# Patient Record
Sex: MALE | Race: WHITE | URBAN - METROPOLITAN AREA
[De-identification: names, ages, dates, MRNs, and addresses within clinical notes are randomized per-mention and may not be internally consistent; named-entity substitution may affect disease eponyms.]

---

## 2017-03-15 ENCOUNTER — INPATIENT (INPATIENT)
Facility: HOSPITAL | Age: 52
LOS: 1 days | Discharge: HOME | End: 2017-03-17
Attending: INTERNAL MEDICINE | Admitting: INTERNAL MEDICINE

## 2017-05-08 ENCOUNTER — EMERGENCY (EMERGENCY)
Facility: HOSPITAL | Age: 52
LOS: 0 days | Discharge: HOME | End: 2017-05-08

## 2017-05-19 ENCOUNTER — APPOINTMENT (OUTPATIENT)
Dept: PULMONOLOGY | Facility: CLINIC | Age: 52
End: 2017-05-19

## 2017-06-28 DIAGNOSIS — M53.3 SACROCOCCYGEAL DISORDERS, NOT ELSEWHERE CLASSIFIED: ICD-10-CM
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
## 2017-06-28 DIAGNOSIS — M54.5 LOW BACK PAIN: ICD-10-CM
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
## 2017-06-28 DIAGNOSIS — E66.9 OBESITY, UNSPECIFIED: ICD-10-CM
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
## 2017-06-28 DIAGNOSIS — I10 ESSENTIAL (PRIMARY) HYPERTENSION: ICD-10-CM

## 2017-06-28 DIAGNOSIS — Z79.51 LONG TERM (CURRENT) USE OF INHALED STEROIDS: ICD-10-CM

## 2017-06-28 DIAGNOSIS — R51 HEADACHE: ICD-10-CM

## 2017-06-28 DIAGNOSIS — E78.00 PURE HYPERCHOLESTEROLEMIA, UNSPECIFIED: ICD-10-CM

## 2017-06-28 DIAGNOSIS — J44.9 CHRONIC OBSTRUCTIVE PULMONARY DISEASE, UNSPECIFIED: ICD-10-CM

## 2017-06-28 DIAGNOSIS — K21.9 GASTRO-ESOPHAGEAL REFLUX DISEASE WITHOUT ESOPHAGITIS: ICD-10-CM

## 2017-08-14 ENCOUNTER — EMERGENCY (EMERGENCY)
Facility: HOSPITAL | Age: 52
LOS: 0 days | Discharge: HOME | End: 2017-08-15

## 2017-08-14 DIAGNOSIS — I10 ESSENTIAL (PRIMARY) HYPERTENSION: ICD-10-CM

## 2017-08-14 DIAGNOSIS — K21.9 GASTRO-ESOPHAGEAL REFLUX DISEASE WITHOUT ESOPHAGITIS: ICD-10-CM

## 2017-08-14 DIAGNOSIS — Z79.82 LONG TERM (CURRENT) USE OF ASPIRIN: ICD-10-CM

## 2017-08-14 DIAGNOSIS — G47.33 OBSTRUCTIVE SLEEP APNEA (ADULT) (PEDIATRIC): ICD-10-CM

## 2017-08-14 DIAGNOSIS — Z90.49 ACQUIRED ABSENCE OF OTHER SPECIFIED PARTS OF DIGESTIVE TRACT: ICD-10-CM

## 2017-08-14 DIAGNOSIS — J44.1 CHRONIC OBSTRUCTIVE PULMONARY DISEASE WITH (ACUTE) EXACERBATION: ICD-10-CM

## 2017-08-14 DIAGNOSIS — R06.02 SHORTNESS OF BREATH: ICD-10-CM

## 2017-08-14 DIAGNOSIS — S76.219A STRAIN OF ADDUCTOR MUSCLE, FASCIA AND TENDON OF UNSPECIFIED THIGH, INITIAL ENCOUNTER: ICD-10-CM

## 2017-08-14 DIAGNOSIS — M17.10 UNILATERAL PRIMARY OSTEOARTHRITIS, UNSPECIFIED KNEE: ICD-10-CM

## 2017-08-14 DIAGNOSIS — Z79.899 OTHER LONG TERM (CURRENT) DRUG THERAPY: ICD-10-CM

## 2017-08-14 DIAGNOSIS — R07.9 CHEST PAIN, UNSPECIFIED: ICD-10-CM

## 2017-08-14 DIAGNOSIS — E66.9 OBESITY, UNSPECIFIED: ICD-10-CM

## 2017-08-14 DIAGNOSIS — E78.5 HYPERLIPIDEMIA, UNSPECIFIED: ICD-10-CM

## 2017-08-14 DIAGNOSIS — Z98.890 OTHER SPECIFIED POSTPROCEDURAL STATES: ICD-10-CM

## 2017-08-14 DIAGNOSIS — E78.00 PURE HYPERCHOLESTEROLEMIA, UNSPECIFIED: ICD-10-CM

## 2017-08-14 DIAGNOSIS — J44.9 CHRONIC OBSTRUCTIVE PULMONARY DISEASE, UNSPECIFIED: ICD-10-CM

## 2017-08-14 DIAGNOSIS — R10.9 UNSPECIFIED ABDOMINAL PAIN: ICD-10-CM

## 2017-08-14 DIAGNOSIS — F17.200 NICOTINE DEPENDENCE, UNSPECIFIED, UNCOMPLICATED: ICD-10-CM

## 2017-10-11 ENCOUNTER — EMERGENCY (EMERGENCY)
Facility: HOSPITAL | Age: 52
LOS: 0 days | Discharge: HOME | End: 2017-10-11
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
## 2017-10-11 DIAGNOSIS — J44.9 CHRONIC OBSTRUCTIVE PULMONARY DISEASE, UNSPECIFIED: ICD-10-CM

## 2017-10-11 DIAGNOSIS — I10 ESSENTIAL (PRIMARY) HYPERTENSION: ICD-10-CM

## 2017-10-11 DIAGNOSIS — S93.504A UNSPECIFIED SPRAIN OF RIGHT LESSER TOE(S), INITIAL ENCOUNTER: ICD-10-CM

## 2017-10-11 DIAGNOSIS — E66.9 OBESITY, UNSPECIFIED: ICD-10-CM

## 2017-10-11 DIAGNOSIS — Z90.49 ACQUIRED ABSENCE OF OTHER SPECIFIED PARTS OF DIGESTIVE TRACT: ICD-10-CM

## 2017-10-11 DIAGNOSIS — R10.9 UNSPECIFIED ABDOMINAL PAIN: ICD-10-CM
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
## 2017-10-11 DIAGNOSIS — E78.00 PURE HYPERCHOLESTEROLEMIA, UNSPECIFIED: ICD-10-CM

## 2017-10-11 DIAGNOSIS — S76.219A STRAIN OF ADDUCTOR MUSCLE, FASCIA AND TENDON OF UNSPECIFIED THIGH, INITIAL ENCOUNTER: ICD-10-CM

## 2017-10-11 DIAGNOSIS — S90.121A CONTUSION OF RIGHT LESSER TOE(S) WITHOUT DAMAGE TO NAIL, INITIAL ENCOUNTER: ICD-10-CM

## 2017-10-11 DIAGNOSIS — R07.9 CHEST PAIN, UNSPECIFIED: ICD-10-CM

## 2017-10-11 DIAGNOSIS — Z79.82 LONG TERM (CURRENT) USE OF ASPIRIN: ICD-10-CM
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
## 2017-10-11 DIAGNOSIS — G47.33 OBSTRUCTIVE SLEEP APNEA (ADULT) (PEDIATRIC): ICD-10-CM

## 2017-10-11 DIAGNOSIS — J44.1 CHRONIC OBSTRUCTIVE PULMONARY DISEASE WITH (ACUTE) EXACERBATION: ICD-10-CM

## 2017-10-11 DIAGNOSIS — S99.921A UNSPECIFIED INJURY OF RIGHT FOOT, INITIAL ENCOUNTER: ICD-10-CM

## 2017-10-11 DIAGNOSIS — M17.10 UNILATERAL PRIMARY OSTEOARTHRITIS, UNSPECIFIED KNEE: ICD-10-CM

## 2017-10-11 DIAGNOSIS — K21.9 GASTRO-ESOPHAGEAL REFLUX DISEASE WITHOUT ESOPHAGITIS: ICD-10-CM

## 2017-10-11 DIAGNOSIS — Z98.890 OTHER SPECIFIED POSTPROCEDURAL STATES: ICD-10-CM

## 2017-12-05 DIAGNOSIS — Z96.651 PRESENCE OF RIGHT ARTIFICIAL KNEE JOINT: ICD-10-CM

## 2017-12-05 DIAGNOSIS — M17.12 UNILATERAL PRIMARY OSTEOARTHRITIS, LEFT KNEE: ICD-10-CM

## 2017-12-05 DIAGNOSIS — K21.9 GASTRO-ESOPHAGEAL REFLUX DISEASE WITHOUT ESOPHAGITIS: ICD-10-CM

## 2017-12-05 DIAGNOSIS — F11.20 OPIOID DEPENDENCE, UNCOMPLICATED: ICD-10-CM

## 2017-12-05 DIAGNOSIS — R07.9 CHEST PAIN, UNSPECIFIED: ICD-10-CM

## 2017-12-05 DIAGNOSIS — E66.8 OTHER OBESITY: ICD-10-CM

## 2017-12-05 DIAGNOSIS — G89.29 OTHER CHRONIC PAIN: ICD-10-CM

## 2017-12-05 DIAGNOSIS — J44.1 CHRONIC OBSTRUCTIVE PULMONARY DISEASE WITH (ACUTE) EXACERBATION: ICD-10-CM

## 2017-12-05 DIAGNOSIS — F17.210 NICOTINE DEPENDENCE, CIGARETTES, UNCOMPLICATED: ICD-10-CM

## 2017-12-05 DIAGNOSIS — I10 ESSENTIAL (PRIMARY) HYPERTENSION: ICD-10-CM

## 2017-12-05 DIAGNOSIS — G47.33 OBSTRUCTIVE SLEEP APNEA (ADULT) (PEDIATRIC): ICD-10-CM

## 2017-12-05 DIAGNOSIS — E78.5 HYPERLIPIDEMIA, UNSPECIFIED: ICD-10-CM

## 2017-12-05 DIAGNOSIS — I25.10 ATHEROSCLEROTIC HEART DISEASE OF NATIVE CORONARY ARTERY WITHOUT ANGINA PECTORIS: ICD-10-CM

## 2017-12-05 DIAGNOSIS — M16.0 BILATERAL PRIMARY OSTEOARTHRITIS OF HIP: ICD-10-CM

## 2017-12-05 DIAGNOSIS — K29.70 GASTRITIS, UNSPECIFIED, WITHOUT BLEEDING: ICD-10-CM

## 2017-12-23 ENCOUNTER — EMERGENCY (EMERGENCY)
Facility: HOSPITAL | Age: 52
LOS: 0 days | Discharge: HOME | End: 2017-12-24

## 2017-12-23 DIAGNOSIS — Z79.82 LONG TERM (CURRENT) USE OF ASPIRIN: ICD-10-CM

## 2017-12-23 DIAGNOSIS — Z79.3 LONG TERM (CURRENT) USE OF HORMONAL CONTRACEPTIVES: ICD-10-CM

## 2017-12-23 DIAGNOSIS — E66.9 OBESITY, UNSPECIFIED: ICD-10-CM

## 2017-12-23 DIAGNOSIS — I10 ESSENTIAL (PRIMARY) HYPERTENSION: ICD-10-CM

## 2017-12-23 DIAGNOSIS — J43.9 EMPHYSEMA, UNSPECIFIED: ICD-10-CM

## 2017-12-23 DIAGNOSIS — E78.00 PURE HYPERCHOLESTEROLEMIA, UNSPECIFIED: ICD-10-CM

## 2017-12-23 DIAGNOSIS — J44.1 CHRONIC OBSTRUCTIVE PULMONARY DISEASE WITH (ACUTE) EXACERBATION: ICD-10-CM

## 2017-12-23 DIAGNOSIS — Z79.891 LONG TERM (CURRENT) USE OF OPIATE ANALGESIC: ICD-10-CM

## 2017-12-23 DIAGNOSIS — R10.9 UNSPECIFIED ABDOMINAL PAIN: ICD-10-CM

## 2017-12-23 DIAGNOSIS — Z90.49 ACQUIRED ABSENCE OF OTHER SPECIFIED PARTS OF DIGESTIVE TRACT: ICD-10-CM

## 2017-12-23 DIAGNOSIS — G47.33 OBSTRUCTIVE SLEEP APNEA (ADULT) (PEDIATRIC): ICD-10-CM

## 2017-12-23 DIAGNOSIS — S76.219A STRAIN OF ADDUCTOR MUSCLE, FASCIA AND TENDON OF UNSPECIFIED THIGH, INITIAL ENCOUNTER: ICD-10-CM

## 2017-12-23 DIAGNOSIS — Z90.89 ACQUIRED ABSENCE OF OTHER ORGANS: ICD-10-CM

## 2017-12-23 DIAGNOSIS — M17.10 UNILATERAL PRIMARY OSTEOARTHRITIS, UNSPECIFIED KNEE: ICD-10-CM

## 2017-12-23 DIAGNOSIS — R07.9 CHEST PAIN, UNSPECIFIED: ICD-10-CM

## 2017-12-23 DIAGNOSIS — Z79.51 LONG TERM (CURRENT) USE OF INHALED STEROIDS: ICD-10-CM

## 2017-12-23 DIAGNOSIS — J44.9 CHRONIC OBSTRUCTIVE PULMONARY DISEASE, UNSPECIFIED: ICD-10-CM

## 2017-12-23 DIAGNOSIS — R07.89 OTHER CHEST PAIN: ICD-10-CM

## 2017-12-23 DIAGNOSIS — Z98.890 OTHER SPECIFIED POSTPROCEDURAL STATES: ICD-10-CM

## 2017-12-27 ENCOUNTER — INPATIENT (INPATIENT)
Facility: HOSPITAL | Age: 52
LOS: 0 days | Discharge: HOME | End: 2017-12-28
Attending: EMERGENCY MEDICINE

## 2017-12-27 DIAGNOSIS — I10 ESSENTIAL (PRIMARY) HYPERTENSION: ICD-10-CM

## 2017-12-27 DIAGNOSIS — M17.10 UNILATERAL PRIMARY OSTEOARTHRITIS, UNSPECIFIED KNEE: ICD-10-CM

## 2017-12-27 DIAGNOSIS — R07.9 CHEST PAIN, UNSPECIFIED: ICD-10-CM

## 2017-12-27 DIAGNOSIS — G47.33 OBSTRUCTIVE SLEEP APNEA (ADULT) (PEDIATRIC): ICD-10-CM

## 2017-12-27 DIAGNOSIS — E66.9 OBESITY, UNSPECIFIED: ICD-10-CM

## 2017-12-27 DIAGNOSIS — R10.9 UNSPECIFIED ABDOMINAL PAIN: ICD-10-CM

## 2017-12-27 DIAGNOSIS — J44.1 CHRONIC OBSTRUCTIVE PULMONARY DISEASE WITH (ACUTE) EXACERBATION: ICD-10-CM

## 2017-12-27 DIAGNOSIS — S76.219A STRAIN OF ADDUCTOR MUSCLE, FASCIA AND TENDON OF UNSPECIFIED THIGH, INITIAL ENCOUNTER: ICD-10-CM

## 2017-12-27 DIAGNOSIS — E78.00 PURE HYPERCHOLESTEROLEMIA, UNSPECIFIED: ICD-10-CM

## 2017-12-27 DIAGNOSIS — J44.9 CHRONIC OBSTRUCTIVE PULMONARY DISEASE, UNSPECIFIED: ICD-10-CM

## 2018-01-02 DIAGNOSIS — R05 COUGH: ICD-10-CM

## 2018-01-02 DIAGNOSIS — J44.9 CHRONIC OBSTRUCTIVE PULMONARY DISEASE, UNSPECIFIED: ICD-10-CM

## 2018-01-02 DIAGNOSIS — R07.89 OTHER CHEST PAIN: ICD-10-CM

## 2018-01-02 DIAGNOSIS — F17.210 NICOTINE DEPENDENCE, CIGARETTES, UNCOMPLICATED: ICD-10-CM

## 2018-01-02 DIAGNOSIS — E78.5 HYPERLIPIDEMIA, UNSPECIFIED: ICD-10-CM

## 2018-01-02 DIAGNOSIS — I25.10 ATHEROSCLEROTIC HEART DISEASE OF NATIVE CORONARY ARTERY WITHOUT ANGINA PECTORIS: ICD-10-CM

## 2018-01-02 DIAGNOSIS — K21.9 GASTRO-ESOPHAGEAL REFLUX DISEASE WITHOUT ESOPHAGITIS: ICD-10-CM

## 2018-01-05 ENCOUNTER — EMERGENCY (EMERGENCY)
Facility: HOSPITAL | Age: 53
LOS: 0 days | Discharge: HOME | End: 2018-01-05

## 2018-01-05 DIAGNOSIS — R11.2 NAUSEA WITH VOMITING, UNSPECIFIED: ICD-10-CM

## 2018-01-05 DIAGNOSIS — R10.31 RIGHT LOWER QUADRANT PAIN: ICD-10-CM

## 2018-01-05 DIAGNOSIS — K21.9 GASTRO-ESOPHAGEAL REFLUX DISEASE WITHOUT ESOPHAGITIS: ICD-10-CM

## 2018-01-05 DIAGNOSIS — R07.9 CHEST PAIN, UNSPECIFIED: ICD-10-CM

## 2018-01-05 DIAGNOSIS — I10 ESSENTIAL (PRIMARY) HYPERTENSION: ICD-10-CM

## 2018-01-05 DIAGNOSIS — R19.7 DIARRHEA, UNSPECIFIED: ICD-10-CM

## 2018-01-05 DIAGNOSIS — Z79.82 LONG TERM (CURRENT) USE OF ASPIRIN: ICD-10-CM

## 2018-01-05 DIAGNOSIS — J44.9 CHRONIC OBSTRUCTIVE PULMONARY DISEASE, UNSPECIFIED: ICD-10-CM

## 2018-01-05 DIAGNOSIS — M17.10 UNILATERAL PRIMARY OSTEOARTHRITIS, UNSPECIFIED KNEE: ICD-10-CM

## 2018-01-05 DIAGNOSIS — E78.00 PURE HYPERCHOLESTEROLEMIA, UNSPECIFIED: ICD-10-CM

## 2018-01-05 DIAGNOSIS — Z79.51 LONG TERM (CURRENT) USE OF INHALED STEROIDS: ICD-10-CM

## 2018-01-05 DIAGNOSIS — R10.32 LEFT LOWER QUADRANT PAIN: ICD-10-CM

## 2018-01-05 DIAGNOSIS — G89.29 OTHER CHRONIC PAIN: ICD-10-CM

## 2018-01-05 DIAGNOSIS — E66.9 OBESITY, UNSPECIFIED: ICD-10-CM

## 2018-01-05 DIAGNOSIS — J44.1 CHRONIC OBSTRUCTIVE PULMONARY DISEASE WITH (ACUTE) EXACERBATION: ICD-10-CM

## 2018-01-05 DIAGNOSIS — Z90.49 ACQUIRED ABSENCE OF OTHER SPECIFIED PARTS OF DIGESTIVE TRACT: ICD-10-CM

## 2018-01-05 DIAGNOSIS — G47.33 OBSTRUCTIVE SLEEP APNEA (ADULT) (PEDIATRIC): ICD-10-CM

## 2018-01-05 DIAGNOSIS — R10.9 UNSPECIFIED ABDOMINAL PAIN: ICD-10-CM

## 2018-01-05 DIAGNOSIS — Z79.891 LONG TERM (CURRENT) USE OF OPIATE ANALGESIC: ICD-10-CM

## 2018-01-05 DIAGNOSIS — S76.219A STRAIN OF ADDUCTOR MUSCLE, FASCIA AND TENDON OF UNSPECIFIED THIGH, INITIAL ENCOUNTER: ICD-10-CM

## 2018-04-06 ENCOUNTER — EMERGENCY (EMERGENCY)
Facility: HOSPITAL | Age: 53
LOS: 0 days | Discharge: HOME | End: 2018-04-07
Attending: EMERGENCY MEDICINE

## 2018-04-06 VITALS
TEMPERATURE: 97 F | SYSTOLIC BLOOD PRESSURE: 126 MMHG | RESPIRATION RATE: 18 BRPM | DIASTOLIC BLOOD PRESSURE: 89 MMHG | OXYGEN SATURATION: 97 % | HEART RATE: 98 BPM

## 2018-04-06 DIAGNOSIS — R06.02 SHORTNESS OF BREATH: ICD-10-CM

## 2018-04-06 DIAGNOSIS — R20.0 ANESTHESIA OF SKIN: ICD-10-CM

## 2018-04-06 DIAGNOSIS — R07.9 CHEST PAIN, UNSPECIFIED: ICD-10-CM

## 2018-04-06 DIAGNOSIS — R07.89 OTHER CHEST PAIN: ICD-10-CM

## 2018-04-06 DIAGNOSIS — E78.00 PURE HYPERCHOLESTEROLEMIA, UNSPECIFIED: ICD-10-CM

## 2018-04-06 DIAGNOSIS — F17.200 NICOTINE DEPENDENCE, UNSPECIFIED, UNCOMPLICATED: ICD-10-CM

## 2018-04-06 DIAGNOSIS — R61 GENERALIZED HYPERHIDROSIS: ICD-10-CM

## 2018-04-06 DIAGNOSIS — I10 ESSENTIAL (PRIMARY) HYPERTENSION: ICD-10-CM

## 2018-04-06 LAB
BASE EXCESS BLDV CALC-SCNC: 5.5 MMOL/L — HIGH (ref -2–2)
BASOPHILS # BLD AUTO: 0.08 K/UL — SIGNIFICANT CHANGE UP (ref 0–0.2)
BASOPHILS NFR BLD AUTO: 0.8 % — SIGNIFICANT CHANGE UP (ref 0–1)
CA-I SERPL-SCNC: 1.2 MMOL/L — SIGNIFICANT CHANGE UP (ref 1.12–1.3)
EOSINOPHIL # BLD AUTO: 0.36 K/UL — SIGNIFICANT CHANGE UP (ref 0–0.7)
EOSINOPHIL NFR BLD AUTO: 3.6 % — SIGNIFICANT CHANGE UP (ref 0–8)
GAS PNL BLDV: 137 MMOL/L — SIGNIFICANT CHANGE UP (ref 136–145)
GAS PNL BLDV: SIGNIFICANT CHANGE UP
GAS PNL BLDV: SIGNIFICANT CHANGE UP
HCO3 BLDV-SCNC: 34 MMOL/L — HIGH (ref 22–29)
HCT VFR BLD CALC: 42.5 % — SIGNIFICANT CHANGE UP (ref 42–52)
HCT VFR BLDA CALC: 45.5 % — HIGH (ref 34–44)
HGB BLD CALC-MCNC: 14.9 G/DL — SIGNIFICANT CHANGE UP (ref 14–18)
HGB BLD-MCNC: 14.3 G/DL — SIGNIFICANT CHANGE UP (ref 14–18)
IMM GRANULOCYTES NFR BLD AUTO: 0.3 % — SIGNIFICANT CHANGE UP (ref 0.1–0.3)
LACTATE BLDV-MCNC: 1.2 MMOL/L — SIGNIFICANT CHANGE UP (ref 0.5–1.6)
LYMPHOCYTES # BLD AUTO: 2.27 K/UL — SIGNIFICANT CHANGE UP (ref 1.2–3.4)
LYMPHOCYTES # BLD AUTO: 22.5 % — SIGNIFICANT CHANGE UP (ref 20.5–51.1)
MCHC RBC-ENTMCNC: 28.9 PG — SIGNIFICANT CHANGE UP (ref 27–31)
MCHC RBC-ENTMCNC: 33.6 G/DL — SIGNIFICANT CHANGE UP (ref 32–37)
MCV RBC AUTO: 86 FL — SIGNIFICANT CHANGE UP (ref 80–94)
MONOCYTES # BLD AUTO: 0.85 K/UL — HIGH (ref 0.1–0.6)
MONOCYTES NFR BLD AUTO: 8.4 % — SIGNIFICANT CHANGE UP (ref 1.7–9.3)
NEUTROPHILS # BLD AUTO: 6.51 K/UL — HIGH (ref 1.4–6.5)
NEUTROPHILS NFR BLD AUTO: 64.4 % — SIGNIFICANT CHANGE UP (ref 42.2–75.2)
NRBC # BLD: 0 /100 WBCS — SIGNIFICANT CHANGE UP (ref 0–0)
PCO2 BLDV: 64 MMHG — HIGH (ref 41–51)
PH BLDV: 7.33 — SIGNIFICANT CHANGE UP (ref 7.26–7.43)
PLATELET # BLD AUTO: 246 K/UL — SIGNIFICANT CHANGE UP (ref 130–400)
PO2 BLDV: 20 MMHG — SIGNIFICANT CHANGE UP (ref 20–40)
POTASSIUM BLDV-SCNC: 3.8 MMOL/L — SIGNIFICANT CHANGE UP (ref 3.3–5.6)
RBC # BLD: 4.94 M/UL — SIGNIFICANT CHANGE UP (ref 4.7–6.1)
RBC # FLD: 15 % — HIGH (ref 11.5–14.5)
SAO2 % BLDV: 34 % — SIGNIFICANT CHANGE UP
WBC # BLD: 10.1 K/UL — SIGNIFICANT CHANGE UP (ref 4.8–10.8)
WBC # FLD AUTO: 10.1 K/UL — SIGNIFICANT CHANGE UP (ref 4.8–10.8)

## 2018-04-06 RX ORDER — IPRATROPIUM/ALBUTEROL SULFATE 18-103MCG
3 AEROSOL WITH ADAPTER (GRAM) INHALATION
Qty: 0 | Refills: 0 | Status: DISCONTINUED | OUTPATIENT
Start: 2018-04-06 | End: 2018-04-07

## 2018-04-06 RX ORDER — ASPIRIN/CALCIUM CARB/MAGNESIUM 324 MG
325 TABLET ORAL ONCE
Qty: 0 | Refills: 0 | Status: COMPLETED | OUTPATIENT
Start: 2018-04-06 | End: 2018-04-06

## 2018-04-06 NOTE — ED PROVIDER NOTE - OBJECTIVE STATEMENT
52 y m pmh copd htn pw cp. 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 L sided chest pain with radiation to L arm. Worsened with exertion, alleviated with rest. A couple minutes pta, noticed cp and L arm numbness/tingling. Associated with SOB and diaphoresis. Denies fever, chills, n/v, abdominal pain, leg swelling, leg erythema.

## 2018-04-06 NOTE — ED PROVIDER NOTE - PHYSICAL EXAMINATION
CONSTITUTIONAL: Well-developed; well-nourished; in no acute distress.   SKIN: warm, dry  HEAD: Normocephalic; atraumatic.  EYES: PERRL, EOMI, normal sclera and conjunctiva   ENT: No nasal discharge; airway clear.  NECK: Supple; non tender.  CARD: S1, S2 normal; no murmurs, gallops, or rubs. Regular rate and rhythm.   RESP: Bilateral wheezing. No accessory muscle use, nasal flaring.   ABD: soft ntnd  EXT: Normal ROM.  No clubbing, cyanosis or edema. No posterior calf ttp.   LYMPH: No acute cervical adenopathy.  NEURO: Alert, oriented, grossly unremarkable. No cranial nerve deficits, able to ambulate well, moving all extremities well.   PSYCH: Cooperative, appropriate.

## 2018-04-06 NOTE — ED PROVIDER NOTE - ATTENDING CONTRIBUTION TO CARE
pt co cp rad to left arm after lifting 80 pound brick. pain sharp. not rad thru to back. n and sweats. pt had same presentation last year, had neg CT dissction study and unremarkable ccta. pt in nad, heent nml, neck sup, ctab, rrr, ab soft ,nt, nd. left trapezius spasm and tender. non focal. will get ekg, labs, cxr, reassess.

## 2018-04-06 NOTE — ED PROVIDER NOTE - NS ED ROS FT
Eyes:  No visual changes, eye pain or discharge.  ENMT:  No hearing changes, pain, discharge or infections. No neck pain or stiffness.  Cardiac:  CP, sob. No edema. +chest pain with exertion.  Respiratory:  SOB. No cough or respiratory distress.  GI:  No nausea, vomiting, diarrhea or abdominal pain.  MS:  No myalgia, muscle weakness, joint pain or back pain.  Neuro:  No headache or weakness.  No LOC.  Skin:  No skin rash.

## 2018-04-06 NOTE — ED ADULT NURSE NOTE - OBJECTIVE STATEMENT
Patient c/o crushing chest pain for the past 7 hours, numbness to left jaw and left arm. States SOB with exertion.

## 2018-04-07 VITALS
SYSTOLIC BLOOD PRESSURE: 135 MMHG | HEART RATE: 89 BPM | RESPIRATION RATE: 20 BRPM | OXYGEN SATURATION: 94 % | DIASTOLIC BLOOD PRESSURE: 76 MMHG

## 2018-04-07 LAB
ALBUMIN SERPL ELPH-MCNC: 4 G/DL — SIGNIFICANT CHANGE UP (ref 3.5–5.2)
ALP SERPL-CCNC: 125 U/L — HIGH (ref 30–115)
ALT FLD-CCNC: 24 U/L — SIGNIFICANT CHANGE UP (ref 0–41)
ANION GAP SERPL CALC-SCNC: 14 MMOL/L — SIGNIFICANT CHANGE UP (ref 7–14)
APTT BLD: 29.3 SEC — SIGNIFICANT CHANGE UP (ref 27–39.2)
AST SERPL-CCNC: 22 U/L — SIGNIFICANT CHANGE UP (ref 0–41)
BILIRUB SERPL-MCNC: 0.4 MG/DL — SIGNIFICANT CHANGE UP (ref 0.2–1.2)
BUN SERPL-MCNC: 12 MG/DL — SIGNIFICANT CHANGE UP (ref 10–20)
CALCIUM SERPL-MCNC: 9.2 MG/DL — SIGNIFICANT CHANGE UP (ref 8.5–10.1)
CHLORIDE SERPL-SCNC: 97 MMOL/L — LOW (ref 98–110)
CK MB CFR SERPL CALC: 3.5 NG/ML — SIGNIFICANT CHANGE UP (ref 0.6–6.3)
CK SERPL-CCNC: 159 U/L — SIGNIFICANT CHANGE UP (ref 0–225)
CO2 SERPL-SCNC: 27 MMOL/L — SIGNIFICANT CHANGE UP (ref 17–32)
CREAT SERPL-MCNC: 0.7 MG/DL — SIGNIFICANT CHANGE UP (ref 0.7–1.5)
GLUCOSE SERPL-MCNC: 113 MG/DL — HIGH (ref 70–99)
INR BLD: 1.14 RATIO — SIGNIFICANT CHANGE UP (ref 0.65–1.3)
NT-PROBNP SERPL-SCNC: 27 PG/ML — SIGNIFICANT CHANGE UP (ref 0–300)
POTASSIUM SERPL-MCNC: 5.1 MMOL/L — HIGH (ref 3.5–5)
POTASSIUM SERPL-SCNC: 5.1 MMOL/L — HIGH (ref 3.5–5)
PROT SERPL-MCNC: 6.6 G/DL — SIGNIFICANT CHANGE UP (ref 6–8)
PROTHROM AB SERPL-ACNC: 12.3 SEC — SIGNIFICANT CHANGE UP (ref 9.95–12.87)
SODIUM SERPL-SCNC: 138 MMOL/L — SIGNIFICANT CHANGE UP (ref 135–146)
TROPONIN T SERPL-MCNC: <0.01 NG/ML — SIGNIFICANT CHANGE UP
TROPONIN T SERPL-MCNC: <0.01 NG/ML — SIGNIFICANT CHANGE UP

## 2018-04-07 RX ORDER — ONDANSETRON 8 MG/1
4 TABLET, FILM COATED ORAL ONCE
Qty: 0 | Refills: 0 | Status: COMPLETED | OUTPATIENT
Start: 2018-04-07 | End: 2018-04-07

## 2018-04-07 RX ORDER — ONDANSETRON 8 MG/1
8 TABLET, FILM COATED ORAL ONCE
Qty: 0 | Refills: 0 | Status: COMPLETED | OUTPATIENT
Start: 2018-04-07 | End: 2018-04-07

## 2018-04-07 RX ORDER — KETOROLAC TROMETHAMINE 30 MG/ML
15 SYRINGE (ML) INJECTION ONCE
Qty: 0 | Refills: 0 | Status: DISCONTINUED | OUTPATIENT
Start: 2018-04-07 | End: 2018-04-07

## 2018-04-07 RX ORDER — ACETAMINOPHEN 500 MG
650 TABLET ORAL ONCE
Qty: 0 | Refills: 0 | Status: COMPLETED | OUTPATIENT
Start: 2018-04-07 | End: 2018-04-07

## 2018-04-07 RX ADMIN — Medication 3 MILLILITER(S): at 00:00

## 2018-04-07 RX ADMIN — Medication 325 MILLIGRAM(S): at 00:00

## 2018-04-07 RX ADMIN — Medication 15 MILLIGRAM(S): at 01:58

## 2018-04-07 RX ADMIN — Medication 3 MILLILITER(S): at 01:16

## 2018-04-07 RX ADMIN — Medication 15 MILLIGRAM(S): at 01:15

## 2018-04-07 RX ADMIN — ONDANSETRON 8 MILLIGRAM(S): 8 TABLET, FILM COATED ORAL at 01:16

## 2018-04-07 RX ADMIN — Medication 650 MILLIGRAM(S): at 02:58

## 2018-04-15 ENCOUNTER — EMERGENCY (EMERGENCY)
Facility: HOSPITAL | Age: 53
LOS: 0 days | Discharge: HOME | End: 2018-04-15

## 2018-04-15 VITALS
DIASTOLIC BLOOD PRESSURE: 73 MMHG | OXYGEN SATURATION: 98 % | RESPIRATION RATE: 18 BRPM | TEMPERATURE: 98 F | SYSTOLIC BLOOD PRESSURE: 130 MMHG | HEART RATE: 87 BPM
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
## 2018-04-15 DIAGNOSIS — M25.512 PAIN IN LEFT SHOULDER: ICD-10-CM

## 2018-04-15 DIAGNOSIS — I10 ESSENTIAL (PRIMARY) HYPERTENSION: ICD-10-CM
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
## 2018-04-15 DIAGNOSIS — J44.9 CHRONIC OBSTRUCTIVE PULMONARY DISEASE, UNSPECIFIED: ICD-10-CM

## 2018-04-15 NOTE — ED PROVIDER NOTE - PHYSICAL EXAMINATION
VITAL SIGNS: I have reviewed nursing notes and confirm.  CONSTITUTIONAL: Well-developed; well-nourished; in no acute distress.   SKIN:  skin exam is warm and dry, no acute rash.    HEAD: Normocephalic; atraumatic.  EYES: conjunctiva and sclera clear.  ENT: No nasal discharge; airway clear.  EXT: mild area of swelling to medial left bicep, TTP in that region, pt has no gross deformities indicative of biceps tendon rupture, pt able to flex/extend at elbow, limited in LUE due to pain, radial pulses present, sensation intact, Normal ROM.  No clubbing, cyanosis   NEURO: Alert, oriented, grossly unremarkable

## 2018-04-15 NOTE — ED PROVIDER NOTE - PROGRESS NOTE DETAILS
pt has possible soft tissue injury to left arm, pt informed will benefit from MRI as outpatient, will give pt ortho f/u

## 2018-04-15 NOTE — ED PROVIDER NOTE - OBJECTIVE STATEMENT
Pt is a 51y/o male with a pmhx of COPD, HTN presents today c/o left shoulder pain x 2 weeks after his dog jumped into his arm, hyperextending it at shoulder. Pt sts pain was slightly worse today which is why pt came to ED. Pt denies weakness, numbness, paresthesias, diffuse edema.

## 2018-04-15 NOTE — ED PROVIDER NOTE - NS ED ROS FT
MS:  + shoulder pain, No myalgia, muscle weakness, back pain.  Neuro:  No headache or weakness.  No LOC.  Skin:  No skin rash.   Endocrine: No history of thyroid disease or diabetes.  Except as documented in the HPI,  all other systems are negative.

## 2018-04-15 NOTE — ED ADULT NURSE NOTE - OBJECTIVE STATEMENT
left shoulder pain x 2 weeks after his dog jumped into his arm, hyperextending it at should, denies any numbness/tingling

## 2018-04-20 ENCOUNTER — EMERGENCY (EMERGENCY)
Facility: HOSPITAL | Age: 53
LOS: 0 days | Discharge: HOME | End: 2018-04-20
Attending: EMERGENCY MEDICINE | Admitting: EMERGENCY MEDICINE

## 2018-04-20 VITALS
OXYGEN SATURATION: 96 % | WEIGHT: 315 LBS | HEART RATE: 89 BPM | TEMPERATURE: 98 F | RESPIRATION RATE: 20 BRPM | DIASTOLIC BLOOD PRESSURE: 76 MMHG | SYSTOLIC BLOOD PRESSURE: 135 MMHG

## 2018-04-20 DIAGNOSIS — E78.00 PURE HYPERCHOLESTEROLEMIA, UNSPECIFIED: ICD-10-CM

## 2018-04-20 DIAGNOSIS — I10 ESSENTIAL (PRIMARY) HYPERTENSION: ICD-10-CM

## 2018-04-20 DIAGNOSIS — K21.9 GASTRO-ESOPHAGEAL REFLUX DISEASE WITHOUT ESOPHAGITIS: ICD-10-CM

## 2018-04-20 DIAGNOSIS — Z87.891 PERSONAL HISTORY OF NICOTINE DEPENDENCE: ICD-10-CM

## 2018-04-20 DIAGNOSIS — J44.9 CHRONIC OBSTRUCTIVE PULMONARY DISEASE, UNSPECIFIED: ICD-10-CM

## 2018-04-20 DIAGNOSIS — R11.2 NAUSEA WITH VOMITING, UNSPECIFIED: ICD-10-CM

## 2018-04-20 DIAGNOSIS — F03.90 UNSPECIFIED DEMENTIA WITHOUT BEHAVIORAL DISTURBANCE: ICD-10-CM

## 2018-04-20 DIAGNOSIS — R10.84 GENERALIZED ABDOMINAL PAIN: ICD-10-CM

## 2018-04-20 DIAGNOSIS — Z90.49 ACQUIRED ABSENCE OF OTHER SPECIFIED PARTS OF DIGESTIVE TRACT: ICD-10-CM

## 2018-04-20 DIAGNOSIS — R10.9 UNSPECIFIED ABDOMINAL PAIN: ICD-10-CM

## 2018-04-20 LAB
ALBUMIN SERPL ELPH-MCNC: 4.1 G/DL — SIGNIFICANT CHANGE UP (ref 3.5–5.2)
ALP SERPL-CCNC: 113 U/L — SIGNIFICANT CHANGE UP (ref 30–115)
ALT FLD-CCNC: 18 U/L — SIGNIFICANT CHANGE UP (ref 0–41)
ANION GAP SERPL CALC-SCNC: 16 MMOL/L — HIGH (ref 7–14)
AST SERPL-CCNC: 46 U/L — HIGH (ref 0–41)
BASOPHILS # BLD AUTO: 0.08 K/UL — SIGNIFICANT CHANGE UP (ref 0–0.2)
BASOPHILS NFR BLD AUTO: 0.6 % — SIGNIFICANT CHANGE UP (ref 0–1)
BILIRUB DIRECT SERPL-MCNC: <0.2 MG/DL — SIGNIFICANT CHANGE UP (ref 0–0.2)
BILIRUB INDIRECT FLD-MCNC: >0.4 MG/DL — SIGNIFICANT CHANGE UP (ref 0.2–1.2)
BILIRUB SERPL-MCNC: 0.6 MG/DL — SIGNIFICANT CHANGE UP (ref 0.2–1.2)
BUN SERPL-MCNC: 8 MG/DL — LOW (ref 10–20)
CALCIUM SERPL-MCNC: 9.4 MG/DL — SIGNIFICANT CHANGE UP (ref 8.5–10.1)
CHLORIDE SERPL-SCNC: 96 MMOL/L — LOW (ref 98–110)
CK SERPL-CCNC: 199 U/L — SIGNIFICANT CHANGE UP (ref 0–225)
CO2 SERPL-SCNC: 25 MMOL/L — SIGNIFICANT CHANGE UP (ref 17–32)
CREAT SERPL-MCNC: 0.8 MG/DL — SIGNIFICANT CHANGE UP (ref 0.7–1.5)
EOSINOPHIL # BLD AUTO: 0.07 K/UL — SIGNIFICANT CHANGE UP (ref 0–0.7)
EOSINOPHIL NFR BLD AUTO: 0.5 % — SIGNIFICANT CHANGE UP (ref 0–8)
GLUCOSE SERPL-MCNC: 123 MG/DL — HIGH (ref 70–99)
HCT VFR BLD CALC: 46.5 % — SIGNIFICANT CHANGE UP (ref 42–52)
HGB BLD-MCNC: 16 G/DL — SIGNIFICANT CHANGE UP (ref 14–18)
IMM GRANULOCYTES NFR BLD AUTO: 0.3 % — SIGNIFICANT CHANGE UP (ref 0.1–0.3)
LIDOCAIN IGE QN: 23 U/L — SIGNIFICANT CHANGE UP (ref 7–60)
LYMPHOCYTES # BLD AUTO: 13.8 % — LOW (ref 20.5–51.1)
LYMPHOCYTES # BLD AUTO: 2.01 K/UL — SIGNIFICANT CHANGE UP (ref 1.2–3.4)
MCHC RBC-ENTMCNC: 28.9 PG — SIGNIFICANT CHANGE UP (ref 27–31)
MCHC RBC-ENTMCNC: 34.4 G/DL — SIGNIFICANT CHANGE UP (ref 32–37)
MCV RBC AUTO: 84.1 FL — SIGNIFICANT CHANGE UP (ref 80–94)
MONOCYTES # BLD AUTO: 0.76 K/UL — HIGH (ref 0.1–0.6)
MONOCYTES NFR BLD AUTO: 5.2 % — SIGNIFICANT CHANGE UP (ref 1.7–9.3)
NEUTROPHILS # BLD AUTO: 11.57 K/UL — HIGH (ref 1.4–6.5)
NEUTROPHILS NFR BLD AUTO: 79.6 % — HIGH (ref 42.2–75.2)
NRBC # BLD: 0 /100 WBCS — SIGNIFICANT CHANGE UP (ref 0–0)
PLATELET # BLD AUTO: 328 K/UL — SIGNIFICANT CHANGE UP (ref 130–400)
POTASSIUM SERPL-MCNC: 5.6 MMOL/L — HIGH (ref 3.5–5)
POTASSIUM SERPL-SCNC: 5.6 MMOL/L — HIGH (ref 3.5–5)
PROT SERPL-MCNC: 8.2 G/DL — HIGH (ref 6–8)
RBC # BLD: 5.53 M/UL — SIGNIFICANT CHANGE UP (ref 4.7–6.1)
RBC # FLD: 14.7 % — HIGH (ref 11.5–14.5)
SODIUM SERPL-SCNC: 137 MMOL/L — SIGNIFICANT CHANGE UP (ref 135–146)
TROPONIN T SERPL-MCNC: <0.01 NG/ML — SIGNIFICANT CHANGE UP
WBC # BLD: 14.54 K/UL — HIGH (ref 4.8–10.8)
WBC # FLD AUTO: 14.54 K/UL — HIGH (ref 4.8–10.8)

## 2018-04-20 RX ORDER — MORPHINE SULFATE 50 MG/1
6 CAPSULE, EXTENDED RELEASE ORAL ONCE
Qty: 0 | Refills: 0 | Status: DISCONTINUED | OUTPATIENT
Start: 2018-04-20 | End: 2018-04-20

## 2018-04-20 RX ORDER — SODIUM CHLORIDE 9 MG/ML
3 INJECTION INTRAMUSCULAR; INTRAVENOUS; SUBCUTANEOUS ONCE
Qty: 0 | Refills: 0 | Status: COMPLETED | OUTPATIENT
Start: 2018-04-20 | End: 2018-04-20

## 2018-04-20 RX ORDER — ONDANSETRON 8 MG/1
4 TABLET, FILM COATED ORAL ONCE
Qty: 0 | Refills: 0 | Status: COMPLETED | OUTPATIENT
Start: 2018-04-20 | End: 2018-04-20

## 2018-04-20 RX ORDER — SODIUM CHLORIDE 9 MG/ML
1000 INJECTION INTRAMUSCULAR; INTRAVENOUS; SUBCUTANEOUS ONCE
Qty: 0 | Refills: 0 | Status: COMPLETED | OUTPATIENT
Start: 2018-04-20 | End: 2018-04-20

## 2018-04-20 RX ORDER — MORPHINE SULFATE 50 MG/1
4 CAPSULE, EXTENDED RELEASE ORAL ONCE
Qty: 0 | Refills: 0 | Status: DISCONTINUED | OUTPATIENT
Start: 2018-04-20 | End: 2018-04-20

## 2018-04-20 RX ORDER — IPRATROPIUM/ALBUTEROL SULFATE 18-103MCG
3 AEROSOL WITH ADAPTER (GRAM) INHALATION ONCE
Qty: 0 | Refills: 0 | Status: COMPLETED | OUTPATIENT
Start: 2018-04-20 | End: 2018-04-20

## 2018-04-20 RX ORDER — ASPIRIN/CALCIUM CARB/MAGNESIUM 324 MG
325 TABLET ORAL ONCE
Qty: 0 | Refills: 0 | Status: COMPLETED | OUTPATIENT
Start: 2018-04-20 | End: 2018-04-20

## 2018-04-20 RX ORDER — OXYCODONE AND ACETAMINOPHEN 5; 325 MG/1; MG/1
1 TABLET ORAL ONCE
Qty: 0 | Refills: 0 | Status: DISCONTINUED | OUTPATIENT
Start: 2018-04-20 | End: 2018-04-20

## 2018-04-20 RX ADMIN — Medication 325 MILLIGRAM(S): at 20:38

## 2018-04-20 RX ADMIN — MORPHINE SULFATE 6 MILLIGRAM(S): 50 CAPSULE, EXTENDED RELEASE ORAL at 21:39

## 2018-04-20 RX ADMIN — Medication 3 MILLILITER(S): at 22:00

## 2018-04-20 RX ADMIN — MORPHINE SULFATE 6 MILLIGRAM(S): 50 CAPSULE, EXTENDED RELEASE ORAL at 22:00

## 2018-04-20 RX ADMIN — OXYCODONE AND ACETAMINOPHEN 1 TABLET(S): 5; 325 TABLET ORAL at 23:54

## 2018-04-20 RX ADMIN — ONDANSETRON 4 MILLIGRAM(S): 8 TABLET, FILM COATED ORAL at 21:37

## 2018-04-20 RX ADMIN — MORPHINE SULFATE 6 MILLIGRAM(S): 50 CAPSULE, EXTENDED RELEASE ORAL at 22:55

## 2018-04-20 RX ADMIN — MORPHINE SULFATE 6 MILLIGRAM(S): 50 CAPSULE, EXTENDED RELEASE ORAL at 21:45

## 2018-04-20 NOTE — ED PROVIDER NOTE - OBJECTIVE STATEMENT
Pt is a 53y/o male with a pmhx of COPD, HTN, Chronic pain on opioids presents today c/o n/v with abd/chest discomfort that started today. Pt denies fever, chills, SOB.

## 2018-04-20 NOTE — ED ADULT NURSE NOTE - PMH
COPD (chronic obstructive pulmonary disease)    GERD (gastroesophageal reflux disease)    High cholesterol    HTN (hypertension)

## 2018-04-20 NOTE — ED ADULT NURSE REASSESSMENT NOTE - NS ED NURSE REASSESS COMMENT FT1
Pt constantly shoving fingers down throat to induce vomiting. Pt asking for pain meds. Pt resting comfortably, no distress noted.

## 2018-04-20 NOTE — ED PROVIDER NOTE - NS ED ROS FT
ENMT:  No hearing changes, pain, discharge or infections. No neck pain or stiffness.  Cardiac:  No chest pain, SOB or edema. No chest pain with exertion.  Respiratory:  No cough or respiratory distress. No hemoptysis. No history of asthma or RAD.  GI:  + nausea, + vomiting, No diarrhea  MS:  No myalgia, muscle weakness, joint pain or back pain.  Neuro:  No headache or weakness.  No LOC.  Skin:  No skin rash.   Endocrine: No history of thyroid disease or diabetes.  Except as documented in the HPI,  all other systems are negative.

## 2018-04-20 NOTE — ED PROVIDER NOTE - ATTENDING CONTRIBUTION TO CARE
53 y/o M here with generalized AP that started today and constant tight CP that feels like his COPD that started around 11am today. No cough or fever.  Pt states he had diarrhea all last week and today around 5/6pm started vomiting. Pt states that he has been taking his pain meds today.  Pt with chronic pain that is treated with morphine, oxycodone, and hydromorphone.  His most recent prescriptions as per [facility] are 30 tabs of morphine ER 100mg filled on 4/5/18, 180tabs of oxycodone 30mg filled on 4/6/18 and 90 tabs of hydromorphone filled on 4/16/18.  Pt also has been noted by both the nursing staff and the [facility] to be sticking his fingers in his throat to gag himself in order to vomit.  Pt was seen here by me in Dec for the same sxs, although stated at that time that he could not keep down his pain meds.  EXAM: well appearing. NAD. s1s2, reg. b/l rhonchi, no wheezing or resp distress. abd soft, nd, nt. no calf swelling or ttp.  P: labs, ivf, zofran, ekg, cxr, nebs.

## 2018-04-20 NOTE — ED ADULT NURSE NOTE - OBJECTIVE STATEMENT
PT presents with one day of n/v/d, stating he has chest pain. Pt shoving fingers down throat, stating "it makes it come out" and "it helps me feel better." Pt on chronic pain meds

## 2018-04-20 NOTE — ED ADULT NURSE NOTE - CHIEF COMPLAINT QUOTE
"My belly pains started this morning at 8, I've been vomiting and having diarrhea. At noontime, I started having chest pains."

## 2018-04-20 NOTE — ED PROVIDER NOTE - PROGRESS NOTE DETAILS
pt has been sticking fingers down throat constantly forcing himself mto vomit, constantly asking for morphine, 1st dose infiltrated, ordered another 6 mg morphine 2nd line blew, morphine ordered 6mg IM PT STILL STICKING FINGERS DOWN THROAT FORCING HIM TO VOMIT pt given results

## 2018-04-20 NOTE — ED ADULT NURSE REASSESSMENT NOTE - NS ED NURSE REASSESS COMMENT FT1
Pt shoving fingers down throat. Pt states "it makes it come out."    Pt is a difficult stick, three attempts made. PA made andie

## 2018-04-20 NOTE — ED PROVIDER NOTE - PHYSICAL EXAMINATION
VITAL SIGNS: I have reviewed nursing notes and confirm.  CONSTITUTIONAL: inducing vomiting by sticking fingers down throat   SKIN: skin exam warm, dry   HEAD: Normocephalic; atraumatic.  EYES:  conjunctiva and sclera clear.  ENT: No nasal discharge; airway clear.  CARD: S1, S2 normal; no murmurs, gallops, or rubs. Regular rate and rhythm.   RESP: No wheezes, rales or rhonchi.  ABD: Normal bowel sounds; soft; non-distended; non-tender  EXT: Normal ROM.  No clubbing, cyanosis or edema.   NEURO: Alert, oriented, grossly unremarkable

## 2018-04-21 DIAGNOSIS — Z98.890 OTHER SPECIFIED POSTPROCEDURAL STATES: Chronic | ICD-10-CM

## 2018-04-22 ENCOUNTER — EMERGENCY (EMERGENCY)
Facility: HOSPITAL | Age: 53
LOS: 0 days | Discharge: HOME | End: 2018-04-22
Attending: EMERGENCY MEDICINE | Admitting: EMERGENCY MEDICINE

## 2018-04-22 VITALS
RESPIRATION RATE: 18 BRPM | HEART RATE: 75 BPM | TEMPERATURE: 97 F | DIASTOLIC BLOOD PRESSURE: 78 MMHG | OXYGEN SATURATION: 99 % | SYSTOLIC BLOOD PRESSURE: 144 MMHG

## 2018-04-22 VITALS — TEMPERATURE: 98 F | HEART RATE: 89 BPM

## 2018-04-22 DIAGNOSIS — R10.9 UNSPECIFIED ABDOMINAL PAIN: ICD-10-CM

## 2018-04-22 DIAGNOSIS — Z79.82 LONG TERM (CURRENT) USE OF ASPIRIN: ICD-10-CM

## 2018-04-22 DIAGNOSIS — R10.84 GENERALIZED ABDOMINAL PAIN: ICD-10-CM

## 2018-04-22 DIAGNOSIS — K21.9 GASTRO-ESOPHAGEAL REFLUX DISEASE WITHOUT ESOPHAGITIS: ICD-10-CM

## 2018-04-22 DIAGNOSIS — J44.9 CHRONIC OBSTRUCTIVE PULMONARY DISEASE, UNSPECIFIED: ICD-10-CM

## 2018-04-22 DIAGNOSIS — F17.200 NICOTINE DEPENDENCE, UNSPECIFIED, UNCOMPLICATED: ICD-10-CM

## 2018-04-22 DIAGNOSIS — Z90.49 ACQUIRED ABSENCE OF OTHER SPECIFIED PARTS OF DIGESTIVE TRACT: ICD-10-CM

## 2018-04-22 DIAGNOSIS — Z98.890 OTHER SPECIFIED POSTPROCEDURAL STATES: Chronic | ICD-10-CM

## 2018-04-22 DIAGNOSIS — Z79.899 OTHER LONG TERM (CURRENT) DRUG THERAPY: ICD-10-CM

## 2018-04-22 DIAGNOSIS — I10 ESSENTIAL (PRIMARY) HYPERTENSION: ICD-10-CM

## 2018-04-22 DIAGNOSIS — Z72.89 OTHER PROBLEMS RELATED TO LIFESTYLE: ICD-10-CM

## 2018-04-22 DIAGNOSIS — E78.00 PURE HYPERCHOLESTEROLEMIA, UNSPECIFIED: ICD-10-CM

## 2018-04-22 RX ORDER — ONDANSETRON 8 MG/1
4 TABLET, FILM COATED ORAL ONCE
Qty: 0 | Refills: 0 | Status: COMPLETED | OUTPATIENT
Start: 2018-04-22 | End: 2018-04-22

## 2018-04-22 RX ADMIN — ONDANSETRON 4 MILLIGRAM(S): 8 TABLET, FILM COATED ORAL at 07:50

## 2018-04-22 RX ADMIN — Medication 20 MILLIGRAM(S): at 07:50

## 2018-04-22 NOTE — ED PROVIDER NOTE - OBJECTIVE STATEMENT
pt co diffsue ab pain. chronic, going on for years. here freq for same. has had several neg CT scans. he is requesting IV dilaudid by name. no fevers or chills. no n, v, d.

## 2018-06-09 ENCOUNTER — EMERGENCY (EMERGENCY)
Facility: HOSPITAL | Age: 53
LOS: 0 days | Discharge: HOME | End: 2018-06-09
Attending: EMERGENCY MEDICINE | Admitting: EMERGENCY MEDICINE

## 2018-06-09 VITALS
HEART RATE: 85 BPM | DIASTOLIC BLOOD PRESSURE: 89 MMHG | RESPIRATION RATE: 16 BRPM | SYSTOLIC BLOOD PRESSURE: 156 MMHG | OXYGEN SATURATION: 97 % | TEMPERATURE: 99 F

## 2018-06-09 DIAGNOSIS — J44.9 CHRONIC OBSTRUCTIVE PULMONARY DISEASE, UNSPECIFIED: ICD-10-CM

## 2018-06-09 DIAGNOSIS — L03.114 CELLULITIS OF LEFT UPPER LIMB: ICD-10-CM

## 2018-06-09 DIAGNOSIS — Z79.82 LONG TERM (CURRENT) USE OF ASPIRIN: ICD-10-CM

## 2018-06-09 DIAGNOSIS — Z90.49 ACQUIRED ABSENCE OF OTHER SPECIFIED PARTS OF DIGESTIVE TRACT: ICD-10-CM

## 2018-06-09 DIAGNOSIS — M79.603 PAIN IN ARM, UNSPECIFIED: ICD-10-CM

## 2018-06-09 DIAGNOSIS — E78.00 PURE HYPERCHOLESTEROLEMIA, UNSPECIFIED: ICD-10-CM

## 2018-06-09 DIAGNOSIS — Z98.890 OTHER SPECIFIED POSTPROCEDURAL STATES: Chronic | ICD-10-CM

## 2018-06-09 DIAGNOSIS — K21.9 GASTRO-ESOPHAGEAL REFLUX DISEASE WITHOUT ESOPHAGITIS: ICD-10-CM

## 2018-06-09 DIAGNOSIS — Z79.2 LONG TERM (CURRENT) USE OF ANTIBIOTICS: ICD-10-CM

## 2018-06-09 DIAGNOSIS — Z79.899 OTHER LONG TERM (CURRENT) DRUG THERAPY: ICD-10-CM

## 2018-06-09 DIAGNOSIS — I10 ESSENTIAL (PRIMARY) HYPERTENSION: ICD-10-CM

## 2018-06-09 RX ORDER — CEPHALEXIN 500 MG
1 CAPSULE ORAL
Qty: 40 | Refills: 0 | OUTPATIENT
Start: 2018-06-09 | End: 2018-06-18

## 2018-06-09 NOTE — ED ADULT NURSE NOTE - OBJECTIVE STATEMENT
pt presents to the ED with c/o left upper arm swelling and redness for 1 month that is worsening the past 2 days. pt denies any fevers/chills, cp/sob, n/v/d.

## 2018-06-09 NOTE — ED PROVIDER NOTE - PHYSICAL EXAMINATION
Well appearing NAD non toxic. NCAT EOMI conjunctiva nml. No nasal discharge. MMM. Neck supple, non tender, full ROM. RRR no MRG +S1S2. CTA b/l. Abd s NT ND +BS. Ext WWP x4, moving all extremities, no edema. 2+ equal pulses throughout. LUE distal upper arm with some edema/swelling, soft, mild overlying erythema, tender, no fluctuance/induration

## 2018-06-09 NOTE — ED PROVIDER NOTE - OBJECTIVE STATEMENT
53yo M hx HTN DL COPD otherwise healthy pw LUE pain/swelling x1mo intermittently but for last 2d getting progressively worse- no f/c/n/v/d, chest pain, shortness of breath, numbness/weakness, trauma, open wound, drainage, dysuria/hematuria, back pain

## 2018-06-09 NOTE — ED PROVIDER NOTE - PROGRESS NOTE DETAILS
bedside sono showing cobblestoning w no fluid collection- will dc on PO abx, return precautions given

## 2018-06-09 NOTE — ED PROVIDER NOTE - ATTENDING CONTRIBUTION TO CARE
52M pmh copd, htn, hl, p/w redness, pain, swelling to L upper inner arm. no fever, chills. no trauma. no numbness, weakness, tingling. symptoms red/warm x today. pain and swelling x 1 mo. no cp, sob. no abd pain, nvdc. on exam, AFVSS, well maria luz nad, ncat, eomi, perrla, mmm, lctab, rrr nl s1s2 no mrg, abd soft ntnd, aaox3, no focal deficits, no le edema or calf ttp, morbid obesity. L  upper arm inner aspect mild erythema 5x3 cm w mild induration, mild edema, no fluctuance, no crepitus, no discharge or ulcer, from, 5/5 motor, silt, a/p; Will treat cellultiis w keflex, f/u pmd 1-2 days wound check or ED, bedside sono no fluid collection, strict return precautions provided .NV intact.

## 2018-07-03 ENCOUNTER — EMERGENCY (EMERGENCY)
Facility: HOSPITAL | Age: 53
LOS: 0 days | Discharge: LEFT BEFORE DISCHARGE | End: 2018-07-03
Attending: EMERGENCY MEDICINE | Admitting: EMERGENCY MEDICINE

## 2018-07-03 VITALS
SYSTOLIC BLOOD PRESSURE: 138 MMHG | TEMPERATURE: 97 F | OXYGEN SATURATION: 98 % | RESPIRATION RATE: 20 BRPM | HEART RATE: 76 BPM | DIASTOLIC BLOOD PRESSURE: 82 MMHG

## 2018-07-03 DIAGNOSIS — F17.210 NICOTINE DEPENDENCE, CIGARETTES, UNCOMPLICATED: ICD-10-CM

## 2018-07-03 DIAGNOSIS — I10 ESSENTIAL (PRIMARY) HYPERTENSION: ICD-10-CM

## 2018-07-03 DIAGNOSIS — K21.9 GASTRO-ESOPHAGEAL REFLUX DISEASE WITHOUT ESOPHAGITIS: ICD-10-CM

## 2018-07-03 DIAGNOSIS — Z96.651 PRESENCE OF RIGHT ARTIFICIAL KNEE JOINT: Chronic | ICD-10-CM

## 2018-07-03 DIAGNOSIS — Z90.49 ACQUIRED ABSENCE OF OTHER SPECIFIED PARTS OF DIGESTIVE TRACT: ICD-10-CM

## 2018-07-03 DIAGNOSIS — Z90.89 ACQUIRED ABSENCE OF OTHER ORGANS: ICD-10-CM

## 2018-07-03 DIAGNOSIS — Z79.899 OTHER LONG TERM (CURRENT) DRUG THERAPY: ICD-10-CM

## 2018-07-03 DIAGNOSIS — R42 DIZZINESS AND GIDDINESS: ICD-10-CM

## 2018-07-03 DIAGNOSIS — Z79.82 LONG TERM (CURRENT) USE OF ASPIRIN: ICD-10-CM

## 2018-07-03 DIAGNOSIS — J44.9 CHRONIC OBSTRUCTIVE PULMONARY DISEASE, UNSPECIFIED: ICD-10-CM

## 2018-07-03 DIAGNOSIS — R07.9 CHEST PAIN, UNSPECIFIED: ICD-10-CM

## 2018-07-03 DIAGNOSIS — Z98.890 OTHER SPECIFIED POSTPROCEDURAL STATES: Chronic | ICD-10-CM

## 2018-07-03 DIAGNOSIS — E78.00 PURE HYPERCHOLESTEROLEMIA, UNSPECIFIED: ICD-10-CM

## 2018-07-03 DIAGNOSIS — R11.0 NAUSEA: ICD-10-CM

## 2018-07-03 DIAGNOSIS — R07.89 OTHER CHEST PAIN: ICD-10-CM

## 2018-07-03 LAB
ALBUMIN SERPL ELPH-MCNC: 3.7 G/DL — SIGNIFICANT CHANGE UP (ref 3.5–5.2)
ALP SERPL-CCNC: 104 U/L — SIGNIFICANT CHANGE UP (ref 30–115)
ALT FLD-CCNC: 16 U/L — SIGNIFICANT CHANGE UP (ref 0–41)
ANION GAP SERPL CALC-SCNC: 14 MMOL/L — SIGNIFICANT CHANGE UP (ref 7–14)
AST SERPL-CCNC: 19 U/L — SIGNIFICANT CHANGE UP (ref 0–41)
BILIRUB SERPL-MCNC: 0.6 MG/DL — SIGNIFICANT CHANGE UP (ref 0.2–1.2)
BUN SERPL-MCNC: 9 MG/DL — LOW (ref 10–20)
CALCIUM SERPL-MCNC: 9 MG/DL — SIGNIFICANT CHANGE UP (ref 8.5–10.1)
CHLORIDE SERPL-SCNC: 101 MMOL/L — SIGNIFICANT CHANGE UP (ref 98–110)
CK MB CFR SERPL CALC: 2.2 NG/ML — SIGNIFICANT CHANGE UP (ref 0.6–6.3)
CK SERPL-CCNC: 122 U/L — SIGNIFICANT CHANGE UP (ref 0–225)
CO2 SERPL-SCNC: 23 MMOL/L — SIGNIFICANT CHANGE UP (ref 17–32)
CREAT SERPL-MCNC: 0.8 MG/DL — SIGNIFICANT CHANGE UP (ref 0.7–1.5)
GLUCOSE SERPL-MCNC: 101 MG/DL — HIGH (ref 70–99)
HCT VFR BLD CALC: 43.6 % — SIGNIFICANT CHANGE UP (ref 42–52)
HGB BLD-MCNC: 15 G/DL — SIGNIFICANT CHANGE UP (ref 14–18)
MCHC RBC-ENTMCNC: 28.8 PG — SIGNIFICANT CHANGE UP (ref 27–31)
MCHC RBC-ENTMCNC: 34.4 G/DL — SIGNIFICANT CHANGE UP (ref 32–37)
MCV RBC AUTO: 83.8 FL — SIGNIFICANT CHANGE UP (ref 80–94)
NRBC # BLD: 0 /100 WBCS — SIGNIFICANT CHANGE UP (ref 0–0)
PLATELET # BLD AUTO: 226 K/UL — SIGNIFICANT CHANGE UP (ref 130–400)
POTASSIUM SERPL-MCNC: 4.4 MMOL/L — SIGNIFICANT CHANGE UP (ref 3.5–5)
POTASSIUM SERPL-SCNC: 4.4 MMOL/L — SIGNIFICANT CHANGE UP (ref 3.5–5)
PROT SERPL-MCNC: 6.4 G/DL — SIGNIFICANT CHANGE UP (ref 6–8)
RBC # BLD: 5.2 M/UL — SIGNIFICANT CHANGE UP (ref 4.7–6.1)
RBC # FLD: 14.2 % — SIGNIFICANT CHANGE UP (ref 11.5–14.5)
SODIUM SERPL-SCNC: 138 MMOL/L — SIGNIFICANT CHANGE UP (ref 135–146)
TROPONIN T SERPL-MCNC: <0.01 NG/ML — SIGNIFICANT CHANGE UP
WBC # BLD: 10.48 K/UL — SIGNIFICANT CHANGE UP (ref 4.8–10.8)
WBC # FLD AUTO: 10.48 K/UL — SIGNIFICANT CHANGE UP (ref 4.8–10.8)

## 2018-07-03 RX ORDER — ONDANSETRON 8 MG/1
4 TABLET, FILM COATED ORAL ONCE
Qty: 0 | Refills: 0 | Status: COMPLETED | OUTPATIENT
Start: 2018-07-03 | End: 2018-07-03

## 2018-07-03 RX ORDER — ASPIRIN/CALCIUM CARB/MAGNESIUM 324 MG
325 TABLET ORAL DAILY
Qty: 0 | Refills: 0 | Status: DISCONTINUED | OUTPATIENT
Start: 2018-07-03 | End: 2018-07-03

## 2018-07-03 RX ADMIN — ONDANSETRON 4 MILLIGRAM(S): 8 TABLET, FILM COATED ORAL at 06:40

## 2018-07-03 RX ADMIN — Medication 325 MILLIGRAM(S): at 06:15

## 2018-07-03 NOTE — ED PROVIDER NOTE - MEDICAL DECISION MAKING DETAILS
chest pain, No  obstructive  coronary  disease on CCTA in 2015, component of drug seeking based on previous charts and his requests for narcotics here. will proceed with acs work up and check cardiac

## 2018-07-03 NOTE — ED PROVIDER NOTE - CARE PLAN
Assessment and plan of treatment:	chest pain, No  obstructive  coronary  disease on CCTA in 2015, component of drug seeking based on previous charts and his requests for narcotics here. will proceed with acs work up and check cardiac enzymes, ekg, cxr.

## 2018-07-03 NOTE — ED PROVIDER NOTE - PLAN OF CARE
chest pain, No  obstructive  coronary  disease on CCTA in 2015, component of drug seeking based on previous charts and his requests for narcotics here. will proceed with acs work up and check cardiac enzymes, ekg, cxr.

## 2018-07-03 NOTE — ED PROVIDER NOTE - OBJECTIVE STATEMENT
51 yo with high cholesterol, asthma, smoker, cardiologist is dr. kathleen. he presents with left sided chest pain and nausea/lightheadedness after walking to [facility] today. called ems. he requested that he receive narcotics for chest pain and zofran. I then stated that he will not recevie any narctocis for chest pain and that we can vgive nitro but he refused. he then stated he needs narcotics for his left arm as  he felt symptoms in left hand, he initially described as numbness then when I told him that if it is numbness he should not be feelign pain he proceeded to descirbe symptomsat as tingling that was uncomfrotable.

## 2018-07-03 NOTE — ED ADULT NURSE NOTE - CHPI ED SYMPTOMS NEG
no dizziness/no fever/no back pain/no shortness of breath/no nausea/no vomiting/no syncope/no chest pain

## 2018-07-03 NOTE — ED ADULT NURSE NOTE - PSH
History of appendectomy    History of cholecystectomy History of appendectomy    History of cholecystectomy    History of knee replacement procedure of right knee

## 2018-07-03 NOTE — ED PROVIDER NOTE - PROGRESS NOTE DETAILS
CCTA in 1/2015 showed IMPRESSION:       Mild  narrowing  in  the  first  obtuse  marginal  branch  from  noncalcified  plaque.  No  obstructive  coronary  disease. patient requesting narcotics still, I discussed that narctocis are not indicated at this time and if he wants his home narcotics he can wait until labs are completed and if work up negative can go home to take meds. He states because he does not want to wait to get his pain medication he does not want to stay and asked to have iv removed, after IV was removed he did not stay for AMA paperwork but I did discuss that we will be unable to evaluate for ACS/chest pain/ or ischemia without get proper blood work and he risks the possibility of NSTEMI or unstable angina. he understands this but still left.

## 2018-07-15 ENCOUNTER — EMERGENCY (EMERGENCY)
Facility: HOSPITAL | Age: 53
LOS: 0 days | Discharge: HOME | End: 2018-07-15
Attending: EMERGENCY MEDICINE | Admitting: EMERGENCY MEDICINE

## 2018-07-15 VITALS
DIASTOLIC BLOOD PRESSURE: 86 MMHG | SYSTOLIC BLOOD PRESSURE: 148 MMHG | HEART RATE: 81 BPM | RESPIRATION RATE: 18 BRPM | OXYGEN SATURATION: 96 % | TEMPERATURE: 99 F

## 2018-07-15 VITALS
HEART RATE: 74 BPM | RESPIRATION RATE: 18 BRPM | TEMPERATURE: 99 F | DIASTOLIC BLOOD PRESSURE: 78 MMHG | SYSTOLIC BLOOD PRESSURE: 131 MMHG | OXYGEN SATURATION: 98 %

## 2018-07-15 DIAGNOSIS — T18.9XXA FOREIGN BODY OF ALIMENTARY TRACT, PART UNSPECIFIED, INITIAL ENCOUNTER: ICD-10-CM

## 2018-07-15 DIAGNOSIS — R53.83 OTHER FATIGUE: ICD-10-CM
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
## 2018-07-15 DIAGNOSIS — I10 ESSENTIAL (PRIMARY) HYPERTENSION: ICD-10-CM

## 2018-07-15 DIAGNOSIS — Z79.2 LONG TERM (CURRENT) USE OF ANTIBIOTICS: ICD-10-CM

## 2018-07-15 DIAGNOSIS — F17.200 NICOTINE DEPENDENCE, UNSPECIFIED, UNCOMPLICATED: ICD-10-CM

## 2018-07-15 DIAGNOSIS — Z79.82 LONG TERM (CURRENT) USE OF ASPIRIN: ICD-10-CM

## 2018-07-15 DIAGNOSIS — E78.00 PURE HYPERCHOLESTEROLEMIA, UNSPECIFIED: ICD-10-CM

## 2018-07-15 DIAGNOSIS — K21.9 GASTRO-ESOPHAGEAL REFLUX DISEASE WITHOUT ESOPHAGITIS: ICD-10-CM
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
## 2018-07-15 DIAGNOSIS — Z98.890 OTHER SPECIFIED POSTPROCEDURAL STATES: Chronic | ICD-10-CM

## 2018-07-15 DIAGNOSIS — Z96.651 PRESENCE OF RIGHT ARTIFICIAL KNEE JOINT: Chronic | ICD-10-CM

## 2018-07-15 DIAGNOSIS — J44.9 CHRONIC OBSTRUCTIVE PULMONARY DISEASE, UNSPECIFIED: ICD-10-CM
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
## 2018-07-15 DIAGNOSIS — Z79.899 OTHER LONG TERM (CURRENT) DRUG THERAPY: ICD-10-CM

## 2018-07-15 NOTE — ED ADULT NURSE NOTE - OBJECTIVE STATEMENT
Pt denies any pain or complaints at this time. Pt states EMS was called by bystander because patient was sleeping in car, pt denies any drug use, a&ox4, speaking coherently,steady gait

## 2018-07-15 NOTE — ED ADULT TRIAGE NOTE - CHIEF COMPLAINT QUOTE
poss heroin abuse, pt found sleeping in his car. poss heroin abuse, pt found sleeping in his car as per ems.

## 2018-07-15 NOTE — ED PROVIDER NOTE - OBJECTIVE STATEMENT
52y m hx HTN, HLD, COPD, drug seeking behavior presents for sleeping in his car. 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 Apparently, passerby saw individual sleeping in car and called EMS because believed that he was passed out from drug use. Patient adamantly denies any recent drug use. Has no current c/o whatsoever. Is feeling healthy and well and eager to go home. No HA, dizziness, syncope, CP, SOB, abdominal pain, n/v/d, fever, chills. + smoker.

## 2018-07-15 NOTE — ED PROVIDER NOTE - PHYSICAL EXAMINATION
VITAL SIGNS: I have reviewed nursing notes and confirm.  CONSTITUTIONAL: Well-developed; well-nourished; in no acute distress. ambulating in ED, well appearing   SKIN: Skin exam is warm and dry  HEAD: Normocephalic; atraumatic.  EYES: EOM intact; conjunctiva and sclera clear. PERRLA, normal sized pupils   ENT: No nasal discharge; airway clear.   NECK: Supple; non tender.  CARD: S1, S2 normal; Regular rate and rhythm.  RESP: No wheezes, rales or rhonchi.  ABD: Normal bowel sounds; soft; non-distended; non-tender  EXT: Normal ROM. No LE edema.  NEURO: Alert, oriented. Grossly unremarkable. No focal deficits.  PSYCH: Cooperative, appropriate.

## 2018-07-15 NOTE — ED PROVIDER NOTE - MEDICAL DECISION MAKING DETAILS
patient feels completely well, denies any sx, will follow up with Dr. Haque. Strict return precautions given.

## 2018-07-25 ENCOUNTER — EMERGENCY (EMERGENCY)
Facility: HOSPITAL | Age: 53
LOS: 0 days | Discharge: HOME | End: 2018-07-25
Attending: EMERGENCY MEDICINE | Admitting: EMERGENCY MEDICINE
Payer: MEDICAID

## 2018-07-25 VITALS
OXYGEN SATURATION: 96 % | HEART RATE: 95 BPM | TEMPERATURE: 99 F | RESPIRATION RATE: 18 BRPM | SYSTOLIC BLOOD PRESSURE: 127 MMHG | DIASTOLIC BLOOD PRESSURE: 83 MMHG

## 2018-07-25 DIAGNOSIS — M79.89 OTHER SPECIFIED SOFT TISSUE DISORDERS: ICD-10-CM

## 2018-07-25 DIAGNOSIS — Z79.899 OTHER LONG TERM (CURRENT) DRUG THERAPY: ICD-10-CM

## 2018-07-25 DIAGNOSIS — Z79.82 LONG TERM (CURRENT) USE OF ASPIRIN: ICD-10-CM

## 2018-07-25 DIAGNOSIS — J44.9 CHRONIC OBSTRUCTIVE PULMONARY DISEASE, UNSPECIFIED: ICD-10-CM

## 2018-07-25 DIAGNOSIS — E78.5 HYPERLIPIDEMIA, UNSPECIFIED: ICD-10-CM

## 2018-07-25 DIAGNOSIS — Z98.890 OTHER SPECIFIED POSTPROCEDURAL STATES: Chronic | ICD-10-CM

## 2018-07-25 DIAGNOSIS — I10 ESSENTIAL (PRIMARY) HYPERTENSION: ICD-10-CM

## 2018-07-25 DIAGNOSIS — Z79.83 LONG TERM (CURRENT) USE OF BISPHOSPHONATES: ICD-10-CM

## 2018-07-25 DIAGNOSIS — M79.602 PAIN IN LEFT ARM: ICD-10-CM

## 2018-07-25 DIAGNOSIS — Z96.651 PRESENCE OF RIGHT ARTIFICIAL KNEE JOINT: Chronic | ICD-10-CM

## 2018-07-25 DIAGNOSIS — Z79.01 LONG TERM (CURRENT) USE OF ANTICOAGULANTS: ICD-10-CM

## 2018-07-25 DIAGNOSIS — F17.200 NICOTINE DEPENDENCE, UNSPECIFIED, UNCOMPLICATED: ICD-10-CM

## 2018-07-25 DIAGNOSIS — Z79.2 LONG TERM (CURRENT) USE OF ANTIBIOTICS: ICD-10-CM

## 2018-07-25 PROBLEM — K21.9 GASTRO-ESOPHAGEAL REFLUX DISEASE WITHOUT ESOPHAGITIS: Chronic | Status: ACTIVE | Noted: 2018-04-21

## 2018-07-25 PROBLEM — E78.00 PURE HYPERCHOLESTEROLEMIA, UNSPECIFIED: Chronic | Status: ACTIVE | Noted: 2018-04-06

## 2018-07-25 LAB
ANION GAP SERPL CALC-SCNC: 12 MMOL/L — SIGNIFICANT CHANGE UP (ref 7–14)
BUN SERPL-MCNC: 8 MG/DL — LOW (ref 10–20)
CALCIUM SERPL-MCNC: 9.3 MG/DL — SIGNIFICANT CHANGE UP (ref 8.5–10.1)
CHLORIDE SERPL-SCNC: 101 MMOL/L — SIGNIFICANT CHANGE UP (ref 98–110)
CO2 SERPL-SCNC: 26 MMOL/L — SIGNIFICANT CHANGE UP (ref 17–32)
CREAT SERPL-MCNC: 0.7 MG/DL — SIGNIFICANT CHANGE UP (ref 0.7–1.5)
GLUCOSE SERPL-MCNC: 92 MG/DL — SIGNIFICANT CHANGE UP (ref 70–99)
HCT VFR BLD CALC: 44.1 % — SIGNIFICANT CHANGE UP (ref 42–52)
HGB BLD-MCNC: 14.9 G/DL — SIGNIFICANT CHANGE UP (ref 14–18)
MCHC RBC-ENTMCNC: 28.5 PG — SIGNIFICANT CHANGE UP (ref 27–31)
MCHC RBC-ENTMCNC: 33.8 G/DL — SIGNIFICANT CHANGE UP (ref 32–37)
MCV RBC AUTO: 84.5 FL — SIGNIFICANT CHANGE UP (ref 80–94)
NRBC # BLD: 0 /100 WBCS — SIGNIFICANT CHANGE UP (ref 0–0)
PLATELET # BLD AUTO: 244 K/UL — SIGNIFICANT CHANGE UP (ref 130–400)
POTASSIUM SERPL-MCNC: 6.1 MMOL/L — CRITICAL HIGH (ref 3.5–5)
POTASSIUM SERPL-SCNC: 6.1 MMOL/L — CRITICAL HIGH (ref 3.5–5)
RBC # BLD: 5.22 M/UL — SIGNIFICANT CHANGE UP (ref 4.7–6.1)
RBC # FLD: 14.1 % — SIGNIFICANT CHANGE UP (ref 11.5–14.5)
SODIUM SERPL-SCNC: 139 MMOL/L — SIGNIFICANT CHANGE UP (ref 135–146)
WBC # BLD: 9.58 K/UL — SIGNIFICANT CHANGE UP (ref 4.8–10.8)
WBC # FLD AUTO: 9.58 K/UL — SIGNIFICANT CHANGE UP (ref 4.8–10.8)

## 2018-07-25 PROCEDURE — 93971 EXTREMITY STUDY: CPT | Mod: 26

## 2018-07-25 RX ORDER — OXYCODONE AND ACETAMINOPHEN 5; 325 MG/1; MG/1
1 TABLET ORAL ONCE
Qty: 0 | Refills: 0 | Status: DISCONTINUED | OUTPATIENT
Start: 2018-07-25 | End: 2018-07-25

## 2018-07-25 RX ADMIN — OXYCODONE AND ACETAMINOPHEN 1 TABLET(S): 5; 325 TABLET ORAL at 18:47

## 2018-07-25 NOTE — ED PROVIDER NOTE - PHYSICAL EXAMINATION
VITAL SIGNS: I have reviewed nursing notes and confirm.  CONSTITUTIONAL: Well-developed; well-nourished; in no acute distress.   SKIN: swelling to left brachial region,   HEAD: Normocephalic; atraumatic.  EYES: conjunctiva and sclera clear.  ENT: No nasal discharge; airway clear.  CARD: S1, S2 normal; no murmurs, gallops, or rubs. Regular rate and rhythm.   RESP: No wheezes, rales or rhonchi.  EXT: mild edema to left brachial region, radial pulses present, sensation intact Normal ROM.  No clubbing, cyanosis  NEURO: Alert, oriented, grossly unremarkable

## 2018-07-25 NOTE — ED PROVIDER NOTE - OBJECTIVE STATEMENT
Pt is a 51y/o male with a pmhx of COPD, HTN, HLD presents today c/o left arm swelling x approx 3 months. Pt st she was recently started on keflex 2 weeks ago for possible cellulitis but has not noticed a change. Pt sts swelling hasn't changed in months. Pt denies CP, SOB, fever, chills, n/v/d, abd pain.

## 2018-07-25 NOTE — ED PROVIDER NOTE - MEDICAL DECISION MAKING DETAILS
51 y/o male with c/o swelling to L upper arm x > 2 months.  no change with keflex.  no trauma to area.  no paresthesias.  2+ radial pulse on pe.  prelim UE duplex - no DVT, no abscess.  pt to f/u with ortho for further eval.  pt told to return to ER for any new/concerning symptoms.

## 2018-07-25 NOTE — ED PROVIDER NOTE - ATTENDING CONTRIBUTION TO CARE
53 y/o male with h/o htn, hld, copd in ER with c/o swelling to L upper arm x > ~ 2months.  Denies any trauma to area.  Pt was seen in ER on 6/9/2018 for same complaint (present x 1 month at that point), noted to have mild overlying erythema, given course of kelfex which he completed last month.  Pt states swelling has not improved since then.  + painful with movements.  no redness.  no fever.  no paresthesias.  no cp/sob.  no abd pain.  no ha/dizziness/loc.  pe - nad, nc/at, eomi, perrl, op - clear, cta b/l, no w/r/r, rrr, abd- soft, nt/nd, nabs, from x 4, LUE - + swelling to medial distal upper arm, no erythema/warmth, no fluctuance, 2+ radial pulse.  -to check LUE duplex.

## 2018-07-25 NOTE — ED PROVIDER NOTE - NS ED ROS FT
Cardiac:  No chest pain, SOB or edema.  Respiratory:  No cough or respiratory distress. No hemoptysis. No history of asthma or RAD.  GI:  No nausea, vomiting, diarrhea or abdominal pain.  MS:  + arm pain, No myalgia, muscle weakness, joint pain or back pain.  Neuro:  No headache or weakness.  No LOC.  Skin:  No skin rash.   Except as documented in the HPI,  all other systems are negative.

## 2018-09-08 ENCOUNTER — EMERGENCY (EMERGENCY)
Facility: HOSPITAL | Age: 53
LOS: 0 days | Discharge: LEFT BEFORE DISCHARGE | End: 2018-09-08
Attending: EMERGENCY MEDICINE | Admitting: EMERGENCY MEDICINE

## 2018-09-08 VITALS
HEART RATE: 89 BPM | WEIGHT: 315 LBS | OXYGEN SATURATION: 100 % | SYSTOLIC BLOOD PRESSURE: 128 MMHG | TEMPERATURE: 98 F | DIASTOLIC BLOOD PRESSURE: 95 MMHG | HEIGHT: 70 IN | RESPIRATION RATE: 20 BRPM

## 2018-09-08 DIAGNOSIS — Z98.890 OTHER SPECIFIED POSTPROCEDURAL STATES: Chronic | ICD-10-CM

## 2018-09-08 DIAGNOSIS — Z96.651 PRESENCE OF RIGHT ARTIFICIAL KNEE JOINT: ICD-10-CM

## 2018-09-08 DIAGNOSIS — Z79.82 LONG TERM (CURRENT) USE OF ASPIRIN: ICD-10-CM

## 2018-09-08 DIAGNOSIS — R07.9 CHEST PAIN, UNSPECIFIED: ICD-10-CM

## 2018-09-08 DIAGNOSIS — R06.00 DYSPNEA, UNSPECIFIED: ICD-10-CM

## 2018-09-08 DIAGNOSIS — F17.210 NICOTINE DEPENDENCE, CIGARETTES, UNCOMPLICATED: ICD-10-CM

## 2018-09-08 DIAGNOSIS — J44.9 CHRONIC OBSTRUCTIVE PULMONARY DISEASE, UNSPECIFIED: ICD-10-CM

## 2018-09-08 DIAGNOSIS — Z90.49 ACQUIRED ABSENCE OF OTHER SPECIFIED PARTS OF DIGESTIVE TRACT: ICD-10-CM

## 2018-09-08 DIAGNOSIS — R07.89 OTHER CHEST PAIN: ICD-10-CM

## 2018-09-08 DIAGNOSIS — Z96.651 PRESENCE OF RIGHT ARTIFICIAL KNEE JOINT: Chronic | ICD-10-CM

## 2018-09-08 DIAGNOSIS — K21.9 GASTRO-ESOPHAGEAL REFLUX DISEASE WITHOUT ESOPHAGITIS: ICD-10-CM

## 2018-09-08 DIAGNOSIS — Z79.2 LONG TERM (CURRENT) USE OF ANTIBIOTICS: ICD-10-CM

## 2018-09-08 DIAGNOSIS — R10.31 RIGHT LOWER QUADRANT PAIN: ICD-10-CM

## 2018-09-08 DIAGNOSIS — R10.32 LEFT LOWER QUADRANT PAIN: ICD-10-CM

## 2018-09-08 DIAGNOSIS — I10 ESSENTIAL (PRIMARY) HYPERTENSION: ICD-10-CM

## 2018-09-08 LAB
ALBUMIN SERPL ELPH-MCNC: 3.7 G/DL — SIGNIFICANT CHANGE UP (ref 3.5–5.2)
ALP SERPL-CCNC: 121 U/L — HIGH (ref 30–115)
ALT FLD-CCNC: 21 U/L — SIGNIFICANT CHANGE UP (ref 0–41)
ANION GAP SERPL CALC-SCNC: 12 MMOL/L — SIGNIFICANT CHANGE UP (ref 7–14)
APTT BLD: 30.3 SEC — SIGNIFICANT CHANGE UP (ref 27–39.2)
AST SERPL-CCNC: 24 U/L — SIGNIFICANT CHANGE UP (ref 0–41)
BASOPHILS # BLD AUTO: 0.05 K/UL — SIGNIFICANT CHANGE UP (ref 0–0.2)
BASOPHILS NFR BLD AUTO: 0.5 % — SIGNIFICANT CHANGE UP (ref 0–1)
BILIRUB SERPL-MCNC: 0.5 MG/DL — SIGNIFICANT CHANGE UP (ref 0.2–1.2)
BUN SERPL-MCNC: 10 MG/DL — SIGNIFICANT CHANGE UP (ref 10–20)
CALCIUM SERPL-MCNC: 9.1 MG/DL — SIGNIFICANT CHANGE UP (ref 8.5–10.1)
CHLORIDE SERPL-SCNC: 99 MMOL/L — SIGNIFICANT CHANGE UP (ref 98–110)
CK SERPL-CCNC: 115 U/L — SIGNIFICANT CHANGE UP (ref 0–225)
CO2 SERPL-SCNC: 26 MMOL/L — SIGNIFICANT CHANGE UP (ref 17–32)
CREAT SERPL-MCNC: 0.8 MG/DL — SIGNIFICANT CHANGE UP (ref 0.7–1.5)
EOSINOPHIL # BLD AUTO: 0.08 K/UL — SIGNIFICANT CHANGE UP (ref 0–0.7)
EOSINOPHIL NFR BLD AUTO: 0.8 % — SIGNIFICANT CHANGE UP (ref 0–8)
GLUCOSE SERPL-MCNC: 121 MG/DL — HIGH (ref 70–99)
HCT VFR BLD CALC: 44.2 % — SIGNIFICANT CHANGE UP (ref 42–52)
HGB BLD-MCNC: 15.3 G/DL — SIGNIFICANT CHANGE UP (ref 14–18)
IMM GRANULOCYTES NFR BLD AUTO: 0.2 % — SIGNIFICANT CHANGE UP (ref 0.1–0.3)
INR BLD: 1.23 RATIO — SIGNIFICANT CHANGE UP (ref 0.65–1.3)
LACTATE SERPL-SCNC: 1 MMOL/L — SIGNIFICANT CHANGE UP (ref 0.5–2.2)
LIDOCAIN IGE QN: 14 U/L — SIGNIFICANT CHANGE UP (ref 7–60)
LYMPHOCYTES # BLD AUTO: 1.54 K/UL — SIGNIFICANT CHANGE UP (ref 1.2–3.4)
LYMPHOCYTES # BLD AUTO: 15.7 % — LOW (ref 20.5–51.1)
MCHC RBC-ENTMCNC: 29.1 PG — SIGNIFICANT CHANGE UP (ref 27–31)
MCHC RBC-ENTMCNC: 34.6 G/DL — SIGNIFICANT CHANGE UP (ref 32–37)
MCV RBC AUTO: 84.2 FL — SIGNIFICANT CHANGE UP (ref 80–94)
MONOCYTES # BLD AUTO: 0.5 K/UL — SIGNIFICANT CHANGE UP (ref 0.1–0.6)
MONOCYTES NFR BLD AUTO: 5.1 % — SIGNIFICANT CHANGE UP (ref 1.7–9.3)
NEUTROPHILS # BLD AUTO: 7.65 K/UL — HIGH (ref 1.4–6.5)
NEUTROPHILS NFR BLD AUTO: 77.7 % — HIGH (ref 42.2–75.2)
NRBC # BLD: 0 /100 WBCS — SIGNIFICANT CHANGE UP (ref 0–0)
PLATELET # BLD AUTO: 244 K/UL — SIGNIFICANT CHANGE UP (ref 130–400)
POTASSIUM SERPL-MCNC: 4.8 MMOL/L — SIGNIFICANT CHANGE UP (ref 3.5–5)
POTASSIUM SERPL-SCNC: 4.8 MMOL/L — SIGNIFICANT CHANGE UP (ref 3.5–5)
PROT SERPL-MCNC: 6.7 G/DL — SIGNIFICANT CHANGE UP (ref 6–8)
PROTHROM AB SERPL-ACNC: 13.3 SEC — HIGH (ref 9.95–12.87)
RBC # BLD: 5.25 M/UL — SIGNIFICANT CHANGE UP (ref 4.7–6.1)
RBC # FLD: 14.2 % — SIGNIFICANT CHANGE UP (ref 11.5–14.5)
SODIUM SERPL-SCNC: 137 MMOL/L — SIGNIFICANT CHANGE UP (ref 135–146)
TROPONIN T SERPL-MCNC: <0.01 NG/ML — SIGNIFICANT CHANGE UP
WBC # BLD: 9.84 K/UL — SIGNIFICANT CHANGE UP (ref 4.8–10.8)
WBC # FLD AUTO: 9.84 K/UL — SIGNIFICANT CHANGE UP (ref 4.8–10.8)

## 2018-09-08 RX ORDER — MORPHINE SULFATE 50 MG/1
4 CAPSULE, EXTENDED RELEASE ORAL ONCE
Qty: 0 | Refills: 0 | Status: DISCONTINUED | OUTPATIENT
Start: 2018-09-08 | End: 2018-09-08

## 2018-09-08 RX ORDER — FAMOTIDINE 10 MG/ML
20 INJECTION INTRAVENOUS ONCE
Qty: 0 | Refills: 0 | Status: COMPLETED | OUTPATIENT
Start: 2018-09-08 | End: 2018-09-08

## 2018-09-08 RX ORDER — ONDANSETRON 8 MG/1
4 TABLET, FILM COATED ORAL ONCE
Qty: 0 | Refills: 0 | Status: COMPLETED | OUTPATIENT
Start: 2018-09-08 | End: 2018-09-08

## 2018-09-08 RX ADMIN — ONDANSETRON 104 MILLIGRAM(S): 8 TABLET, FILM COATED ORAL at 05:52

## 2018-09-08 RX ADMIN — MORPHINE SULFATE 4 MILLIGRAM(S): 50 CAPSULE, EXTENDED RELEASE ORAL at 06:22

## 2018-09-08 RX ADMIN — FAMOTIDINE 100 MILLIGRAM(S): 10 INJECTION INTRAVENOUS at 05:51

## 2018-09-08 NOTE — ED ADULT NURSE REASSESSMENT NOTE - NS ED NURSE REASSESS COMMENT FT1
Pt was given Morphine and was asked to remain on his stretcher for at least 20 minutes.  Pt got up and ambulated outside to smoke a cigarette after 10 min.  When pt returned to ED, he informed this RN that "it still hurts a lot".  Pt was asked to return to his stretcher.
Pt refused ct  md aware pt to be d/c calvin

## 2018-09-08 NOTE — ED ADULT NURSE NOTE - PSH
History of appendectomy    History of cholecystectomy    History of knee replacement procedure of right knee

## 2018-09-08 NOTE — ED PROVIDER NOTE - OBJECTIVE STATEMENT
51 y/o male with PMH of HTN, HLD, CAD, smoker who presents to ED for chest pain. Pt states he was walking tot he store to buy cigarettes this AM when he had onset of pain in the center of his chest with diaphoresis, nausea, 2 episodes of NBNB vomiting and SOB. Patient states he has a history fo colitis and on arrival to ED had onset of pain in the b/l lower abdomen. No diarrhea. No history of abdominal surgery.

## 2018-09-08 NOTE — ED PROVIDER NOTE - PHYSICAL EXAMINATION
CONSTITUTIONAL: Well-developed; well-nourished; in no acute distress.   SKIN: warm, dry  HEAD: Normocephalic; atraumatic.  EYES: no conjunctival injection. PERRL.   ENT: No nasal discharge; airway clear.  NECK: Supple; non tender.  CARD: S1, S2 normal; no murmurs, gallops, or rubs. Regular rate and rhythm.   RESP: No wheezes, rales or rhonchi.  ABD: soft protuberant, ttp in the b/l lower abdomen.   EXT: Normal ROM.  No clubbing, cyanosis or edema.   LYMPH: No acute cervical adenopathy.  NEURO: Alert, oriented, grossly unremarkable  PSYCH: Cooperative, appropriate.

## 2018-09-08 NOTE — ED PROVIDER NOTE - NS ED ROS FT
Constitutional: See HPI.  Eyes: No visual changes, eye pain or discharge.  ENMT: No hearing changes, pain, discharge or infections. No neck pain or stiffness.  Cardiac: as per hpi  Respiratory: No cough or respiratory distress.   GI: No nausea, vomiting, diarrhea or abdominal pain.  : as per hpi  MS: No myalgia, muscle weakness, joint pain or back pain.  Neuro: No headache or weakness. No LOC.  Skin: No skin rash.

## 2018-09-08 NOTE — ED PROVIDER NOTE - MEDICAL DECISION MAKING DETAILS
52m w chest pain and dyspnea concerning for cardiac etiology also reports abdominal pain starting while on way to ED. After receiving IV analgesia patient got up and walked outside to smoke. When patient advised to return for CT, further testing, and admission for cardiac eval patient refusing all further interventions and requesting to leave AMA. Patient is awake/alert/interactive with normal mental status and normal neurologic function. Patient reports no SI/HI and demonstrates normal thought processes w no evidence of intoxication, delirium, delusions, or hallucinations. Patient requesting to leave against medical advice at this time. Advised patient of potential risks of leaving AMA which include potential death or disability. Attempted to convince patient to stay and continue work up/treatment and patient refused. Patient has capacity to make medical decisions at this time and will be signed out AMA. Pt advised regarding symptomatic/supportive care, importance of PMD/Cardiology/GI f/u, and symptoms to prompt ED return.

## 2018-09-08 NOTE — ED PROVIDER NOTE - ATTENDING CONTRIBUTION TO CARE
52m w a hx of HTN, HLD, COPD, & CAD w nonstented lesions previously seen on coronary CT (followed by Dr Montelongo). Pt presents reporting multiple complaints. Pt reports that he went out to buy cigarettes just prior to arrival and while walking developed chest pain and dyspnea. Then while on way to ER for eval of chest pain, developed lower abdominal pain similar to prior colitis. No recent travel/hosp/immobilization.    Review of Systems  Constitutional:  No fever or chills.   Eyes:  Negative.   ENMT:  No nasal congestion, discharge, or throat pain.   Cardiac:  No palpitations, syncope, or edema.  Respiratory:  No dyspnea, wheezing, or cough. No hemoptysis.  GI:  No vomiting, diarrhea, melena, or hematochezia.  :  No dysuria or hematuria.  Musculoskeletal:  No joint swelling, joint pain, or back pain.  Skin:  No skin rash, jaundice, or lesions.  Neuro:  No headache, loss of sensation, or focal weakness.  No change in mental status.    Physical Exam  General: Awake, alert, mild discomfort, WDWN, non-toxic appearing, NCAT  Eyes: PERRL, EOMI, no icterus, lids and conjunctivae are normal  ENT: External inspection normal, pink/moist membranes, pharynx normal  CV: S1S2, regular rate and rhythm, no murmur/gallops/rubs, no JVD, 2+ pulses b/l, no edema/cords/homans, warm/well-perfused  Respiratory: Normal respiratory rate/effort, no respiratory distress, normal voice, speaking full sentences, lungs clear to auscultation b/l, no wheezing/rales/rhonchi, no retractions, no stridor  Abdomen: Soft abdomen, lower abd tender, no distended/guarding/rebound, no CVA tender  Musculoskeletal: FROM all 4 extremities, N/V intact  Neck: FROM neck, supple, no meningismus, trachea midline, no JVD  Integumentary: Color normal for race, warm and dry, no rash  Neuro: Oriented x3, CN 2-12 grossly intact, normal motor, normal sensory  Psych: Oriented x3, mood normal, affect normal     52m w chest pain and dyspnea concerning for cardiac etiology also reports abdominal pain starting while on way to ED. nontoxic appearing, n/v intact. --CBC, CMP, lipase, CXR, EKG, enzymes, CT abd. --IV fluids, Analgesia/antiemetics as needed, admit for cardiac eval.

## 2018-09-08 NOTE — ED ADULT NURSE NOTE - NSIMPLEMENTINTERV_GEN_ALL_ED
Implemented All Universal Safety Interventions:  [place] to call system. Call bell, personal items and telephone within reach. Instruct patient to call for assistance. Room bathroom lighting operational. Non-slip footwear when patient is off stretcher. Physically safe environment: no spills, clutter or unnecessary equipment. Stretcher in lowest position, wheels locked, appropriate side rails in place.

## 2018-10-07 ENCOUNTER — EMERGENCY (EMERGENCY)
Facility: HOSPITAL | Age: 53
LOS: 0 days | Discharge: LEFT BEFORE DISCHARGE | End: 2018-10-07
Admitting: EMERGENCY MEDICINE

## 2018-10-07 DIAGNOSIS — R11.10 VOMITING, UNSPECIFIED: ICD-10-CM

## 2018-10-07 DIAGNOSIS — I10 ESSENTIAL (PRIMARY) HYPERTENSION: ICD-10-CM

## 2018-10-07 DIAGNOSIS — R50.9 FEVER, UNSPECIFIED: ICD-10-CM

## 2018-10-07 DIAGNOSIS — Z98.890 OTHER SPECIFIED POSTPROCEDURAL STATES: Chronic | ICD-10-CM

## 2018-10-07 DIAGNOSIS — Z96.651 PRESENCE OF RIGHT ARTIFICIAL KNEE JOINT: Chronic | ICD-10-CM

## 2018-10-07 DIAGNOSIS — R10.9 UNSPECIFIED ABDOMINAL PAIN: ICD-10-CM

## 2018-10-14 ENCOUNTER — INPATIENT (INPATIENT)
Facility: HOSPITAL | Age: 53
LOS: 7 days | Discharge: HOME | End: 2018-10-22
Attending: HOSPITALIST | Admitting: HOSPITALIST

## 2018-10-14 VITALS
RESPIRATION RATE: 20 BRPM | TEMPERATURE: 98 F | SYSTOLIC BLOOD PRESSURE: 129 MMHG | DIASTOLIC BLOOD PRESSURE: 86 MMHG | OXYGEN SATURATION: 98 % | HEART RATE: 89 BPM

## 2018-10-14 DIAGNOSIS — Z96.651 PRESENCE OF RIGHT ARTIFICIAL KNEE JOINT: Chronic | ICD-10-CM

## 2018-10-14 DIAGNOSIS — Z98.890 OTHER SPECIFIED POSTPROCEDURAL STATES: Chronic | ICD-10-CM

## 2018-10-14 LAB
ALBUMIN SERPL ELPH-MCNC: 4 G/DL — SIGNIFICANT CHANGE UP (ref 3.5–5.2)
ALP SERPL-CCNC: 144 U/L — HIGH (ref 30–115)
ALT FLD-CCNC: 28 U/L — SIGNIFICANT CHANGE UP (ref 0–41)
ANION GAP SERPL CALC-SCNC: 12 MMOL/L — SIGNIFICANT CHANGE UP (ref 7–14)
APTT BLD: 33.7 SEC — SIGNIFICANT CHANGE UP (ref 27–39.2)
AST SERPL-CCNC: 18 U/L — SIGNIFICANT CHANGE UP (ref 0–41)
BASE EXCESS BLDV CALC-SCNC: 2.3 MMOL/L — HIGH (ref -2–2)
BASOPHILS # BLD AUTO: 0.03 K/UL — SIGNIFICANT CHANGE UP (ref 0–0.2)
BASOPHILS NFR BLD AUTO: 0.3 % — SIGNIFICANT CHANGE UP (ref 0–1)
BILIRUB SERPL-MCNC: 0.5 MG/DL — SIGNIFICANT CHANGE UP (ref 0.2–1.2)
BUN SERPL-MCNC: 9 MG/DL — LOW (ref 10–20)
CA-I SERPL-SCNC: 1.28 MMOL/L — SIGNIFICANT CHANGE UP (ref 1.12–1.3)
CALCIUM SERPL-MCNC: 9.1 MG/DL — SIGNIFICANT CHANGE UP (ref 8.5–10.1)
CHLORIDE SERPL-SCNC: 99 MMOL/L — SIGNIFICANT CHANGE UP (ref 98–110)
CO2 SERPL-SCNC: 28 MMOL/L — SIGNIFICANT CHANGE UP (ref 17–32)
CREAT SERPL-MCNC: 0.8 MG/DL — SIGNIFICANT CHANGE UP (ref 0.7–1.5)
EOSINOPHIL # BLD AUTO: 0.27 K/UL — SIGNIFICANT CHANGE UP (ref 0–0.7)
EOSINOPHIL NFR BLD AUTO: 2.5 % — SIGNIFICANT CHANGE UP (ref 0–8)
FLU A RESULT: NEGATIVE — SIGNIFICANT CHANGE UP
FLU A RESULT: NEGATIVE — SIGNIFICANT CHANGE UP
FLUAV AG NPH QL: NEGATIVE — SIGNIFICANT CHANGE UP
FLUBV AG NPH QL: NEGATIVE — SIGNIFICANT CHANGE UP
GAS PNL BLDV: 141 MMOL/L — SIGNIFICANT CHANGE UP (ref 136–145)
GAS PNL BLDV: SIGNIFICANT CHANGE UP
GLUCOSE SERPL-MCNC: 104 MG/DL — HIGH (ref 70–99)
HCO3 BLDV-SCNC: 30 MMOL/L — HIGH (ref 22–29)
HCT VFR BLD CALC: 43.8 % — SIGNIFICANT CHANGE UP (ref 42–52)
HCT VFR BLDA CALC: 47.3 % — HIGH (ref 34–44)
HGB BLD CALC-MCNC: 15.4 G/DL — SIGNIFICANT CHANGE UP (ref 14–18)
HGB BLD-MCNC: 14.8 G/DL — SIGNIFICANT CHANGE UP (ref 14–18)
IMM GRANULOCYTES NFR BLD AUTO: 0.2 % — SIGNIFICANT CHANGE UP (ref 0.1–0.3)
INR BLD: 1.11 RATIO — SIGNIFICANT CHANGE UP (ref 0.65–1.3)
LACTATE BLDV-MCNC: SIGNIFICANT CHANGE UP MMOL/L (ref 0.5–1.6)
LYMPHOCYTES # BLD AUTO: 19.5 % — LOW (ref 20.5–51.1)
LYMPHOCYTES # BLD AUTO: 2.06 K/UL — SIGNIFICANT CHANGE UP (ref 1.2–3.4)
MCHC RBC-ENTMCNC: 28.7 PG — SIGNIFICANT CHANGE UP (ref 27–31)
MCHC RBC-ENTMCNC: 33.8 G/DL — SIGNIFICANT CHANGE UP (ref 32–37)
MCV RBC AUTO: 85 FL — SIGNIFICANT CHANGE UP (ref 80–94)
MONOCYTES # BLD AUTO: 0.51 K/UL — SIGNIFICANT CHANGE UP (ref 0.1–0.6)
MONOCYTES NFR BLD AUTO: 4.8 % — SIGNIFICANT CHANGE UP (ref 1.7–9.3)
NEUTROPHILS # BLD AUTO: 7.7 K/UL — HIGH (ref 1.4–6.5)
NEUTROPHILS NFR BLD AUTO: 72.7 % — SIGNIFICANT CHANGE UP (ref 42.2–75.2)
NRBC # BLD: 0 /100 WBCS — SIGNIFICANT CHANGE UP (ref 0–0)
NT-PROBNP SERPL-SCNC: 69 PG/ML — SIGNIFICANT CHANGE UP (ref 0–300)
PCO2 BLDV: 57 MMHG — HIGH (ref 41–51)
PH BLDV: 7.33 — SIGNIFICANT CHANGE UP (ref 7.26–7.43)
PLATELET # BLD AUTO: 249 K/UL — SIGNIFICANT CHANGE UP (ref 130–400)
PO2 BLDV: 29 MMHG — SIGNIFICANT CHANGE UP (ref 20–40)
POTASSIUM BLDV-SCNC: SIGNIFICANT CHANGE UP MMOL/L (ref 3.3–5.6)
POTASSIUM SERPL-MCNC: 4.2 MMOL/L — SIGNIFICANT CHANGE UP (ref 3.5–5)
POTASSIUM SERPL-SCNC: 4.2 MMOL/L — SIGNIFICANT CHANGE UP (ref 3.5–5)
PROT SERPL-MCNC: 6.7 G/DL — SIGNIFICANT CHANGE UP (ref 6–8)
PROTHROM AB SERPL-ACNC: 12.8 SEC — SIGNIFICANT CHANGE UP (ref 9.95–12.87)
RBC # BLD: 5.15 M/UL — SIGNIFICANT CHANGE UP (ref 4.7–6.1)
RBC # FLD: 14.6 % — HIGH (ref 11.5–14.5)
RSV RESULT: NEGATIVE — SIGNIFICANT CHANGE UP
RSV RNA RESP QL NAA+PROBE: NEGATIVE — SIGNIFICANT CHANGE UP
SAO2 % BLDV: 50 % — SIGNIFICANT CHANGE UP
SODIUM SERPL-SCNC: 139 MMOL/L — SIGNIFICANT CHANGE UP (ref 135–146)
TROPONIN T SERPL-MCNC: <0.01 NG/ML — SIGNIFICANT CHANGE UP
WBC # BLD: 10.59 K/UL — SIGNIFICANT CHANGE UP (ref 4.8–10.8)
WBC # FLD AUTO: 10.59 K/UL — SIGNIFICANT CHANGE UP (ref 4.8–10.8)

## 2018-10-14 RX ORDER — OXYCODONE HYDROCHLORIDE 5 MG/1
30 TABLET ORAL EVERY 4 HOURS
Qty: 0 | Refills: 0 | Status: DISCONTINUED | OUTPATIENT
Start: 2018-10-14 | End: 2018-10-21

## 2018-10-14 RX ORDER — ACETAMINOPHEN 500 MG
650 TABLET ORAL EVERY 6 HOURS
Qty: 0 | Refills: 0 | Status: DISCONTINUED | OUTPATIENT
Start: 2018-10-14 | End: 2018-10-22

## 2018-10-14 RX ORDER — LISINOPRIL 2.5 MG/1
0 TABLET ORAL
Qty: 0 | Refills: 0 | COMMUNITY

## 2018-10-14 RX ORDER — ZOLPIDEM TARTRATE 10 MG/1
5 TABLET ORAL ONCE
Qty: 0 | Refills: 0 | Status: DISCONTINUED | OUTPATIENT
Start: 2018-10-14 | End: 2018-10-14

## 2018-10-14 RX ORDER — FENOFIBRATE,MICRONIZED 130 MG
145 CAPSULE ORAL DAILY
Qty: 0 | Refills: 0 | Status: DISCONTINUED | OUTPATIENT
Start: 2018-10-14 | End: 2018-10-22

## 2018-10-14 RX ORDER — OXYCODONE HYDROCHLORIDE 5 MG/1
10 TABLET ORAL EVERY 6 HOURS
Qty: 0 | Refills: 0 | Status: DISCONTINUED | OUTPATIENT
Start: 2018-10-14 | End: 2018-10-14

## 2018-10-14 RX ORDER — AZITHROMYCIN 500 MG/1
500 TABLET, FILM COATED ORAL ONCE
Qty: 0 | Refills: 0 | Status: COMPLETED | OUTPATIENT
Start: 2018-10-14 | End: 2018-10-14

## 2018-10-14 RX ORDER — FENOFIBRATE,MICRONIZED 130 MG
0 CAPSULE ORAL
Qty: 0 | Refills: 0 | COMMUNITY

## 2018-10-14 RX ORDER — LISINOPRIL 2.5 MG/1
5 TABLET ORAL DAILY
Qty: 0 | Refills: 0 | Status: DISCONTINUED | OUTPATIENT
Start: 2018-10-14 | End: 2018-10-22

## 2018-10-14 RX ORDER — MORPHINE SULFATE 50 MG/1
100 CAPSULE, EXTENDED RELEASE ORAL
Qty: 0 | Refills: 0 | Status: DISCONTINUED | OUTPATIENT
Start: 2018-10-14 | End: 2018-10-21

## 2018-10-14 RX ORDER — FUROSEMIDE 40 MG
0 TABLET ORAL
Qty: 0 | Refills: 0 | COMMUNITY

## 2018-10-14 RX ORDER — ESOMEPRAZOLE MAGNESIUM 40 MG/1
0 CAPSULE, DELAYED RELEASE ORAL
Qty: 0 | Refills: 0 | COMMUNITY

## 2018-10-14 RX ORDER — ENOXAPARIN SODIUM 100 MG/ML
40 INJECTION SUBCUTANEOUS DAILY
Qty: 0 | Refills: 0 | Status: DISCONTINUED | OUTPATIENT
Start: 2018-10-14 | End: 2018-10-15

## 2018-10-14 RX ORDER — SIMVASTATIN 20 MG/1
40 TABLET, FILM COATED ORAL AT BEDTIME
Qty: 0 | Refills: 0 | Status: DISCONTINUED | OUTPATIENT
Start: 2018-10-14 | End: 2018-10-22

## 2018-10-14 RX ORDER — FUROSEMIDE 40 MG
40 TABLET ORAL DAILY
Qty: 0 | Refills: 0 | Status: DISCONTINUED | OUTPATIENT
Start: 2018-10-14 | End: 2018-10-22

## 2018-10-14 RX ORDER — MORPHINE SULFATE 50 MG/1
100 CAPSULE, EXTENDED RELEASE ORAL
Qty: 0 | Refills: 0 | Status: DISCONTINUED | OUTPATIENT
Start: 2018-10-14 | End: 2018-10-14

## 2018-10-14 RX ORDER — ASPIRIN/CALCIUM CARB/MAGNESIUM 324 MG
81 TABLET ORAL DAILY
Qty: 0 | Refills: 0 | Status: DISCONTINUED | OUTPATIENT
Start: 2018-10-14 | End: 2018-10-22

## 2018-10-14 RX ORDER — PANTOPRAZOLE SODIUM 20 MG/1
40 TABLET, DELAYED RELEASE ORAL
Qty: 0 | Refills: 0 | Status: DISCONTINUED | OUTPATIENT
Start: 2018-10-14 | End: 2018-10-22

## 2018-10-14 RX ORDER — AZITHROMYCIN 500 MG/1
500 TABLET, FILM COATED ORAL EVERY 24 HOURS
Qty: 0 | Refills: 0 | Status: DISCONTINUED | OUTPATIENT
Start: 2018-10-14 | End: 2018-10-18

## 2018-10-14 RX ORDER — IPRATROPIUM/ALBUTEROL SULFATE 18-103MCG
3 AEROSOL WITH ADAPTER (GRAM) INHALATION
Qty: 0 | Refills: 0 | Status: COMPLETED | OUTPATIENT
Start: 2018-10-14 | End: 2018-10-14

## 2018-10-14 RX ORDER — IPRATROPIUM/ALBUTEROL SULFATE 18-103MCG
3 AEROSOL WITH ADAPTER (GRAM) INHALATION EVERY 4 HOURS
Qty: 0 | Refills: 0 | Status: DISCONTINUED | OUTPATIENT
Start: 2018-10-14 | End: 2018-10-22

## 2018-10-14 RX ADMIN — MORPHINE SULFATE 100 MILLIGRAM(S): 50 CAPSULE, EXTENDED RELEASE ORAL at 20:30

## 2018-10-14 RX ADMIN — MORPHINE SULFATE 100 MILLIGRAM(S): 50 CAPSULE, EXTENDED RELEASE ORAL at 21:00

## 2018-10-14 RX ADMIN — Medication 60 MILLIGRAM(S): at 13:13

## 2018-10-14 RX ADMIN — SIMVASTATIN 40 MILLIGRAM(S): 20 TABLET, FILM COATED ORAL at 22:26

## 2018-10-14 RX ADMIN — Medication 3 MILLILITER(S): at 13:07

## 2018-10-14 RX ADMIN — Medication 3 MILLILITER(S): at 13:11

## 2018-10-14 RX ADMIN — AZITHROMYCIN 255 MILLIGRAM(S): 500 TABLET, FILM COATED ORAL at 16:03

## 2018-10-14 RX ADMIN — Medication 3 MILLILITER(S): at 13:45

## 2018-10-14 RX ADMIN — ZOLPIDEM TARTRATE 5 MILLIGRAM(S): 10 TABLET ORAL at 23:36

## 2018-10-14 RX ADMIN — Medication 80 MILLIGRAM(S): at 22:26

## 2018-10-14 NOTE — H&P ADULT - PMH
COPD (chronic obstructive pulmonary disease)    GERD (gastroesophageal reflux disease)    High cholesterol    HTN (hypertension)    Obstructive sleep apnea COPD (chronic obstructive pulmonary disease)    GERD (gastroesophageal reflux disease)    High cholesterol    HTN (hypertension)    Morbid obesity    Obstructive sleep apnea

## 2018-10-14 NOTE — H&P ADULT - HISTORY OF PRESENT ILLNESS
52 year old male with a past medical history of Chronic Obstructive Pulmonary Disease, Obstructive Sleep Apnea, Gastroesophageal Reflux Disease, Hyperlipidemia, Hypertension presenting with shortness of breath. 52 year old male with a past medical history of Chronic Obstructive Pulmonary Disease, Obstructive Sleep Apnea, Morbid Obesity, Gastroesophageal Reflux Disease, Hyperlipidemia, Hypertension presenting with shortness of breath for the past week. Patient reports that his shortness of breath has gotten worse to the extent where it's on exertion and his lips and fingers turned blue. Reports chills that started Friday night, diarrhea of around 2-4 since Friday and nasal congestion that started yesterday.  Patient reports that he is expectorating greenish like phlegm as well as wheezing.  Denies fevers, palpitations, chest pain, lower extremity swelling/tenderness/warmth.  Denies rhinorrhea, sinus tenderness. Reports contact with his wife who had a viral illness.  No recent travels.  Patient also reports left knee pain for which is scheduled for a total knee replacement during the end of October 2018.    ED: Azithromycin 500mg, Prednisone 60mg, Albuterol/Ipratropium x 1 52 year old male with a past medical history of Chronic Obstructive Pulmonary Disease, Obstructive Sleep Apnea, Morbid Obesity, Gastroesophageal Reflux Disease, Hyperlipidemia, Hypertension presenting with shortness of breath for the past week. Patient reports that his shortness of breath has gotten worse to the extent where it's on exertion and his lips and fingers turned blue. Not relieved with use of his inhalers and nebulizers. Reports chills that started Friday night, diarrhea of around 2-4 since Friday and nasal congestion that started yesterday.  Patient reports that he is expectorating greenish like phlegm as well as wheezing.  Denies fevers, palpitations, chest pain, lower extremity swelling/tenderness/warmth.  Denies rhinorrhea, sinus tenderness. Reports contact with his wife who had a viral illness.  No recent travels.  Patient also reports left knee pain for which is scheduled for a total knee replacement during the end of October 2018.    ED: Azithromycin 500mg, Prednisone 60mg, Albuterol/Ipratropium x 1

## 2018-10-14 NOTE — ED PROVIDER NOTE - PHYSICAL EXAMINATION
VITAL SIGNS: AFebrile, vital signs stable  CONSTITUTIONAL: Well-developed; well-nourished  SKIN: Skin exam is warm and dry, no acute rash.  HEAD: Normocephalic; atraumatic.  EYES: Pupils equal round reactive to light, Extraocular movements intact; conjunctiva and sclera clear.  ENT: No nasal discharge; airway clear. Moist mucus membranes.  NECK: Supple; non tender. No rigidity  CARD: Regular rate and rhythm. Normal S1, S2; no murmurs, gallops, or rubs.  RESP: diffuse wheezing and rhonchi bilaterally, mild tachypnea and inc wob, speaking in short sentences. mild resp distress.  ABD: Abdomen soft; non-tender; non-distended;  no hepatosplenomegaly. No costovertebral angle tenderness.   EXT: Normal ROM. No clubbing, cyanosis or edema. No calf tenderness to palpation.  NEURO: Alert and oriented x 3. No focal deficits.  PSYCH: Cooperative, appropriate.

## 2018-10-14 NOTE — H&P ADULT - ATTENDING COMMENTS
Patient is seen and examined independently at bedside. I agree with the resident's note, history and plan except as below.    PHYSICAL EXAM:  CONSTITUTIONAL: NAD, well-groomed, well-developed, obese.  HEAD:  Atraumatic, Normocephalic.  EYES: EOMI, PERRLA, conjunctiva and sclera clear.  ENMT: Moist mucous membranes, No lesions.  NERVOUS SYSTEM:  Alert & Oriented X3, Good concentration; No limb weakness or numbness.  RESPIRATORY: Diffuse wheezes and rhonchi bilaterally  CARDIOVASCULAR: Regular rate and rhythm; No murmurs, rubs, or gallops.  GASTROINTESTINAL: Soft, Nontender, Nondistended; Bowel sounds present.  MUSCULOSKELETAL: No joint swelling or tenderness.   EXTREMITIES: No clubbing, cyanosis, or edema.  LYMPH: No lymphadenopathy noted.  SKIN: No rashes or lesions.    # Acute COPD exacerbation  - noted flu and RVP panel negative, isolation discontinued  - start on solumedrol 80 mg IV q8h  - c/w duoneb q6h and prn  - c/w IV antibiotics azithromycin 500 mg q24h    # Watery diarrhea likely viral gastroenteritis  - monitor for bowel movements, symptom management for now  - no recent antibiotics use    # REBECCA  - on BIPAP at bedtime, on 21/18 setting, verified with pulmonology note from [facility]      # Chronic Pain, pt is on pain regimen for past 17 years chronically  - istop checked  - on very high pain regimen from home including 4 medications, morphine  mg po QID, oxycodone 30 mg po 6 times per day, methadone 10 mg po BID, dilaudid 4 mg po TID  - pt states that he will be okay with regimen of home morphine  mg po QID and oxycodone 30 mg po 6 times per day for now  - hold bowel regimen as pt is having diarrhea    # Hypertension  - c/w lisinopril    # Hyperlipidemia  - c/w fenofibrate and zocor    # GERD  - c/w PPI    # Left knee pain  - follow up outpatient for scheduled total left knee replacement    # DVT prophylaxis  - will increase Lovenox subcut to 40 mg BID based on pt's weight and decreased ambulation status due to knee pain and respiratory distress    All labs, radiologic studies, vitals, orders and medications list reviewed. Patient is seen and examined independently at bedside. I agree with the resident's note, history and plan except as below.    PHYSICAL EXAM:  CONSTITUTIONAL: NAD, well-groomed, well-developed, obese.  HEAD:  Atraumatic, Normocephalic.  EYES: EOMI, PERRLA, conjunctiva and sclera clear.  ENMT: Moist mucous membranes, No lesions.  NERVOUS SYSTEM:  Alert & Oriented X3, Good concentration; No limb weakness or numbness.  RESPIRATORY: Diffuse wheezes and rhonchi bilaterally  CARDIOVASCULAR: Regular rate and rhythm; No murmurs, rubs, or gallops.  GASTROINTESTINAL: Soft, Nontender, Nondistended; Bowel sounds present.  MUSCULOSKELETAL: No joint swelling or tenderness.   EXTREMITIES: No clubbing, cyanosis, or edema.  LYMPH: No lymphadenopathy noted.  SKIN: No rashes or lesions.    # Acute COPD exacerbation  - noted flu and RVP panel negative, isolation discontinued  - start on solumedrol 80 mg IV q8h  - c/w duoneb q6h and prn  - c/w IV antibiotics azithromycin 500 mg q24h    # Watery diarrhea likely viral gastroenteritis vs. opioid withdrawals  - monitor for bowel movements, symptom management for now  - no recent antibiotics use    # REBECCA  - on BIPAP at bedtime, on 21/18 setting, verified with pulmonology note from [facility]    # Chronic Pain, pt is on pain regimen for past 17 years chronically  - istop checked  - on very high pain regimen from home including 4 medications, morphine  mg po QID, oxycodone 30 mg po 6 times per day, methadone 10 mg po BID, dilaudid 4 mg po TID  - pt states that he will be okay with regimen of home morphine  mg po QID and oxycodone 30 mg po 6 times per day for now  - hold bowel regimen as pt is having diarrhea    # Hypertension  - c/w lisinopril and lasix    # Hyperlipidemia  - c/w fenofibrate and zocor    # GERD and GI prophylaxis  - c/w PPI    # Left knee pain  - follow up outpatient for scheduled total left knee replacement    # DVT prophylaxis  - will increase Lovenox subcut to 40 mg BID based on pt's weight and decreased ambulation status due to knee pain and respiratory distress    All labs, radiologic studies, vitals, orders and medications list reviewed.

## 2018-10-14 NOTE — ED PROVIDER NOTE - NS ED ROS FT
Review of Systems:  	•	CONSTITUTIONAL - no fever, no diaphoresis, no weight change  	•	SKIN - no rash  	•	HEMATOLOGIC - no bleeding, no bruising  	•	EYES - no eye pain, no blurred vision  	•	ENT - no change in hearing, no pain  	•	RESPIRATORY - + shortness of breath, + cough  	•	CARDIAC - +chest pain, no palpitations  	•	GI - no abd pain, no nausea, no vomiting, no diarrhea, no constipation, no bleeding  	•	 - no dysuria, no hematuria, no flank pain, no urinary retention  	•	MUSCULOSKELETAL - no joint paint, no swelling, no redness  	•	NEUROLOGIC - no weakness, no headache, no paresthesias, no dizziness  All other systems negative, unless specified in HPI

## 2018-10-14 NOTE — ED ADULT NURSE NOTE - OBJECTIVE STATEMENT
pt presents to the ED with c/o sob and cough that worsened past 6 hours. pt denies cp/fevers/chills, n/v/d.

## 2018-10-14 NOTE — H&P ADULT - ASSESSMENT
52 year old male with a past medical history of Chronic Obstructive Pulmonary Disease, Obstructive Sleep Apnea, Morbid Obesity, Gastroesophageal Reflux Disease, Hyperlipidemia, Hypertension presenting with shortness of breath for the past week.    Chronic Obstructive Pulmonary Disease exacerbation  - RVP panel. Flu panel.  - Prednisone 60mg, taper  - Resume Azithromycin 500mg for 4 more days  - Albuterol/Ipratropium q4h prn  - Patient not on LABA/ICS at home. Follow up with Dr Bassett regarding patient's management.    Hypertension; Lisinopril    Hyperlipidemia; Fenofibrate    Bilateral lower extremity ankle swelling; patient takes Lasix twice a week although it was prescribed daily.    Gastroesophageal Reflux Disease; Esomeprazole to Pantoprazole    Obstructive Sleep Apnea; stable    Left knee pain; Tylenol. Has scheduled total knee replacement.    DVT ppx; Lovenox  Ambulate 52 year old male with a past medical history of Chronic Obstructive Pulmonary Disease, Obstructive Sleep Apnea, Morbid Obesity, Gastroesophageal Reflux Disease, Hyperlipidemia, Hypertension presenting with shortness of breath for the past week.    Chronic Obstructive Pulmonary Disease exacerbation  - RVP panel. Flu panel. Droplet & contact precautions.  - Prednisone 60mg, taper  - Resume Azithromycin 500mg for 4 more days  - Albuterol/Ipratropium q4h prn  - Patient not on LABA/ICS at home. Follow up with Dr Bassett regarding patient's management.    Hypertension; Lisinopril    Hyperlipidemia; Fenofibrate    Bilateral lower extremity ankle swelling; patient takes Lasix twice a week although it was prescribed daily.    Gastroesophageal Reflux Disease; Esomeprazole to Pantoprazole    Obstructive Sleep Apnea; stable    Left knee pain; Tylenol. Has scheduled total knee replacement.    DVT ppx; Lovenox  Ambulate

## 2018-10-14 NOTE — ED PROVIDER NOTE - CARE PLAN
Principal Discharge DX:	COPD exacerbation Principal Discharge DX:	COPD exacerbation  Assessment and plan of treatment:	concern for copd exac, r/o pna, r/o acs, H&P not consistent w pe, dissection, ptx, chf, will do labs, ekg/trop, CXR, nebs, steroids, re-eval

## 2018-10-14 NOTE — H&P ADULT - NSHPLABSRESULTS_GEN_ALL_CORE
Labs                        14.8   10.59 )-----------( 249      ( 14 Oct 2018 12:32 )             43.8     10-14  139  |  99  |  9<L>  ----------------------------<  104<H>  4.2   |  28  |  0.8  Ca    9.1      14 Oct 2018 12:32  TPro  6.7  /  Alb  4.0  /  TBili  0.5  /  DBili  x   /  AST  18  /  ALT  28  /  AlkPhos  144<H>  10-14    PT/INR - ( 14 Oct 2018 12:32 )   PT: 12.80 sec;   INR: 1.11 ratio    PTT - ( 14 Oct 2018 12:32 )  PTT:33.7 sec    CARDIAC MARKERS ( 14 Oct 2018 12:32 )  x     / <0.01 ng/mL / x     / x     / x        Serum Pro-Brain Natriuretic Peptide (10.14.18 @ 12:32)    Serum Pro-Brain Natriuretic Peptide: 69 pg/mL    Blood Gas Profile - Venous (10.14.18 @ 13:00)    pH, Venous: 7.33    pCO2, Venous: 57 mmHg    pO2, Venous: 29 mmHg    HCO3, Venous: 30 mmoL/L    Base Excess, Venous: 2.3 mmoL/L    Oxygen Saturation, Venous: 50 % Labs                        14.8   10.59 )-----------( 249      ( 14 Oct 2018 12:32 )             43.8     10-14  139  |  99  |  9<L>  ----------------------------<  104<H>  4.2   |  28  |  0.8  Ca    9.1      14 Oct 2018 12:32  TPro  6.7  /  Alb  4.0  /  TBili  0.5  /  DBili  x   /  AST  18  /  ALT  28  /  AlkPhos  144<H>  10-14    PT/INR - ( 14 Oct 2018 12:32 )   PT: 12.80 sec;   INR: 1.11 ratio    PTT - ( 14 Oct 2018 12:32 )  PTT:33.7 sec    CARDIAC MARKERS ( 14 Oct 2018 12:32 )  x     / <0.01 ng/mL / x     / x     / x        Serum Pro-Brain Natriuretic Peptide (10.14.18 @ 12:32)    Serum Pro-Brain Natriuretic Peptide: 69 pg/mL    Blood Gas Profile - Venous (10.14.18 @ 13:00)    pH, Venous: 7.33    pCO2, Venous: 57 mmHg    pO2, Venous: 29 mmHg    HCO3, Venous: 30 mmoL/L    Base Excess, Venous: 2.3 mmoL/L    Oxygen Saturation, Venous: 50 %    Radiology  CXR; no acute pathology, pending official read Labs                        14.8   10.59 )-----------( 249      ( 14 Oct 2018 12:32 )             43.8     10-14  139  |  99  |  9<L>  ----------------------------<  104<H>  4.2   |  28  |  0.8  Ca    9.1      14 Oct 2018 12:32  TPro  6.7  /  Alb  4.0  /  TBili  0.5  /  DBili  x   /  AST  18  /  ALT  28  /  AlkPhos  144<H>  10-14    PT/INR - ( 14 Oct 2018 12:32 )   PT: 12.80 sec;   INR: 1.11 ratio    PTT - ( 14 Oct 2018 12:32 )  PTT:33.7 sec    CARDIAC MARKERS ( 14 Oct 2018 12:32 )  x     / <0.01 ng/mL / x     / x     / x        Serum Pro-Brain Natriuretic Peptide (10.14.18 @ 12:32)    Serum Pro-Brain Natriuretic Peptide: 69 pg/mL    Blood Gas Profile - Venous (10.14.18 @ 13:00)    pH, Venous: 7.33    pCO2, Venous: 57 mmHg    pO2, Venous: 29 mmHg    HCO3, Venous: 30 mmoL/L    Base Excess, Venous: 2.3 mmoL/L    Oxygen Saturation, Venous: 50 %    EKG NSR    Radiology  CXR; no acute pathology, pending official read

## 2018-10-14 NOTE — H&P ADULT - NSHPPHYSICALEXAM_GEN_ALL_CORE
Vital Signs Last 24 Hrs  T(C): 36.2 (14 Oct 2018 16:24), Max: 37.3 (14 Oct 2018 14:58)  T(F): 97.1 (14 Oct 2018 16:24), Max: 99.1 (14 Oct 2018 14:58)  HR: 85 (14 Oct 2018 16:24) (85 - 104)  BP: 124/68 (14 Oct 2018 16:24) (124/68 - 129/86)  BP(mean): --  RR: 18 (14 Oct 2018 16:24) (18 - 20)  SpO2: 95% (14 Oct 2018 16:24) (95% - 98%)    Constitutional:  HEENT:  Cardiac:  Lungs:  Abdomen:  Extremities:  Skin:  Psych:  Neuro: Vital Signs Last 24 Hrs  T(C): 36.2 (14 Oct 2018 16:24), Max: 37.3 (14 Oct 2018 14:58)  T(F): 97.1 (14 Oct 2018 16:24), Max: 99.1 (14 Oct 2018 14:58)  HR: 85 (14 Oct 2018 16:24) (85 - 104)  BP: 124/68 (14 Oct 2018 16:24) (124/68 - 129/86)  BP(mean): --  RR: 18 (14 Oct 2018 16:24) (18 - 20)  SpO2: 95% (14 Oct 2018 16:24) (95% - 98%)    Constitutional: NAD, morbidly obese, speaking in complete sentences, not using accessory muscles  HEENT: NCAT, EOMI, PERRLA, anicteric sclera, conjunctiva clear, no sinus tenderness, nonerythematous oropharynx, nonexudative tonsils, no uvular deviation, trachea midline, neck supple  Cardiac: S1 S2, RRR, no audible murmurs  Lungs: GBAE, diffuse mild expiratory wheezes bilaterally with bilateral rhonchi, no audible crackles  Abdomen: +BS, soft, NTND, morbid obesity  Extremities: +2 DP b/l, -ve LLE b/l, no left upper extremity erythema from prior admission  Skin: No rashes, no bruises  Psych: Normal affect  Neuro: CN grossly intact, motor 5/5, no sensory abnormalities, limping gait due to left knee pain

## 2018-10-14 NOTE — ED PROVIDER NOTE - PLAN OF CARE
concern for copd exac, r/o pna, r/o acs, H&P not consistent w pe, dissection, ptx, chf, will do labs, ekg/trop, CXR, nebs, steroids, re-eval

## 2018-10-14 NOTE — H&P ADULT - FAMILY HISTORY
No pertinent family history in first degree relatives Father  Still living? Unknown  No pertinent family history in first degree relatives, Age at diagnosis: Age Unknown     Mother  Still living? Unknown  No pertinent family history in first degree relatives, Age at diagnosis: Age Unknown

## 2018-10-14 NOTE — H&P ADULT - NSHPSOCIALHISTORY_GEN_ALL_CORE
Quit smoking 8 days ago Quit smoking 8 days ago; 70-80 pack year smoker  Occasional alcohol  No illicit drugs    Lives with wife  Active in [facility]Ls

## 2018-10-15 LAB
ALBUMIN SERPL ELPH-MCNC: 4 G/DL — SIGNIFICANT CHANGE UP (ref 3.5–5.2)
ALP SERPL-CCNC: 140 U/L — HIGH (ref 30–115)
ALT FLD-CCNC: 21 U/L — SIGNIFICANT CHANGE UP (ref 0–41)
ANION GAP SERPL CALC-SCNC: 15 MMOL/L — HIGH (ref 7–14)
AST SERPL-CCNC: 10 U/L — SIGNIFICANT CHANGE UP (ref 0–41)
BASE EXCESS BLDA CALC-SCNC: 1.8 MMOL/L — SIGNIFICANT CHANGE UP (ref -2–2)
BASOPHILS # BLD AUTO: 0.01 K/UL — SIGNIFICANT CHANGE UP (ref 0–0.2)
BASOPHILS NFR BLD AUTO: 0.1 % — SIGNIFICANT CHANGE UP (ref 0–1)
BILIRUB SERPL-MCNC: 0.4 MG/DL — SIGNIFICANT CHANGE UP (ref 0.2–1.2)
BUN SERPL-MCNC: 12 MG/DL — SIGNIFICANT CHANGE UP (ref 10–20)
CALCIUM SERPL-MCNC: 9.6 MG/DL — SIGNIFICANT CHANGE UP (ref 8.5–10.1)
CHLORIDE SERPL-SCNC: 100 MMOL/L — SIGNIFICANT CHANGE UP (ref 98–110)
CK MB CFR SERPL CALC: 2 NG/ML — SIGNIFICANT CHANGE UP (ref 0.6–6.3)
CK MB CFR SERPL CALC: 2 NG/ML — SIGNIFICANT CHANGE UP (ref 0.6–6.3)
CK SERPL-CCNC: 119 U/L — SIGNIFICANT CHANGE UP (ref 0–225)
CK SERPL-CCNC: 92 U/L — SIGNIFICANT CHANGE UP (ref 0–225)
CO2 SERPL-SCNC: 24 MMOL/L — SIGNIFICANT CHANGE UP (ref 17–32)
CREAT SERPL-MCNC: 0.6 MG/DL — LOW (ref 0.7–1.5)
EOSINOPHIL # BLD AUTO: 0 K/UL — SIGNIFICANT CHANGE UP (ref 0–0.7)
EOSINOPHIL NFR BLD AUTO: 0 % — SIGNIFICANT CHANGE UP (ref 0–8)
GLUCOSE SERPL-MCNC: 192 MG/DL — HIGH (ref 70–99)
HCO3 BLDA-SCNC: 26 MMOL/L — SIGNIFICANT CHANGE UP (ref 23–27)
HCT VFR BLD CALC: 42.1 % — SIGNIFICANT CHANGE UP (ref 42–52)
HGB BLD-MCNC: 14.2 G/DL — SIGNIFICANT CHANGE UP (ref 14–18)
IMM GRANULOCYTES NFR BLD AUTO: 0.4 % — HIGH (ref 0.1–0.3)
LYMPHOCYTES # BLD AUTO: 0.89 K/UL — LOW (ref 1.2–3.4)
LYMPHOCYTES # BLD AUTO: 7.9 % — LOW (ref 20.5–51.1)
MAGNESIUM SERPL-MCNC: 2.2 MG/DL — SIGNIFICANT CHANGE UP (ref 1.8–2.4)
MCHC RBC-ENTMCNC: 28.7 PG — SIGNIFICANT CHANGE UP (ref 27–31)
MCHC RBC-ENTMCNC: 33.7 G/DL — SIGNIFICANT CHANGE UP (ref 32–37)
MCV RBC AUTO: 85.2 FL — SIGNIFICANT CHANGE UP (ref 80–94)
MONOCYTES # BLD AUTO: 0.09 K/UL — LOW (ref 0.1–0.6)
MONOCYTES NFR BLD AUTO: 0.8 % — LOW (ref 1.7–9.3)
NEUTROPHILS # BLD AUTO: 10.17 K/UL — HIGH (ref 1.4–6.5)
NEUTROPHILS NFR BLD AUTO: 90.8 % — HIGH (ref 42.2–75.2)
NRBC # BLD: 0 /100 WBCS — SIGNIFICANT CHANGE UP (ref 0–0)
PCO2 BLDA: 40 MMHG — SIGNIFICANT CHANGE UP (ref 38–42)
PH BLDA: 7.43 — HIGH (ref 7.38–7.42)
PLATELET # BLD AUTO: 258 K/UL — SIGNIFICANT CHANGE UP (ref 130–400)
PO2 BLDA: 108 MMHG — HIGH (ref 78–95)
POTASSIUM SERPL-MCNC: 4.3 MMOL/L — SIGNIFICANT CHANGE UP (ref 3.5–5)
POTASSIUM SERPL-SCNC: 4.3 MMOL/L — SIGNIFICANT CHANGE UP (ref 3.5–5)
PROT SERPL-MCNC: 6.7 G/DL — SIGNIFICANT CHANGE UP (ref 6–8)
RAPID RVP RESULT: SIGNIFICANT CHANGE UP
RBC # BLD: 4.94 M/UL — SIGNIFICANT CHANGE UP (ref 4.7–6.1)
RBC # FLD: 14.7 % — HIGH (ref 11.5–14.5)
SAO2 % BLDA: 99 % — HIGH (ref 94–98)
SODIUM SERPL-SCNC: 139 MMOL/L — SIGNIFICANT CHANGE UP (ref 135–146)
TROPONIN T SERPL-MCNC: <0.01 NG/ML — SIGNIFICANT CHANGE UP
TROPONIN T SERPL-MCNC: <0.01 NG/ML — SIGNIFICANT CHANGE UP
WBC # BLD: 11.21 K/UL — HIGH (ref 4.8–10.8)
WBC # FLD AUTO: 11.21 K/UL — HIGH (ref 4.8–10.8)

## 2018-10-15 RX ORDER — IPRATROPIUM/ALBUTEROL SULFATE 18-103MCG
3 AEROSOL WITH ADAPTER (GRAM) INHALATION EVERY 6 HOURS
Qty: 0 | Refills: 0 | Status: DISCONTINUED | OUTPATIENT
Start: 2018-10-15 | End: 2018-10-22

## 2018-10-15 RX ORDER — ENOXAPARIN SODIUM 100 MG/ML
40 INJECTION SUBCUTANEOUS
Qty: 0 | Refills: 0 | Status: DISCONTINUED | OUTPATIENT
Start: 2018-10-15 | End: 2018-10-19

## 2018-10-15 RX ORDER — MORPHINE SULFATE 50 MG/1
100 CAPSULE, EXTENDED RELEASE ORAL
Qty: 0 | Refills: 0 | COMMUNITY

## 2018-10-15 RX ORDER — BUDESONIDE AND FORMOTEROL FUMARATE DIHYDRATE 160; 4.5 UG/1; UG/1
2 AEROSOL RESPIRATORY (INHALATION)
Qty: 0 | Refills: 0 | Status: DISCONTINUED | OUTPATIENT
Start: 2018-10-15 | End: 2018-10-22

## 2018-10-15 RX ADMIN — MORPHINE SULFATE 100 MILLIGRAM(S): 50 CAPSULE, EXTENDED RELEASE ORAL at 05:47

## 2018-10-15 RX ADMIN — PANTOPRAZOLE SODIUM 40 MILLIGRAM(S): 20 TABLET, DELAYED RELEASE ORAL at 05:46

## 2018-10-15 RX ADMIN — Medication 80 MILLIGRAM(S): at 16:28

## 2018-10-15 RX ADMIN — LISINOPRIL 5 MILLIGRAM(S): 2.5 TABLET ORAL at 05:46

## 2018-10-15 RX ADMIN — Medication 145 MILLIGRAM(S): at 12:20

## 2018-10-15 RX ADMIN — MORPHINE SULFATE 100 MILLIGRAM(S): 50 CAPSULE, EXTENDED RELEASE ORAL at 22:47

## 2018-10-15 RX ADMIN — Medication 81 MILLIGRAM(S): at 12:20

## 2018-10-15 RX ADMIN — SIMVASTATIN 40 MILLIGRAM(S): 20 TABLET, FILM COATED ORAL at 22:47

## 2018-10-15 RX ADMIN — Medication 40 MILLIGRAM(S): at 05:46

## 2018-10-15 RX ADMIN — AZITHROMYCIN 255 MILLIGRAM(S): 500 TABLET, FILM COATED ORAL at 12:20

## 2018-10-15 RX ADMIN — MORPHINE SULFATE 100 MILLIGRAM(S): 50 CAPSULE, EXTENDED RELEASE ORAL at 16:30

## 2018-10-15 RX ADMIN — Medication 80 MILLIGRAM(S): at 05:45

## 2018-10-15 RX ADMIN — OXYCODONE HYDROCHLORIDE 30 MILLIGRAM(S): 5 TABLET ORAL at 23:50

## 2018-10-15 RX ADMIN — Medication 80 MILLIGRAM(S): at 22:50

## 2018-10-15 RX ADMIN — OXYCODONE HYDROCHLORIDE 30 MILLIGRAM(S): 5 TABLET ORAL at 12:23

## 2018-10-15 RX ADMIN — MORPHINE SULFATE 100 MILLIGRAM(S): 50 CAPSULE, EXTENDED RELEASE ORAL at 12:20

## 2018-10-15 RX ADMIN — Medication 3 MILLILITER(S): at 18:05

## 2018-10-15 RX ADMIN — ENOXAPARIN SODIUM 40 MILLIGRAM(S): 100 INJECTION SUBCUTANEOUS at 05:46

## 2018-10-15 RX ADMIN — MORPHINE SULFATE 100 MILLIGRAM(S): 50 CAPSULE, EXTENDED RELEASE ORAL at 05:46

## 2018-10-15 NOTE — PROGRESS NOTE ADULT - ASSESSMENT
52 year old male with a past medical history of Chronic Obstructive Pulmonary Disease, Obstructive Sleep Apnea, Morbid Obesity, Gastroesophageal Reflux Disease, Hyperlipidemia, Hypertension presenting with shortness of breath for the past week. Patient reports that his shortness of breath has gotten worse to the extent where it's on exertion and his lips and fingers turned blue. Not relieved with use of his inhalers and nebulizers. Reports chills that started Friday night, diarrhea of around 2-4 since Friday and nasal congestion that started yesterday.  Patient reports that he is expectorating greenish like phlegm as well as wheezing.  Denies fevers, palpitations, chest pain, lower extremity swelling/tenderness/warmth.  Denies rhinorrhea, sinus tenderness. Reports contact with his wife who had a viral illness.  No recent travels.  Patient also reports left knee pain for which is scheduled for a total knee replacement during the end of October 2018.  ED: Azithromycin 500mg, Prednisone 60mg, Albuterol/Ipratropium x 1 (14 Oct 2018 16:28)    #) Chronic Obstructive Pulmonary Disease exacerbation  - RVP and flu panel- negative  - on Azithromycin 500mg (started 10/14)  - Albuterol/Ipratropium q4h prn  - Patient not on LABA/ICS at home. Follow up with Dr Bassett regarding patient's management.  -2D Echo pending  -pulm c/s pending for Dr. Bassett    #) Hypertension  -c/w Lisinopril    #) Hyperlipidemia  - c/w Fenofibrate    #) Bilateral lower extremity ankle swelling  -patient takes Lasix twice a week although it was prescribed daily.    #) GERD  -protonix    #) Obstructive Sleep Apnea  -stable    #) Left knee pain  -Tylenol PRN  -Has scheduled total knee replacement.    DVT ppx; Lovenox  Ambulate 52 year old male with a past medical history of Chronic Obstructive Pulmonary Disease, Obstructive Sleep Apnea, Morbid Obesity, Gastroesophageal Reflux Disease, Hyperlipidemia, Hypertension presenting with shortness of breath for the past week. Patient reports that his shortness of breath has gotten worse to the extent where it's on exertion and his lips and fingers turned blue. Not relieved with use of his inhalers and nebulizers. Reports chills that started Friday night, diarrhea of around 2-4 since Friday and nasal congestion that started yesterday.  Patient reports that he is expectorating greenish like phlegm as well as wheezing.  Denies fevers, palpitations, chest pain, lower extremity swelling/tenderness/warmth.  Denies rhinorrhea, sinus tenderness. Reports contact with his wife who had a viral illness.  No recent travels.  Patient also reports left knee pain for which is scheduled for a total knee replacement during the end of October 2018.  ED: Azithromycin 500mg, Prednisone 60mg, Albuterol/Ipratropium x 1 (14 Oct 2018 16:28)    #) Chronic Obstructive Pulmonary Disease exacerbation  -RVP and flu panel- negative  -on Azithromycin 500mg (started 10/14)  -Albuterol/Ipratropium q4h prn  - Patient not on LABA/ICS at home. Follow up with Dr Bassett regarding patient's management.  -2D Echo- EF-55-60%, grade 1 DD  -pulm c/s pending for Dr. Bassett    #) Hypertension  -c/w Lisinopril    #) Hyperlipidemia  - c/w Fenofibrate    #) Bilateral lower extremity ankle swelling  -patient takes Lasix twice a week although it was prescribed daily.    #) GERD  -protonix    #) Obstructive Sleep Apnea  -stable    #) Left knee pain  -Tylenol PRN  -Has scheduled total knee replacement.    DVT ppx; Lovenox  Ambulate

## 2018-10-16 RX ORDER — INFLUENZA VIRUS VACCINE 15; 15; 15; 15 UG/.5ML; UG/.5ML; UG/.5ML; UG/.5ML
0.5 SUSPENSION INTRAMUSCULAR ONCE
Qty: 0 | Refills: 0 | Status: COMPLETED | OUTPATIENT
Start: 2018-10-16 | End: 2018-10-19

## 2018-10-16 RX ORDER — ZOLPIDEM TARTRATE 10 MG/1
5 TABLET ORAL AT BEDTIME
Qty: 0 | Refills: 0 | Status: DISCONTINUED | OUTPATIENT
Start: 2018-10-16 | End: 2018-10-22

## 2018-10-16 RX ADMIN — Medication 3 MILLILITER(S): at 09:11

## 2018-10-16 RX ADMIN — Medication 80 MILLIGRAM(S): at 06:44

## 2018-10-16 RX ADMIN — Medication 145 MILLIGRAM(S): at 12:46

## 2018-10-16 RX ADMIN — AZITHROMYCIN 255 MILLIGRAM(S): 500 TABLET, FILM COATED ORAL at 12:46

## 2018-10-16 RX ADMIN — MORPHINE SULFATE 100 MILLIGRAM(S): 50 CAPSULE, EXTENDED RELEASE ORAL at 22:37

## 2018-10-16 RX ADMIN — OXYCODONE HYDROCHLORIDE 30 MILLIGRAM(S): 5 TABLET ORAL at 21:30

## 2018-10-16 RX ADMIN — LISINOPRIL 5 MILLIGRAM(S): 2.5 TABLET ORAL at 06:45

## 2018-10-16 RX ADMIN — ZOLPIDEM TARTRATE 5 MILLIGRAM(S): 10 TABLET ORAL at 23:56

## 2018-10-16 RX ADMIN — MORPHINE SULFATE 100 MILLIGRAM(S): 50 CAPSULE, EXTENDED RELEASE ORAL at 22:33

## 2018-10-16 RX ADMIN — MORPHINE SULFATE 100 MILLIGRAM(S): 50 CAPSULE, EXTENDED RELEASE ORAL at 06:44

## 2018-10-16 RX ADMIN — MORPHINE SULFATE 100 MILLIGRAM(S): 50 CAPSULE, EXTENDED RELEASE ORAL at 08:02

## 2018-10-16 RX ADMIN — OXYCODONE HYDROCHLORIDE 30 MILLIGRAM(S): 5 TABLET ORAL at 10:05

## 2018-10-16 RX ADMIN — BUDESONIDE AND FORMOTEROL FUMARATE DIHYDRATE 2 PUFF(S): 160; 4.5 AEROSOL RESPIRATORY (INHALATION) at 09:10

## 2018-10-16 RX ADMIN — OXYCODONE HYDROCHLORIDE 30 MILLIGRAM(S): 5 TABLET ORAL at 09:33

## 2018-10-16 RX ADMIN — MORPHINE SULFATE 100 MILLIGRAM(S): 50 CAPSULE, EXTENDED RELEASE ORAL at 12:46

## 2018-10-16 RX ADMIN — MORPHINE SULFATE 100 MILLIGRAM(S): 50 CAPSULE, EXTENDED RELEASE ORAL at 21:29

## 2018-10-16 RX ADMIN — MORPHINE SULFATE 100 MILLIGRAM(S): 50 CAPSULE, EXTENDED RELEASE ORAL at 17:10

## 2018-10-16 RX ADMIN — Medication 80 MILLIGRAM(S): at 13:26

## 2018-10-16 RX ADMIN — Medication 3 MILLILITER(S): at 13:27

## 2018-10-16 RX ADMIN — Medication 40 MILLIGRAM(S): at 06:45

## 2018-10-16 RX ADMIN — Medication 80 MILLIGRAM(S): at 22:33

## 2018-10-16 RX ADMIN — OXYCODONE HYDROCHLORIDE 30 MILLIGRAM(S): 5 TABLET ORAL at 20:15

## 2018-10-16 RX ADMIN — PANTOPRAZOLE SODIUM 40 MILLIGRAM(S): 20 TABLET, DELAYED RELEASE ORAL at 06:45

## 2018-10-16 RX ADMIN — ZOLPIDEM TARTRATE 5 MILLIGRAM(S): 10 TABLET ORAL at 00:39

## 2018-10-16 RX ADMIN — SIMVASTATIN 40 MILLIGRAM(S): 20 TABLET, FILM COATED ORAL at 22:33

## 2018-10-16 RX ADMIN — Medication 81 MILLIGRAM(S): at 12:46

## 2018-10-16 RX ADMIN — MORPHINE SULFATE 100 MILLIGRAM(S): 50 CAPSULE, EXTENDED RELEASE ORAL at 13:16

## 2018-10-16 NOTE — CONSULT NOTE ADULT - ATTENDING COMMENTS
copd exacerb-steroids-pulse then taper////bronchodilator rx,o2 based on sats////d/c smoking//////needs sleep study as an outpt copd exacerb-steroids-pulse then taper////bronchodilator rx,o2 based on sats////d/c smoking//////

## 2018-10-16 NOTE — PATIENT PROFILE ADULT - SURGICAL SITE INCISION
eMERGENCY dEPARTMENT eNCOUnter      CHIEF COMPLAINT    Chief Complaint   Patient presents with   • Abdominal Pain       HPI    Ami Melvin is a 18 year old female who presents complaining of burning and stabbing abdominal pain which started approximately 1 hour prior to arrival. This is localized to the epigastrium and right upper quadrant. She had some discomfort last night preceding this which resolved. She has had similar symptoms in the past and been told she has reflux. She no longer takes those medications. Onset did not occur after eating.      She reports associated nausea. She denies emesis or diarrhea. No melena or hematochezia. She denies fever or chills. She has not had any urinary changes. She does not have any back discomfort.     She is not taking any medication for pain control.    ALLERGIES    ALLERGIES:  No Known Allergies    CURRENT MEDICATIONS    No current facility-administered medications for this encounter.      Current Outpatient Prescriptions   Medication Sig Dispense Refill   • pantoprazole (PROTONIX) 40 MG tablet Take 1 tablet by mouth daily. 30 tablet 0       PAST MEDICAL HISTORY    History reviewed. No pertinent past medical history.    SURGICAL HISTORY    History reviewed. No pertinent surgical history.    SOCIAL HISTORY    Social History     Social History   • Marital status: Single     Spouse name: N/A   • Number of children: N/A   • Years of education: N/A     Social History Main Topics   • Smoking status: Never Smoker   • Smokeless tobacco: Never Used   • Alcohol use None   • Drug use: Unknown   • Sexual activity: Not Asked     Other Topics Concern   • None     Social History Narrative   • None       FAMILY HISTORY    History reviewed. No pertinent family history.    REVIEW OF SYSTEMS    A 10 point review of systems was performed and found to be negative, except for stated above in the HPI.    PHYSICAL EXAM    ED Triage Vitals [06/03/18 2119]   /84   Heart Rate 76   Resp 16    Temp 97.7 °F (36.5 °C)   SpO2 97 %       Constitutional: Obese female. No acute distress, non-toxic appearance.   Head: Normal cephalic. Atraumatic.   Eyes: Conjunctivae normal.   Oral pharynx: Moist mucus membranes.   Neck: Supple.  Respiratory:  No respiratory distress, normal breath sounds. No rales. No wheezing. No rhonchi.   Cardiovascular:  Normal rate, normal rhythm. No murmurs, gallops, or rubs.  GI:  Soft, obese. Bowel sounds present, tenderness to the epigastric region. No guarding, rebound or rigidity.   Musculoskeletal:  No edema.  Integument:  Well hydrated, no rash.   Neurologic:  Alert & oriented x 3.   Psychiatric:  Speech and behavior appropriate.     RADIOLOGY    No orders to display       LABS    Results for orders placed or performed during the hospital encounter of 06/03/18   CBC & Auto Differential   Result Value    WBC 11.2 (H)    RBC 4.45    HGB 12.1    HCT 37.6    MCV 84.5    MCH 27.2    MCHC 32.2    RDW-CV 13.3        DIFF TYPE AUTOMATED DIFFERENTIAL    Neutrophil 53    LYMPH 33    MONO 7    EOSIN 7    BASO 0    Absolute Neutrophil 5.9    Absolute Lymph 3.7    Absolute Mono 0.8    Absolute Eos 0.8 (H)    Absolute Baso 0.0   Comprehensive Metabolic Panel   Result Value    Sodium 140    Potassium 3.5    Chloride 104    Carbon Dioxide 28    Anion Gap 12    Glucose 98    BUN 19    Creatinine 0.59    GFR Estimate,  Not calculated.     Comment: GFR NOT CALCULATED FOR AGE LESS THAN 18 YEARS    GFR Estimate, Non  Not calculated.     Comment: GFR NOT CALCULATED FOR AGE LESS THAN 18 YEARS    BUN/Creatinine Ratio 32 (H)    CALCIUM 9.4    TOTAL BILIRUBIN 0.2    AST/SGOT 17    ALT/SGPT 26    ALK PHOSPHATASE 98    TOTAL PROTEIN 7.9    Albumin 3.8    GLOBULIN 4.1 (H)    A/G Ratio, Serum 0.9 (L)   Lipase Level   Result Value    Lipase 115   Urinalysis & Reflex Micro with Culture if Indicated   Result Value    COLOR YELLOW    APPEARANCE CLEAR    GLUCOSE(URINE)  NEGATIVE    BILIRUBIN NEGATIVE    KETONES TRACE (A)    SPECIFIC GRAVITY 1.025    BLOOD NEGATIVE    pH 7.0    PROTEIN(URINE) NEGATIVE    UROBILINOGEN 0.2    NITRITE NEGATIVE    LEUKOCYTE ESTERASE NEGATIVE    SPECIMEN TYPE URINE, CLEAN CATCH/MIDSTREAM   Pregnancy Test Urine   Result Value    MONOCLONAL PREGNANCY NEGATIVE         ED MEDICATIONS  ED Medication Orders     Start Ordered     Status Ordering Provider    06/03/18 2140 06/03/18 2142    PRN      Discontinued ENRICO GARICA    06/03/18 2140 06/03/18 2142    PRN      Discontinued ENRICO GARCIA        ED COURSE & MEDICAL DECISION MAKING    Pt is a 18 year old female, who presented with her parents for abdominal pain. See HPI for additional details. Patient was in no apparent distress and non-toxic in appearance. Triage vitals and nursing notes reviewed. Patient afebrile and hemodynamically stable. Exam as documented above.    Patient given a GI cocktail.   CBC and CMP were negative for gross abnormalities.   No elevation in total bili, LFT's, alk phos or lipase.   UA and urine pregnancy negative.     10:30 PM   Per nursing staff patient is feeling somewhat improved with GI cocktail and is requesting water.     Patient and father were updated on all findings and results. Suspect symptoms related to GERD vs gastritis. Will start her on Protonix. Recommend PRN Maalox for symptoms. Discharge and home care instructions were provided.     She was advised to call her PCP for follow up. Father would like to establish with a new PCP, currently with [facility], list of providers given. Worrisome signs and symptoms were discussed, that if present she should return to the emergency department. All questions were answered. They understood and were in agreement with this plan. She was discharged in stable condition.     FINAL IMPRESSION      The encounter diagnosis was Epigastric pain.      No Pcp    Schedule an appointment as soon as possible for a visit in 1 week  List of local  doctors given, call to establish care and for close follow up.         Discharge Medication List as of 6/3/2018 10:39 PM      START taking these medications    Details   pantoprazole (PROTONIX) 40 MG tablet Take 1 tablet by mouth daily.Eprescribe, Disp-30 tablet, R-0             This chart was documented by Lexx Luther acting as a scribe for Tierra Mckeon MD. 6/3/2018, 9:36 PM.     The documentation recorded by the scribe accurately and completely reflects the service(s) I personally performed and the decisions made by me.           Stephany Menard, DO  06/04/18 0204     no

## 2018-10-16 NOTE — PROGRESS NOTE ADULT - ASSESSMENT
# Acute COPD exacerbation  - noted flu and RVP panel negative,  -  on solumedrol 80 mg IV q8h-- patient feels tight even now and is on bipapa  - c/w duoneb q6h and prn  - c/w IV antibiotics azithromycin 500 mg q24h    # Watery diarrhea likely viral gastroenteritis vs. opioid withdrawals  - monitor for bowel movements, symptom management for now  - no recent antibiotics use    # REBECCA with morbid obesity  - on BIPAP at bedtime, on 21/18 setting, verified with pulmonology note from [facility]    # Chronic Pain, pt is on pain regimen for past 17 years chronically  -- on very high pain regimen from home including 4 medications, morphine  mg po QID, oxycodone 30 mg po 6 times per day, methadone 10 mg po BID, dilaudid 4 mg po TID  - pt states that he will be okay with regimen of home morphine  mg po QID and oxycodone 30 mg po 6 times per day for now  - hold bowel regimen as pt is having diarrhea    # Hypertension  - c/w lisinopril and lasix    # Hyperlipidemia  - c/w fenofibrate and zocor    # GERD and GI prophylaxis  - c/w PPI    # Left knee pain  - follow up outpatient for scheduled total left knee replacement    # DVT prophylaxis  - will increase Lovenox subcut to 40 mg BID based on pt's weight and decreased ambulation status due to knee pain and respiratory distress

## 2018-10-16 NOTE — PROGRESS NOTE ADULT - ASSESSMENT
52 year old male with a past medical history of Chronic Obstructive Pulmonary Disease, Obstructive Sleep Apnea, Morbid Obesity, Gastroesophageal Reflux Disease, Hyperlipidemia, Hypertension presenting with shortness of breath for the past week. Patient reports that his shortness of breath has gotten worse to the extent where it's on exertion and his lips and fingers turned blue. Not relieved with use of his inhalers and nebulizers. Reports chills that started Friday night, diarrhea of around 2-4 since Friday and nasal congestion that started yesterday.  Patient reports that he is expectorating greenish like phlegm as well as wheezing.  Denies fevers, palpitations, chest pain, lower extremity swelling/tenderness/warmth.  Denies rhinorrhea, sinus tenderness. Reports contact with his wife who had a viral illness.  No recent travels.  Patient also reports left knee pain for which is scheduled for a total knee replacement during the end of October 2018.  ED: Azithromycin 500mg, Prednisone 60mg, Albuterol/Ipratropium x 1     #) Chronic Obstructive Pulmonary Disease exacerbation  -RVP and flu panel- negative  -on Azithromycin 500mg (started 10/14)  -Albuterol/Ipratropium q4h prn  -Patient not on LABA/ICS at home. Follow up with Dr Bassett regarding patient's management.  -2D Echo- EF-55-60%, grade 1 DD  -pulm c/s pending for Dr. Bassett    #) Hypertension  -c/w Lisinopril    #) Hyperlipidemia  - c/w Fenofibrate    #) Bilateral lower extremity ankle swelling  -patient takes Lasix twice a week although it was prescribed daily.    #) GERD  -protonix    #) Obstructive Sleep Apnea  -stable    #) Left knee pain  -Tylenol PRN  -Has scheduled total knee replacement.    DVT ppx; Lovenox  Ambulate 52 year old male with a past medical history of Chronic Obstructive Pulmonary Disease, Obstructive Sleep Apnea, Morbid Obesity, Gastroesophageal Reflux Disease, Hyperlipidemia, Hypertension presenting with shortness of breath for the past week. Patient reports that his shortness of breath has gotten worse to the extent where it's on exertion and his lips and fingers turned blue. Not relieved with use of his inhalers and nebulizers. Reports chills that started Friday night, diarrhea of around 2-4 since Friday and nasal congestion that started yesterday.  Patient reports that he is expectorating greenish like phlegm as well as wheezing.  Denies fevers, palpitations, chest pain, lower extremity swelling/tenderness/warmth.  Denies rhinorrhea, sinus tenderness. Reports contact with his wife who had a viral illness.  No recent travels.  Patient also reports left knee pain for which is scheduled for a total knee replacement during the end of October 2018.  ED: Azithromycin 500mg, Prednisone 60mg, Albuterol/Ipratropium x 1     #) Chronic Obstructive Pulmonary Disease exacerbation  -RVP and flu panel- negative  -on Azithromycin 500mg (started 10/14)  -Albuterol/Ipratropium q4h prn  -Patient not on LABA/ICS at home.  -2D Echo- EF-55-60%, grade 1 DD  -pulm c/s pending for Dr. Bassett    #) Hypertension  -c/w Lisinopril    #) Hyperlipidemia  - c/w Fenofibrate    #) Bilateral lower extremity ankle swelling  -patient takes Lasix twice a week although it was prescribed daily.    #) GERD  -protonix    #) Obstructive Sleep Apnea  -stable    #) Left knee pain  -Tylenol PRN  -Has scheduled total knee replacement.    DVT ppx; Lovenox  Ambulate

## 2018-10-16 NOTE — CONSULT NOTE ADULT - SUBJECTIVE AND OBJECTIVE BOX
Patient is a 52y old  Male who presents with a chief complaint of shortness of breath (15 Oct 2018 11:02)      HPI:  52 year old male with a past medical history of Chronic Obstructive Pulmonary Disease, Obstructive Sleep Apnea, Morbid Obesity, Gastroesophageal Reflux Disease, Hyperlipidemia, Hypertension presenting with shortness of breath for the past week. Patient reports that his shortness of breath has gotten worse to the extent where it's on exertion and his lips and fingers turned blue. Not relieved with use of his inhalers and nebulizers. Reports chills that started Friday night, diarrhea of around 2-4 since Friday and nasal congestion that started yesterday.  Patient reports that he is expectorating greenish like phlegm as well as wheezing.  Denies fevers, palpitations, chest pain, lower extremity swelling/tenderness/warmth.  Denies rhinorrhea, sinus tenderness. Reports contact with his wife who had a viral illness.  No recent travels.  Patient also reports left knee pain for which is scheduled for a total knee replacement during the end of October 2018.    ED: Azithromycin 500mg, Prednisone 60mg, Albuterol/Ipratropium x 1 (14 Oct 2018 16:28)      PAST MEDICAL & SURGICAL HISTORY:  Morbid obesity  Obstructive sleep apnea  GERD (gastroesophageal reflux disease)  High cholesterol  HTN (hypertension)  COPD (chronic obstructive pulmonary disease)  History of knee replacement procedure of right knee  History of appendectomy  History of cholecystectomy      SOCIAL HX:   Smoking                         ETOH                            Other    FAMILY HISTORY:  No pertinent family history in first degree relatives (Father, Mother)  .  No cardiovascular or pulmonary family history     Review of System:  See HPI    Allergies    No Known Allergies    Intolerances          PHYSICAL EXAM  Vital Signs Last 24 Hrs  T(C): 36.3 (16 Oct 2018 00:18), Max: 36.3 (16 Oct 2018 00:18)  T(F): 97.4 (16 Oct 2018 00:18), Max: 97.4 (16 Oct 2018 00:18)  HR: 90 (16 Oct 2018 06:43) (74 - 96)  BP: 126/90 (16 Oct 2018 06:43) (100/62 - 138/69)  BP(mean): --  RR: 18 (16 Oct 2018 00:18) (18 - 18)  SpO2: 98% (16 Oct 2018 00:18) (98% - 98%)    General: In NAD  HEENT: GERONIMO             Lymphatic system: No cervical LN     Lungs: Cong BS  Cardiovascular: Regular  Gastrointestinal: Soft.  + BS   Musculoskeletal: No Clubbing.  Full range of motion.. Moves all extremities  Skin: Warm.  Intact  Neurological: No motor or sensory deficit       LABS:                          14.2   11.21 )-----------( 258      ( 15 Oct 2018 05:59 )             42.1                                               10-15    139  |  100  |  12  ----------------------------<  192<H>  4.3   |  24  |  0.6<L>    Ca    9.6      15 Oct 2018 05:59  Mg     2.2     10-15    TPro  6.7  /  Alb  4.0  /  TBili  0.4  /  DBili  x   /  AST  10  /  ALT  21  /  AlkPhos  140<H>  10-15      PT/INR - ( 14 Oct 2018 12:32 )   PT: 12.80 sec;   INR: 1.11 ratio         PTT - ( 14 Oct 2018 12:32 )  PTT:33.7 sec                                           CARDIAC MARKERS ( 15 Oct 2018 05:59 )  x     / <0.01 ng/mL / 92 U/L / x     / 2.0 ng/mL  CARDIAC MARKERS ( 15 Oct 2018 00:37 )  x     / <0.01 ng/mL / 119 U/L / x     / 2.0 ng/mL  CARDIAC MARKERS ( 14 Oct 2018 12:32 )  x     / <0.01 ng/mL / x     / x     / x                                                LIVER FUNCTIONS - ( 15 Oct 2018 05:59 )  Alb: 4.0 g/dL / Pro: 6.7 g/dL / ALK PHOS: 140 U/L / ALT: 21 U/L / AST: 10 U/L / GGT: x                                                                                            ABG - ( 15 Oct 2018 06:18 )  pH, Arterial: 7.43  pH, Blood: x     /  pCO2: 40    /  pO2: 108   / HCO3: 26    / Base Excess: 1.8   /  SaO2: 99                  MEDICATIONS  (STANDING):  ALBUTerol/ipratropium for Nebulization 3 milliLiter(s) Nebulizer every 6 hours  aspirin  chewable 81 milliGRAM(s) Oral daily  azithromycin  IVPB 500 milliGRAM(s) IV Intermittent every 24 hours  buDESOnide 160 MICROgram(s)/formoterol 4.5 MICROgram(s) Inhaler 2 Puff(s) Inhalation two times a day  enoxaparin Injectable 40 milliGRAM(s) SubCutaneous two times a day  fenofibrate Tablet 145 milliGRAM(s) Oral daily  furosemide    Tablet 40 milliGRAM(s) Oral daily  lisinopril 5 milliGRAM(s) Oral daily  methylPREDNISolone sodium succinate Injectable 80 milliGRAM(s) IV Push three times a day  morphine ER Tablet 100 milliGRAM(s) Oral <User Schedule>  pantoprazole    Tablet 40 milliGRAM(s) Oral before breakfast  simvastatin 40 milliGRAM(s) Oral at bedtime    MEDICATIONS  (PRN):  acetaminophen   Tablet .. 650 milliGRAM(s) Oral every 6 hours PRN Moderate Pain (4 - 6)  ALBUTerol/ipratropium for Nebulization 3 milliLiter(s) Nebulizer every 4 hours PRN Bronchospasm  oxyCODONE    IR 30 milliGRAM(s) Oral every 4 hours PRN Severe Pain (7 - 10)  zolpidem 5 milliGRAM(s) Oral at bedtime PRN Insomnia Patient is a 52y old  Male who presents with a chief complaint of shortness of breath (15 Oct 2018 11:02)      HPI:  52 year old male with a past medical history of Chronic Obstructive Pulmonary Disease, Obstructive Sleep Apnea, Morbid Obesity, Gastroesophageal Reflux Disease, Hyperlipidemia, Hypertension presenting with shortness of breath for the past week. Patient reports that his shortness of breath has gotten worse to the extent where it's on exertion and his lips and fingers turned blue. Not relieved with use of his inhalers and nebulizers. Reports chills that started Friday night, diarrhea of around 2-4 since Friday and nasal congestion that started yesterday.  Patient reports that he is expectorating greenish like phlegm as well as wheezing.  Denies fevers, palpitations, chest pain, lower extremity swelling/tenderness/warmth.  Denies rhinorrhea, sinus tenderness. Reports contact with his wife who had a viral illness.  No recent travels.  Patient also reports left knee pain for which is scheduled for a total knee replacement during the end of October 2018.    ED: Azithromycin 500mg, Prednisone 60mg, Albuterol/Ipratropium x 1 (14 Oct 2018 16:28)      PAST MEDICAL & SURGICAL HISTORY:  Morbid obesity  Obstructive sleep apnea  GERD (gastroesophageal reflux disease)  High cholesterol  HTN (hypertension)  COPD (chronic obstructive pulmonary disease)  History of knee replacement procedure of right knee  History of appendectomy  History of cholecystectomy      SOCIAL HX:   Smoking                         ETOH                            Other    FAMILY HISTORY:  No pertinent family history in first degree relatives (Father, Mother)  .  No cardiovascular or pulmonary family history     Review of System:  See HPI    Allergies    No Known Allergies    Intolerances          PHYSICAL EXAM  Vital Signs Last 24 Hrs  T(C): 36.3 (16 Oct 2018 00:18), Max: 36.3 (16 Oct 2018 00:18)  T(F): 97.4 (16 Oct 2018 00:18), Max: 97.4 (16 Oct 2018 00:18)  HR: 90 (16 Oct 2018 06:43) (74 - 96)  BP: 126/90 (16 Oct 2018 06:43) (100/62 - 138/69)  BP(mean): --  RR: 18 (16 Oct 2018 00:18) (18 - 18)  SpO2: 98% (16 Oct 2018 00:18) (98% - 98%)    General: In NAD  HEENT: GERONIMO             Lymphatic system: No cervical LN     Lungs: Cong BS  Cardiovascular: Regular  Gastrointestinal: Soft.  + BS   Musculoskeletal: No Clubbing.  Full range of motion.. Moves all extremities  Skin: Warm.  Intact  Neurological: No motor or sensory deficit       LABS:                          14.2   11.21 )-----------( 258      ( 15 Oct 2018 05:59 )             42.1                                               10-15    139  |  100  |  12  ----------------------------<  192<H>  4.3   |  24  |  0.6<L>    Ca    9.6      15 Oct 2018 05:59  Mg     2.2     10-15    TPro  6.7  /  Alb  4.0  /  TBili  0.4  /  DBili  x   /  AST  10  /  ALT  21  /  AlkPhos  140<H>  10-15      PT/INR - ( 14 Oct 2018 12:32 )   PT: 12.80 sec;   INR: 1.11 ratio         PTT - ( 14 Oct 2018 12:32 )  PTT:33.7 sec                                           CARDIAC MARKERS ( 15 Oct 2018 05:59 )  x     / <0.01 ng/mL / 92 U/L / x     / 2.0 ng/mL  CARDIAC MARKERS ( 15 Oct 2018 00:37 )  x     / <0.01 ng/mL / 119 U/L / x     / 2.0 ng/mL  CARDIAC MARKERS ( 14 Oct 2018 12:32 )  x     / <0.01 ng/mL / x     / x     / x                                                LIVER FUNCTIONS - ( 15 Oct 2018 05:59 )  Alb: 4.0 g/dL / Pro: 6.7 g/dL / ALK PHOS: 140 U/L / ALT: 21 U/L / AST: 10 U/L / GGT: x                                                                                            ABG - ( 15 Oct 2018 06:18 )  pH, Arterial: 7.43  pH, Blood: x     /  pCO2: 40    /  pO2: 108   / HCO3: 26    / Base Excess: 1.8   /  SaO2: 99            < from: Xray Chest 1 View AP/PA (10.14.18 @ 14:20) >  Findings:    Support devices: None.    Cardiac/mediastinum/hilum: Unremarkable.    Lung parenchyma/Pleura: Within normal limits.    Skeleton/soft tissues: Unremarkable.    Impression:      No radiographic evidence of acute cardiopulmonary disease.    Unchanged.    < end of copied text >    < from: CT Angio Chest PE Protocol w/ IV Cont (07.23.16 @ 01:49) >    FINDINGS:  Pulmonary arteries: Unremarkable. No pulmonary embolism.  Aorta: No acute findings. No thoracic aortic aneurysm.  Lungs: Unremarkable. No mass. No consolidation.  Pleural spaces: Unremarkable. No significant effusion. No pneumothorax.  Heart: Unremarkable. No cardiomegaly. No significant pericardial   effusion. No evidence of RV  dysfunction.  Mediastinum: There is a small hiatal hernia.  Bones: Unremarkable. No acute fracture.  Lymph nodes: Unremarkable. No enlarged lymph nodes.  Upper abdomen: The patient is status post cholecystectomy.    IMPRESSION: #1. No evidence of pulmonary embolus  #2. The CT scan of the thorax is nonacute.    < end of copied text >        MEDICATIONS  (STANDING):  ALBUTerol/ipratropium for Nebulization 3 milliLiter(s) Nebulizer every 6 hours  aspirin  chewable 81 milliGRAM(s) Oral daily  azithromycin  IVPB 500 milliGRAM(s) IV Intermittent every 24 hours  buDESOnide 160 MICROgram(s)/formoterol 4.5 MICROgram(s) Inhaler 2 Puff(s) Inhalation two times a day  enoxaparin Injectable 40 milliGRAM(s) SubCutaneous two times a day  fenofibrate Tablet 145 milliGRAM(s) Oral daily  furosemide    Tablet 40 milliGRAM(s) Oral daily  lisinopril 5 milliGRAM(s) Oral daily  methylPREDNISolone sodium succinate Injectable 80 milliGRAM(s) IV Push three times a day  morphine ER Tablet 100 milliGRAM(s) Oral <User Schedule>  pantoprazole    Tablet 40 milliGRAM(s) Oral before breakfast  simvastatin 40 milliGRAM(s) Oral at bedtime    MEDICATIONS  (PRN):  acetaminophen   Tablet .. 650 milliGRAM(s) Oral every 6 hours PRN Moderate Pain (4 - 6)  ALBUTerol/ipratropium for Nebulization 3 milliLiter(s) Nebulizer every 4 hours PRN Bronchospasm  oxyCODONE    IR 30 milliGRAM(s) Oral every 4 hours PRN Severe Pain (7 - 10)  zolpidem 5 milliGRAM(s) Oral at bedtime PRN Insomnia Patient is a 52y old  Male who presents with a chief complaint of shortness of breath (15 Oct 2018 11:02)      HPI:  52 year old male with a past medical history of Chronic Obstructive Pulmonary Disease, (70 Pack year history pt. quit 7 days ago) Obstructive Sleep Apnea on BI Pap settings 21/18, Morbid Obesity, Gastroesophageal Reflux Disease, Hyperlipidemia, Hypertension. Presented to Er with SOB after excretion and productive cough with chest pain secondary to coughing described as sharp and shooting up his chest . Patient claimed that his lips and fingerers turned blue and he has never experienced such an episode of shortness of breath, not relieved with use of his inhalers and nebulizers. Patient claimed that he had a fever on Friday however currently denies fever, chills, night sweats. Patient complains of still having chest pain and states that he still has SOB although notes  marked improvement since ER visit . He also reports contact with his wife who had a viral illness.  No recent travels.        ED: Azithromycin 500mg, Prednisone 60mg, Albuterol/Ipratropium x 1 (14 Oct 2018 16:28)      PAST MEDICAL & SURGICAL HISTORY:  Morbid obesity  Obstructive sleep apnea  GERD (gastroesophageal reflux disease)  High cholesterol  HTN (hypertension)  COPD (chronic obstructive pulmonary disease)  History of knee replacement procedure of right knee  History of appendectomy  History of cholecystectomy      SOCIAL HX:   Smoking  70 Pack Years            ETOH Occasional                    	 Other    FAMILY HISTORY:  No pertinent family history in first degree relatives (Father, Mother)  .  No cardiovascular or pulmonary family history     Review of System:  See HPI    Allergies    No Known Allergies    Intolerances          PHYSICAL EXAM  Vital Signs Last 24 Hrs   T(C): 36.3 (16 Oct 2018 00:18), Max: 36.3 (16 Oct 2018 00:18)  T(F): 97.4 (16 Oct 2018 00:18), Max: 97.4 (16 Oct 2018 00:18)  HR: 90 (16 Oct 2018 06:43) (74 - 96)  BP: 126/90 (16 Oct 2018 06:43) (100/62 - 138/69)  BP(mean): --  RR: 18 (16 Oct 2018 00:18) (18 - 18)  SpO2: 98% (16 Oct 2018 00:18) (98% - 98%), 96% at 9:45am on room air     General: In NAD Lying in bed with non-rebreather mask and receiving a nebulizer treatment   HEENT: GERONIMO  neck supple non tender, obese            Lymphatic system: No cervical LN    Lungs: Not tender to palpation, Wheezing in all lung fields.   Cardiovascular: S1 S2 RRR  Gastrointestinal: Soft.  + BS   Musculoskeletal: No Clubbing.  Full range of motion.. Moves all extremities  Skin: Warm.  Intact  Extremities ankles bilateral non pitting edema   Neurological: No motor or sensory deficit       LABS:                          14.2   11.21 )-----------( 258      ( 15 Oct 2018 05:59 )             42.1                                               10-15    139  |  100  |  12  ----------------------------<  192<H>  4.3   |  24  |  0.6<L>    Ca    9.6      15 Oct 2018 05:59  Mg     2.2     10-15    TPro  6.7  /  Alb  4.0  /  TBili  0.4  /  DBili  x   /  AST  10  /  ALT  21  /  AlkPhos  140<H>  10-15      PT/INR - ( 14 Oct 2018 12:32 )   PT: 12.80 sec;   INR: 1.11 ratio         PTT - ( 14 Oct 2018 12:32 )  PTT:33.7 sec                                           CARDIAC MARKERS ( 15 Oct 2018 05:59 )  x     / <0.01 ng/mL / 92 U/L / x     / 2.0 ng/mL  CARDIAC MARKERS ( 15 Oct 2018 00:37 )  x     / <0.01 ng/mL / 119 U/L / x     / 2.0 ng/mL  CARDIAC MARKERS ( 14 Oct 2018 12:32 )  x     / <0.01 ng/mL / x     / x     / x                                                LIVER FUNCTIONS - ( 15 Oct 2018 05:59 )  Alb: 4.0 g/dL / Pro: 6.7 g/dL / ALK PHOS: 140 U/L / ALT: 21 U/L / AST: 10 U/L / GGT: x                                                                                            ABG - ( 15 Oct 2018 06:18 )  pH, Arterial: 7.43  pH, Blood: x     /  pCO2: 40    /  pO2: 108   / HCO3: 26    / Base Excess: 1.8   /  SaO2: 99            < from: Xray Chest 1 View AP/PA (10.14.18 @ 14:20) >  Findings:    Support devices: None.    Cardiac/mediastinum/hilum: Unremarkable.    Lung parenchyma/Pleura: Within normal limits.    Skeleton/soft tissues: Unremarkable.    Impression:      No radiographic evidence of acute cardiopulmonary disease.    Unchanged.    < end of copied text >    < from: CT Angio Chest PE Protocol w/ IV Cont (07.23.16 @ 01:49) >    FINDINGS:  Pulmonary arteries: Unremarkable. No pulmonary embolism.  Aorta: No acute findings. No thoracic aortic aneurysm.  Lungs: Unremarkable. No mass. No consolidation.  Pleural spaces: Unremarkable. No significant effusion. No pneumothorax.  Heart: Unremarkable. No cardiomegaly. No significant pericardial   effusion. No evidence of RV  dysfunction.  Mediastinum: There is a small hiatal hernia.  Bones: Unremarkable. No acute fracture.  Lymph nodes: Unremarkable. No enlarged lymph nodes.  Upper abdomen: The patient is status post cholecystectomy.    IMPRESSION: #1. No evidence of pulmonary embolus  #2. The CT scan of the thorax is nonacute.    < end of copied text >        MEDICATIONS  (STANDING):  ALBUTerol/ipratropium for Nebulization 3 milliLiter(s) Nebulizer every 6 hours  aspirin  chewable 81 milliGRAM(s) Oral daily  azithromycin  IVPB 500 milliGRAM(s) IV Intermittent every 24 hours  buDESOnide 160 MICROgram(s)/formoterol 4.5 MICROgram(s) Inhaler 2 Puff(s) Inhalation two times a day  enoxaparin Injectable 40 milliGRAM(s) SubCutaneous two times a day  fenofibrate Tablet 145 milliGRAM(s) Oral daily  furosemide    Tablet 40 milliGRAM(s) Oral daily  lisinopril 5 milliGRAM(s) Oral daily  methylPREDNISolone sodium succinate Injectable 80 milliGRAM(s) IV Push three times a day  morphine ER Tablet 100 milliGRAM(s) Oral <User Schedule>  pantoprazole    Tablet 40 milliGRAM(s) Oral before breakfast  simvastatin 40 milliGRAM(s) Oral at bedtime    MEDICATIONS  (PRN):  acetaminophen   Tablet .. 650 milliGRAM(s) Oral every 6 hours PRN Moderate Pain (4 - 6)  ALBUTerol/ipratropium for Nebulization 3 milliLiter(s) Nebulizer every 4 hours PRN Bronchospasm  oxyCODONE    IR 30 milliGRAM(s) Oral every 4 hours PRN Severe Pain (7 - 10)  zolpidem 5 milliGRAM(s) Oral at bedtime PRN Insomnia

## 2018-10-17 LAB
ALBUMIN SERPL ELPH-MCNC: 3.9 G/DL — SIGNIFICANT CHANGE UP (ref 3.5–5.2)
ALP SERPL-CCNC: 112 U/L — SIGNIFICANT CHANGE UP (ref 30–115)
ALT FLD-CCNC: 24 U/L — SIGNIFICANT CHANGE UP (ref 0–41)
ANION GAP SERPL CALC-SCNC: 14 MMOL/L — SIGNIFICANT CHANGE UP (ref 7–14)
AST SERPL-CCNC: 15 U/L — SIGNIFICANT CHANGE UP (ref 0–41)
BILIRUB SERPL-MCNC: 0.4 MG/DL — SIGNIFICANT CHANGE UP (ref 0.2–1.2)
BUN SERPL-MCNC: 17 MG/DL — SIGNIFICANT CHANGE UP (ref 10–20)
CALCIUM SERPL-MCNC: 9.2 MG/DL — SIGNIFICANT CHANGE UP (ref 8.5–10.1)
CHLORIDE SERPL-SCNC: 98 MMOL/L — SIGNIFICANT CHANGE UP (ref 98–110)
CO2 SERPL-SCNC: 27 MMOL/L — SIGNIFICANT CHANGE UP (ref 17–32)
CREAT SERPL-MCNC: 0.8 MG/DL — SIGNIFICANT CHANGE UP (ref 0.7–1.5)
GLUCOSE BLDC GLUCOMTR-MCNC: 298 MG/DL — HIGH (ref 70–99)
GLUCOSE SERPL-MCNC: 163 MG/DL — HIGH (ref 70–99)
HCT VFR BLD CALC: 40.7 % — LOW (ref 42–52)
HGB BLD-MCNC: 13.6 G/DL — LOW (ref 14–18)
MAGNESIUM SERPL-MCNC: 2.1 MG/DL — SIGNIFICANT CHANGE UP (ref 1.8–2.4)
MCHC RBC-ENTMCNC: 28.8 PG — SIGNIFICANT CHANGE UP (ref 27–31)
MCHC RBC-ENTMCNC: 33.4 G/DL — SIGNIFICANT CHANGE UP (ref 32–37)
MCV RBC AUTO: 86 FL — SIGNIFICANT CHANGE UP (ref 80–94)
NRBC # BLD: 0 /100 WBCS — SIGNIFICANT CHANGE UP (ref 0–0)
PLATELET # BLD AUTO: 198 K/UL — SIGNIFICANT CHANGE UP (ref 130–400)
POTASSIUM SERPL-MCNC: 4.2 MMOL/L — SIGNIFICANT CHANGE UP (ref 3.5–5)
POTASSIUM SERPL-SCNC: 4.2 MMOL/L — SIGNIFICANT CHANGE UP (ref 3.5–5)
PROT SERPL-MCNC: 6.3 G/DL — SIGNIFICANT CHANGE UP (ref 6–8)
RBC # BLD: 4.73 M/UL — SIGNIFICANT CHANGE UP (ref 4.7–6.1)
RBC # FLD: 15.3 % — HIGH (ref 11.5–14.5)
SODIUM SERPL-SCNC: 139 MMOL/L — SIGNIFICANT CHANGE UP (ref 135–146)
WBC # BLD: 16.75 K/UL — HIGH (ref 4.8–10.8)
WBC # FLD AUTO: 16.75 K/UL — HIGH (ref 4.8–10.8)

## 2018-10-17 RX ORDER — DOCUSATE SODIUM 100 MG
100 CAPSULE ORAL DAILY
Qty: 0 | Refills: 0 | Status: DISCONTINUED | OUTPATIENT
Start: 2018-10-17 | End: 2018-10-17

## 2018-10-17 RX ORDER — NICOTINE POLACRILEX 2 MG
1 GUM BUCCAL DAILY
Qty: 0 | Refills: 0 | Status: DISCONTINUED | OUTPATIENT
Start: 2018-10-17 | End: 2018-10-22

## 2018-10-17 RX ORDER — SENNA PLUS 8.6 MG/1
2 TABLET ORAL AT BEDTIME
Qty: 0 | Refills: 0 | Status: DISCONTINUED | OUTPATIENT
Start: 2018-10-17 | End: 2018-10-17

## 2018-10-17 RX ORDER — CHLORHEXIDINE GLUCONATE 213 G/1000ML
1 SOLUTION TOPICAL
Qty: 0 | Refills: 0 | Status: DISCONTINUED | OUTPATIENT
Start: 2018-10-17 | End: 2018-10-22

## 2018-10-17 RX ADMIN — ZOLPIDEM TARTRATE 5 MILLIGRAM(S): 10 TABLET ORAL at 20:56

## 2018-10-17 RX ADMIN — OXYCODONE HYDROCHLORIDE 30 MILLIGRAM(S): 5 TABLET ORAL at 01:15

## 2018-10-17 RX ADMIN — SENNA PLUS 2 TABLET(S): 8.6 TABLET ORAL at 05:49

## 2018-10-17 RX ADMIN — MORPHINE SULFATE 100 MILLIGRAM(S): 50 CAPSULE, EXTENDED RELEASE ORAL at 21:23

## 2018-10-17 RX ADMIN — OXYCODONE HYDROCHLORIDE 30 MILLIGRAM(S): 5 TABLET ORAL at 21:27

## 2018-10-17 RX ADMIN — OXYCODONE HYDROCHLORIDE 30 MILLIGRAM(S): 5 TABLET ORAL at 10:18

## 2018-10-17 RX ADMIN — MORPHINE SULFATE 100 MILLIGRAM(S): 50 CAPSULE, EXTENDED RELEASE ORAL at 17:43

## 2018-10-17 RX ADMIN — OXYCODONE HYDROCHLORIDE 30 MILLIGRAM(S): 5 TABLET ORAL at 10:48

## 2018-10-17 RX ADMIN — OXYCODONE HYDROCHLORIDE 30 MILLIGRAM(S): 5 TABLET ORAL at 05:51

## 2018-10-17 RX ADMIN — LISINOPRIL 5 MILLIGRAM(S): 2.5 TABLET ORAL at 08:49

## 2018-10-17 RX ADMIN — Medication 3 MILLILITER(S): at 12:33

## 2018-10-17 RX ADMIN — OXYCODONE HYDROCHLORIDE 30 MILLIGRAM(S): 5 TABLET ORAL at 14:54

## 2018-10-17 RX ADMIN — AZITHROMYCIN 255 MILLIGRAM(S): 500 TABLET, FILM COATED ORAL at 11:30

## 2018-10-17 RX ADMIN — MORPHINE SULFATE 100 MILLIGRAM(S): 50 CAPSULE, EXTENDED RELEASE ORAL at 21:53

## 2018-10-17 RX ADMIN — Medication 40 MILLIGRAM(S): at 05:50

## 2018-10-17 RX ADMIN — MORPHINE SULFATE 100 MILLIGRAM(S): 50 CAPSULE, EXTENDED RELEASE ORAL at 11:27

## 2018-10-17 RX ADMIN — OXYCODONE HYDROCHLORIDE 30 MILLIGRAM(S): 5 TABLET ORAL at 20:57

## 2018-10-17 RX ADMIN — SIMVASTATIN 40 MILLIGRAM(S): 20 TABLET, FILM COATED ORAL at 20:56

## 2018-10-17 RX ADMIN — OXYCODONE HYDROCHLORIDE 30 MILLIGRAM(S): 5 TABLET ORAL at 15:30

## 2018-10-17 RX ADMIN — Medication 1 PATCH: at 12:43

## 2018-10-17 RX ADMIN — Medication 145 MILLIGRAM(S): at 11:24

## 2018-10-17 RX ADMIN — MORPHINE SULFATE 100 MILLIGRAM(S): 50 CAPSULE, EXTENDED RELEASE ORAL at 16:42

## 2018-10-17 RX ADMIN — Medication 60 MILLIGRAM(S): at 20:57

## 2018-10-17 RX ADMIN — BUDESONIDE AND FORMOTEROL FUMARATE DIHYDRATE 2 PUFF(S): 160; 4.5 AEROSOL RESPIRATORY (INHALATION) at 12:49

## 2018-10-17 RX ADMIN — MORPHINE SULFATE 100 MILLIGRAM(S): 50 CAPSULE, EXTENDED RELEASE ORAL at 05:51

## 2018-10-17 RX ADMIN — PANTOPRAZOLE SODIUM 40 MILLIGRAM(S): 20 TABLET, DELAYED RELEASE ORAL at 06:02

## 2018-10-17 RX ADMIN — Medication 3 MILLILITER(S): at 19:07

## 2018-10-17 RX ADMIN — OXYCODONE HYDROCHLORIDE 30 MILLIGRAM(S): 5 TABLET ORAL at 01:46

## 2018-10-17 RX ADMIN — Medication 80 MILLIGRAM(S): at 05:51

## 2018-10-17 RX ADMIN — Medication 1 PATCH: at 19:00

## 2018-10-17 RX ADMIN — MORPHINE SULFATE 100 MILLIGRAM(S): 50 CAPSULE, EXTENDED RELEASE ORAL at 12:51

## 2018-10-17 RX ADMIN — Medication 81 MILLIGRAM(S): at 11:24

## 2018-10-17 RX ADMIN — Medication 80 MILLIGRAM(S): at 13:12

## 2018-10-17 NOTE — PROGRESS NOTE ADULT - ASSESSMENT
52 year old male with a past medical history of Chronic Obstructive Pulmonary Disease, Obstructive Sleep Apnea, Morbid Obesity, Gastroesophageal Reflux Disease, Hyperlipidemia, Hypertension presenting with shortness of breath for the past week.    #) Chronic Obstructive Pulmonary Disease exacerbation  -RVP and flu panel- negative  -on Azithromycin 500mg q24hr (started 10/14)  -c/w duoneb q6h and prn  -solumedrol 80 mg IV q8h: Pulse then taper  -2D Echo- EF-55-60%, grade 1 DD    # Watery diarrhea likely viral gastroenteritis vs. opioid withdrawals  - monitor for bowel movements, symptom management for now  - no recent antibiotics use    # Chronic Pain, pt is on pain regimen for past 17 years chronically  - istop checked during ED stay  - on very high pain regimen from home including 4 medications, morphine  mg po QID, oxycodone 30 mg po 6 times per day, methadone 10 mg po BID, dilaudid 4 mg po TID  - pt states that he will be okay with regimen of home morphine  mg po QID and oxycodone 30 mg po 6 times per day for now  - holding bowel regimen as pt is having diarrhea      #) Hypertension  -c/w Lisinopril    #) Hyperlipidemia  - c/w Fenofibrate    # Left knee pain  - follow up outpatient for scheduled total left knee replacement      #) GERD  -c/w PPI    # REBECCA  - on BIPAP at bedtime, on 21/18 setting, verified with pulmonology note from [facility]    DVT ppx; 40 mg BID based on pt's weight and decreased ambulation status due to knee pain and respiratory distress 52 year old male with a past medical history of Chronic Obstructive Pulmonary Disease, Obstructive Sleep Apnea, Morbid Obesity, Gastroesophageal Reflux Disease, Hyperlipidemia, Hypertension presenting with shortness of breath for the past week.    #) Chronic Obstructive Pulmonary Disease exacerbation  -RVP and flu panel- negative  -on Azithromycin 500mg q24hr (started 10/14)  -c/w duoneb q6h and prn  -solumedrol 60 mg IV q8h: Pulse then taper  -2D Echo- EF-55-60%, grade 1 DD    # Watery diarrhea likely viral gastroenteritis vs. opioid withdrawals  - monitor for bowel movements, symptom management for now  - no recent antibiotics use    # Chronic Pain, pt is on pain regimen for past 17 years chronically  - istop checked during ED stay  - on very high pain regimen from home including 4 medications, morphine  mg po QID, oxycodone 30 mg po 6 times per day, methadone 10 mg po BID, dilaudid 4 mg po TID  - pt states that he will be okay with regimen of home morphine  mg po QID and oxycodone 30 mg po 6 times per day for now  - holding bowel regimen as pt is having diarrhea      #) Hypertension  -c/w Lisinopril    #) Hyperlipidemia  - c/w Fenofibrate    # Left knee pain  - follow up outpatient for scheduled total left knee replacement      #) GERD  -c/w PPI    # REBECCA  - on BIPAP at bedtime, on 21/18 setting, verified with pulmonology note from [facility]    DVT ppx; 40 mg BID based on pt's weight and decreased ambulation status due to knee pain and respiratory distress

## 2018-10-17 NOTE — PROGRESS NOTE ADULT - ASSESSMENT
52 year old male with a past medical history of Chronic Obstructive Pulmonary Disease, Obstructive Sleep Apnea, Morbid Obesity, Gastroesophageal Reflux Disease, Hyperlipidemia, Hypertension presenting with shortness of breath for the past week.    #) Chronic Obstructive Pulmonary Disease exacerbation  -RVP and flu panel- negative  -on Azithromycin 500mg q24hr (started 10/14)  -c/w duoneb q6h and prn  -solumedrol 60 mg IV q8h: Pulse then taper  -2D Echo- EF-55-60%, grade 1 DD    # Watery diarrhea likely viral gastroenteritis vs. opioid withdrawals  - monitor for bowel movements, symptom management for now  - no recent antibiotics use    # Chronic Pain, pt is on pain regimen for past 17 years chronically  - istop checked during ED stay  - on very high pain regimen from home including 4 medications, morphine  mg po QID, oxycodone 30 mg po 6 times per day, methadone 10 mg po BID, dilaudid 4 mg po TID  - pt states that he will be okay with regimen of home morphine  mg po QID and oxycodone 30 mg po 6 times per day for now  - holding bowel regimen as pt is having diarrhea      #) Hypertension  -c/w Lisinopril    #) Hyperlipidemia  - c/w Fenofibrate    # Left knee pain  - follow up outpatient for scheduled total left knee replacement      #) GERD  -c/w PPI    # REBECCA  - on BIPAP at bedtime, on 21/18 setting, verified with pulmonology note from [facility]    DVT ppx; 40 mg BID based on pt's weight and decreased ambulation status due to knee pain and respiratory distress

## 2018-10-18 LAB
ALBUMIN SERPL ELPH-MCNC: 3.7 G/DL — SIGNIFICANT CHANGE UP (ref 3.5–5.2)
ALP SERPL-CCNC: 117 U/L — HIGH (ref 30–115)
ALT FLD-CCNC: 45 U/L — HIGH (ref 0–41)
ANION GAP SERPL CALC-SCNC: 11 MMOL/L — SIGNIFICANT CHANGE UP (ref 7–14)
AST SERPL-CCNC: 26 U/L — SIGNIFICANT CHANGE UP (ref 0–41)
BILIRUB SERPL-MCNC: 0.3 MG/DL — SIGNIFICANT CHANGE UP (ref 0.2–1.2)
BUN SERPL-MCNC: 17 MG/DL — SIGNIFICANT CHANGE UP (ref 10–20)
CALCIUM SERPL-MCNC: 9.6 MG/DL — SIGNIFICANT CHANGE UP (ref 8.5–10.1)
CHLORIDE SERPL-SCNC: 98 MMOL/L — SIGNIFICANT CHANGE UP (ref 98–110)
CO2 SERPL-SCNC: 32 MMOL/L — SIGNIFICANT CHANGE UP (ref 17–32)
CREAT SERPL-MCNC: 0.7 MG/DL — SIGNIFICANT CHANGE UP (ref 0.7–1.5)
GLUCOSE BLDC GLUCOMTR-MCNC: 189 MG/DL — HIGH (ref 70–99)
GLUCOSE SERPL-MCNC: 110 MG/DL — HIGH (ref 70–99)
HCT VFR BLD CALC: 42.6 % — SIGNIFICANT CHANGE UP (ref 42–52)
HGB BLD-MCNC: 14 G/DL — SIGNIFICANT CHANGE UP (ref 14–18)
MAGNESIUM SERPL-MCNC: 2.4 MG/DL — SIGNIFICANT CHANGE UP (ref 1.8–2.4)
MCHC RBC-ENTMCNC: 28.3 PG — SIGNIFICANT CHANGE UP (ref 27–31)
MCHC RBC-ENTMCNC: 32.9 G/DL — SIGNIFICANT CHANGE UP (ref 32–37)
MCV RBC AUTO: 86.2 FL — SIGNIFICANT CHANGE UP (ref 80–94)
NRBC # BLD: 0 /100 WBCS — SIGNIFICANT CHANGE UP (ref 0–0)
PLATELET # BLD AUTO: 231 K/UL — SIGNIFICANT CHANGE UP (ref 130–400)
POTASSIUM SERPL-MCNC: 4.5 MMOL/L — SIGNIFICANT CHANGE UP (ref 3.5–5)
POTASSIUM SERPL-SCNC: 4.5 MMOL/L — SIGNIFICANT CHANGE UP (ref 3.5–5)
PROT SERPL-MCNC: 6.3 G/DL — SIGNIFICANT CHANGE UP (ref 6–8)
RBC # BLD: 4.94 M/UL — SIGNIFICANT CHANGE UP (ref 4.7–6.1)
RBC # FLD: 15.2 % — HIGH (ref 11.5–14.5)
SODIUM SERPL-SCNC: 141 MMOL/L — SIGNIFICANT CHANGE UP (ref 135–146)
WBC # BLD: 14.4 K/UL — HIGH (ref 4.8–10.8)
WBC # FLD AUTO: 14.4 K/UL — HIGH (ref 4.8–10.8)

## 2018-10-18 RX ORDER — AZITHROMYCIN 500 MG/1
500 TABLET, FILM COATED ORAL DAILY
Qty: 0 | Refills: 0 | Status: DISCONTINUED | OUTPATIENT
Start: 2018-10-18 | End: 2018-10-22

## 2018-10-18 RX ADMIN — CHLORHEXIDINE GLUCONATE 1 APPLICATION(S): 213 SOLUTION TOPICAL at 06:19

## 2018-10-18 RX ADMIN — Medication 1 PATCH: at 06:17

## 2018-10-18 RX ADMIN — Medication 3 MILLILITER(S): at 20:04

## 2018-10-18 RX ADMIN — BUDESONIDE AND FORMOTEROL FUMARATE DIHYDRATE 2 PUFF(S): 160; 4.5 AEROSOL RESPIRATORY (INHALATION) at 08:03

## 2018-10-18 RX ADMIN — OXYCODONE HYDROCHLORIDE 30 MILLIGRAM(S): 5 TABLET ORAL at 09:14

## 2018-10-18 RX ADMIN — OXYCODONE HYDROCHLORIDE 30 MILLIGRAM(S): 5 TABLET ORAL at 20:53

## 2018-10-18 RX ADMIN — MORPHINE SULFATE 100 MILLIGRAM(S): 50 CAPSULE, EXTENDED RELEASE ORAL at 23:15

## 2018-10-18 RX ADMIN — AZITHROMYCIN 500 MILLIGRAM(S): 500 TABLET, FILM COATED ORAL at 11:26

## 2018-10-18 RX ADMIN — Medication 3 MILLILITER(S): at 13:13

## 2018-10-18 RX ADMIN — Medication 40 MILLIGRAM(S): at 06:18

## 2018-10-18 RX ADMIN — MORPHINE SULFATE 100 MILLIGRAM(S): 50 CAPSULE, EXTENDED RELEASE ORAL at 07:00

## 2018-10-18 RX ADMIN — Medication 81 MILLIGRAM(S): at 11:25

## 2018-10-18 RX ADMIN — PANTOPRAZOLE SODIUM 40 MILLIGRAM(S): 20 TABLET, DELAYED RELEASE ORAL at 06:18

## 2018-10-18 RX ADMIN — OXYCODONE HYDROCHLORIDE 30 MILLIGRAM(S): 5 TABLET ORAL at 09:44

## 2018-10-18 RX ADMIN — Medication 1 PATCH: at 18:58

## 2018-10-18 RX ADMIN — MORPHINE SULFATE 100 MILLIGRAM(S): 50 CAPSULE, EXTENDED RELEASE ORAL at 11:55

## 2018-10-18 RX ADMIN — MORPHINE SULFATE 100 MILLIGRAM(S): 50 CAPSULE, EXTENDED RELEASE ORAL at 06:18

## 2018-10-18 RX ADMIN — OXYCODONE HYDROCHLORIDE 30 MILLIGRAM(S): 5 TABLET ORAL at 01:58

## 2018-10-18 RX ADMIN — OXYCODONE HYDROCHLORIDE 30 MILLIGRAM(S): 5 TABLET ORAL at 01:28

## 2018-10-18 RX ADMIN — LISINOPRIL 5 MILLIGRAM(S): 2.5 TABLET ORAL at 06:18

## 2018-10-18 RX ADMIN — OXYCODONE HYDROCHLORIDE 30 MILLIGRAM(S): 5 TABLET ORAL at 14:48

## 2018-10-18 RX ADMIN — MORPHINE SULFATE 100 MILLIGRAM(S): 50 CAPSULE, EXTENDED RELEASE ORAL at 17:00

## 2018-10-18 RX ADMIN — Medication 80 MILLIGRAM(S): at 14:17

## 2018-10-18 RX ADMIN — Medication 60 MILLIGRAM(S): at 06:18

## 2018-10-18 RX ADMIN — SIMVASTATIN 40 MILLIGRAM(S): 20 TABLET, FILM COATED ORAL at 21:14

## 2018-10-18 RX ADMIN — Medication 80 MILLIGRAM(S): at 21:13

## 2018-10-18 RX ADMIN — OXYCODONE HYDROCHLORIDE 30 MILLIGRAM(S): 5 TABLET ORAL at 20:23

## 2018-10-18 RX ADMIN — MORPHINE SULFATE 100 MILLIGRAM(S): 50 CAPSULE, EXTENDED RELEASE ORAL at 17:30

## 2018-10-18 RX ADMIN — OXYCODONE HYDROCHLORIDE 30 MILLIGRAM(S): 5 TABLET ORAL at 15:18

## 2018-10-18 RX ADMIN — Medication 1 PATCH: at 11:26

## 2018-10-18 RX ADMIN — Medication 1 PATCH: at 12:45

## 2018-10-18 RX ADMIN — MORPHINE SULFATE 100 MILLIGRAM(S): 50 CAPSULE, EXTENDED RELEASE ORAL at 11:25

## 2018-10-18 RX ADMIN — MORPHINE SULFATE 100 MILLIGRAM(S): 50 CAPSULE, EXTENDED RELEASE ORAL at 22:45

## 2018-10-18 RX ADMIN — Medication 145 MILLIGRAM(S): at 11:25

## 2018-10-18 NOTE — PROGRESS NOTE ADULT - ASSESSMENT
52 year old male with a past medical history of Chronic Obstructive Pulmonary Disease, Obstructive Sleep Apnea, Morbid Obesity, Gastroesophageal Reflux Disease, Hyperlipidemia, Hypertension presenting with shortness of breath for the past week.    #) Chronic Obstructive Pulmonary Disease exacerbation  -RVP and flu panel- negative  -on Azithromycin 500mg q24hr (started 10/14)  -c/w duoneb q6h and prn  -solumedrol 60 mg IV q8h --> Increase to solumedrol 80mg IVq8hr today  -2D Echo- EF-55-60%, grade 1 DD    # Watery diarrhea likely viral gastroenteritis vs. opioid withdrawals- resolved      # Chronic Pain, pt is on pain regimen for past 17 years chronically  - istop checked during ED stay  - on very high pain regimen from home including 4 medications, morphine  mg po QID, oxycodone 30 mg po 6 times per day, methadone 10 mg po BID, dilaudid 4 mg po TID  - pt states that he will be okay with regimen of home morphine  mg po QID and oxycodone 30 mg po 6 times per day for now  - holding bowel regimen as pt had recent diarrhea      #) Hypertension  -c/w Lisinopril    #) Hyperlipidemia  - c/w Fenofibrate    # Left knee pain  - follow up outpatient for scheduled total left knee replacement      #) GERD  -c/w PPI    # REBECCA  - on BIPAP at bedtime, on 21/18 setting, verified with pulmonology note from [facility] while in ED    DVT ppx; No need as patient is ambulatory. 52 year old male with a past medical history of Chronic Obstructive Pulmonary Disease, Obstructive Sleep Apnea, Morbid Obesity, Gastroesophageal Reflux Disease, Hyperlipidemia, Hypertension presenting with shortness of breath for the past week.    #) Chronic Obstructive Pulmonary Disease exacerbation  -RVP and flu panel- negative  -on Azithromycin 500mg q24hr (started 10/14)  -c/w duoneb q6h and prn  -solumedrol 60 mg IV q8h --> Increase to solumedrol 80mg IVq8hr today  -2D Echo- EF-55-60%, grade 1 DD    # Watery diarrhea likely viral gastroenteritis vs. opioid withdrawals- resolved    # Morbid obesity with BMI 47.     Life style modification education / counselling provided.      # Chronic Pain, pt is on pain regimen for past 17 years chronically  - istop checked during ED stay  - on very high pain regimen from home including 4 medications, morphine  mg po QID, oxycodone 30 mg po 6 times per day, methadone 10 mg po BID, dilaudid 4 mg po TID  - pt states that he will be okay with regimen of home morphine  mg po QID and oxycodone 30 mg po 6 times per day for now  - holding bowel regimen as pt had recent diarrhea      #) Hypertension  -c/w Lisinopril    #) Hyperlipidemia  - c/w Fenofibrate    # Left knee pain  - follow up outpatient for scheduled total left knee replacement      #) GERD  -c/w PPI    # REBECCA  - on BIPAP at bedtime, on 21/18 setting, verified with pulmonology note from [facility] while in ED    DVT ppx; No need as patient is ambulatory.

## 2018-10-18 NOTE — PROGRESS NOTE ADULT - ASSESSMENT
52 year old male with a past medical history of Chronic Obstructive Pulmonary Disease, Obstructive Sleep Apnea, Morbid Obesity, Gastroesophageal Reflux Disease, Hyperlipidemia, Hypertension presenting with shortness of breath for the past week.    #) Chronic Obstructive Pulmonary Disease exacerbation  -RVP and flu panel- negative  -on Azithromycin 500mg q24hr (started 10/14)  -c/w duoneb q6h and prn  -solumedrol 60 mg IV q8h --> Increase to solumedrol 80mg IVq8hr today  -2D Echo- EF-55-60%, grade 1 DD    # Watery diarrhea likely viral gastroenteritis vs. opioid withdrawals- resolved      # Chronic Pain, pt is on pain regimen for past 17 years chronically  - istop checked during ED stay  - on very high pain regimen from home including 4 medications, morphine  mg po QID, oxycodone 30 mg po 6 times per day, methadone 10 mg po BID, dilaudid 4 mg po TID  - pt states that he will be okay with regimen of home morphine  mg po QID and oxycodone 30 mg po 6 times per day for now  - holding bowel regimen as pt had recent diarrhea      #) Hypertension  -c/w Lisinopril    #) Hyperlipidemia  - c/w Fenofibrate    # Left knee pain  - follow up outpatient for scheduled total left knee replacement      #) GERD  -c/w PPI    # REBECCA  - on BIPAP at bedtime, on 21/18 setting, verified with pulmonology note from [facility] while in ED    DVT ppx; 40 mg BID based on pt's weight and decreased ambulation status due to knee pain and respiratory distress

## 2018-10-19 LAB
ALBUMIN SERPL ELPH-MCNC: 3.7 G/DL — SIGNIFICANT CHANGE UP (ref 3.5–5.2)
ALP SERPL-CCNC: 117 U/L — HIGH (ref 30–115)
ALT FLD-CCNC: 47 U/L — HIGH (ref 0–41)
ANION GAP SERPL CALC-SCNC: 15 MMOL/L — HIGH (ref 7–14)
AST SERPL-CCNC: 19 U/L — SIGNIFICANT CHANGE UP (ref 0–41)
BILIRUB SERPL-MCNC: 0.4 MG/DL — SIGNIFICANT CHANGE UP (ref 0.2–1.2)
BUN SERPL-MCNC: 18 MG/DL — SIGNIFICANT CHANGE UP (ref 10–20)
CALCIUM SERPL-MCNC: 9.3 MG/DL — SIGNIFICANT CHANGE UP (ref 8.5–10.1)
CHLORIDE SERPL-SCNC: 97 MMOL/L — LOW (ref 98–110)
CO2 SERPL-SCNC: 28 MMOL/L — SIGNIFICANT CHANGE UP (ref 17–32)
CREAT SERPL-MCNC: 0.8 MG/DL — SIGNIFICANT CHANGE UP (ref 0.7–1.5)
GLUCOSE BLDC GLUCOMTR-MCNC: 120 MG/DL — HIGH (ref 70–99)
GLUCOSE BLDC GLUCOMTR-MCNC: 160 MG/DL — HIGH (ref 70–99)
GLUCOSE BLDC GLUCOMTR-MCNC: 180 MG/DL — HIGH (ref 70–99)
GLUCOSE BLDC GLUCOMTR-MCNC: 182 MG/DL — HIGH (ref 70–99)
GLUCOSE SERPL-MCNC: 123 MG/DL — HIGH (ref 70–99)
HCT VFR BLD CALC: 41.2 % — LOW (ref 42–52)
HGB BLD-MCNC: 13.7 G/DL — LOW (ref 14–18)
MAGNESIUM SERPL-MCNC: 2.5 MG/DL — HIGH (ref 1.8–2.4)
MCHC RBC-ENTMCNC: 28.4 PG — SIGNIFICANT CHANGE UP (ref 27–31)
MCHC RBC-ENTMCNC: 33.3 G/DL — SIGNIFICANT CHANGE UP (ref 32–37)
MCV RBC AUTO: 85.5 FL — SIGNIFICANT CHANGE UP (ref 80–94)
NRBC # BLD: 0 /100 WBCS — SIGNIFICANT CHANGE UP (ref 0–0)
PLATELET # BLD AUTO: 228 K/UL — SIGNIFICANT CHANGE UP (ref 130–400)
POTASSIUM SERPL-MCNC: 4.9 MMOL/L — SIGNIFICANT CHANGE UP (ref 3.5–5)
POTASSIUM SERPL-SCNC: 4.9 MMOL/L — SIGNIFICANT CHANGE UP (ref 3.5–5)
PROT SERPL-MCNC: 6.3 G/DL — SIGNIFICANT CHANGE UP (ref 6–8)
RBC # BLD: 4.82 M/UL — SIGNIFICANT CHANGE UP (ref 4.7–6.1)
RBC # FLD: 15 % — HIGH (ref 11.5–14.5)
SODIUM SERPL-SCNC: 140 MMOL/L — SIGNIFICANT CHANGE UP (ref 135–146)
WBC # BLD: 12.89 K/UL — HIGH (ref 4.8–10.8)
WBC # FLD AUTO: 12.89 K/UL — HIGH (ref 4.8–10.8)

## 2018-10-19 RX ORDER — ZOLPIDEM TARTRATE 10 MG/1
5 TABLET ORAL ONCE
Qty: 0 | Refills: 0 | Status: DISCONTINUED | OUTPATIENT
Start: 2018-10-19 | End: 2018-10-19

## 2018-10-19 RX ADMIN — Medication 60 MILLIGRAM(S): at 17:57

## 2018-10-19 RX ADMIN — OXYCODONE HYDROCHLORIDE 30 MILLIGRAM(S): 5 TABLET ORAL at 18:25

## 2018-10-19 RX ADMIN — OXYCODONE HYDROCHLORIDE 30 MILLIGRAM(S): 5 TABLET ORAL at 12:29

## 2018-10-19 RX ADMIN — MORPHINE SULFATE 100 MILLIGRAM(S): 50 CAPSULE, EXTENDED RELEASE ORAL at 06:03

## 2018-10-19 RX ADMIN — MORPHINE SULFATE 100 MILLIGRAM(S): 50 CAPSULE, EXTENDED RELEASE ORAL at 22:06

## 2018-10-19 RX ADMIN — OXYCODONE HYDROCHLORIDE 30 MILLIGRAM(S): 5 TABLET ORAL at 12:59

## 2018-10-19 RX ADMIN — OXYCODONE HYDROCHLORIDE 30 MILLIGRAM(S): 5 TABLET ORAL at 08:43

## 2018-10-19 RX ADMIN — MORPHINE SULFATE 100 MILLIGRAM(S): 50 CAPSULE, EXTENDED RELEASE ORAL at 16:04

## 2018-10-19 RX ADMIN — OXYCODONE HYDROCHLORIDE 30 MILLIGRAM(S): 5 TABLET ORAL at 17:55

## 2018-10-19 RX ADMIN — Medication 3 MILLILITER(S): at 07:25

## 2018-10-19 RX ADMIN — Medication 80 MILLIGRAM(S): at 05:34

## 2018-10-19 RX ADMIN — MORPHINE SULFATE 100 MILLIGRAM(S): 50 CAPSULE, EXTENDED RELEASE ORAL at 22:36

## 2018-10-19 RX ADMIN — ZOLPIDEM TARTRATE 5 MILLIGRAM(S): 10 TABLET ORAL at 23:57

## 2018-10-19 RX ADMIN — Medication 40 MILLIGRAM(S): at 05:34

## 2018-10-19 RX ADMIN — MORPHINE SULFATE 100 MILLIGRAM(S): 50 CAPSULE, EXTENDED RELEASE ORAL at 05:33

## 2018-10-19 RX ADMIN — ZOLPIDEM TARTRATE 5 MILLIGRAM(S): 10 TABLET ORAL at 00:01

## 2018-10-19 RX ADMIN — OXYCODONE HYDROCHLORIDE 30 MILLIGRAM(S): 5 TABLET ORAL at 22:37

## 2018-10-19 RX ADMIN — LISINOPRIL 5 MILLIGRAM(S): 2.5 TABLET ORAL at 05:34

## 2018-10-19 RX ADMIN — Medication 1 PATCH: at 06:24

## 2018-10-19 RX ADMIN — Medication 3 MILLILITER(S): at 19:42

## 2018-10-19 RX ADMIN — SIMVASTATIN 40 MILLIGRAM(S): 20 TABLET, FILM COATED ORAL at 21:13

## 2018-10-19 RX ADMIN — MORPHINE SULFATE 100 MILLIGRAM(S): 50 CAPSULE, EXTENDED RELEASE ORAL at 10:58

## 2018-10-19 RX ADMIN — BUDESONIDE AND FORMOTEROL FUMARATE DIHYDRATE 2 PUFF(S): 160; 4.5 AEROSOL RESPIRATORY (INHALATION) at 08:05

## 2018-10-19 RX ADMIN — Medication 81 MILLIGRAM(S): at 11:54

## 2018-10-19 RX ADMIN — OXYCODONE HYDROCHLORIDE 30 MILLIGRAM(S): 5 TABLET ORAL at 08:13

## 2018-10-19 RX ADMIN — Medication 1 PATCH: at 10:10

## 2018-10-19 RX ADMIN — OXYCODONE HYDROCHLORIDE 30 MILLIGRAM(S): 5 TABLET ORAL at 03:38

## 2018-10-19 RX ADMIN — MORPHINE SULFATE 100 MILLIGRAM(S): 50 CAPSULE, EXTENDED RELEASE ORAL at 16:34

## 2018-10-19 RX ADMIN — OXYCODONE HYDROCHLORIDE 30 MILLIGRAM(S): 5 TABLET ORAL at 22:07

## 2018-10-19 RX ADMIN — Medication 145 MILLIGRAM(S): at 11:54

## 2018-10-19 RX ADMIN — OXYCODONE HYDROCHLORIDE 30 MILLIGRAM(S): 5 TABLET ORAL at 03:08

## 2018-10-19 RX ADMIN — MORPHINE SULFATE 100 MILLIGRAM(S): 50 CAPSULE, EXTENDED RELEASE ORAL at 10:28

## 2018-10-19 RX ADMIN — AZITHROMYCIN 500 MILLIGRAM(S): 500 TABLET, FILM COATED ORAL at 11:53

## 2018-10-19 RX ADMIN — INFLUENZA VIRUS VACCINE 0.5 MILLILITER(S): 15; 15; 15; 15 SUSPENSION INTRAMUSCULAR at 18:53

## 2018-10-19 RX ADMIN — PANTOPRAZOLE SODIUM 40 MILLIGRAM(S): 20 TABLET, DELAYED RELEASE ORAL at 06:25

## 2018-10-19 NOTE — PROGRESS NOTE ADULT - ASSESSMENT
52 year old male with a past medical history of Chronic Obstructive Pulmonary Disease, Obstructive Sleep Apnea, Morbid Obesity, Gastroesophageal Reflux Disease, Hyperlipidemia, Hypertension presenting with shortness of breath for the past week.    #) Chronic Obstructive Pulmonary Disease exacerbation  -RVP and flu panel- negative  -on Azithromycin 500mg q24hr (started 10/14)  -c/w duoneb q6h and prn  - solumedrol 80mg IVq8hr , would taper down.  - Counselled about smoking cessation .    # Watery diarrhea likely viral gastroenteritis vs. opioid withdrawals- resolved    # Morbid obesity with BMI 47.     Life style modification education / counselling provided.      # Chronic Pain, pt is on pain regimen for past 17 years chronically  - istop checked during ED stay  - on very high pain regimen from home including 4 medications, morphine  mg po QID, oxycodone 30 mg po 6 times per day, methadone 10 mg po BID, dilaudid 4 mg po TID  - pt states that he will be okay with regimen of home morphine  mg po QID and oxycodone 30 mg po 6 times per day for now  - holding bowel regimen as pt had recent diarrhea      #) Hypertension  -c/w Lisinopril    #) Hyperlipidemia  - c/w Fenofibrate    # Left knee pain  - follow up outpatient for scheduled total left knee replacement      #) GERD  -c/w PPI    # REBECCA  - on BIPAP at bedtime, on 21/18 setting, verified with pulmonology note from [facility] while in ED    DVT ppx; No need as patient is ambulatory.    Intend for tomorrow.

## 2018-10-19 NOTE — PROGRESS NOTE ADULT - ASSESSMENT
52 year old male with a past medical history of Chronic Obstructive Pulmonary Disease, Obstructive Sleep Apnea, Morbid Obesity, Gastroesophageal Reflux Disease, Hyperlipidemia, Hypertension presenting with shortness of breath for the past week.    #) Chronic Obstructive Pulmonary Disease exacerbation  -RVP and flu panel- negative  -on Azithromycin 500mg q24hr (started 10/14)  -c/w duoneb q6h and prn  -solumedrol 80 mg IV q8h --> tapered to 60mg q12 today. will switch to oral tomorrow.  -2D Echo- EF-55-60%, grade 1 DD      # Severe obesity  -BMI 47.4  -provided education, pt says he plans to follow his own weight reduction program      # Chronic Pain, pt is on pain regimen for past 17 years chronically  - istop checked   - on very high pain regimen from home including 4 medications, morphine  mg po QID, oxycodone 30 mg po 6 times per day, methadone 10 mg po BID, dilaudid 4 mg po TID  - pt states that he will be okay with regimen of home morphine  mg po QID and oxycodone 30 mg po 6 times per day for now  - F/U w/ own pain clinic in [place]   -holding bowel regimen as pt had recent diarrhea      #) Hypertension  -c/w Lisinopril    #) Hyperlipidemia  - c/w Fenofibrate    # Left knee pain  - follow up outpatient for scheduled total left knee replacement      #) GERD  -c/w PPI    # REBECCA  - on BIPAP at bedtime, on 21/18 setting, verified with pulmonology note from [facility] while in ED    DVT ppx; Pt refusing and ambulating 52 year old male with a past medical history of Chronic Obstructive Pulmonary Disease, Obstructive Sleep Apnea, Morbid Obesity, Gastroesophageal Reflux Disease, Hyperlipidemia, Hypertension presenting with shortness of breath for the past week.    #) Chronic Obstructive Pulmonary Disease exacerbation  -RVP and flu panel- negative  -on Azithromycin 500mg q24hr (started 10/14)  -c/w duoneb q6h and prn  -solumedrol 80 mg IV q8h --> tapered to 60mg q12 today. will switch to oral tomorrow.  -2D Echo- EF-55-60%, grade 1 DD      # Severe obesity  -BMI 47.4  -provided education, pt says he plans to follow his own weight reduction program      # Chronic Pain, pt is on pain regimen for past 17 years chronically  - istop checked   - on very high pain regimen from home including 4 medications, morphine  mg po QID, oxycodone 30 mg po 6 times per day, methadone 10 mg po BID, dilaudid 4 mg po TID  - pt states that he will be okay with regimen of home morphine  mg po QID and oxycodone 30 mg po 6 times per day for now  - F/U w/ his own pain clinic in [place]   -holding bowel regimen as pt had recent diarrhea      #) Hypertension  -c/w Lisinopril    #) Hyperlipidemia  - c/w Fenofibrate    # Left knee pain  - follow up outpatient for scheduled total left knee replacement      #) GERD  -c/w PPI    # REBECCA  - on BIPAP at bedtime, on 21/18 setting, verified with pulmonology note from [facility] while in ED    DVT ppx; Pt refusing and ambulating

## 2018-10-20 LAB
ALBUMIN SERPL ELPH-MCNC: 4 G/DL — SIGNIFICANT CHANGE UP (ref 3.5–5.2)
ALP SERPL-CCNC: 105 U/L — SIGNIFICANT CHANGE UP (ref 30–115)
ALT FLD-CCNC: 50 U/L — HIGH (ref 0–41)
ANION GAP SERPL CALC-SCNC: 14 MMOL/L — SIGNIFICANT CHANGE UP (ref 7–14)
AST SERPL-CCNC: 25 U/L — SIGNIFICANT CHANGE UP (ref 0–41)
BILIRUB SERPL-MCNC: 0.5 MG/DL — SIGNIFICANT CHANGE UP (ref 0.2–1.2)
BUN SERPL-MCNC: 20 MG/DL — SIGNIFICANT CHANGE UP (ref 10–20)
CALCIUM SERPL-MCNC: 9 MG/DL — SIGNIFICANT CHANGE UP (ref 8.5–10.1)
CHLORIDE SERPL-SCNC: 92 MMOL/L — LOW (ref 98–110)
CO2 SERPL-SCNC: 34 MMOL/L — HIGH (ref 17–32)
CREAT SERPL-MCNC: 0.8 MG/DL — SIGNIFICANT CHANGE UP (ref 0.7–1.5)
GLUCOSE BLDC GLUCOMTR-MCNC: 113 MG/DL — HIGH (ref 70–99)
GLUCOSE SERPL-MCNC: 153 MG/DL — HIGH (ref 70–99)
HCT VFR BLD CALC: 42.7 % — SIGNIFICANT CHANGE UP (ref 42–52)
HGB BLD-MCNC: 14.2 G/DL — SIGNIFICANT CHANGE UP (ref 14–18)
MAGNESIUM SERPL-MCNC: 2.7 MG/DL — HIGH (ref 1.8–2.4)
MCHC RBC-ENTMCNC: 28.5 PG — SIGNIFICANT CHANGE UP (ref 27–31)
MCHC RBC-ENTMCNC: 33.3 G/DL — SIGNIFICANT CHANGE UP (ref 32–37)
MCV RBC AUTO: 85.6 FL — SIGNIFICANT CHANGE UP (ref 80–94)
NRBC # BLD: 0 /100 WBCS — SIGNIFICANT CHANGE UP (ref 0–0)
PLATELET # BLD AUTO: 234 K/UL — SIGNIFICANT CHANGE UP (ref 130–400)
POTASSIUM SERPL-MCNC: 5 MMOL/L — SIGNIFICANT CHANGE UP (ref 3.5–5)
POTASSIUM SERPL-SCNC: 5 MMOL/L — SIGNIFICANT CHANGE UP (ref 3.5–5)
PROT SERPL-MCNC: 6.3 G/DL — SIGNIFICANT CHANGE UP (ref 6–8)
RBC # BLD: 4.99 M/UL — SIGNIFICANT CHANGE UP (ref 4.7–6.1)
RBC # FLD: 15.3 % — HIGH (ref 11.5–14.5)
SODIUM SERPL-SCNC: 140 MMOL/L — SIGNIFICANT CHANGE UP (ref 135–146)
WBC # BLD: 12.74 K/UL — HIGH (ref 4.8–10.8)
WBC # FLD AUTO: 12.74 K/UL — HIGH (ref 4.8–10.8)

## 2018-10-20 RX ADMIN — OXYCODONE HYDROCHLORIDE 30 MILLIGRAM(S): 5 TABLET ORAL at 04:43

## 2018-10-20 RX ADMIN — OXYCODONE HYDROCHLORIDE 30 MILLIGRAM(S): 5 TABLET ORAL at 21:42

## 2018-10-20 RX ADMIN — Medication 145 MILLIGRAM(S): at 11:05

## 2018-10-20 RX ADMIN — MORPHINE SULFATE 100 MILLIGRAM(S): 50 CAPSULE, EXTENDED RELEASE ORAL at 21:40

## 2018-10-20 RX ADMIN — Medication 40 MILLIGRAM(S): at 05:08

## 2018-10-20 RX ADMIN — OXYCODONE HYDROCHLORIDE 30 MILLIGRAM(S): 5 TABLET ORAL at 04:13

## 2018-10-20 RX ADMIN — ZOLPIDEM TARTRATE 5 MILLIGRAM(S): 10 TABLET ORAL at 21:41

## 2018-10-20 RX ADMIN — Medication 60 MILLIGRAM(S): at 05:08

## 2018-10-20 RX ADMIN — MORPHINE SULFATE 100 MILLIGRAM(S): 50 CAPSULE, EXTENDED RELEASE ORAL at 17:43

## 2018-10-20 RX ADMIN — AZITHROMYCIN 500 MILLIGRAM(S): 500 TABLET, FILM COATED ORAL at 11:05

## 2018-10-20 RX ADMIN — MORPHINE SULFATE 100 MILLIGRAM(S): 50 CAPSULE, EXTENDED RELEASE ORAL at 22:15

## 2018-10-20 RX ADMIN — OXYCODONE HYDROCHLORIDE 30 MILLIGRAM(S): 5 TABLET ORAL at 17:17

## 2018-10-20 RX ADMIN — BUDESONIDE AND FORMOTEROL FUMARATE DIHYDRATE 2 PUFF(S): 160; 4.5 AEROSOL RESPIRATORY (INHALATION) at 08:04

## 2018-10-20 RX ADMIN — BUDESONIDE AND FORMOTEROL FUMARATE DIHYDRATE 2 PUFF(S): 160; 4.5 AEROSOL RESPIRATORY (INHALATION) at 21:43

## 2018-10-20 RX ADMIN — PANTOPRAZOLE SODIUM 40 MILLIGRAM(S): 20 TABLET, DELAYED RELEASE ORAL at 06:07

## 2018-10-20 RX ADMIN — OXYCODONE HYDROCHLORIDE 30 MILLIGRAM(S): 5 TABLET ORAL at 12:18

## 2018-10-20 RX ADMIN — MORPHINE SULFATE 100 MILLIGRAM(S): 50 CAPSULE, EXTENDED RELEASE ORAL at 05:37

## 2018-10-20 RX ADMIN — Medication 3 MILLILITER(S): at 07:39

## 2018-10-20 RX ADMIN — OXYCODONE HYDROCHLORIDE 30 MILLIGRAM(S): 5 TABLET ORAL at 22:15

## 2018-10-20 RX ADMIN — OXYCODONE HYDROCHLORIDE 30 MILLIGRAM(S): 5 TABLET ORAL at 17:43

## 2018-10-20 RX ADMIN — MORPHINE SULFATE 100 MILLIGRAM(S): 50 CAPSULE, EXTENDED RELEASE ORAL at 05:07

## 2018-10-20 RX ADMIN — Medication 81 MILLIGRAM(S): at 11:04

## 2018-10-20 RX ADMIN — Medication 3 MILLILITER(S): at 19:31

## 2018-10-20 RX ADMIN — MORPHINE SULFATE 100 MILLIGRAM(S): 50 CAPSULE, EXTENDED RELEASE ORAL at 17:16

## 2018-10-20 RX ADMIN — OXYCODONE HYDROCHLORIDE 30 MILLIGRAM(S): 5 TABLET ORAL at 11:48

## 2018-10-20 RX ADMIN — MORPHINE SULFATE 100 MILLIGRAM(S): 50 CAPSULE, EXTENDED RELEASE ORAL at 11:04

## 2018-10-20 RX ADMIN — SIMVASTATIN 40 MILLIGRAM(S): 20 TABLET, FILM COATED ORAL at 21:43

## 2018-10-20 RX ADMIN — MORPHINE SULFATE 100 MILLIGRAM(S): 50 CAPSULE, EXTENDED RELEASE ORAL at 11:34

## 2018-10-20 RX ADMIN — LISINOPRIL 5 MILLIGRAM(S): 2.5 TABLET ORAL at 05:08

## 2018-10-20 NOTE — PROGRESS NOTE ADULT - ASSESSMENT
52 year old male with a past medical history of Chronic Obstructive Pulmonary Disease, Obstructive Sleep Apnea, Morbid Obesity, Gastroesophageal Reflux Disease, Hyperlipidemia, Hypertension presenting with shortness of breath for the past week.    #) Chronic Obstructive Pulmonary Disease exacerbation  -RVP and flu panel- negative  -on Azithromycin 500mg q24hr (started 10/14)  -c/w duoneb q6h and prn  -IV solumedrol 60mg q12hr yesterday--> Prednisone 60mg PO today  -2D Echo- EF-55-60%, grade 1 DD      # Severe obesity  -BMI 47.4  -provided education, pt says he plans to follow his own weight reduction program      # Chronic Pain, pt is on pain regimen for past 17 years chronically  - istop checked   - on very high pain regimen from home including 4 medications, morphine  mg po QID, oxycodone 30 mg po 6 times per day, methadone 10 mg po BID, dilaudid 4 mg po TID  - pt states that he will be okay with regimen of home morphine  mg po QID and oxycodone 30 mg po 6 times per day for now  - Follows up w/ his own pain clinic in [place]   -holding bowel regimen as pt had recent diarrhea      #) Hypertension  -c/w Lisinopril    #) Hyperlipidemia  - c/w Fenofibrate    # Left knee pain  - follow up outpatient for scheduled total left knee replacement      #) GERD  -c/w PPI    # REBECCA  - on BIPAP at bedtime, on 21/18 setting, verified with pulmonology note from [facility] while in ED    DVT ppx; Pt refusing and ambulating

## 2018-10-21 LAB
ALBUMIN SERPL ELPH-MCNC: 3.8 G/DL — SIGNIFICANT CHANGE UP (ref 3.5–5.2)
ALP SERPL-CCNC: 102 U/L — SIGNIFICANT CHANGE UP (ref 30–115)
ALT FLD-CCNC: 48 U/L — HIGH (ref 0–41)
ANION GAP SERPL CALC-SCNC: 12 MMOL/L — SIGNIFICANT CHANGE UP (ref 7–14)
AST SERPL-CCNC: 24 U/L — SIGNIFICANT CHANGE UP (ref 0–41)
BILIRUB SERPL-MCNC: 0.5 MG/DL — SIGNIFICANT CHANGE UP (ref 0.2–1.2)
BUN SERPL-MCNC: 22 MG/DL — HIGH (ref 10–20)
CALCIUM SERPL-MCNC: 8.6 MG/DL — SIGNIFICANT CHANGE UP (ref 8.5–10.1)
CHLORIDE SERPL-SCNC: 94 MMOL/L — LOW (ref 98–110)
CO2 SERPL-SCNC: 32 MMOL/L — SIGNIFICANT CHANGE UP (ref 17–32)
CREAT SERPL-MCNC: 0.9 MG/DL — SIGNIFICANT CHANGE UP (ref 0.7–1.5)
GLUCOSE BLDC GLUCOMTR-MCNC: 100 MG/DL — HIGH (ref 70–99)
GLUCOSE BLDC GLUCOMTR-MCNC: 81 MG/DL — SIGNIFICANT CHANGE UP (ref 70–99)
GLUCOSE SERPL-MCNC: 93 MG/DL — SIGNIFICANT CHANGE UP (ref 70–99)
HCT VFR BLD CALC: 43.7 % — SIGNIFICANT CHANGE UP (ref 42–52)
HGB BLD-MCNC: 14.5 G/DL — SIGNIFICANT CHANGE UP (ref 14–18)
MAGNESIUM SERPL-MCNC: 2.8 MG/DL — HIGH (ref 1.8–2.4)
MCHC RBC-ENTMCNC: 28.3 PG — SIGNIFICANT CHANGE UP (ref 27–31)
MCHC RBC-ENTMCNC: 33.2 G/DL — SIGNIFICANT CHANGE UP (ref 32–37)
MCV RBC AUTO: 85.4 FL — SIGNIFICANT CHANGE UP (ref 80–94)
NRBC # BLD: 0 /100 WBCS — SIGNIFICANT CHANGE UP (ref 0–0)
PLATELET # BLD AUTO: 222 K/UL — SIGNIFICANT CHANGE UP (ref 130–400)
POTASSIUM SERPL-MCNC: 4.7 MMOL/L — SIGNIFICANT CHANGE UP (ref 3.5–5)
POTASSIUM SERPL-SCNC: 4.7 MMOL/L — SIGNIFICANT CHANGE UP (ref 3.5–5)
PROT SERPL-MCNC: 6.3 G/DL — SIGNIFICANT CHANGE UP (ref 6–8)
RBC # BLD: 5.12 M/UL — SIGNIFICANT CHANGE UP (ref 4.7–6.1)
RBC # FLD: 15.4 % — HIGH (ref 11.5–14.5)
SODIUM SERPL-SCNC: 138 MMOL/L — SIGNIFICANT CHANGE UP (ref 135–146)
WBC # BLD: 12.1 K/UL — HIGH (ref 4.8–10.8)
WBC # FLD AUTO: 12.1 K/UL — HIGH (ref 4.8–10.8)

## 2018-10-21 RX ADMIN — SIMVASTATIN 40 MILLIGRAM(S): 20 TABLET, FILM COATED ORAL at 21:06

## 2018-10-21 RX ADMIN — LISINOPRIL 5 MILLIGRAM(S): 2.5 TABLET ORAL at 05:28

## 2018-10-21 RX ADMIN — Medication 145 MILLIGRAM(S): at 11:16

## 2018-10-21 RX ADMIN — MORPHINE SULFATE 100 MILLIGRAM(S): 50 CAPSULE, EXTENDED RELEASE ORAL at 22:11

## 2018-10-21 RX ADMIN — Medication 81 MILLIGRAM(S): at 11:16

## 2018-10-21 RX ADMIN — Medication 1 PATCH: at 11:17

## 2018-10-21 RX ADMIN — Medication 3 MILLILITER(S): at 07:45

## 2018-10-21 RX ADMIN — MORPHINE SULFATE 100 MILLIGRAM(S): 50 CAPSULE, EXTENDED RELEASE ORAL at 11:15

## 2018-10-21 RX ADMIN — OXYCODONE HYDROCHLORIDE 30 MILLIGRAM(S): 5 TABLET ORAL at 16:50

## 2018-10-21 RX ADMIN — MORPHINE SULFATE 100 MILLIGRAM(S): 50 CAPSULE, EXTENDED RELEASE ORAL at 17:33

## 2018-10-21 RX ADMIN — Medication 1 PATCH: at 20:00

## 2018-10-21 RX ADMIN — OXYCODONE HYDROCHLORIDE 30 MILLIGRAM(S): 5 TABLET ORAL at 21:45

## 2018-10-21 RX ADMIN — MORPHINE SULFATE 100 MILLIGRAM(S): 50 CAPSULE, EXTENDED RELEASE ORAL at 23:00

## 2018-10-21 RX ADMIN — Medication 60 MILLIGRAM(S): at 05:28

## 2018-10-21 RX ADMIN — Medication 40 MILLIGRAM(S): at 05:28

## 2018-10-21 RX ADMIN — OXYCODONE HYDROCHLORIDE 30 MILLIGRAM(S): 5 TABLET ORAL at 17:55

## 2018-10-21 RX ADMIN — ZOLPIDEM TARTRATE 5 MILLIGRAM(S): 10 TABLET ORAL at 21:21

## 2018-10-21 RX ADMIN — OXYCODONE HYDROCHLORIDE 30 MILLIGRAM(S): 5 TABLET ORAL at 14:09

## 2018-10-21 RX ADMIN — OXYCODONE HYDROCHLORIDE 30 MILLIGRAM(S): 5 TABLET ORAL at 12:47

## 2018-10-21 RX ADMIN — OXYCODONE HYDROCHLORIDE 30 MILLIGRAM(S): 5 TABLET ORAL at 09:30

## 2018-10-21 RX ADMIN — OXYCODONE HYDROCHLORIDE 30 MILLIGRAM(S): 5 TABLET ORAL at 02:57

## 2018-10-21 RX ADMIN — Medication 1 PATCH: at 19:30

## 2018-10-21 RX ADMIN — AZITHROMYCIN 500 MILLIGRAM(S): 500 TABLET, FILM COATED ORAL at 14:18

## 2018-10-21 RX ADMIN — PANTOPRAZOLE SODIUM 40 MILLIGRAM(S): 20 TABLET, DELAYED RELEASE ORAL at 05:28

## 2018-10-21 RX ADMIN — OXYCODONE HYDROCHLORIDE 30 MILLIGRAM(S): 5 TABLET ORAL at 08:35

## 2018-10-21 RX ADMIN — OXYCODONE HYDROCHLORIDE 30 MILLIGRAM(S): 5 TABLET ORAL at 21:06

## 2018-10-21 RX ADMIN — BUDESONIDE AND FORMOTEROL FUMARATE DIHYDRATE 2 PUFF(S): 160; 4.5 AEROSOL RESPIRATORY (INHALATION) at 08:32

## 2018-10-21 RX ADMIN — MORPHINE SULFATE 100 MILLIGRAM(S): 50 CAPSULE, EXTENDED RELEASE ORAL at 11:17

## 2018-10-21 RX ADMIN — MORPHINE SULFATE 100 MILLIGRAM(S): 50 CAPSULE, EXTENDED RELEASE ORAL at 05:27

## 2018-10-21 RX ADMIN — OXYCODONE HYDROCHLORIDE 30 MILLIGRAM(S): 5 TABLET ORAL at 04:00

## 2018-10-21 RX ADMIN — MORPHINE SULFATE 100 MILLIGRAM(S): 50 CAPSULE, EXTENDED RELEASE ORAL at 05:31

## 2018-10-21 RX ADMIN — Medication 3 MILLILITER(S): at 13:02

## 2018-10-21 NOTE — PROGRESS NOTE ADULT - ASSESSMENT
52 year old male with a past medical history of Chronic Obstructive Pulmonary Disease, Obstructive Sleep Apnea, Morbid Obesity, Gastroesophageal Reflux Disease, Hyperlipidemia, Hypertension presenting with shortness of breath for the past week.    #) Chronic Obstructive Pulmonary Disease exacerbation  -RVP and flu panel- negative  -on Azithromycin 500mg q24hr (started 10/14)  -c/w duoneb q6h and prn  -IV solumedrol 60mg q12hr yesterday--> Prednisone 60mg PO today  -2D Echo- EF-55-60%, grade 1 DD      # Morbid  obesity  -BMI 47.4  -provided education, pt says he plans to follow his own weight reduction program      # Chronic Pain, pt is on pain regimen for past 17 years chronically  - istop checked   - on very high pain regimen from home including 4 medications, morphine  mg po QID, oxycodone 30 mg po 6 times per day, methadone 10 mg po BID, dilaudid 4 mg po TID  - pt states that he will be okay with regimen of home morphine  mg po QID and oxycodone 30 mg po 6 times per day for now  - Follows up w/ his own pain clinic in [place]   -holding bowel regimen as pt had recent diarrhea      #) Hypertension  -c/w Lisinopril    #) Hyperlipidemia  - c/w Fenofibrate    # Left knee pain  - follow up outpatient for scheduled total left knee replacement      #) GERD  -c/w PPI    # REBECCA  - on BIPAP at bedtime, on 21/18 setting, verified with pulmonology note from [facility] while in ED    DVT ppx; Pt refusing and ambulating    D/C PLANNING.

## 2018-10-22 ENCOUNTER — TRANSCRIPTION ENCOUNTER (OUTPATIENT)
Age: 53
End: 2018-10-22

## 2018-10-22 VITALS
RESPIRATION RATE: 18 BRPM | SYSTOLIC BLOOD PRESSURE: 121 MMHG | DIASTOLIC BLOOD PRESSURE: 69 MMHG | TEMPERATURE: 97 F | HEART RATE: 103 BPM

## 2018-10-22 LAB
ALBUMIN SERPL ELPH-MCNC: 3.3 G/DL — LOW (ref 3.5–5.2)
ALP SERPL-CCNC: 106 U/L — SIGNIFICANT CHANGE UP (ref 30–115)
ALT FLD-CCNC: 56 U/L — HIGH (ref 0–41)
ANION GAP SERPL CALC-SCNC: 8 MMOL/L — SIGNIFICANT CHANGE UP (ref 7–14)
AST SERPL-CCNC: 28 U/L — SIGNIFICANT CHANGE UP (ref 0–41)
BILIRUB SERPL-MCNC: 0.4 MG/DL — SIGNIFICANT CHANGE UP (ref 0.2–1.2)
BUN SERPL-MCNC: 22 MG/DL — HIGH (ref 10–20)
CALCIUM SERPL-MCNC: 8.9 MG/DL — SIGNIFICANT CHANGE UP (ref 8.5–10.1)
CHLORIDE SERPL-SCNC: 91 MMOL/L — LOW (ref 98–110)
CO2 SERPL-SCNC: 33 MMOL/L — HIGH (ref 17–32)
CREAT SERPL-MCNC: 0.8 MG/DL — SIGNIFICANT CHANGE UP (ref 0.7–1.5)
GLUCOSE BLDC GLUCOMTR-MCNC: 152 MG/DL — HIGH (ref 70–99)
GLUCOSE BLDC GLUCOMTR-MCNC: 184 MG/DL — HIGH (ref 70–99)
GLUCOSE SERPL-MCNC: 75 MG/DL — SIGNIFICANT CHANGE UP (ref 70–99)
HCT VFR BLD CALC: 42.6 % — SIGNIFICANT CHANGE UP (ref 42–52)
HGB BLD-MCNC: 14 G/DL — SIGNIFICANT CHANGE UP (ref 14–18)
MAGNESIUM SERPL-MCNC: 2.6 MG/DL — HIGH (ref 1.8–2.4)
MCHC RBC-ENTMCNC: 28.2 PG — SIGNIFICANT CHANGE UP (ref 27–31)
MCHC RBC-ENTMCNC: 32.9 G/DL — SIGNIFICANT CHANGE UP (ref 32–37)
MCV RBC AUTO: 85.9 FL — SIGNIFICANT CHANGE UP (ref 80–94)
NRBC # BLD: 0 /100 WBCS — SIGNIFICANT CHANGE UP (ref 0–0)
PLATELET # BLD AUTO: 198 K/UL — SIGNIFICANT CHANGE UP (ref 130–400)
POTASSIUM SERPL-MCNC: 5 MMOL/L — SIGNIFICANT CHANGE UP (ref 3.5–5)
POTASSIUM SERPL-SCNC: 5 MMOL/L — SIGNIFICANT CHANGE UP (ref 3.5–5)
PROT SERPL-MCNC: 6 G/DL — SIGNIFICANT CHANGE UP (ref 6–8)
RBC # BLD: 4.96 M/UL — SIGNIFICANT CHANGE UP (ref 4.7–6.1)
RBC # FLD: 15.8 % — HIGH (ref 11.5–14.5)
SODIUM SERPL-SCNC: 132 MMOL/L — LOW (ref 135–146)
WBC # BLD: 11.66 K/UL — HIGH (ref 4.8–10.8)
WBC # FLD AUTO: 11.66 K/UL — HIGH (ref 4.8–10.8)

## 2018-10-22 RX ORDER — MORPHINE SULFATE 50 MG/1
100 CAPSULE, EXTENDED RELEASE ORAL
Qty: 0 | Refills: 0 | Status: DISCONTINUED | OUTPATIENT
Start: 2018-10-22 | End: 2018-10-22

## 2018-10-22 RX ORDER — OXYCODONE HYDROCHLORIDE 5 MG/1
30 TABLET ORAL EVERY 4 HOURS
Qty: 0 | Refills: 0 | Status: DISCONTINUED | OUTPATIENT
Start: 2018-10-22 | End: 2018-10-22

## 2018-10-22 RX ADMIN — OXYCODONE HYDROCHLORIDE 30 MILLIGRAM(S): 5 TABLET ORAL at 09:56

## 2018-10-22 RX ADMIN — OXYCODONE HYDROCHLORIDE 30 MILLIGRAM(S): 5 TABLET ORAL at 10:26

## 2018-10-22 RX ADMIN — Medication 60 MILLIGRAM(S): at 05:57

## 2018-10-22 RX ADMIN — MORPHINE SULFATE 100 MILLIGRAM(S): 50 CAPSULE, EXTENDED RELEASE ORAL at 09:36

## 2018-10-22 RX ADMIN — Medication 3 MILLILITER(S): at 13:47

## 2018-10-22 RX ADMIN — Medication 3 MILLILITER(S): at 07:44

## 2018-10-22 RX ADMIN — OXYCODONE HYDROCHLORIDE 30 MILLIGRAM(S): 5 TABLET ORAL at 14:26

## 2018-10-22 RX ADMIN — Medication 81 MILLIGRAM(S): at 12:06

## 2018-10-22 RX ADMIN — OXYCODONE HYDROCHLORIDE 30 MILLIGRAM(S): 5 TABLET ORAL at 13:56

## 2018-10-22 RX ADMIN — Medication 145 MILLIGRAM(S): at 12:06

## 2018-10-22 RX ADMIN — MORPHINE SULFATE 100 MILLIGRAM(S): 50 CAPSULE, EXTENDED RELEASE ORAL at 09:06

## 2018-10-22 RX ADMIN — LISINOPRIL 5 MILLIGRAM(S): 2.5 TABLET ORAL at 05:57

## 2018-10-22 RX ADMIN — PANTOPRAZOLE SODIUM 40 MILLIGRAM(S): 20 TABLET, DELAYED RELEASE ORAL at 05:57

## 2018-10-22 RX ADMIN — BUDESONIDE AND FORMOTEROL FUMARATE DIHYDRATE 2 PUFF(S): 160; 4.5 AEROSOL RESPIRATORY (INHALATION) at 12:07

## 2018-10-22 RX ADMIN — OXYCODONE HYDROCHLORIDE 30 MILLIGRAM(S): 5 TABLET ORAL at 05:56

## 2018-10-22 RX ADMIN — Medication 40 MILLIGRAM(S): at 05:57

## 2018-10-22 RX ADMIN — AZITHROMYCIN 500 MILLIGRAM(S): 500 TABLET, FILM COATED ORAL at 12:06

## 2018-10-22 RX ADMIN — OXYCODONE HYDROCHLORIDE 30 MILLIGRAM(S): 5 TABLET ORAL at 06:21

## 2018-10-22 RX ADMIN — OXYCODONE HYDROCHLORIDE 30 MILLIGRAM(S): 5 TABLET ORAL at 01:36

## 2018-10-22 NOTE — PROGRESS NOTE ADULT - PROVIDER SPECIALTY LIST ADULT
Hospitalist
Internal Medicine

## 2018-10-22 NOTE — DISCHARGE NOTE ADULT - PATIENT PORTAL LINK FT
You can access the [facility] Patient Portal, offered by [facility], by registering with the following website: [email or web address]

## 2018-10-22 NOTE — DISCHARGE NOTE ADULT - PROVIDER TOKENS
TOKEN:'[id number]:'[id number]',FREE:[LAST:[Zhane],FIRST:[Gabriel],PHONE:[phone]:[(   )    -],ADDRESS:[pain management]]

## 2018-10-22 NOTE — DISCHARGE NOTE ADULT - CARE PLAN
Principal Discharge DX:	COPD exacerbation  Goal:	medical management, prevent recurrent COPD exacerbation  Assessment and plan of treatment:	- came to the hospital with shortness of breath and were found to be in COPD exacerbation.  - Completed antibiotic course with azithromycin 500 mg q 24 hours for 1 week  - Given IV steroids throughout your hospital course, will be changed to PO prednisone on discharge and plan for prednisone taper  - You should follow up with your PMD and pulmonologist on discharge to optimize medical management  - Smoking cessation is highly recommended to help improve your current symptoms and prevent recurrent COPD exacerbations  Secondary Diagnosis:	Morbid obesity  Goal:	weight loss  Assessment and plan of treatment:	- Exercise and diet are critical factors for losing weight. Diet should be low fat, low salt, with many fruits and vegetables, whole grains, and lean meat (like chicken and fish)  - You should try to exercise up to 3-5 x a week, for at least 30 minutes per session Principal Discharge DX:	COPD exacerbation  Goal:	medical management, prevent recurrent COPD exacerbation  Assessment and plan of treatment:	- came to the hospital with shortness of breath and were found to be in COPD exacerbation.  - Completed antibiotic course with azithromycin 500 mg q 24 hours for 1 week  - Given IV steroids throughout your hospital course, will be changed to PO prednisone on discharge and plan for prednisone taper  - You should follow up with your PMD and pulmonologist on discharge to optimize medical management  - Smoking cessation is highly recommended to help improve your current symptoms and prevent recurrent COPD exacerbations  - You will be discharged on a prednisone taper. Take 60 mg for 2 days (3 pills), followed by 40 mg for 2 days (2 pills per day) followed by 20 mg for two days (1 pill)  Secondary Diagnosis:	Morbid obesity  Goal:	weight loss  Assessment and plan of treatment:	- Exercise and diet are critical factors for losing weight. Diet should be low fat, low salt, with many fruits and vegetables, whole grains, and lean meat (like chicken and fish)  - You should try to exercise up to 3-5 x a week, for at least 30 minutes per session

## 2018-10-22 NOTE — DISCHARGE NOTE ADULT - CARE PROVIDERS DIRECT ADDRESSES
,DirectAddress_Unknown,[email or web address],DirectAddress_Unknown

## 2018-10-22 NOTE — PROGRESS NOTE ADULT - ASSESSMENT
52 year old male with a past medical history of Chronic Obstructive Pulmonary Disease, Obstructive Sleep Apnea, Morbid Obesity, Gastroesophageal Reflux Disease, Hyperlipidemia, Hypertension presenting with shortness of breath for the past week.    #) Chronic Obstructive Pulmonary Disease exacerbation  -RVP and flu panel- negative  -on Azithromycin 500mg q24hr (started 10/14), will d/c it.  -c/w duoneb q6h and prn  -IV solumedrol 60mg q12hr yesterday--> Prednisone 60mg PO today, taper down slowly.  -2D Echo- EF-55-60%, grade 1 DD  - f/u with Pulm as outpt.      # Morbid  obesity  -BMI 47.4  -provided education, pt says he plans to follow his own weight reduction program      # Chronic Pain, pt is on pain regimen for past 17 years chronically  - istop checked   - on very high pain regimen from home including 4 medications, morphine  mg po QID, oxycodone 30 mg po 6 times per day, methadone 10 mg po BID, dilaudid 4 mg po TID  - pt states that he will be okay with regimen of home morphine  mg po QID and oxycodone 30 mg po 6 times per day for now  - Follows up w/ his own pain clinic in [place]   -holding bowel regimen as pt had recent diarrhea  -       #) Hypertension  -c/w Lisinopril    #) Hyperlipidemia  - c/w Fenofibrate    # Left knee pain  - follow up outpatient for scheduled total left knee replacement      #) GERD  -c/w PPI    # REBECCA  - on BIPAP at bedtime, on 21/18 setting, verified with pulmonology note from [facility] while in ED    DVT ppx; Pt refusing and ambulating    D/C PLANNING. 52 year old male with a past medical history of Chronic Obstructive Pulmonary Disease, Obstructive Sleep Apnea, Morbid Obesity, Gastroesophageal Reflux Disease, Hyperlipidemia, Hypertension presenting with shortness of breath for the past week.    #) Chronic Obstructive Pulmonary Disease exacerbation  -RVP and flu panel- negative  -on Azithromycin 500mg q24hr (started 10/14), will d/c it.  -c/w duoneb q6h and prn  -IV solumedrol 60mg q12hr yesterday--> Prednisone 60mg PO today, taper down slowly.  -2D Echo- EF-55-60%, grade 1 DD  - f/u with Pulm as outpt.      # Morbid  obesity  -BMI 47.4  -provided education, pt says he plans to follow his own weight reduction program      # Chronic Pain, pt is on pain regimen for past 17 years chronically  - istop checked   - on very high pain regimen from home including 4 medications, morphine  mg po QID, oxycodone 30 mg po 6 times per day, methadone 10 mg po BID, dilaudid 4 mg po TID  - pt states that he will be okay with regimen of home morphine  mg po QID and oxycodone 30 mg po 6 times per day for now  - Follows up w/ his own pain clinic in [place]   -holding bowel regimen as pt had recent diarrhea  -       #) Hypertension  -c/w Lisinopril    # Hyponatremia.     #) Hyperlipidemia  - c/w Fenofibrate    # Left knee pain  - follow up outpatient for scheduled total left knee replacement      #) GERD  -c/w PPI    # REBECCA  - on BIPAP at bedtime, on 21/18 setting, verified with pulmonology note from [facility] while in ED    DVT ppx; Pt refusing and ambulating    D/C PLANNING.

## 2018-10-22 NOTE — PROGRESS NOTE ADULT - REASON FOR ADMISSION
shortness of breath

## 2018-10-22 NOTE — DISCHARGE NOTE ADULT - ADDITIONAL INSTRUCTIONS
Please follow up with your PMD and your pain management doctor Please follow up with your Primary care doctor, pulmonologist,  and your pain management doctor

## 2018-10-22 NOTE — DISCHARGE NOTE ADULT - PLAN OF CARE
medical management, prevent recurrent COPD exacerbation - came to the hospital with shortness of breath and were found to be in COPD exacerbation.  - Completed antibiotic course with azithromycin 500 mg q 24 hours for 1 week  - Given IV steroids throughout your hospital course, will be changed to PO prednisone on discharge and plan for prednisone taper  - You should follow up with your PMD and pulmonologist on discharge to optimize medical management  - Smoking cessation is highly recommended to help improve your current symptoms and prevent recurrent COPD exacerbations weight loss - Exercise and diet are critical factors for losing weight. Diet should be low fat, low salt, with many fruits and vegetables, whole grains, and lean meat (like chicken and fish)  - You should try to exercise up to 3-5 x a week, for at least 30 minutes per session - came to the hospital with shortness of breath and were found to be in COPD exacerbation.  - Completed antibiotic course with azithromycin 500 mg q 24 hours for 1 week  - Given IV steroids throughout your hospital course, will be changed to PO prednisone on discharge and plan for prednisone taper  - You should follow up with your PMD and pulmonologist on discharge to optimize medical management  - Smoking cessation is highly recommended to help improve your current symptoms and prevent recurrent COPD exacerbations  - You will be discharged on a prednisone taper. Take 60 mg for 2 days (3 pills), followed by 40 mg for 2 days (2 pills per day) followed by 20 mg for two days (1 pill)

## 2018-10-22 NOTE — PROGRESS NOTE ADULT - SUBJECTIVE AND OBJECTIVE BOX
GENOVEVAKAYLEIGH HOWARD  52y  Male      Patient is a 52y old  Male who presents with a chief complaint of shortness of breath (17 Oct 2018 10:36)      INTERVAL HPI/OVERNIGHT EVENTS:      ******************************* REVIEW OF SYSTEMS:**********************************************    All other review of systems negative    *********************** VITALS ******************************************    T(F): 97.9 (10-17-18 @ 12:30)  HR: 85 (10-17-18 @ 12:30) (67 - 87)  BP: 120/65 (10-17-18 @ 12:30) (114/58 - 130/74)  RR: 18 (10-17-18 @ 12:30) (18 - 18)  SpO2: 97% (10-16-18 @ 21:09) (97% - 97%)    10-17-18 @ 07:01  -  10-17-18 @ 17:03  --------------------------------------------------------  IN: 0 mL / OUT: 1275 mL / NET: -1275 mL            10-17-18 @ 07:01  -  10-17-18 @ 17:03  --------------------------------------------------------  IN: 0 mL / OUT: 1275 mL / NET: -1275 mL        ******************************** PHYSICAL EXAM:**************************************************  GENERAL: NAD    PSYCH: no agitation, baseline mentation  HEENT:     NERVOUS SYSTEM:  Alert & Oriented X3, MS  5/5 B/L  UE and LE ; Sensory intact    PULMONARY: OTIS, wheezing b/l    CARDIOVASCULAR: S1S2 RRR    GI: Soft, NT, ND; BS present.    EXTREMITIES:  2+ Peripheral Pulses, No clubbing, cyanosis, or edema    LYMPH: No lymphadenopathy noted    SKIN: No rashes or lesions    ******************************************************************************************    Consultant(s) Notes Reviewed:  [x ] YES  [ ] NO    Discussed with Consultants/Other Providers [ x] YES     **************************** LABS *******************************************************                          13.6   16.75 )-----------( 198      ( 17 Oct 2018 13:13 )             40.7     10-17    139  |  98  |  17  ----------------------------<  163<H>  4.2   |  27  |  0.8    Ca    9.2      17 Oct 2018 13:13  Mg     2.1     10-17    TPro  6.3  /  Alb  3.9  /  TBili  0.4  /  DBili  x   /  AST  15  /  ALT  24  /  AlkPhos  112  10-17          Lactate Trend        CAPILLARY BLOOD GLUCOSE      POCT Blood Glucose.: 298 mg/dL (17 Oct 2018 08:11)          **************************Active Medications *******************************************  No Known Allergies      acetaminophen   Tablet .. 650 milliGRAM(s) Oral every 6 hours PRN  ALBUTerol/ipratropium for Nebulization 3 milliLiter(s) Nebulizer every 4 hours PRN  ALBUTerol/ipratropium for Nebulization 3 milliLiter(s) Nebulizer every 6 hours  aspirin  chewable 81 milliGRAM(s) Oral daily  azithromycin  IVPB 500 milliGRAM(s) IV Intermittent every 24 hours  buDESOnide 160 MICROgram(s)/formoterol 4.5 MICROgram(s) Inhaler 2 Puff(s) Inhalation two times a day  chlorhexidine 4% Liquid 1 Application(s) Topical <User Schedule>  enoxaparin Injectable 40 milliGRAM(s) SubCutaneous two times a day  fenofibrate Tablet 145 milliGRAM(s) Oral daily  furosemide    Tablet 40 milliGRAM(s) Oral daily  influenza   Vaccine 0.5 milliLiter(s) IntraMuscular once  lisinopril 5 milliGRAM(s) Oral daily  methylPREDNISolone sodium succinate Injectable 60 milliGRAM(s) IV Push every 8 hours  morphine ER Tablet 100 milliGRAM(s) Oral <User Schedule>  nicotine -  14 mG/24Hr(s) Patch 1 patch Transdermal daily  oxyCODONE    IR 30 milliGRAM(s) Oral every 4 hours PRN  pantoprazole    Tablet 40 milliGRAM(s) Oral before breakfast  simvastatin 40 milliGRAM(s) Oral at bedtime  zolpidem 5 milliGRAM(s) Oral at bedtime PRN      ***************************************************  RADIOLOGY & ADDITIONAL TESTS:    Imaging Personally Reviewed:  [ ] YES  [ ] NO    HEALTH ISSUES - PROBLEM Dx:
GENOVEVAKAYLEIGH HOWARD  52y  Male      Patient is a 52y old  Male who presents with a chief complaint of shortness of breath (18 Oct 2018 09:46)      INTERVAL HPI/OVERNIGHT EVENTS:      ******************************* REVIEW OF SYSTEMS:**********************************************      All other review of systems negative    *********************** VITALS ******************************************    T(F): 96.7 (10-18-18 @ 12:30)  HR: 91 (10-18-18 @ 12:30) (62 - 91)  BP: 117/77 (10-18-18 @ 12:30) (105/56 - 123/68)  RR: 18 (10-18-18 @ 12:30) (18 - 20)  SpO2: 97% (10-17-18 @ 21:46) (97% - 97%)    10-17-18 @ 07:01  -  10-18-18 @ 07:00  --------------------------------------------------------  IN: 0 mL / OUT: 1275 mL / NET: -1275 mL            10-17-18 @ 07:01  -  10-18-18 @ 07:00  --------------------------------------------------------  IN: 0 mL / OUT: 1275 mL / NET: -1275 mL        ******************************** PHYSICAL EXAM:**************************************************  GENERAL: NAD, OBESE    PSYCH: no agitation, baseline mentation  HEENT:     NERVOUS SYSTEM:  Alert & Oriented X3,  PULMONARY: OTIS, diffuse wheezing B/L     CARDIOVASCULAR: S1S2 RRR    GI: Soft, NT, ND; BS present.    EXTREMITIES:  2+ Peripheral Pulses, No clubbing, cyanosis, or edema    LYMPH: No lymphadenopathy noted    SKIN: No rashes or lesions    ******************************************************************************************    Consultant(s) Notes Reviewed:  [x ] YES  [ ] NO    Discussed with Consultants/Other Providers [ x] YES     **************************** LABS *******************************************************                          14.0   14.40 )-----------( 231      ( 18 Oct 2018 06:30 )             42.6     10-18    141  |  98  |  17  ----------------------------<  110<H>  4.5   |  32  |  0.7    Ca    9.6      18 Oct 2018 06:30  Mg     2.4     10-18    TPro  6.3  /  Alb  3.7  /  TBili  0.3  /  DBili  x   /  AST  26  /  ALT  45<H>  /  AlkPhos  117<H>  10-18          Lactate Trend        CAPILLARY BLOOD GLUCOSE      POCT Blood Glucose.: 298 mg/dL (17 Oct 2018 08:11)          **************************Active Medications *******************************************  No Known Allergies      acetaminophen   Tablet .. 650 milliGRAM(s) Oral every 6 hours PRN  ALBUTerol/ipratropium for Nebulization 3 milliLiter(s) Nebulizer every 4 hours PRN  ALBUTerol/ipratropium for Nebulization 3 milliLiter(s) Nebulizer every 6 hours  aspirin  chewable 81 milliGRAM(s) Oral daily  azithromycin   Tablet 500 milliGRAM(s) Oral daily  buDESOnide 160 MICROgram(s)/formoterol 4.5 MICROgram(s) Inhaler 2 Puff(s) Inhalation two times a day  chlorhexidine 4% Liquid 1 Application(s) Topical <User Schedule>  enoxaparin Injectable 40 milliGRAM(s) SubCutaneous two times a day  fenofibrate Tablet 145 milliGRAM(s) Oral daily  furosemide    Tablet 40 milliGRAM(s) Oral daily  influenza   Vaccine 0.5 milliLiter(s) IntraMuscular once  lisinopril 5 milliGRAM(s) Oral daily  methylPREDNISolone sodium succinate Injectable 80 milliGRAM(s) IV Push every 8 hours  morphine ER Tablet 100 milliGRAM(s) Oral <User Schedule>  nicotine -  14 mG/24Hr(s) Patch 1 patch Transdermal daily  oxyCODONE    IR 30 milliGRAM(s) Oral every 4 hours PRN  pantoprazole    Tablet 40 milliGRAM(s) Oral before breakfast  simvastatin 40 milliGRAM(s) Oral at bedtime  zolpidem 5 milliGRAM(s) Oral at bedtime PRN      ***************************************************  RADIOLOGY & ADDITIONAL TESTS:    Imaging Personally Reviewed:  [ ] YES  [ ] NO    HEALTH ISSUES - PROBLEM Dx:
KAYLEIGH TOMAS  52y  Male      Patient is a 52y old  Male who presents with a chief complaint of shortness of breath (19 Oct 2018 10:34)      INTERVAL HPI/OVERNIGHT EVENTS:      ******************************* REVIEW OF SYSTEMS:**********************************************    All other review of systems negative    *********************** VITALS ******************************************    T(F): 96.1 (10-19-18 @ 05:19)  HR: 72 (10-19-18 @ 05:19) (72 - 91)  BP: 100/54 (10-19-18 @ 05:19) (100/54 - 117/77)  RR: 20 (10-19-18 @ 05:19) (18 - 20)  SpO2: 97% (10-19-18 @ 09:22) (96% - 100%)            ******************************** PHYSICAL EXAM:**************************************************  GENERAL: NAD    PSYCH: no agitation, baseline mentation  HEENT:     NERVOUS SYSTEM:  Alert & Oriented X3, MS  5/5 B/L  UE and LE ; Sensory intact    PULMONARY: OTIS, decreased wheezing.    CARDIOVASCULAR: S1S2 RRR    GI: Soft, NT, ND; BS present.    EXTREMITIES:  2+ Peripheral Pulses, No clubbing, cyanosis, or edema    LYMPH: No lymphadenopathy noted    SKIN: No rashes or lesions    ******************************************************************************************    Consultant(s) Notes Reviewed:  [x ] YES  [ ] NO    Discussed with Consultants/Other Providers [ x] YES     **************************** LABS *******************************************************                          13.7   12.89 )-----------( 228      ( 19 Oct 2018 06:28 )             41.2     10-19    140  |  97<L>  |  18  ----------------------------<  123<H>  4.9   |  28  |  0.8    Ca    9.3      19 Oct 2018 06:28  Mg     2.5     10-19    TPro  6.3  /  Alb  3.7  /  TBili  0.4  /  DBili  x   /  AST  19  /  ALT  47<H>  /  AlkPhos  117<H>  10-19          Lactate Trend        CAPILLARY BLOOD GLUCOSE      POCT Blood Glucose.: 180 mg/dL (19 Oct 2018 11:48)          **************************Active Medications *******************************************  No Known Allergies      acetaminophen   Tablet .. 650 milliGRAM(s) Oral every 6 hours PRN  ALBUTerol/ipratropium for Nebulization 3 milliLiter(s) Nebulizer every 4 hours PRN  ALBUTerol/ipratropium for Nebulization 3 milliLiter(s) Nebulizer every 6 hours  aspirin  chewable 81 milliGRAM(s) Oral daily  azithromycin   Tablet 500 milliGRAM(s) Oral daily  buDESOnide 160 MICROgram(s)/formoterol 4.5 MICROgram(s) Inhaler 2 Puff(s) Inhalation two times a day  chlorhexidine 4% Liquid 1 Application(s) Topical <User Schedule>  fenofibrate Tablet 145 milliGRAM(s) Oral daily  furosemide    Tablet 40 milliGRAM(s) Oral daily  influenza   Vaccine 0.5 milliLiter(s) IntraMuscular once  lisinopril 5 milliGRAM(s) Oral daily  methylPREDNISolone sodium succinate Injectable 60 milliGRAM(s) IV Push once  morphine ER Tablet 100 milliGRAM(s) Oral <User Schedule>  nicotine -  14 mG/24Hr(s) Patch 1 patch Transdermal daily  oxyCODONE    IR 30 milliGRAM(s) Oral every 4 hours PRN  pantoprazole    Tablet 40 milliGRAM(s) Oral before breakfast  simvastatin 40 milliGRAM(s) Oral at bedtime  zolpidem 5 milliGRAM(s) Oral at bedtime PRN      ***************************************************  RADIOLOGY & ADDITIONAL TESTS:    Imaging Personally Reviewed:  [ ] YES  [ ] NO    HEALTH ISSUES - PROBLEM Dx:
KAYLEIGH TOMAS  52y  Male      Patient is a 52y old  Male who presents with a chief complaint of shortness of breath (20 Oct 2018 12:03)      INTERVAL HPI/OVERNIGHT EVENTS:      ******************************* REVIEW OF SYSTEMS:**********************************************      All other review of systems negative    *********************** VITALS ******************************************    T(F): 97 (10-21-18 @ 06:20)  HR: 80 (10-21-18 @ 06:20) (80 - 91)  BP: 115/73 (10-21-18 @ 06:20) (115/73 - 148/81)  RR: 18 (10-21-18 @ 06:20) (18 - 20)  SpO2: 93% (10-20-18 @ 21:00) (93% - 97%)            ******************************** PHYSICAL EXAM:**************************************************  GENERAL: NAD    PSYCH: no agitation, baseline mentation  HEENT:     NERVOUS SYSTEM:  Alert & Oriented X3, MS  5/5 B/L  UE and LE ; Sensory intact    PULMONARY: OTIS, still wheezing    CARDIOVASCULAR: S1S2 RRR    GI: Soft, NT, ND; BS present.    EXTREMITIES:  2+ Peripheral Pulses, No clubbing, cyanosis, or edema    LYMPH: No lymphadenopathy noted    SKIN: No rashes or lesions    ******************************************************************************************    Consultant(s) Notes Reviewed:  [x ] YES  [ ] NO    Discussed with Consultants/Other Providers [ x] YES     **************************** LABS *******************************************************                          14.5   12.10 )-----------( 222      ( 21 Oct 2018 07:45 )             43.7     10-21    138  |  94<L>  |  22<H>  ----------------------------<  93  4.7   |  32  |  0.9    Ca    8.6      21 Oct 2018 07:45  Mg     2.8     10-21    TPro  6.3  /  Alb  3.8  /  TBili  0.5  /  DBili  x   /  AST  24  /  ALT  48<H>  /  AlkPhos  102  10-21          Lactate Trend        CAPILLARY BLOOD GLUCOSE      POCT Blood Glucose.: 113 mg/dL (20 Oct 2018 13:51)          **************************Active Medications *******************************************  No Known Allergies      acetaminophen   Tablet .. 650 milliGRAM(s) Oral every 6 hours PRN  ALBUTerol/ipratropium for Nebulization 3 milliLiter(s) Nebulizer every 4 hours PRN  ALBUTerol/ipratropium for Nebulization 3 milliLiter(s) Nebulizer every 6 hours  aspirin  chewable 81 milliGRAM(s) Oral daily  azithromycin   Tablet 500 milliGRAM(s) Oral daily  buDESOnide 160 MICROgram(s)/formoterol 4.5 MICROgram(s) Inhaler 2 Puff(s) Inhalation two times a day  chlorhexidine 4% Liquid 1 Application(s) Topical <User Schedule>  fenofibrate Tablet 145 milliGRAM(s) Oral daily  furosemide    Tablet 40 milliGRAM(s) Oral daily  lisinopril 5 milliGRAM(s) Oral daily  morphine ER Tablet 100 milliGRAM(s) Oral <User Schedule>  nicotine -  14 mG/24Hr(s) Patch 1 patch Transdermal daily  oxyCODONE    IR 30 milliGRAM(s) Oral every 4 hours PRN  pantoprazole    Tablet 40 milliGRAM(s) Oral before breakfast  predniSONE   Tablet 60 milliGRAM(s) Oral daily  simvastatin 40 milliGRAM(s) Oral at bedtime  zolpidem 5 milliGRAM(s) Oral at bedtime PRN      ***************************************************  RADIOLOGY & ADDITIONAL TESTS:    Imaging Personally Reviewed:  [ ] YES  [ ] NO    HEALTH ISSUES - PROBLEM Dx:
KAYLEIGH TOMAS  52y  Male      Patient is a 52y old  Male who presents with a chief complaint of shortness of breath (22 Oct 2018 09:32)      INTERVAL HPI/OVERNIGHT EVENTS:      ******************************* REVIEW OF SYSTEMS:**********************************************      All other review of systems negative    *********************** VITALS ******************************************    T(F): 97.5 (10-22-18 @ 05:51)  HR: 87 (10-22-18 @ 05:51) (87 - 94)  BP: 105/62 (10-22-18 @ 05:51) (101/- - 137/87)  RR: 18 (10-22-18 @ 05:51) (18 - 18)  SpO2: 99% (10-21-18 @ 19:30) (99% - 99%)            ******************************** PHYSICAL EXAM:**************************************************  GENERAL: NAD    PSYCH: no agitation, baseline mentation  HEENT:     NERVOUS SYSTEM:  Alert & Oriented X3,   PULMONARY: OTIS, decreased wheezing.    CARDIOVASCULAR: S1S2 RRR    GI: Soft, NT, ND; BS present.    EXTREMITIES:  2+ Peripheral Pulses, No clubbing, cyanosis, or edema    LYMPH: No lymphadenopathy noted    SKIN: No rashes or lesions    ******************************************************************************************    Consultant(s) Notes Reviewed:  [x ] YES  [ ] NO    Discussed with Consultants/Other Providers [ x] YES     **************************** LABS *******************************************************                          14.0   11.66 )-----------( 198      ( 22 Oct 2018 06:15 )             42.6     10-22    132<L>  |  91<L>  |  22<H>  ----------------------------<  75  5.0   |  33<H>  |  0.8    Ca    8.9      22 Oct 2018 06:15  Mg     2.6     10-22    TPro  6.0  /  Alb  3.3<L>  /  TBili  0.4  /  DBili  x   /  AST  28  /  ALT  56<H>  /  AlkPhos  106  10-22          Lactate Trend        CAPILLARY BLOOD GLUCOSE      POCT Blood Glucose.: 152 mg/dL (22 Oct 2018 02:02)          **************************Active Medications *******************************************  No Known Allergies      acetaminophen   Tablet .. 650 milliGRAM(s) Oral every 6 hours PRN  ALBUTerol/ipratropium for Nebulization 3 milliLiter(s) Nebulizer every 4 hours PRN  ALBUTerol/ipratropium for Nebulization 3 milliLiter(s) Nebulizer every 6 hours  aspirin  chewable 81 milliGRAM(s) Oral daily  azithromycin   Tablet 500 milliGRAM(s) Oral daily  buDESOnide 160 MICROgram(s)/formoterol 4.5 MICROgram(s) Inhaler 2 Puff(s) Inhalation two times a day  chlorhexidine 4% Liquid 1 Application(s) Topical <User Schedule>  fenofibrate Tablet 145 milliGRAM(s) Oral daily  furosemide    Tablet 40 milliGRAM(s) Oral daily  lisinopril 5 milliGRAM(s) Oral daily  morphine ER Tablet 100 milliGRAM(s) Oral <User Schedule>  nicotine -  14 mG/24Hr(s) Patch 1 patch Transdermal daily  oxyCODONE    IR 30 milliGRAM(s) Oral every 4 hours PRN  pantoprazole    Tablet 40 milliGRAM(s) Oral before breakfast  predniSONE   Tablet 60 milliGRAM(s) Oral daily  simvastatin 40 milliGRAM(s) Oral at bedtime  zolpidem 5 milliGRAM(s) Oral at bedtime PRN      ***************************************************  RADIOLOGY & ADDITIONAL TESTS:    Imaging Personally Reviewed:  [ ] YES  [ ] NO    HEALTH ISSUES - PROBLEM Dx:
KAYLEIGH TOMAS, male, 52y ([birth year]),   [id number]  Admit Date: 10-14-18 (6d)      Chief Complaint:  Patient is a 52y old  Male who presents with a chief complaint of shortness of breath (19 Oct 2018 11:54)      Interval History:  Pt lying comfortably in bed. Reports no acute events overnight. still complaining of wheezing and SOB.    Past Medical and Surgical History:  PAST MEDICAL & SURGICAL HISTORY:  Morbid obesity  Obstructive sleep apnea  GERD (gastroesophageal reflux disease)  High cholesterol  HTN (hypertension)  COPD (chronic obstructive pulmonary disease)  History of knee replacement procedure of right knee  History of appendectomy  History of cholecystectomy      Current Medications:  MEDICATIONS  (STANDING):  ALBUTerol/ipratropium for Nebulization 3 milliLiter(s) Nebulizer every 6 hours  aspirin  chewable 81 milliGRAM(s) Oral daily  azithromycin   Tablet 500 milliGRAM(s) Oral daily  buDESOnide 160 MICROgram(s)/formoterol 4.5 MICROgram(s) Inhaler 2 Puff(s) Inhalation two times a day  chlorhexidine 4% Liquid 1 Application(s) Topical <User Schedule>  fenofibrate Tablet 145 milliGRAM(s) Oral daily  furosemide    Tablet 40 milliGRAM(s) Oral daily  lisinopril 5 milliGRAM(s) Oral daily  morphine ER Tablet 100 milliGRAM(s) Oral <User Schedule>  nicotine -  14 mG/24Hr(s) Patch 1 patch Transdermal daily  pantoprazole    Tablet 40 milliGRAM(s) Oral before breakfast  predniSONE   Tablet 60 milliGRAM(s) Oral daily  simvastatin 40 milliGRAM(s) Oral at bedtime    MEDICATIONS  (PRN):  acetaminophen   Tablet .. 650 milliGRAM(s) Oral every 6 hours PRN Moderate Pain (4 - 6)  ALBUTerol/ipratropium for Nebulization 3 milliLiter(s) Nebulizer every 4 hours PRN Bronchospasm  oxyCODONE    IR 30 milliGRAM(s) Oral every 4 hours PRN Severe Pain (7 - 10)  zolpidem 5 milliGRAM(s) Oral at bedtime PRN Insomnia        Vital Signs:  T(F): 96 (10-20-18 @ 04:30), Max: 96.7 (10-18-18 @ 12:30)  HR: 84 (10-20-18 @ 04:30) (72 - 92)  BP: 133/96 (10-20-18 @ 04:30) (100/54 - 137/82)  RR: 18 (10-20-18 @ 04:30) (18 - 20)  SpO2: 100% (10-19-18 @ 21:26) (96% - 100%)  CAPILLARY BLOOD GLUCOSE      POCT Blood Glucose.: 160 mg/dL (19 Oct 2018 21:16)  POCT Blood Glucose.: 182 mg/dL (19 Oct 2018 16:43)      Physical Exam:  General: Not in distress.   HEENT: Moist mucus membranes. PERRLA.  Cardio: Regular rate and rhythm, S1, S2, no murmur, rub, or gallop.  Pulm: diffuse b/l wheezing  Abdomen: Soft, non-tender, non-distended. Obese  Extremities: No cyanosis or edema bilaterally. No calf tenderness to palpation.  Neuro: A&O x3. No focal deficits.     Labs and Imaging:  CBC Full  -  ( 20 Oct 2018 08:03 )  WBC Count : 12.74 K/uL  Hemoglobin : 14.2 g/dL  Hematocrit : 42.7 %  Platelet Count - Automated : 234 K/uL  Mean Cell Volume : 85.6 fL  Mean Cell Hemoglobin : 28.5 pg  Mean Cell Hemoglobin Concentration : 33.3 g/dL  Auto Neutrophil # : x  Auto Lymphocyte # : x  Auto Monocyte # : x  Auto Eosinophil # : x  Auto Basophil # : x  Auto Neutrophil % : x  Auto Lymphocyte % : x  Auto Monocyte % : x  Auto Eosinophil % : x  Auto Basophil % : x    RDW: 15.3      BMP: 10-20-18 @ 08:03  140 | 92 | 20   -----------------< 153  5.0  | 34 | 0.8  eGFR(AA): 121, eGFR (non-AA): 104  Ca 9.0, Mg 2.7, P --    LFTs: 10-20-18 @ 08:03  TP  6.3  | 4.0 Alb   ---------------  TB  0.5  | --  DB   ---------------  ALT 50  | 25  AST            ^          105 ALK  LFTs: 10-19-18 @ 06:28  TP  6.3  | 3.7 Alb   ---------------  TB  0.4  | --  DB   ---------------  ALT 47  | 19  AST            ^          117 ALK      LFTs: 10-20-18 @ 08:03  Ca  9.0  | 25 AST   -----------------  TP  6.3  | 50 ALT  -----------------  Alb 4.0  | 105 ALK          ^        0.5         TB      Cardiac Enzymes:    Urinalysis:    Cultures:        Radiology:   < from: [facility] Duplex Upper Ext Vein Scan, Left (07.25.18 @ 16:18) >  Impression:    No evidence of deep or superficial venous thrombosis in the left upper   extremity.    Left upper arm fluid collection as above.    < end of copied text >
KAYLEIGH TOMAS, male, 52y ([birth year]),   [id number]  Admit Date: 10-14-18 (4d)      Chief Complaint:  Patient is a 52y old  Male who presents with a chief complaint of shortness of breath (17 Oct 2018 17:03)      Interval History:  Pt not available again in the room this morning, seen walking back during rounds. Still complains of SOB, says his symptoms are unchanged. Still wheezing on physical exam.    Past Medical and Surgical History:  PAST MEDICAL & SURGICAL HISTORY:  Morbid obesity  Obstructive sleep apnea  GERD (gastroesophageal reflux disease)  High cholesterol  HTN (hypertension)  COPD (chronic obstructive pulmonary disease)  History of knee replacement procedure of right knee  History of appendectomy  History of cholecystectomy      Current Medications:  MEDICATIONS  (STANDING):  ALBUTerol/ipratropium for Nebulization 3 milliLiter(s) Nebulizer every 6 hours  aspirin  chewable 81 milliGRAM(s) Oral daily  azithromycin   Tablet 500 milliGRAM(s) Oral daily  buDESOnide 160 MICROgram(s)/formoterol 4.5 MICROgram(s) Inhaler 2 Puff(s) Inhalation two times a day  chlorhexidine 4% Liquid 1 Application(s) Topical <User Schedule>  enoxaparin Injectable 40 milliGRAM(s) SubCutaneous two times a day  fenofibrate Tablet 145 milliGRAM(s) Oral daily  furosemide    Tablet 40 milliGRAM(s) Oral daily  influenza   Vaccine 0.5 milliLiter(s) IntraMuscular once  lisinopril 5 milliGRAM(s) Oral daily  methylPREDNISolone sodium succinate Injectable 80 milliGRAM(s) IV Push every 8 hours  morphine ER Tablet 100 milliGRAM(s) Oral <User Schedule>  nicotine -  14 mG/24Hr(s) Patch 1 patch Transdermal daily  pantoprazole    Tablet 40 milliGRAM(s) Oral before breakfast  simvastatin 40 milliGRAM(s) Oral at bedtime    MEDICATIONS  (PRN):  acetaminophen   Tablet .. 650 milliGRAM(s) Oral every 6 hours PRN Moderate Pain (4 - 6)  ALBUTerol/ipratropium for Nebulization 3 milliLiter(s) Nebulizer every 4 hours PRN Bronchospasm  oxyCODONE    IR 30 milliGRAM(s) Oral every 4 hours PRN Severe Pain (7 - 10)  zolpidem 5 milliGRAM(s) Oral at bedtime PRN Insomnia        Vital Signs:  T(F): 97.6 (10-18-18 @ 05:27), Max: 97.9 (10-17-18 @ 12:30)  HR: 62 (10-18-18 @ 05:27) (62 - 87)  BP: 105/56 (10-18-18 @ 05:27) (105/56 - 140/82)  RR: 20 (10-18-18 @ 05:27) (18 - 20)  SpO2: 97% (10-17-18 @ 21:46) (97% - 98%)  CAPILLARY BLOOD GLUCOSE          Physical Exam:  General: looks distressed, complaining of feelng SOB  HEENT: Moist mucus membranes. PERRLA.  Cardio: Regular rate and rhythm, S1, S2, no murmur, rub, or gallop.  Pulm: Diffuse wheezing b/l  Abdomen: Soft, non-tender, non-distended. Normoactive bowel sounds.  Extremities: No cyanosis or edema bilaterally. Neuro: A&O x3. No focal deficits.     Labs and Imaging:  CBC Full  -  ( 18 Oct 2018 06:30 )  WBC Count : 14.40 K/uL  Hemoglobin : 14.0 g/dL  Hematocrit : 42.6 %  Platelet Count - Automated : 231 K/uL  Mean Cell Volume : 86.2 fL  Mean Cell Hemoglobin : 28.3 pg  Mean Cell Hemoglobin Concentration : 32.9 g/dL  Auto Neutrophil # : x  Auto Lymphocyte # : x  Auto Monocyte # : x  Auto Eosinophil # : x  Auto Basophil # : x  Auto Neutrophil % : x  Auto Lymphocyte % : x  Auto Monocyte % : x  Auto Eosinophil % : x  Auto Basophil % : x    RDW: 15.2  15.3      BMP: 10-18-18 @ 06:30  141 | 98 | 17   -----------------< 110  4.5  | 32 | 0.7  eGFR(AA): 128, eGFR (non-AA): 110  Ca 9.6, Mg 2.4, P --  BMP: 10-17-18 @ 13:13  139 | 98 | 17   -----------------< 163  4.2  | 27 | 0.8  eGFR(AA): 121, eGFR (non-AA): 104  Ca 9.2, Mg 2.1, P --    LFTs: 10-18-18 @ 06:30  TP  6.3  | 3.7 Alb   ---------------  TB  0.3  | --  DB   ---------------  ALT 45  | 26  AST            ^          117 ALK  LFTs: 10-17-18 @ 13:13  TP  6.3  | 3.9 Alb   ---------------  TB  0.4  | --  DB   ---------------  ALT 24  | 15  AST            ^          112 ALK      LFTs: 10-18-18 @ 06:30  Ca  9.6  | 26 AST   -----------------  TP  6.3  | 45 ALT  -----------------  Alb 3.7  | 117 ALK          ^        0.3         TB  LFTs: 10-17-18 @ 13:13  Ca  9.2  | 15 AST   -----------------  TP  6.3  | 24 ALT  -----------------  Alb 3.9  | 112 ALK          ^        0.4         TB      Radiology:     < from: Xray Chest 1 View AP/PA (10.14.18 @ 14:20) >  Impression:      No radiographic evidence of acute cardiopulmonary disease.    Unchanged.        < end of copied text >
KAYLEIGH TOMAS, male, 52y ([birth year]),   [id number]  Admit Date: 10-14-18 (3d)      Chief Complaint:  Patient is a 52y old  Male who presents with a chief complaint of shortness of breath (16 Oct 2018 16:36)      Interval History:  Pt still complaining of SOB, Cough  and phlegm production frequently leaves the room to smoke and comes back to ask for his inhalers. Holding bowel regimen as he has been having diarrhea outside the hospital. No diarrhea as an inpt.    Past Medical and Surgical History:  PAST MEDICAL & SURGICAL HISTORY:  Morbid obesity  Obstructive sleep apnea  GERD (gastroesophageal reflux disease)  High cholesterol  HTN (hypertension)  COPD (chronic obstructive pulmonary disease)  History of knee replacement procedure of right knee  History of appendectomy  History of cholecystectomy      Current Medications:  MEDICATIONS  (STANDING):  ALBUTerol/ipratropium for Nebulization 3 milliLiter(s) Nebulizer every 6 hours  aspirin  chewable 81 milliGRAM(s) Oral daily  azithromycin  IVPB 500 milliGRAM(s) IV Intermittent every 24 hours  buDESOnide 160 MICROgram(s)/formoterol 4.5 MICROgram(s) Inhaler 2 Puff(s) Inhalation two times a day  chlorhexidine 4% Liquid 1 Application(s) Topical <User Schedule>  enoxaparin Injectable 40 milliGRAM(s) SubCutaneous two times a day  fenofibrate Tablet 145 milliGRAM(s) Oral daily  furosemide    Tablet 40 milliGRAM(s) Oral daily  influenza   Vaccine 0.5 milliLiter(s) IntraMuscular once  lisinopril 5 milliGRAM(s) Oral daily  methylPREDNISolone sodium succinate Injectable 80 milliGRAM(s) IV Push three times a day  morphine ER Tablet 100 milliGRAM(s) Oral <User Schedule>  pantoprazole    Tablet 40 milliGRAM(s) Oral before breakfast  simvastatin 40 milliGRAM(s) Oral at bedtime    MEDICATIONS  (PRN):  acetaminophen   Tablet .. 650 milliGRAM(s) Oral every 6 hours PRN Moderate Pain (4 - 6)  ALBUTerol/ipratropium for Nebulization 3 milliLiter(s) Nebulizer every 4 hours PRN Bronchospasm  oxyCODONE    IR 30 milliGRAM(s) Oral every 4 hours PRN Severe Pain (7 - 10)  zolpidem 5 milliGRAM(s) Oral at bedtime PRN Insomnia        Vital Signs:  T(F): 97.5 (10-17-18 @ 05:22), Max: 97.5 (10-17-18 @ 05:22)  HR: 67 (10-17-18 @ 05:22) (67 - 96)  BP: 114/58 (10-17-18 @ 05:22) (100/62 - 140/82)  RR: 18 (10-17-18 @ 05:22) (18 - 18)  SpO2: 97% (10-16-18 @ 21:09) (97% - 98%)  CAPILLARY BLOOD GLUCOSE      POCT Blood Glucose.: 298 mg/dL (17 Oct 2018 08:11)      Physical Exam:  General: NAD  HEENT: Moist mucus membranes. PERRLA.  Cardio: Regular rate and rhythm, S1, S2, no murmur, rub, or gallop.  Pulm: B/l expiratory wheezing  Abdomen: Soft, non-tender, non-distended. Normoactive bowel sounds.  Extremities: No cyanosis or edema bilaterally.   Neuro: A&O x3. No focal deficits.         Radiology:     < from: Xray Chest 1 View AP/PA (10.14.18 @ 14:20) >  Impression:      No radiographic evidence of acute cardiopulmonary disease.    Unchanged.    < end of copied text >    < from: [facility] Duplex Upper Ext Vein Scan, Left (07.25.18 @ 16:18) >  Impression:    No evidence of deep or superficial venous thrombosis in the left upper   extremity.    Left upper arm fluid collection as above.    < end of copied text >
SUBJECTIVE:    Patient is a 52y old Male who presents with a chief complaint of shortness of breath (14 Oct 2018 16:28)    Currently admitted to medicine with the primary diagnosis of COPD exacerbation     Today is hospital day 1d. This morning he is resting comfortably in bed and reports no new issues or overnight events.     PAST MEDICAL & SURGICAL HISTORY  Morbid obesity  Obstructive sleep apnea  GERD (gastroesophageal reflux disease)  High cholesterol  HTN (hypertension)  COPD (chronic obstructive pulmonary disease)  History of knee replacement procedure of right knee  History of appendectomy  History of cholecystectomy    SOCIAL HISTORY:  Negative for smoking/alcohol/drug use.     ALLERGIES:  No Known Allergies    MEDICATIONS:  STANDING MEDICATIONS  ALBUTerol/ipratropium for Nebulization 3 milliLiter(s) Nebulizer every 6 hours  aspirin  chewable 81 milliGRAM(s) Oral daily  azithromycin  IVPB 500 milliGRAM(s) IV Intermittent every 24 hours  buDESOnide 160 MICROgram(s)/formoterol 4.5 MICROgram(s) Inhaler 2 Puff(s) Inhalation two times a day  enoxaparin Injectable 40 milliGRAM(s) SubCutaneous two times a day  fenofibrate Tablet 145 milliGRAM(s) Oral daily  furosemide    Tablet 40 milliGRAM(s) Oral daily  lisinopril 5 milliGRAM(s) Oral daily  methylPREDNISolone sodium succinate Injectable 80 milliGRAM(s) IV Push three times a day  morphine ER Tablet 100 milliGRAM(s) Oral <User Schedule>  pantoprazole    Tablet 40 milliGRAM(s) Oral before breakfast  simvastatin 40 milliGRAM(s) Oral at bedtime    PRN MEDICATIONS  acetaminophen   Tablet .. 650 milliGRAM(s) Oral every 6 hours PRN  ALBUTerol/ipratropium for Nebulization 3 milliLiter(s) Nebulizer every 4 hours PRN  oxyCODONE    IR 30 milliGRAM(s) Oral every 4 hours PRN    VITALS:   T(F): 98.5  HR: 74  BP: 109/68  RR: 18  SpO2: 96%    LABS:                        14.2   11.21 )-----------( 258      ( 15 Oct 2018 05:59 )             42.1     10-15    139  |  100  |  12  ----------------------------<  192<H>  4.3   |  24  |  0.6<L>    Ca    9.6      15 Oct 2018 05:59  Mg     2.2     10-15    TPro  6.7  /  Alb  4.0  /  TBili  0.4  /  DBili  x   /  AST  10  /  ALT  21  /  AlkPhos  140<H>  10-15    PT/INR - ( 14 Oct 2018 12:32 )   PT: 12.80 sec;   INR: 1.11 ratio         PTT - ( 14 Oct 2018 12:32 )  PTT:33.7 sec    ABG - ( 15 Oct 2018 06:18 )  pH, Arterial: 7.43  pH, Blood: x     /  pCO2: 40    /  pO2: 108   / HCO3: 26    / Base Excess: 1.8   /  SaO2: 99                Creatine Kinase, Serum: 92 U/L (10-15-18 @ 05:59)  Troponin T, Serum: <0.01 ng/mL (10-15-18 @ 05:59)  Creatine Kinase, Serum: 119 U/L (10-15-18 @ 00:37)  Troponin T, Serum: <0.01 ng/mL (10-15-18 @ 00:37)  Troponin T, Serum: <0.01 ng/mL (10-14-18 @ 12:32)      CARDIAC MARKERS ( 15 Oct 2018 05:59 )  x     / <0.01 ng/mL / 92 U/L / x     / 2.0 ng/mL  CARDIAC MARKERS ( 15 Oct 2018 00:37 )  x     / <0.01 ng/mL / 119 U/L / x     / 2.0 ng/mL  CARDIAC MARKERS ( 14 Oct 2018 12:32 )  x     / <0.01 ng/mL / x     / x     / x          RADIOLOGY:    PHYSICAL EXAM:  GEN: No acute distress  LUNGS: Clear to auscultation bilaterally   HEART: S1/S2 present. RRR.   ABD: Soft, non-tender, non-distended. Bowel sounds present  EXT: NC/NC/NE/2+PP/CLARK  NEURO: AAOX3
SUBJECTIVE:    Patient is a 52y old Male who presents with a chief complaint of shortness of breath (16 Oct 2018 08:49)    Currently admitted to medicine with the primary diagnosis of COPD exacerbation     Today is hospital day 2d. This morning he is resting comfortably in bed and reports no new issues or overnight events.     PAST MEDICAL & SURGICAL HISTORY  Morbid obesity  Obstructive sleep apnea  GERD (gastroesophageal reflux disease)  High cholesterol  HTN (hypertension)  COPD (chronic obstructive pulmonary disease)  History of knee replacement procedure of right knee  History of appendectomy  History of cholecystectomy    SOCIAL HISTORY:  Negative for smoking/alcohol/drug use.     ALLERGIES:  No Known Allergies    MEDICATIONS:  STANDING MEDICATIONS  ALBUTerol/ipratropium for Nebulization 3 milliLiter(s) Nebulizer every 6 hours  aspirin  chewable 81 milliGRAM(s) Oral daily  azithromycin  IVPB 500 milliGRAM(s) IV Intermittent every 24 hours  buDESOnide 160 MICROgram(s)/formoterol 4.5 MICROgram(s) Inhaler 2 Puff(s) Inhalation two times a day  enoxaparin Injectable 40 milliGRAM(s) SubCutaneous two times a day  fenofibrate Tablet 145 milliGRAM(s) Oral daily  furosemide    Tablet 40 milliGRAM(s) Oral daily  lisinopril 5 milliGRAM(s) Oral daily  methylPREDNISolone sodium succinate Injectable 80 milliGRAM(s) IV Push three times a day  morphine ER Tablet 100 milliGRAM(s) Oral <User Schedule>  pantoprazole    Tablet 40 milliGRAM(s) Oral before breakfast  simvastatin 40 milliGRAM(s) Oral at bedtime    PRN MEDICATIONS  acetaminophen   Tablet .. 650 milliGRAM(s) Oral every 6 hours PRN  ALBUTerol/ipratropium for Nebulization 3 milliLiter(s) Nebulizer every 4 hours PRN  oxyCODONE    IR 30 milliGRAM(s) Oral every 4 hours PRN  zolpidem 5 milliGRAM(s) Oral at bedtime PRN    VITALS:   T(F): 97.4  HR: 90  BP: 126/90  RR: 18  SpO2: 98%    LABS:                        14.2   11.21 )-----------( 258      ( 15 Oct 2018 05:59 )             42.1     10-15    139  |  100  |  12  ----------------------------<  192<H>  4.3   |  24  |  0.6<L>    Ca    9.6      15 Oct 2018 05:59  Mg     2.2     10-15    TPro  6.7  /  Alb  4.0  /  TBili  0.4  /  DBili  x   /  AST  10  /  ALT  21  /  AlkPhos  140<H>  10-15    PT/INR - ( 14 Oct 2018 12:32 )   PT: 12.80 sec;   INR: 1.11 ratio         PTT - ( 14 Oct 2018 12:32 )  PTT:33.7 sec    ABG - ( 15 Oct 2018 06:18 )  pH, Arterial: 7.43  pH, Blood: x     /  pCO2: 40    /  pO2: 108   / HCO3: 26    / Base Excess: 1.8   /  SaO2: 99                    CARDIAC MARKERS ( 15 Oct 2018 05:59 )  x     / <0.01 ng/mL / 92 U/L / x     / 2.0 ng/mL  CARDIAC MARKERS ( 15 Oct 2018 00:37 )  x     / <0.01 ng/mL / 119 U/L / x     / 2.0 ng/mL  CARDIAC MARKERS ( 14 Oct 2018 12:32 )  x     / <0.01 ng/mL / x     / x     / x          RADIOLOGY:    PHYSICAL EXAM:  GEN: No acute distress  LUNGS: Clear to auscultation bilaterally   HEART: S1/S2 present. RRR.   ABD: Soft, non-tender, non-distended. Bowel sounds present  EXT: NC/NC/NE/2+PP/CLARK  NEURO: AAOX3
SUBJECTIVE:    Patient is a 52y old Male who presents with a chief complaint of shortness of breath (16 Oct 2018 09:29)    Currently admitted to medicine with the primary diagnosis of COPD exacerbation     Today is hospital day 2d. This morning he is resting comfortably in bed and reports no new issues or overnight events.     PAST MEDICAL & SURGICAL HISTORY  Morbid obesity  Obstructive sleep apnea  GERD (gastroesophageal reflux disease)  High cholesterol  HTN (hypertension)  COPD (chronic obstructive pulmonary disease)  History of knee replacement procedure of right knee  History of appendectomy  History of cholecystectomy    SOCIAL HISTORY:  Negative for smoking/alcohol/drug use.     ALLERGIES:  No Known Allergies    MEDICATIONS:  STANDING MEDICATIONS  ALBUTerol/ipratropium for Nebulization 3 milliLiter(s) Nebulizer every 6 hours  aspirin  chewable 81 milliGRAM(s) Oral daily  azithromycin  IVPB 500 milliGRAM(s) IV Intermittent every 24 hours  buDESOnide 160 MICROgram(s)/formoterol 4.5 MICROgram(s) Inhaler 2 Puff(s) Inhalation two times a day  enoxaparin Injectable 40 milliGRAM(s) SubCutaneous two times a day  fenofibrate Tablet 145 milliGRAM(s) Oral daily  furosemide    Tablet 40 milliGRAM(s) Oral daily  lisinopril 5 milliGRAM(s) Oral daily  methylPREDNISolone sodium succinate Injectable 80 milliGRAM(s) IV Push three times a day  morphine ER Tablet 100 milliGRAM(s) Oral <User Schedule>  pantoprazole    Tablet 40 milliGRAM(s) Oral before breakfast  simvastatin 40 milliGRAM(s) Oral at bedtime    PRN MEDICATIONS  acetaminophen   Tablet .. 650 milliGRAM(s) Oral every 6 hours PRN  ALBUTerol/ipratropium for Nebulization 3 milliLiter(s) Nebulizer every 4 hours PRN  oxyCODONE    IR 30 milliGRAM(s) Oral every 4 hours PRN  zolpidem 5 milliGRAM(s) Oral at bedtime PRN    VITALS:   T(F): 95.9  HR: 79  BP: 140/82  RR: 18  SpO2: 98%    LABS:                        14.2   11.21 )-----------( 258      ( 15 Oct 2018 05:59 )             42.1     10-15    139  |  100  |  12  ----------------------------<  192<H>  4.3   |  24  |  0.6<L>    Ca    9.6      15 Oct 2018 05:59  Mg     2.2     10-15    TPro  6.7  /  Alb  4.0  /  TBili  0.4  /  DBili  x   /  AST  10  /  ALT  21  /  AlkPhos  140<H>  10-15        ABG - ( 15 Oct 2018 06:18 )  pH, Arterial: 7.43  pH, Blood: x     /  pCO2: 40    /  pO2: 108   / HCO3: 26    / Base Excess: 1.8   /  SaO2: 99                    CARDIAC MARKERS ( 15 Oct 2018 05:59 )  x     / <0.01 ng/mL / 92 U/L / x     / 2.0 ng/mL  CARDIAC MARKERS ( 15 Oct 2018 00:37 )  x     / <0.01 ng/mL / 119 U/L / x     / 2.0 ng/mL      RADIOLOGY:    PHYSICAL EXAM:  GEN: No acute distress  LUNGS: Clear to auscultation bilaterally   HEART: S1/S2 present. RRR.   ABD/ GI: Soft, non-tender, non-distended. Bowel sounds present  EXT: swelling of lower extremity  NEURO: AAOX3
KAYLEIGH TOMAS, male, 52y ([birth year]),   MRN-[id number]  Admit Date: 10-14-18 (5d)      Chief Complaint:  Patient is a 52y old  Male who presents with a chief complaint of shortness of breath (18 Oct 2018 15:38)      Interval History:  pt standing by his bed, still regularly leaves and comes back. High dose steroids decreased today. Will switch to PO tomorrow. Anticipated for discharge tomorrow.    Past Medical and Surgical History:  PAST MEDICAL & SURGICAL HISTORY:  Morbid obesity  Obstructive sleep apnea  GERD (gastroesophageal reflux disease)  High cholesterol  HTN (hypertension)  COPD (chronic obstructive pulmonary disease)  History of knee replacement procedure of right knee  History of appendectomy  History of cholecystectomy      Current Medications:  MEDICATIONS  (STANDING):  ALBUTerol/ipratropium for Nebulization 3 milliLiter(s) Nebulizer every 6 hours  aspirin  chewable 81 milliGRAM(s) Oral daily  azithromycin   Tablet 500 milliGRAM(s) Oral daily  buDESOnide 160 MICROgram(s)/formoterol 4.5 MICROgram(s) Inhaler 2 Puff(s) Inhalation two times a day  chlorhexidine 4% Liquid 1 Application(s) Topical <User Schedule>  fenofibrate Tablet 145 milliGRAM(s) Oral daily  furosemide    Tablet 40 milliGRAM(s) Oral daily  influenza   Vaccine 0.5 milliLiter(s) IntraMuscular once  lisinopril 5 milliGRAM(s) Oral daily  methylPREDNISolone sodium succinate Injectable 60 milliGRAM(s) IV Push once  morphine ER Tablet 100 milliGRAM(s) Oral <User Schedule>  nicotine -  14 mG/24Hr(s) Patch 1 patch Transdermal daily  pantoprazole    Tablet 40 milliGRAM(s) Oral before breakfast  simvastatin 40 milliGRAM(s) Oral at bedtime    MEDICATIONS  (PRN):  acetaminophen   Tablet .. 650 milliGRAM(s) Oral every 6 hours PRN Moderate Pain (4 - 6)  ALBUTerol/ipratropium for Nebulization 3 milliLiter(s) Nebulizer every 4 hours PRN Bronchospasm  oxyCODONE    IR 30 milliGRAM(s) Oral every 4 hours PRN Severe Pain (7 - 10)  zolpidem 5 milliGRAM(s) Oral at bedtime PRN Insomnia        Vital Signs:  T(F): 96.1 (10-19-18 @ 05:19), Max: 97.9 (10-17-18 @ 12:30)  HR: 72 (10-19-18 @ 05:19) (62 - 91)  BP: 100/54 (10-19-18 @ 05:19) (100/54 - 123/68)  RR: 20 (10-19-18 @ 05:19) (18 - 20)  SpO2: 97% (10-19-18 @ 09:22) (96% - 100%)  CAPILLARY BLOOD GLUCOSE      POCT Blood Glucose.: 120 mg/dL (19 Oct 2018 08:07)  POCT Blood Glucose.: 189 mg/dL (18 Oct 2018 23:57)      Physical Exam:  General: Not in distress.   HEENT: Moist mucus membranes. PERRLA.  Cardio: Regular rate and rhythm, S1, S2, no murmur, rub, or gallop.  Pulm: b/l wheezing improved  Abdomen: Soft, non-tender, non-distended. Normoactive bowel sounds.  Extremities: No cyanosis or edema bilaterally.   Neuro: A&O x3. No focal deficits.     Labs and Imaging:  CBC Full  -  ( 19 Oct 2018 06:28 )  WBC Count : 12.89 K/uL  Hemoglobin : 13.7 g/dL  Hematocrit : 41.2 %  Platelet Count - Automated : 228 K/uL  Mean Cell Volume : 85.5 fL  Mean Cell Hemoglobin : 28.4 pg  Mean Cell Hemoglobin Concentration : 33.3 g/dL  Auto Neutrophil # : x  Auto Lymphocyte # : x  Auto Monocyte # : x  Auto Eosinophil # : x  Auto Basophil # : x  Auto Neutrophil % : x  Auto Lymphocyte % : x  Auto Monocyte % : x  Auto Eosinophil % : x  Auto Basophil % : x    RDW: 15.0      BMP: 10-19-18 @ 06:28  140 | 97 | 18   -----------------< 123  4.9  | 28 | 0.8  eGFR(AA): 121, eGFR (non-AA): 104  Ca 9.3, Mg 2.5, P --    LFTs: 10-19-18 @ 06:28  TP  6.3  | 3.7 Alb   ---------------  TB  0.4  | --  DB   ---------------  ALT 47  | 19  AST            ^          117 ALK  LFTs: 10-18-18 @ 06:30  TP  6.3  | 3.7 Alb   ---------------  TB  0.3  | --  DB   ---------------  ALT 45  | 26  AST            ^          117 ALK  LFTs: 10-17-18 @ 13:13  TP  6.3  | 3.9 Alb   ---------------  TB  0.4  | --  DB   ---------------  ALT 24  | 15  AST            ^          112 ALK      LFTs: 10-19-18 @ 06:28  Ca  9.3  | 19 AST   -----------------  TP  6.3  | 47 ALT  -----------------  Alb 3.7  | 117 ALK          ^        0.4         TB          Radiology:     < from: Xray Chest 1 View AP/PA (10.14.18 @ 14:20) >  Impression:      No radiographic evidence of acute cardiopulmonary disease.    Unchanged.    < end of copied text >

## 2018-10-22 NOTE — DISCHARGE NOTE ADULT - HOSPITAL COURSE
52 year old male with PMH of COPD, REBECCA, morbid obesity, GERD, HLD, and HTN who presented to the hospital for SOB x 1 week, he was admitted for treatment of COPD exacerbation. While in the hospital, he completed a 7 day course of azithromycin and he was started on IV steroids which were tapered down and he will be discharged on a prednisone taper. His SOB was much improved on admission. He should follow up with his PMD on discharge. OF note, smoking cessation was discussed with patient and the importance of this contributing to symptom improvement and preventing future COPD exacerbations.     The patient was given a slightly lower pain medication regimen while in the hospital, which he was okay with. Plan to resume home medications on discharge and follow up with pain management specialist for mangement.     Home meds for hypertension, HLD, and GERD were otherwise continued during hospitalization.

## 2018-10-22 NOTE — PROGRESS NOTE ADULT - ASSESSMENT
<<<RESIDENT DISCHARGE NOTE>>>     KAYLEIGH TOMAS  MRN-[id number]    VITAL SIGNS:  T(F): 97.1 (10-22-18 @ 13:19), Max: 97.5 (10-22-18 @ 05:51)  HR: 103 (10-22-18 @ 13:19)  BP: 121/69 (10-22-18 @ 13:19)  SpO2: 99% (10-21-18 @ 19:30)      PHYSICAL EXAMINATION:  GENERAL: NAD  PSYCH: no agitation, baseline mentation  NERVOUS SYSTEM:  Alert & Oriented X3,   PULMONARY: b/l rhonchi, decreased wheezing from admissin  CARDIOVASCULAR: S1S2 RRR  GI: Soft, NT, ND; BS present.  EXTREMITIES:  2+ Peripheral Pulses, No clubbing, cyanosis, or edema  LYMPH: No lymphadenopathy noted  SKIN: No rashes or lesions      TEST RESULTS:                        14.0   11.66 )-----------( 198      ( 22 Oct 2018 06:15 )             42.6       10-22    132<L>  |  91<L>  |  22<H>  ----------------------------<  75  5.0   |  33<H>  |  0.8    Ca    8.9      22 Oct 2018 06:15  Mg     2.6     10-22    TPro  6.0  /  Alb  3.3<L>  /  TBili  0.4  /  DBili  x   /  AST  28  /  ALT  56<H>  /  AlkPhos  106  10-22      FINAL DISCHARGE INTERVIEW:  Resident(s) Present: Katerina Sanchez MD, RN Present: Sukumar    DISCHARGE MEDICATION RECONCILIATION  reviewed with Attending Dr. Wallace    DISPOSITION:   [  x] Home,    [  ] Home with Visiting Nursing Services,   [    ]  SNF/ NH,    [   ] Acute Rehab (4A),   [   ] Other (Specify:_________)                   COPD Discharge Checklist (48 hours prior to DC day):     [  x] Verify appropriate discharge disposition with team (e.g. home, home with services, hospice, SNF)    [ x ] Educate patient and/or caregiver on the COPD ACTION PLAN and provide patient with the completed form.    [ x ] Review discharge medications with RN and patient/care giver    [ x ] Emphasize smoking cessation options for smokers    [  ] Switch to nebulizers if patient has difficulty using inhalers    [  ] Verify and document outpatient follow-up within 3 days with PMD or Pulmonary specialist.    [  ] Referal to outpatient pulmonary rehab within 2 weeks for eligible patients (discuss with pulmonary specialist)

## 2018-10-22 NOTE — DISCHARGE NOTE ADULT - MEDICATION SUMMARY - MEDICATIONS TO TAKE
I will START or STAY ON the medications listed below when I get home from the hospital:    predniSONE 20 mg oral tablet  -- 3 tab(s) by mouth once a day x 2 days  2 tab(s) by mouth once a day x 2 days  1 tab(s) by mouth once a day x 2 days  -- Indication: For COPD exacerbation    oxyCODONE-acetaminophen 10 mg-325 mg oral tablet  -- 1 tab(s) by mouth 2 to 3 times a day, As Needed MDD:3 tablets  -- Caution federal law prohibits the transfer of this drug to any person other  than the person for whom it was prescribed.  May cause drowsiness.  Alcohol may intensify this effect.  Use care when operating dangerous machinery.  This prescription cannot be refilled.  This product contains acetaminophen.  Do not use  with any other product containing acetaminophen to prevent possible liver damage.  Using more of this medication than prescribed may cause serious breathing problems.    -- Indication: For pain    aspirin 81 mg oral tablet, chewable  -- Indication: For hypertension    oxyCODONE 30 mg oral tablet  -- 1 tab(s) by mouth every 4 hours, As Needed  -- Indication: For pain    morphine 100 mg/12 hours oral tablet, extended release  -- 1 tab(s) by mouth 4 times a day  -- Indication: For pain    lisinopril 5 mg oral tablet  -- 1 tab(s) by mouth once a day  -- Indication: For hypertension    TriCor 134 mg oral capsule  -- 1 cap(s) by mouth once a day  -- Indication: For hyperlipidemia    Zocor 40 mg oral tablet  -- 1 tab(s) by mouth once a day (at bedtime)  -- Indication: For hyperlipidemia    Advair  mcg-21 mcg/inh inhalation aerosol  -- 2 puff(s) inhaled 2 times a day  -- Indication: For COPD exacerbation    Xopenex HFA  -- Indication: For COPD exacerbation    Spiriva 18 mcg inhalation capsule  -- 1 cap(s) inhaled once a day  -- Indication: For COPD exacerbation    Lasix 40 mg oral tablet  -- 1 tab(s) by mouth once a day  -- Indication: For hypertension    NexIUM 40 mg oral delayed release capsule  -- 1 cap(s) by mouth once a day  -- Indication: For GERD

## 2018-10-22 NOTE — DISCHARGE NOTE ADULT - CARE PROVIDER_API CALL
Edison Bowie), [address]  [address] [place] [address]  Phone: [phone]  Fax: [phone]    Blake Bassett), Critical Care Medicine; Geriatric Medicine; Internal Medicine; Pulmonary Disease  [address] [place] [address] [address]  [place] [address]  Phone: [phone]  Fax: [phone]    Gabriel Phelan  pain management  Phone: [phone]  Fax: (   )    -

## 2018-10-24 LAB — GLUCOSE BLDC GLUCOMTR-MCNC: 108 MG/DL — HIGH (ref 70–99)

## 2018-10-29 DIAGNOSIS — J44.1 CHRONIC OBSTRUCTIVE PULMONARY DISEASE WITH (ACUTE) EXACERBATION: ICD-10-CM

## 2018-10-29 DIAGNOSIS — I10 ESSENTIAL (PRIMARY) HYPERTENSION: ICD-10-CM

## 2018-10-29 DIAGNOSIS — Z79.82 LONG TERM (CURRENT) USE OF ASPIRIN: ICD-10-CM

## 2018-10-29 DIAGNOSIS — E78.5 HYPERLIPIDEMIA, UNSPECIFIED: ICD-10-CM

## 2018-10-29 DIAGNOSIS — G47.33 OBSTRUCTIVE SLEEP APNEA (ADULT) (PEDIATRIC): ICD-10-CM

## 2018-10-29 DIAGNOSIS — K21.9 GASTRO-ESOPHAGEAL REFLUX DISEASE WITHOUT ESOPHAGITIS: ICD-10-CM

## 2018-10-29 DIAGNOSIS — E66.01 MORBID (SEVERE) OBESITY DUE TO EXCESS CALORIES: ICD-10-CM

## 2018-10-29 DIAGNOSIS — M25.562 PAIN IN LEFT KNEE: ICD-10-CM

## 2018-10-29 DIAGNOSIS — G89.29 OTHER CHRONIC PAIN: ICD-10-CM

## 2018-10-29 DIAGNOSIS — E87.1 HYPO-OSMOLALITY AND HYPONATREMIA: ICD-10-CM

## 2018-10-29 DIAGNOSIS — F17.200 NICOTINE DEPENDENCE, UNSPECIFIED, UNCOMPLICATED: ICD-10-CM

## 2018-10-29 DIAGNOSIS — A08.4 VIRAL INTESTINAL INFECTION, UNSPECIFIED: ICD-10-CM

## 2018-10-29 DIAGNOSIS — Z96.651 PRESENCE OF RIGHT ARTIFICIAL KNEE JOINT: ICD-10-CM

## 2018-11-04 ENCOUNTER — EMERGENCY (EMERGENCY)
Facility: HOSPITAL | Age: 53
LOS: 0 days | Discharge: HOME | End: 2018-11-04
Attending: EMERGENCY MEDICINE | Admitting: RADIOLOGY

## 2018-11-04 VITALS
SYSTOLIC BLOOD PRESSURE: 138 MMHG | RESPIRATION RATE: 22 BRPM | OXYGEN SATURATION: 100 % | HEART RATE: 84 BPM | DIASTOLIC BLOOD PRESSURE: 109 MMHG | TEMPERATURE: 98 F

## 2018-11-04 VITALS
HEART RATE: 76 BPM | SYSTOLIC BLOOD PRESSURE: 161 MMHG | OXYGEN SATURATION: 100 % | DIASTOLIC BLOOD PRESSURE: 87 MMHG | RESPIRATION RATE: 22 BRPM

## 2018-11-04 DIAGNOSIS — F17.200 NICOTINE DEPENDENCE, UNSPECIFIED, UNCOMPLICATED: ICD-10-CM

## 2018-11-04 DIAGNOSIS — Z90.49 ACQUIRED ABSENCE OF OTHER SPECIFIED PARTS OF DIGESTIVE TRACT: ICD-10-CM

## 2018-11-04 DIAGNOSIS — Z98.890 OTHER SPECIFIED POSTPROCEDURAL STATES: Chronic | ICD-10-CM

## 2018-11-04 DIAGNOSIS — Z79.82 LONG TERM (CURRENT) USE OF ASPIRIN: ICD-10-CM

## 2018-11-04 DIAGNOSIS — Z79.899 OTHER LONG TERM (CURRENT) DRUG THERAPY: ICD-10-CM

## 2018-11-04 DIAGNOSIS — R10.9 UNSPECIFIED ABDOMINAL PAIN: ICD-10-CM

## 2018-11-04 DIAGNOSIS — R10.30 LOWER ABDOMINAL PAIN, UNSPECIFIED: ICD-10-CM

## 2018-11-04 DIAGNOSIS — Z96.651 PRESENCE OF RIGHT ARTIFICIAL KNEE JOINT: Chronic | ICD-10-CM

## 2018-11-04 DIAGNOSIS — Z79.52 LONG TERM (CURRENT) USE OF SYSTEMIC STEROIDS: ICD-10-CM

## 2018-11-04 DIAGNOSIS — J44.9 CHRONIC OBSTRUCTIVE PULMONARY DISEASE, UNSPECIFIED: ICD-10-CM

## 2018-11-04 DIAGNOSIS — R10.10 UPPER ABDOMINAL PAIN, UNSPECIFIED: ICD-10-CM

## 2018-11-04 DIAGNOSIS — Z79.891 LONG TERM (CURRENT) USE OF OPIATE ANALGESIC: ICD-10-CM

## 2018-11-04 LAB
ALBUMIN SERPL ELPH-MCNC: 3.8 G/DL — SIGNIFICANT CHANGE UP (ref 3.5–5.2)
ALP SERPL-CCNC: 108 U/L — SIGNIFICANT CHANGE UP (ref 30–115)
ALT FLD-CCNC: 17 U/L — SIGNIFICANT CHANGE UP (ref 0–41)
ANION GAP SERPL CALC-SCNC: 14 MMOL/L — SIGNIFICANT CHANGE UP (ref 7–14)
AST SERPL-CCNC: 13 U/L — SIGNIFICANT CHANGE UP (ref 0–41)
BASOPHILS # BLD AUTO: 0.04 K/UL — SIGNIFICANT CHANGE UP (ref 0–0.2)
BASOPHILS NFR BLD AUTO: 0.4 % — SIGNIFICANT CHANGE UP (ref 0–1)
BILIRUB DIRECT SERPL-MCNC: <0.2 MG/DL — SIGNIFICANT CHANGE UP (ref 0–0.2)
BILIRUB INDIRECT FLD-MCNC: >0.3 MG/DL — SIGNIFICANT CHANGE UP (ref 0.2–1.2)
BILIRUB SERPL-MCNC: 0.5 MG/DL — SIGNIFICANT CHANGE UP (ref 0.2–1.2)
BUN SERPL-MCNC: 9 MG/DL — LOW (ref 10–20)
CALCIUM SERPL-MCNC: 9 MG/DL — SIGNIFICANT CHANGE UP (ref 8.5–10.1)
CHLORIDE SERPL-SCNC: 100 MMOL/L — SIGNIFICANT CHANGE UP (ref 98–110)
CO2 SERPL-SCNC: 25 MMOL/L — SIGNIFICANT CHANGE UP (ref 17–32)
CREAT SERPL-MCNC: 0.7 MG/DL — SIGNIFICANT CHANGE UP (ref 0.7–1.5)
EOSINOPHIL # BLD AUTO: 0.02 K/UL — SIGNIFICANT CHANGE UP (ref 0–0.7)
EOSINOPHIL NFR BLD AUTO: 0.2 % — SIGNIFICANT CHANGE UP (ref 0–8)
GLUCOSE SERPL-MCNC: 131 MG/DL — HIGH (ref 70–99)
HCT VFR BLD CALC: 40.9 % — LOW (ref 42–52)
HGB BLD-MCNC: 14.2 G/DL — SIGNIFICANT CHANGE UP (ref 14–18)
IMM GRANULOCYTES NFR BLD AUTO: 0.2 % — SIGNIFICANT CHANGE UP (ref 0.1–0.3)
LACTATE SERPL-SCNC: 2 MMOL/L — SIGNIFICANT CHANGE UP (ref 0.5–2.2)
LIDOCAIN IGE QN: 25 U/L — SIGNIFICANT CHANGE UP (ref 7–60)
LYMPHOCYTES # BLD AUTO: 1.29 K/UL — SIGNIFICANT CHANGE UP (ref 1.2–3.4)
LYMPHOCYTES # BLD AUTO: 11.9 % — LOW (ref 20.5–51.1)
MAGNESIUM SERPL-MCNC: 1.9 MG/DL — SIGNIFICANT CHANGE UP (ref 1.8–2.4)
MCHC RBC-ENTMCNC: 29.2 PG — SIGNIFICANT CHANGE UP (ref 27–31)
MCHC RBC-ENTMCNC: 34.7 G/DL — SIGNIFICANT CHANGE UP (ref 32–37)
MCV RBC AUTO: 84.2 FL — SIGNIFICANT CHANGE UP (ref 80–94)
MONOCYTES # BLD AUTO: 0.43 K/UL — SIGNIFICANT CHANGE UP (ref 0.1–0.6)
MONOCYTES NFR BLD AUTO: 4 % — SIGNIFICANT CHANGE UP (ref 1.7–9.3)
NEUTROPHILS # BLD AUTO: 9.07 K/UL — HIGH (ref 1.4–6.5)
NEUTROPHILS NFR BLD AUTO: 83.3 % — HIGH (ref 42.2–75.2)
NRBC # BLD: 0 /100 WBCS — SIGNIFICANT CHANGE UP (ref 0–0)
PLATELET # BLD AUTO: 224 K/UL — SIGNIFICANT CHANGE UP (ref 130–400)
POTASSIUM SERPL-MCNC: 3.9 MMOL/L — SIGNIFICANT CHANGE UP (ref 3.5–5)
POTASSIUM SERPL-SCNC: 3.9 MMOL/L — SIGNIFICANT CHANGE UP (ref 3.5–5)
PROT SERPL-MCNC: 6.7 G/DL — SIGNIFICANT CHANGE UP (ref 6–8)
RBC # BLD: 4.86 M/UL — SIGNIFICANT CHANGE UP (ref 4.7–6.1)
RBC # FLD: 14.6 % — HIGH (ref 11.5–14.5)
SODIUM SERPL-SCNC: 139 MMOL/L — SIGNIFICANT CHANGE UP (ref 135–146)
WBC # BLD: 10.87 K/UL — HIGH (ref 4.8–10.8)
WBC # FLD AUTO: 10.87 K/UL — HIGH (ref 4.8–10.8)

## 2018-11-04 RX ORDER — MORPHINE SULFATE 50 MG/1
100 CAPSULE, EXTENDED RELEASE ORAL ONCE
Qty: 0 | Refills: 0 | Status: DISCONTINUED | OUTPATIENT
Start: 2018-11-04 | End: 2018-11-04

## 2018-11-04 RX ORDER — OXYCODONE AND ACETAMINOPHEN 5; 325 MG/1; MG/1
1 TABLET ORAL ONCE
Qty: 0 | Refills: 0 | Status: DISCONTINUED | OUTPATIENT
Start: 2018-11-04 | End: 2018-11-04

## 2018-11-04 RX ORDER — ONDANSETRON 8 MG/1
4 TABLET, FILM COATED ORAL ONCE
Qty: 0 | Refills: 0 | Status: COMPLETED | OUTPATIENT
Start: 2018-11-04 | End: 2018-11-04

## 2018-11-04 RX ORDER — ONDANSETRON 8 MG/1
1 TABLET, FILM COATED ORAL
Qty: 24 | Refills: 0 | OUTPATIENT
Start: 2018-11-04 | End: 2018-11-09

## 2018-11-04 RX ORDER — HALOPERIDOL DECANOATE 100 MG/ML
5 INJECTION INTRAMUSCULAR ONCE
Qty: 0 | Refills: 0 | Status: DISCONTINUED | OUTPATIENT
Start: 2018-11-04 | End: 2018-11-04

## 2018-11-04 RX ORDER — OXYCODONE HYDROCHLORIDE 5 MG/1
30 TABLET ORAL ONCE
Qty: 0 | Refills: 0 | Status: DISCONTINUED | OUTPATIENT
Start: 2018-11-04 | End: 2018-11-04

## 2018-11-04 RX ORDER — SODIUM CHLORIDE 9 MG/ML
1000 INJECTION, SOLUTION INTRAVENOUS ONCE
Qty: 0 | Refills: 0 | Status: COMPLETED | OUTPATIENT
Start: 2018-11-04 | End: 2018-11-04

## 2018-11-04 RX ADMIN — ONDANSETRON 4 MILLIGRAM(S): 8 TABLET, FILM COATED ORAL at 21:41

## 2018-11-04 RX ADMIN — SODIUM CHLORIDE 1000 MILLILITER(S): 9 INJECTION, SOLUTION INTRAVENOUS at 21:42

## 2018-11-04 RX ADMIN — OXYCODONE HYDROCHLORIDE 30 MILLIGRAM(S): 5 TABLET ORAL at 22:14

## 2018-11-04 NOTE — ED ADULT NURSE NOTE - CHIEF COMPLAINT QUOTE
Patient BIBA from home with complaints of lower abdominal pain and vomiting for 4 hours. Patient denies any fever or diarrhea.

## 2018-11-04 NOTE — ED PROVIDER NOTE - MEDICAL DECISION MAKING DETAILS
evaluated for abdominal pain and vomiting, his pain is chronic, he was able tot olerate po afte isis dose of zofran, then he was persistently requiesting iv narcotics, I discussed that no iv narcotics will be given , I gave him dose of his home narcotic.

## 2018-11-04 NOTE — ED PROVIDER NOTE - ATTENDING CONTRIBUTION TO CARE
hx copd, chronic abd pain on narcotics who presents with vomiting multiple epsides sicne this after noon without any preceding events with lower abd cramping, no fevers, no diarrhea and no blood in stool or vomit. He demands narcotics to cure his pain. He has soft abd but minimal if any tenderness in his abd however he is obsese. I discussed that IV narcotics will not be used to treat his pain given his prior history. I offered toradol or haldol intially to help treat his sytmpoms but he refuses it. we will obtain ct scan and labs, if vomiting is controlled will offer his home meds of pain med. hx copd, chronic abd pain on narcotics who presents with vomiting multiple epsides sicne this after noon without any preceding events with lower abd cramping, no fevers, no diarrhea and no blood in stool or vomit. He demands narcotics to cure his pain. He has soft abd but minimal if any tenderness in his abd however he is obsese. I discussed that IV narcotics will not be used to treat his pain given his prior history. I offered toradol or haldol intially to help treat his sytmpoms but he refuses it. we will obtain ct scan if labs are abn given multiple prior nml ct scans, if vomiting is controlled will offer his home meds of pain med.

## 2018-11-04 NOTE — ED PROVIDER NOTE - NS ED ROS FT
Constitutional: (-) fever  Eyes/ENT: (-) blurry vision, (-) epistaxis  Cardiovascular: (-) chest pain, (-) syncope  Respiratory: (-) cough, (-) shortness of breath  Gastrointestinal: (+) vomiting, (-) diarrhea  Musculoskeletal: (-) neck pain, (-) back pain, (-) joint pain  Integumentary: (-) rash, (-) edema  Neurological: (-) headache, (-) altered mental status  Psychiatric: (-) hallucinations  Allergic/Immunologic: (-) pruritus

## 2018-11-04 NOTE — ED PROVIDER NOTE - PROGRESS NOTE DETAILS
tolerating po, repeat abd exam is non tender, he was able to keep hi pain medication down, he is asking for morphine now, I told him he can take his medication at home.

## 2018-11-04 NOTE — ED PROVIDER NOTE - PHYSICAL EXAMINATION
VITAL SIGNS: I have reviewed nursing notes and confirm.  CONSTITUTIONAL: Well-developed; +obese. I was talking to pt in the same room as him--pt was calm; then when I went to talk to him he started complaining of pain.  SKIN: skin exam is warm and dry, no acute rash.   HEAD: Normocephalic; atraumatic.  EYES:  EOM intact; conjunctiva and sclera clear.  ENT: No nasal discharge; airway clear. moist oral mucosa;  NECK: Supple; non tender.  CARD: S1, S2 normal; no murmurs, gallops, or rubs. Regular rate and rhythm. posterior tibial and radial pulses 2+  RESP: No wheezes, rales or rhonchi. cta b/l. no use of accessory muscles. no retractions  ABD: Normal bowel sounds; soft; non-distended;; no rebound. negative psoas, rovsign's and murphys. mild tenderness with push b;/l pelvic  : no testicular swelling or tenderness.  EXT: Normal ROM. No  cyanosis or edema.  BACK: No cva tenderness  LYMPH: No acute cervical adenopathy.  NEURO: Alert, oriented, grossly unremarkable.    PSYCH: Cooperative, appropriate.

## 2018-11-04 NOTE — ED ADULT NURSE NOTE - NSIMPLEMENTINTERV_GEN_ALL_ED
Implemented All Universal Safety Interventions:  [place] to call system. Call bell, personal items and telephone within reach. Instruct patient to call for assistance. Room bathroom lighting operational. Non-slip footwear when patient is off stretcher. Physically safe environment: no spills, clutter or unnecessary equipment. Stretcher in lowest position, wheels locked, appropriate side rails in place.

## 2018-11-04 NOTE — ED PROVIDER NOTE - NSFOLLOWUPINSTRUCTIONS_ED_ALL_ED_FT
Nausea / Vomiting    Nausea is the feeling that you have to vomit. As nausea gets worse, it can lead to vomiting. Vomiting puts you at an increased risk for dehydration. Older adults and people with other diseases or a weak immune system are at higher risk for dehydration. Drink clear fluids in small but frequent amounts as tolerated. Eat bland, easy-to-digest foods in small amounts as tolerated.    SEEK IMMEDIATE MEDICAL CARE IF YOU HAVE ANY OF THE FOLLOWING SYMPTOMS: fever, inability to keep sufficient fluids down, black or bloody vomitus, black or bloody stools, lightheadedness/dizziness, chest pain, severe headache, rash, shortness of breath, cold or clammy skin, confusion, pain with urination, or any signs of dehydration.

## 2018-11-04 NOTE — ED PROVIDER NOTE - OBJECTIVE STATEMENT
Called labs.  Per TGF - LDL cholesterol acceptable at 110 - prior severe reaction to atorvastatin.  Chemistry panel is acceptable.  Walk daily as tolerated - re-build strength and fitness.  Consider Zetia for improved cholesterol control. Pt verb understanding.   53y/o M hx copd (not o2 dependant, pul- sassa), chronic abd pain with previous visits AMAing for not getting narcotics who presents with vomiting multiple epsides since this after noon without any preceding events with lower abd cramping, no fevers, no diarrhea and no blood in stool or vomit. Pt asking for pain meds.  s/p cholecystectomy and appendectomy. pt with bowel movement this morning  PCA saw pt gagging himself in order to vomit.  denies testicular swelling or pain.

## 2018-11-05 PROBLEM — E66.01 MORBID (SEVERE) OBESITY DUE TO EXCESS CALORIES: Chronic | Status: ACTIVE | Noted: 2018-10-14

## 2018-11-07 ENCOUNTER — EMERGENCY (EMERGENCY)
Facility: HOSPITAL | Age: 53
LOS: 0 days | Discharge: HOME | End: 2018-11-07
Attending: EMERGENCY MEDICINE | Admitting: EMERGENCY MEDICINE

## 2018-11-07 VITALS
TEMPERATURE: 97 F | HEART RATE: 80 BPM | RESPIRATION RATE: 20 BRPM | OXYGEN SATURATION: 97 % | DIASTOLIC BLOOD PRESSURE: 78 MMHG | SYSTOLIC BLOOD PRESSURE: 130 MMHG

## 2018-11-07 VITALS
HEIGHT: 70 IN | SYSTOLIC BLOOD PRESSURE: 133 MMHG | TEMPERATURE: 98 F | DIASTOLIC BLOOD PRESSURE: 82 MMHG | WEIGHT: 315 LBS | OXYGEN SATURATION: 96 % | RESPIRATION RATE: 18 BRPM | HEART RATE: 78 BPM

## 2018-11-07 DIAGNOSIS — F11.23 OPIOID DEPENDENCE WITH WITHDRAWAL: ICD-10-CM

## 2018-11-07 DIAGNOSIS — J44.9 CHRONIC OBSTRUCTIVE PULMONARY DISEASE, UNSPECIFIED: ICD-10-CM

## 2018-11-07 DIAGNOSIS — K21.9 GASTRO-ESOPHAGEAL REFLUX DISEASE WITHOUT ESOPHAGITIS: ICD-10-CM

## 2018-11-07 DIAGNOSIS — Z98.890 OTHER SPECIFIED POSTPROCEDURAL STATES: Chronic | ICD-10-CM

## 2018-11-07 DIAGNOSIS — Z79.82 LONG TERM (CURRENT) USE OF ASPIRIN: ICD-10-CM

## 2018-11-07 DIAGNOSIS — Z90.49 ACQUIRED ABSENCE OF OTHER SPECIFIED PARTS OF DIGESTIVE TRACT: ICD-10-CM

## 2018-11-07 DIAGNOSIS — R10.32 LEFT LOWER QUADRANT PAIN: ICD-10-CM

## 2018-11-07 DIAGNOSIS — R10.13 EPIGASTRIC PAIN: ICD-10-CM

## 2018-11-07 DIAGNOSIS — Z96.651 PRESENCE OF RIGHT ARTIFICIAL KNEE JOINT: Chronic | ICD-10-CM

## 2018-11-07 DIAGNOSIS — I10 ESSENTIAL (PRIMARY) HYPERTENSION: ICD-10-CM

## 2018-11-07 DIAGNOSIS — R11.10 VOMITING, UNSPECIFIED: ICD-10-CM

## 2018-11-07 DIAGNOSIS — R10.9 UNSPECIFIED ABDOMINAL PAIN: ICD-10-CM

## 2018-11-07 DIAGNOSIS — Z79.51 LONG TERM (CURRENT) USE OF INHALED STEROIDS: ICD-10-CM

## 2018-11-07 DIAGNOSIS — Z79.52 LONG TERM (CURRENT) USE OF SYSTEMIC STEROIDS: ICD-10-CM

## 2018-11-07 DIAGNOSIS — Z96.651 PRESENCE OF RIGHT ARTIFICIAL KNEE JOINT: ICD-10-CM

## 2018-11-07 LAB
ALBUMIN SERPL ELPH-MCNC: 4.1 G/DL — SIGNIFICANT CHANGE UP (ref 3.5–5.2)
ALLERGY+IMMUNOLOGY DIAG STUDY NOTE: SIGNIFICANT CHANGE UP
ALP SERPL-CCNC: 103 U/L — SIGNIFICANT CHANGE UP (ref 30–115)
ALT FLD-CCNC: 17 U/L — SIGNIFICANT CHANGE UP (ref 0–41)
ANION GAP SERPL CALC-SCNC: 14 MMOL/L — SIGNIFICANT CHANGE UP (ref 7–14)
APTT BLD: 27.2 SEC — SIGNIFICANT CHANGE UP (ref 27–39.2)
AST SERPL-CCNC: 14 U/L — SIGNIFICANT CHANGE UP (ref 0–41)
BASOPHILS # BLD AUTO: 0.06 K/UL — SIGNIFICANT CHANGE UP (ref 0–0.2)
BASOPHILS NFR BLD AUTO: 0.7 % — SIGNIFICANT CHANGE UP (ref 0–1)
BILIRUB DIRECT SERPL-MCNC: 0.4 MG/DL — HIGH (ref 0–0.2)
BILIRUB INDIRECT FLD-MCNC: 0.7 MG/DL — SIGNIFICANT CHANGE UP (ref 0.2–1.2)
BILIRUB SERPL-MCNC: 1.1 MG/DL — SIGNIFICANT CHANGE UP (ref 0.2–1.2)
BUN SERPL-MCNC: 10 MG/DL — SIGNIFICANT CHANGE UP (ref 10–20)
CALCIUM SERPL-MCNC: 9.1 MG/DL — SIGNIFICANT CHANGE UP (ref 8.5–10.1)
CHLORIDE SERPL-SCNC: 100 MMOL/L — SIGNIFICANT CHANGE UP (ref 98–110)
CO2 SERPL-SCNC: 27 MMOL/L — SIGNIFICANT CHANGE UP (ref 17–32)
CREAT SERPL-MCNC: 0.7 MG/DL — SIGNIFICANT CHANGE UP (ref 0.7–1.5)
EOSINOPHIL # BLD AUTO: 0.01 K/UL — SIGNIFICANT CHANGE UP (ref 0–0.7)
EOSINOPHIL NFR BLD AUTO: 0.1 % — SIGNIFICANT CHANGE UP (ref 0–8)
GLUCOSE SERPL-MCNC: 109 MG/DL — HIGH (ref 70–99)
HCT VFR BLD CALC: 43.6 % — SIGNIFICANT CHANGE UP (ref 42–52)
HGB BLD-MCNC: 14.9 G/DL — SIGNIFICANT CHANGE UP (ref 14–18)
IMM GRANULOCYTES NFR BLD AUTO: 0.2 % — SIGNIFICANT CHANGE UP (ref 0.1–0.3)
INR BLD: 1.18 RATIO — SIGNIFICANT CHANGE UP (ref 0.65–1.3)
LACTATE SERPL-SCNC: 1.4 MMOL/L — SIGNIFICANT CHANGE UP (ref 0.5–2.2)
LIDOCAIN IGE QN: 18 U/L — SIGNIFICANT CHANGE UP (ref 7–60)
LYMPHOCYTES # BLD AUTO: 1.3 K/UL — SIGNIFICANT CHANGE UP (ref 1.2–3.4)
LYMPHOCYTES # BLD AUTO: 15.2 % — LOW (ref 20.5–51.1)
MCHC RBC-ENTMCNC: 28.9 PG — SIGNIFICANT CHANGE UP (ref 27–31)
MCHC RBC-ENTMCNC: 34.2 G/DL — SIGNIFICANT CHANGE UP (ref 32–37)
MCV RBC AUTO: 84.7 FL — SIGNIFICANT CHANGE UP (ref 80–94)
MONOCYTES # BLD AUTO: 0.52 K/UL — SIGNIFICANT CHANGE UP (ref 0.1–0.6)
MONOCYTES NFR BLD AUTO: 6.1 % — SIGNIFICANT CHANGE UP (ref 1.7–9.3)
NEUTROPHILS # BLD AUTO: 6.67 K/UL — HIGH (ref 1.4–6.5)
NEUTROPHILS NFR BLD AUTO: 77.7 % — HIGH (ref 42.2–75.2)
NRBC # BLD: 0 /100 WBCS — SIGNIFICANT CHANGE UP (ref 0–0)
PLATELET # BLD AUTO: 247 K/UL — SIGNIFICANT CHANGE UP (ref 130–400)
POTASSIUM SERPL-MCNC: 3.3 MMOL/L — LOW (ref 3.5–5)
POTASSIUM SERPL-SCNC: 3.3 MMOL/L — LOW (ref 3.5–5)
PROT SERPL-MCNC: 6.5 G/DL — SIGNIFICANT CHANGE UP (ref 6–8)
PROTHROM AB SERPL-ACNC: 12.8 SEC — SIGNIFICANT CHANGE UP (ref 9.95–12.87)
RBC # BLD: 5.15 M/UL — SIGNIFICANT CHANGE UP (ref 4.7–6.1)
RBC # FLD: 14.2 % — SIGNIFICANT CHANGE UP (ref 11.5–14.5)
SODIUM SERPL-SCNC: 141 MMOL/L — SIGNIFICANT CHANGE UP (ref 135–146)
TROPONIN T SERPL-MCNC: <0.01 NG/ML — SIGNIFICANT CHANGE UP
TYPE + AB SCN PNL BLD: SIGNIFICANT CHANGE UP
WBC # BLD: 8.58 K/UL — SIGNIFICANT CHANGE UP (ref 4.8–10.8)
WBC # FLD AUTO: 8.58 K/UL — SIGNIFICANT CHANGE UP (ref 4.8–10.8)

## 2018-11-07 RX ORDER — DIPHENHYDRAMINE HCL 50 MG
50 CAPSULE ORAL ONCE
Qty: 0 | Refills: 0 | Status: COMPLETED | OUTPATIENT
Start: 2018-11-07 | End: 2018-11-07

## 2018-11-07 RX ORDER — SODIUM CHLORIDE 9 MG/ML
1000 INJECTION INTRAMUSCULAR; INTRAVENOUS; SUBCUTANEOUS
Qty: 0 | Refills: 0 | Status: DISCONTINUED | OUTPATIENT
Start: 2018-11-07 | End: 2018-11-07

## 2018-11-07 RX ORDER — SODIUM CHLORIDE 9 MG/ML
1000 INJECTION INTRAMUSCULAR; INTRAVENOUS; SUBCUTANEOUS ONCE
Qty: 0 | Refills: 0 | Status: COMPLETED | OUTPATIENT
Start: 2018-11-07 | End: 2018-11-07

## 2018-11-07 RX ORDER — PANTOPRAZOLE SODIUM 20 MG/1
40 TABLET, DELAYED RELEASE ORAL ONCE
Qty: 0 | Refills: 0 | Status: COMPLETED | OUTPATIENT
Start: 2018-11-07 | End: 2018-11-07

## 2018-11-07 RX ORDER — LIDOCAINE 4 G/100G
10 CREAM TOPICAL ONCE
Qty: 0 | Refills: 0 | Status: COMPLETED | OUTPATIENT
Start: 2018-11-07 | End: 2018-11-07

## 2018-11-07 RX ORDER — KETOROLAC TROMETHAMINE 30 MG/ML
30 SYRINGE (ML) INJECTION ONCE
Qty: 0 | Refills: 0 | Status: DISCONTINUED | OUTPATIENT
Start: 2018-11-07 | End: 2018-11-07

## 2018-11-07 RX ORDER — ONDANSETRON 8 MG/1
4 TABLET, FILM COATED ORAL ONCE
Qty: 0 | Refills: 0 | Status: COMPLETED | OUTPATIENT
Start: 2018-11-07 | End: 2018-11-07

## 2018-11-07 RX ORDER — SODIUM CHLORIDE 9 MG/ML
3 INJECTION INTRAMUSCULAR; INTRAVENOUS; SUBCUTANEOUS ONCE
Qty: 0 | Refills: 0 | Status: COMPLETED | OUTPATIENT
Start: 2018-11-07 | End: 2018-11-07

## 2018-11-07 RX ADMIN — Medication 30 MILLILITER(S): at 02:15

## 2018-11-07 RX ADMIN — Medication 0.1 MILLIGRAM(S): at 04:05

## 2018-11-07 RX ADMIN — Medication 30 MILLIGRAM(S): at 05:42

## 2018-11-07 RX ADMIN — SODIUM CHLORIDE 3 MILLILITER(S): 9 INJECTION INTRAMUSCULAR; INTRAVENOUS; SUBCUTANEOUS at 02:16

## 2018-11-07 RX ADMIN — SODIUM CHLORIDE 1000 MILLILITER(S): 9 INJECTION INTRAMUSCULAR; INTRAVENOUS; SUBCUTANEOUS at 02:17

## 2018-11-07 RX ADMIN — SODIUM CHLORIDE 125 MILLILITER(S): 9 INJECTION INTRAMUSCULAR; INTRAVENOUS; SUBCUTANEOUS at 02:17

## 2018-11-07 RX ADMIN — PANTOPRAZOLE SODIUM 40 MILLIGRAM(S): 20 TABLET, DELAYED RELEASE ORAL at 02:16

## 2018-11-07 RX ADMIN — Medication 50 MILLIGRAM(S): at 04:05

## 2018-11-07 RX ADMIN — LIDOCAINE 10 MILLILITER(S): 4 CREAM TOPICAL at 02:23

## 2018-11-07 RX ADMIN — ONDANSETRON 4 MILLIGRAM(S): 8 TABLET, FILM COATED ORAL at 02:15

## 2018-11-07 NOTE — ED ADULT NURSE NOTE - NSIMPLEMENTINTERV_GEN_ALL_ED
Implemented All Universal Safety Interventions:  [place] to call system. Call bell, personal items and telephone within reach. Instruct patient to call for assistance. Room bathroom lighting operational. Non-slip footwear when patient is off stretcher. Physically safe environment: no spills, clutter or unnecessary equipment. Stretcher in lowest position, wheels locked, appropriate side rails in place.

## 2018-11-07 NOTE — ED PROVIDER NOTE - PROGRESS NOTE DETAILS
While putting in patient IV in. Patient explains that he is going threw withdrawl because he is out of his morphine. patient states that he is taking back the vomiting blood and states that he is just going threw withdrawl. patient states if he gets IV dilaudid and morphine he will go home and not come back. Pt once again comes up to MD and PA asking for one dose of IV Dilaudid and he will leave and be out of our hair. patient is ambulating to and from bathroom. patient has not vomited once in ER and keeps sticking fingers down his throat and is being told to stop. Pt offered Clonidine to help with withdrawal. Pt laying down stretcher, still putting fingers down his throat in order to induce vomiting. Pt now requesting his home Morphine and Oxycontin stating if he gets his dose he will just leave. d/w patient all labs and ct results. Pt demanding and begging for IV Dilaudid and morphine states he will leave and never come back if we him medication. Pt states has appointment with pain management tomorrow at 4pm. d/w patient importance of not running out of his medication and going to doctor earlier. Pt offered Ketoralac and Clonadine again to help treat withdrawl symptoms. patient refusing. Will d/c home to follow up with PMD and Pain management and detox. Pt has been ambulating in and out of ER to smoke ciggerates and to and from ice machine to get water and ice and is no longer vomiting. While putting in patient IV in. Patient explains that he is going through withdrawal because he is out of his morphine. patient states that he is taking back the vomiting blood and states that he is just going through withdrawl. patient states if he gets IV dilaudid and morphine he will go home and not come back. Pt once again comes up to MD and PA asking for one dose of IV Dilaudid and he will leave and" be out of your hair." patient is ambulating to and from bathroom. patient has not vomited once in ER and keeps sticking fingers down his throat and is being told to stop. Pt offered Clonidine to help with withdrawal. d/w patient all labs and ct results. Pt demanding and begging for IV Dilaudid and morphine states he will leave and never come back if we give him medication. Pt states has appointment with pain management tomorrow at 4pm. d/w patient importance of not running out of his medication and going to doctor earlier. Pt offered Ketoralac and Clonidine again to help treat withdrawal symptoms as well as alternate medications for pain that are non narcotic . patient refusing. Will d/c home to follow up with PMD and Pain management and detox. Pt has been ambulating in and out of ER to smoke cigarettes and to and from ice machine to get water and ice and is no longer vomiting.

## 2018-11-07 NOTE — ED ADULT NURSE NOTE - PMH
COPD (chronic obstructive pulmonary disease)    GERD (gastroesophageal reflux disease)    High blood cholesterol    High cholesterol    HTN (hypertension)    Morbid obesity    Obstructive sleep apnea

## 2018-11-07 NOTE — ED PROVIDER NOTE - MEDICAL DECISION MAKING DETAILS
pt p/w  vomiting and abdominal pain -  abd  soft ttp epigatrum llq  mp rebound guarding or rigidity -   pt on oxycodone at home - says he hasn't taken in 3 days 2/2 vomiting. no fever  - no back marycruz no syncope ,  pt  self inducing  vomiting,   - will get CT  labs

## 2018-11-07 NOTE — ED PROVIDER NOTE - OBJECTIVE STATEMENT
52 year old male brought in by ambulance stating that he has been vomiting for the last few days and states its getting worse. patient seen in [facility] yesterday and had labs and told everything was fine and states he left hospital because he was unhappy with care. patient denies chest pain, shortness of breath, back pain. Pt states he needs IV dilaudid and morphine as he believes his colitis is acting up.

## 2018-11-07 NOTE — ED PROVIDER NOTE - PHYSICAL EXAMINATION
Physical Exam    Vital Signs: I have reviewed the initial vital signs.  Constitutional: well-nourished, appears stated age, no acute distress + patient sticking fingers down his throat as approaching bedside.  Eyes: Conjunctiva pink, Sclera clear, PERRLA, EOMI.  Cardiovascular: S1 and S2, regular rate, regular rhythm, well-perfused extremities, radial pulses equal and 2+  Respiratory: unlabored respiratory effort, clear to auscultation bilaterally no wheezing, rales and rhonchi  Gastrointestinal: soft, +Mild LLQ and epigastric tenderness, no pulsatile mass, normal bowl sounds  Musculoskeletal: supple neck, no lower extremity edema, no midline tenderness  Integumentary: warm, dry, no rash  Neurologic: awake, alert, cranial nerves II-XII grossly intact, extremities’ motor and sensory functions grossly intact  Psychiatric: appropriate mood, appropriate affect

## 2018-11-07 NOTE — ED PROVIDER NOTE - NSFOLLOWUPCLINICS_GEN_ALL_ED_FT
[facility]  Detox Mgmt  [address] [place] [address]  Phone: [phone]  Fax:   Follow Up Time:

## 2018-12-01 ENCOUNTER — EMERGENCY (EMERGENCY)
Facility: HOSPITAL | Age: 53
LOS: 0 days | Discharge: HOME | End: 2018-12-01
Admitting: PHYSICIAN ASSISTANT
Payer: MEDICAID

## 2018-12-01 VITALS
SYSTOLIC BLOOD PRESSURE: 159 MMHG | RESPIRATION RATE: 20 BRPM | DIASTOLIC BLOOD PRESSURE: 96 MMHG | OXYGEN SATURATION: 99 % | HEART RATE: 98 BPM | TEMPERATURE: 96 F

## 2018-12-01 DIAGNOSIS — J44.9 CHRONIC OBSTRUCTIVE PULMONARY DISEASE, UNSPECIFIED: ICD-10-CM

## 2018-12-01 DIAGNOSIS — Z79.2 LONG TERM (CURRENT) USE OF ANTIBIOTICS: ICD-10-CM

## 2018-12-01 DIAGNOSIS — F17.200 NICOTINE DEPENDENCE, UNSPECIFIED, UNCOMPLICATED: ICD-10-CM

## 2018-12-01 DIAGNOSIS — Z79.899 OTHER LONG TERM (CURRENT) DRUG THERAPY: ICD-10-CM

## 2018-12-01 DIAGNOSIS — M79.89 OTHER SPECIFIED SOFT TISSUE DISORDERS: ICD-10-CM

## 2018-12-01 DIAGNOSIS — M79.622 PAIN IN LEFT UPPER ARM: ICD-10-CM

## 2018-12-01 DIAGNOSIS — Z79.891 LONG TERM (CURRENT) USE OF OPIATE ANALGESIC: ICD-10-CM

## 2018-12-01 DIAGNOSIS — I10 ESSENTIAL (PRIMARY) HYPERTENSION: ICD-10-CM

## 2018-12-01 DIAGNOSIS — Z79.52 LONG TERM (CURRENT) USE OF SYSTEMIC STEROIDS: ICD-10-CM

## 2018-12-01 DIAGNOSIS — Z98.890 OTHER SPECIFIED POSTPROCEDURAL STATES: Chronic | ICD-10-CM

## 2018-12-01 DIAGNOSIS — Z79.82 LONG TERM (CURRENT) USE OF ASPIRIN: ICD-10-CM

## 2018-12-01 DIAGNOSIS — Z96.651 PRESENCE OF RIGHT ARTIFICIAL KNEE JOINT: Chronic | ICD-10-CM

## 2018-12-01 DIAGNOSIS — M79.603 PAIN IN ARM, UNSPECIFIED: ICD-10-CM

## 2018-12-01 DIAGNOSIS — Z79.51 LONG TERM (CURRENT) USE OF INHALED STEROIDS: ICD-10-CM

## 2018-12-01 PROCEDURE — 93971 EXTREMITY STUDY: CPT | Mod: 26

## 2018-12-01 NOTE — ED PROVIDER NOTE - OBJECTIVE STATEMENT
51 yo M with pmhx of COPD, emphysema, HTN presenting with worsening pain and swelling to left upper extremity over 1 month. Patient states he had similar problem a few months ago. Denies any fever, chills, warmth or redness. No paresthesias or weakness. No cp, sob, or neck pain. 53 yo M with pmhx of COPD, emphysema, HTN presenting with worsening pain and swelling to left upper extremity over 1 month. Patient states he had similar problem a few months ago. Denies any fever, chills, warmth or redness. No paresthesias or weakness. No cp, sob, or neck pain. No recent trauma. No heavy ligting.

## 2018-12-01 NOTE — ED PROVIDER NOTE - MEDICAL DECISION MAKING DETAILS
I have discussed the discharge plan with the patient. The patient agrees with the plan, as discussed.  The patient understands Emergency Department diagnosis is a preliminary diagnosis often based on limited information and that the patient must adhere to the follow-up plan as discussed.  The patient understands that if the symptoms worsen or if prescribed medications do not have the desired/planned effect that the patient may return to the Emergency Department at any time for further evaluation and treatment. 53 yo M with right arm pain and swelling duplex + for fluid collection negative for DVT. No erythema, warmth, fever, chills seems like a chronic issue that has been occurring which needs further workup and evaluation - rapid surgery clinic follow up made on 12/3     I have discussed the discharge plan with the patient. The patient agrees with the plan, as discussed.  The patient understands Emergency Department diagnosis is a preliminary diagnosis often based on limited information and that the patient must adhere to the follow-up plan as discussed.  The patient understands that if the symptoms worsen or if prescribed medications do not have the desired/planned effect that the patient may return to the Emergency Department at any time for further evaluation and treatment. 53 yo M with right arm pain and swelling duplex + for fluid collection negative for DVT. No trauma. XR neg. No erythema, warmth, fever, chills no concern for cellulitis/abscess likely chronic issue that has been occurring which needs further workup and evaluation - rapid surgery clinic follow up made on 12/3     I have discussed the discharge plan with the patient. The patient agrees with the plan, as discussed.  The patient understands Emergency Department diagnosis is a preliminary diagnosis often based on limited information and that the patient must adhere to the follow-up plan as discussed.  The patient understands that if the symptoms worsen or if prescribed medications do not have the desired/planned effect that the patient may return to the Emergency Department at any time for further evaluation and treatment.

## 2018-12-01 NOTE — ED PROVIDER NOTE - NS ED ROS FT
Review of Systems:  	•	CONSTITUTIONAL - no fever, no diaphoresis, no chills  	•	SKIN - no rash  	•	HEMATOLOGIC - no bleeding, no bruising  	•	EYES - no eye pain, no blurry vision  	•	ENT - no congestion  	•	RESPIRATORY - no shortness of breath, no cough  	•	CARDIAC - no chest pain, no palpitations  	•	MUSCULOSKELETAL - +arm pain, +swelling, no redness  	•	NEUROLOGIC - no weakness, no headache, no paresthesia  	All other ROS are negative except as documented in HPI.

## 2018-12-01 NOTE — ED PROVIDER NOTE - CHIEF COMPLAINT
The patient is a 52y Male complaining of arm pain/injury. The patient is a 52y Male complaining of arm pain.

## 2018-12-01 NOTE — ED PROVIDER NOTE - NSFOLLOWUPINSTRUCTIONS_ED_ALL_ED_FT
YOU HAVE AN APPOINTMENT WITH SURGERY CLINIC AS ABOVE ON DEC, 3, 2018 1PM PLEASE FOLLOW UP WITH THEM.    RETURN TO THE ED IF YOU EXPERIENCE ANY CP, SOB, FEVER, CHILLS, REDNESS, SWELLING, DRAINAGE, OR BLEEDING.

## 2018-12-01 NOTE — ED PROVIDER NOTE - NSFOLLOWUPCLINICS_GEN_ALL_ED_FT
[facility]  Surgery  [address] [place] [address]  Phone: [phone]  Fax:   Follow Up Time: 1-3 Days    A Family Medicine Doctor  Family Medicine  .  [place]   Phone:   Fax:   Follow Up Time: 1-3 Days

## 2018-12-01 NOTE — ED PROVIDER NOTE - PHYSICAL EXAMINATION
VITAL SIGNS: I have reviewed nursing notes and confirm.  CONSTITUTIONAL: Well-developed; well-nourished; in no acute distress.  SKIN: No acute rash. No erythema or warmth. No concern for cellulitis or abscess.   HEAD: Normocephalic; atraumatic.  EYES: PERRL, EOM intact; conjunctiva and sclera clear.  ENT: No nasal discharge; airway clear.   NECK: Supple; non tender.  CARD: S1, S2 normal; no murmurs, gallops, or rubs. Regular rate and rhythm.  RESP: Clear to auscultation bilaterally. No wheezes, rales or rhonchi.  ABD: Normal bowel sounds; soft; non-distended; non-tender.   EXT: +Full ROM. +Swelling to upper lateral left arm.   LYMPH: No acute cervical adenopathy.  NEURO: Alert, oriented. Grossly unremarkable. No focal deficits. Radial pulses 2+ bilaterally. Motor and sensation intact.   PSYCH: Cooperative, appropriate.

## 2018-12-03 ENCOUNTER — APPOINTMENT (OUTPATIENT)
Dept: SURGERY | Facility: CLINIC | Age: 53
End: 2018-12-03
Payer: MEDICAID

## 2018-12-03 ENCOUNTER — OUTPATIENT (OUTPATIENT)
Dept: OUTPATIENT SERVICES | Facility: HOSPITAL | Age: 53
LOS: 1 days | Discharge: HOME | End: 2018-12-03

## 2018-12-03 VITALS
SYSTOLIC BLOOD PRESSURE: 158 MMHG | DIASTOLIC BLOOD PRESSURE: 100 MMHG | BODY MASS INDEX: 45.1 KG/M2 | HEIGHT: 70 IN | WEIGHT: 315 LBS

## 2018-12-03 DIAGNOSIS — Z96.651 PRESENCE OF RIGHT ARTIFICIAL KNEE JOINT: Chronic | ICD-10-CM

## 2018-12-03 DIAGNOSIS — Z98.890 OTHER SPECIFIED POSTPROCEDURAL STATES: Chronic | ICD-10-CM

## 2018-12-03 DIAGNOSIS — M62.9 DISORDER OF MUSCLE, UNSPECIFIED: ICD-10-CM

## 2018-12-03 LAB
ANION GAP SERPL CALC-SCNC: 14 MMOL/L
BASOPHILS # BLD AUTO: 0.06 K/UL
BASOPHILS NFR BLD AUTO: 0.6 %
BUN SERPL-MCNC: <5 MG/DL
CALCIUM SERPL-MCNC: 8.7 MG/DL
CHLORIDE SERPL-SCNC: 100 MMOL/L
CO2 SERPL-SCNC: 26 MMOL/L
CREAT SERPL-MCNC: 0.6 MG/DL
EOSINOPHIL # BLD AUTO: 0.18 K/UL
EOSINOPHIL NFR BLD AUTO: 1.9 %
GLUCOSE SERPL-MCNC: 77 MG/DL
HCT VFR BLD CALC: 42.3 %
HGB BLD-MCNC: 14 G/DL
IMM GRANULOCYTES NFR BLD AUTO: 0.4 %
LYMPHOCYTES # BLD AUTO: 2.17 K/UL
LYMPHOCYTES NFR BLD AUTO: 22.6 %
MAN DIFF?: NORMAL
MCHC RBC-ENTMCNC: 29.2 PG
MCHC RBC-ENTMCNC: 33.1 G/DL
MCV RBC AUTO: 88.3 FL
MONOCYTES # BLD AUTO: 0.64 K/UL
MONOCYTES NFR BLD AUTO: 6.7 %
NEUTROPHILS # BLD AUTO: 6.51 K/UL
NEUTROPHILS NFR BLD AUTO: 67.8 %
PLATELET # BLD AUTO: 223 K/UL
POTASSIUM SERPL-SCNC: 3.5 MMOL/L
RBC # BLD: 4.79 M/UL
RBC # FLD: 15 %
SODIUM SERPL-SCNC: 140 MMOL/L
WBC # FLD AUTO: 9.6 K/UL

## 2018-12-03 PROCEDURE — 99202 OFFICE O/P NEW SF 15 MIN: CPT

## 2018-12-14 ENCOUNTER — APPOINTMENT (OUTPATIENT)
Dept: PULMONOLOGY | Facility: CLINIC | Age: 53
End: 2018-12-14

## 2019-01-23 ENCOUNTER — OUTPATIENT (OUTPATIENT)
Dept: OUTPATIENT SERVICES | Facility: HOSPITAL | Age: 54
LOS: 1 days | Discharge: HOME | End: 2019-01-23

## 2019-01-23 VITALS
HEIGHT: 70 IN | OXYGEN SATURATION: 98 % | RESPIRATION RATE: 16 BRPM | WEIGHT: 313.06 LBS | DIASTOLIC BLOOD PRESSURE: 96 MMHG | TEMPERATURE: 98 F | SYSTOLIC BLOOD PRESSURE: 158 MMHG | HEART RATE: 87 BPM

## 2019-01-23 DIAGNOSIS — M17.12 UNILATERAL PRIMARY OSTEOARTHRITIS, LEFT KNEE: ICD-10-CM

## 2019-01-23 DIAGNOSIS — E78.00 PURE HYPERCHOLESTEROLEMIA, UNSPECIFIED: ICD-10-CM

## 2019-01-23 DIAGNOSIS — Z98.890 OTHER SPECIFIED POSTPROCEDURAL STATES: Chronic | ICD-10-CM

## 2019-01-23 DIAGNOSIS — R06.02 SHORTNESS OF BREATH: ICD-10-CM

## 2019-01-23 DIAGNOSIS — F17.200 NICOTINE DEPENDENCE, UNSPECIFIED, UNCOMPLICATED: ICD-10-CM

## 2019-01-23 DIAGNOSIS — Z01.818 ENCOUNTER FOR OTHER PREPROCEDURAL EXAMINATION: ICD-10-CM

## 2019-01-23 DIAGNOSIS — J44.9 CHRONIC OBSTRUCTIVE PULMONARY DISEASE, UNSPECIFIED: ICD-10-CM

## 2019-01-23 DIAGNOSIS — Z96.651 PRESENCE OF RIGHT ARTIFICIAL KNEE JOINT: Chronic | ICD-10-CM

## 2019-01-23 DIAGNOSIS — K21.9 GASTRO-ESOPHAGEAL REFLUX DISEASE WITHOUT ESOPHAGITIS: ICD-10-CM

## 2019-01-23 DIAGNOSIS — G47.30 SLEEP APNEA, UNSPECIFIED: ICD-10-CM

## 2019-01-23 DIAGNOSIS — K40.20 BILATERAL INGUINAL HERNIA, WITHOUT OBSTRUCTION OR GANGRENE, NOT SPECIFIED AS RECURRENT: Chronic | ICD-10-CM

## 2019-01-23 LAB
ALBUMIN SERPL ELPH-MCNC: 4.1 G/DL — SIGNIFICANT CHANGE UP (ref 3.5–5.2)
ALP SERPL-CCNC: 128 U/L — HIGH (ref 30–115)
ALT FLD-CCNC: 14 U/L — SIGNIFICANT CHANGE UP (ref 0–41)
ANION GAP SERPL CALC-SCNC: 14 MMOL/L — SIGNIFICANT CHANGE UP (ref 7–14)
APPEARANCE UR: CLEAR — SIGNIFICANT CHANGE UP
APTT BLD: 37 SEC — SIGNIFICANT CHANGE UP (ref 27–39.2)
AST SERPL-CCNC: 15 U/L — SIGNIFICANT CHANGE UP (ref 0–41)
BILIRUB SERPL-MCNC: 0.5 MG/DL — SIGNIFICANT CHANGE UP (ref 0.2–1.2)
BILIRUB UR-MCNC: NEGATIVE — SIGNIFICANT CHANGE UP
BUN SERPL-MCNC: 8 MG/DL — LOW (ref 10–20)
CALCIUM SERPL-MCNC: 9.3 MG/DL — SIGNIFICANT CHANGE UP (ref 8.5–10.1)
CHLORIDE SERPL-SCNC: 99 MMOL/L — SIGNIFICANT CHANGE UP (ref 98–110)
CO2 SERPL-SCNC: 29 MMOL/L — SIGNIFICANT CHANGE UP (ref 17–32)
COLOR SPEC: YELLOW — SIGNIFICANT CHANGE UP
CREAT SERPL-MCNC: 0.7 MG/DL — SIGNIFICANT CHANGE UP (ref 0.7–1.5)
DIFF PNL FLD: NEGATIVE — SIGNIFICANT CHANGE UP
ESTIMATED AVERAGE GLUCOSE: 111 MG/DL — SIGNIFICANT CHANGE UP (ref 68–114)
GLUCOSE SERPL-MCNC: 78 MG/DL — SIGNIFICANT CHANGE UP (ref 70–99)
GLUCOSE UR QL: NEGATIVE MG/DL — SIGNIFICANT CHANGE UP
HBA1C BLD-MCNC: 5.5 % — SIGNIFICANT CHANGE UP (ref 4–5.6)
HCT VFR BLD CALC: 45.5 % — SIGNIFICANT CHANGE UP (ref 42–52)
HGB BLD-MCNC: 15 G/DL — SIGNIFICANT CHANGE UP (ref 14–18)
INR BLD: 1.03 RATIO — SIGNIFICANT CHANGE UP (ref 0.65–1.3)
KETONES UR-MCNC: NEGATIVE — SIGNIFICANT CHANGE UP
LEUKOCYTE ESTERASE UR-ACNC: NEGATIVE — SIGNIFICANT CHANGE UP
MCHC RBC-ENTMCNC: 28.4 PG — SIGNIFICANT CHANGE UP (ref 27–31)
MCHC RBC-ENTMCNC: 33 G/DL — SIGNIFICANT CHANGE UP (ref 32–37)
MCV RBC AUTO: 86 FL — SIGNIFICANT CHANGE UP (ref 80–94)
MRSA PCR RESULT.: NEGATIVE — SIGNIFICANT CHANGE UP
NITRITE UR-MCNC: NEGATIVE — SIGNIFICANT CHANGE UP
NRBC # BLD: 0 /100 WBCS — SIGNIFICANT CHANGE UP (ref 0–0)
PH UR: 6.5 — SIGNIFICANT CHANGE UP (ref 5–8)
PLATELET # BLD AUTO: 261 K/UL — SIGNIFICANT CHANGE UP (ref 130–400)
POTASSIUM SERPL-MCNC: 5.2 MMOL/L — HIGH (ref 3.5–5)
POTASSIUM SERPL-SCNC: 5.2 MMOL/L — HIGH (ref 3.5–5)
PROT SERPL-MCNC: 7 G/DL — SIGNIFICANT CHANGE UP (ref 6–8)
PROT UR-MCNC: NEGATIVE MG/DL — SIGNIFICANT CHANGE UP
PROTHROM AB SERPL-ACNC: 11.8 SEC — SIGNIFICANT CHANGE UP (ref 9.95–12.87)
RBC # BLD: 5.29 M/UL — SIGNIFICANT CHANGE UP (ref 4.7–6.1)
RBC # FLD: 14 % — SIGNIFICANT CHANGE UP (ref 11.5–14.5)
SODIUM SERPL-SCNC: 142 MMOL/L — SIGNIFICANT CHANGE UP (ref 135–146)
SP GR SPEC: 1.01 — SIGNIFICANT CHANGE UP (ref 1.01–1.03)
TYPE + AB SCN PNL BLD: SIGNIFICANT CHANGE UP
UROBILINOGEN FLD QL: 0.2 MG/DL — SIGNIFICANT CHANGE UP (ref 0.2–0.2)
WBC # BLD: 9.24 K/UL — SIGNIFICANT CHANGE UP (ref 4.8–10.8)
WBC # FLD AUTO: 9.24 K/UL — SIGNIFICANT CHANGE UP (ref 4.8–10.8)

## 2019-01-23 RX ORDER — SIMVASTATIN 20 MG/1
1 TABLET, FILM COATED ORAL
Qty: 0 | Refills: 0 | COMMUNITY

## 2019-01-23 RX ORDER — LEVALBUTEROL 1.25 MG/.5ML
0 SOLUTION, CONCENTRATE RESPIRATORY (INHALATION)
Qty: 0 | Refills: 0 | COMMUNITY

## 2019-01-23 RX ORDER — FENOFIBRATE,MICRONIZED 130 MG
1 CAPSULE ORAL
Qty: 0 | Refills: 0 | COMMUNITY

## 2019-01-23 NOTE — H&P PST ADULT - ADDITIONAL PE
dr. schultz spoke with patient at length re: surgery. regional vs general, procedure answered all patients questions.

## 2019-01-23 NOTE — H&P PST ADULT - HISTORY OF PRESENT ILLNESS
53 year old male here today for left total knee replacement  having problems for about 1 year  fos=0  denies chest pain ,palp just sob  denies recent uri or uti

## 2019-01-23 NOTE — H&P PST ADULT - PSH
H/O knee surgery  arthoscopic x4  Hernia, inguinal, bilateral  3 months old  History of appendectomy    History of cholecystectomy    History of knee replacement procedure of right knee

## 2019-01-23 NOTE — H&P PST ADULT - PMH
Colitis    COPD (chronic obstructive pulmonary disease)    GERD (gastroesophageal reflux disease)    Hiatal hernia    High blood cholesterol    High cholesterol    HTN (hypertension)    Morbid obesity    Numbness and tingling  hands  Obstructive sleep apnea    Osteoarthritis    SOB (shortness of breath)

## 2019-01-23 NOTE — H&P PST ADULT - NSANTHOSAYNRD_GEN_A_CORE
b-pap/No. REBECCA screening performed.  STOP BANG Legend: 0-2 = LOW Risk; 3-4 = INTERMEDIATE Risk; 5-8 = HIGH Risk

## 2019-01-23 NOTE — PROGRESS NOTE ADULT - SUBJECTIVE AND OBJECTIVE BOX
PAST document reviewed : This is scheduled for a Left TKR under Regional Anesthesia for 02/13/2019 . I interviewed the patient along with the interviewing / examining FNP . I informed the patient that he is scheduled for Regional/ Spinal Anesthesia , however , he expressed that he did not want this type of Anesthesia , but preferred GA ( as with his previous Right TKR  ) . This was because he had had a significant problem in the past with an attempted Epidural Anesthetic . I explained to him that this was now planned as Spinal / (+)/(-) peripheral Anesthesia , not Epidural . I also explained the definite advantages as to post op recovery and shorter hospital stay ; along with the difference of this as opposed to Epidural . I also informed him that because of his significant comorbidities , the Spinal / Peripheral Anesthesia would be more advantageous ( especially in light of his Pulmonary / REBECCA history ) . I told him that , if he is insisting on GA , he must discuss this fully pre op with his Surgeon ,  . He expressed his understanding and said he would consider the Regional Anesthesia more fully based on our discussion . In preparation for this I discussed his medications which may affect the Regional Anesthesia : he is on low dose [id number]  which is not a problem . Also , I specifically discussed use of Rx or OTC NSAID 's or ASA containing preparations ,none of which may be taken for at least >7 days pre op . Again , he expressed his understanding of these further instructions .

## 2019-01-24 LAB
CULTURE RESULTS: NO GROWTH — SIGNIFICANT CHANGE UP
SPECIMEN SOURCE: SIGNIFICANT CHANGE UP

## 2019-02-08 ENCOUNTER — EMERGENCY (EMERGENCY)
Facility: HOSPITAL | Age: 54
LOS: 0 days | Discharge: HOME | End: 2019-02-08
Attending: EMERGENCY MEDICINE | Admitting: EMERGENCY MEDICINE

## 2019-02-08 VITALS
TEMPERATURE: 99 F | OXYGEN SATURATION: 97 % | RESPIRATION RATE: 20 BRPM | SYSTOLIC BLOOD PRESSURE: 124 MMHG | HEART RATE: 124 BPM | DIASTOLIC BLOOD PRESSURE: 86 MMHG

## 2019-02-08 VITALS
RESPIRATION RATE: 18 BRPM | SYSTOLIC BLOOD PRESSURE: 120 MMHG | HEART RATE: 114 BPM | OXYGEN SATURATION: 97 % | DIASTOLIC BLOOD PRESSURE: 68 MMHG

## 2019-02-08 DIAGNOSIS — Z98.890 OTHER SPECIFIED POSTPROCEDURAL STATES: Chronic | ICD-10-CM

## 2019-02-08 DIAGNOSIS — J44.9 CHRONIC OBSTRUCTIVE PULMONARY DISEASE, UNSPECIFIED: ICD-10-CM

## 2019-02-08 DIAGNOSIS — Z96.651 PRESENCE OF RIGHT ARTIFICIAL KNEE JOINT: Chronic | ICD-10-CM

## 2019-02-08 DIAGNOSIS — Z79.899 OTHER LONG TERM (CURRENT) DRUG THERAPY: ICD-10-CM

## 2019-02-08 DIAGNOSIS — Z79.82 LONG TERM (CURRENT) USE OF ASPIRIN: ICD-10-CM

## 2019-02-08 DIAGNOSIS — I10 ESSENTIAL (PRIMARY) HYPERTENSION: ICD-10-CM

## 2019-02-08 DIAGNOSIS — K40.20 BILATERAL INGUINAL HERNIA, WITHOUT OBSTRUCTION OR GANGRENE, NOT SPECIFIED AS RECURRENT: Chronic | ICD-10-CM

## 2019-02-08 DIAGNOSIS — Z87.891 PERSONAL HISTORY OF NICOTINE DEPENDENCE: ICD-10-CM

## 2019-02-08 RX ORDER — OXYCODONE HYDROCHLORIDE 5 MG/1
15 TABLET ORAL ONCE
Qty: 0 | Refills: 0 | Status: DISCONTINUED | OUTPATIENT
Start: 2019-02-08 | End: 2019-02-08

## 2019-02-08 RX ORDER — IPRATROPIUM/ALBUTEROL SULFATE 18-103MCG
3 AEROSOL WITH ADAPTER (GRAM) INHALATION ONCE
Qty: 0 | Refills: 0 | Status: COMPLETED | OUTPATIENT
Start: 2019-02-08 | End: 2019-02-08

## 2019-02-08 RX ADMIN — Medication 3 MILLILITER(S): at 18:33

## 2019-02-08 RX ADMIN — OXYCODONE HYDROCHLORIDE 15 MILLIGRAM(S): 5 TABLET ORAL at 19:41

## 2019-02-08 NOTE — ED ADULT TRIAGE NOTE - CHIEF COMPLAINT QUOTE
Requesting xray for medical clearance for L knee replacement on Wednesday, states he missed his pulmonologist appointment today w/ Dr Bassett, was recently admitted to [facility] for "water on my lungs." states "I need the xray to make sure the water is off for my surgery on Wednesday."

## 2019-02-08 NOTE — ED PROVIDER NOTE - PHYSICAL EXAMINATION
VITAL SIGNS: noted  CONSTITUTIONAL: Well-developed; well-nourished; in no acute distress  HEAD: Normocephalic; atraumatic  EYES: conjunctiva and sclera clear  ENT: No nasal discharge; airway clear. MMM  NECK: Supple; non tender. No anterior cervical lymphadenopathy noted  CARD: Regular rhythm, tachycardic   RESP: bibasilar wheeze, no respiratory distress, referred upper airway sounds. no rales or rhonchi  ABD: Abd exam limited by body habitus, non-tender; No CVAT  EXT: Normal ROM. No calf tenderness or edema. Distal pulses intact  NEURO: Alert, oriented. Grossly unremarkable. No focal deficits  SKIN: Skin exam is warm and dry, no acute rash  MS: No midline spinal tenderness

## 2019-02-08 NOTE — ED PROVIDER NOTE - OBJECTIVE STATEMENT
53M hx of HTN, copd presenting for evaluation of "water on lungs" pt states he went to [facility] for chest pain 2 weeks ago and was told he had a copd exacerbation with water on lungs. Was given lasix. Was told by his doctor to come for xray to ed so he could obtain one prior to surgery next wendesday. Pt has baseline wheeze and dry cough which is improved since stopping smoking 12 days ago. Uses inhaler daily. Has resting HR of 110 since "as long as I can remember." Not on beta blocker.  Pt has no acute complaints.     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

## 2019-02-08 NOTE — ED PROVIDER NOTE - NSFOLLOWUPINSTRUCTIONS_ED_ALL_ED_FT
Follow up with your PMD within 48-72 hrs. Show copies of your reports given to you. Take all of your medications as previously prescribed.

## 2019-02-08 NOTE — ED ADULT NURSE NOTE - CHIEF COMPLAINT QUOTE
Requesting xray for medical clearance for L knee replacement on Wednesday, states he missed his pulmonologist appointment today w/ Dr Bassett, was recently admitted to [facility] for "water on my lungs." states "I need the xray to make sure the water is off for my surgery on Wednesday."

## 2019-02-08 NOTE — ED ADULT NURSE NOTE - CHPI ED NUR SYMPTOMS NEG
no fever/no nausea/no chills/no tingling/no weakness/no decreased eating/drinking/no pain/no vomiting

## 2019-02-08 NOTE — ED PROVIDER NOTE - MEDICAL DECISION MAKING DETAILS
ED CXR prelim: No PTX, No infiltrates, No Free air. ED CXR prelim: No PTX, No infiltrates, No Free air. EKG NSR, No STEMI, No STD. No wheeze on reassessment. Has nebs/mdi  at home. Patient was given strict return and follow up precautions. The patient has been informed of all concerning signs and symptoms to return to Emergency Department, the necessity to follow up with PMD/Clinic/follow up provided within 2-3 days was explained, and the patient reports understanding of above with capacity and insight. Feels and appears well. No complaints at present. Eager to leave. Stable for discharge.

## 2019-02-08 NOTE — ED ADULT NURSE REASSESSMENT NOTE - NS ED NURSE REASSESS COMMENT FT1
pt assessed for SOB, chest x ray obtained, Duoneb obtained as per MD order, EKG ordered for tachycardia, will continue to monitor.

## 2019-02-08 NOTE — ED ADULT NURSE NOTE - OBJECTIVE STATEMENT
Patient is a 53 year old male coming in requesting chest xray for medical clearance for L knee replacement on Wednesday, Patient states he missed his pulmonologist appointment today w/ Dr Bassett to follow up on recent fluid on lungs. As per patient he was told to come to ER for chest x ray. Patient denies any shortness of breath at this time, dyspneic on exertion. Patient denies any recent fever, chills, nausea, vomiting, diarrhea or chest pain.

## 2019-02-08 NOTE — ED PROVIDER NOTE - PROGRESS NOTE DETAILS
JAMARI: Pt aware prior ekg HR 89. Affirms he is consistently 110. EKG here is Sinus Tach to 108. No STEMI, No STD. Pt requesting 30mg oxycodone. States he takes dilaudid, oxy, and moprhine sulfate.

## 2019-02-08 NOTE — ED PROVIDER NOTE - CARE PLAN
Principal Discharge DX:	COPD (chronic obstructive pulmonary disease)  Assessment and plan of treatment:	Pt afebrile WI. Will obtain xr and revital.

## 2019-02-09 PROBLEM — M19.90 UNSPECIFIED OSTEOARTHRITIS, UNSPECIFIED SITE: Chronic | Status: ACTIVE | Noted: 2019-01-23

## 2019-02-10 ENCOUNTER — EMERGENCY (EMERGENCY)
Facility: HOSPITAL | Age: 54
LOS: 0 days | Discharge: LEFT BEFORE DISCHARGE | End: 2019-02-10
Admitting: EMERGENCY MEDICINE

## 2019-02-10 VITALS
DIASTOLIC BLOOD PRESSURE: 90 MMHG | RESPIRATION RATE: 18 BRPM | HEART RATE: 88 BPM | OXYGEN SATURATION: 98 % | TEMPERATURE: 97 F | SYSTOLIC BLOOD PRESSURE: 180 MMHG

## 2019-02-10 VITALS
TEMPERATURE: 99 F | DIASTOLIC BLOOD PRESSURE: 75 MMHG | RESPIRATION RATE: 18 BRPM | HEART RATE: 99 BPM | SYSTOLIC BLOOD PRESSURE: 136 MMHG | OXYGEN SATURATION: 99 %

## 2019-02-10 DIAGNOSIS — Z98.890 OTHER SPECIFIED POSTPROCEDURAL STATES: Chronic | ICD-10-CM

## 2019-02-10 DIAGNOSIS — K40.20 BILATERAL INGUINAL HERNIA, WITHOUT OBSTRUCTION OR GANGRENE, NOT SPECIFIED AS RECURRENT: Chronic | ICD-10-CM

## 2019-02-10 DIAGNOSIS — Z79.51 LONG TERM (CURRENT) USE OF INHALED STEROIDS: ICD-10-CM

## 2019-02-10 DIAGNOSIS — Z79.891 LONG TERM (CURRENT) USE OF OPIATE ANALGESIC: ICD-10-CM

## 2019-02-10 DIAGNOSIS — R31.9 HEMATURIA, UNSPECIFIED: ICD-10-CM

## 2019-02-10 DIAGNOSIS — Z79.82 LONG TERM (CURRENT) USE OF ASPIRIN: ICD-10-CM

## 2019-02-10 DIAGNOSIS — Z79.899 OTHER LONG TERM (CURRENT) DRUG THERAPY: ICD-10-CM

## 2019-02-10 DIAGNOSIS — Z96.651 PRESENCE OF RIGHT ARTIFICIAL KNEE JOINT: Chronic | ICD-10-CM

## 2019-02-15 ENCOUNTER — APPOINTMENT (OUTPATIENT)
Dept: PULMONOLOGY | Facility: CLINIC | Age: 54
End: 2019-02-15

## 2019-02-15 ENCOUNTER — OUTPATIENT (OUTPATIENT)
Dept: OUTPATIENT SERVICES | Facility: HOSPITAL | Age: 54
LOS: 1 days | Discharge: HOME | End: 2019-02-15

## 2019-02-15 VITALS
DIASTOLIC BLOOD PRESSURE: 88 MMHG | HEART RATE: 96 BPM | BODY MASS INDEX: 42.95 KG/M2 | OXYGEN SATURATION: 96 % | SYSTOLIC BLOOD PRESSURE: 133 MMHG | TEMPERATURE: 97.2 F | HEIGHT: 70 IN | WEIGHT: 300 LBS

## 2019-02-15 DIAGNOSIS — Z96.651 PRESENCE OF RIGHT ARTIFICIAL KNEE JOINT: Chronic | ICD-10-CM

## 2019-02-15 DIAGNOSIS — Z98.890 OTHER SPECIFIED POSTPROCEDURAL STATES: Chronic | ICD-10-CM

## 2019-02-15 DIAGNOSIS — K40.20 BILATERAL INGUINAL HERNIA, WITHOUT OBSTRUCTION OR GANGRENE, NOT SPECIFIED AS RECURRENT: Chronic | ICD-10-CM

## 2019-02-15 NOTE — PHYSICAL EXAM
[General Appearance - Well Developed] : well developed [Normal Appearance] : normal appearance [Well Groomed] : well groomed [General Appearance - Well Nourished] : well nourished [General Appearance - In No Acute Distress] : no acute distress [Heart Rate And Rhythm] : heart rate and rhythm were normal [Heart Sounds] : normal S1 and S2 [Murmurs] : no murmurs present [Edema] : no peripheral edema present [] : no respiratory distress [Respiration, Rhythm And Depth] : normal respiratory rhythm and effort [Exaggerated Use Of Accessory Muscles For Inspiration] : no accessory muscle use [Oriented To Time, Place, And Person] : oriented to person, place, and time [Impaired Insight] : insight and judgment were intact

## 2019-02-15 NOTE — END OF VISIT
[] : Resident [FreeTextEntry3] : acceptable risk for the planned ortho procedure-from a pulm perspective-needs to bring cpap to hosp [Time Spent: ___ minutes] : I have spent [unfilled] minutes of face to face time with the patient

## 2019-02-15 NOTE — HISTORY OF PRESENT ILLNESS
[Stable] : are stable [Cough] : improved coughing [Wheezing] : improved wheezing [Chest Pain Or Discomfort] : denies chest pain [FreeTextEntry1] : The patient is a 54 yo M w/ a PMH of sleep apnea on BIPAP and COPD presenting for pulmonary clearance for scheduled knee replacement next week. He states he had a CXR done last week in the ED and was requested to have it read by Dr. Bassett prior to the procedure. He states he stopped smoking 47 days ago and has noticed an improvement in his wheezing since then. All other ROS neg.

## 2019-02-15 NOTE — ASSESSMENT
[FreeTextEntry1] : The patient is a 54 yo M w/ a PMH of COPD and sleep apnea on BIPAP at home presenting for routine follow-up.\par \par # COPD\par Patient instructed to take 40 mg prednisone PO day before knee replacement, 40 mg day of the procedure.\par C/w Advair, Spiriva, Xopenex as prescribed.\par \par # Sleep apnea on BIPAP\par Patient also instructed to bring BIPAP equipment to the planned procedure.\par Patient deemed acceptable risk; cleared for scheduled knee replacement for next week.\par F/u in 6 months.

## 2019-03-01 ENCOUNTER — OUTPATIENT (OUTPATIENT)
Dept: OUTPATIENT SERVICES | Facility: HOSPITAL | Age: 54
LOS: 1 days | End: 2019-03-01
Payer: MEDICAID

## 2019-03-01 DIAGNOSIS — Z96.651 PRESENCE OF RIGHT ARTIFICIAL KNEE JOINT: Chronic | ICD-10-CM

## 2019-03-01 DIAGNOSIS — Z98.890 OTHER SPECIFIED POSTPROCEDURAL STATES: Chronic | ICD-10-CM

## 2019-03-01 DIAGNOSIS — K40.20 BILATERAL INGUINAL HERNIA, WITHOUT OBSTRUCTION OR GANGRENE, NOT SPECIFIED AS RECURRENT: Chronic | ICD-10-CM

## 2019-03-01 PROCEDURE — G9001: CPT

## 2019-03-11 ENCOUNTER — EMERGENCY (EMERGENCY)
Facility: HOSPITAL | Age: 54
LOS: 0 days | Discharge: LEFT BEFORE DISCHARGE | End: 2019-03-11
Admitting: EMERGENCY MEDICINE

## 2019-03-11 VITALS
DIASTOLIC BLOOD PRESSURE: 97 MMHG | TEMPERATURE: 98 F | HEART RATE: 87 BPM | SYSTOLIC BLOOD PRESSURE: 148 MMHG | OXYGEN SATURATION: 97 % | RESPIRATION RATE: 20 BRPM

## 2019-03-11 DIAGNOSIS — Z96.651 PRESENCE OF RIGHT ARTIFICIAL KNEE JOINT: Chronic | ICD-10-CM

## 2019-03-11 DIAGNOSIS — Z79.891 LONG TERM (CURRENT) USE OF OPIATE ANALGESIC: ICD-10-CM

## 2019-03-11 DIAGNOSIS — Z87.19 PERSONAL HISTORY OF OTHER DISEASES OF THE DIGESTIVE SYSTEM: ICD-10-CM

## 2019-03-11 DIAGNOSIS — Z79.51 LONG TERM (CURRENT) USE OF INHALED STEROIDS: ICD-10-CM

## 2019-03-11 DIAGNOSIS — Z98.890 OTHER SPECIFIED POSTPROCEDURAL STATES: Chronic | ICD-10-CM

## 2019-03-11 DIAGNOSIS — R10.9 UNSPECIFIED ABDOMINAL PAIN: ICD-10-CM

## 2019-03-11 DIAGNOSIS — Z79.899 OTHER LONG TERM (CURRENT) DRUG THERAPY: ICD-10-CM

## 2019-03-11 DIAGNOSIS — Z79.82 LONG TERM (CURRENT) USE OF ASPIRIN: ICD-10-CM

## 2019-03-11 DIAGNOSIS — K40.20 BILATERAL INGUINAL HERNIA, WITHOUT OBSTRUCTION OR GANGRENE, NOT SPECIFIED AS RECURRENT: Chronic | ICD-10-CM

## 2019-03-16 ENCOUNTER — INPATIENT (INPATIENT)
Facility: HOSPITAL | Age: 54
LOS: 2 days | Discharge: HOME | End: 2019-03-19
Attending: INTERNAL MEDICINE | Admitting: INTERNAL MEDICINE

## 2019-03-16 VITALS
HEIGHT: 68 IN | HEART RATE: 94 BPM | OXYGEN SATURATION: 98 % | WEIGHT: 300.05 LBS | SYSTOLIC BLOOD PRESSURE: 148 MMHG | TEMPERATURE: 97 F | DIASTOLIC BLOOD PRESSURE: 78 MMHG | RESPIRATION RATE: 19 BRPM

## 2019-03-16 DIAGNOSIS — Z96.651 PRESENCE OF RIGHT ARTIFICIAL KNEE JOINT: Chronic | ICD-10-CM

## 2019-03-16 DIAGNOSIS — Z98.890 OTHER SPECIFIED POSTPROCEDURAL STATES: Chronic | ICD-10-CM

## 2019-03-16 DIAGNOSIS — K40.20 BILATERAL INGUINAL HERNIA, WITHOUT OBSTRUCTION OR GANGRENE, NOT SPECIFIED AS RECURRENT: Chronic | ICD-10-CM

## 2019-03-16 LAB
ALBUMIN SERPL ELPH-MCNC: 3.8 G/DL — SIGNIFICANT CHANGE UP (ref 3.5–5.2)
ALP SERPL-CCNC: 118 U/L — HIGH (ref 30–115)
ALT FLD-CCNC: 12 U/L — SIGNIFICANT CHANGE UP (ref 0–41)
ANION GAP SERPL CALC-SCNC: 8 MMOL/L — SIGNIFICANT CHANGE UP (ref 7–14)
AST SERPL-CCNC: 16 U/L — SIGNIFICANT CHANGE UP (ref 0–41)
BILIRUB SERPL-MCNC: 0.2 MG/DL — SIGNIFICANT CHANGE UP (ref 0.2–1.2)
BUN SERPL-MCNC: 13 MG/DL — SIGNIFICANT CHANGE UP (ref 10–20)
CALCIUM SERPL-MCNC: 9.1 MG/DL — SIGNIFICANT CHANGE UP (ref 8.5–10.1)
CHLORIDE SERPL-SCNC: 100 MMOL/L — SIGNIFICANT CHANGE UP (ref 98–110)
CO2 SERPL-SCNC: 30 MMOL/L — SIGNIFICANT CHANGE UP (ref 17–32)
CREAT SERPL-MCNC: 0.7 MG/DL — SIGNIFICANT CHANGE UP (ref 0.7–1.5)
GAS PNL BLDV: SIGNIFICANT CHANGE UP
GLUCOSE SERPL-MCNC: 101 MG/DL — HIGH (ref 70–99)
NT-PROBNP SERPL-SCNC: 68 PG/ML — SIGNIFICANT CHANGE UP (ref 0–300)
POTASSIUM SERPL-MCNC: 4.7 MMOL/L — SIGNIFICANT CHANGE UP (ref 3.5–5)
POTASSIUM SERPL-SCNC: 4.7 MMOL/L — SIGNIFICANT CHANGE UP (ref 3.5–5)
PROT SERPL-MCNC: 6.4 G/DL — SIGNIFICANT CHANGE UP (ref 6–8)
SODIUM SERPL-SCNC: 138 MMOL/L — SIGNIFICANT CHANGE UP (ref 135–146)
TROPONIN T SERPL-MCNC: <0.01 NG/ML — SIGNIFICANT CHANGE UP

## 2019-03-16 RX ORDER — IPRATROPIUM/ALBUTEROL SULFATE 18-103MCG
3 AEROSOL WITH ADAPTER (GRAM) INHALATION ONCE
Qty: 0 | Refills: 0 | Status: COMPLETED | OUTPATIENT
Start: 2019-03-16 | End: 2019-03-16

## 2019-03-16 RX ORDER — DEXAMETHASONE 0.5 MG/5ML
10 ELIXIR ORAL ONCE
Qty: 0 | Refills: 0 | Status: COMPLETED | OUTPATIENT
Start: 2019-03-16 | End: 2019-03-16

## 2019-03-16 RX ORDER — MAGNESIUM SULFATE 500 MG/ML
2 VIAL (ML) INJECTION ONCE
Qty: 0 | Refills: 0 | Status: COMPLETED | OUTPATIENT
Start: 2019-03-16 | End: 2019-03-16

## 2019-03-16 RX ADMIN — Medication 10 MILLIGRAM(S): at 23:18

## 2019-03-16 RX ADMIN — Medication 3 MILLILITER(S): at 23:18

## 2019-03-16 NOTE — ED PROVIDER NOTE - AXIS
[facility] [address], [place] [address]

Test Date:    2017               Test Time:    22:26:47

Pat Name:     PARISH RICO      Department:   

Patient ID:   [id number]           Room:          

Gender:       F                        Technician:   ZOË

:          1950               Requested By: JESÚS LEIGH

Order Number: [id number]            Reading MD:     

                                 Measurements

Intervals                              Axis          

Rate:         66                       P:            43

CT:           158                      QRS:          -14

QRSD:         126                      T:            73

QT:           442                                    

QTc:          465                                    

                           Interpretive Statements

SINUS RHYTHM

LEFTWARD AXIS

NON SPECIFIC INTRAVENTRICULAR BLOCK

ABNORMAL ECG

RI6.01          Unconfirmed report

Compared to ECG 2017 18:37:15

Left-axis deviation now present
Normal

## 2019-03-16 NOTE — ED PROVIDER NOTE - PHYSICAL EXAMINATION
Physical Exam    Vital Signs: I have reviewed the initial vital signs.  Constitutional: well-nourished, appears stated age, no acute distress  Eyes: Conjunctiva pink, Sclera clear, PERRLA, EOMI.  Cardiovascular: S1 and S2, regular rate, regular rhythm, well-perfused extremities, radial pulses equal and 2+  Respiratory: + b/l wheezing noted  Gastrointestinal: soft, non-tender abdomen, no pulsatile mass, normal bowl sounds  Musculoskeletal: supple neck, no lower extremity edema, no midline tenderness  Integumentary: warm, dry, no rash  Neurologic: awake, alert, cranial nerves II-XII grossly intact, extremities’ motor and sensory functions grossly intact  Psychiatric: appropriate mood, appropriate affect

## 2019-03-16 NOTE — ED ADULT TRIAGE NOTE - CHIEF COMPLAINT QUOTE
BIBA via [employer], patient complaining of shortness of breath x 3 days, patient complaining of generalized chest pain and states, "But it's not cardiac" states pain present for a couple of hours. given 3 combivent tx by ems

## 2019-03-16 NOTE — ED ADULT NURSE NOTE - NSIMPLEMENTINTERV_GEN_ALL_ED
Implemented All Universal Safety Interventions:  [place] to call system. Call bell, personal items and telephone within reach. Instruct patient to call for assistance. Room bathroom lighting operational. Non-slip footwear when patient is off stretcher. Physically safe environment: no spills, clutter or unnecessary equipment. Stretcher in lowest position, wheels locked, appropriate side rails in place.

## 2019-03-16 NOTE — ED PROVIDER NOTE - CLINICAL SUMMARY MEDICAL DECISION MAKING FREE TEXT BOX
pt with COPD history presents with active wheezing, multiple nebs by EMS  .  Wheezing improved however still with some and SOB worse with exertion.  will admit for around the clock nebs, steroids.

## 2019-03-16 NOTE — ED PROVIDER NOTE - OBJECTIVE STATEMENT
53 year old male with multiple medical problems and COPD states that he was walking outside and started to have attack and called 911. patient given 3 nebs enroute to hospital doing better but still wheezing. patient denies chest pain, no abd pain. no nausea and vomiting.

## 2019-03-16 NOTE — ED ADULT NURSE NOTE - NS ED NOTE ABUSE RESPONSE YN
Yes Hpi Title: Evaluation of Skin Lesions How Severe Are Your Spot(S)?: mild Have Your Spot(S) Been Treated In The Past?: has not been treated Year Removed: 1900

## 2019-03-16 NOTE — ED PROVIDER NOTE - ATTENDING CONTRIBUTION TO CARE
54 yo M PMHx noted presents with c/o feeling SOB, wheezing while outdoors.  Pt received 3 nebs via EMA with some improvement, + cough, no chest pain, no fever or chills.  no n/v.  On exam pt in NAD AAO x 3, able to speak full sentences, Lungs with diffuse wheezing and prolonged exp phase, no retractions, abd osft nt nd, no edema

## 2019-03-16 NOTE — ED PROVIDER NOTE - NS ED ROS FT
Constitutional: (-) fever  Eyes/ENT: (-) blurry vision, (-) epistaxis  Cardiovascular: (-) chest pain, (-) syncope  Respiratory: (+) cough, (+) shortness of breath  Gastrointestinal: (-) vomiting, (-) diarrhea  Musculoskeletal: (-) neck pain, (-) back pain, (-) joint pain  Integumentary: (-) rash, (-) edema  Neurological: (-) headache, (-) altered mental status  Psychiatric: (-) hallucinations  Allergic/Immunologic: (-) pruritus

## 2019-03-16 NOTE — ED PROVIDER NOTE - AGGRAVATING FACTORS
Abhi Mahajan is a 54 year old male presenting with a CEE  1st Exam  NVA is down, Uses OTC, not with.     OCCUPATION:           Eye Pain: no       Double Vision: no       Trouble Reading: Yes       Seasonal Allergy/Itchy Eyes:  no       Dry Eyes:  no       Floaters:  No     PERSONAL OCULAR HX:       Glaucoma: no       Cataract: no       Injury to eye: no       Crossed or lazy eye: no       Retinal detachment: no       Eye operations /Lasik: no       Ever worn contact lenses: no       Other eye disease?     FAMILY OCULAR  HX       Glaucoma:       ARMD:    Do you have:                   Diabetes? no        Thyroid disease? Yes        High blood pressure? Yes        Heart disease?no             Other medical conditions:           walking

## 2019-03-17 LAB
HCT VFR BLD CALC: 45.9 % — SIGNIFICANT CHANGE UP (ref 42–52)
HGB BLD-MCNC: 15.1 G/DL — SIGNIFICANT CHANGE UP (ref 14–18)
MCHC RBC-ENTMCNC: 28.1 PG — SIGNIFICANT CHANGE UP (ref 27–31)
MCHC RBC-ENTMCNC: 32.9 G/DL — SIGNIFICANT CHANGE UP (ref 32–37)
MCV RBC AUTO: 85.3 FL — SIGNIFICANT CHANGE UP (ref 80–94)
NRBC # BLD: 0 /100 WBCS — SIGNIFICANT CHANGE UP (ref 0–0)
PLATELET # BLD AUTO: 264 K/UL — SIGNIFICANT CHANGE UP (ref 130–400)
RBC # BLD: 5.38 M/UL — SIGNIFICANT CHANGE UP (ref 4.7–6.1)
RBC # FLD: SIGNIFICANT CHANGE UP % (ref 11.5–14.5)
WBC # BLD: 10.05 K/UL — SIGNIFICANT CHANGE UP (ref 4.8–10.8)
WBC # FLD AUTO: 10.05 K/UL — SIGNIFICANT CHANGE UP (ref 4.8–10.8)

## 2019-03-17 RX ORDER — IPRATROPIUM/ALBUTEROL SULFATE 18-103MCG
3 AEROSOL WITH ADAPTER (GRAM) INHALATION EVERY 4 HOURS
Qty: 0 | Refills: 0 | Status: DISCONTINUED | OUTPATIENT
Start: 2019-03-17 | End: 2019-03-19

## 2019-03-17 RX ORDER — MORPHINE SULFATE 50 MG/1
8 CAPSULE, EXTENDED RELEASE ORAL EVERY 4 HOURS
Qty: 0 | Refills: 0 | Status: DISCONTINUED | OUTPATIENT
Start: 2019-03-17 | End: 2019-03-17

## 2019-03-17 RX ORDER — CHLORHEXIDINE GLUCONATE 213 G/1000ML
1 SOLUTION TOPICAL
Qty: 0 | Refills: 0 | Status: DISCONTINUED | OUTPATIENT
Start: 2019-03-17 | End: 2019-03-19

## 2019-03-17 RX ORDER — ASPIRIN/CALCIUM CARB/MAGNESIUM 324 MG
81 TABLET ORAL DAILY
Qty: 0 | Refills: 0 | Status: DISCONTINUED | OUTPATIENT
Start: 2019-03-17 | End: 2019-03-19

## 2019-03-17 RX ORDER — FENOFIBRATE,MICRONIZED 130 MG
145 CAPSULE ORAL AT BEDTIME
Qty: 0 | Refills: 0 | Status: DISCONTINUED | OUTPATIENT
Start: 2019-03-17 | End: 2019-03-19

## 2019-03-17 RX ORDER — MORPHINE SULFATE 50 MG/1
8 CAPSULE, EXTENDED RELEASE ORAL ONCE
Qty: 0 | Refills: 0 | Status: DISCONTINUED | OUTPATIENT
Start: 2019-03-17 | End: 2019-03-17

## 2019-03-17 RX ORDER — ACETAMINOPHEN 500 MG
650 TABLET ORAL EVERY 6 HOURS
Qty: 0 | Refills: 0 | Status: DISCONTINUED | OUTPATIENT
Start: 2019-03-17 | End: 2019-03-19

## 2019-03-17 RX ORDER — ZOLPIDEM TARTRATE 10 MG/1
5 TABLET ORAL AT BEDTIME
Qty: 0 | Refills: 0 | Status: DISCONTINUED | OUTPATIENT
Start: 2019-03-17 | End: 2019-03-19

## 2019-03-17 RX ORDER — FUROSEMIDE 40 MG
40 TABLET ORAL DAILY
Qty: 0 | Refills: 0 | Status: DISCONTINUED | OUTPATIENT
Start: 2019-03-17 | End: 2019-03-19

## 2019-03-17 RX ORDER — IPRATROPIUM/ALBUTEROL SULFATE 18-103MCG
3 AEROSOL WITH ADAPTER (GRAM) INHALATION ONCE
Qty: 0 | Refills: 0 | Status: COMPLETED | OUTPATIENT
Start: 2019-03-17 | End: 2019-03-17

## 2019-03-17 RX ORDER — IBUPROFEN 200 MG
600 TABLET ORAL ONCE
Qty: 0 | Refills: 0 | Status: COMPLETED | OUTPATIENT
Start: 2019-03-17 | End: 2019-03-17

## 2019-03-17 RX ORDER — SIMVASTATIN 20 MG/1
20 TABLET, FILM COATED ORAL AT BEDTIME
Qty: 0 | Refills: 0 | Status: DISCONTINUED | OUTPATIENT
Start: 2019-03-17 | End: 2019-03-19

## 2019-03-17 RX ORDER — PANTOPRAZOLE SODIUM 20 MG/1
40 TABLET, DELAYED RELEASE ORAL
Qty: 0 | Refills: 0 | Status: DISCONTINUED | OUTPATIENT
Start: 2019-03-17 | End: 2019-03-19

## 2019-03-17 RX ORDER — OXYCODONE HYDROCHLORIDE 5 MG/1
30 TABLET ORAL EVERY 4 HOURS
Qty: 0 | Refills: 0 | Status: DISCONTINUED | OUTPATIENT
Start: 2019-03-17 | End: 2019-03-19

## 2019-03-17 RX ORDER — MORPHINE SULFATE 50 MG/1
8 CAPSULE, EXTENDED RELEASE ORAL EVERY 4 HOURS
Qty: 0 | Refills: 0 | Status: DISCONTINUED | OUTPATIENT
Start: 2019-03-17 | End: 2019-03-18

## 2019-03-17 RX ORDER — MORPHINE SULFATE 50 MG/1
2 CAPSULE, EXTENDED RELEASE ORAL ONCE
Qty: 0 | Refills: 0 | Status: DISCONTINUED | OUTPATIENT
Start: 2019-03-17 | End: 2019-03-17

## 2019-03-17 RX ORDER — LISINOPRIL 2.5 MG/1
5 TABLET ORAL DAILY
Qty: 0 | Refills: 0 | Status: DISCONTINUED | OUTPATIENT
Start: 2019-03-17 | End: 2019-03-19

## 2019-03-17 RX ADMIN — MORPHINE SULFATE 8 MILLIGRAM(S): 50 CAPSULE, EXTENDED RELEASE ORAL at 18:00

## 2019-03-17 RX ADMIN — MORPHINE SULFATE 8 MILLIGRAM(S): 50 CAPSULE, EXTENDED RELEASE ORAL at 12:19

## 2019-03-17 RX ADMIN — Medication 60 MILLIGRAM(S): at 12:58

## 2019-03-17 RX ADMIN — MORPHINE SULFATE 8 MILLIGRAM(S): 50 CAPSULE, EXTENDED RELEASE ORAL at 12:57

## 2019-03-17 RX ADMIN — Medication 81 MILLIGRAM(S): at 11:33

## 2019-03-17 RX ADMIN — Medication 650 MILLIGRAM(S): at 20:30

## 2019-03-17 RX ADMIN — ZOLPIDEM TARTRATE 5 MILLIGRAM(S): 10 TABLET ORAL at 04:54

## 2019-03-17 RX ADMIN — Medication 3 MILLILITER(S): at 01:07

## 2019-03-17 RX ADMIN — Medication 3 MILLILITER(S): at 13:32

## 2019-03-17 RX ADMIN — ZOLPIDEM TARTRATE 5 MILLIGRAM(S): 10 TABLET ORAL at 22:11

## 2019-03-17 RX ADMIN — OXYCODONE HYDROCHLORIDE 30 MILLIGRAM(S): 5 TABLET ORAL at 18:40

## 2019-03-17 RX ADMIN — OXYCODONE HYDROCHLORIDE 30 MILLIGRAM(S): 5 TABLET ORAL at 12:57

## 2019-03-17 RX ADMIN — OXYCODONE HYDROCHLORIDE 30 MILLIGRAM(S): 5 TABLET ORAL at 13:30

## 2019-03-17 RX ADMIN — MORPHINE SULFATE 8 MILLIGRAM(S): 50 CAPSULE, EXTENDED RELEASE ORAL at 08:05

## 2019-03-17 RX ADMIN — MORPHINE SULFATE 2 MILLIGRAM(S): 50 CAPSULE, EXTENDED RELEASE ORAL at 16:45

## 2019-03-17 RX ADMIN — LISINOPRIL 5 MILLIGRAM(S): 2.5 TABLET ORAL at 06:35

## 2019-03-17 RX ADMIN — Medication 600 MILLIGRAM(S): at 18:00

## 2019-03-17 RX ADMIN — MORPHINE SULFATE 8 MILLIGRAM(S): 50 CAPSULE, EXTENDED RELEASE ORAL at 04:16

## 2019-03-17 RX ADMIN — MORPHINE SULFATE 2 MILLIGRAM(S): 50 CAPSULE, EXTENDED RELEASE ORAL at 17:00

## 2019-03-17 RX ADMIN — ZOLPIDEM TARTRATE 5 MILLIGRAM(S): 10 TABLET ORAL at 04:03

## 2019-03-17 RX ADMIN — SIMVASTATIN 20 MILLIGRAM(S): 20 TABLET, FILM COATED ORAL at 21:03

## 2019-03-17 RX ADMIN — MORPHINE SULFATE 8 MILLIGRAM(S): 50 CAPSULE, EXTENDED RELEASE ORAL at 22:30

## 2019-03-17 RX ADMIN — MORPHINE SULFATE 8 MILLIGRAM(S): 50 CAPSULE, EXTENDED RELEASE ORAL at 08:35

## 2019-03-17 RX ADMIN — Medication 60 MILLIGRAM(S): at 21:03

## 2019-03-17 RX ADMIN — Medication 145 MILLIGRAM(S): at 21:03

## 2019-03-17 RX ADMIN — MORPHINE SULFATE 8 MILLIGRAM(S): 50 CAPSULE, EXTENDED RELEASE ORAL at 21:32

## 2019-03-17 RX ADMIN — MORPHINE SULFATE 8 MILLIGRAM(S): 50 CAPSULE, EXTENDED RELEASE ORAL at 16:25

## 2019-03-17 RX ADMIN — Medication 3 MILLILITER(S): at 20:48

## 2019-03-17 RX ADMIN — Medication 600 MILLIGRAM(S): at 18:40

## 2019-03-17 RX ADMIN — Medication 60 MILLIGRAM(S): at 06:34

## 2019-03-17 RX ADMIN — OXYCODONE HYDROCHLORIDE 30 MILLIGRAM(S): 5 TABLET ORAL at 18:00

## 2019-03-17 RX ADMIN — CHLORHEXIDINE GLUCONATE 1 APPLICATION(S): 213 SOLUTION TOPICAL at 07:41

## 2019-03-17 RX ADMIN — MORPHINE SULFATE 8 MILLIGRAM(S): 50 CAPSULE, EXTENDED RELEASE ORAL at 00:29

## 2019-03-17 RX ADMIN — Medication 3 MILLILITER(S): at 17:29

## 2019-03-17 RX ADMIN — PANTOPRAZOLE SODIUM 40 MILLIGRAM(S): 20 TABLET, DELAYED RELEASE ORAL at 06:35

## 2019-03-17 RX ADMIN — Medication 650 MILLIGRAM(S): at 21:03

## 2019-03-17 RX ADMIN — Medication 3 MILLILITER(S): at 07:43

## 2019-03-17 RX ADMIN — MORPHINE SULFATE 8 MILLIGRAM(S): 50 CAPSULE, EXTENDED RELEASE ORAL at 04:02

## 2019-03-17 NOTE — H&P ADULT - NSHPLABSRESULTS_GEN_ALL_CORE
EKG - nsr, normal ekg                          15.1   10.05 )-----------( 264      ( 17 Mar 2019 01:00 )             45.9     03-16    138  |  100  |  13  ----------------------------<  101<H>  4.7   |  30  |  0.7    Ca    9.1      16 Mar 2019 21:55    TPro  6.4  /  Alb  3.8  /  TBili  0.2  /  DBili  x   /  AST  16  /  ALT  12  /  AlkPhos  118<H>  03-16              Lactate Trend    CARDIAC MARKERS ( 16 Mar 2019 21:55 )  x     / <0.01 ng/mL / x     / x     / x          CAPILLARY BLOOD GLUCOSE EKG - nsr, normal ekg                          15.1   10.05 )-----------( 264      ( 17 Mar 2019 01:00 )             45.9     03-16    138  |  100  |  13  ----------------------------<  101<H>  4.7   |  30  |  0.7    Ca    9.1      16 Mar 2019 21:55    TPro  6.4  /  Alb  3.8  /  TBili  0.2  /  DBili  x   /  AST  16  /  ALT  12  /  AlkPhos  118<H>  03-16          Lactate Trend    CARDIAC MARKERS ( 16 Mar 2019 21:55 )  x     / <0.01 ng/mL / x     / x     / x          CAPILLARY BLOOD GLUCOSE    CXR to me is NAPD

## 2019-03-17 NOTE — H&P ADULT - NSHPREVIEWOFSYSTEMS_GEN_ALL_CORE
In addition to HPI and PMH, patient uses a cane and wears glasses In addition to HPI and PMH, patient uses a cane and wears glasses. He lost about 150 lbs on his own, uses ambien for sleep

## 2019-03-17 NOTE — H&P ADULT - NSICDXPASTSURGICALHX_GEN_ALL_CORE_FT
PAST SURGICAL HISTORY:  H/O knee surgery arthoscopic x4    Hernia, inguinal, bilateral 3 months old    History of appendectomy     History of cholecystectomy     History of knee replacement procedure of right knee

## 2019-03-17 NOTE — H&P ADULT - NSICDXPASTMEDICALHX_GEN_ALL_CORE_FT
PAST MEDICAL HISTORY:  Colitis     COPD (chronic obstructive pulmonary disease)     GERD (gastroesophageal reflux disease)     Hiatal hernia     High blood cholesterol     High cholesterol     HTN (hypertension)     Morbid obesity     Numbness and tingling hands    Obstructive sleep apnea     Osteoarthritis     SOB (shortness of breath)

## 2019-03-17 NOTE — H&P ADULT - HISTORY OF PRESENT ILLNESS
52yo male whose PMH includes COPD and chronic orthopedic related pain analgesics include morphine 400 mg daily, oxycodone and methadone) presents to the ER with shortness of breath for 2-3 days. Associated with cough productive of "clear" phlegm but he denies fevers. 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 Associated with cough productive of "clear" phlegm but he denies fevers. 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

## 2019-03-17 NOTE — CONSULT NOTE ADULT - SUBJECTIVE AND OBJECTIVE BOX
Patient is a 53y old  Male who presents with a chief complaint of COPD (17 Mar 2019 03:59)      HPI:  54yo male whose PMH includes COPD  REBECCA on bipap and chronic orthopedic related pain analgesics include morphine 400 mg daily, oxycodone and methadone) presents to the ER with shortness of breath for 2-3 days. Associated with cough productive of "clear" phlegm but he denies fevers. 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 but he also still uses tobacco (17 Mar 2019 03:59)      PAST MEDICAL & SURGICAL HISTORY:  Numbness and tingling: hands  Colitis  Hiatal hernia  SOB (shortness of breath)  Osteoarthritis  High blood cholesterol  Morbid obesity  Obstructive sleep apnea  GERD (gastroesophageal reflux disease)  High cholesterol  HTN (hypertension)  COPD (chronic obstructive pulmonary disease)  H/O knee surgery: arthoscopic x4  Hernia, inguinal, bilateral: 3 months old  History of knee replacement procedure of right knee  History of appendectomy  History of cholecystectomy      FAMILY HISTORY:    Family history: No family cardiovascular system   Occupation:  Alochol: Denied  Smoking: postive   Drug Use: Denied  Marital Status:           Allergies    No Known Allergies    Intolerances        Home Medications:  Advair  mcg-21 mcg/inh inhalation aerosol: 2 puff(s) inhaled 2 times a day (16 Mar 2019 21:10)  aspirin 81 mg oral tablet, chewable:  (16 Mar 2019 21:10)  fenofibrate 134 mg oral capsule: 1 cap(s) orally once a day (16 Mar 2019 21:10)  Lasix 40 mg oral tablet: 1 tab(s) orally once a day, As Needed (16 Mar 2019 21:10)  lisinopril 5 mg oral tablet: 1 tab(s) orally once a day (16 Mar 2019 21:10)  morphine 100 mg/12 hours oral tablet, extended release: 1 tab(s) orally 4 times a day (16 Mar 2019 21:10)  NexIUM 40 mg oral delayed release capsule: 1 cap(s) orally once a day (16 Mar 2019 21:10)  oxyCODONE 30 mg oral tablet: 1 tab(s) orally every 4 hours, As Needed (16 Mar 2019 21:10)  Spiriva 18 mcg inhalation capsule: 1 cap(s) inhaled once a day (16 Mar 2019 21:10)  Xopenex HFA: inhaled once a day, As Needed (16 Mar 2019 21:10)  Zocor 20 mg oral tablet: 1 tab(s) orally once a day (at bedtime) (16 Mar 2019 21:10)      ROS: as in HPI; All other systems reviewed are negative        PHYSICAL EXAM:  Vital Signs Last 24 Hrs  T(C): 36.1 (17 Mar 2019 05:09), Max: 36.7 (17 Mar 2019 01:18)  T(F): 97 (17 Mar 2019 05:09), Max: 98 (17 Mar 2019 01:18)  HR: 85 (17 Mar 2019 05:09) (80 - 94)  BP: 118/71 (17 Mar 2019 05:09) (118/71 - 148/78)  BP(mean): --  RR: 18 (17 Mar 2019 05:09) (18 - 20)  SpO2: 98% (17 Mar 2019 01:18) (98% - 98%)      GENERAL: NAD, well-groomed, well-developed  HEAD:  Atraumatic, Normocephalic  EYES: EOMI, PERRLA, conjunctiva and sclera clear  ENMT: No tonsillar erythema, exudates, or enlargement; Moist mucous membranes, Good dentition, No lesions  NECK: Supple, No JVD, Normal thyroid  NERVOUS SYSTEM:  Alert & Oriented X3, Good concentration; Motor Strength 5/5 B/L upper and lower extremities; DTRs 2+ intact and symmetric  CHEST/LUNG: b/l wheeze   HEART: Regular rate and rhythm; No murmurs, rubs, or gallops  ABDOMEN: Soft, Nontender, Nondistended; Bowel sounds present  EXTREMITIES:  2+ Peripheral Pulses, No clubbing, cyanosis, or edema  LYMPH: No lymphadenopathy noted  SKIN: No rashes or lesions    HOSPITAL MEDICATIONS:  MEDICATIONS  (STANDING):  ALBUTerol/ipratropium for Nebulization 3 milliLiter(s) Nebulizer every 4 hours  aspirin  chewable 81 milliGRAM(s) Oral daily  chlorhexidine 4% Liquid 1 Application(s) Topical <User Schedule>  fenofibrate Tablet 145 milliGRAM(s) Oral at bedtime  lisinopril 5 milliGRAM(s) Oral daily  methylPREDNISolone sodium succinate Injectable 60 milliGRAM(s) IV Push every 8 hours  pantoprazole    Tablet 40 milliGRAM(s) Oral before breakfast  simvastatin 20 milliGRAM(s) Oral at bedtime    MEDICATIONS  (PRN):  furosemide    Tablet 40 milliGRAM(s) Oral daily PRN swelling  morphine  - Injectable 8 milliGRAM(s) IV Push every 4 hours PRN Severe Pain (7 - 10)  zolpidem 5 milliGRAM(s) Oral at bedtime PRN Insomnia  zolpidem 5 milliGRAM(s) Oral at bedtime PRN Insomnia      LABS:                        15.1   10.05 )-----------( 264      ( 17 Mar 2019 01:00 )             45.9     03-16    138  |  100  |  13  ----------------------------<  101<H>  4.7   |  30  |  0.7    Ca    9.1      16 Mar 2019 21:55    TPro  6.4  /  Alb  3.8  /  TBili  0.2  /  DBili  x   /  AST  16  /  ALT  12  /  AlkPhos  118<H>  03-16                  RADIOLOGY:  [ ] Reviewed and interpreted by me  no infiltrate   ECHO:    Point of Care Ultrasound Findings;    PFT:

## 2019-03-17 NOTE — CONSULT NOTE ADULT - ASSESSMENT
Impression:          Plan: Impression: Copd exacerbation         Plan:  solumderol 60 mg q 12 hrs   nebulizer q 4 hrs and prn   Symbicort Q 12 hrs   follow cx   need LAMA upon discharge   smoking cessation   bipap at night and prn   dvt prophylaxis

## 2019-03-18 DIAGNOSIS — Z71.89 OTHER SPECIFIED COUNSELING: ICD-10-CM

## 2019-03-18 RX ORDER — MORPHINE SULFATE 50 MG/1
100 CAPSULE, EXTENDED RELEASE ORAL
Qty: 0 | Refills: 0 | Status: DISCONTINUED | OUTPATIENT
Start: 2019-03-18 | End: 2019-03-18

## 2019-03-18 RX ADMIN — Medication 3 MILLILITER(S): at 13:35

## 2019-03-18 RX ADMIN — OXYCODONE HYDROCHLORIDE 30 MILLIGRAM(S): 5 TABLET ORAL at 12:22

## 2019-03-18 RX ADMIN — Medication 81 MILLIGRAM(S): at 15:22

## 2019-03-18 RX ADMIN — Medication 60 MILLIGRAM(S): at 20:55

## 2019-03-18 RX ADMIN — LISINOPRIL 5 MILLIGRAM(S): 2.5 TABLET ORAL at 05:35

## 2019-03-18 RX ADMIN — ZOLPIDEM TARTRATE 5 MILLIGRAM(S): 10 TABLET ORAL at 22:02

## 2019-03-18 RX ADMIN — OXYCODONE HYDROCHLORIDE 30 MILLIGRAM(S): 5 TABLET ORAL at 13:32

## 2019-03-18 RX ADMIN — OXYCODONE HYDROCHLORIDE 30 MILLIGRAM(S): 5 TABLET ORAL at 16:31

## 2019-03-18 RX ADMIN — Medication 145 MILLIGRAM(S): at 21:10

## 2019-03-18 RX ADMIN — MORPHINE SULFATE 100 MILLIGRAM(S): 50 CAPSULE, EXTENDED RELEASE ORAL at 18:11

## 2019-03-18 RX ADMIN — OXYCODONE HYDROCHLORIDE 30 MILLIGRAM(S): 5 TABLET ORAL at 20:55

## 2019-03-18 RX ADMIN — OXYCODONE HYDROCHLORIDE 30 MILLIGRAM(S): 5 TABLET ORAL at 08:30

## 2019-03-18 RX ADMIN — ZOLPIDEM TARTRATE 5 MILLIGRAM(S): 10 TABLET ORAL at 21:56

## 2019-03-18 RX ADMIN — PANTOPRAZOLE SODIUM 40 MILLIGRAM(S): 20 TABLET, DELAYED RELEASE ORAL at 05:35

## 2019-03-18 RX ADMIN — MORPHINE SULFATE 8 MILLIGRAM(S): 50 CAPSULE, EXTENDED RELEASE ORAL at 05:35

## 2019-03-18 RX ADMIN — MORPHINE SULFATE 100 MILLIGRAM(S): 50 CAPSULE, EXTENDED RELEASE ORAL at 12:12

## 2019-03-18 RX ADMIN — MORPHINE SULFATE 8 MILLIGRAM(S): 50 CAPSULE, EXTENDED RELEASE ORAL at 06:30

## 2019-03-18 RX ADMIN — MORPHINE SULFATE 100 MILLIGRAM(S): 50 CAPSULE, EXTENDED RELEASE ORAL at 11:00

## 2019-03-18 RX ADMIN — Medication 650 MILLIGRAM(S): at 11:12

## 2019-03-18 RX ADMIN — Medication 3 MILLILITER(S): at 15:29

## 2019-03-18 RX ADMIN — Medication 650 MILLIGRAM(S): at 10:12

## 2019-03-18 RX ADMIN — Medication 3 MILLILITER(S): at 20:12

## 2019-03-18 RX ADMIN — OXYCODONE HYDROCHLORIDE 30 MILLIGRAM(S): 5 TABLET ORAL at 07:34

## 2019-03-18 RX ADMIN — SIMVASTATIN 20 MILLIGRAM(S): 20 TABLET, FILM COATED ORAL at 21:10

## 2019-03-18 NOTE — PROGRESS NOTE ADULT - ASSESSMENT
Impression:  Acute chronic COPD with exacerbation  No Impending respiratory failure        Check O2 sat on NC, record, continue O2 as necessary to maintain sats > 90%  Continue IV solumedrol   bronchodilator treatments ATC and PRN  complete course of antibiotics  NIPPV as needed and HS  OOB to chair  GI and DVT prophylaxis  pulmonary toilet  Monitor clinically, convert to oral prednisone as clinical improvement allows  Upon D/C the patient will need ICS/LABA, PRN albuterol, finish abx, slow taper of steroids ie. start Prednisone 40 mg and taper 10 mg Q4D.

## 2019-03-18 NOTE — PROGRESS NOTE ADULT - SUBJECTIVE AND OBJECTIVE BOX
CHIEF COMPLAINT:    Patient is a 53y old  Male who presents with a chief complaint of COPD (17 Mar 2019 07:41)      INTERVAL HPI/OVERNIGHT EVENTS:    Patient seen and examined at bedside. No acute overnight events occurred.    ROS: All other systems are negative.    Vital Signs:    T(F): 97.9 (03-18-19 @ 05:54), Max: 98 (03-17-19 @ 22:31)  HR: 75 (03-18-19 @ 05:54) (75 - 107)  BP: 109/58 (03-18-19 @ 05:54) (109/58 - 136/61)  RR: 18 (03-18-19 @ 05:54) (18 - 18)  SpO2: --  I&O's Summary    Daily     Daily   CAPILLARY BLOOD GLUCOSE          PHYSICAL EXAM:  GENERAL:  NAD, morbidly obese  SKIN: No rashes or lesions  HEENT: Atraumatic. Normocephalic. Anicteric  NECK:  No JVD.   PULMONARY: Clear to ausculation bilaterally. No wheezing. No rales  CVS: Normal S1, S2. Regular rate and rhythm. No murmurs.  ABDOMEN/GI: Soft, Nontender, Nondistended; Bowel sounds are present  EXTREMITIES:  No edema B/L LE.  NEUROLOGIC:  No motor deficit.  PSYCH: Alert & oriented x 3, normal affect    Consultant(s) Notes Reviewed:  [x ] YES  [ ] NO  Care Discussed with Consultants/Other Providers [ x] YES  [ ] NO    LABS:                        15.1   10.05 )-----------( 264      ( 17 Mar 2019 01:00 )             45.9     03-16    138  |  100  |  13  ----------------------------<  101<H>  4.7   |  30  |  0.7    Ca    9.1      16 Mar 2019 21:55    TPro  6.4  /  Alb  3.8  /  TBili  0.2  /  DBili  x   /  AST  16  /  ALT  12  /  AlkPhos  118<H>  03-16      Serum Pro-Brain Natriuretic Peptide: 68 pg/mL (03-16-19 @ 21:55)    Trop <0.01, CKMB --, CK --, 03-16-19 @ 21:55        RADIOLOGY & ADDITIONAL TESTS:  No new imaging    Medications:  Standing  ALBUTerol/ipratropium for Nebulization 3 milliLiter(s) Nebulizer every 4 hours  aspirin  chewable 81 milliGRAM(s) Oral daily  chlorhexidine 4% Liquid 1 Application(s) Topical <User Schedule>  fenofibrate Tablet 145 milliGRAM(s) Oral at bedtime  lisinopril 5 milliGRAM(s) Oral daily  morphine ER Tablet 100 milliGRAM(s) Oral <User Schedule>  pantoprazole    Tablet 40 milliGRAM(s) Oral before breakfast  predniSONE   Tablet 60 milliGRAM(s) Oral daily  simvastatin 20 milliGRAM(s) Oral at bedtime    PRN Meds  acetaminophen   Tablet .. 650 milliGRAM(s) Oral every 6 hours PRN  furosemide    Tablet 40 milliGRAM(s) Oral daily PRN  oxyCODONE    IR 30 milliGRAM(s) Oral every 4 hours PRN  zolpidem 5 milliGRAM(s) Oral at bedtime PRN  zolpidem 5 milliGRAM(s) Oral at bedtime PRN

## 2019-03-18 NOTE — PROGRESS NOTE ADULT - SUBJECTIVE AND OBJECTIVE BOX
Patient is a 53y old  Male who presents with a chief complaint of COPD (18 Mar 2019 10:26)        Interval Events: No overnight events. Feeling better. Walked outside for smoke.    REVIEW OF SYSTEMS:  Constitutional: No fevers or chills. No weight loss. No fatigue or generalized malaise.  Eyes: No itching or discharge from the eyes  ENT: No ear pain. No ear discharge. No nasal congestion. No post nasal drip. No epistaxis. No throat pain. No sore throat. No difficulty swallowing.   CV: No chest pain. No palpitations. No lightheadedness or dizziness.   Resp: +dyspnea at rest. +dyspnea on exertion. No orthopnea. +wheezing. +cough. No stridor. No sputum production. No chest pain with respiration.  GI: No nausea. No vomiting. No diarrhea.  MSK: No joint pain or pain in any extremities  Integumentary: No skin lesions. No pedal edema.  Neurological: No gross motor weakness. No sensory changes.      OBJECTIVE:  ICU Vital Signs Last 24 Hrs  T(C): 36.6 (18 Mar 2019 05:54), Max: 36.7 (17 Mar 2019 22:31)  T(F): 97.9 (18 Mar 2019 05:54), Max: 98 (17 Mar 2019 22:31)  HR: 75 (18 Mar 2019 05:54) (75 - 107)  BP: 109/58 (18 Mar 2019 05:54) (109/58 - 136/61)  RR: 18 (18 Mar 2019 05:54) (18 - 18)        CAPILLARY BLOOD GLUCOSE          PHYSICAL EXAM:  General: Awake, alert, oriented X 3.   HEENT: Atraumatic, normocephalic.                 Mallampatti Grade                 No nasal congestion.                No tonsillar or pharyngeal exudates.  Lymph Nodes: No palpable lymphadenopathy neck, supraclavicular region  Neck: No JVD. No carotid bruit, no thyromegally  Respiratory: Normal chest expansion                         Normal percussion                         Normal and equal air entry                         diffuse wheeze, scattered   Cardiovascular: S1 S2 normal. No murmurs, rubs or gallops.   Abdomen: Soft, non-tender, non-distended. No organomegaly.  Extremities: Warm to touch. Peripheral pulse palpable. No pedal edema.   Skin: No rashes or skin lesions, warm dry  Neurological: Motor and sensory examination equal and normal in all four extremities.  Psychiatry: Appropriate mood and affect.    HOSPITAL MEDICATIONS:  MEDICATIONS  (STANDING):  ALBUTerol/ipratropium for Nebulization 3 milliLiter(s) Nebulizer every 4 hours  aspirin  chewable 81 milliGRAM(s) Oral daily  chlorhexidine 4% Liquid 1 Application(s) Topical <User Schedule>  fenofibrate Tablet 145 milliGRAM(s) Oral at bedtime  lisinopril 5 milliGRAM(s) Oral daily  morphine ER Tablet 100 milliGRAM(s) Oral <User Schedule>  pantoprazole    Tablet 40 milliGRAM(s) Oral before breakfast  predniSONE   Tablet 60 milliGRAM(s) Oral daily  simvastatin 20 milliGRAM(s) Oral at bedtime    MEDICATIONS  (PRN):  acetaminophen   Tablet .. 650 milliGRAM(s) Oral every 6 hours PRN Temp greater or equal to 38C (100.4F), Moderate Pain (4 - 6)  furosemide    Tablet 40 milliGRAM(s) Oral daily PRN swelling  oxyCODONE    IR 30 milliGRAM(s) Oral every 4 hours PRN Severe Pain (7 - 10)  zolpidem 5 milliGRAM(s) Oral at bedtime PRN Insomnia  zolpidem 5 milliGRAM(s) Oral at bedtime PRN Insomnia      LABS:                        15.1   10.05 )-----------( 264      ( 17 Mar 2019 01:00 )             45.9     03-16    138  |  100  |  13  ----------------------------<  101<H>  4.7   |  30  |  0.7    Ca    9.1      16 Mar 2019 21:55    TPro  6.4  /  Alb  3.8  /  TBili  0.2  /  DBili  x   /  AST  16  /  ALT  12  /  AlkPhos  118<H>  03-16                      RADIOLOGY: Radiology personally reviewed.

## 2019-03-18 NOTE — PROGRESS NOTE ADULT - ASSESSMENT
52 yo M with COPD presents for evaluation of SOB    Dypnea  -2/2 COPD exacebation  -lungs CTA today  -d/c solumedrol, start prednisone  -c/w inhalors    REBECCA  -c/w BiPAP    SMoking cessation  -pt goes outside to smoke    Chronic pain  -doses checked with ISTOP  -c/w opoids    Morbid obesity  -BMI 41  -diet and lifestyle modifications    DVT Px  Full Code  From home

## 2019-03-19 ENCOUNTER — TRANSCRIPTION ENCOUNTER (OUTPATIENT)
Age: 54
End: 2019-03-19

## 2019-03-19 VITALS — OXYGEN SATURATION: 94 % | RESPIRATION RATE: 25 BRPM

## 2019-03-19 RX ORDER — FUROSEMIDE 40 MG
1 TABLET ORAL
Qty: 0 | Refills: 0 | DISCHARGE
Start: 2019-03-19

## 2019-03-19 RX ORDER — MORPHINE SULFATE 50 MG/1
8 CAPSULE, EXTENDED RELEASE ORAL ONCE
Qty: 0 | Refills: 0 | Status: DISCONTINUED | OUTPATIENT
Start: 2019-03-19 | End: 2019-03-19

## 2019-03-19 RX ORDER — ZOLPIDEM TARTRATE 10 MG/1
1 TABLET ORAL
Qty: 5 | Refills: 0
Start: 2019-03-19

## 2019-03-19 RX ORDER — IPRATROPIUM/ALBUTEROL SULFATE 18-103MCG
3 AEROSOL WITH ADAPTER (GRAM) INHALATION
Qty: 360 | Refills: 0
Start: 2019-03-19 | End: 2019-04-17

## 2019-03-19 RX ORDER — MORPHINE SULFATE 50 MG/1
100 CAPSULE, EXTENDED RELEASE ORAL ONCE
Qty: 0 | Refills: 0 | Status: DISCONTINUED | OUTPATIENT
Start: 2019-03-19 | End: 2019-03-19

## 2019-03-19 RX ORDER — FUROSEMIDE 40 MG
1 TABLET ORAL
Qty: 0 | Refills: 0 | COMMUNITY

## 2019-03-19 RX ADMIN — MORPHINE SULFATE 8 MILLIGRAM(S): 50 CAPSULE, EXTENDED RELEASE ORAL at 01:08

## 2019-03-19 RX ADMIN — MORPHINE SULFATE 8 MILLIGRAM(S): 50 CAPSULE, EXTENDED RELEASE ORAL at 01:06

## 2019-03-19 RX ADMIN — Medication 650 MILLIGRAM(S): at 14:23

## 2019-03-19 RX ADMIN — OXYCODONE HYDROCHLORIDE 30 MILLIGRAM(S): 5 TABLET ORAL at 05:12

## 2019-03-19 RX ADMIN — PANTOPRAZOLE SODIUM 40 MILLIGRAM(S): 20 TABLET, DELAYED RELEASE ORAL at 08:52

## 2019-03-19 RX ADMIN — OXYCODONE HYDROCHLORIDE 30 MILLIGRAM(S): 5 TABLET ORAL at 01:05

## 2019-03-19 RX ADMIN — Medication 81 MILLIGRAM(S): at 11:15

## 2019-03-19 RX ADMIN — MORPHINE SULFATE 100 MILLIGRAM(S): 50 CAPSULE, EXTENDED RELEASE ORAL at 13:24

## 2019-03-19 RX ADMIN — Medication 3 MILLILITER(S): at 01:40

## 2019-03-19 RX ADMIN — Medication 3 MILLILITER(S): at 08:21

## 2019-03-19 RX ADMIN — MORPHINE SULFATE 100 MILLIGRAM(S): 50 CAPSULE, EXTENDED RELEASE ORAL at 12:50

## 2019-03-19 RX ADMIN — OXYCODONE HYDROCHLORIDE 30 MILLIGRAM(S): 5 TABLET ORAL at 01:08

## 2019-03-19 RX ADMIN — OXYCODONE HYDROCHLORIDE 30 MILLIGRAM(S): 5 TABLET ORAL at 05:15

## 2019-03-19 RX ADMIN — LISINOPRIL 5 MILLIGRAM(S): 2.5 TABLET ORAL at 05:14

## 2019-03-19 RX ADMIN — OXYCODONE HYDROCHLORIDE 30 MILLIGRAM(S): 5 TABLET ORAL at 11:16

## 2019-03-19 RX ADMIN — Medication 650 MILLIGRAM(S): at 15:00

## 2019-03-19 RX ADMIN — Medication 60 MILLIGRAM(S): at 05:14

## 2019-03-19 NOTE — PROGRESS NOTE ADULT - ASSESSMENT
Impression:  Acute chronic COPD with exacerbation  No Impending respiratory failure      Plan:  Check O2 sat on NC, record, continue O2 as necessary to maintain sats > 90%  OK to D/C home  convert to oral prednisone   slow taper of steroids ie. start Prednisone 40 mg and taper 10 mg Q5D.  The patient will need ICS/LABA, LAMA PRN albuterol, finish abx,

## 2019-03-19 NOTE — DISCHARGE NOTE PROVIDER - NSDCCPCAREPLAN_GEN_ALL_CORE_FT
PRINCIPAL DISCHARGE DIAGNOSIS  Diagnosis: COPD exacerbation  Assessment and Plan of Treatment: Follow up with Pulmonary. continue prednisone and inhalers. If you feel that you have an exacerbation coming on please take steroids and doxycycline.

## 2019-03-19 NOTE — DISCHARGE NOTE PROVIDER - CARE PROVIDER_API CALL
Albino Morley (DO)  Critical Care Medicine; Pulmonary Disease; Sleep Medicine  [address] [place] [address], [place] [address]  Phone: [phone]  Fax: [phone]  Follow Up Time:

## 2019-03-19 NOTE — DISCHARGE NOTE NURSING/CASE MANAGEMENT/SOCIAL WORK - NSDCDPATPORTLINK_GEN_ALL_CORE
You can access the [facility] Patient Portal, offered by [facility], by registering with the following website: [email or web address]

## 2019-03-19 NOTE — DISCHARGE NOTE PROVIDER - HOSPITAL COURSE
54 yo M with COPD presents for evaluation of SOB. Found to have COPD exacerbation. Pt transitioned to prednisone from solumedrol yesterday. Pulse ox stable on RA and ambulation. Smoking cessation done but pt continues to smoke while in hospital. Pt is to c/w BiPAP. COPD action plan reviewed with pt. Pt to c/w prednisone taper and use inhalers.

## 2019-03-19 NOTE — CHART NOTE - NSCHARTNOTEFT_GEN_A_CORE
Pt seen and examined at bedside. No acute events occurred. Lungs have mild rhonchi b/l, pt witness by self and other house staff walking freely with out dyspnea to go outside to smoke. Discussed COPD action plan at length with pt, discussed smoking cessation. Declined nictoine replacement or other cessation therapy. Wants to quit by himself. Prednisone for current treatment sent to pharmacy. Prednisone and doxycycline as part of COPD action plan sent to pharmacy as well. Pt to follow up with PMD.    PHYSICAL EXAM:  GENERAL: NAD, speaks in full sentences, no signs of respiratory distress  HEAD:  Atraumatic, Normocephalic  EYES: Anicteric  NECK: Supple, No JVD  CHEST/LUNG: Coarse rhonchi  HEART: Regular rate and rhythm; No murmurs;   ABDOMEN: Soft, Nontender, Nondistended; Bowel sounds present; No guarding  EXTREMITIES:  2+ Peripheral Pulses, No cyanosis or edema  PSYCH: AAOx3  NEUROLOGY: non-focal  SKIN: No rashes or lesions    Time spent=37 minutes

## 2019-03-22 DIAGNOSIS — K21.9 GASTRO-ESOPHAGEAL REFLUX DISEASE WITHOUT ESOPHAGITIS: ICD-10-CM

## 2019-03-22 DIAGNOSIS — M19.90 UNSPECIFIED OSTEOARTHRITIS, UNSPECIFIED SITE: ICD-10-CM

## 2019-03-22 DIAGNOSIS — E66.01 MORBID (SEVERE) OBESITY DUE TO EXCESS CALORIES: ICD-10-CM

## 2019-03-22 DIAGNOSIS — E78.5 HYPERLIPIDEMIA, UNSPECIFIED: ICD-10-CM

## 2019-03-22 DIAGNOSIS — I10 ESSENTIAL (PRIMARY) HYPERTENSION: ICD-10-CM

## 2019-03-22 DIAGNOSIS — J44.1 CHRONIC OBSTRUCTIVE PULMONARY DISEASE WITH (ACUTE) EXACERBATION: ICD-10-CM

## 2019-03-22 DIAGNOSIS — F17.210 NICOTINE DEPENDENCE, CIGARETTES, UNCOMPLICATED: ICD-10-CM

## 2019-03-22 DIAGNOSIS — G47.33 OBSTRUCTIVE SLEEP APNEA (ADULT) (PEDIATRIC): ICD-10-CM

## 2019-03-22 DIAGNOSIS — G89.29 OTHER CHRONIC PAIN: ICD-10-CM

## 2019-03-22 DIAGNOSIS — K44.9 DIAPHRAGMATIC HERNIA WITHOUT OBSTRUCTION OR GANGRENE: ICD-10-CM

## 2019-03-25 ENCOUNTER — EMERGENCY (EMERGENCY)
Facility: HOSPITAL | Age: 54
LOS: 0 days | Discharge: HOME | End: 2019-03-26
Attending: EMERGENCY MEDICINE | Admitting: EMERGENCY MEDICINE

## 2019-03-25 ENCOUNTER — EMERGENCY (EMERGENCY)
Facility: HOSPITAL | Age: 54
LOS: 0 days | Discharge: HOME | End: 2019-03-25
Attending: EMERGENCY MEDICINE | Admitting: EMERGENCY MEDICINE

## 2019-03-25 VITALS
HEIGHT: 71 IN | HEART RATE: 85 BPM | OXYGEN SATURATION: 97 % | RESPIRATION RATE: 18 BRPM | SYSTOLIC BLOOD PRESSURE: 133 MMHG | DIASTOLIC BLOOD PRESSURE: 92 MMHG | TEMPERATURE: 96 F | WEIGHT: 300.05 LBS

## 2019-03-25 VITALS
OXYGEN SATURATION: 100 % | TEMPERATURE: 98 F | DIASTOLIC BLOOD PRESSURE: 87 MMHG | HEIGHT: 70 IN | SYSTOLIC BLOOD PRESSURE: 137 MMHG | WEIGHT: 300.05 LBS | RESPIRATION RATE: 18 BRPM | HEART RATE: 72 BPM

## 2019-03-25 DIAGNOSIS — Z79.2 LONG TERM (CURRENT) USE OF ANTIBIOTICS: ICD-10-CM

## 2019-03-25 DIAGNOSIS — Z96.651 PRESENCE OF RIGHT ARTIFICIAL KNEE JOINT: Chronic | ICD-10-CM

## 2019-03-25 DIAGNOSIS — Z79.51 LONG TERM (CURRENT) USE OF INHALED STEROIDS: ICD-10-CM

## 2019-03-25 DIAGNOSIS — E78.00 PURE HYPERCHOLESTEROLEMIA, UNSPECIFIED: ICD-10-CM

## 2019-03-25 DIAGNOSIS — J44.1 CHRONIC OBSTRUCTIVE PULMONARY DISEASE WITH (ACUTE) EXACERBATION: ICD-10-CM

## 2019-03-25 DIAGNOSIS — R10.31 RIGHT LOWER QUADRANT PAIN: ICD-10-CM

## 2019-03-25 DIAGNOSIS — Z98.890 OTHER SPECIFIED POSTPROCEDURAL STATES: Chronic | ICD-10-CM

## 2019-03-25 DIAGNOSIS — R06.2 WHEEZING: ICD-10-CM

## 2019-03-25 DIAGNOSIS — K21.9 GASTRO-ESOPHAGEAL REFLUX DISEASE WITHOUT ESOPHAGITIS: ICD-10-CM

## 2019-03-25 DIAGNOSIS — R55 SYNCOPE AND COLLAPSE: ICD-10-CM

## 2019-03-25 DIAGNOSIS — F17.200 NICOTINE DEPENDENCE, UNSPECIFIED, UNCOMPLICATED: ICD-10-CM

## 2019-03-25 DIAGNOSIS — R19.7 DIARRHEA, UNSPECIFIED: ICD-10-CM

## 2019-03-25 DIAGNOSIS — Z96.651 PRESENCE OF RIGHT ARTIFICIAL KNEE JOINT: ICD-10-CM

## 2019-03-25 DIAGNOSIS — K40.20 BILATERAL INGUINAL HERNIA, WITHOUT OBSTRUCTION OR GANGRENE, NOT SPECIFIED AS RECURRENT: Chronic | ICD-10-CM

## 2019-03-25 DIAGNOSIS — E78.5 HYPERLIPIDEMIA, UNSPECIFIED: ICD-10-CM

## 2019-03-25 DIAGNOSIS — Z87.891 PERSONAL HISTORY OF NICOTINE DEPENDENCE: ICD-10-CM

## 2019-03-25 DIAGNOSIS — Z79.82 LONG TERM (CURRENT) USE OF ASPIRIN: ICD-10-CM

## 2019-03-25 DIAGNOSIS — R50.9 FEVER, UNSPECIFIED: ICD-10-CM

## 2019-03-25 DIAGNOSIS — I10 ESSENTIAL (PRIMARY) HYPERTENSION: ICD-10-CM

## 2019-03-25 DIAGNOSIS — R10.32 LEFT LOWER QUADRANT PAIN: ICD-10-CM

## 2019-03-25 DIAGNOSIS — Z87.19 PERSONAL HISTORY OF OTHER DISEASES OF THE DIGESTIVE SYSTEM: ICD-10-CM

## 2019-03-25 DIAGNOSIS — R11.2 NAUSEA WITH VOMITING, UNSPECIFIED: ICD-10-CM

## 2019-03-25 DIAGNOSIS — Z79.891 LONG TERM (CURRENT) USE OF OPIATE ANALGESIC: ICD-10-CM

## 2019-03-25 DIAGNOSIS — Z79.52 LONG TERM (CURRENT) USE OF SYSTEMIC STEROIDS: ICD-10-CM

## 2019-03-25 DIAGNOSIS — J44.9 CHRONIC OBSTRUCTIVE PULMONARY DISEASE, UNSPECIFIED: ICD-10-CM

## 2019-03-25 LAB
ALBUMIN SERPL ELPH-MCNC: 4 G/DL — SIGNIFICANT CHANGE UP (ref 3.5–5.2)
ALBUMIN SERPL ELPH-MCNC: 4.2 G/DL — SIGNIFICANT CHANGE UP (ref 3.5–5.2)
ALP SERPL-CCNC: 133 U/L — HIGH (ref 30–115)
ALP SERPL-CCNC: 144 U/L — HIGH (ref 30–115)
ALT FLD-CCNC: 43 U/L — HIGH (ref 0–41)
ALT FLD-CCNC: 47 U/L — HIGH (ref 0–41)
ANION GAP SERPL CALC-SCNC: 10 MMOL/L — SIGNIFICANT CHANGE UP (ref 7–14)
ANION GAP SERPL CALC-SCNC: 12 MMOL/L — SIGNIFICANT CHANGE UP (ref 7–14)
APPEARANCE UR: CLEAR — SIGNIFICANT CHANGE UP
AST SERPL-CCNC: 15 U/L — SIGNIFICANT CHANGE UP (ref 0–41)
AST SERPL-CCNC: 21 U/L — SIGNIFICANT CHANGE UP (ref 0–41)
BACTERIA # UR AUTO: ABNORMAL
BASE EXCESS BLDV CALC-SCNC: 2.2 MMOL/L — HIGH (ref -2–2)
BASOPHILS # BLD AUTO: 0.03 K/UL — SIGNIFICANT CHANGE UP (ref 0–0.2)
BASOPHILS NFR BLD AUTO: 0.3 % — SIGNIFICANT CHANGE UP (ref 0–1)
BILIRUB SERPL-MCNC: 0.4 MG/DL — SIGNIFICANT CHANGE UP (ref 0.2–1.2)
BILIRUB SERPL-MCNC: 0.5 MG/DL — SIGNIFICANT CHANGE UP (ref 0.2–1.2)
BILIRUB UR-MCNC: ABNORMAL
BUN SERPL-MCNC: 14 MG/DL — SIGNIFICANT CHANGE UP (ref 10–20)
BUN SERPL-MCNC: 9 MG/DL — LOW (ref 10–20)
CA-I SERPL-SCNC: 1.19 MMOL/L — SIGNIFICANT CHANGE UP (ref 1.12–1.3)
CALCIUM SERPL-MCNC: 9.2 MG/DL — SIGNIFICANT CHANGE UP (ref 8.5–10.1)
CALCIUM SERPL-MCNC: 9.3 MG/DL — SIGNIFICANT CHANGE UP (ref 8.5–10.1)
CHLORIDE SERPL-SCNC: 103 MMOL/L — SIGNIFICANT CHANGE UP (ref 98–110)
CHLORIDE SERPL-SCNC: 103 MMOL/L — SIGNIFICANT CHANGE UP (ref 98–110)
CK SERPL-CCNC: 73 U/L — SIGNIFICANT CHANGE UP (ref 0–225)
CO2 SERPL-SCNC: 26 MMOL/L — SIGNIFICANT CHANGE UP (ref 17–32)
CO2 SERPL-SCNC: 28 MMOL/L — SIGNIFICANT CHANGE UP (ref 17–32)
COLOR SPEC: YELLOW — SIGNIFICANT CHANGE UP
CREAT SERPL-MCNC: 0.6 MG/DL — LOW (ref 0.7–1.5)
CREAT SERPL-MCNC: 0.6 MG/DL — LOW (ref 0.7–1.5)
DIFF PNL FLD: NEGATIVE — SIGNIFICANT CHANGE UP
EOSINOPHIL # BLD AUTO: 0.03 K/UL — SIGNIFICANT CHANGE UP (ref 0–0.7)
EOSINOPHIL NFR BLD AUTO: 0.3 % — SIGNIFICANT CHANGE UP (ref 0–8)
EPI CELLS # UR: ABNORMAL /HPF
GAS PNL BLDV: 138 MMOL/L — SIGNIFICANT CHANGE UP (ref 136–145)
GAS PNL BLDV: SIGNIFICANT CHANGE UP
GLUCOSE SERPL-MCNC: 134 MG/DL — HIGH (ref 70–99)
GLUCOSE SERPL-MCNC: 98 MG/DL — SIGNIFICANT CHANGE UP (ref 70–99)
GLUCOSE UR QL: NEGATIVE MG/DL — SIGNIFICANT CHANGE UP
HCO3 BLDV-SCNC: 26 MMOL/L — SIGNIFICANT CHANGE UP (ref 22–29)
HCT VFR BLD CALC: 47.6 % — SIGNIFICANT CHANGE UP (ref 42–52)
HCT VFR BLDA CALC: 47.7 % — HIGH (ref 34–44)
HGB BLD CALC-MCNC: 15.6 G/DL — SIGNIFICANT CHANGE UP (ref 14–18)
HGB BLD-MCNC: 15.8 G/DL — SIGNIFICANT CHANGE UP (ref 14–18)
HOROWITZ INDEX BLDV+IHG-RTO: 21 — SIGNIFICANT CHANGE UP
IMM GRANULOCYTES NFR BLD AUTO: 0.4 % — HIGH (ref 0.1–0.3)
INR BLD: 1.13 RATIO — SIGNIFICANT CHANGE UP (ref 0.65–1.3)
KETONES UR-MCNC: NEGATIVE — SIGNIFICANT CHANGE UP
LACTATE BLDV-MCNC: 0.9 MMOL/L — SIGNIFICANT CHANGE UP (ref 0.5–1.6)
LACTATE SERPL-SCNC: 1.1 MMOL/L — SIGNIFICANT CHANGE UP (ref 0.5–2.2)
LEUKOCYTE ESTERASE UR-ACNC: NEGATIVE — SIGNIFICANT CHANGE UP
LIDOCAIN IGE QN: 14 U/L — SIGNIFICANT CHANGE UP (ref 7–60)
LIDOCAIN IGE QN: 27 U/L — SIGNIFICANT CHANGE UP (ref 7–60)
LYMPHOCYTES # BLD AUTO: 1.63 K/UL — SIGNIFICANT CHANGE UP (ref 1.2–3.4)
LYMPHOCYTES # BLD AUTO: 16.2 % — LOW (ref 20.5–51.1)
MCHC RBC-ENTMCNC: 28.2 PG — SIGNIFICANT CHANGE UP (ref 27–31)
MCHC RBC-ENTMCNC: 33.2 G/DL — SIGNIFICANT CHANGE UP (ref 32–37)
MCV RBC AUTO: 84.8 FL — SIGNIFICANT CHANGE UP (ref 80–94)
MONOCYTES # BLD AUTO: 0.55 K/UL — SIGNIFICANT CHANGE UP (ref 0.1–0.6)
MONOCYTES NFR BLD AUTO: 5.5 % — SIGNIFICANT CHANGE UP (ref 1.7–9.3)
NEUTROPHILS # BLD AUTO: 7.76 K/UL — HIGH (ref 1.4–6.5)
NEUTROPHILS NFR BLD AUTO: 77.3 % — HIGH (ref 42.2–75.2)
NITRITE UR-MCNC: NEGATIVE — SIGNIFICANT CHANGE UP
NRBC # BLD: 0 /100 WBCS — SIGNIFICANT CHANGE UP (ref 0–0)
PCO2 BLDV: 39 MMHG — LOW (ref 41–51)
PH BLDV: 7.44 — HIGH (ref 7.26–7.43)
PH UR: 8.5 — SIGNIFICANT CHANGE UP (ref 5–8)
PLATELET # BLD AUTO: 255 K/UL — SIGNIFICANT CHANGE UP (ref 130–400)
PO2 BLDV: 47 MMHG — HIGH (ref 20–40)
POTASSIUM BLDV-SCNC: 4.4 MMOL/L — SIGNIFICANT CHANGE UP (ref 3.3–5.6)
POTASSIUM SERPL-MCNC: 4.2 MMOL/L — SIGNIFICANT CHANGE UP (ref 3.5–5)
POTASSIUM SERPL-MCNC: 4.7 MMOL/L — SIGNIFICANT CHANGE UP (ref 3.5–5)
POTASSIUM SERPL-MCNC: 4.8 MMOL/L — SIGNIFICANT CHANGE UP (ref 3.5–5)
POTASSIUM SERPL-SCNC: 4.2 MMOL/L — SIGNIFICANT CHANGE UP (ref 3.5–5)
POTASSIUM SERPL-SCNC: 4.7 MMOL/L — SIGNIFICANT CHANGE UP (ref 3.5–5)
POTASSIUM SERPL-SCNC: 4.8 MMOL/L — SIGNIFICANT CHANGE UP (ref 3.5–5)
PROT SERPL-MCNC: 6.9 G/DL — SIGNIFICANT CHANGE UP (ref 6–8)
PROT SERPL-MCNC: 6.9 G/DL — SIGNIFICANT CHANGE UP (ref 6–8)
PROT UR-MCNC: 30 MG/DL
PROTHROM AB SERPL-ACNC: 13 SEC — HIGH (ref 9.95–12.87)
RBC # BLD: 5.61 M/UL — SIGNIFICANT CHANGE UP (ref 4.7–6.1)
RBC # FLD: SIGNIFICANT CHANGE UP % (ref 11.5–14.5)
RBC CASTS # UR COMP ASSIST: SIGNIFICANT CHANGE UP /HPF
SAO2 % BLDV: 84 % — SIGNIFICANT CHANGE UP
SODIUM SERPL-SCNC: 141 MMOL/L — SIGNIFICANT CHANGE UP (ref 135–146)
SODIUM SERPL-SCNC: 141 MMOL/L — SIGNIFICANT CHANGE UP (ref 135–146)
SP GR SPEC: 1.02 — SIGNIFICANT CHANGE UP (ref 1.01–1.03)
UROBILINOGEN FLD QL: 1 MG/DL (ref 0.2–0.2)
WBC # BLD: 10.04 K/UL — SIGNIFICANT CHANGE UP (ref 4.8–10.8)
WBC # FLD AUTO: 10.04 K/UL — SIGNIFICANT CHANGE UP (ref 4.8–10.8)
WBC UR QL: SIGNIFICANT CHANGE UP /HPF

## 2019-03-25 RX ORDER — ASPIRIN/CALCIUM CARB/MAGNESIUM 324 MG
325 TABLET ORAL ONCE
Qty: 0 | Refills: 0 | Status: COMPLETED | OUTPATIENT
Start: 2019-03-25 | End: 2019-03-25

## 2019-03-25 RX ORDER — SODIUM CHLORIDE 9 MG/ML
1000 INJECTION INTRAMUSCULAR; INTRAVENOUS; SUBCUTANEOUS ONCE
Qty: 0 | Refills: 0 | Status: COMPLETED | OUTPATIENT
Start: 2019-03-25 | End: 2019-03-25

## 2019-03-25 RX ORDER — SODIUM CHLORIDE 9 MG/ML
1000 INJECTION INTRAMUSCULAR; INTRAVENOUS; SUBCUTANEOUS
Qty: 0 | Refills: 0 | Status: DISCONTINUED | OUTPATIENT
Start: 2019-03-25 | End: 2019-03-25

## 2019-03-25 RX ORDER — MORPHINE SULFATE 50 MG/1
4 CAPSULE, EXTENDED RELEASE ORAL ONCE
Qty: 0 | Refills: 0 | Status: DISCONTINUED | OUTPATIENT
Start: 2019-03-25 | End: 2019-03-25

## 2019-03-25 RX ORDER — SODIUM CHLORIDE 9 MG/ML
2000 INJECTION INTRAMUSCULAR; INTRAVENOUS; SUBCUTANEOUS ONCE
Qty: 0 | Refills: 0 | Status: COMPLETED | OUTPATIENT
Start: 2019-03-25 | End: 2019-03-25

## 2019-03-25 RX ORDER — ONDANSETRON 8 MG/1
4 TABLET, FILM COATED ORAL ONCE
Qty: 0 | Refills: 0 | Status: COMPLETED | OUTPATIENT
Start: 2019-03-25 | End: 2019-03-25

## 2019-03-25 RX ORDER — IPRATROPIUM/ALBUTEROL SULFATE 18-103MCG
3 AEROSOL WITH ADAPTER (GRAM) INHALATION ONCE
Qty: 0 | Refills: 0 | Status: COMPLETED | OUTPATIENT
Start: 2019-03-25 | End: 2019-03-25

## 2019-03-25 RX ORDER — MORPHINE SULFATE 50 MG/1
8 CAPSULE, EXTENDED RELEASE ORAL ONCE
Qty: 0 | Refills: 0 | Status: DISCONTINUED | OUTPATIENT
Start: 2019-03-25 | End: 2019-03-25

## 2019-03-25 RX ADMIN — MORPHINE SULFATE 8 MILLIGRAM(S): 50 CAPSULE, EXTENDED RELEASE ORAL at 16:47

## 2019-03-25 RX ADMIN — SODIUM CHLORIDE 125 MILLILITER(S): 9 INJECTION INTRAMUSCULAR; INTRAVENOUS; SUBCUTANEOUS at 14:56

## 2019-03-25 RX ADMIN — MORPHINE SULFATE 4 MILLIGRAM(S): 50 CAPSULE, EXTENDED RELEASE ORAL at 16:47

## 2019-03-25 RX ADMIN — SODIUM CHLORIDE 2000 MILLILITER(S): 9 INJECTION INTRAMUSCULAR; INTRAVENOUS; SUBCUTANEOUS at 14:55

## 2019-03-25 RX ADMIN — Medication 3 MILLILITER(S): at 16:12

## 2019-03-25 RX ADMIN — ONDANSETRON 4 MILLIGRAM(S): 8 TABLET, FILM COATED ORAL at 14:56

## 2019-03-25 RX ADMIN — MORPHINE SULFATE 8 MILLIGRAM(S): 50 CAPSULE, EXTENDED RELEASE ORAL at 14:56

## 2019-03-25 RX ADMIN — Medication 60 MILLIGRAM(S): at 16:12

## 2019-03-25 NOTE — ED ADULT TRIAGE NOTE - CHIEF COMPLAINT QUOTE
pt c/o diarrhea and lower abd pain, constant pain, c/o weakness, fever over the weekend and chills, c/o n/v.

## 2019-03-25 NOTE — ED PROVIDER NOTE - NS ED ROS FT
Review of Systems    Constitutional: (+) fever, (-) chills  Eyes/ENT: (-) blurry vision, (-) epistaxis, (-) sore throat  Cardiovascular: (-) chest pain, (-) syncope  Respiratory: (-) cough, (-) shortness of breath  Gastrointestinal: (+) pain, (+) nausea, (+) vomiting, (+) diarrhea  Musculoskeletal: (-) neck pain, (-) back pain, (-) joint pain  Integumentary: (-) rash, (-) edema  Neurological: (-) headache, (-) altered mental status  Psychiatric: (-) hallucinations  Allergic/Immunologic: (-) pruritus

## 2019-03-25 NOTE — ED PROVIDER NOTE - ATTENDING CONTRIBUTION TO CARE
52 yo m with pmh of copd, on pain meds for pain, recently admitted for respiratory issues presents with 5 days of worsening abd pain, nausea, vomiting and diarrhea.  pt with multiple episodes of diarrhea, loose, watery.  pt with occasional blood streaks.  + lower abd pain.  no cp, no sob.  no fevers, no chills.  no urinary complaints.  awake, alert.  abd soft with mild diffuse lower abd tenderness, no rebound, no guarding.  mmm.  lungs with mild expiratory wheezing.    p:  ct, labs, cxr, supportive care, ekg, reassess.

## 2019-03-25 NOTE — ED PROVIDER NOTE - CARE PROVIDER_API CALL
your PMD 1 day,   Phone: (   )    -  Fax: (   )    -  Follow Up Time:     Mane Guzman)  Gastroenterology  [address] [place] [address]  Phone: [phone]  Fax: [phone]  Follow Up Time: 1-3 Days

## 2019-03-25 NOTE — ED PROVIDER NOTE - CLINICAL SUMMARY MEDICAL DECISION MAKING FREE TEXT BOX
Pt here with abd pain, na usea, vomiting, diarrhea since being recently discharged from the hospital last week after being admitted for COPD.  D/C note from previous admission reviewed.  no chest pain, no sob.  pt with chornic copd.  pt had mild wheezing.  pt given nebs.  pt had been noncompliant with steroids as outpatient.  pt given steroids here.  Pt given multiple rounds of pain meds.  pt continued to walk around the ED asking for more pain medications However, pt was resting comfortably in the ED.  no chest pain.  CT negative for anything acute.  pt given rx for steroids.  pt dc with outpatient GI follow up.  pt was given strict return precautions by me multiple times.  pt dc.  Pt also had refused rectal exam to DOV Peter.

## 2019-03-25 NOTE — ED PROVIDER NOTE - PROVIDER TOKENS
FREE:[LAST:[your PMD 1 day],PHONE:[(   )    -],FAX:[(   )    -]],PROVIDER:[TOKEN:[[id number]],FOLLOWUP:[1-3 Days]]

## 2019-03-25 NOTE — ED ADULT TRIAGE NOTE - CHIEF COMPLAINT QUOTE
pt seen in ED earlier today, collapsed at bus stop, pt reports loc,  witnessed by bystanders and brought back, c/o nausea and abd pain.

## 2019-03-25 NOTE — ED PROVIDER NOTE - PROGRESS NOTE DETAILS
Patient given copies of all results and advised f/u with PMD and GI. Patient states he will see Dr. Guzman pt walking around the ED without difficulty.  no sob in the ED with ambulating.  pt was askin gthe nurse multiple times and walking to front of ED continuing to ask for pain meds for diarrhea.   ct negative for anything acute.  pt also asked the nurse to "push the morphine fast" through the IV.  work up negative for anyhting acute.  no need for admission at this time.  pt was noncompliant with outpatient steroids for copd.  will give rx for steroids.  pt given strict return precautions.  pt nontoxic, well appearing.  DOV Peter spoke to radiology about CXR finding on aorta and findings are the same as prior ct and other imaging studies. dilation of ascending aorta noted on xray report, discussed with Dr. Iverson, same as previous images

## 2019-03-25 NOTE — ED PROVIDER NOTE - NSFOLLOWUPINSTRUCTIONS_ED_ALL_ED_FT
Abdominal Pain    Many things can cause abdominal pain. Many times, abdominal pain is not caused by a disease and will improve without treatment. Your health care provider will do a physical exam to determine if there is a dangerous cause of your pain; blood tests and imaging may help determine the cause of your pain. However, in many cases, no cause may be found and you may need further testing as an outpatient. Monitor your abdominal pain for any changes.     SEEK IMMEDIATE MEDICAL CARE IF YOU HAVE ANY OF THE FOLLOWING SYMPTOMS: worsening abdominal pain, uncontrollable vomiting, profuse diarrhea, inability to have bowel movements or pass gas, black or bloody stools, fever accompanying chest pain or back pain, or fainting. These symptoms may represent a serious problem that is an emergency. Do not wait to see if the symptoms will go away. Get medical help right away. Call 911 and do not drive yourself to the hospital.    Patient to be discharged from ED. Any available test results were discussed with patient and/or family. Verbal instructions given, including instructions to return to ED immediately for any new, worsening, or concerning symptoms. Patient endorsed understanding. Written discharge instructions additionally given, including follow-up plan.

## 2019-03-25 NOTE — ED PROVIDER NOTE - PHYSICAL EXAMINATION
Gen: Alert, NAD, well appearing  Head: NC, AT, PERRL, EOMI, normal lids/conjunctiva  ENT: normal hearing, patent oropharynx  Neck: +supple, no tenderness/meningismus,  Pulm: Bilateral BS, normal resp effort, no wheeze/stridor/retractions  CV: RRR, no murmer  Abd: soft, BS+. +diffuse discomfort to abdomen on palpation. No guarding or rebound. Refused rectal exam  Mskel: no edema/erythema/cyanosis  Skin: no rash, warm/dry  Neuro: AAOx3, no sensory/motor deficits Gen: Alert, NAD, well appearing  Head: NC, AT, PERRL, EOMI, normal lids/conjunctiva  ENT: normal hearing, patent oropharynx  Neck: +supple, no tenderness/meningismus,  Pulm: Bilateral BS, +wheeze  CV: RRR, no murmer  Abd: soft, BS+. +diffuse discomfort to abdomen on palpation. No guarding or rebound. Refused rectal exam  Mskel: no edema/erythema/cyanosis  Skin: no rash, warm/dry  Neuro: AAOx3, no sensory/motor deficits

## 2019-03-25 NOTE — ED ADULT NURSE REASSESSMENT NOTE - NS ED NURSE REASSESS COMMENT FT1
Patient asking for pain medications and states "push medications fast with a flush I am used to it I get it on the street". MD aware pain seeking behavior noted.

## 2019-03-25 NOTE — ED PROVIDER NOTE - OBJECTIVE STATEMENT
54 yo M hx of COPD, chronic pain c/o abdominal pains. Patient with lower abdominal pains and n/v/d x 5 days. Pain is constant, moderate in severity, with no modifying factors. + fever. + blood in stool. No hematuria or dysuria. Patient was released last week from hospital for COPD.

## 2019-03-26 VITALS
RESPIRATION RATE: 18 BRPM | SYSTOLIC BLOOD PRESSURE: 121 MMHG | OXYGEN SATURATION: 97 % | DIASTOLIC BLOOD PRESSURE: 73 MMHG | HEART RATE: 66 BPM

## 2019-03-26 LAB
APTT BLD: 31 SEC — SIGNIFICANT CHANGE UP (ref 27–39.2)
BASOPHILS # BLD AUTO: 0.01 K/UL — SIGNIFICANT CHANGE UP (ref 0–0.2)
BASOPHILS NFR BLD AUTO: 0.1 % — SIGNIFICANT CHANGE UP (ref 0–1)
CK MB CFR SERPL CALC: 2.4 NG/ML — SIGNIFICANT CHANGE UP (ref 0.6–6.3)
EOSINOPHIL # BLD AUTO: 0 K/UL — SIGNIFICANT CHANGE UP (ref 0–0.7)
EOSINOPHIL NFR BLD AUTO: 0 % — SIGNIFICANT CHANGE UP (ref 0–8)
HCT VFR BLD CALC: 43.3 % — SIGNIFICANT CHANGE UP (ref 42–52)
HGB BLD-MCNC: 14.4 G/DL — SIGNIFICANT CHANGE UP (ref 14–18)
IMM GRANULOCYTES NFR BLD AUTO: 0.5 % — HIGH (ref 0.1–0.3)
LYMPHOCYTES # BLD AUTO: 1.16 K/UL — LOW (ref 1.2–3.4)
LYMPHOCYTES # BLD AUTO: 11.5 % — LOW (ref 20.5–51.1)
MCHC RBC-ENTMCNC: 28.5 PG — SIGNIFICANT CHANGE UP (ref 27–31)
MCHC RBC-ENTMCNC: 33.3 G/DL — SIGNIFICANT CHANGE UP (ref 32–37)
MCV RBC AUTO: 85.6 FL — SIGNIFICANT CHANGE UP (ref 80–94)
MONOCYTES # BLD AUTO: 0.21 K/UL — SIGNIFICANT CHANGE UP (ref 0.1–0.6)
MONOCYTES NFR BLD AUTO: 2.1 % — SIGNIFICANT CHANGE UP (ref 1.7–9.3)
NEUTROPHILS # BLD AUTO: 8.63 K/UL — HIGH (ref 1.4–6.5)
NEUTROPHILS NFR BLD AUTO: 85.8 % — HIGH (ref 42.2–75.2)
NRBC # BLD: 0 /100 WBCS — SIGNIFICANT CHANGE UP (ref 0–0)
NT-PROBNP SERPL-SCNC: 25 PG/ML — SIGNIFICANT CHANGE UP (ref 0–300)
PLATELET # BLD AUTO: 267 K/UL — SIGNIFICANT CHANGE UP (ref 130–400)
RBC # BLD: 5.06 M/UL — SIGNIFICANT CHANGE UP (ref 4.7–6.1)
RBC # FLD: 15.9 % — HIGH (ref 11.5–14.5)
TROPONIN T SERPL-MCNC: <0.01 NG/ML — SIGNIFICANT CHANGE UP
WBC # BLD: 10.06 K/UL — SIGNIFICANT CHANGE UP (ref 4.8–10.8)
WBC # FLD AUTO: 10.06 K/UL — SIGNIFICANT CHANGE UP (ref 4.8–10.8)

## 2019-03-26 RX ORDER — AZITHROMYCIN 500 MG/1
1 TABLET, FILM COATED ORAL
Qty: 7 | Refills: 0 | OUTPATIENT
Start: 2019-03-26 | End: 2019-04-01

## 2019-03-26 RX ORDER — IPRATROPIUM/ALBUTEROL SULFATE 18-103MCG
3 AEROSOL WITH ADAPTER (GRAM) INHALATION ONCE
Qty: 0 | Refills: 0 | Status: COMPLETED | OUTPATIENT
Start: 2019-03-26 | End: 2019-03-26

## 2019-03-26 RX ORDER — MORPHINE SULFATE 50 MG/1
8 CAPSULE, EXTENDED RELEASE ORAL ONCE
Qty: 0 | Refills: 0 | Status: DISCONTINUED | OUTPATIENT
Start: 2019-03-26 | End: 2019-03-26

## 2019-03-26 RX ORDER — DIPHENHYDRAMINE HYDROCHLORIDE AND LIDOCAINE HYDROCHLORIDE AND ALUMINUM HYDROXIDE AND MAGNESIUM HYDRO
30 KIT ONCE
Qty: 0 | Refills: 0 | Status: COMPLETED | OUTPATIENT
Start: 2019-03-26 | End: 2019-03-26

## 2019-03-26 RX ORDER — AZITHROMYCIN 500 MG/1
500 TABLET, FILM COATED ORAL ONCE
Qty: 0 | Refills: 0 | Status: COMPLETED | OUTPATIENT
Start: 2019-03-26 | End: 2019-03-26

## 2019-03-26 RX ORDER — ONDANSETRON 8 MG/1
8 TABLET, FILM COATED ORAL ONCE
Qty: 0 | Refills: 0 | Status: COMPLETED | OUTPATIENT
Start: 2019-03-26 | End: 2019-03-26

## 2019-03-26 RX ORDER — FAMOTIDINE 10 MG/ML
20 INJECTION INTRAVENOUS ONCE
Qty: 0 | Refills: 0 | Status: COMPLETED | OUTPATIENT
Start: 2019-03-26 | End: 2019-03-26

## 2019-03-26 RX ADMIN — ONDANSETRON 4 MILLIGRAM(S): 8 TABLET, FILM COATED ORAL at 00:02

## 2019-03-26 RX ADMIN — AZITHROMYCIN 500 MILLIGRAM(S): 500 TABLET, FILM COATED ORAL at 03:01

## 2019-03-26 RX ADMIN — AZITHROMYCIN 255 MILLIGRAM(S): 500 TABLET, FILM COATED ORAL at 02:01

## 2019-03-26 RX ADMIN — ONDANSETRON 8 MILLIGRAM(S): 8 TABLET, FILM COATED ORAL at 03:56

## 2019-03-26 RX ADMIN — Medication 3 MILLILITER(S): at 02:00

## 2019-03-26 RX ADMIN — Medication 20 MILLIGRAM(S): at 00:34

## 2019-03-26 RX ADMIN — MORPHINE SULFATE 8 MILLIGRAM(S): 50 CAPSULE, EXTENDED RELEASE ORAL at 01:51

## 2019-03-26 RX ADMIN — FAMOTIDINE 20 MILLIGRAM(S): 10 INJECTION INTRAVENOUS at 03:57

## 2019-03-26 RX ADMIN — MORPHINE SULFATE 8 MILLIGRAM(S): 50 CAPSULE, EXTENDED RELEASE ORAL at 02:06

## 2019-03-26 RX ADMIN — Medication 3 MILLILITER(S): at 02:01

## 2019-03-26 RX ADMIN — SODIUM CHLORIDE 2000 MILLILITER(S): 9 INJECTION INTRAMUSCULAR; INTRAVENOUS; SUBCUTANEOUS at 01:02

## 2019-03-26 RX ADMIN — SODIUM CHLORIDE 2000 MILLILITER(S): 9 INJECTION INTRAMUSCULAR; INTRAVENOUS; SUBCUTANEOUS at 00:02

## 2019-03-26 RX ADMIN — Medication 125 MILLIGRAM(S): at 01:50

## 2019-03-26 RX ADMIN — Medication 325 MILLIGRAM(S): at 00:34

## 2019-03-26 RX ADMIN — DIPHENHYDRAMINE HYDROCHLORIDE AND LIDOCAINE HYDROCHLORIDE AND ALUMINUM HYDROXIDE AND MAGNESIUM HYDRO 30 MILLILITER(S): KIT at 03:57

## 2019-03-26 NOTE — ED PROVIDER NOTE - ATTENDING CONTRIBUTION TO CARE
56 y M PMH COPD, HTN, HLD, Obesity, REBECCA, chronic pain on multiple chronic opioid pain medications outpatient, pw coughing fit which caused him to have to lower himself to the floor to rest. No LOC as suggested in documented CC of syncope. Patient has not experienced syncope, fall, or other LOC associated with today's complaint. Was seen earlier in the ED with abd pain, nausea and vomiting, with fevers in prior days, and workup at that time was negative, pt felt improved and was discharged.   Exam: NAD, NCAT, HEENT: mmm, EOMI, PERRLA, Neck: supple, nontender, nl ROM, Heart: RRR, no murmur, Lungs: B/l wheezing throughout, greatest in JARRELL field anteriorly, moderate to good air movement b/l, Abd: NTND, no guarding or rebound, no hernia palpated, no CVAT. MSK: chest, back, and ext nontender, nl rom, no deformity. Neuro: A&Ox3, normal strength, nl sensation throughout, normal speech.   A/P: Eval COPD vs PNA given history of fevers, vs less likely cardiac etiology. Labs, imaging, nebs, steroids, chronic pain symptom control, reassess.

## 2019-03-26 NOTE — ED PROVIDER NOTE - NSFOLLOWUPINSTRUCTIONS_ED_ALL_ED_FT
Chronic Obstructive Pulmonary Disease    Chronic obstructive pulmonary disease (COPD) is a lung condition in which airflow from the lungs is limited. Causes include smoking, secondhand smoke exposure, genetics, or recurrent infections. Take all medicines (inhaled or pills) as directed by your health care provider. Avoid exposure to irritants such as smoke, chemicals, and fumes that aggravate your breathing.    If you are a smoker, the most important thing that you can do is stop smoking. Continuing to smoke will cause further lung damage and breathing trouble. Ask your health care provider for help with quitting smoking.    SEEK IMMEDIATE MEDICAL CARE IF YOU HAVE ANY OF THE FOLLOWING SYMPTOMS: shortness of breath at rest or when talking, bluish discoloration of lips, skin, fever, worsening cough, unexplained chest pain, or lightheadedness/dizziness.

## 2019-03-26 NOTE — ED ADULT NURSE NOTE - NSIMPLEMENTINTERV_GEN_ALL_ED
Implemented All Fall Risk Interventions:  [place] to call system. Call bell, personal items and telephone within reach. Instruct patient to call for assistance. Room bathroom lighting operational. Non-slip footwear when patient is off stretcher. Physically safe environment: no spills, clutter or unnecessary equipment. Stretcher in lowest position, wheels locked, appropriate side rails in place. Provide visual cue, wrist band, yellow gown, etc. Monitor gait and stability. Monitor for mental status changes and reorient to person, place, and time. Review medications for side effects contributing to fall risk. Reinforce activity limits and safety measures with patient and family.

## 2019-03-26 NOTE — ED PROVIDER NOTE - CARE PROVIDER_API CALL
Blake Bassett)  Critical Care Medicine; Geriatric Medicine; Internal Medicine; Pulmonary Disease  [address] [place] [address]  Phone: [phone]  Fax: [phone]  Follow Up Time: 1-3 Days

## 2019-03-26 NOTE — ED PROVIDER NOTE - PHYSICAL EXAMINATION
PHYSICAL EXAM:    GENERAL: Alert, appears stated age, well appearing, non-toxic  SKIN: Warm, pink and dry. MMM.   EYE: Normal lids/conjunctiva  ENT: Normal hearing, patent oropharynx without erythema or exudate  NECK: +supple. No meningismus  Pulm: Bilateral BS, normal resp effort, no stridor, or retractions. +diffuse wheezing.   CV: RRR, no M/R/G, 2+and = radial pulses  Abd: soft, non-tender, non-distended  Mskel: no erythema, cyanosis, edema. no calf tenderness  Neuro: AAOx3. 5/5 strength throughout. normal gait.

## 2019-03-26 NOTE — ED ADULT NURSE NOTE - OBJECTIVE STATEMENT
Patient presents today with complaints of collapse at bus stop, pt reports loc,   c/o nausea and abd pain. He is alert and oriented x 4, S1, s2 regular, normal sinus rhythm on cardiac monitor, respirations are even and unlabored, abdomen obese and nontender, 20 gauge saline lock placed on left metacarpal , blood drawn and sent. Cardiac monitor placed on patient. Safety measures maintained. Will follow up.

## 2019-03-26 NOTE — ED PROVIDER NOTE - OBJECTIVE STATEMENT
54 y/o M with PMH chronic pain on opiates, HTN, HLD, tobacco use (with recent "increased" use), COPD not on home O2, here earlier today for ab pain and n/v/d with workup and improvement in symptoms, presents for "coughing fit" Followed by shortness of breath and subsequently sitting on the ground one hour PTA. Despite triage note patient denies LOC. +wheezing. No head injury; no blood thinner use; no fall/trauma. Denies CP, palpitations, back pain, fevers, sweats, chills, HA, leg pain/swelling, urinary symptoms, rash.  Denies hemoptysis, recent surgery/immobilization, hx cancers, hx PE/DVT, hormone use.

## 2019-03-26 NOTE — ED PROVIDER NOTE - NS ED ROS FT
Review of Systems    Constitutional: (-) fever  Eyes/ENT: (-) blurry vision  Cardiovascular: (-) chest pain, (-) syncope  Respiratory: (+) cough, (+) shortness of breath  Gastrointestinal: (+) vomiting, (-) diarrhea  Genitourinary:  (-) dysuria   Musculoskeletal: (-) neck pain, (-) back pain  Integumentary: (-) rash, (-) edema  Neurological: (-) headache  Hematologic: (-) easy bruising

## 2019-03-26 NOTE — ED PROVIDER NOTE - CLINICAL SUMMARY MEDICAL DECISION MAKING FREE TEXT BOX
pt pw coughing,  no syncope as reportedin CC<  labs wnl, including  trop wbc, bnp - cxr wnl -  pt dcd  in stable condtion with print out of labs and outpt follow u md   Patient to be discharged from ED. Any available test results were discussed with patient and/or family. Verbal instructions given, including instructions to return to ED immediately for any new, worsening, or concerning symptoms. Patient endorsed understanding. Written discharge instructions additionally given, including follow-up plan.

## 2019-03-26 NOTE — ED ADULT NURSE NOTE - ED STAT RN HANDOFF DETAILS
Patient is discharge I stable condition, taxi service provided. Patient advised to  medications at pharmacy.

## 2019-03-27 ENCOUNTER — OUTPATIENT (OUTPATIENT)
Dept: OUTPATIENT SERVICES | Facility: HOSPITAL | Age: 54
LOS: 1 days | Discharge: HOME | End: 2019-03-27

## 2019-03-27 VITALS
SYSTOLIC BLOOD PRESSURE: 136 MMHG | OXYGEN SATURATION: 98 % | TEMPERATURE: 98 F | HEIGHT: 70 IN | DIASTOLIC BLOOD PRESSURE: 90 MMHG | RESPIRATION RATE: 18 BRPM | HEART RATE: 85 BPM

## 2019-03-27 DIAGNOSIS — Z45.9 ENCOUNTER FOR ADJUSTMENT AND MANAGEMENT OF UNSPECIFIED IMPLANTED DEVICE: ICD-10-CM

## 2019-03-27 DIAGNOSIS — Z98.890 OTHER SPECIFIED POSTPROCEDURAL STATES: Chronic | ICD-10-CM

## 2019-03-27 DIAGNOSIS — K40.20 BILATERAL INGUINAL HERNIA, WITHOUT OBSTRUCTION OR GANGRENE, NOT SPECIFIED AS RECURRENT: Chronic | ICD-10-CM

## 2019-03-27 DIAGNOSIS — N40.0 BENIGN PROSTATIC HYPERPLASIA WITHOUT LOWER URINARY TRACT SYMPTOMS: ICD-10-CM

## 2019-03-27 DIAGNOSIS — Z01.818 ENCOUNTER FOR OTHER PREPROCEDURAL EXAMINATION: ICD-10-CM

## 2019-03-27 DIAGNOSIS — G47.30 SLEEP APNEA, UNSPECIFIED: ICD-10-CM

## 2019-03-27 DIAGNOSIS — K21.9 GASTRO-ESOPHAGEAL REFLUX DISEASE WITHOUT ESOPHAGITIS: ICD-10-CM

## 2019-03-27 DIAGNOSIS — E66.9 OBESITY, UNSPECIFIED: ICD-10-CM

## 2019-03-27 DIAGNOSIS — E78.00 PURE HYPERCHOLESTEROLEMIA, UNSPECIFIED: ICD-10-CM

## 2019-03-27 DIAGNOSIS — Z96.651 PRESENCE OF RIGHT ARTIFICIAL KNEE JOINT: Chronic | ICD-10-CM

## 2019-03-27 DIAGNOSIS — M17.12 UNILATERAL PRIMARY OSTEOARTHRITIS, LEFT KNEE: ICD-10-CM

## 2019-03-27 DIAGNOSIS — I10 ESSENTIAL (PRIMARY) HYPERTENSION: ICD-10-CM

## 2019-03-27 LAB
BLD GP AB SCN SERPL QL: SIGNIFICANT CHANGE UP
ESTIMATED AVERAGE GLUCOSE: 126 MG/DL — HIGH (ref 68–114)
HBA1C BLD-MCNC: 6 % — HIGH (ref 4–5.6)
MRSA PCR RESULT.: NEGATIVE — SIGNIFICANT CHANGE UP
TYPE + AB SCN PNL BLD: SIGNIFICANT CHANGE UP

## 2019-03-27 NOTE — H&P PST ADULT - HISTORY OF PRESENT ILLNESS
"left knee"  PT CURRENTLY DENIES PALPITATIONS, DYSURIA, RECENT ILLNESS  HAD "CHEST PAIN" SEVERAL DAYS AGO-WENT TO ED    WAS DISCHARGED 3/26  YESTERDAY   W/U NEGATIVE PER PATIENT  EXERCISE TOLERANCE 1 BLOCKS THEN PAIN AND SOB  PT DENIES ANY RASHES, ABRASION, OR OPEN WOUNDS OR LACERATIONS    AS PER THE PT, THIS IS A COMPLETE MEDICAL AND SURGICAL HX, INCLUDING MEDICATIONS PRESCRIBED AND OVER THE COUNTER

## 2019-03-27 NOTE — H&P PST ADULT - CONSTITUTIONAL DETAILS
talkative  unfocused appears aggravated with questioning at times/well-groomed/well-developed/well-nourished/obese

## 2019-03-27 NOTE — H&P PST ADULT - NSICDXPASTMEDICALHX_GEN_ALL_CORE_FT
PAST MEDICAL HISTORY:  Colitis LARGE INTESTINE    COPD (chronic obstructive pulmonary disease)     GERD (gastroesophageal reflux disease)     Hiatal hernia     High blood cholesterol     High cholesterol     HTN (hypertension)     Morbid obesity     Numbness and tingling hands    Obstructive sleep apnea     Osteoarthritis     SOB (shortness of breath)

## 2019-03-28 LAB
CULTURE RESULTS: SIGNIFICANT CHANGE UP
SPECIMEN SOURCE: SIGNIFICANT CHANGE UP

## 2019-04-08 ENCOUNTER — EMERGENCY (EMERGENCY)
Facility: HOSPITAL | Age: 54
LOS: 0 days | Discharge: HOME | End: 2019-04-08
Attending: EMERGENCY MEDICINE | Admitting: EMERGENCY MEDICINE
Payer: MEDICAID

## 2019-04-08 VITALS
SYSTOLIC BLOOD PRESSURE: 139 MMHG | TEMPERATURE: 98 F | RESPIRATION RATE: 18 BRPM | WEIGHT: 279.99 LBS | DIASTOLIC BLOOD PRESSURE: 82 MMHG | HEART RATE: 88 BPM | OXYGEN SATURATION: 98 %

## 2019-04-08 VITALS
DIASTOLIC BLOOD PRESSURE: 95 MMHG | HEART RATE: 83 BPM | SYSTOLIC BLOOD PRESSURE: 173 MMHG | RESPIRATION RATE: 18 BRPM | OXYGEN SATURATION: 98 % | TEMPERATURE: 97 F

## 2019-04-08 DIAGNOSIS — Z98.890 OTHER SPECIFIED POSTPROCEDURAL STATES: Chronic | ICD-10-CM

## 2019-04-08 DIAGNOSIS — R10.30 LOWER ABDOMINAL PAIN, UNSPECIFIED: ICD-10-CM

## 2019-04-08 DIAGNOSIS — R11.2 NAUSEA WITH VOMITING, UNSPECIFIED: ICD-10-CM

## 2019-04-08 DIAGNOSIS — Z79.899 OTHER LONG TERM (CURRENT) DRUG THERAPY: ICD-10-CM

## 2019-04-08 DIAGNOSIS — Z79.2 LONG TERM (CURRENT) USE OF ANTIBIOTICS: ICD-10-CM

## 2019-04-08 DIAGNOSIS — Z96.651 PRESENCE OF RIGHT ARTIFICIAL KNEE JOINT: Chronic | ICD-10-CM

## 2019-04-08 DIAGNOSIS — F17.200 NICOTINE DEPENDENCE, UNSPECIFIED, UNCOMPLICATED: ICD-10-CM

## 2019-04-08 DIAGNOSIS — Z79.52 LONG TERM (CURRENT) USE OF SYSTEMIC STEROIDS: ICD-10-CM

## 2019-04-08 DIAGNOSIS — R19.7 DIARRHEA, UNSPECIFIED: ICD-10-CM

## 2019-04-08 DIAGNOSIS — G89.29 OTHER CHRONIC PAIN: ICD-10-CM

## 2019-04-08 DIAGNOSIS — J44.9 CHRONIC OBSTRUCTIVE PULMONARY DISEASE, UNSPECIFIED: ICD-10-CM

## 2019-04-08 DIAGNOSIS — K40.20 BILATERAL INGUINAL HERNIA, WITHOUT OBSTRUCTION OR GANGRENE, NOT SPECIFIED AS RECURRENT: Chronic | ICD-10-CM

## 2019-04-08 DIAGNOSIS — Z79.891 LONG TERM (CURRENT) USE OF OPIATE ANALGESIC: ICD-10-CM

## 2019-04-08 DIAGNOSIS — M25.569 PAIN IN UNSPECIFIED KNEE: ICD-10-CM

## 2019-04-08 DIAGNOSIS — I10 ESSENTIAL (PRIMARY) HYPERTENSION: ICD-10-CM

## 2019-04-08 DIAGNOSIS — R11.10 VOMITING, UNSPECIFIED: ICD-10-CM

## 2019-04-08 LAB
ALBUMIN SERPL ELPH-MCNC: 4.5 G/DL — SIGNIFICANT CHANGE UP (ref 3.5–5.2)
ALP SERPL-CCNC: 121 U/L — HIGH (ref 30–115)
ALT FLD-CCNC: 15 U/L — SIGNIFICANT CHANGE UP (ref 0–41)
ANION GAP SERPL CALC-SCNC: 16 MMOL/L — HIGH (ref 7–14)
APPEARANCE UR: CLEAR — SIGNIFICANT CHANGE UP
AST SERPL-CCNC: 38 U/L — SIGNIFICANT CHANGE UP (ref 0–41)
BACTERIA # UR AUTO: ABNORMAL /HPF
BASE EXCESS BLDV CALC-SCNC: 5.5 MMOL/L — HIGH (ref -2–2)
BASOPHILS # BLD AUTO: 0.03 K/UL — SIGNIFICANT CHANGE UP (ref 0–0.2)
BASOPHILS NFR BLD AUTO: 0.2 % — SIGNIFICANT CHANGE UP (ref 0–1)
BILIRUB SERPL-MCNC: 0.6 MG/DL — SIGNIFICANT CHANGE UP (ref 0.2–1.2)
BILIRUB UR-MCNC: NEGATIVE — SIGNIFICANT CHANGE UP
BUN SERPL-MCNC: 12 MG/DL — SIGNIFICANT CHANGE UP (ref 10–20)
CA-I SERPL-SCNC: 1.19 MMOL/L — SIGNIFICANT CHANGE UP (ref 1.12–1.3)
CALCIUM SERPL-MCNC: 9.8 MG/DL — SIGNIFICANT CHANGE UP (ref 8.5–10.1)
CHLORIDE SERPL-SCNC: 99 MMOL/L — SIGNIFICANT CHANGE UP (ref 98–110)
CO2 SERPL-SCNC: 24 MMOL/L — SIGNIFICANT CHANGE UP (ref 17–32)
COLOR SPEC: YELLOW — SIGNIFICANT CHANGE UP
CREAT SERPL-MCNC: 0.8 MG/DL — SIGNIFICANT CHANGE UP (ref 0.7–1.5)
DIFF PNL FLD: NEGATIVE — SIGNIFICANT CHANGE UP
EOSINOPHIL # BLD AUTO: 0 K/UL — SIGNIFICANT CHANGE UP (ref 0–0.7)
EOSINOPHIL NFR BLD AUTO: 0 % — SIGNIFICANT CHANGE UP (ref 0–8)
EPI CELLS # UR: ABNORMAL /HPF
GAS PNL BLDV: 140 MMOL/L — SIGNIFICANT CHANGE UP (ref 136–145)
GAS PNL BLDV: SIGNIFICANT CHANGE UP
GLUCOSE SERPL-MCNC: 136 MG/DL — HIGH (ref 70–99)
GLUCOSE UR QL: NEGATIVE — SIGNIFICANT CHANGE UP
HCO3 BLDV-SCNC: 32 MMOL/L — HIGH (ref 22–29)
HCT VFR BLD CALC: 47 % — SIGNIFICANT CHANGE UP (ref 42–52)
HCT VFR BLDA CALC: 48.9 % — HIGH (ref 34–44)
HGB BLD CALC-MCNC: 15.9 G/DL — SIGNIFICANT CHANGE UP (ref 14–18)
HGB BLD-MCNC: 16.2 G/DL — SIGNIFICANT CHANGE UP (ref 14–18)
IMM GRANULOCYTES NFR BLD AUTO: 0.3 % — SIGNIFICANT CHANGE UP (ref 0.1–0.3)
KETONES UR-MCNC: NEGATIVE — SIGNIFICANT CHANGE UP
LACTATE BLDV-MCNC: 1.3 MMOL/L — SIGNIFICANT CHANGE UP (ref 0.5–1.6)
LACTATE SERPL-SCNC: 2 MMOL/L — SIGNIFICANT CHANGE UP (ref 0.5–2.2)
LEUKOCYTE ESTERASE UR-ACNC: NEGATIVE — SIGNIFICANT CHANGE UP
LIDOCAIN IGE QN: 22 U/L — SIGNIFICANT CHANGE UP (ref 7–60)
LYMPHOCYTES # BLD AUTO: 1.35 K/UL — SIGNIFICANT CHANGE UP (ref 1.2–3.4)
LYMPHOCYTES # BLD AUTO: 8.3 % — LOW (ref 20.5–51.1)
MCHC RBC-ENTMCNC: 28.6 PG — SIGNIFICANT CHANGE UP (ref 27–31)
MCHC RBC-ENTMCNC: 34.5 G/DL — SIGNIFICANT CHANGE UP (ref 32–37)
MCV RBC AUTO: 82.9 FL — SIGNIFICANT CHANGE UP (ref 80–94)
MONOCYTES # BLD AUTO: 0.78 K/UL — HIGH (ref 0.1–0.6)
MONOCYTES NFR BLD AUTO: 4.8 % — SIGNIFICANT CHANGE UP (ref 1.7–9.3)
NEUTROPHILS # BLD AUTO: 13.97 K/UL — HIGH (ref 1.4–6.5)
NEUTROPHILS NFR BLD AUTO: 86.4 % — HIGH (ref 42.2–75.2)
NITRITE UR-MCNC: NEGATIVE — SIGNIFICANT CHANGE UP
NRBC # BLD: 0 /100 WBCS — SIGNIFICANT CHANGE UP (ref 0–0)
NT-PROBNP SERPL-SCNC: 134 PG/ML — SIGNIFICANT CHANGE UP (ref 0–300)
PCO2 BLDV: 53 MMHG — HIGH (ref 41–51)
PH BLDV: 7.39 — SIGNIFICANT CHANGE UP (ref 7.26–7.43)
PH UR: 6.5 — SIGNIFICANT CHANGE UP (ref 5–8)
PLATELET # BLD AUTO: 340 K/UL — SIGNIFICANT CHANGE UP (ref 130–400)
PO2 BLDV: 20 MMHG — SIGNIFICANT CHANGE UP (ref 20–40)
POTASSIUM BLDV-SCNC: 3.5 MMOL/L — SIGNIFICANT CHANGE UP (ref 3.3–5.6)
POTASSIUM SERPL-MCNC: 6.5 MMOL/L — CRITICAL HIGH (ref 3.5–5)
POTASSIUM SERPL-SCNC: 6.5 MMOL/L — CRITICAL HIGH (ref 3.5–5)
PROT SERPL-MCNC: 8 G/DL — SIGNIFICANT CHANGE UP (ref 6–8)
PROT UR-MCNC: 30
RBC # BLD: 5.67 M/UL — SIGNIFICANT CHANGE UP (ref 4.7–6.1)
RBC # FLD: 16.6 % — HIGH (ref 11.5–14.5)
SAO2 % BLDV: 29 % — SIGNIFICANT CHANGE UP
SODIUM SERPL-SCNC: 139 MMOL/L — SIGNIFICANT CHANGE UP (ref 135–146)
SP GR SPEC: >=1.03 — SIGNIFICANT CHANGE UP (ref 1.01–1.03)
UROBILINOGEN FLD QL: 0.2 — SIGNIFICANT CHANGE UP (ref 0.2–0.2)
WBC # BLD: 16.18 K/UL — HIGH (ref 4.8–10.8)
WBC # FLD AUTO: 16.18 K/UL — HIGH (ref 4.8–10.8)

## 2019-04-08 PROCEDURE — 74177 CT ABD & PELVIS W/CONTRAST: CPT | Mod: 26

## 2019-04-08 PROCEDURE — 99285 EMERGENCY DEPT VISIT HI MDM: CPT | Mod: 25

## 2019-04-08 PROCEDURE — 93010 ELECTROCARDIOGRAM REPORT: CPT

## 2019-04-08 PROCEDURE — 71046 X-RAY EXAM CHEST 2 VIEWS: CPT | Mod: 26

## 2019-04-08 RX ORDER — ONDANSETRON 8 MG/1
4 TABLET, FILM COATED ORAL ONCE
Qty: 0 | Refills: 0 | Status: DISCONTINUED | OUTPATIENT
Start: 2019-04-08 | End: 2019-04-08

## 2019-04-08 RX ORDER — IOHEXOL 300 MG/ML
30 INJECTION, SOLUTION INTRAVENOUS ONCE
Qty: 0 | Refills: 0 | Status: COMPLETED | OUTPATIENT
Start: 2019-04-08 | End: 2019-04-08

## 2019-04-08 RX ORDER — MORPHINE SULFATE 50 MG/1
8 CAPSULE, EXTENDED RELEASE ORAL ONCE
Qty: 0 | Refills: 0 | Status: DISCONTINUED | OUTPATIENT
Start: 2019-04-08 | End: 2019-04-08

## 2019-04-08 RX ORDER — MORPHINE SULFATE 50 MG/1
4 CAPSULE, EXTENDED RELEASE ORAL ONCE
Qty: 0 | Refills: 0 | Status: COMPLETED | OUTPATIENT
Start: 2019-04-08 | End: 2019-04-08

## 2019-04-08 RX ORDER — METOCLOPRAMIDE HCL 10 MG
10 TABLET ORAL ONCE
Qty: 0 | Refills: 0 | Status: COMPLETED | OUTPATIENT
Start: 2019-04-08 | End: 2019-04-08

## 2019-04-08 RX ORDER — SODIUM CHLORIDE 9 MG/ML
1000 INJECTION, SOLUTION INTRAVENOUS ONCE
Qty: 0 | Refills: 0 | Status: DISCONTINUED | OUTPATIENT
Start: 2019-04-08 | End: 2019-04-08

## 2019-04-08 RX ORDER — KETOROLAC TROMETHAMINE 30 MG/ML
15 SYRINGE (ML) INJECTION ONCE
Qty: 0 | Refills: 0 | Status: DISCONTINUED | OUTPATIENT
Start: 2019-04-08 | End: 2019-04-08

## 2019-04-08 RX ORDER — SODIUM CHLORIDE 9 MG/ML
1000 INJECTION INTRAMUSCULAR; INTRAVENOUS; SUBCUTANEOUS ONCE
Qty: 0 | Refills: 0 | Status: COMPLETED | OUTPATIENT
Start: 2019-04-08 | End: 2019-04-08

## 2019-04-08 RX ORDER — ONDANSETRON 8 MG/1
4 TABLET, FILM COATED ORAL ONCE
Qty: 0 | Refills: 0 | Status: COMPLETED | OUTPATIENT
Start: 2019-04-08 | End: 2019-04-08

## 2019-04-08 RX ADMIN — MORPHINE SULFATE 8 MILLIGRAM(S): 50 CAPSULE, EXTENDED RELEASE ORAL at 02:36

## 2019-04-08 RX ADMIN — ONDANSETRON 4 MILLIGRAM(S): 8 TABLET, FILM COATED ORAL at 02:19

## 2019-04-08 RX ADMIN — Medication 15 MILLIGRAM(S): at 02:19

## 2019-04-08 RX ADMIN — SODIUM CHLORIDE 1000 MILLILITER(S): 9 INJECTION INTRAMUSCULAR; INTRAVENOUS; SUBCUTANEOUS at 02:19

## 2019-04-08 RX ADMIN — IOHEXOL 30 MILLILITER(S): 300 INJECTION, SOLUTION INTRAVENOUS at 02:20

## 2019-04-08 RX ADMIN — Medication 10 MILLIGRAM(S): at 02:36

## 2019-04-08 NOTE — ED PROVIDER NOTE - OBJECTIVE STATEMENT
Patient is a 54 yo M w/ hx of ulcerative colitis seeing Dr. Gerber, COPD not on home O2, HTN, chronic knee pain on oxycodone and morphine at home p/w lower abdominal pain and vomiting x 1 day. Patient states he has severe, sharp lower abdominal pain, nonradiating, no exacerbating or relieving factors associated with 15 episodes of NBNB vomiting and watery diarrhea. Denies fevers, denies chest pain, SOB. No recent travel.

## 2019-04-08 NOTE — ED ADULT NURSE REASSESSMENT NOTE - NS ED NURSE REASSESS COMMENT FT1
Patient requesting RN "push the morphine fast, I'll be okay". Patient also observed sticking his fingers down his throat, inducing vomiting prior to morphine and Zofran administration. MD aware.

## 2019-04-08 NOTE — ED PROVIDER NOTE - PROGRESS NOTE DETAILS
lactate 1.3, k on vbg 3.5. Patient self inducing vomiting; seen doing it multiple times.   When asked about it, states he wants to "get it all out". pt tolerating po in ed, asking for pain medicatins because he id due tof rhi medicaiton he would take at home, pt abd re exam softntnd no t/g, not vomiting at this time, aware of labs and imaging, understands follow up with gi as outpt as well as pmd, pending urine, will conintue to monitor and reassess.

## 2019-04-08 NOTE — ED PROVIDER NOTE - NS ED ROS FT
Constitutional: No fevers.   Eyes:  No visual changes, eye pain or discharge.  ENMT:  No sore throat or runny nose.  Cardiac:  No chest pain, SOB or edema.  Respiratory:  No cough or respiratory distress. hx of COPD.   GI: +abdominal pain. +vomiting. +diarrhea.   :  No dysuria, frequency or burning.  MS:  knee pain.   Neuro:  No headache or weakness.  No LOC.  Skin:  No skin rash.

## 2019-04-08 NOTE — ED PROVIDER NOTE - ATTENDING CONTRIBUTION TO CARE
I personally evaluated the patient. I reviewed the Resident’s or Physician Assistant’s note (as assigned above), and agree with the findings and plan except as documented in my note.  A 54 y/o m w/ pmhx of copd not on home o2, ulcerative colitis based on ct (has not had colonoscopy yet- follows with gi Dr. Gerber), htn, hld, knee and shoulder pain on morphine 100mg 4 x a day presents with lower abd pain associated with nausea, and vomiting ~ 16 episodes since 6:00 p.m. watery diarrhea, non-bloody. pt reports fever of 100.9 on Friday ~ 3 days ago, no fever now, and abd pain started yesterday. when he vomited today came to ed. No current fever, chills, cp,  pleuritic cp, sob, palpitations, diaphoresis, cough, ha/lh/dizziness, numbness/tingling, neck pain/ stiffness, constipation, melena/brbpr, urinary symptoms, trauma, testicular pain/swelling/erythema, weakness, edema, calf pain/swelling/erythema, sick contacts, recent travel or rash.  Vital Signs: I have reviewed the initial vital signs. Constitutional: Male pt sitting on stretcher speaking full sentences. Yellow vomit in bin next to him. Integumentary: No rash. ENT: Dry MM NECK: Supple, non-tender, no meningeal signs. Cardiovascular: RRR, radial pulses 2/4 b/l. No JVD. Respiratory: BS present b/l, no wheezing or crackles, good resp effort and excursion, poorair exchange,  no accessory muscle use, no stridor. Gastrointestinal: BS present throughout all 4 quadrants, soft, nd, generalzied tenderness to palpation worse to lower mid abd, no rebound tenderness or guarding, no cvat. (-) Christian (-) Roving (-) Obturator (-) Psoas Musculoskeletal: FROM, no edema, no calf pain/swelling/erythema. Neurologic: AAOx3, motor 5/5 and sensation intact throughout upper and lowe ext, CN II-XII intact, No facial droop or slurring of speech. No focal deficits.

## 2019-04-08 NOTE — ED PROVIDER NOTE - CARE PLAN
Assessment and plan of treatment:	Plan: EKG, CXR, labs, ivf, anti-emetic, pain medication, urine, imaging, reassess. Principal Discharge DX:	Abdominal pain  Assessment and plan of treatment:	Plan: EKG, CXR, labs, ivf, anti-emetic, pain medication, urine, imaging, reassess.  Secondary Diagnosis:	Nausea and vomiting

## 2019-04-08 NOTE — ED PROVIDER NOTE - PHYSICAL EXAMINATION
CONSTITUTIONAL: Well-developed; well-nourished; in no acute distress.   SKIN: warm, dry.  HEAD: Normocephalic; atraumatic.  EYES: EOMI, no conjunctival erythema  ENT: No nasal discharge; airway clear.  NECK: Supple; non tender.  CARD: S1, S2 normal; no murmurs, gallops, or rubs. Regular rate and rhythm.   RESP: No wheezes, rales or rhonchi.  ABD: soft, tender to palpation diffusely; when distracted, abdomen is nontender. No rebound. No CVA tenderness bilaterally.   EXT: Normal ROM.    NEURO: Alert, oriented, grossly unremarkable  PSYCH: Cooperative, appropriate.

## 2019-04-08 NOTE — ED PROVIDER NOTE - CLINICAL SUMMARY MEDICAL DECISION MAKING FREE TEXT BOX
pt aware of all labs and imaging, tolerated po, abd re exam with no pain, pt understand sproper hydration, had pain mediacation at home, signs and symptoms to return for, aware of cxr, and ct, copy of results given, reports will follow up with pmd and gi discussed.

## 2019-04-08 NOTE — ED ADULT NURSE NOTE - NSIMPLEMENTINTERV_GEN_ALL_ED
Implemented All Universal Safety Interventions:  [place] to call system. Call bell, personal items and telephone within reach. Instruct patient to call for assistance. Room bathroom lighting operational. Non-slip footwear when patient is off stretcher. Physically safe environment: no spills, clutter or unnecessary equipment. Stretcher in lowest position, wheels locked, appropriate side rails in place.

## 2019-04-09 LAB
CULTURE RESULTS: SIGNIFICANT CHANGE UP
SPECIMEN SOURCE: SIGNIFICANT CHANGE UP

## 2019-04-15 ENCOUNTER — INPATIENT (INPATIENT)
Facility: HOSPITAL | Age: 54
LOS: 2 days | Discharge: HOME HEALTH | End: 2019-04-18
Attending: ORTHOPAEDIC SURGERY | Admitting: ORTHOPAEDIC SURGERY
Payer: MEDICAID

## 2019-04-15 ENCOUNTER — RESULT REVIEW (OUTPATIENT)
Age: 54
End: 2019-04-15

## 2019-04-15 VITALS
SYSTOLIC BLOOD PRESSURE: 146 MMHG | WEIGHT: 294.98 LBS | RESPIRATION RATE: 18 BRPM | HEART RATE: 82 BPM | DIASTOLIC BLOOD PRESSURE: 102 MMHG | HEIGHT: 70.5 IN | TEMPERATURE: 99 F

## 2019-04-15 DIAGNOSIS — Z96.651 PRESENCE OF RIGHT ARTIFICIAL KNEE JOINT: Chronic | ICD-10-CM

## 2019-04-15 DIAGNOSIS — Z98.890 OTHER SPECIFIED POSTPROCEDURAL STATES: Chronic | ICD-10-CM

## 2019-04-15 DIAGNOSIS — K40.20 BILATERAL INGUINAL HERNIA, WITHOUT OBSTRUCTION OR GANGRENE, NOT SPECIFIED AS RECURRENT: Chronic | ICD-10-CM

## 2019-04-15 LAB
ANION GAP SERPL CALC-SCNC: 11 MMOL/L — SIGNIFICANT CHANGE UP (ref 7–14)
BUN SERPL-MCNC: 10 MG/DL — SIGNIFICANT CHANGE UP (ref 10–20)
CALCIUM SERPL-MCNC: 9.1 MG/DL — SIGNIFICANT CHANGE UP (ref 8.5–10.1)
CHLORIDE SERPL-SCNC: 102 MMOL/L — SIGNIFICANT CHANGE UP (ref 98–110)
CO2 SERPL-SCNC: 26 MMOL/L — SIGNIFICANT CHANGE UP (ref 17–32)
CREAT SERPL-MCNC: 0.8 MG/DL — SIGNIFICANT CHANGE UP (ref 0.7–1.5)
GLUCOSE SERPL-MCNC: 128 MG/DL — HIGH (ref 70–99)
HCT VFR BLD CALC: 43.3 % — SIGNIFICANT CHANGE UP (ref 42–52)
HGB BLD-MCNC: 14.2 G/DL — SIGNIFICANT CHANGE UP (ref 14–18)
MCHC RBC-ENTMCNC: 28.2 PG — SIGNIFICANT CHANGE UP (ref 27–31)
MCHC RBC-ENTMCNC: 32.8 G/DL — SIGNIFICANT CHANGE UP (ref 32–37)
MCV RBC AUTO: 86.1 FL — SIGNIFICANT CHANGE UP (ref 80–94)
NRBC # BLD: 0 /100 WBCS — SIGNIFICANT CHANGE UP (ref 0–0)
PLATELET # BLD AUTO: 249 K/UL — SIGNIFICANT CHANGE UP (ref 130–400)
POTASSIUM SERPL-MCNC: 4.4 MMOL/L — SIGNIFICANT CHANGE UP (ref 3.5–5)
POTASSIUM SERPL-SCNC: 4.4 MMOL/L — SIGNIFICANT CHANGE UP (ref 3.5–5)
RBC # BLD: 5.03 M/UL — SIGNIFICANT CHANGE UP (ref 4.7–6.1)
RBC # FLD: 16.1 % — HIGH (ref 11.5–14.5)
SODIUM SERPL-SCNC: 139 MMOL/L — SIGNIFICANT CHANGE UP (ref 135–146)
WBC # BLD: 12.19 K/UL — HIGH (ref 4.8–10.8)
WBC # FLD AUTO: 12.19 K/UL — HIGH (ref 4.8–10.8)

## 2019-04-15 PROCEDURE — 73560 X-RAY EXAM OF KNEE 1 OR 2: CPT | Mod: 26,LT

## 2019-04-15 PROCEDURE — 88304 TISSUE EXAM BY PATHOLOGIST: CPT | Mod: 26

## 2019-04-15 PROCEDURE — 88311 DECALCIFY TISSUE: CPT | Mod: 26

## 2019-04-15 RX ORDER — MORPHINE SULFATE 50 MG/1
100 CAPSULE, EXTENDED RELEASE ORAL
Qty: 0 | Refills: 0 | Status: DISCONTINUED | OUTPATIENT
Start: 2019-04-15 | End: 2019-04-18

## 2019-04-15 RX ORDER — ALBUTEROL 90 UG/1
2 AEROSOL, METERED ORAL EVERY 6 HOURS
Qty: 0 | Refills: 0 | Status: DISCONTINUED | OUTPATIENT
Start: 2019-04-15 | End: 2019-04-18

## 2019-04-15 RX ORDER — SENNA PLUS 8.6 MG/1
2 TABLET ORAL AT BEDTIME
Qty: 0 | Refills: 0 | Status: DISCONTINUED | OUTPATIENT
Start: 2019-04-15 | End: 2019-04-18

## 2019-04-15 RX ORDER — HYDROMORPHONE HYDROCHLORIDE 2 MG/ML
0.5 INJECTION INTRAMUSCULAR; INTRAVENOUS; SUBCUTANEOUS
Qty: 0 | Refills: 0 | Status: DISCONTINUED | OUTPATIENT
Start: 2019-04-15 | End: 2019-04-15

## 2019-04-15 RX ORDER — SIMVASTATIN 20 MG/1
20 TABLET, FILM COATED ORAL AT BEDTIME
Qty: 0 | Refills: 0 | Status: DISCONTINUED | OUTPATIENT
Start: 2019-04-15 | End: 2019-04-18

## 2019-04-15 RX ORDER — ACETAMINOPHEN 500 MG
650 TABLET ORAL EVERY 6 HOURS
Qty: 0 | Refills: 0 | Status: DISCONTINUED | OUTPATIENT
Start: 2019-04-15 | End: 2019-04-18

## 2019-04-15 RX ORDER — TIOTROPIUM BROMIDE 18 UG/1
1 CAPSULE ORAL; RESPIRATORY (INHALATION) DAILY
Qty: 0 | Refills: 0 | Status: DISCONTINUED | OUTPATIENT
Start: 2019-04-15 | End: 2019-04-15

## 2019-04-15 RX ORDER — GABAPENTIN 400 MG/1
300 CAPSULE ORAL ONCE
Qty: 0 | Refills: 0 | Status: COMPLETED | OUTPATIENT
Start: 2019-04-15 | End: 2019-04-15

## 2019-04-15 RX ORDER — FUROSEMIDE 40 MG
40 TABLET ORAL DAILY
Qty: 0 | Refills: 0 | Status: DISCONTINUED | OUTPATIENT
Start: 2019-04-15 | End: 2019-04-18

## 2019-04-15 RX ORDER — CHLORHEXIDINE GLUCONATE 213 G/1000ML
1 SOLUTION TOPICAL
Qty: 0 | Refills: 0 | Status: DISCONTINUED | OUTPATIENT
Start: 2019-04-15 | End: 2019-04-18

## 2019-04-15 RX ORDER — SENNA PLUS 8.6 MG/1
2 TABLET ORAL AT BEDTIME
Qty: 0 | Refills: 0 | Status: DISCONTINUED | OUTPATIENT
Start: 2019-04-15 | End: 2019-04-15

## 2019-04-15 RX ORDER — MAGNESIUM HYDROXIDE 400 MG/1
30 TABLET, CHEWABLE ORAL DAILY
Qty: 0 | Refills: 0 | Status: DISCONTINUED | OUTPATIENT
Start: 2019-04-15 | End: 2019-04-18

## 2019-04-15 RX ORDER — SODIUM CHLORIDE 9 MG/ML
1000 INJECTION INTRAMUSCULAR; INTRAVENOUS; SUBCUTANEOUS
Qty: 0 | Refills: 0 | Status: DISCONTINUED | OUTPATIENT
Start: 2019-04-15 | End: 2019-04-15

## 2019-04-15 RX ORDER — DOCUSATE SODIUM 100 MG
100 CAPSULE ORAL THREE TIMES A DAY
Qty: 0 | Refills: 0 | Status: DISCONTINUED | OUTPATIENT
Start: 2019-04-15 | End: 2019-04-18

## 2019-04-15 RX ORDER — LABETALOL HCL 100 MG
10 TABLET ORAL
Qty: 0 | Refills: 0 | Status: DISCONTINUED | OUTPATIENT
Start: 2019-04-15 | End: 2019-04-18

## 2019-04-15 RX ORDER — HYDROMORPHONE HYDROCHLORIDE 2 MG/ML
1 INJECTION INTRAMUSCULAR; INTRAVENOUS; SUBCUTANEOUS
Qty: 0 | Refills: 0 | Status: DISCONTINUED | OUTPATIENT
Start: 2019-04-15 | End: 2019-04-15

## 2019-04-15 RX ORDER — LISINOPRIL 2.5 MG/1
5 TABLET ORAL DAILY
Qty: 0 | Refills: 0 | Status: DISCONTINUED | OUTPATIENT
Start: 2019-04-15 | End: 2019-04-18

## 2019-04-15 RX ORDER — DOCUSATE SODIUM 100 MG
100 CAPSULE ORAL THREE TIMES A DAY
Qty: 0 | Refills: 0 | Status: DISCONTINUED | OUTPATIENT
Start: 2019-04-15 | End: 2019-04-15

## 2019-04-15 RX ORDER — CELECOXIB 200 MG/1
200 CAPSULE ORAL
Qty: 0 | Refills: 0 | Status: DISCONTINUED | OUTPATIENT
Start: 2019-04-16 | End: 2019-04-18

## 2019-04-15 RX ORDER — ZOLPIDEM TARTRATE 10 MG/1
5 TABLET ORAL AT BEDTIME
Qty: 0 | Refills: 0 | Status: DISCONTINUED | OUTPATIENT
Start: 2019-04-15 | End: 2019-04-18

## 2019-04-15 RX ORDER — FENOFIBRATE,MICRONIZED 130 MG
145 CAPSULE ORAL DAILY
Qty: 0 | Refills: 0 | Status: DISCONTINUED | OUTPATIENT
Start: 2019-04-15 | End: 2019-04-18

## 2019-04-15 RX ORDER — ACETAMINOPHEN 500 MG
1000 TABLET ORAL ONCE
Qty: 0 | Refills: 0 | Status: COMPLETED | OUTPATIENT
Start: 2019-04-15 | End: 2019-04-15

## 2019-04-15 RX ORDER — OXYCODONE HYDROCHLORIDE 5 MG/1
30 TABLET ORAL EVERY 4 HOURS
Qty: 0 | Refills: 0 | Status: DISCONTINUED | OUTPATIENT
Start: 2019-04-15 | End: 2019-04-18

## 2019-04-15 RX ORDER — ACETAMINOPHEN 500 MG
650 TABLET ORAL EVERY 6 HOURS
Qty: 0 | Refills: 0 | Status: DISCONTINUED | OUTPATIENT
Start: 2019-04-15 | End: 2019-04-15

## 2019-04-15 RX ORDER — OXYCODONE HYDROCHLORIDE 5 MG/1
10 TABLET ORAL EVERY 4 HOURS
Qty: 0 | Refills: 0 | Status: DISCONTINUED | OUTPATIENT
Start: 2019-04-15 | End: 2019-04-15

## 2019-04-15 RX ORDER — CELECOXIB 200 MG/1
400 CAPSULE ORAL ONCE
Qty: 0 | Refills: 0 | Status: COMPLETED | OUTPATIENT
Start: 2019-04-15 | End: 2019-04-15

## 2019-04-15 RX ORDER — ENOXAPARIN SODIUM 100 MG/ML
40 INJECTION SUBCUTANEOUS ONCE
Qty: 0 | Refills: 0 | Status: COMPLETED | OUTPATIENT
Start: 2019-04-16 | End: 2019-04-16

## 2019-04-15 RX ORDER — MORPHINE SULFATE 50 MG/1
2 CAPSULE, EXTENDED RELEASE ORAL EVERY 4 HOURS
Qty: 0 | Refills: 0 | Status: DISCONTINUED | OUTPATIENT
Start: 2019-04-15 | End: 2019-04-16

## 2019-04-15 RX ORDER — CEFAZOLIN SODIUM 1 G
2000 VIAL (EA) INJECTION EVERY 8 HOURS
Qty: 0 | Refills: 0 | Status: COMPLETED | OUTPATIENT
Start: 2019-04-15 | End: 2019-04-16

## 2019-04-15 RX ORDER — TIOTROPIUM BROMIDE 18 UG/1
1 CAPSULE ORAL; RESPIRATORY (INHALATION) DAILY
Qty: 0 | Refills: 0 | Status: DISCONTINUED | OUTPATIENT
Start: 2019-04-15 | End: 2019-04-18

## 2019-04-15 RX ORDER — ASPIRIN/CALCIUM CARB/MAGNESIUM 324 MG
81 TABLET ORAL
Qty: 0 | Refills: 0 | Status: DISCONTINUED | OUTPATIENT
Start: 2019-04-15 | End: 2019-04-18

## 2019-04-15 RX ORDER — ONDANSETRON 8 MG/1
4 TABLET, FILM COATED ORAL EVERY 6 HOURS
Qty: 0 | Refills: 0 | Status: DISCONTINUED | OUTPATIENT
Start: 2019-04-15 | End: 2019-04-18

## 2019-04-15 RX ORDER — POLYETHYLENE GLYCOL 3350 17 G/17G
17 POWDER, FOR SOLUTION ORAL DAILY
Qty: 0 | Refills: 0 | Status: DISCONTINUED | OUTPATIENT
Start: 2019-04-15 | End: 2019-04-18

## 2019-04-15 RX ORDER — SODIUM CHLORIDE 9 MG/ML
1000 INJECTION, SOLUTION INTRAVENOUS
Qty: 0 | Refills: 0 | Status: DISCONTINUED | OUTPATIENT
Start: 2019-04-15 | End: 2019-04-15

## 2019-04-15 RX ORDER — OXYCODONE HYDROCHLORIDE 5 MG/1
5 TABLET ORAL EVERY 4 HOURS
Qty: 0 | Refills: 0 | Status: DISCONTINUED | OUTPATIENT
Start: 2019-04-15 | End: 2019-04-15

## 2019-04-15 RX ORDER — GABAPENTIN 400 MG/1
100 CAPSULE ORAL EVERY 8 HOURS
Qty: 0 | Refills: 0 | Status: DISCONTINUED | OUTPATIENT
Start: 2019-04-15 | End: 2019-04-18

## 2019-04-15 RX ORDER — ONDANSETRON 8 MG/1
4 TABLET, FILM COATED ORAL EVERY 6 HOURS
Qty: 0 | Refills: 0 | Status: DISCONTINUED | OUTPATIENT
Start: 2019-04-15 | End: 2019-04-15

## 2019-04-15 RX ORDER — POLYETHYLENE GLYCOL 3350 17 G/17G
17 POWDER, FOR SOLUTION ORAL DAILY
Qty: 0 | Refills: 0 | Status: DISCONTINUED | OUTPATIENT
Start: 2019-04-15 | End: 2019-04-15

## 2019-04-15 RX ADMIN — SIMVASTATIN 20 MILLIGRAM(S): 20 TABLET, FILM COATED ORAL at 21:25

## 2019-04-15 RX ADMIN — MORPHINE SULFATE 100 MILLIGRAM(S): 50 CAPSULE, EXTENDED RELEASE ORAL at 16:20

## 2019-04-15 RX ADMIN — Medication 81 MILLIGRAM(S): at 18:16

## 2019-04-15 RX ADMIN — MORPHINE SULFATE 2 MILLIGRAM(S): 50 CAPSULE, EXTENDED RELEASE ORAL at 23:00

## 2019-04-15 RX ADMIN — HYDROMORPHONE HYDROCHLORIDE 0.5 MILLIGRAM(S): 2 INJECTION INTRAMUSCULAR; INTRAVENOUS; SUBCUTANEOUS at 14:40

## 2019-04-15 RX ADMIN — HYDROMORPHONE HYDROCHLORIDE 0.5 MILLIGRAM(S): 2 INJECTION INTRAMUSCULAR; INTRAVENOUS; SUBCUTANEOUS at 14:30

## 2019-04-15 RX ADMIN — HYDROMORPHONE HYDROCHLORIDE 1 MILLIGRAM(S): 2 INJECTION INTRAMUSCULAR; INTRAVENOUS; SUBCUTANEOUS at 15:00

## 2019-04-15 RX ADMIN — CELECOXIB 400 MILLIGRAM(S): 200 CAPSULE ORAL at 10:23

## 2019-04-15 RX ADMIN — HYDROMORPHONE HYDROCHLORIDE 0.5 MILLIGRAM(S): 2 INJECTION INTRAMUSCULAR; INTRAVENOUS; SUBCUTANEOUS at 15:50

## 2019-04-15 RX ADMIN — Medication 100 MILLIGRAM(S): at 21:24

## 2019-04-15 RX ADMIN — Medication 650 MILLIGRAM(S): at 18:56

## 2019-04-15 RX ADMIN — GABAPENTIN 100 MILLIGRAM(S): 400 CAPSULE ORAL at 21:24

## 2019-04-15 RX ADMIN — Medication 650 MILLIGRAM(S): at 23:01

## 2019-04-15 RX ADMIN — Medication 650 MILLIGRAM(S): at 18:15

## 2019-04-15 RX ADMIN — HYDROMORPHONE HYDROCHLORIDE 0.5 MILLIGRAM(S): 2 INJECTION INTRAMUSCULAR; INTRAVENOUS; SUBCUTANEOUS at 15:35

## 2019-04-15 RX ADMIN — ALBUTEROL 2 PUFF(S): 90 AEROSOL, METERED ORAL at 20:26

## 2019-04-15 RX ADMIN — HYDROMORPHONE HYDROCHLORIDE 1 MILLIGRAM(S): 2 INJECTION INTRAMUSCULAR; INTRAVENOUS; SUBCUTANEOUS at 14:50

## 2019-04-15 RX ADMIN — HYDROMORPHONE HYDROCHLORIDE 1 MILLIGRAM(S): 2 INJECTION INTRAMUSCULAR; INTRAVENOUS; SUBCUTANEOUS at 15:25

## 2019-04-15 RX ADMIN — SODIUM CHLORIDE 75 MILLILITER(S): 9 INJECTION INTRAMUSCULAR; INTRAVENOUS; SUBCUTANEOUS at 19:37

## 2019-04-15 RX ADMIN — Medication 1000 MILLIGRAM(S): at 10:23

## 2019-04-15 RX ADMIN — ZOLPIDEM TARTRATE 5 MILLIGRAM(S): 10 TABLET ORAL at 23:18

## 2019-04-15 RX ADMIN — OXYCODONE HYDROCHLORIDE 30 MILLIGRAM(S): 5 TABLET ORAL at 21:23

## 2019-04-15 RX ADMIN — OXYCODONE HYDROCHLORIDE 30 MILLIGRAM(S): 5 TABLET ORAL at 16:18

## 2019-04-15 RX ADMIN — GABAPENTIN 300 MILLIGRAM(S): 400 CAPSULE ORAL at 10:23

## 2019-04-15 RX ADMIN — Medication 100 MILLIGRAM(S): at 18:15

## 2019-04-15 RX ADMIN — MORPHINE SULFATE 2 MILLIGRAM(S): 50 CAPSULE, EXTENDED RELEASE ORAL at 22:23

## 2019-04-15 RX ADMIN — Medication 100 MILLIGRAM(S): at 18:16

## 2019-04-15 RX ADMIN — OXYCODONE HYDROCHLORIDE 30 MILLIGRAM(S): 5 TABLET ORAL at 23:01

## 2019-04-15 NOTE — CHART NOTE - NSCHARTNOTEFT_GEN_A_CORE
PACU ANESTHESIA ADMISSION NOTE      Procedure: Total left knee replacement    Post op diagnosis:  Osteoarthritis of left knee      ____  Intubated  TV:______       Rate: ______      FiO2: ______    __x__  Patent Airway    __x__  Full return of protective reflexes    __x__  Full recovery from anesthesia / back to baseline status    Vitals:  T(C): 37.2 (04-15-19 @ 11:05), Max: 37.2 (04-15-19 @ 10:18)  HR: 82 (04-15-19 @ 11:05) (82 - 82)  BP: 146/102 (04-15-19 @ 11:05) (146/102 - 146/102)  RR: 18 (04-15-19 @ 11:05) (18 - 18)  SpO2: --    Mental Status:  __x__ Awake   ___x__ Alert   _____ Drowsy   _____ Sedated    Nausea/Vomiting:  __x__ NO  ______Yes,   See Post - Op Orders          Pain Scale (0-10):  _____    Treatment: ____ None    __x__ See Post - Op/PCA Orders    Post - Operative Fluids:   ____ Oral   __x__ See Post - Op Orders    Plan: Discharge:   ____Home       ___X__Floor     _____Critical Care    _____  Other:_________________    Comments: Patient had smooth intraoperative event under GA with left ACB, no anesthesia complication.  PACU Vital signs: HR:     88       BP:    169/98       RR:             O2 Sat:   100    %     Temp:97.8

## 2019-04-15 NOTE — PHYSICAL THERAPY INITIAL EVALUATION ADULT - GENERAL OBSERVATIONS, REHAB EVAL
17:00-17:30. chart reviewed. Pt received sitting at EOB, alert, oriented, able to follow multi-step instructions and agreeable to PT evaluation. + IV, + L knee ace wrapping, + B/L NAKIA stocking, slight L knee edema, c/o of L knee pain 9/10, /92

## 2019-04-15 NOTE — PROGRESS NOTE ADULT - SUBJECTIVE AND OBJECTIVE BOX
[facility] ORTHOPEDIC POST OP CHECK    T(C): 36.6 (04-15-19 @ 17:14), Max: 37.2 (04-15-19 @ 10:18)  HR: 90 (04-15-19 @ 17:14) (68 - 90)  BP: 132/86 (04-15-19 @ 17:14) (132/86 - 181/105)  RR: 18 (04-15-19 @ 17:14) (14 - 20)  SpO2: 96% (04-15-19 @ 16:30) (96% - 100%)                        14.2   12.19 )-----------( 249      ( 15 Apr 2019 15:45 )             43.3     04-15    139  |  102  |  10  ----------------------------<  128<H>  4.4   |  26  |  0.8    Ca    9.1      15 Apr 2019 15:45            S/P TKA ARTHROPLASTY  PAIN CONTROLLED  VSS   A&O X3  DRESSING C/D/I  NVI  ANTIBIOTICS INFUSED  DVT PROPHYLAXIS ORDERED  ENCOURAGE INCENTIVE SPIROMETRY  AM LABS  REHAB TO BEGIN IN THE AM  D/C PLANNING

## 2019-04-15 NOTE — ASU PATIENT PROFILE, ADULT - PMH
Colitis  LARGE INTESTINE  COPD (chronic obstructive pulmonary disease)    GERD (gastroesophageal reflux disease)    Hiatal hernia    High blood cholesterol    High cholesterol    HTN (hypertension)    Morbid obesity    Numbness and tingling  hands  Obstructive sleep apnea    Osteoarthritis    SOB (shortness of breath)

## 2019-04-15 NOTE — PATIENT PROFILE ADULT - FALL HARM RISK
coagulation(Bleeding disorder R/T clinical cond/anti-coags)/surgery/bones(Osteoporosis,prev fx,steroid use,metastatic bone ca)

## 2019-04-15 NOTE — PRE-ANESTHESIA EVALUATION ADULT - NSANTHADDINFOFT_GEN_ALL_CORE
R/B/A of neuroaxial anesthesia and regional nerve block detailed explained to the patient. Patient understood and agree to proceed.   Discussed with patient and surgeon regarding patient's poor respiratory status  Although no current cough, fever, but wheezing is present with sputum production, the risk of perioperative hypoxia, pneumonia, laryngospasm, bronchospasm is still much higher than general population. We recommend to delay elective surgery for few weeks.   However, patient state this is best condition he has been absolutely can't take another day off, would like to have surgery done today and willing to take the risk.

## 2019-04-15 NOTE — PROGRESS NOTE ADULT - SUBJECTIVE AND OBJECTIVE BOX
pain medication regimen confirmed in istop:    morphine er 100mg twice daily  oxycodone ir 30mg q4h prn   hydromorphone 4mg tabs q8h   methadone 10mg q12h    morphine er and oxycodone ir ordered , pain mgmt consult ordered. will follow

## 2019-04-16 DIAGNOSIS — M25.562 PAIN IN LEFT KNEE: ICD-10-CM

## 2019-04-16 LAB
ANION GAP SERPL CALC-SCNC: 13 MMOL/L — SIGNIFICANT CHANGE UP (ref 7–14)
BUN SERPL-MCNC: 12 MG/DL — SIGNIFICANT CHANGE UP (ref 10–20)
CALCIUM SERPL-MCNC: 9.1 MG/DL — SIGNIFICANT CHANGE UP (ref 8.5–10.1)
CHLORIDE SERPL-SCNC: 101 MMOL/L — SIGNIFICANT CHANGE UP (ref 98–110)
CO2 SERPL-SCNC: 26 MMOL/L — SIGNIFICANT CHANGE UP (ref 17–32)
CREAT SERPL-MCNC: 0.9 MG/DL — SIGNIFICANT CHANGE UP (ref 0.7–1.5)
GLUCOSE SERPL-MCNC: 111 MG/DL — HIGH (ref 70–99)
HCT VFR BLD CALC: 40.5 % — LOW (ref 42–52)
HGB BLD-MCNC: 13.3 G/DL — LOW (ref 14–18)
MCHC RBC-ENTMCNC: 28.4 PG — SIGNIFICANT CHANGE UP (ref 27–31)
MCHC RBC-ENTMCNC: 32.8 G/DL — SIGNIFICANT CHANGE UP (ref 32–37)
MCV RBC AUTO: 86.5 FL — SIGNIFICANT CHANGE UP (ref 80–94)
NRBC # BLD: 0 /100 WBCS — SIGNIFICANT CHANGE UP (ref 0–0)
PLATELET # BLD AUTO: 253 K/UL — SIGNIFICANT CHANGE UP (ref 130–400)
POTASSIUM SERPL-MCNC: 4.5 MMOL/L — SIGNIFICANT CHANGE UP (ref 3.5–5)
POTASSIUM SERPL-SCNC: 4.5 MMOL/L — SIGNIFICANT CHANGE UP (ref 3.5–5)
RBC # BLD: 4.68 M/UL — LOW (ref 4.7–6.1)
RBC # FLD: 16.5 % — HIGH (ref 11.5–14.5)
SODIUM SERPL-SCNC: 140 MMOL/L — SIGNIFICANT CHANGE UP (ref 135–146)
WBC # BLD: 14.76 K/UL — HIGH (ref 4.8–10.8)
WBC # FLD AUTO: 14.76 K/UL — HIGH (ref 4.8–10.8)

## 2019-04-16 RX ORDER — PANTOPRAZOLE SODIUM 20 MG/1
40 TABLET, DELAYED RELEASE ORAL
Qty: 0 | Refills: 0 | Status: DISCONTINUED | OUTPATIENT
Start: 2019-04-16 | End: 2019-04-18

## 2019-04-16 RX ORDER — HYDROMORPHONE HYDROCHLORIDE 2 MG/ML
1 INJECTION INTRAMUSCULAR; INTRAVENOUS; SUBCUTANEOUS ONCE
Qty: 0 | Refills: 0 | Status: DISCONTINUED | OUTPATIENT
Start: 2019-04-16 | End: 2019-04-16

## 2019-04-16 RX ORDER — METHADONE HYDROCHLORIDE 40 MG/1
10 TABLET ORAL EVERY 12 HOURS
Qty: 0 | Refills: 0 | Status: DISCONTINUED | OUTPATIENT
Start: 2019-04-16 | End: 2019-04-18

## 2019-04-16 RX ADMIN — Medication 100 MILLIGRAM(S): at 05:46

## 2019-04-16 RX ADMIN — HYDROMORPHONE HYDROCHLORIDE 1 MILLIGRAM(S): 2 INJECTION INTRAMUSCULAR; INTRAVENOUS; SUBCUTANEOUS at 21:56

## 2019-04-16 RX ADMIN — Medication 650 MILLIGRAM(S): at 17:24

## 2019-04-16 RX ADMIN — LISINOPRIL 5 MILLIGRAM(S): 2.5 TABLET ORAL at 05:46

## 2019-04-16 RX ADMIN — CELECOXIB 200 MILLIGRAM(S): 200 CAPSULE ORAL at 17:24

## 2019-04-16 RX ADMIN — ALBUTEROL 2 PUFF(S): 90 AEROSOL, METERED ORAL at 19:24

## 2019-04-16 RX ADMIN — GABAPENTIN 100 MILLIGRAM(S): 400 CAPSULE ORAL at 20:46

## 2019-04-16 RX ADMIN — Medication 100 MILLIGRAM(S): at 02:20

## 2019-04-16 RX ADMIN — POLYETHYLENE GLYCOL 3350 17 GRAM(S): 17 POWDER, FOR SOLUTION ORAL at 11:19

## 2019-04-16 RX ADMIN — OXYCODONE HYDROCHLORIDE 30 MILLIGRAM(S): 5 TABLET ORAL at 06:22

## 2019-04-16 RX ADMIN — MORPHINE SULFATE 100 MILLIGRAM(S): 50 CAPSULE, EXTENDED RELEASE ORAL at 06:05

## 2019-04-16 RX ADMIN — CELECOXIB 200 MILLIGRAM(S): 200 CAPSULE ORAL at 05:48

## 2019-04-16 RX ADMIN — HYDROMORPHONE HYDROCHLORIDE 1 MILLIGRAM(S): 2 INJECTION INTRAMUSCULAR; INTRAVENOUS; SUBCUTANEOUS at 22:13

## 2019-04-16 RX ADMIN — Medication 650 MILLIGRAM(S): at 11:18

## 2019-04-16 RX ADMIN — ZOLPIDEM TARTRATE 5 MILLIGRAM(S): 10 TABLET ORAL at 21:57

## 2019-04-16 RX ADMIN — GABAPENTIN 100 MILLIGRAM(S): 400 CAPSULE ORAL at 13:11

## 2019-04-16 RX ADMIN — OXYCODONE HYDROCHLORIDE 30 MILLIGRAM(S): 5 TABLET ORAL at 13:06

## 2019-04-16 RX ADMIN — ALBUTEROL 2 PUFF(S): 90 AEROSOL, METERED ORAL at 14:11

## 2019-04-16 RX ADMIN — Medication 100 MILLIGRAM(S): at 13:11

## 2019-04-16 RX ADMIN — MORPHINE SULFATE 100 MILLIGRAM(S): 50 CAPSULE, EXTENDED RELEASE ORAL at 06:41

## 2019-04-16 RX ADMIN — CELECOXIB 200 MILLIGRAM(S): 200 CAPSULE ORAL at 05:46

## 2019-04-16 RX ADMIN — METHADONE HYDROCHLORIDE 10 MILLIGRAM(S): 40 TABLET ORAL at 17:56

## 2019-04-16 RX ADMIN — OXYCODONE HYDROCHLORIDE 30 MILLIGRAM(S): 5 TABLET ORAL at 06:20

## 2019-04-16 RX ADMIN — OXYCODONE HYDROCHLORIDE 30 MILLIGRAM(S): 5 TABLET ORAL at 18:28

## 2019-04-16 RX ADMIN — Medication 100 MILLIGRAM(S): at 20:45

## 2019-04-16 RX ADMIN — OXYCODONE HYDROCHLORIDE 30 MILLIGRAM(S): 5 TABLET ORAL at 17:56

## 2019-04-16 RX ADMIN — Medication 145 MILLIGRAM(S): at 11:19

## 2019-04-16 RX ADMIN — Medication 81 MILLIGRAM(S): at 17:24

## 2019-04-16 RX ADMIN — PANTOPRAZOLE SODIUM 40 MILLIGRAM(S): 20 TABLET, DELAYED RELEASE ORAL at 14:36

## 2019-04-16 RX ADMIN — Medication 81 MILLIGRAM(S): at 05:46

## 2019-04-16 RX ADMIN — ENOXAPARIN SODIUM 40 MILLIGRAM(S): 100 INJECTION SUBCUTANEOUS at 10:07

## 2019-04-16 RX ADMIN — GABAPENTIN 100 MILLIGRAM(S): 400 CAPSULE ORAL at 05:46

## 2019-04-16 RX ADMIN — Medication 650 MILLIGRAM(S): at 17:58

## 2019-04-16 RX ADMIN — Medication 650 MILLIGRAM(S): at 23:19

## 2019-04-16 RX ADMIN — MORPHINE SULFATE 100 MILLIGRAM(S): 50 CAPSULE, EXTENDED RELEASE ORAL at 17:58

## 2019-04-16 RX ADMIN — OXYCODONE HYDROCHLORIDE 30 MILLIGRAM(S): 5 TABLET ORAL at 11:36

## 2019-04-16 RX ADMIN — MORPHINE SULFATE 100 MILLIGRAM(S): 50 CAPSULE, EXTENDED RELEASE ORAL at 17:24

## 2019-04-16 RX ADMIN — SIMVASTATIN 20 MILLIGRAM(S): 20 TABLET, FILM COATED ORAL at 20:46

## 2019-04-16 RX ADMIN — Medication 650 MILLIGRAM(S): at 13:06

## 2019-04-16 RX ADMIN — Medication 100 MILLIGRAM(S): at 17:26

## 2019-04-16 RX ADMIN — Medication 650 MILLIGRAM(S): at 05:48

## 2019-04-16 RX ADMIN — Medication 650 MILLIGRAM(S): at 05:46

## 2019-04-16 RX ADMIN — Medication 650 MILLIGRAM(S): at 23:13

## 2019-04-16 RX ADMIN — CELECOXIB 200 MILLIGRAM(S): 200 CAPSULE ORAL at 17:29

## 2019-04-16 NOTE — CONSULT NOTE ADULT - ASSESSMENT
REVIEW OF SYSTEMS    General:	NAD   Skin/Breast:	Neg  Ophthalmologic:	Neg  ENMT: Neg	  Respiratory and Thorax: Neg SOB	  Cardiovascular:	Neg  Gastrointestinal:	Neg  Genitourinary:	Neg  Musculoskeletal:	movement and sensation present  Neurological:	Neg deficits  Psychiatric:	Neg signs of depression   Hematology/Lymphatics:	Neg  Endocrine:	Neg        PHYSICAL EXAM:    GENERAL: NAD, well-groomed, well-developed  HEAD:  Atraumatic, Normocephalic  EYES: EOMI, PERRLA, conjunctiva and sclera clear  ENMT: No tonsillar erythema, exudates, or enlargement; Moist mucous membranes, Good dentition, No lesions  NECK: Supple, No JVD, Normal thyroid  NERVOUS SYSTEM:  Alert & Oriented X3, Good concentration; Motor Strength 5/5 B/L upper and lower extremities  CHEST/LUNG: Clear to percussion bilaterally; No rales, rhonchi, wheezing, or rubs  HEART: Regular rate and rhythm; No murmurs, rubs, or gallops  ABDOMEN: Soft, Nontender, Nondistended; Bowel sounds present  EXTREMITIES: No clubbing, cyanosis, or edema  LYMPH: No lymphadenopathy noted  SKIN: No rashes or lesions      Patient lying in bed supine. left leg with ace bandage which is dry and intact. patient states he is having some mild withdrawal symptoms. Chronic pain to the right foot big toe, bilat shoulders and left knee. Meds are verified via I-Stop. Patient states pain is 10/10 before pain meds and  10/10 after pain meds. Pain is  10/10 now. Pain is described as sharp, constant, non-radiating, better with meds and lying still and worse when he is moving. Current pain meds are not relieving his pain.

## 2019-04-16 NOTE — CONSULT NOTE ADULT - ATTENDING COMMENTS
Patient seen and examined    Agree with above.    Discharge on home medications only and to pain provider

## 2019-04-16 NOTE — OCCUPATIONAL THERAPY INITIAL EVALUATION ADULT - GENERAL OBSERVATIONS, REHAB EVAL
pt received semi martins in bed in NAD, agreeable to OT evaluation, +L knee ace wrap, left seated in w/c with RN Kasey and RN Manager Bibiana aware pt wanting to go outside to smoke cigarette. pt cautioned not to stand without assist and aware staff does not recommend pt to leave unit alone, pt aware and with good understanding of all education

## 2019-04-16 NOTE — PROGRESS NOTE ADULT - SUBJECTIVE AND OBJECTIVE BOX
ORTHO PROGRESS NOTE       53yMale POD #  1    S/P left Total Knee Arthoplasty     Patient seen and examined at bedside . The patient is awake and alert in NAD. No complaints of chest pain, SOB, N/V.     Vital Signs Last 24 Hrs  T(C): 36.9 (16 Apr 2019 07:30), Max: 37.2 (15 Apr 2019 10:18)  T(F): 98.5 (16 Apr 2019 07:30), Max: 98.9 (15 Apr 2019 10:18)  HR: 81 (16 Apr 2019 07:30) (68 - 106)  BP: 113/55 (16 Apr 2019 07:30) (113/55 - 181/105)  BP(mean): --  RR: 18 (16 Apr 2019 07:30) (14 - 20)  SpO2: 96% (15 Apr 2019 16:30) (96% - 100%)                          13.3   14.76 )-----------( 253      ( 16 Apr 2019 06:11 )             40.5     04-16    140  |  101  |  12  ----------------------------<  111<H>  4.5   |  26  |  0.9    Ca    9.1      16 Apr 2019 06:11            DVT ppx : aspirin     MEDICATIONS  (STANDING):  acetaminophen   Tablet .. 650 milliGRAM(s) Oral every 6 hours  ALBUTerol    90 MICROgram(s) HFA Inhaler 2 Puff(s) Inhalation every 6 hours  aspirin enteric coated 81 milliGRAM(s) Oral two times a day  celecoxib 200 milliGRAM(s) Oral two times a day  chlorhexidine 4% Liquid 1 Application(s) Topical <User Schedule>  docusate sodium 100 milliGRAM(s) Oral three times a day  doxycycline hyclate Capsule 100 milliGRAM(s) Oral every 12 hours  enoxaparin Injectable 40 milliGRAM(s) SubCutaneous once  fenofibrate Tablet 145 milliGRAM(s) Oral daily  gabapentin 100 milliGRAM(s) Oral every 8 hours  lisinopril 5 milliGRAM(s) Oral daily  morphine ER Tablet 100 milliGRAM(s) Oral two times a day  polyethylene glycol 3350 17 Gram(s) Oral daily  simvastatin 20 milliGRAM(s) Oral at bedtime  tiotropium 18 MICROgram(s) Capsule 1 Capsule(s) Inhalation daily    MEDICATIONS  (PRN):  aluminum hydroxide/magnesium hydroxide/simethicone Suspension 30 milliLiter(s) Oral four times a day PRN Indigestion  furosemide    Tablet 40 milliGRAM(s) Oral daily PRN swelling  labetalol Injectable 10 milliGRAM(s) IV Push every 20 minutes PRN Systolic blood pressure >160  magnesium hydroxide Suspension 30 milliLiter(s) Oral daily PRN Constipation  morphine  - Injectable 2 milliGRAM(s) IV Push every 4 hours PRN Severe Pain (7 - 10)  ondansetron Injectable 4 milliGRAM(s) IV Push every 6 hours PRN Nausea and/or Vomiting  oxyCODONE    IR 30 milliGRAM(s) Oral every 4 hours PRN BREAKTHROUGH PAIN  senna 2 Tablet(s) Oral at bedtime PRN Constipation  zolpidem 5 milliGRAM(s) Oral at bedtime PRN Insomnia      PE:   left knee dressing C/D/I          Compartments soft         NVI, SILT           A/P: 53yMale POD #1    S/P   left Total Knee Arthoplasty             OOB to Chair            Physical Therapy           Pain control            Incentive Spirometry            DVT Prophylaxis            Discharge planning for home

## 2019-04-16 NOTE — CONSULT NOTE ADULT - SUBJECTIVE AND OBJECTIVE BOX
Chief Complaint:     HPI:  This is a 53y Male admitted for elective left TKA.       Allergies    No Known Allergies    Intolerances        PAST MEDICAL & SURGICAL HISTORY:  Numbness and tingling: hands  Colitis: LARGE INTESTINE  Hiatal hernia  SOB (shortness of breath)  Osteoarthritis  High blood cholesterol  Morbid obesity  Obstructive sleep apnea  GERD (gastroesophageal reflux disease)  High cholesterol  HTN (hypertension)  COPD (chronic obstructive pulmonary disease)  H/O knee surgery: arthoscopic x4  Hernia, inguinal, bilateral: 3 months old  History of knee replacement procedure of right knee  History of appendectomy  History of cholecystectomy        SOCIAL HISTORY:  Denies Smoking, Alcohol, or Drug Use    No Known Allergies      PAIN MEDICATIONS:  acetaminophen   Tablet .. 650 milliGRAM(s) Oral every 6 hours  celecoxib 200 milliGRAM(s) Oral two times a day  gabapentin 100 milliGRAM(s) Oral every 8 hours  morphine ER Tablet 100 milliGRAM(s) Oral two times a day  ondansetron Injectable 4 milliGRAM(s) IV Push every 6 hours PRN  oxyCODONE    IR 30 milliGRAM(s) Oral every 4 hours PRN  zolpidem 5 milliGRAM(s) Oral at bedtime PRN    Heme:  aspirin enteric coated 81 milliGRAM(s) Oral two times a day    Antibiotics:  doxycycline hyclate Capsule 100 milliGRAM(s) Oral every 12 hours    Cardiovascular:  furosemide    Tablet 40 milliGRAM(s) Oral daily PRN  labetalol Injectable 10 milliGRAM(s) IV Push every 20 minutes PRN  lisinopril 5 milliGRAM(s) Oral daily    GI:  aluminum hydroxide/magnesium hydroxide/simethicone Suspension 30 milliLiter(s) Oral four times a day PRN  docusate sodium 100 milliGRAM(s) Oral three times a day  magnesium hydroxide Suspension 30 milliLiter(s) Oral daily PRN  polyethylene glycol 3350 17 Gram(s) Oral daily  senna 2 Tablet(s) Oral at bedtime PRN    Endocrine:  fenofibrate Tablet 145 milliGRAM(s) Oral daily  simvastatin 20 milliGRAM(s) Oral at bedtime    All Other Medications:  chlorhexidine 4% Liquid 1 Application(s) Topical <User Schedule>      Vital Signs Last 24 Hrs  T(C): 36.9 (16 Apr 2019 07:30), Max: 36.9 (16 Apr 2019 07:30)  T(F): 98.5 (16 Apr 2019 07:30), Max: 98.5 (16 Apr 2019 07:30)  HR: 81 (16 Apr 2019 07:30) (68 - 106)  BP: 113/55 (16 Apr 2019 07:30) (113/55 - 181/105)  BP(mean): --  RR: 18 (16 Apr 2019 07:30) (14 - 20)  SpO2: 96% (15 Apr 2019 16:30) (96% - 100%)      04-15-19 @ 07:01  -  04-16-19 @ 07:00  --------------------------------------------------------  IN: 1020 mL / OUT: 1400 mL / NET: -380 mL        LABS:                          13.3   14.76 )-----------( 253      ( 16 Apr 2019 06:11 )             40.5     04-16    140  |  101  |  12  ----------------------------<  111<H>  4.5   |  26  |  0.9    Ca    9.1      16 Apr 2019 06:11            RADIOLOGY:    Drug Screen:        [ ]  [place]  Reviewed on 4/16/2019, 1:27P  Patient Name:	Kamari Hogan	YOB: 1965  Address:	[address] [place] [address]	Sex:	Male  Rx Written	Rx Dispensed	Drug	Quantity	Days Supply	Prescriber Name  04/10/2019	04/11/2019	hydromorphone 4 mg tablet	90	30	Erick Womack MD  04/10/2019	04/11/2019	methadone hcl 10 mg tablet	60	30	GrErick madrigal MD  03/21/2019	03/28/2019	oxycodone hcl 30 mg tablet	180	30	Erick Womack MD  03/21/2019	03/28/2019	morphine sulf er 100 mg tablet	120	30	GribErick ólpez MD  03/11/2019	03/11/2019	hydromorphone 4 mg tablet	90	30	Erick Womack MD  03/11/2019	03/11/2019	methadone hcl 10 mg tablet	60	30	GrErick madrigal MD  02/26/2019	02/27/2019	morphine sulf er 100 mg tablet	120	30	Erick Womack MD  02/26/2019	02/27/2019	oxycodone hcl 30 mg tablet	180	30	Erick Womack MD  02/07/2019	02/11/2019	hydromorphone 4 mg tablet	90	30	GrErick madrigal MD  02/07/2019	02/11/2019	methadone hcl 10 mg tablet	60	30	GrErick madrigal MD  01/28/2019	01/28/2019	morphine sulf er 100 mg tablet	120	30	GrErick madrigal MD  01/28/2019	01/28/2019	oxycodone hcl 30 mg tablet	180	30	Erick Womack MD  01/10/2019	01/11/2019	hydromorphone 4 mg tablet	90	30	GrErick madrigal MD  01/10/2019	01/11/2019	methadone hcl 10 mg tablet	60	30	Erick Womack MD  12/27/2018	12/29/2018	morphine sulf er 100 mg tablet	120	30	GrErick madrigal MD  12/27/2018	12/29/2018	oxycodone hcl 30 mg tablet	180	30	Erick Womack MD  12/10/2018	12/12/2018	hydromorphone 4 mg tablet	90	30	GrErick madrigal MD  12/10/2018	12/12/2018	methadone hcl 10 mg tablet	60	30	Erick Womack MD  11/29/2018	12/03/2018	morphine sulf er 100 mg tablet	120	30	Erick Womack MD  11/29/2018	11/30/2018	oxycodone hcl 30 mg tablet	180	30	Erick Womack MD  11/08/2018	11/12/2018	hydromorphone 4 mg tablet	90	30	Erick Womack MD  11/08/2018	11/12/2018	methadone hcl 10 mg tablet	60	30	Erick Womack MD  11/01/2018	11/01/2018	oxycodone hcl 30 mg tablet	160	30	Erick Womack MD  10/23/2018	10/26/2018	morphine sulf er 100 mg tablet	140	30	GrErick madrigal MD  10/22/2018	10/22/2018	oxycodone-acetaminophen  mg tab	15	5	Shanna Bowie MD  10/08/2018	10/12/2018	hydromorphone 4 mg tablet	90	30	GrErick madrigal MD  10/08/2018	10/12/2018	methadone hcl 10 mg tablet	60	30	Erick Womack MD  09/24/2018	10/01/2018	morphine sulf er 100 mg tablet	120	30	GrErick madrigal MD  09/24/2018	10/01/2018	oxycodone hcl 30 mg tablet	180	30	GrErick madrigal MD  09/10/2018	09/14/2018	methadone hcl 10 mg tablet	60	30	GrEirck madrigal MD  09/10/2018	09/12/2018	hydromorphone 4 mg tablet	90	30	GrErick madrigal MD  08/23/2018	08/31/2018	morphine sulf er 100 mg tablet	120	30	GrErick madrigal MD  08/23/2018	08/31/2018	oxycodone hcl 30 mg tablet	180	30	Erick Womack MD  08/13/2018	08/14/2018	methadone hcl 10 mg tablet	60	30	GrErick madrigal MD  08/13/2018	08/13/2018	hydromorphone 4 mg tablet	90	30	GrErick madrigal MD  07/26/2018	08/02/2018	morphine sulf er 100 mg tablet	120	30	Erick Womack MD  07/26/2018	08/02/2018	oxycodone hcl 30 mg tablet	180	30	Erick Womack MD  07/12/2018	07/14/2018	hydromorphone 4 mg tablet	90	30	GrErick madrigal MD  07/12/2018	07/14/2018	methadone hcl 10 mg tablet	60	30	GrErick madrigal MD  06/28/2018	07/03/2018	oxycodone hcl 30 mg tablet	180	30	GrErick madrigal MD  06/28/2018	07/03/2018	morphine sulf er 100 mg tablet	120	30	GrErick madrigal MD  06/14/2018	06/15/2018	hydromorphone 4 mg tablet	90	30	Gribrene, Erick MD  06/14/2018	06/14/2018	methadone hcl 10 mg tablet	60	30	Erick Womack MD  05/31/2018	06/04/2018	morphine sulf er 100 mg tablet	120	30	Erick Womack MD  05/31/2018	06/04/2018	oxycodone hcl 30 mg tablet	180	30	Erick Womack MD  05/03/2018	05/05/2018	morphine sulf er 100 mg tablet	120	30	Erick Womack MD  05/03/2018	05/05/2018	oxycodone hcl 30 mg tablet	180	30	Erick Womack MD

## 2019-04-16 NOTE — OCCUPATIONAL THERAPY INITIAL EVALUATION ADULT - PLANNED THERAPY INTERVENTIONS, OT EVAL
parent/caregiver training.../strengthening/stretching/transfer training/ROM/bed mobility training/ADL retraining/balance training

## 2019-04-16 NOTE — CONSULT NOTE ADULT - PROBLEM SELECTOR RECOMMENDATION 9
acetaminophen   Tablet .. 650 milliGRAM(s) Oral every 6 hours  celecoxib 200 milliGRAM(s) Oral two times a day  gabapentin 100 milliGRAM(s) Oral every 8 hours  morphine ER Tablet 100 milliGRAM(s) Oral Q12hrs  ondansetron Injectable 4 milliGRAM(s) IV Push every 6 hours PRN  oxyCODONE    IR 30 milliGRAM(s) Oral every 4 hours PRN  zolpidem 5 milliGRAM(s) Oral at bedtime PRN  Methadone 10mg PO Q12hrs Royer  Patient to f/u with PMD for any refills and tappering of pain meds.

## 2019-04-16 NOTE — OCCUPATIONAL THERAPY INITIAL EVALUATION ADULT - LIVES WITH, PROFILE
private home, 1 [address], pt states he will stay on first floor upon d/c home with +bathtub, +high level toilet with grab bars/spouse

## 2019-04-17 ENCOUNTER — TRANSCRIPTION ENCOUNTER (OUTPATIENT)
Age: 54
End: 2019-04-17

## 2019-04-17 LAB
HCT VFR BLD CALC: 35.1 % — LOW (ref 42–52)
HGB BLD-MCNC: 11.4 G/DL — LOW (ref 14–18)
MCHC RBC-ENTMCNC: 28.4 PG — SIGNIFICANT CHANGE UP (ref 27–31)
MCHC RBC-ENTMCNC: 32.5 G/DL — SIGNIFICANT CHANGE UP (ref 32–37)
MCV RBC AUTO: 87.5 FL — SIGNIFICANT CHANGE UP (ref 80–94)
NRBC # BLD: 0 /100 WBCS — SIGNIFICANT CHANGE UP (ref 0–0)
PLATELET # BLD AUTO: 186 K/UL — SIGNIFICANT CHANGE UP (ref 130–400)
RBC # BLD: 4.01 M/UL — LOW (ref 4.7–6.1)
RBC # FLD: 16.9 % — HIGH (ref 11.5–14.5)
WBC # BLD: 10.45 K/UL — SIGNIFICANT CHANGE UP (ref 4.8–10.8)
WBC # FLD AUTO: 10.45 K/UL — SIGNIFICANT CHANGE UP (ref 4.8–10.8)

## 2019-04-17 RX ORDER — CEPHALEXIN 500 MG
500 CAPSULE ORAL
Qty: 0 | Refills: 0 | Status: DISCONTINUED | OUTPATIENT
Start: 2019-04-17 | End: 2019-04-18

## 2019-04-17 RX ORDER — CEFAZOLIN SODIUM 1 G
1000 VIAL (EA) INJECTION EVERY 8 HOURS
Qty: 0 | Refills: 0 | Status: DISCONTINUED | OUTPATIENT
Start: 2019-04-17 | End: 2019-04-17

## 2019-04-17 RX ADMIN — ZOLPIDEM TARTRATE 5 MILLIGRAM(S): 10 TABLET ORAL at 21:19

## 2019-04-17 RX ADMIN — Medication 650 MILLIGRAM(S): at 05:23

## 2019-04-17 RX ADMIN — Medication 100 MILLIGRAM(S): at 11:50

## 2019-04-17 RX ADMIN — Medication 650 MILLIGRAM(S): at 18:53

## 2019-04-17 RX ADMIN — MORPHINE SULFATE 100 MILLIGRAM(S): 50 CAPSULE, EXTENDED RELEASE ORAL at 05:22

## 2019-04-17 RX ADMIN — OXYCODONE HYDROCHLORIDE 30 MILLIGRAM(S): 5 TABLET ORAL at 07:28

## 2019-04-17 RX ADMIN — CELECOXIB 200 MILLIGRAM(S): 200 CAPSULE ORAL at 21:58

## 2019-04-17 RX ADMIN — MORPHINE SULFATE 100 MILLIGRAM(S): 50 CAPSULE, EXTENDED RELEASE ORAL at 18:48

## 2019-04-17 RX ADMIN — ALBUTEROL 2 PUFF(S): 90 AEROSOL, METERED ORAL at 07:27

## 2019-04-17 RX ADMIN — Medication 100 MILLIGRAM(S): at 05:24

## 2019-04-17 RX ADMIN — OXYCODONE HYDROCHLORIDE 30 MILLIGRAM(S): 5 TABLET ORAL at 22:55

## 2019-04-17 RX ADMIN — MORPHINE SULFATE 100 MILLIGRAM(S): 50 CAPSULE, EXTENDED RELEASE ORAL at 05:29

## 2019-04-17 RX ADMIN — Medication 81 MILLIGRAM(S): at 05:23

## 2019-04-17 RX ADMIN — Medication 650 MILLIGRAM(S): at 23:26

## 2019-04-17 RX ADMIN — Medication 100 MILLIGRAM(S): at 18:49

## 2019-04-17 RX ADMIN — GABAPENTIN 100 MILLIGRAM(S): 400 CAPSULE ORAL at 11:50

## 2019-04-17 RX ADMIN — METHADONE HYDROCHLORIDE 10 MILLIGRAM(S): 40 TABLET ORAL at 05:27

## 2019-04-17 RX ADMIN — Medication 650 MILLIGRAM(S): at 13:33

## 2019-04-17 RX ADMIN — OXYCODONE HYDROCHLORIDE 30 MILLIGRAM(S): 5 TABLET ORAL at 11:54

## 2019-04-17 RX ADMIN — Medication 650 MILLIGRAM(S): at 05:29

## 2019-04-17 RX ADMIN — PANTOPRAZOLE SODIUM 40 MILLIGRAM(S): 20 TABLET, DELAYED RELEASE ORAL at 07:27

## 2019-04-17 RX ADMIN — CELECOXIB 200 MILLIGRAM(S): 200 CAPSULE ORAL at 18:51

## 2019-04-17 RX ADMIN — Medication 145 MILLIGRAM(S): at 11:50

## 2019-04-17 RX ADMIN — Medication 81 MILLIGRAM(S): at 18:49

## 2019-04-17 RX ADMIN — LISINOPRIL 5 MILLIGRAM(S): 2.5 TABLET ORAL at 05:24

## 2019-04-17 RX ADMIN — Medication 650 MILLIGRAM(S): at 11:51

## 2019-04-17 RX ADMIN — OXYCODONE HYDROCHLORIDE 30 MILLIGRAM(S): 5 TABLET ORAL at 21:19

## 2019-04-17 RX ADMIN — CELECOXIB 200 MILLIGRAM(S): 200 CAPSULE ORAL at 05:29

## 2019-04-17 RX ADMIN — SIMVASTATIN 20 MILLIGRAM(S): 20 TABLET, FILM COATED ORAL at 21:18

## 2019-04-17 RX ADMIN — Medication 100 MILLIGRAM(S): at 21:18

## 2019-04-17 RX ADMIN — METHADONE HYDROCHLORIDE 10 MILLIGRAM(S): 40 TABLET ORAL at 18:53

## 2019-04-17 RX ADMIN — OXYCODONE HYDROCHLORIDE 30 MILLIGRAM(S): 5 TABLET ORAL at 21:58

## 2019-04-17 RX ADMIN — MORPHINE SULFATE 100 MILLIGRAM(S): 50 CAPSULE, EXTENDED RELEASE ORAL at 21:58

## 2019-04-17 RX ADMIN — GABAPENTIN 100 MILLIGRAM(S): 400 CAPSULE ORAL at 05:24

## 2019-04-17 RX ADMIN — Medication 100 MILLIGRAM(S): at 05:23

## 2019-04-17 RX ADMIN — OXYCODONE HYDROCHLORIDE 30 MILLIGRAM(S): 5 TABLET ORAL at 14:32

## 2019-04-17 RX ADMIN — Medication 650 MILLIGRAM(S): at 21:57

## 2019-04-17 RX ADMIN — CELECOXIB 200 MILLIGRAM(S): 200 CAPSULE ORAL at 05:23

## 2019-04-17 RX ADMIN — Medication 500 MILLIGRAM(S): at 11:50

## 2019-04-17 RX ADMIN — OXYCODONE HYDROCHLORIDE 30 MILLIGRAM(S): 5 TABLET ORAL at 16:47

## 2019-04-17 RX ADMIN — Medication 500 MILLIGRAM(S): at 23:25

## 2019-04-17 RX ADMIN — POLYETHYLENE GLYCOL 3350 17 GRAM(S): 17 POWDER, FOR SOLUTION ORAL at 11:51

## 2019-04-17 RX ADMIN — Medication 500 MILLIGRAM(S): at 18:49

## 2019-04-17 RX ADMIN — OXYCODONE HYDROCHLORIDE 30 MILLIGRAM(S): 5 TABLET ORAL at 08:16

## 2019-04-17 RX ADMIN — GABAPENTIN 100 MILLIGRAM(S): 400 CAPSULE ORAL at 21:18

## 2019-04-17 NOTE — PROGRESS NOTE ADULT - SUBJECTIVE AND OBJECTIVE BOX
Patient states his left knee pain is controlled with current regimen. The pain is throbbing in sensation. Noted staples to the left knee. site is dry and intact. movement and sensation present. Regimen discussed with patient.

## 2019-04-17 NOTE — PROGRESS NOTE ADULT - SUBJECTIVE AND OBJECTIVE BOX
pt s&e  doing well after knee replacement.  has not been compliant with smoking cessation, was in the parking lot of the hospital smoking this morning.  otherwise doing well however  knee incision c/d  leg soft  nvid    plan:  jean carlos CANDELARIO tomorrow  keflex 500 q6 b/c higher risk of infection from smoking  f/u as OP

## 2019-04-17 NOTE — DISCHARGE NOTE NURSING/CASE MANAGEMENT/SOCIAL WORK - NSDCDPATPORTLINK_GEN_ALL_CORE
You can access the [facility] Portal, offered by [facility], by registering with the following website: [email or web address]

## 2019-04-17 NOTE — PROGRESS NOTE ADULT - SUBJECTIVE AND OBJECTIVE BOX
ORTHO PROGRESS NOTE       53yMale POD # 2      S/P left Total Knee Arthoplasty     Patient seen and examined at bedside . The patient is awake and alert in NAD. No complaints of chest pain, SOB, N/V.    Vital Signs Last 24 Hrs  T(C): 35.9 (17 Apr 2019 00:00), Max: 36.8 (16 Apr 2019 16:01)  T(F): 96.7 (17 Apr 2019 00:00), Max: 98.2 (16 Apr 2019 16:01)  HR: 103 (17 Apr 2019 00:00) (103 - 121)  BP: 110/68 (17 Apr 2019 00:00) (110/68 - 133/69)  BP(mean): --  RR: 18 (17 Apr 2019 00:00) (18 - 18)  SpO2: --                          11.4   10.45 )-----------( 186      ( 17 Apr 2019 05:31 )             35.1     04-16    140  |  101  |  12  ----------------------------<  111<H>  4.5   |  26  |  0.9    Ca    9.1      16 Apr 2019 06:11            DVT ppx : aspirin     MEDICATIONS  (STANDING):  acetaminophen   Tablet .. 650 milliGRAM(s) Oral every 6 hours  ALBUTerol    90 MICROgram(s) HFA Inhaler 2 Puff(s) Inhalation every 6 hours  aspirin enteric coated 81 milliGRAM(s) Oral two times a day  celecoxib 200 milliGRAM(s) Oral two times a day  cephalexin 500 milliGRAM(s) Oral four times a day  chlorhexidine 4% Liquid 1 Application(s) Topical <User Schedule>  docusate sodium 100 milliGRAM(s) Oral three times a day  doxycycline hyclate Capsule 100 milliGRAM(s) Oral every 12 hours  fenofibrate Tablet 145 milliGRAM(s) Oral daily  gabapentin 100 milliGRAM(s) Oral every 8 hours  lisinopril 5 milliGRAM(s) Oral daily  methadone    Tablet 10 milliGRAM(s) Oral every 12 hours  morphine ER Tablet 100 milliGRAM(s) Oral two times a day  pantoprazole    Tablet 40 milliGRAM(s) Oral before breakfast  polyethylene glycol 3350 17 Gram(s) Oral daily  simvastatin 20 milliGRAM(s) Oral at bedtime  tiotropium 18 MICROgram(s) Capsule 1 Capsule(s) Inhalation daily    MEDICATIONS  (PRN):  aluminum hydroxide/magnesium hydroxide/simethicone Suspension 30 milliLiter(s) Oral four times a day PRN Indigestion  bisacodyl Suppository 10 milliGRAM(s) Rectal daily PRN If no bowel movement by postoperative day #2  bisacodyl Suppository 10 milliGRAM(s) Rectal daily PRN If no bowel movement by postoperative day #2  furosemide    Tablet 40 milliGRAM(s) Oral daily PRN swelling  labetalol Injectable 10 milliGRAM(s) IV Push every 20 minutes PRN Systolic blood pressure >160  magnesium hydroxide Suspension 30 milliLiter(s) Oral daily PRN Constipation  ondansetron Injectable 4 milliGRAM(s) IV Push every 6 hours PRN Nausea and/or Vomiting  oxyCODONE    IR 30 milliGRAM(s) Oral every 4 hours PRN BREAKTHROUGH PAIN  senna 2 Tablet(s) Oral at bedtime PRN Constipation  zolpidem 5 milliGRAM(s) Oral at bedtime PRN Insomnia      PE:   left knee surgical site  C/D          Compartments soft         NVI, SILT           A/P: 53yMale POD # 2    S/P left Total Knee Arthoplasty             OOB to Chair            Physical Therapy           Pain control            Incentive Spirometry            DVT Prophylaxis            Discharge planning

## 2019-04-17 NOTE — PROGRESS NOTE ADULT - PROBLEM SELECTOR PLAN 1
acetaminophen   Tablet .. 650 milliGRAM(s) Oral every 6 hours  celecoxib 200 milliGRAM(s) Oral two times a day  gabapentin 100 milliGRAM(s) Oral every 8 hours  methadone    Tablet 10 milliGRAM(s) Oral every 12 hours  morphine ER Tablet 100 milliGRAM(s) Oral two times a day  oxyCODONE    IR 30 milliGRAM(s) Oral every 4 hours PRN

## 2019-04-18 ENCOUNTER — TRANSCRIPTION ENCOUNTER (OUTPATIENT)
Age: 54
End: 2019-04-18

## 2019-04-18 VITALS
DIASTOLIC BLOOD PRESSURE: 60 MMHG | RESPIRATION RATE: 18 BRPM | SYSTOLIC BLOOD PRESSURE: 119 MMHG | TEMPERATURE: 98 F | HEART RATE: 113 BPM

## 2019-04-18 PROCEDURE — 93010 ELECTROCARDIOGRAM REPORT: CPT

## 2019-04-18 RX ORDER — DOCUSATE SODIUM 100 MG
1 CAPSULE ORAL
Qty: 0 | Refills: 0 | DISCHARGE
Start: 2019-04-18 | End: 2019-05-01

## 2019-04-18 RX ORDER — ASPIRIN/CALCIUM CARB/MAGNESIUM 324 MG
1 TABLET ORAL
Qty: 56 | Refills: 0
Start: 2019-04-18 | End: 2019-05-15

## 2019-04-18 RX ORDER — CELECOXIB 200 MG/1
1 CAPSULE ORAL
Qty: 14 | Refills: 0
Start: 2019-04-18 | End: 2019-04-24

## 2019-04-18 RX ORDER — ACETAMINOPHEN 500 MG
2 TABLET ORAL
Qty: 0 | Refills: 0 | DISCHARGE
Start: 2019-04-18

## 2019-04-18 RX ORDER — DOCUSATE SODIUM 100 MG
1 CAPSULE ORAL
Qty: 42 | Refills: 0
Start: 2019-04-18 | End: 2019-05-01

## 2019-04-18 RX ORDER — CEPHALEXIN 500 MG
1 CAPSULE ORAL
Qty: 28 | Refills: 0
Start: 2019-04-18 | End: 2019-04-24

## 2019-04-18 RX ORDER — GABAPENTIN 400 MG/1
1 CAPSULE ORAL
Qty: 21 | Refills: 0
Start: 2019-04-18 | End: 2019-04-24

## 2019-04-18 RX ADMIN — MORPHINE SULFATE 100 MILLIGRAM(S): 50 CAPSULE, EXTENDED RELEASE ORAL at 05:20

## 2019-04-18 RX ADMIN — Medication 81 MILLIGRAM(S): at 17:27

## 2019-04-18 RX ADMIN — METHADONE HYDROCHLORIDE 10 MILLIGRAM(S): 40 TABLET ORAL at 05:20

## 2019-04-18 RX ADMIN — PANTOPRAZOLE SODIUM 40 MILLIGRAM(S): 20 TABLET, DELAYED RELEASE ORAL at 13:33

## 2019-04-18 RX ADMIN — LISINOPRIL 5 MILLIGRAM(S): 2.5 TABLET ORAL at 05:21

## 2019-04-18 RX ADMIN — Medication 650 MILLIGRAM(S): at 13:34

## 2019-04-18 RX ADMIN — Medication 650 MILLIGRAM(S): at 05:20

## 2019-04-18 RX ADMIN — CELECOXIB 200 MILLIGRAM(S): 200 CAPSULE ORAL at 05:21

## 2019-04-18 RX ADMIN — Medication 650 MILLIGRAM(S): at 13:36

## 2019-04-18 RX ADMIN — CELECOXIB 200 MILLIGRAM(S): 200 CAPSULE ORAL at 17:27

## 2019-04-18 RX ADMIN — Medication 650 MILLIGRAM(S): at 17:27

## 2019-04-18 RX ADMIN — Medication 81 MILLIGRAM(S): at 05:21

## 2019-04-18 RX ADMIN — Medication 145 MILLIGRAM(S): at 13:34

## 2019-04-18 RX ADMIN — Medication 500 MILLIGRAM(S): at 05:20

## 2019-04-18 RX ADMIN — OXYCODONE HYDROCHLORIDE 30 MILLIGRAM(S): 5 TABLET ORAL at 18:44

## 2019-04-18 RX ADMIN — Medication 650 MILLIGRAM(S): at 05:24

## 2019-04-18 RX ADMIN — Medication 500 MILLIGRAM(S): at 13:34

## 2019-04-18 RX ADMIN — Medication 100 MILLIGRAM(S): at 17:27

## 2019-04-18 RX ADMIN — GABAPENTIN 100 MILLIGRAM(S): 400 CAPSULE ORAL at 05:20

## 2019-04-18 RX ADMIN — OXYCODONE HYDROCHLORIDE 30 MILLIGRAM(S): 5 TABLET ORAL at 09:17

## 2019-04-18 RX ADMIN — MORPHINE SULFATE 100 MILLIGRAM(S): 50 CAPSULE, EXTENDED RELEASE ORAL at 05:25

## 2019-04-18 RX ADMIN — OXYCODONE HYDROCHLORIDE 30 MILLIGRAM(S): 5 TABLET ORAL at 08:22

## 2019-04-18 RX ADMIN — METHADONE HYDROCHLORIDE 10 MILLIGRAM(S): 40 TABLET ORAL at 17:27

## 2019-04-18 RX ADMIN — Medication 100 MILLIGRAM(S): at 05:20

## 2019-04-18 RX ADMIN — CELECOXIB 200 MILLIGRAM(S): 200 CAPSULE ORAL at 05:24

## 2019-04-18 RX ADMIN — Medication 500 MILLIGRAM(S): at 17:27

## 2019-04-18 RX ADMIN — OXYCODONE HYDROCHLORIDE 30 MILLIGRAM(S): 5 TABLET ORAL at 14:28

## 2019-04-18 RX ADMIN — GABAPENTIN 100 MILLIGRAM(S): 400 CAPSULE ORAL at 13:34

## 2019-04-18 RX ADMIN — Medication 100 MILLIGRAM(S): at 13:33

## 2019-04-18 NOTE — DISCHARGE NOTE PROVIDER - NSDCFUADDINST_GEN_ALL_CORE_FT
Take temperature Twice a day for 1 week, call office for temp greater than 101  Continue to do range of motion exercises left knee  Ambulate with walker -  weight bearing as tolerated left leg  Call office for signs of infection: such as increasing swelling, pain, redness or     drainage.  If additional pain medication please contact your pain specialist or primary care provider  Call office if left leg starts to swell Take temperature Twice a day for 1 week, call office for temp greater than 101  Continue to do range of motion exercises left knee  Ambulate with walker -  weight bearing as tolerated left leg  Call office for signs of infection: such as increasing swelling, pain, redness or     drainage.  Come to ER for HR > 120 or prolonged palatations  If additional pain medication please contact your pain specialist or primary care provider  Call office if left leg starts to swell

## 2019-04-18 NOTE — DISCHARGE NOTE PROVIDER - HOSPITAL COURSE
54 yo male electively admitted for Total LKA. Patient with extensive medical hx consisting of chronic pain (has pain specialist) Colitis LARGE INTESTINE,    COPD (chronic obstructive pulmonary disease), GERD (gastroesophageal reflux disease), Hiatal hernia, High blood cholesterol, High cholesterol     HTN (hypertension), Morbid obesity, Numbness and tingling hands, Obstructive sleep apnea, Osteoarthritis, PAST SURGICAL HISTORY:    H/O knee surgery arthoscopic x4, Hernia, inguinal, bilateral 3 months old, History of appendectomy, History of cholecystectomy, and replacement of    right knee. His post-op course was essentially uneventful except for tachycardia most likely secondary to surgical pain, EKG unremarkable. He was    discharge home in stable condition on POD #3. 54 yo male electively admitted for Total LKA. Patient with extensive medical hx consisting of chronic pain (has pain specialist) Colitis LARGE INTESTINE,    COPD (chronic obstructive pulmonary disease), GERD (gastroesophageal reflux disease), Hiatal hernia, High blood cholesterol, High cholesterol     HTN (hypertension), Morbid obesity, Numbness and tingling hands, Obstructive sleep apnea, Osteoarthritis, PAST SURGICAL HISTORY:    H/O knee surgery arthoscopic x4, Hernia, inguinal, bilateral 3 months old, History of appendectomy, History of cholecystectomy, and replacement of    right knee. His post-op course was essentially uneventful except for tachycardia most likely secondary to surgical pain, EKG unremarkable, sinus tach 110. He was    discharge home in stable condition on POD #3 with  instructed to f/u with primary MD for heart rate.

## 2019-04-18 NOTE — PROGRESS NOTE ADULT - SUBJECTIVE AND OBJECTIVE BOX
53 M s/p left tka pod 3  pt s&e  no CP, SOB  has not been compliant with smoking cessation    Vital Signs Last 24 Hrs  T(C): 35.9 (18 Apr 2019 07:48), Max: 36.6 (17 Apr 2019 15:40)  T(F): 96.7 (18 Apr 2019 07:48), Max: 97.9 (17 Apr 2019 15:40)  HR: 104 (18 Apr 2019 07:48) (104 - 113)  BP: 116/72 (18 Apr 2019 07:48) (116/63 - 119/71)  BP(mean): --  RR: 18 (18 Apr 2019 07:48) (16 - 18)  SpO2: --    knee incision c/d  leg soft  nvid                          11.4   10.45 )-----------( 186      ( 17 Apr 2019 05:31 )             35.1       plan:  jean carlos CANDELARIO today  keflex 500 q6 b/c higher risk of infection from smoking  dvt ppx  PT WBAT  f/u as OP

## 2019-04-18 NOTE — DISCHARGE NOTE PROVIDER - PROVIDER TOKENS
PROVIDER:[TOKEN:[[id number]]] PROVIDER:[TOKEN:[[id number]]],PROVIDER:[TOKEN:[[id number]]]

## 2019-04-18 NOTE — DISCHARGE NOTE PROVIDER - NSDCACTIVITY_GEN_ALL_CORE
No heavy lifting/straining/Do not make important decisions/Walking - Indoors allowed/Showering allowed/Do not drive or operate machinery/Stairs allowed/Walking - Outdoors allowed

## 2019-04-18 NOTE — DISCHARGE NOTE PROVIDER - NSDCCPCAREPLAN_GEN_ALL_CORE_FT
PRINCIPAL DISCHARGE DIAGNOSIS  Diagnosis: Left knee pain  Assessment and Plan of Treatment: Left knee pain

## 2019-04-18 NOTE — DISCHARGE NOTE PROVIDER - CARE PROVIDER_API CALL
[place])  Orthopaedic Surgery  [address] [place] [address]  Phone: [phone]  Fax: [phone]  Follow Up Time: [place])  Orthopaedic Surgery  [address] [place] [address]  Phone: [phone]  Fax: [phone]  Follow Up Time:     Blake Bassett)  Critical Care Medicine; Geriatric Medicine; Internal Medicine; Pulmonary Disease  [address] [place] [address] [address]  [place] [address]  Phone: [phone]  Fax: [phone]  Follow Up Time:

## 2019-04-18 NOTE — DISCHARGE NOTE PROVIDER - CARE PROVIDERS DIRECT ADDRESSES
[email or web address]

## 2019-04-23 DIAGNOSIS — Z90.49 ACQUIRED ABSENCE OF OTHER SPECIFIED PARTS OF DIGESTIVE TRACT: ICD-10-CM

## 2019-04-23 DIAGNOSIS — M17.12 UNILATERAL PRIMARY OSTEOARTHRITIS, LEFT KNEE: ICD-10-CM

## 2019-04-23 DIAGNOSIS — J44.9 CHRONIC OBSTRUCTIVE PULMONARY DISEASE, UNSPECIFIED: ICD-10-CM

## 2019-04-23 DIAGNOSIS — E78.5 HYPERLIPIDEMIA, UNSPECIFIED: ICD-10-CM

## 2019-04-23 DIAGNOSIS — I10 ESSENTIAL (PRIMARY) HYPERTENSION: ICD-10-CM

## 2019-04-23 DIAGNOSIS — Z96.651 PRESENCE OF RIGHT ARTIFICIAL KNEE JOINT: ICD-10-CM

## 2019-04-23 DIAGNOSIS — E66.01 MORBID (SEVERE) OBESITY DUE TO EXCESS CALORIES: ICD-10-CM

## 2019-05-01 ENCOUNTER — OUTPATIENT (OUTPATIENT)
Dept: OUTPATIENT SERVICES | Facility: HOSPITAL | Age: 54
LOS: 1 days | End: 2019-05-01

## 2019-05-01 DIAGNOSIS — Z98.890 OTHER SPECIFIED POSTPROCEDURAL STATES: Chronic | ICD-10-CM

## 2019-05-01 DIAGNOSIS — Z96.651 PRESENCE OF RIGHT ARTIFICIAL KNEE JOINT: Chronic | ICD-10-CM

## 2019-05-01 DIAGNOSIS — K40.20 BILATERAL INGUINAL HERNIA, WITHOUT OBSTRUCTION OR GANGRENE, NOT SPECIFIED AS RECURRENT: Chronic | ICD-10-CM

## 2019-05-15 ENCOUNTER — EMERGENCY (EMERGENCY)
Facility: HOSPITAL | Age: 54
LOS: 0 days | Discharge: HOME | End: 2019-05-15
Attending: EMERGENCY MEDICINE | Admitting: EMERGENCY MEDICINE
Payer: MEDICAID

## 2019-05-15 VITALS
HEIGHT: 70 IN | OXYGEN SATURATION: 96 % | TEMPERATURE: 98 F | RESPIRATION RATE: 20 BRPM | DIASTOLIC BLOOD PRESSURE: 85 MMHG | HEART RATE: 83 BPM | SYSTOLIC BLOOD PRESSURE: 129 MMHG | WEIGHT: 300.05 LBS

## 2019-05-15 VITALS
SYSTOLIC BLOOD PRESSURE: 144 MMHG | DIASTOLIC BLOOD PRESSURE: 80 MMHG | HEART RATE: 80 BPM | RESPIRATION RATE: 20 BRPM | OXYGEN SATURATION: 96 %

## 2019-05-15 DIAGNOSIS — Z98.890 OTHER SPECIFIED POSTPROCEDURAL STATES: Chronic | ICD-10-CM

## 2019-05-15 DIAGNOSIS — I10 ESSENTIAL (PRIMARY) HYPERTENSION: ICD-10-CM

## 2019-05-15 DIAGNOSIS — Z79.891 LONG TERM (CURRENT) USE OF OPIATE ANALGESIC: ICD-10-CM

## 2019-05-15 DIAGNOSIS — R07.9 CHEST PAIN, UNSPECIFIED: ICD-10-CM

## 2019-05-15 DIAGNOSIS — R06.02 SHORTNESS OF BREATH: ICD-10-CM

## 2019-05-15 DIAGNOSIS — Z79.1 LONG TERM (CURRENT) USE OF NON-STEROIDAL ANTI-INFLAMMATORIES (NSAID): ICD-10-CM

## 2019-05-15 DIAGNOSIS — R07.89 OTHER CHEST PAIN: ICD-10-CM

## 2019-05-15 DIAGNOSIS — Z71.89 OTHER SPECIFIED COUNSELING: ICD-10-CM

## 2019-05-15 DIAGNOSIS — K40.20 BILATERAL INGUINAL HERNIA, WITHOUT OBSTRUCTION OR GANGRENE, NOT SPECIFIED AS RECURRENT: Chronic | ICD-10-CM

## 2019-05-15 DIAGNOSIS — Z96.651 PRESENCE OF RIGHT ARTIFICIAL KNEE JOINT: Chronic | ICD-10-CM

## 2019-05-15 DIAGNOSIS — Z79.01 LONG TERM (CURRENT) USE OF ANTICOAGULANTS: ICD-10-CM

## 2019-05-15 DIAGNOSIS — F17.210 NICOTINE DEPENDENCE, CIGARETTES, UNCOMPLICATED: ICD-10-CM

## 2019-05-15 DIAGNOSIS — E78.5 HYPERLIPIDEMIA, UNSPECIFIED: ICD-10-CM

## 2019-05-15 LAB
ALBUMIN SERPL ELPH-MCNC: 4.1 G/DL — SIGNIFICANT CHANGE UP (ref 3.5–5.2)
ALP SERPL-CCNC: 138 U/L — HIGH (ref 30–115)
ALT FLD-CCNC: 11 U/L — SIGNIFICANT CHANGE UP (ref 0–41)
ANION GAP SERPL CALC-SCNC: 10 MMOL/L — SIGNIFICANT CHANGE UP (ref 7–14)
AST SERPL-CCNC: 13 U/L — SIGNIFICANT CHANGE UP (ref 0–41)
BILIRUB SERPL-MCNC: 0.5 MG/DL — SIGNIFICANT CHANGE UP (ref 0.2–1.2)
BUN SERPL-MCNC: 7 MG/DL — LOW (ref 10–20)
CALCIUM SERPL-MCNC: 9.7 MG/DL — SIGNIFICANT CHANGE UP (ref 8.5–10.1)
CHLORIDE SERPL-SCNC: 103 MMOL/L — SIGNIFICANT CHANGE UP (ref 98–110)
CO2 SERPL-SCNC: 29 MMOL/L — SIGNIFICANT CHANGE UP (ref 17–32)
CREAT SERPL-MCNC: 0.8 MG/DL — SIGNIFICANT CHANGE UP (ref 0.7–1.5)
GAS PNL BLDV: SIGNIFICANT CHANGE UP
GLUCOSE SERPL-MCNC: 110 MG/DL — HIGH (ref 70–99)
HCT VFR BLD CALC: 42.2 % — SIGNIFICANT CHANGE UP (ref 42–52)
HGB BLD-MCNC: 14.1 G/DL — SIGNIFICANT CHANGE UP (ref 14–18)
INR BLD: 1.14 RATIO — SIGNIFICANT CHANGE UP (ref 0.65–1.3)
MCHC RBC-ENTMCNC: 29.3 PG — SIGNIFICANT CHANGE UP (ref 27–31)
MCHC RBC-ENTMCNC: 33.4 G/DL — SIGNIFICANT CHANGE UP (ref 32–37)
MCV RBC AUTO: 87.7 FL — SIGNIFICANT CHANGE UP (ref 80–94)
NRBC # BLD: 0 /100 WBCS — SIGNIFICANT CHANGE UP (ref 0–0)
NT-PROBNP SERPL-SCNC: 305 PG/ML — HIGH (ref 0–300)
PLATELET # BLD AUTO: 289 K/UL — SIGNIFICANT CHANGE UP (ref 130–400)
POTASSIUM SERPL-MCNC: 4 MMOL/L — SIGNIFICANT CHANGE UP (ref 3.5–5)
POTASSIUM SERPL-SCNC: 4 MMOL/L — SIGNIFICANT CHANGE UP (ref 3.5–5)
PROT SERPL-MCNC: 7 G/DL — SIGNIFICANT CHANGE UP (ref 6–8)
PROTHROM AB SERPL-ACNC: 13.1 SEC — HIGH (ref 9.95–12.87)
RBC # BLD: 4.81 M/UL — SIGNIFICANT CHANGE UP (ref 4.7–6.1)
RBC # FLD: 14.7 % — HIGH (ref 11.5–14.5)
SODIUM SERPL-SCNC: 142 MMOL/L — SIGNIFICANT CHANGE UP (ref 135–146)
TROPONIN T SERPL-MCNC: <0.01 NG/ML — SIGNIFICANT CHANGE UP
TROPONIN T SERPL-MCNC: <0.01 NG/ML — SIGNIFICANT CHANGE UP
WBC # BLD: 9.77 K/UL — SIGNIFICANT CHANGE UP (ref 4.8–10.8)
WBC # FLD AUTO: 9.77 K/UL — SIGNIFICANT CHANGE UP (ref 4.8–10.8)

## 2019-05-15 PROCEDURE — 93970 EXTREMITY STUDY: CPT | Mod: 26

## 2019-05-15 PROCEDURE — 71046 X-RAY EXAM CHEST 2 VIEWS: CPT | Mod: 26

## 2019-05-15 PROCEDURE — 99284 EMERGENCY DEPT VISIT MOD MDM: CPT | Mod: 25

## 2019-05-15 RX ORDER — IPRATROPIUM/ALBUTEROL SULFATE 18-103MCG
3 AEROSOL WITH ADAPTER (GRAM) INHALATION ONCE
Refills: 0 | Status: COMPLETED | OUTPATIENT
Start: 2019-05-15 | End: 2019-05-15

## 2019-05-15 RX ORDER — ONDANSETRON 8 MG/1
4 TABLET, FILM COATED ORAL ONCE
Refills: 0 | Status: COMPLETED | OUTPATIENT
Start: 2019-05-15 | End: 2019-05-15

## 2019-05-15 RX ORDER — MORPHINE SULFATE 50 MG/1
4 CAPSULE, EXTENDED RELEASE ORAL
Refills: 0 | Status: DISCONTINUED | OUTPATIENT
Start: 2019-05-15 | End: 2019-05-15

## 2019-05-15 RX ORDER — SODIUM CHLORIDE 9 MG/ML
1000 INJECTION, SOLUTION INTRAVENOUS ONCE
Refills: 0 | Status: COMPLETED | OUTPATIENT
Start: 2019-05-15 | End: 2019-05-15

## 2019-05-15 RX ORDER — MORPHINE SULFATE 50 MG/1
4 CAPSULE, EXTENDED RELEASE ORAL ONCE
Refills: 0 | Status: DISCONTINUED | OUTPATIENT
Start: 2019-05-15 | End: 2019-05-15

## 2019-05-15 RX ORDER — HYDROMORPHONE HYDROCHLORIDE 2 MG/ML
1 INJECTION INTRAMUSCULAR; INTRAVENOUS; SUBCUTANEOUS ONCE
Refills: 0 | Status: DISCONTINUED | OUTPATIENT
Start: 2019-05-15 | End: 2019-05-15

## 2019-05-15 RX ORDER — MORPHINE SULFATE 50 MG/1
2 CAPSULE, EXTENDED RELEASE ORAL ONCE
Refills: 0 | Status: DISCONTINUED | OUTPATIENT
Start: 2019-05-15 | End: 2019-05-15

## 2019-05-15 RX ADMIN — ONDANSETRON 4 MILLIGRAM(S): 8 TABLET, FILM COATED ORAL at 10:47

## 2019-05-15 RX ADMIN — Medication 125 MILLIGRAM(S): at 09:45

## 2019-05-15 RX ADMIN — Medication 3 MILLILITER(S): at 09:49

## 2019-05-15 RX ADMIN — HYDROMORPHONE HYDROCHLORIDE 1 MILLIGRAM(S): 2 INJECTION INTRAMUSCULAR; INTRAVENOUS; SUBCUTANEOUS at 10:47

## 2019-05-15 RX ADMIN — MORPHINE SULFATE 2 MILLIGRAM(S): 50 CAPSULE, EXTENDED RELEASE ORAL at 09:48

## 2019-05-15 RX ADMIN — Medication 3 MILLILITER(S): at 08:35

## 2019-05-15 RX ADMIN — MORPHINE SULFATE 4 MILLIGRAM(S): 50 CAPSULE, EXTENDED RELEASE ORAL at 08:53

## 2019-05-15 RX ADMIN — SODIUM CHLORIDE 1000 MILLILITER(S): 9 INJECTION, SOLUTION INTRAVENOUS at 10:23

## 2019-05-15 NOTE — ED PROVIDER NOTE - ATTENDING CONTRIBUTION TO CARE
52yo M with CP and SOB x 3 days. Pt states that the SOB has been increasing over the last few days. Pt is not on home O2. Pt is a smoker and admits to chills. Pt denies any HA, neck pain, back pain, fever, nausea, vomiting. On exam: NCAT. PERRLA, EOMI. OP clear. Lungs (+) b/l diffuse wheezing, RRR, S1S2 noted. Radial pulses 2+ and equal, pedal pulses 2+ and equal. Abdomen soft, NT/ND, no rebound or guarding. FROM x4 extremities. No focal neuro deficits. Plan: DVT US, labs, EKG, nebs and reassess

## 2019-05-15 NOTE — ED PROVIDER NOTE - OBJECTIVE STATEMENT
52 yo male with a pmh of COD, HTN, HLD, and s/p R TKA 1 month prior presents with SOB and chest pain that has been worsening over the past two days. pt states to have sternal chest pain that radiates to the L and is described as pressure in nature. the SOB is worsened with laying flat and he has not found relief. he denies any other symptoms including recent illness/travel, hemoptysis, n/v/d, or urinary/bowel changes.

## 2019-05-15 NOTE — ED PROVIDER NOTE - PHYSICAL EXAMINATION
Gen: NAD, AOx3  Head: NCAT  HEENT: PERRL, oral mucosa moist, normal conjunctiva, oropharynx clear without exudate or erythema  Lung: diffuse rales with wheeze  CV: normal s1/s2, rrr, Normal perfusion, pulses 2+ throughout  Abd: soft, NTND, no CVA tenderness  Genitourinary: no pelvic tenderness  MSK: No edema, no visible deformities, full range of motion in all 4 extremities, gayathri + homans sign  Neuro: CN II-XII grossly intact, No focal neurologic deficits  Skin: No rash   Psych: normal affect

## 2019-05-15 NOTE — ED ADULT NURSE REASSESSMENT NOTE - NS ED NURSE REASSESS COMMENT FT1
Pt refusing to remain in bed after receiving Morphine.  Pt found sitting outside and escorted back to bed.  Pt given respiratory tx and did not use it.  Pt continues to remove cardiac a monitor.  Pt has been repeatedly told he must remain in ED and his bed due receiving pain meds.

## 2019-05-15 NOTE — ED PROVIDER NOTE - PROGRESS NOTE DETAILS
54yo M with CP and SOB x 3 days. Pt states that the SOB has been increasing over the last few days. Pt is not on home O2. Pt is a smoker and admits to chills. Pt denies any HA, neck pain, back pain, fever, nausea, vomiting. On exam: NCAT. PERRLA, EOMI. OP clear. Lungs (+) b/l diffuse wheezing, RRR, S1S2 noted. Radial pulses 2+ and equal, pedal pulses 2+ and equal. Abdomen soft, NT/ND, no rebound or guarding. FROM x4 extremities. No focal neuro deficits. Plan: DVT US, labs, EKG, nebs and reassess Patient to be discharged from ED. Any available test results were discussed with patient and/or family. Verbal instructions given, including instructions to return to ED immediately for any new, worsening, or concerning symptoms. Patient endorsed understanding. Written discharge instructions additionally given, including follow-up plan.

## 2019-05-15 NOTE — ED PROVIDER NOTE - NSFOLLOWUPINSTRUCTIONS_ED_ALL_ED_FT
Shortness of Breath    WHAT YOU NEED TO KNOW:    Shortness of breath is a feeling that you cannot get enough air when you breathe in. You may have this feeling only during activity, or all the time. Your symptoms can range from mild to severe. Shortness of breath may be a sign of a serious health condition that needs immediate care.    DISCHARGE INSTRUCTIONS:    Return to the emergency department if:     Your signs and symptoms are the same or worse within 24 hours of treatment.       The skin over your ribs or on your neck sinks in when you breathe.       You feel confused or dizzy.    Contact your healthcare provider if:     You have new or worsening symptoms.      You have questions or concerns about your condition or care.    Medicines:     Medicines may be used to treat the cause of your symptoms. You may need medicine to treat a bacterial infection or reduce anxiety. Other medicines may be used to open your airway, reduce swelling, or remove extra fluid. If you have a heart condition, you may need medicine to help your heart beat more strongly or regularly.      Take your medicine as directed. Contact your healthcare provider if you think your medicine is not helping or if you have side effects. Tell him or her if you are allergic to any medicine. Keep a list of the medicines, vitamins, and herbs you take. Include the amounts, and when and why you take them. Bring the list or the pill bottles to follow-up visits. Carry your medicine list with you in case of an emergency.    Manage shortness of breath:     Create an action plan. You and your healthcare provider can work together to create a plan for how to handle shortness of breath. The plan can include daily activities, treatment changes, and what to do if you have severe breathing problems.      Lean forward on your elbows when you sit. This helps your lungs expand and may make it easier to breathe.      Use pursed-lip breathing any time you feel short of breath. Breathe in through your nose and then slowly breathe out through your mouth with your lips slightly puckered. It should take you twice as long to breathe out as it did to breathe in.Breathe in Breathe out           Do not smoke. Nicotine and other chemicals in cigarettes and cigars can cause lung damage and make shortness of breath worse. Ask your healthcare provider for information if you currently smoke and need help to quit. E-cigarettes or smokeless tobacco still contain nicotine. Talk to your healthcare provider before you use these products.      Reach or maintain a healthy weight. Your healthcare provider can help you create a safe weight loss plan if you are overweight.      Exercise as directed. Exercise can help your lungs work more easily. Exercise can also help you lose weight if needed. Try to get at least 30 minutes of exercise most days of the week.    Follow up with your healthcare provider or specialist as directed: Write down your questions so you remember to ask them during your visits.       © Copyright [employer] 2019 All illustrations and images included in CareNotes are the copyrighted property of [employer] or [employer].

## 2019-05-27 ENCOUNTER — EMERGENCY (EMERGENCY)
Facility: HOSPITAL | Age: 54
LOS: 0 days | Discharge: HOME | End: 2019-05-27
Admitting: EMERGENCY MEDICINE
Payer: MEDICAID

## 2019-05-27 VITALS
OXYGEN SATURATION: 98 % | TEMPERATURE: 99 F | DIASTOLIC BLOOD PRESSURE: 80 MMHG | HEART RATE: 88 BPM | SYSTOLIC BLOOD PRESSURE: 129 MMHG | RESPIRATION RATE: 20 BRPM

## 2019-05-27 VITALS — WEIGHT: 279.99 LBS

## 2019-05-27 DIAGNOSIS — I10 ESSENTIAL (PRIMARY) HYPERTENSION: ICD-10-CM

## 2019-05-27 DIAGNOSIS — E78.5 HYPERLIPIDEMIA, UNSPECIFIED: ICD-10-CM

## 2019-05-27 DIAGNOSIS — Z98.890 OTHER SPECIFIED POSTPROCEDURAL STATES: Chronic | ICD-10-CM

## 2019-05-27 DIAGNOSIS — Z96.651 PRESENCE OF RIGHT ARTIFICIAL KNEE JOINT: Chronic | ICD-10-CM

## 2019-05-27 DIAGNOSIS — S60.221A CONTUSION OF RIGHT HAND, INITIAL ENCOUNTER: ICD-10-CM

## 2019-05-27 DIAGNOSIS — Z79.891 LONG TERM (CURRENT) USE OF OPIATE ANALGESIC: ICD-10-CM

## 2019-05-27 DIAGNOSIS — Z79.82 LONG TERM (CURRENT) USE OF ASPIRIN: ICD-10-CM

## 2019-05-27 DIAGNOSIS — J44.9 CHRONIC OBSTRUCTIVE PULMONARY DISEASE, UNSPECIFIED: ICD-10-CM
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
## 2019-05-27 DIAGNOSIS — M79.646 PAIN IN UNSPECIFIED FINGER(S): ICD-10-CM
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
## 2019-05-27 DIAGNOSIS — K40.20 BILATERAL INGUINAL HERNIA, WITHOUT OBSTRUCTION OR GANGRENE, NOT SPECIFIED AS RECURRENT: Chronic | ICD-10-CM

## 2019-05-27 DIAGNOSIS — F17.200 NICOTINE DEPENDENCE, UNSPECIFIED, UNCOMPLICATED: ICD-10-CM

## 2019-05-27 DIAGNOSIS — Z87.19 PERSONAL HISTORY OF OTHER DISEASES OF THE DIGESTIVE SYSTEM: ICD-10-CM
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
## 2019-05-27 PROCEDURE — 99283 EMERGENCY DEPT VISIT LOW MDM: CPT

## 2019-05-27 PROCEDURE — 73130 X-RAY EXAM OF HAND: CPT | Mod: 26,RT

## 2019-05-27 NOTE — ED PROVIDER NOTE - OBJECTIVE STATEMENT
52 yo M with pmhx of HTN, HLD, emphysema, COPD presenting with right hand pain after a 100lb  fell onto it. Reports pain to 2nd digit of right hand radiating to knuckle. Symptoms are moderate. Worse wit palpation. No alleviating factors. No numbness, tingling, or weakness. Reports swelling to finger.

## 2019-05-27 NOTE — ED PROVIDER NOTE - NS ED ROS FT
Review of Systems:  	•	CONSTITUTIONAL - no fever, no diaphoresis, no chills  	•	SKIN - no rash  	•	HEMATOLOGIC - no bleeding, no bruising  	•	MUSCULOSKELETAL - +finger pain, +swelling, no redness  	•	NEUROLOGIC - no weakness, no headache, no paresthesias  	All other ROS are negative except as documented in HPI.

## 2019-05-27 NOTE — ED PROVIDER NOTE - NSFOLLOWUPINSTRUCTIONS_ED_ALL_ED_FT
Hand Contusion  A hand contusion is a deep bruise to the hand. Contusions are the result of a blunt injury to tissues and muscle fibers under the skin. The injury causes bleeding under the skin. The skin overlying the contusion may turn blue, purple, or yellow. Minor injuries will give you a painless contusion, but more severe contusions may stay painful and swollen for a few weeks.    What are the causes?  A contusion is usually caused by a hard hit, trauma, or direct force to your hand, such as having a heavy object fall on your hand.    What are the signs or symptoms?  Symptoms of this condition include:  Swelling of the hand.  Pain and tenderness of the hand.  Discoloration of the hand. The area may have redness and then turn blue, purple, or yellow.  How is this diagnosed?  This condition is diagnosed from a physical exam and your medical history. An X-ray may be needed to see if there are any other injuries, such as broken bones (fractures). Sometimes, a CT scan or MRI may be needed if your health care provider is concerned that you may have torn or injured ligaments.    How is this treated?  An elastic wrap may be recommended to support your hand. In general, the best treatment for a hand contusion is rest, ice, pressure (compression), and elevation of the injured area. This is often called RICE therapy. Over-the-counter medicines may also be recommended for pain control.    Follow these instructions at home:  RICE Therapy     Rest the injured area.  If directed, apply ice to the injured area:  Put ice in a plastic bag.  Place a towel between your skin and the bag.  Leave the ice on for 20 minutes, 2–3 times a day.  If directed, apply light compression to the injured area using an elastic wrap. Make sure the wrap is not too tight. Remove and reapply the wrap as told by your health care provider. If your fingers become numb, cold, or blue, take the wrap off and reapply it more loosely.  Raise (elevate) the injured area above the level of your heart while you are sitting or lying down.  General instructions     Image   Take over-the-counter and prescription medicines only as told by your health care provider.  Protect your hand from getting injured further.  Keep all follow-up visits as told by your health care provider. This is important.  Contact a health care provider if:  Your symptoms do not improve after several days of treatment.  You have increased redness, swelling, or pain in your hand or fingers.  You have difficulty moving the injured area.  Your swelling or pain is not relieved with medicines.  Get help right away if:  You have severe pain.  Your hand or fingers become numb.  Your hand or fingers turn pale, blue, or cold.  You cannot move your hand or wrist.  Your hand is warm to the touch.  This information is not intended to replace advice given to you by your health care provider. Make sure you discuss any questions you have with your health care provider.

## 2019-05-27 NOTE — ED PROVIDER NOTE - PHYSICAL EXAMINATION
VITAL SIGNS: I have reviewed nursing notes and confirm.  CONSTITUTIONAL: Well-developed; well-nourished; in no acute distress.  SKIN: Skin exam is warm and dry, no acute rash.  HEAD: Normocephalic; atraumatic.  EYES: PERRL, EOM intact; conjunctiva and sclera clear.  EXT: +Tenderness and swelling to right 2nd digit of right hand. Normal ROM. No edema.  LYMPH: No acute cervical adenopathy.  NEURO: Alert, oriented. Grossly unremarkable. No focal deficits.  PSYCH: Cooperative, appropriate.

## 2019-05-27 NOTE — ED ADULT NURSE NOTE - OBJECTIVE STATEMENT
pt presents with right index finger pain and swelling started this morning s/p dropping 2x4 pavers on his right hand from 2 feet high shelf. limited ROM to right index finger. no loss of sensation.

## 2019-05-27 NOTE — ED PROVIDER NOTE - CLINICAL SUMMARY MEDICAL DECISION MAKING FREE TEXT BOX
54 yo M with pmhx of HTN, HLD, emphysema, COPD presenting with right hand pain after a 100lb  fell onto it. Reports pain to 2nd digit of right hand radiating to knuckle. Xray neg. Aluminum finger splint applied with hand fu. I have discussed the discharge plan with the patient. The patient agrees with the plan, as discussed.  The patient understands Emergency Department diagnosis is a preliminary diagnosis often based on limited information and that the patient must adhere to the follow-up plan as discussed.  The patient understands that if the symptoms worsen or if prescribed medications do not have the desired/planned effect that the patient may return to the Emergency Department at any time for further evaluation and treatment.

## 2019-05-27 NOTE — ED PROVIDER NOTE - CARE PROVIDER_API CALL
Johnny Roy)  Orthopaedic Surgery  [address] [place] [address]  Phone: [phone]  Fax: [phone]  Follow Up Time: 1-3 Days

## 2019-05-27 NOTE — ED ADULT TRIAGE NOTE - CHIEF COMPLAINT QUOTE
pt stated "I think I broke my finger". pt injured his R index finger. pt neurovascular status intact. pt ROM limited in R index finger.

## 2019-06-03 NOTE — DISCHARGE NOTE ADULT - LAUNCH MEDICATION RECONCILIATION
History     Chief Complaint:  Abdominal Pain    HPI   Maria Del Rosario Robertson is a 38 year old female with a history of hypertension who presents with for evaluation of abdominal pain. Throughout the past day, she reports nausea, vomiting, diarrhea, fevers. and chills. She was managing these symptoms at home until around 1800 when she was struck with sudden onset of upper mid-abdominal pain. When this pain persisted, she decided to present to the ED. Here, she reports neither her stool nor the emesis have been bloody; she states the emesis was more green in color. She has not been able to tolerate fluids and only ate a small lunch today. She denies any urinary symptoms or recent travel. She does note her mother, who is currently staying with her, was ill with similar symptoms 2 days ago, but have since resolved.     Allergies:  NKDA    Medications:    Wellbutrin  HTN Medication    Past Medical History:    HTN    Past Surgical History:    The patient does not have any pertinent past surgical history.    Family History:    No past pertinent family history.    Social History:  Marital Status:   [2]   at bedside.  Negative for tobacco use.  Negative for alcohol use.    Review of Systems   Constitutional: Negative for fever.   Gastrointestinal: Positive for abdominal pain, diarrhea, nausea and vomiting. Negative for blood in stool.   Genitourinary: Negative for dysuria and hematuria.   All other systems reviewed and are negative.      Physical Exam     Patient Vitals for the past 24 hrs:   BP Temp Temp src Pulse Heart Rate Resp SpO2   06/03/19 0459 -- -- -- -- -- 14 --   06/03/19 0400 (!) 139/91 -- -- 78 -- -- 97 %   06/03/19 0345 (!) 139/91 -- -- 79 -- -- 97 %   06/03/19 0300 147/90 -- -- 79 -- -- 97 %   06/03/19 0050 (!) 165/108 98.2  F (36.8  C) Oral -- 76 18 100 %      Physical Exam   Constitutional: She appears well-developed and well-nourished.   HENT:   Right Ear: External ear normal.   Left Ear:  External ear normal.   Mouth/Throat: Oropharynx is clear and moist. No oropharyngeal exudate.   TM's clear bilaterally   Eyes: Pupils are equal, round, and reactive to light. Conjunctivae are normal. No scleral icterus.   Cardiovascular: Normal rate, regular rhythm, normal heart sounds and intact distal pulses. Exam reveals no gallop and no friction rub.   No murmur heard.  Pulmonary/Chest: Effort normal and breath sounds normal. No respiratory distress. She has no wheezes. She has no rales.   Abdominal: Soft. Bowel sounds are normal. She exhibits no distension and no mass. There is tenderness.   Epigastric tenderness. No CVAT   Musculoskeletal: She exhibits no edema.   Neurological: She is alert. No cranial nerve deficit.   Skin: Skin is warm and dry. No rash noted.   Psychiatric: She has a normal mood and affect.         Emergency Department Course   ECG:  Indication: Epigastric Pain  Time: 0220  Vent. Rate 76 bpm. HI interval 172. QRS duration 88. QT/QTc 4002/452. P-R-T axis 32 24 32.    Normal Sinus Rhythm  Normal ECG. Read time: 0220.    Imaging:  Radiographic findings were communicated with the patient who voiced understanding of the findings.  US Abdomen, Complete:  No acute sonographic findings, as per radiology.     Laboratory:  CBC: WBC: 7.9, HGB: 12.3, PLT: 281  CMP: Glucose 119 (H), o/w WNL (Creatinine: 0.84)    Lipase: 62 (L)  HCG: Negative    UA with Microscopic: Leukocyte esterase: Small (A), Bacteria: Few (A), Squamous epithelial: 5 (H), Mucous: Present (A), o/w WNL     Interventions:  0102 Zofran, 4 mg, IV injection  0215 NS 1L IV  0243 Pepcid, 20 mg, IV injection  0245 Reglan, 10 mg, IV injection  0247 Morphine, 4 mg, IV injection     Emergency Department Course:  Nursing notes and vitals reviewed. (0200) I performed an exam of the patient as documented above.      EKG obtained in the ED, see results above.     IV inserted. Medicine administered as documented above. Blood drawn. This was sent to  the lab for further testing, results above.     The patient was sent for an abdominal ultrasound while in the emergency department, findings above.      The patient provided a urine sample here in the emergency department. This was sent for laboratory testing, findings above.     (6657) I rechecked the patient and discussed the results of her workup thus far. She reports improvement.      Findings and plan explained to the Patient. Patient discharged home with instructions regarding supportive care, medications, and reasons to return. The importance of close follow-up was reviewed. The patient was prescribed Zofran and oxycodone.      I personally reviewed the laboratory and imaging results with the Patient and answered all related questions prior to discharge.       Impression & Plan      Medical Decision Making:  Maria Del Rosario Robertson is a 38 year old female who presents mid-abdominal pain, nausea, and frequent diarrhea. On my evaluation, patient did have pretty significant epigastric tenderness, but vitals were stable. She did note that her mother had been sick with similar symptoms before hers started. Vitals on reevaluation were within a normal range. Labs are also unremarkable. No evidence of bloody diarrhea on the history. Ultrasound was negative. After doses of morphine and fluids, her symptoms were much better. Likely this could be a viral gastroenteritis.  is a physician as well and will be able to care for her. They are comfortable with following up with her regular doctor as needed. They are sent home with Zofran and oxycodone for nausea and pain as needed, and she was placed on a clear liquid diet.     Diagnosis:    ICD-10-CM    1. Gastroenteritis K52.9        Disposition:  discharged to home    Discharge Medications:     Medication List      Started    ondansetron 4 MG ODT tab  Commonly known as:  ZOFRAN ODT  4 mg, Oral, EVERY 6 HOURS PRN     oxyCODONE 5 MG tablet  Commonly known as:  ROXICODONE  5  mg, Oral, EVERY 6 HOURS PRN          Scribe Disclosure:  I, Sade Valdes, am serving as a scribe on 6/3/2019 at 2:00 AM to personally document services performed by Rolo Lora MD based on my observations and the provider's statements to me.      Sade Valdes  6/3/2019   [facility] EMERGENCY DEPARTMENT       Rolo Lora MD  06/03/19 0610     <<-----Click here for Discharge Medication Review

## 2019-06-06 NOTE — ED PROVIDER NOTE - MEDICAL DECISION MAKING DETAILS
Pt tolerated PO.  Has been seen continuing to gag himself with his fingers, but also still asking for pain meds.  Pt to f/u with PCP and continue home meds as needed. 14

## 2019-07-22 ENCOUNTER — OUTPATIENT (OUTPATIENT)
Dept: OUTPATIENT SERVICES | Facility: HOSPITAL | Age: 54
LOS: 1 days | Discharge: HOME | End: 2019-07-22
Payer: MEDICAID

## 2019-07-22 VITALS — WEIGHT: 291.01 LBS | HEIGHT: 70 IN

## 2019-07-22 DIAGNOSIS — Z01.818 ENCOUNTER FOR OTHER PREPROCEDURAL EXAMINATION: ICD-10-CM

## 2019-07-22 DIAGNOSIS — M20.21 HALLUX RIGIDUS, RIGHT FOOT: ICD-10-CM

## 2019-07-22 DIAGNOSIS — Z98.890 OTHER SPECIFIED POSTPROCEDURAL STATES: Chronic | ICD-10-CM

## 2019-07-22 DIAGNOSIS — K40.20 BILATERAL INGUINAL HERNIA, WITHOUT OBSTRUCTION OR GANGRENE, NOT SPECIFIED AS RECURRENT: Chronic | ICD-10-CM

## 2019-07-22 DIAGNOSIS — Z96.651 PRESENCE OF RIGHT ARTIFICIAL KNEE JOINT: Chronic | ICD-10-CM

## 2019-07-22 LAB
ALBUMIN SERPL ELPH-MCNC: 3.8 G/DL — SIGNIFICANT CHANGE UP (ref 3.5–5.2)
ALP SERPL-CCNC: 135 U/L — HIGH (ref 30–115)
ALT FLD-CCNC: 10 U/L — SIGNIFICANT CHANGE UP (ref 0–41)
ANION GAP SERPL CALC-SCNC: 13 MMOL/L — SIGNIFICANT CHANGE UP (ref 7–14)
APPEARANCE UR: CLEAR — SIGNIFICANT CHANGE UP
APTT BLD: 38.1 SEC — SIGNIFICANT CHANGE UP (ref 27–39.2)
AST SERPL-CCNC: 13 U/L — SIGNIFICANT CHANGE UP (ref 0–41)
BASOPHILS # BLD AUTO: 0.04 K/UL — SIGNIFICANT CHANGE UP (ref 0–0.2)
BASOPHILS NFR BLD AUTO: 0.6 % — SIGNIFICANT CHANGE UP (ref 0–1)
BILIRUB SERPL-MCNC: 0.4 MG/DL — SIGNIFICANT CHANGE UP (ref 0.2–1.2)
BILIRUB UR-MCNC: NEGATIVE — SIGNIFICANT CHANGE UP
BUN SERPL-MCNC: 7 MG/DL — LOW (ref 10–20)
CALCIUM SERPL-MCNC: 9.5 MG/DL — SIGNIFICANT CHANGE UP (ref 8.5–10.1)
CHLORIDE SERPL-SCNC: 100 MMOL/L — SIGNIFICANT CHANGE UP (ref 98–110)
CO2 SERPL-SCNC: 27 MMOL/L — SIGNIFICANT CHANGE UP (ref 17–32)
COLOR SPEC: SIGNIFICANT CHANGE UP
CREAT SERPL-MCNC: 0.8 MG/DL — SIGNIFICANT CHANGE UP (ref 0.7–1.5)
DIFF PNL FLD: NEGATIVE — SIGNIFICANT CHANGE UP
EOSINOPHIL # BLD AUTO: 0.3 K/UL — SIGNIFICANT CHANGE UP (ref 0–0.7)
EOSINOPHIL NFR BLD AUTO: 4.8 % — SIGNIFICANT CHANGE UP (ref 0–8)
GLUCOSE SERPL-MCNC: 134 MG/DL — HIGH (ref 70–99)
GLUCOSE UR QL: NEGATIVE — SIGNIFICANT CHANGE UP
HCT VFR BLD CALC: 47 % — SIGNIFICANT CHANGE UP (ref 42–52)
HGB BLD-MCNC: 15.1 G/DL — SIGNIFICANT CHANGE UP (ref 14–18)
IMM GRANULOCYTES NFR BLD AUTO: 0.2 % — SIGNIFICANT CHANGE UP (ref 0.1–0.3)
INR BLD: 0.98 RATIO — SIGNIFICANT CHANGE UP (ref 0.65–1.3)
KETONES UR-MCNC: NEGATIVE — SIGNIFICANT CHANGE UP
LEUKOCYTE ESTERASE UR-ACNC: NEGATIVE — SIGNIFICANT CHANGE UP
LYMPHOCYTES # BLD AUTO: 1.71 K/UL — SIGNIFICANT CHANGE UP (ref 1.2–3.4)
LYMPHOCYTES # BLD AUTO: 27.5 % — SIGNIFICANT CHANGE UP (ref 20.5–51.1)
MCHC RBC-ENTMCNC: 27.8 PG — SIGNIFICANT CHANGE UP (ref 27–31)
MCHC RBC-ENTMCNC: 32.1 G/DL — SIGNIFICANT CHANGE UP (ref 32–37)
MCV RBC AUTO: 86.4 FL — SIGNIFICANT CHANGE UP (ref 80–94)
MONOCYTES # BLD AUTO: 0.35 K/UL — SIGNIFICANT CHANGE UP (ref 0.1–0.6)
MONOCYTES NFR BLD AUTO: 5.6 % — SIGNIFICANT CHANGE UP (ref 1.7–9.3)
NEUTROPHILS # BLD AUTO: 3.8 K/UL — SIGNIFICANT CHANGE UP (ref 1.4–6.5)
NEUTROPHILS NFR BLD AUTO: 61.3 % — SIGNIFICANT CHANGE UP (ref 42.2–75.2)
NITRITE UR-MCNC: NEGATIVE — SIGNIFICANT CHANGE UP
NRBC # BLD: 0 /100 WBCS — SIGNIFICANT CHANGE UP (ref 0–0)
PH UR: 7 — SIGNIFICANT CHANGE UP (ref 5–8)
PLATELET # BLD AUTO: 222 K/UL — SIGNIFICANT CHANGE UP (ref 130–400)
POTASSIUM SERPL-MCNC: 4.6 MMOL/L — SIGNIFICANT CHANGE UP (ref 3.5–5)
POTASSIUM SERPL-SCNC: 4.6 MMOL/L — SIGNIFICANT CHANGE UP (ref 3.5–5)
PROT SERPL-MCNC: 6.9 G/DL — SIGNIFICANT CHANGE UP (ref 6–8)
PROT UR-MCNC: NEGATIVE — SIGNIFICANT CHANGE UP
PROTHROM AB SERPL-ACNC: 11.3 SEC — SIGNIFICANT CHANGE UP (ref 9.95–12.87)
RBC # BLD: 5.44 M/UL — SIGNIFICANT CHANGE UP (ref 4.7–6.1)
RBC # FLD: 14.1 % — SIGNIFICANT CHANGE UP (ref 11.5–14.5)
SODIUM SERPL-SCNC: 140 MMOL/L — SIGNIFICANT CHANGE UP (ref 135–146)
SP GR SPEC: 1.01 — SIGNIFICANT CHANGE UP (ref 1.01–1.02)
UROBILINOGEN FLD QL: SIGNIFICANT CHANGE UP
WBC # BLD: 6.21 K/UL — SIGNIFICANT CHANGE UP (ref 4.8–10.8)
WBC # FLD AUTO: 6.21 K/UL — SIGNIFICANT CHANGE UP (ref 4.8–10.8)

## 2019-07-22 PROCEDURE — 93010 ELECTROCARDIOGRAM REPORT: CPT

## 2019-07-22 NOTE — H&P PST ADULT - NSICDXPASTSURGICALHX_GEN_ALL_CORE_FT
PAST SURGICAL HISTORY:  H/O knee surgery arthoscopic x4    Hernia, inguinal, bilateral 3 months old    History of appendectomy     History of cholecystectomy     History of knee replacement procedure of right knee BILATERAL

## 2019-07-22 NOTE — H&P PST ADULT - NSICDXPASTMEDICALHX_GEN_ALL_CORE_FT
PAST MEDICAL HISTORY:  Colitis LARGE INTESTINE   NO RECENT FLARES    COPD (chronic obstructive pulmonary disease)     COPD (chronic obstructive pulmonary disease)     GERD (gastroesophageal reflux disease)     Hiatal hernia GERD    High blood cholesterol     High cholesterol     HTN (hypertension)     Morbid obesity     Numbness and tingling hands    Obstructive sleep apnea     REBECCA treated with BiPAP     Osteoarthritis     SOB (shortness of breath)

## 2019-07-22 NOTE — H&P PST ADULT - HISTORY OF PRESENT ILLNESS
'RIGHT BIG TOE-SHE'S GUTTING IT OUT"  PT CURRENTLY DENIES CHEST PAIN, PALPITATIONS, DYSURIA, RECENT ILLNESS  EXERCISE TOLERANCE ONE MILE  PT DENIES ANY RASHES, ABRASION, OR OPEN WOUNDS OR LACERATIONS

## 2019-07-31 NOTE — ASU PATIENT PROFILE, ADULT - PMH
Colitis  LARGE INTESTINE   NO RECENT FLARES  COPD (chronic obstructive pulmonary disease)    COPD (chronic obstructive pulmonary disease)    GERD (gastroesophageal reflux disease)    Hiatal hernia  GERD  High blood cholesterol    High cholesterol    HTN (hypertension)    Morbid obesity    Numbness and tingling  hands  Obstructive sleep apnea    REBECCA treated with BiPAP    Osteoarthritis    SOB (shortness of breath)

## 2019-07-31 NOTE — ASU PATIENT PROFILE, ADULT - PSH
H/O knee surgery  arthoscopic x4  Hernia, inguinal, bilateral  3 months old  History of appendectomy    History of cholecystectomy    History of knee replacement procedure of right knee  BILATERAL

## 2019-08-01 ENCOUNTER — OUTPATIENT (OUTPATIENT)
Dept: OUTPATIENT SERVICES | Facility: HOSPITAL | Age: 54
LOS: 1 days | Discharge: HOME | End: 2019-08-01

## 2019-08-01 VITALS
RESPIRATION RATE: 17 BRPM | TEMPERATURE: 99 F | DIASTOLIC BLOOD PRESSURE: 88 MMHG | HEART RATE: 80 BPM | SYSTOLIC BLOOD PRESSURE: 140 MMHG | OXYGEN SATURATION: 100 %

## 2019-08-01 VITALS
WEIGHT: 291.01 LBS | HEART RATE: 79 BPM | SYSTOLIC BLOOD PRESSURE: 138 MMHG | DIASTOLIC BLOOD PRESSURE: 79 MMHG | RESPIRATION RATE: 18 BRPM | HEIGHT: 70 IN | OXYGEN SATURATION: 96 % | TEMPERATURE: 99 F

## 2019-08-01 DIAGNOSIS — Z98.890 OTHER SPECIFIED POSTPROCEDURAL STATES: Chronic | ICD-10-CM

## 2019-08-01 DIAGNOSIS — K40.20 BILATERAL INGUINAL HERNIA, WITHOUT OBSTRUCTION OR GANGRENE, NOT SPECIFIED AS RECURRENT: Chronic | ICD-10-CM

## 2019-08-01 DIAGNOSIS — Z96.651 PRESENCE OF RIGHT ARTIFICIAL KNEE JOINT: Chronic | ICD-10-CM

## 2019-08-01 RX ORDER — IBUPROFEN 200 MG
1 TABLET ORAL
Qty: 90 | Refills: 0
Start: 2019-08-01 | End: 2019-08-30

## 2019-08-01 RX ORDER — HYDROMORPHONE HYDROCHLORIDE 2 MG/ML
0.5 INJECTION INTRAMUSCULAR; INTRAVENOUS; SUBCUTANEOUS
Refills: 0 | Status: DISCONTINUED | OUTPATIENT
Start: 2019-08-01 | End: 2019-08-01

## 2019-08-01 RX ORDER — ONDANSETRON 8 MG/1
4 TABLET, FILM COATED ORAL ONCE
Refills: 0 | Status: DISCONTINUED | OUTPATIENT
Start: 2019-08-01 | End: 2019-08-16

## 2019-08-01 RX ORDER — SODIUM CHLORIDE 9 MG/ML
1000 INJECTION, SOLUTION INTRAVENOUS
Refills: 0 | Status: DISCONTINUED | OUTPATIENT
Start: 2019-08-01 | End: 2019-08-16

## 2019-08-01 RX ORDER — OXYCODONE HYDROCHLORIDE 5 MG/1
5 TABLET ORAL ONCE
Refills: 0 | Status: DISCONTINUED | OUTPATIENT
Start: 2019-08-01 | End: 2019-08-01

## 2019-08-01 NOTE — CHART NOTE - NSCHARTNOTEFT_GEN_A_CORE
PACU ANESTHESIA ADMISSION NOTE      Procedure:   Post op diagnosis:      ____  Intubated  TV:______       Rate: ______      FiO2: ______    __x__  Patent Airway    __x__  Full return of protective reflexes    __x__  Full recovery from anesthesia / back to baseline status      Vitals:   BP  140/102          HR   78        RR   12          O2 sat 98          Temp 98      Mental Status:  __x__ Awake   _____ Alert   _____ Drowsy   _____ Sedated    Nausea/Vomiting:  __x__ No    ____ Yes, See Post - Op Orders        Pain Scale (0-10):  __0___    Treatment: ____ None    ____ See Post - Op/PCA Orders    Post - Operative Fluids:   __x__ Oral   ____ See Post - Op Orders    Plan: Discharge:   _x___Home       _____Floor     _____Critical Care    _____  Other:_________________    Comments: No anesthesia complications noted. Procedure was cancelled due to no IV access. Patient is in pacu.   Discharge once criteria met.

## 2019-08-01 NOTE — ASU DISCHARGE PLAN (ADULT/PEDIATRIC) - PROVIDER TOKENS
FREE:[LAST:[Rebecca],FIRST:[Zoey],PHONE:[phone]],FAX:[phone]   [phone]    -],ADDRESS:[[address] [place] [address]],FOLLOWUP:[2 weeks]]

## 2019-08-01 NOTE — ASU DISCHARGE PLAN (ADULT/PEDIATRIC) - ASU DC SPECIAL INSTRUCTIONSFT
weightbearing as tolerated, elevate, ice, pain control.  Follow up in 2 weeks for suture removal and xrays

## 2019-08-01 NOTE — ASU DISCHARGE PLAN (ADULT/PEDIATRIC) - CALL YOUR DOCTOR IF YOU HAVE ANY OF THE FOLLOWING:
Fever greater than (need to indicate Fahrenheit or Celsius)/Numbness, tingling, color or temperature change to extremity/Pain not relieved by Medications/Wound/Surgical Site with redness, or foul smelling discharge or pus/Bleeding that does not stop/Swelling that gets worse

## 2019-08-10 DIAGNOSIS — M20.21 HALLUX RIGIDUS, RIGHT FOOT: ICD-10-CM

## 2019-08-10 DIAGNOSIS — Z53.09 PROCEDURE AND TREATMENT NOT CARRIED OUT BECAUSE OF OTHER CONTRAINDICATION: ICD-10-CM

## 2019-09-16 PROBLEM — K44.9 DIAPHRAGMATIC HERNIA WITHOUT OBSTRUCTION OR GANGRENE: Chronic | Status: ACTIVE | Noted: 2019-01-23

## 2019-09-16 PROBLEM — J44.9 CHRONIC OBSTRUCTIVE PULMONARY DISEASE, UNSPECIFIED: Chronic | Status: ACTIVE | Noted: 2019-07-22

## 2019-09-16 PROBLEM — G47.33 OBSTRUCTIVE SLEEP APNEA (ADULT) (PEDIATRIC): Chronic | Status: ACTIVE | Noted: 2019-07-22

## 2019-09-16 PROBLEM — K52.9 NONINFECTIVE GASTROENTERITIS AND COLITIS, UNSPECIFIED: Chronic | Status: ACTIVE | Noted: 2019-01-23

## 2019-09-19 ENCOUNTER — OUTPATIENT (OUTPATIENT)
Dept: OUTPATIENT SERVICES | Facility: HOSPITAL | Age: 54
LOS: 1 days | Discharge: HOME | End: 2019-09-19

## 2019-09-19 VITALS
HEIGHT: 70 IN | HEART RATE: 78 BPM | TEMPERATURE: 97 F | WEIGHT: 299.83 LBS | OXYGEN SATURATION: 98 % | DIASTOLIC BLOOD PRESSURE: 88 MMHG | RESPIRATION RATE: 18 BRPM | SYSTOLIC BLOOD PRESSURE: 144 MMHG

## 2019-09-19 DIAGNOSIS — M20.21 HALLUX RIGIDUS, RIGHT FOOT: ICD-10-CM

## 2019-09-19 DIAGNOSIS — Z01.818 ENCOUNTER FOR OTHER PREPROCEDURAL EXAMINATION: ICD-10-CM

## 2019-09-19 DIAGNOSIS — Z98.890 OTHER SPECIFIED POSTPROCEDURAL STATES: Chronic | ICD-10-CM

## 2019-09-19 DIAGNOSIS — Z96.651 PRESENCE OF RIGHT ARTIFICIAL KNEE JOINT: Chronic | ICD-10-CM

## 2019-09-19 DIAGNOSIS — K40.20 BILATERAL INGUINAL HERNIA, WITHOUT OBSTRUCTION OR GANGRENE, NOT SPECIFIED AS RECURRENT: Chronic | ICD-10-CM

## 2019-09-19 LAB
APPEARANCE UR: CLEAR — SIGNIFICANT CHANGE UP
BILIRUB UR-MCNC: NEGATIVE — SIGNIFICANT CHANGE UP
COLOR SPEC: SIGNIFICANT CHANGE UP
DIFF PNL FLD: NEGATIVE — SIGNIFICANT CHANGE UP
GLUCOSE UR QL: NEGATIVE — SIGNIFICANT CHANGE UP
KETONES UR-MCNC: NEGATIVE — SIGNIFICANT CHANGE UP
LEUKOCYTE ESTERASE UR-ACNC: NEGATIVE — SIGNIFICANT CHANGE UP
NITRITE UR-MCNC: NEGATIVE — SIGNIFICANT CHANGE UP
PH UR: 7 — SIGNIFICANT CHANGE UP (ref 5–8)
PROT UR-MCNC: NEGATIVE — SIGNIFICANT CHANGE UP
SP GR SPEC: 1.01 — SIGNIFICANT CHANGE UP (ref 1.01–1.02)
UROBILINOGEN FLD QL: SIGNIFICANT CHANGE UP

## 2019-09-19 RX ORDER — FENOFIBRATE,MICRONIZED 130 MG
1 CAPSULE ORAL
Qty: 0 | Refills: 0 | DISCHARGE

## 2019-09-19 NOTE — H&P PST ADULT - NSICDXPASTSURGICALHX_GEN_ALL_CORE_FT
PAST SURGICAL HISTORY:  H/O knee surgery arthroscopic x4    Hernia, inguinal, bilateral 3 months old    History of appendectomy     History of cholecystectomy     History of knee replacement procedure of right knee BILATERAL

## 2019-09-19 NOTE — H&P PST ADULT - REASON FOR ADMISSION
52 yo male presents to PAST for right first toe cheilectomy under GA by Dr. Fernandez at [facility] OR on 01/3/2019.

## 2019-09-19 NOTE — H&P PST ADULT - HISTORY OF PRESENT ILLNESS
Patient c/o progressively worsening right foot pain x 10 years. Patient was a heavy smoker ( 3 pks/Day) and quit smoking on 08/17/2019. Patient was scheduled the above surgery on Aug 1 2019 and cancelled due to unable to Obtain an IV access at the [facility] OR. Patient denies any c/o cp, sob, palpitations, fever, cough , dysuria. Ex tolerance of 2 fos walk with out SOB. Positive for REBECCA and uses BIPAP at night. Patient will bring Bipap on the DOS.  Patient non compliance with medication.

## 2019-09-20 ENCOUNTER — APPOINTMENT (OUTPATIENT)
Dept: PULMONOLOGY | Facility: CLINIC | Age: 54
End: 2019-09-20

## 2019-09-27 ENCOUNTER — APPOINTMENT (OUTPATIENT)
Dept: PULMONOLOGY | Facility: CLINIC | Age: 54
End: 2019-09-27
Payer: COMMERCIAL

## 2019-09-27 ENCOUNTER — OUTPATIENT (OUTPATIENT)
Dept: OUTPATIENT SERVICES | Facility: HOSPITAL | Age: 54
LOS: 1 days | Discharge: HOME | End: 2019-09-27

## 2019-09-27 VITALS
OXYGEN SATURATION: 96 % | HEIGHT: 70 IN | DIASTOLIC BLOOD PRESSURE: 96 MMHG | SYSTOLIC BLOOD PRESSURE: 144 MMHG | BODY MASS INDEX: 42.95 KG/M2 | HEART RATE: 79 BPM | WEIGHT: 300 LBS

## 2019-09-27 DIAGNOSIS — Z96.651 PRESENCE OF RIGHT ARTIFICIAL KNEE JOINT: Chronic | ICD-10-CM

## 2019-09-27 DIAGNOSIS — Z98.890 OTHER SPECIFIED POSTPROCEDURAL STATES: Chronic | ICD-10-CM

## 2019-09-27 DIAGNOSIS — K40.20 BILATERAL INGUINAL HERNIA, WITHOUT OBSTRUCTION OR GANGRENE, NOT SPECIFIED AS RECURRENT: Chronic | ICD-10-CM

## 2019-09-27 PROCEDURE — 99213 OFFICE O/P EST LOW 20 MIN: CPT | Mod: GC

## 2019-09-27 RX ORDER — PREDNISONE 20 MG/1
20 TABLET ORAL ONCE
Qty: 4 | Refills: 0 | Status: DISCONTINUED | COMMUNITY
Start: 2019-02-15 | End: 2019-09-27

## 2019-09-27 RX ORDER — PREDNISONE 20 MG/1
20 TABLET ORAL
Qty: 4 | Refills: 0 | Status: DISCONTINUED | COMMUNITY
Start: 2019-02-15 | End: 2019-09-27

## 2019-09-27 NOTE — PHYSICAL EXAM
[General Appearance - Well Developed] : well developed [Normal Appearance] : normal appearance [Neck Appearance] : the appearance of the neck was normal [Jugular Venous Distention Increased] : there was no jugular-venous distention [Heart Rate And Rhythm] : heart rate and rhythm were normal [Murmurs] : no murmurs present [Heart Sounds] : normal S1 and S2 [] : no respiratory distress [Exaggerated Use Of Accessory Muscles For Inspiration] : no accessory muscle use [Abdomen Soft] : soft [Abdomen Tenderness] : non-tender [Bowel Sounds] : normal bowel sounds [FreeTextEntry1] : Mild expiratory wheezing

## 2019-09-27 NOTE — HISTORY OF PRESENT ILLNESS
[None] : ~He/She~ has no significant interval events [Former] : is a former smoker [Oxygen] : the patient uses no supplemental oxygen [FreeTextEntry1] : Here for right big toe joint replacement pre-op clearance. \par \par No ED visits\par No hospitalizations \par Stopped smoking 7-8 months ago \par SOB almost resolved, decrease in wheezing since last visit\par BIPAP only at night, sleeping well on BIPAP\par No SOB at night. \par Exercise tolerance getting better from better knees and improvement of breathing. Can walk 30 mins without stopping. \par Not using his inhalers anymore as he feels like he does not need them

## 2019-09-27 NOTE — END OF VISIT
[FreeTextEntry3] : acceptable risk for the planned foot procedure-needs to bring sleep equipment ---------from a pulm perspective [] : Resident [Time Spent: ___ minutes] : I have spent [unfilled] minutes of face to face time with the patient

## 2019-09-27 NOTE — ASSESSMENT
[FreeTextEntry1] : Mr Hogan is a 53 year old male with COPD and REBECCA (on BIPAP at night) presenting for pre-op clearance for right first toe replacement. Op will be under general anesthesia. \par Assessed to have an acceptable risk for surgery. \par \par PLAN\par - Acceptable risk for surgery from a pulmonary perspective\par -  Patient instructed to bring his BIPAP to the hospital to use in the recovery room\par - Continue current inhalers (Advair Diskus, Spiriva and Xopenex)\par - given prednisone 40mg for 5 days to be taken 5 days before surgery if needed. \par -Influenza vaccine given \par - f/u in 6 months

## 2019-10-03 RX ORDER — FLUTICASONE PROPIONATE AND SALMETEROL 50; 250 UG/1; UG/1
2 POWDER ORAL; RESPIRATORY (INHALATION)
Qty: 0 | Refills: 0 | DISCHARGE

## 2019-10-03 RX ORDER — ASPIRIN/CALCIUM CARB/MAGNESIUM 324 MG
0 TABLET ORAL
Qty: 0 | Refills: 0 | DISCHARGE

## 2019-10-03 NOTE — PROGRESS NOTE ADULT - SUBJECTIVE AND OBJECTIVE BOX
Patient is a 53y old  Male who presents with a chief complaint of COPD (19 Mar 2019 11:06)        Interval Events: No overnight events. Feeling better in general. The Patient was seen walking outside to smoke.    REVIEW OF SYSTEMS:  Constitutional: No fevers or chills. No weight loss. No fatigue or generalized malaise.  Eyes: No itching or discharge from the eyes  ENT: No ear pain. No ear discharge. No nasal congestion. No post nasal drip. No epistaxis. No throat pain. No sore throat. No difficulty swallowing.   CV: No chest pain. No palpitations. No lightheadedness or dizziness.   Resp: No dyspnea at rest. No dyspnea on exertion. No orthopnea. No wheezing. No cough. No stridor. No sputum production. No chest pain with respiration.  GI: No nausea. No vomiting. No diarrhea.  MSK: No joint pain or pain in any extremities  Integumentary: No skin lesions. No pedal edema.  Neurological: No gross motor weakness. No sensory changes.      OBJECTIVE:  ICU Vital Signs Last 24 Hrs  T(C): 35.8 (19 Mar 2019 05:35), Max: 36.1 (18 Mar 2019 14:00)  T(F): 96.5 (19 Mar 2019 05:35), Max: 97 (18 Mar 2019 14:00)  HR: 91 (19 Mar 2019 05:35) (87 - 97)  BP: 132/64 (19 Mar 2019 05:35) (118/55 - 132/64)    RR: 19 (19 Mar 2019 05:35) (18 - 19)    PHYSICAL EXAM:  General: Awake, alert, oriented X 3.   HEENT: Atraumatic, normocephalic.                 Mallampatti Grade                 No nasal congestion.                No tonsillar or pharyngeal exudates.  Lymph Nodes: No palpable lymphadenopathy neck, supraclavicular region  Neck: No JVD. No carotid bruit, no thyromegally  Respiratory: Normal chest expansion                         Normal percussion                         Normal and equal air entry                         + improved wheeze, better air entry  Cardiovascular: S1 S2 normal. No murmurs, rubs or gallops.   Abdomen: Soft, non-tender, non-distended. No organomegaly.  Extremities: Warm to touch. Peripheral pulse palpable. No pedal edema.   Skin: No rashes or skin lesions, warm dry  Neurological: Motor and sensory examination equal and normal in all four extremities.  Psychiatry: Appropriate mood and affect.    HOSPITAL MEDICATIONS:  MEDICATIONS  (STANDING):  ALBUTerol/ipratropium for Nebulization 3 milliLiter(s) Nebulizer every 4 hours  aspirin  chewable 81 milliGRAM(s) Oral daily  chlorhexidine 4% Liquid 1 Application(s) Topical <User Schedule>  fenofibrate Tablet 145 milliGRAM(s) Oral at bedtime  lisinopril 5 milliGRAM(s) Oral daily  pantoprazole    Tablet 40 milliGRAM(s) Oral before breakfast  predniSONE   Tablet 60 milliGRAM(s) Oral daily  simvastatin 20 milliGRAM(s) Oral at bedtime    MEDICATIONS  (PRN):  acetaminophen   Tablet .. 650 milliGRAM(s) Oral every 6 hours PRN Temp greater or equal to 38C (100.4F), Moderate Pain (4 - 6)  furosemide    Tablet 40 milliGRAM(s) Oral daily PRN swelling  oxyCODONE    IR 30 milliGRAM(s) Oral every 4 hours PRN Severe Pain (7 - 10)  zolpidem 5 milliGRAM(s) Oral at bedtime PRN Insomnia  zolpidem 5 milliGRAM(s) Oral at bedtime PRN Insomnia              RADIOLOGY: Radiology personally reviewed. [Alert] : alert [No Acute Distress] : no acute distress [Well Nourished] : well nourished [Well Developed] : well developed [Healthy Appearance] : healthy appearance [Normal Voice/Communication] : normal voice communication [Normal Sclera/Conjunctiva] : normal sclera/conjunctiva [Normal Oropharynx] : the oropharynx was normal [No Proptosis] : no proptosis [No Neck Mass] : no neck mass was observed [Supple] : the neck was supple [No LAD] : no lymphadenopathy [Thyroid Not Enlarged] : the thyroid was not enlarged [No Respiratory Distress] : no respiratory distress [No Accessory Muscle Use] : no accessory muscle use [Normal Rate and Effort] : normal respiratory rhythm and effort [Clear to Auscultation] : lungs were clear to auscultation bilaterally [Normal Rate] : heart rate was normal  [Normal S1, S2] : normal S1 and S2 [Regular Rhythm] : with a regular rhythm [No Edema] : there was no peripheral edema [Not Distended] : not distended [No Stigmata of Cushings Syndrome] : no stigmata of cushings syndrome [Spine Straight] : spine straight [Normal Gait] : normal gait [No Clubbing, Cyanosis] : no clubbing  or cyanosis of the fingernails [No Involuntary Movements] : no involuntary movements were seen [Hirsutism] : hirsutism [Acne] : acne [Acanthosis Nigricans] : no acanthosis nigricans [Normal Insight/Judgement] : insight and judgment were intact [Normal Affect] : the affect was normal [Normal Mood] : the mood was normal

## 2019-10-10 ENCOUNTER — OUTPATIENT (OUTPATIENT)
Dept: OUTPATIENT SERVICES | Facility: HOSPITAL | Age: 54
LOS: 1 days | Discharge: HOME | End: 2019-10-10

## 2019-10-10 VITALS
HEIGHT: 70 IN | HEART RATE: 95 BPM | SYSTOLIC BLOOD PRESSURE: 168 MMHG | TEMPERATURE: 98 F | RESPIRATION RATE: 18 BRPM | DIASTOLIC BLOOD PRESSURE: 70 MMHG | WEIGHT: 299.83 LBS | OXYGEN SATURATION: 95 %

## 2019-10-10 VITALS
DIASTOLIC BLOOD PRESSURE: 87 MMHG | OXYGEN SATURATION: 96 % | SYSTOLIC BLOOD PRESSURE: 154 MMHG | RESPIRATION RATE: 20 BRPM | HEART RATE: 92 BPM

## 2019-10-10 DIAGNOSIS — Z98.890 OTHER SPECIFIED POSTPROCEDURAL STATES: Chronic | ICD-10-CM

## 2019-10-10 DIAGNOSIS — Z96.651 PRESENCE OF RIGHT ARTIFICIAL KNEE JOINT: Chronic | ICD-10-CM

## 2019-10-10 DIAGNOSIS — K40.20 BILATERAL INGUINAL HERNIA, WITHOUT OBSTRUCTION OR GANGRENE, NOT SPECIFIED AS RECURRENT: Chronic | ICD-10-CM

## 2019-10-10 RX ORDER — IBUPROFEN 200 MG
1 TABLET ORAL
Qty: 90 | Refills: 0
Start: 2019-10-10 | End: 2019-11-08

## 2019-10-10 RX ORDER — HYDROMORPHONE HYDROCHLORIDE 2 MG/ML
0.5 INJECTION INTRAMUSCULAR; INTRAVENOUS; SUBCUTANEOUS
Refills: 0 | Status: DISCONTINUED | OUTPATIENT
Start: 2019-10-10 | End: 2019-10-10

## 2019-10-10 RX ORDER — OXYCODONE AND ACETAMINOPHEN 5; 325 MG/1; MG/1
2 TABLET ORAL ONCE
Refills: 0 | Status: DISCONTINUED | OUTPATIENT
Start: 2019-10-10 | End: 2019-10-10

## 2019-10-10 RX ORDER — MEPERIDINE HYDROCHLORIDE 50 MG/ML
12.5 INJECTION INTRAMUSCULAR; INTRAVENOUS; SUBCUTANEOUS ONCE
Refills: 0 | Status: DISCONTINUED | OUTPATIENT
Start: 2019-10-10 | End: 2019-10-10

## 2019-10-10 RX ORDER — HYDROMORPHONE HYDROCHLORIDE 2 MG/ML
1 INJECTION INTRAMUSCULAR; INTRAVENOUS; SUBCUTANEOUS
Refills: 0 | Status: DISCONTINUED | OUTPATIENT
Start: 2019-10-10 | End: 2019-10-10

## 2019-10-10 RX ORDER — SODIUM CHLORIDE 9 MG/ML
1000 INJECTION, SOLUTION INTRAVENOUS
Refills: 0 | Status: DISCONTINUED | OUTPATIENT
Start: 2019-10-10 | End: 2019-10-25

## 2019-10-10 RX ORDER — ONDANSETRON 8 MG/1
4 TABLET, FILM COATED ORAL ONCE
Refills: 0 | Status: DISCONTINUED | OUTPATIENT
Start: 2019-10-10 | End: 2019-10-25

## 2019-10-10 RX ADMIN — OXYCODONE AND ACETAMINOPHEN 2 TABLET(S): 5; 325 TABLET ORAL at 18:19

## 2019-10-10 RX ADMIN — HYDROMORPHONE HYDROCHLORIDE 1 MILLIGRAM(S): 2 INJECTION INTRAMUSCULAR; INTRAVENOUS; SUBCUTANEOUS at 17:40

## 2019-10-10 RX ADMIN — HYDROMORPHONE HYDROCHLORIDE 1 MILLIGRAM(S): 2 INJECTION INTRAMUSCULAR; INTRAVENOUS; SUBCUTANEOUS at 18:01

## 2019-10-10 RX ADMIN — HYDROMORPHONE HYDROCHLORIDE 1 MILLIGRAM(S): 2 INJECTION INTRAMUSCULAR; INTRAVENOUS; SUBCUTANEOUS at 17:50

## 2019-10-10 RX ADMIN — SODIUM CHLORIDE 100 MILLILITER(S): 9 INJECTION, SOLUTION INTRAVENOUS at 17:14

## 2019-10-10 RX ADMIN — OXYCODONE AND ACETAMINOPHEN 2 TABLET(S): 5; 325 TABLET ORAL at 18:48

## 2019-10-10 RX ADMIN — HYDROMORPHONE HYDROCHLORIDE 1 MILLIGRAM(S): 2 INJECTION INTRAMUSCULAR; INTRAVENOUS; SUBCUTANEOUS at 17:51

## 2019-10-10 NOTE — PROCEDURE NOTE - NSPROCDETAILS_GEN_ALL_CORE
sterile technique, catheter placed/location identified, draped/prepped, sterile technique used/sterile dressing applied/supine position/ultrasound guidance

## 2019-10-10 NOTE — ASU DISCHARGE PLAN (ADULT/PEDIATRIC) - ASU DC SPECIAL INSTRUCTIONSFT
weight bearing as tolerated.  elevate, ice, pain control.  follow up in 2 weeks for suture removal and xrays.

## 2019-10-10 NOTE — ASU DISCHARGE PLAN (ADULT/PEDIATRIC) - CALL YOUR DOCTOR IF YOU HAVE ANY OF THE FOLLOWING:
Numbness, tingling, color or temperature change to extremity/Wound/Surgical Site with redness, or foul smelling discharge or pus/Bleeding that does not stop/Pain not relieved by Medications/Swelling that gets worse/Fever greater than (need to indicate Fahrenheit or Celsius)

## 2019-10-10 NOTE — ASU DISCHARGE PLAN (ADULT/PEDIATRIC) - CARE PROVIDER_API CALL
Zoey Fernandez  [address]  Phone: [phone]  Fax: (   )    -  Follow Up Time: 2 weeks

## 2019-10-10 NOTE — ASU DISCHARGE PLAN (ADULT/PEDIATRIC) - PROVIDER TOKENS
FREE:[LAST:[Rebecca],FIRST:[Zoey],PHONE:[phone]],FAX:[(   [phone]    -],ADDRESS:[[address] [place] [address]],FOLLOWUP:[2 weeks]]

## 2019-10-10 NOTE — PROCEDURE NOTE - NSINFORMCONSENT_GEN_A_CORE
Benefits, risks, and possible complications of procedure explained to patient/caregiver who verbalized understanding and gave verbal consent./patient

## 2019-10-14 DIAGNOSIS — M20.21 HALLUX RIGIDUS, RIGHT FOOT: ICD-10-CM

## 2019-10-24 ENCOUNTER — INPATIENT (INPATIENT)
Facility: HOSPITAL | Age: 54
LOS: 0 days | Discharge: HOME | End: 2019-10-25
Attending: INTERNAL MEDICINE | Admitting: INTERNAL MEDICINE
Payer: MEDICAID

## 2019-10-24 VITALS
DIASTOLIC BLOOD PRESSURE: 80 MMHG | RESPIRATION RATE: 20 BRPM | TEMPERATURE: 97 F | HEIGHT: 70 IN | WEIGHT: 300.05 LBS | OXYGEN SATURATION: 100 % | HEART RATE: 74 BPM | SYSTOLIC BLOOD PRESSURE: 164 MMHG

## 2019-10-24 DIAGNOSIS — Z98.890 OTHER SPECIFIED POSTPROCEDURAL STATES: Chronic | ICD-10-CM

## 2019-10-24 DIAGNOSIS — Z96.651 PRESENCE OF RIGHT ARTIFICIAL KNEE JOINT: Chronic | ICD-10-CM

## 2019-10-24 DIAGNOSIS — K40.20 BILATERAL INGUINAL HERNIA, WITHOUT OBSTRUCTION OR GANGRENE, NOT SPECIFIED AS RECURRENT: Chronic | ICD-10-CM

## 2019-10-24 LAB
ALBUMIN SERPL ELPH-MCNC: 4.3 G/DL — SIGNIFICANT CHANGE UP (ref 3.5–5.2)
ALP SERPL-CCNC: 124 U/L — HIGH (ref 30–115)
ALT FLD-CCNC: 12 U/L — SIGNIFICANT CHANGE UP (ref 0–41)
ANION GAP SERPL CALC-SCNC: 16 MMOL/L — HIGH (ref 7–14)
AST SERPL-CCNC: 18 U/L — SIGNIFICANT CHANGE UP (ref 0–41)
BASOPHILS # BLD AUTO: 0.08 K/UL — SIGNIFICANT CHANGE UP (ref 0–0.2)
BASOPHILS NFR BLD AUTO: 0.6 % — SIGNIFICANT CHANGE UP (ref 0–1)
BILIRUB SERPL-MCNC: 0.6 MG/DL — SIGNIFICANT CHANGE UP (ref 0.2–1.2)
BUN SERPL-MCNC: 10 MG/DL — SIGNIFICANT CHANGE UP (ref 10–20)
CALCIUM SERPL-MCNC: 9.8 MG/DL — SIGNIFICANT CHANGE UP (ref 8.5–10.1)
CHLORIDE SERPL-SCNC: 100 MMOL/L — SIGNIFICANT CHANGE UP (ref 98–110)
CK MB CFR SERPL CALC: 3.1 NG/ML — SIGNIFICANT CHANGE UP (ref 0.6–6.3)
CK SERPL-CCNC: 199 U/L — SIGNIFICANT CHANGE UP (ref 0–225)
CO2 SERPL-SCNC: 23 MMOL/L — SIGNIFICANT CHANGE UP (ref 17–32)
CREAT SERPL-MCNC: 0.8 MG/DL — SIGNIFICANT CHANGE UP (ref 0.7–1.5)
D DIMER BLD IA.RAPID-MCNC: 621 NG/ML DDU — HIGH (ref 0–230)
EOSINOPHIL # BLD AUTO: 0.15 K/UL — SIGNIFICANT CHANGE UP (ref 0–0.7)
EOSINOPHIL NFR BLD AUTO: 1.1 % — SIGNIFICANT CHANGE UP (ref 0–8)
ETHANOL SERPL-MCNC: <10 MG/DL — SIGNIFICANT CHANGE UP
GLUCOSE SERPL-MCNC: 133 MG/DL — HIGH (ref 70–99)
HCT VFR BLD CALC: 46.5 % — SIGNIFICANT CHANGE UP (ref 42–52)
HGB BLD-MCNC: 15.9 G/DL — SIGNIFICANT CHANGE UP (ref 14–18)
IMM GRANULOCYTES NFR BLD AUTO: 0.3 % — SIGNIFICANT CHANGE UP (ref 0.1–0.3)
LIDOCAIN IGE QN: 14 U/L — SIGNIFICANT CHANGE UP (ref 7–60)
LYMPHOCYTES # BLD AUTO: 1.69 K/UL — SIGNIFICANT CHANGE UP (ref 1.2–3.4)
LYMPHOCYTES # BLD AUTO: 12.7 % — LOW (ref 20.5–51.1)
MAGNESIUM SERPL-MCNC: 1.9 MG/DL — SIGNIFICANT CHANGE UP (ref 1.8–2.4)
MCHC RBC-ENTMCNC: 28.6 PG — SIGNIFICANT CHANGE UP (ref 27–31)
MCHC RBC-ENTMCNC: 34.2 G/DL — SIGNIFICANT CHANGE UP (ref 32–37)
MCV RBC AUTO: 83.8 FL — SIGNIFICANT CHANGE UP (ref 80–94)
MONOCYTES # BLD AUTO: 0.63 K/UL — HIGH (ref 0.1–0.6)
MONOCYTES NFR BLD AUTO: 4.7 % — SIGNIFICANT CHANGE UP (ref 1.7–9.3)
NEUTROPHILS # BLD AUTO: 10.7 K/UL — HIGH (ref 1.4–6.5)
NEUTROPHILS NFR BLD AUTO: 80.6 % — HIGH (ref 42.2–75.2)
NRBC # BLD: 0 /100 WBCS — SIGNIFICANT CHANGE UP (ref 0–0)
PLATELET # BLD AUTO: 267 K/UL — SIGNIFICANT CHANGE UP (ref 130–400)
POTASSIUM SERPL-MCNC: 4.2 MMOL/L — SIGNIFICANT CHANGE UP (ref 3.5–5)
POTASSIUM SERPL-SCNC: 4.2 MMOL/L — SIGNIFICANT CHANGE UP (ref 3.5–5)
PROT SERPL-MCNC: 7.2 G/DL — SIGNIFICANT CHANGE UP (ref 6–8)
RBC # BLD: 5.55 M/UL — SIGNIFICANT CHANGE UP (ref 4.7–6.1)
RBC # FLD: 16.4 % — HIGH (ref 11.5–14.5)
SODIUM SERPL-SCNC: 139 MMOL/L — SIGNIFICANT CHANGE UP (ref 135–146)
TROPONIN T SERPL-MCNC: <0.01 NG/ML — SIGNIFICANT CHANGE UP
TROPONIN T SERPL-MCNC: <0.01 NG/ML — SIGNIFICANT CHANGE UP
WBC # BLD: 13.29 K/UL — HIGH (ref 4.8–10.8)
WBC # FLD AUTO: 13.29 K/UL — HIGH (ref 4.8–10.8)

## 2019-10-24 PROCEDURE — 71275 CT ANGIOGRAPHY CHEST: CPT | Mod: 26

## 2019-10-24 PROCEDURE — 74177 CT ABD & PELVIS W/CONTRAST: CPT | Mod: 26

## 2019-10-24 PROCEDURE — 99285 EMERGENCY DEPT VISIT HI MDM: CPT

## 2019-10-24 PROCEDURE — 99223 1ST HOSP IP/OBS HIGH 75: CPT | Mod: AI

## 2019-10-24 PROCEDURE — 71045 X-RAY EXAM CHEST 1 VIEW: CPT | Mod: 26

## 2019-10-24 RX ORDER — PANTOPRAZOLE SODIUM 20 MG/1
40 TABLET, DELAYED RELEASE ORAL
Refills: 0 | Status: DISCONTINUED | OUTPATIENT
Start: 2019-10-24 | End: 2019-10-25

## 2019-10-24 RX ORDER — HYDROMORPHONE HYDROCHLORIDE 2 MG/ML
1 INJECTION INTRAMUSCULAR; INTRAVENOUS; SUBCUTANEOUS ONCE
Refills: 0 | Status: DISCONTINUED | OUTPATIENT
Start: 2019-10-24 | End: 2019-10-24

## 2019-10-24 RX ORDER — CHLORHEXIDINE GLUCONATE 213 G/1000ML
1 SOLUTION TOPICAL
Refills: 0 | Status: DISCONTINUED | OUTPATIENT
Start: 2019-10-24 | End: 2019-10-25

## 2019-10-24 RX ORDER — FENOFIBRATE,MICRONIZED 130 MG
145 CAPSULE ORAL DAILY
Refills: 0 | Status: DISCONTINUED | OUTPATIENT
Start: 2019-10-24 | End: 2019-10-25

## 2019-10-24 RX ORDER — LISINOPRIL 2.5 MG/1
5 TABLET ORAL DAILY
Refills: 0 | Status: DISCONTINUED | OUTPATIENT
Start: 2019-10-24 | End: 2019-10-25

## 2019-10-24 RX ORDER — ASPIRIN/CALCIUM CARB/MAGNESIUM 324 MG
81 TABLET ORAL DAILY
Refills: 0 | Status: DISCONTINUED | OUTPATIENT
Start: 2019-10-24 | End: 2019-10-25

## 2019-10-24 RX ORDER — ALBUTEROL 90 UG/1
2 AEROSOL, METERED ORAL EVERY 6 HOURS
Refills: 0 | Status: DISCONTINUED | OUTPATIENT
Start: 2019-10-24 | End: 2019-10-25

## 2019-10-24 RX ORDER — BUDESONIDE AND FORMOTEROL FUMARATE DIHYDRATE 160; 4.5 UG/1; UG/1
2 AEROSOL RESPIRATORY (INHALATION)
Refills: 0 | Status: DISCONTINUED | OUTPATIENT
Start: 2019-10-24 | End: 2019-10-25

## 2019-10-24 RX ORDER — HYDROMORPHONE HYDROCHLORIDE 2 MG/ML
2 INJECTION INTRAMUSCULAR; INTRAVENOUS; SUBCUTANEOUS EVERY 6 HOURS
Refills: 0 | Status: DISCONTINUED | OUTPATIENT
Start: 2019-10-24 | End: 2019-10-24

## 2019-10-24 RX ORDER — TIOTROPIUM BROMIDE 18 UG/1
1 CAPSULE ORAL; RESPIRATORY (INHALATION) DAILY
Refills: 0 | Status: DISCONTINUED | OUTPATIENT
Start: 2019-10-24 | End: 2019-10-25

## 2019-10-24 RX ORDER — ASPIRIN/CALCIUM CARB/MAGNESIUM 324 MG
325 TABLET ORAL ONCE
Refills: 0 | Status: COMPLETED | OUTPATIENT
Start: 2019-10-24 | End: 2019-10-24

## 2019-10-24 RX ORDER — OXYCODONE HYDROCHLORIDE 5 MG/1
30 TABLET ORAL EVERY 6 HOURS
Refills: 0 | Status: DISCONTINUED | OUTPATIENT
Start: 2019-10-24 | End: 2019-10-25

## 2019-10-24 RX ORDER — FAMOTIDINE 10 MG/ML
20 INJECTION INTRAVENOUS ONCE
Refills: 0 | Status: COMPLETED | OUTPATIENT
Start: 2019-10-24 | End: 2019-10-24

## 2019-10-24 RX ORDER — MORPHINE SULFATE 50 MG/1
8 CAPSULE, EXTENDED RELEASE ORAL ONCE
Refills: 0 | Status: DISCONTINUED | OUTPATIENT
Start: 2019-10-24 | End: 2019-10-24

## 2019-10-24 RX ORDER — SIMVASTATIN 20 MG/1
20 TABLET, FILM COATED ORAL AT BEDTIME
Refills: 0 | Status: DISCONTINUED | OUTPATIENT
Start: 2019-10-24 | End: 2019-10-25

## 2019-10-24 RX ORDER — HEPARIN SODIUM 5000 [USP'U]/ML
5000 INJECTION INTRAVENOUS; SUBCUTANEOUS EVERY 8 HOURS
Refills: 0 | Status: DISCONTINUED | OUTPATIENT
Start: 2019-10-24 | End: 2019-10-25

## 2019-10-24 RX ORDER — MORPHINE SULFATE 50 MG/1
2 CAPSULE, EXTENDED RELEASE ORAL ONCE
Refills: 0 | Status: DISCONTINUED | OUTPATIENT
Start: 2019-10-24 | End: 2019-10-24

## 2019-10-24 RX ORDER — SODIUM CHLORIDE 9 MG/ML
3 INJECTION INTRAMUSCULAR; INTRAVENOUS; SUBCUTANEOUS EVERY 8 HOURS
Refills: 0 | Status: DISCONTINUED | OUTPATIENT
Start: 2019-10-24 | End: 2019-10-25

## 2019-10-24 RX ORDER — ACETAMINOPHEN 500 MG
650 TABLET ORAL EVERY 6 HOURS
Refills: 0 | Status: DISCONTINUED | OUTPATIENT
Start: 2019-10-24 | End: 2019-10-25

## 2019-10-24 RX ORDER — ONDANSETRON 8 MG/1
8 TABLET, FILM COATED ORAL ONCE
Refills: 0 | Status: COMPLETED | OUTPATIENT
Start: 2019-10-24 | End: 2019-10-24

## 2019-10-24 RX ORDER — FUROSEMIDE 40 MG
40 TABLET ORAL DAILY
Refills: 0 | Status: DISCONTINUED | OUTPATIENT
Start: 2019-10-24 | End: 2019-10-25

## 2019-10-24 RX ORDER — MORPHINE SULFATE 50 MG/1
6 CAPSULE, EXTENDED RELEASE ORAL ONCE
Refills: 0 | Status: DISCONTINUED | OUTPATIENT
Start: 2019-10-24 | End: 2019-10-24

## 2019-10-24 RX ORDER — FAMOTIDINE 10 MG/ML
20 INJECTION INTRAVENOUS
Refills: 0 | Status: DISCONTINUED | OUTPATIENT
Start: 2019-10-24 | End: 2019-10-25

## 2019-10-24 RX ORDER — MORPHINE SULFATE 50 MG/1
100 CAPSULE, EXTENDED RELEASE ORAL
Refills: 0 | Status: DISCONTINUED | OUTPATIENT
Start: 2019-10-24 | End: 2019-10-25

## 2019-10-24 RX ORDER — OXYCODONE AND ACETAMINOPHEN 5; 325 MG/1; MG/1
2 TABLET ORAL EVERY 6 HOURS
Refills: 0 | Status: DISCONTINUED | OUTPATIENT
Start: 2019-10-24 | End: 2019-10-25

## 2019-10-24 RX ADMIN — MORPHINE SULFATE 2 MILLIGRAM(S): 50 CAPSULE, EXTENDED RELEASE ORAL at 13:38

## 2019-10-24 RX ADMIN — TIOTROPIUM BROMIDE 1 CAPSULE(S): 18 CAPSULE ORAL; RESPIRATORY (INHALATION) at 17:49

## 2019-10-24 RX ADMIN — Medication 145 MILLIGRAM(S): at 17:50

## 2019-10-24 RX ADMIN — OXYCODONE AND ACETAMINOPHEN 2 TABLET(S): 5; 325 TABLET ORAL at 14:13

## 2019-10-24 RX ADMIN — HYDROMORPHONE HYDROCHLORIDE 2 MILLIGRAM(S): 2 INJECTION INTRAMUSCULAR; INTRAVENOUS; SUBCUTANEOUS at 16:02

## 2019-10-24 RX ADMIN — Medication 325 MILLIGRAM(S): at 10:51

## 2019-10-24 RX ADMIN — MORPHINE SULFATE 2 MILLIGRAM(S): 50 CAPSULE, EXTENDED RELEASE ORAL at 13:25

## 2019-10-24 RX ADMIN — FAMOTIDINE 100 MILLIGRAM(S): 10 INJECTION INTRAVENOUS at 06:00

## 2019-10-24 RX ADMIN — SODIUM CHLORIDE 3 MILLILITER(S): 9 INJECTION INTRAMUSCULAR; INTRAVENOUS; SUBCUTANEOUS at 13:39

## 2019-10-24 RX ADMIN — HYDROMORPHONE HYDROCHLORIDE 2 MILLIGRAM(S): 2 INJECTION INTRAMUSCULAR; INTRAVENOUS; SUBCUTANEOUS at 16:42

## 2019-10-24 RX ADMIN — MORPHINE SULFATE 100 MILLIGRAM(S): 50 CAPSULE, EXTENDED RELEASE ORAL at 19:49

## 2019-10-24 RX ADMIN — LISINOPRIL 5 MILLIGRAM(S): 2.5 TABLET ORAL at 17:50

## 2019-10-24 RX ADMIN — SIMVASTATIN 20 MILLIGRAM(S): 20 TABLET, FILM COATED ORAL at 23:51

## 2019-10-24 RX ADMIN — OXYCODONE HYDROCHLORIDE 30 MILLIGRAM(S): 5 TABLET ORAL at 19:07

## 2019-10-24 RX ADMIN — BUDESONIDE AND FORMOTEROL FUMARATE DIHYDRATE 2 PUFF(S): 160; 4.5 AEROSOL RESPIRATORY (INHALATION) at 19:50

## 2019-10-24 RX ADMIN — PANTOPRAZOLE SODIUM 40 MILLIGRAM(S): 20 TABLET, DELAYED RELEASE ORAL at 17:50

## 2019-10-24 RX ADMIN — HYDROMORPHONE HYDROCHLORIDE 1 MILLIGRAM(S): 2 INJECTION INTRAMUSCULAR; INTRAVENOUS; SUBCUTANEOUS at 11:52

## 2019-10-24 RX ADMIN — ONDANSETRON 8 MILLIGRAM(S): 8 TABLET, FILM COATED ORAL at 06:01

## 2019-10-24 RX ADMIN — HYDROMORPHONE HYDROCHLORIDE 1 MILLIGRAM(S): 2 INJECTION INTRAMUSCULAR; INTRAVENOUS; SUBCUTANEOUS at 12:57

## 2019-10-24 RX ADMIN — OXYCODONE HYDROCHLORIDE 30 MILLIGRAM(S): 5 TABLET ORAL at 18:50

## 2019-10-24 RX ADMIN — FAMOTIDINE 20 MILLIGRAM(S): 10 INJECTION INTRAVENOUS at 17:48

## 2019-10-24 RX ADMIN — OXYCODONE HYDROCHLORIDE 30 MILLIGRAM(S): 5 TABLET ORAL at 23:52

## 2019-10-24 RX ADMIN — MORPHINE SULFATE 6 MILLIGRAM(S): 50 CAPSULE, EXTENDED RELEASE ORAL at 08:02

## 2019-10-24 RX ADMIN — SODIUM CHLORIDE 3 MILLILITER(S): 9 INJECTION INTRAMUSCULAR; INTRAVENOUS; SUBCUTANEOUS at 21:26

## 2019-10-24 RX ADMIN — MORPHINE SULFATE 100 MILLIGRAM(S): 50 CAPSULE, EXTENDED RELEASE ORAL at 20:19

## 2019-10-24 RX ADMIN — OXYCODONE AND ACETAMINOPHEN 2 TABLET(S): 5; 325 TABLET ORAL at 14:56

## 2019-10-24 RX ADMIN — MORPHINE SULFATE 8 MILLIGRAM(S): 50 CAPSULE, EXTENDED RELEASE ORAL at 06:01

## 2019-10-24 NOTE — H&P ADULT - HISTORY OF PRESENT ILLNESS
53 y.o M w/ PMHx HTN, HLD, COPD p/w chest pressure that began yesterday morning while gardening a/w tingling of left arm with vomiting. Pt has not had such an extreme pain like this before.   + smoker  + recent surgery,       no recent travel, no Hx of clots. 53 y.o M w/ PMHx HTN, HLD, COPD p/w chest pressure that began yesterday morning while gardening a/w tingling of left arm with vomiting. Pt has not had such an extreme pain like this before.   + smoker 2-3 pack per day x 35 years   + recent surgery, right foot surgery    Pain in Chest / left shoulder and jaw started yesterday morning and thought it would go away , pain got worse and then SOB started today , then decided to come to the ER this am.  Pain is describe as 7/10 sharp.  Pt on a lot of pain meds including morphine/ dilaudid/ methadone/ oxy    denies oxygen at home/ diarrhea/ fever    no recent travel, no Hx of clots.

## 2019-10-24 NOTE — ED ADULT NURSE NOTE - NS ED NOTE ABUSE SUSPICION NEGLECT YN
Met with patient and Dr Fanny Rivera  regarding recommendation for;      __x___ RIGHT ______LEFT      __x___Ultrasound guided  ______Stereotactic  Breast biopsy  __x___Verbalized understanding        Blood thinners:  __x___yes _____no    Date stopped: ____n/a_______    Biopsy teaching sheet given:  ____x___yes ______no No

## 2019-10-24 NOTE — ED PROVIDER NOTE - OBJECTIVE STATEMENT
53 y.o M w/ PMHx HTN, HLD, COPD p/w chest pressure that began yesterday morning while gardening a/w tingling of left arm with vomiting. Pt has not had such an extreme pain like this before. + smoker, no family history of heart disease, + recent surgery, no recent travel, no Hx of clots.

## 2019-10-24 NOTE — ED PROVIDER NOTE - CARE PLAN
Principal Discharge DX:	Chest pain  Secondary Diagnosis:	Nausea & vomiting  Secondary Diagnosis:	Abdominal pain

## 2019-10-24 NOTE — H&P ADULT - ATTENDING COMMENTS
Patient seen and examined independently. Agree with  PA note.  Patient has been insisting on getting morphine and oxycodone. he c/o shortness of breath. He takes  a lot of medications for knee pain and shoulder surgery. c/o pain on taking deep breath. will trend troponins and observe on telemetry.   will get v/q scan. patient has been walking around and is not tachycardic-- CT chest was suboptimal study. check echocardiogram.

## 2019-10-24 NOTE — ED ADULT NURSE NOTE - NSIMPLEMENTINTERV_GEN_ALL_ED
Implemented All Fall with Harm Risk Interventions:  [place] to call system. Call bell, personal items and telephone within reach. Instruct patient to call for assistance. Room bathroom lighting operational. Non-slip footwear when patient is off stretcher. Physically safe environment: no spills, clutter or unnecessary equipment. Stretcher in lowest position, wheels locked, appropriate side rails in place. Provide visual cue, wrist band, yellow gown, etc. Monitor gait and stability. Monitor for mental status changes and reorient to person, place, and time. Review medications for side effects contributing to fall risk. Reinforce activity limits and safety measures with patient and family. Provide visual clues: red socks.

## 2019-10-24 NOTE — ED ADULT NURSE REASSESSMENT NOTE - NS ED NURSE REASSESS COMMENT FT1
Patient asked if he can walk to the bathroom after receiving IV medications. Patient was advised not to walk to the bathroom and was offered a urinal, voiced understanding but refused to stay in bed.  Patient took himself off the cardiac monitor and walked himself to the bathroom. MD aware. Patient did not suffer any fall or injury. Patient back at bedside and on cardiac monitoring. Patient advised to not get out of bed. Will continue to monitor.

## 2019-10-24 NOTE — ED PROVIDER NOTE - CLINICAL SUMMARY MEDICAL DECISION MAKING FREE TEXT BOX
Patient presents for evaluation of chest pain and vomiting, he denies any fevers or chills, we obtained labs including troponin which is negative I will admit for further management at this time  I will admit for further workup

## 2019-10-24 NOTE — ED PROVIDER NOTE - NS ED ROS FT
Constitutional:  (-) fever, (-) chills, (-) lethargy  Eyes:  (-) eye pain (-) visual changes  ENMT: (-) nasal discharge, (-) sore throat. (-) neck pain or stiffness  Cardiac: (+) chest pain (-) palpitations  Respiratory:  (-) cough (-) respiratory distress.   GI:  (+) nausea (+) vomiting (-) diarrhea (-) abdominal pain.  :  (-) dysuria (-) frequency (-) burning.  MS:  (-) back pain (-) joint pain.  Neuro:  (-) headache (-) numbness (-) tingling (-) focal weakness  Skin:  (-) rash  Except as documented in the HPI,  all other systems are negative

## 2019-10-24 NOTE — ED PROVIDER NOTE - PROGRESS NOTE DETAILS
Jolly: patient signed out to me, seen and evaluated, pending CTA chest and a/p. Patient stable at this time. Jolly: patient's scans negative, patient states he is currently still having the pain and asking for pain medication. Agrees for admission for further evaluation of chest pain.

## 2019-10-24 NOTE — H&P ADULT - NSICDXPASTMEDICALHX_GEN_ALL_CORE_FT
PAST MEDICAL HISTORY:  Colitis LARGE INTESTINE   NO RECENT FLARES    COPD (chronic obstructive pulmonary disease)     GERD (gastroesophageal reflux disease)     Hiatal hernia GERD    High blood cholesterol     High cholesterol     HTN (hypertension)     Morbid obesity     Obstructive sleep apnea     REBECCA treated with BiPAP     Osteoarthritis     SOB (shortness of breath)

## 2019-10-24 NOTE — ED ADULT TRIAGE NOTE - AS HEIGHT TYPE
colitis 09/20/2017    COPD, severe ([address] 10/24/2017    Depression     Fibromyalgia     Herniated cervical disc 5-1998    Herpes zoster ophthalmicus 3/2012    Hyperlipidemia     Hypertension     L3 vertebral fracture ([address] 2010    vertebroplasty    Lumbar spinal stenosis 2015    moderately severe by MRI    Migraine     Osteoporosis 2010    Fragility Fx L3    Rheumatoid arthritis(714.0) 1976    Dr Ji Jacobson S/P cardiac catheterization 05/2017    patent coronaries; Takotsubo; najeeb Ahmed    Smoker     Takotsubo syndrome 05/2017    TMJ dysfunction     Vitamin B12 deficiency 12/2014    B12 level 199        Social History   Substance Use Topics    Smoking status: Former Smoker     Packs/day: 0.75     Years: 55.00     Types: Cigarettes     Start date: 5/26/1962     Quit date: 9/11/2017    Smokeless tobacco: Never Used      Comment: Stopped smoking for approx 20 years, restarted back up for a year    Alcohol use No        ROS: The patient has had no headache, sore throat, fever or chills, cough, dyspnea, chest pain, nausea, vomiting or diarrhea, or edema. Objective:      /76   Pulse 75   Resp 16   Wt 150 lb (68 kg)   SpO2 95%   BMI 24.96 kg/m²    General: in no apparent distress   The patient's neck is free of nodes. Lungs are clear. Heart is normal in rate and regular in rhythm. Legs are free of edema. No rash or erythema. Mammograms and lab from sept on chart and rev'd       Assessment / Plan:      1. COPD, severe ([address]    2. History of iron deficiency anemia    3. Age-related osteoporosis without current pathological fracture    4. Smoker    5. Rheumatoid arthritis involving multiple joints (HCC)    6.  Essential hypertension            Plan   Restart adamaris / Saintclair Gilles if OK with Rheum   See Mady for endoscopy for recent anemia and positive hemoccult  Stop iron  Stop cigs  Same meds  RTC 3 mo  [facility] mari met with her    Flu shot fracisco
stated

## 2019-10-24 NOTE — H&P ADULT - ASSESSMENT
52 yo male with PAST MEDICAL & SURGICAL HISTORY:  REBECCA treated with BiPAP  COPD (chronic obstructive pulmonary disease)  Osteoarthritis  High blood cholesterol  Morbid obesity  Obstructive sleep apnea  GERD (gastroesophageal reflux disease)  HTN (hypertension)  H/O knee surgery: arthoscopic x4  History of knee replacement BILATERAL  History of appendectomy  History of cholecystectomy    reports CP/ SOB / VOMITING   I-STOP to verify all narcotics  r/o ACS    - c/w asa/ statin  - heparin 5000units q8  - will hold all PO morphine/ oxycod/ dilaudid/ methadone and give DILAUDID 2mg ivP q 6 prn and PERCOCET 10mg q 4 prn   - cardio cx  - trop x 2 more  - c/w all home meds for HTN/ COPD  - O2 via nc 2L    D/W DR. GILL

## 2019-10-24 NOTE — ED PROVIDER NOTE - PHYSICAL EXAMINATION
CONSTITUTIONAL: well-appearing, in NAD  SKIN: Warm dry, normal skin turgor  HEAD: NCAT  EYES: EOMI, PERRLA, no scleral icterus, conjunctiva pink  ENT: normal pharynx with no erythema or exudates  NECK: Supple; non tender. Full ROM.  CARD: RRR, no murmurs.  RESP: clear to ausculation b/l. No crackles or wheezing.  ABD: soft, non-tender, non-distended, no rebound or guarding.  EXT: Full ROM, no bony tenderness, no pedal edema, no calf tenderness, well healing incision of R big toe.   NEURO: normal motor. normal sensory. Normal gait.  PSYCH: Cooperative, appropriate.

## 2019-10-24 NOTE — ED ADULT NURSE REASSESSMENT NOTE - NS ED NURSE REASSESS COMMENT FT1
Patient is noncompliant with cardiac monitor. Patient explained many times to keep the cardiac monitor on. Patient refusing to keep cardiac monitor on. MD Macias aware. Will continue to monitor.

## 2019-10-24 NOTE — H&P ADULT - NSHPPHYSICALEXAM_GEN_ALL_CORE
Vital Signs Last 24 Hrs  T(C): 36.1 (24 Oct 2019 11:54), Max: 36.1 (24 Oct 2019 05:28)  T(F): 97 (24 Oct 2019 11:54), Max: 97 (24 Oct 2019 05:28)  HR: 71 (24 Oct 2019 11:54) (71 - 75)  BP: 166/84 (24 Oct 2019 11:54) (164/80 - 168/80)  RR: 20 (24 Oct 2019 08:58) (20 - 20)  SpO2: 99% (24 Oct 2019 08:58) (99% - 100%)    CONSTITUTIONAL: well-appearing, in NAD  SKIN: Warm dry, normal skin turgor  HEAD: NCAT  EYES: EOMI, PERRLA, no scleral icterus, conjunctiva pink  ENT: normal pharynx with no erythema or exudates  NECK: Supple; non tender. Full ROM.  CARD: RRR, no murmurs.  RESP: clear to ausculation b/l. No crackles or wheezing.  ABD: soft, non-tender, non-distended, no rebound or guarding.  EXT: Full ROM, no bony tenderness, no pedal edema, no calf tenderness, well healing incision of R big toe.   NEURO: normal motor. normal sensory. Normal gait.  PSYCH: Cooperative, appropriate. Vital Signs Last 24 Hrs  T(C): 36.1 (24 Oct 2019 11:54), Max: 36.1 (24 Oct 2019 05:28)  T(F): 97 (24 Oct 2019 11:54), Max: 97 (24 Oct 2019 05:28)  HR: 71 (24 Oct 2019 11:54) (71 - 75)  BP: 166/84 (24 Oct 2019 11:54) (164/80 - 168/80)  RR: 20 (24 Oct 2019 08:58) (20 - 20)  SpO2: 99% (24 Oct 2019 08:58) (99% - 100%)    CONSTITUTIONAL: well-appearing, in NAD  SKIN: Warm dry, normal skin turgor  HEAD: NCAT  EYES: EOMI, PERRLA, no scleral icterus, conjunctiva pink  ENT: normal pharynx with no erythema or exudates  NECK: Supple; non tender. Full ROM.  CARD: RRR, no murmurs.  RESP: clear to ausculation b/l. No crackles or wheezing.  ABD: soft, non-tender, non-distended, no rebound or guarding.  EXT: Full ROM, no bony tenderness, no pedal edema, no calf tenderness, well healing incision of R big toe. in open boot  NEURO: normal motor. normal sensory. Normal gait.  PSYCH: Cooperative, appropriate.

## 2019-10-24 NOTE — H&P ADULT - NSHPLABSRESULTS_GEN_ALL_CORE
10-24    139  |  100  |  10  ----------------------------<  133<H>  4.2   |  23  |  0.8    Ca    9.8      24 Oct 2019 05:50  Mg     1.9     10-24    TPro  7.2  /  Alb  4.3  /  TBili  0.6  /  DBili  x   /  AST  18  /  ALT  12  /  AlkPhos  124<H>  10-24                            15.9   13.29 )-----------( 267      ( 24 Oct 2019 05:50 )             46.5     CAPILLARY BLOOD GLUCOSE        CARDIAC MARKERS ( 24 Oct 2019 05:50 )  x     / <0.01 ng/mL / x     / x     / x        < from: CT Abdomen and Pelvis w/ IV Cont (10.24.19 @ 08:58) >    ABDOMINOPELVIC NODES: No lymphadenopathy.    PELVIC ORGANS: Unremarkable.    PERITONEUM/MESENTERY/BOWEL: No pneumoperitoneum. No abdominopelvic   ascites. No evidence of bowel obstruction. The appendix is not definitely   seen. However, there is no evidence of pericecal inflammation.    BONES/SOFT TISSUES: Degenerative changes of the spine      IMPRESSION:    1.  Suboptimal PE study. No central pulmonary embolism.  2.  Ectatic ascending thoracic aorta.  3.  No acute findings in the abdomen or pelvis.    < end of copied text >

## 2019-10-25 ENCOUNTER — TRANSCRIPTION ENCOUNTER (OUTPATIENT)
Age: 54
End: 2019-10-25

## 2019-10-25 VITALS
RESPIRATION RATE: 16 BRPM | SYSTOLIC BLOOD PRESSURE: 127 MMHG | DIASTOLIC BLOOD PRESSURE: 73 MMHG | HEART RATE: 80 BPM | TEMPERATURE: 98 F

## 2019-10-25 LAB
ALBUMIN SERPL ELPH-MCNC: 3.8 G/DL — SIGNIFICANT CHANGE UP (ref 3.5–5.2)
ALP SERPL-CCNC: 97 U/L — SIGNIFICANT CHANGE UP (ref 30–115)
ALT FLD-CCNC: 12 U/L — SIGNIFICANT CHANGE UP (ref 0–41)
ANION GAP SERPL CALC-SCNC: 12 MMOL/L — SIGNIFICANT CHANGE UP (ref 7–14)
AST SERPL-CCNC: 15 U/L — SIGNIFICANT CHANGE UP (ref 0–41)
BILIRUB SERPL-MCNC: 0.8 MG/DL — SIGNIFICANT CHANGE UP (ref 0.2–1.2)
BUN SERPL-MCNC: 13 MG/DL — SIGNIFICANT CHANGE UP (ref 10–20)
CALCIUM SERPL-MCNC: 9 MG/DL — SIGNIFICANT CHANGE UP (ref 8.5–10.1)
CHLORIDE SERPL-SCNC: 99 MMOL/L — SIGNIFICANT CHANGE UP (ref 98–110)
CK MB CFR SERPL CALC: 2.3 NG/ML — SIGNIFICANT CHANGE UP (ref 0.6–6.3)
CK SERPL-CCNC: 149 U/L — SIGNIFICANT CHANGE UP (ref 0–225)
CO2 SERPL-SCNC: 26 MMOL/L — SIGNIFICANT CHANGE UP (ref 17–32)
CREAT SERPL-MCNC: 0.8 MG/DL — SIGNIFICANT CHANGE UP (ref 0.7–1.5)
GLUCOSE SERPL-MCNC: 102 MG/DL — HIGH (ref 70–99)
HCT VFR BLD CALC: 43.5 % — SIGNIFICANT CHANGE UP (ref 42–52)
HCT VFR BLD CALC: 44.4 % — SIGNIFICANT CHANGE UP (ref 42–52)
HGB BLD-MCNC: 14.6 G/DL — SIGNIFICANT CHANGE UP (ref 14–18)
HGB BLD-MCNC: 14.8 G/DL — SIGNIFICANT CHANGE UP (ref 14–18)
MCHC RBC-ENTMCNC: 28.5 PG — SIGNIFICANT CHANGE UP (ref 27–31)
MCHC RBC-ENTMCNC: 29 PG — SIGNIFICANT CHANGE UP (ref 27–31)
MCHC RBC-ENTMCNC: 33.3 G/DL — SIGNIFICANT CHANGE UP (ref 32–37)
MCHC RBC-ENTMCNC: 33.6 G/DL — SIGNIFICANT CHANGE UP (ref 32–37)
MCV RBC AUTO: 85 FL — SIGNIFICANT CHANGE UP (ref 80–94)
MCV RBC AUTO: 87.1 FL — SIGNIFICANT CHANGE UP (ref 80–94)
NRBC # BLD: 0 /100 WBCS — SIGNIFICANT CHANGE UP (ref 0–0)
NRBC # BLD: 0 /100 WBCS — SIGNIFICANT CHANGE UP (ref 0–0)
PLATELET # BLD AUTO: 121 K/UL — LOW (ref 130–400)
PLATELET # BLD AUTO: 168 K/UL — SIGNIFICANT CHANGE UP (ref 130–400)
POTASSIUM SERPL-MCNC: 3.8 MMOL/L — SIGNIFICANT CHANGE UP (ref 3.5–5)
POTASSIUM SERPL-SCNC: 3.8 MMOL/L — SIGNIFICANT CHANGE UP (ref 3.5–5)
PROT SERPL-MCNC: 6.6 G/DL — SIGNIFICANT CHANGE UP (ref 6–8)
RBC # BLD: 5.1 M/UL — SIGNIFICANT CHANGE UP (ref 4.7–6.1)
RBC # BLD: 5.12 M/UL — SIGNIFICANT CHANGE UP (ref 4.7–6.1)
RBC # FLD: 16.8 % — HIGH (ref 11.5–14.5)
RBC # FLD: 16.8 % — HIGH (ref 11.5–14.5)
SODIUM SERPL-SCNC: 137 MMOL/L — SIGNIFICANT CHANGE UP (ref 135–146)
TROPONIN T SERPL-MCNC: <0.01 NG/ML — SIGNIFICANT CHANGE UP
WBC # BLD: 12.8 K/UL — HIGH (ref 4.8–10.8)
WBC # BLD: 13.19 K/UL — HIGH (ref 4.8–10.8)
WBC # FLD AUTO: 12.8 K/UL — HIGH (ref 4.8–10.8)
WBC # FLD AUTO: 13.19 K/UL — HIGH (ref 4.8–10.8)

## 2019-10-25 PROCEDURE — 99232 SBSQ HOSP IP/OBS MODERATE 35: CPT

## 2019-10-25 PROCEDURE — 78582 LUNG VENTILAT&PERFUS IMAGING: CPT | Mod: 26

## 2019-10-25 RX ADMIN — MORPHINE SULFATE 100 MILLIGRAM(S): 50 CAPSULE, EXTENDED RELEASE ORAL at 06:59

## 2019-10-25 RX ADMIN — Medication 145 MILLIGRAM(S): at 11:00

## 2019-10-25 RX ADMIN — PANTOPRAZOLE SODIUM 40 MILLIGRAM(S): 20 TABLET, DELAYED RELEASE ORAL at 05:24

## 2019-10-25 RX ADMIN — LISINOPRIL 5 MILLIGRAM(S): 2.5 TABLET ORAL at 05:23

## 2019-10-25 RX ADMIN — MORPHINE SULFATE 100 MILLIGRAM(S): 50 CAPSULE, EXTENDED RELEASE ORAL at 12:33

## 2019-10-25 RX ADMIN — OXYCODONE HYDROCHLORIDE 30 MILLIGRAM(S): 5 TABLET ORAL at 17:05

## 2019-10-25 RX ADMIN — Medication 81 MILLIGRAM(S): at 11:00

## 2019-10-25 RX ADMIN — MORPHINE SULFATE 100 MILLIGRAM(S): 50 CAPSULE, EXTENDED RELEASE ORAL at 18:30

## 2019-10-25 RX ADMIN — MORPHINE SULFATE 100 MILLIGRAM(S): 50 CAPSULE, EXTENDED RELEASE ORAL at 18:19

## 2019-10-25 RX ADMIN — OXYCODONE HYDROCHLORIDE 30 MILLIGRAM(S): 5 TABLET ORAL at 11:55

## 2019-10-25 RX ADMIN — MORPHINE SULFATE 100 MILLIGRAM(S): 50 CAPSULE, EXTENDED RELEASE ORAL at 07:40

## 2019-10-25 RX ADMIN — OXYCODONE HYDROCHLORIDE 30 MILLIGRAM(S): 5 TABLET ORAL at 06:40

## 2019-10-25 RX ADMIN — OXYCODONE HYDROCHLORIDE 30 MILLIGRAM(S): 5 TABLET ORAL at 17:40

## 2019-10-25 RX ADMIN — FAMOTIDINE 20 MILLIGRAM(S): 10 INJECTION INTRAVENOUS at 17:05

## 2019-10-25 RX ADMIN — MORPHINE SULFATE 100 MILLIGRAM(S): 50 CAPSULE, EXTENDED RELEASE ORAL at 01:02

## 2019-10-25 RX ADMIN — BUDESONIDE AND FORMOTEROL FUMARATE DIHYDRATE 2 PUFF(S): 160; 4.5 AEROSOL RESPIRATORY (INHALATION) at 07:52

## 2019-10-25 RX ADMIN — MORPHINE SULFATE 100 MILLIGRAM(S): 50 CAPSULE, EXTENDED RELEASE ORAL at 14:08

## 2019-10-25 RX ADMIN — TIOTROPIUM BROMIDE 1 CAPSULE(S): 18 CAPSULE ORAL; RESPIRATORY (INHALATION) at 07:52

## 2019-10-25 RX ADMIN — SODIUM CHLORIDE 3 MILLILITER(S): 9 INJECTION INTRAMUSCULAR; INTRAVENOUS; SUBCUTANEOUS at 05:24

## 2019-10-25 RX ADMIN — OXYCODONE HYDROCHLORIDE 30 MILLIGRAM(S): 5 TABLET ORAL at 11:01

## 2019-10-25 RX ADMIN — OXYCODONE HYDROCHLORIDE 30 MILLIGRAM(S): 5 TABLET ORAL at 00:15

## 2019-10-25 RX ADMIN — FAMOTIDINE 20 MILLIGRAM(S): 10 INJECTION INTRAVENOUS at 05:23

## 2019-10-25 RX ADMIN — OXYCODONE HYDROCHLORIDE 30 MILLIGRAM(S): 5 TABLET ORAL at 05:22

## 2019-10-25 NOTE — DISCHARGE NOTE NURSING/CASE MANAGEMENT/SOCIAL WORK - NSDCPEWEB_GEN_ALL_CORE
[facility] for Tobacco Control website --- [email or web address] --- [email or web address]

## 2019-10-25 NOTE — CHART NOTE - NSCHARTNOTEFT_GEN_A_CORE
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 HE will take "a couple oxycodone and morphine per day plus heroine" and will go into withdrawals if he does not take opioids. The pt went to [employer] school and "knows what to say" to get admitted and get pain meds. She begged me profusely to not give him any pain meds.     Given that the pt is on such high doses of opioids and his risk for withdrawal, will spread out his pain med dosing. Sign out to AM team for rehab consult.
Patient seen and examined throughout the course of the day.    Results of Labs/Imaging discussed as well as patient's plan.        -platelet normal  -VQ scan -normal no PE  -plan to discharge today   All patient's/family questions answered.  Patient and family encouraged to contact PA with any further issues.
Patient seen and examined throughout the course of the day.    Results of Labs/Imaging discussed as well as patient's plan.      -trend cardiac enzymes  -f/u cardiology consult  All patient's/family questions answered.  Patient and family encouraged to contact PA with any further issues.

## 2019-10-25 NOTE — DISCHARGE NOTE PROVIDER - NSDCCPCAREPLAN_GEN_ALL_CORE_FT
PRINCIPAL DISCHARGE DIAGNOSIS  Diagnosis: Chest pain  Assessment and Plan of Treatment:       SECONDARY DISCHARGE DIAGNOSES  Diagnosis: Abdominal pain  Assessment and Plan of Treatment:     Diagnosis: Nausea & vomiting  Assessment and Plan of Treatment:

## 2019-10-25 NOTE — PROVIDER CONTACT NOTE (OTHER) - SITUATION
Patient noted exiting building to smoke despite smoking cessation and teaching the importance of consistent telemetry monitoring on unit. No chest pain or cardiac events noted overnight.

## 2019-10-25 NOTE — DISCHARGE NOTE NURSING/CASE MANAGEMENT/SOCIAL WORK - NSDCPEEMAIL_GEN_ALL_CORE
[facility] for Tobacco Control email [email or web address]

## 2019-10-25 NOTE — DISCHARGE NOTE PROVIDER - HOSPITAL COURSE
53 y.o M w/ PMHx HTN, HLD, COPD p/w chest pressure that began yesterday morning while gardening a/w tingling of left arm with vomiting. Pt has not had such an extreme pain like this before.     + smoker 2-3 pack per day x 35 years     + recent surgery, right foot surgery        Pain in Chest / left shoulder and jaw started yesterday morning and thought it would go away , pain got worse and then SOB started today , then decided to come to the ER this am.    Pain is describe as 7/10 sharp.    Pt on a lot of pain meds including morphine/  oxycodone.  he had normal c/q scan and was also seen by cardiology-- cardiac enz were negative and no events were noted on telemetry.    He was advised to see DR Reid as outpatient for further cardiac w/u.

## 2019-10-25 NOTE — PROGRESS NOTE ADULT - ASSESSMENT
52 yo male w presented with SOB:  - # SOB-- sec to smoking, echo showed normal LV function.  He went to get V/q scan--< from: [facility] Pulmonary Ventilation/Perfusion Scan (10.25.19 @ 14:13) >  NORMAL VENTILATION AND PERFUSION LUNG IMAGES.      # Hx of recent foot surgery-- can ambulate.    # Opioid dependence-- taking high doses of pain meds -- already has prescriptions filled for this month.    #Copd --currently not wheezing on bronchodilators.    Dc home today--spent more than 30mins

## 2019-10-25 NOTE — PROGRESS NOTE ADULT - SUBJECTIVE AND OBJECTIVE BOX
SUBJECTIVE:    Patient is a 53y old Male who presents with a chief complaint of CP (25 Oct 2019 09:16)    Currently admitted to medicine with the primary diagnosis of Chest pain     Today is hospital day 1d.     PAST MEDICAL & SURGICAL HISTORY  REBECCA treated with BiPAP  COPD (chronic obstructive pulmonary disease)  Colitis: LARGE INTESTINE   NO RECENT FLARES  Hiatal hernia: GERD  SOB (shortness of breath)  Osteoarthritis  High blood cholesterol  Morbid obesity  Obstructive sleep apnea  GERD (gastroesophageal reflux disease)  High cholesterol  HTN (hypertension)  H/O knee surgery: arthoscopic x4  Hernia, inguinal, bilateral: 3 months old  History of knee replacement procedure of right knee: BILATERAL  History of appendectomy  History of cholecystectomy    ALLERGIES:  No Known Allergies    MEDICATIONS:  STANDING MEDICATIONS  aspirin  chewable 81 milliGRAM(s) Oral daily  budesonide 160 MICROgram(s)/formoterol 4.5 MICROgram(s) Inhaler 2 Puff(s) Inhalation two times a day  chlorhexidine 4% Liquid 1 Application(s) Topical <User Schedule>  famotidine    Tablet 20 milliGRAM(s) Oral two times a day  fenofibrate Tablet 145 milliGRAM(s) Oral daily  heparin  Injectable 5000 Unit(s) SubCutaneous every 8 hours  lisinopril 5 milliGRAM(s) Oral daily  morphine ER Tablet 100 milliGRAM(s) Oral four times a day  oxyCODONE    IR 30 milliGRAM(s) Oral every 6 hours  pantoprazole    Tablet 40 milliGRAM(s) Oral before breakfast  simvastatin 20 milliGRAM(s) Oral at bedtime  sodium chloride 0.9% lock flush 3 milliLiter(s) IV Push every 8 hours  tiotropium 18 MICROgram(s) Capsule 1 Capsule(s) Inhalation daily    PRN MEDICATIONS  acetaminophen   Tablet .. 650 milliGRAM(s) Oral every 6 hours PRN  ALBUTerol    90 MICROgram(s) HFA Inhaler 2 Puff(s) Inhalation every 6 hours PRN  aluminum hydroxide/magnesium hydroxide/simethicone Suspension 30 milliLiter(s) Oral every 6 hours PRN  furosemide    Tablet 40 milliGRAM(s) Oral daily PRN  oxycodone    5 mG/acetaminophen 325 mG 2 Tablet(s) Oral every 6 hours PRN    VITALS:   T(F): 97.6  HR: 80  BP: 127/73  RR: 16  SpO2: --    LABS:                        14.8   12.80 )-----------( 168      ( 25 Oct 2019 11:40 )             44.4     10-25    137  |  99  |  13  ----------------------------<  102<H>  3.8   |  26  |  0.8    Ca    9.0      25 Oct 2019 06:26  Mg     1.9     10-24    TPro  6.6  /  Alb  3.8  /  TBili  0.8  /  DBili  x   /  AST  15  /  ALT  12  /  AlkPhos  97  10-25          Creatine Kinase, Serum: 149 U/L (10-25-19 @ 06:26)  Troponin T, Serum: <0.01 ng/mL (10-25-19 @ 06:26)      CARDIAC MARKERS ( 25 Oct 2019 06:26 )  x     / <0.01 ng/mL / 149 U/L / x     / 2.3 ng/mL  CARDIAC MARKERS ( 24 Oct 2019 15:39 )  x     / <0.01 ng/mL / 199 U/L / x     / 3.1 ng/mL  CARDIAC MARKERS ( 24 Oct 2019 05:50 )  x     / <0.01 ng/mL / x     / x     / x          RADIOLOGY:    PHYSICAL EXAM:  GEN: No acute distress  LUNGS: Clear to auscultation bilaterally   HEART: S1/S2 present. RRR.   ABD/ GI: Soft, non-tender, non-distended. Bowel sounds present  EXT: NC/NC/NE/2+PP/CLARK  NEURO: AAOX3

## 2019-10-25 NOTE — CONSULT NOTE ADULT - SUBJECTIVE AND OBJECTIVE BOX
CARDIOLOGY CONSULT NOTE     CHIEF COMPLAINT/REASON FOR CONSULT:    HPI:  53 y.o M w/ PMHx HTN, HLD, COPD p/w chest pressure that began yesterday morning while gardening a/w tingling of left arm with vomiting. Pt has not had such an extreme pain like this before.   + smoker 2-3 pack per day x 35 years   + recent surgery, right foot surgery    Pain in Chest / left shoulder and jaw started yesterday morning and thought it would go away , pain got worse and then SOB started today , then decided to come to the ER this am.  Pain is describe as 7/10 sharp.  Pt on a lot of pain meds including morphine/ dilaudid/ methadone/ oxy    denies oxygen at home/ diarrhea/ fever    no recent travel, no Hx of clots. (24 Oct 2019 11:58)      PAST MEDICAL & SURGICAL HISTORY:  REBECCA treated with BiPAP  COPD (chronic obstructive pulmonary disease)  Colitis: LARGE INTESTINE   NO RECENT FLARES  Hiatal hernia: GERD  SOB (shortness of breath)  Osteoarthritis  High blood cholesterol  Morbid obesity  Obstructive sleep apnea  GERD (gastroesophageal reflux disease)  High cholesterol  HTN (hypertension)  H/O knee surgery: arthoscopic x4  Hernia, inguinal, bilateral: 3 months old  History of knee replacement procedure of right knee: BILATERAL  History of appendectomy  History of cholecystectomy      Cardiac Risks:   [x ]HTN, [ ] DM, [x ] Smoking, [ ] FH,  [x ] Lipids        MEDICATIONS:  MEDICATIONS  (STANDING):  aspirin  chewable 81 milliGRAM(s) Oral daily  budesonide 160 MICROgram(s)/formoterol 4.5 MICROgram(s) Inhaler 2 Puff(s) Inhalation two times a day  chlorhexidine 4% Liquid 1 Application(s) Topical <User Schedule>  famotidine    Tablet 20 milliGRAM(s) Oral two times a day  fenofibrate Tablet 145 milliGRAM(s) Oral daily  heparin  Injectable 5000 Unit(s) SubCutaneous every 8 hours  lisinopril 5 milliGRAM(s) Oral daily  morphine ER Tablet 100 milliGRAM(s) Oral four times a day  oxyCODONE    IR 30 milliGRAM(s) Oral every 6 hours  pantoprazole    Tablet 40 milliGRAM(s) Oral before breakfast  simvastatin 20 milliGRAM(s) Oral at bedtime  sodium chloride 0.9% lock flush 3 milliLiter(s) IV Push every 8 hours  tiotropium 18 MICROgram(s) Capsule 1 Capsule(s) Inhalation daily      FAMILY HISTORY:  No pertinent family history in first degree relatives      SOCIAL HISTORY:      [ ] Marital status       Allergies    No Known Allergies        	    REVIEW OF SYSTEMS:  CONSTITUTIONAL: No fever, weight loss, or fatigue  EYES: No eye pain, visual disturbances, or discharge  ENMT:  No difficulty hearing, tinnitus, vertigo; No sinus or throat pain  NECK: No pain or stiffness  RESPIRATORY: No cough, wheezing, chills or hemoptysis; No Shortness of Breath  CARDIOVASCULAR: See above  GASTROINTESTINAL: No abdominal or epigastric pain. No nausea, vomiting, or hematemesis; No diarrhea or constipation. No melena or hematochezia.  GENITOURINARY: No dysuria, frequency, hematuria, or incontinence  NEUROLOGICAL: No headaches, memory loss, loss of strength, numbness, or tremors  SKIN: No itching, burning, rashes, or lesions   	      PHYSICAL EXAM:  T(C): 36.1 (10-25-19 @ 05:30), Max: 37.1 (10-24-19 @ 13:25)  HR: 70 (10-25-19 @ 05:30) (16 - 78)  BP: 132/70 (10-25-19 @ 05:30) (132/70 - 166/84)  RR: 16 (10-25-19 @ 05:30) (16 - 16)  SpO2: --  Wt(kg): --  I&O's Summary      Appearance: Normal	  Psychiatry: A & O x 3, Mood & affect appropriate  HEENT:   Normal oral mucosa, PERRL, EOMI	  Lymphatic: No lymphadenopathy  Cardiovascular: Normal S1 S2,RRR, No JVD, No murmurs  Respiratory: decreased bs rhonchi  Gastrointestinal:  Soft, Non-tender, + BS	  Skin: No rashes, No ecchymoses, No cyanosis	  Neurologic: Non-focal  Extremities: Normal range of motion, No clubbing, cyanosis or edema  Vascular: Peripheral pulses palpable 2+ bilaterally      ECG:  	< from: 12 Lead ECG (10.24.19 @ 05:41) >  Diagnosis Line Normal sinus rhythm  Normal ECG    Confirmed by RYANNE SHAIKH MD ([id number]) on 10/24/2019 11:32:03 AM    < end of copied text >      	  LABS:	 	    CARDIAC MARKERS:                                    14.6   13.19 )-----------( 121      ( 25 Oct 2019 06:26 )             43.5     10-25    137  |  99  |  13  ----------------------------<  102<H>  3.8   |  26  |  0.8    Ca    9.0      25 Oct 2019 06:26  Mg     1.9     10-24    TPro  6.6  /  Alb  3.8  /  TBili  0.8  /  DBili  x   /  AST  15  /  ALT  12  /  AlkPhos  97  10-25

## 2019-10-25 NOTE — CONSULT NOTE ADULT - ASSESSMENT
Patient followed by Behzad. Reportedly no significant CAD by CTA and cardiac cath. Atypical chest pain. Appears exacerbation COPD. Need r/o mi. Stop smoking . F/u Dr Wen. Prognosis guarded

## 2019-10-25 NOTE — DISCHARGE NOTE NURSING/CASE MANAGEMENT/SOCIAL WORK - PATIENT PORTAL LINK FT
You can access the FollowMyHealth Patient Portal offered by [facility] by registering at the following website: [email or web address]. By joining [facility]’s FollowMyHealth portal, you will also be able to view your health information using other applications (apps) compatible with our system.

## 2019-10-25 NOTE — DISCHARGE NOTE PROVIDER - CARE PROVIDER_API CALL
Oliver Crook (MD)  Medicine  [address] [place] [address]  Phone: [phone]  Fax: [phone]  Follow Up Time:

## 2019-10-30 DIAGNOSIS — F17.200 NICOTINE DEPENDENCE, UNSPECIFIED, UNCOMPLICATED: ICD-10-CM

## 2019-10-30 DIAGNOSIS — G47.33 OBSTRUCTIVE SLEEP APNEA (ADULT) (PEDIATRIC): ICD-10-CM

## 2019-10-30 DIAGNOSIS — R07.9 CHEST PAIN, UNSPECIFIED: ICD-10-CM

## 2019-10-30 DIAGNOSIS — J44.9 CHRONIC OBSTRUCTIVE PULMONARY DISEASE, UNSPECIFIED: ICD-10-CM

## 2019-10-30 DIAGNOSIS — R10.9 UNSPECIFIED ABDOMINAL PAIN: ICD-10-CM

## 2019-10-30 DIAGNOSIS — R11.2 NAUSEA WITH VOMITING, UNSPECIFIED: ICD-10-CM

## 2019-10-30 DIAGNOSIS — M19.90 UNSPECIFIED OSTEOARTHRITIS, UNSPECIFIED SITE: ICD-10-CM

## 2019-10-30 DIAGNOSIS — F11.29 OPIOID DEPENDENCE WITH UNSPECIFIED OPIOID-INDUCED DISORDER: ICD-10-CM

## 2019-10-30 DIAGNOSIS — I10 ESSENTIAL (PRIMARY) HYPERTENSION: ICD-10-CM

## 2019-10-30 DIAGNOSIS — K21.9 GASTRO-ESOPHAGEAL REFLUX DISEASE WITHOUT ESOPHAGITIS: ICD-10-CM

## 2019-10-30 DIAGNOSIS — R06.02 SHORTNESS OF BREATH: ICD-10-CM

## 2020-01-17 ENCOUNTER — EMERGENCY (EMERGENCY)
Facility: HOSPITAL | Age: 55
LOS: 0 days | Discharge: HOME | End: 2020-01-17
Attending: EMERGENCY MEDICINE | Admitting: EMERGENCY MEDICINE
Payer: MEDICAID

## 2020-01-17 VITALS
HEART RATE: 85 BPM | OXYGEN SATURATION: 95 % | TEMPERATURE: 97 F | RESPIRATION RATE: 18 BRPM | DIASTOLIC BLOOD PRESSURE: 81 MMHG | SYSTOLIC BLOOD PRESSURE: 146 MMHG

## 2020-01-17 DIAGNOSIS — K59.00 CONSTIPATION, UNSPECIFIED: ICD-10-CM

## 2020-01-17 DIAGNOSIS — Z98.890 OTHER SPECIFIED POSTPROCEDURAL STATES: Chronic | ICD-10-CM

## 2020-01-17 DIAGNOSIS — J44.9 CHRONIC OBSTRUCTIVE PULMONARY DISEASE, UNSPECIFIED: ICD-10-CM

## 2020-01-17 DIAGNOSIS — K40.20 BILATERAL INGUINAL HERNIA, WITHOUT OBSTRUCTION OR GANGRENE, NOT SPECIFIED AS RECURRENT: Chronic | ICD-10-CM

## 2020-01-17 DIAGNOSIS — Z96.651 PRESENCE OF RIGHT ARTIFICIAL KNEE JOINT: Chronic | ICD-10-CM

## 2020-01-17 DIAGNOSIS — R10.9 UNSPECIFIED ABDOMINAL PAIN: ICD-10-CM

## 2020-01-17 PROCEDURE — 99284 EMERGENCY DEPT VISIT MOD MDM: CPT

## 2020-01-17 RX ORDER — IPRATROPIUM/ALBUTEROL SULFATE 18-103MCG
3 AEROSOL WITH ADAPTER (GRAM) INHALATION
Refills: 0 | Status: COMPLETED | OUTPATIENT
Start: 2020-01-17 | End: 2020-01-17

## 2020-01-17 RX ADMIN — Medication 1 ENEMA: at 21:03

## 2020-01-17 RX ADMIN — Medication 3 MILLILITER(S): at 21:00

## 2020-01-17 RX ADMIN — Medication 3 MILLILITER(S): at 21:25

## 2020-01-17 RX ADMIN — Medication 60 MILLIGRAM(S): at 21:03

## 2020-01-17 NOTE — ED PROVIDER NOTE - PHYSICAL EXAMINATION
Physical Exam    Vital Signs: I have reviewed the initial vital signs.  Constitutional: well-nourished, appears stated age, no acute distress, obese   Eyes: Conjunctiva pink, Sclera clear  Cardiovascular: S1 and S2, regular rate, regular rhythm, well-perfused extremities, radial pulses equal and 2+  Respiratory: unlabored respiratory effort, diffuse end expiratory wheezing, speaking in full sentences.   Gastrointestinal: soft, non-tender abdomen, no pulsatile mass, normal bowl sounds  Musculoskeletal: supple neck, no lower extremity edema, no midline tenderness  Integumentary: warm, dry, no rash  Neurologic: awake, alert, nvi

## 2020-01-17 NOTE — ED PROVIDER NOTE - OBJECTIVE STATEMENT
53 yo male, pmh of chronic pain on chronic opiates, copd, htn, hld, presents to ed for constipation. Pt states has not had bm in one week, ran out of colace at home, has happened many times in past. Denies fever, chills, cp, sob, cough, le swelling, back pain, neck pain, nv, dysuria. pt admits to passing gas and eating.

## 2020-01-17 NOTE — ED PROVIDER NOTE - PROGRESS NOTE DETAILS
pt had large bm in ed s/p enema, reports feeling better. will finish nebs for pt. pt no longer in ed, eloped without telling any providers.

## 2020-01-17 NOTE — ED PROVIDER NOTE - NS ED ROS FT
Constitutional: (-) fever, (-) chills  Eyes: (-) visual changes  ENT: (-) nasal congestions  Cardiovascular: (-) chest pain, (-) syncope  Respiratory: (-) cough, (-) shortness of breath, (-) dyspnea,   Gastrointestinal: (-) vomiting, (-) diarrhea, (-)nausea, (+) constipation   Musculoskeletal: (-) neck pain, (-) back pain, (-) joint pain,  Integumentary: (-) rash, (-) edema, (-) bruises  Neurological: (-) headache, (-) loc, (-) dizziness, (-) tingling, (-)numbness,  Peripheral Vascular: (-) leg swelling  :  (-)dysuria,  (-) hematuria  Allergic/Immunologic: (-) pruritus

## 2020-01-17 NOTE — ED ADULT TRIAGE NOTE - CHIEF COMPLAINT QUOTE
"I take narcotic pain relievers (Morphine & Oxycodone) with Colace. I ran out of Colace 2 weeks ago and I've been constipated and having abdominal pain since."

## 2020-01-17 NOTE — ED PROVIDER NOTE - PMH
Colitis  LARGE INTESTINE   NO RECENT FLARES  COPD (chronic obstructive pulmonary disease)    GERD (gastroesophageal reflux disease)    Hiatal hernia  GERD  High blood cholesterol    High cholesterol    HTN (hypertension)    Morbid obesity    Obstructive sleep apnea    REBECCA treated with BiPAP    Osteoarthritis    SOB (shortness of breath)

## 2020-01-17 NOTE — ED PROVIDER NOTE - ATTENDING CONTRIBUTION TO CARE
Patient is c/o constipation x 2 wks, states feel has to move bowels, but only small amount of the stool comes out, denies rectal pain, denies abdominal pain, denies n/v/f/c/cp/sob. Denies change in appetite, has been eating normally. Denies  symptoms.   vitals noted  lungs: B/L scattered wheezing, patient with h/o COPD  abd: +BS, NT, ND, soft  CNS: awake, alert, o x 3, speaking in full sentences, appears comfortable  A/P: Constipation  COPD exacerbation  nebs, fleet enema  reevaluation after BM.

## 2020-01-17 NOTE — ED ADULT NURSE NOTE - OBJECTIVE STATEMENT
PT c/o constipation [id number]. PT states he takes opiates at home for his recent B/L knee replacements and takes colace at home for the side effects.

## 2020-01-29 NOTE — ED PROVIDER NOTE - PROGRESS NOTE DETAILS
Patient returned call. She said yes to both A & B. Please e scribe prescription to [address] [place] [address] in [facility]. Pt  re-eval Lungs improved. Unable to obtain IV access - patient refusing further attempts at this point for Lab redraws after multiple attempts made by nursing, PA and MD. Will give additional treatment and PO Decadron and re-eval

## 2020-02-01 ENCOUNTER — OUTPATIENT (OUTPATIENT)
Dept: OUTPATIENT SERVICES | Facility: HOSPITAL | Age: 55
LOS: 1 days | End: 2020-02-01
Payer: MEDICAID

## 2020-02-01 DIAGNOSIS — K40.20 BILATERAL INGUINAL HERNIA, WITHOUT OBSTRUCTION OR GANGRENE, NOT SPECIFIED AS RECURRENT: Chronic | ICD-10-CM

## 2020-02-01 DIAGNOSIS — Z98.890 OTHER SPECIFIED POSTPROCEDURAL STATES: Chronic | ICD-10-CM

## 2020-02-01 DIAGNOSIS — Z96.651 PRESENCE OF RIGHT ARTIFICIAL KNEE JOINT: Chronic | ICD-10-CM

## 2020-02-18 NOTE — ED PROVIDER NOTE - NS ED MD EM SELECTION
"  Physical Therapy Daily Treatment Note      Name: Octavia Arrington  Clinic Number: [id number]     Therapy Diagnosis:    Decreased ROM of neck      Decreased strength      Vestibulopathy, unspecified laterality           Physician: Gerard Cavazos III, MD     Visit Date: 12/30/2019      Physician Orders: PT Eval and Treat, therapeutic exercise passive, active resistive, cervical traction  Medical Diagnosis from Referral:   S06.0X0D (ICD-10-CM) - Concussion without loss of consciousness, subsequent encounter   S13.4XXA (ICD-10-CM) - Whiplash, initial encounter   H81.90 (ICD-10-CM) - Vestibulopathy, unspecified laterality   F07.81 (ICD-10-CM) - Post-concussion syndrome   R51 (ICD-10-CM) - Cervicogenic headache   M54.81 (ICD-10-CM) - Bilateral occipital neuralgia      Authorization Period Expiration: 10/10/2020  Plan of Care Expiration: 3/6/2020  Visit # / Visits authorized: 12/ 12     Time In: 1549  Time Out: 1635  Total Billable Time: 46 minutes     Precautions: Standard        Subjective      Pt reports: headaches haven't been too bad. Dizziness has been pretty good lately. Lost prism glasses this weekend   HEP: pt report good compliance with HEP  Tolerance to last PT session: no adverse effects reported  Functional change: improved convergence point, decreased headache occurrence     Pain: denies headache upon arrival but reports "weird sensation" at apex of head during session (towards end of session)- pt denied this as being painful  Location:  n/a  Objective      Octavia participated in neuromuscular re-education activities to improve: Balance, Coordination, Kinesthetic, Sense, Proprioception, Posture and oculomotor performance for 17 minutes. The following activities were included:     Convergence point= 19 cm    Functional Gait Assessment:   1. Gait on level surface =  2, 5.9 sec   (3) Normal: less than 5.5 sec, no A.D., no imbalance, normal gait pattern, deviates< 6in   (2) Mild impairment: 7-5.6 sec, uses A.D., " mild gait deviations, or deviates 6-10 in   (1) Moderate impairment: > 7 sec, slow speed, imbalance, deviates 10-15 in.   (0) Severe impairment: needs assist, deviates >15 in, reach/touch wall  2. Change in Gait Speed = 3   (3) Normal: smooth change w/o loss of balance or gait deviation, deviates < 6 in, significant difference between speeds   (2) Mild impairment: changes speed, but demonstrates mild gait deviations, deviates 6-10 in, OR no deviations but unable to significantly speed, OR uses A.D.   (1) Moderate impairment: minor changes to speed, OR changes speed w/ significant deviations, deviates 10-15 in, OR  Changes speed , but loses balance & recovers   (0) Severe impairment: cannot change speed, deviates >15 in, or loses balance & needs assist  3. Gait with horizontal head turns  = 3 (woozy)   (3) Normal: no change in gait, deviates <6 in   (2) Mild impairment: slight change in speed, deviates 6-10 in, OR uses A.D.   (1) Moderate impairment: moderate change in speed, deviates 10-15 in   (0) Severe impairment: severe disruption of gait, deviates >15in  4. Gait with vertical head turns = 3 (woozy)   (3) Normal: no change in gait, deviates <6 in   (2) Mild impairment: slight change in speed, deviates 6-10 in OR uses A.D.   (1) Moderate impairment: moderate change in speed, deviates 10-15 in   (0) Severe impairment: severe disruption of gait, deviates >15 in  5. Gait with pivot turns = 2   (3) Normal: performs safely in 3 sec, no LOB   (2) Mild impairment: performs in >3 sec & no LOB, OR turns safely & requires several steps to regain LOB   (1) Moderate impairment: turns slow, OR requires several small steps for balance following turn & stop   (0) Severe impairment: cannot turn safely, needs assist  6. Step over obstacle = 2   (3) Normal: steps over 2 stacked boxes w/o change in speed or LOB   (2) Mild impairment: able to step over 1 box w/o change in speed or LOB   (1) Moderate impairment: steps over 1 box but  must slow down, may require VC   (0) Severe impairment: cannot perform w/o assist  7. Gait with Narrow MARIELA = 3   (3) Normal: 10 steps no staggering   (2) Mild impairment: 7-9 steps   (1) Moderate impairment: 4-7 steps   (0) Severe impairment: < 4 steps or cannot perform w/o assist  8. Gait with eyes closed = 0   (3) Normal: < 7 sec, no A.D., no LOB, normal gait pattern, deviates <6 in   (2) Mild impairment: 7.1-9 sec, mild gait deviations, deviates 6-10 in   (1) Moderate impairment: > 9 sec, abnormal pattern, LOB, deviates 10-15 in   (0) Severe impairment: cannot perform w/o assist, LOB, deviates >15in  9. Ambulating Backwards = 2   (3) Normal: no A.D., no LOB, normal gait pattern, deviates <6in   (2) Mild impairment: uses A.D., slower speed, mild gait deviations, deviates 6-10 in   (1) Moderate impairment: slow speed, abnormal gait pattern, LOB, deviates 10-15 in   (0) Severe impairment: severe gait deviations or LOB, deviates >15in  10. Steps = 2   (3) Normal: alternating feet, no rail   (2) Mild Impairment: alternating feet, uses rail   (1) Moderate impairment: step-to, uses rail   (0) Severe impairment: cannot perform safely  Score 22/30     Score:   <22/30 fall risk   <20/30 fall risk in older adults   <18/30 fall risk in Parkinsons     [facility] stepping test: 0 deg rotation, no hypofunction noted CLEMENT     // bars:   Static standing on Bosu (flat side up) 2 x 30 sec= fair to fair+ balance  Static standing on Bosu (flat side up) horizontal head turns x 30 sec= fair- balance  Static standing on Bosu (flat side up) vertical head turns 2 x 30 sec= fair- balance      Octavia received therapeutic exercises to develop strength, endurance, flexibility and posture for 29 minutes including:     upper extremity ergometer, hills interval training L4 x 6 min (3 front/ 3 back)     standing:               Lower trap strength via wall slide with lift off 2 x 10   no moneys Green thera band 2 x 15    Side lying: open [employer]  "10x    * pt mentioned L "heel pain that is worst when getting up in the morning with some pain into the Achilles tendon". This pain causes her to lift L foot as soon as she attempts to rise. Pt was instructed in plantar fascia massage with tennis ball and provided with foam arch supports to address.            Home Exercises Provided and Patient Education Provided      Education provided:   - continue with HEP, added open books  - continue with convergence exercise, it has improved since last reassessment.       Written Home Exercises Provided: continue with previous HEP, added open books  Exercises were reviewed and Octavia was able to demonstrate them prior to the end of the session.  Octavia demonstrated good  understanding of the education provided.      See EMR under Patient Instructions for exercises provided 6/28/2019. And 12/30/2019     Assessment   Octavia tolerated today's PT session well. Pt reports she still has headaches and "dizziness" but this continues to improve. Pt is unable to indicate when headaches seem to occur. Pt reports improved compliance with HEP for postural range of Motion and strengthening and pencil push ups. Pt demonstrates improved (but still abnormal) convergence point.  Functional Gait Assessment was performed with pt reporting "wooziness" during gait with head turns, but no significant deviation from straight path. Pt was not able to walk with eyes closed, veering to left of center each attempt. Pt had consistent loss of balance with pivot turns; this could be influced by history of TKAs. Overall, pt is not at risk for falls per this assessment. Pt was advanced to performing head turns while balancing on Bosu ball with fair balance noted. PT recommend that pt continue with PT for ~4 more sessions (at 1x/week to advance HEP as needed) to allow her to become as close to prior level of function as possible prior to d/c to ensure resolution of headaches and dizziness. Pt also indicated L " plantar foot pain, see objective info for description and intervention provided.       Octavia is progressing well towards her goals.   Pt prognosis is Good.      Pt will continue to benefit from skilled outpatient physical therapy to address the deficits listed in the problem list box on initial evaluation, provide pt/family education and to maximize pt's level of independence in the home and community environment.      Pt's spiritual, cultural and educational needs considered and pt agreeable to plan of care and goals.     Anticipated barriers to physical therapy: attendance to treatment, compliance with HEP        Goals:  Formal status as of 1/28/2020  Short Term Goals: 6 weeks   1. Pt will report compliance with HEP to improve carry over. Ongoing- good compliance reported  2. Pt will be able to perform saccades at 75 bpm without excessive blinking at blank background x 10. Met 12/2/2019- 85 bpm  3. Pt will be able to balance with narrow base of support and eyes closed for 30 seconds without LOB. Met 12/2/19  4. Pt will improve convergence to </= 17 cm to improve oculomotor control. Improved- (2/18/20- 19 cm)     Long Term Goals: 12 weeks   1. Pt will report ability to read for 10 minutes without vision changes. Met- 1/28/2020  2. Pt will perform 10 reps of smooth tracking without nystagmus at end range horizontal motion. improved- minute nystagmus in L eye at endrange  3. Pt will be able to perform saccades at 90 bpm in dynamic background without excessive blinking or visual strain. Met 12/30/19  4. Pt will improve convergence to </= 10 cm to improve oculomotor control. Improved compared to last assessment- 23 cm  5. Pt will improve cervical SB bilaterally by 10 degrees without pain to improve AROM and return to functional activities without increase in symptoms. Ongoing-  R= 20 deg, L= 25 deg      Plan      Continue with balance on variable surfaces with decreased visual support. Perform cardiovascular  activities (preferably UE) to decrease headaches     Merari Dougherty,PT, DPT              [id number] [facility]

## 2020-02-20 ENCOUNTER — INPATIENT (INPATIENT)
Facility: HOSPITAL | Age: 55
LOS: 6 days | Discharge: HOME | End: 2020-02-27
Attending: HOSPITALIST | Admitting: HOSPITALIST
Payer: MEDICAID

## 2020-02-20 VITALS
TEMPERATURE: 98 F | SYSTOLIC BLOOD PRESSURE: 149 MMHG | HEART RATE: 91 BPM | HEIGHT: 70 IN | OXYGEN SATURATION: 97 % | DIASTOLIC BLOOD PRESSURE: 83 MMHG | WEIGHT: 266.98 LBS | RESPIRATION RATE: 17 BRPM

## 2020-02-20 DIAGNOSIS — Z98.890 OTHER SPECIFIED POSTPROCEDURAL STATES: Chronic | ICD-10-CM

## 2020-02-20 DIAGNOSIS — K40.20 BILATERAL INGUINAL HERNIA, WITHOUT OBSTRUCTION OR GANGRENE, NOT SPECIFIED AS RECURRENT: Chronic | ICD-10-CM

## 2020-02-20 DIAGNOSIS — Z96.651 PRESENCE OF RIGHT ARTIFICIAL KNEE JOINT: Chronic | ICD-10-CM

## 2020-02-20 LAB
ALBUMIN SERPL ELPH-MCNC: 4.3 G/DL — SIGNIFICANT CHANGE UP (ref 3.5–5.2)
ALP SERPL-CCNC: 124 U/L — HIGH (ref 30–115)
ALT FLD-CCNC: 16 U/L — SIGNIFICANT CHANGE UP (ref 0–41)
ANION GAP SERPL CALC-SCNC: 15 MMOL/L — HIGH (ref 7–14)
AST SERPL-CCNC: 27 U/L — SIGNIFICANT CHANGE UP (ref 0–41)
BASOPHILS # BLD AUTO: 0.08 K/UL — SIGNIFICANT CHANGE UP (ref 0–0.2)
BASOPHILS NFR BLD AUTO: 0.8 % — SIGNIFICANT CHANGE UP (ref 0–1)
BILIRUB SERPL-MCNC: 0.4 MG/DL — SIGNIFICANT CHANGE UP (ref 0.2–1.2)
BUN SERPL-MCNC: 8 MG/DL — LOW (ref 10–20)
CALCIUM SERPL-MCNC: 9.7 MG/DL — SIGNIFICANT CHANGE UP (ref 8.5–10.1)
CHLORIDE SERPL-SCNC: 96 MMOL/L — LOW (ref 98–110)
CO2 SERPL-SCNC: 27 MMOL/L — SIGNIFICANT CHANGE UP (ref 17–32)
CREAT SERPL-MCNC: 0.8 MG/DL — SIGNIFICANT CHANGE UP (ref 0.7–1.5)
EOSINOPHIL # BLD AUTO: 0.29 K/UL — SIGNIFICANT CHANGE UP (ref 0–0.7)
EOSINOPHIL NFR BLD AUTO: 2.9 % — SIGNIFICANT CHANGE UP (ref 0–8)
GLUCOSE SERPL-MCNC: 95 MG/DL — SIGNIFICANT CHANGE UP (ref 70–99)
HCT VFR BLD CALC: 45.5 % — SIGNIFICANT CHANGE UP (ref 42–52)
HGB BLD-MCNC: 15.4 G/DL — SIGNIFICANT CHANGE UP (ref 14–18)
IMM GRANULOCYTES NFR BLD AUTO: 0.3 % — SIGNIFICANT CHANGE UP (ref 0.1–0.3)
LYMPHOCYTES # BLD AUTO: 2.47 K/UL — SIGNIFICANT CHANGE UP (ref 1.2–3.4)
LYMPHOCYTES # BLD AUTO: 24.4 % — SIGNIFICANT CHANGE UP (ref 20.5–51.1)
MCHC RBC-ENTMCNC: 30.4 PG — SIGNIFICANT CHANGE UP (ref 27–31)
MCHC RBC-ENTMCNC: 33.8 G/DL — SIGNIFICANT CHANGE UP (ref 32–37)
MCV RBC AUTO: 89.9 FL — SIGNIFICANT CHANGE UP (ref 80–94)
MONOCYTES # BLD AUTO: 0.71 K/UL — HIGH (ref 0.1–0.6)
MONOCYTES NFR BLD AUTO: 7 % — SIGNIFICANT CHANGE UP (ref 1.7–9.3)
NEUTROPHILS # BLD AUTO: 6.53 K/UL — HIGH (ref 1.4–6.5)
NEUTROPHILS NFR BLD AUTO: 64.6 % — SIGNIFICANT CHANGE UP (ref 42.2–75.2)
NRBC # BLD: 0 /100 WBCS — SIGNIFICANT CHANGE UP (ref 0–0)
PLATELET # BLD AUTO: 231 K/UL — SIGNIFICANT CHANGE UP (ref 130–400)
POTASSIUM SERPL-MCNC: 4.7 MMOL/L — SIGNIFICANT CHANGE UP (ref 3.5–5)
POTASSIUM SERPL-SCNC: 4.7 MMOL/L — SIGNIFICANT CHANGE UP (ref 3.5–5)
PROT SERPL-MCNC: 7.1 G/DL — SIGNIFICANT CHANGE UP (ref 6–8)
RBC # BLD: 5.06 M/UL — SIGNIFICANT CHANGE UP (ref 4.7–6.1)
RBC # FLD: 14.9 % — HIGH (ref 11.5–14.5)
SODIUM SERPL-SCNC: 138 MMOL/L — SIGNIFICANT CHANGE UP (ref 135–146)
TROPONIN T SERPL-MCNC: <0.01 NG/ML — SIGNIFICANT CHANGE UP
WBC # BLD: 10.11 K/UL — SIGNIFICANT CHANGE UP (ref 4.8–10.8)
WBC # FLD AUTO: 10.11 K/UL — SIGNIFICANT CHANGE UP (ref 4.8–10.8)

## 2020-02-20 PROCEDURE — 99223 1ST HOSP IP/OBS HIGH 75: CPT | Mod: AI

## 2020-02-20 PROCEDURE — 71046 X-RAY EXAM CHEST 2 VIEWS: CPT | Mod: 26

## 2020-02-20 PROCEDURE — 93010 ELECTROCARDIOGRAM REPORT: CPT

## 2020-02-20 PROCEDURE — 99285 EMERGENCY DEPT VISIT HI MDM: CPT

## 2020-02-20 RX ORDER — OXYMETAZOLINE HYDROCHLORIDE 0.5 MG/ML
1 SPRAY NASAL ONCE
Refills: 0 | Status: COMPLETED | OUTPATIENT
Start: 2020-02-20 | End: 2020-02-20

## 2020-02-20 RX ORDER — CHLORHEXIDINE GLUCONATE 213 G/1000ML
1 SOLUTION TOPICAL
Refills: 0 | Status: DISCONTINUED | OUTPATIENT
Start: 2020-02-20 | End: 2020-02-27

## 2020-02-20 RX ORDER — HYDROMORPHONE HYDROCHLORIDE 2 MG/ML
4 INJECTION INTRAMUSCULAR; INTRAVENOUS; SUBCUTANEOUS ONCE
Refills: 0 | Status: DISCONTINUED | OUTPATIENT
Start: 2020-02-20 | End: 2020-02-20

## 2020-02-20 RX ORDER — ALBUTEROL 90 UG/1
2 AEROSOL, METERED ORAL EVERY 6 HOURS
Refills: 0 | Status: DISCONTINUED | OUTPATIENT
Start: 2020-02-20 | End: 2020-02-27

## 2020-02-20 RX ORDER — AZITHROMYCIN 500 MG/1
250 TABLET, FILM COATED ORAL DAILY
Refills: 0 | Status: DISCONTINUED | OUTPATIENT
Start: 2020-02-20 | End: 2020-02-21

## 2020-02-20 RX ORDER — OXYCODONE HYDROCHLORIDE 5 MG/1
30 TABLET ORAL EVERY 4 HOURS
Refills: 0 | Status: DISCONTINUED | OUTPATIENT
Start: 2020-02-20 | End: 2020-02-27

## 2020-02-20 RX ORDER — FAMOTIDINE 10 MG/ML
20 INJECTION INTRAVENOUS ONCE
Refills: 0 | Status: COMPLETED | OUTPATIENT
Start: 2020-02-20 | End: 2020-02-20

## 2020-02-20 RX ORDER — HYDROMORPHONE HYDROCHLORIDE 2 MG/ML
1 INJECTION INTRAMUSCULAR; INTRAVENOUS; SUBCUTANEOUS
Qty: 0 | Refills: 0 | DISCHARGE

## 2020-02-20 RX ORDER — METHADONE HYDROCHLORIDE 40 MG/1
10 TABLET ORAL EVERY 12 HOURS
Refills: 0 | Status: DISCONTINUED | OUTPATIENT
Start: 2020-02-20 | End: 2020-02-25

## 2020-02-20 RX ORDER — ENOXAPARIN SODIUM 100 MG/ML
40 INJECTION SUBCUTANEOUS AT BEDTIME
Refills: 0 | Status: DISCONTINUED | OUTPATIENT
Start: 2020-02-20 | End: 2020-02-27

## 2020-02-20 RX ORDER — METHADONE HYDROCHLORIDE 40 MG/1
1 TABLET ORAL
Qty: 0 | Refills: 0 | DISCHARGE

## 2020-02-20 RX ORDER — ASPIRIN/CALCIUM CARB/MAGNESIUM 324 MG
81 TABLET ORAL DAILY
Refills: 0 | Status: DISCONTINUED | OUTPATIENT
Start: 2020-02-20 | End: 2020-02-27

## 2020-02-20 RX ORDER — FENOFIBRATE,MICRONIZED 130 MG
145 CAPSULE ORAL AT BEDTIME
Refills: 0 | Status: DISCONTINUED | OUTPATIENT
Start: 2020-02-20 | End: 2020-02-27

## 2020-02-20 RX ORDER — GUAIFENESIN/DEXTROMETHORPHAN 600MG-30MG
5 TABLET, EXTENDED RELEASE 12 HR ORAL EVERY 6 HOURS
Refills: 0 | Status: DISCONTINUED | OUTPATIENT
Start: 2020-02-20 | End: 2020-02-27

## 2020-02-20 RX ORDER — SIMVASTATIN 20 MG/1
20 TABLET, FILM COATED ORAL AT BEDTIME
Refills: 0 | Status: DISCONTINUED | OUTPATIENT
Start: 2020-02-20 | End: 2020-02-27

## 2020-02-20 RX ORDER — LISINOPRIL 2.5 MG/1
5 TABLET ORAL DAILY
Refills: 0 | Status: DISCONTINUED | OUTPATIENT
Start: 2020-02-20 | End: 2020-02-27

## 2020-02-20 RX ORDER — FUROSEMIDE 40 MG
40 TABLET ORAL AT BEDTIME
Refills: 0 | Status: DISCONTINUED | OUTPATIENT
Start: 2020-02-20 | End: 2020-02-27

## 2020-02-20 RX ORDER — PANTOPRAZOLE SODIUM 20 MG/1
40 TABLET, DELAYED RELEASE ORAL
Refills: 0 | Status: DISCONTINUED | OUTPATIENT
Start: 2020-02-20 | End: 2020-02-27

## 2020-02-20 RX ORDER — IPRATROPIUM/ALBUTEROL SULFATE 18-103MCG
3 AEROSOL WITH ADAPTER (GRAM) INHALATION ONCE
Refills: 0 | Status: COMPLETED | OUTPATIENT
Start: 2020-02-20 | End: 2020-02-20

## 2020-02-20 RX ORDER — SENNA PLUS 8.6 MG/1
2 TABLET ORAL AT BEDTIME
Refills: 0 | Status: DISCONTINUED | OUTPATIENT
Start: 2020-02-20 | End: 2020-02-27

## 2020-02-20 RX ORDER — MORPHINE SULFATE 50 MG/1
100 CAPSULE, EXTENDED RELEASE ORAL
Refills: 0 | Status: COMPLETED | OUTPATIENT
Start: 2020-02-20 | End: 2020-02-27

## 2020-02-20 RX ORDER — TIOTROPIUM BROMIDE 18 UG/1
1 CAPSULE ORAL; RESPIRATORY (INHALATION) DAILY
Refills: 0 | Status: DISCONTINUED | OUTPATIENT
Start: 2020-02-20 | End: 2020-02-27

## 2020-02-20 RX ORDER — GUAIFENESIN/DEXTROMETHORPHAN 600MG-30MG
5 TABLET, EXTENDED RELEASE 12 HR ORAL EVERY 6 HOURS
Refills: 0 | Status: DISCONTINUED | OUTPATIENT
Start: 2020-02-20 | End: 2020-02-20

## 2020-02-20 RX ADMIN — Medication 110 MILLIGRAM(S): at 22:46

## 2020-02-20 RX ADMIN — Medication 3 MILLILITER(S): at 21:25

## 2020-02-20 RX ADMIN — FAMOTIDINE 20 MILLIGRAM(S): 10 INJECTION INTRAVENOUS at 18:45

## 2020-02-20 RX ADMIN — Medication 60 MILLIGRAM(S): at 18:04

## 2020-02-20 RX ADMIN — Medication 3 MILLILITER(S): at 18:04

## 2020-02-20 RX ADMIN — OXYMETAZOLINE HYDROCHLORIDE 1 SPRAY(S): 0.5 SPRAY NASAL at 23:08

## 2020-02-20 RX ADMIN — HYDROMORPHONE HYDROCHLORIDE 4 MILLIGRAM(S): 2 INJECTION INTRAMUSCULAR; INTRAVENOUS; SUBCUTANEOUS at 18:45

## 2020-02-20 RX ADMIN — HYDROMORPHONE HYDROCHLORIDE 4 MILLIGRAM(S): 2 INJECTION INTRAMUSCULAR; INTRAVENOUS; SUBCUTANEOUS at 22:18

## 2020-02-20 NOTE — ED PROVIDER NOTE - NS ED ROS FT
Constitutional: (+) subjective fever  Eyes/ENT: (-) blurry vision, (-) epistaxis  Cardiovascular: (+) chest pain, (-) syncope  Respiratory: (+) cough, (+) shortness of breath  Gastrointestinal: (-) vomiting, (-) diarrhea  Musculoskeletal: (-) neck pain, (-) back pain, (-) joint pain  Integumentary: (-) rash, (-) edema  Neurological: (-) headache, (-) altered mental status  Psychiatric: (-) hallucinations  Allergic/Immunologic: (-) pruritus

## 2020-02-20 NOTE — H&P ADULT - NSHPSOCIALHISTORY_GEN_ALL_CORE
The patient reports he has smoked 2-3 packs of cigarettes daily for 35 years; denies alcohol or illicit drug use including cocaine, marijuana, amphetamines; reports methadone which he is prescribed.

## 2020-02-20 NOTE — H&P ADULT - ATTENDING COMMENTS
55 YO M with a PMH of COPD, REBECCA (on BiPAP), chronic pain (on methadone), HLD, HTN, and GERD who presents with a c/o of SOB for the past x 1 week. Associated with productive cough (yellow) and increase in sputum/volume production. Denies any fevers/chills, CP, palpitations, N/V/D, ABD pain, or LE swelling. In the ED, given Duonebs, IV steroids, and antitussives. Physical exam shows pt in NAD. VSS, afebrile. A&Ox3. Non-focal neuro exam. Muscle strength/sensation intact. Scattered expiratory wheezes with rhonchi in the upper airways. RRR, no M/G/R. ABD is obese, soft and non-tender, normoactive BSs. LEs without swelling. No rashes. Labs and radiology as above. Dyspnea due to COPD Exacerbation due to viral URI. Send Flu/RSV and RVP. Duonebs PRN and standing. LABA/ICS inhaler. IV steroids and transition to PO. Singulair. Claritin. H2B. Anti-tussives PRN. Supplemental O2 PRN. FU official Chest X-Ray results. Chronic pain. Start pt on his home regimen after verification of doses. Tobacco abuse. Extensive counseling on benefits of quitting. Hx of REBECCA, HLD, HTN, and GERD. Restart home meds. GI and DVT PPX. Rest as per above note.

## 2020-02-20 NOTE — ED ADULT NURSE NOTE - NS ED NOTE ABUSE RESPONSE YN
reviewed with Dr Hardin   echo reviewed   start xarelto today dose given   prescription sent to pharmacy      sucralfate 1 Gram(s) Oral every 6 hours  pantoprazole    Tablet 40   propafenone  po   rivaroxaban 20      pt to follow up with Dr Hardin 2 weeks
s/p afib ablation 9/6 now in SR   post procedure instructions discussed with patient  c/w propafenone  Xarelto restarted as there is no pericardial effusion  c/w carafate and protonix x 1 month   mech soft diet x 1 month   patient is clear for discharge to follow up with [facility] Hardin in 2 weeks   discussed with Dr Hardin   [id number]
Yes

## 2020-02-20 NOTE — ED PROVIDER NOTE - PROGRESS NOTE DETAILS
continue with SOB/wheezing and rhonchi despite multiple nebs and steroid started at home. will admit for further management

## 2020-02-20 NOTE — H&P ADULT - NSHPLABSRESULTS_GEN_ALL_CORE
15.4   10.11 )-----------( 231      ( 20 Feb 2020 19:00 )             45.5     02-20    138  |  96<L>  |  8<L>  ----------------------------<  95  4.7   |  27  |  0.8    Ca    9.7      20 Feb 2020 19:00    TPro  7.1  /  Alb  4.3  /  TBili  0.4  /  DBili  x   /  AST  27  /  ALT  16  /  AlkPhos  124<H>  02-20    Troponin T, Serum (02.20.20 @ 19:00)    Troponin T, Serum: <0.01: Hemolyzed. Interpret with caution ng/mL    < from: 12 Lead ECG (02.20.20 @ 16:26) >      Ventricular Rate 93 BPM    Atrial Rate 93 BPM    P-R Interval 146 ms    QRS Duration 78 ms    Q-T Interval 354 ms    QTC Calculation(Bezet) 440 ms    P Axis 31 degrees    R Axis 66 degrees    T Axis 44 degrees    Diagnosis Line Normal sinus rhythm  Normal ECG    Confirmed by Kenan Aggarwal ([id number]) on 2/20/2020 6:23:38 PM

## 2020-02-20 NOTE — ED PROVIDER NOTE - PHYSICAL EXAMINATION
Vital Signs: I have reviewed the initial vital signs.  Constitutional: well-nourished, appears stated age, no acute distress  Cardiovascular: regular rate, regular rhythm, well-perfused extremities  Respiratory: unlabored respiratory effort, (+) BL rhonchi in all lung fields   Gastrointestinal: soft, non-tender abdomen, no pulsatile mass  Musculoskeletal: supple neck, mild lower extremity edema  Integumentary: warm, dry, no rash  Neurologic: awake, alert, cranial nerves II-XII grossly intact, extremities’ motor and sensory functions grossly intact  Psychiatric: appropriate mood, appropriate affect

## 2020-02-20 NOTE — ED PROVIDER NOTE - ATTENDING CONTRIBUTION TO CARE
54y M w/ hx of COPD, emphysema, hyperlipidemia, HTN, GERD here for eval for sob X 5 days. sx's associated with cough of white/yellow sputum. + subjective fevr,but no chest pain leg swelling. agree with above exam. in addition lung exam with wheezing and rhonchi. pt able to speak full sentences

## 2020-02-20 NOTE — ED PROVIDER NOTE - OBJECTIVE STATEMENT
The pt is a 54y M w/ hx of COPD, emphysema, hyperlipidemia, HTN, GERD presenting with worsening SOB x5 days. His usual regimen of medications has not helped. Pt also notes chest tightness, subjective fevers, and cough which is now becoming productive of white/yellow sputum. Denies headache, changes in vision, N/V, diarrhea/constipation, numbness/tingling in any extremities. Surgical hx includes BL knee replacements, appendectomy, cholecystectomy. No allergies. Current smoker.

## 2020-02-20 NOTE — ED ADULT NURSE NOTE - NSIMPLEMENTINTERV_GEN_ALL_ED
Implemented All Universal Safety Interventions:  Strong to call system. Call bell, personal items and telephone within reach. Instruct patient to call for assistance. Room bathroom lighting operational. Non-slip footwear when patient is off stretcher. Physically safe environment: no spills, clutter or unnecessary equipment. Stretcher in lowest position, wheels locked, appropriate side rails in place.

## 2020-02-20 NOTE — ED PROVIDER NOTE - CLINICAL SUMMARY MEDICAL DECISION MAKING FREE TEXT BOX
pt presented for worsening sob X 5 days. xray shows no acute infiltrate. pt treated for copd exacerbation. after nebs and steroids pt still wheezing. admitted for further eval and treatment

## 2020-02-20 NOTE — H&P ADULT - NSHPPHYSICALEXAM_GEN_ALL_CORE
Constitutional: AAOx3, obese body habitus, NAD  HEENT: atraumatic, normocephalic  Cardiovascular: NS1, S2, RRR, no murmurs, rubs, gallops  Pulmonary: b/l late expiratory wheeze w/ bilateral diffuse crackles; non-tachypneic/cyanotic; on room air  Gastrointestinal: soft, distended, nontender; + bowel sounds in all quadrants  Extremities: non-edematous, non-erythematous  Neurological: non-focal

## 2020-02-20 NOTE — ED ADULT NURSE NOTE - OBJECTIVE STATEMENT
pt presents with sob and chest pain x 5 days, hx of COPD no home o2, albuterol treatments at home with no improvement

## 2020-02-20 NOTE — H&P ADULT - ASSESSMENT
The patient is a 54 year-old male with a PMH of COPD, obstructive sleep apnea (on BiPAP), chronic knee, back pain (on methadone), HLD, HTN, GERD, b/l knee replacement, cholecystectomy and appendectomy presenting for shortness of breath, admitted for COPD exacerbation and chronic pain.      # Shortness of breath x 5 days w/ increased sputum production likely secondary to COPD exacerbation vs HFrEF exacerbation, less likely  CXR showed increased vascular congestion; pending official read; no leukocytosis; afebrile; trops neg x 1  O2 sat 96% on room air  C/w Spiriva, nebs q6h, PRN for sob/wheeze (takes Zopinex at home)  S/p prednisone 60 mg PO x 1 in ED; c/w prednisone 60 mg PO qD, taper as tolerated  Azithromycin 250 mg PO qD  Robitussin q6h, PRN  Vitals per routine  Admit to Medicine    # HTN  C/w Lasix 40 mg PO qHS, lisinopril 5 mg PO qD  Vitals per routine    # Chronic knee, back pain  C/w methadone 10 mg twice daily (patient reports he is prescribed this; does not see methadone clinic)  C/w morphine sulfate 100 mg PO q6h, oxycodone 30 mg TD  Senna qHS    # REBECCA on BiPAP  C/w BiPAP qHS  Vitals per routine    # HLD  C/w Zocor 20 mg PO qHS, fenofibrate    # GERD  Pantoprazole 40 mg PO qAM    # CHG  # DVT ppx- Lovenox 40 mg SQ qHS  # GI ppx- pantoprazole 40 mg PO qAM  # Diet- DASH  # Activity- ambulate as tolerated  # Dispo- acute, pending repeat echo, on steroid taper, +/- Pulm eval The patient is a 54 year-old male with a PMH of COPD, obstructive sleep apnea (on BiPAP), chronic knee, back pain (on methadone), HLD, HTN, GERD, b/l knee replacement, cholecystectomy and appendectomy presenting for shortness of breath, admitted for COPD exacerbation and chronic pain.      # Shortness of breath x 5 days w/ increased sputum production likely secondary to COPD exacerbation vs HFrEF exacerbation, less likely  CXR showed increased vascular congestion; pending official read; no leukocytosis; afebrile; trops neg x 1  O2 sat 96% on room air  C/w Spiriva, nebs q6h, PRN for sob/wheeze (takes Zopinex at home)  S/p prednisone 60 mg PO x 1 in ED; c/w prednisone 60 mg PO qD, taper as tolerated  AM CBC, BMP, Mg, sputum cx  Levaquin 750 mg PO once daily  Robitussin q6h, PRN  Vitals per routine  Admit to Medicine    # HTN  C/w Lasix 40 mg PO qHS, lisinopril 5 mg PO qD  Vitals per routine    # Chronic knee, back pain  C/w methadone 10 mg twice daily (patient reports he is prescribed this; does not see methadone clinic)  C/w morphine sulfate 100 mg PO q6h, oxycodone 30 mg TD  Dilaudid 4 mg PO TID  Senna qHS    # REBECCA on BiPAP  C/w BiPAP qHS  Vitals per routine    # HLD  C/w Zocor 20 mg PO qHS, fenofibrate    # GERD  Pantoprazole 40 mg PO qAM    # CHG  # DVT ppx- Lovenox 40 mg SQ qHS  # GI ppx- pantoprazole 40 mg PO qAM  # Diet- DASH  # Activity- ambulate as tolerated  # Dispo- acute, pending repeat echo, on steroid taper, +/- Pulm eval

## 2020-02-20 NOTE — H&P ADULT - HISTORY OF PRESENT ILLNESS
The patient is a 54 year-old male with a PMH of COPD, obstructive sleep apnea (on BiPAP), chronic knee, back pain (on methadone), HLD, HTN, GERD, b/l knee replacement, cholecystectomy and appendectomy presenting for shortness of breath. The patient reports that he has had shortness of breath worse compared to his baseline which started 5 days ago and gradually progressed since that time. He also endorses increased sputum production which began about a week ago, clear in color, and which progressed to a whitish/yellowish color over the subsequent days. The patient endorses subjective fever alternating with chills every ~1 hour; denies associated nausea, vomiting, diarrhea, burning on urination, headache. He reports chest tightness that is worse when coughing. In the ED a chest xray was done; pending read; trops neg x 1.    Vital Signs Last 24 Hrs  T(C): 36.9 (20 Feb 2020 16:21), Max: 36.9 (20 Feb 2020 16:21)  T(F): 98.4 (20 Feb 2020 16:21), Max: 98.4 (20 Feb 2020 16:21)  HR: 84 (20 Feb 2020 23:16) (84 - 91)  BP: 138/86 (20 Feb 2020 23:16) (138/86 - 149/83)  BP(mean): --  RR: 18 (20 Feb 2020 23:16) (17 - 18)  SpO2: 96% (20 Feb 2020 23:16) (96% - 97%)

## 2020-02-21 LAB
ANION GAP SERPL CALC-SCNC: 12 MMOL/L — SIGNIFICANT CHANGE UP (ref 7–14)
BASE EXCESS BLDA CALC-SCNC: 5.3 MMOL/L — HIGH (ref -2–2)
BASOPHILS # BLD AUTO: 0.03 K/UL — SIGNIFICANT CHANGE UP (ref 0–0.2)
BASOPHILS NFR BLD AUTO: 0.3 % — SIGNIFICANT CHANGE UP (ref 0–1)
BUN SERPL-MCNC: 14 MG/DL — SIGNIFICANT CHANGE UP (ref 10–20)
CALCIUM SERPL-MCNC: 9.3 MG/DL — SIGNIFICANT CHANGE UP (ref 8.5–10.1)
CHLORIDE SERPL-SCNC: 97 MMOL/L — LOW (ref 98–110)
CO2 SERPL-SCNC: 26 MMOL/L — SIGNIFICANT CHANGE UP (ref 17–32)
CREAT SERPL-MCNC: 0.8 MG/DL — SIGNIFICANT CHANGE UP (ref 0.7–1.5)
EOSINOPHIL # BLD AUTO: 0.02 K/UL — SIGNIFICANT CHANGE UP (ref 0–0.7)
EOSINOPHIL NFR BLD AUTO: 0.2 % — SIGNIFICANT CHANGE UP (ref 0–8)
GAS PNL BLDA: SIGNIFICANT CHANGE UP
GLUCOSE SERPL-MCNC: 133 MG/DL — HIGH (ref 70–99)
HCO3 BLDA-SCNC: 30 MMOL/L — HIGH (ref 23–27)
HCT VFR BLD CALC: 42.4 % — SIGNIFICANT CHANGE UP (ref 42–52)
HGB BLD-MCNC: 14.3 G/DL — SIGNIFICANT CHANGE UP (ref 14–18)
HOROWITZ INDEX BLDA+IHG-RTO: 21 — SIGNIFICANT CHANGE UP
IMM GRANULOCYTES NFR BLD AUTO: 0.5 % — HIGH (ref 0.1–0.3)
LYMPHOCYTES # BLD AUTO: 1.33 K/UL — SIGNIFICANT CHANGE UP (ref 1.2–3.4)
LYMPHOCYTES # BLD AUTO: 15 % — LOW (ref 20.5–51.1)
MAGNESIUM SERPL-MCNC: 2 MG/DL — SIGNIFICANT CHANGE UP (ref 1.8–2.4)
MCHC RBC-ENTMCNC: 29.6 PG — SIGNIFICANT CHANGE UP (ref 27–31)
MCHC RBC-ENTMCNC: 33.7 G/DL — SIGNIFICANT CHANGE UP (ref 32–37)
MCV RBC AUTO: 87.8 FL — SIGNIFICANT CHANGE UP (ref 80–94)
MONOCYTES # BLD AUTO: 0.52 K/UL — SIGNIFICANT CHANGE UP (ref 0.1–0.6)
MONOCYTES NFR BLD AUTO: 5.9 % — SIGNIFICANT CHANGE UP (ref 1.7–9.3)
NEUTROPHILS # BLD AUTO: 6.9 K/UL — HIGH (ref 1.4–6.5)
NEUTROPHILS NFR BLD AUTO: 78.1 % — HIGH (ref 42.2–75.2)
NRBC # BLD: 0 /100 WBCS — SIGNIFICANT CHANGE UP (ref 0–0)
PCO2 BLDA: 44 MMHG — HIGH (ref 38–42)
PH BLDA: 7.44 — HIGH (ref 7.38–7.42)
PLATELET # BLD AUTO: 241 K/UL — SIGNIFICANT CHANGE UP (ref 130–400)
PO2 BLDA: 80 MMHG — SIGNIFICANT CHANGE UP (ref 78–95)
POTASSIUM SERPL-MCNC: 4.7 MMOL/L — SIGNIFICANT CHANGE UP (ref 3.5–5)
POTASSIUM SERPL-SCNC: 4.7 MMOL/L — SIGNIFICANT CHANGE UP (ref 3.5–5)
RBC # BLD: 4.83 M/UL — SIGNIFICANT CHANGE UP (ref 4.7–6.1)
RBC # FLD: 14.7 % — HIGH (ref 11.5–14.5)
SAO2 % BLDA: 97 % — SIGNIFICANT CHANGE UP (ref 94–98)
SODIUM SERPL-SCNC: 135 MMOL/L — SIGNIFICANT CHANGE UP (ref 135–146)
WBC # BLD: 8.84 K/UL — SIGNIFICANT CHANGE UP (ref 4.8–10.8)
WBC # FLD AUTO: 8.84 K/UL — SIGNIFICANT CHANGE UP (ref 4.8–10.8)

## 2020-02-21 PROCEDURE — 93306 TTE W/DOPPLER COMPLETE: CPT | Mod: 26

## 2020-02-21 PROCEDURE — 99233 SBSQ HOSP IP/OBS HIGH 50: CPT

## 2020-02-21 RX ORDER — IPRATROPIUM/ALBUTEROL SULFATE 18-103MCG
3 AEROSOL WITH ADAPTER (GRAM) INHALATION EVERY 6 HOURS
Refills: 0 | Status: DISCONTINUED | OUTPATIENT
Start: 2020-02-21 | End: 2020-02-27

## 2020-02-21 RX ORDER — ZOLPIDEM TARTRATE 10 MG/1
5 TABLET ORAL AT BEDTIME
Refills: 0 | Status: DISCONTINUED | OUTPATIENT
Start: 2020-02-21 | End: 2020-02-27

## 2020-02-21 RX ORDER — HYDROMORPHONE HYDROCHLORIDE 2 MG/ML
4 INJECTION INTRAMUSCULAR; INTRAVENOUS; SUBCUTANEOUS EVERY 8 HOURS
Refills: 0 | Status: DISCONTINUED | OUTPATIENT
Start: 2020-02-21 | End: 2020-02-27

## 2020-02-21 RX ADMIN — ZOLPIDEM TARTRATE 5 MILLIGRAM(S): 10 TABLET ORAL at 21:56

## 2020-02-21 RX ADMIN — OXYCODONE HYDROCHLORIDE 30 MILLIGRAM(S): 5 TABLET ORAL at 18:14

## 2020-02-21 RX ADMIN — SENNA PLUS 2 TABLET(S): 8.6 TABLET ORAL at 21:49

## 2020-02-21 RX ADMIN — Medication 145 MILLIGRAM(S): at 21:49

## 2020-02-21 RX ADMIN — OXYCODONE HYDROCHLORIDE 30 MILLIGRAM(S): 5 TABLET ORAL at 22:06

## 2020-02-21 RX ADMIN — Medication 81 MILLIGRAM(S): at 11:28

## 2020-02-21 RX ADMIN — Medication 3 MILLILITER(S): at 08:30

## 2020-02-21 RX ADMIN — MORPHINE SULFATE 100 MILLIGRAM(S): 50 CAPSULE, EXTENDED RELEASE ORAL at 11:27

## 2020-02-21 RX ADMIN — PANTOPRAZOLE SODIUM 40 MILLIGRAM(S): 20 TABLET, DELAYED RELEASE ORAL at 07:04

## 2020-02-21 RX ADMIN — HYDROMORPHONE HYDROCHLORIDE 4 MILLIGRAM(S): 2 INJECTION INTRAMUSCULAR; INTRAVENOUS; SUBCUTANEOUS at 21:50

## 2020-02-21 RX ADMIN — MORPHINE SULFATE 100 MILLIGRAM(S): 50 CAPSULE, EXTENDED RELEASE ORAL at 17:59

## 2020-02-21 RX ADMIN — HYDROMORPHONE HYDROCHLORIDE 4 MILLIGRAM(S): 2 INJECTION INTRAMUSCULAR; INTRAVENOUS; SUBCUTANEOUS at 22:21

## 2020-02-21 RX ADMIN — MORPHINE SULFATE 100 MILLIGRAM(S): 50 CAPSULE, EXTENDED RELEASE ORAL at 01:45

## 2020-02-21 RX ADMIN — Medication 60 MILLIGRAM(S): at 11:28

## 2020-02-21 RX ADMIN — TIOTROPIUM BROMIDE 1 CAPSULE(S): 18 CAPSULE ORAL; RESPIRATORY (INHALATION) at 08:30

## 2020-02-21 RX ADMIN — OXYCODONE HYDROCHLORIDE 30 MILLIGRAM(S): 5 TABLET ORAL at 22:21

## 2020-02-21 RX ADMIN — OXYCODONE HYDROCHLORIDE 30 MILLIGRAM(S): 5 TABLET ORAL at 01:47

## 2020-02-21 RX ADMIN — HYDROMORPHONE HYDROCHLORIDE 4 MILLIGRAM(S): 2 INJECTION INTRAMUSCULAR; INTRAVENOUS; SUBCUTANEOUS at 14:19

## 2020-02-21 RX ADMIN — ZOLPIDEM TARTRATE 5 MILLIGRAM(S): 10 TABLET ORAL at 03:43

## 2020-02-21 RX ADMIN — MORPHINE SULFATE 100 MILLIGRAM(S): 50 CAPSULE, EXTENDED RELEASE ORAL at 05:49

## 2020-02-21 RX ADMIN — HYDROMORPHONE HYDROCHLORIDE 4 MILLIGRAM(S): 2 INJECTION INTRAMUSCULAR; INTRAVENOUS; SUBCUTANEOUS at 05:49

## 2020-02-21 RX ADMIN — OXYCODONE HYDROCHLORIDE 30 MILLIGRAM(S): 5 TABLET ORAL at 05:51

## 2020-02-21 RX ADMIN — OXYCODONE HYDROCHLORIDE 30 MILLIGRAM(S): 5 TABLET ORAL at 11:29

## 2020-02-21 RX ADMIN — SIMVASTATIN 20 MILLIGRAM(S): 20 TABLET, FILM COATED ORAL at 21:49

## 2020-02-21 RX ADMIN — Medication 40 MILLIGRAM(S): at 21:50

## 2020-02-21 RX ADMIN — LISINOPRIL 5 MILLIGRAM(S): 2.5 TABLET ORAL at 05:48

## 2020-02-21 RX ADMIN — METHADONE HYDROCHLORIDE 10 MILLIGRAM(S): 40 TABLET ORAL at 18:01

## 2020-02-21 RX ADMIN — ALBUTEROL 2 PUFF(S): 90 AEROSOL, METERED ORAL at 01:48

## 2020-02-21 RX ADMIN — METHADONE HYDROCHLORIDE 10 MILLIGRAM(S): 40 TABLET ORAL at 05:48

## 2020-02-21 RX ADMIN — Medication 3 MILLILITER(S): at 19:05

## 2020-02-21 RX ADMIN — Medication 3 MILLILITER(S): at 13:32

## 2020-02-21 NOTE — PROGRESS NOTE ADULT - SUBJECTIVE AND OBJECTIVE BOX
KAYLEIGH TOMAS 54y Male  MRN#: [id number]     SUBJECTIVE  Patient is a 54y old Male who presents with a chief complaint of COPD exacerbation (20 Feb 2020 23:22)  Currently admitted to medicine with the primary diagnosis of COPD exacerbation    OBJECTIVE  PAST MEDICAL & SURGICAL HISTORY  REBECCA treated with BiPAP  COPD (chronic obstructive pulmonary disease)  Colitis: LARGE INTESTINE   NO RECENT FLARES  Hiatal hernia: GERD  SOB (shortness of breath)  Osteoarthritis  High blood cholesterol  Morbid obesity  Obstructive sleep apnea  GERD (gastroesophageal reflux disease)  High cholesterol  HTN (hypertension)  H/O knee surgery: arthoscopic x4  Hernia, inguinal, bilateral: 3 months old  History of knee replacement procedure of right knee: BILATERAL  History of appendectomy  History of cholecystectomy    ALLERGIES:  No Known Allergies    MEDICATIONS:  STANDING MEDICATIONS  albuterol/ipratropium for Nebulization 3 milliLiter(s) Nebulizer every 6 hours  aspirin enteric coated 81 milliGRAM(s) Oral daily  chlorhexidine 4% Liquid 1 Application(s) Topical <User Schedule>  enoxaparin Injectable 40 milliGRAM(s) SubCutaneous at bedtime  fenofibrate Tablet 145 milliGRAM(s) Oral at bedtime  furosemide    Tablet 40 milliGRAM(s) Oral at bedtime  HYDROmorphone   Tablet 4 milliGRAM(s) Oral every 8 hours  levoFLOXacin  Tablet 750 milliGRAM(s) Oral every 24 hours  lisinopril 5 milliGRAM(s) Oral daily  methadone    Tablet 10 milliGRAM(s) Oral every 12 hours  methylPREDNISolone sodium succinate Injectable 60 milliGRAM(s) IV Push daily  morphine ER Tablet 100 milliGRAM(s) Oral four times a day  pantoprazole    Tablet 40 milliGRAM(s) Oral before breakfast  senna 2 Tablet(s) Oral at bedtime  simvastatin 20 milliGRAM(s) Oral at bedtime  tiotropium 18 MICROgram(s) Capsule 1 Capsule(s) Inhalation daily    PRN MEDICATIONS  ALBUTerol    90 MICROgram(s) HFA Inhaler 2 Puff(s) Inhalation every 6 hours PRN  guaifenesin/dextromethorphan  Syrup 5 milliLiter(s) Oral every 6 hours PRN  oxyCODONE   IR Oral Tab/Cap - Peds 30 milliGRAM(s) Oral every 4 hours PRN  zolpidem 5 milliGRAM(s) Oral at bedtime PRN      VITAL SIGNS: Last 24 Hours  T(C): 35.8 (21 Feb 2020 10:45), Max: 36.9 (20 Feb 2020 16:21)  T(F): 96.5 (21 Feb 2020 10:45), Max: 98.4 (20 Feb 2020 16:21)  HR: 72 (21 Feb 2020 10:45) (72 - 91)  BP: 132/80 (21 Feb 2020 10:45) (131/76 - 149/83)  BP(mean): --  RR: 18 (21 Feb 2020 10:45) (17 - 18)  SpO2: 94% (21 Feb 2020 10:45) (94% - 100%)    LABS:                        14.3   8.84  )-----------( 241      ( 21 Feb 2020 07:05 )             42.4     02-21    135  |  97<L>  |  14  ----------------------------<  133<H>  4.7   |  26  |  0.8    Ca    9.3      21 Feb 2020 07:05  Mg     2.0     02-21    TPro  7.1  /  Alb  4.3  /  TBili  0.4  /  DBili  x   /  AST  27  /  ALT  16  /  AlkPhos  124<H>  02-20  Troponin T, Serum: <0.01 ng/mL (02-20-20 @ 19:00)      CARDIAC MARKERS ( 20 Feb 2020 19:00 )  x     / <0.01 ng/mL / x     / x     / x        PHYSICAL EXAM:  Constitutional: AAOx3, obese body habitus, NAD  	HEENT: atraumatic, normocephalic  	Cardiovascular: NS1, S2, RRR, no murmurs, rubs, gallops  	Pulmonary: b/l late expiratory wheeze w/ bilateral diffuse crackles; non-tachypneic/cyanotic; on room air  	Gastrointestinal: soft, distended, nontender; + bowel sounds in all quadrants  	Extremities: non-edematous, non-erythematous  Neurological: non-focal

## 2020-02-21 NOTE — PROGRESS NOTE ADULT - ATTENDING COMMENTS
54 year-old male with a PMH of COPD, obstructive sleep apnea (on BiPAP), chronic knee, back pain (on methadone), HLD, HTN, GERD, b/l knee replacement, cholecystectomy and appendectomy presenting for shortness of breath, admitted for COPD exacerbation and chronic pain.    pt seen and examined at bedside. states that he has not used any of his inhalers prior to about 2-3wks ago despite having had them for "years" pt also endorses being an active smoker but states this has "scared" him and he is planning to quit smoking.     #acute on chronic exacerbation of COPD 2/2 medication non compliance vs. PNA  clinically unchanged from yesterday   -cont with spiriva, nebs q6h standing  -change PO steroids to intravenous   -O2 NC to maintain sats >90%  -empirically treat with levofloxacin   -if no symptomatic relief in 24hrs will consider inpt pulm however, pt will need to establish care with pulmonary upon discharge     #HTN  stable  -cont with present home medications  #chronic pain  -verify and resume home pain medications  #REBECCA  -bipap at night  #HLD  -cont with home medications  #GERD  -cont ppit    remainder to plan as noted above    Progress Note Handoff  Pending:  transition from iv to po steroids, symptomatic improvement   Patient/Family discussion: spoke with pt at length regarding smoking cessation and medication compliance he states he understands and is eager to improve his current medical state. no questions from pt at this time  Disposition: from home likely home in 48hrs

## 2020-02-21 NOTE — PROGRESS NOTE ADULT - ASSESSMENT
The patient is a 54 year-old male with a PMH of COPD, obstructive sleep apnea (on BiPAP), chronic knee, back pain (on methadone), HLD, HTN, GERD, b/l knee replacement, cholecystectomy and appendectomy presenting for shortness of breath, admitted for COPD exacerbation and chronic pain.      # Shortness of breath x 5 days w/ increased sputum production likely secondary to COPD exacerbation vs HFrEF exacerbation, less likely  CXR showed increased vascular congestion; pending official read; no leukocytosis; afebrile; trops neg x 1  O2 sat 96% on room air  C/w Spiriva, nebs q6h, PRN for sob/wheeze (takes Zopinex at home)  S/p prednisone 60 mg PO x 1 in ED; c/w prednisone 60 mg PO qD, taper as tolerated  AM CBC, BMP, Mg, sputum cx  Levaquin 750 mg PO once daily  Robitussin q6h, PRN  Vitals per routine  Admit to Medicine    # HTN  C/w Lasix 40 mg PO qHS, lisinopril 5 mg PO qD  Vitals per routine    # Chronic knee, back pain  C/w methadone 10 mg twice daily (patient reports he is prescribed this; does not see methadone clinic)  C/w morphine sulfate 100 mg PO q6h, oxycodone 30 mg TD  Dilaudid 4 mg PO TID  Senna qHS    # REBECCA on BiPAP  C/w BiPAP qHS  Vitals per routine    # HLD  C/w Zocor 20 mg PO qHS, fenofibrate    # GERD  Pantoprazole 40 mg PO qAM    # CHG  # DVT ppx- Lovenox 40 mg SQ qHS  # GI ppx- pantoprazole 40 mg PO qAM  # Diet- DASH  # Activity- ambulate as tolerated  # Dispo- acute, pending repeat echo, on steroid taper, +/- Pulm eval The patient is a 54 year-old male with a PMH of COPD, obstructive sleep apnea (on BiPAP), chronic knee, back pain (on methadone), HLD, HTN, GERD, b/l knee replacement, cholecystectomy and appendectomy presenting for shortness of breath, admitted for COPD exacerbation and chronic pain.    COPD exacerbation  -Shortness of breath x 5 days w/ increased sputum production   -Will get ABG STAT - follow up  - no leukocytosis; afebrile; trops neg x 1, no chest pain  -O2 sat 96% on room air  -C/w Spiriva, nebs q6h, PRN for sob/wheeze (takes Zopinex at home)  -Will give IV steroids as patient has significant B/L wheeze and is uncomfortable with   breathing  -Levaquin 750 mg PO once daily  -Robitussin q6h, PRN  -Consider Pulm if does not improve by tomorrow    # HTN  C/w Lasix 40 mg PO qHS, lisinopril 5 mg PO qD    # Chronic knee, back pain H/O B/L Knee replacement and chronic Shoulder Pain  C/w methadone 10 mg twice daily (patient reports he is prescribed this; does not see methadone clinic)  C/w morphine sulfate 100 mg PO q6h, oxycodone 30 mg TD  Dilaudid 4 mg PO TID  Senna qHS    # REBECCA on BiPAP  C/w BiPAP qHS    # HLD  C/w Zocor 20 mg PO qHS, fenofibrate    # GERD  Pantoprazole 40 mg PO qAM    # DVT ppx- Lovenox 40 mg SQ  # GI ppx- pantoprazole 40 mg PO qAM  # Diet- DASH  # Activity- ambulate as tolerated

## 2020-02-22 LAB
ALBUMIN SERPL ELPH-MCNC: 4.4 G/DL — SIGNIFICANT CHANGE UP (ref 3.5–5.2)
ALP SERPL-CCNC: 135 U/L — HIGH (ref 30–115)
ALT FLD-CCNC: 15 U/L — SIGNIFICANT CHANGE UP (ref 0–41)
ANION GAP SERPL CALC-SCNC: 15 MMOL/L — HIGH (ref 7–14)
AST SERPL-CCNC: 15 U/L — SIGNIFICANT CHANGE UP (ref 0–41)
BILIRUB SERPL-MCNC: 0.4 MG/DL — SIGNIFICANT CHANGE UP (ref 0.2–1.2)
BUN SERPL-MCNC: 21 MG/DL — HIGH (ref 10–20)
CALCIUM SERPL-MCNC: 9.8 MG/DL — SIGNIFICANT CHANGE UP (ref 8.5–10.1)
CHLORIDE SERPL-SCNC: 95 MMOL/L — LOW (ref 98–110)
CO2 SERPL-SCNC: 26 MMOL/L — SIGNIFICANT CHANGE UP (ref 17–32)
CREAT SERPL-MCNC: 1 MG/DL — SIGNIFICANT CHANGE UP (ref 0.7–1.5)
GLUCOSE SERPL-MCNC: 115 MG/DL — HIGH (ref 70–99)
HCT VFR BLD CALC: 46.2 % — SIGNIFICANT CHANGE UP (ref 42–52)
HGB BLD-MCNC: 15.6 G/DL — SIGNIFICANT CHANGE UP (ref 14–18)
MCHC RBC-ENTMCNC: 30.1 PG — SIGNIFICANT CHANGE UP (ref 27–31)
MCHC RBC-ENTMCNC: 33.8 G/DL — SIGNIFICANT CHANGE UP (ref 32–37)
MCV RBC AUTO: 89 FL — SIGNIFICANT CHANGE UP (ref 80–94)
NRBC # BLD: 0 /100 WBCS — SIGNIFICANT CHANGE UP (ref 0–0)
PLATELET # BLD AUTO: 260 K/UL — SIGNIFICANT CHANGE UP (ref 130–400)
POTASSIUM SERPL-MCNC: 4.6 MMOL/L — SIGNIFICANT CHANGE UP (ref 3.5–5)
POTASSIUM SERPL-SCNC: 4.6 MMOL/L — SIGNIFICANT CHANGE UP (ref 3.5–5)
PROCALCITONIN SERPL-MCNC: 0.03 NG/ML — SIGNIFICANT CHANGE UP (ref 0.02–0.1)
PROT SERPL-MCNC: 7.5 G/DL — SIGNIFICANT CHANGE UP (ref 6–8)
RBC # BLD: 5.19 M/UL — SIGNIFICANT CHANGE UP (ref 4.7–6.1)
RBC # FLD: 14.8 % — HIGH (ref 11.5–14.5)
SODIUM SERPL-SCNC: 136 MMOL/L — SIGNIFICANT CHANGE UP (ref 135–146)
WBC # BLD: 10.88 K/UL — HIGH (ref 4.8–10.8)
WBC # FLD AUTO: 10.88 K/UL — HIGH (ref 4.8–10.8)

## 2020-02-22 PROCEDURE — 99233 SBSQ HOSP IP/OBS HIGH 50: CPT

## 2020-02-22 RX ORDER — BUDESONIDE AND FORMOTEROL FUMARATE DIHYDRATE 160; 4.5 UG/1; UG/1
2 AEROSOL RESPIRATORY (INHALATION)
Refills: 0 | Status: DISCONTINUED | OUTPATIENT
Start: 2020-02-22 | End: 2020-02-27

## 2020-02-22 RX ADMIN — ZOLPIDEM TARTRATE 5 MILLIGRAM(S): 10 TABLET ORAL at 21:00

## 2020-02-22 RX ADMIN — HYDROMORPHONE HYDROCHLORIDE 4 MILLIGRAM(S): 2 INJECTION INTRAMUSCULAR; INTRAVENOUS; SUBCUTANEOUS at 05:50

## 2020-02-22 RX ADMIN — OXYCODONE HYDROCHLORIDE 30 MILLIGRAM(S): 5 TABLET ORAL at 18:40

## 2020-02-22 RX ADMIN — HYDROMORPHONE HYDROCHLORIDE 4 MILLIGRAM(S): 2 INJECTION INTRAMUSCULAR; INTRAVENOUS; SUBCUTANEOUS at 21:00

## 2020-02-22 RX ADMIN — OXYCODONE HYDROCHLORIDE 30 MILLIGRAM(S): 5 TABLET ORAL at 23:32

## 2020-02-22 RX ADMIN — MORPHINE SULFATE 100 MILLIGRAM(S): 50 CAPSULE, EXTENDED RELEASE ORAL at 23:32

## 2020-02-22 RX ADMIN — TIOTROPIUM BROMIDE 1 CAPSULE(S): 18 CAPSULE ORAL; RESPIRATORY (INHALATION) at 08:14

## 2020-02-22 RX ADMIN — Medication 60 MILLIGRAM(S): at 05:16

## 2020-02-22 RX ADMIN — MORPHINE SULFATE 100 MILLIGRAM(S): 50 CAPSULE, EXTENDED RELEASE ORAL at 13:18

## 2020-02-22 RX ADMIN — METHADONE HYDROCHLORIDE 10 MILLIGRAM(S): 40 TABLET ORAL at 18:40

## 2020-02-22 RX ADMIN — MORPHINE SULFATE 100 MILLIGRAM(S): 50 CAPSULE, EXTENDED RELEASE ORAL at 05:21

## 2020-02-22 RX ADMIN — Medication 40 MILLIGRAM(S): at 21:00

## 2020-02-22 RX ADMIN — Medication 145 MILLIGRAM(S): at 21:00

## 2020-02-22 RX ADMIN — BUDESONIDE AND FORMOTEROL FUMARATE DIHYDRATE 2 PUFF(S): 160; 4.5 AEROSOL RESPIRATORY (INHALATION) at 20:51

## 2020-02-22 RX ADMIN — METHADONE HYDROCHLORIDE 10 MILLIGRAM(S): 40 TABLET ORAL at 05:21

## 2020-02-22 RX ADMIN — MORPHINE SULFATE 100 MILLIGRAM(S): 50 CAPSULE, EXTENDED RELEASE ORAL at 00:39

## 2020-02-22 RX ADMIN — HYDROMORPHONE HYDROCHLORIDE 4 MILLIGRAM(S): 2 INJECTION INTRAMUSCULAR; INTRAVENOUS; SUBCUTANEOUS at 05:20

## 2020-02-22 RX ADMIN — PANTOPRAZOLE SODIUM 40 MILLIGRAM(S): 20 TABLET, DELAYED RELEASE ORAL at 08:22

## 2020-02-22 RX ADMIN — Medication 3 MILLILITER(S): at 08:14

## 2020-02-22 RX ADMIN — MORPHINE SULFATE 100 MILLIGRAM(S): 50 CAPSULE, EXTENDED RELEASE ORAL at 00:09

## 2020-02-22 RX ADMIN — HYDROMORPHONE HYDROCHLORIDE 4 MILLIGRAM(S): 2 INJECTION INTRAMUSCULAR; INTRAVENOUS; SUBCUTANEOUS at 13:38

## 2020-02-22 RX ADMIN — MORPHINE SULFATE 100 MILLIGRAM(S): 50 CAPSULE, EXTENDED RELEASE ORAL at 11:22

## 2020-02-22 RX ADMIN — SIMVASTATIN 20 MILLIGRAM(S): 20 TABLET, FILM COATED ORAL at 21:00

## 2020-02-22 RX ADMIN — MORPHINE SULFATE 100 MILLIGRAM(S): 50 CAPSULE, EXTENDED RELEASE ORAL at 18:40

## 2020-02-22 RX ADMIN — CHLORHEXIDINE GLUCONATE 1 APPLICATION(S): 213 SOLUTION TOPICAL at 05:22

## 2020-02-22 RX ADMIN — MORPHINE SULFATE 100 MILLIGRAM(S): 50 CAPSULE, EXTENDED RELEASE ORAL at 05:50

## 2020-02-22 RX ADMIN — LISINOPRIL 5 MILLIGRAM(S): 2.5 TABLET ORAL at 05:16

## 2020-02-22 RX ADMIN — OXYCODONE HYDROCHLORIDE 30 MILLIGRAM(S): 5 TABLET ORAL at 10:44

## 2020-02-22 RX ADMIN — Medication 81 MILLIGRAM(S): at 11:23

## 2020-02-22 RX ADMIN — SENNA PLUS 2 TABLET(S): 8.6 TABLET ORAL at 21:00

## 2020-02-22 RX ADMIN — OXYCODONE HYDROCHLORIDE 30 MILLIGRAM(S): 5 TABLET ORAL at 08:24

## 2020-02-22 NOTE — PROGRESS NOTE ADULT - ASSESSMENT
54 year-old male with a PMH of COPD, obstructive sleep apnea (on BiPAP), chronic knee, back pain (on methadone), HLD, HTN, GERD, b/l knee replacement, cholecystectomy and appendectomy presenting for shortness of breath, admitted for COPD exacerbation and chronic pain.    pt seen and examined at bedside. states that he has not used any of his inhalers prior to about 2-3wks ago despite having had them for "years" pt also endorses being an active smoker but states this has "scared" him and he is planning to quit smoking.     #acute on chronic exacerbation of COPD 2/2 medication non compliance vs. PNA  clinically unchanged from yesterday   -cont with spiriva, nebs q6h standing  -prednisone PO with plan to taper on discharge  -start symbicort   -O2 NC to maintain sats >90%  -empirically treat with levofloxacin   - pt will need to establish care with pulmonary upon discharge     #HTN  stable  -cont with present home medications  #chronic pain  -verify and resume home pain medications  #REBECCA  -bipap at night  #HLD  -cont with home medications  #GERD  -cont ppi    Progress Note Handoff  Pending:   po steroids, symptomatic improvement   Patient/Family discussion: spoke with pt at length regarding smoking cessation and medication compliance he states he understands and is eager to improve his current medical state. no questions from pt at this time  Disposition: from home likely home in 24-48hrs 54 year-old male with a PMH of COPD, obstructive sleep apnea (on BiPAP), chronic knee, back pain (on methadone), HLD, HTN, GERD, b/l knee replacement, cholecystectomy and appendectomy presenting for shortness of breath, admitted for COPD exacerbation and chronic pain.    pt seen and examined at bedside. states that he has not used any of his inhalers prior to about 2-3wks ago despite having had them for "years" pt also endorses being an active smoker but states this has "scared" him and he is planning to quit smoking.     #acute on chronic exacerbation of COPD 2/2 medication non compliance vs. PNA  clinically unchanged from yesterday   -cont with spiriva, nebs q6h standing  -cont solumedrol 60 daily   -start symbicort   -repeat CXR with AP/lateral in AM  -O2 NC to maintain sats >90%  -empirically treat with levofloxacin   - pt will need to establish care with pulmonary upon discharge     #HTN  stable  -cont with present home medications  #chronic pain  -verify and resume home pain medications  #REBECCA  -bipap at night  #HLD  -cont with home medications  #GERD  -cont ppi    Progress Note Handoff  Pending:   transition to po steroids, symptomatic improvement   Patient/Family discussion: spoke with pt at length regarding smoking cessation and medication compliance he states he understands and is eager to improve his current medical state. no questions from pt at this time  Disposition: from home likely home in 48hrs

## 2020-02-22 NOTE — PROGRESS NOTE ADULT - SUBJECTIVE AND OBJECTIVE BOX
KAYLEIGH TOMAS  54y, Male  Allergy: No Known Allergies  Hospital Day: 2d      Patient was seen and examined at bedside. no new complaints       VITALS:  T(F): 98.2 (02-22-20 @ 14:37), Max: 98.3 (02-21-20 @ 17:00)  HR: 83 (02-22-20 @ 14:37)  BP: 123/65 (02-22-20 @ 14:37) (123/65 - 165/88)  RR: 18 (02-22-20 @ 14:37)  SpO2: --    PHYSICAL EXAM:  GENERAL: well developed well nourished in no acute distress  NECK: No evidence of swelling no palpable lymphadenopathy  CHEST/LUNG: diffuse wheezing throughout    HEART/VASC: RRR, No murmurs, rubs, or gallops appreciated, 2+ equal bilateral radial pulse  ABDOMEN: Soft, non tender non distended +bowel sounds in all quadrants, no palpable organomegaly   EXTREMITIES:  No clubbing and range of motion intact     TESTS & MEASUREMENTS:  Weight (Kg): 121 (02-21-20 @ 17:00)  BMI (kg/m2): 31.6 (02-21)    02-21-20 @ 07:01  -  02-22-20 @ 07:00  --------------------------------------------------------  IN: 0 mL / OUT: 400 mL / NET: -400 mL                            15.6   10.88 )-----------( 260      ( 22 Feb 2020 07:03 )             46.2       02-22    136  |  95<L>  |  21<H>  ----------------------------<  115<H>  4.6   |  26  |  1.0    Ca    9.8      22 Feb 2020 07:03  Mg     2.0     02-21    TPro  7.5  /  Alb  4.4  /  TBili  0.4  /  DBili  x   /  AST  15  /  ALT  15  /  AlkPhos  135<H>  02-22    LIVER FUNCTIONS - ( 22 Feb 2020 07:03 )  Alb: 4.4 g/dL / Pro: 7.5 g/dL / ALK PHOS: 135 U/L / ALT: 15 U/L / AST: 15 U/L / GGT: x           CARDIAC MARKERS ( 20 Feb 2020 19:00 )  x     / <0.01 ng/mL / x     / x     / x            MEDICATIONS:  MEDICATIONS  (STANDING):  albuterol/ipratropium for Nebulization 3 milliLiter(s) Nebulizer every 6 hours  aspirin enteric coated 81 milliGRAM(s) Oral daily  budesonide 160 MICROgram(s)/formoterol 4.5 MICROgram(s) Inhaler 2 Puff(s) Inhalation two times a day  chlorhexidine 4% Liquid 1 Application(s) Topical <User Schedule>  enoxaparin Injectable 40 milliGRAM(s) SubCutaneous at bedtime  fenofibrate Tablet 145 milliGRAM(s) Oral at bedtime  furosemide    Tablet 40 milliGRAM(s) Oral at bedtime  HYDROmorphone   Tablet 4 milliGRAM(s) Oral every 8 hours  levoFLOXacin  Tablet 750 milliGRAM(s) Oral every 24 hours  lisinopril 5 milliGRAM(s) Oral daily  methadone    Tablet 10 milliGRAM(s) Oral every 12 hours  morphine ER Tablet 100 milliGRAM(s) Oral four times a day  pantoprazole    Tablet 40 milliGRAM(s) Oral before breakfast  predniSONE   Tablet 40 milliGRAM(s) Oral daily  senna 2 Tablet(s) Oral at bedtime  simvastatin 20 milliGRAM(s) Oral at bedtime  tiotropium 18 MICROgram(s) Capsule 1 Capsule(s) Inhalation daily    MEDICATIONS  (PRN):  ALBUTerol    90 MICROgram(s) HFA Inhaler 2 Puff(s) Inhalation every 6 hours PRN Shortness of Breath and/or Wheezing  guaifenesin/dextromethorphan  Syrup 5 milliLiter(s) Oral every 6 hours PRN Cough  oxyCODONE   IR Oral Tab/Cap - Peds 30 milliGRAM(s) Oral every 4 hours PRN Severe Pain (7 - 10)  zolpidem 5 milliGRAM(s) Oral at bedtime PRN Insomnia KAYLEIGH TOMAS  54y, Male  Allergy: No Known Allergies  Hospital Day: 2d      Patient was seen and examined at bedside. no new complaints       VITALS:  T(F): 98.2 (02-22-20 @ 14:37), Max: 98.3 (02-21-20 @ 17:00)  HR: 83 (02-22-20 @ 14:37)  BP: 123/65 (02-22-20 @ 14:37) (123/65 - 165/88)  RR: 18 (02-22-20 @ 14:37)  SpO2: --    PHYSICAL EXAM:  GENERAL: well developed well nourished in no acute distress  NECK: No evidence of swelling no palpable lymphadenopathy  CHEST/LUNG: diffuse wheezing throughout  no change from yesterday  HEART/VASC: RRR, No murmurs, rubs, or gallops appreciated, 2+ equal bilateral radial pulse  ABDOMEN: Soft, non tender non distended +bowel sounds in all quadrants, no palpable organomegaly   EXTREMITIES:  No clubbing and range of motion intact     TESTS & MEASUREMENTS:  Weight (Kg): 121 (02-21-20 @ 17:00)  BMI (kg/m2): 31.6 (02-21)    02-21-20 @ 07:01  -  02-22-20 @ 07:00  --------------------------------------------------------  IN: 0 mL / OUT: 400 mL / NET: -400 mL                            15.6   10.88 )-----------( 260      ( 22 Feb 2020 07:03 )             46.2       02-22    136  |  95<L>  |  21<H>  ----------------------------<  115<H>  4.6   |  26  |  1.0    Ca    9.8      22 Feb 2020 07:03  Mg     2.0     02-21    TPro  7.5  /  Alb  4.4  /  TBili  0.4  /  DBili  x   /  AST  15  /  ALT  15  /  AlkPhos  135<H>  02-22    LIVER FUNCTIONS - ( 22 Feb 2020 07:03 )  Alb: 4.4 g/dL / Pro: 7.5 g/dL / ALK PHOS: 135 U/L / ALT: 15 U/L / AST: 15 U/L / GGT: x           CARDIAC MARKERS ( 20 Feb 2020 19:00 )  x     / <0.01 ng/mL / x     / x     / x            MEDICATIONS:  MEDICATIONS  (STANDING):  albuterol/ipratropium for Nebulization 3 milliLiter(s) Nebulizer every 6 hours  aspirin enteric coated 81 milliGRAM(s) Oral daily  budesonide 160 MICROgram(s)/formoterol 4.5 MICROgram(s) Inhaler 2 Puff(s) Inhalation two times a day  chlorhexidine 4% Liquid 1 Application(s) Topical <User Schedule>  enoxaparin Injectable 40 milliGRAM(s) SubCutaneous at bedtime  fenofibrate Tablet 145 milliGRAM(s) Oral at bedtime  furosemide    Tablet 40 milliGRAM(s) Oral at bedtime  HYDROmorphone   Tablet 4 milliGRAM(s) Oral every 8 hours  levoFLOXacin  Tablet 750 milliGRAM(s) Oral every 24 hours  lisinopril 5 milliGRAM(s) Oral daily  methadone    Tablet 10 milliGRAM(s) Oral every 12 hours  morphine ER Tablet 100 milliGRAM(s) Oral four times a day  pantoprazole    Tablet 40 milliGRAM(s) Oral before breakfast  predniSONE   Tablet 40 milliGRAM(s) Oral daily  senna 2 Tablet(s) Oral at bedtime  simvastatin 20 milliGRAM(s) Oral at bedtime  tiotropium 18 MICROgram(s) Capsule 1 Capsule(s) Inhalation daily    MEDICATIONS  (PRN):  ALBUTerol    90 MICROgram(s) HFA Inhaler 2 Puff(s) Inhalation every 6 hours PRN Shortness of Breath and/or Wheezing  guaifenesin/dextromethorphan  Syrup 5 milliLiter(s) Oral every 6 hours PRN Cough  oxyCODONE   IR Oral Tab/Cap - Peds 30 milliGRAM(s) Oral every 4 hours PRN Severe Pain (7 - 10)  zolpidem 5 milliGRAM(s) Oral at bedtime PRN Insomnia

## 2020-02-23 LAB
ALBUMIN SERPL ELPH-MCNC: 4.5 G/DL — SIGNIFICANT CHANGE UP (ref 3.5–5.2)
ALP SERPL-CCNC: 128 U/L — HIGH (ref 30–115)
ALT FLD-CCNC: 16 U/L — SIGNIFICANT CHANGE UP (ref 0–41)
ANION GAP SERPL CALC-SCNC: 15 MMOL/L — HIGH (ref 7–14)
AST SERPL-CCNC: 16 U/L — SIGNIFICANT CHANGE UP (ref 0–41)
BASOPHILS # BLD AUTO: 0.07 K/UL — SIGNIFICANT CHANGE UP (ref 0–0.2)
BASOPHILS NFR BLD AUTO: 0.4 % — SIGNIFICANT CHANGE UP (ref 0–1)
BILIRUB SERPL-MCNC: 0.4 MG/DL — SIGNIFICANT CHANGE UP (ref 0.2–1.2)
BUN SERPL-MCNC: 23 MG/DL — HIGH (ref 10–20)
CALCIUM SERPL-MCNC: 10 MG/DL — SIGNIFICANT CHANGE UP (ref 8.5–10.1)
CHLORIDE SERPL-SCNC: 96 MMOL/L — LOW (ref 98–110)
CO2 SERPL-SCNC: 28 MMOL/L — SIGNIFICANT CHANGE UP (ref 17–32)
CREAT SERPL-MCNC: 1.2 MG/DL — SIGNIFICANT CHANGE UP (ref 0.7–1.5)
EOSINOPHIL # BLD AUTO: 0.03 K/UL — SIGNIFICANT CHANGE UP (ref 0–0.7)
EOSINOPHIL NFR BLD AUTO: 0.2 % — SIGNIFICANT CHANGE UP (ref 0–8)
GLUCOSE BLDC GLUCOMTR-MCNC: 101 MG/DL — HIGH (ref 70–99)
GLUCOSE SERPL-MCNC: 25 MG/DL — CRITICAL LOW (ref 70–99)
HCT VFR BLD CALC: 48.4 % — SIGNIFICANT CHANGE UP (ref 42–52)
HGB BLD-MCNC: 16.1 G/DL — SIGNIFICANT CHANGE UP (ref 14–18)
IMM GRANULOCYTES NFR BLD AUTO: 0.6 % — HIGH (ref 0.1–0.3)
LYMPHOCYTES # BLD AUTO: 37 % — SIGNIFICANT CHANGE UP (ref 20.5–51.1)
LYMPHOCYTES # BLD AUTO: 6.75 K/UL — HIGH (ref 1.2–3.4)
MAGNESIUM SERPL-MCNC: 2.1 MG/DL — SIGNIFICANT CHANGE UP (ref 1.8–2.4)
MCHC RBC-ENTMCNC: 30.3 PG — SIGNIFICANT CHANGE UP (ref 27–31)
MCHC RBC-ENTMCNC: 33.3 G/DL — SIGNIFICANT CHANGE UP (ref 32–37)
MCV RBC AUTO: 91 FL — SIGNIFICANT CHANGE UP (ref 80–94)
MONOCYTES # BLD AUTO: 1.26 K/UL — HIGH (ref 0.1–0.6)
MONOCYTES NFR BLD AUTO: 6.9 % — SIGNIFICANT CHANGE UP (ref 1.7–9.3)
NEUTROPHILS # BLD AUTO: 10.02 K/UL — HIGH (ref 1.4–6.5)
NEUTROPHILS NFR BLD AUTO: 54.9 % — SIGNIFICANT CHANGE UP (ref 42.2–75.2)
NRBC # BLD: 0 /100 WBCS — SIGNIFICANT CHANGE UP (ref 0–0)
PLATELET # BLD AUTO: 324 K/UL — SIGNIFICANT CHANGE UP (ref 130–400)
POTASSIUM SERPL-MCNC: 4.8 MMOL/L — SIGNIFICANT CHANGE UP (ref 3.5–5)
POTASSIUM SERPL-SCNC: 4.8 MMOL/L — SIGNIFICANT CHANGE UP (ref 3.5–5)
PROT SERPL-MCNC: 7.2 G/DL — SIGNIFICANT CHANGE UP (ref 6–8)
RBC # BLD: 5.32 M/UL — SIGNIFICANT CHANGE UP (ref 4.7–6.1)
RBC # FLD: 14.9 % — HIGH (ref 11.5–14.5)
SODIUM SERPL-SCNC: 139 MMOL/L — SIGNIFICANT CHANGE UP (ref 135–146)
WBC # BLD: 18.24 K/UL — HIGH (ref 4.8–10.8)
WBC # FLD AUTO: 18.24 K/UL — HIGH (ref 4.8–10.8)

## 2020-02-23 PROCEDURE — 71045 X-RAY EXAM CHEST 1 VIEW: CPT | Mod: 26

## 2020-02-23 PROCEDURE — 99233 SBSQ HOSP IP/OBS HIGH 50: CPT

## 2020-02-23 RX ADMIN — MORPHINE SULFATE 100 MILLIGRAM(S): 50 CAPSULE, EXTENDED RELEASE ORAL at 00:00

## 2020-02-23 RX ADMIN — ZOLPIDEM TARTRATE 5 MILLIGRAM(S): 10 TABLET ORAL at 21:29

## 2020-02-23 RX ADMIN — HYDROMORPHONE HYDROCHLORIDE 4 MILLIGRAM(S): 2 INJECTION INTRAMUSCULAR; INTRAVENOUS; SUBCUTANEOUS at 05:23

## 2020-02-23 RX ADMIN — Medication 81 MILLIGRAM(S): at 13:20

## 2020-02-23 RX ADMIN — PANTOPRAZOLE SODIUM 40 MILLIGRAM(S): 20 TABLET, DELAYED RELEASE ORAL at 05:23

## 2020-02-23 RX ADMIN — METHADONE HYDROCHLORIDE 10 MILLIGRAM(S): 40 TABLET ORAL at 18:56

## 2020-02-23 RX ADMIN — HYDROMORPHONE HYDROCHLORIDE 4 MILLIGRAM(S): 2 INJECTION INTRAMUSCULAR; INTRAVENOUS; SUBCUTANEOUS at 17:27

## 2020-02-23 RX ADMIN — HYDROMORPHONE HYDROCHLORIDE 4 MILLIGRAM(S): 2 INJECTION INTRAMUSCULAR; INTRAVENOUS; SUBCUTANEOUS at 21:35

## 2020-02-23 RX ADMIN — MORPHINE SULFATE 100 MILLIGRAM(S): 50 CAPSULE, EXTENDED RELEASE ORAL at 13:50

## 2020-02-23 RX ADMIN — SENNA PLUS 2 TABLET(S): 8.6 TABLET ORAL at 21:29

## 2020-02-23 RX ADMIN — Medication 60 MILLIGRAM(S): at 05:23

## 2020-02-23 RX ADMIN — Medication 3 MILLILITER(S): at 20:23

## 2020-02-23 RX ADMIN — Medication 145 MILLIGRAM(S): at 21:28

## 2020-02-23 RX ADMIN — Medication 3 MILLILITER(S): at 13:28

## 2020-02-23 RX ADMIN — TIOTROPIUM BROMIDE 1 CAPSULE(S): 18 CAPSULE ORAL; RESPIRATORY (INHALATION) at 08:14

## 2020-02-23 RX ADMIN — HYDROMORPHONE HYDROCHLORIDE 4 MILLIGRAM(S): 2 INJECTION INTRAMUSCULAR; INTRAVENOUS; SUBCUTANEOUS at 16:26

## 2020-02-23 RX ADMIN — OXYCODONE HYDROCHLORIDE 30 MILLIGRAM(S): 5 TABLET ORAL at 13:22

## 2020-02-23 RX ADMIN — SIMVASTATIN 20 MILLIGRAM(S): 20 TABLET, FILM COATED ORAL at 21:29

## 2020-02-23 RX ADMIN — HYDROMORPHONE HYDROCHLORIDE 4 MILLIGRAM(S): 2 INJECTION INTRAMUSCULAR; INTRAVENOUS; SUBCUTANEOUS at 06:00

## 2020-02-23 RX ADMIN — Medication 3 MILLILITER(S): at 08:14

## 2020-02-23 RX ADMIN — OXYCODONE HYDROCHLORIDE 30 MILLIGRAM(S): 5 TABLET ORAL at 06:00

## 2020-02-23 RX ADMIN — MORPHINE SULFATE 100 MILLIGRAM(S): 50 CAPSULE, EXTENDED RELEASE ORAL at 06:00

## 2020-02-23 RX ADMIN — METHADONE HYDROCHLORIDE 10 MILLIGRAM(S): 40 TABLET ORAL at 05:23

## 2020-02-23 RX ADMIN — MORPHINE SULFATE 100 MILLIGRAM(S): 50 CAPSULE, EXTENDED RELEASE ORAL at 13:20

## 2020-02-23 RX ADMIN — OXYCODONE HYDROCHLORIDE 30 MILLIGRAM(S): 5 TABLET ORAL at 00:00

## 2020-02-23 RX ADMIN — Medication 40 MILLIGRAM(S): at 21:32

## 2020-02-23 RX ADMIN — MORPHINE SULFATE 100 MILLIGRAM(S): 50 CAPSULE, EXTENDED RELEASE ORAL at 19:26

## 2020-02-23 RX ADMIN — LISINOPRIL 5 MILLIGRAM(S): 2.5 TABLET ORAL at 05:23

## 2020-02-23 RX ADMIN — MORPHINE SULFATE 100 MILLIGRAM(S): 50 CAPSULE, EXTENDED RELEASE ORAL at 05:23

## 2020-02-23 RX ADMIN — MORPHINE SULFATE 100 MILLIGRAM(S): 50 CAPSULE, EXTENDED RELEASE ORAL at 23:55

## 2020-02-23 RX ADMIN — OXYCODONE HYDROCHLORIDE 30 MILLIGRAM(S): 5 TABLET ORAL at 23:55

## 2020-02-23 RX ADMIN — MORPHINE SULFATE 100 MILLIGRAM(S): 50 CAPSULE, EXTENDED RELEASE ORAL at 18:56

## 2020-02-23 RX ADMIN — BUDESONIDE AND FORMOTEROL FUMARATE DIHYDRATE 2 PUFF(S): 160; 4.5 AEROSOL RESPIRATORY (INHALATION) at 20:22

## 2020-02-23 RX ADMIN — OXYCODONE HYDROCHLORIDE 30 MILLIGRAM(S): 5 TABLET ORAL at 13:52

## 2020-02-23 RX ADMIN — OXYCODONE HYDROCHLORIDE 30 MILLIGRAM(S): 5 TABLET ORAL at 05:27

## 2020-02-23 RX ADMIN — BUDESONIDE AND FORMOTEROL FUMARATE DIHYDRATE 2 PUFF(S): 160; 4.5 AEROSOL RESPIRATORY (INHALATION) at 07:59

## 2020-02-23 RX ADMIN — HYDROMORPHONE HYDROCHLORIDE 4 MILLIGRAM(S): 2 INJECTION INTRAMUSCULAR; INTRAVENOUS; SUBCUTANEOUS at 21:32

## 2020-02-23 NOTE — PROGRESS NOTE ADULT - SUBJECTIVE AND OBJECTIVE BOX
KAYLEIGH TOMAS  54y, Male  Allergy: No Known Allergies  Hospital Day: 3d      Patient was seen and examined at bedside. denies any improvement in breathing status       VITALS:  T(F): 97 (02-23-20 @ 13:23), Max: 98.2 (02-22-20 @ 14:37)  HR: 97 (02-23-20 @ 13:23)  BP: 131/80 (02-23-20 @ 13:23) (118/60 - 131/80)  RR: 18 (02-23-20 @ 13:23)  SpO2: 95% (02-23-20 @ 09:01)    PHYSICAL EXAM:  GENERAL: obese male in no acute distress  NECK: No evidence of swelling no palpable lymphadenopathy  CHEST/LUNG: diffuse coarse breath sounds throughout   HEART/VASC: RRR, No murmurs, rubs, or gallops appreciated, 2+ equal bilateral radial pulse  ABDOMEN: Soft, non tender non distended +bowel sounds in all quadrants, no palpable organomegaly   EXTREMITIES:  No clubbing and range of motion intact     TESTS & MEASUREMENTS:  Weight (Kg): 121 (02-21-20 @ 17:00)  BMI (kg/m2): 31.6 (02-21)    02-21-20 @ 07:01  -  02-22-20 @ 07:00  --------------------------------------------------------  IN: 0 mL / OUT: 400 mL / NET: -400 mL                            16.1   18.24 )-----------( 324      ( 23 Feb 2020 06:15 )             48.4       02-23    139  |  96<L>  |  23<H>  ----------------------------<  25<LL>  4.8   |  28  |  1.2    Ca    10.0      23 Feb 2020 06:15  Mg     2.1     02-23    TPro  7.2  /  Alb  4.5  /  TBili  0.4  /  DBili  x   /  AST  16  /  ALT  16  /  AlkPhos  128<H>  02-23    LIVER FUNCTIONS - ( 23 Feb 2020 06:15 )  Alb: 4.5 g/dL / Pro: 7.2 g/dL / ALK PHOS: 128 U/L / ALT: 16 U/L / AST: 16 U/L / GGT: x           MEDICATIONS:  MEDICATIONS  (STANDING):  albuterol/ipratropium for Nebulization 3 milliLiter(s) Nebulizer every 6 hours  aspirin enteric coated 81 milliGRAM(s) Oral daily  budesonide 160 MICROgram(s)/formoterol 4.5 MICROgram(s) Inhaler 2 Puff(s) Inhalation two times a day  chlorhexidine 4% Liquid 1 Application(s) Topical <User Schedule>  enoxaparin Injectable 40 milliGRAM(s) SubCutaneous at bedtime  fenofibrate Tablet 145 milliGRAM(s) Oral at bedtime  furosemide    Tablet 40 milliGRAM(s) Oral at bedtime  HYDROmorphone   Tablet 4 milliGRAM(s) Oral every 8 hours  levoFLOXacin  Tablet 750 milliGRAM(s) Oral every 24 hours  lisinopril 5 milliGRAM(s) Oral daily  methadone    Tablet 10 milliGRAM(s) Oral every 12 hours  methylPREDNISolone sodium succinate Injectable 60 milliGRAM(s) IV Push daily  morphine ER Tablet 100 milliGRAM(s) Oral four times a day  pantoprazole    Tablet 40 milliGRAM(s) Oral before breakfast  senna 2 Tablet(s) Oral at bedtime  simvastatin 20 milliGRAM(s) Oral at bedtime  tiotropium 18 MICROgram(s) Capsule 1 Capsule(s) Inhalation daily    MEDICATIONS  (PRN):  ALBUTerol    90 MICROgram(s) HFA Inhaler 2 Puff(s) Inhalation every 6 hours PRN Shortness of Breath and/or Wheezing  guaifenesin/dextromethorphan  Syrup 5 milliLiter(s) Oral every 6 hours PRN Cough  oxyCODONE   IR Oral Tab/Cap - Peds 30 milliGRAM(s) Oral every 4 hours PRN Severe Pain (7 - 10)  zolpidem 5 milliGRAM(s) Oral at bedtime PRN Insomnia

## 2020-02-23 NOTE — PROGRESS NOTE ADULT - ASSESSMENT
54 year-old male with a PMH of COPD, obstructive sleep apnea (on BiPAP), chronic knee, back pain (on methadone), HLD, HTN, GERD, b/l knee replacement, cholecystectomy and appendectomy presenting for shortness of breath, admitted for COPD exacerbation and chronic pain.    pt seen and examined at bedside. states that he has not used any of his inhalers prior to about 2-3wks ago despite having had them for "years" pt also endorses being an active smoker but states this has "scared" him and he is planning to quit smoking.     #acute on chronic exacerbation of COPD 2/2 medication non compliance vs. PNA  - cont with Symbicort, Spiriva, and nebs q6h standing  - Solumedrol 60 daily, transition to oral   - O2 NC to maintain sats 88-92%  - Levofloxacin 750mg oral daily    - Establish regimen for pain   - will need to establish care with pulmonary upon discharge     DVT ppx: Lovenox 40mg daily  GI ppx: Protonix 40mg daily   Activty: IAT  Dispo: Home   Code: Full

## 2020-02-23 NOTE — PROGRESS NOTE ADULT - SUBJECTIVE AND OBJECTIVE BOX
KAYLEIGH TOMAS 54y Male      Patient is a 54y old  Male who presents with a chief complaint of COPD exacerbation (22 Feb 2020 15:47)        INTERVAL HPI/OVERNIGHT EVENTS: No acute events overnight. Patient was seen and evaluated at the bedside. The patient denies pain. Vitals stable. Patient denies fever/chills, chest pain, abdominal pain, headaches, nausea/vomiting, and diarrhea/constipation. +SOB      PHYSICAL EXAM:  GENERAL: NAD  HEAD:  Normocephalic  EYES:  conjunctiva and sclera clear  ENMT: Moist mucous membranes  NECK: Supple  NERVOUS SYSTEM:  Alert, awake  CHEST/LUNG: Good air exchange bilaterally, +wheeze and rhonci   HEART: Regular rate and rhythm        Vital Signs Last 24 Hrs  T(C): 35.9 (23 Feb 2020 05:00), Max: 36.8 (22 Feb 2020 14:37)  T(F): 96.7 (23 Feb 2020 05:00), Max: 98.2 (22 Feb 2020 14:37)  HR: 73 (23 Feb 2020 05:00) (72 - 83)  BP: 118/60 (23 Feb 2020 05:00) (118/60 - 129/76)  BP(mean): --  RR: 19 (23 Feb 2020 05:00) (18 - 20)  SpO2: --      Consultant(s) Notes Reviewed:  [X] YES  [ ] NO  Care Discussed with Consultants/Other Providers [X] YES  [ ] NO  Imaging Personally Reviewed:  [X] YES  [ ] NO      LABS:                        16.1   18.24 )-----------( 324      ( 23 Feb 2020 06:15 )             48.4     02-23    139  |  96<L>  |  23<H>  ----------------------------<  25<LL>  4.8   |  28  |  1.2    Ca    10.0      23 Feb 2020 06:15  Mg     2.1     02-23    TPro  7.2  /  Alb  4.5  /  TBili  0.4  /  DBili  x   /  AST  16  /  ALT  16  /  AlkPhos  128<H>  02-23        ABG - ( 21 Feb 2020 11:32 )  pH, Arterial: 7.44  pH, Blood: x     /  pCO2: 44    /  pO2: 80    / HCO3: 30    / Base Excess: 5.3   /  SaO2: 97                CAPILLARY BLOOD GLUCOSE

## 2020-02-23 NOTE — PROGRESS NOTE ADULT - ASSESSMENT
54 year-old male with a PMH of COPD, obstructive sleep apnea (on BiPAP), chronic knee, back pain (on methadone), HLD, HTN, GERD, b/l knee replacement, cholecystectomy and appendectomy presenting for shortness of breath, admitted for COPD exacerbation and chronic pain.      #acute on chronic exacerbation of COPD 2/2 medication non compliance vs. PNA  clinically unchanged from yesterday   -cont with spiriva, nebs q6h standing  -cont solumedrol 60 daily   -cont symbicort   -O2 NC to maintain sats >90%  -empirically treat with levofloxacin   -given no improvement despite treatment inpt pulm eval     #HTN  stable  -cont with present home medications  #chronic pain  -cont home pain medications  #REBECCA  -bipap at night  #HLD  -cont with home medications  #GERD  -cont ppi    Progress Note Handoff  Pending:   transition to po steroids, symptomatic improvement, pulm eval   Disposition: from home likely home in 48hrs

## 2020-02-24 DIAGNOSIS — Z71.89 OTHER SPECIFIED COUNSELING: ICD-10-CM

## 2020-02-24 LAB
ANION GAP SERPL CALC-SCNC: 13 MMOL/L — SIGNIFICANT CHANGE UP (ref 7–14)
BUN SERPL-MCNC: 21 MG/DL — HIGH (ref 10–20)
CALCIUM SERPL-MCNC: 9.4 MG/DL — SIGNIFICANT CHANGE UP (ref 8.5–10.1)
CHLORIDE SERPL-SCNC: 92 MMOL/L — LOW (ref 98–110)
CO2 SERPL-SCNC: 30 MMOL/L — SIGNIFICANT CHANGE UP (ref 17–32)
CREAT SERPL-MCNC: 0.9 MG/DL — SIGNIFICANT CHANGE UP (ref 0.7–1.5)
GAS PNL BLDA: SIGNIFICANT CHANGE UP
GLUCOSE SERPL-MCNC: 107 MG/DL — HIGH (ref 70–99)
HCT VFR BLD CALC: 43.5 % — SIGNIFICANT CHANGE UP (ref 42–52)
HGB BLD-MCNC: 14.7 G/DL — SIGNIFICANT CHANGE UP (ref 14–18)
MCHC RBC-ENTMCNC: 29.8 PG — SIGNIFICANT CHANGE UP (ref 27–31)
MCHC RBC-ENTMCNC: 33.8 G/DL — SIGNIFICANT CHANGE UP (ref 32–37)
MCV RBC AUTO: 88.1 FL — SIGNIFICANT CHANGE UP (ref 80–94)
NRBC # BLD: 0 /100 WBCS — SIGNIFICANT CHANGE UP (ref 0–0)
NT-PROBNP SERPL-SCNC: 15 PG/ML — SIGNIFICANT CHANGE UP (ref 0–300)
PLATELET # BLD AUTO: 252 K/UL — SIGNIFICANT CHANGE UP (ref 130–400)
POTASSIUM SERPL-MCNC: 4.4 MMOL/L — SIGNIFICANT CHANGE UP (ref 3.5–5)
POTASSIUM SERPL-SCNC: 4.4 MMOL/L — SIGNIFICANT CHANGE UP (ref 3.5–5)
RBC # BLD: 4.94 M/UL — SIGNIFICANT CHANGE UP (ref 4.7–6.1)
RBC # FLD: 14.8 % — HIGH (ref 11.5–14.5)
SODIUM SERPL-SCNC: 135 MMOL/L — SIGNIFICANT CHANGE UP (ref 135–146)
WBC # BLD: 13.17 K/UL — HIGH (ref 4.8–10.8)
WBC # FLD AUTO: 13.17 K/UL — HIGH (ref 4.8–10.8)

## 2020-02-24 PROCEDURE — 71250 CT THORAX DX C-: CPT | Mod: 26

## 2020-02-24 PROCEDURE — 99233 SBSQ HOSP IP/OBS HIGH 50: CPT

## 2020-02-24 RX ADMIN — SENNA PLUS 2 TABLET(S): 8.6 TABLET ORAL at 22:18

## 2020-02-24 RX ADMIN — MORPHINE SULFATE 100 MILLIGRAM(S): 50 CAPSULE, EXTENDED RELEASE ORAL at 12:21

## 2020-02-24 RX ADMIN — Medication 60 MILLIGRAM(S): at 10:27

## 2020-02-24 RX ADMIN — Medication 3 MILLILITER(S): at 08:01

## 2020-02-24 RX ADMIN — Medication 81 MILLIGRAM(S): at 12:23

## 2020-02-24 RX ADMIN — TIOTROPIUM BROMIDE 1 CAPSULE(S): 18 CAPSULE ORAL; RESPIRATORY (INHALATION) at 08:03

## 2020-02-24 RX ADMIN — HYDROMORPHONE HYDROCHLORIDE 4 MILLIGRAM(S): 2 INJECTION INTRAMUSCULAR; INTRAVENOUS; SUBCUTANEOUS at 17:38

## 2020-02-24 RX ADMIN — OXYCODONE HYDROCHLORIDE 30 MILLIGRAM(S): 5 TABLET ORAL at 10:26

## 2020-02-24 RX ADMIN — Medication 3 MILLILITER(S): at 19:25

## 2020-02-24 RX ADMIN — MORPHINE SULFATE 100 MILLIGRAM(S): 50 CAPSULE, EXTENDED RELEASE ORAL at 12:51

## 2020-02-24 RX ADMIN — HYDROMORPHONE HYDROCHLORIDE 4 MILLIGRAM(S): 2 INJECTION INTRAMUSCULAR; INTRAVENOUS; SUBCUTANEOUS at 05:02

## 2020-02-24 RX ADMIN — Medication 60 MILLIGRAM(S): at 22:19

## 2020-02-24 RX ADMIN — Medication 60 MILLIGRAM(S): at 17:11

## 2020-02-24 RX ADMIN — OXYCODONE HYDROCHLORIDE 30 MILLIGRAM(S): 5 TABLET ORAL at 00:30

## 2020-02-24 RX ADMIN — CHLORHEXIDINE GLUCONATE 1 APPLICATION(S): 213 SOLUTION TOPICAL at 05:01

## 2020-02-24 RX ADMIN — Medication 145 MILLIGRAM(S): at 22:19

## 2020-02-24 RX ADMIN — MORPHINE SULFATE 100 MILLIGRAM(S): 50 CAPSULE, EXTENDED RELEASE ORAL at 00:30

## 2020-02-24 RX ADMIN — Medication 40 MILLIGRAM(S): at 22:18

## 2020-02-24 RX ADMIN — PANTOPRAZOLE SODIUM 40 MILLIGRAM(S): 20 TABLET, DELAYED RELEASE ORAL at 05:02

## 2020-02-24 RX ADMIN — MORPHINE SULFATE 100 MILLIGRAM(S): 50 CAPSULE, EXTENDED RELEASE ORAL at 05:02

## 2020-02-24 RX ADMIN — HYDROMORPHONE HYDROCHLORIDE 4 MILLIGRAM(S): 2 INJECTION INTRAMUSCULAR; INTRAVENOUS; SUBCUTANEOUS at 17:08

## 2020-02-24 RX ADMIN — SIMVASTATIN 20 MILLIGRAM(S): 20 TABLET, FILM COATED ORAL at 22:19

## 2020-02-24 RX ADMIN — BUDESONIDE AND FORMOTEROL FUMARATE DIHYDRATE 2 PUFF(S): 160; 4.5 AEROSOL RESPIRATORY (INHALATION) at 08:15

## 2020-02-24 RX ADMIN — METHADONE HYDROCHLORIDE 10 MILLIGRAM(S): 40 TABLET ORAL at 05:02

## 2020-02-24 RX ADMIN — LISINOPRIL 5 MILLIGRAM(S): 2.5 TABLET ORAL at 05:02

## 2020-02-24 RX ADMIN — METHADONE HYDROCHLORIDE 10 MILLIGRAM(S): 40 TABLET ORAL at 17:11

## 2020-02-24 RX ADMIN — OXYCODONE HYDROCHLORIDE 30 MILLIGRAM(S): 5 TABLET ORAL at 10:56

## 2020-02-24 NOTE — CONSULT NOTE ADULT - ASSESSMENT
Impression:  COPD exacerbation    Recommendations:  C/w IV solumedrol  C/w Impression:  COPD exacerbation  Active smoker    Recommendations:  C/w IV solumedrol  Duonebs q4hrs PRN  Send BNP  Smoking cessation Impression:  COPD exacerbation  Active smoker    Recommendations:  C/w IV solumedrol  Duonebs q4hrs PRN  Send BNP  Send Flu A/B/RSV and RVP  Smoking cessation      Await attending review

## 2020-02-24 NOTE — PROCEDURE NOTE - NSPROCDETAILS_GEN_ALL_CORE
location identified, draped/prepped, sterile technique used/sterile technique, catheter placed/sterile dressing applied/ultrasound guidance/ultrasound assessment

## 2020-02-24 NOTE — CONSULT NOTE ADULT - SUBJECTIVE AND OBJECTIVE BOX
Patient is a 54y old  Male who presents with a chief complaint of COPD exacerbation (23 Feb 2020 13:48)      HPI:  The patient is a 54 year-old male with a PMH of COPD, obstructive sleep apnea (on BiPAP), chronic knee, back pain (on methadone), HLD, HTN, GERD, b/l knee replacement, cholecystectomy and appendectomy presenting for shortness of breath. The patient reports that he has had shortness of breath worse compared to his baseline which started 5 days ago and gradually progressed since that time. He also endorses increased sputum production which began about a week ago, clear in color, and which progressed to a whitish/yellowish color over the subsequent days. The patient endorses subjective fever alternating with chills every ~1 hour; denies associated nausea, vomiting, diarrhea, burning on urination, headache. He reports chest tightness that is worse when coughing. In the ED a chest xray was done; pending read; trops neg x 1.    Vital Signs Last 24 Hrs  T(C): 36.9 (20 Feb 2020 16:21), Max: 36.9 (20 Feb 2020 16:21)  T(F): 98.4 (20 Feb 2020 16:21), Max: 98.4 (20 Feb 2020 16:21)  HR: 84 (20 Feb 2020 23:16) (84 - 91)  BP: 138/86 (20 Feb 2020 23:16) (138/86 - 149/83)  BP(mean): --  RR: 18 (20 Feb 2020 23:16) (17 - 18)  SpO2: 96% (20 Feb 2020 23:16) (96% - 97%) (20 Feb 2020 23:22)      PAST MEDICAL & SURGICAL HISTORY:  REBECCA treated with BiPAP  COPD (chronic obstructive pulmonary disease)  Colitis: LARGE INTESTINE   NO RECENT FLARES  Hiatal hernia: GERD  SOB (shortness of breath)  Osteoarthritis  High blood cholesterol  Morbid obesity  Obstructive sleep apnea  GERD (gastroesophageal reflux disease)  High cholesterol  HTN (hypertension)  H/O knee surgery: arthoscopic x4  Hernia, inguinal, bilateral: 3 months old  History of knee replacement procedure of right knee: BILATERAL  History of appendectomy  History of cholecystectomy      SOCIAL HX:   Smoking                         ETOH                            Other    FAMILY HISTORY:  FH: diabetes mellitus  FH: lung cancer: mother  .  No cardiovascular or pulmonary family history     REVIEW OF SYSTEMS:    CONSTITUTIONAL:   no fever   no chills.  no weight gain   no weight loss    EYES:   no discharge,   no visual changes.    ENT:   Ears: no ear pain and no hearing problems.  Nose: no nasal congestion and no nasal drainage.  Mouth/Throat: no dysphagia,  no hoarseness and no throat pain.  Neck: no lumps, no pain, no stiffness and no swollen glands.    CARDIOVASCULAR:   no chest pain,   no swelling  no palpitaions  no syncope    RESPIRATORY:  no SOB,  no wheezing ,  no respiratory difficulty  no sputum production    GASTROINTESTINAL:   no abdominal pain,   no constipation,   no diarrhea,   no vomiting.    GENITOURINARY:  no dysuria,   no hematuria.    MUSCULOSKELETAL:   no back pain,   no musculoskeletal pain,  no weakness.    SKIN:   no jaundice,    no rashes.    NEURO:   no loss of consciousness,   no headache,   no weakness.    PSYCHIATRIC:   no known mental health issues  no anxiety  no depression    ALLERGIC/IMMUNOLOGIC:   No active allergic or immunologic issues      Allergies    No Known Allergies    Intolerances          PHYSICAL EXAM  Vital Signs Last 24 Hrs  T(C): 36.1 (24 Feb 2020 06:03), Max: 36.4 (23 Feb 2020 21:38)  T(F): 97 (24 Feb 2020 06:03), Max: 97.6 (23 Feb 2020 21:38)  HR: 71 (24 Feb 2020 06:03) (71 - 97)  BP: 129/86 (24 Feb 2020 06:03) (127/91 - 131/80)  BP(mean): --  RR: 17 (24 Feb 2020 06:03) (17 - 18)  SpO2: --    CONSTITUTIONAL:  Well nourished.  NAD    ENT:   Airway patent,   No thrush    EYES:   Clear bilaterally,   pupils equal,   round and reactive to light.    CARDIAC:   Normal rate,   regular rhythm.    no edema      CAROTID:   normal systolic impulse  no bruits    RESPIRATORY:   No wheezing  Nnormal chest expansion  Not tachypneic,  No use of accessory muscles    GASTROINTESTINAL:  Abdomen soft,   non-tender,   no guarding,   + BS    GENITOURINARY  normal genitalia for sex  no edema    MUSCULOSKELETAL:   range of motion is not limited,  no clubbing, cyanosis    NEUROLOGICAL:   Alert and oriented   no motor deficits.  pertinent DTRs normal    SKIN:   Skin normal color for race,   No evidence of rash.    PSYCHIATRIC:   normal mood and affect.   no apparent risk to self or others.    HEME LYMPH:   No cervical  lymphadenopathy.  no inguinal lymphadenopathy          LABS:                          14.7   13.17 )-----------( 252      ( 24 Feb 2020 06:11 )             43.5                                               02-24    135  |  92<L>  |  21<H>  ----------------------------<  107<H>  4.4   |  30  |  0.9    Ca    9.4      24 Feb 2020 06:11  Mg     2.1     02-23    TPro  7.2  /  Alb  4.5  /  TBili  0.4  /  DBili  x   /  AST  16  /  ALT  16  /  AlkPhos  128<H>  02-23                                                                                           LIVER FUNCTIONS - ( 23 Feb 2020 06:15 )  Alb: 4.5 g/dL / Pro: 7.2 g/dL / ALK PHOS: 128 U/L / ALT: 16 U/L / AST: 16 U/L / GGT: x                                                                                                MEDICATIONS  (STANDING):  albuterol/ipratropium for Nebulization 3 milliLiter(s) Nebulizer every 6 hours  aspirin enteric coated 81 milliGRAM(s) Oral daily  budesonide 160 MICROgram(s)/formoterol 4.5 MICROgram(s) Inhaler 2 Puff(s) Inhalation two times a day  chlorhexidine 4% Liquid 1 Application(s) Topical <User Schedule>  enoxaparin Injectable 40 milliGRAM(s) SubCutaneous at bedtime  fenofibrate Tablet 145 milliGRAM(s) Oral at bedtime  furosemide    Tablet 40 milliGRAM(s) Oral at bedtime  HYDROmorphone   Tablet 4 milliGRAM(s) Oral every 8 hours  levoFLOXacin  Tablet 750 milliGRAM(s) Oral every 24 hours  lisinopril 5 milliGRAM(s) Oral daily  methadone    Tablet 10 milliGRAM(s) Oral every 12 hours  methylPREDNISolone sodium succinate Injectable 60 milliGRAM(s) IV Push every 8 hours  morphine ER Tablet 100 milliGRAM(s) Oral four times a day  pantoprazole    Tablet 40 milliGRAM(s) Oral before breakfast  senna 2 Tablet(s) Oral at bedtime  simvastatin 20 milliGRAM(s) Oral at bedtime  tiotropium 18 MICROgram(s) Capsule 1 Capsule(s) Inhalation daily    MEDICATIONS  (PRN):  ALBUTerol    90 MICROgram(s) HFA Inhaler 2 Puff(s) Inhalation every 6 hours PRN Shortness of Breath and/or Wheezing  guaifenesin/dextromethorphan  Syrup 5 milliLiter(s) Oral every 6 hours PRN Cough  oxyCODONE   IR Oral Tab/Cap - Peds 30 milliGRAM(s) Oral every 4 hours PRN Severe Pain (7 - 10)  zolpidem 5 milliGRAM(s) Oral at bedtime PRN Insomnia      X-Rays reviewed:    CXR interpreted by me: Patient is a 54y old  Male who presents with a chief complaint of COPD exacerbation (23 Feb 2020 13:48)      HPI:  The patient is a 54 year-old male with a PMH of COPD, obstructive sleep apnea (on BiPAP), chronic knee, back pain (on methadone), HLD, HTN, GERD, b/l knee replacement, cholecystectomy and appendectomy presenting for shortness of breath. The patient reports that he has had shortness of breath worse compared to his baseline which started 5 days ago and gradually progressed since that time. He also endorses increased sputum production which began about a week ago, clear in color, and which progressed to a whitish/yellowish color over the subsequent days. The patient endorses subjective fever alternating with chills every ~1 hour; denies associated nausea, vomiting, diarrhea, burning on urination, headache. He reports chest tightness that is worse when coughing. In the ED a chest xray was done; pending read; trops neg x 1.    Vital Signs Last 24 Hrs  T(C): 36.9 (20 Feb 2020 16:21), Max: 36.9 (20 Feb 2020 16:21)  T(F): 98.4 (20 Feb 2020 16:21), Max: 98.4 (20 Feb 2020 16:21)  HR: 84 (20 Feb 2020 23:16) (84 - 91)  BP: 138/86 (20 Feb 2020 23:16) (138/86 - 149/83)  BP(mean): --  RR: 18 (20 Feb 2020 23:16) (17 - 18)  SpO2: 96% (20 Feb 2020 23:16) (96% - 97%) (20 Feb 2020 23:22)      PAST MEDICAL & SURGICAL HISTORY:  REBECCA treated with BiPAP  COPD (chronic obstructive pulmonary disease)  Colitis: LARGE INTESTINE   NO RECENT FLARES  Hiatal hernia: GERD  SOB (shortness of breath)  Osteoarthritis  High blood cholesterol  Morbid obesity  Obstructive sleep apnea  GERD (gastroesophageal reflux disease)  High cholesterol  HTN (hypertension)  H/O knee surgery: arthoscopic x4  Hernia, inguinal, bilateral: 3 months old  History of knee replacement procedure of right knee: BILATERAL  History of appendectomy  History of cholecystectomy      SOCIAL HX:   Smoking                         ETOH                            Other    FAMILY HISTORY:  FH: diabetes mellitus  FH: lung cancer: mother  .  No cardiovascular or pulmonary family history     REVIEW OF SYSTEMS:    CONSTITUTIONAL:   no fever   no chills.  no weight gain   no weight loss    EYES:   no discharge,   no visual changes.    ENT:   Ears: no ear pain and no hearing problems.  Nose: no nasal congestion and no nasal drainage.  Mouth/Throat: no dysphagia,  no hoarseness and no throat pain.  Neck: no lumps, no pain, no stiffness and no swollen glands.    CARDIOVASCULAR:   no chest pain,   no swelling  no palpitaions  no syncope    RESPIRATORY:  no SOB,  no wheezing ,  no respiratory difficulty  no sputum production    GASTROINTESTINAL:   no abdominal pain,   no constipation,   no diarrhea,   no vomiting.    GENITOURINARY:  no dysuria,   no hematuria.    MUSCULOSKELETAL:   no back pain,   no musculoskeletal pain,  no weakness.    SKIN:   no jaundice,    no rashes.    NEURO:   no loss of consciousness,   no headache,   no weakness.    PSYCHIATRIC:   no known mental health issues  no anxiety  no depression    ALLERGIC/IMMUNOLOGIC:   No active allergic or immunologic issues      Allergies    No Known Allergies    Intolerances          PHYSICAL EXAM  Vital Signs Last 24 Hrs  T(C): 36.1 (24 Feb 2020 06:03), Max: 36.4 (23 Feb 2020 21:38)  T(F): 97 (24 Feb 2020 06:03), Max: 97.6 (23 Feb 2020 21:38)  HR: 71 (24 Feb 2020 06:03) (71 - 97)  BP: 129/86 (24 Feb 2020 06:03) (127/91 - 131/80)  BP(mean): --  RR: 17 (24 Feb 2020 06:03) (17 - 18)  SpO2: --    CONSTITUTIONAL:  Well nourished.  NAD    ENT:   Airway patent,   No thrush    EYES:   Clear bilaterally,   pupils equal,   round and reactive to light.    CARDIAC:   Normal rate,   regular rhythm.    no edema      CAROTID:   normal systolic impulse  no bruits    RESPIRATORY:   No wheezing  Nnormal chest expansion  Not tachypneic,  No use of accessory muscles    GASTROINTESTINAL:  Abdomen soft,   non-tender,   no guarding,   + BS    GENITOURINARY  normal genitalia for sex  no edema    MUSCULOSKELETAL:   range of motion is not limited,  no clubbing, cyanosis    NEUROLOGICAL:   Alert and oriented   no motor deficits.  pertinent DTRs normal    SKIN:   Skin normal color for race,   No evidence of rash.    PSYCHIATRIC:   normal mood and affect.   no apparent risk to self or others.    HEME LYMPH:   No cervical  lymphadenopathy.  no inguinal lymphadenopathy          LABS:                          14.7   13.17 )-----------( 252      ( 24 Feb 2020 06:11 )             43.5                                               02-24    135  |  92<L>  |  21<H>  ----------------------------<  107<H>  4.4   |  30  |  0.9    Ca    9.4      24 Feb 2020 06:11  Mg     2.1     02-23    TPro  7.2  /  Alb  4.5  /  TBili  0.4  /  DBili  x   /  AST  16  /  ALT  16  /  AlkPhos  128<H>  02-23                                                                                           LIVER FUNCTIONS - ( 23 Feb 2020 06:15 )  Alb: 4.5 g/dL / Pro: 7.2 g/dL / ALK PHOS: 128 U/L / ALT: 16 U/L / AST: 16 U/L / GGT: x                                                                                                MEDICATIONS  (STANDING):  albuterol/ipratropium for Nebulization 3 milliLiter(s) Nebulizer every 6 hours  aspirin enteric coated 81 milliGRAM(s) Oral daily  budesonide 160 MICROgram(s)/formoterol 4.5 MICROgram(s) Inhaler 2 Puff(s) Inhalation two times a day  chlorhexidine 4% Liquid 1 Application(s) Topical <User Schedule>  enoxaparin Injectable 40 milliGRAM(s) SubCutaneous at bedtime  fenofibrate Tablet 145 milliGRAM(s) Oral at bedtime  furosemide    Tablet 40 milliGRAM(s) Oral at bedtime  HYDROmorphone   Tablet 4 milliGRAM(s) Oral every 8 hours  levoFLOXacin  Tablet 750 milliGRAM(s) Oral every 24 hours  lisinopril 5 milliGRAM(s) Oral daily  methadone    Tablet 10 milliGRAM(s) Oral every 12 hours  methylPREDNISolone sodium succinate Injectable 60 milliGRAM(s) IV Push every 8 hours  morphine ER Tablet 100 milliGRAM(s) Oral four times a day  pantoprazole    Tablet 40 milliGRAM(s) Oral before breakfast  senna 2 Tablet(s) Oral at bedtime  simvastatin 20 milliGRAM(s) Oral at bedtime  tiotropium 18 MICROgram(s) Capsule 1 Capsule(s) Inhalation daily    MEDICATIONS  (PRN):  ALBUTerol    90 MICROgram(s) HFA Inhaler 2 Puff(s) Inhalation every 6 hours PRN Shortness of Breath and/or Wheezing  guaifenesin/dextromethorphan  Syrup 5 milliLiter(s) Oral every 6 hours PRN Cough  oxyCODONE   IR Oral Tab/Cap - Peds 30 milliGRAM(s) Oral every 4 hours PRN Severe Pain (7 - 10)  zolpidem 5 milliGRAM(s) Oral at bedtime PRN Insomnia    CXR interpreted by me: within normal limits Patient is a 54y old  Male who presents with a chief complaint of COPD exacerbation (23 Feb 2020 13:48)      HPI:  54 year-old male with a PMH of COPD, obstructive sleep apnea (on BiPAP), chronic knee, back pain (on methadone), HLD, HTN, GERD, b/l knee replacement, cholecystectomy and appendectomy presenting for shortness of breath. The patient reports that he has had shortness of breath worse compared to his baseline which started 5 days ago and gradually progressed since that time. He also endorses increased sputum production which began about a week ago, clear in color, and which progressed to a whitish/yellowish color over the subsequent days. The patient endorses subjective fever alternating with chills every ~1 hour; denies associated nausea, vomiting, diarrhea, burning on urination, headache. He reports chest tightness that is worse when coughing. In the ED a chest xray was done.    Vital Signs Last 24 Hrs  T(C): 36.9 (20 Feb 2020 16:21), Max: 36.9 (20 Feb 2020 16:21)  T(F): 98.4 (20 Feb 2020 16:21), Max: 98.4 (20 Feb 2020 16:21)  HR: 84 (20 Feb 2020 23:16) (84 - 91)  BP: 138/86 (20 Feb 2020 23:16) (138/86 - 149/83)  BP(mean): --  RR: 18 (20 Feb 2020 23:16) (17 - 18)  SpO2: 96% (20 Feb 2020 23:16) (96% - 97%) (20 Feb 2020 23:22)      PAST MEDICAL & SURGICAL HISTORY:  REBECCA treated with BiPAP  COPD (chronic obstructive pulmonary disease)  Colitis: LARGE INTESTINE   NO RECENT FLARES  Hiatal hernia: GERD  SOB (shortness of breath)  Osteoarthritis  High blood cholesterol  Morbid obesity  Obstructive sleep apnea  GERD (gastroesophageal reflux disease)  High cholesterol  HTN (hypertension)  H/O knee surgery: arthoscopic x4  Hernia, inguinal, bilateral: 3 months old  History of knee replacement procedure of right knee: BILATERAL  History of appendectomy  History of cholecystectomy      SOCIAL HX:   Smoking                         ETOH                            Other    FAMILY HISTORY:  FH: diabetes mellitus  FH: lung cancer: mother  .  No cardiovascular or pulmonary family history     REVIEW OF SYSTEMS:    CONSTITUTIONAL:   no fever   no chills.  no weight gain   no weight loss    EYES:   no discharge,   no visual changes.    ENT:   Ears: no ear pain and no hearing problems.  Nose: no nasal congestion and no nasal drainage.  Mouth/Throat: no dysphagia,  no hoarseness and no throat pain.  Neck: no lumps, no pain, no stiffness and no swollen glands.    CARDIOVASCULAR:   no chest pain,   no swelling  no palpitaions  no syncope    RESPIRATORY:  no SOB,  no wheezing ,  no respiratory difficulty  no sputum production    GASTROINTESTINAL:   no abdominal pain,   no constipation,   no diarrhea,   no vomiting.    GENITOURINARY:  no dysuria,   no hematuria.    MUSCULOSKELETAL:   no back pain,   no musculoskeletal pain,  no weakness.    SKIN:   no jaundice,    no rashes.    NEURO:   no loss of consciousness,   no headache,   no weakness.    PSYCHIATRIC:   no known mental health issues  no anxiety  no depression    ALLERGIC/IMMUNOLOGIC:   No active allergic or immunologic issues      Allergies    No Known Allergies    Intolerances          PHYSICAL EXAM  Vital Signs Last 24 Hrs  T(C): 36.1 (24 Feb 2020 06:03), Max: 36.4 (23 Feb 2020 21:38)  T(F): 97 (24 Feb 2020 06:03), Max: 97.6 (23 Feb 2020 21:38)  HR: 71 (24 Feb 2020 06:03) (71 - 97)  BP: 129/86 (24 Feb 2020 06:03) (127/91 - 131/80)  BP(mean): --  RR: 17 (24 Feb 2020 06:03) (17 - 18)  SpO2: --    CONSTITUTIONAL:  Well nourished.  NAD    ENT:   Airway patent,   No thrush    EYES:   Clear bilaterally,   pupils equal,   round and reactive to light.    CARDIAC:   Normal rate,   regular rhythm.    no edema      CAROTID:   normal systolic impulse  no bruits    RESPIRATORY:   No wheezing  Nnormal chest expansion  Not tachypneic,  No use of accessory muscles    GASTROINTESTINAL:  Abdomen soft,   non-tender,   no guarding,   + BS    GENITOURINARY  normal genitalia for sex  no edema    MUSCULOSKELETAL:   range of motion is not limited,  no clubbing, cyanosis    NEUROLOGICAL:   Alert and oriented   no motor deficits.  pertinent DTRs normal    SKIN:   Skin normal color for race,   No evidence of rash.    PSYCHIATRIC:   normal mood and affect.   no apparent risk to self or others.    HEME LYMPH:   No cervical  lymphadenopathy.  no inguinal lymphadenopathy          LABS:                          14.7   13.17 )-----------( 252      ( 24 Feb 2020 06:11 )             43.5                                               02-24    135  |  92<L>  |  21<H>  ----------------------------<  107<H>  4.4   |  30  |  0.9    Ca    9.4      24 Feb 2020 06:11  Mg     2.1     02-23    TPro  7.2  /  Alb  4.5  /  TBili  0.4  /  DBili  x   /  AST  16  /  ALT  16  /  AlkPhos  128<H>  02-23                                                                                           LIVER FUNCTIONS - ( 23 Feb 2020 06:15 )  Alb: 4.5 g/dL / Pro: 7.2 g/dL / ALK PHOS: 128 U/L / ALT: 16 U/L / AST: 16 U/L / GGT: x                                              ABG: PH:7.44, CO2: 44, PO2: 80                                                   MEDICATIONS  (STANDING):  albuterol/ipratropium for Nebulization 3 milliLiter(s) Nebulizer every 6 hours  aspirin enteric coated 81 milliGRAM(s) Oral daily  budesonide 160 MICROgram(s)/formoterol 4.5 MICROgram(s) Inhaler 2 Puff(s) Inhalation two times a day  chlorhexidine 4% Liquid 1 Application(s) Topical <User Schedule>  enoxaparin Injectable 40 milliGRAM(s) SubCutaneous at bedtime  fenofibrate Tablet 145 milliGRAM(s) Oral at bedtime  furosemide    Tablet 40 milliGRAM(s) Oral at bedtime  HYDROmorphone   Tablet 4 milliGRAM(s) Oral every 8 hours  levoFLOXacin  Tablet 750 milliGRAM(s) Oral every 24 hours  lisinopril 5 milliGRAM(s) Oral daily  methadone    Tablet 10 milliGRAM(s) Oral every 12 hours  methylPREDNISolone sodium succinate Injectable 60 milliGRAM(s) IV Push every 8 hours  morphine ER Tablet 100 milliGRAM(s) Oral four times a day  pantoprazole    Tablet 40 milliGRAM(s) Oral before breakfast  senna 2 Tablet(s) Oral at bedtime  simvastatin 20 milliGRAM(s) Oral at bedtime  tiotropium 18 MICROgram(s) Capsule 1 Capsule(s) Inhalation daily    MEDICATIONS  (PRN):  ALBUTerol    90 MICROgram(s) HFA Inhaler 2 Puff(s) Inhalation every 6 hours PRN Shortness of Breath and/or Wheezing  guaifenesin/dextromethorphan  Syrup 5 milliLiter(s) Oral every 6 hours PRN Cough  oxyCODONE   IR Oral Tab/Cap - Peds 30 milliGRAM(s) Oral every 4 hours PRN Severe Pain (7 - 10)  zolpidem 5 milliGRAM(s) Oral at bedtime PRN Insomnia    CXR interpreted by me: within normal limits Patient is a 54y old  Male who presents with a chief complaint of COPD exacerbation (23 Feb 2020 13:48)      HPI:  54 year-old male with a PMH of COPD, obstructive sleep apnea (on BiPAP), chronic knee, back pain (on methadone), HLD, HTN, GERD, b/l knee replacement, cholecystectomy and appendectomy presenting for shortness of breath. The patient reports that he has had shortness of breath worse compared to his baseline which started 5 days ago and gradually progressed since that time. He also endorses increased sputum production which began about a week ago, clear in color, and which progressed to a whitish/yellowish color over the subsequent days. The patient endorses subjective fever alternating with chills every ~1 hour; denies associated nausea, vomiting, diarrhea, burning on urination, headache. He reports chest tightness that is worse when coughing. In the ED a chest xray was done.    Vital Signs Last 24 Hrs  T(C): 36.9 (20 Feb 2020 16:21), Max: 36.9 (20 Feb 2020 16:21)  T(F): 98.4 (20 Feb 2020 16:21), Max: 98.4 (20 Feb 2020 16:21)  HR: 84 (20 Feb 2020 23:16) (84 - 91)  BP: 138/86 (20 Feb 2020 23:16) (138/86 - 149/83)  BP(mean): --  RR: 18 (20 Feb 2020 23:16) (17 - 18)  SpO2: 96% (20 Feb 2020 23:16) (96% - 97%) (20 Feb 2020 23:22)      PAST MEDICAL & SURGICAL HISTORY:  REBECCA treated with BiPAP  COPD (chronic obstructive pulmonary disease)  Colitis: LARGE INTESTINE   NO RECENT FLARES  Hiatal hernia: GERD  SOB (shortness of breath)  Osteoarthritis  High blood cholesterol  Morbid obesity  Obstructive sleep apnea  GERD (gastroesophageal reflux disease)  High cholesterol  HTN (hypertension)  H/O knee surgery: arthoscopic x4  Hernia, inguinal, bilateral: 3 months old  History of knee replacement procedure of right knee: BILATERAL  History of appendectomy  History of cholecystectomy      SOCIAL HX:   2PPD for last 35 years    FAMILY HISTORY:  FH: diabetes mellitus  FH: lung cancer: mother  .  No cardiovascular or pulmonary family history     REVIEW OF SYSTEMS:  unable to obtain as patient not in room currently    Allergies    No Known Allergies    Intolerances          PHYSICAL EXAM  Vital Signs Last 24 Hrs  T(C): 36.1 (24 Feb 2020 06:03), Max: 36.4 (23 Feb 2020 21:38)  T(F): 97 (24 Feb 2020 06:03), Max: 97.6 (23 Feb 2020 21:38)  HR: 71 (24 Feb 2020 06:03) (71 - 97)  BP: 129/86 (24 Feb 2020 06:03) (127/91 - 131/80)  BP(mean): --  RR: 17 (24 Feb 2020 06:03) (17 - 18)  SpO2: --    Physical Exam:  Unable to obtain as patient not in room on multiple visits        LABS:                          14.7   13.17 )-----------( 252      ( 24 Feb 2020 06:11 )             43.5                                               02-24    135  |  92<L>  |  21<H>  ----------------------------<  107<H>  4.4   |  30  |  0.9    Ca    9.4      24 Feb 2020 06:11  Mg     2.1     02-23    TPro  7.2  /  Alb  4.5  /  TBili  0.4  /  DBili  x   /  AST  16  /  ALT  16  /  AlkPhos  128<H>  02-23                                                                                           LIVER FUNCTIONS - ( 23 Feb 2020 06:15 )  Alb: 4.5 g/dL / Pro: 7.2 g/dL / ALK PHOS: 128 U/L / ALT: 16 U/L / AST: 16 U/L / GGT: x                                              ABG: PH:7.44, CO2: 44, PO2: 80 on presentation                                                  MEDICATIONS  (STANDING):  albuterol/ipratropium for Nebulization 3 milliLiter(s) Nebulizer every 6 hours  aspirin enteric coated 81 milliGRAM(s) Oral daily  budesonide 160 MICROgram(s)/formoterol 4.5 MICROgram(s) Inhaler 2 Puff(s) Inhalation two times a day  chlorhexidine 4% Liquid 1 Application(s) Topical <User Schedule>  enoxaparin Injectable 40 milliGRAM(s) SubCutaneous at bedtime  fenofibrate Tablet 145 milliGRAM(s) Oral at bedtime  furosemide    Tablet 40 milliGRAM(s) Oral at bedtime  HYDROmorphone   Tablet 4 milliGRAM(s) Oral every 8 hours  levoFLOXacin  Tablet 750 milliGRAM(s) Oral every 24 hours  lisinopril 5 milliGRAM(s) Oral daily  methadone    Tablet 10 milliGRAM(s) Oral every 12 hours  methylPREDNISolone sodium succinate Injectable 60 milliGRAM(s) IV Push every 8 hours  morphine ER Tablet 100 milliGRAM(s) Oral four times a day  pantoprazole    Tablet 40 milliGRAM(s) Oral before breakfast  senna 2 Tablet(s) Oral at bedtime  simvastatin 20 milliGRAM(s) Oral at bedtime  tiotropium 18 MICROgram(s) Capsule 1 Capsule(s) Inhalation daily    MEDICATIONS  (PRN):  ALBUTerol    90 MICROgram(s) HFA Inhaler 2 Puff(s) Inhalation every 6 hours PRN Shortness of Breath and/or Wheezing  guaifenesin/dextromethorphan  Syrup 5 milliLiter(s) Oral every 6 hours PRN Cough  oxyCODONE   IR Oral Tab/Cap - Peds 30 milliGRAM(s) Oral every 4 hours PRN Severe Pain (7 - 10)  zolpidem 5 milliGRAM(s) Oral at bedtime PRN Insomnia    CXR interpreted by me: within normal limits    ECHO: < from: Transthoracic Echocardiogram (02.21.20 @ 07:21) >    Summary:   1. Normal global left ventricular systolic function.   2. LV Ejection Fraction by Bender's Method with a biplane EF of 61 %.   3. Moderately increased LV wall thickness.   4. Normal left ventricular internal cavity size.   5. Normal right atrial size.   6. There is no evidence of pericardial effusion.   7. Mild thickening of the anterior and posterior mitral valve leaflets.   8. No evidence of mitral valve regurgitation.   9. Dilatation of the aortic root.    < end of copied text > Patient is a 54y old  Male who presents with a chief complaint of COPD exacerbation (23 Feb 2020 13:48)      HPI:  54 year-old male with a PMH of COPD, obstructive sleep apnea (on BiPAP), chronic knee, back pain (on methadone), HLD, HTN, GERD, b/l knee replacement, cholecystectomy and appendectomy presenting for shortness of breath. The patient reports that he has had shortness of breath worse compared to his baseline which started 5 days ago and gradually progressed since that time. He also endorses increased sputum production which began about a week ago, clear in color, and which progressed to a whitish/yellowish color over the subsequent days. The patient endorses subjective fever alternating with chills every ~1 hour; denies associated nausea, vomiting, diarrhea, burning on urination, headache. He reports chest tightness that is worse when coughing.    Interval history: Despite IV steroids patient reportedly continues to report shortness of breath. As per primary team patient also having persistent bilateral ronchi. However he is ambulating without oxygen requirement. This morning 95% on room air.     Vital Signs Last 24 Hrs  T(C): 36.9 (20 Feb 2020 16:21), Max: 36.9 (20 Feb 2020 16:21)  T(F): 98.4 (20 Feb 2020 16:21), Max: 98.4 (20 Feb 2020 16:21)  HR: 84 (20 Feb 2020 23:16) (84 - 91)  BP: 138/86 (20 Feb 2020 23:16) (138/86 - 149/83)  BP(mean): --  RR: 18 (20 Feb 2020 23:16) (17 - 18)  SpO2: 96% (20 Feb 2020 23:16) (96% - 97%) (20 Feb 2020 23:22)      PAST MEDICAL & SURGICAL HISTORY:  REBECCA treated with BiPAP  COPD (chronic obstructive pulmonary disease)  Colitis: LARGE INTESTINE   NO RECENT FLARES  Hiatal hernia: GERD  SOB (shortness of breath)  Osteoarthritis  High blood cholesterol  Morbid obesity  Obstructive sleep apnea  GERD (gastroesophageal reflux disease)  High cholesterol  HTN (hypertension)  H/O knee surgery: arthoscopic x4  Hernia, inguinal, bilateral: 3 months old  History of knee replacement procedure of right knee: BILATERAL  History of appendectomy  History of cholecystectomy      SOCIAL HX:   2PPD for last 35 years    FAMILY HISTORY:  FH: diabetes mellitus  FH: lung cancer: mother  .  No cardiovascular or pulmonary family history     REVIEW OF SYSTEMS:  unable to obtain as patient not in room currently    Allergies    No Known Allergies    Intolerances          PHYSICAL EXAM  Vital Signs Last 24 Hrs  T(C): 36.1 (24 Feb 2020 06:03), Max: 36.4 (23 Feb 2020 21:38)  T(F): 97 (24 Feb 2020 06:03), Max: 97.6 (23 Feb 2020 21:38)  HR: 71 (24 Feb 2020 06:03) (71 - 97)  BP: 129/86 (24 Feb 2020 06:03) (127/91 - 131/80)  BP(mean): --  RR: 17 (24 Feb 2020 06:03) (17 - 18)  SpO2: --    Physical Exam:  Unable to obtain as patient not in room on multiple visits        LABS:                          14.7   13.17 )-----------( 252      ( 24 Feb 2020 06:11 )             43.5                                               02-24    135  |  92<L>  |  21<H>  ----------------------------<  107<H>  4.4   |  30  |  0.9    Ca    9.4      24 Feb 2020 06:11  Mg     2.1     02-23    TPro  7.2  /  Alb  4.5  /  TBili  0.4  /  DBili  x   /  AST  16  /  ALT  16  /  AlkPhos  128<H>  02-23                                                                                           LIVER FUNCTIONS - ( 23 Feb 2020 06:15 )  Alb: 4.5 g/dL / Pro: 7.2 g/dL / ALK PHOS: 128 U/L / ALT: 16 U/L / AST: 16 U/L / GGT: x                                              ABG: PH:7.44, CO2: 44, PO2: 80 on presentation                                                  MEDICATIONS  (STANDING):  albuterol/ipratropium for Nebulization 3 milliLiter(s) Nebulizer every 6 hours  aspirin enteric coated 81 milliGRAM(s) Oral daily  budesonide 160 MICROgram(s)/formoterol 4.5 MICROgram(s) Inhaler 2 Puff(s) Inhalation two times a day  chlorhexidine 4% Liquid 1 Application(s) Topical <User Schedule>  enoxaparin Injectable 40 milliGRAM(s) SubCutaneous at bedtime  fenofibrate Tablet 145 milliGRAM(s) Oral at bedtime  furosemide    Tablet 40 milliGRAM(s) Oral at bedtime  HYDROmorphone   Tablet 4 milliGRAM(s) Oral every 8 hours  levoFLOXacin  Tablet 750 milliGRAM(s) Oral every 24 hours  lisinopril 5 milliGRAM(s) Oral daily  methadone    Tablet 10 milliGRAM(s) Oral every 12 hours  methylPREDNISolone sodium succinate Injectable 60 milliGRAM(s) IV Push every 8 hours  morphine ER Tablet 100 milliGRAM(s) Oral four times a day  pantoprazole    Tablet 40 milliGRAM(s) Oral before breakfast  senna 2 Tablet(s) Oral at bedtime  simvastatin 20 milliGRAM(s) Oral at bedtime  tiotropium 18 MICROgram(s) Capsule 1 Capsule(s) Inhalation daily    MEDICATIONS  (PRN):  ALBUTerol    90 MICROgram(s) HFA Inhaler 2 Puff(s) Inhalation every 6 hours PRN Shortness of Breath and/or Wheezing  guaifenesin/dextromethorphan  Syrup 5 milliLiter(s) Oral every 6 hours PRN Cough  oxyCODONE   IR Oral Tab/Cap - Peds 30 milliGRAM(s) Oral every 4 hours PRN Severe Pain (7 - 10)  zolpidem 5 milliGRAM(s) Oral at bedtime PRN Insomnia    CXR interpreted by me: within normal limits    ECHO: < from: Transthoracic Echocardiogram (02.21.20 @ 07:21) >    Summary:   1. Normal global left ventricular systolic function.   2. LV Ejection Fraction by Bender's Method with a biplane EF of 61 %.   3. Moderately increased LV wall thickness.   4. Normal left ventricular internal cavity size.   5. Normal right atrial size.   6. There is no evidence of pericardial effusion.   7. Mild thickening of the anterior and posterior mitral valve leaflets.   8. No evidence of mitral valve regurgitation.   9. Dilatation of the aortic root.    < end of copied text >

## 2020-02-24 NOTE — CHART NOTE - NSCHARTNOTEFT_GEN_A_CORE
Pulmonary team attempted multiple times to see patient throughout the day however patient persistently out of room throughout the morning and into the afternoon. Waited outside room in the afternoon for 30 minutes however patient not present. Unable to see for now.

## 2020-02-24 NOTE — PROGRESS NOTE ADULT - ASSESSMENT
The patient is a 54 year-old male with a PMH of COPD, obstructive sleep apnea (on BiPAP), chronic knee, back pain (on methadone), HLD, HTN, GERD, b/l knee replacement, cholecystectomy and appendectomy presenting for shortness of breath, admitted for COPD exacerbation and chronic pain.      # Shortness of breath x 5 days w/ increased sputum production likely secondary to COPD exacerbation vs HFrEF exacerbation, less likely  flu/rsv negative  C/w Spiriva, symbicort   duonebs q6h  Levaquin 750 mg PO daily, today day 4  solumedrol 60 q8  pulmonary following  Robitussin q6h, PRN  will send probnp  2d echo nl EF and systolic function  if not improving may consider CT chest    # HTN  C/w Lasix 40 mg PO qHS, lisinopril 5 mg PO qD  Vitals per routine    # Chronic knee, back pain  C/w methadone 10 mg twice daily (patient reports he is prescribed this; does not see methadone clinic)  C/w morphine sulfate 100 mg PO q6h, oxycodone 30 mg TD  Dilaudid 4 mg PO TID  Senna qHS    # REBECCA on BiPAP  C/w BiPAP qHS  Vitals per routine    # HLD  C/w statin 20 mg PO qHS, fenofibrate    # GERD  Pantoprazole 40 mg PO qAM    # CHG  # DVT ppx- Lovenox 40 mg SQ   # GI ppx- pantoprazole 40 mg PO   # Diet- DASH  # Activity- ambulate as tolerated  # Dispo- acute

## 2020-02-24 NOTE — PROGRESS NOTE ADULT - ATTENDING COMMENTS
54 year-old male with a PMH of COPD, obstructive sleep apnea (on BiPAP), chronic knee, back pain (on methadone), HLD, HTN, GERD, b/l knee replacement, cholecystectomy and appendectomy presenting for shortness of breath, admitted for COPD exacerbation and chronic pain.    attempted to see and examine patient 3 times and pt not in room or on floor.     #acute on chronic exacerbation of COPD 2/2 medication non compliance vs. PNA  -cont with spiriva, nebs q6h standing  -cont solumedrol 60 daily   -cont symbicort   -O2 NC to maintain sats >90%  -empirically treat with levofloxacin   -f/u pulm  -obtain CT thorax    #HTN  stable  -cont with present home medications  #chronic pain  -cont home pain medications  #REBECCA  -bipap at night  #HLD  -cont with home medications  #GERD  -cont ppi    Progress Note Handoff  Pending:   transition to po steroids, symptomatic improvement, pulm eval   Patient/Family discussion: pt not seen during rounds. not in room despite going back 3 times over the course of the morning and afternoon   Disposition: from home likely home in 48hrs 54 year-old male with a PMH of COPD, obstructive sleep apnea (on BiPAP), chronic knee, back pain (on methadone), HLD, HTN, GERD, b/l knee replacement, cholecystectomy and appendectomy presenting for shortness of breath, admitted for COPD exacerbation and chronic pain.    pt seen and examined at bedside. breathing status mildly improved per pt. lethargic on exam however.     #acute on chronic exacerbation of COPD 2/2 medication non compliance vs. PNA  -cont with spiriva, nebs q6h standing  -cont solumedrol 60 daily   -cont symbicort   -O2 NC to maintain sats >90%  -empirically treat with levofloxacin   -f/u pulm  -obtain CT thorax  -repeat ABG    #HTN  stable  -cont with present home medications  #chronic pain  -cont home pain medications  #REBECCA  -bipap at night  #HLD  -cont with home medications  #GERD  -cont ppi    Progress Note Handoff  Pending:   transition to po steroids, symptomatic improvement, pulm eval, ct thorax, abg  Patient/Family discussion: spoke with pt at bedside no questions at this time   Disposition: from home to return when medically stable

## 2020-02-24 NOTE — PROGRESS NOTE ADULT - SUBJECTIVE AND OBJECTIVE BOX
Hospital Day:  4d    Subjective:    Patient is a 54y old  Male who presents with a chief complaint of COPD exacerbation (24 Feb 2020 10:40)    Interval: No acute events overnight. Patient breathing well on room air, however still with very loud and coarse wheezes/rhonchi.     Past Medical Hx:   REBECCA treated with BiPAP  COPD (chronic obstructive pulmonary disease)  Numbness and tingling  Colitis  Hiatal hernia  SOB (shortness of breath)  Osteoarthritis  High blood cholesterol  Morbid obesity  Obstructive sleep apnea  GERD (gastroesophageal reflux disease)  High cholesterol  HTN (hypertension)  COPD (chronic obstructive pulmonary disease)    Past Sx:  H/O knee surgery  Hernia, inguinal, bilateral  History of knee replacement procedure of right knee  History of appendectomy  History of cholecystectomy    Allergies:  No Known Allergies    Current Meds:   Standng Meds:  albuterol/ipratropium for Nebulization 3 milliLiter(s) Nebulizer every 6 hours  aspirin enteric coated 81 milliGRAM(s) Oral daily  budesonide 160 MICROgram(s)/formoterol 4.5 MICROgram(s) Inhaler 2 Puff(s) Inhalation two times a day  chlorhexidine 4% Liquid 1 Application(s) Topical <User Schedule>  enoxaparin Injectable 40 milliGRAM(s) SubCutaneous at bedtime  fenofibrate Tablet 145 milliGRAM(s) Oral at bedtime  furosemide    Tablet 40 milliGRAM(s) Oral at bedtime  HYDROmorphone   Tablet 4 milliGRAM(s) Oral every 8 hours  levoFLOXacin  Tablet 750 milliGRAM(s) Oral every 24 hours  lisinopril 5 milliGRAM(s) Oral daily  methadone    Tablet 10 milliGRAM(s) Oral every 12 hours  methylPREDNISolone sodium succinate Injectable 60 milliGRAM(s) IV Push every 8 hours  morphine ER Tablet 100 milliGRAM(s) Oral four times a day  pantoprazole    Tablet 40 milliGRAM(s) Oral before breakfast  senna 2 Tablet(s) Oral at bedtime  simvastatin 20 milliGRAM(s) Oral at bedtime  tiotropium 18 MICROgram(s) Capsule 1 Capsule(s) Inhalation daily    PRN Meds:  ALBUTerol    90 MICROgram(s) HFA Inhaler 2 Puff(s) Inhalation every 6 hours PRN Shortness of Breath and/or Wheezing  guaifenesin/dextromethorphan  Syrup 5 milliLiter(s) Oral every 6 hours PRN Cough  oxyCODONE   IR Oral Tab/Cap - Peds 30 milliGRAM(s) Oral every 4 hours PRN Severe Pain (7 - 10)  zolpidem 5 milliGRAM(s) Oral at bedtime PRN Insomnia    HOME MEDICATIONS:  Advair Diskus 100 mcg-50 mcg inhalation powder: 1 dose(s) inhaled 2 times a day  aspirin 81 mg oral tablet: 1 tab(s) orally once a day  docusate sodium 100 mg oral capsule: 1 cap(s) orally once a day (at bedtime)  fenofibrate 134 mg oral capsule: 1 cap(s) orally once a day (at bedtime)  furosemide 40 mg oral tablet: 1 tab(s) orally once a day (at bedtime)  HYDROmorphone 4 mg oral tablet: 1 tab(s) orally every 8 hours  lisinopril 5 mg oral tablet: 1 tab(s) orally once a day  methadone 10 mg oral tablet: 1 tab(s) orally every 12 hours  morphine 100 mg/12 hours oral tablet, extended release: 1 tab(s) orally 4 times a day  NexIUM 40 mg oral delayed release capsule: 1 cap(s) orally once a day  oxyCODONE 30 mg oral tablet: 1 tab(s) orally every 4 hours, As Needed  Spiriva 18 mcg inhalation capsule: 1 cap(s) inhaled once a day, As Needed  Xopenex HFA: inhaled once a day, As Needed  Zocor 20 mg oral tablet: 1 tab(s) orally once a day (at bedtime)    Vital Signs:   T(F): 97 (02-24-20 @ 06:03), Max: 97.6 (02-23-20 @ 21:38)  HR: 71 (02-24-20 @ 06:03) (71 - 79)  BP: 129/86 (02-24-20 @ 06:03) (127/91 - 129/86)  RR: 17 (02-24-20 @ 06:03) (17 - 18)  SpO2: --    Physical Exam:   GENERAL: NAD  HEENT: NCAT  CHEST/LUNG: Coarse wheezes and rhonchi  HEART: Regular rate and rhythm; s1 s2 appreciated  ABDOMEN: Soft, Nontender, Nondistended; Bowel sounds present  EXTREMITIES: No LE edema b/l  NERVOUS SYSTEM:  Alert & Oriented X3    Labs:                         14.7   13.17 )-----------( 252      ( 24 Feb 2020 06:11 )             43.5       24 Feb 2020 06:11    135    |  92     |  21     ----------------------------<  107    4.4     |  30     |  0.9      Ca    9.4        24 Feb 2020 06:11  Mg     2.1       23 Feb 2020 06:15    TPro  7.2    /  Alb  4.5    /  TBili  0.4    /  DBili  x      /  AST  16     /  ALT  16     /  AlkPhos  128    23 Feb 2020 06:15    Troponin <0.01, CKMB --, CK -- 02-20-20 @ 19:00    Radiology:     < from: Xray Chest 1 View- PORTABLE-Routine (02.23.20 @ 08:20) >  Impression:      No radiographic evidence of acute cardiopulmonary disease.    < end of copied text >

## 2020-02-25 LAB
AMPHET UR-MCNC: NEGATIVE — SIGNIFICANT CHANGE UP
ANION GAP SERPL CALC-SCNC: 12 MMOL/L — SIGNIFICANT CHANGE UP (ref 7–14)
BARBITURATES UR SCN-MCNC: NEGATIVE — SIGNIFICANT CHANGE UP
BASOPHILS # BLD AUTO: 0.01 K/UL — SIGNIFICANT CHANGE UP (ref 0–0.2)
BASOPHILS NFR BLD AUTO: 0.1 % — SIGNIFICANT CHANGE UP (ref 0–1)
BENZODIAZ UR-MCNC: NEGATIVE — SIGNIFICANT CHANGE UP
BUN SERPL-MCNC: 22 MG/DL — HIGH (ref 10–20)
CALCIUM SERPL-MCNC: 9.4 MG/DL — SIGNIFICANT CHANGE UP (ref 8.5–10.1)
CHLORIDE SERPL-SCNC: 94 MMOL/L — LOW (ref 98–110)
CO2 SERPL-SCNC: 30 MMOL/L — SIGNIFICANT CHANGE UP (ref 17–32)
COCAINE METAB.OTHER UR-MCNC: NEGATIVE — SIGNIFICANT CHANGE UP
CREAT SERPL-MCNC: 1 MG/DL — SIGNIFICANT CHANGE UP (ref 0.7–1.5)
EOSINOPHIL # BLD AUTO: 0 K/UL — SIGNIFICANT CHANGE UP (ref 0–0.7)
EOSINOPHIL NFR BLD AUTO: 0 % — SIGNIFICANT CHANGE UP (ref 0–8)
GLUCOSE SERPL-MCNC: 199 MG/DL — HIGH (ref 70–99)
HCT VFR BLD CALC: 45.1 % — SIGNIFICANT CHANGE UP (ref 42–52)
HGB BLD-MCNC: 15.1 G/DL — SIGNIFICANT CHANGE UP (ref 14–18)
IMM GRANULOCYTES NFR BLD AUTO: 1 % — HIGH (ref 0.1–0.3)
LYMPHOCYTES # BLD AUTO: 1.5 K/UL — SIGNIFICANT CHANGE UP (ref 1.2–3.4)
LYMPHOCYTES # BLD AUTO: 11.1 % — LOW (ref 20.5–51.1)
MAGNESIUM SERPL-MCNC: 2.5 MG/DL — HIGH (ref 1.8–2.4)
MCHC RBC-ENTMCNC: 30 PG — SIGNIFICANT CHANGE UP (ref 27–31)
MCHC RBC-ENTMCNC: 33.5 G/DL — SIGNIFICANT CHANGE UP (ref 32–37)
MCV RBC AUTO: 89.7 FL — SIGNIFICANT CHANGE UP (ref 80–94)
METHADONE UR-MCNC: POSITIVE
MONOCYTES # BLD AUTO: 0.51 K/UL — SIGNIFICANT CHANGE UP (ref 0.1–0.6)
MONOCYTES NFR BLD AUTO: 3.8 % — SIGNIFICANT CHANGE UP (ref 1.7–9.3)
NEUTROPHILS # BLD AUTO: 11.32 K/UL — HIGH (ref 1.4–6.5)
NEUTROPHILS NFR BLD AUTO: 84 % — HIGH (ref 42.2–75.2)
NRBC # BLD: 0 /100 WBCS — SIGNIFICANT CHANGE UP (ref 0–0)
OPIATES UR-MCNC: POSITIVE
PCP SPEC-MCNC: SIGNIFICANT CHANGE UP
PLATELET # BLD AUTO: 267 K/UL — SIGNIFICANT CHANGE UP (ref 130–400)
POTASSIUM SERPL-MCNC: 4.6 MMOL/L — SIGNIFICANT CHANGE UP (ref 3.5–5)
POTASSIUM SERPL-SCNC: 4.6 MMOL/L — SIGNIFICANT CHANGE UP (ref 3.5–5)
PROPOXYPHENE QUALITATIVE URINE RESULT: NEGATIVE — SIGNIFICANT CHANGE UP
RBC # BLD: 5.03 M/UL — SIGNIFICANT CHANGE UP (ref 4.7–6.1)
RBC # FLD: 14.7 % — HIGH (ref 11.5–14.5)
SODIUM SERPL-SCNC: 136 MMOL/L — SIGNIFICANT CHANGE UP (ref 135–146)
WBC # BLD: 13.48 K/UL — HIGH (ref 4.8–10.8)
WBC # FLD AUTO: 13.48 K/UL — HIGH (ref 4.8–10.8)

## 2020-02-25 PROCEDURE — 99406 BEHAV CHNG SMOKING 3-10 MIN: CPT

## 2020-02-25 PROCEDURE — 71046 X-RAY EXAM CHEST 2 VIEWS: CPT | Mod: 26

## 2020-02-25 PROCEDURE — 72050 X-RAY EXAM NECK SPINE 4/5VWS: CPT | Mod: 26

## 2020-02-25 PROCEDURE — 93970 EXTREMITY STUDY: CPT | Mod: 26

## 2020-02-25 PROCEDURE — 70450 CT HEAD/BRAIN W/O DYE: CPT | Mod: 26

## 2020-02-25 PROCEDURE — 99233 SBSQ HOSP IP/OBS HIGH 50: CPT

## 2020-02-25 PROCEDURE — 73562 X-RAY EXAM OF KNEE 3: CPT | Mod: 26,LT

## 2020-02-25 RX ORDER — METHADONE HYDROCHLORIDE 40 MG/1
10 TABLET ORAL EVERY 12 HOURS
Refills: 0 | Status: DISCONTINUED | OUTPATIENT
Start: 2020-02-25 | End: 2020-02-27

## 2020-02-25 RX ADMIN — OXYCODONE HYDROCHLORIDE 30 MILLIGRAM(S): 5 TABLET ORAL at 11:04

## 2020-02-25 RX ADMIN — Medication 3 MILLILITER(S): at 19:25

## 2020-02-25 RX ADMIN — MORPHINE SULFATE 100 MILLIGRAM(S): 50 CAPSULE, EXTENDED RELEASE ORAL at 19:09

## 2020-02-25 RX ADMIN — MORPHINE SULFATE 100 MILLIGRAM(S): 50 CAPSULE, EXTENDED RELEASE ORAL at 17:34

## 2020-02-25 RX ADMIN — HYDROMORPHONE HYDROCHLORIDE 4 MILLIGRAM(S): 2 INJECTION INTRAMUSCULAR; INTRAVENOUS; SUBCUTANEOUS at 21:39

## 2020-02-25 RX ADMIN — SENNA PLUS 2 TABLET(S): 8.6 TABLET ORAL at 21:40

## 2020-02-25 RX ADMIN — HYDROMORPHONE HYDROCHLORIDE 4 MILLIGRAM(S): 2 INJECTION INTRAMUSCULAR; INTRAVENOUS; SUBCUTANEOUS at 13:23

## 2020-02-25 RX ADMIN — HYDROMORPHONE HYDROCHLORIDE 4 MILLIGRAM(S): 2 INJECTION INTRAMUSCULAR; INTRAVENOUS; SUBCUTANEOUS at 22:10

## 2020-02-25 RX ADMIN — METHADONE HYDROCHLORIDE 10 MILLIGRAM(S): 40 TABLET ORAL at 09:16

## 2020-02-25 RX ADMIN — SIMVASTATIN 20 MILLIGRAM(S): 20 TABLET, FILM COATED ORAL at 21:39

## 2020-02-25 RX ADMIN — HYDROMORPHONE HYDROCHLORIDE 4 MILLIGRAM(S): 2 INJECTION INTRAMUSCULAR; INTRAVENOUS; SUBCUTANEOUS at 16:46

## 2020-02-25 RX ADMIN — OXYCODONE HYDROCHLORIDE 30 MILLIGRAM(S): 5 TABLET ORAL at 10:34

## 2020-02-25 RX ADMIN — METHADONE HYDROCHLORIDE 10 MILLIGRAM(S): 40 TABLET ORAL at 17:34

## 2020-02-25 RX ADMIN — MORPHINE SULFATE 100 MILLIGRAM(S): 50 CAPSULE, EXTENDED RELEASE ORAL at 12:07

## 2020-02-25 RX ADMIN — TIOTROPIUM BROMIDE 1 CAPSULE(S): 18 CAPSULE ORAL; RESPIRATORY (INHALATION) at 07:36

## 2020-02-25 RX ADMIN — Medication 60 MILLIGRAM(S): at 06:43

## 2020-02-25 RX ADMIN — PANTOPRAZOLE SODIUM 40 MILLIGRAM(S): 20 TABLET, DELAYED RELEASE ORAL at 06:43

## 2020-02-25 RX ADMIN — Medication 81 MILLIGRAM(S): at 12:07

## 2020-02-25 RX ADMIN — LISINOPRIL 5 MILLIGRAM(S): 2.5 TABLET ORAL at 06:43

## 2020-02-25 RX ADMIN — BUDESONIDE AND FORMOTEROL FUMARATE DIHYDRATE 2 PUFF(S): 160; 4.5 AEROSOL RESPIRATORY (INHALATION) at 08:08

## 2020-02-25 RX ADMIN — BUDESONIDE AND FORMOTEROL FUMARATE DIHYDRATE 2 PUFF(S): 160; 4.5 AEROSOL RESPIRATORY (INHALATION) at 21:40

## 2020-02-25 RX ADMIN — Medication 3 MILLILITER(S): at 07:36

## 2020-02-25 RX ADMIN — CHLORHEXIDINE GLUCONATE 1 APPLICATION(S): 213 SOLUTION TOPICAL at 06:43

## 2020-02-25 RX ADMIN — Medication 145 MILLIGRAM(S): at 21:39

## 2020-02-25 RX ADMIN — MORPHINE SULFATE 100 MILLIGRAM(S): 50 CAPSULE, EXTENDED RELEASE ORAL at 16:46

## 2020-02-25 RX ADMIN — Medication 40 MILLIGRAM(S): at 21:39

## 2020-02-25 NOTE — CHART NOTE - NSCHARTNOTEFT_GEN_A_CORE
This AM pt irritable with security present demanding that he be allowed to go down and get coffee. Fell x2 overnight but pt insists because he was on edge of bed and he was sleepy. And denies second fall, insists it was a slip.     Went down to 'get coffee' with wheelchair. However afterwards ambulated and complained that he wants to walk around.  REFUSING ALL FALL PRECUATIONS. Wants 1:1 d/c'd. After discussion agreed that pt could ambulate around unit and would reassess 1:1 ~ noon.  Pt is known to go out and smoke cigarettes downstairs. Had pills as per RN that he refused to give or show nurse. Denies this. This AM pt irritable with security present demanding that he be allowed to go down and get coffee. Fell x2 overnight but pt insists because he was on edge of bed and he was sleepy. And denies second fall, insists it was a slip.     Went down to 'get coffee' with wheelchair. However afterwards ambulated and complained that he wants to walk around.  REFUSING ALL FALL PRECUATIONS. Wants 1:1 d/c'd. After discussion agreed that pt could ambulate around unit and would reassess 1:1 ~ noon.  Pt is known to go out and smoke cigarettes downstairs. Had pills as per RN that he refused to give or show nurse. Denies this.    UPDATE:   Patient REFUSED 1:1. Walking outside 'to get air'. Asking for pain pills.   Explained repeatedly risk of fall and risk of death, but pt continues to insist he's 'fine'.

## 2020-02-25 NOTE — PROGRESS NOTE ADULT - ASSESSMENT
The patient is a 54 year-old male with a PMH of COPD, obstructive sleep apnea (on BiPAP), chronic knee, back pain (on methadone), HLD, HTN, GERD, b/l knee replacement, cholecystectomy and appendectomy presenting for shortness of breath, admitted for COPD exacerbation and chronic pain.      # Shortness of breath x 5 days w/ increased sputum production likely secondary to COPD exacerbation  flu/rsv negative  C/w Spiriva, symbicort   duonebs q6h  Levaquin 750 mg PO daily, today day 5  solumedrol 60 q8  Robitussin q6h, PRN  probnp- 15/negative  2d echo nl EF and systolic function  f/u CT chest results, pulmonary consult    # HTN  C/w Lasix 40 mg PO qHS, lisinopril 5 mg PO qD  Vitals per routine    # Chronic knee, back pain  C/w methadone 10 mg twice daily (patient reports he is prescribed this; does not see methadone clinic)  C/w morphine sulfate 100 mg PO q6h, oxycodone 30 mg TD  Dilaudid 4 mg PO TID  Senna qHS    # REBECCA on BiPAP  C/w BiPAP qHS  Vitals per routine    # HLD  C/w statin 20 mg PO qHS, fenofibrate    # GERD  Pantoprazole 40 mg PO qAM    # CHG  # DVT ppx- Lovenox 40 mg SQ   # GI ppx- pantoprazole 40 mg PO   # Diet- DASH  # Activity- ambulate as tolerated  # Dispo- acute The patient is a 54 year-old male with a PMH of COPD, obstructive sleep apnea (on BiPAP), chronic knee, back pain (on methadone), HLD, HTN, GERD, b/l knee replacement, cholecystectomy and appendectomy presenting for shortness of breath, admitted for COPD exacerbation and chronic pain.      # Shortness of breath x 5 days w/ increased sputum production likely secondary to COPD exacerbation  flu/rsv negative  C/w Spiriva, symbicort   duonebs q6h  Levaquin 750 mg PO daily, today day 5  solumedrol 60 q8--> decrease today to q12  Robitussin q6h, PRN  probnp- 15/negative  2d echo nl EF and systolic function  CT chest- unremarkable  f/u pulmonary consult/recc's    # HTN  C/w Lasix 40 mg PO qHS, lisinopril 5 mg PO qD  Vitals per routine    # Chronic knee, back pain  C/w methadone 10 mg twice daily (patient reports he is prescribed this; does not see methadone clinic)  C/w morphine sulfate 100 mg PO q6h, oxycodone 30 mg TD  Dilaudid 4 mg PO TID  Senna qHS    # REBECCA on BiPAP  C/w BiPAP qHS  Vitals per routine    # HLD  C/w statin 20 mg PO qHS, fenofibrate    # GERD  Pantoprazole 40 mg PO qAM    # CHG  # DVT ppx- Lovenox 40 mg SQ   # GI ppx- pantoprazole 40 mg PO   # Diet- DASH  # Activity- ambulate as tolerated  # Dispo- acute, possible within 24 hours depending on pulmonary recc's

## 2020-02-25 NOTE — CHART NOTE - NSCHARTNOTEFT_GEN_A_CORE
Called by RN for repeat fall at 3 am.    Initial fall was around 12 pm when patient fell from bed to his knees at the time, denied any LOC, seizure, palpitations, lightheadedness. Physical Exam revealed course wheezing and rhonchi, but no signs of trauma to head, neck, cervical, thoracic or lumbar spine. Pt is obese and denies any pain. Knees and hips non-tender will full ROM. CN II-XII intact, alert and oriented x 3, no dysmetria and full ROM of knees bilaterally. Dr. Burns was made aware, pt on ASA ( antiplatelet ) and LMWH.  X-ray of the knees bilaterally was ordered, narcotics were held. Patient refused to have family made aware.    Per patient second fall occurred during sleep. Patient has sob due to COPD and has a tough time getting comfortable at night. Denied any LOC, seizure, palpitations, lightheadedness. States he accidently fell head first onto the floor while reposition, denies any lightheadedness. Repeat Physical exam similar to prior with the exception of a hematoma and small laceration to R eyebrow. He was not oriented to time at the repeat examination but oriented to person and place. Attending made aware, pt refuses to have family informed.    #Fall- Possible concussion  - CT Head NC ordered  - Cervical X-ray  - Surgery for lac repair    At the end of the visit patient refuses to remain in bed, putting his jacket on. States " I need to go for a smoke". Basilio Dee called.

## 2020-02-25 NOTE — PROGRESS NOTE ADULT - SUBJECTIVE AND OBJECTIVE BOX
KAYLEIGH TOMAS  54y  [facility] T1-3A [id number] B      Patient is a 54y old  Male who presents with a chief complaint of COPD exacerbation (25 Feb 2020 08:14)      INTERVAL HPI/OVERNIGHT EVENTS:  episode of fall overnight   per hoursestaff noncompliant leaving floor despite maximal education , placed on 1:1 which patient refused       REVIEW OF SYSTEMS:  CONSTITUTIONAL: No fever, weight loss, or fatigue  EYES: No eye pain, visual disturbances, or discharge  ENMT:  No difficulty hearing, tinnitus, vertigo; No sinus or throat pain  NECK: No pain or stiffness  BREASTS: No pain, masses, or nipple discharge  RESPIRATORY: +cough and episodes of shortness of  breath   CARDIOVASCULAR: No chest pain, palpitations, dizziness, or leg swelling  GASTROINTESTINAL: No abdominal or epigastric pain. No nausea, vomiting, or hematemesis; No diarrhea or constipation. No melena or hematochezia.  GENITOURINARY: No dysuria, frequency, hematuria, or incontinence  NEUROLOGICAL: No headaches, memory loss, loss of strength, numbness, or tremors  SKIN: No itching, burning, rashes, or lesions   LYMPH NODES: No enlarged glands  ENDOCRINE: No heat or cold intolerance; No hair loss  MUSCULOSKELETAL: No joint pain or swelling; No muscle, back, or extremity pain  PSYCHIATRIC: No depression, anxiety, mood swings, or difficulty sleeping  HEME/LYMPH: No easy bruising, or bleeding gums  ALLERY AND IMMUNOLOGIC: No hives or eczema  FAMILY HISTORY:  FH: diabetes mellitus  FH: lung cancer: mother    T(C): 35.8 (02-25-20 @ 05:10), Max: 36.8 (02-24-20 @ 21:00)  HR: 84 (02-25-20 @ 05:10) (82 - 87)  BP: 121/78 (02-25-20 @ 05:10) (116/57 - 133/88)  RR: 18 (02-25-20 @ 05:10) (18 - 19)  SpO2: 100% (02-25-20 @ 09:17) (93% - 100%)  Wt(kg): --Vital Signs Last 24 Hrs  T(C): 35.8 (25 Feb 2020 05:10), Max: 36.8 (24 Feb 2020 21:00)  T(F): 96.4 (25 Feb 2020 05:10), Max: 98.2 (24 Feb 2020 21:00)  HR: 84 (25 Feb 2020 05:10) (82 - 87)  BP: 121/78 (25 Feb 2020 05:10) (116/57 - 133/88)  BP(mean): --  RR: 18 (25 Feb 2020 05:10) (18 - 19)  SpO2: 100% (25 Feb 2020 09:17) (93% - 100%)    PHYSICAL EXAM:  GENERAL: NAD, well-groomed, well-developed  HEAD:  Atraumatic, Normocephalic  EYES: EOMI, PERRLA, conjunctiva and sclera clear  ENMT: No tonsillar erythema, exudates, or enlargement; Moist mucous membranes, Good dentition, No lesions  NECK: Supple, No JVD, Normal thyroid  NERVOUS SYSTEM:  Alert & Oriented X3, Good concentration; Motor Strength 5/5 B/L upper and lower extremities; DTRs 2+ intact and symmetric  PULM: diffuse rhonchi  CARDIAC:S1s2  GI: Soft, Nontender, Nondistended; Bowel sounds present obese   EXTREMITIES:  2+ Peripheral Pulses, No clubbing, cyanosis, or edema  LYMPH: No lymphadenopathy noted  SKIN: right laceration around level of right eyebrow ~3cm in length      LABS:                            15.1   13.48 )-----------( 267      ( 25 Feb 2020 07:44 )             45.1   02-25    136  |  94<L>  |  22<H>  ----------------------------<  199<H>  4.6   |  30  |  1.0    Ca    9.4      25 Feb 2020 07:44  Mg     2.5     02-25              ALBUTerol    90 MICROgram(s) HFA Inhaler 2 Puff(s) Inhalation every 6 hours PRN  albuterol/ipratropium for Nebulization 3 milliLiter(s) Nebulizer every 6 hours  aspirin enteric coated 81 milliGRAM(s) Oral daily  budesonide 160 MICROgram(s)/formoterol 4.5 MICROgram(s) Inhaler 2 Puff(s) Inhalation two times a day  chlorhexidine 4% Liquid 1 Application(s) Topical <User Schedule>  enoxaparin Injectable 40 milliGRAM(s) SubCutaneous at bedtime  fenofibrate Tablet 145 milliGRAM(s) Oral at bedtime  furosemide    Tablet 40 milliGRAM(s) Oral at bedtime  guaifenesin/dextromethorphan  Syrup 5 milliLiter(s) Oral every 6 hours PRN  HYDROmorphone   Tablet 4 milliGRAM(s) Oral every 8 hours  levoFLOXacin  Tablet 750 milliGRAM(s) Oral every 24 hours  lisinopril 5 milliGRAM(s) Oral daily  methadone    Tablet 10 milliGRAM(s) Oral every 12 hours  methylPREDNISolone sodium succinate Injectable 60 milliGRAM(s) IV Push every 12 hours  morphine ER Tablet 100 milliGRAM(s) Oral four times a day  oxyCODONE   IR Oral Tab/Cap - Peds 30 milliGRAM(s) Oral every 4 hours PRN  pantoprazole    Tablet 40 milliGRAM(s) Oral before breakfast  senna 2 Tablet(s) Oral at bedtime  simvastatin 20 milliGRAM(s) Oral at bedtime  tiotropium 18 MICROgram(s) Capsule 1 Capsule(s) Inhalation daily  zolpidem 5 milliGRAM(s) Oral at bedtime PRN      HEALTH ISSUES - PROBLEM Dx:          Case Discussed with House Staff     Spectra [phone]

## 2020-02-25 NOTE — PROGRESS NOTE ADULT - SUBJECTIVE AND OBJECTIVE BOX
Hospital Day:  5d    Subjective:    Patient is a 54y old  Male who presents with a chief complaint of COPD exacerbation (24 Feb 2020 13:24)    Interval: Patient fell twice overnight including once on his face sustaining laceration to his brow. Denies taking any extra medication, however per nurse, he threw out some pills and didn't let anyone see them. CT head was negative.     Past Medical Hx:   REBECCA treated with BiPAP  COPD (chronic obstructive pulmonary disease)  Numbness and tingling  Colitis  Hiatal hernia  SOB (shortness of breath)  Osteoarthritis  High blood cholesterol  Morbid obesity  Obstructive sleep apnea  GERD (gastroesophageal reflux disease)  High cholesterol  HTN (hypertension)  COPD (chronic obstructive pulmonary disease)    Past Sx:  H/O knee surgery  Hernia, inguinal, bilateral  History of knee replacement procedure of right knee  History of appendectomy  History of cholecystectomy    Allergies:  No Known Allergies    Current Meds:   Standng Meds:  albuterol/ipratropium for Nebulization 3 milliLiter(s) Nebulizer every 6 hours  aspirin enteric coated 81 milliGRAM(s) Oral daily  budesonide 160 MICROgram(s)/formoterol 4.5 MICROgram(s) Inhaler 2 Puff(s) Inhalation two times a day  chlorhexidine 4% Liquid 1 Application(s) Topical <User Schedule>  enoxaparin Injectable 40 milliGRAM(s) SubCutaneous at bedtime  fenofibrate Tablet 145 milliGRAM(s) Oral at bedtime  furosemide    Tablet 40 milliGRAM(s) Oral at bedtime  HYDROmorphone   Tablet 4 milliGRAM(s) Oral every 8 hours  levoFLOXacin  Tablet 750 milliGRAM(s) Oral every 24 hours  lisinopril 5 milliGRAM(s) Oral daily  methadone    Tablet 10 milliGRAM(s) Oral every 12 hours  methylPREDNISolone sodium succinate Injectable 60 milliGRAM(s) IV Push every 8 hours  morphine ER Tablet 100 milliGRAM(s) Oral four times a day  pantoprazole    Tablet 40 milliGRAM(s) Oral before breakfast  senna 2 Tablet(s) Oral at bedtime  simvastatin 20 milliGRAM(s) Oral at bedtime  tiotropium 18 MICROgram(s) Capsule 1 Capsule(s) Inhalation daily    PRN Meds:  ALBUTerol    90 MICROgram(s) HFA Inhaler 2 Puff(s) Inhalation every 6 hours PRN Shortness of Breath and/or Wheezing  guaifenesin/dextromethorphan  Syrup 5 milliLiter(s) Oral every 6 hours PRN Cough  oxyCODONE   IR Oral Tab/Cap - Peds 30 milliGRAM(s) Oral every 4 hours PRN Severe Pain (7 - 10)  zolpidem 5 milliGRAM(s) Oral at bedtime PRN Insomnia    HOME MEDICATIONS:  Advair Diskus 100 mcg-50 mcg inhalation powder: 1 dose(s) inhaled 2 times a day  aspirin 81 mg oral tablet: 1 tab(s) orally once a day  docusate sodium 100 mg oral capsule: 1 cap(s) orally once a day (at bedtime)  fenofibrate 134 mg oral capsule: 1 cap(s) orally once a day (at bedtime)  furosemide 40 mg oral tablet: 1 tab(s) orally once a day (at bedtime)  HYDROmorphone 4 mg oral tablet: 1 tab(s) orally every 8 hours  lisinopril 5 mg oral tablet: 1 tab(s) orally once a day  methadone 10 mg oral tablet: 1 tab(s) orally every 12 hours  morphine 100 mg/12 hours oral tablet, extended release: 1 tab(s) orally 4 times a day  NexIUM 40 mg oral delayed release capsule: 1 cap(s) orally once a day  oxyCODONE 30 mg oral tablet: 1 tab(s) orally every 4 hours, As Needed  Spiriva 18 mcg inhalation capsule: 1 cap(s) inhaled once a day, As Needed  Xopenex HFA: inhaled once a day, As Needed  Zocor 20 mg oral tablet: 1 tab(s) orally once a day (at bedtime)      Vital Signs:   T(F): 96.4 (02-25-20 @ 05:10), Max: 98.2 (02-24-20 @ 21:00)  HR: 84 (02-25-20 @ 05:10) (82 - 87)  BP: 121/78 (02-25-20 @ 05:10) (116/57 - 133/88)  RR: 18 (02-25-20 @ 05:10) (18 - 19)  SpO2: 93% (02-25-20 @ 02:50) (93% - 94%)      Physical Exam:   GENERAL: NAD, obese  HEENT: Laceration to eyebrow  CHEST/LUNG: With loud and coarse wheezes and rhonchi  HEART: Regular rate and rhythm; s1 s2 appreciated  ABDOMEN: Soft, Nontender, Nondistended; Bowel sounds present  EXTREMITIES: No LE edema b/l  NERVOUS SYSTEM:  Alert & Oriented X3        Labs:                         15.1   13.48 )-----------( 267      ( 25 Feb 2020 07:44 )             45.1     Neutophil% 84.0, Lymphocyte% 11.1, Monocyte% 3.8, Bands% 1.0 02-25-20 @ 07:44    24 Feb 2020 06:11    135    |  92     |  21     ----------------------------<  107    4.4     |  30     |  0.9      Ca    9.4        24 Feb 2020 06:11    Serum Pro-Brain Natriuretic Peptide: 15 pg/mL (02-24-20 @ 22:43)    Radiology:     < from: CT Head No Cont (02.25.20 @ 03:41) >  IMPRESSION:     No acute intra-cranial hemorrhage.    < end of copied text > Hospital Day:  5d    Subjective:    Patient is a 54y old  Male who presents with a chief complaint of COPD exacerbation (24 Feb 2020 13:24)    Interval: Patient fell twice overnight including once on his face sustaining laceration to his brow. Denies taking any extra medication, however per nurse and admitted by patient, he threw out some pills and didn't let anyone see them. He complains they were just ibuprofen. 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     Past Medical Hx:   REBECCA treated with BiPAP  COPD (chronic obstructive pulmonary disease)  Numbness and tingling  Colitis  Hiatal hernia  SOB (shortness of breath)  Osteoarthritis  High blood cholesterol  Morbid obesity  Obstructive sleep apnea  GERD (gastroesophageal reflux disease)  High cholesterol  HTN (hypertension)  COPD (chronic obstructive pulmonary disease)    Past Sx:  H/O knee surgery  Hernia, inguinal, bilateral  History of knee replacement procedure of right knee  History of appendectomy  History of cholecystectomy    Allergies:  No Known Allergies    Current Meds:   Standng Meds:  albuterol/ipratropium for Nebulization 3 milliLiter(s) Nebulizer every 6 hours  aspirin enteric coated 81 milliGRAM(s) Oral daily  budesonide 160 MICROgram(s)/formoterol 4.5 MICROgram(s) Inhaler 2 Puff(s) Inhalation two times a day  chlorhexidine 4% Liquid 1 Application(s) Topical <User Schedule>  enoxaparin Injectable 40 milliGRAM(s) SubCutaneous at bedtime  fenofibrate Tablet 145 milliGRAM(s) Oral at bedtime  furosemide    Tablet 40 milliGRAM(s) Oral at bedtime  HYDROmorphone   Tablet 4 milliGRAM(s) Oral every 8 hours  levoFLOXacin  Tablet 750 milliGRAM(s) Oral every 24 hours  lisinopril 5 milliGRAM(s) Oral daily  methadone    Tablet 10 milliGRAM(s) Oral every 12 hours  methylPREDNISolone sodium succinate Injectable 60 milliGRAM(s) IV Push every 8 hours  morphine ER Tablet 100 milliGRAM(s) Oral four times a day  pantoprazole    Tablet 40 milliGRAM(s) Oral before breakfast  senna 2 Tablet(s) Oral at bedtime  simvastatin 20 milliGRAM(s) Oral at bedtime  tiotropium 18 MICROgram(s) Capsule 1 Capsule(s) Inhalation daily    PRN Meds:  ALBUTerol    90 MICROgram(s) HFA Inhaler 2 Puff(s) Inhalation every 6 hours PRN Shortness of Breath and/or Wheezing  guaifenesin/dextromethorphan  Syrup 5 milliLiter(s) Oral every 6 hours PRN Cough  oxyCODONE   IR Oral Tab/Cap - Peds 30 milliGRAM(s) Oral every 4 hours PRN Severe Pain (7 - 10)  zolpidem 5 milliGRAM(s) Oral at bedtime PRN Insomnia    HOME MEDICATIONS:  Advair Diskus 100 mcg-50 mcg inhalation powder: 1 dose(s) inhaled 2 times a day  aspirin 81 mg oral tablet: 1 tab(s) orally once a day  docusate sodium 100 mg oral capsule: 1 cap(s) orally once a day (at bedtime)  fenofibrate 134 mg oral capsule: 1 cap(s) orally once a day (at bedtime)  furosemide 40 mg oral tablet: 1 tab(s) orally once a day (at bedtime)  HYDROmorphone 4 mg oral tablet: 1 tab(s) orally every 8 hours  lisinopril 5 mg oral tablet: 1 tab(s) orally once a day  methadone 10 mg oral tablet: 1 tab(s) orally every 12 hours  morphine 100 mg/12 hours oral tablet, extended release: 1 tab(s) orally 4 times a day  NexIUM 40 mg oral delayed release capsule: 1 cap(s) orally once a day  oxyCODONE 30 mg oral tablet: 1 tab(s) orally every 4 hours, As Needed  Spiriva 18 mcg inhalation capsule: 1 cap(s) inhaled once a day, As Needed  Xopenex HFA: inhaled once a day, As Needed  Zocor 20 mg oral tablet: 1 tab(s) orally once a day (at bedtime)      Vital Signs:   T(F): 96.4 (02-25-20 @ 05:10), Max: 98.2 (02-24-20 @ 21:00)  HR: 84 (02-25-20 @ 05:10) (82 - 87)  BP: 121/78 (02-25-20 @ 05:10) (116/57 - 133/88)  RR: 18 (02-25-20 @ 05:10) (18 - 19)  SpO2: 93% (02-25-20 @ 02:50) (93% - 94%)      Physical Exam:   GENERAL: NAD, obese  HEENT: Laceration to eyebrow  CHEST/LUNG: With loud and coarse wheezes and rhonchi  HEART: Regular rate and rhythm; s1 s2 appreciated  ABDOMEN: Soft, Nontender, Nondistended; Bowel sounds present  EXTREMITIES: No LE edema b/l  NERVOUS SYSTEM:  Alert & Oriented X3    Labs:                         15.1   13.48 )-----------( 267      ( 25 Feb 2020 07:44 )             45.1     Neutophil% 84.0, Lymphocyte% 11.1, Monocyte% 3.8, Bands% 1.0 02-25-20 @ 07:44    24 Feb 2020 06:11    135    |  92     |  21     ----------------------------<  107    4.4     |  30     |  0.9      Ca    9.4        24 Feb 2020 06:11    Serum Pro-Brain Natriuretic Peptide: 15 pg/mL (02-24-20 @ 22:43)    Radiology:     < from: CT Head No Cont (02.25.20 @ 03:41) >  IMPRESSION:     No acute intra-cranial hemorrhage.    < end of copied text > Hospital Day:  5d    Subjective:    Patient is a 54y old  Male who presents with a chief complaint of COPD exacerbation (24 Feb 2020 13:24)    Interval: Patient fell twice overnight including once on his face sustaining laceration to his brow. Denies taking any extra medication, however per nurse and admitted by patient, he threw out some pills and didn't let anyone see them. He complains they were just ibuprofen. 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 Ct chest also unremarkable. Pending pulm follow up, will decrease solumedrol to q12.    Past Medical Hx:   REBECCA treated with BiPAP  COPD (chronic obstructive pulmonary disease)  Numbness and tingling  Colitis  Hiatal hernia  SOB (shortness of breath)  Osteoarthritis  High blood cholesterol  Morbid obesity  Obstructive sleep apnea  GERD (gastroesophageal reflux disease)  High cholesterol  HTN (hypertension)  COPD (chronic obstructive pulmonary disease)    Past Sx:  H/O knee surgery  Hernia, inguinal, bilateral  History of knee replacement procedure of right knee  History of appendectomy  History of cholecystectomy    Allergies:  No Known Allergies    Current Meds:   Standng Meds:  albuterol/ipratropium for Nebulization 3 milliLiter(s) Nebulizer every 6 hours  aspirin enteric coated 81 milliGRAM(s) Oral daily  budesonide 160 MICROgram(s)/formoterol 4.5 MICROgram(s) Inhaler 2 Puff(s) Inhalation two times a day  chlorhexidine 4% Liquid 1 Application(s) Topical <User Schedule>  enoxaparin Injectable 40 milliGRAM(s) SubCutaneous at bedtime  fenofibrate Tablet 145 milliGRAM(s) Oral at bedtime  furosemide    Tablet 40 milliGRAM(s) Oral at bedtime  HYDROmorphone   Tablet 4 milliGRAM(s) Oral every 8 hours  levoFLOXacin  Tablet 750 milliGRAM(s) Oral every 24 hours  lisinopril 5 milliGRAM(s) Oral daily  methadone    Tablet 10 milliGRAM(s) Oral every 12 hours  methylPREDNISolone sodium succinate Injectable 60 milliGRAM(s) IV Push every 8 hours  morphine ER Tablet 100 milliGRAM(s) Oral four times a day  pantoprazole    Tablet 40 milliGRAM(s) Oral before breakfast  senna 2 Tablet(s) Oral at bedtime  simvastatin 20 milliGRAM(s) Oral at bedtime  tiotropium 18 MICROgram(s) Capsule 1 Capsule(s) Inhalation daily    PRN Meds:  ALBUTerol    90 MICROgram(s) HFA Inhaler 2 Puff(s) Inhalation every 6 hours PRN Shortness of Breath and/or Wheezing  guaifenesin/dextromethorphan  Syrup 5 milliLiter(s) Oral every 6 hours PRN Cough  oxyCODONE   IR Oral Tab/Cap - Peds 30 milliGRAM(s) Oral every 4 hours PRN Severe Pain (7 - 10)  zolpidem 5 milliGRAM(s) Oral at bedtime PRN Insomnia    HOME MEDICATIONS:  Advair Diskus 100 mcg-50 mcg inhalation powder: 1 dose(s) inhaled 2 times a day  aspirin 81 mg oral tablet: 1 tab(s) orally once a day  docusate sodium 100 mg oral capsule: 1 cap(s) orally once a day (at bedtime)  fenofibrate 134 mg oral capsule: 1 cap(s) orally once a day (at bedtime)  furosemide 40 mg oral tablet: 1 tab(s) orally once a day (at bedtime)  HYDROmorphone 4 mg oral tablet: 1 tab(s) orally every 8 hours  lisinopril 5 mg oral tablet: 1 tab(s) orally once a day  methadone 10 mg oral tablet: 1 tab(s) orally every 12 hours  morphine 100 mg/12 hours oral tablet, extended release: 1 tab(s) orally 4 times a day  NexIUM 40 mg oral delayed release capsule: 1 cap(s) orally once a day  oxyCODONE 30 mg oral tablet: 1 tab(s) orally every 4 hours, As Needed  Spiriva 18 mcg inhalation capsule: 1 cap(s) inhaled once a day, As Needed  Xopenex HFA: inhaled once a day, As Needed  Zocor 20 mg oral tablet: 1 tab(s) orally once a day (at bedtime)      Vital Signs:   T(F): 96.4 (02-25-20 @ 05:10), Max: 98.2 (02-24-20 @ 21:00)  HR: 84 (02-25-20 @ 05:10) (82 - 87)  BP: 121/78 (02-25-20 @ 05:10) (116/57 - 133/88)  RR: 18 (02-25-20 @ 05:10) (18 - 19)  SpO2: 93% (02-25-20 @ 02:50) (93% - 94%)      Physical Exam:   GENERAL: NAD, obese  HEENT: Laceration to eyebrow  CHEST/LUNG: With loud and coarse wheezes and rhonchi  HEART: Regular rate and rhythm; s1 s2 appreciated  ABDOMEN: Soft, Nontender, Nondistended; Bowel sounds present  EXTREMITIES: No LE edema b/l  NERVOUS SYSTEM:  Alert & Oriented X3    Labs:                         15.1   13.48 )-----------( 267      ( 25 Feb 2020 07:44 )             45.1     Neutophil% 84.0, Lymphocyte% 11.1, Monocyte% 3.8, Bands% 1.0 02-25-20 @ 07:44    24 Feb 2020 06:11    135    |  92     |  21     ----------------------------<  107    4.4     |  30     |  0.9      Ca    9.4        24 Feb 2020 06:11    Serum Pro-Brain Natriuretic Peptide: 15 pg/mL (02-24-20 @ 22:43)    Radiology:     < from: CT Head No Cont (02.25.20 @ 03:41) >  IMPRESSION:     No acute intra-cranial hemorrhage.    < end of copied text >

## 2020-02-25 NOTE — PROGRESS NOTE ADULT - ASSESSMENT
Patient is a 54y old  Male who presents with a chief complaint of COPD exacerbation (25 Feb 2020 08:14)    # Acute dyspnea secondary to acute bronchitis secondary COPD exacerbation secondary to tobacco abuse   tobaco abuse counseling spent 10 additional minutes counseling patient   switch to prednisone 60 mg q24h in AM  levaquin 750 mg q24h x 7 days  duplex to rule out vte   ct non contrast shows no consolidation     #atelectasis deep breathing     #obstructive sleep apnea (on BiPAP), with suspicion for Obesity hypoventilation syndrome     #Obesity BMI 31 patient needs to see dieitian outpatient for further evaluation     #chronic knee, back pain (on methadone)    #HLD    #HTN Vital Signs Last 24 Hrs  BP: 121/78 (25 Feb 2020 05:10) (116/57 - 133/88)  controlled    #GERD, on protonix     #h/ocholecystectomy and appendectomy     #insomnia on zolpidem     Progress Note Handoff    Pending: ambulation around unit , change to prednisone in am , duplex   Family discussion: patient verbalized understanding and agreeable to plan of care     Disposition: Home___

## 2020-02-26 ENCOUNTER — TRANSCRIPTION ENCOUNTER (OUTPATIENT)
Age: 55
End: 2020-02-26

## 2020-02-26 LAB
ANION GAP SERPL CALC-SCNC: 15 MMOL/L — HIGH (ref 7–14)
BASOPHILS # BLD AUTO: 0.03 K/UL — SIGNIFICANT CHANGE UP (ref 0–0.2)
BASOPHILS NFR BLD AUTO: 0.2 % — SIGNIFICANT CHANGE UP (ref 0–1)
BUN SERPL-MCNC: 26 MG/DL — HIGH (ref 10–20)
CALCIUM SERPL-MCNC: 9 MG/DL — SIGNIFICANT CHANGE UP (ref 8.5–10.1)
CHLORIDE SERPL-SCNC: 97 MMOL/L — LOW (ref 98–110)
CO2 SERPL-SCNC: 27 MMOL/L — SIGNIFICANT CHANGE UP (ref 17–32)
CREAT SERPL-MCNC: 1 MG/DL — SIGNIFICANT CHANGE UP (ref 0.7–1.5)
EDDP UR QL CFM: 425 NG/ML — SIGNIFICANT CHANGE UP
EDDP, UR RESULT: 425 NG/ML — SIGNIFICANT CHANGE UP
EOSINOPHIL # BLD AUTO: 0.01 K/UL — SIGNIFICANT CHANGE UP (ref 0–0.7)
EOSINOPHIL NFR BLD AUTO: 0.1 % — SIGNIFICANT CHANGE UP (ref 0–8)
GLUCOSE SERPL-MCNC: 127 MG/DL — HIGH (ref 70–99)
HCT VFR BLD CALC: 42.3 % — SIGNIFICANT CHANGE UP (ref 42–52)
HGB BLD-MCNC: 14.2 G/DL — SIGNIFICANT CHANGE UP (ref 14–18)
IMM GRANULOCYTES NFR BLD AUTO: 2.2 % — HIGH (ref 0.1–0.3)
LYMPHOCYTES # BLD AUTO: 15.4 % — LOW (ref 20.5–51.1)
LYMPHOCYTES # BLD AUTO: 2.09 K/UL — SIGNIFICANT CHANGE UP (ref 1.2–3.4)
MAGNESIUM SERPL-MCNC: 2.3 MG/DL — SIGNIFICANT CHANGE UP (ref 1.8–2.4)
MCHC RBC-ENTMCNC: 30.3 PG — SIGNIFICANT CHANGE UP (ref 27–31)
MCHC RBC-ENTMCNC: 33.6 G/DL — SIGNIFICANT CHANGE UP (ref 32–37)
MCV RBC AUTO: 90.4 FL — SIGNIFICANT CHANGE UP (ref 80–94)
METHADONE IN-HOUSE INTERPRETATION: POSITIVE
METHADONE UR CFM-MCNC: POSITIVE
MONOCYTES # BLD AUTO: 0.87 K/UL — HIGH (ref 0.1–0.6)
MONOCYTES NFR BLD AUTO: 6.4 % — SIGNIFICANT CHANGE UP (ref 1.7–9.3)
NEUTROPHILS # BLD AUTO: 10.27 K/UL — HIGH (ref 1.4–6.5)
NEUTROPHILS NFR BLD AUTO: 75.7 % — HIGH (ref 42.2–75.2)
NRBC # BLD: 0 /100 WBCS — SIGNIFICANT CHANGE UP (ref 0–0)
PLATELET # BLD AUTO: 218 K/UL — SIGNIFICANT CHANGE UP (ref 130–400)
POTASSIUM SERPL-MCNC: 4.4 MMOL/L — SIGNIFICANT CHANGE UP (ref 3.5–5)
POTASSIUM SERPL-SCNC: 4.4 MMOL/L — SIGNIFICANT CHANGE UP (ref 3.5–5)
RBC # BLD: 4.68 M/UL — LOW (ref 4.7–6.1)
RBC # FLD: 15.3 % — HIGH (ref 11.5–14.5)
SODIUM SERPL-SCNC: 139 MMOL/L — SIGNIFICANT CHANGE UP (ref 135–146)
WBC # BLD: 13.57 K/UL — HIGH (ref 4.8–10.8)
WBC # FLD AUTO: 13.57 K/UL — HIGH (ref 4.8–10.8)

## 2020-02-26 PROCEDURE — 99233 SBSQ HOSP IP/OBS HIGH 50: CPT

## 2020-02-26 RX ORDER — CIPROFLOXACIN LACTATE 400MG/40ML
1 VIAL (ML) INTRAVENOUS
Qty: 3 | Refills: 0
Start: 2020-02-26 | End: 2020-02-28

## 2020-02-26 RX ADMIN — MORPHINE SULFATE 100 MILLIGRAM(S): 50 CAPSULE, EXTENDED RELEASE ORAL at 12:07

## 2020-02-26 RX ADMIN — SIMVASTATIN 20 MILLIGRAM(S): 20 TABLET, FILM COATED ORAL at 21:26

## 2020-02-26 RX ADMIN — HYDROMORPHONE HYDROCHLORIDE 4 MILLIGRAM(S): 2 INJECTION INTRAMUSCULAR; INTRAVENOUS; SUBCUTANEOUS at 16:09

## 2020-02-26 RX ADMIN — CHLORHEXIDINE GLUCONATE 1 APPLICATION(S): 213 SOLUTION TOPICAL at 06:01

## 2020-02-26 RX ADMIN — BUDESONIDE AND FORMOTEROL FUMARATE DIHYDRATE 2 PUFF(S): 160; 4.5 AEROSOL RESPIRATORY (INHALATION) at 09:23

## 2020-02-26 RX ADMIN — Medication 40 MILLIGRAM(S): at 21:27

## 2020-02-26 RX ADMIN — OXYCODONE HYDROCHLORIDE 30 MILLIGRAM(S): 5 TABLET ORAL at 22:30

## 2020-02-26 RX ADMIN — METHADONE HYDROCHLORIDE 10 MILLIGRAM(S): 40 TABLET ORAL at 17:18

## 2020-02-26 RX ADMIN — HYDROMORPHONE HYDROCHLORIDE 4 MILLIGRAM(S): 2 INJECTION INTRAMUSCULAR; INTRAVENOUS; SUBCUTANEOUS at 17:16

## 2020-02-26 RX ADMIN — OXYCODONE HYDROCHLORIDE 30 MILLIGRAM(S): 5 TABLET ORAL at 22:01

## 2020-02-26 RX ADMIN — MORPHINE SULFATE 100 MILLIGRAM(S): 50 CAPSULE, EXTENDED RELEASE ORAL at 19:17

## 2020-02-26 RX ADMIN — Medication 81 MILLIGRAM(S): at 12:07

## 2020-02-26 RX ADMIN — MORPHINE SULFATE 100 MILLIGRAM(S): 50 CAPSULE, EXTENDED RELEASE ORAL at 13:12

## 2020-02-26 RX ADMIN — METHADONE HYDROCHLORIDE 10 MILLIGRAM(S): 40 TABLET ORAL at 06:01

## 2020-02-26 RX ADMIN — MORPHINE SULFATE 100 MILLIGRAM(S): 50 CAPSULE, EXTENDED RELEASE ORAL at 17:18

## 2020-02-26 RX ADMIN — Medication 3 MILLILITER(S): at 08:25

## 2020-02-26 RX ADMIN — Medication 60 MILLIGRAM(S): at 06:02

## 2020-02-26 RX ADMIN — Medication 145 MILLIGRAM(S): at 21:26

## 2020-02-26 RX ADMIN — HYDROMORPHONE HYDROCHLORIDE 4 MILLIGRAM(S): 2 INJECTION INTRAMUSCULAR; INTRAVENOUS; SUBCUTANEOUS at 21:29

## 2020-02-26 RX ADMIN — Medication 3 MILLILITER(S): at 13:07

## 2020-02-26 RX ADMIN — MORPHINE SULFATE 100 MILLIGRAM(S): 50 CAPSULE, EXTENDED RELEASE ORAL at 06:30

## 2020-02-26 RX ADMIN — MORPHINE SULFATE 100 MILLIGRAM(S): 50 CAPSULE, EXTENDED RELEASE ORAL at 06:01

## 2020-02-26 RX ADMIN — Medication 3 MILLILITER(S): at 01:27

## 2020-02-26 RX ADMIN — MORPHINE SULFATE 100 MILLIGRAM(S): 50 CAPSULE, EXTENDED RELEASE ORAL at 23:20

## 2020-02-26 RX ADMIN — SENNA PLUS 2 TABLET(S): 8.6 TABLET ORAL at 21:26

## 2020-02-26 RX ADMIN — PANTOPRAZOLE SODIUM 40 MILLIGRAM(S): 20 TABLET, DELAYED RELEASE ORAL at 06:02

## 2020-02-26 RX ADMIN — TIOTROPIUM BROMIDE 1 CAPSULE(S): 18 CAPSULE ORAL; RESPIRATORY (INHALATION) at 08:25

## 2020-02-26 RX ADMIN — LISINOPRIL 5 MILLIGRAM(S): 2.5 TABLET ORAL at 06:02

## 2020-02-26 RX ADMIN — Medication 3 MILLILITER(S): at 19:28

## 2020-02-26 NOTE — DISCHARGE NOTE PROVIDER - HOSPITAL COURSE
54 year-old male with a PMH of COPD, obstructive sleep apnea (on BiPAP), chronic knee, back pain (on methadone), HLD, HTN, GERD, b/l knee replacement, cholecystectomy and appendectomy presenting for shortness of breath. The patient reports that he has had shortness of breath worse compared to his baseline which started 5 days ago and gradually progressed since that time.         Patient had negative CXR but found to have rhonchi. Patient SMOKING while in hospital, walking off unit. Had x2 falls and refused intervention despite risk. Confirmed taking multiple pain medications with pharmacy for chronic pain. Pt had CT chest that was negative. Otherwise walking around unit without oxygen and without shortness of breath. 54 year-old male with a PMH of COPD, obstructive sleep apnea (on BiPAP), chronic knee, back pain (on methadone), HLD, HTN, GERD, b/l knee replacement, cholecystectomy and appendectomy presenting for shortness of breath. The patient reports that he has had shortness of breath worse compared to his baseline which started 5 days ago and gradually progressed since that time.         Patient had negative CXR but found to have rhonchi. Patient SMOKING while in hospital, walking off unit. Had x2 falls and refused intervention despite risk. Confirmed taking multiple pain medications with pharmacy for chronic pain. Pt had CT chest that was negative. Otherwise walking around unit without oxygen and without shortness of breath. He will 54 year-old male with a PMH of COPD, obstructive sleep apnea (on BiPAP), chronic knee, back pain (on methadone), HLD, HTN, GERD, b/l knee replacement, cholecystectomy and appendectomy presenting for shortness of breath. The patient reports that he has had shortness of breath worse compared to his baseline which started 5 days ago and gradually progressed since that time.         Patient had negative CXR but found to have rhonchi. Patient SMOKING while in hospital, walking off unit. Had x2 falls and refused intervention despite risk. Confirmed taking multiple pain medications with pharmacy for chronic pain. Pt had CT chest that was negative. Otherwise walking around unit without oxygen and without shortness of breath. He will leave with prednisone taper. 54 year-old male with a PMH of COPD, obstructive sleep apnea (on BiPAP), chronic knee, back pain (on methadone), HLD, HTN, GERD, b/l knee replacement, cholecystectomy and appendectomy presenting for shortness of breath. The patient reports that he has had shortness of breath worse compared to his baseline which started 5 days ago and gradually progressed since that time. Admitted to hospital for a COPD exacerbation. Patient had negative CXR but found to have rhonchi. Patient SMOKING while in hospital, walking off unit. Had x2 falls and refused intervention despite risk. Av CT chest was also performed that was negative. Otherwise walking around unit without oxygen and without shortness of breath. Improved with nebulizer treatments, steroids, and antibiotics. He will leave with short course of levaquin and a prednisone taper.

## 2020-02-26 NOTE — DISCHARGE NOTE PROVIDER - NSDCFUADDAPPT_GEN_ALL_CORE_FT
Please make an appointment with Dr. Bassett for your lung disease - continue all inhalers.   Tell them you were in the hospital recently when you make your appointment.

## 2020-02-26 NOTE — PROGRESS NOTE ADULT - SUBJECTIVE AND OBJECTIVE BOX
KAYLEIGH TOMAS  54y  Male      Patient is a 54y old  Male who presents with a chief complaint of COPD exacerbation (26 Feb 2020 15:06)      INTERVAL HPI/OVERNIGHT EVENTS: sob slowly improving, not quite at baseline      REVIEW OF SYSTEMS:  as above  All other review of systems negative    T(C): 35.4 (02-26-20 @ 14:26), Max: 37.4 (02-25-20 @ 21:31)  HR: 104 (02-26-20 @ 14:26) (99 - 104)  BP: 125/59 (02-26-20 @ 14:26) (102/57 - 125/59)  RR: 18 (02-26-20 @ 14:26) (18 - 18)  SpO2: 100% (02-26-20 @ 08:12) (100% - 100%)  Wt(kg): --Vital Signs Last 24 Hrs  T(C): 35.4 (26 Feb 2020 14:26), Max: 37.4 (25 Feb 2020 21:31)  T(F): 95.7 (26 Feb 2020 14:26), Max: 99.4 (25 Feb 2020 21:31)  HR: 104 (26 Feb 2020 14:26) (99 - 104)  BP: 125/59 (26 Feb 2020 14:26) (102/57 - 125/59)  BP(mean): --  RR: 18 (26 Feb 2020 14:26) (18 - 18)  SpO2: 100% (26 Feb 2020 08:12) (100% - 100%)        PHYSICAL EXAM:  GENERAL: NAD  HEENT thick neck, forced upper expiratory wheezing, no pharyngeal erythema or swelling noted  PSYCH: no agitation, baseline mentation  NERVOUS SYSTEM:  Alert & Oriented X3, no new focal deficits  PULMONARY: coarse/ noisy in light of forced upper neck expiratory wheezing, comfortable appearing, no signs of co2 narcosis  CARDIOVASCULAR: Regular rate and rhythm; No murmurs, rubs, or gallops  GI: Soft, Nontender, Nondistended; Bowel sounds present obese  EXTREMITIES:  2+ Peripheral Pulses, No clubbing, cyanosis, or edema    Consultant(s) Notes Reviewed:  [x ] YES  [ ] NO    Discussed with Consultants/Other Providers [ x] YES     LABS                          14.2   13.57 )-----------( 218      ( 26 Feb 2020 07:07 )             42.3     02-26    139  |  97<L>  |  26<H>  ----------------------------<  127<H>  4.4   |  27  |  1.0    Ca    9.0      26 Feb 2020 07:07  Mg     2.3     02-26            Lactate Trend        CAPILLARY BLOOD GLUCOSE            RADIOLOGY & ADDITIONAL TESTS:    Imaging Personally Reviewed:  [ ] YES  [ ] NO    HEALTH ISSUES - PROBLEM Dx:

## 2020-02-26 NOTE — CHART NOTE - NSCHARTNOTEFT_GEN_A_CORE
Confirmed with [facility] ([phone]) patient is prescribed:    Xanax 2 mg, four times a day  Oxycodone 30 mg every hours  Morphine Sulfate (Ms Contin) 100 mg every 6 hours  Methadone 10 mg 1 tab every 12 hours  Dilaudid 4 mg every 8 hours    They are refilled every month.

## 2020-02-26 NOTE — PROGRESS NOTE ADULT - ASSESSMENT
The patient is a 54 year-old male with a PMH of COPD, obstructive sleep apnea (on BiPAP), chronic knee, back pain (on methadone), HLD, HTN, GERD, b/l knee replacement, cholecystectomy and appendectomy presenting for shortness of breath, admitted for COPD exacerbation and chronic pain.      # Shortness of breath x 5 days w/ increased sputum production likely secondary to COPD exacerbation, improving  flu/rsv negative  C/w Spiriva, symbicort   duonebs q6h  Levaquin 750 mg PO daily, today day 6  prednisone 60 PO, will taper  Robitussin q6h, PRN  probnp- 15/negative  2d echo nl EF and systolic function  CT chest- unremarkable    # HTN  C/w Lasix 40 mg PO qHS, lisinopril 5 mg PO qD  Vitals per routine    # Chronic knee, back pain  C/w methadone 10 mg twice daily (patient reports he is prescribed this; does not see methadone clinic)  C/w morphine sulfate 100 mg PO q6h, oxycodone 30 mg TD  Dilaudid 4 mg PO TID  Confirmed with pharmacy  Senna qHS    # REBECCA on BiPAP  C/w BiPAP qHS  Vitals per routine    # HLD  C/w statin 20 mg PO qHS, fenofibrate    # GERD  Pantoprazole 40 mg PO qAM    # CHG  # DVT ppx- Lovenox 40 mg SQ   # GI ppx- pantoprazole 40 mg PO   # Diet- DASH  # Activity- ambulate as tolerated  # Dispo- d/c tomorrow

## 2020-02-26 NOTE — PROGRESS NOTE ADULT - SUBJECTIVE AND OBJECTIVE BOX
Hospital Day:  6d    Subjective:    Patient is a 54y old  Male who presents with a chief complaint of COPD exacerbation (26 Feb 2020 10:24)    Interval: No acute events overnight. Patient still endorses shortness of breath, however reports improvement. Still with     Past Medical Hx:   REBECCA treated with BiPAP  COPD (chronic obstructive pulmonary disease)  Numbness and tingling  Colitis  Hiatal hernia  SOB (shortness of breath)  Osteoarthritis  High blood cholesterol  Morbid obesity  Obstructive sleep apnea  GERD (gastroesophageal reflux disease)  High cholesterol  HTN (hypertension)  COPD (chronic obstructive pulmonary disease)    Past Sx:  H/O knee surgery  Hernia, inguinal, bilateral  History of knee replacement procedure of right knee  History of appendectomy  History of cholecystectomy    Allergies:  No Known Allergies    Current Meds:   Standng Meds:  albuterol/ipratropium for Nebulization 3 milliLiter(s) Nebulizer every 6 hours  aspirin enteric coated 81 milliGRAM(s) Oral daily  budesonide 160 MICROgram(s)/formoterol 4.5 MICROgram(s) Inhaler 2 Puff(s) Inhalation two times a day  chlorhexidine 4% Liquid 1 Application(s) Topical <User Schedule>  enoxaparin Injectable 40 milliGRAM(s) SubCutaneous at bedtime  fenofibrate Tablet 145 milliGRAM(s) Oral at bedtime  furosemide    Tablet 40 milliGRAM(s) Oral at bedtime  HYDROmorphone   Tablet 4 milliGRAM(s) Oral every 8 hours  levoFLOXacin  Tablet 750 milliGRAM(s) Oral every 24 hours  lisinopril 5 milliGRAM(s) Oral daily  methadone    Tablet 10 milliGRAM(s) Oral every 12 hours  morphine ER Tablet 100 milliGRAM(s) Oral four times a day  pantoprazole    Tablet 40 milliGRAM(s) Oral before breakfast  predniSONE   Tablet 60 milliGRAM(s) Oral daily  senna 2 Tablet(s) Oral at bedtime  simvastatin 20 milliGRAM(s) Oral at bedtime  tiotropium 18 MICROgram(s) Capsule 1 Capsule(s) Inhalation daily    PRN Meds:  ALBUTerol    90 MICROgram(s) HFA Inhaler 2 Puff(s) Inhalation every 6 hours PRN Shortness of Breath and/or Wheezing  guaifenesin/dextromethorphan  Syrup 5 milliLiter(s) Oral every 6 hours PRN Cough  oxyCODONE   IR Oral Tab/Cap - Peds 30 milliGRAM(s) Oral every 4 hours PRN Severe Pain (7 - 10)  zolpidem 5 milliGRAM(s) Oral at bedtime PRN Insomnia    HOME MEDICATIONS:  Advair Diskus 100 mcg-50 mcg inhalation powder: 1 dose(s) inhaled 2 times a day  aspirin 81 mg oral tablet: 1 tab(s) orally once a day  docusate sodium 100 mg oral capsule: 1 cap(s) orally once a day (at bedtime)  fenofibrate 134 mg oral capsule: 1 cap(s) orally once a day (at bedtime)  furosemide 40 mg oral tablet: 1 tab(s) orally once a day (at bedtime)  HYDROmorphone 4 mg oral tablet: 1 tab(s) orally every 8 hours  lisinopril 5 mg oral tablet: 1 tab(s) orally once a day  methadone 10 mg oral tablet: 1 tab(s) orally every 12 hours  morphine 100 mg/12 hours oral tablet, extended release: 1 tab(s) orally 4 times a day  NexIUM 40 mg oral delayed release capsule: 1 cap(s) orally once a day  oxyCODONE 30 mg oral tablet: 1 tab(s) orally every 4 hours, As Needed  Spiriva 18 mcg inhalation capsule: 1 cap(s) inhaled once a day, As Needed  Xopenex HFA: inhaled once a day, As Needed  Zocor 20 mg oral tablet: 1 tab(s) orally once a day (at bedtime)      Vital Signs:   T(F): 95.7 (02-26-20 @ 14:26), Max: 99.4 (02-25-20 @ 21:31)  HR: 104 (02-26-20 @ 14:26) (99 - 104)  BP: 125/59 (02-26-20 @ 14:26) (102/57 - 125/59)  RR: 18 (02-26-20 @ 14:26) (18 - 18)  SpO2: 100% (02-26-20 @ 08:12) (100% - 100%)        Physical Exam:   GENERAL: NAD  HEENT: NCAT  CHEST/LUNG: CTAB  HEART: Regular rate and rhythm; s1 s2 appreciated, No murmurs, rubs, or gallops  ABDOMEN: Soft, Nontender, Nondistended; Bowel sounds present  EXTREMITIES: No LE edema b/l  NERVOUS SYSTEM:  Alert & Oriented X3    Labs:                         14.2   13.57 )-----------( 218      ( 26 Feb 2020 07:07 )             42.3     Neutophil% 75.7, Lymphocyte% 15.4, Monocyte% 6.4, Bands% 2.2 02-26-20 @ 07:07    26 Feb 2020 07:07    139    |  97     |  26     ----------------------------<  127    4.4     |  27     |  1.0      Ca    9.0        26 Feb 2020 07:07  Mg     2.3       26 Feb 2020 07:07    Serum Pro-Brain Natriuretic Peptide: 15 pg/mL (02-24-20 @ 22:43)    Radiology:     < from: [facility] Dup[facility] Lower Ext Vein ScanGiovani (02.25.20 @ 16:51) >    Impression:    No evidence of deep venous thrombosis or superficial thrombophlebitis in the bilateral lower extremities.    < end of copied text > Hospital Day:  6d    Subjective:    Patient is a 54y old  Male who presents with a chief complaint of COPD exacerbation (26 Feb 2020 10:24)    Interval: No acute events overnight. Patient still endorses shortness of breath, however reports improvement. Still with coarse breath sounds.    Past Medical Hx:   REBECCA treated with BiPAP  COPD (chronic obstructive pulmonary disease)  Numbness and tingling  Colitis  Hiatal hernia  SOB (shortness of breath)  Osteoarthritis  High blood cholesterol  Morbid obesity  Obstructive sleep apnea  GERD (gastroesophageal reflux disease)  High cholesterol  HTN (hypertension)  COPD (chronic obstructive pulmonary disease)    Past Sx:  H/O knee surgery  Hernia, inguinal, bilateral  History of knee replacement procedure of right knee  History of appendectomy  History of cholecystectomy    Allergies:  No Known Allergies    Current Meds:   Standng Meds:  albuterol/ipratropium for Nebulization 3 milliLiter(s) Nebulizer every 6 hours  aspirin enteric coated 81 milliGRAM(s) Oral daily  budesonide 160 MICROgram(s)/formoterol 4.5 MICROgram(s) Inhaler 2 Puff(s) Inhalation two times a day  chlorhexidine 4% Liquid 1 Application(s) Topical <User Schedule>  enoxaparin Injectable 40 milliGRAM(s) SubCutaneous at bedtime  fenofibrate Tablet 145 milliGRAM(s) Oral at bedtime  furosemide    Tablet 40 milliGRAM(s) Oral at bedtime  HYDROmorphone   Tablet 4 milliGRAM(s) Oral every 8 hours  levoFLOXacin  Tablet 750 milliGRAM(s) Oral every 24 hours  lisinopril 5 milliGRAM(s) Oral daily  methadone    Tablet 10 milliGRAM(s) Oral every 12 hours  morphine ER Tablet 100 milliGRAM(s) Oral four times a day  pantoprazole    Tablet 40 milliGRAM(s) Oral before breakfast  predniSONE   Tablet 60 milliGRAM(s) Oral daily  senna 2 Tablet(s) Oral at bedtime  simvastatin 20 milliGRAM(s) Oral at bedtime  tiotropium 18 MICROgram(s) Capsule 1 Capsule(s) Inhalation daily    PRN Meds:  ALBUTerol    90 MICROgram(s) HFA Inhaler 2 Puff(s) Inhalation every 6 hours PRN Shortness of Breath and/or Wheezing  guaifenesin/dextromethorphan  Syrup 5 milliLiter(s) Oral every 6 hours PRN Cough  oxyCODONE   IR Oral Tab/Cap - Peds 30 milliGRAM(s) Oral every 4 hours PRN Severe Pain (7 - 10)  zolpidem 5 milliGRAM(s) Oral at bedtime PRN Insomnia    HOME MEDICATIONS:  Advair Diskus 100 mcg-50 mcg inhalation powder: 1 dose(s) inhaled 2 times a day  aspirin 81 mg oral tablet: 1 tab(s) orally once a day  docusate sodium 100 mg oral capsule: 1 cap(s) orally once a day (at bedtime)  fenofibrate 134 mg oral capsule: 1 cap(s) orally once a day (at bedtime)  furosemide 40 mg oral tablet: 1 tab(s) orally once a day (at bedtime)  HYDROmorphone 4 mg oral tablet: 1 tab(s) orally every 8 hours  lisinopril 5 mg oral tablet: 1 tab(s) orally once a day  methadone 10 mg oral tablet: 1 tab(s) orally every 12 hours  morphine 100 mg/12 hours oral tablet, extended release: 1 tab(s) orally 4 times a day  NexIUM 40 mg oral delayed release capsule: 1 cap(s) orally once a day  oxyCODONE 30 mg oral tablet: 1 tab(s) orally every 4 hours, As Needed  Spiriva 18 mcg inhalation capsule: 1 cap(s) inhaled once a day, As Needed  Xopenex HFA: inhaled once a day, As Needed  Zocor 20 mg oral tablet: 1 tab(s) orally once a day (at bedtime)      Vital Signs:   T(F): 95.7 (02-26-20 @ 14:26), Max: 99.4 (02-25-20 @ 21:31)  HR: 104 (02-26-20 @ 14:26) (99 - 104)  BP: 125/59 (02-26-20 @ 14:26) (102/57 - 125/59)  RR: 18 (02-26-20 @ 14:26) (18 - 18)  SpO2: 100% (02-26-20 @ 08:12) (100% - 100%)    Physical Exam:     GENERAL: NAD, obese  HEENT: Laceration to eyebrow  CHEST/LUNG: With loud and coarse wheezes and rhonchi  HEART: Regular rate and rhythm; s1 s2 appreciated  ABDOMEN: Soft, Nontender, Nondistended; Bowel sounds present  EXTREMITIES: No LE edema b/l  NERVOUS SYSTEM:  Alert & Oriented X3    Labs:                         14.2   13.57 )-----------( 218      ( 26 Feb 2020 07:07 )             42.3     Neutophil% 75.7, Lymphocyte% 15.4, Monocyte% 6.4, Bands% 2.2 02-26-20 @ 07:07    26 Feb 2020 07:07    139    |  97     |  26     ----------------------------<  127    4.4     |  27     |  1.0      Ca    9.0        26 Feb 2020 07:07  Mg     2.3       26 Feb 2020 07:07    Serum Pro-Brain Natriuretic Peptide: 15 pg/mL (02-24-20 @ 22:43)    Radiology:     < from: [facility] Duplex Lower Ext Vein Scan, Giovani (02.25.20 @ 16:51) >    Impression:    No evidence of deep venous thrombosis or superficial thrombophlebitis in the bilateral lower extremities.    < end of copied text >

## 2020-02-26 NOTE — DISCHARGE NOTE PROVIDER - NSDCCPCAREPLAN_GEN_ALL_CORE_FT
PRINCIPAL DISCHARGE DIAGNOSIS  Diagnosis: COPD exacerbation  Assessment and Plan of Treatment: You presented with shortness of breath likely due to COPD. CT scan of chest was negative for acute pathology. You were treated with nebulizers and steroids while in-patient.   - Please it is CRITICAL that you STOP SMOKING CIGARETTES as this not only worsens your lung function but will put you into exacerbation.   - Continue all inhalers as prescribed  - Continue prednisone taper 60mg for 2 more days, then 40mg for 3 days, then 20mg for 3 days, then stop.  - Continue levofloxacin for 3 more days.   - Please follow up with Dr. Bassett within 1 week of discharge.      SECONDARY DISCHARGE DIAGNOSES  Diagnosis: Heavy smoker  Assessment and Plan of Treatment: Please stop smoking. Consider nicotine replacement therapies.  - Please see your primary and discuss strategies to help you achieve success in stopping smoking.   - This is the single best thing you can do for your health of both your heart and lungs as smoking leads to many chronic issues including cardiac disease, cancer, and other seriouds complications    Diagnosis: REBECCA treated with BiPAP  Assessment and Plan of Treatment: Continue using BIPAP nightly which you have been doing.   - Please follow up with your pulmonary doctor, Dr. Bassett on discharge.    Diagnosis: Chronic pain  Assessment and Plan of Treatment: You are taking 4 high dose pain medications. these medications have many side effects and are high risk for respiratory depression especially when taken in conjunction.   - Please strongly consider STOPPING or REDUCING these medications. Discuss with your orthopedic and pain management providers about these risks.  - Do not take these meds together, space them out as much as possible to give yourself better pain control and protect your lungs. PRINCIPAL DISCHARGE DIAGNOSIS  Diagnosis: COPD exacerbation  Assessment and Plan of Treatment: You presented with shortness of breath likely due to COPD. CT scan of chest was negative for acute pathology. You were treated with nebulizers and steroids while in-patient.   - Please it is CRITICAL that you STOP SMOKING CIGARETTES as this not only worsens your lung function but will put you into exacerbation.   - Continue all inhalers as prescribed  - Continue prednisone taper 60mg for 1 more days, then 40mg for 3 days, then 20mg for 3 days, then stop.  - Continue levofloxacin for 2 more days.   - Please follow up with Dr. Bassett within 1 week of discharge.      SECONDARY DISCHARGE DIAGNOSES  Diagnosis: Heavy smoker  Assessment and Plan of Treatment: Please stop smoking. Consider nicotine replacement therapies.  - Please see your primary and discuss strategies to help you achieve success in stopping smoking.   - This is the single best thing you can do for your health of both your heart and lungs as smoking leads to many chronic issues including cardiac disease, cancer, and other seriouds complications    Diagnosis: REBECCA treated with BiPAP  Assessment and Plan of Treatment: Continue using BIPAP nightly which you have been doing.   - Please follow up with your pulmonary doctor, Dr. Bassett on discharge.    Diagnosis: Chronic pain  Assessment and Plan of Treatment: You are taking 4 high dose pain medications. these medications have many side effects and are high risk for respiratory depression especially when taken in conjunction.   - Please strongly consider STOPPING or REDUCING these medications. Discuss with your orthopedic and pain management providers about these risks.  - Do not take these meds together, space them out as much as possible to give yourself better pain control and protect your lungs.

## 2020-02-26 NOTE — DISCHARGE NOTE PROVIDER - CARE PROVIDERS DIRECT ADDRESSES
[email or web address]

## 2020-02-26 NOTE — DISCHARGE NOTE PROVIDER - NSFOLLOWUPCLINICS_GEN_ALL_ED_FT
[facility]  Medicine  [address], [place]   Phone: [phone]  Fax:   Follow Up Time:

## 2020-02-26 NOTE — PROGRESS NOTE ADULT - ASSESSMENT
Patient is a 54y old  Male who presents with a chief complaint of COPD exacerbation (25 Feb 2020 08:14)    # Acute dyspnea secondary to acute bronchitis secondary COPD exacerbation secondary to tobacco abuse   smoking cessation counselling reinforced  oral steroid taper  empiric oral antibiotic  duplex has ruled out vte   ct non contrast shows no consolidation     #atelectasis deep breathing / pulmonary toilet/ incentive spirometry encouraged    #obstructive sleep apnea (on BiPAP), with suspicion for Obesity hypoventilation syndrome. follows with Dr. lopez pulmonologist as an outpatient     #Obesity BMI 31 patient needs to see dieitian outpatient for further evaluation / weight loss counselling provided    #chronic knee, back pain- on extensive outpatient analgesia regimen    #HLD    #HTN controlled    #GERD, on protonix     #h/ocholecystectomy and appendectomy     #insomnia on zolpidem     high risk of readmission    Progress Note Handoff    Pending: anticipate for discharge tomorrow, add to complex care list, copd action plan upon discharge

## 2020-02-26 NOTE — DISCHARGE NOTE PROVIDER - CARE PROVIDER_API CALL
Blake Bassett)  Critical Care Medicine; Geriatric Medicine; Internal Medicine; Pulmonary Disease  [address] [place] [address], [place] [address]  Phone: [phone]  Fax: [phone]  Follow Up Time: 1-3 days    Albino Leung)  Medicine  Physicians  [address] [place] [address]  Phone: [phone]  Fax: [phone]  Follow Up Time:

## 2020-02-26 NOTE — DISCHARGE NOTE PROVIDER - NSDCMRMEDTOKEN_GEN_ALL_CORE_FT
Advair Diskus 100 mcg-50 mcg inhalation powder: 1 dose(s) inhaled 2 times a day  aspirin 81 mg oral tablet: 1 tab(s) orally once a day  docusate sodium 100 mg oral capsule: 1 cap(s) orally once a day (at bedtime)  fenofibrate 134 mg oral capsule: 1 cap(s) orally once a day (at bedtime)  furosemide 40 mg oral tablet: 1 tab(s) orally once a day (at bedtime)  HYDROmorphone 4 mg oral tablet: 1 tab(s) orally every 8 hours  levoFLOXacin 750 mg oral tablet: 1 tab(s) orally every 24 hours  lisinopril 5 mg oral tablet: 1 tab(s) orally once a day  methadone 10 mg oral tablet: 1 tab(s) orally every 12 hours  morphine 100 mg/12 hours oral tablet, extended release: 1 tab(s) orally 4 times a day  NexIUM 40 mg oral delayed release capsule: 1 cap(s) orally once a day  oxyCODONE 30 mg oral tablet: 1 tab(s) orally every 4 hours, As Needed  predniSONE 20 mg oral tablet: 3 tab(s) orally once a day from 2/27-2/28  2 tabs once a day from 2/29-3/2  1 tab once a day 3/3 - 3/5  Spiriva 18 mcg inhalation capsule: 1 cap(s) inhaled once a day, As Needed  Xopenex HFA: inhaled once a day, As Needed  Zocor 20 mg oral tablet: 1 tab(s) orally once a day (at bedtime) Advair Diskus 500 mcg-50 mcg inhalation powder: 1 inhalation inhaled 2 times a day  albuterol 90 mcg/inh inhalation powder: 2 puff(s) inhaled every 6 hours, As Needed - for shortness of breath and/or wheezing   aspirin 81 mg oral tablet: 1 tab(s) orally once a day  docusate sodium 100 mg oral capsule: 1 cap(s) orally once a day (at bedtime)  fenofibrate 134 mg oral capsule: 1 cap(s) orally once a day (at bedtime)  furosemide 40 mg oral tablet: 1 tab(s) orally once a day (at bedtime)  HYDROmorphone 4 mg oral tablet: 1 tab(s) orally every 8 hours  levoFLOXacin 750 mg oral tablet: 1 tab(s) orally every 24 hours  lisinopril 5 mg oral tablet: 1 tab(s) orally once a day  methadone 10 mg oral tablet: 1 tab(s) orally every 12 hours  morphine 100 mg/12 hours oral tablet, extended release: 1 tab(s) orally 4 times a day  NexIUM 40 mg oral delayed release capsule: 1 cap(s) orally once a day  oxyCODONE 30 mg oral tablet: 1 tab(s) orally every 4 hours, As Needed  predniSONE 20 mg oral tablet: 3 tab(s) orally once a day from 2/27-2/28  2 tabs once a day from 2/29-3/2  1 tab once a day 3/3 - 3/5  Spiriva 18 mcg inhalation capsule: 1 cap(s) inhaled once a day, As Needed  Zocor 20 mg oral tablet: 1 tab(s) orally once a day (at bedtime)

## 2020-02-26 NOTE — PROGRESS NOTE ADULT - PROVIDER SPECIALTY LIST ADULT
Internal Medicine Helical Rim Advancement Flap Text: The defect edges were debeveled with a #15 blade scalpel.  Given the location of the defect and the proximity to free margins (helical rim) a double helical rim advancement flap was deemed most appropriate.  Using a sterile surgical marker, the appropriate advancement flaps were drawn incorporating the defect and placing the expected incisions between the helical rim and antihelix where possible.  The area thus outlined was incised through and through with a #15 scalpel blade.  With a skin hook and iris scissors, the flaps were gently and sharply undermined and freed up.

## 2020-02-27 ENCOUNTER — TRANSCRIPTION ENCOUNTER (OUTPATIENT)
Age: 55
End: 2020-02-27

## 2020-02-27 VITALS — WEIGHT: 315 LBS | HEIGHT: 70.5 IN

## 2020-02-27 LAB
ALBUMIN SERPL ELPH-MCNC: 3.8 G/DL — SIGNIFICANT CHANGE UP (ref 3.5–5.2)
ALP SERPL-CCNC: 112 U/L — SIGNIFICANT CHANGE UP (ref 30–115)
ALT FLD-CCNC: 30 U/L — SIGNIFICANT CHANGE UP (ref 0–41)
ANION GAP SERPL CALC-SCNC: 13 MMOL/L — SIGNIFICANT CHANGE UP (ref 7–14)
AST SERPL-CCNC: 24 U/L — SIGNIFICANT CHANGE UP (ref 0–41)
BASOPHILS # BLD AUTO: 0.06 K/UL — SIGNIFICANT CHANGE UP (ref 0–0.2)
BASOPHILS NFR BLD AUTO: 0.4 % — SIGNIFICANT CHANGE UP (ref 0–1)
BILIRUB SERPL-MCNC: 0.3 MG/DL — SIGNIFICANT CHANGE UP (ref 0.2–1.2)
BUN SERPL-MCNC: 22 MG/DL — HIGH (ref 10–20)
CALCIUM SERPL-MCNC: 8.9 MG/DL — SIGNIFICANT CHANGE UP (ref 8.5–10.1)
CHLORIDE SERPL-SCNC: 96 MMOL/L — LOW (ref 98–110)
CO2 SERPL-SCNC: 28 MMOL/L — SIGNIFICANT CHANGE UP (ref 17–32)
CREAT SERPL-MCNC: 1.1 MG/DL — SIGNIFICANT CHANGE UP (ref 0.7–1.5)
EOSINOPHIL # BLD AUTO: 0.05 K/UL — SIGNIFICANT CHANGE UP (ref 0–0.7)
EOSINOPHIL NFR BLD AUTO: 0.3 % — SIGNIFICANT CHANGE UP (ref 0–8)
GLUCOSE SERPL-MCNC: 80 MG/DL — SIGNIFICANT CHANGE UP (ref 70–99)
HCT VFR BLD CALC: 44.3 % — SIGNIFICANT CHANGE UP (ref 42–52)
HGB BLD-MCNC: 14.3 G/DL — SIGNIFICANT CHANGE UP (ref 14–18)
IMM GRANULOCYTES NFR BLD AUTO: 4.3 % — HIGH (ref 0.1–0.3)
LYMPHOCYTES # BLD AUTO: 31 % — SIGNIFICANT CHANGE UP (ref 20.5–51.1)
LYMPHOCYTES # BLD AUTO: 4.49 K/UL — HIGH (ref 1.2–3.4)
MAGNESIUM SERPL-MCNC: 2.4 MG/DL — SIGNIFICANT CHANGE UP (ref 1.8–2.4)
MCHC RBC-ENTMCNC: 28.9 PG — SIGNIFICANT CHANGE UP (ref 27–31)
MCHC RBC-ENTMCNC: 32.3 G/DL — SIGNIFICANT CHANGE UP (ref 32–37)
MCV RBC AUTO: 89.5 FL — SIGNIFICANT CHANGE UP (ref 80–94)
MONOCYTES # BLD AUTO: 1.11 K/UL — HIGH (ref 0.1–0.6)
MONOCYTES NFR BLD AUTO: 7.7 % — SIGNIFICANT CHANGE UP (ref 1.7–9.3)
NEUTROPHILS # BLD AUTO: 8.16 K/UL — HIGH (ref 1.4–6.5)
NEUTROPHILS NFR BLD AUTO: 56.3 % — SIGNIFICANT CHANGE UP (ref 42.2–75.2)
NRBC # BLD: 0 /100 WBCS — SIGNIFICANT CHANGE UP (ref 0–0)
PLATELET # BLD AUTO: 236 K/UL — SIGNIFICANT CHANGE UP (ref 130–400)
POTASSIUM SERPL-MCNC: 5.1 MMOL/L — HIGH (ref 3.5–5)
POTASSIUM SERPL-SCNC: 5.1 MMOL/L — HIGH (ref 3.5–5)
PROT SERPL-MCNC: 6.2 G/DL — SIGNIFICANT CHANGE UP (ref 6–8)
RBC # BLD: 4.95 M/UL — SIGNIFICANT CHANGE UP (ref 4.7–6.1)
RBC # FLD: 15.3 % — HIGH (ref 11.5–14.5)
SODIUM SERPL-SCNC: 137 MMOL/L — SIGNIFICANT CHANGE UP (ref 135–146)
WBC # BLD: 14.49 K/UL — HIGH (ref 4.8–10.8)
WBC # FLD AUTO: 14.49 K/UL — HIGH (ref 4.8–10.8)

## 2020-02-27 PROCEDURE — 99233 SBSQ HOSP IP/OBS HIGH 50: CPT

## 2020-02-27 RX ORDER — FLUTICASONE PROPIONATE AND SALMETEROL 50; 250 UG/1; UG/1
1 POWDER ORAL; RESPIRATORY (INHALATION)
Qty: 0 | Refills: 0 | DISCHARGE
Start: 2020-02-27

## 2020-02-27 RX ORDER — FLUTICASONE PROPIONATE AND SALMETEROL 50; 250 UG/1; UG/1
1 POWDER ORAL; RESPIRATORY (INHALATION)
Qty: 0 | Refills: 0 | DISCHARGE

## 2020-02-27 RX ORDER — ALBUTEROL 90 UG/1
2 AEROSOL, METERED ORAL
Qty: 1 | Refills: 0
Start: 2020-02-27

## 2020-02-27 RX ORDER — LEVALBUTEROL 1.25 MG/.5ML
0 SOLUTION, CONCENTRATE RESPIRATORY (INHALATION)
Qty: 0 | Refills: 0 | DISCHARGE

## 2020-02-27 RX ADMIN — MORPHINE SULFATE 100 MILLIGRAM(S): 50 CAPSULE, EXTENDED RELEASE ORAL at 05:54

## 2020-02-27 RX ADMIN — HYDROMORPHONE HYDROCHLORIDE 4 MILLIGRAM(S): 2 INJECTION INTRAMUSCULAR; INTRAVENOUS; SUBCUTANEOUS at 05:53

## 2020-02-27 RX ADMIN — MORPHINE SULFATE 100 MILLIGRAM(S): 50 CAPSULE, EXTENDED RELEASE ORAL at 12:02

## 2020-02-27 RX ADMIN — LISINOPRIL 5 MILLIGRAM(S): 2.5 TABLET ORAL at 05:49

## 2020-02-27 RX ADMIN — Medication 3 MILLILITER(S): at 07:25

## 2020-02-27 RX ADMIN — Medication 60 MILLIGRAM(S): at 05:49

## 2020-02-27 RX ADMIN — BUDESONIDE AND FORMOTEROL FUMARATE DIHYDRATE 2 PUFF(S): 160; 4.5 AEROSOL RESPIRATORY (INHALATION) at 11:57

## 2020-02-27 RX ADMIN — Medication 81 MILLIGRAM(S): at 11:57

## 2020-02-27 RX ADMIN — METHADONE HYDROCHLORIDE 10 MILLIGRAM(S): 40 TABLET ORAL at 05:54

## 2020-02-27 RX ADMIN — PANTOPRAZOLE SODIUM 40 MILLIGRAM(S): 20 TABLET, DELAYED RELEASE ORAL at 05:49

## 2020-02-27 RX ADMIN — OXYCODONE HYDROCHLORIDE 30 MILLIGRAM(S): 5 TABLET ORAL at 12:06

## 2020-02-27 NOTE — PROGRESS NOTE ADULT - SUBJECTIVE AND OBJECTIVE BOX
KAYLEIGH TOMAS  54y  Male      Patient is a 54y old  Male who presents with a chief complaint of COPD exacerbation (26 Feb 2020 16:59)      INTERVAL HPI/OVERNIGHT EVENTS: reports significant improvement in sob      REVIEW OF SYSTEMS:  as above  All other review of systems negative    T(C): 36 (02-27-20 @ 04:48), Max: 36.4 (02-26-20 @ 21:27)  HR: 77 (02-27-20 @ 04:48) (77 - 96)  BP: 120/73 (02-27-20 @ 04:48) (99/67 - 120/73)  RR: 18 (02-27-20 @ 04:48) (18 - 18)  SpO2: 97% (02-27-20 @ 07:45) (97% - 97%)  Wt(kg): --Vital Signs Last 24 Hrs  T(C): 36 (27 Feb 2020 04:48), Max: 36.4 (26 Feb 2020 21:27)  T(F): 96.8 (27 Feb 2020 04:48), Max: 97.5 (26 Feb 2020 21:27)  HR: 77 (27 Feb 2020 04:48) (77 - 96)  BP: 120/73 (27 Feb 2020 04:48) (99/67 - 120/73)  BP(mean): --  RR: 18 (27 Feb 2020 04:48) (18 - 18)  SpO2: 97% (27 Feb 2020 07:45) (97% - 97%)      02-27-20 @ 07:01  -  02-27-20 @ 15:56  --------------------------------------------------------  IN: 240 mL / OUT: 0 mL / NET: 240 mL        PHYSICAL EXAM:  GENERAL: NAD  HEENT forced upper airway expiratory wheeze, no stridor  PSYCH: no agitation, baseline mentation  NERVOUS SYSTEM:  Alert & Oriented X3, no new focal deficits  PULMONARY: less expiratory wheeze, more exchange bilaterally, comfortable on RA, no signs of co2 narcosis  CARDIOVASCULAR: Regular rate and rhythm; No murmurs, rubs, or gallops  GI: Soft, Nontender, Nondistended; Bowel sounds present obese  EXTREMITIES:  2+ Peripheral Pulses, No clubbing, cyanosis, or edema    Consultant(s) Notes Reviewed:  [x ] YES  [ ] NO    Discussed with Consultants/Other Providers [ x] YES     LABS                          14.3   14.49 )-----------( 236      ( 27 Feb 2020 05:51 )             44.3     02-27    137  |  96<L>  |  22<H>  ----------------------------<  80  5.1<H>   |  28  |  1.1    Ca    8.9      27 Feb 2020 05:51  Mg     2.4     02-27    TPro  6.2  /  Alb  3.8  /  TBili  0.3  /  DBili  x   /  AST  24  /  ALT  30  /  AlkPhos  112  02-27          Lactate Trend        CAPILLARY BLOOD GLUCOSE            RADIOLOGY & ADDITIONAL TESTS:    Imaging Personally Reviewed:  [ ] YES  [ ] NO    HEALTH ISSUES - PROBLEM Dx:

## 2020-02-27 NOTE — DIETITIAN INITIAL EVALUATION ADULT. - FACTORS AFF FOOD INTAKE
Pt with a good appetite and PO intake  since admission. Reports a significant intentional wt loss within 2+ years with diet/lifestyle changes. Reports cutting out soda and fried foods, eating significantly less grains/starch, Last BM 2/26, WDL. NKFA. Denies chew/swallow difficulty. Denies prior vit/min supplementation.

## 2020-02-27 NOTE — PROGRESS NOTE ADULT - ASSESSMENT
55yo obese M active smoker with copd and ortega on qhs bipap at home here with copd exacerbation 2/2 continued smoking, chronic pain on significant opiate analgesia. copd improved with supportive care, empiric abx, bronchodilators, and steroid taper. patient is medically stable for discharge home but is at high risk for readmission 2/2 continued smoking. counselled patient regarding appropriate inhalers use, has high dose advair at home, rescue inhaler, spiriva. will have a steroid taper oral and complete an oral course of antibiotic for anti-inflammatory purposes. he is adherent with nippv when sleeping. smoking cessation counselling support and provided. copd action plan counselling also provided. patient will follow with Dr. lopez pulmonary as an outpatient.    Patient seen independently of resident.  >30 minutes spent coordinating discharge planning, medicine reconciliation, follow up plan, and direct patient encounter  see discharge summary for further recommendation

## 2020-02-27 NOTE — PROGRESS NOTE ADULT - ATTENDING COMMENTS
Patient seen independently of resident.  >30 minutes spent coordinating discharge planning, medicine reconciliation, follow up plan, and direct patient encounter

## 2020-02-27 NOTE — DIETITIAN INITIAL EVALUATION ADULT. - RD TO REMAIN AVAILABLE
RD to monitor diet order, enegy intake, NFPF, body comp, glucose and renal profile. Pt not at risk f/u 7 days

## 2020-02-27 NOTE — DIETITIAN INITIAL EVALUATION ADULT. - PHYSICAL APPEARANCE
obese/BMI: 37.8. IBW: 76.8kg+/-10%. Note ACTUAL .07cm. Skin WDL except ecchymosis and R eyebrow laceration.

## 2020-02-27 NOTE — DIETITIAN INITIAL EVALUATION ADULT. - DIET TYPE
RECOMMENDATIONS: Continue DASH/TLC diet modifier. No further diet interventions. Pt declines further diet ed and d/c instructions.

## 2020-02-27 NOTE — DISCHARGE NOTE NURSING/CASE MANAGEMENT/SOCIAL WORK - PATIENT PORTAL LINK FT
You can access the FollowMyHealth Patient Portal offered by [facility] by registering at the following website: [email or web address]. By joining [employer]’s FollowMyHealth portal, you will also be able to view your health information using other applications (apps) compatible with our system.

## 2020-02-27 NOTE — DIETITIAN INITIAL EVALUATION ADULT. - ENERGY NEEDS
estimated energy needs: ~2077kcal (MSJx1.0AF) continued wt loss desirable  estimated protein needs: 77-92g (1.0-1.2g/kgIBW)  estimated fluid needs: 1ml/kcal

## 2020-02-27 NOTE — CHART NOTE - NSCHARTNOTEFT_GEN_A_CORE
<<<RESIDENT DISCHARGE NOTE>>>     KAYLEIGH TOMAS  MRN-[id number]    VITAL SIGNS:  T(F): 96.8 (02-27-20 @ 04:48), Max: 97.5 (02-26-20 @ 21:27)  HR: 77 (02-27-20 @ 04:48)  BP: 120/73 (02-27-20 @ 04:48)  SpO2: 97% (02-27-20 @ 07:45)      PHYSICAL EXAMINATION:    GENERAL: NAD, obese  HEENT: Laceration to eyebrow  CHEST/LUNG: wheezes bilaterally  HEART: Regular rate and rhythm; s1 s2 appreciated  ABDOMEN: Soft, Nontender, Nondistended; Bowel sounds present  EXTREMITIES: No LE edema b/l  NERVOUS SYSTEM:  Alert & Oriented X3    TEST RESULTS:                        14.3   14.49 )-----------( 236      ( 27 Feb 2020 05:51 )             44.3       02-27    137  |  96<L>  |  22<H>  ----------------------------<  80  5.1<H>   |  28  |  1.1    Ca    8.9      27 Feb 2020 05:51  Mg     2.4     02-27    TPro  6.2  /  Alb  3.8  /  TBili  0.3  /  DBili  x   /  AST  24  /  ALT  30  /  AlkPhos  112  02-27      FINAL DISCHARGE INTERVIEW:  Resident(s) Present: (Name:Bailee De Jesus    DISCHARGE MEDICATION RECONCILIATION  reviewed with Attending (Name:_Dr. Martines    DISPOSITION:   [ x ] Home,    [  ] Home with Visiting Nursing Services,   [    ]  SNF/ NH,    [   ] Acute Rehab (4A),   [   ] Other (Specify:_________)

## 2020-02-27 NOTE — DIETITIAN INITIAL EVALUATION ADULT. - PERTINENT MEDS FT
lovenox, IV abx, prednisone, methadone, albuterol, dilaudid, tricor, lasix, zestril, oxycodone, protonix, senna, zocor

## 2020-02-28 LAB
6-ACETYLMORPHINE, UR RESULT: NEGATIVE NG/ML — SIGNIFICANT CHANGE UP
6MAM UR CFM-MCNC: NEGATIVE NG/ML — SIGNIFICANT CHANGE UP
CODEINE UR CFM-MCNC: NEGATIVE NG/ML — SIGNIFICANT CHANGE UP
CODEINE, UR RESULT: NEGATIVE NG/ML — SIGNIFICANT CHANGE UP
HYDROCODONE UR QL CFM: NEGATIVE NG/ML — SIGNIFICANT CHANGE UP
HYDROCODONE, UR RESULT: NEGATIVE NG/ML — SIGNIFICANT CHANGE UP
HYDROMORPHONE UR QL CFM: 559 NG/ML — SIGNIFICANT CHANGE UP
HYDROMORPHONE, UR RESULT: 559 NG/ML — SIGNIFICANT CHANGE UP
MORPHINE UR QL CFM: SIGNIFICANT CHANGE UP NG/ML
MORPHINE, UR RESULT: SIGNIFICANT CHANGE UP NG/ML
NOROXYCODONE (OPIATES), UR RESULT: 4540 NG/ML — SIGNIFICANT CHANGE UP
NOROXYCODONE UR CFM-MCNC: 4540 NG/ML — SIGNIFICANT CHANGE UP
OPIATES IN-HOUSE INTERPRETATION: POSITIVE
OPIATES UR QL CFM: POSITIVE
OXYCODONE (OPIATES), UR RESULT: 1829 NG/ML — SIGNIFICANT CHANGE UP
OXYCODONE UR-MCNC: 1829 NG/ML — SIGNIFICANT CHANGE UP
OXYMORPHONE (OPIATES), UR RESULT: NEGATIVE NG/ML — SIGNIFICANT CHANGE UP
OXYMORPHONE UR CFM-MCNC: NEGATIVE NG/ML — SIGNIFICANT CHANGE UP

## 2020-02-29 ENCOUNTER — EMERGENCY (EMERGENCY)
Facility: HOSPITAL | Age: 55
LOS: 0 days | Discharge: HOME | End: 2020-03-01
Attending: EMERGENCY MEDICINE | Admitting: EMERGENCY MEDICINE
Payer: MEDICAID

## 2020-02-29 VITALS
WEIGHT: 266.98 LBS | HEIGHT: 77 IN | HEART RATE: 96 BPM | SYSTOLIC BLOOD PRESSURE: 150 MMHG | RESPIRATION RATE: 20 BRPM | DIASTOLIC BLOOD PRESSURE: 66 MMHG | OXYGEN SATURATION: 97 % | TEMPERATURE: 98 F

## 2020-02-29 DIAGNOSIS — Z96.651 PRESENCE OF RIGHT ARTIFICIAL KNEE JOINT: Chronic | ICD-10-CM

## 2020-02-29 DIAGNOSIS — R06.02 SHORTNESS OF BREATH: ICD-10-CM

## 2020-02-29 DIAGNOSIS — E78.00 PURE HYPERCHOLESTEROLEMIA, UNSPECIFIED: ICD-10-CM

## 2020-02-29 DIAGNOSIS — R05 COUGH: ICD-10-CM

## 2020-02-29 DIAGNOSIS — K21.9 GASTRO-ESOPHAGEAL REFLUX DISEASE WITHOUT ESOPHAGITIS: ICD-10-CM

## 2020-02-29 DIAGNOSIS — Z98.890 OTHER SPECIFIED POSTPROCEDURAL STATES: Chronic | ICD-10-CM

## 2020-02-29 DIAGNOSIS — J44.9 CHRONIC OBSTRUCTIVE PULMONARY DISEASE, UNSPECIFIED: ICD-10-CM

## 2020-02-29 DIAGNOSIS — K40.20 BILATERAL INGUINAL HERNIA, WITHOUT OBSTRUCTION OR GANGRENE, NOT SPECIFIED AS RECURRENT: Chronic | ICD-10-CM

## 2020-02-29 DIAGNOSIS — J45.909 UNSPECIFIED ASTHMA, UNCOMPLICATED: ICD-10-CM

## 2020-02-29 DIAGNOSIS — Z87.891 PERSONAL HISTORY OF NICOTINE DEPENDENCE: ICD-10-CM

## 2020-02-29 LAB
ANION GAP SERPL CALC-SCNC: 14 MMOL/L — SIGNIFICANT CHANGE UP (ref 7–14)
BUN SERPL-MCNC: 20 MG/DL — SIGNIFICANT CHANGE UP (ref 10–20)
CALCIUM SERPL-MCNC: 8.7 MG/DL — SIGNIFICANT CHANGE UP (ref 8.5–10.1)
CHLORIDE SERPL-SCNC: 94 MMOL/L — LOW (ref 98–110)
CO2 SERPL-SCNC: 27 MMOL/L — SIGNIFICANT CHANGE UP (ref 17–32)
CREAT SERPL-MCNC: 1.1 MG/DL — SIGNIFICANT CHANGE UP (ref 0.7–1.5)
GLUCOSE SERPL-MCNC: 156 MG/DL — HIGH (ref 70–99)
HCT VFR BLD CALC: 44 % — SIGNIFICANT CHANGE UP (ref 42–52)
HGB BLD-MCNC: 14.8 G/DL — SIGNIFICANT CHANGE UP (ref 14–18)
MCHC RBC-ENTMCNC: 29.2 PG — SIGNIFICANT CHANGE UP (ref 27–31)
MCHC RBC-ENTMCNC: 33.6 G/DL — SIGNIFICANT CHANGE UP (ref 32–37)
MCV RBC AUTO: 86.8 FL — SIGNIFICANT CHANGE UP (ref 80–94)
NRBC # BLD: 0 /100 WBCS — SIGNIFICANT CHANGE UP (ref 0–0)
PLATELET # BLD AUTO: 239 K/UL — SIGNIFICANT CHANGE UP (ref 130–400)
POTASSIUM SERPL-MCNC: 5.4 MMOL/L — HIGH (ref 3.5–5)
POTASSIUM SERPL-SCNC: 5.4 MMOL/L — HIGH (ref 3.5–5)
RBC # BLD: 5.07 M/UL — SIGNIFICANT CHANGE UP (ref 4.7–6.1)
RBC # FLD: 15.3 % — HIGH (ref 11.5–14.5)
SODIUM SERPL-SCNC: 135 MMOL/L — SIGNIFICANT CHANGE UP (ref 135–146)
TROPONIN T SERPL-MCNC: <0.01 NG/ML — SIGNIFICANT CHANGE UP
WBC # BLD: 15.25 K/UL — HIGH (ref 4.8–10.8)
WBC # FLD AUTO: 15.25 K/UL — HIGH (ref 4.8–10.8)

## 2020-02-29 PROCEDURE — 71046 X-RAY EXAM CHEST 2 VIEWS: CPT | Mod: 26

## 2020-02-29 PROCEDURE — 93010 ELECTROCARDIOGRAM REPORT: CPT

## 2020-02-29 PROCEDURE — 99285 EMERGENCY DEPT VISIT HI MDM: CPT

## 2020-02-29 RX ORDER — IPRATROPIUM/ALBUTEROL SULFATE 18-103MCG
3 AEROSOL WITH ADAPTER (GRAM) INHALATION
Refills: 0 | Status: COMPLETED | OUTPATIENT
Start: 2020-02-29 | End: 2020-02-29

## 2020-02-29 RX ORDER — DEXAMETHASONE 0.5 MG/5ML
10 ELIXIR ORAL ONCE
Refills: 0 | Status: COMPLETED | OUTPATIENT
Start: 2020-02-29 | End: 2020-02-29

## 2020-02-29 RX ADMIN — Medication 3 MILLILITER(S): at 20:30

## 2020-02-29 RX ADMIN — Medication 102 MILLIGRAM(S): at 21:00

## 2020-02-29 RX ADMIN — Medication 3 MILLILITER(S): at 20:45

## 2020-02-29 RX ADMIN — Medication 3 MILLILITER(S): at 21:10

## 2020-02-29 RX ADMIN — Medication 10 MILLIGRAM(S): at 21:30

## 2020-02-29 NOTE — ED PROVIDER NOTE - CARE PLAN
Principal Discharge DX:	COPD (chronic obstructive pulmonary disease)  Assessment and plan of treatment:	Impression: moderate COPD exacerbation  Plan: will get basic blood work, CXR, nebs and steroids

## 2020-02-29 NOTE — ED PROVIDER NOTE - FAMILY HISTORY
FH: diabetes mellitus     Mother  Still living? Unknown  FH: lung cancer, Age at diagnosis: Age Unknown

## 2020-02-29 NOTE — ED PROVIDER NOTE - PATIENT PORTAL LINK FT
You can access the FollowMyHealth Patient Portal offered by [facility] by registering at the following website: [email or web address]. By joining [employer]’s FollowMyHealth portal, you will also be able to view your health information using other applications (apps) compatible with our system.

## 2020-02-29 NOTE — ED PROVIDER NOTE - PHYSICAL EXAMINATION
Physical Exam    Vital Signs: I have reviewed the initial vital signs.  Constitutional: well-nourished, appears stated age, no acute distress  Cardiovascular: RRR, well-perfused extremities  Respiratory: (+) b/l wheezing, speaking full sentences, no respiratory distress, prolonged expiratory phase  Gastrointestinal: soft, non-tender abdomen, no pulsatile mass  Musculoskeletal: supple neck, no lower extremity edema  Integumentary: warm, dry, no rash  Neurologic: awake, alert, cranial nerves II-XII grossly intact, extremities’ motor and sensory functions grossly intact  Psychiatric: appropriate mood, appropriate affect

## 2020-02-29 NOTE — ED ADULT NURSE NOTE - CHPI ED NUR SYMPTOMS NEG
no fever/no wheezing/no diaphoresis/no chills/no edema/no hemoptysis/no chest pain/no body aches/no headache

## 2020-02-29 NOTE — ED PROVIDER NOTE - CARE PROVIDER_API CALL
Blake Bassett)  Critical Care Medicine; Geriatric Medicine; Internal Medicine; Pulmonary Disease  [address] [place] [address]  Phone: [phone]  Fax: [phone]  Follow Up Time: Routine

## 2020-02-29 NOTE — ED ADULT NURSE NOTE - OBJECTIVE STATEMENT
Pt c/o SOB x2 days and passed out earlier today. Pt denies chest pain/lightheadedness/dizziness at this time. Pt has dx COPD, states he has not been taking his meds as prescribed.

## 2020-02-29 NOTE — ED PROVIDER NOTE - OBJECTIVE STATEMENT
55 y/o M with PMHx COPD and asthma presents with SOB since today, associated with non-productive cough. Pt states he was standing waiting for the bus and began to feel light headed secondary to SOB, so he comes to the ED for evaluation as his sxs persist. He denies any CP or hemoptysis. Pt was admitted here for 8-9 days 2 weeks ago for treatment before being d/c. He states he has not smoked since his last d/c.

## 2020-02-29 NOTE — ED PROVIDER NOTE - NS ED ROS FT
Constitutional: (-) fever (-)chills  (-)sweats  Eyes/ENT: (-)rhinorrhea  (-)sore throat  Cardiovascular: (-) chest pain, (-) palpitations   Respiratory: (-) cough, (+) shortness of breath  Gastrointestinal: (-) vomiting, (-) diarrhea  (-) abdominal pain  Musculoskeletal: (-) neck pain, (-) back pain  Integumentary: (-) rash, (-) edema  Neurological: (-) headache, (-) altered mental status  (-) LOC  Psychiatric: (-) hallucinations  Allergic/Immunologic: (-) pruritus

## 2020-02-29 NOTE — ED PROVIDER NOTE - CLINICAL SUMMARY MEDICAL DECISION MAKING FREE TEXT BOX
evalauted for copd given wheezing, he was treated with nebs and steroids, he had basic blood work done and cxr to evaluate for cardiac or pneumonia which was negative, his wheezing improved and he is ambulating wihtout difficulty he will fu with pulmonology as outpatient.

## 2020-03-06 DIAGNOSIS — J20.9 ACUTE BRONCHITIS, UNSPECIFIED: ICD-10-CM
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
## 2020-03-06 DIAGNOSIS — E66.9 OBESITY, UNSPECIFIED: ICD-10-CM

## 2020-03-06 DIAGNOSIS — J98.11 ATELECTASIS: ICD-10-CM

## 2020-03-06 DIAGNOSIS — S01.81XA LACERATION WITHOUT FOREIGN BODY OF OTHER PART OF HEAD, INITIAL ENCOUNTER: ICD-10-CM

## 2020-03-06 DIAGNOSIS — E78.5 HYPERLIPIDEMIA, UNSPECIFIED: ICD-10-CM
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
## 2020-03-06 DIAGNOSIS — J44.0 CHRONIC OBSTRUCTIVE PULMONARY DISEASE WITH (ACUTE) LOWER RESPIRATORY INFECTION: ICD-10-CM

## 2020-03-06 DIAGNOSIS — G47.33 OBSTRUCTIVE SLEEP APNEA (ADULT) (PEDIATRIC): ICD-10-CM

## 2020-03-06 DIAGNOSIS — K21.9 GASTRO-ESOPHAGEAL REFLUX DISEASE WITHOUT ESOPHAGITIS: ICD-10-CM

## 2020-03-06 DIAGNOSIS — F11.20 OPIOID DEPENDENCE, UNCOMPLICATED: ICD-10-CM

## 2020-03-06 DIAGNOSIS — Z79.82 LONG TERM (CURRENT) USE OF ASPIRIN: ICD-10-CM

## 2020-03-06 DIAGNOSIS — M54.9 DORSALGIA, UNSPECIFIED: ICD-10-CM

## 2020-03-06 DIAGNOSIS — F17.210 NICOTINE DEPENDENCE, CIGARETTES, UNCOMPLICATED: ICD-10-CM

## 2020-03-06 DIAGNOSIS — Z96.653 PRESENCE OF ARTIFICIAL KNEE JOINT, BILATERAL: ICD-10-CM

## 2020-03-06 DIAGNOSIS — G89.29 OTHER CHRONIC PAIN: ICD-10-CM

## 2020-03-06 DIAGNOSIS — I50.20 UNSPECIFIED SYSTOLIC (CONGESTIVE) HEART FAILURE: ICD-10-CM
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
## 2020-03-06 DIAGNOSIS — I11.0 HYPERTENSIVE HEART DISEASE WITH HEART FAILURE: ICD-10-CM

## 2020-03-06 DIAGNOSIS — R06.02 SHORTNESS OF BREATH: ICD-10-CM

## 2020-03-06 DIAGNOSIS — J44.1 CHRONIC OBSTRUCTIVE PULMONARY DISEASE WITH (ACUTE) EXACERBATION: ICD-10-CM

## 2020-03-09 ENCOUNTER — INPATIENT (INPATIENT)
Facility: HOSPITAL | Age: 55
LOS: 2 days | Discharge: HOME | End: 2020-03-12
Attending: HOSPITALIST | Admitting: HOSPITALIST
Payer: MEDICAID

## 2020-03-09 VITALS
RESPIRATION RATE: 18 BRPM | TEMPERATURE: 98 F | OXYGEN SATURATION: 98 % | SYSTOLIC BLOOD PRESSURE: 132 MMHG | DIASTOLIC BLOOD PRESSURE: 81 MMHG | HEIGHT: 70 IN | WEIGHT: 300.05 LBS | HEART RATE: 92 BPM

## 2020-03-09 DIAGNOSIS — K40.20 BILATERAL INGUINAL HERNIA, WITHOUT OBSTRUCTION OR GANGRENE, NOT SPECIFIED AS RECURRENT: Chronic | ICD-10-CM

## 2020-03-09 DIAGNOSIS — Z98.890 OTHER SPECIFIED POSTPROCEDURAL STATES: Chronic | ICD-10-CM

## 2020-03-09 DIAGNOSIS — Z96.651 PRESENCE OF RIGHT ARTIFICIAL KNEE JOINT: Chronic | ICD-10-CM

## 2020-03-09 LAB
ALBUMIN SERPL ELPH-MCNC: 3.9 G/DL — SIGNIFICANT CHANGE UP (ref 3.5–5.2)
ALP SERPL-CCNC: 74 U/L — SIGNIFICANT CHANGE UP (ref 30–115)
ALT FLD-CCNC: 21 U/L — SIGNIFICANT CHANGE UP (ref 0–41)
ANION GAP SERPL CALC-SCNC: 14 MMOL/L — SIGNIFICANT CHANGE UP (ref 7–14)
AST SERPL-CCNC: 24 U/L — SIGNIFICANT CHANGE UP (ref 0–41)
BASOPHILS # BLD AUTO: 0.03 K/UL — SIGNIFICANT CHANGE UP (ref 0–0.2)
BASOPHILS NFR BLD AUTO: 0.3 % — SIGNIFICANT CHANGE UP (ref 0–1)
BILIRUB SERPL-MCNC: 0.5 MG/DL — SIGNIFICANT CHANGE UP (ref 0.2–1.2)
BUN SERPL-MCNC: 9 MG/DL — LOW (ref 10–20)
CALCIUM SERPL-MCNC: 9 MG/DL — SIGNIFICANT CHANGE UP (ref 8.5–10.1)
CHLORIDE SERPL-SCNC: 102 MMOL/L — SIGNIFICANT CHANGE UP (ref 98–110)
CO2 SERPL-SCNC: 24 MMOL/L — SIGNIFICANT CHANGE UP (ref 17–32)
CREAT SERPL-MCNC: 0.8 MG/DL — SIGNIFICANT CHANGE UP (ref 0.7–1.5)
EOSINOPHIL # BLD AUTO: 0.06 K/UL — SIGNIFICANT CHANGE UP (ref 0–0.7)
EOSINOPHIL NFR BLD AUTO: 0.5 % — SIGNIFICANT CHANGE UP (ref 0–8)
GLUCOSE SERPL-MCNC: 107 MG/DL — HIGH (ref 70–99)
HCT VFR BLD CALC: 41.3 % — LOW (ref 42–52)
HGB BLD-MCNC: 13.8 G/DL — LOW (ref 14–18)
IMM GRANULOCYTES NFR BLD AUTO: 0.4 % — HIGH (ref 0.1–0.3)
LYMPHOCYTES # BLD AUTO: 2.71 K/UL — SIGNIFICANT CHANGE UP (ref 1.2–3.4)
LYMPHOCYTES # BLD AUTO: 23.2 % — SIGNIFICANT CHANGE UP (ref 20.5–51.1)
MAGNESIUM SERPL-MCNC: 1.9 MG/DL — SIGNIFICANT CHANGE UP (ref 1.8–2.4)
MCHC RBC-ENTMCNC: 29 PG — SIGNIFICANT CHANGE UP (ref 27–31)
MCHC RBC-ENTMCNC: 33.4 G/DL — SIGNIFICANT CHANGE UP (ref 32–37)
MCV RBC AUTO: 86.8 FL — SIGNIFICANT CHANGE UP (ref 80–94)
MONOCYTES # BLD AUTO: 0.76 K/UL — HIGH (ref 0.1–0.6)
MONOCYTES NFR BLD AUTO: 6.5 % — SIGNIFICANT CHANGE UP (ref 1.7–9.3)
NEUTROPHILS # BLD AUTO: 8.05 K/UL — HIGH (ref 1.4–6.5)
NEUTROPHILS NFR BLD AUTO: 69.1 % — SIGNIFICANT CHANGE UP (ref 42.2–75.2)
NRBC # BLD: 0 /100 WBCS — SIGNIFICANT CHANGE UP (ref 0–0)
NT-PROBNP SERPL-SCNC: 227 PG/ML — SIGNIFICANT CHANGE UP (ref 0–300)
PLATELET # BLD AUTO: 196 K/UL — SIGNIFICANT CHANGE UP (ref 130–400)
POTASSIUM SERPL-MCNC: 4.9 MMOL/L — SIGNIFICANT CHANGE UP (ref 3.5–5)
POTASSIUM SERPL-SCNC: 4.9 MMOL/L — SIGNIFICANT CHANGE UP (ref 3.5–5)
PROT SERPL-MCNC: 6.3 G/DL — SIGNIFICANT CHANGE UP (ref 6–8)
RBC # BLD: 4.76 M/UL — SIGNIFICANT CHANGE UP (ref 4.7–6.1)
RBC # FLD: 15.3 % — HIGH (ref 11.5–14.5)
SODIUM SERPL-SCNC: 140 MMOL/L — SIGNIFICANT CHANGE UP (ref 135–146)
TROPONIN T SERPL-MCNC: <0.01 NG/ML — SIGNIFICANT CHANGE UP
WBC # BLD: 11.66 K/UL — HIGH (ref 4.8–10.8)
WBC # FLD AUTO: 11.66 K/UL — HIGH (ref 4.8–10.8)

## 2020-03-09 PROCEDURE — 99285 EMERGENCY DEPT VISIT HI MDM: CPT

## 2020-03-09 PROCEDURE — 71045 X-RAY EXAM CHEST 1 VIEW: CPT | Mod: 26

## 2020-03-09 PROCEDURE — 93010 ELECTROCARDIOGRAM REPORT: CPT

## 2020-03-09 RX ORDER — IPRATROPIUM/ALBUTEROL SULFATE 18-103MCG
3 AEROSOL WITH ADAPTER (GRAM) INHALATION ONCE
Refills: 0 | Status: COMPLETED | OUTPATIENT
Start: 2020-03-09 | End: 2020-03-09

## 2020-03-09 RX ORDER — MORPHINE SULFATE 50 MG/1
100 CAPSULE, EXTENDED RELEASE ORAL ONCE
Refills: 0 | Status: DISCONTINUED | OUTPATIENT
Start: 2020-03-09 | End: 2020-03-09

## 2020-03-09 RX ORDER — IPRATROPIUM/ALBUTEROL SULFATE 18-103MCG
3 AEROSOL WITH ADAPTER (GRAM) INHALATION EVERY 6 HOURS
Refills: 0 | Status: DISCONTINUED | OUTPATIENT
Start: 2020-03-09 | End: 2020-03-12

## 2020-03-09 RX ORDER — BUDESONIDE AND FORMOTEROL FUMARATE DIHYDRATE 160; 4.5 UG/1; UG/1
2 AEROSOL RESPIRATORY (INHALATION)
Refills: 0 | Status: DISCONTINUED | OUTPATIENT
Start: 2020-03-09 | End: 2020-03-12

## 2020-03-09 RX ORDER — OXYCODONE HYDROCHLORIDE 5 MG/1
30 TABLET ORAL ONCE
Refills: 0 | Status: DISCONTINUED | OUTPATIENT
Start: 2020-03-09 | End: 2020-03-09

## 2020-03-09 RX ORDER — ALBUTEROL 90 UG/1
2 AEROSOL, METERED ORAL EVERY 4 HOURS
Refills: 0 | Status: DISCONTINUED | OUTPATIENT
Start: 2020-03-09 | End: 2020-03-12

## 2020-03-09 RX ORDER — ENOXAPARIN SODIUM 100 MG/ML
40 INJECTION SUBCUTANEOUS DAILY
Refills: 0 | Status: DISCONTINUED | OUTPATIENT
Start: 2020-03-09 | End: 2020-03-12

## 2020-03-09 RX ORDER — CHLORHEXIDINE GLUCONATE 213 G/1000ML
1 SOLUTION TOPICAL
Refills: 0 | Status: DISCONTINUED | OUTPATIENT
Start: 2020-03-09 | End: 2020-03-12

## 2020-03-09 RX ADMIN — OXYCODONE HYDROCHLORIDE 30 MILLIGRAM(S): 5 TABLET ORAL at 18:32

## 2020-03-09 RX ADMIN — Medication 3 MILLILITER(S): at 16:15

## 2020-03-09 RX ADMIN — Medication 3 MILLILITER(S): at 18:14

## 2020-03-09 RX ADMIN — MORPHINE SULFATE 100 MILLIGRAM(S): 50 CAPSULE, EXTENDED RELEASE ORAL at 22:16

## 2020-03-09 RX ADMIN — MORPHINE SULFATE 100 MILLIGRAM(S): 50 CAPSULE, EXTENDED RELEASE ORAL at 21:46

## 2020-03-09 RX ADMIN — OXYCODONE HYDROCHLORIDE 30 MILLIGRAM(S): 5 TABLET ORAL at 19:17

## 2020-03-09 NOTE — ED ADULT NURSE REASSESSMENT NOTE - NS ED NURSE REASSESS COMMENT FT1
patient assessed, no acute respiratory distress noted, Vital signs stable, nebulizer treatment given as per MD orders. Labs sent pending results, EKG completed, awaiting chest x ray, will monitor.

## 2020-03-09 NOTE — ED PROVIDER NOTE - NS ED ROS FT
Constitutional: (-) fever  Eyes/ENT: (-) blurry vision, (-) epistaxis  Cardiovascular: (-) chest pain, (-) syncope  Respiratory: (-) cough, (-) shortness of breath  Gastrointestinal: (-) vomiting, (-) diarrhea  : (-) dysuria, (-) hematuria  Musculoskeletal: (-) neck pain, (-) back pain, (-) joint pain  Integumentary: (-) rash, (-) edema  Neurological: (-) headache, (-) altered mental status  Allergic/Immunologic: (-) pruritus

## 2020-03-09 NOTE — H&P ADULT - ASSESSMENT
54 M w/ PMH COPD, active smoker, REBECCA on BIPAP (non-compliant), chronic knee, back pain, HLD, HTN, GERD, Insomnia, HO CCY, Appendectomy, presenting to the ED c/o shortness of breath being admitted for a COPD Exacerbation.    # COPD Exacerbation 2/2 active smoking  - IV Solumedrol  - IV Levaquin   - Duonebs / Symbicort / Albuterol  - Advised on tobacco cessation     # REBECCA on BIPAP (28/18) qhs    # Chronic Knee / shoulder / pain  - pain mgmt has him on - morphine / oxycodone / Dilaudid and methadone ?    # GERD - PPI    # DLD - Statin    # CHF ? - on lasix 40mg    # 54 M w/ PMH COPD, active smoker, REBECCA on BIPAP (non-compliant), chronic knee, back pain, HLD, HTN, GERD, Insomnia, HO CCY, Appendectomy, presenting to the ED c/o shortness of breath being admitted for a COPD Exacerbation.    # COPD Exacerbation 2/2 active smoking  - IV Solumedrol  - IV Levaquin   - Duonebs / Symbicort / Albuterol  - Advised on tobacco cessation   - Dr. Bassett is his pulmonologist     # REBECCA on BIPAP (28/18) qhs    # Chronic Knee / shoulder / pain  - pain mgmt has him on - morphine / oxycodone / Dilaudid and methadone ?    # GERD - PPI    # DLD - Statin    # CHF ? - on lasix 40mg    # Insomnia - ambien prn    # CHG  DVTppx  Full code  Dispo: 24-48hrs

## 2020-03-09 NOTE — ED PROVIDER NOTE - OBJECTIVE STATEMENT
54y M pmh as listed presents for eval of sob. Pt states he has increasing sob x1wk associated olvera, aggravated with ambulation, minimally relieved at rest. Associated np cough. Denies fever, ha, cp, weakness, numbness, n/v

## 2020-03-09 NOTE — ED PROVIDER NOTE - ATTENDING CONTRIBUTION TO CARE
53 yo male PMH REBECCA, morbid obesity, COPD active smoker, OA, HTN, elevated cholesterol c/o SOB today.  States he was on his way to the store to buy a pack of cigarettes when he started feeling SOB which caused him to stop and sit down, then called 911.  Did not use his nebs today, says he is still taking prednisone taper after a recent exacerbation of COPD and hospital admission ( finished abx already).  Obese, NAD, RRR, speaking full sentences, well-perfused extremities.  Will give nebs, get CXR, reassess.

## 2020-03-09 NOTE — H&P ADULT - NSICDXFAMILYHX_GEN_ALL_CORE_FT
FAMILY HISTORY:  FH: diabetes mellitus    Mother  Still living? Unknown  FH: lung cancer, Age at diagnosis: Age Unknown

## 2020-03-09 NOTE — H&P ADULT - NSHPPHYSICALEXAM_GEN_ALL_CORE
VITALS:   T(F): 98  HR: 90  BP: 129/83  RR: 16  SpO2: 97%    PHYSICAL EXAM:  GEN: No acute distress, obese  LUNGS: some mild expiratory wheezes bilaterally   HEART: S1/S2 present. RRR.   ABD: Soft, non-tender, non-distended. Bowel sounds present  EXT: NC/NC/NE/CLARK  NEURO: AAOX3

## 2020-03-09 NOTE — ED PROVIDER NOTE - PROGRESS NOTE DETAILS
the patient appears well, NAD, eating dinner. no respiratory distress, speaking long full sentences, nml work of breathing,  when asked to take deep breaths he forcefully exhaled creating a lot of extra noise; asking for his pain medications.  Will give another neb and ambulate in ED> Patient has not been seen in his assigned spot for a while. back in ED, was found in waiting room watching TV, SOB on ambulation, will admit.

## 2020-03-09 NOTE — ED ADULT NURSE REASSESSMENT NOTE - NS ED NURSE REASSESS COMMENT FT1
patient dyspneic on exertion and educated need to stay in room on monitor. Patient insists on walking to vending machine to get soda despite education and encouragement. Patient offered juice but refused.

## 2020-03-09 NOTE — H&P ADULT - HISTORY OF PRESENT ILLNESS
54 M w/ PMH COPD, active smoker, REBECCA on BIPAP (non-compliant), chronic knee, back pain, HLD, HTN, GERD, Insomnia, HO CCY, Appendectomy, presenting to the ED c/o shortness of breath. Pt says he has been having العراقي, in the past week worse then his baseline, 54 M w/ PMH COPD, active smoker, REBECCA on BIPAP (non-compliant), chronic knee, back pain, HLD, HTN, GERD, Insomnia, HO CCY, Appendectomy, presenting to the ED c/o shortness of breath. Pt says he has been having العراقي, in the past week worse then his baseline, as well as a worse cough with increased sputum production more then baseline., as well as chills.  Denies fevers, night sweats, n/v/d/ha/dizziness/cp/syncope.    In ED, given nebs and steroids, 98 on RA but dyspneic on ambulation in ed so admitted for COPD exacerbation.

## 2020-03-09 NOTE — ED ADULT NURSE NOTE - NSIMPLEMENTINTERV_GEN_ALL_ED
Implemented All Universal Safety Interventions:  [place] to call system. Call bell, personal items and telephone within reach. Instruct patient to call for assistance. Room bathroom lighting operational. Non-slip footwear when patient is off stretcher. Physically safe environment: no spills, clutter or unnecessary equipment. Stretcher in lowest position, wheels locked, appropriate side rails in place.

## 2020-03-09 NOTE — H&P ADULT - ATTENDING COMMENTS
55 YO M with a PMH of COPD, active smoker, REBECCA on BIPAP (non-compliant), chronic knee/back pain, HLD, HTN, GERD, and insomnia who presents to the hospital with a c/o progressively worsening العراقي over the past x 1 week. Associated with increase volume of phlegm. Denies any CP, palpitaions, LE swelling, N/V/D, or ABD pain. Of note, the pt is seen multiple times in the ED walking outside to smoke cigarettes. In the ED, pt was started on IV steroids and duonebs. Physical exam shows obese pt in NAD, walking around the ED and speaking in full sentences. VSS, afebrile. A&Ox3. Non-focal neuro exam. Muscle strength/sensation intact. Good air entry B/L with scattered expiratory wheezes noted throughout all lung fields, minimal rhonch. RRR. ABD is soft, obese, and non-tender, normoactive BSs. LEs without swelling. No rashes. Labs and radiology as above. COPD Exacerbation due to viral URI vs active smoking, complicated. Send Flu/RSV and RVP. Agree with Levaquin. Duonebs PRN and standing. LABA/ICS inhaler. IV steroids and transition to PO. Singulair. Claritin. H2B. Anti-tussives PRN. Supplemental O2 PRN. Extensive counseling on benefits of smoking cessation given. Hx of REBECCA on BIPAP (non-compliant), chronic knee/back pain, HLD, HTN, GERD, and insomnia. Restart home meds. GI and DVT PPX. Inform PCP of pt's admission to hospital. Pt can likely be discharged in the AM with out-pt pulm FU. Rest as per above note.

## 2020-03-09 NOTE — ED ADULT NURSE NOTE - OBJECTIVE STATEMENT
Patient is a 54 year old male presents to ED with complaints of shortness of breath, difficulty breathing and cough x 1 week. Patient states he was discharged 2/29 from the ED.  Patient reports productive cough noted to have yellow sputum today. Hx of COPD and emphysema not on home O2, uses BiPAP at night. Denies any chest pain, recent fever, nausea, vomiting, abdominal pain.

## 2020-03-09 NOTE — H&P ADULT - NSHPLABSRESULTS_GEN_ALL_CORE
LABS:                        13.8   11.66 )-----------( 196      ( 09 Mar 2020 15:58 )             41.3     03-09    140  |  102  |  9<L>  ----------------------------<  107<H>  4.9   |  24  |  0.8    Ca    9.0      09 Mar 2020 15:58  Mg     1.9     03-09    TPro  6.3  /  Alb  3.9  /  TBili  0.5  /  DBili  x   /  AST  24  /  ALT  21  /  AlkPhos  74  03-09          Troponin T, Serum: <0.01 ng/mL (03-09-20 @ 15:58)      CARDIAC MARKERS ( 09 Mar 2020 15:58 )  x     / <0.01 ng/mL / x     / x     / x          RADIOLOGY:

## 2020-03-09 NOTE — ED PROVIDER NOTE - CLINICAL SUMMARY MEDICAL DECISION MAKING FREE TEXT BOX
53 yo male smoker presenting with COPD exacerbation, only mild improvement with nebs, steroids ordered, admit.

## 2020-03-09 NOTE — ED PROVIDER NOTE - PHYSICAL EXAMINATION
CONST: NAD  EYES: Sclera and conjunctiva clear.   ENT: No nasal discharge. Oropharynx normal appearing, no erythema or exudates. No abscess or swelling. Uvula midline.   NECK: Non-tender, no meningeal signs. normal ROM. supple   CARD: S1 S2; No jvd  RESP: B/L wheezing throughout, O2 sat 95% ra  GI: Soft, non-tender, non-distended. no cva tenderness. normal BS  MS: Normal ROM in all extremities. pulses 2 +. no calf tenderness or swelling  SKIN: Warm, dry, no acute rashes. Good turgor  NEURO: A&Ox4, No focal deficits. Strength 5/5 with no sensory deficits.

## 2020-03-10 LAB
ALBUMIN SERPL ELPH-MCNC: 4.2 G/DL — SIGNIFICANT CHANGE UP (ref 3.5–5.2)
ALP SERPL-CCNC: 91 U/L — SIGNIFICANT CHANGE UP (ref 30–115)
ALT FLD-CCNC: 23 U/L — SIGNIFICANT CHANGE UP (ref 0–41)
ANION GAP SERPL CALC-SCNC: 13 MMOL/L — SIGNIFICANT CHANGE UP (ref 7–14)
ANION GAP SERPL CALC-SCNC: 15 MMOL/L — HIGH (ref 7–14)
AST SERPL-CCNC: 15 U/L — SIGNIFICANT CHANGE UP (ref 0–41)
BILIRUB SERPL-MCNC: 0.5 MG/DL — SIGNIFICANT CHANGE UP (ref 0.2–1.2)
BUN SERPL-MCNC: 17 MG/DL — SIGNIFICANT CHANGE UP (ref 10–20)
BUN SERPL-MCNC: 17 MG/DL — SIGNIFICANT CHANGE UP (ref 10–20)
CALCIUM SERPL-MCNC: 9.5 MG/DL — SIGNIFICANT CHANGE UP (ref 8.5–10.1)
CALCIUM SERPL-MCNC: 9.6 MG/DL — SIGNIFICANT CHANGE UP (ref 8.5–10.1)
CHLORIDE SERPL-SCNC: 100 MMOL/L — SIGNIFICANT CHANGE UP (ref 98–110)
CHLORIDE SERPL-SCNC: 101 MMOL/L — SIGNIFICANT CHANGE UP (ref 98–110)
CO2 SERPL-SCNC: 25 MMOL/L — SIGNIFICANT CHANGE UP (ref 17–32)
CO2 SERPL-SCNC: 25 MMOL/L — SIGNIFICANT CHANGE UP (ref 17–32)
CREAT SERPL-MCNC: 0.9 MG/DL — SIGNIFICANT CHANGE UP (ref 0.7–1.5)
CREAT SERPL-MCNC: 0.9 MG/DL — SIGNIFICANT CHANGE UP (ref 0.7–1.5)
GLUCOSE SERPL-MCNC: 168 MG/DL — HIGH (ref 70–99)
GLUCOSE SERPL-MCNC: 171 MG/DL — HIGH (ref 70–99)
HCT VFR BLD CALC: 44.6 % — SIGNIFICANT CHANGE UP (ref 42–52)
HGB BLD-MCNC: 15.3 G/DL — SIGNIFICANT CHANGE UP (ref 14–18)
MAGNESIUM SERPL-MCNC: 2 MG/DL — SIGNIFICANT CHANGE UP (ref 1.8–2.4)
MCHC RBC-ENTMCNC: 30.4 PG — SIGNIFICANT CHANGE UP (ref 27–31)
MCHC RBC-ENTMCNC: 34.3 G/DL — SIGNIFICANT CHANGE UP (ref 32–37)
MCV RBC AUTO: 88.5 FL — SIGNIFICANT CHANGE UP (ref 80–94)
NRBC # BLD: 0 /100 WBCS — SIGNIFICANT CHANGE UP (ref 0–0)
PLATELET # BLD AUTO: 196 K/UL — SIGNIFICANT CHANGE UP (ref 130–400)
POTASSIUM SERPL-MCNC: 4.8 MMOL/L — SIGNIFICANT CHANGE UP (ref 3.5–5)
POTASSIUM SERPL-MCNC: 4.8 MMOL/L — SIGNIFICANT CHANGE UP (ref 3.5–5)
POTASSIUM SERPL-SCNC: 4.8 MMOL/L — SIGNIFICANT CHANGE UP (ref 3.5–5)
POTASSIUM SERPL-SCNC: 4.8 MMOL/L — SIGNIFICANT CHANGE UP (ref 3.5–5)
PROT SERPL-MCNC: 6.6 G/DL — SIGNIFICANT CHANGE UP (ref 6–8)
RBC # BLD: 5.04 M/UL — SIGNIFICANT CHANGE UP (ref 4.7–6.1)
RBC # FLD: 15.5 % — HIGH (ref 11.5–14.5)
SODIUM SERPL-SCNC: 139 MMOL/L — SIGNIFICANT CHANGE UP (ref 135–146)
SODIUM SERPL-SCNC: 140 MMOL/L — SIGNIFICANT CHANGE UP (ref 135–146)
WBC # BLD: 7.66 K/UL — SIGNIFICANT CHANGE UP (ref 4.8–10.8)
WBC # FLD AUTO: 7.66 K/UL — SIGNIFICANT CHANGE UP (ref 4.8–10.8)

## 2020-03-10 PROCEDURE — 93970 EXTREMITY STUDY: CPT | Mod: 26

## 2020-03-10 PROCEDURE — 99223 1ST HOSP IP/OBS HIGH 75: CPT | Mod: AI

## 2020-03-10 RX ORDER — INFLUENZA VIRUS VACCINE 15; 15; 15; 15 UG/.5ML; UG/.5ML; UG/.5ML; UG/.5ML
0.5 SUSPENSION INTRAMUSCULAR ONCE
Refills: 0 | Status: DISCONTINUED | OUTPATIENT
Start: 2020-03-10 | End: 2020-03-12

## 2020-03-10 RX ORDER — SIMVASTATIN 20 MG/1
20 TABLET, FILM COATED ORAL AT BEDTIME
Refills: 0 | Status: DISCONTINUED | OUTPATIENT
Start: 2020-03-10 | End: 2020-03-12

## 2020-03-10 RX ORDER — SENNA PLUS 8.6 MG/1
2 TABLET ORAL AT BEDTIME
Refills: 0 | Status: DISCONTINUED | OUTPATIENT
Start: 2020-03-10 | End: 2020-03-12

## 2020-03-10 RX ORDER — PANTOPRAZOLE SODIUM 20 MG/1
40 TABLET, DELAYED RELEASE ORAL
Refills: 0 | Status: DISCONTINUED | OUTPATIENT
Start: 2020-03-10 | End: 2020-03-12

## 2020-03-10 RX ORDER — ASPIRIN/CALCIUM CARB/MAGNESIUM 324 MG
81 TABLET ORAL DAILY
Refills: 0 | Status: DISCONTINUED | OUTPATIENT
Start: 2020-03-10 | End: 2020-03-12

## 2020-03-10 RX ORDER — FENOFIBRATE,MICRONIZED 130 MG
145 CAPSULE ORAL DAILY
Refills: 0 | Status: DISCONTINUED | OUTPATIENT
Start: 2020-03-10 | End: 2020-03-12

## 2020-03-10 RX ORDER — ZOLPIDEM TARTRATE 10 MG/1
5 TABLET ORAL ONCE
Refills: 0 | Status: DISCONTINUED | OUTPATIENT
Start: 2020-03-10 | End: 2020-03-10

## 2020-03-10 RX ORDER — HYDROMORPHONE HYDROCHLORIDE 2 MG/ML
4 INJECTION INTRAMUSCULAR; INTRAVENOUS; SUBCUTANEOUS EVERY 8 HOURS
Refills: 0 | Status: DISCONTINUED | OUTPATIENT
Start: 2020-03-10 | End: 2020-03-12

## 2020-03-10 RX ORDER — OXYCODONE HYDROCHLORIDE 5 MG/1
30 TABLET ORAL ONCE
Refills: 0 | Status: DISCONTINUED | OUTPATIENT
Start: 2020-03-10 | End: 2020-03-10

## 2020-03-10 RX ORDER — FUROSEMIDE 40 MG
40 TABLET ORAL DAILY
Refills: 0 | Status: DISCONTINUED | OUTPATIENT
Start: 2020-03-10 | End: 2020-03-12

## 2020-03-10 RX ORDER — OXYCODONE HYDROCHLORIDE 5 MG/1
30 TABLET ORAL EVERY 6 HOURS
Refills: 0 | Status: DISCONTINUED | OUTPATIENT
Start: 2020-03-10 | End: 2020-03-10

## 2020-03-10 RX ORDER — MORPHINE SULFATE 50 MG/1
100 CAPSULE, EXTENDED RELEASE ORAL
Refills: 0 | Status: DISCONTINUED | OUTPATIENT
Start: 2020-03-10 | End: 2020-03-12

## 2020-03-10 RX ORDER — ZOLPIDEM TARTRATE 10 MG/1
5 TABLET ORAL AT BEDTIME
Refills: 0 | Status: DISCONTINUED | OUTPATIENT
Start: 2020-03-10 | End: 2020-03-12

## 2020-03-10 RX ORDER — LISINOPRIL 2.5 MG/1
5 TABLET ORAL DAILY
Refills: 0 | Status: DISCONTINUED | OUTPATIENT
Start: 2020-03-10 | End: 2020-03-12

## 2020-03-10 RX ADMIN — OXYCODONE HYDROCHLORIDE 30 MILLIGRAM(S): 5 TABLET ORAL at 01:31

## 2020-03-10 RX ADMIN — Medication 3 MILLILITER(S): at 14:28

## 2020-03-10 RX ADMIN — MORPHINE SULFATE 100 MILLIGRAM(S): 50 CAPSULE, EXTENDED RELEASE ORAL at 07:19

## 2020-03-10 RX ADMIN — MORPHINE SULFATE 100 MILLIGRAM(S): 50 CAPSULE, EXTENDED RELEASE ORAL at 12:50

## 2020-03-10 RX ADMIN — Medication 60 MILLIGRAM(S): at 05:07

## 2020-03-10 RX ADMIN — PANTOPRAZOLE SODIUM 40 MILLIGRAM(S): 20 TABLET, DELAYED RELEASE ORAL at 06:25

## 2020-03-10 RX ADMIN — ZOLPIDEM TARTRATE 5 MILLIGRAM(S): 10 TABLET ORAL at 22:17

## 2020-03-10 RX ADMIN — ZOLPIDEM TARTRATE 5 MILLIGRAM(S): 10 TABLET ORAL at 01:19

## 2020-03-10 RX ADMIN — MORPHINE SULFATE 100 MILLIGRAM(S): 50 CAPSULE, EXTENDED RELEASE ORAL at 06:28

## 2020-03-10 RX ADMIN — LISINOPRIL 5 MILLIGRAM(S): 2.5 TABLET ORAL at 06:25

## 2020-03-10 RX ADMIN — MORPHINE SULFATE 100 MILLIGRAM(S): 50 CAPSULE, EXTENDED RELEASE ORAL at 18:04

## 2020-03-10 RX ADMIN — HYDROMORPHONE HYDROCHLORIDE 4 MILLIGRAM(S): 2 INJECTION INTRAMUSCULAR; INTRAVENOUS; SUBCUTANEOUS at 16:50

## 2020-03-10 RX ADMIN — OXYCODONE HYDROCHLORIDE 30 MILLIGRAM(S): 5 TABLET ORAL at 00:58

## 2020-03-10 RX ADMIN — Medication 81 MILLIGRAM(S): at 12:21

## 2020-03-10 RX ADMIN — OXYCODONE HYDROCHLORIDE 30 MILLIGRAM(S): 5 TABLET ORAL at 07:45

## 2020-03-10 RX ADMIN — SENNA PLUS 2 TABLET(S): 8.6 TABLET ORAL at 22:18

## 2020-03-10 RX ADMIN — MORPHINE SULFATE 100 MILLIGRAM(S): 50 CAPSULE, EXTENDED RELEASE ORAL at 12:20

## 2020-03-10 RX ADMIN — HYDROMORPHONE HYDROCHLORIDE 4 MILLIGRAM(S): 2 INJECTION INTRAMUSCULAR; INTRAVENOUS; SUBCUTANEOUS at 16:17

## 2020-03-10 RX ADMIN — Medication 40 MILLIGRAM(S): at 06:25

## 2020-03-10 RX ADMIN — MORPHINE SULFATE 100 MILLIGRAM(S): 50 CAPSULE, EXTENDED RELEASE ORAL at 18:31

## 2020-03-10 RX ADMIN — Medication 145 MILLIGRAM(S): at 12:20

## 2020-03-10 RX ADMIN — SIMVASTATIN 20 MILLIGRAM(S): 20 TABLET, FILM COATED ORAL at 22:17

## 2020-03-10 RX ADMIN — OXYCODONE HYDROCHLORIDE 30 MILLIGRAM(S): 5 TABLET ORAL at 08:05

## 2020-03-10 NOTE — PROGRESS NOTE ADULT - ASSESSMENT
54 M w/ PMH COPD, active smoker, REBECCA on BIPAP (non-compliant), chronic knee, back pain, HLD, HTN, GERD, Insomnia, HO CCY, Appendectomy, presenting to the ED c/o shortness of breath being admitted for a COPD Exacerbation.    # COPD Exacerbation 2/2 active smoking & viral URI  - IV Solumedrol continue  - IV Levaquin continue  - CXR unremarkable - no new opacities  - continue with Duonebs / Symbicort / Albuterol  - Advised on tobacco cessation - he is not interested in quitting/alternatives at this time  - Dr. Bassett is his pulmonologist     # REBECCA on BIPAP (28/18)   - continue nightly     # Chronic Knee / shoulder / pain  - pain mgmt has him on - morphine / oxycodone / Dilaudid and methadone ?  - will continue morphine and dilaudid for now    # GERD   - PPI - protonix    # DLD   - simvastatin    # CHF ?   - on lasix 40mg at home  - no echo in system    # Insomnia   - ambien prn    # CHG  DVTppx  Full code  Dispo: prepare for dc tomorrow; need to switch to Oral steroids; from home fully functional

## 2020-03-10 NOTE — PROGRESS NOTE ADULT - SUBJECTIVE AND OBJECTIVE BOX
KAYLEIGH TOMAS  54y  [facility] T2-3A 025 A      Patient is a 54y old  Male who presents with a chief complaint of copd exac (10 Mar 2020 10:45)      INTERVAL HPI/OVERNIGHT EVENTS:    no acute events overnightb    REVIEW OF SYSTEMS:  CONSTITUTIONAL: No fever, weight loss, or fatigue  EYES: No eye pain, visual disturbances, or discharge  ENMT:  No difficulty hearing, tinnitus, vertigo; No sinus or throat pain  NECK: No pain or stiffness  BREASTS: No pain, masses, or nipple discharge  RESPIRATORY: No cough, wheezing, chills or hemoptysis; No shortness of breath  CARDIOVASCULAR: No chest pain, palpitations, dizziness, or leg swelling  GASTROINTESTINAL: No abdominal or epigastric pain. No nausea, vomiting, or hematemesis; No diarrhea or constipation. No melena or hematochezia.  GENITOURINARY: No dysuria, frequency, hematuria, or incontinence  NEUROLOGICAL: No headaches, memory loss, loss of strength, numbness, or tremors  SKIN: No itching, burning, rashes, or lesions   LYMPH NODES: No enlarged glands  ENDOCRINE: No heat or cold intolerance; No hair loss  MUSCULOSKELETAL: No joint pain or swelling; No muscle, back, or extremity pain  PSYCHIATRIC: No depression, anxiety, mood swings, or difficulty sleeping  HEME/LYMPH: No easy bruising, or bleeding gums  ALLERY AND IMMUNOLOGIC: No hives or eczema  FAMILY HISTORY:  FH: diabetes mellitus  FH: lung cancer (Mother): mother    T(C): 36.1 (03-10-20 @ 14:47), Max: 37 (03-10-20 @ 05:45)  HR: 100 (03-10-20 @ 14:47) (83 - 100)  BP: 97/68 (03-10-20 @ 14:47) (97/68 - 129/83)  RR: 19 (03-10-20 @ 14:47) (16 - 22)  SpO2: 97% (03-09-20 @ 23:50) (97% - 98%)  Wt(kg): --Vital Signs Last 24 Hrs  T(C): 36.1 (10 Mar 2020 14:47), Max: 37 (10 Mar 2020 05:45)  T(F): 96.9 (10 Mar 2020 14:47), Max: 98.6 (10 Mar 2020 05:45)  HR: 100 (10 Mar 2020 14:47) (83 - 100)  BP: 97/68 (10 Mar 2020 14:47) (97/68 - 129/83)  BP(mean): --  RR: 19 (10 Mar 2020 14:47) (16 - 22)  SpO2: 97% (09 Mar 2020 23:50) (97% - 98%)    PHYSICAL EXAM:  GENERAL: NAD, well-groomed, well-developed  HEAD:  Atraumatic, Normocephalic  EYES: EOMI, PERRLA, conjunctiva and sclera clear  ENMT: No tonsillar erythema, exudates, or enlargement; Moist mucous membranes, Good dentition, No lesions  NECK: Supple, No JVD, Normal thyroid  NERVOUS SYSTEM:  Alert & Oriented X3,  PULM: rhonchi   CARDIAC: Regular rate and rhythm; No murmurs, rubs, or gallops  GI: Soft, Nontender, Nondistended; Bowel sounds present , obese   EXTREMITIES:  2+ Peripheral Pulses, No clubbing, cyanosis, or edema  LYMPH: No lymphadenopathy noted  SKIN: No rashes or lesions      LABS:                            15.3   7.66  )-----------( 196      ( 10 Mar 2020 11:46 )             44.6   03-10    140  |  100  |  17  ----------------------------<  168<H>  4.8   |  25  |  0.9    Ca    9.5      10 Mar 2020 11:46  Mg     2.0     03-10    TPro  6.6  /  Alb  4.2  /  TBili  0.5  /  DBili  x   /  AST  15  /  ALT  23  /  AlkPhos  91  03-10            ALBUTerol    90 MICROgram(s) HFA Inhaler 2 Puff(s) Inhalation every 4 hours PRN  albuterol/ipratropium for Nebulization 3 milliLiter(s) Nebulizer every 6 hours  aspirin  chewable 81 milliGRAM(s) Oral daily  budesonide 160 MICROgram(s)/formoterol 4.5 MICROgram(s) Inhaler 2 Puff(s) Inhalation two times a day  chlorhexidine 4% Liquid 1 Application(s) Topical <User Schedule>  enoxaparin Injectable 40 milliGRAM(s) SubCutaneous daily  fenofibrate Tablet 145 milliGRAM(s) Oral daily  furosemide    Tablet 40 milliGRAM(s) Oral daily  HYDROmorphone   Tablet 4 milliGRAM(s) Oral every 8 hours PRN  influenza   Vaccine 0.5 milliLiter(s) IntraMuscular once  levoFLOXacin IVPB 750 milliGRAM(s) IV Intermittent every 24 hours  lisinopril 5 milliGRAM(s) Oral daily  methylPREDNISolone sodium succinate Injectable 60 milliGRAM(s) IV Push every 24 hours  morphine ER Tablet 100 milliGRAM(s) Oral four times a day  pantoprazole    Tablet 40 milliGRAM(s) Oral before breakfast  simvastatin 20 milliGRAM(s) Oral at bedtime      HEALTH ISSUES - PROBLEM Dx:          Case Discussed with House Staff     Spectra [phone]

## 2020-03-10 NOTE — PROGRESS NOTE ADULT - SUBJECTIVE AND OBJECTIVE BOX
SUBJECTIVE:    Patient is a 54y old Male who presents with a chief complaint of shortness of breath.  Currently admitted to medicine with the primary diagnosis of COPD exacerbation     Today is hospital day 1d. This morning he is resting comfortably in bed and reports no new issues or overnight events. He states he feels moderately improved but is still short of breath on exertion and coughing often. He denies CP, palpitations, abdominal pain. He is ambulating independently and frequently is off the unit.     PAST MEDICAL & SURGICAL HISTORY  REBECCA treated with BiPAP  COPD (chronic obstructive pulmonary disease)  Colitis: LARGE INTESTINE   NO RECENT FLARES  Hiatal hernia: GERD  SOB (shortness of breath)  Osteoarthritis  High blood cholesterol  Morbid obesity  Obstructive sleep apnea  GERD (gastroesophageal reflux disease)  High cholesterol  HTN (hypertension)  H/O knee surgery: arthoscopic x4  Hernia, inguinal, bilateral: 3 months old  History of knee replacement procedure of right knee: BILATERAL  History of appendectomy  History of cholecystectomy    SOCIAL HISTORY:  Negative for smoking/alcohol/drug use.     ALLERGIES:  No Known Allergies    MEDICATIONS:  STANDING MEDICATIONS  ALBUTerol    90 MICROgram(s) HFA Inhaler 2 Puff(s) Inhalation every 4 hours PRN Shortness of Breath  albuterol/ipratropium for Nebulization 3 milliLiter(s) Nebulizer every 6 hours  aspirin  chewable 81 milliGRAM(s) Oral daily  budesonide 160 MICROgram(s)/formoterol 4.5 MICROgram(s) Inhaler 2 Puff(s) Inhalation two times a day  chlorhexidine 4% Liquid 1 Application(s) Topical <User Schedule>  enoxaparin Injectable 40 milliGRAM(s) SubCutaneous daily  fenofibrate Tablet 145 milliGRAM(s) Oral daily  furosemide    Tablet 40 milliGRAM(s) Oral daily  HYDROmorphone   Tablet 4 milliGRAM(s) Oral every 8 hours PRN Severe Pain (7 - 10)  influenza   Vaccine 0.5 milliLiter(s) IntraMuscular once  levoFLOXacin IVPB 750 milliGRAM(s) IV Intermittent every 24 hours  lisinopril 5 milliGRAM(s) Oral daily  methylPREDNISolone sodium succinate Injectable 60 milliGRAM(s) IV Push every 24 hours  morphine ER Tablet 100 milliGRAM(s) Oral four times a day  pantoprazole    Tablet 40 milliGRAM(s) Oral before breakfast  simvastatin 20 milliGRAM(s) Oral at bedtime    PRN MEDICATIONS  ALBUTerol    90 MICROgram(s) HFA Inhaler 2 Puff(s) Inhalation every 4 hours PRN  HYDROmorphone   Tablet 4 milliGRAM(s) Oral every 8 hours PRN    VITALS:   T(F): 98.6  HR: 83 (83 - 97)  BP: 114/68 (111/63 - 132/81)  RR: 18 (16 - 22)  SpO2: 97% (97% - 98%)    LABS:                        13.8   11.66 )-----------( 196      ( 09 Mar 2020 15:58 )             41.3     03-09    140  |  102  |  9<L>  ----------------------------<  107<H>  4.9   |  24  |  0.8    Ca    9.0      09 Mar 2020 15:58  Mg     1.9     03-09    TPro  6.3  /  Alb  3.9  /  TBili  0.5  /  DBili  x   /  AST  24  /  ALT  21  /  AlkPhos  74  03-09    Troponin T, Serum: <0.01 ng/mL (03-09-20 @ 15:58)    CARDIAC MARKERS ( 09 Mar 2020 15:58 )  x     / <0.01 ng/mL / x     / x     / x        Troponin T, Serum: <0.01 ng/mL (03-09-20 @ 15:58)  Serum Pro-Brain Natriuretic Peptide: 227 pg/mL (03-09-20 @ 15:58)    PHYSICAL EXAM:  GEN: In no acute distress. Pt. is awake in bed able to have a conversation.  LUNGS: on RA; BL diffuse wheezing and rhonchi appreciated  HEART: +S1,S2, RRR, No murmurs, Rubs, Gallops   ABD: Bowel Sounds Present, Soft, non tender, non distended, no guarding, no rebound.   EXT: 2+ peripheral Pulses, no clubbing, no cyanosis, no Edema appreciated  NEURO: AAOX3. No focal deficits.

## 2020-03-10 NOTE — PROGRESS NOTE ADULT - ASSESSMENT
Patient is a 54y old  Male who presents with a chief complaint of copd exac (10 Mar 2020 10:45)    #Acute Dyspnea secondary to acute copd exacerbation secondary to viral uri complicated by active tobacco abuse  anticipate transition to po prednisone in am   no evidence of pneumonia   chest xray neg  nebulizers    #Obesity patient needs to see dieitian outpatient for further evaluation Morbid BMI 43    #Tobacco abuse tobaco abuse counseling spent 10 additional minutes counseling patient     #obstructive sleep apnea (on BiPAP), with suspicion for Obesity hypoventilation syndrome     #chronic knee, back pain (on methadone)    #HLD    #HTN Vital Signs Last 24 Hrs  BP: 97/68 (10 Mar 2020 14:47) (97/68 - 129/83)  controlled  Progress Note Handoff    Pending:  anticipate transition to po in am then dc home   Family discussion: patient verbalized understanding and agreeable to plan of care     Disposition: Home___

## 2020-03-11 LAB
ANION GAP SERPL CALC-SCNC: 14 MMOL/L — SIGNIFICANT CHANGE UP (ref 7–14)
BUN SERPL-MCNC: 23 MG/DL — HIGH (ref 10–20)
CALCIUM SERPL-MCNC: 9.3 MG/DL — SIGNIFICANT CHANGE UP (ref 8.5–10.1)
CHLORIDE SERPL-SCNC: 97 MMOL/L — LOW (ref 98–110)
CO2 SERPL-SCNC: 27 MMOL/L — SIGNIFICANT CHANGE UP (ref 17–32)
CREAT SERPL-MCNC: 1 MG/DL — SIGNIFICANT CHANGE UP (ref 0.7–1.5)
FLU A RESULT: NEGATIVE — SIGNIFICANT CHANGE UP
FLU A RESULT: NEGATIVE — SIGNIFICANT CHANGE UP
FLUAV AG NPH QL: NEGATIVE — SIGNIFICANT CHANGE UP
FLUBV AG NPH QL: NEGATIVE — SIGNIFICANT CHANGE UP
GLUCOSE SERPL-MCNC: 108 MG/DL — HIGH (ref 70–99)
HCT VFR BLD CALC: 45 % — SIGNIFICANT CHANGE UP (ref 42–52)
HGB BLD-MCNC: 14.8 G/DL — SIGNIFICANT CHANGE UP (ref 14–18)
MAGNESIUM SERPL-MCNC: 2.1 MG/DL — SIGNIFICANT CHANGE UP (ref 1.8–2.4)
MCHC RBC-ENTMCNC: 28.7 PG — SIGNIFICANT CHANGE UP (ref 27–31)
MCHC RBC-ENTMCNC: 32.9 G/DL — SIGNIFICANT CHANGE UP (ref 32–37)
MCV RBC AUTO: 87.2 FL — SIGNIFICANT CHANGE UP (ref 80–94)
NRBC # BLD: 0 /100 WBCS — SIGNIFICANT CHANGE UP (ref 0–0)
PLATELET # BLD AUTO: 184 K/UL — SIGNIFICANT CHANGE UP (ref 130–400)
POTASSIUM SERPL-MCNC: 4.3 MMOL/L — SIGNIFICANT CHANGE UP (ref 3.5–5)
POTASSIUM SERPL-SCNC: 4.3 MMOL/L — SIGNIFICANT CHANGE UP (ref 3.5–5)
RBC # BLD: 5.16 M/UL — SIGNIFICANT CHANGE UP (ref 4.7–6.1)
RBC # FLD: 15.3 % — HIGH (ref 11.5–14.5)
RSV RESULT: NEGATIVE — SIGNIFICANT CHANGE UP
RSV RNA RESP QL NAA+PROBE: NEGATIVE — SIGNIFICANT CHANGE UP
SODIUM SERPL-SCNC: 138 MMOL/L — SIGNIFICANT CHANGE UP (ref 135–146)
WBC # BLD: 10.12 K/UL — SIGNIFICANT CHANGE UP (ref 4.8–10.8)
WBC # FLD AUTO: 10.12 K/UL — SIGNIFICANT CHANGE UP (ref 4.8–10.8)

## 2020-03-11 PROCEDURE — 99406 BEHAV CHNG SMOKING 3-10 MIN: CPT

## 2020-03-11 PROCEDURE — 99233 SBSQ HOSP IP/OBS HIGH 50: CPT | Mod: 25

## 2020-03-11 RX ORDER — FLUTICASONE PROPIONATE 50 MCG
1 SPRAY, SUSPENSION NASAL
Refills: 0 | Status: DISCONTINUED | OUTPATIENT
Start: 2020-03-11 | End: 2020-03-12

## 2020-03-11 RX ADMIN — SENNA PLUS 2 TABLET(S): 8.6 TABLET ORAL at 20:37

## 2020-03-11 RX ADMIN — MORPHINE SULFATE 100 MILLIGRAM(S): 50 CAPSULE, EXTENDED RELEASE ORAL at 00:16

## 2020-03-11 RX ADMIN — Medication 81 MILLIGRAM(S): at 12:00

## 2020-03-11 RX ADMIN — MORPHINE SULFATE 100 MILLIGRAM(S): 50 CAPSULE, EXTENDED RELEASE ORAL at 12:33

## 2020-03-11 RX ADMIN — MORPHINE SULFATE 100 MILLIGRAM(S): 50 CAPSULE, EXTENDED RELEASE ORAL at 12:03

## 2020-03-11 RX ADMIN — HYDROMORPHONE HYDROCHLORIDE 4 MILLIGRAM(S): 2 INJECTION INTRAMUSCULAR; INTRAVENOUS; SUBCUTANEOUS at 01:15

## 2020-03-11 RX ADMIN — ZOLPIDEM TARTRATE 5 MILLIGRAM(S): 10 TABLET ORAL at 20:37

## 2020-03-11 RX ADMIN — PANTOPRAZOLE SODIUM 40 MILLIGRAM(S): 20 TABLET, DELAYED RELEASE ORAL at 06:21

## 2020-03-11 RX ADMIN — SIMVASTATIN 20 MILLIGRAM(S): 20 TABLET, FILM COATED ORAL at 20:37

## 2020-03-11 RX ADMIN — Medication 3 MILLILITER(S): at 09:34

## 2020-03-11 RX ADMIN — Medication 1 SPRAY(S): at 17:48

## 2020-03-11 RX ADMIN — MORPHINE SULFATE 100 MILLIGRAM(S): 50 CAPSULE, EXTENDED RELEASE ORAL at 23:39

## 2020-03-11 RX ADMIN — Medication 60 MILLIGRAM(S): at 06:22

## 2020-03-11 RX ADMIN — Medication 60 MILLIGRAM(S): at 18:08

## 2020-03-11 RX ADMIN — HYDROMORPHONE HYDROCHLORIDE 4 MILLIGRAM(S): 2 INJECTION INTRAMUSCULAR; INTRAVENOUS; SUBCUTANEOUS at 10:43

## 2020-03-11 RX ADMIN — HYDROMORPHONE HYDROCHLORIDE 4 MILLIGRAM(S): 2 INJECTION INTRAMUSCULAR; INTRAVENOUS; SUBCUTANEOUS at 11:13

## 2020-03-11 RX ADMIN — Medication 40 MILLIGRAM(S): at 06:21

## 2020-03-11 RX ADMIN — CHLORHEXIDINE GLUCONATE 1 APPLICATION(S): 213 SOLUTION TOPICAL at 06:22

## 2020-03-11 RX ADMIN — Medication 3 MILLILITER(S): at 19:59

## 2020-03-11 RX ADMIN — MORPHINE SULFATE 100 MILLIGRAM(S): 50 CAPSULE, EXTENDED RELEASE ORAL at 00:30

## 2020-03-11 RX ADMIN — HYDROMORPHONE HYDROCHLORIDE 4 MILLIGRAM(S): 2 INJECTION INTRAMUSCULAR; INTRAVENOUS; SUBCUTANEOUS at 19:34

## 2020-03-11 RX ADMIN — Medication 3 MILLILITER(S): at 13:38

## 2020-03-11 RX ADMIN — LISINOPRIL 5 MILLIGRAM(S): 2.5 TABLET ORAL at 06:21

## 2020-03-11 RX ADMIN — Medication 145 MILLIGRAM(S): at 12:00

## 2020-03-11 RX ADMIN — MORPHINE SULFATE 100 MILLIGRAM(S): 50 CAPSULE, EXTENDED RELEASE ORAL at 06:52

## 2020-03-11 RX ADMIN — MORPHINE SULFATE 100 MILLIGRAM(S): 50 CAPSULE, EXTENDED RELEASE ORAL at 18:23

## 2020-03-11 RX ADMIN — HYDROMORPHONE HYDROCHLORIDE 4 MILLIGRAM(S): 2 INJECTION INTRAMUSCULAR; INTRAVENOUS; SUBCUTANEOUS at 19:04

## 2020-03-11 RX ADMIN — MORPHINE SULFATE 100 MILLIGRAM(S): 50 CAPSULE, EXTENDED RELEASE ORAL at 06:26

## 2020-03-11 RX ADMIN — HYDROMORPHONE HYDROCHLORIDE 4 MILLIGRAM(S): 2 INJECTION INTRAMUSCULAR; INTRAVENOUS; SUBCUTANEOUS at 00:45

## 2020-03-11 RX ADMIN — MORPHINE SULFATE 100 MILLIGRAM(S): 50 CAPSULE, EXTENDED RELEASE ORAL at 17:53

## 2020-03-11 NOTE — PROGRESS NOTE ADULT - SUBJECTIVE AND OBJECTIVE BOX
SUBJECTIVE:    Patient is a 54y old Male who presents with a chief complaint of copd exac (10 Mar 2020 10:45)    Currently admitted to medicine with the primary diagnosis of COPD exacerbation     Today is hospital day 2d. This morning he is resting comfortably in bed and reports no new issues or overnight events. He feels improved since yesterday, but states he is not at baseline. Complains of العراقي and coughing. Denies CP, palpitations, abdominal pain.     PAST MEDICAL & SURGICAL HISTORY  REBECCA treated with BiPAP  COPD (chronic obstructive pulmonary disease)  Colitis: LARGE INTESTINE   NO RECENT FLARES  Hiatal hernia: GERD  SOB (shortness of breath)  Osteoarthritis  High blood cholesterol  Morbid obesity  Obstructive sleep apnea  GERD (gastroesophageal reflux disease)  High cholesterol  HTN (hypertension)  H/O knee surgery: arthoscopic x4  Hernia, inguinal, bilateral: 3 months old  History of knee replacement procedure of right knee: BILATERAL  History of appendectomy  History of cholecystectomy    SOCIAL HISTORY:  Negative for smoking/alcohol/drug use.     ALLERGIES:  No Known Allergies    MEDICATIONS:  STANDING MEDICATIONS  ALBUTerol    90 MICROgram(s) HFA Inhaler 2 Puff(s) Inhalation every 4 hours PRN Shortness of Breath  albuterol/ipratropium for Nebulization 3 milliLiter(s) Nebulizer every 6 hours  aspirin  chewable 81 milliGRAM(s) Oral daily  budesonide 160 MICROgram(s)/formoterol 4.5 MICROgram(s) Inhaler 2 Puff(s) Inhalation two times a day  chlorhexidine 4% Liquid 1 Application(s) Topical <User Schedule>  enoxaparin Injectable 40 milliGRAM(s) SubCutaneous daily  fenofibrate Tablet 145 milliGRAM(s) Oral daily  fluticasone propionate 50 MICROgram(s)/spray Nasal Spray 1 Spray(s) Both Nostrils two times a day  furosemide    Tablet 40 milliGRAM(s) Oral daily  HYDROmorphone   Tablet 4 milliGRAM(s) Oral every 8 hours PRN Severe Pain (7 - 10)  influenza   Vaccine 0.5 milliLiter(s) IntraMuscular once  levoFLOXacin IVPB 750 milliGRAM(s) IV Intermittent every 24 hours  lisinopril 5 milliGRAM(s) Oral daily  methylPREDNISolone sodium succinate Injectable 60 milliGRAM(s) IV Push once  morphine ER Tablet 100 milliGRAM(s) Oral four times a day  pantoprazole    Tablet 40 milliGRAM(s) Oral before breakfast  senna 2 Tablet(s) Oral at bedtime  simvastatin 20 milliGRAM(s) Oral at bedtime  zolpidem 5 milliGRAM(s) Oral at bedtime PRN Insomnia    PRN MEDICATIONS  ALBUTerol    90 MICROgram(s) HFA Inhaler 2 Puff(s) Inhalation every 4 hours PRN  HYDROmorphone   Tablet 4 milliGRAM(s) Oral every 8 hours PRN  zolpidem 5 milliGRAM(s) Oral at bedtime PRN    VITALS:   T(F): 95.7  HR: 81 (81 - 100)  BP: 105/58 (97/68 - 122/75)  RR: 18 (18 - 19)  SpO2: --    LABS:                        14.8   10.12 )-----------( 184      ( 11 Mar 2020 08:14 )             45.0     03-11    138  |  97<L>  |  23<H>  ----------------------------<  108<H>  4.3   |  27  |  1.0    Ca    9.3      11 Mar 2020 08:14  Mg     2.1     03-11    TPro  6.6  /  Alb  4.2  /  TBili  0.5  /  DBili  x   /  AST  15  /  ALT  23  /  AlkPhos  91  03-10    CARDIAC MARKERS ( 09 Mar 2020 15:58 )  x     / <0.01 ng/mL / x     / x     / x        Troponin T, Serum: <0.01 ng/mL (03-09-20 @ 15:58)  Serum Pro-Brain Natriuretic Peptide: 227 pg/mL (03-09-20 @ 15:58)    PHYSICAL EXAM:  GEN: In no acute distress. Pt. is awake in bed able to have a conversation.  LUNGS: decreased wheezing from 3/10; coarse breath sounds BL   HEART: +S1,S2, RRR, No murmurs, Rubs, Gallops   ABD: Bowel Sounds Present, Soft, non tender, non distended, no guarding, no rebound.   EXT: Bl ankle edema - nonpitting, nontender  NEURO: AAOX3. No focal deficits. SUBJECTIVE:    Patient is a 54y old Male who presents with a chief complaint of copd exac (10 Mar 2020 10:45)    Currently admitted to medicine with the primary diagnosis of COPD exacerbation     Today is hospital day 2d. This morning he is resting comfortably in bed and reports no new issues or overnight events. He feels improved since yesterday, but states he is not at baseline. Complains of العراق[place] and coughing. Denies CP, palpitations, abdominal pain.     Attending note: Pt seen and examined at bedside. No cp or sob.     PAST MEDICAL & SURGICAL HISTORY  REBECCA treated with BiPAP  COPD (chronic obstructive pulmonary disease)  Colitis: LARGE INTESTINE   NO RECENT FLARES  Hiatal hernia: GERD  SOB (shortness of breath)  Osteoarthritis  High blood cholesterol  Morbid obesity  Obstructive sleep apnea  GERD (gastroesophageal reflux disease)  High cholesterol  HTN (hypertension)  H/O knee surgery: arthoscopic x4  Hernia, inguinal, bilateral: 3 months old  History of knee replacement procedure of right knee: BILATERAL  History of appendectomy  History of cholecystectomy    SOCIAL HISTORY:  Negative for smoking/alcohol/drug use.     ALLERGIES:  No Known Allergies    MEDICATIONS:  STANDING MEDICATIONS  ALBUTerol    90 MICROgram(s) HFA Inhaler 2 Puff(s) Inhalation every 4 hours PRN Shortness of Breath  albuterol/ipratropium for Nebulization 3 milliLiter(s) Nebulizer every 6 hours  aspirin  chewable 81 milliGRAM(s) Oral daily  budesonide 160 MICROgram(s)/formoterol 4.5 MICROgram(s) Inhaler 2 Puff(s) Inhalation two times a day  chlorhexidine 4% Liquid 1 Application(s) Topical <User Schedule>  enoxaparin Injectable 40 milliGRAM(s) SubCutaneous daily  fenofibrate Tablet 145 milliGRAM(s) Oral daily  fluticasone propionate 50 MICROgram(s)/spray Nasal Spray 1 Spray(s) Both Nostrils two times a day  furosemide    Tablet 40 milliGRAM(s) Oral daily  HYDROmorphone   Tablet 4 milliGRAM(s) Oral every 8 hours PRN Severe Pain (7 - 10)  influenza   Vaccine 0.5 milliLiter(s) IntraMuscular once  levoFLOXacin IVPB 750 milliGRAM(s) IV Intermittent every 24 hours  lisinopril 5 milliGRAM(s) Oral daily  methylPREDNISolone sodium succinate Injectable 60 milliGRAM(s) IV Push once  morphine ER Tablet 100 milliGRAM(s) Oral four times a day  pantoprazole    Tablet 40 milliGRAM(s) Oral before breakfast  senna 2 Tablet(s) Oral at bedtime  simvastatin 20 milliGRAM(s) Oral at bedtime  zolpidem 5 milliGRAM(s) Oral at bedtime PRN Insomnia    PRN MEDICATIONS  ALBUTerol    90 MICROgram(s) HFA Inhaler 2 Puff(s) Inhalation every 4 hours PRN  HYDROmorphone   Tablet 4 milliGRAM(s) Oral every 8 hours PRN  zolpidem 5 milliGRAM(s) Oral at bedtime PRN    VITALS:   T(F): 95.7  HR: 81 (81 - 100)  BP: 105/58 (97/68 - 122/75)  RR: 18 (18 - 19)  SpO2: --    LABS:                        14.8   10.12 )-----------( 184      ( 11 Mar 2020 08:14 )             45.0     03-11    138  |  97<L>  |  23<H>  ----------------------------<  108<H>  4.3   |  27  |  1.0    Ca    9.3      11 Mar 2020 08:14  Mg     2.1     03-11    TPro  6.6  /  Alb  4.2  /  TBili  0.5  /  DBili  x   /  AST  15  /  ALT  23  /  AlkPhos  91  03-10    CARDIAC MARKERS ( 09 Mar 2020 15:58 )  x     / <0.01 ng/mL / x     / x     / x        Troponin T, Serum: <0.01 ng/mL (03-09-20 @ 15:58)  Serum Pro-Brain Natriuretic Peptide: 227 pg/mL (03-09-20 @ 15:58)    PHYSICAL EXAM:  GEN: In no acute distress. Pt. is awake in bed able to have a conversation.  LUNGS: decreased wheezing from 3/10; coarse breath sounds BL   HEART: +S1,S2, RRR, No murmurs, Rubs, Gallops   ABD: Bowel Sounds Present, Soft, non tender, non distended, no guarding, no rebound.   EXT: Bl ankle edema - nonpitting, nontender  NEURO: AAOX3. No focal deficits.

## 2020-03-11 NOTE — PROGRESS NOTE ADULT - ATTENDING COMMENTS
#Progress Note Handoff  Pending (specify):  cod improvement, anticpated for am  Family discussion: trung pt and agreed to plan  Disposition: Home_x__/SNF___/Other________/Unknown at this time________

## 2020-03-11 NOTE — PROGRESS NOTE ADULT - ASSESSMENT
54 M w/ PMH COPD, active smoker, REBECCA on BIPAP (non-compliant), chronic knee, back pain, HLD, HTN, GERD, Insomnia, HO CCY, Appendectomy, presenting to the ED c/o shortness of breath being admitted for a COPD Exacerbation.    # COPD Exacerbation 2/2 active smoking & viral URI  - IV Solumedrol x1 this PM-  switch to PO prednisone 3/12 AM  - IV Levaquin continue  - CXR unremarkable - no new opacities  - continue with Duonebs / Symbicort / Albuterol  - Advised on tobacco cessation - he is not interested in quitting/alternatives at this time  - Dr. Bassett is his pulmonologist   - FLU/ RSV negative     # REBECCA on BIPAP (28/18)   - continue nightly     # Chronic Knee / shoulder / pain  - pain mgmt has him on - morphine / oxycodone / Dilaudid and methadone ?  - will continue morphine and dilaudid for now - pain is controlled     # GERD   - PPI - protonix    # DLD   - simvastatin    # CHF ?   - on lasix 40mg at home  - no echo in system    # Insomnia   - ambien prn    # CHG  DVTppx  Full code  Dispo: prepare for dc tomorrow; from home fully functional 54 M w/ PMH COPD, active smoker, REBECCA on BIPAP (non-compliant), chronic knee, back pain, HLD, HTN, GERD, Insomnia, HO CCY, Appendectomy, presenting to the ED c/o shortness of breath being admitted for a COPD Exacerbation.    # COPD Exacerbation 2/2 active smoking & viral URI  - IV Solumedrol x1 this PM-  switch to PO prednisone 3/12 AM  - IV Levaquin continue  - CXR unremarkable - no new opacities  - continue with Duonebs / Symbicort / Albuterol  - Advised on tobacco cessation - he is not interested in quitting/alternatives at this time  - Dr. Bassett is his pulmonologist   - FLU/ RSV negative     #current smoker-smoking cessation counseling done    # REBECCA on BIPAP (28/18)   - continue nightly     # Chronic Knee / shoulder / pain  - pain mgmt has him on - morphine / oxycodone / Dilaudid and methadone ?  - will continue morphine and dilaudid for now - pain is controlled     # GERD   - PPI - protonix    # DLD   - simvastatin    # CHF=EF 61%, NO CHF    - on lasix 40mg at home  - no echo in system    # Insomnia   - ambien prn    # CHG  DVTppx  Full code  Dispo: prepare for dc tomorrow; from home fully functional

## 2020-03-12 ENCOUNTER — TRANSCRIPTION ENCOUNTER (OUTPATIENT)
Age: 55
End: 2020-03-12

## 2020-03-12 VITALS
SYSTOLIC BLOOD PRESSURE: 123 MMHG | RESPIRATION RATE: 18 BRPM | HEART RATE: 76 BPM | TEMPERATURE: 98 F | DIASTOLIC BLOOD PRESSURE: 84 MMHG

## 2020-03-12 LAB
ANION GAP SERPL CALC-SCNC: 15 MMOL/L — HIGH (ref 7–14)
BUN SERPL-MCNC: 24 MG/DL — HIGH (ref 10–20)
CALCIUM SERPL-MCNC: 9.4 MG/DL — SIGNIFICANT CHANGE UP (ref 8.5–10.1)
CHLORIDE SERPL-SCNC: 101 MMOL/L — SIGNIFICANT CHANGE UP (ref 98–110)
CO2 SERPL-SCNC: 23 MMOL/L — SIGNIFICANT CHANGE UP (ref 17–32)
CREAT SERPL-MCNC: 0.8 MG/DL — SIGNIFICANT CHANGE UP (ref 0.7–1.5)
GLUCOSE SERPL-MCNC: 103 MG/DL — HIGH (ref 70–99)
HCT VFR BLD CALC: 45.9 % — SIGNIFICANT CHANGE UP (ref 42–52)
HGB BLD-MCNC: 15.5 G/DL — SIGNIFICANT CHANGE UP (ref 14–18)
MAGNESIUM SERPL-MCNC: 2.4 MG/DL — SIGNIFICANT CHANGE UP (ref 1.8–2.4)
MCHC RBC-ENTMCNC: 29.4 PG — SIGNIFICANT CHANGE UP (ref 27–31)
MCHC RBC-ENTMCNC: 33.8 G/DL — SIGNIFICANT CHANGE UP (ref 32–37)
MCV RBC AUTO: 87.1 FL — SIGNIFICANT CHANGE UP (ref 80–94)
NRBC # BLD: 0 /100 WBCS — SIGNIFICANT CHANGE UP (ref 0–0)
PLATELET # BLD AUTO: 212 K/UL — SIGNIFICANT CHANGE UP (ref 130–400)
POTASSIUM SERPL-MCNC: 4.8 MMOL/L — SIGNIFICANT CHANGE UP (ref 3.5–5)
POTASSIUM SERPL-SCNC: 4.8 MMOL/L — SIGNIFICANT CHANGE UP (ref 3.5–5)
RAPID RVP RESULT: SIGNIFICANT CHANGE UP
RBC # BLD: 5.27 M/UL — SIGNIFICANT CHANGE UP (ref 4.7–6.1)
RBC # FLD: 15.5 % — HIGH (ref 11.5–14.5)
SODIUM SERPL-SCNC: 139 MMOL/L — SIGNIFICANT CHANGE UP (ref 135–146)
WBC # BLD: 14.06 K/UL — HIGH (ref 4.8–10.8)
WBC # FLD AUTO: 14.06 K/UL — HIGH (ref 4.8–10.8)

## 2020-03-12 PROCEDURE — 99239 HOSP IP/OBS DSCHRG MGMT >30: CPT

## 2020-03-12 RX ADMIN — Medication 1 SPRAY(S): at 06:24

## 2020-03-12 RX ADMIN — LISINOPRIL 5 MILLIGRAM(S): 2.5 TABLET ORAL at 06:24

## 2020-03-12 RX ADMIN — HYDROMORPHONE HYDROCHLORIDE 4 MILLIGRAM(S): 2 INJECTION INTRAMUSCULAR; INTRAVENOUS; SUBCUTANEOUS at 08:51

## 2020-03-12 RX ADMIN — Medication 3 MILLILITER(S): at 13:23

## 2020-03-12 RX ADMIN — Medication 145 MILLIGRAM(S): at 11:41

## 2020-03-12 RX ADMIN — PANTOPRAZOLE SODIUM 40 MILLIGRAM(S): 20 TABLET, DELAYED RELEASE ORAL at 06:24

## 2020-03-12 RX ADMIN — MORPHINE SULFATE 100 MILLIGRAM(S): 50 CAPSULE, EXTENDED RELEASE ORAL at 11:45

## 2020-03-12 RX ADMIN — MORPHINE SULFATE 100 MILLIGRAM(S): 50 CAPSULE, EXTENDED RELEASE ORAL at 06:26

## 2020-03-12 RX ADMIN — Medication 81 MILLIGRAM(S): at 11:41

## 2020-03-12 RX ADMIN — Medication 40 MILLIGRAM(S): at 06:24

## 2020-03-12 RX ADMIN — Medication 60 MILLIGRAM(S): at 06:24

## 2020-03-12 NOTE — DISCHARGE NOTE PROVIDER - NSDCCPCAREPLAN_GEN_ALL_CORE_FT
PRINCIPAL DISCHARGE DIAGNOSIS  Diagnosis: COPD exacerbation  Assessment and Plan of Treatment: You were admitted for exacerbation of COPD likely secondary to a viral upper respiratory infection as well as active smoking. You improved with antibiotics and steroids as well as bipap at night. It is important to complete the course of antibiotics, steroids as well as continue using bipap overnight. Please follow up with pulmonary and primary care as well. Also please see smoking sensation specialist.

## 2020-03-12 NOTE — DISCHARGE NOTE PROVIDER - NSDCPNSUBOBJ_GEN_ALL_CORE
<<<RESIDENT DISCHARGE NOTE>>>         KAYLEIGH TOMAS    MRN-[id number]        VITAL SIGNS:    T(F): 97.6 (03-12-20 @ 05:27), Max: 97.6 (03-11-20 @ 20:24)    HR: 76 (03-12-20 @ 05:27)    BP: 123/84 (03-12-20 @ 05:27)    SpO2: --            T(C): 36.4 (03-12-20 @ 05:27), Max: 36.4 (03-11-20 @ 20:24)    HR: 76 (03-12-20 @ 05:27) (76 - 85)    BP: 123/84 (03-12-20 @ 05:27) (103/61 - 123/84)    RR: 18 (03-12-20 @ 05:27) (18 - 18)    SpO2: --        GENERAL: NAD, well-developed, 54y    EENT: EOMI, conjunctiva and sclera clear, No nasal obstruction or discharge    RESPIRATORY: Coarse breath sounds BL; no wheezing appreciated    CARDIOVASCULAR: Regular rate and rhythm; No murmurs, rubs, or gallops, no pitting edema    GASTROINTESTINAL: Abdomen Soft, Nontender, Nondistended    MUSCULOSKELETAL:  No cyanosis, extremities grossly symmetrical    PSYCH: AAOx3, affect appropriate    NEUROLOGY: non-focal, cognition grossly intact, MAEE        TEST RESULTS:                            15.5     14.06 )-----------( 212      ( 12 Mar 2020 07:22 )               45.9             03-12        139  |  101  |  24<H>    ----------------------------<  103<H>    4.8   |  23  |  0.8        Ca    9.4      12 Mar 2020 07:22    Mg     2.4     03-12        TPro  6.6  /  Alb  4.2  /  TBili  0.5  /  DBili  x   /  AST  15  /  ALT  23  /  AlkPhos  91  03-10            FINAL DISCHARGE INTERVIEW:  Resident(s) Present: (Name:____Toñito_________), RN Present: (Name:  ___________)        DISCHARGE MEDICATION RECONCILIATION  reviewed with Attending (Name:___Ali________)        DISPOSITION:   [  x ] Home,    [  ] Home with Visiting Nursing Services,   [    ]  SNF/ NH,    [   ] Acute Rehab (4A),   [   ] Other (Specify:_________)

## 2020-03-12 NOTE — DISCHARGE NOTE PROVIDER - HOSPITAL COURSE
54 M w/ PMH COPD, active smoker, REBECAC on BIPAP (non-compliant), chronic knee, back pain, HLD, HTN, GERD, Insomnia, HO CCY, Appendectomy, presenting to the ED c/o shortness of breath. Pt says he has been having العراقي, in the past week worse then his baseline, as well as a worse cough with increased sputum production more then baseline., as well as chills.    Denies fevers, night sweats, n/v/d/ha/dizziness/cp/syncope.        In ED, given nebs and steroids, 98 on RA but dyspneic on ambulation in ed so admitted for COPD exacerbation.        Patient was started on levaquin as well as IV steroid. He used BIpap at night, and was saturating well on room air. Remained afebrile during hospitalization. Improved with IV steroids and antibiotics. He is medically stable for discharge with appropriate outpatient FU.

## 2020-03-12 NOTE — DISCHARGE NOTE PROVIDER - CARE PROVIDER_API CALL
Blake Bassett)  Critical Care Medicine; Geriatric Medicine; Internal Medicine; Pulmonary Disease  [address] [place] [address], [place] [address]  Phone: [phone]  Fax: [phone]  Follow Up Time: 1 week    Edison Bowie)  [address] [place] [address]  Phone: [phone]  Fax: [phone]  Follow Up Time: 1-3 days

## 2020-03-12 NOTE — DISCHARGE NOTE PROVIDER - NSDCMRMEDTOKEN_GEN_ALL_CORE_FT
Advair Diskus 500 mcg-50 mcg inhalation powder: 1 inhalation inhaled 2 times a day  albuterol 90 mcg/inh inhalation powder: 2 puff(s) inhaled every 6 hours, As Needed - for shortness of breath and/or wheezing   aspirin 81 mg oral tablet: 1 tab(s) orally once a day  docusate sodium 100 mg oral capsule: 1 cap(s) orally once a day (at bedtime)  fenofibrate 134 mg oral capsule: 1 cap(s) orally once a day (at bedtime)  furosemide 40 mg oral tablet: 1 tab(s) orally once a day (at bedtime)  HYDROmorphone 4 mg oral tablet: 1 tab(s) orally every 8 hours  levoFLOXacin 750 mg oral tablet: 1 tab(s) orally every 24 hours  lisinopril 5 mg oral tablet: 1 tab(s) orally once a day  methadone 10 mg oral tablet: 1 tab(s) orally every 12 hours  morphine 100 mg/12 hours oral tablet, extended release: 1 tab(s) orally 4 times a day  NexIUM 40 mg oral delayed release capsule: 1 cap(s) orally once a day  oxyCODONE 30 mg oral tablet: 1 tab(s) orally every 4 hours, As Needed  predniSONE 20 mg oral tablet: 3 tab(s) orally once a day from 3/13-3/14  2 tabs once a day from 3/15-3/17  1 tab once a day 3/18-3/20  Spiriva 18 mcg inhalation capsule: 1 cap(s) inhaled once a day, As Needed  Zocor 20 mg oral tablet: 1 tab(s) orally once a day (at bedtime)

## 2020-03-12 NOTE — DISCHARGE NOTE PROVIDER - PROVIDER TOKENS
PROVIDER:[TOKEN:[[id number]],FOLLOWUP:[1 week]],PROVIDER:[TOKEN:[[id number]],FOLLOWUP:[1-3 days]]

## 2020-03-12 NOTE — DISCHARGE NOTE NURSING/CASE MANAGEMENT/SOCIAL WORK - PATIENT PORTAL LINK FT
You can access the FollowMyHealth Patient Portal offered by [facility] by registering at the following website: [email or web address]. By joining [employer]’s FollowMyHealth portal, you will also be able to view your health information using other applications (apps) compatible with our system.

## 2020-03-12 NOTE — DISCHARGE NOTE PROVIDER - NSDCFUADDINST_GEN_ALL_CORE_FT
If symptoms return or persist, please call doctor and return to emergency department for further work up.

## 2020-03-12 NOTE — DISCHARGE NOTE PROVIDER - NSDCFUADDAPPT_GEN_ALL_CORE_FT
Please complete antibiotics and steroids as prescribed. Please follow up with primary care doctor within a week and pulmonary doctor to discuss hospitalization and medical management.

## 2020-03-13 RX ORDER — CIPROFLOXACIN LACTATE 400MG/40ML
1 VIAL (ML) INTRAVENOUS
Qty: 2 | Refills: 0
Start: 2020-03-13 | End: 2020-03-14

## 2020-03-17 DIAGNOSIS — Z90.49 ACQUIRED ABSENCE OF OTHER SPECIFIED PARTS OF DIGESTIVE TRACT: ICD-10-CM

## 2020-03-17 DIAGNOSIS — K44.9 DIAPHRAGMATIC HERNIA WITHOUT OBSTRUCTION OR GANGRENE: ICD-10-CM

## 2020-03-17 DIAGNOSIS — M19.90 UNSPECIFIED OSTEOARTHRITIS, UNSPECIFIED SITE: ICD-10-CM

## 2020-03-17 DIAGNOSIS — M25.569 PAIN IN UNSPECIFIED KNEE: ICD-10-CM

## 2020-03-17 DIAGNOSIS — E66.01 MORBID (SEVERE) OBESITY DUE TO EXCESS CALORIES: ICD-10-CM

## 2020-03-17 DIAGNOSIS — Z91.19 PATIENT'S NONCOMPLIANCE WITH OTHER MEDICAL TREATMENT AND REGIMEN: ICD-10-CM

## 2020-03-17 DIAGNOSIS — G47.33 OBSTRUCTIVE SLEEP APNEA (ADULT) (PEDIATRIC): ICD-10-CM

## 2020-03-17 DIAGNOSIS — J44.1 CHRONIC OBSTRUCTIVE PULMONARY DISEASE WITH (ACUTE) EXACERBATION: ICD-10-CM

## 2020-03-17 DIAGNOSIS — Z79.52 LONG TERM (CURRENT) USE OF SYSTEMIC STEROIDS: ICD-10-CM

## 2020-03-17 DIAGNOSIS — Z79.82 LONG TERM (CURRENT) USE OF ASPIRIN: ICD-10-CM

## 2020-03-17 DIAGNOSIS — J06.9 ACUTE UPPER RESPIRATORY INFECTION, UNSPECIFIED: ICD-10-CM

## 2020-03-17 DIAGNOSIS — G47.00 INSOMNIA, UNSPECIFIED: ICD-10-CM

## 2020-03-17 DIAGNOSIS — Z96.651 PRESENCE OF RIGHT ARTIFICIAL KNEE JOINT: ICD-10-CM

## 2020-03-17 DIAGNOSIS — R06.02 SHORTNESS OF BREATH: ICD-10-CM

## 2020-03-17 DIAGNOSIS — G89.29 OTHER CHRONIC PAIN: ICD-10-CM

## 2020-03-17 DIAGNOSIS — Z79.899 OTHER LONG TERM (CURRENT) DRUG THERAPY: ICD-10-CM

## 2020-03-17 DIAGNOSIS — M25.519 PAIN IN UNSPECIFIED SHOULDER: ICD-10-CM

## 2020-03-17 DIAGNOSIS — I10 ESSENTIAL (PRIMARY) HYPERTENSION: ICD-10-CM

## 2020-03-17 DIAGNOSIS — K21.9 GASTRO-ESOPHAGEAL REFLUX DISEASE WITHOUT ESOPHAGITIS: ICD-10-CM

## 2020-03-17 DIAGNOSIS — E78.00 PURE HYPERCHOLESTEROLEMIA, UNSPECIFIED: ICD-10-CM

## 2020-03-17 DIAGNOSIS — F17.200 NICOTINE DEPENDENCE, UNSPECIFIED, UNCOMPLICATED: ICD-10-CM

## 2020-03-26 NOTE — ED PROVIDER NOTE - PROVIDER TOKENS
Refill requested for:     montelukast (SINGULAIR) 10 MG tablet  Last filled on:     10/2/19  #30  +3  Last office visit:     2/21/20    Medication refilled per protocol.     PROVIDER:[TOKEN:[[id number]],FOLLOWUP:[1-3 Days]]

## 2020-04-10 ENCOUNTER — APPOINTMENT (OUTPATIENT)
Dept: PULMONOLOGY | Facility: CLINIC | Age: 55
End: 2020-04-10

## 2020-04-16 NOTE — ED ADULT TRIAGE NOTE - TEMPERATURE IN CELSIUS (DEGREES C)
SURVEY:    You may be receiving a survey from [employer] regarding your visit today. Please complete the survey to enable us to provide the highest quality of care to you and your family. If you cannot score us a very good on any question, please call the office to discuss how we could have made your experience a very good one. Thank you. 36.7

## 2020-06-15 PROCEDURE — G9005: CPT

## 2020-06-17 ENCOUNTER — INPATIENT (INPATIENT)
Facility: HOSPITAL | Age: 55
LOS: 1 days | Discharge: HOME | End: 2020-06-19
Attending: HOSPITALIST | Admitting: HOSPITALIST
Payer: MEDICAID

## 2020-06-17 VITALS
HEIGHT: 70 IN | DIASTOLIC BLOOD PRESSURE: 91 MMHG | RESPIRATION RATE: 17 BRPM | WEIGHT: 315 LBS | HEART RATE: 60 BPM | TEMPERATURE: 97 F | SYSTOLIC BLOOD PRESSURE: 133 MMHG | OXYGEN SATURATION: 100 %

## 2020-06-17 DIAGNOSIS — K40.20 BILATERAL INGUINAL HERNIA, WITHOUT OBSTRUCTION OR GANGRENE, NOT SPECIFIED AS RECURRENT: Chronic | ICD-10-CM

## 2020-06-17 DIAGNOSIS — Z98.890 OTHER SPECIFIED POSTPROCEDURAL STATES: Chronic | ICD-10-CM

## 2020-06-17 DIAGNOSIS — Z96.651 PRESENCE OF RIGHT ARTIFICIAL KNEE JOINT: Chronic | ICD-10-CM

## 2020-06-17 LAB
ALBUMIN SERPL ELPH-MCNC: 4.3 G/DL — SIGNIFICANT CHANGE UP (ref 3.5–5.2)
ALP SERPL-CCNC: 102 U/L — SIGNIFICANT CHANGE UP (ref 30–115)
ALT FLD-CCNC: 22 U/L — SIGNIFICANT CHANGE UP (ref 0–41)
ANION GAP SERPL CALC-SCNC: 8 MMOL/L — SIGNIFICANT CHANGE UP (ref 7–14)
APTT BLD: 32.1 SEC — SIGNIFICANT CHANGE UP (ref 27–39.2)
AST SERPL-CCNC: 26 U/L — SIGNIFICANT CHANGE UP (ref 0–41)
BASOPHILS # BLD AUTO: 0.06 K/UL — SIGNIFICANT CHANGE UP (ref 0–0.2)
BASOPHILS NFR BLD AUTO: 0.6 % — SIGNIFICANT CHANGE UP (ref 0–1)
BILIRUB SERPL-MCNC: 0.5 MG/DL — SIGNIFICANT CHANGE UP (ref 0.2–1.2)
BUN SERPL-MCNC: 9 MG/DL — LOW (ref 10–20)
CALCIUM SERPL-MCNC: 9.7 MG/DL — SIGNIFICANT CHANGE UP (ref 8.5–10.1)
CHLORIDE SERPL-SCNC: 99 MMOL/L — SIGNIFICANT CHANGE UP (ref 98–110)
CO2 SERPL-SCNC: 31 MMOL/L — SIGNIFICANT CHANGE UP (ref 17–32)
CREAT SERPL-MCNC: 0.9 MG/DL — SIGNIFICANT CHANGE UP (ref 0.7–1.5)
EOSINOPHIL # BLD AUTO: 0.17 K/UL — SIGNIFICANT CHANGE UP (ref 0–0.7)
EOSINOPHIL NFR BLD AUTO: 1.8 % — SIGNIFICANT CHANGE UP (ref 0–8)
GLUCOSE SERPL-MCNC: 111 MG/DL — HIGH (ref 70–99)
HCT VFR BLD CALC: 46.5 % — SIGNIFICANT CHANGE UP (ref 42–52)
HGB BLD-MCNC: 15.3 G/DL — SIGNIFICANT CHANGE UP (ref 14–18)
IMM GRANULOCYTES NFR BLD AUTO: 0.4 % — HIGH (ref 0.1–0.3)
INR BLD: 1.06 RATIO — SIGNIFICANT CHANGE UP (ref 0.65–1.3)
LYMPHOCYTES # BLD AUTO: 1.67 K/UL — SIGNIFICANT CHANGE UP (ref 1.2–3.4)
LYMPHOCYTES # BLD AUTO: 17.9 % — LOW (ref 20.5–51.1)
MAGNESIUM SERPL-MCNC: 2.1 MG/DL — SIGNIFICANT CHANGE UP (ref 1.8–2.4)
MCHC RBC-ENTMCNC: 28.9 PG — SIGNIFICANT CHANGE UP (ref 27–31)
MCHC RBC-ENTMCNC: 32.9 G/DL — SIGNIFICANT CHANGE UP (ref 32–37)
MCV RBC AUTO: 87.7 FL — SIGNIFICANT CHANGE UP (ref 80–94)
MONOCYTES # BLD AUTO: 0.64 K/UL — HIGH (ref 0.1–0.6)
MONOCYTES NFR BLD AUTO: 6.9 % — SIGNIFICANT CHANGE UP (ref 1.7–9.3)
NEUTROPHILS # BLD AUTO: 6.76 K/UL — HIGH (ref 1.4–6.5)
NEUTROPHILS NFR BLD AUTO: 72.4 % — SIGNIFICANT CHANGE UP (ref 42.2–75.2)
NRBC # BLD: 0 /100 WBCS — SIGNIFICANT CHANGE UP (ref 0–0)
PLATELET # BLD AUTO: 233 K/UL — SIGNIFICANT CHANGE UP (ref 130–400)
POTASSIUM SERPL-MCNC: 5.3 MMOL/L — HIGH (ref 3.5–5)
POTASSIUM SERPL-SCNC: 5.3 MMOL/L — HIGH (ref 3.5–5)
PROT SERPL-MCNC: 7.1 G/DL — SIGNIFICANT CHANGE UP (ref 6–8)
PROTHROM AB SERPL-ACNC: 12.2 SEC — SIGNIFICANT CHANGE UP (ref 9.95–12.87)
RBC # BLD: 5.3 M/UL — SIGNIFICANT CHANGE UP (ref 4.7–6.1)
RBC # FLD: 14.1 % — SIGNIFICANT CHANGE UP (ref 11.5–14.5)
SARS-COV-2 RNA SPEC QL NAA+PROBE: SIGNIFICANT CHANGE UP
SODIUM SERPL-SCNC: 138 MMOL/L — SIGNIFICANT CHANGE UP (ref 135–146)
TROPONIN T SERPL-MCNC: <0.01 NG/ML — SIGNIFICANT CHANGE UP
TROPONIN T SERPL-MCNC: <0.01 NG/ML — SIGNIFICANT CHANGE UP
WBC # BLD: 9.34 K/UL — SIGNIFICANT CHANGE UP (ref 4.8–10.8)
WBC # FLD AUTO: 9.34 K/UL — SIGNIFICANT CHANGE UP (ref 4.8–10.8)

## 2020-06-17 PROCEDURE — 99285 EMERGENCY DEPT VISIT HI MDM: CPT

## 2020-06-17 PROCEDURE — 99223 1ST HOSP IP/OBS HIGH 75: CPT

## 2020-06-17 PROCEDURE — 71046 X-RAY EXAM CHEST 2 VIEWS: CPT | Mod: 26

## 2020-06-17 RX ORDER — BUDESONIDE AND FORMOTEROL FUMARATE DIHYDRATE 160; 4.5 UG/1; UG/1
2 AEROSOL RESPIRATORY (INHALATION)
Refills: 0 | Status: DISCONTINUED | OUTPATIENT
Start: 2020-06-17 | End: 2020-06-19

## 2020-06-17 RX ORDER — MORPHINE SULFATE 50 MG/1
100 CAPSULE, EXTENDED RELEASE ORAL
Refills: 0 | Status: DISCONTINUED | OUTPATIENT
Start: 2020-06-17 | End: 2020-06-19

## 2020-06-17 RX ORDER — ASPIRIN/CALCIUM CARB/MAGNESIUM 324 MG
81 TABLET ORAL DAILY
Refills: 0 | Status: DISCONTINUED | OUTPATIENT
Start: 2020-06-17 | End: 2020-06-19

## 2020-06-17 RX ORDER — TIOTROPIUM BROMIDE 18 UG/1
1 CAPSULE ORAL; RESPIRATORY (INHALATION) DAILY
Refills: 0 | Status: DISCONTINUED | OUTPATIENT
Start: 2020-06-17 | End: 2020-06-19

## 2020-06-17 RX ORDER — OXYCODONE HYDROCHLORIDE 5 MG/1
30 TABLET ORAL ONCE
Refills: 0 | Status: DISCONTINUED | OUTPATIENT
Start: 2020-06-17 | End: 2020-06-17

## 2020-06-17 RX ORDER — FENOFIBRATE,MICRONIZED 130 MG
145 CAPSULE ORAL DAILY
Refills: 0 | Status: DISCONTINUED | OUTPATIENT
Start: 2020-06-17 | End: 2020-06-19

## 2020-06-17 RX ORDER — ALBUTEROL 90 UG/1
1 AEROSOL, METERED ORAL EVERY 4 HOURS
Refills: 0 | Status: DISCONTINUED | OUTPATIENT
Start: 2020-06-17 | End: 2020-06-19

## 2020-06-17 RX ORDER — IPRATROPIUM/ALBUTEROL SULFATE 18-103MCG
3 AEROSOL WITH ADAPTER (GRAM) INHALATION ONCE
Refills: 0 | Status: COMPLETED | OUTPATIENT
Start: 2020-06-17 | End: 2020-06-17

## 2020-06-17 RX ORDER — ZOLPIDEM TARTRATE 10 MG/1
5 TABLET ORAL AT BEDTIME
Refills: 0 | Status: DISCONTINUED | OUTPATIENT
Start: 2020-06-17 | End: 2020-06-19

## 2020-06-17 RX ORDER — IPRATROPIUM/ALBUTEROL SULFATE 18-103MCG
3 AEROSOL WITH ADAPTER (GRAM) INHALATION EVERY 6 HOURS
Refills: 0 | Status: DISCONTINUED | OUTPATIENT
Start: 2020-06-17 | End: 2020-06-19

## 2020-06-17 RX ORDER — FUROSEMIDE 40 MG
40 TABLET ORAL AT BEDTIME
Refills: 0 | Status: DISCONTINUED | OUTPATIENT
Start: 2020-06-17 | End: 2020-06-19

## 2020-06-17 RX ORDER — ALBUTEROL 90 UG/1
2.5 AEROSOL, METERED ORAL ONCE
Refills: 0 | Status: COMPLETED | OUTPATIENT
Start: 2020-06-17 | End: 2020-06-17

## 2020-06-17 RX ORDER — ENOXAPARIN SODIUM 100 MG/ML
40 INJECTION SUBCUTANEOUS DAILY
Refills: 0 | Status: DISCONTINUED | OUTPATIENT
Start: 2020-06-17 | End: 2020-06-19

## 2020-06-17 RX ORDER — OXYCODONE HYDROCHLORIDE 5 MG/1
30 TABLET ORAL EVERY 4 HOURS
Refills: 0 | Status: DISCONTINUED | OUTPATIENT
Start: 2020-06-17 | End: 2020-06-19

## 2020-06-17 RX ORDER — MORPHINE SULFATE 50 MG/1
100 CAPSULE, EXTENDED RELEASE ORAL ONCE
Refills: 0 | Status: DISCONTINUED | OUTPATIENT
Start: 2020-06-17 | End: 2020-06-17

## 2020-06-17 RX ORDER — PANTOPRAZOLE SODIUM 20 MG/1
40 TABLET, DELAYED RELEASE ORAL
Refills: 0 | Status: DISCONTINUED | OUTPATIENT
Start: 2020-06-17 | End: 2020-06-19

## 2020-06-17 RX ORDER — LISINOPRIL 2.5 MG/1
5 TABLET ORAL DAILY
Refills: 0 | Status: DISCONTINUED | OUTPATIENT
Start: 2020-06-17 | End: 2020-06-19

## 2020-06-17 RX ORDER — FLUTICASONE PROPIONATE 50 MCG
1 SPRAY, SUSPENSION NASAL
Refills: 0 | Status: DISCONTINUED | OUTPATIENT
Start: 2020-06-17 | End: 2020-06-19

## 2020-06-17 RX ORDER — SIMVASTATIN 20 MG/1
20 TABLET, FILM COATED ORAL AT BEDTIME
Refills: 0 | Status: DISCONTINUED | OUTPATIENT
Start: 2020-06-17 | End: 2020-06-19

## 2020-06-17 RX ADMIN — MORPHINE SULFATE 100 MILLIGRAM(S): 50 CAPSULE, EXTENDED RELEASE ORAL at 15:27

## 2020-06-17 RX ADMIN — MORPHINE SULFATE 100 MILLIGRAM(S): 50 CAPSULE, EXTENDED RELEASE ORAL at 18:48

## 2020-06-17 RX ADMIN — SIMVASTATIN 20 MILLIGRAM(S): 20 TABLET, FILM COATED ORAL at 21:07

## 2020-06-17 RX ADMIN — ALBUTEROL 2.5 MILLIGRAM(S): 90 AEROSOL, METERED ORAL at 10:41

## 2020-06-17 RX ADMIN — Medication 3 MILLILITER(S): at 10:57

## 2020-06-17 RX ADMIN — Medication 125 MILLIGRAM(S): at 10:41

## 2020-06-17 RX ADMIN — ZOLPIDEM TARTRATE 5 MILLIGRAM(S): 10 TABLET ORAL at 21:06

## 2020-06-17 RX ADMIN — ALBUTEROL 2.5 MILLIGRAM(S): 90 AEROSOL, METERED ORAL at 12:14

## 2020-06-17 RX ADMIN — Medication 60 MILLIGRAM(S): at 18:48

## 2020-06-17 RX ADMIN — OXYCODONE HYDROCHLORIDE 30 MILLIGRAM(S): 5 TABLET ORAL at 19:26

## 2020-06-17 RX ADMIN — Medication 3 MILLILITER(S): at 20:17

## 2020-06-17 RX ADMIN — BUDESONIDE AND FORMOTEROL FUMARATE DIHYDRATE 2 PUFF(S): 160; 4.5 AEROSOL RESPIRATORY (INHALATION) at 21:07

## 2020-06-17 RX ADMIN — OXYCODONE HYDROCHLORIDE 30 MILLIGRAM(S): 5 TABLET ORAL at 12:11

## 2020-06-17 NOTE — ED PROVIDER NOTE - ATTENDING CONTRIBUTION TO CARE
53yo M with PMHx COPD, REBECCA/BIPAP at night, active smoker, HTN, hld, OA, GERD, presents for SOB today. Pt states he was doing yardwork for a few hours this morning when he began to feel SOB and feeling progressed. Feels like chest tightness similar to prior episodes of COPD, sensation not described as pain. Denies fever, cough. Denies fever/chills, headache, lightheadedness, nausea, vomiting, diarrhea, abd pain, dysuria, leg swelling. H/o multiple hospitalizations for COPD most recently 3 months ago, no h/o intubation. Last steroid use was 3 months ago.    Vital signs reviewed  GENERAL: Patient nontoxic appearing, no respiratory distress  HEAD: NCAT  EYES: Anicteric  ENT: MMM  RESPIRATORY: Slightly increased respiratory effort. No retractions or accessory muscle use. Speaking full sentences. Diffuse wheezing/rhonchi.   CARDIOVASCULAR: Regular rate and rhythm  ABDOMEN: Soft. Nondistended. Nontender. No guarding or rebound.   MUSCULOSKELETAL/EXTREMITIES: Brisk cap refill. Equal radial pulses. No leg edema.  SKIN:  Warm and dry  NEURO: AAOx3. No gross FND.

## 2020-06-17 NOTE — ED PROVIDER NOTE - CARE PLAN
Principal Discharge DX:	COPD (chronic obstructive pulmonary disease)  Secondary Diagnosis:	Shortness of breath

## 2020-06-17 NOTE — H&P ADULT - NSHPPHYSICALEXAM_GEN_ALL_CORE
Vital Signs Last 24 Hrs  T(C): 37 (17 Jun 2020 14:06), Max: 37 (17 Jun 2020 14:06)  T(F): 98.6 (17 Jun 2020 14:06), Max: 98.6 (17 Jun 2020 14:06)  HR: 89 (17 Jun 2020 16:20) (60 - 89)  BP: 151/89 (17 Jun 2020 16:20) (133/91 - 151/89)  RR: 20 (17 Jun 2020 16:20) (17 - 20)  SpO2: 94% (17 Jun 2020 16:20) (94% - 100%)    PHYSICAL EXAM:  GENERAL: NAD, speaks in full sentences, no signs of respiratory distress  HEAD:  Atraumatic, Normocephalic  EYES: Anicteric  NECK: Supple, No JVD  CHEST/LUNG: Clear to auscultation bilaterally; No wheeze; No crackles; No accessory muscles used  HEART: Regular rate and rhythm; No murmurs;   ABDOMEN: Soft, Nontender, Nondistended; Bowel sounds present; No guarding  EXTREMITIES:  2+ Peripheral Pulses, No cyanosis or edema  PSYCH: AAOx3  NEUROLOGY: non-focal  SKIN: No rashes or lesions

## 2020-06-17 NOTE — H&P ADULT - HISTORY OF PRESENT ILLNESS
55 yo M with morbid obesity, current smoker, COPD (no home o2), HTN, GERD, chronic pain presents for evaluation of chest tightness and pain. Pt states this started 2 days ago when he was doing heavy lifting in his garden. Since then it has been getting worse. He tried his inhalers with no relief. He describes chest pain as sharp, generalized over entire front of chest, and worse with deep inspiration. He states symptoms feels identical to when he had a COPD exacerbation in March 2020.     He denies worsening cough, change in sputum production or purulence.    To note he follows with cardiology Dr. Wen. This past year he had an ECHO and stress test which he states is normal.

## 2020-06-17 NOTE — H&P ADULT - NSHPLABSRESULTS_GEN_ALL_CORE
15.3   9.34  )-----------( 233      ( 17 Jun 2020 11:35 )             46.5       06-17    138  |  99  |  9<L>  ----------------------------<  111<H>  5.3<H>   |  31  |  0.9    Ca    9.7      17 Jun 2020 10:26  Mg     2.1     06-17    TPro  7.1  /  Alb  4.3  /  TBili  0.5  /  DBili  x   /  AST  26  /  ALT  22  /  AlkPhos  102  06-17          PT/INR - ( 17 Jun 2020 10:26 )   PT: 12.20 sec;   INR: 1.06 ratio         PTT - ( 17 Jun 2020 10:26 )  PTT:32.1 sec    Lactate Trend  f  CARDIAC MARKERS ( 17 Jun 2020 10:26 )  x     / <0.01 ng/mL / x     / x     / x          CXR showed no evidence of cardio pulmonary disease    EKG showed NSR

## 2020-06-17 NOTE — ED PROVIDER NOTE - OBJECTIVE STATEMENT
Patient c/o COPD . wheezing , SOB, Worsening over past several hours.  Taking MDI PTA without relief. Sx started while working in yard, + cough, chest pain, no fever, + TOB use

## 2020-06-18 ENCOUNTER — TRANSCRIPTION ENCOUNTER (OUTPATIENT)
Age: 55
End: 2020-06-18

## 2020-06-18 LAB
ANION GAP SERPL CALC-SCNC: 9 MMOL/L — SIGNIFICANT CHANGE UP (ref 7–14)
BASOPHILS # BLD AUTO: 0.01 K/UL — SIGNIFICANT CHANGE UP (ref 0–0.2)
BASOPHILS NFR BLD AUTO: 0.1 % — SIGNIFICANT CHANGE UP (ref 0–1)
BUN SERPL-MCNC: 20 MG/DL — SIGNIFICANT CHANGE UP (ref 10–20)
CALCIUM SERPL-MCNC: 9.6 MG/DL — SIGNIFICANT CHANGE UP (ref 8.5–10.1)
CHLORIDE SERPL-SCNC: 102 MMOL/L — SIGNIFICANT CHANGE UP (ref 98–110)
CO2 SERPL-SCNC: 24 MMOL/L — SIGNIFICANT CHANGE UP (ref 17–32)
CREAT SERPL-MCNC: 0.9 MG/DL — SIGNIFICANT CHANGE UP (ref 0.7–1.5)
EOSINOPHIL # BLD AUTO: 0 K/UL — SIGNIFICANT CHANGE UP (ref 0–0.7)
EOSINOPHIL NFR BLD AUTO: 0 % — SIGNIFICANT CHANGE UP (ref 0–8)
GLUCOSE SERPL-MCNC: 212 MG/DL — HIGH (ref 70–99)
HCT VFR BLD CALC: 43.7 % — SIGNIFICANT CHANGE UP (ref 42–52)
HGB BLD-MCNC: 14.3 G/DL — SIGNIFICANT CHANGE UP (ref 14–18)
IMM GRANULOCYTES NFR BLD AUTO: 0.6 % — HIGH (ref 0.1–0.3)
LYMPHOCYTES # BLD AUTO: 0.94 K/UL — LOW (ref 1.2–3.4)
LYMPHOCYTES # BLD AUTO: 6.5 % — LOW (ref 20.5–51.1)
MCHC RBC-ENTMCNC: 28.7 PG — SIGNIFICANT CHANGE UP (ref 27–31)
MCHC RBC-ENTMCNC: 32.7 G/DL — SIGNIFICANT CHANGE UP (ref 32–37)
MCV RBC AUTO: 87.8 FL — SIGNIFICANT CHANGE UP (ref 80–94)
MONOCYTES # BLD AUTO: 0.43 K/UL — SIGNIFICANT CHANGE UP (ref 0.1–0.6)
MONOCYTES NFR BLD AUTO: 3 % — SIGNIFICANT CHANGE UP (ref 1.7–9.3)
NEUTROPHILS # BLD AUTO: 12.94 K/UL — HIGH (ref 1.4–6.5)
NEUTROPHILS NFR BLD AUTO: 89.8 % — HIGH (ref 42.2–75.2)
NRBC # BLD: 0 /100 WBCS — SIGNIFICANT CHANGE UP (ref 0–0)
PLATELET # BLD AUTO: 232 K/UL — SIGNIFICANT CHANGE UP (ref 130–400)
POTASSIUM SERPL-MCNC: 4 MMOL/L — SIGNIFICANT CHANGE UP (ref 3.5–5)
POTASSIUM SERPL-SCNC: 4 MMOL/L — SIGNIFICANT CHANGE UP (ref 3.5–5)
RBC # BLD: 4.98 M/UL — SIGNIFICANT CHANGE UP (ref 4.7–6.1)
RBC # FLD: 14.1 % — SIGNIFICANT CHANGE UP (ref 11.5–14.5)
SODIUM SERPL-SCNC: 135 MMOL/L — SIGNIFICANT CHANGE UP (ref 135–146)
WBC # BLD: 14.4 K/UL — HIGH (ref 4.8–10.8)
WBC # FLD AUTO: 14.4 K/UL — HIGH (ref 4.8–10.8)

## 2020-06-18 PROCEDURE — 99232 SBSQ HOSP IP/OBS MODERATE 35: CPT

## 2020-06-18 RX ORDER — SENNA PLUS 8.6 MG/1
2 TABLET ORAL AT BEDTIME
Refills: 0 | Status: DISCONTINUED | OUTPATIENT
Start: 2020-06-18 | End: 2020-06-19

## 2020-06-18 RX ADMIN — OXYCODONE HYDROCHLORIDE 30 MILLIGRAM(S): 5 TABLET ORAL at 04:35

## 2020-06-18 RX ADMIN — MORPHINE SULFATE 100 MILLIGRAM(S): 50 CAPSULE, EXTENDED RELEASE ORAL at 17:46

## 2020-06-18 RX ADMIN — ZOLPIDEM TARTRATE 5 MILLIGRAM(S): 10 TABLET ORAL at 22:00

## 2020-06-18 RX ADMIN — SIMVASTATIN 20 MILLIGRAM(S): 20 TABLET, FILM COATED ORAL at 22:00

## 2020-06-18 RX ADMIN — MORPHINE SULFATE 100 MILLIGRAM(S): 50 CAPSULE, EXTENDED RELEASE ORAL at 11:51

## 2020-06-18 RX ADMIN — SENNA PLUS 2 TABLET(S): 8.6 TABLET ORAL at 22:00

## 2020-06-18 RX ADMIN — Medication 3 MILLILITER(S): at 20:15

## 2020-06-18 RX ADMIN — Medication 3 MILLILITER(S): at 07:55

## 2020-06-18 RX ADMIN — OXYCODONE HYDROCHLORIDE 30 MILLIGRAM(S): 5 TABLET ORAL at 20:11

## 2020-06-18 RX ADMIN — LISINOPRIL 5 MILLIGRAM(S): 2.5 TABLET ORAL at 06:27

## 2020-06-18 RX ADMIN — BUDESONIDE AND FORMOTEROL FUMARATE DIHYDRATE 2 PUFF(S): 160; 4.5 AEROSOL RESPIRATORY (INHALATION) at 20:11

## 2020-06-18 RX ADMIN — Medication 3 MILLILITER(S): at 14:51

## 2020-06-18 RX ADMIN — MORPHINE SULFATE 100 MILLIGRAM(S): 50 CAPSULE, EXTENDED RELEASE ORAL at 06:27

## 2020-06-18 RX ADMIN — OXYCODONE HYDROCHLORIDE 30 MILLIGRAM(S): 5 TABLET ORAL at 13:06

## 2020-06-18 RX ADMIN — PANTOPRAZOLE SODIUM 40 MILLIGRAM(S): 20 TABLET, DELAYED RELEASE ORAL at 06:27

## 2020-06-18 RX ADMIN — BUDESONIDE AND FORMOTEROL FUMARATE DIHYDRATE 2 PUFF(S): 160; 4.5 AEROSOL RESPIRATORY (INHALATION) at 11:30

## 2020-06-18 NOTE — PROGRESS NOTE ADULT - ASSESSMENT
53 yo M PMHx GERD, HTN, COPD, morbid obesity presents for evaluation of SOB, chest tightness/pain    Dyspnea with chest tightness/pain  - cardiac enzymes unremarkable  - repeat CE  - pt had chronic LE edema, 2d echo 1/2019 was normal  - prednisone  - no need for abx at present time as no worsening cough or change in purulence  - nebs prn  - Symbicort for Advair singulair  - wheezing and tightness resolved. Spok with cardio, agree to outpt cardiac eval  - pt states he thought he was ready to go but no longer feels ready, was outside walking several times today, witnessed by myself and house staff-ambulating without issue  - anticipate for d/c in AM    HTN  - c/w losartan    Chronic pain  - pt states he only needs oxycodone/morphine so can hold dialudid/methadone    GERD  - protonix for nexium    DVT px  Full Code  Independent from home

## 2020-06-18 NOTE — DISCHARGE NOTE NURSING/CASE MANAGEMENT/SOCIAL WORK - NSDCPNDISPN_GEN_ALL_CORE
Side effects of pain management treatment/Safe use, storage and disposal of opioids when prescribed/Opioids not applicable/not prescribed/Education provided on the pain management plan of care/Activities of daily living, including home environment that might     exacerbate pain or reduce effectiveness of the pain management plan of care as well as strategies to address these issues

## 2020-06-18 NOTE — PROGRESS NOTE ADULT - SUBJECTIVE AND OBJECTIVE BOX
CHIEF COMPLAINT:    Patient is a 54y old  Male who presents with a chief complaint of Chest tightness    INTERVAL HPI/OVERNIGHT EVENTS:    Patient seen and examined at bedside. No acute overnight events occurred.    ROS: This morning pt felt well, no SOB, no wheezing. Later on pt stated he didn't feel ready to leave, was still wheezing. All other systems are negative.    Vital Signs:    T(F): 96.4 (06-18-20 @ 05:00), Max: 98.6 (06-17-20 @ 14:06)  HR: 95 (06-18-20 @ 05:00) (88 - 105)  BP: 126/78 (06-18-20 @ 05:00) (126/78 - 158/88)  RR: 18 (06-18-20 @ 05:00) (18 - 20)  SpO2: 96% (06-18-20 @ 10:52) (94% - 96%)  I&O's Summary    Daily Height in cm: 177.8 (17 Jun 2020 17:30)    Daily   CAPILLARY BLOOD GLUCOSE          PHYSICAL EXAM:  GENERAL:  NAD  SKIN: No rashes or lesions  HEENT: Atraumatic. Normocephalic. Anicteric  NECK:  No JVD.   PULMONARY: Clear to ausculation bilaterally. No wheezing. No rales-transmitted breath sounds  CVS: Normal S1, S2. Regular rate and rhythm. No murmurs.  ABDOMEN/GI: Soft, Nontender, Nondistended; Bowel sounds are present  EXTREMITIES:  No edema B/L LE.  NEUROLOGIC:  No motor deficit.  PSYCH: Alert & oriented x 3, normal affect      LABS:                        14.3   14.40 )-----------( 232      ( 18 Jun 2020 06:22 )             43.7     06-18    135  |  102  |  20  ----------------------------<  212<H>  4.0   |  24  |  0.9    Ca    9.6      18 Jun 2020 06:22  Mg     2.1     06-17    TPro  7.1  /  Alb  4.3  /  TBili  0.5  /  DBili  x   /  AST  26  /  ALT  22  /  AlkPhos  102  06-17    PT/INR - ( 17 Jun 2020 10:26 )   PT: 12.20 sec;   INR: 1.06 ratio         PTT - ( 17 Jun 2020 10:26 )  PTT:32.1 sec    Trop <0.01, CKMB --, CK --, 06-17-20 @ 19:31  Trop <0.01, CKMB --, CK --, 06-17-20 @ 10:26        RADIOLOGY & ADDITIONAL TESTS:  No new images    Medications:  Standing  ALBUTerol    90 MICROgram(s) HFA Inhaler 1 Puff(s) Inhalation every 4 hours  albuterol/ipratropium for Nebulization 3 milliLiter(s) Nebulizer every 6 hours  aspirin  chewable 81 milliGRAM(s) Oral daily  budesonide 160 MICROgram(s)/formoterol 4.5 MICROgram(s) Inhaler 2 Puff(s) Inhalation two times a day  enoxaparin Injectable 40 milliGRAM(s) SubCutaneous daily  fenofibrate Tablet 145 milliGRAM(s) Oral daily  furosemide    Tablet 40 milliGRAM(s) Oral at bedtime  lisinopril 5 milliGRAM(s) Oral daily  methylPREDNISolone sodium succinate Injectable 60 milliGRAM(s) IV Push every 12 hours  morphine ER Tablet 100 milliGRAM(s) Oral four times a day  pantoprazole    Tablet 40 milliGRAM(s) Oral before breakfast  predniSONE   Tablet 60 milliGRAM(s) Oral daily  simvastatin 20 milliGRAM(s) Oral at bedtime  tiotropium 18 MICROgram(s) Capsule 1 Capsule(s) Inhalation daily    PRN Meds  fluticasone propionate 50 MICROgram(s)/spray Nasal Spray 1 Spray(s) Both Nostrils two times a day PRN  oxyCODONE   IR Oral Tab/Cap - Peds 30 milliGRAM(s) Oral every 4 hours PRN  tiotropium 18 MICROgram(s) Capsule 1 Capsule(s) Inhalation daily PRN  zolpidem 5 milliGRAM(s) Oral at bedtime PRN  zolpidem 5 milliGRAM(s) Oral at bedtime PRN      Case discussed with resident  Care discussed with pt

## 2020-06-18 NOTE — DISCHARGE NOTE PROVIDER - HOSPITAL COURSE
53 yo M PMHx GERD, HTN, COPD, morbid obesity presents for evaluation of SOB, chest tightness/pain. In ER pt had active wheezing, treated with solumedrol. Wheezing and tightness resolved. Spoke with pt's cardiologist-had normal stress test and 2d echo in 1/2019, test performed at [facility]. Given negative troponins and telemetry on this     admission, likely COPD bronchspasm causing tightness. WIll d/c pt home with prednsione.

## 2020-06-18 NOTE — DISCHARGE NOTE NURSING/CASE MANAGEMENT/SOCIAL WORK - PATIENT PORTAL LINK FT
You can access the FollowMyHealth Patient Portal offered by [facility] by registering at the following website: [email or web address]. By joining [employer]’s FollowMyHealth portal, you will also be able to view your health information using other applications (apps) compatible with our system.

## 2020-06-18 NOTE — CHART NOTE - NSCHARTNOTEFT_GEN_A_CORE
Pt seen and examined at bedside. No longer feels SOB, no chest tightness. Spoke with cardiology, Agreeable to outpt follow up. Medically stable for d/c home.    PHYSICAL EXAM:  GENERAL: NAD, speaks in full sentences, no signs of respiratory distress  HEAD:  Atraumatic, Normocephalic  EYES: Anicteric  NECK: Supple, No JVD  CHEST/LUNG: Clear to auscultation bilaterally; No wheeze; No crackles; No accessory muscles used  HEART: Regular rate and rhythm; No murmurs;   ABDOMEN: Soft, Nontender, Nondistended; Bowel sounds present; No guarding  EXTREMITIES:  2+ Peripheral Pulses, No cyanosis or edema  PSYCH: AAOx3  NEUROLOGY: non-focal  SKIN: No rashes or lesions    Time spent coordinating discharge 33 minutes

## 2020-06-18 NOTE — DISCHARGE NOTE PROVIDER - NSDCMRMEDTOKEN_GEN_ALL_CORE_FT
Advair Diskus 500 mcg-50 mcg inhalation powder: 1 inhalation inhaled 2 times a day  aspirin 81 mg oral tablet: 1 tab(s) orally once a day  docusate sodium 100 mg oral capsule: 1 cap(s) orally once a day (at bedtime)  fenofibrate 134 mg oral capsule: 1 cap(s) orally once a day (at bedtime)  furosemide 40 mg oral tablet: 1 tab(s) orally once a day (at bedtime)  HYDROmorphone 4 mg oral tablet: 1 tab(s) orally every 8 hours  lisinopril 5 mg oral tablet: 1 tab(s) orally once a day  methadone 10 mg oral tablet: 1 tab(s) orally every 12 hours  morphine 100 mg/12 hours oral tablet, extended release: 1 tab(s) orally 4 times a day  NexIUM 40 mg oral delayed release capsule: 1 cap(s) orally once a day  oxyCODONE 30 mg oral tablet: 1 tab(s) orally every 4 hours, As Needed  predniSONE 20 mg oral tablet: 3 tab(s) orally once a day   Spiriva 18 mcg inhalation capsule: 1 cap(s) inhaled once a day, As Needed  Zocor 20 mg oral tablet: 1 tab(s) orally once a day (at bedtime) Advair Diskus 500 mcg-50 mcg inhalation powder: 1 inhalation inhaled 2 times a day  aspirin 81 mg oral tablet: 1 tab(s) orally once a day  docusate sodium 100 mg oral capsule: 1 cap(s) orally once a day (at bedtime)  fenofibrate 134 mg oral capsule: 1 cap(s) orally once a day (at bedtime)  furosemide 40 mg oral tablet: 1 tab(s) orally once a day (at bedtime)  HYDROmorphone 4 mg oral tablet: 1 tab(s) orally every 8 hours  lisinopril 5 mg oral tablet: 1 tab(s) orally once a day  methadone 10 mg oral tablet: 1 tab(s) orally every 12 hours  morphine 100 mg/12 hours oral tablet, extended release: 1 tab(s) orally 4 times a day  NexIUM 40 mg oral delayed release capsule: 1 cap(s) orally once a day  oxyCODONE 30 mg oral tablet: 1 tab(s) orally every 4 hours, As Needed  Spiriva 18 mcg inhalation capsule: 1 cap(s) inhaled once a day, As Needed  Zocor 20 mg oral tablet: 1 tab(s) orally once a day (at bedtime)

## 2020-06-18 NOTE — DISCHARGE NOTE PROVIDER - CARE PROVIDER_API CALL
Erwin Wen  Cardiovascular Disease  [address] [facility] [address]  [place] [address]  Phone: [phone]  Fax: [phone]  Follow Up Time:     Blake Bassett  CRITICAL CARE MEDICINE  [address] [place] [address] [address]  [place] [address]  Phone: [phone]  Fax: [phone]  Follow Up Time:

## 2020-06-18 NOTE — DISCHARGE NOTE PROVIDER - NSDCCPCAREPLAN_GEN_ALL_CORE_FT
PRINCIPAL DISCHARGE DIAGNOSIS  Diagnosis: COPD (chronic obstructive pulmonary disease)  Assessment and Plan of Treatment: Likely exacerbation from dirt/garden dust. Improved with steroids. Oxygen levels are good. Please complete steroids and follow up with Dr. Bassett.      SECONDARY DISCHARGE DIAGNOSES  Diagnosis: Shortness of breath  Assessment and Plan of Treatment: Likely from COPD exacerbation. Doubt cardiac cause. Dr. Wen is aware and would like you to follow up in the office.

## 2020-06-19 VITALS — DIASTOLIC BLOOD PRESSURE: 63 MMHG | SYSTOLIC BLOOD PRESSURE: 112 MMHG | HEART RATE: 91 BPM

## 2020-06-19 PROCEDURE — 99239 HOSP IP/OBS DSCHRG MGMT >30: CPT

## 2020-06-19 RX ADMIN — MORPHINE SULFATE 100 MILLIGRAM(S): 50 CAPSULE, EXTENDED RELEASE ORAL at 00:04

## 2020-06-19 RX ADMIN — OXYCODONE HYDROCHLORIDE 30 MILLIGRAM(S): 5 TABLET ORAL at 09:08

## 2020-06-19 RX ADMIN — LISINOPRIL 5 MILLIGRAM(S): 2.5 TABLET ORAL at 06:48

## 2020-06-19 RX ADMIN — MORPHINE SULFATE 100 MILLIGRAM(S): 50 CAPSULE, EXTENDED RELEASE ORAL at 12:30

## 2020-06-19 RX ADMIN — PANTOPRAZOLE SODIUM 40 MILLIGRAM(S): 20 TABLET, DELAYED RELEASE ORAL at 06:47

## 2020-06-19 RX ADMIN — Medication 81 MILLIGRAM(S): at 12:30

## 2020-06-19 RX ADMIN — Medication 3 MILLILITER(S): at 08:49

## 2020-06-19 RX ADMIN — Medication 60 MILLIGRAM(S): at 06:48

## 2020-06-19 RX ADMIN — Medication 145 MILLIGRAM(S): at 12:30

## 2020-06-19 RX ADMIN — BUDESONIDE AND FORMOTEROL FUMARATE DIHYDRATE 2 PUFF(S): 160; 4.5 AEROSOL RESPIRATORY (INHALATION) at 09:07

## 2020-06-19 RX ADMIN — MORPHINE SULFATE 100 MILLIGRAM(S): 50 CAPSULE, EXTENDED RELEASE ORAL at 06:48

## 2020-06-19 NOTE — CHART NOTE - NSCHARTNOTEFT_GEN_A_CORE
Pt seen and examined at bedside. Pt states he is still wheezing but on exam it is transmitted breath sounds from upper airway. Pt admits to have some mild sputum production. He has been ambulatory around hospital and outside without any chest tightness. He is SOB but states that is his baseline. He said he smoked 2-3 cigarettes over the last 24 hrs. He was instructed to avoid smoking, especially during his recovering during this exacerbation. He states he feels well enough to go home. His pulse ox remains stable  >94% on RA. Pt is  obese with BMI 34.5 and states he his actively trying to lose weight.    PHYSICAL EXAM:  GENERAL: NAD, speaks in full sentences, no signs of respiratory distress  HEAD:  Atraumatic, Normocephalic  EYES: EOMI, PERRLA, conjunctiva and sclera clear  NECK: Supple, No JVD  CHEST/LUNG: Clear to auscultation bilaterally; No wheeze; No crackles; No accessory muscles used  HEART: Regular rate and rhythm; No murmurs;   ABDOMEN: Soft, Nontender, Nondistended; Bowel sounds present; No guarding  EXTREMITIES:  2+ Peripheral Pulses, No cyanosis or edema  PSYCH: AAOx3  NEUROLOGY: non-focal  SKIN: No rashes or lesions    TIme spent coordinating discharge 31 minutes

## 2020-06-23 DIAGNOSIS — E66.01 MORBID (SEVERE) OBESITY DUE TO EXCESS CALORIES: ICD-10-CM

## 2020-06-23 DIAGNOSIS — I10 ESSENTIAL (PRIMARY) HYPERTENSION: ICD-10-CM

## 2020-06-23 DIAGNOSIS — J44.1 CHRONIC OBSTRUCTIVE PULMONARY DISEASE WITH (ACUTE) EXACERBATION: ICD-10-CM

## 2020-06-23 DIAGNOSIS — F17.210 NICOTINE DEPENDENCE, CIGARETTES, UNCOMPLICATED: ICD-10-CM

## 2020-06-23 DIAGNOSIS — Z96.651 PRESENCE OF RIGHT ARTIFICIAL KNEE JOINT: ICD-10-CM

## 2020-06-23 DIAGNOSIS — G47.33 OBSTRUCTIVE SLEEP APNEA (ADULT) (PEDIATRIC): ICD-10-CM

## 2020-06-23 DIAGNOSIS — E78.00 PURE HYPERCHOLESTEROLEMIA, UNSPECIFIED: ICD-10-CM

## 2020-06-23 DIAGNOSIS — G89.29 OTHER CHRONIC PAIN: ICD-10-CM

## 2020-06-23 DIAGNOSIS — K21.9 GASTRO-ESOPHAGEAL REFLUX DISEASE WITHOUT ESOPHAGITIS: ICD-10-CM

## 2020-06-23 DIAGNOSIS — Z79.82 LONG TERM (CURRENT) USE OF ASPIRIN: ICD-10-CM

## 2020-06-23 DIAGNOSIS — Z79.52 LONG TERM (CURRENT) USE OF SYSTEMIC STEROIDS: ICD-10-CM

## 2020-06-23 DIAGNOSIS — Z79.899 OTHER LONG TERM (CURRENT) DRUG THERAPY: ICD-10-CM

## 2020-06-25 ENCOUNTER — EMERGENCY (EMERGENCY)
Facility: HOSPITAL | Age: 55
LOS: 0 days | Discharge: HOME | End: 2020-06-25
Attending: EMERGENCY MEDICINE | Admitting: EMERGENCY MEDICINE
Payer: SUBSIDIZED

## 2020-06-25 ENCOUNTER — EMERGENCY (EMERGENCY)
Facility: HOSPITAL | Age: 55
LOS: 0 days | Discharge: HOME | End: 2020-06-25
Attending: EMERGENCY MEDICINE | Admitting: EMERGENCY MEDICINE
Payer: MEDICAID

## 2020-06-25 ENCOUNTER — TRANSCRIPTION ENCOUNTER (OUTPATIENT)
Age: 55
End: 2020-06-25

## 2020-06-25 VITALS
DIASTOLIC BLOOD PRESSURE: 122 MMHG | RESPIRATION RATE: 18 BRPM | SYSTOLIC BLOOD PRESSURE: 194 MMHG | OXYGEN SATURATION: 96 % | HEART RATE: 99 BPM | TEMPERATURE: 99 F

## 2020-06-25 VITALS
HEART RATE: 81 BPM | HEIGHT: 70 IN | DIASTOLIC BLOOD PRESSURE: 85 MMHG | WEIGHT: 315 LBS | OXYGEN SATURATION: 96 % | TEMPERATURE: 98 F | SYSTOLIC BLOOD PRESSURE: 144 MMHG | RESPIRATION RATE: 18 BRPM

## 2020-06-25 VITALS
DIASTOLIC BLOOD PRESSURE: 85 MMHG | HEART RATE: 81 BPM | TEMPERATURE: 98 F | RESPIRATION RATE: 18 BRPM | OXYGEN SATURATION: 96 % | SYSTOLIC BLOOD PRESSURE: 144 MMHG | WEIGHT: 315 LBS

## 2020-06-25 DIAGNOSIS — R07.9 CHEST PAIN, UNSPECIFIED: ICD-10-CM

## 2020-06-25 DIAGNOSIS — R06.02 SHORTNESS OF BREATH: ICD-10-CM

## 2020-06-25 DIAGNOSIS — Z98.890 OTHER SPECIFIED POSTPROCEDURAL STATES: Chronic | ICD-10-CM

## 2020-06-25 DIAGNOSIS — K40.20 BILATERAL INGUINAL HERNIA, WITHOUT OBSTRUCTION OR GANGRENE, NOT SPECIFIED AS RECURRENT: Chronic | ICD-10-CM

## 2020-06-25 DIAGNOSIS — Z96.651 PRESENCE OF RIGHT ARTIFICIAL KNEE JOINT: Chronic | ICD-10-CM

## 2020-06-25 DIAGNOSIS — R07.89 OTHER CHEST PAIN: ICD-10-CM

## 2020-06-25 DIAGNOSIS — R11.2 NAUSEA WITH VOMITING, UNSPECIFIED: ICD-10-CM

## 2020-06-25 DIAGNOSIS — Z02.9 ENCOUNTER FOR ADMINISTRATIVE EXAMINATIONS, UNSPECIFIED: ICD-10-CM

## 2020-06-25 DIAGNOSIS — Z20.828 CONTACT WITH AND (SUSPECTED) EXPOSURE TO OTHER VIRAL COMMUNICABLE DISEASES: ICD-10-CM

## 2020-06-25 DIAGNOSIS — R11.0 NAUSEA: ICD-10-CM

## 2020-06-25 LAB
ALBUMIN SERPL ELPH-MCNC: 4.4 G/DL — SIGNIFICANT CHANGE UP (ref 3.5–5.2)
ALP SERPL-CCNC: 107 U/L — SIGNIFICANT CHANGE UP (ref 30–115)
ALT FLD-CCNC: 26 U/L — SIGNIFICANT CHANGE UP (ref 0–41)
ANION GAP SERPL CALC-SCNC: 13 MMOL/L — SIGNIFICANT CHANGE UP (ref 7–14)
AST SERPL-CCNC: 17 U/L — SIGNIFICANT CHANGE UP (ref 0–41)
BASOPHILS # BLD AUTO: 0.06 K/UL — SIGNIFICANT CHANGE UP (ref 0–0.2)
BASOPHILS NFR BLD AUTO: 0.5 % — SIGNIFICANT CHANGE UP (ref 0–1)
BILIRUB SERPL-MCNC: 0.3 MG/DL — SIGNIFICANT CHANGE UP (ref 0.2–1.2)
BUN SERPL-MCNC: 16 MG/DL — SIGNIFICANT CHANGE UP (ref 10–20)
CALCIUM SERPL-MCNC: 9.5 MG/DL — SIGNIFICANT CHANGE UP (ref 8.5–10.1)
CHLORIDE SERPL-SCNC: 97 MMOL/L — LOW (ref 98–110)
CO2 SERPL-SCNC: 28 MMOL/L — SIGNIFICANT CHANGE UP (ref 17–32)
CREAT SERPL-MCNC: 0.9 MG/DL — SIGNIFICANT CHANGE UP (ref 0.7–1.5)
EOSINOPHIL # BLD AUTO: 0.08 K/UL — SIGNIFICANT CHANGE UP (ref 0–0.7)
EOSINOPHIL NFR BLD AUTO: 0.6 % — SIGNIFICANT CHANGE UP (ref 0–8)
GLUCOSE SERPL-MCNC: 162 MG/DL — HIGH (ref 70–99)
HCT VFR BLD CALC: 46.4 % — SIGNIFICANT CHANGE UP (ref 42–52)
HGB BLD-MCNC: 15.9 G/DL — SIGNIFICANT CHANGE UP (ref 14–18)
IMM GRANULOCYTES NFR BLD AUTO: 1.1 % — HIGH (ref 0.1–0.3)
LIDOCAIN IGE QN: 18 U/L — SIGNIFICANT CHANGE UP (ref 7–60)
LYMPHOCYTES # BLD AUTO: 1.45 K/UL — SIGNIFICANT CHANGE UP (ref 1.2–3.4)
LYMPHOCYTES # BLD AUTO: 11.2 % — LOW (ref 20.5–51.1)
MAGNESIUM SERPL-MCNC: 2.1 MG/DL — SIGNIFICANT CHANGE UP (ref 1.8–2.4)
MCHC RBC-ENTMCNC: 30.5 PG — SIGNIFICANT CHANGE UP (ref 27–31)
MCHC RBC-ENTMCNC: 34.3 G/DL — SIGNIFICANT CHANGE UP (ref 32–37)
MCV RBC AUTO: 88.9 FL — SIGNIFICANT CHANGE UP (ref 80–94)
MONOCYTES # BLD AUTO: 0.51 K/UL — SIGNIFICANT CHANGE UP (ref 0.1–0.6)
MONOCYTES NFR BLD AUTO: 3.9 % — SIGNIFICANT CHANGE UP (ref 1.7–9.3)
NEUTROPHILS # BLD AUTO: 10.74 K/UL — HIGH (ref 1.4–6.5)
NEUTROPHILS NFR BLD AUTO: 82.7 % — HIGH (ref 42.2–75.2)
NRBC # BLD: 0 /100 WBCS — SIGNIFICANT CHANGE UP (ref 0–0)
PLATELET # BLD AUTO: 217 K/UL — SIGNIFICANT CHANGE UP (ref 130–400)
POTASSIUM SERPL-MCNC: 4.1 MMOL/L — SIGNIFICANT CHANGE UP (ref 3.5–5)
POTASSIUM SERPL-SCNC: 4.1 MMOL/L — SIGNIFICANT CHANGE UP (ref 3.5–5)
PROT SERPL-MCNC: 7.4 G/DL — SIGNIFICANT CHANGE UP (ref 6–8)
RBC # BLD: 5.22 M/UL — SIGNIFICANT CHANGE UP (ref 4.7–6.1)
RBC # FLD: 14.4 % — SIGNIFICANT CHANGE UP (ref 11.5–14.5)
SARS-COV-2 RNA SPEC QL NAA+PROBE: SIGNIFICANT CHANGE UP
SODIUM SERPL-SCNC: 138 MMOL/L — SIGNIFICANT CHANGE UP (ref 135–146)
TROPONIN T SERPL-MCNC: <0.01 NG/ML — SIGNIFICANT CHANGE UP
TROPONIN T SERPL-MCNC: <0.01 NG/ML — SIGNIFICANT CHANGE UP
WBC # BLD: 12.98 K/UL — HIGH (ref 4.8–10.8)
WBC # FLD AUTO: 12.98 K/UL — HIGH (ref 4.8–10.8)

## 2020-06-25 PROCEDURE — 93018 CV STRESS TEST I&R ONLY: CPT

## 2020-06-25 PROCEDURE — 78452 HT MUSCLE IMAGE SPECT MULT: CPT | Mod: 26

## 2020-06-25 PROCEDURE — 99236 HOSP IP/OBS SAME DATE HI 85: CPT

## 2020-06-25 PROCEDURE — 93010 ELECTROCARDIOGRAM REPORT: CPT

## 2020-06-25 PROCEDURE — 71045 X-RAY EXAM CHEST 1 VIEW: CPT | Mod: 26

## 2020-06-25 PROCEDURE — 93016 CV STRESS TEST SUPVJ ONLY: CPT

## 2020-06-25 PROCEDURE — L9991: CPT

## 2020-06-25 RX ORDER — MORPHINE SULFATE 50 MG/1
4 CAPSULE, EXTENDED RELEASE ORAL ONCE
Refills: 0 | Status: DISCONTINUED | OUTPATIENT
Start: 2020-06-25 | End: 2020-06-25

## 2020-06-25 RX ORDER — ONDANSETRON 8 MG/1
4 TABLET, FILM COATED ORAL ONCE
Refills: 0 | Status: COMPLETED | OUTPATIENT
Start: 2020-06-25 | End: 2020-06-25

## 2020-06-25 RX ORDER — HYDROMORPHONE HYDROCHLORIDE 2 MG/ML
2 INJECTION INTRAMUSCULAR; INTRAVENOUS; SUBCUTANEOUS ONCE
Refills: 0 | Status: DISCONTINUED | OUTPATIENT
Start: 2020-06-25 | End: 2020-06-25

## 2020-06-25 RX ORDER — HYDROMORPHONE HYDROCHLORIDE 2 MG/ML
1 INJECTION INTRAMUSCULAR; INTRAVENOUS; SUBCUTANEOUS ONCE
Refills: 0 | Status: DISCONTINUED | OUTPATIENT
Start: 2020-06-25 | End: 2020-06-25

## 2020-06-25 RX ORDER — REGADENOSON 0.08 MG/ML
0.4 INJECTION, SOLUTION INTRAVENOUS ONCE
Refills: 0 | Status: DISCONTINUED | OUTPATIENT
Start: 2020-06-25 | End: 2020-06-25

## 2020-06-25 RX ORDER — ALBUTEROL 90 UG/1
1 AEROSOL, METERED ORAL ONCE
Refills: 0 | Status: COMPLETED | OUTPATIENT
Start: 2020-06-25 | End: 2020-06-25

## 2020-06-25 RX ORDER — ADENOSINE 3 MG/ML
60 INJECTION INTRAVENOUS ONCE
Refills: 0 | Status: DISCONTINUED | OUTPATIENT
Start: 2020-06-25 | End: 2020-06-25

## 2020-06-25 RX ORDER — ASPIRIN/CALCIUM CARB/MAGNESIUM 324 MG
162 TABLET ORAL ONCE
Refills: 0 | Status: COMPLETED | OUTPATIENT
Start: 2020-06-25 | End: 2020-06-25

## 2020-06-25 RX ADMIN — HYDROMORPHONE HYDROCHLORIDE 2 MILLIGRAM(S): 2 INJECTION INTRAMUSCULAR; INTRAVENOUS; SUBCUTANEOUS at 15:42

## 2020-06-25 RX ADMIN — HYDROMORPHONE HYDROCHLORIDE 1 MILLIGRAM(S): 2 INJECTION INTRAMUSCULAR; INTRAVENOUS; SUBCUTANEOUS at 08:05

## 2020-06-25 RX ADMIN — ONDANSETRON 4 MILLIGRAM(S): 8 TABLET, FILM COATED ORAL at 02:07

## 2020-06-25 RX ADMIN — ALBUTEROL 1 PUFF(S): 90 AEROSOL, METERED ORAL at 03:50

## 2020-06-25 RX ADMIN — Medication 162 MILLIGRAM(S): at 03:09

## 2020-06-25 RX ADMIN — ONDANSETRON 4 MILLIGRAM(S): 8 TABLET, FILM COATED ORAL at 04:17

## 2020-06-25 RX ADMIN — HYDROMORPHONE HYDROCHLORIDE 2 MILLIGRAM(S): 2 INJECTION INTRAMUSCULAR; INTRAVENOUS; SUBCUTANEOUS at 09:43

## 2020-06-25 RX ADMIN — MORPHINE SULFATE 4 MILLIGRAM(S): 50 CAPSULE, EXTENDED RELEASE ORAL at 04:16

## 2020-06-25 NOTE — ED CDU PROVIDER INITIAL DAY NOTE - OBJECTIVE STATEMENT
55y/o htn, hld, copd, pt. c/o mid sternal/left sided cp which started earlier today. + nausea and vomiting. cp is not exertional. denies fever, chills, cough, abdominal pain. pt. is a smoker. cardiologist dr. Wen. pt. was placed in obs for acs workup.

## 2020-06-25 NOTE — ED PROVIDER NOTE - OBJECTIVE STATEMENT
The pt is a 54y M with PMH HTN, HLD, COPD is presenting to ED with chest pain x 4 hrs. Pt endorsing left sided, sharp, chest pain radiating to left shoulder. no aggravating or relieving factors. associated with one episode of vomiting and some sob. pt denies f/c/d, abd pain, lightheadedness, dizziness, weakness, paresthesias. Pts cardiologist Dr. Rojo.

## 2020-06-25 NOTE — ED ADULT NURSE REASSESSMENT NOTE - NS ED NURSE REASSESS COMMENT FT1
Pt. assessed. Denies chest pain, states he needs morphine for abdominal pain. Said "I came here for one thing and now they're putting me through withdrawals." IVL intact and patent. 2nd trop sent. Ambulates steady with no assist. Refusing to keep cardiac monitor leads on, educated on importance of monitoring his heart. Safety prec maintained, will cont to monitor.

## 2020-06-25 NOTE — ED PROVIDER NOTE - ATTENDING CONTRIBUTION TO CARE
pt with atypical cp, n, v. started 2 days ago. no fever, chills. pt in nad, anict, neck sup, ctab, rrr, ab osft, nt, nd. non focal.  ekg, labs, cxr, edou.

## 2020-06-25 NOTE — ED PROVIDER NOTE - PHYSICAL EXAMINATION
GEN: Alert & Oriented x 3, No acute distress. Calm, appropriate.  Head and Neck: Normocephalic, atraumatic.   ENT: Oral mucosa pink, moist without lesions.   Eyes: PERRL. No conjunctival injection. No scleral icterus.   RESP:  coarse breath sounds bilaterally, no wheezes. No retractions. Equal air entry.  CARDIO: regular rate and rhythm, no murmurs, rubs or gallops. Normal S1, S2. Radial pulses 2+ bilaterally. No lower extremity edema.  ABD: Soft, Nondistended. No rebound tenderness/guarding. No pulsatile mass. No tenderness with palpation x 4 quadrants.  MS: grossly moving all extremities.  SKIN: no rashes/lesions, no petechiae, no ecchymosis.  NEURO: CN II-XII grossly intact. Speech and cognition normal.

## 2020-06-25 NOTE — ED CDU PROVIDER DISPOSITION NOTE - CARE PROVIDER_API CALL
Jose De Jesus Alcazar)  Cardiovascular Disease; Internal Medicine; Interventional Cardiology; Nuclear Cardiology  [address] [place] [address]  Phone: [phone]  Fax: [phone]  Follow Up Time:

## 2020-06-25 NOTE — ED CDU PROVIDER DISPOSITION NOTE - NSFOLLOWUPINSTRUCTIONS_ED_ALL_ED_FT
follow up PMD/ CARDIOLOGY      Chest Pain    Chest pain can be caused by many different conditions which may or may not be dangerous. Causes include heartburn, lung infections, heart attack, blood clot in lungs, skin infections, strain or damage to muscle, cartilage, or bones, etc. In addition to a history and physical examination, an electrocardiogram (ECG) or other lab tests may have been performed to determine the cause of your chest pain. Follow up with your primary care provider or with a cardiologist as instructed.     SEEK IMMEDIATE MEDICAL CARE IF YOU HAVE ANY OF THE FOLLOWING SYMPTOMS: worsening chest pain, coughing up blood, unexplained back/neck/jaw pain, severe abdominal pain, dizziness or lightheadedness, fainting, shortness of breath, sweaty or clammy skin, vomiting, or racing heart beat. These symptoms may represent a serious problem that is an emergency. Do not wait to see if the symptoms will go away. Get medical help right away. Call 911 and do not drive yourself to the hospital.

## 2020-06-25 NOTE — ED ADULT TRIAGE NOTE - CHIEF COMPLAINT QUOTE
Pt c/o chest pain and sob started couple hours ago, as well as NBNB vomiting. Denies fever or diarrhea. Pt received asa 162 and NTG spray X 1 by EMS

## 2020-06-25 NOTE — ED ADULT NURSE REASSESSMENT NOTE - NS ED NURSE REASSESS COMMENT FT1
Patient ordered for repeat labs. Patient IV is infiltrated and unable to have labs drawn. DOV Edwards made aware.

## 2020-06-25 NOTE — ED CDU PROVIDER INITIAL DAY NOTE - CONSTITUTIONAL, MLM
normal... Well appearing obese male, awake, alert, oriented to person, place, time/situation and in no apparent distress.

## 2020-06-25 NOTE — ED CDU PROVIDER INITIAL DAY NOTE - MEDICAL DECISION MAKING DETAILS
54M p/w chest pain- placed in obs for nuc stress. ekg reviewed. ED CXR reviewed by me which did not reveal a ptx, infiltrate, or effusion. trop neg.

## 2020-06-25 NOTE — ED CDU PROVIDER DISPOSITION NOTE - CLINICAL COURSE
54M p/w chest pain- placed in obs for nuc stress. ekg reviewed. ED CXR reviewed by me which did not reveal a ptx, infiltrate, or effusion. trop neg. nl nuc stress- dc home with cardio fu/

## 2020-06-25 NOTE — ED ADULT NURSE NOTE - OBJECTIVE STATEMENT
Patient complaining of chest pain starting tonight with associated abdominal pain and vomiting also starting tonight.

## 2020-06-25 NOTE — ED ADULT NURSE REASSESSMENT NOTE - NS ED NURSE REASSESS COMMENT FT1
Patient ordered for continuous cardiac monitoring and despite frequent education patient is refusing cardiac monitoring. Patient has been placed on cardiac monitor multiple times and continuously takes leads off. MD made aware.

## 2020-06-25 NOTE — ED CDU PROVIDER DISPOSITION NOTE - PATIENT PORTAL LINK FT
You can access the FollowMyHealth Patient Portal offered by [facility] by registering at the following website: [email or web address]. By joining [employer]’s FollowMyHealth portal, you will also be able to view your health information using other applications (apps) compatible with our system.

## 2020-06-25 NOTE — ED CDU PROVIDER DISPOSITION NOTE - CARE PROVIDERS DIRECT ADDRESSES
[email or web address]

## 2020-06-25 NOTE — ED CDU PROVIDER INITIAL DAY NOTE - PROGRESS NOTE DETAILS
pt complaining of pain, seen bedside, takes 100 of morphine , methadone, hydromorphone states he needs something because his pain is intolerable. pt originally came in for CP and sob. will go for Pharm Nuc. negative trops and ekgs. will continue to monitor patient.

## 2020-07-30 ENCOUNTER — EMERGENCY (EMERGENCY)
Facility: HOSPITAL | Age: 55
LOS: 0 days | Discharge: HOME | End: 2020-07-30
Attending: STUDENT IN AN ORGANIZED HEALTH CARE EDUCATION/TRAINING PROGRAM | Admitting: STUDENT IN AN ORGANIZED HEALTH CARE EDUCATION/TRAINING PROGRAM
Payer: MEDICAID

## 2020-07-30 VITALS
TEMPERATURE: 99 F | RESPIRATION RATE: 24 BRPM | OXYGEN SATURATION: 96 % | SYSTOLIC BLOOD PRESSURE: 143 MMHG | HEART RATE: 68 BPM | DIASTOLIC BLOOD PRESSURE: 69 MMHG

## 2020-07-30 VITALS
TEMPERATURE: 98 F | OXYGEN SATURATION: 94 % | HEART RATE: 76 BPM | RESPIRATION RATE: 18 BRPM | SYSTOLIC BLOOD PRESSURE: 139 MMHG | DIASTOLIC BLOOD PRESSURE: 77 MMHG

## 2020-07-30 DIAGNOSIS — Z98.890 OTHER SPECIFIED POSTPROCEDURAL STATES: Chronic | ICD-10-CM

## 2020-07-30 DIAGNOSIS — Z87.19 PERSONAL HISTORY OF OTHER DISEASES OF THE DIGESTIVE SYSTEM: ICD-10-CM

## 2020-07-30 DIAGNOSIS — R07.89 OTHER CHEST PAIN: ICD-10-CM

## 2020-07-30 DIAGNOSIS — K40.20 BILATERAL INGUINAL HERNIA, WITHOUT OBSTRUCTION OR GANGRENE, NOT SPECIFIED AS RECURRENT: Chronic | ICD-10-CM

## 2020-07-30 DIAGNOSIS — E78.00 PURE HYPERCHOLESTEROLEMIA, UNSPECIFIED: ICD-10-CM

## 2020-07-30 DIAGNOSIS — Z98.890 OTHER SPECIFIED POSTPROCEDURAL STATES: ICD-10-CM

## 2020-07-30 DIAGNOSIS — Z96.653 PRESENCE OF ARTIFICIAL KNEE JOINT, BILATERAL: ICD-10-CM

## 2020-07-30 DIAGNOSIS — Z96.651 PRESENCE OF RIGHT ARTIFICIAL KNEE JOINT: Chronic | ICD-10-CM

## 2020-07-30 DIAGNOSIS — J44.9 CHRONIC OBSTRUCTIVE PULMONARY DISEASE, UNSPECIFIED: ICD-10-CM

## 2020-07-30 DIAGNOSIS — F17.210 NICOTINE DEPENDENCE, CIGARETTES, UNCOMPLICATED: ICD-10-CM

## 2020-07-30 DIAGNOSIS — Z87.39 PERSONAL HISTORY OF OTHER DISEASES OF THE MUSCULOSKELETAL SYSTEM AND CONNECTIVE TISSUE: ICD-10-CM

## 2020-07-30 DIAGNOSIS — I10 ESSENTIAL (PRIMARY) HYPERTENSION: ICD-10-CM

## 2020-07-30 DIAGNOSIS — K21.9 GASTRO-ESOPHAGEAL REFLUX DISEASE WITHOUT ESOPHAGITIS: ICD-10-CM

## 2020-07-30 DIAGNOSIS — R06.02 SHORTNESS OF BREATH: ICD-10-CM

## 2020-07-30 DIAGNOSIS — Z90.49 ACQUIRED ABSENCE OF OTHER SPECIFIED PARTS OF DIGESTIVE TRACT: ICD-10-CM

## 2020-07-30 LAB
ALBUMIN SERPL ELPH-MCNC: 4 G/DL — SIGNIFICANT CHANGE UP (ref 3.5–5.2)
ALP SERPL-CCNC: 109 U/L — SIGNIFICANT CHANGE UP (ref 30–115)
ALT FLD-CCNC: 21 U/L — SIGNIFICANT CHANGE UP (ref 0–41)
ANION GAP SERPL CALC-SCNC: 12 MMOL/L — SIGNIFICANT CHANGE UP (ref 7–14)
AST SERPL-CCNC: 26 U/L — SIGNIFICANT CHANGE UP (ref 0–41)
BASE EXCESS BLDV CALC-SCNC: 6.2 MMOL/L — HIGH (ref -2–2)
BASOPHILS # BLD AUTO: 0.07 K/UL — SIGNIFICANT CHANGE UP (ref 0–0.2)
BASOPHILS NFR BLD AUTO: 0.7 % — SIGNIFICANT CHANGE UP (ref 0–1)
BILIRUB SERPL-MCNC: 0.3 MG/DL — SIGNIFICANT CHANGE UP (ref 0.2–1.2)
BUN SERPL-MCNC: 8 MG/DL — LOW (ref 10–20)
CA-I SERPL-SCNC: 1.14 MMOL/L — SIGNIFICANT CHANGE UP (ref 1.12–1.3)
CALCIUM SERPL-MCNC: 9.4 MG/DL — SIGNIFICANT CHANGE UP (ref 8.5–10.1)
CHLORIDE SERPL-SCNC: 98 MMOL/L — SIGNIFICANT CHANGE UP (ref 98–110)
CO2 SERPL-SCNC: 28 MMOL/L — SIGNIFICANT CHANGE UP (ref 17–32)
CREAT SERPL-MCNC: 0.8 MG/DL — SIGNIFICANT CHANGE UP (ref 0.7–1.5)
EOSINOPHIL # BLD AUTO: 0.19 K/UL — SIGNIFICANT CHANGE UP (ref 0–0.7)
EOSINOPHIL NFR BLD AUTO: 1.8 % — SIGNIFICANT CHANGE UP (ref 0–8)
GAS PNL BLDV: 133 MMOL/L — LOW (ref 136–145)
GAS PNL BLDV: SIGNIFICANT CHANGE UP
GLUCOSE SERPL-MCNC: 145 MG/DL — HIGH (ref 70–99)
HCO3 BLDV-SCNC: 33 MMOL/L — HIGH (ref 22–29)
HCT VFR BLD CALC: 42.9 % — SIGNIFICANT CHANGE UP (ref 42–52)
HCT VFR BLDA CALC: 44 % — SIGNIFICANT CHANGE UP (ref 34–44)
HGB BLD CALC-MCNC: 14.3 G/DL — SIGNIFICANT CHANGE UP (ref 14–18)
HGB BLD-MCNC: 14.2 G/DL — SIGNIFICANT CHANGE UP (ref 14–18)
IMM GRANULOCYTES NFR BLD AUTO: 0.5 % — HIGH (ref 0.1–0.3)
LACTATE BLDV-MCNC: 1.8 MMOL/L — HIGH (ref 0.5–1.6)
LYMPHOCYTES # BLD AUTO: 1.55 K/UL — SIGNIFICANT CHANGE UP (ref 1.2–3.4)
LYMPHOCYTES # BLD AUTO: 14.9 % — LOW (ref 20.5–51.1)
MAGNESIUM SERPL-MCNC: 2 MG/DL — SIGNIFICANT CHANGE UP (ref 1.8–2.4)
MCHC RBC-ENTMCNC: 29.2 PG — SIGNIFICANT CHANGE UP (ref 27–31)
MCHC RBC-ENTMCNC: 33.1 G/DL — SIGNIFICANT CHANGE UP (ref 32–37)
MCV RBC AUTO: 88.1 FL — SIGNIFICANT CHANGE UP (ref 80–94)
MONOCYTES # BLD AUTO: 0.7 K/UL — HIGH (ref 0.1–0.6)
MONOCYTES NFR BLD AUTO: 6.7 % — SIGNIFICANT CHANGE UP (ref 1.7–9.3)
NEUTROPHILS # BLD AUTO: 7.82 K/UL — HIGH (ref 1.4–6.5)
NEUTROPHILS NFR BLD AUTO: 75.4 % — HIGH (ref 42.2–75.2)
NRBC # BLD: 0 /100 WBCS — SIGNIFICANT CHANGE UP (ref 0–0)
NT-PROBNP SERPL-SCNC: 60 PG/ML — SIGNIFICANT CHANGE UP (ref 0–300)
PCO2 BLDV: 57 MMHG — HIGH (ref 41–51)
PH BLDV: 7.37 — SIGNIFICANT CHANGE UP (ref 7.26–7.43)
PLATELET # BLD AUTO: 210 K/UL — SIGNIFICANT CHANGE UP (ref 130–400)
PO2 BLDV: 44 MMHG — HIGH (ref 20–40)
POTASSIUM BLDV-SCNC: 4 MMOL/L — SIGNIFICANT CHANGE UP (ref 3.3–5.6)
POTASSIUM SERPL-MCNC: 4.7 MMOL/L — SIGNIFICANT CHANGE UP (ref 3.5–5)
POTASSIUM SERPL-SCNC: 4.7 MMOL/L — SIGNIFICANT CHANGE UP (ref 3.5–5)
PROT SERPL-MCNC: 6.7 G/DL — SIGNIFICANT CHANGE UP (ref 6–8)
RBC # BLD: 4.87 M/UL — SIGNIFICANT CHANGE UP (ref 4.7–6.1)
RBC # FLD: 14.8 % — HIGH (ref 11.5–14.5)
SAO2 % BLDV: 77 % — SIGNIFICANT CHANGE UP
SODIUM SERPL-SCNC: 138 MMOL/L — SIGNIFICANT CHANGE UP (ref 135–146)
TROPONIN T SERPL-MCNC: <0.01 NG/ML — SIGNIFICANT CHANGE UP
TROPONIN T SERPL-MCNC: <0.01 NG/ML — SIGNIFICANT CHANGE UP
WBC # BLD: 10.38 K/UL — SIGNIFICANT CHANGE UP (ref 4.8–10.8)
WBC # FLD AUTO: 10.38 K/UL — SIGNIFICANT CHANGE UP (ref 4.8–10.8)

## 2020-07-30 PROCEDURE — 93010 ELECTROCARDIOGRAM REPORT: CPT

## 2020-07-30 PROCEDURE — 71045 X-RAY EXAM CHEST 1 VIEW: CPT | Mod: 26

## 2020-07-30 PROCEDURE — 99285 EMERGENCY DEPT VISIT HI MDM: CPT

## 2020-07-30 RX ORDER — IPRATROPIUM/ALBUTEROL SULFATE 18-103MCG
3 AEROSOL WITH ADAPTER (GRAM) INHALATION ONCE
Refills: 0 | Status: COMPLETED | OUTPATIENT
Start: 2020-07-30 | End: 2020-07-30

## 2020-07-30 RX ORDER — MAGNESIUM SULFATE 500 MG/ML
2 VIAL (ML) INJECTION ONCE
Refills: 0 | Status: COMPLETED | OUTPATIENT
Start: 2020-07-30 | End: 2020-07-30

## 2020-07-30 RX ADMIN — Medication 125 MILLIGRAM(S): at 14:13

## 2020-07-30 RX ADMIN — Medication 3 MILLILITER(S): at 14:14

## 2020-07-30 RX ADMIN — Medication 3 MILLILITER(S): at 14:12

## 2020-07-30 RX ADMIN — Medication 3 MILLILITER(S): at 14:10

## 2020-07-30 RX ADMIN — Medication 50 GRAM(S): at 15:38

## 2020-07-30 NOTE — ED PROVIDER NOTE - CARE PROVIDER_API CALL
YOUR PRIMARY CARE PHYSICIAN,   Phone: (   )    -  Fax: (   )    -  Follow Up Time: 1-3 Days    Albino Morley  CRITICAL CARE MEDICINE  [address] [place] [address]  Phone: [phone]  Fax: [phone]  Follow Up Time: 1-3 Days

## 2020-07-30 NOTE — ED PROVIDER NOTE - PROGRESS NOTE DETAILS
MOSQUERA: Reassessed pt respiratory status. Pt no longer having labored breathing, able to ambulate without difficulty and without sob, maintain spo2 at 95% which is pt baseline. Rpt 2nd trop at 530pm, if neg then DC

## 2020-07-30 NOTE — ED PROVIDER NOTE - OBJECTIVE STATEMENT
Pt is a 54 year old male with PMH Morbid obesity, HTN, REBECCA on home bipap, COPD, HLD presents to ED c/o shortness of breath. 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 Pt states tried taking (3) duoneb at home with no relief, last dose 2 hours prior. Pt states also has chest pain as well. Pain is described a pressure across chest wall, non radiating, with no alleviating or aggravating factors. Pt denies fever, chills, bodyaches, abdominal pain, NVCD

## 2020-07-30 NOTE — ED PROVIDER NOTE - CLINICAL SUMMARY MEDICAL DECISION MAKING FREE TEXT BOX
pt presents w/ shortness of breath/wheezing similar to similar copd exacerbations. sx improved w/ nebs, steroids, mag. alt cause of sob r/o - serial trop neg, bnp neg, ekg w/o e/o acute ischaemia. pt comfortable w/ d/c, outpt management of copd,  f/u, return precautions

## 2020-07-30 NOTE — ED PROVIDER NOTE - NSFOLLOWUPINSTRUCTIONS_ED_ALL_ED_FT
Chronic Obstructive Pulmonary Disease     Chronic obstructive pulmonary disease (COPD) is a common lung condition in which airflow from the lungs is limited. Causes include smoking, secondhand smoke exposure, genetics, or recurrent infections. Symptoms include shortness of breath, coughing, wheezing, rapid breathing, fatigue, chest tightness, or frequent infections. Take all medicines (inhaled or pills) as directed by your health care provider. Avoid exposure to irritants such as smoke, chemicals, and fumes that aggravate your breathing.    If you are a smoker, the most important thing that you can do is stop smoking. Continuing to smoke will cause further lung damage and breathing trouble. Ask your health care provider for help with quitting smoking. He or she can direct you to community resources or hospitals that provide support.    SEEK IMMEDIATE MEDICAL CARE IF YOU HAVE THE FOLLOWING SYMPTOMS: shortness of breath at rest or when talking, bluish discoloration of lips, skin, fever, worsening cough, unexplained chest pain, or lightheadedness/dizziness.

## 2020-07-30 NOTE — ED PROVIDER NOTE - PHYSICAL EXAMINATION
Physical Exam    Vital Signs: I have reviewed the initial vital signs.  Constitutional: well-nourished, appears stated age, no acute distress  Eyes: Conjunctiva pink, Sclera clear, PERRLA, EOMI.  Cardiovascular: S1 and S2, regular rate, regular rhythm, well-perfused extremities, radial pulses equal and 2+  Respiratory: labored respiratory effort, wheezing to auscultation bilaterally with no rhonchi or rales noted.   Gastrointestinal: soft, non-tender abdomen, no pulsatile mass, normal bowl sounds  Musculoskeletal: supple neck, no lower extremity edema, no midline tenderness  Integumentary: warm, dry, no rash  Neurologic: awake, alert, cranial nerves II-XII grossly intact, extremities’ motor and sensory functions grossly intact  Psychiatric: appropriate mood, appropriate affect

## 2020-07-30 NOTE — ED ADULT NURSE NOTE - TEMPLATE LIST FOR HEAD TO TOE ASSESSMENT
pt came to the ER today with c/o abdominal pain radiating from navel to uterus. pt also complains of urinary urgency. pt states she has not been able to sleep at night due to urinary frequency. General

## 2020-07-30 NOTE — ED PROVIDER NOTE - ATTENDING CONTRIBUTION TO CARE
54 year old male with PMH Morbid obesity, HTN, REBECCA on home bipap, COPD, HLD here for SOB. pt was working outside and moving pavers when he developed SOB. pt took 3duonebs w/o relief. pt also endorsing chest pain across entire anterior/lateral chest wall. no radiation. no exertional component. no fever, chills cough, myalgias, ap, n,v,d    vss  gen- NAD, aaox3  card-rrr  lungs-mild increased WOB w/ tachypnea, speaking moderate length sentences, decreased air entry throughout and wheezing  abd-sntnd, no guarding or rebound  neuro- full str/sensation, cn ii-xii grossly intact, normal coordination    likely copd exacerbation, r/o acs (recent nuc stress negative, EF 71%), r/o anemia/lyte abnormality, less likely infection/ptx but will get xr  basic labs, vbg, cxr, ekg/trop, duonebs, steroids, mag, reassess  dispo pending

## 2020-07-30 NOTE — ED PROVIDER NOTE - PROVIDER TOKENS
FREE:[LAST:[YOUR PRIMARY CARE PHYSICIAN],PHONE:[(   )    -],FAX:[(   )    -],FOLLOWUP:[1-3 Days]],PROVIDER:[TOKEN:[[id number]],FOLLOWUP:[1-3 Days]]

## 2020-07-30 NOTE — ED PROVIDER NOTE - PATIENT PORTAL LINK FT
You can access the FollowMyHealth Patient Portal offered by [facility] by registering at the following website: [email or web address]. By joining [employer]’s FollowMyHealth portal, you will also be able to view your health information using other applications (apps) compatible with our system.

## 2020-08-13 NOTE — ED ADULT TRIAGE NOTE - RESPIRATORY RATE (BREATHS/MIN)
17
preop for repeat  section, bilateral tubal ligation on 20  preop instructions given, pt verbalized understanding   chlorhexidine wash provided   pt instructed to hold prenatal MVI 1 week prior to  and to ask her OB/GYN when to stop Aspirin  cbc, ua, urine culture pending   pt scheduled for COVID testing on 20

## 2020-10-18 ENCOUNTER — EMERGENCY (EMERGENCY)
Facility: HOSPITAL | Age: 55
LOS: 0 days | Discharge: HOME | End: 2020-10-18
Attending: EMERGENCY MEDICINE | Admitting: EMERGENCY MEDICINE
Payer: MEDICAID

## 2020-10-18 VITALS
HEIGHT: 70 IN | TEMPERATURE: 98 F | WEIGHT: 315 LBS | SYSTOLIC BLOOD PRESSURE: 153 MMHG | RESPIRATION RATE: 20 BRPM | HEART RATE: 92 BPM | OXYGEN SATURATION: 94 % | DIASTOLIC BLOOD PRESSURE: 87 MMHG

## 2020-10-18 VITALS
OXYGEN SATURATION: 95 % | HEART RATE: 94 BPM | RESPIRATION RATE: 20 BRPM | DIASTOLIC BLOOD PRESSURE: 67 MMHG | SYSTOLIC BLOOD PRESSURE: 124 MMHG

## 2020-10-18 DIAGNOSIS — Z20.828 CONTACT WITH AND (SUSPECTED) EXPOSURE TO OTHER VIRAL COMMUNICABLE DISEASES: ICD-10-CM

## 2020-10-18 DIAGNOSIS — Z98.890 OTHER SPECIFIED POSTPROCEDURAL STATES: Chronic | ICD-10-CM

## 2020-10-18 DIAGNOSIS — Z96.651 PRESENCE OF RIGHT ARTIFICIAL KNEE JOINT: ICD-10-CM

## 2020-10-18 DIAGNOSIS — Z98.890 OTHER SPECIFIED POSTPROCEDURAL STATES: ICD-10-CM

## 2020-10-18 DIAGNOSIS — R06.02 SHORTNESS OF BREATH: ICD-10-CM

## 2020-10-18 DIAGNOSIS — E78.5 HYPERLIPIDEMIA, UNSPECIFIED: ICD-10-CM

## 2020-10-18 DIAGNOSIS — Z79.01 LONG TERM (CURRENT) USE OF ANTICOAGULANTS: ICD-10-CM

## 2020-10-18 DIAGNOSIS — Z96.651 PRESENCE OF RIGHT ARTIFICIAL KNEE JOINT: Chronic | ICD-10-CM

## 2020-10-18 DIAGNOSIS — J44.1 CHRONIC OBSTRUCTIVE PULMONARY DISEASE WITH (ACUTE) EXACERBATION: ICD-10-CM

## 2020-10-18 DIAGNOSIS — F17.200 NICOTINE DEPENDENCE, UNSPECIFIED, UNCOMPLICATED: ICD-10-CM

## 2020-10-18 DIAGNOSIS — K40.20 BILATERAL INGUINAL HERNIA, WITHOUT OBSTRUCTION OR GANGRENE, NOT SPECIFIED AS RECURRENT: Chronic | ICD-10-CM

## 2020-10-18 DIAGNOSIS — Z90.49 ACQUIRED ABSENCE OF OTHER SPECIFIED PARTS OF DIGESTIVE TRACT: ICD-10-CM

## 2020-10-18 DIAGNOSIS — Z79.899 OTHER LONG TERM (CURRENT) DRUG THERAPY: ICD-10-CM

## 2020-10-18 DIAGNOSIS — I10 ESSENTIAL (PRIMARY) HYPERTENSION: ICD-10-CM

## 2020-10-18 LAB
ALBUMIN SERPL ELPH-MCNC: 4.2 G/DL — SIGNIFICANT CHANGE UP (ref 3.5–5.2)
ALP SERPL-CCNC: 115 U/L — SIGNIFICANT CHANGE UP (ref 30–115)
ALT FLD-CCNC: 22 U/L — SIGNIFICANT CHANGE UP (ref 0–41)
ANION GAP SERPL CALC-SCNC: 8 MMOL/L — SIGNIFICANT CHANGE UP (ref 7–14)
AST SERPL-CCNC: 22 U/L — SIGNIFICANT CHANGE UP (ref 0–41)
BASOPHILS # BLD AUTO: 0.1 K/UL — SIGNIFICANT CHANGE UP (ref 0–0.2)
BASOPHILS NFR BLD AUTO: 0.9 % — SIGNIFICANT CHANGE UP (ref 0–1)
BILIRUB SERPL-MCNC: 0.3 MG/DL — SIGNIFICANT CHANGE UP (ref 0.2–1.2)
BUN SERPL-MCNC: 9 MG/DL — LOW (ref 10–20)
CALCIUM SERPL-MCNC: 9.8 MG/DL — SIGNIFICANT CHANGE UP (ref 8.5–10.1)
CHLORIDE SERPL-SCNC: 94 MMOL/L — LOW (ref 98–110)
CO2 SERPL-SCNC: 33 MMOL/L — HIGH (ref 17–32)
CREAT SERPL-MCNC: 0.8 MG/DL — SIGNIFICANT CHANGE UP (ref 0.7–1.5)
EOSINOPHIL # BLD AUTO: 0.29 K/UL — SIGNIFICANT CHANGE UP (ref 0–0.7)
EOSINOPHIL NFR BLD AUTO: 2.6 % — SIGNIFICANT CHANGE UP (ref 0–8)
GLUCOSE SERPL-MCNC: 116 MG/DL — HIGH (ref 70–99)
HCT VFR BLD CALC: 44 % — SIGNIFICANT CHANGE UP (ref 42–52)
HGB BLD-MCNC: 14 G/DL — SIGNIFICANT CHANGE UP (ref 14–18)
IMM GRANULOCYTES NFR BLD AUTO: 1.2 % — HIGH (ref 0.1–0.3)
LYMPHOCYTES # BLD AUTO: 1.93 K/UL — SIGNIFICANT CHANGE UP (ref 1.2–3.4)
LYMPHOCYTES # BLD AUTO: 17 % — LOW (ref 20.5–51.1)
MAGNESIUM SERPL-MCNC: 2 MG/DL — SIGNIFICANT CHANGE UP (ref 1.8–2.4)
MCHC RBC-ENTMCNC: 28.3 PG — SIGNIFICANT CHANGE UP (ref 27–31)
MCHC RBC-ENTMCNC: 31.8 G/DL — LOW (ref 32–37)
MCV RBC AUTO: 88.9 FL — SIGNIFICANT CHANGE UP (ref 80–94)
MONOCYTES # BLD AUTO: 0.94 K/UL — HIGH (ref 0.1–0.6)
MONOCYTES NFR BLD AUTO: 8.3 % — SIGNIFICANT CHANGE UP (ref 1.7–9.3)
NEUTROPHILS # BLD AUTO: 7.97 K/UL — HIGH (ref 1.4–6.5)
NEUTROPHILS NFR BLD AUTO: 70 % — SIGNIFICANT CHANGE UP (ref 42.2–75.2)
NRBC # BLD: 0 /100 WBCS — SIGNIFICANT CHANGE UP (ref 0–0)
NT-PROBNP SERPL-SCNC: 57 PG/ML — SIGNIFICANT CHANGE UP (ref 0–300)
PLATELET # BLD AUTO: 224 K/UL — SIGNIFICANT CHANGE UP (ref 130–400)
POTASSIUM SERPL-MCNC: 4.3 MMOL/L — SIGNIFICANT CHANGE UP (ref 3.5–5)
POTASSIUM SERPL-SCNC: 4.3 MMOL/L — SIGNIFICANT CHANGE UP (ref 3.5–5)
PROT SERPL-MCNC: 6.8 G/DL — SIGNIFICANT CHANGE UP (ref 6–8)
RAPID RVP RESULT: SIGNIFICANT CHANGE UP
RBC # BLD: 4.95 M/UL — SIGNIFICANT CHANGE UP (ref 4.7–6.1)
RBC # FLD: 14.6 % — HIGH (ref 11.5–14.5)
SARS-COV-2 RNA SPEC QL NAA+PROBE: SIGNIFICANT CHANGE UP
SODIUM SERPL-SCNC: 135 MMOL/L — SIGNIFICANT CHANGE UP (ref 135–146)
TROPONIN T SERPL-MCNC: <0.01 NG/ML — SIGNIFICANT CHANGE UP
WBC # BLD: 11.37 K/UL — HIGH (ref 4.8–10.8)
WBC # FLD AUTO: 11.37 K/UL — HIGH (ref 4.8–10.8)

## 2020-10-18 PROCEDURE — 71045 X-RAY EXAM CHEST 1 VIEW: CPT | Mod: 26

## 2020-10-18 PROCEDURE — 99285 EMERGENCY DEPT VISIT HI MDM: CPT

## 2020-10-18 RX ORDER — IPRATROPIUM/ALBUTEROL SULFATE 18-103MCG
3 AEROSOL WITH ADAPTER (GRAM) INHALATION ONCE
Refills: 0 | Status: COMPLETED | OUTPATIENT
Start: 2020-10-18 | End: 2020-10-18

## 2020-10-18 RX ORDER — ALBUTEROL 90 UG/1
2 AEROSOL, METERED ORAL
Qty: 1 | Refills: 0
Start: 2020-10-18 | End: 2020-11-16

## 2020-10-18 RX ADMIN — Medication 3 MILLILITER(S): at 19:54

## 2020-10-18 RX ADMIN — Medication 3 MILLILITER(S): at 20:04

## 2020-10-18 RX ADMIN — Medication 3 MILLILITER(S): at 20:17

## 2020-10-18 RX ADMIN — Medication 125 MILLIGRAM(S): at 19:53

## 2020-10-18 NOTE — ED PROVIDER NOTE - CLINICAL SUMMARY MEDICAL DECISION MAKING FREE TEXT BOX
Labs unremarkable. Covid negative. EKG no acute changes. CXR negative. Given nebs and Solumedrol with improvement. Patient wants to go home. Instructed to follow up with PCP an d return if symptoms worsen.

## 2020-10-18 NOTE — ED PROVIDER NOTE - PROGRESS NOTE DETAILS
ROSEY: has f/u with his pulm next week. pt no longer feels SOB. no longer wheezing on exam. speaking in complete sentences, given strict return precautions. Reviewed all results and necessity for follow up. Counseled on red flags and to return for them.  Patient appears well on discharge.

## 2020-10-18 NOTE — ED PROVIDER NOTE - NSFOLLOWUPCLINICS_GEN_ALL_ED_FT
A Pulmonologist  Pulmonary Medicine  .  [place]   Phone:   Fax:   Follow Up Time: 7-10 Days    A Family Medicine Doctor  Family Medicine  .  [place]   Phone:   Fax:   Follow Up Time: 1-3 Days

## 2020-10-18 NOTE — ED PROVIDER NOTE - OBJECTIVE STATEMENT
53 y/o M with PMH COPD not on home O2, current heavy smoker, REBECCA on bipap, per pt normal nuc stress 3 mos ago presents with SOB which feels like his COPD x 3 days, worse today. no palliating/provoking factors.   +constant, moderate.  no cp/tightness.   no fever/cough/congestion.

## 2020-10-18 NOTE — ED PROVIDER NOTE - PATIENT PORTAL LINK FT
You can access the FollowMyHealth Patient Portal offered by [facility] by registering at the following website: [email or web address]. By joining [employer]’s FollowMyHealth portal, you will also be able to view your health information using other applications (apps) compatible with our system.

## 2020-10-18 NOTE — ED PROVIDER NOTE - PHYSICAL EXAMINATION
PHYSICAL EXAM:    GENERAL: Alert, appears stated age, well appearing, non-toxic  SKIN: Warm, pink and dry. MMM.    EYE: Normal lids/conjunctiva  ENT: Normal hearing, patent oropharynx   NECK: +supple. No meningismus, or JVD  Pulm: Bilateral BS, normal resp effort, no stridor, or retractions.  +diffuse wheezing.   CV: RRR, no M/R/G, 2+and = radial pulses  Abd: soft, non-tender, morbidly obese.   Mskel: no erythema, cyanosis, edema. no calf tenderness  Neuro: AAOx3, n. normal gait.

## 2020-10-18 NOTE — ED PROVIDER NOTE - NS ED ROS FT
Review of Systems    Constitutional: (-) fever   Eyes/ENT: (-) vision changes  Cardiovascular: (-) chest pain, (-) syncope (-) palpitations  Respiratory: (-) cough, (+) shortness of breath  Gastrointestinal: (-) vomiting, (-) diarrhea (-) abdominal pain  Genitourinary:  (-) dysuria   Musculoskeletal: (-) neck pain, (-) back pain, (-) leg pain/swelling  Integumentary: (-) rash, (-) edema  Neurological: (-) headache, (-) confusion  Hematologic: (-) easy bruising  Allergic/Immunologic: (-) pruritus

## 2020-10-18 NOTE — ED PROVIDER NOTE - ATTENDING CONTRIBUTION TO CARE
I personally evaluated the patient. I reviewed the Resident’s or Physician Assistant’s note (as assigned above), and agree with the findings and plan except as documented in my note.  Chart reviewed. H/O COPD, obstructive sleep apnea, smoker, GERD, HTN, HLD, presents with SOB for for 3 days. No chest pain, fever or cough. Exam shows alert patient in no distress, HEENT NCAT, bilateral wheezing, RR S1S2, abdomen soft NT +BS, no CCE, chronic venous stasis.

## 2020-10-28 ENCOUNTER — INPATIENT (INPATIENT)
Facility: HOSPITAL | Age: 55
LOS: 5 days | Discharge: HOME | End: 2020-11-03
Attending: HOSPITALIST | Admitting: HOSPITALIST
Payer: MEDICAID

## 2020-10-28 VITALS
DIASTOLIC BLOOD PRESSURE: 85 MMHG | WEIGHT: 300.05 LBS | HEART RATE: 113 BPM | HEIGHT: 70 IN | OXYGEN SATURATION: 98 % | RESPIRATION RATE: 18 BRPM | SYSTOLIC BLOOD PRESSURE: 152 MMHG | TEMPERATURE: 98 F

## 2020-10-28 DIAGNOSIS — K40.20 BILATERAL INGUINAL HERNIA, WITHOUT OBSTRUCTION OR GANGRENE, NOT SPECIFIED AS RECURRENT: Chronic | ICD-10-CM

## 2020-10-28 DIAGNOSIS — Z98.890 OTHER SPECIFIED POSTPROCEDURAL STATES: Chronic | ICD-10-CM

## 2020-10-28 DIAGNOSIS — Z96.651 PRESENCE OF RIGHT ARTIFICIAL KNEE JOINT: Chronic | ICD-10-CM

## 2020-10-28 LAB
ALBUMIN SERPL ELPH-MCNC: 3.9 G/DL — SIGNIFICANT CHANGE UP (ref 3.5–5.2)
ALP SERPL-CCNC: 110 U/L — SIGNIFICANT CHANGE UP (ref 30–115)
ALT FLD-CCNC: 36 U/L — SIGNIFICANT CHANGE UP (ref 0–41)
ANION GAP SERPL CALC-SCNC: 8 MMOL/L — SIGNIFICANT CHANGE UP (ref 7–14)
AST SERPL-CCNC: 27 U/L — SIGNIFICANT CHANGE UP (ref 0–41)
BASE EXCESS BLDV CALC-SCNC: 3.8 MMOL/L — HIGH (ref -2–2)
BASOPHILS # BLD AUTO: 0.06 K/UL — SIGNIFICANT CHANGE UP (ref 0–0.2)
BASOPHILS NFR BLD AUTO: 0.5 % — SIGNIFICANT CHANGE UP (ref 0–1)
BILIRUB SERPL-MCNC: 0.8 MG/DL — SIGNIFICANT CHANGE UP (ref 0.2–1.2)
BUN SERPL-MCNC: 14 MG/DL — SIGNIFICANT CHANGE UP (ref 10–20)
CA-I SERPL-SCNC: 1.05 MMOL/L — LOW (ref 1.12–1.3)
CALCIUM SERPL-MCNC: 9.2 MG/DL — SIGNIFICANT CHANGE UP (ref 8.5–10.1)
CHLORIDE SERPL-SCNC: 99 MMOL/L — SIGNIFICANT CHANGE UP (ref 98–110)
CO2 SERPL-SCNC: 29 MMOL/L — SIGNIFICANT CHANGE UP (ref 17–32)
CREAT SERPL-MCNC: 0.8 MG/DL — SIGNIFICANT CHANGE UP (ref 0.7–1.5)
EOSINOPHIL # BLD AUTO: 0.13 K/UL — SIGNIFICANT CHANGE UP (ref 0–0.7)
EOSINOPHIL NFR BLD AUTO: 1.1 % — SIGNIFICANT CHANGE UP (ref 0–8)
GAS PNL BLDV: 130 MMOL/L — LOW (ref 136–145)
GAS PNL BLDV: SIGNIFICANT CHANGE UP
GLUCOSE SERPL-MCNC: 114 MG/DL — HIGH (ref 70–99)
HCO3 BLDV-SCNC: 28 MMOL/L — SIGNIFICANT CHANGE UP (ref 22–29)
HCT VFR BLD CALC: 45.4 % — SIGNIFICANT CHANGE UP (ref 42–52)
HCT VFR BLDA CALC: 48.4 % — HIGH (ref 34–44)
HGB BLD CALC-MCNC: 15.8 G/DL — SIGNIFICANT CHANGE UP (ref 14–18)
HGB BLD-MCNC: 14.8 G/DL — SIGNIFICANT CHANGE UP (ref 14–18)
IMM GRANULOCYTES NFR BLD AUTO: 0.7 % — HIGH (ref 0.1–0.3)
LACTATE BLDV-MCNC: 2.1 MMOL/L — HIGH (ref 0.5–1.6)
LYMPHOCYTES # BLD AUTO: 1.71 K/UL — SIGNIFICANT CHANGE UP (ref 1.2–3.4)
LYMPHOCYTES # BLD AUTO: 14.2 % — LOW (ref 20.5–51.1)
MAGNESIUM SERPL-MCNC: 2.1 MG/DL — SIGNIFICANT CHANGE UP (ref 1.8–2.4)
MCHC RBC-ENTMCNC: 28.4 PG — SIGNIFICANT CHANGE UP (ref 27–31)
MCHC RBC-ENTMCNC: 32.6 G/DL — SIGNIFICANT CHANGE UP (ref 32–37)
MCV RBC AUTO: 87.1 FL — SIGNIFICANT CHANGE UP (ref 80–94)
MONOCYTES # BLD AUTO: 0.78 K/UL — HIGH (ref 0.1–0.6)
MONOCYTES NFR BLD AUTO: 6.5 % — SIGNIFICANT CHANGE UP (ref 1.7–9.3)
NEUTROPHILS # BLD AUTO: 9.26 K/UL — HIGH (ref 1.4–6.5)
NEUTROPHILS NFR BLD AUTO: 77 % — HIGH (ref 42.2–75.2)
NRBC # BLD: 0 /100 WBCS — SIGNIFICANT CHANGE UP (ref 0–0)
NT-PROBNP SERPL-SCNC: 22 PG/ML — SIGNIFICANT CHANGE UP (ref 0–300)
PCO2 BLDV: 38 MMHG — LOW (ref 41–51)
PH BLDV: 7.47 — HIGH (ref 7.26–7.43)
PLATELET # BLD AUTO: 205 K/UL — SIGNIFICANT CHANGE UP (ref 130–400)
PO2 BLDV: 42 MMHG — HIGH (ref 20–40)
POTASSIUM BLDV-SCNC: 7.6 MMOL/L — HIGH (ref 3.3–5.6)
POTASSIUM SERPL-MCNC: 5 MMOL/L — SIGNIFICANT CHANGE UP (ref 3.5–5)
POTASSIUM SERPL-SCNC: 5 MMOL/L — SIGNIFICANT CHANGE UP (ref 3.5–5)
PROT SERPL-MCNC: 6.5 G/DL — SIGNIFICANT CHANGE UP (ref 6–8)
RBC # BLD: 5.21 M/UL — SIGNIFICANT CHANGE UP (ref 4.7–6.1)
RBC # FLD: 15.6 % — HIGH (ref 11.5–14.5)
SAO2 % BLDV: 83 % — SIGNIFICANT CHANGE UP
SARS-COV-2 RNA SPEC QL NAA+PROBE: SIGNIFICANT CHANGE UP
SODIUM SERPL-SCNC: 136 MMOL/L — SIGNIFICANT CHANGE UP (ref 135–146)
TROPONIN T SERPL-MCNC: <0.01 NG/ML — SIGNIFICANT CHANGE UP
WBC # BLD: 12.03 K/UL — HIGH (ref 4.8–10.8)
WBC # FLD AUTO: 12.03 K/UL — HIGH (ref 4.8–10.8)

## 2020-10-28 PROCEDURE — 71046 X-RAY EXAM CHEST 2 VIEWS: CPT | Mod: 26

## 2020-10-28 PROCEDURE — 93010 ELECTROCARDIOGRAM REPORT: CPT

## 2020-10-28 PROCEDURE — 99285 EMERGENCY DEPT VISIT HI MDM: CPT

## 2020-10-28 RX ORDER — OXYCODONE HYDROCHLORIDE 5 MG/1
1 TABLET ORAL
Qty: 0 | Refills: 0 | DISCHARGE

## 2020-10-28 RX ORDER — FUROSEMIDE 40 MG
40 TABLET ORAL
Refills: 0 | Status: DISCONTINUED | OUTPATIENT
Start: 2020-10-28 | End: 2020-10-28

## 2020-10-28 RX ORDER — FENOFIBRATE,MICRONIZED 130 MG
145 CAPSULE ORAL DAILY
Refills: 0 | Status: DISCONTINUED | OUTPATIENT
Start: 2020-10-28 | End: 2020-11-03

## 2020-10-28 RX ORDER — TIOTROPIUM BROMIDE 18 UG/1
1 CAPSULE ORAL; RESPIRATORY (INHALATION) DAILY
Refills: 0 | Status: DISCONTINUED | OUTPATIENT
Start: 2020-10-28 | End: 2020-11-03

## 2020-10-28 RX ORDER — IPRATROPIUM/ALBUTEROL SULFATE 18-103MCG
3 AEROSOL WITH ADAPTER (GRAM) INHALATION
Refills: 0 | Status: COMPLETED | OUTPATIENT
Start: 2020-10-28 | End: 2020-10-28

## 2020-10-28 RX ORDER — OXYCODONE HYDROCHLORIDE 5 MG/1
30 TABLET ORAL EVERY 8 HOURS
Refills: 0 | Status: DISCONTINUED | OUTPATIENT
Start: 2020-10-28 | End: 2020-10-29

## 2020-10-28 RX ORDER — BUPROPION HYDROCHLORIDE 150 MG/1
150 TABLET, EXTENDED RELEASE ORAL DAILY
Refills: 0 | Status: DISCONTINUED | OUTPATIENT
Start: 2020-10-28 | End: 2020-11-03

## 2020-10-28 RX ORDER — MAGNESIUM SULFATE 500 MG/ML
2 VIAL (ML) INJECTION ONCE
Refills: 0 | Status: COMPLETED | OUTPATIENT
Start: 2020-10-28 | End: 2020-10-28

## 2020-10-28 RX ORDER — SIMVASTATIN 20 MG/1
20 TABLET, FILM COATED ORAL AT BEDTIME
Refills: 0 | Status: DISCONTINUED | OUTPATIENT
Start: 2020-10-28 | End: 2020-11-03

## 2020-10-28 RX ORDER — LISINOPRIL 2.5 MG/1
1 TABLET ORAL
Qty: 0 | Refills: 0 | DISCHARGE

## 2020-10-28 RX ORDER — HYDROMORPHONE HYDROCHLORIDE 2 MG/ML
4 INJECTION INTRAMUSCULAR; INTRAVENOUS; SUBCUTANEOUS EVERY 8 HOURS
Refills: 0 | Status: DISCONTINUED | OUTPATIENT
Start: 2020-10-28 | End: 2020-11-03

## 2020-10-28 RX ORDER — PANTOPRAZOLE SODIUM 20 MG/1
40 TABLET, DELAYED RELEASE ORAL
Refills: 0 | Status: DISCONTINUED | OUTPATIENT
Start: 2020-10-28 | End: 2020-10-31

## 2020-10-28 RX ORDER — LISINOPRIL 2.5 MG/1
5 TABLET ORAL DAILY
Refills: 0 | Status: DISCONTINUED | OUTPATIENT
Start: 2020-10-28 | End: 2020-11-03

## 2020-10-28 RX ORDER — CEFTRIAXONE 500 MG/1
1000 INJECTION, POWDER, FOR SOLUTION INTRAMUSCULAR; INTRAVENOUS ONCE
Refills: 0 | Status: COMPLETED | OUTPATIENT
Start: 2020-10-28 | End: 2020-10-28

## 2020-10-28 RX ORDER — OXYCODONE AND ACETAMINOPHEN 5; 325 MG/1; MG/1
2 TABLET ORAL ONCE
Refills: 0 | Status: DISCONTINUED | OUTPATIENT
Start: 2020-10-28 | End: 2020-10-28

## 2020-10-28 RX ORDER — CHLORHEXIDINE GLUCONATE 213 G/1000ML
1 SOLUTION TOPICAL
Refills: 0 | Status: DISCONTINUED | OUTPATIENT
Start: 2020-10-28 | End: 2020-11-03

## 2020-10-28 RX ORDER — AZITHROMYCIN 500 MG/1
500 TABLET, FILM COATED ORAL ONCE
Refills: 0 | Status: COMPLETED | OUTPATIENT
Start: 2020-10-28 | End: 2020-10-28

## 2020-10-28 RX ORDER — FUROSEMIDE 40 MG
40 TABLET ORAL DAILY
Refills: 0 | Status: DISCONTINUED | OUTPATIENT
Start: 2020-10-28 | End: 2020-10-31

## 2020-10-28 RX ORDER — ENOXAPARIN SODIUM 100 MG/ML
40 INJECTION SUBCUTANEOUS DAILY
Refills: 0 | Status: DISCONTINUED | OUTPATIENT
Start: 2020-10-28 | End: 2020-11-03

## 2020-10-28 RX ORDER — AZITHROMYCIN 500 MG/1
500 TABLET, FILM COATED ORAL DAILY
Refills: 0 | Status: COMPLETED | OUTPATIENT
Start: 2020-10-28 | End: 2020-10-30

## 2020-10-28 RX ORDER — ALPRAZOLAM 0.25 MG
2 TABLET ORAL
Refills: 0 | Status: DISCONTINUED | OUTPATIENT
Start: 2020-10-28 | End: 2020-11-03

## 2020-10-28 RX ORDER — ESOMEPRAZOLE MAGNESIUM 40 MG/1
1 CAPSULE, DELAYED RELEASE ORAL
Qty: 0 | Refills: 0 | DISCHARGE

## 2020-10-28 RX ORDER — ALBUTEROL 90 UG/1
2 AEROSOL, METERED ORAL
Refills: 0 | Status: DISCONTINUED | OUTPATIENT
Start: 2020-10-28 | End: 2020-11-03

## 2020-10-28 RX ORDER — ASPIRIN/CALCIUM CARB/MAGNESIUM 324 MG
1 TABLET ORAL
Qty: 0 | Refills: 0 | DISCHARGE

## 2020-10-28 RX ORDER — TIOTROPIUM BROMIDE 18 UG/1
1 CAPSULE ORAL; RESPIRATORY (INHALATION)
Qty: 0 | Refills: 0 | DISCHARGE

## 2020-10-28 RX ORDER — SIMVASTATIN 20 MG/1
1 TABLET, FILM COATED ORAL
Qty: 0 | Refills: 0 | DISCHARGE

## 2020-10-28 RX ORDER — FENOFIBRATE,MICRONIZED 130 MG
1 CAPSULE ORAL
Qty: 0 | Refills: 0 | DISCHARGE

## 2020-10-28 RX ORDER — OXYCODONE AND ACETAMINOPHEN 5; 325 MG/1; MG/1
1 TABLET ORAL ONCE
Refills: 0 | Status: DISCONTINUED | OUTPATIENT
Start: 2020-10-28 | End: 2020-10-28

## 2020-10-28 RX ORDER — MORPHINE SULFATE 50 MG/1
100 CAPSULE, EXTENDED RELEASE ORAL
Refills: 0 | Status: DISCONTINUED | OUTPATIENT
Start: 2020-10-28 | End: 2020-11-03

## 2020-10-28 RX ADMIN — AZITHROMYCIN 255 MILLIGRAM(S): 500 TABLET, FILM COATED ORAL at 16:23

## 2020-10-28 RX ADMIN — MORPHINE SULFATE 100 MILLIGRAM(S): 50 CAPSULE, EXTENDED RELEASE ORAL at 18:18

## 2020-10-28 RX ADMIN — HYDROMORPHONE HYDROCHLORIDE 4 MILLIGRAM(S): 2 INJECTION INTRAMUSCULAR; INTRAVENOUS; SUBCUTANEOUS at 19:31

## 2020-10-28 RX ADMIN — Medication 50 GRAM(S): at 20:35

## 2020-10-28 RX ADMIN — Medication 2 MILLIGRAM(S): at 20:35

## 2020-10-28 RX ADMIN — ALBUTEROL 2 PUFF(S): 90 AEROSOL, METERED ORAL at 11:41

## 2020-10-28 RX ADMIN — OXYCODONE AND ACETAMINOPHEN 2 TABLET(S): 5; 325 TABLET ORAL at 14:53

## 2020-10-28 RX ADMIN — Medication 125 MILLIGRAM(S): at 11:41

## 2020-10-28 RX ADMIN — Medication 40 MILLIGRAM(S): at 18:17

## 2020-10-28 RX ADMIN — ALBUTEROL 2 PUFF(S): 90 AEROSOL, METERED ORAL at 12:48

## 2020-10-28 RX ADMIN — HYDROMORPHONE HYDROCHLORIDE 4 MILLIGRAM(S): 2 INJECTION INTRAMUSCULAR; INTRAVENOUS; SUBCUTANEOUS at 18:17

## 2020-10-28 RX ADMIN — CEFTRIAXONE 100 MILLIGRAM(S): 500 INJECTION, POWDER, FOR SOLUTION INTRAMUSCULAR; INTRAVENOUS at 14:53

## 2020-10-28 RX ADMIN — OXYCODONE AND ACETAMINOPHEN 1 TABLET(S): 5; 325 TABLET ORAL at 14:02

## 2020-10-28 NOTE — ED ADULT NURSE NOTE - CHIEF COMPLAINT QUOTE
Patient with PMH of emphysema, COPD, + smoker c/o SOB for 2-3 months and is worse with exertion. Patient has (-) covid test result from two days ago.

## 2020-10-28 NOTE — H&P ADULT - NSHPLABSRESULTS_GEN_ALL_CORE
(10-28 @ 11:00)                      14.8  12.03 )-----------( 205                 45.4    Neutrophils = 9.26 (77.0%)  Lymphocytes = 1.71 (14.2%)  Eosinophils = 0.13 (1.1%)  Basophils = 0.06 (0.5%)  Monocytes = 0.78 (6.5%)  Bands = --%    10-28    136  |  99  |  14  ----------------------------<  114<H>  5.0   |  29  |  0.8    Ca    9.2      28 Oct 2020 11:00  Mg     2.1     10-28    TPro  6.5  /  Alb  3.9  /  TBili  0.8  /  DBili  x   /  AST  27  /  ALT  36  /  AlkPhos  110  10-28      CARDIAC MARKERS ( 28 Oct 2020 11:00 )  Trop <0.01 ng/mL / CK x     / CKMB x           RVP:    Venous Blood Gas:  10-28 @ 13:19  7.47/38/42/28/83  VBG Lactate: 2.1      < from: Xray Chest 2 Views PA/Lat (10.28.20 @ 14:10) >    Impression:    No radiographic evidence of acute pulmonary disease.    < end of copied text >

## 2020-10-28 NOTE — H&P ADULT - NSHPOUTPATIENTPROVIDERS_GEN_ALL_CORE
PMD: Dr. Azeb Hogan PMD: Edison Marin  Cardiologist: Dr. Wen  Pulmonologist: Dr. Bassett  Pain management: Dr. Womack in [place]

## 2020-10-28 NOTE — H&P ADULT - NSHPPHYSICALEXAM_GEN_ALL_CORE
PHYSICAL EXAM:  GENERAL: NAD, sitting in bed  HEAD:  Atraumatic, Normocephalic  EYES: EOMI, PERRLA, conjunctiva and sclera clear  ENT: Moist mucous membranes  NECK: Supple, No JVD  CHEST/LUNG: b/l wheezing  HEART: Regular rate and rhythm; No murmurs, rubs, or gallops  ABDOMEN: Bowel sounds present; Soft, Nontender, Nondistended. No hepatomegally  EXTREMITIES:  2+LE edema  NERVOUS SYSTEM:  Alert & Oriented X3, speech clear. No deficits   MSK: FROM all 4 extremities, full and equal strength  SKIN: No rashes or lesions

## 2020-10-28 NOTE — ED ADULT TRIAGE NOTE - CHIEF COMPLAINT QUOTE
Patient with PMH of emphysema, COPD, + smoker c/o SOB for 2-3 months and is worse with exertion. Patient has - covid test result from two days ago. Patient with PMH of emphysema, COPD, + smoker c/o SOB for 2-3 months and is worse with exertion. Patient has (-) covid test result from two days ago.

## 2020-10-28 NOTE — H&P ADULT - ATTENDING COMMENTS
54 years old Male smoker with PMHx of COPD, REBECCA on BiPAP, HTN, DLD, GERD, OA comes in for shortness of breath.  Pt reports that this morning while he was putting on his socks, he developed severe SOB and ws unable to breath. He has worsening SOB and orthopnea for the last few months, for past year he sleeps in a seated position with BIPAP. He has worsening cough, no fever, no chest pain.   In the ED, /85, , temp 98,3F, RR 18, pt is saturating well on room air. Labs significant for leukocytosis, ABG WNL. CXR negative for pulmonary pathology.     PHYSICAL EXAM:  GENERAL: mild respiratory distress,  well-developed.  HEAD:  Atraumatic, Normocephalic.  EYES: EOMI, PERRLA, conjunctiva and sclera clear.  NECK: Supple, No JVD.  CHEST/LUNG: bilateral diffuse wheezing, prolonged expiration.   HEART: Regular rate and rhythm; S1 S2.   ABDOMEN: Soft, Nontender, Nondistended; Bowel sounds present.  EXTREMITIES:  2+ Peripheral Pulses, No clubbing, cyanosis, or edema.  PSYCH: AAOx3.  NEUROLOGY: non-focal.  SKIN: No rashes or lesions.    A/P:   Acute hypoxic hypercapnic respiratory failure  Acute COPD exacerbation:   Patient with diffuse wheezing.   CXR showed no acute infiltrates.   Continue Solu-Medrol IV, DuoNeb.  Continue Azithromycin.   Counseled for smoking cessation  Start on Nicotine patch inpatient.

## 2020-10-28 NOTE — H&P ADULT - HISTORY OF PRESENT ILLNESS
54 years old Male smoker with PMHx of COPD, REBECCA on bipap, HTN, DLD, GERD comes in for shortness of breath.      Pt has been admitted multiple times (>5 times in past 1 year) due to COPD exacerbation.   pt presents w/ SOB c/w his COPD exacerbation. + wheeze and cough.  pt states he feels like his COPD is exacerbated mild- moderate.  no fever or chills. Per pt normal nuc stress 3 mos ago . Pt was seen on 10/18 and was d/c on steroid burst.    In the ED, /85, , temp 98,3F, RR 18, pt is saturating well on room air. Labs significant for leukocytosis, ABG WNL. CXR 54 years old Male smoker with PMHx of COPD, REBECCA on bipap, HTN, DLD, GERD comes in for shortness of breath.      Pt has been admitted multiple times (>5 times in past 1 year) due to COPD exacerbation.   pt presents w/ SOB c/w his COPD exacerbation. + wheeze and cough.  pt states he feels like his COPD is exacerbated mild- moderate.  no fever or chills. Per pt normal nuc stress 3 mos ago . Pt was seen on 10/18 and was d/c on steroid burst.    In the ED, /85, , temp 98,3F, RR 18, pt is saturating well on room air. Labs significant for leukocytosis, ABG WNL. CXR negative for pulmonary pathology. Pt recieved azithromycin / ceftriaxone, solumedrol 125mg IV, Magnesium sulfate 2g and inhalers in ED. To be admitted under medicine for further workup. 54 years old Male smoker with PMHx of COPD, REBECCA on BiPAP, HTN, DLD, GERD, OA comes in for shortness of breath.    History of presenting illness goes back 3 months ago when patient developed shortness of breath aggravated by exertion and relieved by rest. Pt reports that this morning while he was putting on his socks, he developed severe SOB and ws unable to breath which prompted him to come to ED. Reports that SOB is associated with sweating orthopnea for past 1 year and sleeps in a seated position with BIPAP and chronic dry cough. Of note pt has been admitted multiple times (>5 times in past 1 year) due to COPD exacerbation in pat 1 year only.   Pt also takes multiple pain medications due to severe joint pain, has morbid obesity and has had multiple joint replacement surgeries including Right knee, ankle, and toe joint replacement surgeries. Pt follow up with Dr. Womack for pain management.  Pt denies any fever, chest pan, n/v/d, recent sick contact or weight changes.     In the ED, /85, , temp 98,3F, RR 18, pt is saturating well on room air. Labs significant for leukocytosis, ABG WNL. CXR negative for pulmonary pathology. Pt recieved azithromycin / ceftriaxone, solumedrol 125mg IV, Magnesium sulfate 2g and inhalers in ED. To be admitted under medicine for further workup.

## 2020-10-28 NOTE — ED PROVIDER NOTE - ATTENDING CONTRIBUTION TO CARE
55 yo m hx COPD, REBECCA on bipap, +smoker, htn, ?CHF   pt presents w/ SOB c/w his COPD exacerbation. + wheeze and cough.  pt states he feels like his COPD is exacerbated mild- moderate.  no fever or chills. Per pt normal nuc stress 3 mos ago . Pt was seen on 10/18 and was d/c on steroid burst.    vss  gen- NAD, aaox3  card-rrr  lungs-diffuse wheezing, good air entry b/l, no crackles, symmetric breath sounds, no accessory muscle use  abd-sntnd, no guarding or rebound  neuro- full str/sensation, cn ii-xii grossly intact, normal coordination and gait      likely copd, given age and comorbidities, will get basic labs, cxr, trop/ekg  will treat copd w/ nebs, steroids  dispo pending

## 2020-10-28 NOTE — H&P ADULT - ASSESSMENT
# Shortness of breath / COPD exacerbation  - CXR no acute pathology.  - wheezing on exam, cough  - ABG 7.47/38/42/28  - start inhalers, duoneb, albuterol MDI  - prednisone 40mg qd for now  - antibiotics: Azithromycin 500mg qd x 3 days  - chest PT  - pulmonary consult    # HTN - c/w home medications  # REBECCA - BIPAP prn at night  # GERD - c/w protonix  # DLD - c/w statin    # Diet: DASH / TLC  # DVT Prophylaxis: Lovenox  # GI Prophylaxis: Protonix  # Activity: IAT  # IV Fluids:  # Dispo: Acute  # Code Status: Fullcode  # CHG wash 54 years old Male smoker with PMHx of COPD, REBECCA on BiPAP, HTN, DLD, GERD, OA comes in for shortness of breath.     # Shortness of breath / COPD exacerbation  - CXR no acute pathology.  - wheezing on exam, cough  - ABG 7.47/38/42/28  - start inhalers, Duoneb, albuterol MDI  - prednisone 60mg PO qd for now  - antibiotics: Azithromycin 500mg qd x 3 days  - c/w lasix 40mg PO qd  - chest PT  - check TTE  - pulmonary consult  - f/u morning ABG    # HTN - c/w home medications  # REBECCA - BIPAP prn at night setting 28 / 18  # GERD - c/w protonix  # DLD - c/w statin  # Chronic pain - c/w home pain medications, confirm methadone  # Anxiety / insomnia - c/w alprazolam, and Wellbutrin     # Diet: DASH / TLC  # DVT Prophylaxis: Lovenox  # GI Prophylaxis: Protonix  # Activity: IAT  # Dispo: Acute  # Code Status: Fullcode

## 2020-10-28 NOTE — ED PROVIDER NOTE - OBJECTIVE STATEMENT
53 y/o M PMHx COPD not on home O2, emphysema, REBECCA, smoker, p/w SOB and wheezing today. Sx have been worsening since pt was seen last week in ER for COPD where he was offered admission, but he wanted to go home and was given Prednisone. Sx are moderate. SOB is worse with exertion and worse when pt is smoking. No CP. Pt has chronic cough from smoking. Tested for COVID x2 days ago and was negative. No abd pain, n/v/d, leg swelling, weakness, or paresthesia. No fever or chills.

## 2020-10-28 NOTE — ED PROVIDER NOTE - NS ED ROS FT
CONSTITUTIONAL - no fever, no diaphoresis, no chills  SKIN - no rash  HEMATOLOGIC - no bleeding, no bruising  EYES - no eye pain, no blurry vision  ENT - no congestion  RESPIRATORY - + SOB, + wheezing   CARDIAC - no chest pain, no palpitations  GI - no abd pain, no nausea, no vomiting, no diarrhea, no constipation  GENITO-URINARY - no dysuria; no hematuria, no increased urinary frequency  MUSCULOSKELETAL - no joint pain, no swelling, no redness  NEUROLOGIC - no weakness, no headache, no paresthesias, no LOC  PSYCH - no anxiety, non suicidal, non homicidal, no hallucination, no depression  All other ROS are negative except as documented in HPI.

## 2020-10-28 NOTE — ED PROVIDER NOTE - CLINICAL SUMMARY MEDICAL DECISION MAKING FREE TEXT BOX
pt presents for worsening SOB/wheezing. pt treated w/ albuterol/solumedrol/mag. given copd, worsening cough and SOB will cover w/ ceftriaxone/azithro. given persistence of sx, will admit for copd exacerbation

## 2020-10-28 NOTE — ED PROVIDER NOTE - PHYSICAL EXAMINATION
VITAL SIGNS: I have reviewed nursing notes and confirm.  CONSTITUTIONAL: Well-developed; well-nourished; in no acute distress.  SKIN: Skin exam is warm and dry, no acute rash.  HEAD: Normocephalic; atraumatic.  EYES: PERRL, EOM intact; conjunctiva and sclera clear.  ENT: No nasal discharge; airway clear.   NECK: Supple; non tender.  CARD: S1, S2 normal; no murmurs, gallops, or rubs. Regular rate and rhythm.  RESP: Clear to auscultation bilaterally. No rales or rhonchi. Diffuse wheezing b/l. No accessory muscle use.    ABD: Normal bowel sounds; soft; non-distended; non-tender.   EXT: Normal ROM. No edema.  LYMPH: No acute cervical adenopathy.  NEURO: Alert, oriented. Grossly unremarkable. No focal deficits.  PSYCH: Cooperative, appropriate. VITAL SIGNS: I have reviewed nursing notes and confirm.  CONSTITUTIONAL: Well-developed; well-nourished; in no acute distress.  SKIN: Skin exam is warm and dry, no acute rash.  HEAD: Normocephalic; atraumatic.  EYES: PERRL, EOM intact; conjunctiva and sclera clear.  ENT: No nasal discharge; airway clear.   NECK: Supple; non tender.  CARD: S1, S2 normal; no murmurs, gallops, or rubs. Regular rate and rhythm.  RESP: + Diffuse wheezing b/l. No accessory muscle use.    ABD: Normal bowel sounds; soft; non-distended; non-tender.   EXT: Normal ROM. No edema.  LYMPH: No acute cervical adenopathy.  NEURO: Alert, oriented. Grossly unremarkable. No focal deficits.  PSYCH: Cooperative, appropriate.

## 2020-10-28 NOTE — ED ADULT NURSE NOTE - OBJECTIVE STATEMENT
Pt c/o shortness of breath, wheezing, and cough. Pt has hx of COPD, pt current smoker. Denies fevers, chills, n/v/d, abd pain, back pain, cp.

## 2020-10-28 NOTE — ED PROVIDER NOTE - SECONDARY DIAGNOSIS.
[address] [place] [address]    4/3/2019      Dear Mary Ellis:     Re:   Your screening low-dose chest CT done on: April 2, 2019            Report sent to: Latanya Ruiz MD    We are writing to let you know that your recent low-dose lung screening CT shows small lung nodules which are likely benign (not cancer). Lung nodules are very common and many people without cancer have these nodules. To make sure these nodules are benign, screening guidelines recommend you have another low-dose chest CT on or around: April 3, 2020. â¢ Your low-dose lung screening CT report, including any minor observations, has been sent to your health care provider. Your exam report and images will be kept on file as part of your permanent record and are available for your continuing care. â¢ Although low-dose lung screening CT is very effective at finding lung cancer early, it cannot find all lung cancers. If you develop any new symptoms such as shortness of breath, chest pain, or coughing up blood, please call your doctor. â¢ Please keep in mind that maintaining good health involves quitting smoking (for help, call the Jatin Ramirez at [facility] or [phone] or visit the website at  [facility]), an annual physical exam, and continued screening with low-dose chest CT. If you have questions about your results call your provider; moreover, if you have any questions about the lung screening program, please call us between 8:30 am and 4:30 pm, Monday through Friday at [phone].     Sincerely,      Jaqueline Yee RN & Marielle Ratliff RN  [facility] Lung CT Screening Program Pneumonia

## 2020-10-28 NOTE — ED ADULT NURSE NOTE - NSIMPLEMENTINTERV_GEN_ALL_ED
Implemented All Universal Safety Interventions:  [place] to call system. Call bell, personal items and telephone within reach. Instruct patient to call for assistance. Room bathroom lighting operational. Non-slip footwear when patient is off stretcher. Physically safe environment: no spills, clutter or unnecessary equipment. Stretcher in lowest position, wheels locked, appropriate side rails in place.

## 2020-10-29 LAB
A1C WITH ESTIMATED AVERAGE GLUCOSE RESULT: 6.1 % — HIGH (ref 4–5.6)
ALBUMIN SERPL ELPH-MCNC: 4 G/DL — SIGNIFICANT CHANGE UP (ref 3.5–5.2)
ALP SERPL-CCNC: 105 U/L — SIGNIFICANT CHANGE UP (ref 30–115)
ALT FLD-CCNC: 30 U/L — SIGNIFICANT CHANGE UP (ref 0–41)
ANION GAP SERPL CALC-SCNC: 10 MMOL/L — SIGNIFICANT CHANGE UP (ref 7–14)
ANION GAP SERPL CALC-SCNC: 9 MMOL/L — SIGNIFICANT CHANGE UP (ref 7–14)
APTT BLD: 29.6 SEC — SIGNIFICANT CHANGE UP (ref 27–39.2)
AST SERPL-CCNC: 17 U/L — SIGNIFICANT CHANGE UP (ref 0–41)
BASOPHILS # BLD AUTO: 0.02 K/UL — SIGNIFICANT CHANGE UP (ref 0–0.2)
BASOPHILS NFR BLD AUTO: 0.1 % — SIGNIFICANT CHANGE UP (ref 0–1)
BILIRUB SERPL-MCNC: 0.3 MG/DL — SIGNIFICANT CHANGE UP (ref 0.2–1.2)
BUN SERPL-MCNC: 17 MG/DL — SIGNIFICANT CHANGE UP (ref 10–20)
BUN SERPL-MCNC: 19 MG/DL — SIGNIFICANT CHANGE UP (ref 10–20)
CALCIUM SERPL-MCNC: 8.8 MG/DL — SIGNIFICANT CHANGE UP (ref 8.5–10.1)
CALCIUM SERPL-MCNC: 8.9 MG/DL — SIGNIFICANT CHANGE UP (ref 8.5–10.1)
CHLORIDE SERPL-SCNC: 100 MMOL/L — SIGNIFICANT CHANGE UP (ref 98–110)
CHLORIDE SERPL-SCNC: 100 MMOL/L — SIGNIFICANT CHANGE UP (ref 98–110)
CO2 SERPL-SCNC: 25 MMOL/L — SIGNIFICANT CHANGE UP (ref 17–32)
CO2 SERPL-SCNC: 27 MMOL/L — SIGNIFICANT CHANGE UP (ref 17–32)
CREAT SERPL-MCNC: 0.8 MG/DL — SIGNIFICANT CHANGE UP (ref 0.7–1.5)
CREAT SERPL-MCNC: 0.8 MG/DL — SIGNIFICANT CHANGE UP (ref 0.7–1.5)
EOSINOPHIL # BLD AUTO: 0 K/UL — SIGNIFICANT CHANGE UP (ref 0–0.7)
EOSINOPHIL NFR BLD AUTO: 0 % — SIGNIFICANT CHANGE UP (ref 0–8)
ESTIMATED AVERAGE GLUCOSE: 128 MG/DL — HIGH (ref 68–114)
GLUCOSE SERPL-MCNC: 131 MG/DL — HIGH (ref 70–99)
GLUCOSE SERPL-MCNC: 166 MG/DL — HIGH (ref 70–99)
HCT VFR BLD CALC: 42.8 % — SIGNIFICANT CHANGE UP (ref 42–52)
HGB BLD-MCNC: 14.2 G/DL — SIGNIFICANT CHANGE UP (ref 14–18)
IMM GRANULOCYTES NFR BLD AUTO: 0.9 % — HIGH (ref 0.1–0.3)
INR BLD: 1.01 RATIO — SIGNIFICANT CHANGE UP (ref 0.65–1.3)
LYMPHOCYTES # BLD AUTO: 1.36 K/UL — SIGNIFICANT CHANGE UP (ref 1.2–3.4)
LYMPHOCYTES # BLD AUTO: 9.6 % — LOW (ref 20.5–51.1)
MAGNESIUM SERPL-MCNC: 2.4 MG/DL — SIGNIFICANT CHANGE UP (ref 1.8–2.4)
MCHC RBC-ENTMCNC: 29 PG — SIGNIFICANT CHANGE UP (ref 27–31)
MCHC RBC-ENTMCNC: 33.2 G/DL — SIGNIFICANT CHANGE UP (ref 32–37)
MCV RBC AUTO: 87.5 FL — SIGNIFICANT CHANGE UP (ref 80–94)
MONOCYTES # BLD AUTO: 0.8 K/UL — HIGH (ref 0.1–0.6)
MONOCYTES NFR BLD AUTO: 5.7 % — SIGNIFICANT CHANGE UP (ref 1.7–9.3)
NEUTROPHILS # BLD AUTO: 11.84 K/UL — HIGH (ref 1.4–6.5)
NEUTROPHILS NFR BLD AUTO: 83.7 % — HIGH (ref 42.2–75.2)
NRBC # BLD: 0 /100 WBCS — SIGNIFICANT CHANGE UP (ref 0–0)
PLATELET # BLD AUTO: 189 K/UL — SIGNIFICANT CHANGE UP (ref 130–400)
POTASSIUM SERPL-MCNC: 4.6 MMOL/L — SIGNIFICANT CHANGE UP (ref 3.5–5)
POTASSIUM SERPL-MCNC: 4.6 MMOL/L — SIGNIFICANT CHANGE UP (ref 3.5–5)
POTASSIUM SERPL-SCNC: 4.6 MMOL/L — SIGNIFICANT CHANGE UP (ref 3.5–5)
POTASSIUM SERPL-SCNC: 4.6 MMOL/L — SIGNIFICANT CHANGE UP (ref 3.5–5)
PROT SERPL-MCNC: 6.5 G/DL — SIGNIFICANT CHANGE UP (ref 6–8)
PROTHROM AB SERPL-ACNC: 11.6 SEC — SIGNIFICANT CHANGE UP (ref 9.95–12.87)
RBC # BLD: 4.89 M/UL — SIGNIFICANT CHANGE UP (ref 4.7–6.1)
RBC # FLD: 15.5 % — HIGH (ref 11.5–14.5)
SODIUM SERPL-SCNC: 135 MMOL/L — SIGNIFICANT CHANGE UP (ref 135–146)
SODIUM SERPL-SCNC: 136 MMOL/L — SIGNIFICANT CHANGE UP (ref 135–146)
TSH SERPL-MCNC: 0.42 UIU/ML — SIGNIFICANT CHANGE UP (ref 0.27–4.2)
WBC # BLD: 14.15 K/UL — HIGH (ref 4.8–10.8)
WBC # FLD AUTO: 14.15 K/UL — HIGH (ref 4.8–10.8)

## 2020-10-29 PROCEDURE — 71045 X-RAY EXAM CHEST 1 VIEW: CPT | Mod: 26

## 2020-10-29 PROCEDURE — 99223 1ST HOSP IP/OBS HIGH 75: CPT | Mod: AI

## 2020-10-29 RX ORDER — NICOTINE POLACRILEX 2 MG
1 GUM BUCCAL DAILY
Refills: 0 | Status: DISCONTINUED | OUTPATIENT
Start: 2020-10-29 | End: 2020-11-03

## 2020-10-29 RX ORDER — OXYCODONE HYDROCHLORIDE 5 MG/1
30 TABLET ORAL EVERY 8 HOURS
Refills: 0 | Status: DISCONTINUED | OUTPATIENT
Start: 2020-10-29 | End: 2020-11-03

## 2020-10-29 RX ADMIN — MORPHINE SULFATE 100 MILLIGRAM(S): 50 CAPSULE, EXTENDED RELEASE ORAL at 17:44

## 2020-10-29 RX ADMIN — SIMVASTATIN 20 MILLIGRAM(S): 20 TABLET, FILM COATED ORAL at 00:27

## 2020-10-29 RX ADMIN — HYDROMORPHONE HYDROCHLORIDE 4 MILLIGRAM(S): 2 INJECTION INTRAMUSCULAR; INTRAVENOUS; SUBCUTANEOUS at 22:30

## 2020-10-29 RX ADMIN — OXYCODONE HYDROCHLORIDE 30 MILLIGRAM(S): 5 TABLET ORAL at 10:09

## 2020-10-29 RX ADMIN — HYDROMORPHONE HYDROCHLORIDE 4 MILLIGRAM(S): 2 INJECTION INTRAMUSCULAR; INTRAVENOUS; SUBCUTANEOUS at 21:29

## 2020-10-29 RX ADMIN — HYDROMORPHONE HYDROCHLORIDE 4 MILLIGRAM(S): 2 INJECTION INTRAMUSCULAR; INTRAVENOUS; SUBCUTANEOUS at 04:32

## 2020-10-29 RX ADMIN — Medication 2 MILLIGRAM(S): at 13:47

## 2020-10-29 RX ADMIN — Medication 40 MILLIGRAM(S): at 13:20

## 2020-10-29 RX ADMIN — MORPHINE SULFATE 100 MILLIGRAM(S): 50 CAPSULE, EXTENDED RELEASE ORAL at 13:20

## 2020-10-29 RX ADMIN — Medication 40 MILLIGRAM(S): at 22:40

## 2020-10-29 RX ADMIN — AZITHROMYCIN 500 MILLIGRAM(S): 500 TABLET, FILM COATED ORAL at 13:20

## 2020-10-29 RX ADMIN — BUPROPION HYDROCHLORIDE 150 MILLIGRAM(S): 150 TABLET, EXTENDED RELEASE ORAL at 13:20

## 2020-10-29 RX ADMIN — LISINOPRIL 5 MILLIGRAM(S): 2.5 TABLET ORAL at 06:30

## 2020-10-29 RX ADMIN — MORPHINE SULFATE 100 MILLIGRAM(S): 50 CAPSULE, EXTENDED RELEASE ORAL at 06:29

## 2020-10-29 RX ADMIN — MORPHINE SULFATE 100 MILLIGRAM(S): 50 CAPSULE, EXTENDED RELEASE ORAL at 06:59

## 2020-10-29 RX ADMIN — PANTOPRAZOLE SODIUM 40 MILLIGRAM(S): 20 TABLET, DELAYED RELEASE ORAL at 06:30

## 2020-10-29 RX ADMIN — Medication 40 MILLIGRAM(S): at 06:30

## 2020-10-29 RX ADMIN — Medication 60 MILLIGRAM(S): at 06:30

## 2020-10-29 RX ADMIN — HYDROMORPHONE HYDROCHLORIDE 4 MILLIGRAM(S): 2 INJECTION INTRAMUSCULAR; INTRAVENOUS; SUBCUTANEOUS at 13:20

## 2020-10-29 RX ADMIN — Medication 2 MILLIGRAM(S): at 19:30

## 2020-10-29 RX ADMIN — SIMVASTATIN 20 MILLIGRAM(S): 20 TABLET, FILM COATED ORAL at 22:40

## 2020-10-29 RX ADMIN — OXYCODONE HYDROCHLORIDE 30 MILLIGRAM(S): 5 TABLET ORAL at 10:39

## 2020-10-29 RX ADMIN — MORPHINE SULFATE 100 MILLIGRAM(S): 50 CAPSULE, EXTENDED RELEASE ORAL at 13:10

## 2020-10-29 RX ADMIN — MORPHINE SULFATE 100 MILLIGRAM(S): 50 CAPSULE, EXTENDED RELEASE ORAL at 18:52

## 2020-10-29 RX ADMIN — TIOTROPIUM BROMIDE 1 CAPSULE(S): 18 CAPSULE ORAL; RESPIRATORY (INHALATION) at 10:09

## 2020-10-29 RX ADMIN — MORPHINE SULFATE 100 MILLIGRAM(S): 50 CAPSULE, EXTENDED RELEASE ORAL at 00:43

## 2020-10-29 RX ADMIN — Medication 145 MILLIGRAM(S): at 13:20

## 2020-10-29 RX ADMIN — MORPHINE SULFATE 100 MILLIGRAM(S): 50 CAPSULE, EXTENDED RELEASE ORAL at 01:13

## 2020-10-29 RX ADMIN — HYDROMORPHONE HYDROCHLORIDE 4 MILLIGRAM(S): 2 INJECTION INTRAMUSCULAR; INTRAVENOUS; SUBCUTANEOUS at 13:10

## 2020-10-29 RX ADMIN — HYDROMORPHONE HYDROCHLORIDE 4 MILLIGRAM(S): 2 INJECTION INTRAMUSCULAR; INTRAVENOUS; SUBCUTANEOUS at 05:30

## 2020-10-29 NOTE — CONSULT NOTE ADULT - SUBJECTIVE AND OBJECTIVE BOX
Patient is a 54y old  Male who presents with a chief complaint of shortness of breath (28 Oct 2020 15:09)      HPI:  54 years old Male smoker with PMHx of COPD, REBECCA on BiPAP, HTN, DLD, GERD, OA comes in for shortness of breath.    History of presenting illness goes back 3 months ago when patient developed shortness of breath aggravated by exertion and relieved by rest. Pt reports that this morning while he was putting on his socks, he developed severe SOB and ws unable to breath which prompted him to come to ED. Reports that SOB is associated with sweating orthopnea for past 1 year and sleeps in a seated position with BIPAP and chronic dry cough. Of note pt has been admitted multiple times (>5 times in past 1 year) due to COPD exacerbation in pat 1 year only.   Pt also takes multiple pain medications due to severe joint pain, has morbid obesity and has had multiple joint replacement surgeries including Right knee, ankle, and toe joint replacement surgeries. Pt follow up with Dr. Womack for pain management.  Pt denies any fever, chest pan, n/v/d, recent sick contact or weight changes.     In the ED, /85, , temp 98,3F, RR 18, pt is saturating well on room air. Labs significant for leukocytosis, ABG WNL. CXR negative for pulmonary pathology. Pt recieved azithromycin / ceftriaxone, solumedrol 125mg IV, Magnesium sulfate 2g and inhalers in ED. To be admitted under medicine for further workup.  (28 Oct 2020 15:09).    Patient active Smoker and has had multiple exacerbations for the last 6 months. Trying to quit smoking now. Follows with Dr. Bassett outpatient      PAST MEDICAL & SURGICAL HISTORY:  REBECCA treated with BiPAP    COPD (chronic obstructive pulmonary disease)    Colitis  LARGE INTESTINE   NO RECENT FLARES    Hiatal hernia  GERD    SOB (shortness of breath)    Osteoarthritis    High blood cholesterol    Morbid obesity    Obstructive sleep apnea    GERD (gastroesophageal reflux disease)    High cholesterol    HTN (hypertension)    H/O knee surgery  arthoscopic x4    Hernia, inguinal, bilateral  3 months old    History of knee replacement procedure of right knee  BILATERAL    History of appendectomy    History of cholecystectomy        SOCIAL HX:   Active smoker half a pack a day now. Smoked 4ppd for 38 years                        Denies alcohol or illicit drug use  FAMILY HISTORY:  FH: diabetes mellitus    FH: lung cancer (Mother)  mother    .  No cardiovascular or pulmonary family history     REVIEW OF SYSTEMS:    All ROS are negative exept per HPI       Allergies    No Known Allergies    Intolerances          PHYSICAL EXAM  Vital Signs Last 24 Hrs  T(C): 36.7 (29 Oct 2020 06:11), Max: 37.1 (28 Oct 2020 21:13)  T(F): 98 (29 Oct 2020 06:11), Max: 98.7 (28 Oct 2020 21:13)  HR: 85 (29 Oct 2020 06:11) (85 - 113)  BP: 103/58 (29 Oct 2020 06:11) (103/58 - 169/89)  BP(mean): --  RR: 18 (29 Oct 2020 06:11) (17 - 19)  SpO2: 95% (28 Oct 2020 20:01) (95% - 98%)    CONSTITUTIONAL:  Morbidly Obese    ENT:   Airway patent,   No thrush    EYES:   Clear bilaterally,   pupils equal,   round and reactive to light.    CARDIAC:   Normal rate,   regular rhythm.    no edema      RESPIRATORY:   Bilateral wheezing  Not tachypneic,  No use of accessory muscles    GASTROINTESTINAL:  Abdomen soft, non-tender,   No guarding,   Positive BS    MUSCULOSKELETAL:   Range of motion is not limited,  No clubbing, cyanosis    NEUROLOGICAL:   Alert and oriented   No motor deficits.    SKIN:   Skin normal color for race,   No evidence of rash.      HEME LYMPH:   No cervical  lymphadenopathy.  no inguinal lymphadenopathy          LABS:                          14.2   14.15 )-----------( 189      ( 29 Oct 2020 07:00 )             42.8                                               10-29    135  |  100  |  17  ----------------------------<  166<H>  4.6   |  25  |  0.8    Ca    8.8      29 Oct 2020 00:00  Mg     2.1     10-28    TPro  6.5  /  Alb  3.9  /  TBili  0.8  /  DBili  x   /  AST  27  /  ALT  36  /  AlkPhos  110  10-28      PT/INR - ( 29 Oct 2020 07:00 )   PT: 11.60 sec;   INR: 1.01 ratio         PTT - ( 29 Oct 2020 07:00 )  PTT:29.6 sec                                           CARDIAC MARKERS ( 28 Oct 2020 11:00 )  x     / <0.01 ng/mL / x     / x     / x                                                LIVER FUNCTIONS - ( 28 Oct 2020 11:00 )  Alb: 3.9 g/dL / Pro: 6.5 g/dL / ALK PHOS: 110 U/L / ALT: 36 U/L / AST: 27 U/L / GGT: x                                                                                                MEDICATIONS  (STANDING):  azithromycin   Tablet 500 milliGRAM(s) Oral daily  buPROPion XL . 150 milliGRAM(s) Oral daily  chlorhexidine 4% Liquid 1 Application(s) Topical <User Schedule>  enoxaparin Injectable 40 milliGRAM(s) SubCutaneous daily  fenofibrate Tablet 145 milliGRAM(s) Oral daily  furosemide    Tablet 40 milliGRAM(s) Oral daily  lisinopril 5 milliGRAM(s) Oral daily  morphine ER Tablet 100 milliGRAM(s) Oral four times a day  pantoprazole    Tablet 40 milliGRAM(s) Oral before breakfast  predniSONE   Tablet 60 milliGRAM(s) Oral daily  simvastatin 20 milliGRAM(s) Oral at bedtime  tiotropium 18 MICROgram(s) Capsule 1 Capsule(s) Inhalation daily    MEDICATIONS  (PRN):  ALBUTerol    90 MICROgram(s) HFA Inhaler 2 Puff(s) Inhalation every 15 minutes PRN Wheezing  ALPRAZolam 2 milliGRAM(s) Oral four times a day PRN anxiety  HYDROmorphone   Tablet 4 milliGRAM(s) Oral every 8 hours PRN Moderate Pain (4 - 6)  oxyCODONE    IR 30 milliGRAM(s) Oral every 8 hours PRN Severe Pain (7 - 10)      X-Rays reviewed:    CXR interpreted by me: No focal opacities seen Patient is a 54y old  Male who presents with a chief complaint of shortness of breath (28 Oct 2020 15:09)    HPI:  54 years old Male smoker with PMHx of COPD, REBECCA on BiPAP, HTN, DLD, GERD, OA comes in for shortness of breath.    History of presenting illness goes back 3 months ago when patient developed shortness of breath aggravated by exertion and relieved by rest. Pt reports that this morning while he was putting on his socks, he developed severe SOB and ws unable to breath which prompted him to come to ED. Reports that SOB is associated with sweating orthopnea for past 1 year and sleeps in a seated position with BIPAP and chronic dry cough. Of note pt has been admitted multiple times (>5 times in past 1 year) due to COPD exacerbation in pat 1 year only.   Pt also takes multiple pain medications due to severe joint pain, has morbid obesity and has had multiple joint replacement surgeries including Right knee, ankle, and toe joint replacement surgeries. Pt follow up with Dr. Womack for pain management.  Pt denies any fever, chest pan, n/v/d, recent sick contact or weight changes.     In the ED, /85, , temp 98,3F, RR 18, pt is saturating well on room air. Labs significant for leukocytosis, ABG WNL. CXR negative for pulmonary pathology. Pt recieved azithromycin / ceftriaxone, solumedrol 125mg IV, Magnesium sulfate 2g and inhalers in ED. To be admitted under medicine for further workup.  (28 Oct 2020 15:09).    Patient active Smoker and has had multiple exacerbations for the last 6 months. Trying to quit smoking now. Follows with Dr. Bassett outpatient      PAST MEDICAL & SURGICAL HISTORY:  REBECCA treated with BiPAP    COPD (chronic obstructive pulmonary disease)    Colitis  LARGE INTESTINE   NO RECENT FLARES    Hiatal hernia  GERD    SOB (shortness of breath)    Osteoarthritis    High blood cholesterol    Morbid obesity    Obstructive sleep apnea    GERD (gastroesophageal reflux disease)    High cholesterol    HTN (hypertension)    H/O knee surgery  arthoscopic x4    Hernia, inguinal, bilateral  3 months old    History of knee replacement procedure of right knee  BILATERAL    History of appendectomy    History of cholecystectomy        SOCIAL HX:   Active smoker half a pack a day now. Smoked 4ppd for 38 years                        Denies alcohol or illicit drug use  FAMILY HISTORY:  FH: diabetes mellitus    FH: lung cancer (Mother)  mother    .  No cardiovascular or pulmonary family history     REVIEW OF SYSTEMS:    All ROS are negative exept per HPI       Allergies    No Known Allergies    Intolerances          PHYSICAL EXAM  Vital Signs Last 24 Hrs  T(C): 36.7 (29 Oct 2020 06:11), Max: 37.1 (28 Oct 2020 21:13)  T(F): 98 (29 Oct 2020 06:11), Max: 98.7 (28 Oct 2020 21:13)  HR: 85 (29 Oct 2020 06:11) (85 - 113)  BP: 103/58 (29 Oct 2020 06:11) (103/58 - 169/89)  BP(mean): --  RR: 18 (29 Oct 2020 06:11) (17 - 19)  SpO2: 95% (28 Oct 2020 20:01) (95% - 98%)    CONSTITUTIONAL:  Morbidly Obese    ENT:   Airway patent,   No thrush    EYES:   Clear bilaterally,   pupils equal,   round and reactive to light.    CARDIAC:   Normal rate,   regular rhythm.    no edema    RESPIRATORY:   On 2L NC, satting at 95%  Bilateral wheezing  Not tachypneic,  No use of accessory muscles    GASTROINTESTINAL:  Abdomen soft, non-tender,   No guarding,   Positive BS    MUSCULOSKELETAL:   Range of motion is not limited,  No clubbing, cyanosis    NEUROLOGICAL:   AOx4  Alert and oriented   No motor deficits.    SKIN:   Skin normal color for race,   No evidence of rash.          LABS:                          14.2   14.15 )-----------( 189      ( 29 Oct 2020 07:00 )             42.8                                               10-29    135  |  100  |  17  ----------------------------<  166<H>  4.6   |  25  |  0.8    Ca    8.8      29 Oct 2020 00:00  Mg     2.1     10-28    TPro  6.5  /  Alb  3.9  /  TBili  0.8  /  DBili  x   /  AST  27  /  ALT  36  /  AlkPhos  110  10-28      PT/INR - ( 29 Oct 2020 07:00 )   PT: 11.60 sec;   INR: 1.01 ratio         PTT - ( 29 Oct 2020 07:00 )  PTT:29.6 sec                                           CARDIAC MARKERS ( 28 Oct 2020 11:00 )  x     / <0.01 ng/mL / x     / x     / x                                                LIVER FUNCTIONS - ( 28 Oct 2020 11:00 )  Alb: 3.9 g/dL / Pro: 6.5 g/dL / ALK PHOS: 110 U/L / ALT: 36 U/L / AST: 27 U/L / GGT: x                                                                                 MEDICATIONS  (STANDING):  azithromycin   Tablet 500 milliGRAM(s) Oral daily  buPROPion XL . 150 milliGRAM(s) Oral daily  chlorhexidine 4% Liquid 1 Application(s) Topical <User Schedule>  enoxaparin Injectable 40 milliGRAM(s) SubCutaneous daily  fenofibrate Tablet 145 milliGRAM(s) Oral daily  furosemide    Tablet 40 milliGRAM(s) Oral daily  lisinopril 5 milliGRAM(s) Oral daily  morphine ER Tablet 100 milliGRAM(s) Oral four times a day  pantoprazole    Tablet 40 milliGRAM(s) Oral before breakfast  predniSONE   Tablet 60 milliGRAM(s) Oral daily  simvastatin 20 milliGRAM(s) Oral at bedtime  tiotropium 18 MICROgram(s) Capsule 1 Capsule(s) Inhalation daily    MEDICATIONS  (PRN):  ALBUTerol    90 MICROgram(s) HFA Inhaler 2 Puff(s) Inhalation every 15 minutes PRN Wheezing  ALPRAZolam 2 milliGRAM(s) Oral four times a day PRN anxiety  HYDROmorphone   Tablet 4 milliGRAM(s) Oral every 8 hours PRN Moderate Pain (4 - 6)  oxyCODONE    IR 30 milliGRAM(s) Oral every 8 hours PRN Severe Pain (7 - 10)      X-Rays reviewed:    CXR interpreted by me: No focal opacities seen

## 2020-10-29 NOTE — PROGRESS NOTE ADULT - SUBJECTIVE AND OBJECTIVE BOX
SUBJECTIVE:    Patient is a 54y old Male who presents with a chief complaint of shortness of breath (29 Oct 2020 08:54)  Currently admitted to medicine with the primary diagnosis of COPD exacerbation  Today is hospital day 1d. This morning he is resting comfortably in bed.  Pt seen leaving floor to consume cigarette.  Pt dyspneic off o2    PAST MEDICAL & SURGICAL HISTORY  REBECCA treated with BiPAP  COPD (chronic obstructive pulmonary disease)  Colitis  LARGE INTESTINE   NO RECENT FLARES  Hiatal hernia  GERD  SOB (shortness of breath)  Osteoarthritis  High blood cholesterol  Morbid obesity  Obstructive sleep apnea  GERD (gastroesophageal reflux disease)  High cholesterol  HTN (hypertension)  H/O knee surgery  arthoscopic x4  Hernia, inguinal, bilateral  3 months old  History of knee replacement procedure of right knee  BILATERAL  History of appendectomy  History of cholecystectomy    ALLERGIES:  No Known Allergies    MEDICATIONS:  STANDING MEDICATIONS  azithromycin   Tablet 500 milliGRAM(s) Oral daily  buPROPion XL . 150 milliGRAM(s) Oral daily  chlorhexidine 4% Liquid 1 Application(s) Topical <User Schedule>  enoxaparin Injectable 40 milliGRAM(s) SubCutaneous daily  fenofibrate Tablet 145 milliGRAM(s) Oral daily  furosemide    Tablet 40 milliGRAM(s) Oral daily  lisinopril 5 milliGRAM(s) Oral daily  methylPREDNISolone sodium succinate Injectable 40 milliGRAM(s) IV Push three times a day  morphine ER Tablet 100 milliGRAM(s) Oral four times a day  pantoprazole    Tablet 40 milliGRAM(s) Oral before breakfast  simvastatin 20 milliGRAM(s) Oral at bedtime  tiotropium 18 MICROgram(s) Capsule 1 Capsule(s) Inhalation daily    PRN MEDICATIONS  ALBUTerol    90 MICROgram(s) HFA Inhaler 2 Puff(s) Inhalation every 15 minutes PRN  ALPRAZolam 2 milliGRAM(s) Oral four times a day PRN  HYDROmorphone   Tablet 4 milliGRAM(s) Oral every 8 hours PRN  oxyCODONE    IR 30 milliGRAM(s) Oral every 8 hours PRN    VITALS:   T(F): 97.8  HR: 97  BP: 144/92  RR: 18  SpO2: 94%    LABS:             14.2   14.15 )-----------( 189      ( 29 Oct 2020 07:00 )             42.8     10-29    136  |  100  |  19  ----------------------------<  131<H>  4.6   |  27  |  0.8    Ca    8.9      29 Oct 2020 07:00  Mg     2.4     10-29    TPro  6.5  /  Alb  4.0  /  TBili  0.3  /  DBili  x   /  AST  17  /  ALT  30  /  AlkPhos  105  10-29  PT/INR - ( 29 Oct 2020 07:00 )   PT: 11.60 sec;   INR: 1.01 ratio     PTT - ( 29 Oct 2020 07:00 )  PTT:29.6 sec  CARDIAC MARKERS ( 28 Oct 2020 11:00 )  x     / <0.01 ng/mL / x     / x     / x        RADIOLOGY:  < from: Xray Chest 2 Views PA/Lat (10.28.20 @ 14:10) >  EXAM:  XR CHEST PA LAT 2V        PROCEDURE DATE:  10/28/2020    INTERPRETATION:  Clinical History / Reason for exam: Dyspnea.  Comparison : Chest radiograph 10/18/2020.  Technique/Positioning: Frontal and lateral views of the chest.  Findings:  Support devices: None.  Cardiac/mediastinum/hilum: Unchanged.  Lung parenchyma/Pleura: No focal consolidation, pleural effusion or pneumothorax.  Skeleton/soft tissues: Unchanged.  Impression:  No radiographic evidence of acute pulmonary disease.  PUSHPA JAY M.D., RESIDENT RADIOLOGIST  This document has been electronically signed.  ELLIOT LANDAU MD; Attending Radiologist  This document has been electronically signed. Oct 28 2020  3:46PM  < end of copied text >    PHYSICAL EXAM:  GEN: No acute distress; large body habitus   HEENT: nc/at, seen off NC  LUNGS: sig wheezes heard, wheezes audible without ausculation   HEART: S1/S2 present. RRR.   ABD: Soft, non-tender, non-distended.   EXT: 2+ edema  NEURO: AAOX3

## 2020-10-29 NOTE — PHARMACOTHERAPY INTERVENTION NOTE - COMMENTS
Spoke with MD. As there were two pain medications for pain scale 4 -6 , recommended that Oxycodone 30 be changed to pain scale 7 to 10

## 2020-10-29 NOTE — CONSULT NOTE ADULT - ASSESSMENT
Impression  Acute on Chronic COPD Exacerbation  REBCECA on BIPAP    Plan:  Solumedrol 60 q12 for now  Albuterol q4 PRN  Continue Spiriva 2 puffs daily  In patient replace Advair with Budesonide/Formoterol twice daily  Complete Antibiotic Course  NIV overnight  continue O2 as necessary to maintain sat > 90%  Outpatient repeat Sleep Study  Smoking Cessation  DVT Prophylaxis Impression  Acute on Chronic COPD Exacerbation  REBECCA on BIPAP    Plan:  Solumedrol 60 q12 for now  Albuterol q4 PRN  Continue Spiriva 2 puffs daily  In patient replace Advair with Budesonide/Formoterol twice daily  Complete Antibiotic Course  NIV overnight  continue O2 as necessary to maintain sat > 90%  Get Ambulatory Pulse oxygen, may need home oxygen  Outpatient repeat Sleep Study  Smoking Cessation  DVT Prophylaxis  Patient taking both Spiromax and Advair both at home. D/C one at discharge Impression  Acute on Chronic COPD Exacerbation  REBECCA on BIPAP    Plan:  Solumedrol 60 q12 for now  Albuterol q4 PRN  Continue Spiriva 2 puffs daily  Add Budesonide/Formoterol twice daily  Complete Antibiotic Course  Use NIV overnight  continue O2 as necessary to maintain sat > 90%  Keep Negative Balance  Get Ambulatory Pulse oxygen, may need home oxygen  Outpatient repeat Sleep Study  Smoking Cessation  DVT Prophylaxis  Patient taking both Spiromax and Advair both at home. D/C one at discharge Impression  COPD Exacerbation  REBECCA, on BIPAP  [facility]    Plan:  Solumedrol 60 q12 for now  Duonebs q4h and PRN  Continue with home inhaler (symbicort)  Complete Antibiotic Course, azithromycin 500mg x 5days  Titrate oxygen, target SpO2>94%  NIV hs, AVAPS, TV-450, Max IPAP-20, Min IPAP-14, EPAP-10, RR-12  Aspiration precautions  HOB at 30  Smoking cessation counseling  DVT Prophylaxis  OP pulm FU with Dr. Bassett  Patient taking both Spiromax (ICS/ LABA) and Advair (ICS/ LABA) both at home. D/C one at discharge Impression  COPD Exacerbation  REBECCA, on BIPAP  [facility]    Plan:  Solumedrol 60 q12 for now  Duonebs q4h and PRN  Continue with home inhaler (symbicort)  Complete Antibiotic Course, azithromycin 500mg x 5days  Titrate oxygen, target SpO2>94%  NIV hs, AVAPS, TV-450, Max IPAP-20, Min IPAP-14, EPAP-10, RR-12  Aspiration precautions  HOB at 30  Smoking cessation counselling  DVT Prophylaxis  OP pulm FU with Dr. Bassett  Patient taking both Spiromax (ICS/ LABA) and Advair (ICS/ LABA) both at home. D/C one at discharge Impression  COPD Exacerbation  REBECCA, on BIPAP  [facility]    Plan:  Solumedrol 60 q12 for now  Duonebs q4h and PRN  Continue with home inhaler (symbicort)  Complete Antibiotic Course, azithromycin 500mg x 5days  Titrate oxygen, target SpO2>94%  NIV hs, AVAPS, TV-450, Max IPAP-25, Min IPAP-14, EPAP-10, RR-12  Aspiration precautions  HOB at 30  Smoking cessation counselling  DVT Prophylaxis  OP pulm FU with Dr. Bassett  Patient taking both Spiromax (ICS/ LABA) and Advair (ICS/ LABA) both at home. D/C one at discharge

## 2020-10-30 LAB
ALBUMIN SERPL ELPH-MCNC: 3.8 G/DL — SIGNIFICANT CHANGE UP (ref 3.5–5.2)
ALP SERPL-CCNC: 108 U/L — SIGNIFICANT CHANGE UP (ref 30–115)
ALT FLD-CCNC: 25 U/L — SIGNIFICANT CHANGE UP (ref 0–41)
ANION GAP SERPL CALC-SCNC: 11 MMOL/L — SIGNIFICANT CHANGE UP (ref 7–14)
AST SERPL-CCNC: 13 U/L — SIGNIFICANT CHANGE UP (ref 0–41)
BASOPHILS # BLD AUTO: 0.02 K/UL — SIGNIFICANT CHANGE UP (ref 0–0.2)
BASOPHILS NFR BLD AUTO: 0.1 % — SIGNIFICANT CHANGE UP (ref 0–1)
BILIRUB SERPL-MCNC: 0.2 MG/DL — SIGNIFICANT CHANGE UP (ref 0.2–1.2)
BUN SERPL-MCNC: 21 MG/DL — HIGH (ref 10–20)
CALCIUM SERPL-MCNC: 9.2 MG/DL — SIGNIFICANT CHANGE UP (ref 8.5–10.1)
CHLORIDE SERPL-SCNC: 99 MMOL/L — SIGNIFICANT CHANGE UP (ref 98–110)
CO2 SERPL-SCNC: 26 MMOL/L — SIGNIFICANT CHANGE UP (ref 17–32)
CREAT SERPL-MCNC: 0.8 MG/DL — SIGNIFICANT CHANGE UP (ref 0.7–1.5)
EOSINOPHIL # BLD AUTO: 0 K/UL — SIGNIFICANT CHANGE UP (ref 0–0.7)
EOSINOPHIL NFR BLD AUTO: 0 % — SIGNIFICANT CHANGE UP (ref 0–8)
GLUCOSE SERPL-MCNC: 210 MG/DL — HIGH (ref 70–99)
HCT VFR BLD CALC: 42.2 % — SIGNIFICANT CHANGE UP (ref 42–52)
HGB BLD-MCNC: 13.8 G/DL — LOW (ref 14–18)
IMM GRANULOCYTES NFR BLD AUTO: 1 % — HIGH (ref 0.1–0.3)
LYMPHOCYTES # BLD AUTO: 1.19 K/UL — LOW (ref 1.2–3.4)
LYMPHOCYTES # BLD AUTO: 7.8 % — LOW (ref 20.5–51.1)
MCHC RBC-ENTMCNC: 29.2 PG — SIGNIFICANT CHANGE UP (ref 27–31)
MCHC RBC-ENTMCNC: 32.7 G/DL — SIGNIFICANT CHANGE UP (ref 32–37)
MCV RBC AUTO: 89.2 FL — SIGNIFICANT CHANGE UP (ref 80–94)
MONOCYTES # BLD AUTO: 0.53 K/UL — SIGNIFICANT CHANGE UP (ref 0.1–0.6)
MONOCYTES NFR BLD AUTO: 3.5 % — SIGNIFICANT CHANGE UP (ref 1.7–9.3)
NEUTROPHILS # BLD AUTO: 13.46 K/UL — HIGH (ref 1.4–6.5)
NEUTROPHILS NFR BLD AUTO: 87.6 % — HIGH (ref 42.2–75.2)
NRBC # BLD: 0 /100 WBCS — SIGNIFICANT CHANGE UP (ref 0–0)
PLATELET # BLD AUTO: 215 K/UL — SIGNIFICANT CHANGE UP (ref 130–400)
POTASSIUM SERPL-MCNC: 5.1 MMOL/L — HIGH (ref 3.5–5)
POTASSIUM SERPL-SCNC: 5.1 MMOL/L — HIGH (ref 3.5–5)
PROT SERPL-MCNC: 6.3 G/DL — SIGNIFICANT CHANGE UP (ref 6–8)
RBC # BLD: 4.73 M/UL — SIGNIFICANT CHANGE UP (ref 4.7–6.1)
RBC # FLD: 15.7 % — HIGH (ref 11.5–14.5)
SODIUM SERPL-SCNC: 136 MMOL/L — SIGNIFICANT CHANGE UP (ref 135–146)
WBC # BLD: 15.35 K/UL — HIGH (ref 4.8–10.8)
WBC # FLD AUTO: 15.35 K/UL — HIGH (ref 4.8–10.8)

## 2020-10-30 PROCEDURE — 99233 SBSQ HOSP IP/OBS HIGH 50: CPT

## 2020-10-30 PROCEDURE — 99406 BEHAV CHNG SMOKING 3-10 MIN: CPT

## 2020-10-30 RX ORDER — SENNA PLUS 8.6 MG/1
2 TABLET ORAL AT BEDTIME
Refills: 0 | Status: DISCONTINUED | OUTPATIENT
Start: 2020-10-30 | End: 2020-11-03

## 2020-10-30 RX ORDER — FUROSEMIDE 40 MG
40 TABLET ORAL ONCE
Refills: 0 | Status: COMPLETED | OUTPATIENT
Start: 2020-10-30 | End: 2020-10-30

## 2020-10-30 RX ORDER — IPRATROPIUM/ALBUTEROL SULFATE 18-103MCG
3 AEROSOL WITH ADAPTER (GRAM) INHALATION EVERY 6 HOURS
Refills: 0 | Status: DISCONTINUED | OUTPATIENT
Start: 2020-10-30 | End: 2020-11-03

## 2020-10-30 RX ORDER — POLYETHYLENE GLYCOL 3350 17 G/17G
17 POWDER, FOR SOLUTION ORAL DAILY
Refills: 0 | Status: DISCONTINUED | OUTPATIENT
Start: 2020-10-30 | End: 2020-11-03

## 2020-10-30 RX ADMIN — MORPHINE SULFATE 100 MILLIGRAM(S): 50 CAPSULE, EXTENDED RELEASE ORAL at 07:30

## 2020-10-30 RX ADMIN — Medication 3 MILLILITER(S): at 20:23

## 2020-10-30 RX ADMIN — ENOXAPARIN SODIUM 40 MILLIGRAM(S): 100 INJECTION SUBCUTANEOUS at 13:18

## 2020-10-30 RX ADMIN — MORPHINE SULFATE 100 MILLIGRAM(S): 50 CAPSULE, EXTENDED RELEASE ORAL at 02:00

## 2020-10-30 RX ADMIN — HYDROMORPHONE HYDROCHLORIDE 4 MILLIGRAM(S): 2 INJECTION INTRAMUSCULAR; INTRAVENOUS; SUBCUTANEOUS at 17:26

## 2020-10-30 RX ADMIN — MORPHINE SULFATE 100 MILLIGRAM(S): 50 CAPSULE, EXTENDED RELEASE ORAL at 00:41

## 2020-10-30 RX ADMIN — LISINOPRIL 5 MILLIGRAM(S): 2.5 TABLET ORAL at 06:05

## 2020-10-30 RX ADMIN — Medication 3 MILLILITER(S): at 14:13

## 2020-10-30 RX ADMIN — Medication 145 MILLIGRAM(S): at 13:18

## 2020-10-30 RX ADMIN — MORPHINE SULFATE 100 MILLIGRAM(S): 50 CAPSULE, EXTENDED RELEASE ORAL at 06:08

## 2020-10-30 RX ADMIN — Medication 60 MILLIGRAM(S): at 17:24

## 2020-10-30 RX ADMIN — POLYETHYLENE GLYCOL 3350 17 GRAM(S): 17 POWDER, FOR SOLUTION ORAL at 13:18

## 2020-10-30 RX ADMIN — Medication 2 MILLIGRAM(S): at 13:31

## 2020-10-30 RX ADMIN — Medication 20 MILLIGRAM(S): at 08:42

## 2020-10-30 RX ADMIN — Medication 40 MILLIGRAM(S): at 06:05

## 2020-10-30 RX ADMIN — Medication 600 MILLIGRAM(S): at 17:24

## 2020-10-30 RX ADMIN — MORPHINE SULFATE 100 MILLIGRAM(S): 50 CAPSULE, EXTENDED RELEASE ORAL at 13:31

## 2020-10-30 RX ADMIN — MORPHINE SULFATE 100 MILLIGRAM(S): 50 CAPSULE, EXTENDED RELEASE ORAL at 19:05

## 2020-10-30 RX ADMIN — MORPHINE SULFATE 100 MILLIGRAM(S): 50 CAPSULE, EXTENDED RELEASE ORAL at 17:25

## 2020-10-30 RX ADMIN — MORPHINE SULFATE 100 MILLIGRAM(S): 50 CAPSULE, EXTENDED RELEASE ORAL at 15:39

## 2020-10-30 RX ADMIN — AZITHROMYCIN 500 MILLIGRAM(S): 500 TABLET, FILM COATED ORAL at 13:19

## 2020-10-30 RX ADMIN — HYDROMORPHONE HYDROCHLORIDE 4 MILLIGRAM(S): 2 INJECTION INTRAMUSCULAR; INTRAVENOUS; SUBCUTANEOUS at 06:14

## 2020-10-30 RX ADMIN — BUPROPION HYDROCHLORIDE 150 MILLIGRAM(S): 150 TABLET, EXTENDED RELEASE ORAL at 13:19

## 2020-10-30 RX ADMIN — Medication 3 MILLILITER(S): at 08:47

## 2020-10-30 RX ADMIN — HYDROMORPHONE HYDROCHLORIDE 4 MILLIGRAM(S): 2 INJECTION INTRAMUSCULAR; INTRAVENOUS; SUBCUTANEOUS at 15:39

## 2020-10-30 RX ADMIN — Medication 2 MILLIGRAM(S): at 19:46

## 2020-10-30 RX ADMIN — SIMVASTATIN 20 MILLIGRAM(S): 20 TABLET, FILM COATED ORAL at 21:34

## 2020-10-30 RX ADMIN — HYDROMORPHONE HYDROCHLORIDE 4 MILLIGRAM(S): 2 INJECTION INTRAMUSCULAR; INTRAVENOUS; SUBCUTANEOUS at 08:42

## 2020-10-30 RX ADMIN — Medication 40 MILLIGRAM(S): at 13:18

## 2020-10-30 RX ADMIN — PANTOPRAZOLE SODIUM 40 MILLIGRAM(S): 20 TABLET, DELAYED RELEASE ORAL at 06:05

## 2020-10-30 RX ADMIN — SENNA PLUS 2 TABLET(S): 8.6 TABLET ORAL at 21:34

## 2020-10-30 RX ADMIN — Medication 2 MILLIGRAM(S): at 06:15

## 2020-10-30 NOTE — PROGRESS NOTE ADULT - SUBJECTIVE AND OBJECTIVE BOX
DAILY PROGRESS NOTE  ===========================================================    Patient Information:  KAYLEIGH TOMAS  /  54y  /  Male  /  MRN#: [id number]    Hospital Day: 2d     |:::::::::::::::::::::::::::| SUBJECTIVE |:::::::::::::::::::::::::::|    OVERNIGHT EVENTS:   TODAY: Patient was seen today at bedside. Patient on BiPAP, says breathing has improved. Patient gets high dose of opioids from pain management doctor (Erick Womack MD) but does not consent to records request from that doctor. He says he is going for orthopedic surgery for his shoulders in a few months and wants to keep taking opioids and will taper after surgery.    |:::::::::::::::::::::::::::| OBJECTIVE |:::::::::::::::::::::::::::|    VITAL SIGNS: Last 24 Hours  T(C): 35.8 (30 Oct 2020 13:17), Max: 36.7 (29 Oct 2020 19:53)  T(F): 96.4 (30 Oct 2020 13:17), Max: 98.1 (29 Oct 2020 19:53)  HR: 115 (30 Oct 2020 13:17) (88 - 115)  BP: 121/60 (30 Oct 2020 13:17) (95/58 - 121/60)  BP(mean): --  RR: 18 (30 Oct 2020 13:17) (15 - 18)  SpO2: 99% (30 Oct 2020 08:40) (93% - 99%)    PHYSICAL EXAM:  GENERAL:   Awake, alert; NAD.  HEENT:  Head NC/AT; Conjunctivae pink, Sclera anicteric; Oral mucosa moist.  CARDIO:   Regular rate; Regular rhythm; S1 & S2.  RESP:  Coarse breath sounds bilaterally, + wheezing  GI:   Soft; NT/ND; BS; No guarding; No rebound tenderness. Obese abdomen  EXT:   Strength UE 5/5; Strength LE 5/5; No edema. Surgical scar over R knee.  SKIN:   Intact.    LAB RESULTS:                        13.8   15.35 )-----------( 215      ( 30 Oct 2020 05:21 )             42.2     10-30    136  |  99  |  21<H>  ----------------------------<  210<H>  5.1<H>   |  26  |  0.8    Ca    9.2      30 Oct 2020 05:21  Mg     2.4     10-29    TPro  6.3  /  Alb  3.8  /  TBili  0.2  /  DBili  x   /  AST  13  /  ALT  25  /  AlkPhos  108  10-30    PT/INR - ( 29 Oct 2020 07:00 )   PT: 11.60 sec;   INR: 1.01 ratio         PTT - ( 29 Oct 2020 07:00 )  PTT:29.6 sec            MICROBIOLOGY:    RADIOLOGY:    ALLERGIES: No Known Allergies      ===========================================================

## 2020-10-30 NOTE — PROGRESS NOTE ADULT - SUBJECTIVE AND OBJECTIVE BOX
.54 years old Male smoker with PMHx of COPD, REBECCA on BiPAP, HTN, DLD, GERD, OA comes in for shortness of breath admitted with a working diagnosis of COPD exacerbation.  He was treated with Iv steroids, consulted by pulmonary, clinically improved. He was consulted regarding smoking cessation. Patient gets high dose of opioids from pain management doctor (Erick Womack MD). Pt is morbidly obese, has h/o b/l knee replacement, ankle Sx and has b/l shoulder surgery scheduled in 2 months.  Pt wants to keep taking  the same dose of opioids  for now and will taper after surgery. His wife expressed her concern regarding high doses of pain medications and stated that pt does not require such a high doses, will consult pain management to address this issue.   Today pt feels little better, ambulating w/o oxygen and any difficulties ( looks pretty comfortable, not in pain), c/o constipation.       VITAL SIGNS: Last 24 Hours  T(C): 35.8 (30 Oct 2020 13:17), Max: 36.7 (29 Oct 2020 19:53)  T(F): 96.4 (30 Oct 2020 13:17), Max: 98.1 (29 Oct 2020 19:53)  HR: 115 (30 Oct 2020 13:17) (88 - 115)  BP: 121/60 (30 Oct 2020 13:17) (95/58 - 121/60)  BP(mean): --  RR: 18 (30 Oct 2020 13:17) (15 - 18)  SpO2: 99% (30 Oct 2020 08:40) (93% - 99%)    PHYSICAL EXAM:  GENERAL:   Awake, alert; NAD, morbidly obese   HEENT:  Head NC/AT; Conjunctivae pink, Sclera anicteric; Oral mucosa moist, large neck   CARDIO:   Regular rate; Regular rhythm; S1 & S2.  RESP:  Coarse breath sounds bilaterally with prolonged inspiratory phase   Abdominal/ GI: obese,   Soft; NT/ND; BS; No guarding; No rebound tenderness   EXT:   Strength UE 5/5; Strength LE 5/5; No edema  SKIN:   Intact.    LAB RESULTS:                        13.8   15.35 )-----------( 215      ( 30 Oct 2020 05:21 )             42.2     10-30    136  |  99  |  21<H>  ----------------------------<  210<H>  5.1<H>   |  26  |  0.8    Ca    9.2      30 Oct 2020 05:21  Mg     2.4     10-29    TPro  6.3  /  Alb  3.8  /  TBili  0.2  /  DBili  x   /  AST  13  /  ALT  25  /  AlkPhos  108  10-30    PT/INR - ( 29 Oct 2020 07:00 )   PT: 11.60 sec;   INR: 1.01 ratio         PTT - ( 29 Oct 2020 07:00 )  PTT:29.6 sec    RADIOLOGY:    < from: Xray Chest 1 View- PORTABLE-Routine (Xray Chest 1 View- PORTABLE-Routine in AM.) (10.29.20 @ 09:08) >  Impression:    No significant radiographic change on frontal view.    < from: Transthoracic Echocardiogram (02.21.20 @ 07:21) >  Summary:   1. Normal global left ventricular systolic function.   2. LV Ejection Fraction by Bender's Method with a biplane EF of 61 %.   3. Moderately increased LV wall thickness.   4. Normal left ventricular internal cavity size.   5. Normal right atrial size.   6. There is no evidence of pericardial effusion.   7. Mild thickening of the anterior and posterior mitral valve leaflets.   8. No evidence of mitral valve regurgitation.   9. Dilatation of the aortic root.      MEDICATIONS  (STANDING):  albuterol/ipratropium for Nebulization 3 milliLiter(s) Nebulizer every 6 hours  buPROPion XL . 150 milliGRAM(s) Oral daily  chlorhexidine 4% Liquid 1 Application(s) Topical <User Schedule>  enoxaparin Injectable 40 milliGRAM(s) SubCutaneous daily  fenofibrate Tablet 145 milliGRAM(s) Oral daily  furosemide    Tablet 40 milliGRAM(s) Oral daily  guaiFENesin  milliGRAM(s) Oral every 12 hours  lisinopril 5 milliGRAM(s) Oral daily  methylPREDNISolone sodium succinate Injectable 60 milliGRAM(s) IV Push two times a day  morphine ER Tablet 100 milliGRAM(s) Oral four times a day  nicotine - 21 mG/24Hr(s) Patch 1 patch Transdermal daily  pantoprazole    Tablet 40 milliGRAM(s) Oral before breakfast  polyethylene glycol 3350 17 Gram(s) Oral daily  senna 2 Tablet(s) Oral at bedtime  simvastatin 20 milliGRAM(s) Oral at bedtime  tiotropium 18 MICROgram(s) Capsule 1 Capsule(s) Inhalation daily    MEDICATIONS  (PRN):  ALBUTerol    90 MICROgram(s) HFA Inhaler 2 Puff(s) Inhalation every 15 minutes PRN Wheezing  ALPRAZolam 2 milliGRAM(s) Oral four times a day PRN anxiety  HYDROmorphone   Tablet 4 milliGRAM(s) Oral every 8 hours PRN Moderate Pain (4 - 6)  oxyCODONE    IR 30 milliGRAM(s) Oral every 8 hours PRN Severe Pain (7 - 10)

## 2020-10-31 LAB
ANION GAP SERPL CALC-SCNC: 9 MMOL/L — SIGNIFICANT CHANGE UP (ref 7–14)
BASOPHILS # BLD AUTO: 0.02 K/UL — SIGNIFICANT CHANGE UP (ref 0–0.2)
BASOPHILS NFR BLD AUTO: 0.1 % — SIGNIFICANT CHANGE UP (ref 0–1)
BUN SERPL-MCNC: 23 MG/DL — HIGH (ref 10–20)
CALCIUM SERPL-MCNC: 9.4 MG/DL — SIGNIFICANT CHANGE UP (ref 8.5–10.1)
CHLORIDE SERPL-SCNC: 97 MMOL/L — LOW (ref 98–110)
CO2 SERPL-SCNC: 31 MMOL/L — SIGNIFICANT CHANGE UP (ref 17–32)
CREAT SERPL-MCNC: 0.8 MG/DL — SIGNIFICANT CHANGE UP (ref 0.7–1.5)
EOSINOPHIL # BLD AUTO: 0 K/UL — SIGNIFICANT CHANGE UP (ref 0–0.7)
EOSINOPHIL NFR BLD AUTO: 0 % — SIGNIFICANT CHANGE UP (ref 0–8)
GLUCOSE SERPL-MCNC: 153 MG/DL — HIGH (ref 70–99)
HCT VFR BLD CALC: 40.6 % — LOW (ref 42–52)
HGB BLD-MCNC: 13.5 G/DL — LOW (ref 14–18)
IMM GRANULOCYTES NFR BLD AUTO: 1.2 % — HIGH (ref 0.1–0.3)
LYMPHOCYTES # BLD AUTO: 12.5 % — LOW (ref 20.5–51.1)
LYMPHOCYTES # BLD AUTO: 2 K/UL — SIGNIFICANT CHANGE UP (ref 1.2–3.4)
MAGNESIUM SERPL-MCNC: 2 MG/DL — SIGNIFICANT CHANGE UP (ref 1.8–2.4)
MCHC RBC-ENTMCNC: 29.2 PG — SIGNIFICANT CHANGE UP (ref 27–31)
MCHC RBC-ENTMCNC: 33.3 G/DL — SIGNIFICANT CHANGE UP (ref 32–37)
MCV RBC AUTO: 87.9 FL — SIGNIFICANT CHANGE UP (ref 80–94)
MONOCYTES # BLD AUTO: 1.44 K/UL — HIGH (ref 0.1–0.6)
MONOCYTES NFR BLD AUTO: 9 % — SIGNIFICANT CHANGE UP (ref 1.7–9.3)
NEUTROPHILS # BLD AUTO: 12.37 K/UL — HIGH (ref 1.4–6.5)
NEUTROPHILS NFR BLD AUTO: 77.2 % — HIGH (ref 42.2–75.2)
NRBC # BLD: 0 /100 WBCS — SIGNIFICANT CHANGE UP (ref 0–0)
PLATELET # BLD AUTO: 206 K/UL — SIGNIFICANT CHANGE UP (ref 130–400)
POTASSIUM SERPL-MCNC: 4.4 MMOL/L — SIGNIFICANT CHANGE UP (ref 3.5–5)
POTASSIUM SERPL-SCNC: 4.4 MMOL/L — SIGNIFICANT CHANGE UP (ref 3.5–5)
RBC # BLD: 4.62 M/UL — LOW (ref 4.7–6.1)
RBC # FLD: 15.8 % — HIGH (ref 11.5–14.5)
SODIUM SERPL-SCNC: 137 MMOL/L — SIGNIFICANT CHANGE UP (ref 135–146)
WBC # BLD: 16.02 K/UL — HIGH (ref 4.8–10.8)
WBC # FLD AUTO: 16.02 K/UL — HIGH (ref 4.8–10.8)

## 2020-10-31 PROCEDURE — 99233 SBSQ HOSP IP/OBS HIGH 50: CPT

## 2020-10-31 PROCEDURE — 93306 TTE W/DOPPLER COMPLETE: CPT | Mod: 26

## 2020-10-31 RX ORDER — INFLUENZA VIRUS VACCINE 15; 15; 15; 15 UG/.5ML; UG/.5ML; UG/.5ML; UG/.5ML
0.5 SUSPENSION INTRAMUSCULAR ONCE
Refills: 0 | Status: COMPLETED | OUTPATIENT
Start: 2020-10-31 | End: 2020-10-31

## 2020-10-31 RX ORDER — FUROSEMIDE 40 MG
40 TABLET ORAL DAILY
Refills: 0 | Status: DISCONTINUED | OUTPATIENT
Start: 2020-10-31 | End: 2020-10-31

## 2020-10-31 RX ORDER — PANTOPRAZOLE SODIUM 20 MG/1
40 TABLET, DELAYED RELEASE ORAL
Refills: 0 | Status: DISCONTINUED | OUTPATIENT
Start: 2020-10-31 | End: 2020-11-03

## 2020-10-31 RX ORDER — FUROSEMIDE 40 MG
60 TABLET ORAL DAILY
Refills: 0 | Status: DISCONTINUED | OUTPATIENT
Start: 2020-10-31 | End: 2020-11-02

## 2020-10-31 RX ADMIN — SIMVASTATIN 20 MILLIGRAM(S): 20 TABLET, FILM COATED ORAL at 21:09

## 2020-10-31 RX ADMIN — MORPHINE SULFATE 100 MILLIGRAM(S): 50 CAPSULE, EXTENDED RELEASE ORAL at 13:16

## 2020-10-31 RX ADMIN — HYDROMORPHONE HYDROCHLORIDE 4 MILLIGRAM(S): 2 INJECTION INTRAMUSCULAR; INTRAVENOUS; SUBCUTANEOUS at 16:37

## 2020-10-31 RX ADMIN — Medication 60 MILLIGRAM(S): at 13:16

## 2020-10-31 RX ADMIN — MORPHINE SULFATE 100 MILLIGRAM(S): 50 CAPSULE, EXTENDED RELEASE ORAL at 18:10

## 2020-10-31 RX ADMIN — Medication 600 MILLIGRAM(S): at 18:10

## 2020-10-31 RX ADMIN — LISINOPRIL 5 MILLIGRAM(S): 2.5 TABLET ORAL at 05:51

## 2020-10-31 RX ADMIN — OXYCODONE HYDROCHLORIDE 30 MILLIGRAM(S): 5 TABLET ORAL at 22:00

## 2020-10-31 RX ADMIN — Medication 2 MILLIGRAM(S): at 13:18

## 2020-10-31 RX ADMIN — Medication 2 MILLIGRAM(S): at 05:49

## 2020-10-31 RX ADMIN — MORPHINE SULFATE 100 MILLIGRAM(S): 50 CAPSULE, EXTENDED RELEASE ORAL at 05:52

## 2020-10-31 RX ADMIN — Medication 3 MILLILITER(S): at 20:01

## 2020-10-31 RX ADMIN — OXYCODONE HYDROCHLORIDE 30 MILLIGRAM(S): 5 TABLET ORAL at 09:09

## 2020-10-31 RX ADMIN — Medication 600 MILLIGRAM(S): at 05:51

## 2020-10-31 RX ADMIN — SENNA PLUS 2 TABLET(S): 8.6 TABLET ORAL at 21:09

## 2020-10-31 RX ADMIN — Medication 2 MILLIGRAM(S): at 21:14

## 2020-10-31 RX ADMIN — CHLORHEXIDINE GLUCONATE 1 APPLICATION(S): 213 SOLUTION TOPICAL at 05:51

## 2020-10-31 RX ADMIN — Medication 60 MILLIGRAM(S): at 05:51

## 2020-10-31 RX ADMIN — BUPROPION HYDROCHLORIDE 150 MILLIGRAM(S): 150 TABLET, EXTENDED RELEASE ORAL at 13:16

## 2020-10-31 RX ADMIN — OXYCODONE HYDROCHLORIDE 30 MILLIGRAM(S): 5 TABLET ORAL at 21:15

## 2020-10-31 RX ADMIN — Medication 3 MILLILITER(S): at 13:33

## 2020-10-31 RX ADMIN — PANTOPRAZOLE SODIUM 40 MILLIGRAM(S): 20 TABLET, DELAYED RELEASE ORAL at 18:10

## 2020-10-31 RX ADMIN — Medication 3 MILLILITER(S): at 07:49

## 2020-10-31 RX ADMIN — POLYETHYLENE GLYCOL 3350 17 GRAM(S): 17 POWDER, FOR SOLUTION ORAL at 13:17

## 2020-10-31 RX ADMIN — PANTOPRAZOLE SODIUM 40 MILLIGRAM(S): 20 TABLET, DELAYED RELEASE ORAL at 05:51

## 2020-10-31 RX ADMIN — Medication 145 MILLIGRAM(S): at 13:16

## 2020-10-31 RX ADMIN — MORPHINE SULFATE 100 MILLIGRAM(S): 50 CAPSULE, EXTENDED RELEASE ORAL at 23:22

## 2020-10-31 RX ADMIN — Medication 2 MILLIGRAM(S): at 16:37

## 2020-10-31 RX ADMIN — Medication 40 MILLIGRAM(S): at 05:51

## 2020-10-31 NOTE — PROGRESS NOTE ADULT - SUBJECTIVE AND OBJECTIVE BOX
.54 years old Male smoker with PMHx of COPD, REBECCA on BiPAP, HTN, DLD, GERD, OA comes in for shortness of breath admitted with a working diagnosis of COPD exacerbation.  He was treated with Iv steroids, consulted by pulmonary, clinically improved. He was consulted regarding smoking cessation. Patient gets high dose of opioids from pain management doctor (Erick Womack MD). Pt is morbidly obese, has h/o b/l knee replacement, ankle Sx and has b/l shoulder surgery scheduled in 2 months.  Pt wants to keep taking  the same dose of opioids  for now and will taper after surgery. His wife expressed her concern regarding high doses of pain medications , pain management consulted.   Today pt feels the same, c/o productive cough and SOB.       VITAL SIGNS: Last 24 Hours  T(C): 35.8 (31 Oct 2020 05:00), Max: 35.8 (30 Oct 2020 13:17)  T(F): 96.5 (31 Oct 2020 05:00), Max: 96.5 (31 Oct 2020 05:00)  HR: 82 (31 Oct 2020 05:00) (82 - 115)  BP: 114/75 (31 Oct 2020 05:00) (111/60 - 121/60)  BP(mean): --  RR: 18 (31 Oct 2020 05:00) (18 - 18)  SpO2: 99% (31 Oct 2020 08:15) (99% - 99%)    PHYSICAL EXAM:  GENERAL:   Awake, alert; NAD, morbidly obese   HEENT:  Head NC/AT; Conjunctivae pink, Sclera anicteric; Oral mucosa moist, large neck   CARDIO:   Regular rate; Regular rhythm; S1 & S2.  RESP:  Coarse breath sounds bilaterally with prolonged inspiratory phase   Abdominal/ GI: obese,   Soft; NT/ND; BS; No guarding; No rebound tenderness   EXT:   Strength UE 5/5; Strength LE 5/5; No edema  SKIN:   Intact.    LAB RESULTS:                                       13.8   15.35 )-----------( 215      ( 30 Oct 2020 05:21 )             42.2   10-30    136  |  99  |  21<H>  ----------------------------<  210<H>  5.1<H>   |  26  |  0.8    Ca    9.2      30 Oct 2020 05:21    TPro  6.3  /  Alb  3.8  /  TBili  0.2  /  DBili  x   /  AST  13  /  ALT  25  /  AlkPhos  108  10-30    PTT - ( 29 Oct 2020 07:00 )  PTT:29.6 sec    RADIOLOGY:    < from: Xray Chest 1 View- PORTABLE-Routine (Xray Chest 1 View- PORTABLE-Routine in AM.) (10.29.20 @ 09:08) >  Impression:    No significant radiographic change on frontal view.    < from: Transthoracic Echocardiogram (02.21.20 @ 07:21) >  Summary:   1. Normal global left ventricular systolic function.   2. LV Ejection Fraction by Bender's Method with a biplane EF of 61 %.   3. Moderately increased LV wall thickness.   4. Normal left ventricular internal cavity size.   5. Normal right atrial size.   6. There is no evidence of pericardial effusion.   7. Mild thickening of the anterior and posterior mitral valve leaflets.   8. No evidence of mitral valve regurgitation.   9. Dilatation of the aortic root.      MEDICATIONS  (STANDING):  albuterol/ipratropium for Nebulization 3 milliLiter(s) Nebulizer every 6 hours  buPROPion XL . 150 milliGRAM(s) Oral daily  chlorhexidine 4% Liquid 1 Application(s) Topical <User Schedule>  enoxaparin Injectable 40 milliGRAM(s) SubCutaneous daily  fenofibrate Tablet 145 milliGRAM(s) Oral daily  furosemide   Injectable 60 milliGRAM(s) IV Push daily  guaiFENesin  milliGRAM(s) Oral every 12 hours  lisinopril 5 milliGRAM(s) Oral daily  morphine ER Tablet 100 milliGRAM(s) Oral four times a day  nicotine - 21 mG/24Hr(s) Patch 1 patch Transdermal daily  pantoprazole    Tablet 40 milliGRAM(s) Oral two times a day  polyethylene glycol 3350 17 Gram(s) Oral daily  senna 2 Tablet(s) Oral at bedtime  simvastatin 20 milliGRAM(s) Oral at bedtime  tiotropium 18 MICROgram(s) Capsule 1 Capsule(s) Inhalation daily    MEDICATIONS  (PRN):  ALBUTerol    90 MICROgram(s) HFA Inhaler 2 Puff(s) Inhalation every 15 minutes PRN Wheezing  ALPRAZolam 2 milliGRAM(s) Oral four times a day PRN anxiety  HYDROmorphone   Tablet 4 milliGRAM(s) Oral every 8 hours PRN Moderate Pain (4 - 6)  oxyCODONE    IR 30 milliGRAM(s) Oral every 8 hours PRN Severe Pain (7 - 10)

## 2020-10-31 NOTE — PROGRESS NOTE ADULT - SUBJECTIVE AND OBJECTIVE BOX
DAILY PROGRESS NOTE  ===========================================================    Patient Information:  KAYLEIGH TOMAS  /  54y  /  Male  /  MRN#: [id number]    Hospital Day: 3d     |:::::::::::::::::::::::::::| SUBJECTIVE |:::::::::::::::::::::::::::|    OVERNIGHT EVENTS: None  TODAY: Patient was seen today at bedside. Patient was out smoking a cigarette this AM    |:::::::::::::::::::::::::::| OBJECTIVE |:::::::::::::::::::::::::::|    VITAL SIGNS: Last 24 Hours  T(C): 35.8 (31 Oct 2020 05:00), Max: 35.8 (30 Oct 2020 13:17)  T(F): 96.5 (31 Oct 2020 05:00), Max: 96.5 (31 Oct 2020 05:00)  HR: 82 (31 Oct 2020 05:00) (82 - 115)  BP: 114/75 (31 Oct 2020 05:00) (111/60 - 121/60)  BP(mean): --  RR: 18 (31 Oct 2020 05:00) (18 - 18)  SpO2: 99% (31 Oct 2020 08:15) (99% - 99%)    PHYSICAL EXAM:  GENERAL:   Awake, alert; NAD.  HEENT:  Head NC/AT; Conjunctivae pink, Sclera anicteric; Oral mucosa moist.  CARDIO:   Regular rate; Regular rhythm; S1 & S2.  RESP:   No rales, wheezing, or rhonchi appreciated.  GI:   Soft; NT/ND; BS; No guarding; No rebound tenderness.  EXT:   Strength UE 5/5; Strength LE 5/5; No edema.   SKIN:   Intact.    LAB RESULTS:                        13.8   15.35 )-----------( 215      ( 30 Oct 2020 05:21 )             42.2     10-30    136  |  99  |  21<H>  ----------------------------<  210<H>  5.1<H>   |  26  |  0.8    Ca    9.2      30 Oct 2020 05:21    TPro  6.3  /  Alb  3.8  /  TBili  0.2  /  DBili  x   /  AST  13  /  ALT  25  /  AlkPhos  108  10-30                MICROBIOLOGY:    RADIOLOGY:    ALLERGIES: No Known Allergies      ===========================================================     DAILY PROGRESS NOTE  ===========================================================    Patient Information:  KAYLEIGH TOMAS  /  54y  /  Male  /  MRN#: [id number]    Hospital Day: 3d     |:::::::::::::::::::::::::::| SUBJECTIVE |:::::::::::::::::::::::::::|    OVERNIGHT EVENTS: None  TODAY: Patient was walking out of the unit to smoke a cigarette this AM. He is walking comfortably and seems less dyspneic than yesterday    |:::::::::::::::::::::::::::| OBJECTIVE |:::::::::::::::::::::::::::|    VITAL SIGNS: Last 24 Hours  T(C): 35.8 (31 Oct 2020 05:00), Max: 35.8 (30 Oct 2020 13:17)  T(F): 96.5 (31 Oct 2020 05:00), Max: 96.5 (31 Oct 2020 05:00)  HR: 82 (31 Oct 2020 05:00) (82 - 115)  BP: 114/75 (31 Oct 2020 05:00) (111/60 - 121/60)  BP(mean): --  RR: 18 (31 Oct 2020 05:00) (18 - 18)  SpO2: 99% (31 Oct 2020 08:15) (99% - 99%)    PHYSICAL EXAM:  GENERAL:   Awake, alert; NAD.  HEENT:  Head NC/AT; Conjunctivae pink, Sclera anicteric; Oral mucosa moist.  CARDIO:   Regular rate; Regular rhythm; S1 & S2.  RESP:   + coarse breath sounds, wheezing  GI:   Soft; NT/ND; BS; No guarding; No rebound tenderness.  EXT:   Strength UE 5/5; Strength LE 5/5; No edema.   SKIN:   Intact.    LAB RESULTS:                        13.8   15.35 )-----------( 215      ( 30 Oct 2020 05:21 )             42.2     10-30    136  |  99  |  21<H>  ----------------------------<  210<H>  5.1<H>   |  26  |  0.8    Ca    9.2      30 Oct 2020 05:21    TPro  6.3  /  Alb  3.8  /  TBili  0.2  /  DBili  x   /  AST  13  /  ALT  25  /  AlkPhos  108  10-30      MICROBIOLOGY:    RADIOLOGY:    < from: Xray Chest 1 View- PORTABLE-Routine (Xray Chest 1 View- PORTABLE-Routine in AM.) (10.29.20 @ 09:08) >  No significant radiographic change on frontal view.    < end of copied text >    ALLERGIES: No Known Allergies      ===========================================================

## 2020-11-01 LAB
HCT VFR BLD CALC: 43.3 % — SIGNIFICANT CHANGE UP (ref 42–52)
HGB BLD-MCNC: 14 G/DL — SIGNIFICANT CHANGE UP (ref 14–18)
MCHC RBC-ENTMCNC: 28.7 PG — SIGNIFICANT CHANGE UP (ref 27–31)
MCHC RBC-ENTMCNC: 32.3 G/DL — SIGNIFICANT CHANGE UP (ref 32–37)
MCV RBC AUTO: 88.9 FL — SIGNIFICANT CHANGE UP (ref 80–94)
NRBC # BLD: 0 /100 WBCS — SIGNIFICANT CHANGE UP (ref 0–0)
PLATELET # BLD AUTO: 205 K/UL — SIGNIFICANT CHANGE UP (ref 130–400)
RBC # BLD: 4.87 M/UL — SIGNIFICANT CHANGE UP (ref 4.7–6.1)
RBC # FLD: 15.6 % — HIGH (ref 11.5–14.5)
WBC # BLD: 10.44 K/UL — SIGNIFICANT CHANGE UP (ref 4.8–10.8)
WBC # FLD AUTO: 10.44 K/UL — SIGNIFICANT CHANGE UP (ref 4.8–10.8)

## 2020-11-01 PROCEDURE — 99233 SBSQ HOSP IP/OBS HIGH 50: CPT

## 2020-11-01 RX ADMIN — Medication 2 MILLIGRAM(S): at 22:11

## 2020-11-01 RX ADMIN — MORPHINE SULFATE 100 MILLIGRAM(S): 50 CAPSULE, EXTENDED RELEASE ORAL at 13:20

## 2020-11-01 RX ADMIN — MORPHINE SULFATE 100 MILLIGRAM(S): 50 CAPSULE, EXTENDED RELEASE ORAL at 17:29

## 2020-11-01 RX ADMIN — Medication 3 MILLILITER(S): at 08:30

## 2020-11-01 RX ADMIN — SIMVASTATIN 20 MILLIGRAM(S): 20 TABLET, FILM COATED ORAL at 22:09

## 2020-11-01 RX ADMIN — MORPHINE SULFATE 100 MILLIGRAM(S): 50 CAPSULE, EXTENDED RELEASE ORAL at 07:22

## 2020-11-01 RX ADMIN — Medication 2 MILLIGRAM(S): at 06:10

## 2020-11-01 RX ADMIN — POLYETHYLENE GLYCOL 3350 17 GRAM(S): 17 POWDER, FOR SOLUTION ORAL at 13:20

## 2020-11-01 RX ADMIN — PANTOPRAZOLE SODIUM 40 MILLIGRAM(S): 20 TABLET, DELAYED RELEASE ORAL at 17:29

## 2020-11-01 RX ADMIN — SENNA PLUS 2 TABLET(S): 8.6 TABLET ORAL at 22:09

## 2020-11-01 RX ADMIN — Medication 145 MILLIGRAM(S): at 13:20

## 2020-11-01 RX ADMIN — Medication 2 MILLIGRAM(S): at 13:20

## 2020-11-01 RX ADMIN — HYDROMORPHONE HYDROCHLORIDE 4 MILLIGRAM(S): 2 INJECTION INTRAMUSCULAR; INTRAVENOUS; SUBCUTANEOUS at 17:29

## 2020-11-01 RX ADMIN — PANTOPRAZOLE SODIUM 40 MILLIGRAM(S): 20 TABLET, DELAYED RELEASE ORAL at 06:10

## 2020-11-01 RX ADMIN — Medication 600 MILLIGRAM(S): at 17:30

## 2020-11-01 RX ADMIN — CHLORHEXIDINE GLUCONATE 1 APPLICATION(S): 213 SOLUTION TOPICAL at 06:37

## 2020-11-01 RX ADMIN — Medication 60 MILLIGRAM(S): at 06:11

## 2020-11-01 RX ADMIN — MORPHINE SULFATE 100 MILLIGRAM(S): 50 CAPSULE, EXTENDED RELEASE ORAL at 06:09

## 2020-11-01 RX ADMIN — BUPROPION HYDROCHLORIDE 150 MILLIGRAM(S): 150 TABLET, EXTENDED RELEASE ORAL at 13:20

## 2020-11-01 RX ADMIN — Medication 60 MILLIGRAM(S): at 06:10

## 2020-11-01 RX ADMIN — LISINOPRIL 5 MILLIGRAM(S): 2.5 TABLET ORAL at 06:10

## 2020-11-01 RX ADMIN — Medication 600 MILLIGRAM(S): at 06:10

## 2020-11-01 NOTE — PROGRESS NOTE ADULT - SUBJECTIVE AND OBJECTIVE BOX
.54 years old Male smoker with PMHx of COPD, REBECCA on BiPAP, HTN, DLD, GERD, OA comes in for shortness of breath admitted with a working diagnosis of COPD exacerbation.  He was treated with Iv steroids, consulted by pulmonary, clinically improved. He was consulted regarding smoking cessation. Patient gets high dose of opioids from pain management doctor (Erick Womack MD). Pt is morbidly obese, has h/o b/l knee replacement, ankle Sx and has b/l shoulder surgery scheduled in 2 months.  Pt wants to keep taking  the same dose of opioids  for now and will taper after surgery. His wife expressed her concern regarding high doses of pain medications , pain management consulted.   Today pt feels better, he has significant amount of secretions pt was instructed to cough and clear up secretions before he'll use BIPAP at night.       VITAL SIGNS: Last 24 Hours  T(C): 35.6 (01 Nov 2020 05:10), Max: 36.8 (31 Oct 2020 13:00)  T(F): 96.1 (01 Nov 2020 05:10), Max: 98.3 (31 Oct 2020 13:00)  HR: 87 (01 Nov 2020 05:10) (87 - 100)  BP: 114/70 (01 Nov 2020 05:10) (108/66 - 118/75)  BP(mean): --  RR: 18 (01 Nov 2020 05:10) (18 - 20)  SpO2: 94% (01 Nov 2020 08:33) (93% - 94%)    PHYSICAL EXAM:  GENERAL:   Awake, alert; NAD, morbidly obese   HEENT:  Head NC/AT; Conjunctivae pink, Sclera anicteric; Oral mucosa moist, large neck   CARDIO:   Regular rate; Regular rhythm; S1 & S2.  RESP:  Coarse breath sounds bilaterally with prolonged inspiratory phase   Abdominal/ GI: obese,   Soft; NT/ND; BS; No guarding; No rebound tenderness   EXT:   Strength UE 5/5; Strength LE 5/5; No edema  SKIN:   Intact.    LAB RESULTS:                                                13.5   16.02 )-----------( 206      ( 31 Oct 2020 11:27 )             40.6     10-31    137  |  97<L>  |  23<H>  ----------------------------<  153<H>  4.4   |  31  |  0.8    Ca    9.4      31 Oct 2020 11:27  Mg     2.0     10-31      RADIOLOGY:    < from: Xray Chest 1 View- PORTABLE-Routine (Xray Chest 1 View- PORTABLE-Routine in AM.) (10.29.20 @ 09:08) >  Impression:    No significant radiographic change on frontal view.    < from: Transthoracic Echocardiogram (02.21.20 @ 07:21) >  Summary:   1. Normal global left ventricular systolic function.   2. LV Ejection Fraction by Bender's Method with a biplane EF of 61 %.   3. Moderately increased LV wall thickness.   4. Normal left ventricular internal cavity size.   5. Normal right atrial size.   6. There is no evidence of pericardial effusion.   7. Mild thickening of the anterior and posterior mitral valve leaflets.   8. No evidence of mitral valve regurgitation.   9. Dilatation of the aortic root.        MEDICATIONS  (STANDING):  albuterol/ipratropium for Nebulization 3 milliLiter(s) Nebulizer every 6 hours  buPROPion XL . 150 milliGRAM(s) Oral daily  chlorhexidine 4% Liquid 1 Application(s) Topical <User Schedule>  enoxaparin Injectable 40 milliGRAM(s) SubCutaneous daily  fenofibrate Tablet 145 milliGRAM(s) Oral daily  furosemide   Injectable 60 milliGRAM(s) IV Push daily  guaiFENesin  milliGRAM(s) Oral every 12 hours  influenza   Vaccine 0.5 milliLiter(s) IntraMuscular once  lisinopril 5 milliGRAM(s) Oral daily  morphine ER Tablet 100 milliGRAM(s) Oral four times a day  nicotine - 21 mG/24Hr(s) Patch 1 patch Transdermal daily  pantoprazole    Tablet 40 milliGRAM(s) Oral two times a day  polyethylene glycol 3350 17 Gram(s) Oral daily  predniSONE   Tablet 60 milliGRAM(s) Oral daily  senna 2 Tablet(s) Oral at bedtime  simvastatin 20 milliGRAM(s) Oral at bedtime  tiotropium 18 MICROgram(s) Capsule 1 Capsule(s) Inhalation daily    MEDICATIONS  (PRN):  ALBUTerol    90 MICROgram(s) HFA Inhaler 2 Puff(s) Inhalation every 15 minutes PRN Wheezing  ALPRAZolam 2 milliGRAM(s) Oral four times a day PRN anxiety  HYDROmorphone   Tablet 4 milliGRAM(s) Oral every 8 hours PRN Moderate Pain (4 - 6)  oxyCODONE    IR 30 milliGRAM(s) Oral every 8 hours PRN Severe Pain (7 - 10)

## 2020-11-02 ENCOUNTER — TRANSCRIPTION ENCOUNTER (OUTPATIENT)
Age: 55
End: 2020-11-02

## 2020-11-02 DIAGNOSIS — G89.29 OTHER CHRONIC PAIN: ICD-10-CM

## 2020-11-02 LAB
ALBUMIN SERPL ELPH-MCNC: 3.7 G/DL — SIGNIFICANT CHANGE UP (ref 3.5–5.2)
ALP SERPL-CCNC: 94 U/L — SIGNIFICANT CHANGE UP (ref 30–115)
ALT FLD-CCNC: 96 U/L — HIGH (ref 0–41)
ANION GAP SERPL CALC-SCNC: 10 MMOL/L — SIGNIFICANT CHANGE UP (ref 7–14)
AST SERPL-CCNC: 47 U/L — HIGH (ref 0–41)
BILIRUB SERPL-MCNC: 0.4 MG/DL — SIGNIFICANT CHANGE UP (ref 0.2–1.2)
BUN SERPL-MCNC: 24 MG/DL — HIGH (ref 10–20)
CALCIUM SERPL-MCNC: 9.3 MG/DL — SIGNIFICANT CHANGE UP (ref 8.5–10.1)
CHLORIDE SERPL-SCNC: 93 MMOL/L — LOW (ref 98–110)
CO2 SERPL-SCNC: 32 MMOL/L — SIGNIFICANT CHANGE UP (ref 17–32)
CREAT SERPL-MCNC: 1 MG/DL — SIGNIFICANT CHANGE UP (ref 0.7–1.5)
GLUCOSE SERPL-MCNC: 125 MG/DL — HIGH (ref 70–99)
HCT VFR BLD CALC: 43 % — SIGNIFICANT CHANGE UP (ref 42–52)
HGB BLD-MCNC: 14 G/DL — SIGNIFICANT CHANGE UP (ref 14–18)
MAGNESIUM SERPL-MCNC: 2.4 MG/DL — SIGNIFICANT CHANGE UP (ref 1.8–2.4)
MCHC RBC-ENTMCNC: 29.4 PG — SIGNIFICANT CHANGE UP (ref 27–31)
MCHC RBC-ENTMCNC: 32.6 G/DL — SIGNIFICANT CHANGE UP (ref 32–37)
MCV RBC AUTO: 90.1 FL — SIGNIFICANT CHANGE UP (ref 80–94)
NRBC # BLD: 0 /100 WBCS — SIGNIFICANT CHANGE UP (ref 0–0)
PLATELET # BLD AUTO: 206 K/UL — SIGNIFICANT CHANGE UP (ref 130–400)
POTASSIUM SERPL-MCNC: 4.9 MMOL/L — SIGNIFICANT CHANGE UP (ref 3.5–5)
POTASSIUM SERPL-SCNC: 4.9 MMOL/L — SIGNIFICANT CHANGE UP (ref 3.5–5)
PROT SERPL-MCNC: 6.2 G/DL — SIGNIFICANT CHANGE UP (ref 6–8)
RBC # BLD: 4.77 M/UL — SIGNIFICANT CHANGE UP (ref 4.7–6.1)
RBC # FLD: 15.7 % — HIGH (ref 11.5–14.5)
SODIUM SERPL-SCNC: 135 MMOL/L — SIGNIFICANT CHANGE UP (ref 135–146)
WBC # BLD: 13.12 K/UL — HIGH (ref 4.8–10.8)
WBC # FLD AUTO: 13.12 K/UL — HIGH (ref 4.8–10.8)

## 2020-11-02 PROCEDURE — 99233 SBSQ HOSP IP/OBS HIGH 50: CPT

## 2020-11-02 RX ORDER — FUROSEMIDE 40 MG
60 TABLET ORAL DAILY
Refills: 0 | Status: DISCONTINUED | OUTPATIENT
Start: 2020-11-03 | End: 2020-11-03

## 2020-11-02 RX ADMIN — MORPHINE SULFATE 100 MILLIGRAM(S): 50 CAPSULE, EXTENDED RELEASE ORAL at 11:43

## 2020-11-02 RX ADMIN — SENNA PLUS 2 TABLET(S): 8.6 TABLET ORAL at 21:32

## 2020-11-02 RX ADMIN — MORPHINE SULFATE 100 MILLIGRAM(S): 50 CAPSULE, EXTENDED RELEASE ORAL at 00:45

## 2020-11-02 RX ADMIN — Medication 145 MILLIGRAM(S): at 11:39

## 2020-11-02 RX ADMIN — HYDROMORPHONE HYDROCHLORIDE 4 MILLIGRAM(S): 2 INJECTION INTRAMUSCULAR; INTRAVENOUS; SUBCUTANEOUS at 06:28

## 2020-11-02 RX ADMIN — Medication 2 MILLIGRAM(S): at 21:32

## 2020-11-02 RX ADMIN — PANTOPRAZOLE SODIUM 40 MILLIGRAM(S): 20 TABLET, DELAYED RELEASE ORAL at 05:44

## 2020-11-02 RX ADMIN — MORPHINE SULFATE 100 MILLIGRAM(S): 50 CAPSULE, EXTENDED RELEASE ORAL at 05:49

## 2020-11-02 RX ADMIN — SIMVASTATIN 20 MILLIGRAM(S): 20 TABLET, FILM COATED ORAL at 21:32

## 2020-11-02 RX ADMIN — BUPROPION HYDROCHLORIDE 150 MILLIGRAM(S): 150 TABLET, EXTENDED RELEASE ORAL at 11:39

## 2020-11-02 RX ADMIN — OXYCODONE HYDROCHLORIDE 30 MILLIGRAM(S): 5 TABLET ORAL at 19:18

## 2020-11-02 RX ADMIN — MORPHINE SULFATE 100 MILLIGRAM(S): 50 CAPSULE, EXTENDED RELEASE ORAL at 19:00

## 2020-11-02 RX ADMIN — MORPHINE SULFATE 100 MILLIGRAM(S): 50 CAPSULE, EXTENDED RELEASE ORAL at 23:10

## 2020-11-02 RX ADMIN — Medication 3 MILLILITER(S): at 20:53

## 2020-11-02 RX ADMIN — OXYCODONE HYDROCHLORIDE 30 MILLIGRAM(S): 5 TABLET ORAL at 17:28

## 2020-11-02 RX ADMIN — Medication 3 MILLILITER(S): at 11:52

## 2020-11-02 RX ADMIN — Medication 600 MILLIGRAM(S): at 17:27

## 2020-11-02 RX ADMIN — LISINOPRIL 5 MILLIGRAM(S): 2.5 TABLET ORAL at 05:44

## 2020-11-02 RX ADMIN — POLYETHYLENE GLYCOL 3350 17 GRAM(S): 17 POWDER, FOR SOLUTION ORAL at 11:41

## 2020-11-02 RX ADMIN — Medication 2 MILLIGRAM(S): at 16:09

## 2020-11-02 RX ADMIN — MORPHINE SULFATE 100 MILLIGRAM(S): 50 CAPSULE, EXTENDED RELEASE ORAL at 08:14

## 2020-11-02 RX ADMIN — Medication 60 MILLIGRAM(S): at 05:44

## 2020-11-02 RX ADMIN — PANTOPRAZOLE SODIUM 40 MILLIGRAM(S): 20 TABLET, DELAYED RELEASE ORAL at 17:27

## 2020-11-02 RX ADMIN — Medication 600 MILLIGRAM(S): at 05:44

## 2020-11-02 RX ADMIN — MORPHINE SULFATE 100 MILLIGRAM(S): 50 CAPSULE, EXTENDED RELEASE ORAL at 17:34

## 2020-11-02 RX ADMIN — HYDROMORPHONE HYDROCHLORIDE 4 MILLIGRAM(S): 2 INJECTION INTRAMUSCULAR; INTRAVENOUS; SUBCUTANEOUS at 07:49

## 2020-11-02 RX ADMIN — MORPHINE SULFATE 100 MILLIGRAM(S): 50 CAPSULE, EXTENDED RELEASE ORAL at 00:16

## 2020-11-02 RX ADMIN — Medication 2 MILLIGRAM(S): at 06:27

## 2020-11-02 NOTE — DISCHARGE NOTE PROVIDER - CARE PROVIDER_API CALL
Blake Bassett  CRITICAL CARE MEDICINE  [address] [place] [address], [facility] [address]  [place] [address]  Phone: [phone]  Fax: [phone]  Follow Up Time: 1 week   Blake Bassett  CRITICAL CARE MEDICINE  [address] [place] [address], [facility] [address]  [place] [address]  Phone: [phone]  Fax: [phone]  Follow Up Time: 2 weeks

## 2020-11-02 NOTE — DISCHARGE NOTE PROVIDER - PROVIDER TOKENS
PROVIDER:[TOKEN:[[id number]],FOLLOWUP:[1 week]] PROVIDER:[TOKEN:[[id number]],FOLLOWUP:[2 weeks]]

## 2020-11-02 NOTE — CONSULT NOTE ADULT - PROBLEM SELECTOR RECOMMENDATION 9
Con't HYDROmorphone   Tablet 4 milliGRAM(s) Oral every 8 hours PRN  DC morphine ER Tablet 100 milliGRAM(s) Oral four times a day  DC oxyCODONE    IR 30 milliGRAM(s) Oral every 8 hours PRN  Start MSContin 100mg PO Q6hrs standing  Start Methadone 10mg PO Q8hrs standing  Start Oxycodone IR 30mg PO Q4hrs PRN

## 2020-11-02 NOTE — PROGRESS NOTE ADULT - SUBJECTIVE AND OBJECTIVE BOX
.54 years old Male smoker with PMHx of COPD, REBECCA on BiPAP, HTN, DLD, GERD, OA comes in for shortness of breath admitted with a working diagnosis of COPD exacerbation.  He was treated with Iv steroids, consulted by pulmonary, clinically improved. He was consulted regarding smoking cessation. Patient gets high dose of opioids from pain management doctor (Erick Womack MD). Pt is morbidly obese, has h/o b/l knee replacement, ankle Sx and has b/l shoulder surgery scheduled in 2 months.  Pt wants to keep taking  the same dose of opioids  for now and will taper after surgery. His wife expressed her concern regarding high doses of pain medications , pain management consulted ( consult is still pending), pt is no willing to taper down his pain meds or Xanax.    Today pt is still c/ SOB, denies cough, fever, chills.        VITAL SIGNS: Last 24 Hours  T(C): 36.7 (01 Nov 2020 21:00), Max: 36.7 (01 Nov 2020 21:00)  T(F): 98.1 (01 Nov 2020 21:00), Max: 98.1 (01 Nov 2020 21:00)  HR: 83 (02 Nov 2020 05:30) (83 - 106)  BP: 113/62 (02 Nov 2020 05:30) (109/51 - 143/67)  BP(mean): --  RR: 18 (02 Nov 2020 05:30) (18 - 18)  SpO2: 96% (02 Nov 2020 00:00) (96% - 96%)    PHYSICAL EXAM:  GENERAL:   Awake, alert; NAD, morbidly obese   HEENT:  Head NC/AT; Conjunctivae pink, Sclera anicteric; Oral mucosa moist, large neck   CARDIO:   Regular rate; Regular rhythm; S1 & S2.  RESP:  Coarse breath sounds bilaterally with prolonged inspiratory phase   Abdominal/ GI: obese,   Soft; NT/ND; BS; No guarding; No rebound tenderness   EXT:   Strength UE 5/5; Strength LE 5/5; No edema  SKIN:   Intact.    LAB RESULTS:                                   14.0   13.12 )-----------( 206      ( 02 Nov 2020 05:42 )             43.0   11-02    135  |  93<L>  |  24<H>  ----------------------------<  125<H>  4.9   |  32  |  1.0    Ca    9.3      02 Nov 2020 05:42  Mg     2.4     11-02    TPro  6.2  /  Alb  3.7  /  TBili  0.4  /  DBili  x   /  AST  47<H>  /  ALT  96<H>  /  AlkPhos  94  11-02      RADIOLOGY:    < from: Xray Chest 1 View- PORTABLE-Routine (Xray Chest 1 View- PORTABLE-Routine in AM.) (10.29.20 @ 09:08) >  Impression:    No significant radiographic change on frontal view.    < from: Transthoracic Echocardiogram (02.21.20 @ 07:21) >  Summary:   1. Normal global left ventricular systolic function.   2. LV Ejection Fraction by Bender's Method with a biplane EF of 61 %.   3. Moderately increased LV wall thickness.   4. Normal left ventricular internal cavity size.   5. Normal right atrial size.   6. There is no evidence of pericardial effusion.   7. Mild thickening of the anterior and posterior mitral valve leaflets.   8. No evidence of mitral valve regurgitation.   9. Dilatation of the aortic root.        MEDICATIONS  (STANDING):  albuterol/ipratropium for Nebulization 3 milliLiter(s) Nebulizer every 6 hours  buPROPion XL . 150 milliGRAM(s) Oral daily  chlorhexidine 4% Liquid 1 Application(s) Topical <User Schedule>  enoxaparin Injectable 40 milliGRAM(s) SubCutaneous daily  fenofibrate Tablet 145 milliGRAM(s) Oral daily  furosemide   Injectable 60 milliGRAM(s) IV Push daily  guaiFENesin  milliGRAM(s) Oral every 12 hours  influenza   Vaccine 0.5 milliLiter(s) IntraMuscular once  lisinopril 5 milliGRAM(s) Oral daily  morphine ER Tablet 100 milliGRAM(s) Oral four times a day  nicotine - 21 mG/24Hr(s) Patch 1 patch Transdermal daily  pantoprazole    Tablet 40 milliGRAM(s) Oral two times a day  polyethylene glycol 3350 17 Gram(s) Oral daily  predniSONE   Tablet 60 milliGRAM(s) Oral daily  senna 2 Tablet(s) Oral at bedtime  simvastatin 20 milliGRAM(s) Oral at bedtime  tiotropium 18 MICROgram(s) Capsule 1 Capsule(s) Inhalation daily    MEDICATIONS  (PRN):  ALBUTerol    90 MICROgram(s) HFA Inhaler 2 Puff(s) Inhalation every 15 minutes PRN Wheezing  ALPRAZolam 2 milliGRAM(s) Oral four times a day PRN anxiety  HYDROmorphone   Tablet 4 milliGRAM(s) Oral every 8 hours PRN Moderate Pain (4 - 6)  oxyCODONE    IR 30 milliGRAM(s) Oral every 8 hours PRN Severe Pain (7 - 10)

## 2020-11-02 NOTE — CONSULT NOTE ADULT - ASSESSMENT
REVIEW OF SYSTEMS    General:	NAD   Skin/Breast:	Neg  Ophthalmologic:	Neg  ENMT: Neg	  Respiratory and Thorax: Neg	  Cardiovascular:	Neg  Gastrointestinal:	Neg  Genitourinary:	Neg  Musculoskeletal:	Neg  Neurological:	Neg  Psychiatric:	Neg  Hematology/Lymphatics:	Neg  Endocrine:	Neg        PHYSICAL EXAM:    GENERAL: NAD, well-groomed, well-developed  HEAD:  Atraumatic, Normocephalic  EYES: EOMI, PERRLA, conjunctiva and sclera clear  ENMT: No tonsillar erythema, exudates, or enlargement; Moist mucous membranes, Good dentition, No lesions  NECK: Supple, No JVD, Normal thyroid  NERVOUS SYSTEM:  Alert & Oriented X3, Good concentration; Motor Strength 5/5 B/L upper and lower extremities  CHEST/LUNG: Clear to percussion bilaterally; No rales, rhonchi, wheezing, or rubs  HEART: Regular rate and rhythm; No murmurs, rubs, or gallops  ABDOMEN: Soft, Nontender, Nondistended; Bowel sounds present  EXTREMITIES:  No clubbing, cyanosis, or edema  LYMPH: No lymphadenopathy noted  SKIN: No rashes or lesions      Patient sitting on the bed. Patient states he has bilat shoulder pain and needs bilat shoulder replacements. pain is sharp and stabbing, constant. I called the office of Dr. Womack, pain mgmt service. The nurse office confirmed the recommended medication as his home dose as ordered by Dr. Mccoy. I-Stop confirms the same. Patient states that after his shoulder replacements he will be weaned off the pain meds. Patient states that his bilat arm movement is limited secondary to pain. Pain is 10/10 now and 5-6/10 after pain meds. Pain is sharp and stabbing, constant, non-radiating. Patient is on Bipap for sleep apnea. Neg resp distress noted.

## 2020-11-02 NOTE — DISCHARGE NOTE PROVIDER - NSDCMRMEDTOKEN_GEN_ALL_CORE_FT
albuterol 90 mcg/inh inhalation aerosol: 2 puff(s) inhaled every 6 hours AS NEEDED   ALPRAZolam 2 mg oral tablet: 1 tab(s) orally 4 times a day, As Needed  Ambien 10 mg oral tablet: 1 tab(s) orally once a day (at bedtime)  fenofibrate 160 mg oral tablet: 1 tab(s) orally once a day  HYDROmorphone 4 mg oral tablet: 1 tab(s) orally every 8 hours  Lasix 40 mg oral tablet: 1 tab(s) orally once a day  lisinopril 5 mg oral tablet: 1 tab(s) orally once a day  methadone 10 mg oral tablet: 1 tab(s) orally every 12 hours  morphine 100 mg/12 hours oral tablet, extended release: 1 tab(s) orally 4 times a day  omeprazole 40 mg oral delayed release capsule: 1 cap(s) orally once a day  oxyCODONE 30 mg oral tablet: 1 tab(s) orally every 8 hours, As Needed  Spiriva Respimat 1.25 mcg/inh inhalation aerosol: 2 puff(s) inhaled once a day  Wellbutrin  mg/24 hours oral tablet, extended release: 1 tab(s) orally every 24 hours  Zocor 20 mg oral tablet: 1 tab(s) orally once a day (at bedtime)   albuterol 90 mcg/inh inhalation aerosol: 2 puff(s) inhaled every 6 hours AS NEEDED   ALPRAZolam 2 mg oral tablet: 1 tab(s) orally 4 times a day, As Needed  Ambien 10 mg oral tablet: 1 tab(s) orally once a day (at bedtime)  fenofibrate 160 mg oral tablet: 1 tab(s) orally once a day  Lasix 40 mg oral tablet: 1 tab(s) orally once a day  lisinopril 5 mg oral tablet: 1 tab(s) orally once a day  methadone 10 mg oral tablet: 1 tab(s) orally every 12 hours  nicotine 21 mg/24 hr transdermal film, extended release: 1 patch transdermal once a day   omeprazole 40 mg oral delayed release capsule: 1 cap(s) orally 2 times a day   oxyCODONE 30 mg oral tablet: 1 tab(s) orally every 8 hours, As Needed  polyethylene glycol 3350 oral powder for reconstitution: 17 gram(s) orally once a day AS NEEDED FOR CONSTIPATION  predniSONE 20 mg oral tablet: 2 tab(s) orally once a day 11/4 - 11/8  predniSONE 20 mg oral tablet: 1 tab(s) orally once a day 11/9 - 11/13  senna oral tablet: 2 tab(s) orally once a day (at bedtime)  Spiriva Respimat 1.25 mcg/inh inhalation aerosol: 2 puff(s) inhaled once a day  Wellbutrin  mg/24 hours oral tablet, extended release: 1 tab(s) orally every 24 hours  Zocor 20 mg oral tablet: 1 tab(s) orally once a day (at bedtime)   albuterol 90 mcg/inh inhalation aerosol: 2 puff(s) inhaled every 6 hours AS NEEDED   ALPRAZolam 2 mg oral tablet: 0.5 tab(s) orally 4 times a day, As Needed  Ambien 10 mg oral tablet: 0.5 tab(s) orally once a day (at bedtime), As Needed  fenofibrate 160 mg oral tablet: 1 tab(s) orally once a day  Lasix 40 mg oral tablet: 1 tab(s) orally once a day  lisinopril 5 mg oral tablet: 1 tab(s) orally once a day  nicotine 21 mg/24 hr transdermal film, extended release: 1 patch transdermal once a day   omeprazole 40 mg oral delayed release capsule: 1 cap(s) orally 2 times a day   polyethylene glycol 3350 oral powder for reconstitution: 17 gram(s) orally once a day AS NEEDED FOR CONSTIPATION  predniSONE 20 mg oral tablet: 2 tab(s) orally once a day 11/4 - 11/8  predniSONE 20 mg oral tablet: 1 tab(s) orally once a day 11/9 - 11/13  senna oral tablet: 2 tab(s) orally once a day (at bedtime)  Symbicort 160 mcg-4.5 mcg/inh inhalation aerosol: 2 puff(s) inhaled 2 times a day   tiotropium 18 mcg inhalation capsule: 1 cap(s) inhaled once a day  Wellbutrin  mg/24 hours oral tablet, extended release: 1 tab(s) orally every 24 hours  Zocor 20 mg oral tablet: 1 tab(s) orally once a day (at bedtime)   albuterol 90 mcg/inh inhalation aerosol: 2 puff(s) inhaled every 6 hours AS NEEDED   ALPRAZolam 2 mg oral tablet: 0.5 tab(s) orally 4 times a day, As Needed  Ambien 10 mg oral tablet: 0.5 tab(s) orally once a day (at bedtime), As Needed  fenofibrate 160 mg oral tablet: 1 tab(s) orally once a day  HYDROmorphone 4 mg oral tablet: 1 tab(s) orally every 8 hours MDD:12mg  Lasix 40 mg oral tablet: 1 tab(s) orally once a day  lisinopril 5 mg oral tablet: 1 tab(s) orally once a day  methadone 10 mg oral tablet: 1 tab(s) orally every 8 hours MDD:30mg  MS Contin 100 mg oral tablet, extended release: 1 tab(s) orally 4 times a day MDD:400mg  nicotine 21 mg/24 hr transdermal film, extended release: 1 patch transdermal once a day   omeprazole 40 mg oral delayed release capsule: 1 cap(s) orally 2 times a day   oxyCODONE 30 mg oral tablet: 1 tab(s) orally every 4 hours, As needed, Severe Pain (7 - 10) MDD:180mg  polyethylene glycol 3350 oral powder for reconstitution: 17 gram(s) orally once a day AS NEEDED FOR CONSTIPATION  predniSONE 20 mg oral tablet: 2 tab(s) orally once a day 11/4 - 11/8  predniSONE 20 mg oral tablet: 1 tab(s) orally once a day 11/9 - 11/13  senna oral tablet: 2 tab(s) orally once a day (at bedtime)  Symbicort 160 mcg-4.5 mcg/inh inhalation aerosol: 2 puff(s) inhaled 2 times a day   tiotropium 18 mcg inhalation capsule: 1 cap(s) inhaled once a day  Wellbutrin  mg/24 hours oral tablet, extended release: 1 tab(s) orally every 24 hours  Zocor 20 mg oral tablet: 1 tab(s) orally once a day (at bedtime)

## 2020-11-02 NOTE — DISCHARGE NOTE PROVIDER - NSDCCPCAREPLAN_GEN_ALL_CORE_FT
PRINCIPAL DISCHARGE DIAGNOSIS  Diagnosis: COPD exacerbation  Assessment and Plan of Treatment: You were admitted for COPD exacerbation. You have multiple comorbidities such as obesity, and high risk behavior such as active smoking that place you at increased risk for exacerbation. You were treated with antibiotics, prednisone and Lasix during this hospital admission. The pulmonary team evaluated you and recommended...Please follow up with your pulmonary doctor as an outpatient and take all  medications as prescribed. If you have symptoms such as shortness of breath, cough, fever, please go to the nearest ED.      SECONDARY DISCHARGE DIAGNOSES  Diagnosis: Chronic pain  Assessment and Plan of Treatment: You are currently on many pain medications that may increase your risk of exacerbations. The pain management team was consulted and recommended ...     PRINCIPAL DISCHARGE DIAGNOSIS  Diagnosis: COPD exacerbation  Assessment and Plan of Treatment: You were admitted for COPD exacerbation. You have multiple comorbidities such as obesity, and high risk behavior such as active smoking that place you at increased risk for exacerbation. You were treated with antibiotics, prednisone and Lasix during this hospital admission. The pulmonary team evaluated you and recommended...Please follow up with your pulmonary doctor as an outpatient and take all  medications as prescribed. If you have symptoms such as shortness of breath, cough, fever, please go to the nearest ED.      SECONDARY DISCHARGE DIAGNOSES  Diagnosis: Chronic pain  Assessment and Plan of Treatment: You are currently on many pain medications that may increase your risk of exacerbations. The pain management team was consulted and recommended adjusting your pain meds. Please follow prescribed pain meds recommendations.     PRINCIPAL DISCHARGE DIAGNOSIS  Diagnosis: COPD exacerbation  Assessment and Plan of Treatment: You were admitted for COPD exacerbation. You have multiple comorbidities such as obesity, and high risk behavior such as active smoking that place you at increased risk for exacerbation. You were treated with antibiotics, prednisone and Lasix during this hospital admission. The pulmonary team evaluated you and recommended a prednisone taper as an outpatient. Please follow up with your pulmonary doctor Dr. Bassett as an outpatient and take all  medications as prescribed. If you have symptoms such as shortness of breath, cough, fever, please go to the nearest ED.      SECONDARY DISCHARGE DIAGNOSES  Diagnosis: Chronic pain  Assessment and Plan of Treatment: You are currently on many pain medications that may increase your risk of exacerbations. The pain management team was consulted and recommended adjusting your pain meds. Please follow prescribed pain meds recommendations.

## 2020-11-02 NOTE — DISCHARGE NOTE PROVIDER - HOSPITAL COURSE
HPI:  54 years old Male smoker with PMHx of COPD, REBECCA on BiPAP, HTN, DLD, GERD, OA comes in for shortness of breath.    History of presenting illness goes back 3 months ago when patient developed shortness of breath aggravated by exertion and relieved by rest. Pt reports that this morning while he was putting on his socks, he developed severe SOB and ws unable to breath which prompted him to come to ED. Reports that SOB is associated with sweating orthopnea for past 1 year and sleeps in a seated position with BIPAP and chronic dry cough. Of note pt has been admitted multiple times (>5 times in past 1 year) due to COPD exacerbation in past 1 year only.   Pt also takes multiple pain medications due to severe joint pain, has morbid obesity and has had multiple joint replacement surgeries including Right knee, ankle, and toe joint replacement surgeries. Pt follow up with Dr. Womack for pain management. Pt denies any fever, chest pan, n/v/d, recent sick contact or weight changes.     In the ED, /85, , temp 98,3F, RR 18, pt is saturating well on room air. Labs significant for leukocytosis, ABG WNL. CXR negative for pulmonary pathology. Pt recieved azithromycin / ceftriaxone, solumedrol 125mg IV, Magnesium sulfate 2g and inhalers in ED.     Pt admitted for Acute on chronic respiratory failure/ COPD exacerbation/ Smoking/ REBECCA, acute on chronic  diastolic CHF/ Morbid obesity/  Obesity  hypoventilation syndrome, started on  po Prednisone 60 mg Q 24 hours,s/p Azithromycin, BIPAP qHS, Lasix 60 mg IV Q 24 hours and  extensively consulted  regarding smoking cessation and pain management.    Pt also has a history of multiple ortho surgery and Chronic pain. Patient on multiple Home pain meds which increases risk of exacerbation.  -  Dilaudid 4mg q8 PRN  -  Morphine ER 100mg qid  -  Oxycodone IR 30mg q8  - pain management consult was placed  for opioid taper and counseling     CXR shows  no acute pathology      Pt to follow up with pulmonary as an outpatientp   HPI:  54 years old Male smoker with PMHx of COPD, REBECCA on BiPAP, HTN, DLD, GERD, OA comes in for shortness of breath.    History of presenting illness goes back 3 months ago when patient developed shortness of breath aggravated by exertion and relieved by rest. Pt reports that this morning while he was putting on his socks, he developed severe SOB and ws unable to breath which prompted him to come to ED. Reports that SOB is associated with sweating orthopnea for past 1 year and sleeps in a seated position with BIPAP and chronic dry cough. Of note pt has been admitted multiple times (>5 times in past 1 year) due to COPD exacerbation in past 1 year only.   Pt also takes multiple pain medications due to severe joint pain, has morbid obesity and has had multiple joint replacement surgeries including Right knee, ankle, and toe joint replacement surgeries. Pt follow up with Dr. Womack for pain management. Pt denies any fever, chest pan, n/v/d, recent sick contact or weight changes.     In the ED, /85, , temp 98,3F, RR 18, pt is saturating well on room air. Labs significant for leukocytosis, ABG WNL. CXR negative for pulmonary pathology. Pt recieved azithromycin / ceftriaxone, solumedrol 125mg IV, Magnesium sulfate 2g and inhalers in ED.     Pt admitted for Acute on chronic respiratory failure/ COPD exacerbation/ Smoking/ REBECCA, acute on chronic  diastolic CHF/ Morbid obesity/  Obesity  hypoventilation syndrome, started on  po Prednisone 60 mg Q 24 hours,s/p Azithromycin, BIPAP qHS, Lasix 60 mg IV Q 24 hours and  extensively consulted  regarding smoking cessation and pain management.    Pt also has a history of multiple ortho surgery and Chronic pain. Patient on multiple Home pain meds which increases risk of exacerbation.  -  Dilaudid 4mg q8 PRN  -  Morphine ER 100mg qid  -  Oxycodone IR 30mg q8  - pain management consult was placed  for opioid taper and counseling.    CXR shows  no acute pathology      Pt to follow up with pulmonary as an outpatient   HPI:  54 years old Male smoker with PMHx of COPD, REBECCA on BiPAP, HTN, DLD, GERD, OA comes in for shortness of breath.    History of presenting illness goes back 3 months ago when patient developed shortness of breath aggravated by exertion and relieved by rest. Pt reports that this morning while he was putting on his socks, he developed severe SOB and ws unable to breath which prompted him to come to ED. Reports that SOB is associated with sweating orthopnea for past 1 year and sleeps in a seated position with BIPAP and chronic dry cough. Of note pt has been admitted multiple times (>5 times in past 1 year) due to COPD exacerbation in past 1 year only.   Pt also takes multiple pain medications due to severe joint pain, has morbid obesity and has had multiple joint replacement surgeries including Right knee, ankle, and toe joint replacement surgeries. Pt follow up with Dr. Womack for pain management. Pt denies any fever, chest pan, n/v/d, recent sick contact or weight changes.     In the ED, /85, , temp 98,3F, RR 18, pt is saturating well on room air. Labs significant for leukocytosis, ABG WNL. CXR negative for pulmonary pathology. Pt recieved azithromycin / ceftriaxone, solumedrol 125mg IV, Magnesium sulfate 2g and inhalers in ED.     Pt admitted for Acute on chronic respiratory failure/ COPD exacerbation/ Smoking/ REBECCA, acute on chronic  diastolic CHF/ Morbid obesity/  Obesity  hypoventilation syndrome, started on  po Prednisone 60 mg Q 24 hours,s/p Azithromycin, BIPAP qHS, Lasix 60 mg IV Q 24 hours and  extensively consulted  regarding smoking cessation and pain management.    Pt also has a history of multiple ortho surgery and Chronic pain. Patient on multiple Home pain meds which increases risk of exacerbation.  -  Dilaudid 4mg q8 PRN  -  Morphine ER 100mg qid  -  Oxycodone IR 30mg q8  - pain management consult was placed  for opioid taper and counseling. Pt now has new pain medication recommendations. Pt to follow up with his PCP for further management.    CXR shows  no acute pathology      Pt to follow up with pulmonary as an outpatient after completing prednisone taper.

## 2020-11-02 NOTE — CONSULT NOTE ADULT - SUBJECTIVE AND OBJECTIVE BOX
Pain Management Consult Note  11-02-20 @ 18:20    54 years old Male smoker with PMHx of COPD, REBECCA on BiPAP, HTN, DLD, GERD, OA comes in for shortness of breath.    Patient with complaint of Chronic bilat shoulder pain.        Allergies    No Known Allergies    Intolerances        PAST MEDICAL & SURGICAL HISTORY:  REBECCA treated with BiPAP    COPD (chronic obstructive pulmonary disease)    Colitis  LARGE INTESTINE   NO RECENT FLARES    Hiatal hernia  GERD    SOB (shortness of breath)    Osteoarthritis    High blood cholesterol    Morbid obesity    Obstructive sleep apnea    GERD (gastroesophageal reflux disease)    High cholesterol    HTN (hypertension)    H/O knee surgery  arthoscopic x4    Hernia, inguinal, bilateral  3 months old    History of knee replacement procedure of right knee  BILATERAL    History of appendectomy    History of cholecystectomy        Home Medications:  ALPRAZolam 2 mg oral tablet: 1 tab(s) orally 4 times a day, As Needed (28 Oct 2020 17:46)  Ambien 10 mg oral tablet: 1 tab(s) orally once a day (at bedtime) (28 Oct 2020 17:46)  fenofibrate 160 mg oral tablet: 1 tab(s) orally once a day (28 Oct 2020 17:46)  HYDROmorphone 4 mg oral tablet: 1 tab(s) orally every 8 hours (28 Oct 2020 17:46)  Lasix 40 mg oral tablet: 1 tab(s) orally once a day (28 Oct 2020 17:46)  lisinopril 5 mg oral tablet: 1 tab(s) orally once a day (28 Oct 2020 17:46)  methadone 10 mg oral tablet: 1 tab(s) orally every 12 hours (28 Oct 2020 17:46)  morphine 100 mg/12 hours oral tablet, extended release: 1 tab(s) orally 4 times a day (28 Oct 2020 17:46)  omeprazole 40 mg oral delayed release capsule: 1 cap(s) orally once a day (28 Oct 2020 17:46)  oxyCODONE 30 mg oral tablet: 1 tab(s) orally every 8 hours, As Needed (28 Oct 2020 17:46)  Spiriva Respimat 1.25 mcg/inh inhalation aerosol: 2 puff(s) inhaled once a day (28 Oct 2020 17:46)  Wellbutrin  mg/24 hours oral tablet, extended release: 1 tab(s) orally every 24 hours (28 Oct 2020 17:46)  Zocor 20 mg oral tablet: 1 tab(s) orally once a day (at bedtime) (28 Oct 2020 17:46)      SOCIAL HISTORY:  Denies Smoking, Alcohol, or Drug Use    No Known Allergies      MEDICATIONS  (STANDING):  albuterol/ipratropium for Nebulization 3 milliLiter(s) Nebulizer every 6 hours  buPROPion XL . 150 milliGRAM(s) Oral daily  chlorhexidine 4% Liquid 1 Application(s) Topical <User Schedule>  enoxaparin Injectable 40 milliGRAM(s) SubCutaneous daily  fenofibrate Tablet 145 milliGRAM(s) Oral daily  guaiFENesin  milliGRAM(s) Oral every 12 hours  influenza   Vaccine 0.5 milliLiter(s) IntraMuscular once  lisinopril 5 milliGRAM(s) Oral daily  morphine ER Tablet 100 milliGRAM(s) Oral four times a day  nicotine - 21 mG/24Hr(s) Patch 1 patch Transdermal daily  pantoprazole    Tablet 40 milliGRAM(s) Oral two times a day  polyethylene glycol 3350 17 Gram(s) Oral daily  predniSONE   Tablet 60 milliGRAM(s) Oral daily  senna 2 Tablet(s) Oral at bedtime  simvastatin 20 milliGRAM(s) Oral at bedtime  tiotropium 18 MICROgram(s) Capsule 1 Capsule(s) Inhalation daily    MEDICATIONS  (PRN):  ALBUTerol    90 MICROgram(s) HFA Inhaler 2 Puff(s) Inhalation every 15 minutes PRN Wheezing  ALPRAZolam 2 milliGRAM(s) Oral four times a day PRN anxiety  HYDROmorphone   Tablet 4 milliGRAM(s) Oral every 8 hours PRN Moderate Pain (4 - 6)  oxyCODONE    IR 30 milliGRAM(s) Oral every 8 hours PRN Severe Pain (7 - 10)      Vital Signs Last 24 Hrs  T(C): 37 (02 Nov 2020 14:50), Max: 37 (02 Nov 2020 14:50)  T(F): 98.6 (02 Nov 2020 14:50), Max: 98.6 (02 Nov 2020 14:50)  HR: 89 (02 Nov 2020 14:50) (83 - 89)  BP: 111/65 (02 Nov 2020 14:50) (109/51 - 113/62)  BP(mean): --  RR: 19 (02 Nov 2020 14:50) (18 - 19)  SpO2: 94% (02 Nov 2020 18:10) (94% - 96%)        LABS:                          14.0   13.12 )-----------( 206      ( 02 Nov 2020 05:42 )             43.0     11-02    135  |  93<L>  |  24<H>  ----------------------------<  125<H>  4.9   |  32  |  1.0    Ca    9.3      02 Nov 2020 05:42  Mg     2.4     11-02    TPro  6.2  /  Alb  3.7  /  TBili  0.4  /  DBili  x   /  AST  47<H>  /  ALT  96<H>  /  AlkPhos  94  11-02    LIVER FUNCTIONS - ( 02 Nov 2020 05:42 )  Alb: 3.7 g/dL / Pro: 6.2 g/dL / ALK PHOS: 94 U/L / ALT: 96 U/L / AST: 47 U/L / GGT: x                   RADIOLOGY:  EXAM:  XR CHEST PORTABLE ROUTINE 1V            PROCEDURE DATE:  10/29/2020            INTERPRETATION:  Clinical History / Reason for exam: COPD    Comparison : 10/28/2020.    Technique/Positioning: Frontal view of the chest.    Findings:    Support devices: None.    Cardiac/mediastinum/hilum: Stable appearance of the cardiomediastinal silhouette.    Lung parenchyma/Pleura: No focal consolidation, pneumothorax or pleural effusion on frontal view.    Skeleton/soft tissues: Unchanged.    Impression:    No significant radiographic change on frontal view.                  HARRIET JACKSON MD; Attending Radiologist  This document has been electronically signed. Oct 29 2020 11:53AM        Drug Screen:        [ ]  [facility] Reviewed on 11/2/20, 6:23P  Patient Name: aKmari Berger Date: 1965  Address: [address] [place] [id number]: Male  Rx Written	Rx Dispensed	Drug	Quantity	Days Supply	Prescriber Name  10/15/2020	10/15/2020	zolpidem tartrate 10 mg tablet	30	30	Azeem Delgado  Payment Method Insurance  [facility]  10/12/2020	10/12/2020	oxycodone hcl 30 mg tablet	180	30	Erick Womack MD  Payment Method Cash  [facility]  10/07/2020	10/08/2020	methadone hcl 10 mg tablet	60	30	Erick Womack MD  Payment Method Insurance  [facility]  10/07/2020	10/08/2020	hydromorphone 4 mg tablet	90	30	Erick Womack MD  Payment Method Cash  [facility]  09/08/2020	10/06/2020	morphine sulf er 100 mg tablet	120	30	GrErick madrigal MD  Payment Method Cash  [facility]  09/15/2020	09/18/2020	alprazolam 2 mg tablet	120	30	Azeem Delgado  Payment Method Insurance  [facility]  09/15/2020	09/15/2020	zolpidem tartrate 10 mg tablet	30	30	Azeem Delgado  Payment Method Insurance  [facility]  09/08/2020	09/12/2020	oxycodone hcl 30 mg tablet	180	30	Erick Womack MD  Payment Method Cash  [facility]  09/08/2020	09/08/2020	hydromorphone 4 mg tablet	90	30	Erick Womack MD  Payment Method Cash  [facility]  09/03/2020	09/05/2020	methadone hcl 10 mg tablet	60	30	Erick Womack MD  Payment Method Insurance  [facility]  09/03/2020	09/05/2020	morphine sulf er 100 mg tablet	120	30	Erick Womack MD  Payment Method Cash  [facility]  08/13/2020	08/19/2020	alprazolam 2 mg tablet	120	30	Azeem Delgado  Payment Method Insurance  [facility]  08/13/2020	08/14/2020	oxycodone hcl 30 mg tablet	180	30	Erick Womack MD  Payment Method Cash  [facility]  08/06/2020	08/12/2020	methadone hcl 10 mg tablet	60	30	Erick Womack MD  Payment Method Cash  [id number]  08/10/2020	08/12/2020	morphine sulf er 100 mg tablet	20	5	GrErick madrigal MD  Payment Method Cash  [id number]  08/12/2020	08/12/2020	zolpidem tartrate 10 mg tablet	30	30	Azeem Delgado  Payment Method Insurance  [id number]  08/06/2020	08/06/2020	hydromorphone 4 mg tablet	90	30	Erick Womack MD  Payment Method Insurance  [id number]  07/07/2020	07/20/2020	alprazolam 2 mg tablet	120	30	Azeem Delgado  Payment Method Insurance  [facility]  07/14/2020	07/15/2020	oxycodone hcl 30 mg tablet	180	30	Erick Womack MD  Payment Method Cash  [facility]  07/07/2020	07/11/2020	zolpidem tartrate 10 mg tablet	30	30	Azeem Delgado  Payment Method Insurance  [facility]

## 2020-11-02 NOTE — CONSULT NOTE ADULT - CONSULT REASON
Patient with chronic pain related to knee arthroplasty 2 years ago in addition to other orthopedic pain- on high doses of opoids chronically. Appreciate recs wrt tapering down pain meds, side effect mgmt, and appropriate pain control

## 2020-11-03 ENCOUNTER — TRANSCRIPTION ENCOUNTER (OUTPATIENT)
Age: 55
End: 2020-11-03

## 2020-11-03 VITALS
RESPIRATION RATE: 20 BRPM | HEART RATE: 101 BPM | TEMPERATURE: 98 F | DIASTOLIC BLOOD PRESSURE: 66 MMHG | SYSTOLIC BLOOD PRESSURE: 113 MMHG

## 2020-11-03 DIAGNOSIS — M25.519 PAIN IN UNSPECIFIED SHOULDER: ICD-10-CM

## 2020-11-03 PROCEDURE — 99233 SBSQ HOSP IP/OBS HIGH 50: CPT

## 2020-11-03 RX ORDER — TIOTROPIUM BROMIDE 18 UG/1
2 CAPSULE ORAL; RESPIRATORY (INHALATION)
Qty: 0 | Refills: 0 | DISCHARGE

## 2020-11-03 RX ORDER — HYDROMORPHONE HYDROCHLORIDE 2 MG/ML
1 INJECTION INTRAMUSCULAR; INTRAVENOUS; SUBCUTANEOUS
Qty: 0 | Refills: 0 | DISCHARGE

## 2020-11-03 RX ORDER — METHADONE HYDROCHLORIDE 40 MG/1
1 TABLET ORAL
Qty: 0 | Refills: 0 | DISCHARGE

## 2020-11-03 RX ORDER — POLYETHYLENE GLYCOL 3350 17 G/17G
17 POWDER, FOR SOLUTION ORAL
Qty: 170 | Refills: 0
Start: 2020-11-03 | End: 2020-11-12

## 2020-11-03 RX ORDER — BUDESONIDE AND FORMOTEROL FUMARATE DIHYDRATE 160; 4.5 UG/1; UG/1
2 AEROSOL RESPIRATORY (INHALATION)
Qty: 1 | Refills: 0
Start: 2020-11-03 | End: 2020-12-02

## 2020-11-03 RX ORDER — MORPHINE SULFATE 50 MG/1
1 CAPSULE, EXTENDED RELEASE ORAL
Qty: 0 | Refills: 0 | DISCHARGE

## 2020-11-03 RX ORDER — MORPHINE SULFATE 50 MG/1
100 CAPSULE, EXTENDED RELEASE ORAL
Refills: 0 | Status: DISCONTINUED | OUTPATIENT
Start: 2020-11-03 | End: 2020-11-03

## 2020-11-03 RX ORDER — NICOTINE POLACRILEX 2 MG
1 GUM BUCCAL
Qty: 30 | Refills: 0
Start: 2020-11-03 | End: 2020-12-02

## 2020-11-03 RX ORDER — OMEPRAZOLE 10 MG/1
1 CAPSULE, DELAYED RELEASE ORAL
Qty: 14 | Refills: 0
Start: 2020-11-03 | End: 2020-11-09

## 2020-11-03 RX ORDER — SENNA PLUS 8.6 MG/1
2 TABLET ORAL
Qty: 20 | Refills: 0
Start: 2020-11-03 | End: 2020-11-12

## 2020-11-03 RX ORDER — ZOLPIDEM TARTRATE 10 MG/1
1 TABLET ORAL
Qty: 0 | Refills: 0 | DISCHARGE

## 2020-11-03 RX ORDER — MORPHINE SULFATE 50 MG/1
1 CAPSULE, EXTENDED RELEASE ORAL
Qty: 28 | Refills: 0
Start: 2020-11-03 | End: 2020-11-09

## 2020-11-03 RX ORDER — OXYCODONE HYDROCHLORIDE 5 MG/1
1 TABLET ORAL
Qty: 0 | Refills: 0 | DISCHARGE

## 2020-11-03 RX ORDER — ALPRAZOLAM 0.25 MG
1 TABLET ORAL
Qty: 0 | Refills: 0 | DISCHARGE

## 2020-11-03 RX ORDER — HYDROMORPHONE HYDROCHLORIDE 2 MG/ML
4 INJECTION INTRAMUSCULAR; INTRAVENOUS; SUBCUTANEOUS EVERY 8 HOURS
Refills: 0 | Status: DISCONTINUED | OUTPATIENT
Start: 2020-11-03 | End: 2020-11-03

## 2020-11-03 RX ORDER — TIOTROPIUM BROMIDE 18 UG/1
1 CAPSULE ORAL; RESPIRATORY (INHALATION)
Qty: 0 | Refills: 0 | DISCHARGE
Start: 2020-11-03

## 2020-11-03 RX ORDER — ALPRAZOLAM 0.25 MG
0.5 TABLET ORAL
Qty: 0 | Refills: 0 | DISCHARGE
Start: 2020-11-03

## 2020-11-03 RX ORDER — HYDROMORPHONE HYDROCHLORIDE 2 MG/ML
1 INJECTION INTRAMUSCULAR; INTRAVENOUS; SUBCUTANEOUS
Qty: 21 | Refills: 0
Start: 2020-11-03 | End: 2020-11-09

## 2020-11-03 RX ORDER — METHADONE HYDROCHLORIDE 40 MG/1
1 TABLET ORAL
Qty: 21 | Refills: 0
Start: 2020-11-03 | End: 2020-11-09

## 2020-11-03 RX ORDER — OXYCODONE HYDROCHLORIDE 5 MG/1
30 TABLET ORAL EVERY 4 HOURS
Refills: 0 | Status: DISCONTINUED | OUTPATIENT
Start: 2020-11-03 | End: 2020-11-03

## 2020-11-03 RX ORDER — OMEPRAZOLE 10 MG/1
1 CAPSULE, DELAYED RELEASE ORAL
Qty: 0 | Refills: 0 | DISCHARGE

## 2020-11-03 RX ORDER — METHADONE HYDROCHLORIDE 40 MG/1
10 TABLET ORAL EVERY 8 HOURS
Refills: 0 | Status: DISCONTINUED | OUTPATIENT
Start: 2020-11-03 | End: 2020-11-03

## 2020-11-03 RX ORDER — OXYCODONE HYDROCHLORIDE 5 MG/1
1 TABLET ORAL
Qty: 42 | Refills: 0
Start: 2020-11-03 | End: 2020-11-09

## 2020-11-03 RX ADMIN — MORPHINE SULFATE 100 MILLIGRAM(S): 50 CAPSULE, EXTENDED RELEASE ORAL at 11:19

## 2020-11-03 RX ADMIN — MORPHINE SULFATE 100 MILLIGRAM(S): 50 CAPSULE, EXTENDED RELEASE ORAL at 17:13

## 2020-11-03 RX ADMIN — Medication 3 MILLILITER(S): at 13:52

## 2020-11-03 RX ADMIN — Medication 145 MILLIGRAM(S): at 11:17

## 2020-11-03 RX ADMIN — HYDROMORPHONE HYDROCHLORIDE 4 MILLIGRAM(S): 2 INJECTION INTRAMUSCULAR; INTRAVENOUS; SUBCUTANEOUS at 16:17

## 2020-11-03 RX ADMIN — METHADONE HYDROCHLORIDE 10 MILLIGRAM(S): 40 TABLET ORAL at 13:09

## 2020-11-03 RX ADMIN — Medication 600 MILLIGRAM(S): at 17:13

## 2020-11-03 RX ADMIN — Medication 3 MILLILITER(S): at 02:54

## 2020-11-03 RX ADMIN — MORPHINE SULFATE 100 MILLIGRAM(S): 50 CAPSULE, EXTENDED RELEASE ORAL at 00:10

## 2020-11-03 RX ADMIN — Medication 2 MILLIGRAM(S): at 06:31

## 2020-11-03 RX ADMIN — HYDROMORPHONE HYDROCHLORIDE 4 MILLIGRAM(S): 2 INJECTION INTRAMUSCULAR; INTRAVENOUS; SUBCUTANEOUS at 16:18

## 2020-11-03 RX ADMIN — Medication 2 MILLIGRAM(S): at 16:16

## 2020-11-03 RX ADMIN — POLYETHYLENE GLYCOL 3350 17 GRAM(S): 17 POWDER, FOR SOLUTION ORAL at 11:16

## 2020-11-03 RX ADMIN — PANTOPRAZOLE SODIUM 40 MILLIGRAM(S): 20 TABLET, DELAYED RELEASE ORAL at 17:13

## 2020-11-03 RX ADMIN — Medication 60 MILLIGRAM(S): at 06:23

## 2020-11-03 RX ADMIN — BUPROPION HYDROCHLORIDE 150 MILLIGRAM(S): 150 TABLET, EXTENDED RELEASE ORAL at 11:17

## 2020-11-03 RX ADMIN — OXYCODONE HYDROCHLORIDE 30 MILLIGRAM(S): 5 TABLET ORAL at 11:20

## 2020-11-03 RX ADMIN — Medication 600 MILLIGRAM(S): at 06:23

## 2020-11-03 RX ADMIN — MORPHINE SULFATE 100 MILLIGRAM(S): 50 CAPSULE, EXTENDED RELEASE ORAL at 06:22

## 2020-11-03 RX ADMIN — MORPHINE SULFATE 100 MILLIGRAM(S): 50 CAPSULE, EXTENDED RELEASE ORAL at 11:20

## 2020-11-03 RX ADMIN — OXYCODONE HYDROCHLORIDE 30 MILLIGRAM(S): 5 TABLET ORAL at 10:10

## 2020-11-03 RX ADMIN — Medication 3 MILLILITER(S): at 08:48

## 2020-11-03 RX ADMIN — PANTOPRAZOLE SODIUM 40 MILLIGRAM(S): 20 TABLET, DELAYED RELEASE ORAL at 06:22

## 2020-11-03 RX ADMIN — LISINOPRIL 5 MILLIGRAM(S): 2.5 TABLET ORAL at 06:23

## 2020-11-03 NOTE — DISCHARGE NOTE NURSING/CASE MANAGEMENT/SOCIAL WORK - PATIENT PORTAL LINK FT
You can access the FollowMyHealth Patient Portal offered by [facility] by registering at the following website: [email or web address]. By joining [facility]’s FollowMyHealth portal, you will also be able to view your health information using other applications (apps) compatible with our system.

## 2020-11-03 NOTE — PROGRESS NOTE ADULT - ASSESSMENT
54 M PMHx of COPD (not on home O2), active smoker, REBECCA on BiPAP, HTN, DLD, GERD, OA presents for COPD exacerbation     IMPRESSION  COPD exacerbation  REBECCA  Obesity  Active Smoker    #COPD exacerbation-sig wheezing heard on today's exam  - CXR no acute pathology.  - wheezing on exam, cough  - ABG 7.47/38/42/28  - start inhalers, Duoneb, albuterol MDI  - prednisone 60mg IV Q12 (switch from PO)  - antibiotics: Azithromycin 500mg qd x 3 days (day 2 today  - c/w lasix 40mg PO qd  - chest PT  - check TTE  - smoking cessation encouraged; pt willing to try nicotine     # HTN - c/w lisinopril, if endorsing dry cough can d/c   # REBECCA - BIPAP prn at night setting 28 / 18  # Obesity- weight loss encouraged   # GERD - c/w protonix  # DLD - c/w statin  # Anxiety / insomnia - c/w alprazolam, and Wellbutrin   ______________________________________________  Diet: DASH / TLC  DVT Prophylaxis: Lovenox  GI Prophylaxis: Protonix  Activity: IAT  Dispo: Acute  Code Status: Full code
54 years old Male smoker with PMHx of COPD, REBECCA on BiPAP, HTN, DLD, GERD, OA comes in for shortness of breath admitted with a working diagnosis of COPD exacerbation    # Shortness of breath / COPD exacerbation  - CXR no acute pathology  - Pulm recs appreciated  - wheezing on exam, cough  - on inhalers, Duoneb, albuterol MDI  - prednisone 60mg IV Q12 -> plan to switch to po tomorrow with improvement  - S/p Azithromycin 500mg qd x 3 days (ended 10/30)  - c/w lasix 40mg PO qd, gave lasix 40 IV x1  - chest PT  - smoking cessation encouraged; pt willing to try nicotine     # Chronic pain  - Patient c/o chronic pain after knee arthroplasty 2yrs ago, now complains of shoulder pain also  - Home pain meds, Confirmed with pharmacy & checked iStop, Rx from [facility]  -- Dilaudid 4mg q8 PRN  -- Morphine ER 100mg qid  -- Oxycodone IR 30mg q8  - F/u pain management consult, continuing home meds for now pending pain mgmt recs    # Anxiety / insomnia   - c/w home alprazolam, and Wellbutrin for now -> Rx from patient's psychiatrist    # REBECCA   - BIPAP prn at night setting 28 / 18    # HTN - c/w home medications  # GERD - c/w protonix  # DLD - c/w statin      # Diet: DASH / TLC  # DVT Prophylaxis: Lovenox  # GI Prophylaxis: Protonix  # Activity: IAT  # Dispo: Acute  # Code Status: Fullcode  
54 years old Male smoker with PMHx of COPD, REBECCA on BiPAP, HTN, DLD, GERD, OA comes in for shortness of breath admitted with a working diagnosis of COPD exacerbation.      # Acute on chronic respiratory failure/ COPD exacerbation/ Smoking/ REBECCA, acute on chronic  diastolic CHF/ Morbid obesity/  Obesity  hypoventilation syndrome  - Pulm recs for discharge - Prednisone 40mg x 5 days, then 20mg x 5 days, f/ with Dr. Bassett in 10 days  - on inhalers, Duoneb, albuterol MDI  - prednisone 60mg po daily  - S/p Azithromycin 500mg qd x 3 days (ended 10/30)  - Lasix switched to PO yesterday  - chest PT  - smoking cessation counselling     # Chronic pain  - Patient c/o chronic pain after knee arthroplasty 2yrs ago, now complains of shoulder pain also  - Home pain meds, Confirmed with pharmacy & checked iStop, Rx from [facility]  -- Dilaudid 4mg q8 PRN  -- Morphine ER 100mg qid  -- Oxycodone IR 30mg q8  - pain mgmt consult appreciated - pt to be discharged based on their recs     # Anxiety / insomnia   - c/w home alprazolam, and Wellbutrin for now -> Rx from patient's psychiatrist    # REBECCA   - BIPAP prn at night     # HTN - c/w home medications  # GERD -avoid late night dinner,  c/w protonix switched to q12h  # DLD - c/w statin    # Diet: DASH / TLC  # DVT Prophylaxis: Lovenox  # GI Prophylaxis: Protonix  # Activity: IAT  # Dispo: Acute  # Code Status: Full code    
54 years old Male smoker with PMHx of COPD, REBECCA on BiPAP, HTN, DLD, GERD, OA comes in for shortness of breath admitted with a working diagnosis of COPD exacerbation.     # Acute on chronic respiratory failure/ COPD exacerbation/ Smoking/ REBECCA, acute on chronic  diastolic CHF/ Morbid obesity/  Obesity  hypoventilation syndrome  - pulmonary follow up  - start po Prednisone 60 mg Q 24 hours on Sunday   - nebs Q 4 hours, start Mucinex   - he was extensively consulted  regarding smoking cessation   - CXR no acute pathology  - pt was treated with Azithromycin   - BIPAP QHS and PRN   - avoid late dinner and oversedation ( pt has very high risk of aspiration at night while on BIPAP)   - fluid restriction 1200 ml in 24 hours, intake and output monitoring, daily weight   - keep negative balance   - change Lasix to 60 mg IV Q 24 hours   - chest PT, aspiration precautions   - low calories diet, encourage weight loss   - dietitian consult     #h/o multiple ortho surgery/  Chronic pain  - h/o  knee arthroplasty 2yrs ago, pt is scheduled for shoulder Sx two month from now   - Home pain meds, Confirmed with pharmacy & checked iStop, Rx from [facility]  -- Dilaudid 4mg q8 PRN  -- Morphine ER 100mg qid  -- Oxycodone IR 30mg q8  - pain management consult for opioid taper and counseling     # Anxiety / insomnia   - c/w home alprazolam, and Wellbutrin   - f/u with psychiatry as an OP     # HTN  -DASH diet    - c/w home medications    # GERD  - avoid late dinner    - increased  Protonix to Q 12 hours   # DLD   - c/w statin      # Diet: DASH / TLC  # DVT Prophylaxis: Lovenox  # GI Prophylaxis: Protonix    #Progress Note Handoff  Pending (specify): start po Prednisone on Sunday, pulmonary follow up, change Lasix to 60 mg Q 24 hours, keep negative balance, pain management   Family discussion: I spoke with pt, he agreed with a plan of care   Disposition: Home__x _/SNF___/Other________/Unknown at this time________  
54 years old Male smoker with PMHx of COPD, REBECCA on BiPAP, HTN, DLD, GERD, OA comes in for shortness of breath admitted with a working diagnosis of COPD exacerbation.     # Acute on chronic respiratory failure/ COPD exacerbation/ Smoking/ REBECCA, acute on chronic  diastolic CHF/ Morbid obesity/  Obesity  hypoventilation syndrome  - pulmonary follow up  - started on  po Prednisone 60 mg Q 24 hours today  - nebs Q 4 hours, on  Mucinex   - pulmonary toilet, chest PT Q 4 hours, suction   - he was extensively consulted  regarding smoking cessation   - CXR no acute pathology  - pt was treated with Azithromycin   - BIPAP QHS and PRN   - avoid late dinner and oversedation ( pt has very high risk of aspiration at night while on BIPAP)   - fluid restriction 1200 ml in 24 hours, intake and output monitoring, daily weight   - keep negative balance   - on Lasix to 60 mg IV Q 24 hours for now   - aspiration precautions   - low calories diet, encourage weight loss   - dietitian consult     #h/o multiple ortho surgery/  Chronic pain  - h/o  knee arthroplasty 2yrs ago, pt is scheduled for shoulder Sx two month from now   - Home pain meds, Confirmed with pharmacy & checked iStop, Rx from [facility]  -  Dilaudid 4mg q8 PRN  -  Morphine ER 100mg qid  -  Oxycodone IR 30mg q8  - pain management consult for opioid taper and counseling     # Anxiety / insomnia   - c/w home alprazolam, and Wellbutrin   - f/u with psychiatry as an OP     # HTN  -DASH diet    - c/w home medications    # GERD  - avoid late dinner    - increased  Protonix to Q 12 hours   # DLD   - c/w statin      # Diet: DASH / TLC  # DVT Prophylaxis: Lovenox  # GI Prophylaxis: Protonix    #Progress Note Handoff  Pending (specify): started on po Prednisone,  pulmonary follow up before discharge, will change Lasix to po on Monday.  Family discussion: I spoke with pt, he agreed with a plan of care   Disposition: Home__x _/SNF___/Other________/Unknown at this time________  
54 years old Male smoker with PMHx of COPD, REBECCA on BiPAP, HTN, DLD, GERD, OA comes in for shortness of breath admitted with a working diagnosis of COPD exacerbation.     # Acute on chronic respiratory failure/ COPD exacerbation/ Smoking/ REBECCA, acute on chronic  diastolic CHF/ Morbid obesity/  Obesity  hypoventilation syndrome  - pulmonary follow up today   -  on  po Prednisone 60 mg Q 24 hours for now, still symptomatic   - nebs Q 4 hours, on  Mucinex   - pulmonary toilet, chest PT Q 4 hours, suction   - he was extensively consulted  regarding smoking cessation   - CXR no acute pathology  - pt was treated with Azithromycin   - BIPAP QHS and PRN   - avoid late dinner and oversedation ( pt has very high risk of aspiration at night while on BIPAP)   - fluid restriction 1200 ml in 24 hours, intake and output monitoring, daily weight   - keep negative balance   - change IV Lasix to po   - aspiration precautions   - low calories diet, encourage weight loss   - dietitian consult     #h/o multiple ortho surgery/  Chronic pain  - h/o  knee arthroplasty 2yrs ago, pt is scheduled for shoulder Sx two month from now   - Home pain meds, Confirmed with pharmacy & checked iStop, Rx from [facility]  -  Dilaudid 4mg q8 PRN  -  Morphine ER 100mg qid  -  Oxycodone IR 30mg q8  - pain management consult for opioid taper and counseling is still pending.     # Anxiety / insomnia   - c/w home alprazolam, and Wellbutrin   - f/u with psychiatry as an OP     # HTN  -DASH diet    - c/w home medications    # GERD  - avoid late dinner    - on  Protonix to Q 12 hours     # DLD   - c/w statin      # Diet: DASH / TLC  # DVT Prophylaxis: Lovenox  # GI Prophylaxis: Protonix    #Progress Note Handoff  Pending (specify): pulmonary follow up today, change Lasix to po, anticipate discharge in 24- 48 hours   Family discussion: I spoke with pt, he agreed with a plan of care   Disposition: Home__x _/SNF___/Other________/Unknown at this time________  
54 years old Male smoker with PMHx of COPD, REBECCA on BiPAP, HTN, DLD, GERD, OA comes in for shortness of breath admitted with a working diagnosis of COPD exacerbation.     # Acute on chronic respiratory failure/ COPD exacerbation/ Smoking/ REBECCA, acute on chronic  diastolic CHF/ Morbid obesity/  Obesity  hypoventilation syndrome  - pulmonary followed up today, pt was cleared for discharge with OP follow up with DR Bassett    - will discharge home with Prednisone taper   - c/w Symbicort on discharge   - he was extensively consulted  regarding smoking cessation   - CXR no acute pathology  - pt was treated with Azithromycin   - BIPAP QHS and PRN   - avoid late dinner and oversedation ( pt has very high risk of aspiration at night while on BIPAP)   - fluid restriction 1200 ml in 24 hours, intake and output monitoring, daily weight   - keep negative balance   - discharge home on po Lasix   - aspiration precautions   - low calories diet, encourage weight loss   - dietitian consult     #h/o multiple ortho surgery/  Chronic pain  - h/o  knee arthroplasty 2yrs ago, pt is scheduled for shoulder Sx two month from now   - Home pain meds, Confirmed with pharmacy & checked iStop, Rx from [facility]  - f/u pain management recommendations ( decrease doses of meds on discharge)     # Anxiety / insomnia   - c/w home alprazolam, and Wellbutrin   - f/u with psychiatry as an OP     # HTN  -DASH diet    - c/w home medications    # GERD  - avoid late dinner    - on  Protonix to Q 12 hours     # DLD   - c/w statin      # Diet: DASH / TLC  # DVT Prophylaxis: Lovenox  # GI Prophylaxis: Protonix    #Progress Note Handoff  Pending (specify): none   Family discussion: I spoke with pt, he agreed with a plan of care   Disposition: pt was extensively consulted regarding medications compliance outpt follow ups, smoking cessation and weight loss.   
54 years old Male smoker with PMHx of COPD, REBECCA on BiPAP, HTN, DLD, GERD, OA comes in for shortness of breath admitted with a working diagnosis of COPD exacerbation.     # Acute on chronic respiratory failure/ COPD exacerbation/ Smoking/ REBECCA, suspected diastolic CHF/ Morbid obesity/  Obesity  hypoventilation syndrome  - taper down steroids , Solumedrol 60 mg IV Q 12 hours   - monitor pulse Ox  - nebs Q 4 hours, start Mucinex   - pt was consulted by pulmonary, recommendations noted  - he was extensively consulted  regarding smoking cessation   - CXR no acute pathology  - pt was treated with Azithromycin   - BIPAP QHS and PRN   - avoid late dinner and oversedation ( pt has very high risk of aspiration at night while on BIPAP)   - fluid restriction 1200 ml in 24 hours, intake and output monitoring, daily weight   - keep negative balance   - c/w Lasix 40mg PO qd, received one extra dose of  Lasix 40 IV today   - chest PT, aspiration precautions   - low calories diet, encourage weight loss   - dietitian consult     #h/o multiple ortho surgery/  Chronic pain  - h/o  knee arthroplasty 2yrs ago, pt is scheduled for shoulder Sx two month from now   - Home pain meds, Confirmed with pharmacy & checked iStop, Rx from [facility]  -- Dilaudid 4mg q8 PRN  -- Morphine ER 100mg qid  -- Oxycodone IR 30mg q8  - pain management consult for opioid taper and counseling     # Anxiety / insomnia   - c/w home alprazolam, and Wellbutrin   - f/u with psychiatry as an OP     # HTN  -DASH diet    - c/w home medications    # GERD  - avoid late dinner    - c/w protonix  # DLD   - c/w statin      # Diet: DASH / TLC  # DVT Prophylaxis: Lovenox  # GI Prophylaxis: Protonix    #Progress Note Handoff  Pending (specify): taper steroids, give one extra dose of IV Lasix , pain management consult to taper down pain medications and counseling, registered dietitian consult, low calorie diet   Family discussion: I spoke with pt, he agreed with a plan of care but wants to continue his pain meds   Disposition: Home__x _/SNF___/Other________/Unknown at this time________  
Impression  COPD Exacerbation improving  REBECCA on BIPAP  OHS    Plan:  Continue prednisone taper. D/C on prednisone 40mg for 5 days followed by 20mg for 5 days  Triple therapy on discharge  Please clarify med rec  Smoking cessation counselling  DVT Prophylaxis  OP pulm FU with Dr. Bassett in 2 weeks  Patient cleared for discharge from pulmonary standpoint
54 years old Male smoker with PMHx of COPD, REBECCA on BiPAP, HTN, DLD, GERD, OA comes in for shortness of breath admitted with a working diagnosis of COPD exacerbation.    # Shortness of breath / COPD exacerbation  - CXR no acute pathology  - Pulm recs appreciated  - wheezing on exam, cough  - on inhalers, Duoneb, albuterol MDI  - prednisone 60mg IV Q12 -> plan to switch to po prednisone tomorrow  - F/u pulm  - S/p Azithromycin 500mg qd x 3 days (ended 10/30)  - c/w lasix 40mg PO qd, gave lasix 40 IV x1  - chest PT  - smoking cessation encouraged; pt willing to try NRT    # Chronic pain  - Patient c/o chronic pain after knee arthroplasty 2yrs ago, now complains of shoulder pain also  - Home pain meds, Confirmed with pharmacy & checked iStop, Rx from [facility]  -- Dilaudid 4mg q8 PRN  -- Morphine ER 100mg qid  -- Oxycodone IR 30mg q8  - F/u pain management consult, continuing home meds for now pending pain mgmt recs    # Anxiety / insomnia   - c/w home alprazolam, and Wellbutrin for now -> Rx from patient's psychiatrist    # REBECCA   - BIPAP prn at night setting 28 / 18    # HTN - c/w home medications  # GERD - c/w protonix  # DLD - c/w statin      # Diet: DASH / TLC  # DVT Prophylaxis: Lovenox  # GI Prophylaxis: Protonix  # Activity: IAT  # Dispo: Acute  # Code Status: Fullcode
sinus bradycardia

## 2020-11-03 NOTE — PROGRESS NOTE ADULT - SUBJECTIVE AND OBJECTIVE BOX
Pain Management Progress Note -     54y Male smoker with PMHx of COPD, REBECCA on BiPAP, HTN, DLD, GERD, OA comes in for shortness of breath.       History of presenting illness goes back 3 months ago when patient developed shortness of breath aggravated by exertion and relieved by rest. Patient with complaint of bilat shoulder pain. Limited ROM due to pain, not weakness. As discussed with his PMD providing opioids, NO SCRIPTS for opioids are to be given.   Patient to continue his home meds and any refills needed will be provided by his PMD. Discussed with Dr. Baron.       No Known Allergies      COPD EXACERBATION    ^TROUBLE BREATHING    FH: diabetes mellitus    FH: lung cancer (Mother)    No pertinent family history in first degree relatives    No pertinent family history in first degree relatives (Father, Mother)    Handoff    MEWS Score    REBECCA treated with BiPAP    COPD (chronic obstructive pulmonary disease)    Numbness and tingling    Colitis    Hiatal hernia    SOB (shortness of breath)    Osteoarthritis    High blood cholesterol    Morbid obesity    Obstructive sleep apnea    GERD (gastroesophageal reflux disease)    High cholesterol    HTN (hypertension)    COPD (chronic obstructive pulmonary disease)    COPD exacerbation    Chronic pain    H/O knee surgery    Hernia, inguinal, bilateral    History of knee replacement procedure of right knee    History of appendectomy    History of cholecystectomy    TROUBLE BREATHING    TROBLE BREATHING    9    Chronic pain    Pneumonia    SysAdmin_VisitLink        HYDROmorphone   Tablet  oxyCODONE    IR  methadone    Tablet  morphine ER Tablet  furosemide    Tablet  influenza   Vaccine  pantoprazole    Tablet  predniSONE   Tablet  guaiFENesin ER  polyethylene glycol 3350  senna  albuterol/ipratropium for Nebulization  nicotine - 21 mG/24Hr(s) Patch  chlorhexidine 4% Liquid  enoxaparin Injectable  buPROPion XL .  tiotropium 18 MICROgram(s) Capsule  ALPRAZolam  simvastatin  fenofibrate Tablet  lisinopril  ALBUTerol    90 MICROgram(s) HFA Inhaler      ALBUTerol    90 MICROgram(s) HFA Inhaler 2 Puff(s) Inhalation every 15 minutes PRN  albuterol/ipratropium for Nebulization 3 milliLiter(s) Nebulizer every 6 hours  ALPRAZolam 2 milliGRAM(s) Oral four times a day PRN  buPROPion XL . 150 milliGRAM(s) Oral daily  chlorhexidine 4% Liquid 1 Application(s) Topical <User Schedule>  enoxaparin Injectable 40 milliGRAM(s) SubCutaneous daily  fenofibrate Tablet 145 milliGRAM(s) Oral daily  furosemide    Tablet 60 milliGRAM(s) Oral daily  guaiFENesin  milliGRAM(s) Oral every 12 hours  HYDROmorphone   Tablet 4 milliGRAM(s) Oral every 8 hours PRN  influenza   Vaccine 0.5 milliLiter(s) IntraMuscular once  lisinopril 5 milliGRAM(s) Oral daily  methadone    Tablet 10 milliGRAM(s) Oral every 8 hours  morphine ER Tablet 100 milliGRAM(s) Oral four times a day  nicotine - 21 mG/24Hr(s) Patch 1 patch Transdermal daily  oxyCODONE    IR 30 milliGRAM(s) Oral every 4 hours PRN  pantoprazole    Tablet 40 milliGRAM(s) Oral two times a day  polyethylene glycol 3350 17 Gram(s) Oral daily  predniSONE   Tablet 60 milliGRAM(s) Oral daily  senna 2 Tablet(s) Oral at bedtime  simvastatin 20 milliGRAM(s) Oral at bedtime  tiotropium 18 MICROgram(s) Capsule 1 Capsule(s) Inhalation daily          Hemoglobin: 14.0 g/dL (11-02 @ 05:42)  Hemoglobin: 14.0 g/dL (11-01 @ 13:35)        T(C): 35.6 (11-03-20 @ 04:57), Max: 37 (11-02-20 @ 14:50)  HR: 98 (11-03-20 @ 04:57) (89 - 98)  BP: 112/62 (11-03-20 @ 04:57) (111/65 - 112/62)  RR: 18 (11-03-20 @ 04:57) (18 - 19)  SpO2: 94% (11-02-20 @ 18:10) (94% - 94%)  Wt(kg): --     REVIEW OF SYSTEMS    General:	NAD   Skin/Breast:	Neg  Ophthalmologic:	Neg  ENMT: Neg	  Respiratory and Thorax: Neg	  Cardiovascular:	Neg  Gastrointestinal:	Neg  Genitourinary:	Neg  Musculoskeletal:	Neg  Neurological:	Neg  Psychiatric:	Neg  Hematology/Lymphatics:	Neg  Endocrine:	Neg        PHYSICAL EXAM:    GENERAL: NAD, well-groomed, well-developed  HEAD:  Atraumatic, Normocephalic  EYES: EOMI, PERRLA, conjunctiva and sclera clear  ENMT: No tonsillar erythema, exudates, or enlargement; Moist mucous membranes, Good dentition, No lesions  NECK: Supple, No JVD, Normal thyroid  NERVOUS SYSTEM:  Alert & Oriented X3, Good concentration; Motor Strength 5/5 B/L upper and lower extremities; DTRs 2+ intact and symmetric  CHEST/LUNG: Clear to percussion bilaterally; No rales, rhonchi, wheezing, or rubs  HEART: Regular rate and rhythm; No murmurs, rubs, or gallops  ABDOMEN: Soft, Nontender, Nondistended; Bowel sounds present  EXTREMITIES:  2+ Peripheral Pulses, No clubbing, cyanosis, or edema  LYMPH: No lymphadenopathy noted  SKIN: No rashes or lesions             Pain Management Progress Note -     54y Male smoker with PMHx of COPD, REBECCA on BiPAP, HTN, DLD, GERD, OA comes in for shortness of breath.       History of presenting illness goes back 3 months ago when patient developed shortness of breath aggravated by exertion and relieved by rest. Patient with complaint of bilat shoulder pain. Limited ROM due to pain, not weakness. As discussed with his PMD providing opioids, NO SCRIPTS for opioids are to be given.   Patient to continue his home meds and any refills needed will be provided by his PMD. Discussed with Dr. Baron.     Patient's pain is controlled. Will sign off. Recall for any uncontrolled pain.       No Known Allergies      COPD EXACERBATION    ^TROUBLE BREATHING    FH: diabetes mellitus    FH: lung cancer (Mother)    No pertinent family history in first degree relatives    No pertinent family history in first degree relatives (Father, Mother)    Handoff    MEWS Score    REBECCA treated with BiPAP    COPD (chronic obstructive pulmonary disease)    Numbness and tingling    Colitis    Hiatal hernia    SOB (shortness of breath)    Osteoarthritis    High blood cholesterol    Morbid obesity    Obstructive sleep apnea    GERD (gastroesophageal reflux disease)    High cholesterol    HTN (hypertension)    COPD (chronic obstructive pulmonary disease)    COPD exacerbation    Chronic pain    H/O knee surgery    Hernia, inguinal, bilateral    History of knee replacement procedure of right knee    History of appendectomy    History of cholecystectomy    TROUBLE BREATHING    TROBLE BREATHING    9    Chronic pain    Pneumonia    SysAdmin_VisitLink        HYDROmorphone   Tablet  oxyCODONE    IR  methadone    Tablet  morphine ER Tablet  furosemide    Tablet  influenza   Vaccine  pantoprazole    Tablet  predniSONE   Tablet  guaiFENesin ER  polyethylene glycol 3350  senna  albuterol/ipratropium for Nebulization  nicotine - 21 mG/24Hr(s) Patch  chlorhexidine 4% Liquid  enoxaparin Injectable  buPROPion XL .  tiotropium 18 MICROgram(s) Capsule  ALPRAZolam  simvastatin  fenofibrate Tablet  lisinopril  ALBUTerol    90 MICROgram(s) HFA Inhaler      ALBUTerol    90 MICROgram(s) HFA Inhaler 2 Puff(s) Inhalation every 15 minutes PRN  albuterol/ipratropium for Nebulization 3 milliLiter(s) Nebulizer every 6 hours  ALPRAZolam 2 milliGRAM(s) Oral four times a day PRN  buPROPion XL . 150 milliGRAM(s) Oral daily  chlorhexidine 4% Liquid 1 Application(s) Topical <User Schedule>  enoxaparin Injectable 40 milliGRAM(s) SubCutaneous daily  fenofibrate Tablet 145 milliGRAM(s) Oral daily  furosemide    Tablet 60 milliGRAM(s) Oral daily  guaiFENesin  milliGRAM(s) Oral every 12 hours  HYDROmorphone   Tablet 4 milliGRAM(s) Oral every 8 hours PRN  influenza   Vaccine 0.5 milliLiter(s) IntraMuscular once  lisinopril 5 milliGRAM(s) Oral daily  methadone    Tablet 10 milliGRAM(s) Oral every 8 hours  morphine ER Tablet 100 milliGRAM(s) Oral four times a day  nicotine - 21 mG/24Hr(s) Patch 1 patch Transdermal daily  oxyCODONE    IR 30 milliGRAM(s) Oral every 4 hours PRN  pantoprazole    Tablet 40 milliGRAM(s) Oral two times a day  polyethylene glycol 3350 17 Gram(s) Oral daily  predniSONE   Tablet 60 milliGRAM(s) Oral daily  senna 2 Tablet(s) Oral at bedtime  simvastatin 20 milliGRAM(s) Oral at bedtime  tiotropium 18 MICROgram(s) Capsule 1 Capsule(s) Inhalation daily          Hemoglobin: 14.0 g/dL (11-02 @ 05:42)  Hemoglobin: 14.0 g/dL (11-01 @ 13:35)        T(C): 35.6 (11-03-20 @ 04:57), Max: 37 (11-02-20 @ 14:50)  HR: 98 (11-03-20 @ 04:57) (89 - 98)  BP: 112/62 (11-03-20 @ 04:57) (111/65 - 112/62)  RR: 18 (11-03-20 @ 04:57) (18 - 19)  SpO2: 94% (11-02-20 @ 18:10) (94% - 94%)  Wt(kg): --     REVIEW OF SYSTEMS    General:	NAD   Skin/Breast:	Neg  Ophthalmologic:	Neg  ENMT: Neg	  Respiratory and Thorax: Neg	  Cardiovascular:	Neg  Gastrointestinal:	Neg  Genitourinary:	Neg  Musculoskeletal:	Neg  Neurological:	Neg  Psychiatric:	Neg  Hematology/Lymphatics:	Neg  Endocrine:	Neg        PHYSICAL EXAM:    GENERAL: NAD, well-groomed, well-developed  HEAD:  Atraumatic, Normocephalic  EYES: EOMI, PERRLA, conjunctiva and sclera clear  ENMT: No tonsillar erythema, exudates, or enlargement; Moist mucous membranes, Good dentition, No lesions  NECK: Supple, No JVD, Normal thyroid  NERVOUS SYSTEM:  Alert & Oriented X3, Good concentration; Motor Strength 5/5 B/L upper and lower extremities; DTRs 2+ intact and symmetric  CHEST/LUNG: Clear to percussion bilaterally; No rales, rhonchi, wheezing, or rubs  HEART: Regular rate and rhythm; No murmurs, rubs, or gallops  ABDOMEN: Soft, Nontender, Nondistended; Bowel sounds present  EXTREMITIES:  2+ Peripheral Pulses, No clubbing, cyanosis, or edema  LYMPH: No lymphadenopathy noted  SKIN: No rashes or lesions

## 2020-11-03 NOTE — PROGRESS NOTE ADULT - SUBJECTIVE AND OBJECTIVE BOX
Patient is a 54y old  Male who presents with a chief complaint of shortness of breath (02 Nov 2020 18:18)    SUBJECTIVE: Remains short of breath but significantly better than day of admission.     PHYSICAL EXAM  Vital Signs Last 24 Hrs  T(C): 35.6 (03 Nov 2020 04:57), Max: 37 (02 Nov 2020 14:50)  T(F): 96 (03 Nov 2020 04:57), Max: 98.6 (02 Nov 2020 14:50)  HR: 98 (03 Nov 2020 04:57) (89 - 98)  BP: 112/62 (03 Nov 2020 04:57) (111/65 - 112/62)  RR: 18 (03 Nov 2020 04:57) (18 - 19)  SpO2: 94% (02 Nov 2020 18:10) (94% - 94%)    CONSTITUTIONAL:  Well nourished.  NAD. Morbidly obese    ENT:   Airway patent,   No thrush    EYES:   Clear bilaterally,   pupils equal, round and reactive to light.    CARDIAC:   Normal rate,   regular rhythm.    no edema    CAROTID:   normal systolic impulse  no bruits    RESPIRATORY:   Bilateral wheezing and rales  Normal chest expansion  Not tachypneic  Nno use of accessory muscles    GASTROINTESTINAL:  Abdomen soft,   non-tender,   no guarding,   + BS    GENITOURINARY  normal genitalia for sex    MUSCULOSKELETAL:   range of motion is not limited,  no clubbing, cyanosis    NEUROLOGICAL:   Alert and oriented   no motor  deficits.    SKIN:   Skin normal color for race,   No evidence of rash.    PSYCHIATRIC:   normal mood and affect.   no apparent risk to self or others.    HEME LYMPH:   No cervical  lymphadenopathy.  no inguinal lymphadenopathy      LABS:                          14.0   13.12 )-----------( 206      ( 02 Nov 2020 05:42 )             43.0                                               11-02    135  |  93<L>  |  24<H>  ----------------------------<  125<H>  4.9   |  32  |  1.0    Ca    9.3      02 Nov 2020 05:42  Mg     2.4     11-02    TPro  6.2  /  Alb  3.7  /  TBili  0.4  /  DBili  x   /  AST  47<H>  /  ALT  96<H>  /  AlkPhos  94  11-02                                              LIVER FUNCTIONS - ( 02 Nov 2020 05:42 )  Alb: 3.7 g/dL / Pro: 6.2 g/dL / ALK PHOS: 94 U/L / ALT: 96 U/L / AST: 47 U/L / GGT: x                                                                                                MEDICATIONS  (STANDING):  albuterol/ipratropium for Nebulization 3 milliLiter(s) Nebulizer every 6 hours  buPROPion XL . 150 milliGRAM(s) Oral daily  chlorhexidine 4% Liquid 1 Application(s) Topical <User Schedule>  enoxaparin Injectable 40 milliGRAM(s) SubCutaneous daily  fenofibrate Tablet 145 milliGRAM(s) Oral daily  furosemide    Tablet 60 milliGRAM(s) Oral daily  guaiFENesin  milliGRAM(s) Oral every 12 hours  influenza   Vaccine 0.5 milliLiter(s) IntraMuscular once  lisinopril 5 milliGRAM(s) Oral daily  methadone    Tablet 10 milliGRAM(s) Oral every 8 hours  morphine ER Tablet 100 milliGRAM(s) Oral four times a day  nicotine - 21 mG/24Hr(s) Patch 1 patch Transdermal daily  pantoprazole    Tablet 40 milliGRAM(s) Oral two times a day  polyethylene glycol 3350 17 Gram(s) Oral daily  predniSONE   Tablet 60 milliGRAM(s) Oral daily  senna 2 Tablet(s) Oral at bedtime  simvastatin 20 milliGRAM(s) Oral at bedtime  tiotropium 18 MICROgram(s) Capsule 1 Capsule(s) Inhalation daily    MEDICATIONS  (PRN):  ALBUTerol    90 MICROgram(s) HFA Inhaler 2 Puff(s) Inhalation every 15 minutes PRN Wheezing  ALPRAZolam 2 milliGRAM(s) Oral four times a day PRN anxiety  HYDROmorphone   Tablet 4 milliGRAM(s) Oral every 8 hours PRN Moderate Pain (4 - 6)  oxyCODONE    IR 30 milliGRAM(s) Oral every 4 hours PRN Severe Pain (7 - 10)

## 2020-11-03 NOTE — PROGRESS NOTE ADULT - PROBLEM SELECTOR PLAN 1
Con't meds below. NO SCRIPTS for Discharge home  HYDROmorphone   Tablet 4 milliGRAM(s) Oral every 8 hours PRN  methadone    Tablet 10 milliGRAM(s) Oral every 8 hours  morphine ER Tablet 100 milliGRAM(s) Oral four times a day  oxyCODONE    IR 30 milliGRAM(s) Oral every 4 hours PRN

## 2020-11-03 NOTE — PROGRESS NOTE ADULT - SUBJECTIVE AND OBJECTIVE BOX
.54 years old Male smoker with PMHx of COPD, REBECCA on BiPAP, HTN, DLD, GERD, OA comes in for shortness of breath admitted with a working diagnosis of COPD exacerbation.  He was treated with Iv steroids, consulted by pulmonary, clinically improved. He was consulted regarding smoking cessation. Patient gets high dose of opioids from pain management doctor (Erick Womack MD). Pt is morbidly obese, has h/o b/l knee replacement, ankle Sx and has b/l shoulder surgery scheduled in 2 months.  Pt wants to keep taking  the same dose of opioids  for now and will taper after surgery. His wife expressed her concern regarding high doses of pain medications , pain management consulted recommendations noted.   Case d/w pulmonary fellow today, pt was cleared for discharge.   Today pt feels better, has dyspnea at times, asking if his needs Ox.      VITAL SIGNS: Last 24 Hours  T(C): 36.4 (03 Nov 2020 13:06), Max: 37 (02 Nov 2020 14:50)  T(F): 97.6 (03 Nov 2020 13:06), Max: 98.6 (02 Nov 2020 14:50)  HR: 101 (03 Nov 2020 13:06) (89 - 101)  BP: 113/66 (03 Nov 2020 13:06) (111/65 - 113/66)  RR: 20 (03 Nov 2020 13:06) (18 - 20)  SpO2: 94% (02 Nov 2020 18:10) (94% - 94%)    PHYSICAL EXAM:  GENERAL:   Awake, alert; NAD, morbidly obese   HEENT:  Head NC/AT; Conjunctivae pink, Sclera anicteric; Oral mucosa moist, large neck   CARDIO:   Regular rate; Regular rhythm; S1 & S2.  RESP:  Coarse breath sounds bilaterally with prolonged inspiratory phase   Abdominal/ GI: obese,   Soft; NT/ND; BS; No guarding; No rebound tenderness   EXT:   Strength UE 5/5; Strength LE 5/5; No edema  SKIN:   Intact.    LAB RESULTS:                                            14.0   13.12 )-----------( 206      ( 02 Nov 2020 05:42 )             43.0   11-02    135  |  93<L>  |  24<H>  ----------------------------<  125<H>  4.9   |  32  |  1.0    Ca    9.3      02 Nov 2020 05:42  Mg     2.4     11-02    TPro  6.2  /  Alb  3.7  /  TBili  0.4  /  DBili  x   /  AST  47<H>  /  ALT  96<H>  /  AlkPhos  94  11-02        RADIOLOGY:    < from: Xray Chest 1 View- PORTABLE-Routine (Xray Chest 1 View- PORTABLE-Routine in AM.) (10.29.20 @ 09:08) >  Impression:    No significant radiographic change on frontal view.    < from: Transthoracic Echocardiogram (02.21.20 @ 07:21) >  Summary:   1. Normal global left ventricular systolic function.   2. LV Ejection Fraction by Bender's Method with a biplane EF of 61 %.   3. Moderately increased LV wall thickness.   4. Normal left ventricular internal cavity size.   5. Normal right atrial size.   6. There is no evidence of pericardial effusion.   7. Mild thickening of the anterior and posterior mitral valve leaflets.   8. No evidence of mitral valve regurgitation.   9. Dilatation of the aortic root.        MEDICATIONS  (STANDING):  albuterol/ipratropium for Nebulization 3 milliLiter(s) Nebulizer every 6 hours  buPROPion XL . 150 milliGRAM(s) Oral daily  chlorhexidine 4% Liquid 1 Application(s) Topical <User Schedule>  enoxaparin Injectable 40 milliGRAM(s) SubCutaneous daily  fenofibrate Tablet 145 milliGRAM(s) Oral daily  furosemide    Tablet 60 milliGRAM(s) Oral daily  guaiFENesin  milliGRAM(s) Oral every 12 hours  influenza   Vaccine 0.5 milliLiter(s) IntraMuscular once  lisinopril 5 milliGRAM(s) Oral daily  methadone    Tablet 10 milliGRAM(s) Oral every 8 hours  morphine ER Tablet 100 milliGRAM(s) Oral four times a day  nicotine - 21 mG/24Hr(s) Patch 1 patch Transdermal daily  pantoprazole    Tablet 40 milliGRAM(s) Oral two times a day  polyethylene glycol 3350 17 Gram(s) Oral daily  predniSONE   Tablet 60 milliGRAM(s) Oral daily  senna 2 Tablet(s) Oral at bedtime  simvastatin 20 milliGRAM(s) Oral at bedtime  tiotropium 18 MICROgram(s) Capsule 1 Capsule(s) Inhalation daily    MEDICATIONS  (PRN):  ALBUTerol    90 MICROgram(s) HFA Inhaler 2 Puff(s) Inhalation every 15 minutes PRN Wheezing  ALPRAZolam 2 milliGRAM(s) Oral four times a day PRN anxiety  HYDROmorphone   Tablet 4 milliGRAM(s) Oral every 8 hours PRN Moderate Pain (4 - 6)  oxyCODONE    IR 30 milliGRAM(s) Oral every 4 hours PRN Severe Pain (7 - 10)

## 2020-11-03 NOTE — PROGRESS NOTE ADULT - NSHPATTENDINGPLANDISCUSS_GEN_ALL_CORE
house staff, residents during rounds

## 2020-11-03 NOTE — PROGRESS NOTE ADULT - SUBJECTIVE AND OBJECTIVE BOX
DAILY PROGRESS NOTE  ===========================================================    Patient Information:  KAYLEIGH TOMAS  /  54y  /  Male  /  MRN#: [id number]    Hospital Day: 6d     |:::::::::::::::::::::::::::| SUBJECTIVE |:::::::::::::::::::::::::::|    OVERNIGHT EVENTS: NONE  TODAY: Patient was seen today at bedside. Review of systems is otherwise negative.    |:::::::::::::::::::::::::::| OBJECTIVE |:::::::::::::::::::::::::::|    VITAL SIGNS: Last 24 Hours  T(C): 35.6 (03 Nov 2020 04:57), Max: 37 (02 Nov 2020 14:50)  T(F): 96 (03 Nov 2020 04:57), Max: 98.6 (02 Nov 2020 14:50)  HR: 98 (03 Nov 2020 04:57) (89 - 98)  BP: 112/62 (03 Nov 2020 04:57) (111/65 - 112/62)  BP(mean): --  RR: 18 (03 Nov 2020 04:57) (18 - 19)  SpO2: 94% (02 Nov 2020 18:10) (94% - 94%)    PHYSICAL EXAM:  GENERAL:   Awake, alert; NAD.  HEENT:  Head NC/AT; Conjunctivae pink, Sclera anicteric; Oral mucosa moist.  CARDIO:   Regular rate; Regular rhythm; S1 & S2.  RESP:  RALES and wheezing bilaterally   GI:   Soft; NT/ND; BS; No guarding; No rebound tenderness.  EXT:   Stregnth UE 5/5; Strength LE 5/5; No edema.   SKIN:   Intact.    LAB RESULTS:                        14.0   13.12 )-----------( 206      ( 02 Nov 2020 05:42 )             43.0     11-02    135  |  93<L>  |  24<H>  ----------------------------<  125<H>  4.9   |  32  |  1.0    Ca    9.3      02 Nov 2020 05:42  Mg     2.4     11-02    TPro  6.2  /  Alb  3.7  /  TBili  0.4  /  DBili  x   /  AST  47<H>  /  ALT  96<H>  /  AlkPhos  94  11-02                MICROBIOLOGY:    RADIOLOGY:    ALLERGIES:  No Known Allergies      ===========================================================

## 2020-11-10 DIAGNOSIS — F17.210 NICOTINE DEPENDENCE, CIGARETTES, UNCOMPLICATED: ICD-10-CM

## 2020-11-10 DIAGNOSIS — M25.512 PAIN IN LEFT SHOULDER: ICD-10-CM

## 2020-11-10 DIAGNOSIS — I50.33 ACUTE ON CHRONIC DIASTOLIC (CONGESTIVE) HEART FAILURE: ICD-10-CM

## 2020-11-10 DIAGNOSIS — Z90.49 ACQUIRED ABSENCE OF OTHER SPECIFIED PARTS OF DIGESTIVE TRACT: ICD-10-CM

## 2020-11-10 DIAGNOSIS — J96.21 ACUTE AND CHRONIC RESPIRATORY FAILURE WITH HYPOXIA: ICD-10-CM

## 2020-11-10 DIAGNOSIS — J44.1 CHRONIC OBSTRUCTIVE PULMONARY DISEASE WITH (ACUTE) EXACERBATION: ICD-10-CM

## 2020-11-10 DIAGNOSIS — M25.511 PAIN IN RIGHT SHOULDER: ICD-10-CM

## 2020-11-10 DIAGNOSIS — I11.0 HYPERTENSIVE HEART DISEASE WITH HEART FAILURE: ICD-10-CM

## 2020-11-10 DIAGNOSIS — E66.2 MORBID (SEVERE) OBESITY WITH ALVEOLAR HYPOVENTILATION: ICD-10-CM

## 2020-11-10 DIAGNOSIS — J96.22 ACUTE AND CHRONIC RESPIRATORY FAILURE WITH HYPERCAPNIA: ICD-10-CM

## 2020-11-10 DIAGNOSIS — Z99.89 DEPENDENCE ON OTHER ENABLING MACHINES AND DEVICES: ICD-10-CM

## 2020-11-10 DIAGNOSIS — K21.9 GASTRO-ESOPHAGEAL REFLUX DISEASE WITHOUT ESOPHAGITIS: ICD-10-CM

## 2020-11-10 DIAGNOSIS — G47.00 INSOMNIA, UNSPECIFIED: ICD-10-CM

## 2020-11-10 DIAGNOSIS — E78.00 PURE HYPERCHOLESTEROLEMIA, UNSPECIFIED: ICD-10-CM

## 2020-12-17 NOTE — ED ADULT NURSE NOTE - CAS EDN INTEG ASSESS
Problem: Urinary Tract Infection  Goal: Urinary Tract Infection (UTI) s/s resolved  Outcome: Outcome Not Met, Continue to Monitor  Goal: Verbalizes s/s of UTI and treatment plan  Description: Document on Patient Education Activity   Outcome: Outcome Not Met, Continue to Monitor     Problem: Diabetes  Goal: Glycemic balance achieved/maintained  Description: Goal is to maintain blood sugar within range with no episodes of hypoglycemia  Outcome: Outcome Not Met, Continue to Monitor  Goal: Verbalizes/demonstrates understanding of Diabetes  Description: Document on Patient Education Activity  Outcome: Outcome Not Met, Continue to Monitor  Goal: Demonstrates ability to self-administer insulin  Description: Document on Patient Education Activity  Outcome: Outcome Not Met, Continue to Monitor     Problem: Pressure Injury, Risk for  Goal: # Skin remains intact  Outcome: Outcome Not Met, Continue to Monitor  Goal: No new pressure injury (PI) development  Outcome: Outcome Not Met, Continue to Monitor  Goal: # Verbalizes understanding of PI risk factors and prevention strategies  Description: Document education using the patient education activity.   Outcome: Outcome Not Met, Continue to Monitor  Goal: Comfort optimized with pressure injury prevention strategies guided by patient/family preference. (Hospice)  Outcome: Outcome Not Met, Continue to Monitor      WDL

## 2020-12-18 NOTE — ASU PATIENT PROFILE, ADULT - NS PRO INFO GIVEN TO
Ambulated on unit this morning with spouse at side.  No c/o pain or SOB.  Verbalized understanding of procedure.  Oriented to unit and call bell provided.  UPT negative this am.  Will continue to monitor.   
patient

## 2021-01-01 NOTE — ED ADULT TRIAGE NOTE - CHIEF COMPLAINT QUOTE
"My belly pains started this morning at 8, I've been vomiting and having diarrhea. At noontime, I started having chest pains."
Statement Selected

## 2021-01-10 NOTE — DISCHARGE NOTE PROVIDER - NSDCHC_MEDRECSTATUS_GEN_ALL_CORE
alcohol on breathe/ intoxicated Admission Reconciliation is Completed  Discharge Reconciliation is Completed

## 2021-02-01 ENCOUNTER — OUTPATIENT (OUTPATIENT)
Dept: OUTPATIENT SERVICES | Facility: HOSPITAL | Age: 56
LOS: 1 days | End: 2021-02-01
Payer: MEDICAID

## 2021-02-01 DIAGNOSIS — Z98.890 OTHER SPECIFIED POSTPROCEDURAL STATES: Chronic | ICD-10-CM

## 2021-02-01 DIAGNOSIS — K40.20 BILATERAL INGUINAL HERNIA, WITHOUT OBSTRUCTION OR GANGRENE, NOT SPECIFIED AS RECURRENT: Chronic | ICD-10-CM

## 2021-02-01 DIAGNOSIS — Z96.651 PRESENCE OF RIGHT ARTIFICIAL KNEE JOINT: Chronic | ICD-10-CM

## 2021-02-19 NOTE — CONSULT NOTE ADULT - REASON FOR ADMISSION
[facility] 3/17/21  Last seen: 1/14/21  Last refilled: 12/17/20 #30 RF:1  Medication:escitalopram (LEXAPRO) 10 MG tablet  TAKE 1 TABLET BY MOUTH EVERY DAY  Last note reviewed:  Continue escitalopram 10 mg daily in the morning for now.    One month supply sent per protocols  
shortness of breath
shortness of breath

## 2021-02-20 ENCOUNTER — EMERGENCY (EMERGENCY)
Facility: HOSPITAL | Age: 56
LOS: 0 days | Discharge: HOME | End: 2021-02-20
Attending: EMERGENCY MEDICINE | Admitting: EMERGENCY MEDICINE
Payer: MEDICAID

## 2021-02-20 VITALS
SYSTOLIC BLOOD PRESSURE: 136 MMHG | HEART RATE: 86 BPM | RESPIRATION RATE: 18 BRPM | DIASTOLIC BLOOD PRESSURE: 92 MMHG | TEMPERATURE: 97 F | HEIGHT: 70 IN | OXYGEN SATURATION: 98 %

## 2021-02-20 VITALS
OXYGEN SATURATION: 97 % | HEART RATE: 86 BPM | SYSTOLIC BLOOD PRESSURE: 139 MMHG | TEMPERATURE: 97 F | RESPIRATION RATE: 18 BRPM | DIASTOLIC BLOOD PRESSURE: 86 MMHG

## 2021-02-20 DIAGNOSIS — Z79.899 OTHER LONG TERM (CURRENT) DRUG THERAPY: ICD-10-CM

## 2021-02-20 DIAGNOSIS — K40.20 BILATERAL INGUINAL HERNIA, WITHOUT OBSTRUCTION OR GANGRENE, NOT SPECIFIED AS RECURRENT: Chronic | ICD-10-CM
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
## 2021-02-20 DIAGNOSIS — Z96.651 PRESENCE OF RIGHT ARTIFICIAL KNEE JOINT: Chronic | ICD-10-CM
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
## 2021-02-20 DIAGNOSIS — Z98.890 OTHER SPECIFIED POSTPROCEDURAL STATES: Chronic | ICD-10-CM

## 2021-02-20 DIAGNOSIS — R07.9 CHEST PAIN, UNSPECIFIED: ICD-10-CM

## 2021-02-20 DIAGNOSIS — I10 ESSENTIAL (PRIMARY) HYPERTENSION: ICD-10-CM

## 2021-02-20 DIAGNOSIS — R07.89 OTHER CHEST PAIN: ICD-10-CM

## 2021-02-20 DIAGNOSIS — J44.9 CHRONIC OBSTRUCTIVE PULMONARY DISEASE, UNSPECIFIED: ICD-10-CM
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
## 2021-02-20 DIAGNOSIS — Z79.51 LONG TERM (CURRENT) USE OF INHALED STEROIDS: ICD-10-CM

## 2021-02-20 LAB
ALBUMIN SERPL ELPH-MCNC: 3.8 G/DL — SIGNIFICANT CHANGE UP (ref 3.5–5.2)
ALP SERPL-CCNC: 120 U/L — HIGH (ref 30–115)
ALT FLD-CCNC: 20 U/L — SIGNIFICANT CHANGE UP (ref 0–41)
ANION GAP SERPL CALC-SCNC: 11 MMOL/L — SIGNIFICANT CHANGE UP (ref 7–14)
AST SERPL-CCNC: 29 U/L — SIGNIFICANT CHANGE UP (ref 0–41)
BASOPHILS # BLD AUTO: 0.06 K/UL — SIGNIFICANT CHANGE UP (ref 0–0.2)
BASOPHILS NFR BLD AUTO: 0.6 % — SIGNIFICANT CHANGE UP (ref 0–1)
BILIRUB SERPL-MCNC: 0.3 MG/DL — SIGNIFICANT CHANGE UP (ref 0.2–1.2)
BUN SERPL-MCNC: 5 MG/DL — LOW (ref 10–20)
CALCIUM SERPL-MCNC: 8.8 MG/DL — SIGNIFICANT CHANGE UP (ref 8.5–10.1)
CHLORIDE SERPL-SCNC: 100 MMOL/L — SIGNIFICANT CHANGE UP (ref 98–110)
CO2 SERPL-SCNC: 26 MMOL/L — SIGNIFICANT CHANGE UP (ref 17–32)
CREAT SERPL-MCNC: 0.7 MG/DL — SIGNIFICANT CHANGE UP (ref 0.7–1.5)
EOSINOPHIL # BLD AUTO: 0.22 K/UL — SIGNIFICANT CHANGE UP (ref 0–0.7)
EOSINOPHIL NFR BLD AUTO: 2.2 % — SIGNIFICANT CHANGE UP (ref 0–8)
GLUCOSE SERPL-MCNC: 102 MG/DL — HIGH (ref 70–99)
HCT VFR BLD CALC: 45.8 % — SIGNIFICANT CHANGE UP (ref 42–52)
HGB BLD-MCNC: 15 G/DL — SIGNIFICANT CHANGE UP (ref 14–18)
IMM GRANULOCYTES NFR BLD AUTO: 0.5 % — HIGH (ref 0.1–0.3)
LYMPHOCYTES # BLD AUTO: 1.52 K/UL — SIGNIFICANT CHANGE UP (ref 1.2–3.4)
LYMPHOCYTES # BLD AUTO: 15.2 % — LOW (ref 20.5–51.1)
MCHC RBC-ENTMCNC: 28.5 PG — SIGNIFICANT CHANGE UP (ref 27–31)
MCHC RBC-ENTMCNC: 32.8 G/DL — SIGNIFICANT CHANGE UP (ref 32–37)
MCV RBC AUTO: 86.9 FL — SIGNIFICANT CHANGE UP (ref 80–94)
MONOCYTES # BLD AUTO: 0.81 K/UL — HIGH (ref 0.1–0.6)
MONOCYTES NFR BLD AUTO: 8.1 % — SIGNIFICANT CHANGE UP (ref 1.7–9.3)
NEUTROPHILS # BLD AUTO: 7.35 K/UL — HIGH (ref 1.4–6.5)
NEUTROPHILS NFR BLD AUTO: 73.4 % — SIGNIFICANT CHANGE UP (ref 42.2–75.2)
NRBC # BLD: 0 /100 WBCS — SIGNIFICANT CHANGE UP (ref 0–0)
PLATELET # BLD AUTO: 218 K/UL — SIGNIFICANT CHANGE UP (ref 130–400)
POTASSIUM SERPL-MCNC: 5.3 MMOL/L — HIGH (ref 3.5–5)
POTASSIUM SERPL-SCNC: 5.3 MMOL/L — HIGH (ref 3.5–5)
PROT SERPL-MCNC: 6.6 G/DL — SIGNIFICANT CHANGE UP (ref 6–8)
RBC # BLD: 5.27 M/UL — SIGNIFICANT CHANGE UP (ref 4.7–6.1)
RBC # FLD: 14.3 % — SIGNIFICANT CHANGE UP (ref 11.5–14.5)
SODIUM SERPL-SCNC: 137 MMOL/L — SIGNIFICANT CHANGE UP (ref 135–146)
TROPONIN T SERPL-MCNC: <0.01 NG/ML — SIGNIFICANT CHANGE UP
WBC # BLD: 10.01 K/UL — SIGNIFICANT CHANGE UP (ref 4.8–10.8)
WBC # FLD AUTO: 10.01 K/UL — SIGNIFICANT CHANGE UP (ref 4.8–10.8)

## 2021-02-20 PROCEDURE — 99285 EMERGENCY DEPT VISIT HI MDM: CPT

## 2021-02-20 PROCEDURE — 93010 ELECTROCARDIOGRAM REPORT: CPT

## 2021-02-20 PROCEDURE — 71250 CT THORAX DX C-: CPT | Mod: 26

## 2021-02-20 RX ORDER — MORPHINE SULFATE 50 MG/1
4 CAPSULE, EXTENDED RELEASE ORAL ONCE
Refills: 0 | Status: DISCONTINUED | OUTPATIENT
Start: 2021-02-20 | End: 2021-02-20

## 2021-02-20 RX ORDER — MORPHINE SULFATE 50 MG/1
6 CAPSULE, EXTENDED RELEASE ORAL ONCE
Refills: 0 | Status: DISCONTINUED | OUTPATIENT
Start: 2021-02-20 | End: 2021-02-20

## 2021-02-20 RX ADMIN — MORPHINE SULFATE 4 MILLIGRAM(S): 50 CAPSULE, EXTENDED RELEASE ORAL at 16:08

## 2021-02-20 RX ADMIN — MORPHINE SULFATE 4 MILLIGRAM(S): 50 CAPSULE, EXTENDED RELEASE ORAL at 14:08

## 2021-02-20 RX ADMIN — MORPHINE SULFATE 6 MILLIGRAM(S): 50 CAPSULE, EXTENDED RELEASE ORAL at 14:49

## 2021-02-20 NOTE — ED PROVIDER NOTE - PHYSICAL EXAMINATION
CONSTITUTIONAL: Well-appearing; well-nourished; in no apparent distress.   EYES: PERRL; EOM intact.   ENT: normal nose; no rhinorrhea; normal pharynx with no tonsillar hypertrophy.   NECK: Supple; non-tender; no cervical lymphadenopathy.   CARDIOVASCULAR: Normal S1, S2; no murmurs, rubs, or gallops.   CHEST: + rt sided chest wall ttp. No crepitus  RESPIRATORY: Normal chest excursion with respiration; breath sounds clear and equal bilaterally; no wheezes, rhonchi, or rales.  GI/: Normal bowel sounds; non-distended; non-tender; no palpable organomegaly.   MS: No evidence of trauma or deformity. Normal ROM in all four extremities; non-tender to palpation; distal pulses are normal.   SKIN: Normal for age and race; warm; dry; good turgor; no apparent lesions or exudate. NO skin changes/ecchymosis/abrasions/lacerations  NEURO/PSYCH: A & O x 4; grossly unremarkable. mood and manner are appropriate.

## 2021-02-20 NOTE — ED PROVIDER NOTE - PATIENT PORTAL LINK FT
You can access the FollowMyHealth Patient Portal offered by [facility] by registering at the following website: [email or web address]. By joining [facility] FollowMyHealth portal, you will also be able to view your health information using other applications (apps) compatible with our system.

## 2021-02-20 NOTE — ED ADULT TRIAGE NOTE - CHIEF COMPLAINT QUOTE
Patient s/p trip and fall landing on his chest. denies LOC, denies blood thinners, denies head injury.

## 2021-02-20 NOTE — ED PROVIDER NOTE - NSFOLLOWUPINSTRUCTIONS_ED_ALL_ED_FT
Fall Prevention in the Home, Adult  Falls can cause injuries and can affect people from all age groups. There are many simple things that you can do to make your home safe and to help prevent falls. Ask for help when making these changes, if needed.    What actions can I take to prevent falls?  General instructions     Use good lighting in all rooms. Replace any light bulbs that burn out.  Turn on lights if it is dark. Use night-lights.  Place frequently used items in easy-to-reach places. Lower the shelves around your home if necessary.  Set up furniture so that there are clear paths around it. Avoid moving your furniture around.  Remove throw rugs and other tripping hazards from the floor.  Avoid walking on wet floors.  Fix any uneven floor surfaces.  Add color or contrast paint or tape to grab bars and handrails in your home. Place contrasting color strips on the first and last steps of stairways.  When you use a stepladder, make sure that it is completely opened and that the sides are firmly locked. Have someone hold the ladder while you are using it. Do not climb a closed stepladder.  Be aware of any and all pets.  What can I do in the bathroom?     Keep the floor dry. Immediately clean up any water that spills onto the floor.  Remove soap buildup in the tub or shower on a regular basis.  Use non-skid mats or decals on the floor of the tub or shower.  Attach bath mats securely with double-sided, non-slip rug tape.  If you need to sit down while you are in the shower, use a plastic, non-slip stool.  Image ImageInstall grab bars by the toilet and in the tub and shower. Do not use towel bars as grab bars.  What can I do in the bedroom?     Make sure that a bedside light is easy to reach.  Do not use oversized bedding that drapes onto the floor.  Have a firm chair that has side arms to use for getting dressed.  What can I do in the kitchen?     Clean up any spills right away.  If you need to reach for something above you, use a sturdy step stool that has a grab bar.  Keep electrical cables out of the way.  Do not use floor polish or wax that makes floors slippery. If you must use wax, make sure that it is non-skid floor wax.  What can I do in the stairways?     Do not leave any items on the stairs.  Make sure that you have a light switch at the top of the stairs and the bottom of the stairs. Have them installed if you do not have them.  Make sure that there are handrails on both sides of the stairs. Fix handrails that are broken or loose. Make sure that handrails are as long as the stairways.  Install non-slip stair treads on all stairs in your home.  Avoid having throw rugs at the top or bottom of stairways, or secure the rugs with carpet tape to prevent them from moving.  Choose a carpet design that does not hide the edge of steps on the stairway.  Check any carpeting to make sure that it is firmly attached to the stairs. Fix any carpet that is loose or worn.  What can I do on the outside of my home?     Use bright outdoor lighting.  Regularly repair the edges of walkways and driveways and fix any cracks.  Remove high doorway thresholds.  Trim any shrubbery on the main path into your home.  Regularly check that handrails are securely fastened and in good repair. Both sides of any steps should have handrails.  Install guardrails along the edges of any raised decks or porches.  Clear walkways of debris and clutter, including tools and rocks.  Have leaves, snow, and ice cleared regularly.  Use sand or salt on walkways during winter months.  In the garage, clean up any spills right away, including grease or oil spills.  What other actions can I take?     Wear closed-toe shoes that fit well and support your feet. Wear shoes that have rubber soles or low heels.  Use mobility aids as needed, such as canes, walkers, scooters, and crutches.  Review your medicines with your health care provider. Some medicines can cause dizziness or changes in blood pressure, which increase your risk of falling.  Talk with your health care provider about other ways that you can decrease your risk of falls. This may include working with a physical therapist or  to improve your strength, balance, and endurance.    Where to find more information  Centers for Disease Control and Prevention, [place]: https://www.cdc.gov  National [place] on Aging: https://sz5adbp.carie.nih.gov  Contact a health care provider if:  You are afraid of falling at home.  You feel weak, drowsy, or dizzy at home.  You fall at home.  Summary  There are many simple things that you can do to make your home safe and to help prevent falls.  Ways to make your home safe include removing tripping hazards and installing grab bars in the bathroom.  Ask for help when making these changes in your home.  This information is not intended to replace advice given to you by your health care provider. Make sure you discuss any questions you have with your health care provider.    Please follow up with your primary care doctor within one week.

## 2021-02-20 NOTE — ED ADULT NURSE NOTE - OBJECTIVE STATEMENT
Patient s/p trip and fall on ice yesterday and hit his chest. Denies LOC, denies blood thinners, denies head injury. Pt denies SOB

## 2021-02-20 NOTE — ED PROVIDER NOTE - NS ED ROS FT
Constitutional: no fever, chills, no recent weight loss, change in appetite or malaise  Eyes: no redness/discharge/pain/vision changes  ENT: no rhinorrhea/ear pain/sore throat  Cardiac: + rt sided chest pain. No SOB or edema.  Respiratory: No cough or respiratory distress  GI: No nausea, vomiting, diarrhea or abdominal pain.  : No dysuria, frequency, urgency or hematuria  MS: no pain to back or extremities, no loss of ROM, no weakness  Neuro: No headache or weakness. No LOC.  Skin: No skin rash.  Endocrine: No history of thyroid disease or diabetes.  Except as documented in the HPI, all other systems are negative.

## 2021-02-20 NOTE — ED PROVIDER NOTE - CLINICAL SUMMARY MEDICAL DECISION MAKING FREE TEXT BOX
pt feels better, no acute traumatic injury seen on imaging, dc home w motrin/tylenol prn, f/u pmd 1-2 weeks, strict return precautions provided.

## 2021-02-20 NOTE — ED PROVIDER NOTE - OBJECTIVE STATEMENT
55 year old M with hx of COPD, REBECCA on BIPAP, HTN, DLD, GERD c/o rt sided chest pain s/p fall. Pt sts slipped on ice from standing yesterday and landed to rt side of chest. No other injuries/head trauma. Pain is worse with movement and on inspiration. No skin changes/bruising, sob, abd pain, n/v, headache, neck pain, back pain, dizziness.

## 2021-02-20 NOTE — ED PROVIDER NOTE - ATTENDING CONTRIBUTION TO CARE
55M PMH COPD HTN HL on chronic opiates, p/w slip and fall on ice yesterday, landed on R chest wall with fist up against his chest. has had sharp pain ever since, constant nonradiating, worse w movement, talking, breathing. no fever, cough. no sob, cp. no chi or loc. no other trauma sustained to body.     on exam, AFVSS, well maria luz nad, ncat, eomi, perrla, mmm, lctab, no bruising or crepitus, +R chest wall ttp, rrr nl s1s2 no mrg, abd soft ntnd, aaox3, no focal deficits, no le edema or calf ttp,     a/p; concern for rib fx, will give pain meds, XR, CT chest , labs, ekg/trop re-eval

## 2021-02-20 NOTE — ED ADULT NURSE NOTE - NSIMPLEMENTINTERV_GEN_ALL_ED
Implemented All Fall with Harm Risk Interventions:  [place] to call system. Call bell, personal items and telephone within reach. Instruct patient to call for assistance. Room bathroom lighting operational. Non-slip footwear when patient is off stretcher. Physically safe environment: no spills, clutter or unnecessary equipment. Stretcher in lowest position, wheels locked, appropriate side rails in place. Provide visual cue, wrist band, yellow gown, etc. Monitor gait and stability. Monitor for mental status changes and reorient to person, place, and time. Review medications for side effects contributing to fall risk. Reinforce activity limits and safety measures with patient and family. Provide visual clues: red socks.

## 2021-02-22 ENCOUNTER — EMERGENCY (EMERGENCY)
Facility: HOSPITAL | Age: 56
LOS: 0 days | Discharge: LEFT BEFORE DISCHARGE | End: 2021-02-22
Attending: EMERGENCY MEDICINE | Admitting: EMERGENCY MEDICINE
Payer: MEDICAID

## 2021-02-22 VITALS
WEIGHT: 315 LBS | OXYGEN SATURATION: 89 % | TEMPERATURE: 99 F | HEART RATE: 115 BPM | HEIGHT: 70 IN | DIASTOLIC BLOOD PRESSURE: 90 MMHG | SYSTOLIC BLOOD PRESSURE: 148 MMHG | RESPIRATION RATE: 18 BRPM

## 2021-02-22 VITALS
RESPIRATION RATE: 18 BRPM | HEART RATE: 100 BPM | DIASTOLIC BLOOD PRESSURE: 86 MMHG | SYSTOLIC BLOOD PRESSURE: 152 MMHG | OXYGEN SATURATION: 93 %

## 2021-02-22 DIAGNOSIS — T40.0X4A: ICD-10-CM

## 2021-02-22 DIAGNOSIS — J44.9 CHRONIC OBSTRUCTIVE PULMONARY DISEASE, UNSPECIFIED: ICD-10-CM

## 2021-02-22 DIAGNOSIS — Z87.891 PERSONAL HISTORY OF NICOTINE DEPENDENCE: ICD-10-CM

## 2021-02-22 DIAGNOSIS — Z96.651 PRESENCE OF RIGHT ARTIFICIAL KNEE JOINT: Chronic | ICD-10-CM

## 2021-02-22 DIAGNOSIS — Z98.890 OTHER SPECIFIED POSTPROCEDURAL STATES: Chronic | ICD-10-CM

## 2021-02-22 DIAGNOSIS — E78.00 PURE HYPERCHOLESTEROLEMIA, UNSPECIFIED: ICD-10-CM

## 2021-02-22 DIAGNOSIS — I10 ESSENTIAL (PRIMARY) HYPERTENSION: ICD-10-CM

## 2021-02-22 DIAGNOSIS — K40.20 BILATERAL INGUINAL HERNIA, WITHOUT OBSTRUCTION OR GANGRENE, NOT SPECIFIED AS RECURRENT: Chronic | ICD-10-CM

## 2021-02-22 DIAGNOSIS — E78.5 HYPERLIPIDEMIA, UNSPECIFIED: ICD-10-CM

## 2021-02-22 DIAGNOSIS — Z20.822 CONTACT WITH AND (SUSPECTED) EXPOSURE TO COVID-19: ICD-10-CM

## 2021-02-22 LAB
ALBUMIN SERPL ELPH-MCNC: 4.2 G/DL — SIGNIFICANT CHANGE UP (ref 3.5–5.2)
ALP SERPL-CCNC: 137 U/L — HIGH (ref 30–115)
ALT FLD-CCNC: 23 U/L — SIGNIFICANT CHANGE UP (ref 0–41)
ANION GAP SERPL CALC-SCNC: 10 MMOL/L — SIGNIFICANT CHANGE UP (ref 7–14)
AST SERPL-CCNC: 30 U/L — SIGNIFICANT CHANGE UP (ref 0–41)
BASOPHILS # BLD AUTO: 0.05 K/UL — SIGNIFICANT CHANGE UP (ref 0–0.2)
BASOPHILS NFR BLD AUTO: 0.3 % — SIGNIFICANT CHANGE UP (ref 0–1)
BILIRUB SERPL-MCNC: 0.7 MG/DL — SIGNIFICANT CHANGE UP (ref 0.2–1.2)
BUN SERPL-MCNC: 10 MG/DL — SIGNIFICANT CHANGE UP (ref 10–20)
CALCIUM SERPL-MCNC: 9.3 MG/DL — SIGNIFICANT CHANGE UP (ref 8.5–10.1)
CHLORIDE SERPL-SCNC: 95 MMOL/L — LOW (ref 98–110)
CO2 SERPL-SCNC: 31 MMOL/L — SIGNIFICANT CHANGE UP (ref 17–32)
CREAT SERPL-MCNC: 0.7 MG/DL — SIGNIFICANT CHANGE UP (ref 0.7–1.5)
EOSINOPHIL # BLD AUTO: 0.04 K/UL — SIGNIFICANT CHANGE UP (ref 0–0.7)
EOSINOPHIL NFR BLD AUTO: 0.3 % — SIGNIFICANT CHANGE UP (ref 0–8)
GLUCOSE SERPL-MCNC: 106 MG/DL — HIGH (ref 70–99)
HCT VFR BLD CALC: 44.8 % — SIGNIFICANT CHANGE UP (ref 42–52)
HGB BLD-MCNC: 15.1 G/DL — SIGNIFICANT CHANGE UP (ref 14–18)
IMM GRANULOCYTES NFR BLD AUTO: 0.6 % — HIGH (ref 0.1–0.3)
LYMPHOCYTES # BLD AUTO: 1.34 K/UL — SIGNIFICANT CHANGE UP (ref 1.2–3.4)
LYMPHOCYTES # BLD AUTO: 9.3 % — LOW (ref 20.5–51.1)
MCHC RBC-ENTMCNC: 28.9 PG — SIGNIFICANT CHANGE UP (ref 27–31)
MCHC RBC-ENTMCNC: 33.7 G/DL — SIGNIFICANT CHANGE UP (ref 32–37)
MCV RBC AUTO: 85.7 FL — SIGNIFICANT CHANGE UP (ref 80–94)
MONOCYTES # BLD AUTO: 0.96 K/UL — HIGH (ref 0.1–0.6)
MONOCYTES NFR BLD AUTO: 6.7 % — SIGNIFICANT CHANGE UP (ref 1.7–9.3)
NEUTROPHILS # BLD AUTO: 11.86 K/UL — HIGH (ref 1.4–6.5)
NEUTROPHILS NFR BLD AUTO: 82.8 % — HIGH (ref 42.2–75.2)
NRBC # BLD: 0 /100 WBCS — SIGNIFICANT CHANGE UP (ref 0–0)
PLATELET # BLD AUTO: 216 K/UL — SIGNIFICANT CHANGE UP (ref 130–400)
POTASSIUM SERPL-MCNC: 4.6 MMOL/L — SIGNIFICANT CHANGE UP (ref 3.5–5)
POTASSIUM SERPL-SCNC: 4.6 MMOL/L — SIGNIFICANT CHANGE UP (ref 3.5–5)
PROT SERPL-MCNC: 7 G/DL — SIGNIFICANT CHANGE UP (ref 6–8)
RBC # BLD: 5.23 M/UL — SIGNIFICANT CHANGE UP (ref 4.7–6.1)
RBC # FLD: 14.5 % — SIGNIFICANT CHANGE UP (ref 11.5–14.5)
SODIUM SERPL-SCNC: 136 MMOL/L — SIGNIFICANT CHANGE UP (ref 135–146)
TROPONIN T SERPL-MCNC: <0.01 NG/ML — SIGNIFICANT CHANGE UP
WBC # BLD: 14.34 K/UL — HIGH (ref 4.8–10.8)
WBC # FLD AUTO: 14.34 K/UL — HIGH (ref 4.8–10.8)

## 2021-02-22 PROCEDURE — 99285 EMERGENCY DEPT VISIT HI MDM: CPT

## 2021-02-22 PROCEDURE — 71045 X-RAY EXAM CHEST 1 VIEW: CPT | Mod: 26

## 2021-02-22 RX ORDER — SODIUM CHLORIDE 9 MG/ML
1000 INJECTION INTRAMUSCULAR; INTRAVENOUS; SUBCUTANEOUS ONCE
Refills: 0 | Status: COMPLETED | OUTPATIENT
Start: 2021-02-22 | End: 2021-02-22

## 2021-02-22 RX ADMIN — SODIUM CHLORIDE 2000 MILLILITER(S): 9 INJECTION INTRAMUSCULAR; INTRAVENOUS; SUBCUTANEOUS at 21:34

## 2021-02-22 NOTE — ED PROVIDER NOTE - CLINICAL SUMMARY MEDICAL DECISION MAKING FREE TEXT BOX
55yM HTN DLD COPD opiate abuse/chronic opiate use (on methadone, morphine, dilaudid) p/w somnolence, likely from opiate overdose.  Pt unreliable/inconsistent about what meds he has used in the past 12hr, let alone 24hr.  Pt monitored in the ED on EtCO2, somnolent but arousable and with RR >8 continuously.  No narcan needed or given either prehospital or during ED stay.  Labs reassuring.  EKG w/o ischemia or arrhythmia.  Pt planned for admission given unknown opiate duration/pharmacokinetics, but pt then woke up and refused to stay further in the ED.  Pt denied SI/HI and then eloped from the ED.

## 2021-02-22 NOTE — ED PROVIDER NOTE - OBJECTIVE STATEMENT
55 y.o. male pmh of opioid abuse, COPD, HTN, HLD, REBECCA on home BIPAP presenting for suspected opoid intoxication. As per pts sister, pt is dependent on PO morphine for chronic pain. Pt was last seen in ER on Saturday for a fall and tx with morphine. Today sister called EMS on pt for admitting to take 100 mg stumbling around house and slurring words.

## 2021-02-22 NOTE — ED PROVIDER NOTE - ATTENDING CONTRIBUTION TO CARE
55yM chronic pain on opiates (methadone, morphine, ?dilaudid as well) p/w opiate overdose - pt sleepy but answering questions (until he falls asleep mid-conversation).  Says his sister called EMS after she heard him very sleepy on the phone.  Pt admits to taking methadone bid (last dose this morning or last night - pt providing differing answers) in addition to morphine 100mg (long acting?) and possibly also dilaudid.  Unclear what medicine pt took today as he is confused about timing and duration.

## 2021-02-22 NOTE — ED ADULT TRIAGE NOTE - CHIEF COMPLAINT QUOTE
sent in by sister via ems for medical evaluation. sister states pt is on po morphine, and he may have taken more than prescribed.

## 2021-02-22 NOTE — ED PROVIDER NOTE - CARE PLAN
Principal Discharge DX:	Eloped from emergency department   Principal Discharge DX:	Opiate overdose, undetermined intent, initial encounter

## 2021-02-22 NOTE — ED PROVIDER NOTE - PHYSICAL EXAMINATION
Physical Exam    Vital Signs: I have reviewed the initial vital signs.  Constitutional: well-nourished, appears stated age, no acute distress  Head: NCAT, no contusions or hematomas  Eyes: Conjunctiva pink, Sclera clear, PERRLA, EOMI.  Cardiovascular: S1 and S2, regular rate, regular rhythm, well-perfused extremities, radial pulses equal and 2+  Respiratory: unlabored respiratory effort, clear to auscultation bilaterally no wheezing, rales and rhonchi  Gastrointestinal: soft, non-tender abdomen, no pulsatile mass, normal bowl sounds  Musculoskeletal: supple neck, no lower extremity edema, no midline tenderness  Integumentary: warm, dry, no rash, no ecchymosis no lacerations  Neurologic: awake to name and voice, spontaneous eye opening, sensation in tact, no tremors or fasciculations.

## 2021-02-22 NOTE — ED PROVIDER NOTE - PROGRESS NOTE DETAILS
sister Sanna contacted to received information. Admits pt has a opioid dependence  In ER: labs, EKG, CXR obtained. patient ask to speak to me again. patient is awake and alert. patient is able to ambulate with steady gait. patient is asking to leave.   I again asked patient what he had taken . patient states that he took dilaudid and morphine which he states he is prescribed..   patient states that he wants to leave the ed  now. I explained that he need to be observed longer. patient seen by nursing staff leaving ed.

## 2021-02-23 LAB
RAPID RVP RESULT: SIGNIFICANT CHANGE UP
SARS-COV-2 RNA SPEC QL NAA+PROBE: SIGNIFICANT CHANGE UP

## 2021-02-26 DIAGNOSIS — Z71.89 OTHER SPECIFIED COUNSELING: ICD-10-CM

## 2021-04-02 ENCOUNTER — INPATIENT (INPATIENT)
Facility: HOSPITAL | Age: 56
LOS: 1 days | Discharge: HOME | End: 2021-04-04
Attending: HOSPITALIST | Admitting: HOSPITALIST
Payer: MEDICAID

## 2021-04-02 VITALS
TEMPERATURE: 98 F | DIASTOLIC BLOOD PRESSURE: 89 MMHG | WEIGHT: 315 LBS | SYSTOLIC BLOOD PRESSURE: 136 MMHG | RESPIRATION RATE: 18 BRPM | HEIGHT: 70 IN | HEART RATE: 86 BPM | OXYGEN SATURATION: 97 %

## 2021-04-02 DIAGNOSIS — Z98.890 OTHER SPECIFIED POSTPROCEDURAL STATES: Chronic | ICD-10-CM

## 2021-04-02 DIAGNOSIS — K40.20 BILATERAL INGUINAL HERNIA, WITHOUT OBSTRUCTION OR GANGRENE, NOT SPECIFIED AS RECURRENT: Chronic | ICD-10-CM

## 2021-04-02 DIAGNOSIS — Z96.651 PRESENCE OF RIGHT ARTIFICIAL KNEE JOINT: Chronic | ICD-10-CM

## 2021-04-02 PROCEDURE — 76937 US GUIDE VASCULAR ACCESS: CPT | Mod: 26

## 2021-04-02 PROCEDURE — 99232 SBSQ HOSP IP/OBS MODERATE 35: CPT

## 2021-04-02 PROCEDURE — 99285 EMERGENCY DEPT VISIT HI MDM: CPT | Mod: 25

## 2021-04-02 PROCEDURE — 36000 PLACE NEEDLE IN VEIN: CPT

## 2021-04-02 RX ORDER — ALBUTEROL 90 UG/1
2 AEROSOL, METERED ORAL
Refills: 0 | Status: COMPLETED | OUTPATIENT
Start: 2021-04-02 | End: 2021-04-03

## 2021-04-02 RX ORDER — SODIUM CHLORIDE 9 MG/ML
1000 INJECTION, SOLUTION INTRAVENOUS ONCE
Refills: 0 | Status: COMPLETED | OUTPATIENT
Start: 2021-04-02 | End: 2021-04-02

## 2021-04-02 RX ADMIN — ALBUTEROL 2 PUFF(S): 90 AEROSOL, METERED ORAL at 23:46

## 2021-04-02 RX ADMIN — Medication 125 MILLIGRAM(S): at 23:51

## 2021-04-02 RX ADMIN — SODIUM CHLORIDE 1000 MILLILITER(S): 9 INJECTION, SOLUTION INTRAVENOUS at 23:51

## 2021-04-02 RX ADMIN — ALBUTEROL 2 PUFF(S): 90 AEROSOL, METERED ORAL at 23:41

## 2021-04-02 NOTE — ED PROCEDURE NOTE - PROCEDURE ADDITIONAL DETAILS
18 gauge IV placed to left Antecubital Vein. Confirmation of line obtained with Ultrasound and image saved. No complications form procedure and line is functioning well.

## 2021-04-02 NOTE — ED ADULT TRIAGE NOTE - CHIEF COMPLAINT QUOTE
pt BIBA from home c/o SOB, known COPD not on O2 at home  O2 Sat 97% on room air, pt denies chest pain

## 2021-04-02 NOTE — ED ADULT NURSE NOTE - NSIMPLEMENTINTERV_GEN_ALL_ED
Implemented All Universal Safety Interventions:  [place] to call system. Call bell, personal items and telephone within reach. Instruct patient to call for assistance. Room bathroom lighting operational. Non-slip footwear when patient is off stretcher. Physically safe environment: no spills, clutter or unnecessary equipment. Stretcher in lowest position, wheels locked, appropriate side rails in place.

## 2021-04-02 NOTE — ED ADULT NURSE NOTE - OBJECTIVE STATEMENT
patient complaints of cough, SOB and wheezing.  Patient denies chest pain, n/v. fever.  Patient repots PMH of COPD, not on home oxygen.

## 2021-04-03 DIAGNOSIS — Z98.890 OTHER SPECIFIED POSTPROCEDURAL STATES: Chronic | ICD-10-CM

## 2021-04-03 LAB
ALBUMIN SERPL ELPH-MCNC: 3.9 G/DL — SIGNIFICANT CHANGE UP (ref 3.5–5.2)
ALP SERPL-CCNC: 131 U/L — HIGH (ref 30–115)
ALT FLD-CCNC: 19 U/L — SIGNIFICANT CHANGE UP (ref 0–41)
ANION GAP SERPL CALC-SCNC: 7 MMOL/L — SIGNIFICANT CHANGE UP (ref 7–14)
AST SERPL-CCNC: 19 U/L — SIGNIFICANT CHANGE UP (ref 0–41)
BASOPHILS # BLD AUTO: 0.07 K/UL — SIGNIFICANT CHANGE UP (ref 0–0.2)
BASOPHILS NFR BLD AUTO: 0.6 % — SIGNIFICANT CHANGE UP (ref 0–1)
BILIRUB SERPL-MCNC: 0.3 MG/DL — SIGNIFICANT CHANGE UP (ref 0.2–1.2)
BUN SERPL-MCNC: 6 MG/DL — LOW (ref 10–20)
CALCIUM SERPL-MCNC: 9.4 MG/DL — SIGNIFICANT CHANGE UP (ref 8.5–10.1)
CHLORIDE SERPL-SCNC: 99 MMOL/L — SIGNIFICANT CHANGE UP (ref 98–110)
CO2 SERPL-SCNC: 32 MMOL/L — SIGNIFICANT CHANGE UP (ref 17–32)
CREAT SERPL-MCNC: 0.7 MG/DL — SIGNIFICANT CHANGE UP (ref 0.7–1.5)
EOSINOPHIL # BLD AUTO: 0.32 K/UL — SIGNIFICANT CHANGE UP (ref 0–0.7)
EOSINOPHIL NFR BLD AUTO: 2.9 % — SIGNIFICANT CHANGE UP (ref 0–8)
GLUCOSE SERPL-MCNC: 121 MG/DL — HIGH (ref 70–99)
HCT VFR BLD CALC: 43.7 % — SIGNIFICANT CHANGE UP (ref 42–52)
HGB BLD-MCNC: 14.5 G/DL — SIGNIFICANT CHANGE UP (ref 14–18)
IMM GRANULOCYTES NFR BLD AUTO: 0.5 % — HIGH (ref 0.1–0.3)
LYMPHOCYTES # BLD AUTO: 2.4 K/UL — SIGNIFICANT CHANGE UP (ref 1.2–3.4)
LYMPHOCYTES # BLD AUTO: 21.9 % — SIGNIFICANT CHANGE UP (ref 20.5–51.1)
MCHC RBC-ENTMCNC: 28.3 PG — SIGNIFICANT CHANGE UP (ref 27–31)
MCHC RBC-ENTMCNC: 33.2 G/DL — SIGNIFICANT CHANGE UP (ref 32–37)
MCV RBC AUTO: 85.4 FL — SIGNIFICANT CHANGE UP (ref 80–94)
MONOCYTES # BLD AUTO: 0.84 K/UL — HIGH (ref 0.1–0.6)
MONOCYTES NFR BLD AUTO: 7.7 % — SIGNIFICANT CHANGE UP (ref 1.7–9.3)
NEUTROPHILS # BLD AUTO: 7.29 K/UL — HIGH (ref 1.4–6.5)
NEUTROPHILS NFR BLD AUTO: 66.4 % — SIGNIFICANT CHANGE UP (ref 42.2–75.2)
NRBC # BLD: 0 /100 WBCS — SIGNIFICANT CHANGE UP (ref 0–0)
PLATELET # BLD AUTO: 234 K/UL — SIGNIFICANT CHANGE UP (ref 130–400)
POTASSIUM SERPL-MCNC: 4.9 MMOL/L — SIGNIFICANT CHANGE UP (ref 3.5–5)
POTASSIUM SERPL-SCNC: 4.9 MMOL/L — SIGNIFICANT CHANGE UP (ref 3.5–5)
PROT SERPL-MCNC: 6.9 G/DL — SIGNIFICANT CHANGE UP (ref 6–8)
RBC # BLD: 5.12 M/UL — SIGNIFICANT CHANGE UP (ref 4.7–6.1)
RBC # FLD: 14.7 % — HIGH (ref 11.5–14.5)
SARS-COV-2 RNA SPEC QL NAA+PROBE: SIGNIFICANT CHANGE UP
SODIUM SERPL-SCNC: 138 MMOL/L — SIGNIFICANT CHANGE UP (ref 135–146)
TROPONIN T SERPL-MCNC: <0.01 NG/ML — SIGNIFICANT CHANGE UP
WBC # BLD: 10.98 K/UL — HIGH (ref 4.8–10.8)
WBC # FLD AUTO: 10.98 K/UL — HIGH (ref 4.8–10.8)

## 2021-04-03 PROCEDURE — 99223 1ST HOSP IP/OBS HIGH 75: CPT

## 2021-04-03 PROCEDURE — 71045 X-RAY EXAM CHEST 1 VIEW: CPT | Mod: 26

## 2021-04-03 PROCEDURE — 93970 EXTREMITY STUDY: CPT | Mod: 26

## 2021-04-03 PROCEDURE — 93010 ELECTROCARDIOGRAM REPORT: CPT

## 2021-04-03 RX ORDER — BUPROPION HYDROCHLORIDE 150 MG/1
150 TABLET, EXTENDED RELEASE ORAL DAILY
Refills: 0 | Status: DISCONTINUED | OUTPATIENT
Start: 2021-04-03 | End: 2021-04-04

## 2021-04-03 RX ORDER — FUROSEMIDE 40 MG
40 TABLET ORAL DAILY
Refills: 0 | Status: DISCONTINUED | OUTPATIENT
Start: 2021-04-03 | End: 2021-04-04

## 2021-04-03 RX ORDER — CHLORHEXIDINE GLUCONATE 213 G/1000ML
1 SOLUTION TOPICAL
Refills: 0 | Status: DISCONTINUED | OUTPATIENT
Start: 2021-04-03 | End: 2021-04-04

## 2021-04-03 RX ORDER — TAMSULOSIN HYDROCHLORIDE 0.4 MG/1
0.4 CAPSULE ORAL AT BEDTIME
Refills: 0 | Status: DISCONTINUED | OUTPATIENT
Start: 2021-04-03 | End: 2021-04-04

## 2021-04-03 RX ORDER — MORPHINE SULFATE 50 MG/1
100 CAPSULE, EXTENDED RELEASE ORAL
Refills: 0 | Status: DISCONTINUED | OUTPATIENT
Start: 2021-04-03 | End: 2021-04-03

## 2021-04-03 RX ORDER — FENOFIBRATE,MICRONIZED 130 MG
145 CAPSULE ORAL DAILY
Refills: 0 | Status: DISCONTINUED | OUTPATIENT
Start: 2021-04-03 | End: 2021-04-04

## 2021-04-03 RX ORDER — OXYCODONE HYDROCHLORIDE 5 MG/1
30 TABLET ORAL EVERY 4 HOURS
Refills: 0 | Status: DISCONTINUED | OUTPATIENT
Start: 2021-04-03 | End: 2021-04-04

## 2021-04-03 RX ORDER — IPRATROPIUM/ALBUTEROL SULFATE 18-103MCG
3 AEROSOL WITH ADAPTER (GRAM) INHALATION EVERY 6 HOURS
Refills: 0 | Status: DISCONTINUED | OUTPATIENT
Start: 2021-04-03 | End: 2021-04-04

## 2021-04-03 RX ORDER — NICOTINE POLACRILEX 2 MG
1 GUM BUCCAL DAILY
Refills: 0 | Status: DISCONTINUED | OUTPATIENT
Start: 2021-04-03 | End: 2021-04-04

## 2021-04-03 RX ORDER — LISINOPRIL 2.5 MG/1
5 TABLET ORAL DAILY
Refills: 0 | Status: DISCONTINUED | OUTPATIENT
Start: 2021-04-03 | End: 2021-04-04

## 2021-04-03 RX ORDER — MORPHINE SULFATE 50 MG/1
100 CAPSULE, EXTENDED RELEASE ORAL
Refills: 0 | Status: DISCONTINUED | OUTPATIENT
Start: 2021-04-03 | End: 2021-04-04

## 2021-04-03 RX ORDER — TIOTROPIUM BROMIDE 18 UG/1
1 CAPSULE ORAL; RESPIRATORY (INHALATION) DAILY
Refills: 0 | Status: DISCONTINUED | OUTPATIENT
Start: 2021-04-03 | End: 2021-04-03

## 2021-04-03 RX ORDER — OXYCODONE HYDROCHLORIDE 5 MG/1
30 TABLET ORAL EVERY 4 HOURS
Refills: 0 | Status: DISCONTINUED | OUTPATIENT
Start: 2021-04-03 | End: 2021-04-03

## 2021-04-03 RX ORDER — IPRATROPIUM/ALBUTEROL SULFATE 18-103MCG
3 AEROSOL WITH ADAPTER (GRAM) INHALATION EVERY 4 HOURS
Refills: 0 | Status: DISCONTINUED | OUTPATIENT
Start: 2021-04-03 | End: 2021-04-04

## 2021-04-03 RX ORDER — ENOXAPARIN SODIUM 100 MG/ML
40 INJECTION SUBCUTANEOUS DAILY
Refills: 0 | Status: DISCONTINUED | OUTPATIENT
Start: 2021-04-03 | End: 2021-04-04

## 2021-04-03 RX ORDER — SENNA PLUS 8.6 MG/1
2 TABLET ORAL AT BEDTIME
Refills: 0 | Status: DISCONTINUED | OUTPATIENT
Start: 2021-04-03 | End: 2021-04-04

## 2021-04-03 RX ORDER — PANTOPRAZOLE SODIUM 20 MG/1
40 TABLET, DELAYED RELEASE ORAL
Refills: 0 | Status: DISCONTINUED | OUTPATIENT
Start: 2021-04-03 | End: 2021-04-04

## 2021-04-03 RX ORDER — POLYETHYLENE GLYCOL 3350 17 G/17G
17 POWDER, FOR SOLUTION ORAL DAILY
Refills: 0 | Status: DISCONTINUED | OUTPATIENT
Start: 2021-04-03 | End: 2021-04-04

## 2021-04-03 RX ORDER — SIMVASTATIN 20 MG/1
20 TABLET, FILM COATED ORAL AT BEDTIME
Refills: 0 | Status: DISCONTINUED | OUTPATIENT
Start: 2021-04-03 | End: 2021-04-04

## 2021-04-03 RX ORDER — BUDESONIDE AND FORMOTEROL FUMARATE DIHYDRATE 160; 4.5 UG/1; UG/1
2 AEROSOL RESPIRATORY (INHALATION)
Refills: 0 | Status: DISCONTINUED | OUTPATIENT
Start: 2021-04-03 | End: 2021-04-04

## 2021-04-03 RX ORDER — ZOLPIDEM TARTRATE 10 MG/1
5 TABLET ORAL AT BEDTIME
Refills: 0 | Status: DISCONTINUED | OUTPATIENT
Start: 2021-04-03 | End: 2021-04-04

## 2021-04-03 RX ADMIN — BUPROPION HYDROCHLORIDE 150 MILLIGRAM(S): 150 TABLET, EXTENDED RELEASE ORAL at 11:32

## 2021-04-03 RX ADMIN — OXYCODONE HYDROCHLORIDE 30 MILLIGRAM(S): 5 TABLET ORAL at 11:38

## 2021-04-03 RX ADMIN — SIMVASTATIN 20 MILLIGRAM(S): 20 TABLET, FILM COATED ORAL at 21:10

## 2021-04-03 RX ADMIN — Medication 1 PATCH: at 11:30

## 2021-04-03 RX ADMIN — Medication 145 MILLIGRAM(S): at 11:30

## 2021-04-03 RX ADMIN — SENNA PLUS 2 TABLET(S): 8.6 TABLET ORAL at 21:10

## 2021-04-03 RX ADMIN — TAMSULOSIN HYDROCHLORIDE 0.4 MILLIGRAM(S): 0.4 CAPSULE ORAL at 21:10

## 2021-04-03 RX ADMIN — OXYCODONE HYDROCHLORIDE 30 MILLIGRAM(S): 5 TABLET ORAL at 15:38

## 2021-04-03 RX ADMIN — ALBUTEROL 2 PUFF(S): 90 AEROSOL, METERED ORAL at 22:27

## 2021-04-03 RX ADMIN — POLYETHYLENE GLYCOL 3350 17 GRAM(S): 17 POWDER, FOR SOLUTION ORAL at 11:30

## 2021-04-03 RX ADMIN — OXYCODONE HYDROCHLORIDE 30 MILLIGRAM(S): 5 TABLET ORAL at 20:05

## 2021-04-03 RX ADMIN — ZOLPIDEM TARTRATE 5 MILLIGRAM(S): 10 TABLET ORAL at 20:04

## 2021-04-03 RX ADMIN — MORPHINE SULFATE 100 MILLIGRAM(S): 50 CAPSULE, EXTENDED RELEASE ORAL at 18:49

## 2021-04-03 RX ADMIN — OXYCODONE HYDROCHLORIDE 30 MILLIGRAM(S): 5 TABLET ORAL at 21:00

## 2021-04-03 NOTE — ED PROVIDER NOTE - NS ED ROS FT
Constitutional:  No fever, chills, lethargy, or abnormal weight loss  Eyes:  No eye pain or visual changes  ENMT: No nasal discharge, no toothache, no sore throat. No neck pain or stiffness  Cardiac:  No chest pain or palpitations  Respiratory:  + dry cough, + Wheezing, + dyspnea  GI:  No nausea, vomiting, diarrhea or abdominal pain.  :  No dysuria, frequency or burning.  MS:  Chronic knee pain  Neuro:  No headache. No numbness, weakness, or tingling.   Skin:  No skin rash  Except as documented in the HPI,  all other systems are negative

## 2021-04-03 NOTE — H&P ADULT - NSHPSOCIALHISTORY_GEN_ALL_CORE
- Daily smoker 1 pack a day, for the past 30 years  - No alcohol use  - No drug use  - Works as paramedic, lives in [place], , lives with wife and kids

## 2021-04-03 NOTE — H&P ADULT - NSICDXPASTMEDICALHX_GEN_ALL_CORE_FT
PAST MEDICAL HISTORY:  Colitis LARGE INTESTINE   NO RECENT FLARES    COPD (chronic obstructive pulmonary disease)     GERD (gastroesophageal reflux disease)     Hiatal hernia GERD    High cholesterol     HTN (hypertension)     Morbid obesity     REBECCA treated with BiPAP     Osteoarthritis

## 2021-04-03 NOTE — H&P ADULT - NSICDXPASTSURGICALHX_GEN_ALL_CORE_FT
PAST SURGICAL HISTORY:  H/O foot surgery right big toe surgery    H/O knee surgery arthoscopic x4    Hernia, inguinal, bilateral 3 months old    History of appendectomy     History of cholecystectomy     History of knee replacement procedure of right knee BILATERAL

## 2021-04-03 NOTE — ED ADULT NURSE REASSESSMENT NOTE - NS ED NURSE REASSESS COMMENT FT1
albuterol treatments will not scan, all three treatments given as ordered. Will continue to montior.

## 2021-04-03 NOTE — ED PROVIDER NOTE - OBJECTIVE STATEMENT
54 y/o M PMH COPD, HTN, HLD, REBECCA on home BIPAP, Chronic Opioid Dependence due to knee pain with knee replacements who presents to the ED with wheezing and shortness of breath for the past 2-3 days. Patient reports dyspnea as moderate, located in chest, worse with exertion, associated with wheezing and a dry cough. Patient still smokes cigarettes and this has irritated his cough.    No known COVID exposures. He denies fever, chills, phlegm production. No history of DVT or PE. Patient denies any chest pain, calf pain or leg swelling.

## 2021-04-03 NOTE — ED PROVIDER NOTE - CLINICAL SUMMARY MEDICAL DECISION MAKING FREE TEXT BOX
Patient presents with chest tightness and wheezing from a COPD exacerbation. No fever. No history of DVT or PE and patient never had chest pain. Large bore IV placed and labs sent, CXR done, EKG is normal. Patient given steroids and took numerous puffs of an inhaler. Patient had diffuse wheezing and he did not improve enough for discharge. Will admit for COPD exacerbation for IV steroids and treatments prn. Patient aware of plan and need for admission for further treatment and monitoring.

## 2021-04-03 NOTE — H&P ADULT - NSHPLABSRESULTS_GEN_ALL_CORE
14.5   10.98 )-----------( 234      ( 02 Apr 2021 23:50 )             43.7       04-02    138  |  99  |  6<L>  ----------------------------<  121<H>  4.9   |  32  |  0.7    Ca    9.4      02 Apr 2021 23:50    TPro  6.9  /  Alb  3.9  /  TBili  0.3  /  DBili  x   /  AST  19  /  ALT  19  /  AlkPhos  131<H>  04-02                      Lactate Trend      CARDIAC MARKERS ( 02 Apr 2021 23:50 )  x     / <0.01 ng/mL / x     / x     / x            CAPILLARY BLOOD GLUCOSE

## 2021-04-03 NOTE — ED PROVIDER NOTE - PHYSICAL EXAMINATION
VITAL SIGNS: I have reviewed nursing notes and confirm.  CONSTITUTIONAL:  non-toxic, NAD  SKIN: Warm dry, normal skin turgor  HEAD: NCAT  EYES: EOMI, PERRLA, no scleral icterus  ENT: Moist mucous membranes, normal pharynx with no erythema or exudates  NECK: Supple; non tender. Full ROM. No cervical LAD  CARD: RRR, no murmurs, rubs or gallops  RESP: diffuse expiratory wheezing b/l  ABD: soft, + BS, non-tender, non-distended, no rebound or guarding. No CVA tenderness  EXT: Full ROM, no bony tenderness, no pedal edema, no calf tenderness  NEURO: normal motor. normal sensory.   PSYCH: Cooperative, appropriate.

## 2021-04-03 NOTE — H&P ADULT - NSHPPHYSICALEXAM_GEN_ALL_CORE
GEN: No acute distress, NC/AT, anicteric, on nasal canula, morbid obesity, dishelved looking. Morbid obesity  LUNGS: Bilateral wheezing on expiration, bilateral rhonchi on all fields of both lungs. No crackles  HEART: S1/S2 present. RRR. no murmurs  ABD: Soft, non-tender, non-distended. Morbid obesity  EXT: 2+ LE edema bilaterally  NEURO: AAOX3, moves all extremities, no focal deficits    Intravenous access: Peripheral access  NG tube: None  Suazo Catheter: None

## 2021-04-03 NOTE — H&P ADULT - ATTENDING COMMENTS
#Acute hypoxic resp failure, due to copd exacerbation  active smoker  counseled on smoking cessation  duonebs q6  symbicort  start levaquin  change solumedrol to prednsione 40  cxr clear  currently on 2l nc, wean if possible goal >88  prepare for d/c in am

## 2021-04-03 NOTE — H&P ADULT - NSHPREVIEWOFSYSTEMS_GEN_ALL_CORE
CONSTITUTIONAL: No weakness, fevers or chills  EYES/ENT: No visual changes;  No vertigo or throat pain   RESPIRATORY: SOB when laying down  CARDIOVASCULAR: No chest pain or palpitations  GASTROINTESTINAL: Constipation  GENITOURINARY: Intermittent dysuria  NEUROLOGICAL: No numbness or weakness  SKIN: No itching, rashes

## 2021-04-03 NOTE — H&P ADULT - HISTORY OF PRESENT ILLNESS
54 years old Male, active heavy smoker with PMHx of COPD not on home O2, REBECCA on BiPAP, HTN, DLD, GERD, severe OA of bilateral shoulders, hips and knees s/p total knee replacement bilaterally presents for SOB. HE states that it started the day before admission when he was laying down, he was not doing any efforts, it improved slightly when he sat up. He endorses chronic cough of white phlegm and no acute changes in color or amount of phlegm currently. He ambulates with no issues, does feel a bit of SOB on ambulation though. He denies chest pain, palpitation or abdominal pain. He was never intubated, most recent hospitalization for copd exacerbation was in November 2020, baseline CO2 around 50, was treated with po prednisone and azithromycin and seen by pain management as well.  In ED, VS wnl, patient on RA not in distress, VBG not performed. CXR clear, EKG NSR, labs unremarkable. He is s/p solumedrol 125 mg IV once and 1 L LR bolus.

## 2021-04-03 NOTE — H&P ADULT - ASSESSMENT
54 years old Male, active heavy smoker with PMHx of COPD not on home O2, REBECCA on BiPAP, HTN, DLD, GERD, severe OA of bilateral shoulders, hips and knees s/p total knee replacement bilaterally presents for SOB found to be in COPD exacerbation, active wheezing, on room air    # COPD exacerbation most likely due to active smoking:  - Not in distress, on room air  - S/p solumedrol 125, c/w solumedrol 60 mg IV q12h. Likely switch to prednisone in am  - Duoneb q6h standing and q4h PRN  - BIPAP at night ( IPAP 28/EPAP 18)  - C/w symbicort q12 and spiriva qd  - F/up pulm consult ( Service)  - Incentive spirometry  - C/w O2 2L NC for now ( patient's request, subjective SOB with normal SaO2 on room air), wean O2 tomorrow.    # Active smoking:  - Smoking cessation counseling provided at length patient is interested to start now.  - Started on nicotine patch 21mg/24h  - Started on buproprion  mg qd for 3 days, then will increase to q12h per protocol.  - Social work consult for smoking cessation resources    # REBECCA on bipap:  - C/w bipap at night  - Pulm follow up    # HLD/HTN:  - C/w fenofibrate, lisinopril and atorvastatin  - F/up lipid panel, A1C, TSH    # GERD:  - C/w protonix 40 mg qd, f/up magnesium levels ( on omeprazole at home)    # Diffuse OA due to morbid obesity:  - C/w MS contin 100 mg q6h standing   - C/w oxycodone 30 mg q4h PRN for breakthrough pain  - All those recs are pain management's recs from previous admission  - Also recommended was methadone q8h but patient do not take it as often.  - Dilaudid was d/tootie on previous admission but patient still takes as needed. Please mention on discharge note that dilaudid is to be d/tootie    # GI ppx: Protonix qd  # DVT ppx: Lovenox qd  # Diet: DASH  # Activity: IAT, no needs  # From home, anticipate in 24 hours if improvement  # FULL code 54 years old Male, active heavy smoker with PMHx of COPD not on home O2, REBECCA on BiPAP, HTN, DLD, GERD, severe OA of bilateral shoulders, hips and knees s/p total knee replacement bilaterally presents for SOB found to be in COPD exacerbation, active wheezing, on room air    # COPD exacerbation most likely due to active smoking:  - Not in distress, on room air  - S/p solumedrol 125, c/w solumedrol 60 mg IV q12h. Likely switch to prednisone in am  - Duoneb q6h standing and q4h PRN  - BIPAP at night ( IPAP 28/EPAP 18)  - C/w symbicort q12 and spiriva qd  - F/up pulm consult ( Service)  - Incentive spirometry  - C/w O2 2L NC for now ( patient's request, subjective SOB with normal SaO2 on room air), wean O2 tomorrow.    # Active smoking:  - Smoking cessation counseling provided at length patient is interested to start now.  - Started on nicotine patch 21mg/24h  - Started on buproprion  mg qd for 3 days, then will increase to q12h per protocol.  - Social work consult for smoking cessation resources    # REBECCA on bipap:  - C/w bipap at night  - Pulm follow up    # Chronic LE edema , not from heart failure or cirrhosis:  - Likely from obesity, compressing lymphatics  - On lasix 40 PO qd, c/w lasix qd  - Last echo in October 2020 showing good LVEF with no valvular abnormalities.    # HLD/HTN:  - C/w fenofibrate, lisinopril and atorvastatin  - F/up lipid panel, A1C, TSH    # GERD:  - C/w protonix 40 mg qd, f/up magnesium levels ( on omeprazole at home)    # Diffuse OA due to morbid obesity:  - C/w MS contin 100 mg q6h standing   - C/w oxycodone 30 mg q4h PRN for breakthrough pain  - All those recs are pain management's recs from previous admission  - Also recommended was methadone q8h but patient do not take it as often.  - Dilaudid was d/tootie on previous admission but patient still takes as needed. Please mention on discharge note that dilaudid is to be d/tootie    # GI ppx: Protonix qd  # DVT ppx: Lovenox qd  # Diet: DASH  # Activity: IAT, no needs  # From home, anticipate in 24 hours if improvement  # FULL code

## 2021-04-04 ENCOUNTER — TRANSCRIPTION ENCOUNTER (OUTPATIENT)
Age: 56
End: 2021-04-04

## 2021-04-04 VITALS
RESPIRATION RATE: 18 BRPM | DIASTOLIC BLOOD PRESSURE: 69 MMHG | HEART RATE: 105 BPM | TEMPERATURE: 97 F | SYSTOLIC BLOOD PRESSURE: 124 MMHG

## 2021-04-04 LAB
ANION GAP SERPL CALC-SCNC: 10 MMOL/L — SIGNIFICANT CHANGE UP (ref 7–14)
BASOPHILS # BLD AUTO: 0.05 K/UL — SIGNIFICANT CHANGE UP (ref 0–0.2)
BASOPHILS NFR BLD AUTO: 0.5 % — SIGNIFICANT CHANGE UP (ref 0–1)
BUN SERPL-MCNC: 14 MG/DL — SIGNIFICANT CHANGE UP (ref 10–20)
CALCIUM SERPL-MCNC: 9.1 MG/DL — SIGNIFICANT CHANGE UP (ref 8.5–10.1)
CHLORIDE SERPL-SCNC: 98 MMOL/L — SIGNIFICANT CHANGE UP (ref 98–110)
CHOLEST SERPL-MCNC: 118 MG/DL — SIGNIFICANT CHANGE UP
CO2 SERPL-SCNC: 31 MMOL/L — SIGNIFICANT CHANGE UP (ref 17–32)
CREAT SERPL-MCNC: 0.8 MG/DL — SIGNIFICANT CHANGE UP (ref 0.7–1.5)
EOSINOPHIL # BLD AUTO: 0.11 K/UL — SIGNIFICANT CHANGE UP (ref 0–0.7)
EOSINOPHIL NFR BLD AUTO: 1.1 % — SIGNIFICANT CHANGE UP (ref 0–8)
GLUCOSE SERPL-MCNC: 95 MG/DL — SIGNIFICANT CHANGE UP (ref 70–99)
HCT VFR BLD CALC: 43.5 % — SIGNIFICANT CHANGE UP (ref 42–52)
HDLC SERPL-MCNC: 50 MG/DL — SIGNIFICANT CHANGE UP
HGB BLD-MCNC: 14.1 G/DL — SIGNIFICANT CHANGE UP (ref 14–18)
IMM GRANULOCYTES NFR BLD AUTO: 0.5 % — HIGH (ref 0.1–0.3)
LIPID PNL WITH DIRECT LDL SERPL: 48 MG/DL — SIGNIFICANT CHANGE UP
LYMPHOCYTES # BLD AUTO: 2.88 K/UL — SIGNIFICANT CHANGE UP (ref 1.2–3.4)
LYMPHOCYTES # BLD AUTO: 28 % — SIGNIFICANT CHANGE UP (ref 20.5–51.1)
MAGNESIUM SERPL-MCNC: 2.1 MG/DL — SIGNIFICANT CHANGE UP (ref 1.8–2.4)
MCHC RBC-ENTMCNC: 27.9 PG — SIGNIFICANT CHANGE UP (ref 27–31)
MCHC RBC-ENTMCNC: 32.4 G/DL — SIGNIFICANT CHANGE UP (ref 32–37)
MCV RBC AUTO: 86.1 FL — SIGNIFICANT CHANGE UP (ref 80–94)
MONOCYTES # BLD AUTO: 0.77 K/UL — HIGH (ref 0.1–0.6)
MONOCYTES NFR BLD AUTO: 7.5 % — SIGNIFICANT CHANGE UP (ref 1.7–9.3)
NEUTROPHILS # BLD AUTO: 6.41 K/UL — SIGNIFICANT CHANGE UP (ref 1.4–6.5)
NEUTROPHILS NFR BLD AUTO: 62.4 % — SIGNIFICANT CHANGE UP (ref 42.2–75.2)
NON HDL CHOLESTEROL: 68 MG/DL — SIGNIFICANT CHANGE UP
NRBC # BLD: 0 /100 WBCS — SIGNIFICANT CHANGE UP (ref 0–0)
PLATELET # BLD AUTO: 243 K/UL — SIGNIFICANT CHANGE UP (ref 130–400)
POTASSIUM SERPL-MCNC: 4.1 MMOL/L — SIGNIFICANT CHANGE UP (ref 3.5–5)
POTASSIUM SERPL-SCNC: 4.1 MMOL/L — SIGNIFICANT CHANGE UP (ref 3.5–5)
RBC # BLD: 5.05 M/UL — SIGNIFICANT CHANGE UP (ref 4.7–6.1)
RBC # FLD: 15.5 % — HIGH (ref 11.5–14.5)
SODIUM SERPL-SCNC: 139 MMOL/L — SIGNIFICANT CHANGE UP (ref 135–146)
TRIGL SERPL-MCNC: 123 MG/DL — SIGNIFICANT CHANGE UP
WBC # BLD: 10.27 K/UL — SIGNIFICANT CHANGE UP (ref 4.8–10.8)
WBC # FLD AUTO: 10.27 K/UL — SIGNIFICANT CHANGE UP (ref 4.8–10.8)

## 2021-04-04 PROCEDURE — 71045 X-RAY EXAM CHEST 1 VIEW: CPT | Mod: 26

## 2021-04-04 PROCEDURE — 99239 HOSP IP/OBS DSCHRG MGMT >30: CPT

## 2021-04-04 PROCEDURE — 99221 1ST HOSP IP/OBS SF/LOW 40: CPT

## 2021-04-04 RX ORDER — CIPROFLOXACIN LACTATE 400MG/40ML
1 VIAL (ML) INTRAVENOUS
Qty: 3 | Refills: 0
Start: 2021-04-04 | End: 2021-04-06

## 2021-04-04 RX ORDER — FLUTICASONE PROPIONATE AND SALMETEROL 50; 250 UG/1; UG/1
1 POWDER ORAL; RESPIRATORY (INHALATION)
Qty: 0 | Refills: 0 | DISCHARGE

## 2021-04-04 RX ORDER — ALBUTEROL 90 UG/1
2 AEROSOL, METERED ORAL
Qty: 112 | Refills: 0
Start: 2021-04-04 | End: 2021-04-17

## 2021-04-04 RX ORDER — SODIUM CHLORIDE 0.65 %
1 AEROSOL, SPRAY (ML) NASAL
Refills: 0 | Status: DISCONTINUED | OUTPATIENT
Start: 2021-04-04 | End: 2021-04-04

## 2021-04-04 RX ORDER — BUDESONIDE AND FORMOTEROL FUMARATE DIHYDRATE 160; 4.5 UG/1; UG/1
1 AEROSOL RESPIRATORY (INHALATION)
Qty: 28 | Refills: 0
Start: 2021-04-04 | End: 2021-04-17

## 2021-04-04 RX ORDER — TIOTROPIUM BROMIDE 18 UG/1
1 CAPSULE ORAL; RESPIRATORY (INHALATION)
Qty: 14 | Refills: 0
Start: 2021-04-04 | End: 2021-04-17

## 2021-04-04 RX ADMIN — MORPHINE SULFATE 100 MILLIGRAM(S): 50 CAPSULE, EXTENDED RELEASE ORAL at 01:00

## 2021-04-04 RX ADMIN — Medication 145 MILLIGRAM(S): at 11:20

## 2021-04-04 RX ADMIN — MORPHINE SULFATE 100 MILLIGRAM(S): 50 CAPSULE, EXTENDED RELEASE ORAL at 13:14

## 2021-04-04 RX ADMIN — OXYCODONE HYDROCHLORIDE 30 MILLIGRAM(S): 5 TABLET ORAL at 13:00

## 2021-04-04 RX ADMIN — LISINOPRIL 5 MILLIGRAM(S): 2.5 TABLET ORAL at 05:29

## 2021-04-04 RX ADMIN — OXYCODONE HYDROCHLORIDE 30 MILLIGRAM(S): 5 TABLET ORAL at 08:31

## 2021-04-04 RX ADMIN — MORPHINE SULFATE 100 MILLIGRAM(S): 50 CAPSULE, EXTENDED RELEASE ORAL at 05:29

## 2021-04-04 RX ADMIN — POLYETHYLENE GLYCOL 3350 17 GRAM(S): 17 POWDER, FOR SOLUTION ORAL at 11:23

## 2021-04-04 RX ADMIN — PANTOPRAZOLE SODIUM 40 MILLIGRAM(S): 20 TABLET, DELAYED RELEASE ORAL at 08:31

## 2021-04-04 RX ADMIN — Medication 40 MILLIGRAM(S): at 05:29

## 2021-04-04 RX ADMIN — MORPHINE SULFATE 100 MILLIGRAM(S): 50 CAPSULE, EXTENDED RELEASE ORAL at 11:21

## 2021-04-04 RX ADMIN — MORPHINE SULFATE 100 MILLIGRAM(S): 50 CAPSULE, EXTENDED RELEASE ORAL at 00:18

## 2021-04-04 RX ADMIN — BUPROPION HYDROCHLORIDE 150 MILLIGRAM(S): 150 TABLET, EXTENDED RELEASE ORAL at 11:21

## 2021-04-04 RX ADMIN — OXYCODONE HYDROCHLORIDE 30 MILLIGRAM(S): 5 TABLET ORAL at 02:04

## 2021-04-04 NOTE — CONSULT NOTE ADULT - SUBJECTIVE AND OBJECTIVE BOX
Patient is a 55y old  Male who presents with a chief complaint of COPD exacerbation (04 Apr 2021 09:27)    HPI:  54 years old Male, active heavy smoker with PMHx of COPD not on home O2, REBECCA on BiPAP, HTN, DLD, GERD, severe OA of bilateral shoulders, hips and knees s/p total knee replacement bilaterally presents for SOB. HE states that it started the day before admission when he was laying down, he was not doing any efforts, it improved slightly when he sat up. He endorses chronic cough of white phlegm and no acute changes in color or amount of phlegm currently. He ambulates with no issues, does feel a bit of SOB on ambulation though. He denies chest pain, palpitation or abdominal pain. He was never intubated, most recent hospitalization for copd exacerbation was in November 2020, baseline CO2 around 50, was treated with po prednisone and azithromycin and seen by pain management as well.  In ED, VS wnl, patient on RA not in distress, VBG not performed. CXR clear, EKG NSR, labs unremarkable. He is s/p solumedrol 125 mg IV once and 1 L LR bolus. (03 Apr 2021 10:27)      PAST MEDICAL & SURGICAL HISTORY:  HTN (hypertension)  High cholesterol  GERD (gastroesophageal reflux disease)  Morbid obesity  Osteoarthritis  Hiatal hernia  GERD  Colitis  LARGE INTESTINE   NO RECENT FLARES  COPD (chronic obstructive pulmonary disease)  REBECCA treated with BiPAP  History of cholecystectomy  History of appendectomy  History of knee replacement procedure of right knee BILATERAL  Hernia, inguinal, bilateral 3 months old  H/O knee surgery arthoscopic x4  H/O foot surgery right big toe surgery    SOCIAL HX:   Heavy smoker    FAMILY HISTORY:  FH: lung cancer (Mother) mother  FH: diabetes mellitus    ROS as per HPI    Allergies  No Known Allergies  Intolerances      PHYSICAL EXAM  Vital Signs Last 24 Hrs  T(C): 36 (04 Apr 2021 04:43), Max: 36.5 (03 Apr 2021 14:14)  T(F): 96.8 (04 Apr 2021 04:43), Max: 97.7 (03 Apr 2021 14:14)  HR: 88 (04 Apr 2021 05:15) (87 - 95)  BP: 120/68 (04 Apr 2021 05:15) (102/56 - 132/76)  BP(mean): --  RR: 18 (04 Apr 2021 05:15) (18 - 20)  SpO2: 97% (04 Apr 2021 05:15) (94% - 97%)    General: NAD  HEENT:  NCAT, PERRLA, clear conjunctiva, poor dentition  Neck: supple  Respiratory: bilateral end expiratory wheezes  Cardiovascular: +S1/S2, RRR  Abdomen: soft, NTND, +BS, morbidly obese  Extremities: 2+ peripheral pulses b/l, no LE edema  Skin: normal color and turgor, no rash  Neurological: AAOx3      LABS:                          14.1   10.27 )-----------( 243      ( 04 Apr 2021 05:42 )             43.5                                               04-04    139  |  98  |  14  ----------------------------<  95  4.1   |  31  |  0.8    Ca    9.1      04 Apr 2021 05:42  Mg     2.1     04-04    TPro  6.9  /  Alb  3.9  /  TBili  0.3  /  DBili  x   /  AST  19  /  ALT  19  /  AlkPhos  131<H>  04-02                                                 CARDIAC MARKERS ( 02 Apr 2021 23:50 )  x     / <0.01 ng/mL / x     / x     / x                                                LIVER FUNCTIONS - ( 02 Apr 2021 23:50 )  Alb: 3.9 g/dL / Pro: 6.9 g/dL / ALK PHOS: 131 U/L / ALT: 19 U/L / AST: 19 U/L / GGT: x                                                                                             MEDICATIONS  (STANDING):  albuterol/ipratropium for Nebulization 3 milliLiter(s) Nebulizer every 6 hours  budesonide 160 MICROgram(s)/formoterol 4.5 MICROgram(s) Inhaler 2 Puff(s) Inhalation two times a day  buPROPion  milliGRAM(s) Oral daily  chlorhexidine 4% Liquid 1 Application(s) Topical <User Schedule>  enoxaparin Injectable 40 milliGRAM(s) SubCutaneous daily  fenofibrate Tablet 145 milliGRAM(s) Oral daily  furosemide    Tablet 40 milliGRAM(s) Oral daily  levoFLOXacin IVPB 750 milliGRAM(s) IV Intermittent every 24 hours  lisinopril 5 milliGRAM(s) Oral daily  morphine ER Tablet 100 milliGRAM(s) Oral four times a day  nicotine - 21 mG/24Hr(s) Patch 1 patch Transdermal daily  pantoprazole    Tablet 40 milliGRAM(s) Oral before breakfast  polyethylene glycol 3350 17 Gram(s) Oral daily  predniSONE   Tablet 40 milliGRAM(s) Oral daily  senna 2 Tablet(s) Oral at bedtime  simvastatin 20 milliGRAM(s) Oral at bedtime  tamsulosin 0.4 milliGRAM(s) Oral at bedtime    MEDICATIONS  (PRN):  albuterol/ipratropium for Nebulization 3 milliLiter(s) Nebulizer every 4 hours PRN Shortness of Breath and/or Wheezing  oxyCODONE    IR 30 milliGRAM(s) Oral every 4 hours PRN Severe Pain (7 - 10)  sodium chloride 0.65% Nasal 1 Spray(s) Both Nostrils two times a day PRN Nasal Congestion  zolpidem 5 milliGRAM(s) Oral at bedtime PRN Insomnia      < from: [facility] Duplex Lower Ext Vein Scan, Bilat (04.03.21 @ 16:21) >  Impression:  No evidence of deep venousthrombosis or superficial thrombophlebitis in the bilateral lower extremities.  < end of copied text >

## 2021-04-04 NOTE — DISCHARGE NOTE PROVIDER - CARE PROVIDER_API CALL
Edison Bowie)  [id number]  [address] [place] [address]  Phone: [phone]  Fax: [phone]  Follow Up Time:     Blake Bassett  CRITICAL CARE MEDICINE  [address] [place] [address], [address]  [place] [address]  Phone: [phone]  Fax: [phone]  Follow Up Time:

## 2021-04-04 NOTE — DISCHARGE NOTE PROVIDER - HOSPITAL COURSE
54 years old Male, active heavy smoker with PMHx of COPD not on home O2, REBECCA on BiPAP, HTN, DLD, GERD, severe OA of bilateral shoulders, hips and knees s/p total knee replacement bilaterally presents for SOB. HE states that it started the day before admission when he was laying down, he was not doing any efforts, it improved slightly when he sat up. He endorses chronic cough of white phlegm and no acute changes in color or amount of phlegm currently. He ambulates with no issues, does feel a bit of SOB on ambulation though. He denies chest pain, palpitation or abdominal pain. He was never intubated, most recent hospitalization for copd exacerbation was in November 2020, baseline CO2 around 50, was treated with po prednisone and azithromycin and seen by pain management as well.  In ED, VS wnl, patient on RA not in distress, VBG not performed. CXR clear, EKG NSR, labs unremarkable. He is s/p solumedrol 125 mg IV once and 1 L LR bolus.    Hospital course: admitted to medical floor. CXR neg, end exp wheeze on exam. Active smoker, counseled on cessation. Continued on steroids, levaquin. He was stable on room air with good saturations. He was stable for d/c on 4/4, needs outpt pmd, pulm f/u one week.     41 minutes spent on discharge planning.

## 2021-04-04 NOTE — PROGRESS NOTE ADULT - SUBJECTIVE AND OBJECTIVE BOX
INTERVAL HPI/OVERNIGHT EVENTS:    SUBJECTIVE: Patient seen and examined at bedside.     no cp, abd pain, fever  sob improved, non productive cough, no orthopnea, pnd    OBJECTIVE:    VITAL SIGNS:  Vital Signs Last 24 Hrs  T(C): 36 (04 Apr 2021 04:43), Max: 36.5 (03 Apr 2021 14:14)  T(F): 96.8 (04 Apr 2021 04:43), Max: 97.7 (03 Apr 2021 14:14)  HR: 88 (04 Apr 2021 05:15) (87 - 95)  BP: 120/68 (04 Apr 2021 05:15) (102/56 - 132/76)  BP(mean): --  RR: 18 (04 Apr 2021 05:15) (18 - 20)  SpO2: 97% (04 Apr 2021 05:15) (94% - 97%)      PHYSICAL EXAM:    General: NAD  HEENT: NC/AT; PERRL, clear conjunctiva  Neck: supple  Respiratory: end exp wheeze diffusely  Cardiovascular: +S1/S2; RRR  Abdomen: soft, NT/ND; +BS x4  Extremities: WWP, 2+ peripheral pulses b/l; no LE edema  Skin: normal color and turgor; no rash  Neurological:    MEDICATIONS:  MEDICATIONS  (STANDING):  albuterol/ipratropium for Nebulization 3 milliLiter(s) Nebulizer every 6 hours  budesonide 160 MICROgram(s)/formoterol 4.5 MICROgram(s) Inhaler 2 Puff(s) Inhalation two times a day  buPROPion  milliGRAM(s) Oral daily  chlorhexidine 4% Liquid 1 Application(s) Topical <User Schedule>  enoxaparin Injectable 40 milliGRAM(s) SubCutaneous daily  fenofibrate Tablet 145 milliGRAM(s) Oral daily  furosemide    Tablet 40 milliGRAM(s) Oral daily  levoFLOXacin IVPB 750 milliGRAM(s) IV Intermittent every 24 hours  lisinopril 5 milliGRAM(s) Oral daily  morphine ER Tablet 100 milliGRAM(s) Oral four times a day  nicotine - 21 mG/24Hr(s) Patch 1 patch Transdermal daily  pantoprazole    Tablet 40 milliGRAM(s) Oral before breakfast  polyethylene glycol 3350 17 Gram(s) Oral daily  predniSONE   Tablet 40 milliGRAM(s) Oral daily  senna 2 Tablet(s) Oral at bedtime  simvastatin 20 milliGRAM(s) Oral at bedtime  tamsulosin 0.4 milliGRAM(s) Oral at bedtime    MEDICATIONS  (PRN):  albuterol/ipratropium for Nebulization 3 milliLiter(s) Nebulizer every 4 hours PRN Shortness of Breath and/or Wheezing  oxyCODONE    IR 30 milliGRAM(s) Oral every 4 hours PRN Severe Pain (7 - 10)  sodium chloride 0.65% Nasal 1 Spray(s) Both Nostrils two times a day PRN Nasal Congestion  zolpidem 5 milliGRAM(s) Oral at bedtime PRN Insomnia      ALLERGIES:  Allergies    No Known Allergies    Intolerances        LABS:                        14.1   10.27 )-----------( 243      ( 04 Apr 2021 05:42 )             43.5     Hemoglobin: 14.1 g/dL (04-04 @ 05:42)  Hemoglobin: 14.5 g/dL (04-02 @ 23:50)    CBC Full  -  ( 04 Apr 2021 05:42 )  WBC Count : 10.27 K/uL  RBC Count : 5.05 M/uL  Hemoglobin : 14.1 g/dL  Hematocrit : 43.5 %  Platelet Count - Automated : 243 K/uL  Mean Cell Volume : 86.1 fL  Mean Cell Hemoglobin : 27.9 pg  Mean Cell Hemoglobin Concentration : 32.4 g/dL  Auto Neutrophil # : 6.41 K/uL  Auto Lymphocyte # : 2.88 K/uL  Auto Monocyte # : 0.77 K/uL  Auto Eosinophil # : 0.11 K/uL  Auto Basophil # : 0.05 K/uL  Auto Neutrophil % : 62.4 %  Auto Lymphocyte % : 28.0 %  Auto Monocyte % : 7.5 %  Auto Eosinophil % : 1.1 %  Auto Basophil % : 0.5 %    04-04    139  |  98  |  14  ----------------------------<  95  4.1   |  31  |  0.8    Ca    9.1      04 Apr 2021 05:42  Mg     2.1     04-04    TPro  6.9  /  Alb  3.9  /  TBili  0.3  /  DBili  x   /  AST  19  /  ALT  19  /  AlkPhos  131<H>  04-02    Creatinine Trend: 0.8<--, 0.7<--  LIVER FUNCTIONS - ( 02 Apr 2021 23:50 )  Alb: 3.9 g/dL / Pro: 6.9 g/dL / ALK PHOS: 131 U/L / ALT: 19 U/L / AST: 19 U/L / GGT: x               hs Troponin:              CSF:                      EKG:   MICROBIOLOGY:    IMAGING:      Labs, imaging, EKG personally reviewed    RADIOLOGY & ADDITIONAL TESTS: Reviewed.

## 2021-04-04 NOTE — PROGRESS NOTE ADULT - ASSESSMENT
54M PMHx COPD, REBECCA, HTN, HLD, severe OA s/p TKR BL here with acute hypoxic resp failure.  #Acute hypoxic resp failure, due to copd exacerbation  active smoker  counseled on smoking cessation  duonebs q6  symbicort  levaquin  prednsione 40  cxr clear  hypoxia resolved, satting well on ra  stable for d/c, needs outpt pmd, pulm f/u one week  #HTN  lasix 40  lisinopril 5  #HLD  zocor 20  #DVT ppx  d/c today

## 2021-04-04 NOTE — CONSULT NOTE ADULT - ASSESSMENT
Impression  COPD Exacerbation improving  REBECCA on BIPAP  [facility]    Plan:  Continue prednisone taper.  D/C on prednisone 40mg for 5 days followed by 20mg for 5 days  Triple therapy on discharge  Smoking cessation counselling  DVT Prophylaxis  OP pulm FU with Dr. Bassett in 2 weeks

## 2021-04-04 NOTE — DISCHARGE NOTE PROVIDER - NSDCCPCAREPLAN_GEN_ALL_CORE_FT
PRINCIPAL DISCHARGE DIAGNOSIS  Diagnosis: COPD exacerbation  Assessment and Plan of Treatment: PLEASE FOLLOW UP WITH YOUR PRIMARY CARE DOCTOR, PULMONOLOGIST IN ONE WEEK.

## 2021-04-04 NOTE — DISCHARGE NOTE NURSING/CASE MANAGEMENT/SOCIAL WORK - PATIENT PORTAL LINK FT
You can access the FollowMyHealth Patient Portal offered by [facility] by registering at the following website: [email or web address]. By joining [employer]’s FollowMyHealth portal, you will also be able to view your health information using other applications (apps) compatible with our system.

## 2021-04-04 NOTE — DISCHARGE NOTE PROVIDER - NSDCMRMEDTOKEN_GEN_ALL_CORE_FT
albuterol 90 mcg/inh inhalation aerosol: 2 puff(s) inhaled every 6 hours AS NEEDED   Ambien 10 mg oral tablet: 0.5 tab(s) orally once a day (at bedtime), As Needed  fenofibrate 160 mg oral tablet: 1 tab(s) orally once a day  Lasix 40 mg oral tablet: 1 tab(s) orally once a day  levoFLOXacin 750 mg oral tablet: 1 tab(s) orally once a day   lisinopril 5 mg oral tablet: 1 tab(s) orally once a day  MS Contin 100 mg oral tablet, extended release: 1 tab(s) orally 4 times a day MDD:400mg  nicotine 21 mg/24 hr transdermal film, extended release: 1 patch transdermal once a day   omeprazole 40 mg oral delayed release capsule: 1 cap(s) orally 2 times a day   oxyCODONE 30 mg oral tablet: 1 tab(s) orally every 4 hours, As needed, Severe Pain (7 - 10) MDD:180mg  polyethylene glycol 3350 oral powder for reconstitution: 17 gram(s) orally once a day AS NEEDED FOR CONSTIPATION  predniSONE 20 mg oral tablet: 2 tab(s) orally once a day  senna oral tablet: 2 tab(s) orally once a day (at bedtime)  Spiriva HandiHaler 18 mcg inhalation capsule: 1 cap(s) inhaled once a day   Symbicort 160 mcg-4.5 mcg/inh inhalation aerosol: 1 puff(s) inhaled 2 times a day   Wellbutrin  mg/24 hours oral tablet, extended release: 1 tab(s) orally every 24 hours  Zocor 20 mg oral tablet: 1 tab(s) orally once a day (at bedtime)

## 2021-04-05 LAB
A1C WITH ESTIMATED AVERAGE GLUCOSE RESULT: 6 % — HIGH (ref 4–5.6)
COVID-19 SPIKE DOMAIN AB INTERP: NEGATIVE — SIGNIFICANT CHANGE UP
COVID-19 SPIKE DOMAIN ANTIBODY RESULT: 0.4 U/ML — SIGNIFICANT CHANGE UP
ESTIMATED AVERAGE GLUCOSE: 126 MG/DL — HIGH (ref 68–114)
SARS-COV-2 IGG+IGM SERPL QL IA: 0.4 U/ML — SIGNIFICANT CHANGE UP
SARS-COV-2 IGG+IGM SERPL QL IA: NEGATIVE — SIGNIFICANT CHANGE UP
TSH SERPL-MCNC: 1.93 UIU/ML — SIGNIFICANT CHANGE UP (ref 0.27–4.2)

## 2021-04-09 DIAGNOSIS — K44.9 DIAPHRAGMATIC HERNIA WITHOUT OBSTRUCTION OR GANGRENE: ICD-10-CM

## 2021-04-09 DIAGNOSIS — K21.9 GASTRO-ESOPHAGEAL REFLUX DISEASE WITHOUT ESOPHAGITIS: ICD-10-CM

## 2021-04-09 DIAGNOSIS — F32.9 MAJOR DEPRESSIVE DISORDER, SINGLE EPISODE, UNSPECIFIED: ICD-10-CM

## 2021-04-09 DIAGNOSIS — Z90.49 ACQUIRED ABSENCE OF OTHER SPECIFIED PARTS OF DIGESTIVE TRACT: ICD-10-CM

## 2021-04-09 DIAGNOSIS — E66.2 MORBID (SEVERE) OBESITY WITH ALVEOLAR HYPOVENTILATION: ICD-10-CM

## 2021-04-09 DIAGNOSIS — I10 ESSENTIAL (PRIMARY) HYPERTENSION: ICD-10-CM

## 2021-04-09 DIAGNOSIS — J96.01 ACUTE RESPIRATORY FAILURE WITH HYPOXIA: ICD-10-CM

## 2021-04-09 DIAGNOSIS — Z96.651 PRESENCE OF RIGHT ARTIFICIAL KNEE JOINT: ICD-10-CM

## 2021-04-09 DIAGNOSIS — F11.20 OPIOID DEPENDENCE, UNCOMPLICATED: ICD-10-CM

## 2021-04-09 DIAGNOSIS — R06.02 SHORTNESS OF BREATH: ICD-10-CM

## 2021-04-09 DIAGNOSIS — J44.1 CHRONIC OBSTRUCTIVE PULMONARY DISEASE WITH (ACUTE) EXACERBATION: ICD-10-CM

## 2021-04-09 DIAGNOSIS — M15.0 PRIMARY GENERALIZED (OSTEO)ARTHRITIS: ICD-10-CM

## 2021-04-09 DIAGNOSIS — Z99.89 DEPENDENCE ON OTHER ENABLING MACHINES AND DEVICES: ICD-10-CM

## 2021-04-09 DIAGNOSIS — E78.00 PURE HYPERCHOLESTEROLEMIA, UNSPECIFIED: ICD-10-CM

## 2021-04-09 DIAGNOSIS — F17.210 NICOTINE DEPENDENCE, CIGARETTES, UNCOMPLICATED: ICD-10-CM

## 2021-05-11 NOTE — ED ADULT NURSE NOTE - MUSCULOSKELETAL ASSESSMENT
Patient left message, wants to know if she decides to do pelvic surgery, wants to know which surgery option  would recommend doing for her.    WDL

## 2021-06-04 ENCOUNTER — OUTPATIENT (OUTPATIENT)
Dept: OUTPATIENT SERVICES | Facility: HOSPITAL | Age: 56
LOS: 1 days | Discharge: HOME | End: 2021-06-04

## 2021-06-04 ENCOUNTER — APPOINTMENT (OUTPATIENT)
Dept: PULMONOLOGY | Facility: CLINIC | Age: 56
End: 2021-06-04
Payer: MEDICAID

## 2021-06-04 VITALS
OXYGEN SATURATION: 96 % | DIASTOLIC BLOOD PRESSURE: 96 MMHG | BODY MASS INDEX: 45.1 KG/M2 | HEART RATE: 88 BPM | SYSTOLIC BLOOD PRESSURE: 155 MMHG | HEIGHT: 70 IN | TEMPERATURE: 98 F | WEIGHT: 315 LBS

## 2021-06-04 DIAGNOSIS — Z96.651 PRESENCE OF RIGHT ARTIFICIAL KNEE JOINT: Chronic | ICD-10-CM

## 2021-06-04 DIAGNOSIS — K40.20 BILATERAL INGUINAL HERNIA, WITHOUT OBSTRUCTION OR GANGRENE, NOT SPECIFIED AS RECURRENT: Chronic | ICD-10-CM

## 2021-06-04 DIAGNOSIS — Z98.890 OTHER SPECIFIED POSTPROCEDURAL STATES: Chronic | ICD-10-CM

## 2021-06-04 DIAGNOSIS — G47.30 SLEEP APNEA, UNSPECIFIED: ICD-10-CM

## 2021-06-04 PROCEDURE — 99214 OFFICE O/P EST MOD 30 MIN: CPT | Mod: GC

## 2021-06-04 NOTE — ASSESSMENT
[FreeTextEntry1] : He has no significant interval events. \par the patient uses no supplemental oxygen. \par Smoking Status: is a former smoker. \par Here for right ankle reconstruction pre-op clearance. \par \par No ED visits\par recent hospitalization 6 weeks ago\par Stopped smoking 7-8 months ago \par SOB almost resolved, decrease in wheezing since last visit\par BIPAP only at night, sleeping well on BIPAP\par No SOB at night. \par Exercise tolerance getting better from better knees and improvement of breathing. Can walk 30 mins without stopping. \par Not using his inhalers anymore as he feels like he does not need them\par \par # patient is intermediate risk for intermediate risk surgery \par \par advised to use bipap pre op with 18 / 8 + oxygen as needed\par prednisone 40 mg once a day 5 days before the surgery \par c/w advair, spiriva and albuterol as needed \par f/u in 6 months\par Sleep studies ordered

## 2021-06-04 NOTE — HISTORY OF PRESENT ILLNESS
[TextBox_4] : He has no significant interval events. \par the patient uses no supplemental oxygen. \par Smoking Status: is a former smoker. \par Here for right ankle reconstruction pre-op clearance. \par \par No ED visits\par recent hospitalization 6 weeks ago\par Stopped smoking 7-8 months ago \par SOB almost resolved, decrease in wheezing since last visit\par BIPAP only at night, sleeping well on BIPAP\par No SOB at night. \par Exercise tolerance getting better from better knees and improvement of breathing. Can walk 30 mins without stopping. \par Not using his inhalers anymore as he feels like he does not need them

## 2021-06-04 NOTE — PHYSICAL EXAM
[Normal Rate/Rhythm] : normal rate/rhythm [Normal S1, S2] : normal s1, s2 [No Resp Distress] : no resp distress [No Acc Muscle Use] : no acc muscle use [Wheeze] : wheeze [No Abnormalities] : no abnormalities

## 2021-06-20 ENCOUNTER — EMERGENCY (EMERGENCY)
Facility: HOSPITAL | Age: 56
LOS: 0 days | Discharge: HOME | End: 2021-06-20
Attending: EMERGENCY MEDICINE | Admitting: EMERGENCY MEDICINE
Payer: MEDICAID

## 2021-06-20 VITALS
TEMPERATURE: 98 F | HEART RATE: 87 BPM | OXYGEN SATURATION: 95 % | SYSTOLIC BLOOD PRESSURE: 111 MMHG | DIASTOLIC BLOOD PRESSURE: 62 MMHG

## 2021-06-20 VITALS
HEART RATE: 114 BPM | WEIGHT: 279.99 LBS | OXYGEN SATURATION: 97 % | HEIGHT: 70 IN | SYSTOLIC BLOOD PRESSURE: 112 MMHG | RESPIRATION RATE: 18 BRPM | DIASTOLIC BLOOD PRESSURE: 68 MMHG | TEMPERATURE: 98 F

## 2021-06-20 DIAGNOSIS — E78.00 PURE HYPERCHOLESTEROLEMIA, UNSPECIFIED: ICD-10-CM

## 2021-06-20 DIAGNOSIS — R11.0 NAUSEA: ICD-10-CM

## 2021-06-20 DIAGNOSIS — R06.02 SHORTNESS OF BREATH: ICD-10-CM
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
## 2021-06-20 DIAGNOSIS — Z98.890 OTHER SPECIFIED POSTPROCEDURAL STATES: Chronic | ICD-10-CM

## 2021-06-20 DIAGNOSIS — Z87.09 PERSONAL HISTORY OF OTHER DISEASES OF THE RESPIRATORY SYSTEM: ICD-10-CM

## 2021-06-20 DIAGNOSIS — Z87.19 PERSONAL HISTORY OF OTHER DISEASES OF THE DIGESTIVE SYSTEM: ICD-10-CM

## 2021-06-20 DIAGNOSIS — K21.9 GASTRO-ESOPHAGEAL REFLUX DISEASE WITHOUT ESOPHAGITIS: ICD-10-CM

## 2021-06-20 DIAGNOSIS — Z98.890 OTHER SPECIFIED POSTPROCEDURAL STATES: ICD-10-CM

## 2021-06-20 DIAGNOSIS — T67.5XXA HEAT EXHAUSTION, UNSPECIFIED, INITIAL ENCOUNTER: ICD-10-CM

## 2021-06-20 DIAGNOSIS — J44.9 CHRONIC OBSTRUCTIVE PULMONARY DISEASE, UNSPECIFIED: ICD-10-CM

## 2021-06-20 DIAGNOSIS — I10 ESSENTIAL (PRIMARY) HYPERTENSION: ICD-10-CM

## 2021-06-20 DIAGNOSIS — E66.01 MORBID (SEVERE) OBESITY DUE TO EXCESS CALORIES: ICD-10-CM

## 2021-06-20 DIAGNOSIS — F17.200 NICOTINE DEPENDENCE, UNSPECIFIED, UNCOMPLICATED: ICD-10-CM

## 2021-06-20 DIAGNOSIS — R53.1 WEAKNESS: ICD-10-CM

## 2021-06-20 DIAGNOSIS — Z87.39 PERSONAL HISTORY OF OTHER DISEASES OF THE MUSCULOSKELETAL SYSTEM AND CONNECTIVE TISSUE: ICD-10-CM

## 2021-06-20 DIAGNOSIS — R42 DIZZINESS AND GIDDINESS: ICD-10-CM

## 2021-06-20 DIAGNOSIS — Z79.899 OTHER LONG TERM (CURRENT) DRUG THERAPY: ICD-10-CM

## 2021-06-20 DIAGNOSIS — Z96.651 PRESENCE OF RIGHT ARTIFICIAL KNEE JOINT: Chronic | ICD-10-CM

## 2021-06-20 DIAGNOSIS — K40.20 BILATERAL INGUINAL HERNIA, WITHOUT OBSTRUCTION OR GANGRENE, NOT SPECIFIED AS RECURRENT: Chronic | ICD-10-CM

## 2021-06-20 LAB
ALBUMIN SERPL ELPH-MCNC: 4 G/DL — SIGNIFICANT CHANGE UP (ref 3.5–5.2)
ALP SERPL-CCNC: 116 U/L — HIGH (ref 30–115)
ALT FLD-CCNC: 14 U/L — SIGNIFICANT CHANGE UP (ref 0–41)
ANION GAP SERPL CALC-SCNC: 11 MMOL/L — SIGNIFICANT CHANGE UP (ref 7–14)
AST SERPL-CCNC: 17 U/L — SIGNIFICANT CHANGE UP (ref 0–41)
BASE EXCESS BLDV CALC-SCNC: 1.1 MMOL/L — SIGNIFICANT CHANGE UP (ref -2–2)
BASOPHILS # BLD AUTO: 0.06 K/UL — SIGNIFICANT CHANGE UP (ref 0–0.2)
BASOPHILS NFR BLD AUTO: 0.6 % — SIGNIFICANT CHANGE UP (ref 0–1)
BILIRUB SERPL-MCNC: 0.6 MG/DL — SIGNIFICANT CHANGE UP (ref 0.2–1.2)
BUN SERPL-MCNC: 10 MG/DL — SIGNIFICANT CHANGE UP (ref 10–20)
CA-I SERPL-SCNC: 1.21 MMOL/L — SIGNIFICANT CHANGE UP (ref 1.12–1.3)
CALCIUM SERPL-MCNC: 9.6 MG/DL — SIGNIFICANT CHANGE UP (ref 8.5–10.1)
CHLORIDE SERPL-SCNC: 100 MMOL/L — SIGNIFICANT CHANGE UP (ref 98–110)
CK SERPL-CCNC: 270 U/L — HIGH (ref 0–225)
CO2 SERPL-SCNC: 25 MMOL/L — SIGNIFICANT CHANGE UP (ref 17–32)
CREAT SERPL-MCNC: 1 MG/DL — SIGNIFICANT CHANGE UP (ref 0.7–1.5)
EOSINOPHIL # BLD AUTO: 0.14 K/UL — SIGNIFICANT CHANGE UP (ref 0–0.7)
EOSINOPHIL NFR BLD AUTO: 1.5 % — SIGNIFICANT CHANGE UP (ref 0–8)
GAS PNL BLDV: 138 MMOL/L — SIGNIFICANT CHANGE UP (ref 136–145)
GAS PNL BLDV: SIGNIFICANT CHANGE UP
GLUCOSE SERPL-MCNC: 126 MG/DL — HIGH (ref 70–99)
HCO3 BLDV-SCNC: 28 MMOL/L — SIGNIFICANT CHANGE UP (ref 22–29)
HCT VFR BLD CALC: 45.8 % — SIGNIFICANT CHANGE UP (ref 42–52)
HCT VFR BLDA CALC: 45.4 % — HIGH (ref 34–44)
HGB BLD CALC-MCNC: 14.8 G/DL — SIGNIFICANT CHANGE UP (ref 14–18)
HGB BLD-MCNC: 15.3 G/DL — SIGNIFICANT CHANGE UP (ref 14–18)
IMM GRANULOCYTES NFR BLD AUTO: 0.4 % — HIGH (ref 0.1–0.3)
LACTATE BLDV-MCNC: 1.4 MMOL/L — SIGNIFICANT CHANGE UP (ref 0.5–1.6)
LYMPHOCYTES # BLD AUTO: 1.47 K/UL — SIGNIFICANT CHANGE UP (ref 1.2–3.4)
LYMPHOCYTES # BLD AUTO: 15.2 % — LOW (ref 20.5–51.1)
MAGNESIUM SERPL-MCNC: 2.1 MG/DL — SIGNIFICANT CHANGE UP (ref 1.8–2.4)
MCHC RBC-ENTMCNC: 28.2 PG — SIGNIFICANT CHANGE UP (ref 27–31)
MCHC RBC-ENTMCNC: 33.4 G/DL — SIGNIFICANT CHANGE UP (ref 32–37)
MCV RBC AUTO: 84.5 FL — SIGNIFICANT CHANGE UP (ref 80–94)
MONOCYTES # BLD AUTO: 0.6 K/UL — SIGNIFICANT CHANGE UP (ref 0.1–0.6)
MONOCYTES NFR BLD AUTO: 6.2 % — SIGNIFICANT CHANGE UP (ref 1.7–9.3)
NEUTROPHILS # BLD AUTO: 7.33 K/UL — HIGH (ref 1.4–6.5)
NEUTROPHILS NFR BLD AUTO: 76.1 % — HIGH (ref 42.2–75.2)
NRBC # BLD: 0 /100 WBCS — SIGNIFICANT CHANGE UP (ref 0–0)
PCO2 BLDV: 52 MMHG — HIGH (ref 41–51)
PH BLDV: 7.34 — SIGNIFICANT CHANGE UP (ref 7.26–7.43)
PLATELET # BLD AUTO: 258 K/UL — SIGNIFICANT CHANGE UP (ref 130–400)
PO2 BLDV: 48 MMHG — HIGH (ref 20–40)
POTASSIUM BLDV-SCNC: 3.8 MMOL/L — SIGNIFICANT CHANGE UP (ref 3.3–5.6)
POTASSIUM SERPL-MCNC: 4 MMOL/L — SIGNIFICANT CHANGE UP (ref 3.5–5)
POTASSIUM SERPL-SCNC: 4 MMOL/L — SIGNIFICANT CHANGE UP (ref 3.5–5)
PROT SERPL-MCNC: 6.7 G/DL — SIGNIFICANT CHANGE UP (ref 6–8)
RBC # BLD: 5.42 M/UL — SIGNIFICANT CHANGE UP (ref 4.7–6.1)
RBC # FLD: 14.5 % — SIGNIFICANT CHANGE UP (ref 11.5–14.5)
SAO2 % BLDV: 82 % — SIGNIFICANT CHANGE UP
SODIUM SERPL-SCNC: 136 MMOL/L — SIGNIFICANT CHANGE UP (ref 135–146)
TROPONIN T SERPL-MCNC: <0.01 NG/ML — SIGNIFICANT CHANGE UP
WBC # BLD: 9.64 K/UL — SIGNIFICANT CHANGE UP (ref 4.8–10.8)
WBC # FLD AUTO: 9.64 K/UL — SIGNIFICANT CHANGE UP (ref 4.8–10.8)

## 2021-06-20 PROCEDURE — 93010 ELECTROCARDIOGRAM REPORT: CPT

## 2021-06-20 PROCEDURE — 71045 X-RAY EXAM CHEST 1 VIEW: CPT | Mod: 26

## 2021-06-20 PROCEDURE — 99285 EMERGENCY DEPT VISIT HI MDM: CPT

## 2021-06-20 RX ORDER — SODIUM CHLORIDE 9 MG/ML
1000 INJECTION, SOLUTION INTRAVENOUS ONCE
Refills: 0 | Status: COMPLETED | OUTPATIENT
Start: 2021-06-20 | End: 2021-06-20

## 2021-06-20 RX ORDER — METOCLOPRAMIDE HCL 10 MG
10 TABLET ORAL ONCE
Refills: 0 | Status: COMPLETED | OUTPATIENT
Start: 2021-06-20 | End: 2021-06-20

## 2021-06-20 RX ADMIN — Medication 10 MILLIGRAM(S): at 11:35

## 2021-06-20 RX ADMIN — SODIUM CHLORIDE 1000 MILLILITER(S): 9 INJECTION, SOLUTION INTRAVENOUS at 11:35

## 2021-06-20 NOTE — ED ADULT TRIAGE NOTE - CHIEF COMPLAINT QUOTE
pt biba because he was standing outside someone's house for about 20 minutes and they called 911. the patient sts that he was feeling overheated and short of breath and he was just staying in the shade .

## 2021-06-20 NOTE — ED ADULT NURSE NOTE - OBJECTIVE STATEMENT
pt biba because he was standing outside someone's house for about 20 minutes and they called 911. the patient sts that he was feeling overheated and short of breath and he was just staying in the shade.

## 2021-06-20 NOTE — ED PROVIDER NOTE - CLINICAL SUMMARY MEDICAL DECISION MAKING FREE TEXT BOX
Patient presented with lightheadedness, dyspnea and nausea s/p working outside in the heat in his yard. On arrival to ED patient afebrile, HD stable, neurovascularly intact, feeling better since being out of the sun. Had nuclear stress testing done last year at [facility] which was negative. Obtained EKG which was non-ischemic without evidence of STEMI. Obtained labs which were grossly unremarkable including no significant leukocytosis, anemia, signs of dehydration/TEODORO, transaminitis or significant electrolyte abnormalities. Trop negative and CPK only mildly elevated for which fluids were given. After tx in ED patient asymptomatic, feeling better, ambulatory. Given the above, will discharge home with outpatient follow up. Patient agreeable with plan. Agrees to return to ED for any new or worsening symptoms.

## 2021-06-20 NOTE — ED PROVIDER NOTE - PROVIDER TOKENS
PROVIDER:[TOKEN:[[id number]],FOLLOWUP:[1-3 Days]],PROVIDER:[TOKEN:[[id number]],FOLLOWUP:[1-3 Days]]

## 2021-06-20 NOTE — ED PROVIDER NOTE - CARE PROVIDER_API CALL
Blake Bassett  CRITICAL CARE MEDICINE  [address] [place] [address], [address]  [place] [address]  Phone: [phone]  Fax: [phone]  Follow Up Time: 1-3 Days    Edison Bowie)  [address] [place], [place] [address]  [address] [place] [address]  Phone: [phone]  Fax: [phone]  Follow Up Time: 1-3 Days

## 2021-06-20 NOTE — ED PROVIDER NOTE - PSH
H/O foot surgery  right big toe surgery  H/O knee surgery  arthoscopic x4  Hernia, inguinal, bilateral  3 months old  History of appendectomy    History of cholecystectomy    History of knee replacement procedure of right knee  BILATERAL

## 2021-06-20 NOTE — ED PROVIDER NOTE - PATIENT PORTAL LINK FT
You can access the FollowMyHealth Patient Portal offered by [facility] by registering at the following website: [email or web address]. By joining [employer]’s FollowMyHealth portal, you will also be able to view your health information using other applications (apps) compatible with our system.

## 2021-06-20 NOTE — ED PROVIDER NOTE - PHYSICAL EXAMINATION
CONSTITUTIONAL: obese male, well-developed, well-nourished  SKIN: skin exam is warm and dry,  HEAD: Normocephalic; atraumatic.  EYES: PERRL, 3 mm bilateral, no nystagmus, EOM intact; conjunctiva and sclera clear.  ENT: Dry MM, oropharynx non-erythematous, uvula midline  NECK: ROM intact.  CARD: S1, S2 normal, no murmur  RESP:  Good air movement bilaterally. Speaking full sentences, no increased WOB   ABD: soft; non-distended; non-tender. No Rebound, No guarding  EXT: Normal ROM. No calf tenderness/swelling  NEURO: awake, alert, following commands, oriented, grossly unremarkable. No Focal deficits. GCS 15.   PSYCH: Cooperative, appropriate.

## 2021-06-20 NOTE — ED ADULT NURSE NOTE - INTERVENTIONS DEFINITIONS
[place] to call system/Call bell, personal items and telephone within reach/Instruct patient to call for assistance/Room bathroom lighting operational/Non-slip footwear when patient is off stretcher/Physically safe environment: no spills, clutter or unnecessary equipment/Stretcher in lowest position, wheels locked, appropriate side rails in place/Provide visual cue, wrist band, yellow gown, etc./Monitor gait and stability/Monitor for mental status changes and reorient to person, place, and time/Review medications for side effects contributing to fall risk/Reinforce activity limits and safety measures with patient and family/Provide visual clues: red socks

## 2021-06-20 NOTE — ED PROVIDER NOTE - NS ED ROS FT
Review of Systems:  	•	CONSTITUTIONAL: no fever, no chills  	•	SKIN: no rash  	•	EYES: no eye pain, no blurry vision  	•	ENT: no change in hearing, no tinnitus   	•	RESPIRATORY: no shortness of breath, no cough  	•	CARDIAC: no chest pain, no palpitations  	•	GI: +nausea, no abd pain, no diarrhea, no constipation  	•	GENITO-URINARY: no discharge, no dysuria; no hematuria, no increased urinary frequency  	•	MUSCULOSKELETAL: no joint paint, no swelling, no redness  	•	NEUROLOGIC: +lightheadedness, +weakness. no headache, no syncope   	•	PSYCH: no anxiety, non suicidal, non homicidal, no hallucination, no depression

## 2021-06-20 NOTE — ED PROVIDER NOTE - NSFOLLOWUPINSTRUCTIONS_ED_ALL_ED_FT
Please follow up with your primary care doctor and pulmonologist in 1-3 days  Please be aware of any new or worsening signs or symptoms that should prompt your return to the ER.      Dizziness  Dizziness is a common problem. It is a feeling of unsteadiness or light-headedness. You may feel like you are about to faint. Dizziness can lead to injury if you stumble or fall. Anyone can become dizzy, but dizziness is more common in older adults. This condition can be caused by a number of things, including medicines, dehydration, or illness.    Follow these instructions at home:  Eating and drinking     Drink enough fluid to keep your urine clear or pale yellow. This helps to keep you from becoming dehydrated. Try to drink more clear fluids, such as water.  Do not drink alcohol.  Limit your caffeine intake if told to do so by your health care provider. Check ingredients and nutrition facts to see if a food or beverage contains caffeine.  Limit your salt (sodium) intake if told to do so by your health care provider. Check ingredients and nutrition facts to see if a food or beverage contains sodium.  Activity     Avoid making quick movements.  Rise slowly from chairs and steady yourself until you feel okay.  In the morning, first sit up on the side of the bed. When you feel okay, stand slowly while you hold onto something until you know that your balance is fine.  If you need to  one place for a long time, move your legs often. Tighten and relax the muscles in your legs while you are standing.  Do not drive or use heavy machinery if you feel dizzy.  Avoid bending down if you feel dizzy. Place items in your home so that they are easy for you to reach without leaning over.  Lifestyle     Do not use any products that contain nicotine or tobacco, such as cigarettes and e-cigarettes. If you need help quitting, ask your health care provider.  Try to reduce your stress level by using methods such as yoga or meditation. Talk with your health care provider if you need help to manage your stress.  General instructions     Watch your dizziness for any changes.  Take over-the-counter and prescription medicines only as told by your health care provider. Talk with your health care provider if you think that your dizziness is caused by a medicine that you are taking.  Tell a friend or a family member that you are feeling dizzy. If he or she notices any changes in your behavior, have this person call your health care provider.  Keep all follow-up visits as told by your health care provider. This is important.  Contact a health care provider if:  Your dizziness does not go away.  Your dizziness or light-headedness gets worse.  You feel nauseous.  You have reduced hearing.  You have new symptoms.  You are unsteady on your feet or you feel like the room is spinning.  Get help right away if:  You vomit or have diarrhea and are unable to eat or drink anything.  You have problems talking, walking, swallowing, or using your arms, hands, or legs.  You feel generally weak.  You are not thinking clearly or you have trouble forming sentences. It may take a friend or family member to notice this.  You have chest pain, abdominal pain, shortness of breath, or sweating.  Your vision changes.  You have any bleeding.  You have a severe headache.  You have neck pain or a stiff neck.  You have a fever.  These symptoms may represent a serious problem that is an emergency. Do not wait to see if the symptoms will go away. Get medical help right away. Call your local emergency services (911 in the [place] Do not drive yourself to the hospital.     Summary  Dizziness is a feeling of unsteadiness or light-headedness. This condition can be caused by a number of things, including medicines, dehydration, or illness.  Anyone can become dizzy, but dizziness is more common in older adults.  Drink enough fluid to keep your urine clear or pale yellow. Do not drink alcohol.  Avoid making quick movements if you feel dizzy. Monitor your dizziness for any changes.  This information is not intended to replace advice given to you by your health care provider. Make sure you discuss any questions you have with your health care provider.    Heat exhaustion happens when your body gets too hot (overheated) from hot weather or from exercise. Untreated heat exhaustion could lead to heat stroke. Heat stroke can be deadly.  How can heat exhaustion affect me?  Early warning signs of heat exhaustion are:  Weakness.Fatigue.Stomach cramps.Arm pain.Leg cramps.Later symptoms of heat exhaustion include:  Heavy sweating.Clammy skin.Rapid, weak pulse.Nausea or vomiting.Dizziness.Headache.Fainting.If you have signs and symptoms of heat exhaustion, move to a cool place, loosen your clothing, and drink water or a sports drink. Then, put cool, wet compresses on your body or get into a cool bath or shower.  What can increase my risk?  People who work or exercise outside in hot weather have the highest risk of heat exhaustion. You may also be at higher risk if you:  Are over age 65. Older adults have a greater risk for heat exhaustion than younger adults.Are overweight.Have high blood pressure.Have heart disease.What actions can I take to prevent heat exhaustion?      Avoid being outside on very hot days. Check your local news for extreme heat alerts or warnings.In extreme heat, stay in an air-conditioned environment until the temperature cools off.Check with your health care provider before starting any new exercise or activity. Ask about any health conditions or medicines that might increase your risk for heat exhaustion.Wear lightweight, light-colored, and loose-fitting clothing in warm weather.Do outdoor activities when it is cooler. This may be in the morning, late afternoon, or evening. Take breaks in the shade.Do not work or exercise in the heat when you feel unwell or have been sick.Start any new work or exercise activity gradually.Protect yourself from the sun by wearing a broad-brimmed hat and using at least SPF 15 broad-spectrum sunscreen.Drink enough water or sports drink to keep your urine pale yellow. When it is hot, drink every 15 to 20 minutes, even if you are not thirsty.Do not go out in the heat after a heavy meal. Do not drink alcohol or caffeinated drinks when it is very hot outside.If you have friends or family members who are older adults:  Do not leave an older adult alone in a hot car.Make sure older adults have access to air-conditioning on very hot days. Remind them to drink enough fluids. Check on them at least twice a day if you can.Where to find more information  Centers for Disease Control and Prevention (CDC): https://www.cdc.gov/disasters/extremeheat/warning.htmlAmerican Academy of Family Physicians (AAFP): https://familydoctor.org/condition/heat-exhaustion-heatstrokeAmerican Academy of Orthopaedic Surgeons (AAOS): https://orthoinfo.aaos.org/en/diseases--conditions/heat-injury-and-heat-exhaustionContact a health care provider if:  You faint.You feel weak or dizzy.You have any signs or symptoms of heat exhaustion that last more than one hour.Get help right away if you have signs of heat stroke:  Body temperature of 103°F (39.4°C) or higher.Hot, dry, red skin.Fast, thumping pulse.Confusion.Loss of consciousness.Summary  Heat exhaustion happens when your body gets overheated and cannot cool down.Avoid being outside on very hot days. Check your local news for extreme heat alerts or warnings. When you are out in the heat, take steps to protect yourself from the sun and stay hydrated.If you have signs or symptoms of heat exhaustion, get out of the heat, drink fluids, take steps to cool down, and contact a health care provider.Get help right away if you have signs or symptoms of heat stroke.This information is not intended to replace advice given to you by your health care provider. Make sure you discuss any questions you have with your health care provider.

## 2021-06-20 NOTE — ED ADULT NURSE NOTE - PMH
Colitis  LARGE INTESTINE   NO RECENT FLARES  COPD (chronic obstructive pulmonary disease)    GERD (gastroesophageal reflux disease)    Hiatal hernia  GERD  High cholesterol    HTN (hypertension)    Morbid obesity    REBECCA treated with BiPAP    Osteoarthritis

## 2021-06-20 NOTE — ED PROVIDER NOTE - ATTENDING CONTRIBUTION TO CARE
55 year old male, pmhx as documented presenting with lightheadedness. Patient states he was working in his yard this AM x 2 hours in the heat and then felt very lightheaded and short of breath with some nausea. States he sat on a sidewalk and called EMS. States currently he feels better since coming inside. Has had history of these episodes in the heat. Otherwise denies pain and denies fevers, URI symptoms, cough, headache, vomiting, diarrhea or any other complaints.    Patient had nuclear stress test last year which was negative at [facility].    Vital Signs: I have reviewed the initial vital signs.  Constitutional: NAD, well-nourished, appears stated age, no acute distress.  HEENT: Airway patent, moist MM, no erythema/swelling/deformity of oral structures. EOMI, PERRLA.  CV: regular rate, regular rhythm, well-perfused extremities, 2+ b/l DP and radial pulses equal.  Lungs: BCTA, no increased WOB.  ABD: NTND, no guarding or rebound, no pulsatile mass, no hernias.   MSK: Neck supple, nontender, nl ROM, no stepoff. Chest nontender. Back nontender in TLS spine or to b/l bony structures or flanks. Ext nontender, nl rom, no deformity.   INTEG: Skin warm, dry, no rash.  NEURO: A&Ox3, normal strength, nl sensation throughout, normal speech.   PSYCH: Calm, cooperative, normal affect and interaction.    Neurovascularly intact, well appearing. Possibly heat exhaustion. Will obtain EKG, labs, CXR, monitor, give IVF, re-eval.

## 2021-06-20 NOTE — ED PROVIDER NOTE - OBJECTIVE STATEMENT
55 year old male, past medical history copd not on home o2, ortega on cpap, htn, hdl, who presents with lightheadedness. 55 year old male, past medical history copd not on home o2, ortega on cpap, htn, hdl, who presents with lightheadedness. patient states he was working in his yard this morning outside in the heat x2 hours, was walking to store when he felt lightheadedness, shortness of breath, nausea. patient states he sat on sidewalk and called ems. denies headache, chest pain, URI symptoms, vomiting, diarrhea, syncope.

## 2021-06-20 NOTE — ED PROVIDER NOTE - PROGRESS NOTE DETAILS
patient reports moderate improvement of symptoms after fluids and meds. rpt VS improved. given strict return precautions, verbalizes understanding of plan. will fu with pmd and pulmonologist.

## 2021-07-01 PROCEDURE — G9005: CPT

## 2021-07-15 NOTE — DISCHARGE NOTE NURSING/CASE MANAGEMENT/SOCIAL WORK - NSTRANSFERBELONGINGSRESP_GEN_A_NUR
yes
Spine appears normal, range of motion is not limited, no muscle or joint tenderness, all extremities with good distal pulses,

## 2021-07-18 ENCOUNTER — EMERGENCY (EMERGENCY)
Facility: HOSPITAL | Age: 56
LOS: 0 days | Discharge: HOME | End: 2021-07-18
Attending: EMERGENCY MEDICINE | Admitting: EMERGENCY MEDICINE
Payer: MEDICAID

## 2021-07-18 VITALS
WEIGHT: 315 LBS | DIASTOLIC BLOOD PRESSURE: 83 MMHG | RESPIRATION RATE: 20 BRPM | SYSTOLIC BLOOD PRESSURE: 131 MMHG | TEMPERATURE: 97 F | HEART RATE: 79 BPM | OXYGEN SATURATION: 98 % | HEIGHT: 70 IN

## 2021-07-18 DIAGNOSIS — Z98.890 OTHER SPECIFIED POSTPROCEDURAL STATES: Chronic | ICD-10-CM

## 2021-07-18 DIAGNOSIS — Z99.89 DEPENDENCE ON OTHER ENABLING MACHINES AND DEVICES: ICD-10-CM

## 2021-07-18 DIAGNOSIS — Z96.651 PRESENCE OF RIGHT ARTIFICIAL KNEE JOINT: Chronic | ICD-10-CM

## 2021-07-18 DIAGNOSIS — G47.33 OBSTRUCTIVE SLEEP APNEA (ADULT) (PEDIATRIC): ICD-10-CM

## 2021-07-18 DIAGNOSIS — E66.01 MORBID (SEVERE) OBESITY DUE TO EXCESS CALORIES: ICD-10-CM

## 2021-07-18 DIAGNOSIS — Z79.51 LONG TERM (CURRENT) USE OF INHALED STEROIDS: ICD-10-CM

## 2021-07-18 DIAGNOSIS — J44.9 CHRONIC OBSTRUCTIVE PULMONARY DISEASE, UNSPECIFIED: ICD-10-CM

## 2021-07-18 DIAGNOSIS — Z90.49 ACQUIRED ABSENCE OF OTHER SPECIFIED PARTS OF DIGESTIVE TRACT: ICD-10-CM

## 2021-07-18 DIAGNOSIS — G89.29 OTHER CHRONIC PAIN: ICD-10-CM

## 2021-07-18 DIAGNOSIS — Z20.822 CONTACT WITH AND (SUSPECTED) EXPOSURE TO COVID-19: ICD-10-CM

## 2021-07-18 DIAGNOSIS — I10 ESSENTIAL (PRIMARY) HYPERTENSION: ICD-10-CM

## 2021-07-18 DIAGNOSIS — M19.90 UNSPECIFIED OSTEOARTHRITIS, UNSPECIFIED SITE: ICD-10-CM

## 2021-07-18 DIAGNOSIS — E78.5 HYPERLIPIDEMIA, UNSPECIFIED: ICD-10-CM

## 2021-07-18 DIAGNOSIS — K40.20 BILATERAL INGUINAL HERNIA, WITHOUT OBSTRUCTION OR GANGRENE, NOT SPECIFIED AS RECURRENT: Chronic | ICD-10-CM

## 2021-07-18 DIAGNOSIS — R07.89 OTHER CHEST PAIN: ICD-10-CM

## 2021-07-18 LAB
ALBUMIN SERPL ELPH-MCNC: 3.9 G/DL — SIGNIFICANT CHANGE UP (ref 3.5–5.2)
ALP SERPL-CCNC: 110 U/L — SIGNIFICANT CHANGE UP (ref 30–115)
ALT FLD-CCNC: 16 U/L — SIGNIFICANT CHANGE UP (ref 0–41)
ANION GAP SERPL CALC-SCNC: 8 MMOL/L — SIGNIFICANT CHANGE UP (ref 7–14)
AST SERPL-CCNC: 15 U/L — SIGNIFICANT CHANGE UP (ref 0–41)
BASOPHILS # BLD AUTO: 0.07 K/UL — SIGNIFICANT CHANGE UP (ref 0–0.2)
BASOPHILS NFR BLD AUTO: 0.6 % — SIGNIFICANT CHANGE UP (ref 0–1)
BILIRUB SERPL-MCNC: 0.6 MG/DL — SIGNIFICANT CHANGE UP (ref 0.2–1.2)
BUN SERPL-MCNC: 7 MG/DL — LOW (ref 10–20)
CALCIUM SERPL-MCNC: 9.7 MG/DL — SIGNIFICANT CHANGE UP (ref 8.5–10.1)
CHLORIDE SERPL-SCNC: 101 MMOL/L — SIGNIFICANT CHANGE UP (ref 98–110)
CO2 SERPL-SCNC: 29 MMOL/L — SIGNIFICANT CHANGE UP (ref 17–32)
CREAT SERPL-MCNC: 0.7 MG/DL — SIGNIFICANT CHANGE UP (ref 0.7–1.5)
EOSINOPHIL # BLD AUTO: 0.15 K/UL — SIGNIFICANT CHANGE UP (ref 0–0.7)
EOSINOPHIL NFR BLD AUTO: 1.4 % — SIGNIFICANT CHANGE UP (ref 0–8)
GLUCOSE SERPL-MCNC: 107 MG/DL — HIGH (ref 70–99)
HCT VFR BLD CALC: 40.9 % — LOW (ref 42–52)
HGB BLD-MCNC: 13.5 G/DL — LOW (ref 14–18)
IMM GRANULOCYTES NFR BLD AUTO: 0.4 % — HIGH (ref 0.1–0.3)
LIDOCAIN IGE QN: 10 U/L — SIGNIFICANT CHANGE UP (ref 7–60)
LYMPHOCYTES # BLD AUTO: 1.79 K/UL — SIGNIFICANT CHANGE UP (ref 1.2–3.4)
LYMPHOCYTES # BLD AUTO: 16.6 % — LOW (ref 20.5–51.1)
MAGNESIUM SERPL-MCNC: 2.1 MG/DL — SIGNIFICANT CHANGE UP (ref 1.8–2.4)
MCHC RBC-ENTMCNC: 28.4 PG — SIGNIFICANT CHANGE UP (ref 27–31)
MCHC RBC-ENTMCNC: 33 G/DL — SIGNIFICANT CHANGE UP (ref 32–37)
MCV RBC AUTO: 86.1 FL — SIGNIFICANT CHANGE UP (ref 80–94)
MONOCYTES # BLD AUTO: 0.85 K/UL — HIGH (ref 0.1–0.6)
MONOCYTES NFR BLD AUTO: 7.9 % — SIGNIFICANT CHANGE UP (ref 1.7–9.3)
NEUTROPHILS # BLD AUTO: 7.89 K/UL — HIGH (ref 1.4–6.5)
NEUTROPHILS NFR BLD AUTO: 73.1 % — SIGNIFICANT CHANGE UP (ref 42.2–75.2)
NRBC # BLD: 0 /100 WBCS — SIGNIFICANT CHANGE UP (ref 0–0)
NT-PROBNP SERPL-SCNC: 96 PG/ML — SIGNIFICANT CHANGE UP (ref 0–300)
PLATELET # BLD AUTO: 306 K/UL — SIGNIFICANT CHANGE UP (ref 130–400)
POTASSIUM SERPL-MCNC: 4.5 MMOL/L — SIGNIFICANT CHANGE UP (ref 3.5–5)
POTASSIUM SERPL-SCNC: 4.5 MMOL/L — SIGNIFICANT CHANGE UP (ref 3.5–5)
PROT SERPL-MCNC: 6.5 G/DL — SIGNIFICANT CHANGE UP (ref 6–8)
RBC # BLD: 4.75 M/UL — SIGNIFICANT CHANGE UP (ref 4.7–6.1)
RBC # FLD: 14.6 % — HIGH (ref 11.5–14.5)
SODIUM SERPL-SCNC: 138 MMOL/L — SIGNIFICANT CHANGE UP (ref 135–146)
TROPONIN T SERPL-MCNC: <0.01 NG/ML — SIGNIFICANT CHANGE UP
WBC # BLD: 10.79 K/UL — SIGNIFICANT CHANGE UP (ref 4.8–10.8)
WBC # FLD AUTO: 10.79 K/UL — SIGNIFICANT CHANGE UP (ref 4.8–10.8)

## 2021-07-18 PROCEDURE — 71046 X-RAY EXAM CHEST 2 VIEWS: CPT | Mod: 26

## 2021-07-18 PROCEDURE — 93010 ELECTROCARDIOGRAM REPORT: CPT

## 2021-07-18 PROCEDURE — 99284 EMERGENCY DEPT VISIT MOD MDM: CPT

## 2021-07-18 RX ORDER — OXYCODONE HYDROCHLORIDE 5 MG/1
30 TABLET ORAL ONCE
Refills: 0 | Status: DISCONTINUED | OUTPATIENT
Start: 2021-07-18 | End: 2021-07-18

## 2021-07-18 RX ORDER — KETOROLAC TROMETHAMINE 30 MG/ML
30 SYRINGE (ML) INJECTION ONCE
Refills: 0 | Status: DISCONTINUED | OUTPATIENT
Start: 2021-07-18 | End: 2021-07-18

## 2021-07-18 RX ORDER — ACETAMINOPHEN 500 MG
975 TABLET ORAL ONCE
Refills: 0 | Status: COMPLETED | OUTPATIENT
Start: 2021-07-18 | End: 2021-07-18

## 2021-07-18 RX ORDER — MORPHINE SULFATE 50 MG/1
100 CAPSULE, EXTENDED RELEASE ORAL ONCE
Refills: 0 | Status: DISCONTINUED | OUTPATIENT
Start: 2021-07-18 | End: 2021-07-18

## 2021-07-18 RX ORDER — MORPHINE SULFATE 50 MG/1
30 CAPSULE, EXTENDED RELEASE ORAL ONCE
Refills: 0 | Status: DISCONTINUED | OUTPATIENT
Start: 2021-07-18 | End: 2021-07-18

## 2021-07-18 RX ADMIN — MORPHINE SULFATE 30 MILLIGRAM(S): 50 CAPSULE, EXTENDED RELEASE ORAL at 10:30

## 2021-07-18 RX ADMIN — OXYCODONE HYDROCHLORIDE 30 MILLIGRAM(S): 5 TABLET ORAL at 07:43

## 2021-07-18 RX ADMIN — Medication 975 MILLIGRAM(S): at 09:50

## 2021-07-18 NOTE — ED ADULT NURSE NOTE - OBJECTIVE STATEMENT
pt BIBEMS for complain of chest pain and right foot incision bleeding, pt denies sob, fever, cough, body aches, chills, n/v/d, rash, numbness, tingling

## 2021-07-18 NOTE — ED ADULT TRIAGE NOTE - ACCOMPANIED BY
EMT/paramedic Professional Component, Technical Component Or Both?: professional and technical component

## 2021-07-18 NOTE — ED PROVIDER NOTE - PHYSICAL EXAMINATION
VITAL SIGNS: I have reviewed nursing notes and confirm.  CONSTITUTIONAL: Well-developed; well-nourished; in no acute distress.   SKIN: skin exam is warm and dry, no acute rash.    HEAD: Normocephalic; atraumatic.  EYES:  conjunctiva and sclera clear.  ENT: No nasal discharge; airway clear.  CARD: S1, S2 normal; no murmurs, gallops, or rubs. Regular rate and rhythm.   RESP: No wheezes, rales or rhonchi.  ABD: Normal bowel sounds; soft; non-distended; non-tender  EXT: RLE in posterior cast, poorly maintained (pt has been walking on it)  NEURO: Alert, oriented, grossly unremarkable

## 2021-07-18 NOTE — ED PROVIDER NOTE - OBJECTIVE STATEMENT
Pt is a 54y/o male with a pmhx of HTN, HLD, COPD, chronic pain on morphine presents today for eval of non-exertional chest discomfort since earlier this morning with no aggravating/alleviating factors. Pt denies fever, chills, n/v/d, sob, leg swelling

## 2021-07-18 NOTE — ED ADULT TRIAGE NOTE - CHIEF COMPLAINT QUOTE
BIBA with complaints of chest pain since yesterday and right ankle surgical incision bleeding. S/P right ankle surgery last Wednesday

## 2021-07-18 NOTE — ED PROVIDER NOTE - NS ED ROS FT
Eyes:  No visual changes, eye pain or discharge.  ENMT:  No hearing changes, pain, discharge or infections. No neck pain or stiffness.  Cardiac:  + CP No  SOB   Respiratory:  No cough or respiratory distress. No hemoptysis. No history of asthma or RAD.  GI:  No nausea, vomiting, diarrhea or abdominal pain.  :  No dysuria, frequency or burning.  MS:  No myalgia, muscle weakness, joint pain or back pain.  Neuro:  No headache or weakness.  No LOC.  Skin:  No skin rash.   Endocrine: No history of thyroid disease or diabetes.  Except as documented in the HPI,  all other systems are negative.

## 2021-07-19 ENCOUNTER — EMERGENCY (EMERGENCY)
Facility: HOSPITAL | Age: 56
LOS: 0 days | Discharge: HOME | End: 2021-07-19
Attending: EMERGENCY MEDICINE | Admitting: EMERGENCY MEDICINE
Payer: MEDICAID

## 2021-07-19 VITALS
TEMPERATURE: 97 F | OXYGEN SATURATION: 96 % | HEART RATE: 77 BPM | DIASTOLIC BLOOD PRESSURE: 73 MMHG | SYSTOLIC BLOOD PRESSURE: 115 MMHG | RESPIRATION RATE: 18 BRPM

## 2021-07-19 VITALS
RESPIRATION RATE: 20 BRPM | WEIGHT: 315 LBS | DIASTOLIC BLOOD PRESSURE: 92 MMHG | TEMPERATURE: 97 F | OXYGEN SATURATION: 95 % | HEART RATE: 100 BPM | SYSTOLIC BLOOD PRESSURE: 132 MMHG | HEIGHT: 70 IN

## 2021-07-19 DIAGNOSIS — I10 ESSENTIAL (PRIMARY) HYPERTENSION: ICD-10-CM

## 2021-07-19 DIAGNOSIS — E66.01 MORBID (SEVERE) OBESITY DUE TO EXCESS CALORIES: ICD-10-CM

## 2021-07-19 DIAGNOSIS — Z98.890 OTHER SPECIFIED POSTPROCEDURAL STATES: Chronic | ICD-10-CM

## 2021-07-19 DIAGNOSIS — Z20.822 CONTACT WITH AND (SUSPECTED) EXPOSURE TO COVID-19: ICD-10-CM

## 2021-07-19 DIAGNOSIS — K40.20 BILATERAL INGUINAL HERNIA, WITHOUT OBSTRUCTION OR GANGRENE, NOT SPECIFIED AS RECURRENT: Chronic | ICD-10-CM

## 2021-07-19 DIAGNOSIS — J44.9 CHRONIC OBSTRUCTIVE PULMONARY DISEASE, UNSPECIFIED: ICD-10-CM

## 2021-07-19 DIAGNOSIS — K21.9 GASTRO-ESOPHAGEAL REFLUX DISEASE WITHOUT ESOPHAGITIS: ICD-10-CM

## 2021-07-19 DIAGNOSIS — Z99.89 DEPENDENCE ON OTHER ENABLING MACHINES AND DEVICES: ICD-10-CM

## 2021-07-19 DIAGNOSIS — M25.571 PAIN IN RIGHT ANKLE AND JOINTS OF RIGHT FOOT: ICD-10-CM

## 2021-07-19 DIAGNOSIS — Z96.651 PRESENCE OF RIGHT ARTIFICIAL KNEE JOINT: Chronic | ICD-10-CM

## 2021-07-19 DIAGNOSIS — K44.9 DIAPHRAGMATIC HERNIA WITHOUT OBSTRUCTION OR GANGRENE: ICD-10-CM

## 2021-07-19 DIAGNOSIS — Z87.19 PERSONAL HISTORY OF OTHER DISEASES OF THE DIGESTIVE SYSTEM: ICD-10-CM

## 2021-07-19 DIAGNOSIS — Z79.899 OTHER LONG TERM (CURRENT) DRUG THERAPY: ICD-10-CM

## 2021-07-19 DIAGNOSIS — G47.33 OBSTRUCTIVE SLEEP APNEA (ADULT) (PEDIATRIC): ICD-10-CM

## 2021-07-19 DIAGNOSIS — M19.90 UNSPECIFIED OSTEOARTHRITIS, UNSPECIFIED SITE: ICD-10-CM

## 2021-07-19 DIAGNOSIS — R07.9 CHEST PAIN, UNSPECIFIED: ICD-10-CM

## 2021-07-19 LAB
ALBUMIN SERPL ELPH-MCNC: 4 G/DL — SIGNIFICANT CHANGE UP (ref 3.5–5.2)
ALP SERPL-CCNC: 112 U/L — SIGNIFICANT CHANGE UP (ref 30–115)
ALT FLD-CCNC: 15 U/L — SIGNIFICANT CHANGE UP (ref 0–41)
ANION GAP SERPL CALC-SCNC: 11 MMOL/L — SIGNIFICANT CHANGE UP (ref 7–14)
AST SERPL-CCNC: 14 U/L — SIGNIFICANT CHANGE UP (ref 0–41)
BASOPHILS # BLD AUTO: 0.04 K/UL — SIGNIFICANT CHANGE UP (ref 0–0.2)
BASOPHILS NFR BLD AUTO: 0.4 % — SIGNIFICANT CHANGE UP (ref 0–1)
BILIRUB SERPL-MCNC: 0.6 MG/DL — SIGNIFICANT CHANGE UP (ref 0.2–1.2)
BUN SERPL-MCNC: 9 MG/DL — LOW (ref 10–20)
CALCIUM SERPL-MCNC: 9.7 MG/DL — SIGNIFICANT CHANGE UP (ref 8.5–10.1)
CHLORIDE SERPL-SCNC: 101 MMOL/L — SIGNIFICANT CHANGE UP (ref 98–110)
CO2 SERPL-SCNC: 28 MMOL/L — SIGNIFICANT CHANGE UP (ref 17–32)
CREAT SERPL-MCNC: 0.9 MG/DL — SIGNIFICANT CHANGE UP (ref 0.7–1.5)
D DIMER BLD IA.RAPID-MCNC: 785 NG/ML DDU — HIGH (ref 0–230)
EOSINOPHIL # BLD AUTO: 0.16 K/UL — SIGNIFICANT CHANGE UP (ref 0–0.7)
EOSINOPHIL NFR BLD AUTO: 1.5 % — SIGNIFICANT CHANGE UP (ref 0–8)
GLUCOSE SERPL-MCNC: 160 MG/DL — HIGH (ref 70–99)
HCT VFR BLD CALC: 40.9 % — LOW (ref 42–52)
HGB BLD-MCNC: 13.6 G/DL — LOW (ref 14–18)
IMM GRANULOCYTES NFR BLD AUTO: 0.4 % — HIGH (ref 0.1–0.3)
LIDOCAIN IGE QN: 17 U/L — SIGNIFICANT CHANGE UP (ref 7–60)
LYMPHOCYTES # BLD AUTO: 1.55 K/UL — SIGNIFICANT CHANGE UP (ref 1.2–3.4)
LYMPHOCYTES # BLD AUTO: 14.9 % — LOW (ref 20.5–51.1)
MAGNESIUM SERPL-MCNC: 2.1 MG/DL — SIGNIFICANT CHANGE UP (ref 1.8–2.4)
MCHC RBC-ENTMCNC: 28.3 PG — SIGNIFICANT CHANGE UP (ref 27–31)
MCHC RBC-ENTMCNC: 33.3 G/DL — SIGNIFICANT CHANGE UP (ref 32–37)
MCV RBC AUTO: 85.2 FL — SIGNIFICANT CHANGE UP (ref 80–94)
MONOCYTES # BLD AUTO: 0.56 K/UL — SIGNIFICANT CHANGE UP (ref 0.1–0.6)
MONOCYTES NFR BLD AUTO: 5.4 % — SIGNIFICANT CHANGE UP (ref 1.7–9.3)
NEUTROPHILS # BLD AUTO: 8.03 K/UL — HIGH (ref 1.4–6.5)
NEUTROPHILS NFR BLD AUTO: 77.4 % — HIGH (ref 42.2–75.2)
NRBC # BLD: 0 /100 WBCS — SIGNIFICANT CHANGE UP (ref 0–0)
NT-PROBNP SERPL-SCNC: 97 PG/ML — SIGNIFICANT CHANGE UP (ref 0–300)
PLATELET # BLD AUTO: 308 K/UL — SIGNIFICANT CHANGE UP (ref 130–400)
POTASSIUM SERPL-MCNC: 4.5 MMOL/L — SIGNIFICANT CHANGE UP (ref 3.5–5)
POTASSIUM SERPL-SCNC: 4.5 MMOL/L — SIGNIFICANT CHANGE UP (ref 3.5–5)
PROT SERPL-MCNC: 6.8 G/DL — SIGNIFICANT CHANGE UP (ref 6–8)
RBC # BLD: 4.8 M/UL — SIGNIFICANT CHANGE UP (ref 4.7–6.1)
RBC # FLD: 14.6 % — HIGH (ref 11.5–14.5)
SARS-COV-2 RNA SPEC QL NAA+PROBE: SIGNIFICANT CHANGE UP
SODIUM SERPL-SCNC: 140 MMOL/L — SIGNIFICANT CHANGE UP (ref 135–146)
TROPONIN T SERPL-MCNC: <0.01 NG/ML — SIGNIFICANT CHANGE UP
WBC # BLD: 10.38 K/UL — SIGNIFICANT CHANGE UP (ref 4.8–10.8)
WBC # FLD AUTO: 10.38 K/UL — SIGNIFICANT CHANGE UP (ref 4.8–10.8)

## 2021-07-19 PROCEDURE — 99285 EMERGENCY DEPT VISIT HI MDM: CPT

## 2021-07-19 PROCEDURE — 71045 X-RAY EXAM CHEST 1 VIEW: CPT | Mod: 26

## 2021-07-19 PROCEDURE — 93971 EXTREMITY STUDY: CPT | Mod: 26,RT

## 2021-07-19 PROCEDURE — 71275 CT ANGIOGRAPHY CHEST: CPT | Mod: 26,MA

## 2021-07-19 PROCEDURE — 93010 ELECTROCARDIOGRAM REPORT: CPT

## 2021-07-19 RX ORDER — MORPHINE SULFATE 50 MG/1
6 CAPSULE, EXTENDED RELEASE ORAL ONCE
Refills: 0 | Status: DISCONTINUED | OUTPATIENT
Start: 2021-07-19 | End: 2021-07-19

## 2021-07-19 RX ORDER — ONDANSETRON 8 MG/1
4 TABLET, FILM COATED ORAL ONCE
Refills: 0 | Status: COMPLETED | OUTPATIENT
Start: 2021-07-19 | End: 2021-07-19

## 2021-07-19 RX ORDER — MORPHINE SULFATE 50 MG/1
4 CAPSULE, EXTENDED RELEASE ORAL ONCE
Refills: 0 | Status: DISCONTINUED | OUTPATIENT
Start: 2021-07-19 | End: 2021-07-19

## 2021-07-19 RX ORDER — ALBUTEROL 90 UG/1
2 AEROSOL, METERED ORAL EVERY 6 HOURS
Refills: 0 | Status: DISCONTINUED | OUTPATIENT
Start: 2021-07-19 | End: 2021-07-19

## 2021-07-19 RX ADMIN — ONDANSETRON 4 MILLIGRAM(S): 8 TABLET, FILM COATED ORAL at 06:22

## 2021-07-19 RX ADMIN — ALBUTEROL 2 PUFF(S): 90 AEROSOL, METERED ORAL at 11:02

## 2021-07-19 RX ADMIN — MORPHINE SULFATE 6 MILLIGRAM(S): 50 CAPSULE, EXTENDED RELEASE ORAL at 07:52

## 2021-07-19 RX ADMIN — MORPHINE SULFATE 6 MILLIGRAM(S): 50 CAPSULE, EXTENDED RELEASE ORAL at 06:22

## 2021-07-19 NOTE — ED PROVIDER NOTE - CARE PROVIDER_API CALL
Erwin Wen  CARDIOVASCULAR DISEASE  [address], [address]  [place] [address]  Phone: [phone]  Fax: [phone]  Follow Up Time: Routine    Edison Bowie)  [address] [address]  Phone: [phone]  Fax: [phone]  Follow Up Time: Routine

## 2021-07-19 NOTE — ED PROVIDER NOTE - PROGRESS NOTE DETAILS
Michael: pt signed out to Dr. Petit. Pt pending imaging, labs. reassess. dispo. sylwia- signed patient out to Dr. Saldaña. patient pending CT chest and labs. NB: Pt signed out to me approximately 7AMm seen shortly after sign out, results reviewed, given elevated D-dimer primary team ordered CT chest. I saw pt and at that time he denies CP and said his ankle was hurting so much he believes it gives him CP. Pt at this time has 2 negative troponins. Negative EKG, no PE on CT. Will educate on DX and refer to cardiology as outpatient. Of note pt reports he is mostly concerned that his daily pain regimen was not managing his pain for his ankle and that is what made him come to ED. DL - Pt signed out from overnight team Dr. Perez. Pt w/ recent R ankle surgery and CP. +D dimer, negatvie DVT study. CT showing bronchitis but no PE. Pt w/ two negative troponins, has good follow up, feeling better in ED on reassessment. Pt will follow up with PMD and cardiologist Dr. Montelongo. Strict return precautions given. DL - Pt signed out from overnight team Dr. Perez. Pt w/ recent R ankle surgery and CP. +D dimer, negatvie DVT study. CT showing bronchitis but no PE. Pt w/ two negative troponins, has good follow up, feeling better in ED on reassessment. Pt will follow up with PMD and cardiologist Dr. Montelongo. Strict return precautions given. Spoke with Dr. Hylton regarding CT, showing no signs of PE but bronchitis. Patient told about findings, given albuterol. Has inhalers that he uses at home. Pt told about R groin lymph node found on US.

## 2021-07-19 NOTE — ED PROVIDER NOTE - ATTENDING CONTRIBUTION TO CARE
55 yr old m w/ a pmh significant for COPD, GERD, HLD, HTN, REBECCA, who presents with chest pain. Pt states that for the past couple of days he has been having worsening RLE pain (pt had reconstruction surgery to the ankle at [facility]) and swelling. In addition, pt states that he also has been having L sided non radiating chest pain. Pt also endorses nausea. Pt denies any other medical complaints.     VITAL SIGNS: I have reviewed nursing notes and confirm.  CONSTITUTIONAL: non-toxic, well appearing  SKIN: no rash, no petechiae.  EYES: EOMI, pink conjunctiva, anicteric  ENT: tongue midline, no exudates, MMM  NECK: Supple; no meningismus, no JVD  CARD: RRR, no murmurs, equal radial pulses bilaterally 2+  RESP: CTAB, no respiratory distress  ABD: Soft, non-tender, non-distended, no peritoneal signs, no HSM, no CVA tenderness  EXT: Normal ROM x4. swelling noted to the RLE. sutures noted to the medial aspect of the ankle, no discharge, redness or erythema. pain on palpation of the RLE. DP +2  NEURO: Alert, oriented x3    a/p  55 yr old m that presents with chest pain   -labs  -ekg  -imaging  -US  -pain management  -dispo pending

## 2021-07-19 NOTE — ED ADULT NURSE REASSESSMENT NOTE - NS ED NURSE REASSESS COMMENT FT1
Patient assessed and found to be a&O x 4. educated on need for of fall precaution and patient refused mattress alarm. Call bell placed within reach and patient told to call for assistance prior to getting out of bed and pt agreed to comply

## 2021-07-19 NOTE — ED PROVIDER NOTE - PATIENT PORTAL LINK FT
You can access the FollowMyHealth Patient Portal offered by [facility] by registering at the following website: [email or web address]. By joining [employer]’s FollowMyHealth portal, you will also be able to view your health information using other applications (apps) compatible with our system.

## 2021-07-19 NOTE — ED PROVIDER NOTE - PHYSICAL EXAMINATION
PHYSICAL EXAM: I have reviewed current vital signs.  GENERAL: NAD, well-nourished; well-developed.  HEAD:  Normocephalic, atraumatic.  EYES: EOMI, PERRL, conjunctiva and sclera clear.  ENT: MMM, no erythema/exudates.  NECK: Supple, no JVD.  CHEST/LUNG: Clear to auscultation bilaterally; no wheezes, rales, or rhonchi.  HEART: Regular rate and rhythm, normal S1 and S2; no murmurs, rubs, or gallops.  ABDOMEN: Soft, nontender, nondistended.  EXTREMITIES: right leg and foot enlarge and edematous 2+ pitting edema with increased erythema compared to left.  2+ peripheral pulses; no clubbing, cyanosis  PSYCH: Cooperative, appropriate, normal mood and affect.  NEUROLOGY: A&O x 3. Motor 5/5. Sensory intact. No focal neurological deficits. CN II - XII intact. (-) dysmetria, facial droop, pronator drift.  SKIN: Warm and dry.  surgical right ankle wounds well healing no erythema or exudates.

## 2021-07-19 NOTE — ED ADULT NURSE NOTE - OBJECTIVE STATEMENT
pt is a 54 yo m pw chest pain. pw RLE pain and swelling. also states that he also has been having L sided non radiating chest pain. Pt also endorses nausea. Pt denies any other medical complaints.

## 2021-07-19 NOTE — ED PROVIDER NOTE - PROVIDER TOKENS
PROVIDER:[TOKEN:[[id number]],FOLLOWUP:[Routine]],PROVIDER:[TOKEN:[[id number]],FOLLOWUP:[Routine]]

## 2021-07-19 NOTE — ED PROVIDER NOTE - NS ED ROS FT
Constitutional:  No fevers or chills.  Eyes:  No visual changes, eye pain, or discharge.  ENT:  No hearing changes. No sore throat.  Neck:  No neck pain or stiffness.  Cardiac:  (+) CP (+) leg edema.  Resp:  No cough or SOB. No hemoptysis.   GI:  No nausea, vomiting, diarrhea, or abdominal pain.  :  No dysuria, frequency, or hematuria.  MSK:  No myalgias or joint pain/swelling.  Neuro:  No headache, dizziness, or weakness.  Skin:  No skin rash.

## 2021-07-19 NOTE — ED PROVIDER NOTE - NSFOLLOWUPINSTRUCTIONS_ED_ALL_ED_FT
Ankle Pain    Many things can cause ankle pain, including an injury to the area and overuse of the ankle. The ankle joint holds your body weight and allows you to move around. Ankle pain can occur on either side or the back of one ankle or both ankles. Ankle pain may be sharp and burning or dull and aching. There may be tenderness, stiffness, redness, or warmth around the ankle.     HOME CARE INSTRUCTIONS  Activity    Rest your ankle as told by your health care provider. Avoid any activities that cause ankle pain.  Do exercises as told by your health care provider.  Ask your health care provider if you can drive.    Using a Brace, a Bandage, or Crutches    If you were given a brace:  Wear it as told by your health care provider.  Remove it when you take a bath or a shower.  Try not to move your ankle very much, but wiggle your toes from time to time. This helps to prevent swelling.  If you were given an elastic bandage:  Remove it when you take a bath or a shower.  Try not to move your ankle very much, but wiggle your toes from time to time. This helps to prevent swelling.  Adjust the bandage to make it more comfortable if it feels too tight.  Loosen the bandage if you have numbness or tingling in your foot or if your foot turns cold and blue.  If you have crutches, use them as told by your health care provider. Continue to use them until you can walk without feeling pain in your ankle.    Managing Pain, Stiffness, and Swelling    Raise (elevate) your ankle above the level of your heart while you are sitting or lying down.  If directed, apply ice to the area:  Put ice in a plastic bag.  Place a towel between your skin and the bag.  Leave the ice on for 20 minutes, 2–3 times per day.    General Instructions    Keep all follow-up visits as told by your health care provider. This is important.  Record this information that may be helpful for you and your health care provider:  How often you have ankle pain.  Where the pain is located.  What the pain feels like.  Take over-the-counter and prescription medicines only as told by your health care provider.    SEEK MEDICAL CARE IF:  Your pain gets worse.  Your pain is not relieved with medicines.  You have a fever or chills.  You are having more trouble with walking.  You have new symptoms.    SEEK IMMEDIATE MEDICAL CARE IF:  Your foot, leg, toes, or ankle tingles or becomes numb.  Your foot, leg, toes, or ankle becomes swollen.  Your foot, leg, toes, or ankle turns pale or blue.    ADDITIONAL NOTES AND INSTRUCTIONS    Please follow up with your Primary MD in 24-48 hr.  Seek immediate medical care for any new/worsening signs or symptoms.         Chest Pain    Chest pain can be caused by many different conditions which may or may not be dangerous. Causes include heartburn, lung infections, heart attack, blood clot in lungs, skin infections, strain or damage to muscle, cartilage, or bones, etc. Lab tests or other studies including an electrocardiogram (EKG) may have been performed to find the cause of your pain. Make sure to follow up with a cardiologist or as instructed by your health care professional.    SEEK IMMEDIATE MEDICAL CARE IF YOU HAVE THE FOLLOWING SYMPTOMS: worsening chest pain, coughing up blood, unexplained back/neck/jaw pain, severe abdominal pain, dizziness or lightheadedness, shortness of breath, sweaty or clammy skin, vomiting, or racing heart beat. These symptoms may represent a serious problem that is an emergency. Do not wait to see if the symptoms will go away. Get medical help right away. Call your local emergency services (911 in the [place] Do not drive yourself to the hospital.

## 2021-07-19 NOTE — ED PROVIDER NOTE - CLINICAL SUMMARY MEDICAL DECISION MAKING FREE TEXT BOX
pt with ankle pain; concern for DVT given recent surgery and splinting. Pt also reported chest discomfort; given elevated ddimer requried CT chest. pt with trop x 2 as in ED yesterday and today. EKG without concerning changes. Pt stable for dc. to fu as outpt. pt agreeable to plan

## 2021-07-19 NOTE — ED ADULT NURSE REASSESSMENT NOTE - NS ED NURSE REASSESS COMMENT FT1
Patient is non compliant with calling for assistance  prior to getting out of bed. Found walking out of ED to smoke . Nicotine patch offered and patient refused.

## 2021-07-19 NOTE — ED ADULT NURSE NOTE - NSIMPLEMENTINTERV_GEN_ALL_ED
Implemented All Universal Safety Interventions:  [place] to call system. Call bell, personal items and telephone within reach. Instruct patient to call for assistance. Room bathroom lighting operational. Non-slip footwear when patient is off stretcher. Physically safe environment: no spills, clutter or unnecessary equipment. Stretcher in lowest position, wheels locked, appropriate side rails in place.

## 2021-07-19 NOTE — ED PROVIDER NOTE - OBJECTIVE STATEMENT
55 y male with PMH of COPD, GERD, HTN, HIatal hernia, REBECCA 55 y male with PMH of COPD, GERD, HTN, HIatal hernia, REBECCA on CPAP presents with nonexertional non pleuritic CP and unilateral right leg swelling started yesterday morning.  Had ankle surgery 3 weeks prior at [facility] and has been non compliant with weight-bearing precautions.  was seen yesterday for CP and signed out AMA before completion of workup.

## 2021-07-31 NOTE — ED ADULT NURSE NOTE - NS ED NURSE RECORD ANOTHER VITAL SIGN
Yes
[FreeTextEntry1] : Bone mineral density: 07/30/2021 \par Indication: vs. 2019\par Spine: not performed\par Total hip: -2.1 osteopenia, no significant change\par Femoral neck: -2.0 osteopenia, no significant change\par Proximal radius: -1.3 osteopenia, +5.1%

## 2021-08-01 ENCOUNTER — INPATIENT (INPATIENT)
Facility: HOSPITAL | Age: 56
LOS: 2 days | Discharge: HOME | End: 2021-08-04
Attending: INTERNAL MEDICINE | Admitting: INTERNAL MEDICINE
Payer: MEDICAID

## 2021-08-01 VITALS
TEMPERATURE: 99 F | HEART RATE: 68 BPM | SYSTOLIC BLOOD PRESSURE: 160 MMHG | OXYGEN SATURATION: 100 % | HEIGHT: 70 IN | DIASTOLIC BLOOD PRESSURE: 87 MMHG | RESPIRATION RATE: 18 BRPM | WEIGHT: 179.9 LBS

## 2021-08-01 DIAGNOSIS — Z98.890 OTHER SPECIFIED POSTPROCEDURAL STATES: Chronic | ICD-10-CM

## 2021-08-01 DIAGNOSIS — Z96.651 PRESENCE OF RIGHT ARTIFICIAL KNEE JOINT: Chronic | ICD-10-CM

## 2021-08-01 DIAGNOSIS — K40.20 BILATERAL INGUINAL HERNIA, WITHOUT OBSTRUCTION OR GANGRENE, NOT SPECIFIED AS RECURRENT: Chronic | ICD-10-CM

## 2021-08-01 LAB
ALBUMIN SERPL ELPH-MCNC: 3.7 G/DL — SIGNIFICANT CHANGE UP (ref 3.5–5.2)
ALP SERPL-CCNC: 140 U/L — HIGH (ref 30–115)
ALT FLD-CCNC: 13 U/L — SIGNIFICANT CHANGE UP (ref 0–41)
ANION GAP SERPL CALC-SCNC: 9 MMOL/L — SIGNIFICANT CHANGE UP (ref 7–14)
APTT BLD: 34.1 SEC — SIGNIFICANT CHANGE UP (ref 27–39.2)
AST SERPL-CCNC: 28 U/L — SIGNIFICANT CHANGE UP (ref 0–41)
BASOPHILS # BLD AUTO: 0.06 K/UL — SIGNIFICANT CHANGE UP (ref 0–0.2)
BASOPHILS NFR BLD AUTO: 0.5 % — SIGNIFICANT CHANGE UP (ref 0–1)
BILIRUB DIRECT SERPL-MCNC: <0.2 MG/DL — SIGNIFICANT CHANGE UP (ref 0–0.2)
BILIRUB INDIRECT FLD-MCNC: >0.1 MG/DL — LOW (ref 0.2–1.2)
BILIRUB SERPL-MCNC: 0.3 MG/DL — SIGNIFICANT CHANGE UP (ref 0.2–1.2)
BUN SERPL-MCNC: 6 MG/DL — LOW (ref 10–20)
CALCIUM SERPL-MCNC: 9.1 MG/DL — SIGNIFICANT CHANGE UP (ref 8.5–10.1)
CHLORIDE SERPL-SCNC: 102 MMOL/L — SIGNIFICANT CHANGE UP (ref 98–110)
CO2 SERPL-SCNC: 24 MMOL/L — SIGNIFICANT CHANGE UP (ref 17–32)
CREAT SERPL-MCNC: 0.8 MG/DL — SIGNIFICANT CHANGE UP (ref 0.7–1.5)
EOSINOPHIL # BLD AUTO: 0.14 K/UL — SIGNIFICANT CHANGE UP (ref 0–0.7)
EOSINOPHIL NFR BLD AUTO: 1.2 % — SIGNIFICANT CHANGE UP (ref 0–8)
GLUCOSE SERPL-MCNC: 114 MG/DL — HIGH (ref 70–99)
HCT VFR BLD CALC: 41 % — LOW (ref 42–52)
HGB BLD-MCNC: 13.9 G/DL — LOW (ref 14–18)
IMM GRANULOCYTES NFR BLD AUTO: 0.4 % — HIGH (ref 0.1–0.3)
INR BLD: 1.11 RATIO — SIGNIFICANT CHANGE UP (ref 0.65–1.3)
LACTATE SERPL-SCNC: 1.5 MMOL/L — SIGNIFICANT CHANGE UP (ref 0.7–2)
LIDOCAIN IGE QN: 28 U/L — SIGNIFICANT CHANGE UP (ref 7–60)
LYMPHOCYTES # BLD AUTO: 1.63 K/UL — SIGNIFICANT CHANGE UP (ref 1.2–3.4)
LYMPHOCYTES # BLD AUTO: 14.4 % — LOW (ref 20.5–51.1)
MCHC RBC-ENTMCNC: 28.6 PG — SIGNIFICANT CHANGE UP (ref 27–31)
MCHC RBC-ENTMCNC: 33.9 G/DL — SIGNIFICANT CHANGE UP (ref 32–37)
MCV RBC AUTO: 84.4 FL — SIGNIFICANT CHANGE UP (ref 80–94)
MONOCYTES # BLD AUTO: 0.66 K/UL — HIGH (ref 0.1–0.6)
MONOCYTES NFR BLD AUTO: 5.8 % — SIGNIFICANT CHANGE UP (ref 1.7–9.3)
NEUTROPHILS # BLD AUTO: 8.76 K/UL — HIGH (ref 1.4–6.5)
NEUTROPHILS NFR BLD AUTO: 77.7 % — HIGH (ref 42.2–75.2)
NRBC # BLD: 0 /100 WBCS — SIGNIFICANT CHANGE UP (ref 0–0)
NT-PROBNP SERPL-SCNC: 88 PG/ML — SIGNIFICANT CHANGE UP (ref 0–300)
PLATELET # BLD AUTO: 279 K/UL — SIGNIFICANT CHANGE UP (ref 130–400)
POTASSIUM SERPL-MCNC: 5.4 MMOL/L — HIGH (ref 3.5–5)
POTASSIUM SERPL-SCNC: 5.4 MMOL/L — HIGH (ref 3.5–5)
PROT SERPL-MCNC: 6.7 G/DL — SIGNIFICANT CHANGE UP (ref 6–8)
PROTHROM AB SERPL-ACNC: 12.8 SEC — SIGNIFICANT CHANGE UP (ref 9.95–12.87)
RBC # BLD: 4.86 M/UL — SIGNIFICANT CHANGE UP (ref 4.7–6.1)
RBC # FLD: 14.4 % — SIGNIFICANT CHANGE UP (ref 11.5–14.5)
SARS-COV-2 RNA SPEC QL NAA+PROBE: SIGNIFICANT CHANGE UP
SODIUM SERPL-SCNC: 135 MMOL/L — SIGNIFICANT CHANGE UP (ref 135–146)
TROPONIN T SERPL-MCNC: <0.01 NG/ML — SIGNIFICANT CHANGE UP
WBC # BLD: 11.3 K/UL — HIGH (ref 4.8–10.8)
WBC # FLD AUTO: 11.3 K/UL — HIGH (ref 4.8–10.8)

## 2021-08-01 PROCEDURE — 74177 CT ABD & PELVIS W/CONTRAST: CPT | Mod: 26,MA

## 2021-08-01 PROCEDURE — 99285 EMERGENCY DEPT VISIT HI MDM: CPT

## 2021-08-01 PROCEDURE — 71275 CT ANGIOGRAPHY CHEST: CPT | Mod: 26,MA

## 2021-08-01 PROCEDURE — 71045 X-RAY EXAM CHEST 1 VIEW: CPT | Mod: 26

## 2021-08-01 PROCEDURE — 93010 ELECTROCARDIOGRAM REPORT: CPT

## 2021-08-01 RX ORDER — ALBUTEROL 90 UG/1
1 AEROSOL, METERED ORAL ONCE
Refills: 0 | Status: COMPLETED | OUTPATIENT
Start: 2021-08-01 | End: 2021-08-01

## 2021-08-01 RX ORDER — MORPHINE SULFATE 50 MG/1
4 CAPSULE, EXTENDED RELEASE ORAL ONCE
Refills: 0 | Status: DISCONTINUED | OUTPATIENT
Start: 2021-08-01 | End: 2021-08-01

## 2021-08-01 RX ORDER — ONDANSETRON 8 MG/1
4 TABLET, FILM COATED ORAL ONCE
Refills: 0 | Status: COMPLETED | OUTPATIENT
Start: 2021-08-01 | End: 2021-08-01

## 2021-08-01 RX ORDER — AZITHROMYCIN 500 MG/1
500 TABLET, FILM COATED ORAL ONCE
Refills: 0 | Status: COMPLETED | OUTPATIENT
Start: 2021-08-01 | End: 2021-08-01

## 2021-08-01 RX ADMIN — ALBUTEROL 1 PUFF(S): 90 AEROSOL, METERED ORAL at 21:38

## 2021-08-01 RX ADMIN — MORPHINE SULFATE 4 MILLIGRAM(S): 50 CAPSULE, EXTENDED RELEASE ORAL at 21:38

## 2021-08-01 RX ADMIN — AZITHROMYCIN 255 MILLIGRAM(S): 500 TABLET, FILM COATED ORAL at 22:53

## 2021-08-01 RX ADMIN — ONDANSETRON 4 MILLIGRAM(S): 8 TABLET, FILM COATED ORAL at 21:38

## 2021-08-01 RX ADMIN — MORPHINE SULFATE 4 MILLIGRAM(S): 50 CAPSULE, EXTENDED RELEASE ORAL at 23:22

## 2021-08-01 RX ADMIN — Medication 125 MILLIGRAM(S): at 21:38

## 2021-08-02 PROCEDURE — 99223 1ST HOSP IP/OBS HIGH 75: CPT

## 2021-08-02 RX ORDER — FUROSEMIDE 40 MG
40 TABLET ORAL DAILY
Refills: 0 | Status: DISCONTINUED | OUTPATIENT
Start: 2021-08-02 | End: 2021-08-04

## 2021-08-02 RX ORDER — MORPHINE SULFATE 50 MG/1
4 CAPSULE, EXTENDED RELEASE ORAL EVERY 6 HOURS
Refills: 0 | Status: DISCONTINUED | OUTPATIENT
Start: 2021-08-02 | End: 2021-08-02

## 2021-08-02 RX ORDER — OXYCODONE AND ACETAMINOPHEN 5; 325 MG/1; MG/1
1 TABLET ORAL EVERY 4 HOURS
Refills: 0 | Status: DISCONTINUED | OUTPATIENT
Start: 2021-08-02 | End: 2021-08-04

## 2021-08-02 RX ORDER — METRONIDAZOLE 500 MG
500 TABLET ORAL EVERY 8 HOURS
Refills: 0 | Status: DISCONTINUED | OUTPATIENT
Start: 2021-08-02 | End: 2021-08-04

## 2021-08-02 RX ORDER — MORPHINE SULFATE 50 MG/1
4 CAPSULE, EXTENDED RELEASE ORAL EVERY 4 HOURS
Refills: 0 | Status: DISCONTINUED | OUTPATIENT
Start: 2021-08-02 | End: 2021-08-04

## 2021-08-02 RX ORDER — CIPROFLOXACIN LACTATE 400MG/40ML
500 VIAL (ML) INTRAVENOUS EVERY 12 HOURS
Refills: 0 | Status: DISCONTINUED | OUTPATIENT
Start: 2021-08-02 | End: 2021-08-04

## 2021-08-02 RX ORDER — SODIUM CHLORIDE 9 MG/ML
1000 INJECTION, SOLUTION INTRAVENOUS
Refills: 0 | Status: DISCONTINUED | OUTPATIENT
Start: 2021-08-02 | End: 2021-08-04

## 2021-08-02 RX ORDER — CHLORHEXIDINE GLUCONATE 213 G/1000ML
1 SOLUTION TOPICAL
Refills: 0 | Status: DISCONTINUED | OUTPATIENT
Start: 2021-08-02 | End: 2021-08-04

## 2021-08-02 RX ORDER — SIMVASTATIN 20 MG/1
20 TABLET, FILM COATED ORAL AT BEDTIME
Refills: 0 | Status: DISCONTINUED | OUTPATIENT
Start: 2021-08-02 | End: 2021-08-04

## 2021-08-02 RX ORDER — PANTOPRAZOLE SODIUM 20 MG/1
40 TABLET, DELAYED RELEASE ORAL
Refills: 0 | Status: DISCONTINUED | OUTPATIENT
Start: 2021-08-02 | End: 2021-08-04

## 2021-08-02 RX ORDER — ENOXAPARIN SODIUM 100 MG/ML
40 INJECTION SUBCUTANEOUS DAILY
Refills: 0 | Status: DISCONTINUED | OUTPATIENT
Start: 2021-08-02 | End: 2021-08-04

## 2021-08-02 RX ORDER — MORPHINE SULFATE 50 MG/1
4 CAPSULE, EXTENDED RELEASE ORAL ONCE
Refills: 0 | Status: DISCONTINUED | OUTPATIENT
Start: 2021-08-02 | End: 2021-08-02

## 2021-08-02 RX ORDER — KETOROLAC TROMETHAMINE 30 MG/ML
15 SYRINGE (ML) INJECTION ONCE
Refills: 0 | Status: DISCONTINUED | OUTPATIENT
Start: 2021-08-02 | End: 2021-08-02

## 2021-08-02 RX ORDER — ALPRAZOLAM 0.25 MG
2 TABLET ORAL
Refills: 0 | Status: DISCONTINUED | OUTPATIENT
Start: 2021-08-02 | End: 2021-08-04

## 2021-08-02 RX ORDER — LISINOPRIL 2.5 MG/1
5 TABLET ORAL DAILY
Refills: 0 | Status: DISCONTINUED | OUTPATIENT
Start: 2021-08-02 | End: 2021-08-04

## 2021-08-02 RX ORDER — IPRATROPIUM/ALBUTEROL SULFATE 18-103MCG
3 AEROSOL WITH ADAPTER (GRAM) INHALATION EVERY 6 HOURS
Refills: 0 | Status: DISCONTINUED | OUTPATIENT
Start: 2021-08-02 | End: 2021-08-04

## 2021-08-02 RX ADMIN — MORPHINE SULFATE 4 MILLIGRAM(S): 50 CAPSULE, EXTENDED RELEASE ORAL at 06:10

## 2021-08-02 RX ADMIN — Medication 500 MILLIGRAM(S): at 21:48

## 2021-08-02 RX ADMIN — Medication 500 MILLIGRAM(S): at 06:06

## 2021-08-02 RX ADMIN — MORPHINE SULFATE 4 MILLIGRAM(S): 50 CAPSULE, EXTENDED RELEASE ORAL at 22:27

## 2021-08-02 RX ADMIN — SODIUM CHLORIDE 100 MILLILITER(S): 9 INJECTION, SOLUTION INTRAVENOUS at 07:31

## 2021-08-02 RX ADMIN — MORPHINE SULFATE 4 MILLIGRAM(S): 50 CAPSULE, EXTENDED RELEASE ORAL at 10:30

## 2021-08-02 RX ADMIN — PANTOPRAZOLE SODIUM 40 MILLIGRAM(S): 20 TABLET, DELAYED RELEASE ORAL at 06:06

## 2021-08-02 RX ADMIN — Medication 2 MILLIGRAM(S): at 17:04

## 2021-08-02 RX ADMIN — Medication 15 MILLIGRAM(S): at 04:56

## 2021-08-02 RX ADMIN — MORPHINE SULFATE 4 MILLIGRAM(S): 50 CAPSULE, EXTENDED RELEASE ORAL at 03:48

## 2021-08-02 RX ADMIN — SIMVASTATIN 20 MILLIGRAM(S): 20 TABLET, FILM COATED ORAL at 21:48

## 2021-08-02 RX ADMIN — LISINOPRIL 5 MILLIGRAM(S): 2.5 TABLET ORAL at 07:30

## 2021-08-02 RX ADMIN — MORPHINE SULFATE 4 MILLIGRAM(S): 50 CAPSULE, EXTENDED RELEASE ORAL at 17:04

## 2021-08-02 RX ADMIN — Medication 500 MILLIGRAM(S): at 17:05

## 2021-08-02 RX ADMIN — Medication 2 MILLIGRAM(S): at 23:17

## 2021-08-02 RX ADMIN — MORPHINE SULFATE 4 MILLIGRAM(S): 50 CAPSULE, EXTENDED RELEASE ORAL at 14:26

## 2021-08-02 RX ADMIN — Medication 500 MILLIGRAM(S): at 15:43

## 2021-08-02 RX ADMIN — MORPHINE SULFATE 4 MILLIGRAM(S): 50 CAPSULE, EXTENDED RELEASE ORAL at 21:50

## 2021-08-02 RX ADMIN — MORPHINE SULFATE 4 MILLIGRAM(S): 50 CAPSULE, EXTENDED RELEASE ORAL at 00:51

## 2021-08-02 RX ADMIN — Medication 40 MILLIGRAM(S): at 06:06

## 2021-08-02 RX ADMIN — SODIUM CHLORIDE 100 MILLILITER(S): 9 INJECTION, SOLUTION INTRAVENOUS at 06:12

## 2021-08-02 NOTE — ED PROVIDER NOTE - PHYSICAL EXAMINATION
Physical Exam    Vital Signs: I have reviewed the initial vital signs.  Constitutional: pt is obese, appears stated age, no acute distress  Eyes: Conjunctiva pink, Sclera clear  Cardiovascular: S1 and S2, regular rate, regular rhythm, well-perfused extremities, radial pulses equal and 2+ b/l.   Respiratory: unlabored respiratory effort, (+) diffuse wheezing throughout b/l.  pt is speaking full sentences. no accessory muscle use.   Gastrointestinal: soft, (+) mild lower abdominal tender, nondistended abdomen, no pulsatile mass, normal bowl sounds, no rebound, no guarding, no organomegaly. no cva tenderness.   Musculoskeletal: supple neck, no lower extremity edema, no calf tenderness, no midline tenderness, no palpable spinal step offs  Integumentary: warm, dry, no rash  Neurologic: awake, alert, cranial nerves II-XII grossly intact, extremities’ motor and sensory functions grossly intact. steady gait with walker.   Psychiatric: appropriate mood, appropriate affect

## 2021-08-02 NOTE — H&P ADULT - ATTENDING COMMENTS
54 YO M with a PMH of obesity, COPD (not on home O2), REBECCA on BiPAP, HTN, DLD, GERD, severe OA of bilateral shoulders, hips and knees s/p total knee replacement bilaterally who presents to the hospital with a c/o ABD pain for the past x 2 days. Described as located in the suprapubic region, radiating towards his RLQ/LLQ, crampy, and waxes/wanes. - N/V (non-bloody). - black/bloody stools. Denies any fevers/chills, hematemesis, rashes, flank pain, dysuria, LE swelling, headaches, or CP.     In the ED, CTA-chest and CT-AP w/ IV contrast showed no acute process. Pt started on IV steroids, Duonebs, Azithro, and pain meds.     FMHx: Reviewed, not relevant    Physical exam shows obese pt laying in bed in NAD. VSS, afebrile, not hypoxic on RA. A&Ox3. poor dentition. Neuro exam intact. Scattered expiratory wheezes noted throughout w/ good air entry B/L. RRR, no M/G/R. ABD is soft, obese, with TTP in the suprapubic region, normoactive BSs. LEs with no pitting edema; RLE with surgical boot in place. No rashes. Labs and radiology as above.     Suprapubic ABD pain, possibly early colitis vs UTI; no sepsis present on admission. Obtain UA. PO ABXs (Cipro/Flagyl). PRN pain meds. Anti-emetics PRN.   -Pt states "it feels like my colitis again", can give a trial of PO ABXs (Cipro/Flagyl). Pt is able to tolerate PO   -Discharge in the AM    Hx of obesity, COPD (not on home O2), REBECCA on BiPAP, HTN, DLD, GERD, severe OA of bilateral shoulders, hips and knees s/p total knee replacement bilaterally. Restart home meds, except as stated above. DVT PPX. Inform PCP of pt's admission to hospital. My note supersedes the residents note.

## 2021-08-02 NOTE — ED PROVIDER NOTE - OBJECTIVE STATEMENT
56 y/o male with a PMH of COPD, current cigarette smoker, HTN, HLD, hx of appendectomy and cholecystectomy, and colitis in 2009 presents to the ED for evaluation of lower abdominal pain associated with yellow diarrhea x 18 times, and nbnb vomiting x 4 times today. pt reports sob not relieved with his advair or spiriva, pt had right foot reconstructive surgery a month ago at [facility]. pt denies fever, chills, recent trauma, chest pain, weakness, numbness, tingling, dizziness, back pain, burning or pain with urination, blood in the urine or stool.

## 2021-08-02 NOTE — ED PROVIDER NOTE - ATTENDING CONTRIBUTION TO CARE
Patient is c/o sob on exertions, +wheezing, +chest tightness x one week, also has been having diarrhea, loose watery BM, had total of 18 BM in 24 hours, denies blood in the stool, +generalized lower abd pain, +nausea/vomiting.   Vitals reviewed.   Lungs: B/L scattered wheezing, no crackles.  Abd: +BS, +generalized lower abd tenderness, ND, soft,   A/P: Abd pain with n/v/d,   Worsening dyspnea,   labs, nebs, symptomatic treatment,   CT, reevaluation.

## 2021-08-02 NOTE — H&P ADULT - NSHPPHYSICALEXAM_GEN_ALL_CORE
GENERAL: In moderate disress  HEAD:  Atraumatic, Normocephalic  EYES: EOMI  ENT: Moist mucous membranes  NECK: No JVD  CHEST/LUNG: Clear to auscultation bilaterally; No rales, rhonchi, wheezing, or rub  HEART: Regular rate and rhythm; No murmurs, rubs, or gallops  ABDOMEN: Lower abdomen tender to palpation, + BS  EXTREMITIES:   No cyanosis, or edema;   NERVOUS SYSTEM:  Alert & Oriented X3, speech clear. No deficits GENERAL: In moderate disress  HEAD:  Atraumatic, Normocephalic  EYES: EOMI  ENT: Moist mucous membranes  NECK: No JVD  CHEST/LUNG: Clear to auscultation bilaterally; No rales, rhonchi, wheezing, or rub  HEART: Regular rate and rhythm; No murmurs, rubs, or gallops  ABDOMEN: Lower abdomen tender to palpation, + BS  EXTREMITIES: Right foot in surgical boot;  No cyanosis, or edema;   NERVOUS SYSTEM:  Alert & Oriented X3, speech clear. No deficits

## 2021-08-02 NOTE — H&P ADULT - HISTORY OF PRESENT ILLNESS
56 YO M with a PMH of COPD, REBECCA on CPAP, obesity, HTN, DLD, GERD, severe OA of bilateral shoulders, hips and knees s/p total knee replacement bilaterally who presents with abdominal pain. He reports severe lower abdominal pain For the past 3 days and diarrhea since a few days before that as well as nausea and vomiting. He describes the pain as crampy, 10/10 in severity, does not radiate, and is only briefly relieved with pain meds. He says that this feels like when he had colitis.     In the ED, VS wnl, labs grossly unremarkable, lactate 1.5. CTAP and CT chest with IV contrast showed no evidence of acute pathology

## 2021-08-02 NOTE — H&P ADULT - ASSESSMENT
56 YO M with a PMH of COPD, REBECCA on CPAP, obesity, HTN, DLD, GERD, OA presents with abdominal pain.    #Abdominal pain  - CTAP negative for any acute process  - WBC 11.30, afebrile. diarrhea x3 days  - f/u GI PCR  - Can start cipro and flagyl for now  - IV hydration  - Pain control    #HTN  - Continue home lisinopril 5 mg, Lasix 40 mg    #COPD  - Stable  - Continue with douneb       54 YO M with a PMH of COPD, REBECCA on CPAP, obesity, HTN, DLD, GERD, OA presents with abdominal pain.    #Abdominal pain  - CTAP negative for any acute process  - WBC 11.30, afebrile. diarrhea x3 days  - f/u GI PCR  - Can start cipro and flagyl for now  - IV hydration  - Pain control    #HTN  - Continue home lisinopril 5 mg, Lasix 40 mg    #COPD  #REBECCA  - Stable  - Continue with douneb  - BiPAP at night    #GERD  - Continue Protonix    DVT PPx: Lovenox  GI PPx: Protonix  Diet: DASH/CC

## 2021-08-02 NOTE — ED PROVIDER NOTE - NS ED ROS FT
CONST: No fever, chills or bodyaches  EYES: No pain, redness, drainage or visual changes.  ENT: No ear pain or discharge, nasal discharge or congestion. No sore throat  CARD: No chest pain, palpitations  RESP: (+) SOB. No cough, hemoptysis. (+) hx of COPD  GI: (+) abdominal pain, N/V/D  : No urinary symptoms  MS: No joint pain, back pain or extremity pain/injury  SKIN: No rashes  NEURO: No headache, dizziness, paresthesias or LOC

## 2021-08-03 LAB
ALBUMIN SERPL ELPH-MCNC: 4 G/DL — SIGNIFICANT CHANGE UP (ref 3.5–5.2)
ALP SERPL-CCNC: 158 U/L — HIGH (ref 30–115)
ALT FLD-CCNC: 11 U/L — SIGNIFICANT CHANGE UP (ref 0–41)
ANION GAP SERPL CALC-SCNC: 11 MMOL/L — SIGNIFICANT CHANGE UP (ref 7–14)
AST SERPL-CCNC: 13 U/L — SIGNIFICANT CHANGE UP (ref 0–41)
BASOPHILS # BLD AUTO: 0.07 K/UL — SIGNIFICANT CHANGE UP (ref 0–0.2)
BASOPHILS NFR BLD AUTO: 0.7 % — SIGNIFICANT CHANGE UP (ref 0–1)
BILIRUB SERPL-MCNC: 0.3 MG/DL — SIGNIFICANT CHANGE UP (ref 0.2–1.2)
BUN SERPL-MCNC: 19 MG/DL — SIGNIFICANT CHANGE UP (ref 10–20)
CALCIUM SERPL-MCNC: 9.4 MG/DL — SIGNIFICANT CHANGE UP (ref 8.5–10.1)
CHLORIDE SERPL-SCNC: 96 MMOL/L — LOW (ref 98–110)
CO2 SERPL-SCNC: 30 MMOL/L — SIGNIFICANT CHANGE UP (ref 17–32)
COVID-19 SPIKE DOMAIN AB INTERP: NEGATIVE — SIGNIFICANT CHANGE UP
COVID-19 SPIKE DOMAIN ANTIBODY RESULT: 0.4 U/ML — SIGNIFICANT CHANGE UP
CREAT SERPL-MCNC: 0.9 MG/DL — SIGNIFICANT CHANGE UP (ref 0.7–1.5)
EOSINOPHIL # BLD AUTO: 0.04 K/UL — SIGNIFICANT CHANGE UP (ref 0–0.7)
EOSINOPHIL NFR BLD AUTO: 0.4 % — SIGNIFICANT CHANGE UP (ref 0–8)
GLUCOSE SERPL-MCNC: 113 MG/DL — HIGH (ref 70–99)
HCT VFR BLD CALC: 42.5 % — SIGNIFICANT CHANGE UP (ref 42–52)
HGB BLD-MCNC: 14 G/DL — SIGNIFICANT CHANGE UP (ref 14–18)
IMM GRANULOCYTES NFR BLD AUTO: 0.4 % — HIGH (ref 0.1–0.3)
LYMPHOCYTES # BLD AUTO: 29.7 % — SIGNIFICANT CHANGE UP (ref 20.5–51.1)
LYMPHOCYTES # BLD AUTO: 3.09 K/UL — SIGNIFICANT CHANGE UP (ref 1.2–3.4)
MAGNESIUM SERPL-MCNC: 2.1 MG/DL — SIGNIFICANT CHANGE UP (ref 1.8–2.4)
MCHC RBC-ENTMCNC: 28.2 PG — SIGNIFICANT CHANGE UP (ref 27–31)
MCHC RBC-ENTMCNC: 32.9 G/DL — SIGNIFICANT CHANGE UP (ref 32–37)
MCV RBC AUTO: 85.7 FL — SIGNIFICANT CHANGE UP (ref 80–94)
MONOCYTES # BLD AUTO: 0.67 K/UL — HIGH (ref 0.1–0.6)
MONOCYTES NFR BLD AUTO: 6.4 % — SIGNIFICANT CHANGE UP (ref 1.7–9.3)
NEUTROPHILS # BLD AUTO: 6.48 K/UL — SIGNIFICANT CHANGE UP (ref 1.4–6.5)
NEUTROPHILS NFR BLD AUTO: 62.4 % — SIGNIFICANT CHANGE UP (ref 42.2–75.2)
NRBC # BLD: 0 /100 WBCS — SIGNIFICANT CHANGE UP (ref 0–0)
PHOSPHATE SERPL-MCNC: 4.1 MG/DL — SIGNIFICANT CHANGE UP (ref 2.1–4.9)
PLATELET # BLD AUTO: 296 K/UL — SIGNIFICANT CHANGE UP (ref 130–400)
POTASSIUM SERPL-MCNC: 4.4 MMOL/L — SIGNIFICANT CHANGE UP (ref 3.5–5)
POTASSIUM SERPL-SCNC: 4.4 MMOL/L — SIGNIFICANT CHANGE UP (ref 3.5–5)
PROT SERPL-MCNC: 6.6 G/DL — SIGNIFICANT CHANGE UP (ref 6–8)
RBC # BLD: 4.96 M/UL — SIGNIFICANT CHANGE UP (ref 4.7–6.1)
RBC # FLD: 14.9 % — HIGH (ref 11.5–14.5)
SARS-COV-2 IGG+IGM SERPL QL IA: 0.4 U/ML — SIGNIFICANT CHANGE UP
SARS-COV-2 IGG+IGM SERPL QL IA: NEGATIVE — SIGNIFICANT CHANGE UP
SODIUM SERPL-SCNC: 137 MMOL/L — SIGNIFICANT CHANGE UP (ref 135–146)
WBC # BLD: 10.39 K/UL — SIGNIFICANT CHANGE UP (ref 4.8–10.8)
WBC # FLD AUTO: 10.39 K/UL — SIGNIFICANT CHANGE UP (ref 4.8–10.8)

## 2021-08-03 PROCEDURE — 99232 SBSQ HOSP IP/OBS MODERATE 35: CPT

## 2021-08-03 RX ADMIN — Medication 40 MILLIGRAM(S): at 05:41

## 2021-08-03 RX ADMIN — Medication 15 MILLIGRAM(S): at 02:29

## 2021-08-03 RX ADMIN — Medication 2 MILLIGRAM(S): at 16:09

## 2021-08-03 RX ADMIN — OXYCODONE AND ACETAMINOPHEN 1 TABLET(S): 5; 325 TABLET ORAL at 19:57

## 2021-08-03 RX ADMIN — SIMVASTATIN 20 MILLIGRAM(S): 20 TABLET, FILM COATED ORAL at 21:02

## 2021-08-03 RX ADMIN — Medication 2 MILLIGRAM(S): at 11:44

## 2021-08-03 RX ADMIN — MORPHINE SULFATE 4 MILLIGRAM(S): 50 CAPSULE, EXTENDED RELEASE ORAL at 02:29

## 2021-08-03 RX ADMIN — Medication 2 MILLIGRAM(S): at 21:02

## 2021-08-03 RX ADMIN — MORPHINE SULFATE 4 MILLIGRAM(S): 50 CAPSULE, EXTENDED RELEASE ORAL at 06:12

## 2021-08-03 RX ADMIN — OXYCODONE AND ACETAMINOPHEN 1 TABLET(S): 5; 325 TABLET ORAL at 22:17

## 2021-08-03 RX ADMIN — MORPHINE SULFATE 4 MILLIGRAM(S): 50 CAPSULE, EXTENDED RELEASE ORAL at 17:01

## 2021-08-03 RX ADMIN — Medication 500 MILLIGRAM(S): at 05:41

## 2021-08-03 RX ADMIN — Medication 2 MILLIGRAM(S): at 05:40

## 2021-08-03 RX ADMIN — MORPHINE SULFATE 4 MILLIGRAM(S): 50 CAPSULE, EXTENDED RELEASE ORAL at 02:30

## 2021-08-03 RX ADMIN — MORPHINE SULFATE 4 MILLIGRAM(S): 50 CAPSULE, EXTENDED RELEASE ORAL at 13:27

## 2021-08-03 RX ADMIN — Medication 500 MILLIGRAM(S): at 17:01

## 2021-08-03 RX ADMIN — LISINOPRIL 5 MILLIGRAM(S): 2.5 TABLET ORAL at 05:42

## 2021-08-03 RX ADMIN — MORPHINE SULFATE 4 MILLIGRAM(S): 50 CAPSULE, EXTENDED RELEASE ORAL at 14:12

## 2021-08-03 RX ADMIN — CHLORHEXIDINE GLUCONATE 1 APPLICATION(S): 213 SOLUTION TOPICAL at 05:41

## 2021-08-03 RX ADMIN — MORPHINE SULFATE 4 MILLIGRAM(S): 50 CAPSULE, EXTENDED RELEASE ORAL at 21:30

## 2021-08-03 RX ADMIN — Medication 500 MILLIGRAM(S): at 21:02

## 2021-08-03 RX ADMIN — MORPHINE SULFATE 4 MILLIGRAM(S): 50 CAPSULE, EXTENDED RELEASE ORAL at 10:12

## 2021-08-03 RX ADMIN — PANTOPRAZOLE SODIUM 40 MILLIGRAM(S): 20 TABLET, DELAYED RELEASE ORAL at 06:11

## 2021-08-03 RX ADMIN — MORPHINE SULFATE 4 MILLIGRAM(S): 50 CAPSULE, EXTENDED RELEASE ORAL at 10:27

## 2021-08-03 RX ADMIN — Medication 500 MILLIGRAM(S): at 13:28

## 2021-08-03 RX ADMIN — MORPHINE SULFATE 4 MILLIGRAM(S): 50 CAPSULE, EXTENDED RELEASE ORAL at 03:10

## 2021-08-03 RX ADMIN — MORPHINE SULFATE 4 MILLIGRAM(S): 50 CAPSULE, EXTENDED RELEASE ORAL at 21:02

## 2021-08-03 NOTE — PROGRESS NOTE ADULT - ASSESSMENT
54 YO M with a PMH of COPD, REBECCA on CPAP, obesity, HTN, DLD, GERD, OA presents with abdominal pain.    #Abdominal pain  improved, no BM since yesterday, able to tolerate PO   - CTAP negative for any acute process  - WBC 11.30, afebrile.   - f/u GI PCR  -on cipro and flagyl for now  - IV hydration  - Pain control    #HTN  - Continue home lisinopril 5 mg, Lasix 40 mg    #COPD  #REBECCA  - Stable  - Continue with douneb  - BiPAP at night    #GERD  - Continue Protonix    DVT PPx: Lovenox  GI PPx: Protonix  Diet: DASH/CC    Informed by nurse that pt left his room in the ED to take a walk outside, on his way back into the ED, tripped on a rug- denies head trauma, landed on his knees, denies knee pain.

## 2021-08-04 ENCOUNTER — TRANSCRIPTION ENCOUNTER (OUTPATIENT)
Age: 56
End: 2021-08-04

## 2021-08-04 VITALS — SYSTOLIC BLOOD PRESSURE: 126 MMHG | DIASTOLIC BLOOD PRESSURE: 86 MMHG | HEART RATE: 94 BPM

## 2021-08-04 LAB
ALBUMIN SERPL ELPH-MCNC: 3.6 G/DL — SIGNIFICANT CHANGE UP (ref 3.5–5.2)
ALP SERPL-CCNC: 137 U/L — HIGH (ref 30–115)
ALT FLD-CCNC: 11 U/L — SIGNIFICANT CHANGE UP (ref 0–41)
ANION GAP SERPL CALC-SCNC: 11 MMOL/L — SIGNIFICANT CHANGE UP (ref 7–14)
AST SERPL-CCNC: 12 U/L — SIGNIFICANT CHANGE UP (ref 0–41)
BASOPHILS # BLD AUTO: 0.08 K/UL — SIGNIFICANT CHANGE UP (ref 0–0.2)
BASOPHILS NFR BLD AUTO: 1 % — SIGNIFICANT CHANGE UP (ref 0–1)
BILIRUB SERPL-MCNC: 0.2 MG/DL — SIGNIFICANT CHANGE UP (ref 0.2–1.2)
BUN SERPL-MCNC: 15 MG/DL — SIGNIFICANT CHANGE UP (ref 10–20)
CALCIUM SERPL-MCNC: 9.1 MG/DL — SIGNIFICANT CHANGE UP (ref 8.5–10.1)
CHLORIDE SERPL-SCNC: 98 MMOL/L — SIGNIFICANT CHANGE UP (ref 98–110)
CO2 SERPL-SCNC: 29 MMOL/L — SIGNIFICANT CHANGE UP (ref 17–32)
CREAT SERPL-MCNC: 0.9 MG/DL — SIGNIFICANT CHANGE UP (ref 0.7–1.5)
EOSINOPHIL # BLD AUTO: 0.1 K/UL — SIGNIFICANT CHANGE UP (ref 0–0.7)
EOSINOPHIL NFR BLD AUTO: 1.2 % — SIGNIFICANT CHANGE UP (ref 0–8)
GLUCOSE SERPL-MCNC: 114 MG/DL — HIGH (ref 70–99)
HCT VFR BLD CALC: 44 % — SIGNIFICANT CHANGE UP (ref 42–52)
HGB BLD-MCNC: 14.5 G/DL — SIGNIFICANT CHANGE UP (ref 14–18)
IMM GRANULOCYTES NFR BLD AUTO: 0.5 % — HIGH (ref 0.1–0.3)
LYMPHOCYTES # BLD AUTO: 2.48 K/UL — SIGNIFICANT CHANGE UP (ref 1.2–3.4)
LYMPHOCYTES # BLD AUTO: 30.4 % — SIGNIFICANT CHANGE UP (ref 20.5–51.1)
MAGNESIUM SERPL-MCNC: 2 MG/DL — SIGNIFICANT CHANGE UP (ref 1.8–2.4)
MCHC RBC-ENTMCNC: 28.4 PG — SIGNIFICANT CHANGE UP (ref 27–31)
MCHC RBC-ENTMCNC: 33 G/DL — SIGNIFICANT CHANGE UP (ref 32–37)
MCV RBC AUTO: 86.1 FL — SIGNIFICANT CHANGE UP (ref 80–94)
MONOCYTES # BLD AUTO: 0.79 K/UL — HIGH (ref 0.1–0.6)
MONOCYTES NFR BLD AUTO: 9.7 % — HIGH (ref 1.7–9.3)
NEUTROPHILS # BLD AUTO: 4.66 K/UL — SIGNIFICANT CHANGE UP (ref 1.4–6.5)
NEUTROPHILS NFR BLD AUTO: 57.2 % — SIGNIFICANT CHANGE UP (ref 42.2–75.2)
NRBC # BLD: 0 /100 WBCS — SIGNIFICANT CHANGE UP (ref 0–0)
PLATELET # BLD AUTO: 268 K/UL — SIGNIFICANT CHANGE UP (ref 130–400)
POTASSIUM SERPL-MCNC: 4.5 MMOL/L — SIGNIFICANT CHANGE UP (ref 3.5–5)
POTASSIUM SERPL-SCNC: 4.5 MMOL/L — SIGNIFICANT CHANGE UP (ref 3.5–5)
PROT SERPL-MCNC: 6.2 G/DL — SIGNIFICANT CHANGE UP (ref 6–8)
RBC # BLD: 5.11 M/UL — SIGNIFICANT CHANGE UP (ref 4.7–6.1)
RBC # FLD: 14.9 % — HIGH (ref 11.5–14.5)
SODIUM SERPL-SCNC: 138 MMOL/L — SIGNIFICANT CHANGE UP (ref 135–146)
WBC # BLD: 8.15 K/UL — SIGNIFICANT CHANGE UP (ref 4.8–10.8)
WBC # FLD AUTO: 8.15 K/UL — SIGNIFICANT CHANGE UP (ref 4.8–10.8)

## 2021-08-04 PROCEDURE — 99239 HOSP IP/OBS DSCHRG MGMT >30: CPT

## 2021-08-04 RX ADMIN — Medication 2 MILLIGRAM(S): at 10:36

## 2021-08-04 RX ADMIN — Medication 3 MILLILITER(S): at 08:38

## 2021-08-04 RX ADMIN — LISINOPRIL 5 MILLIGRAM(S): 2.5 TABLET ORAL at 06:08

## 2021-08-04 RX ADMIN — Medication 500 MILLIGRAM(S): at 06:08

## 2021-08-04 RX ADMIN — Medication 40 MILLIGRAM(S): at 06:08

## 2021-08-04 RX ADMIN — PANTOPRAZOLE SODIUM 40 MILLIGRAM(S): 20 TABLET, DELAYED RELEASE ORAL at 06:08

## 2021-08-04 RX ADMIN — MORPHINE SULFATE 4 MILLIGRAM(S): 50 CAPSULE, EXTENDED RELEASE ORAL at 06:12

## 2021-08-04 RX ADMIN — OXYCODONE AND ACETAMINOPHEN 1 TABLET(S): 5; 325 TABLET ORAL at 11:13

## 2021-08-04 RX ADMIN — OXYCODONE AND ACETAMINOPHEN 1 TABLET(S): 5; 325 TABLET ORAL at 09:24

## 2021-08-04 RX ADMIN — MORPHINE SULFATE 4 MILLIGRAM(S): 50 CAPSULE, EXTENDED RELEASE ORAL at 03:03

## 2021-08-04 RX ADMIN — MORPHINE SULFATE 4 MILLIGRAM(S): 50 CAPSULE, EXTENDED RELEASE ORAL at 03:30

## 2021-08-04 NOTE — DISCHARGE NOTE PROVIDER - NSDCMRMEDTOKEN_GEN_ALL_CORE_FT
albuterol 90 mcg/inh inhalation aerosol: 2 puff(s) inhaled every 6 hours AS NEEDED   Ambien 10 mg oral tablet: 0.5 tab(s) orally once a day (at bedtime), As Needed  fenofibrate 160 mg oral tablet: 1 tab(s) orally once a day  Lasix 40 mg oral tablet: 1 tab(s) orally once a day  levoFLOXacin 750 mg oral tablet: 1 tab(s) orally once a day   lisinopril 5 mg oral tablet: 1 tab(s) orally once a day  MS Contin 100 mg oral tablet, extended release: 1 tab(s) orally 4 times a day MDD:400mg  nicotine 21 mg/24 hr transdermal film, extended release: 1 patch transdermal once a day   omeprazole 40 mg oral delayed release capsule: 1 cap(s) orally 2 times a day   oxyCODONE 30 mg oral tablet: 1 tab(s) orally every 4 hours, As needed, Severe Pain (7 - 10) MDD:180mg  polyethylene glycol 3350 oral powder for reconstitution: 17 gram(s) orally once a day AS NEEDED FOR CONSTIPATION  predniSONE 20 mg oral tablet: 2 tab(s) orally once a day  senna oral tablet: 2 tab(s) orally once a day (at bedtime)  Spiriva HandiHaler 18 mcg inhalation capsule: 1 cap(s) inhaled once a day   Symbicort 160 mcg-4.5 mcg/inh inhalation aerosol: 1 puff(s) inhaled 2 times a day   Wellbutrin  mg/24 hours oral tablet, extended release: 1 tab(s) orally every 24 hours  Zocor 20 mg oral tablet: 1 tab(s) orally once a day (at bedtime)   albuterol 90 mcg/inh inhalation aerosol: 2 puff(s) inhaled every 6 hours AS NEEDED   Ambien 10 mg oral tablet: 0.5 tab(s) orally once a day (at bedtime), As Needed  fenofibrate 160 mg oral tablet: 1 tab(s) orally once a day  Lasix 40 mg oral tablet: 1 tab(s) orally once a day  lisinopril 5 mg oral tablet: 1 tab(s) orally once a day  MS Contin 100 mg oral tablet, extended release: 1 tab(s) orally 4 times a day MDD:400mg  nicotine 21 mg/24 hr transdermal film, extended release: 1 patch transdermal once a day   omeprazole 40 mg oral delayed release capsule: 1 cap(s) orally 2 times a day   oxyCODONE 30 mg oral tablet: 1 tab(s) orally every 4 hours, As needed, Severe Pain (7 - 10) MDD:180mg  polyethylene glycol 3350 oral powder for reconstitution: 17 gram(s) orally once a day AS NEEDED FOR CONSTIPATION  senna oral tablet: 2 tab(s) orally once a day (at bedtime)  Spiriva HandiHaler 18 mcg inhalation capsule: 1 cap(s) inhaled once a day   Symbicort 160 mcg-4.5 mcg/inh inhalation aerosol: 1 puff(s) inhaled 2 times a day   Wellbutrin  mg/24 hours oral tablet, extended release: 1 tab(s) orally every 24 hours  Zocor 20 mg oral tablet: 1 tab(s) orally once a day (at bedtime)

## 2021-08-04 NOTE — DISCHARGE NOTE NURSING/CASE MANAGEMENT/SOCIAL WORK - NSDCVIVACCINE_GEN_ALL_CORE_FT
influenza, injectable, quadrivalent, preservative free; 19-Oct-2018 18:53; Zina Muro (RN); [employer]; 449DE (Exp. Date: 30-Jun-2019); IntraMuscular; Deltoid Left.; 0.5 milliLiter(s); VIS (VIS Published: 07-Aug-2015, VIS Presented: 19-Oct-2018);

## 2021-08-04 NOTE — DISCHARGE NOTE PROVIDER - CARE PROVIDERS DIRECT ADDRESSES
This is a 50 y/o F with hx of multiple TIA, HLD, Sjögren-Syed disorder, hole in heart, and hysterectomy in 2002 and tummy tuck in 2008 presenting to the ED complaining of intermittent abdominal pain, fever, indigestion, and diarrhea. Pt was seen by GI doctor x 6 days and given medication for spasm TID that has improved pain, has schedule for abdominal CT next week. She reports PO intolerance. Also voices stabbing pain to her neck. Denies symptoms at present. Denies CP, vaginal discharge, hematuria, and SOB.  medication: Crestor, Vit-D deficiency, Topamax, vivance, and anxiety medication.  PMD: Dr. Bravo
,DirectAddress_Unknown

## 2021-08-04 NOTE — DISCHARGE NOTE PROVIDER - CARE PROVIDER_API CALL
Edison Bowie)  [address] [facility]  [address] [place] [address]  Phone: [phone]  Fax: [phone]  Follow Up Time: 2 weeks

## 2021-08-04 NOTE — PROGRESS NOTE ADULT - SUBJECTIVE AND OBJECTIVE BOX
Discharge note      KAYLEIGH TOMAS  55y Male    INTERVAL HPI/OVERNIGHT EVENTS:    Pt is ambulating per staff and still smoking  smoking cessation discussed with the pt  abdominal pain is improved  No N/V  had BM  NO chest pain  agreeable with going home today    T(F): 98.3 (08-04-21 @ 04:47), Max: 98.3 (08-04-21 @ 04:47)  HR: 94 (08-04-21 @ 05:29) (87 - 105)  BP: 126/86 (08-04-21 @ 05:29) (126/86 - 132/90)  RR: 20 (08-04-21 @ 04:47) (20 - 24)  SpO2: 98% (08-03-21 @ 13:00) (98% - 98%) on RA      I&O's Summary    03 Aug 2021 07:01  -  04 Aug 2021 07:00  --------------------------------------------------------  IN: 0 mL / OUT: 700 mL / NET: -700 mL      PHYSICAL EXAM:  GENERAL: NAD  HEAD:  Normocephalic  EYES:  conjunctiva and sclera clear  ENMT: Moist mucous membranes  NERVOUS SYSTEM:  Alert, awake, Good concentration  CHEST/LUNG:  wheezing  HEART: Regular rate and rhythm  ABDOMEN: Soft, obese, Nontender, Nondistended; Bowel sounds present  EXTREMITIES:  right ankle with sutures and scab   SKIN: as above    Consultant(s) Notes Reviewed:  [x ] YES  [ ] NO  Care Discussed with Consultants/Other Providers [ x] YES  [ ] NO    MEDICATIONS  (STANDING):  albuterol/ipratropium for Nebulization 3 milliLiter(s) Nebulizer every 6 hours  chlorhexidine 4% Liquid 1 Application(s) Topical <User Schedule>  enoxaparin Injectable 40 milliGRAM(s) SubCutaneous daily  furosemide    Tablet 40 milliGRAM(s) Oral daily  lisinopril 5 milliGRAM(s) Oral daily  pantoprazole    Tablet 40 milliGRAM(s) Oral before breakfast  simvastatin 20 milliGRAM(s) Oral at bedtime    MEDICATIONS  (PRN):  ALPRAZolam 2 milliGRAM(s) Oral four times a day PRN anxiety  oxycodone    5 mG/acetaminophen 325 mG 1 Tablet(s) Oral every 4 hours PRN Moderate Pain (4 - 6)      LABS:                        14.5   8.15  )-----------( 268      ( 04 Aug 2021 07:09 )             44.0     08-04    138  |  98  |  15  ----------------------------<  114<H>  4.5   |  29  |  0.9    Ca    9.1      04 Aug 2021 07:09  Phos  4.1     08-03  Mg     2.0     08-04    TPro  6.2  /  Alb  3.6  /  TBili  0.2  /  DBili  x   /  AST  12  /  ALT  11  /  AlkPhos  137<H>  08-04          RADIOLOGY & ADDITIONAL TESTS:    Imaging or report Personally Reviewed:  [ ] YES  [ ] NO    < from: CT Angio Chest PE Protocol w/ IV Cont (08.01.21 @ 22:17) >  IMPRESSION:    No CT evidence for acute thoracic, abdominal or pelvic pathology, specifically no pulmonary embolus is identified.    Ascending aortic aneurysm to 4.1 cm.      < end of copied text >      Case discussed with residents and RN on rounds today    Care discussed with pt        
  DAILY PROGRESS NOTE  ===========================================================    Patient Information:  KAYLEIGH TOMAS  /  55y  /  Male  /  MRN#: [id number]    Hospital Day: 1d     |:::::::::::::::::::::::::::| SUBJECTIVE |:::::::::::::::::::::::::::|    OVERNIGHT EVENTS:   TODAY: Patient was seen today at bedside. Review of systems is otherwise negative.    |:::::::::::::::::::::::::::| OBJECTIVE |:::::::::::::::::::::::::::|    VITAL SIGNS: Last 24 Hours  T(C): 36.2 (03 Aug 2021 07:51), Max: 36.2 (03 Aug 2021 07:51)  T(F): 97.1 (03 Aug 2021 07:51), Max: 97.1 (03 Aug 2021 07:51)  HR: 72 (03 Aug 2021 07:51) (72 - 100)  BP: 99/56 (03 Aug 2021 07:51) (99/56 - 132/78)  BP(mean): --  RR: 20 (03 Aug 2021 07:51) (20 - 20)  SpO2: 96% (03 Aug 2021 07:51) (96% - 96%)    PHYSICAL EXAM:  GENERAL:   Awake, alert; NAD.  HEENT:  Head NC/AT; Conjunctivae pink, Sclera anicteric; Oral mucosa moist.  CARDIO:   Regular rate; Regular rhythm  RESP:   No rales, wheezing, or rhonchi appreciated.  GI:   Soft; NT/Distended; BS; No guarding; No rebound tenderness.  EXT:   b/l LE venous stasis, dry skin    LAB RESULTS:                        14.0   10.39 )-----------( 296      ( 03 Aug 2021 06:06 )             42.5     08-03    137  |  96<L>  |  19  ----------------------------<  113<H>  4.4   |  30  |  0.9    Ca    9.4      03 Aug 2021 06:06  Phos  4.1     08-03  Mg     2.1     08-03    TPro  6.6  /  Alb  4.0  /  TBili  0.3  /  DBili  x   /  AST  13  /  ALT  11  /  AlkPhos  158<H>  08-03    PT/INR - ( 01 Aug 2021 21:58 )   PT: 12.80 sec;   INR: 1.11 ratio         PTT - ( 01 Aug 2021 21:58 )  PTT:34.1 sec        CARDIAC MARKERS ( 01 Aug 2021 21:58 )  x     / <0.01 ng/mL / x     / x     / x          MICROBIOLOGY:    RADIOLOGY:    ALLERGIES:  No Known Allergies      ===========================================================

## 2021-08-04 NOTE — DISCHARGE NOTE NURSING/CASE MANAGEMENT/SOCIAL WORK - PATIENT PORTAL LINK FT
You can access the FollowMyHealth Patient Portal offered by [facility] by registering at the following website: [email or web address]. By joining [facility]’s FollowMyHealth portal, you will also be able to view your health information using other applications (apps) compatible with our system.

## 2021-08-04 NOTE — DISCHARGE NOTE PROVIDER - HOSPITAL COURSE
HPI:  54 YO M with a PMH of COPD, REBECCA on CPAP, obesity, HTN, DLD, GERD, severe OA of bilateral shoulders, hips and knees s/p total knee replacement bilaterally who presents with abdominal pain. He reports severe lower abdominal pain For the past 3 days and diarrhea since a few days before that as well as nausea and vomiting. He describes the pain as crampy, 10/10 in severity, does not radiate, and is only briefly relieved with pain meds. He says that this feels like when he had colitis.     In the ED, VS wnl, labs grossly unremarkable, lactate 1.5. CTAP and CT chest with IV contrast showed no evidence of acute pathology (02 Aug 2021 03:09)    Hospital course  Pt transferred to floors. Continued on cipro and flagyl. tachycardic to low 100's. Afebrile. Satting 95%+ on RA. current smoker, taking frequent smoke breaks. Overnight events notable for two falls. No injury, Tripped on linen and walker, caught himself with walker slowing down falls. On exam expiratory wheezing bilaterally. No signs of respiratory distress. Endorses chronic cough. Pt stable, abdominal pain improved, no signs of tenderness on exam. Slightly distended. BM endorsed. Eating and drinking tolerated with no complications. D/c'd IV fluids and abx. Pt prepared for D/C. HPI:  54 YO M with a PMH of COPD, REBECCA on CPAP, obesity, HTN, DLD, GERD, severe OA of bilateral shoulders, hips and knees s/p total knee replacement bilaterally who presents with abdominal pain. He reports severe lower abdominal pain For the past 3 days and diarrhea since a few days before that as well as nausea and vomiting. He describes the pain as crampy, 10/10 in severity, does not radiate, and is only briefly relieved with pain meds. He says that this feels like when he had colitis.     In the ED, VS wnl, labs grossly unremarkable, lactate 1.5. CTAP and CT chest with IV contrast showed no evidence of acute pathology (02 Aug 2021 03:09)    Hospital course  Pt transferred to floors. Continued on cipro and flagyl. tachycardic to low 100's. Afebrile. Satting 95%+ on RA. current smoker, taking frequent smoke breaks. Overnight events notable for two falls. No injury, Tripped on linen and walker, caught himself with walker slowing down falls. On exam expiratory wheezing bilaterally. No signs of respiratory distress. Endorses chronic cough. Pt stable, abdominal pain improved, no signs of tenderness on exam. BM endorsed. Eating and drinking tolerated with no complications. D/c'd IV fluids and abx. Pt prepared for D/C.

## 2021-08-04 NOTE — DISCHARGE NOTE PROVIDER - NSDCFUADDINST_GEN_ALL_CORE_FT
Please take all your medications as prescribed.   Please follow up with your regular doctor for further care and health maintenance.

## 2021-08-04 NOTE — PROGRESS NOTE ADULT - ASSESSMENT
56 YO M with a PMH of COPD, REBECCA on CPAP, obesity, HTN, DLD, GERD, OA presents with abdominal pain.    #Abdominal pain  improved, no BM since yesterday, able to tolerate PO   - CTAP negative for any acute process  - WBC 11.30, afebrile.   - f/u GI PCR  -on cipro and flagyl for now  - IV hydration  - Pain control    #HTN  - Continue home lisinopril 5 mg, Lasix 40 mg    #COPD  #REBECCA  - Stable  - Continue with douneb  - BiPAP at night    #GERD  - Continue Protonix    DVT PPx: Lovenox  GI PPx: Protonix  Diet: DASH/CC   56 YO M with a PMH of COPD, REBECCA on CPAP, obesity, HTN, DLD, GERD and OA presented with abdominal pain.    1. Abdominal pain- now improved  - pt tolerating diet, had BM  - CT scan unremarkable for acute process  - lactate WNL  - d/c abx - doubt colitis  - discharge home with outpt f/u with PMD    2. COPD - continue home inhalers  - smoking cessation  - outpt Pulm f/u    3. Continue current management for HTN, REBECCA, GERD    4. Chronic opiate use - I-STOP checked   pt has active prescriptions for morphine extended release and oxycodone     medication reconciliation and discharge papers reviewed    spent 37 minutes on the discharge process of the pt

## 2021-08-04 NOTE — DISCHARGE NOTE PROVIDER - NSDCCPCAREPLAN_GEN_ALL_CORE_FT
PRINCIPAL DISCHARGE DIAGNOSIS  Diagnosis: Abdominal pain  Assessment and Plan of Treatment:   Abdominal pain can be dull, achy, or sharp. You may have pain in one area of your abdomen, or in your entire abdomen. Your pain may be caused by a condition such as constipation, food sensitivity or poisoning, infection, or a blockage. Abdominal pain can also be from a hernia, appendicitis, or an ulcer. Liver, gallbladder, or kidney conditions can also cause abdominal pain. The cause of your abdominal pain may not be known.  DISCHARGE INSTRUCTIONS:  Call your local emergency number (911 in the [place]) if:   •You have new chest pain or shortness of breath.  Return to the emergency department if:   •You have pulsing pain in your upper abdomen or lower back that suddenly becomes constant.  •Your pain is in the right lower abdominal area and worsens with movement.  •You have a fever over 100.4°F (38°C) or shaking chills.  •You are vomiting and cannot keep food or liquids down.  •You see blood in your vomit or bowel movements, or they look black and tarry.  •Your skin or the whites of your eyes turn yellow.  Call your doctor if:   •You have pain in your lower back.  •You are a man and have pain in your testicles.  •You have pain when you urinate.  Medicines:   •Medicines may be given to calm your stomach or prevent vomiting.  •Take your medicine as directed. Contact your healthcare provider if you think your medicine is not helping or if you have side effects. Tell him of her if you are allergic to any medicine. Keep a list of the medicines, vitamins, and herbs you take. Include the amounts, and when and why you take them. Bring the list or the pill bottles to follow-up visits. Carry your medicine list with you in case of an emergency.  Manage your symptoms:   •Make changes to the food you eat, if needed. Do not eat foods that cause abdominal pain or other symptoms. Eat small meals more often. The following changes may also help:?Eat more high-fiber foods if you are constipated. High-fiber foods include fruits, vegetables, whole-grain foods, and legumes.  ?Do not eat foods that cause gas if yo      SECONDARY DISCHARGE DIAGNOSES  Diagnosis: SOB (shortness of breath)  Assessment and Plan of Treatment:      PRINCIPAL DISCHARGE DIAGNOSIS  Diagnosis: Abdominal pain  Assessment and Plan of Treatment: Abdominal pain can be dull, achy, or sharp. You may have pain in one area of your abdomen, or in your entire abdomen. Your pain may be caused by a condition such as constipation, food sensitivity or poisoning, infection, or a blockage. Abdominal pain can also be from a hernia, appendicitis, or an ulcer. Liver, gallbladder, or kidney conditions can also cause abdominal pain. The cause of your abdominal pain may not be known.  DISCHARGE INSTRUCTIONS:  Call your local emergency number (911 in the [place]) if:   •You have new chest pain or shortness of breath.  Return to the emergency department if:   •You have pulsing pain in your upper abdomen or lower back that suddenly becomes constant.  •Your pain is in the right lower abdominal area and worsens with movement.  •You have a fever over 100.4°F (38°C) or shaking chills.  •You are vomiting and cannot keep food or liquids down.  •You see blood in your vomit or bowel movements, or they look black and tarry.  •Your skin or the whites of your eyes turn yellow.  Call your doctor if:   •You have pain in your lower back.  •You are a man and have pain in your testicles.  •You have pain when you urinate.  Medicines:   •Medicines may be given to calm your stomach or prevent vomiting.  •Take your medicine as directed. Contact your healthcare provider if you think your medicine is not helping or if you have side effects. Tell him of her if you are allergic to any medicine. Keep a list of the medicines, vitamins, and herbs you take. Include the amounts, and when and why you take them. Bring the list or the pill bottles to follow-up visits. Carry your medicine list with you in case of an emergency.  Manage your symptoms:   •Make changes to the food you eat, if needed. Do not eat foods that cause abdominal pain or other symptoms. Eat small meals more often. The following changes may also help:?Eat more high-fiber foods if you are constipated. High-fiber foods include fruits, vegetables, whole-grain foods, and legumes.  Do not eat foods that cause gas.

## 2021-08-09 NOTE — DISCHARGE NOTE ADULT - VISION (WITH CORRECTIVE LENSES IF THE PATIENT USUALLY WEARS THEM):
[facility] to patient[facility]   Partially impaired: cannot see medication labels or newsprint, but can see obstacles in path, and the surrounding layout; can count fingers at arm's length

## 2021-08-15 ENCOUNTER — EMERGENCY (EMERGENCY)
Facility: HOSPITAL | Age: 56
LOS: 0 days | Discharge: HOME | End: 2021-08-16
Attending: STUDENT IN AN ORGANIZED HEALTH CARE EDUCATION/TRAINING PROGRAM | Admitting: STUDENT IN AN ORGANIZED HEALTH CARE EDUCATION/TRAINING PROGRAM
Payer: MEDICAID

## 2021-08-15 VITALS
SYSTOLIC BLOOD PRESSURE: 126 MMHG | HEIGHT: 70 IN | WEIGHT: 315 LBS | HEART RATE: 98 BPM | TEMPERATURE: 97 F | DIASTOLIC BLOOD PRESSURE: 80 MMHG | RESPIRATION RATE: 18 BRPM | OXYGEN SATURATION: 98 %

## 2021-08-15 DIAGNOSIS — Z87.19 PERSONAL HISTORY OF OTHER DISEASES OF THE DIGESTIVE SYSTEM: ICD-10-CM

## 2021-08-15 DIAGNOSIS — F17.200 NICOTINE DEPENDENCE, UNSPECIFIED, UNCOMPLICATED: ICD-10-CM

## 2021-08-15 DIAGNOSIS — Z98.890 OTHER SPECIFIED POSTPROCEDURAL STATES: Chronic | ICD-10-CM

## 2021-08-15 DIAGNOSIS — R11.2 NAUSEA WITH VOMITING, UNSPECIFIED: ICD-10-CM

## 2021-08-15 DIAGNOSIS — I10 ESSENTIAL (PRIMARY) HYPERTENSION: ICD-10-CM

## 2021-08-15 DIAGNOSIS — K21.9 GASTRO-ESOPHAGEAL REFLUX DISEASE WITHOUT ESOPHAGITIS: ICD-10-CM

## 2021-08-15 DIAGNOSIS — Z87.39 PERSONAL HISTORY OF OTHER DISEASES OF THE MUSCULOSKELETAL SYSTEM AND CONNECTIVE TISSUE: ICD-10-CM

## 2021-08-15 DIAGNOSIS — R10.30 LOWER ABDOMINAL PAIN, UNSPECIFIED: ICD-10-CM

## 2021-08-15 DIAGNOSIS — R19.7 DIARRHEA, UNSPECIFIED: ICD-10-CM

## 2021-08-15 DIAGNOSIS — E78.5 HYPERLIPIDEMIA, UNSPECIFIED: ICD-10-CM

## 2021-08-15 DIAGNOSIS — G89.29 OTHER CHRONIC PAIN: ICD-10-CM

## 2021-08-15 DIAGNOSIS — Z79.899 OTHER LONG TERM (CURRENT) DRUG THERAPY: ICD-10-CM

## 2021-08-15 DIAGNOSIS — E66.01 MORBID (SEVERE) OBESITY DUE TO EXCESS CALORIES: ICD-10-CM

## 2021-08-15 DIAGNOSIS — K40.20 BILATERAL INGUINAL HERNIA, WITHOUT OBSTRUCTION OR GANGRENE, NOT SPECIFIED AS RECURRENT: Chronic | ICD-10-CM

## 2021-08-15 DIAGNOSIS — Z90.49 ACQUIRED ABSENCE OF OTHER SPECIFIED PARTS OF DIGESTIVE TRACT: ICD-10-CM

## 2021-08-15 DIAGNOSIS — Z96.651 PRESENCE OF RIGHT ARTIFICIAL KNEE JOINT: Chronic | ICD-10-CM

## 2021-08-15 DIAGNOSIS — J44.9 CHRONIC OBSTRUCTIVE PULMONARY DISEASE, UNSPECIFIED: ICD-10-CM

## 2021-08-15 PROCEDURE — 36000 PLACE NEEDLE IN VEIN: CPT

## 2021-08-15 PROCEDURE — 99285 EMERGENCY DEPT VISIT HI MDM: CPT | Mod: 25

## 2021-08-15 NOTE — ED ADULT TRIAGE NOTE - CHIEF COMPLAINT QUOTE
Pt BIBA from home c/o lower abdominal pain and diarrhea x3 weeks states he was recently hospitalized for colitis

## 2021-08-16 VITALS
SYSTOLIC BLOOD PRESSURE: 120 MMHG | HEART RATE: 70 BPM | RESPIRATION RATE: 18 BRPM | TEMPERATURE: 97 F | OXYGEN SATURATION: 99 % | DIASTOLIC BLOOD PRESSURE: 66 MMHG

## 2021-08-16 LAB
ALBUMIN SERPL ELPH-MCNC: 4.1 G/DL — SIGNIFICANT CHANGE UP (ref 3.5–5.2)
ALP SERPL-CCNC: 143 U/L — HIGH (ref 30–115)
ALT FLD-CCNC: 14 U/L — SIGNIFICANT CHANGE UP (ref 0–41)
ANION GAP SERPL CALC-SCNC: 11 MMOL/L — SIGNIFICANT CHANGE UP (ref 7–14)
AST SERPL-CCNC: 16 U/L — SIGNIFICANT CHANGE UP (ref 0–41)
BASOPHILS # BLD AUTO: 0.06 K/UL — SIGNIFICANT CHANGE UP (ref 0–0.2)
BASOPHILS NFR BLD AUTO: 0.6 % — SIGNIFICANT CHANGE UP (ref 0–1)
BILIRUB SERPL-MCNC: 0.5 MG/DL — SIGNIFICANT CHANGE UP (ref 0.2–1.2)
BUN SERPL-MCNC: 9 MG/DL — LOW (ref 10–20)
CALCIUM SERPL-MCNC: 9.2 MG/DL — SIGNIFICANT CHANGE UP (ref 8.5–10.1)
CHLORIDE SERPL-SCNC: 102 MMOL/L — SIGNIFICANT CHANGE UP (ref 98–110)
CO2 SERPL-SCNC: 26 MMOL/L — SIGNIFICANT CHANGE UP (ref 17–32)
CREAT SERPL-MCNC: 0.8 MG/DL — SIGNIFICANT CHANGE UP (ref 0.7–1.5)
EOSINOPHIL # BLD AUTO: 0.18 K/UL — SIGNIFICANT CHANGE UP (ref 0–0.7)
EOSINOPHIL NFR BLD AUTO: 1.7 % — SIGNIFICANT CHANGE UP (ref 0–8)
GLUCOSE SERPL-MCNC: 106 MG/DL — HIGH (ref 70–99)
HCT VFR BLD CALC: 43.5 % — SIGNIFICANT CHANGE UP (ref 42–52)
HGB BLD-MCNC: 14.4 G/DL — SIGNIFICANT CHANGE UP (ref 14–18)
IMM GRANULOCYTES NFR BLD AUTO: 0.3 % — SIGNIFICANT CHANGE UP (ref 0.1–0.3)
LACTATE SERPL-SCNC: 1.3 MMOL/L — SIGNIFICANT CHANGE UP (ref 0.7–2)
LIDOCAIN IGE QN: 19 U/L — SIGNIFICANT CHANGE UP (ref 7–60)
LYMPHOCYTES # BLD AUTO: 1.99 K/UL — SIGNIFICANT CHANGE UP (ref 1.2–3.4)
LYMPHOCYTES # BLD AUTO: 18.6 % — LOW (ref 20.5–51.1)
MCHC RBC-ENTMCNC: 28 PG — SIGNIFICANT CHANGE UP (ref 27–31)
MCHC RBC-ENTMCNC: 33.1 G/DL — SIGNIFICANT CHANGE UP (ref 32–37)
MCV RBC AUTO: 84.5 FL — SIGNIFICANT CHANGE UP (ref 80–94)
MONOCYTES # BLD AUTO: 0.87 K/UL — HIGH (ref 0.1–0.6)
MONOCYTES NFR BLD AUTO: 8.1 % — SIGNIFICANT CHANGE UP (ref 1.7–9.3)
NEUTROPHILS # BLD AUTO: 7.56 K/UL — HIGH (ref 1.4–6.5)
NEUTROPHILS NFR BLD AUTO: 70.7 % — SIGNIFICANT CHANGE UP (ref 42.2–75.2)
NRBC # BLD: 0 /100 WBCS — SIGNIFICANT CHANGE UP (ref 0–0)
PLATELET # BLD AUTO: 284 K/UL — SIGNIFICANT CHANGE UP (ref 130–400)
POTASSIUM SERPL-MCNC: 4.1 MMOL/L — SIGNIFICANT CHANGE UP (ref 3.5–5)
POTASSIUM SERPL-SCNC: 4.1 MMOL/L — SIGNIFICANT CHANGE UP (ref 3.5–5)
PROT SERPL-MCNC: 6.7 G/DL — SIGNIFICANT CHANGE UP (ref 6–8)
RBC # BLD: 5.15 M/UL — SIGNIFICANT CHANGE UP (ref 4.7–6.1)
RBC # FLD: 14.8 % — HIGH (ref 11.5–14.5)
SODIUM SERPL-SCNC: 139 MMOL/L — SIGNIFICANT CHANGE UP (ref 135–146)
WBC # BLD: 10.69 K/UL — SIGNIFICANT CHANGE UP (ref 4.8–10.8)
WBC # FLD AUTO: 10.69 K/UL — SIGNIFICANT CHANGE UP (ref 4.8–10.8)

## 2021-08-16 PROCEDURE — 74177 CT ABD & PELVIS W/CONTRAST: CPT | Mod: 26,MA

## 2021-08-16 RX ORDER — KETOROLAC TROMETHAMINE 30 MG/ML
30 SYRINGE (ML) INJECTION ONCE
Refills: 0 | Status: DISCONTINUED | OUTPATIENT
Start: 2021-08-16 | End: 2021-08-16

## 2021-08-16 RX ORDER — SACCHAROMYCES BOULARDII 250 MG
1 POWDER IN PACKET (EA) ORAL
Qty: 60 | Refills: 0
Start: 2021-08-16

## 2021-08-16 RX ORDER — MORPHINE SULFATE 50 MG/1
4 CAPSULE, EXTENDED RELEASE ORAL ONCE
Refills: 0 | Status: DISCONTINUED | OUTPATIENT
Start: 2021-08-16 | End: 2021-08-16

## 2021-08-16 RX ORDER — MORPHINE SULFATE 50 MG/1
2 CAPSULE, EXTENDED RELEASE ORAL ONCE
Refills: 0 | Status: DISCONTINUED | OUTPATIENT
Start: 2021-08-16 | End: 2021-08-16

## 2021-08-16 RX ORDER — ONDANSETRON 8 MG/1
4 TABLET, FILM COATED ORAL ONCE
Refills: 0 | Status: COMPLETED | OUTPATIENT
Start: 2021-08-16 | End: 2021-08-16

## 2021-08-16 RX ORDER — FAMOTIDINE 10 MG/ML
20 INJECTION INTRAVENOUS ONCE
Refills: 0 | Status: COMPLETED | OUTPATIENT
Start: 2021-08-16 | End: 2021-08-16

## 2021-08-16 RX ADMIN — Medication 20 MILLIGRAM(S): at 02:41

## 2021-08-16 RX ADMIN — ONDANSETRON 4 MILLIGRAM(S): 8 TABLET, FILM COATED ORAL at 01:29

## 2021-08-16 RX ADMIN — FAMOTIDINE 20 MILLIGRAM(S): 10 INJECTION INTRAVENOUS at 02:41

## 2021-08-16 RX ADMIN — MORPHINE SULFATE 4 MILLIGRAM(S): 50 CAPSULE, EXTENDED RELEASE ORAL at 01:45

## 2021-08-16 RX ADMIN — MORPHINE SULFATE 4 MILLIGRAM(S): 50 CAPSULE, EXTENDED RELEASE ORAL at 01:29

## 2021-08-16 RX ADMIN — MORPHINE SULFATE 2 MILLIGRAM(S): 50 CAPSULE, EXTENDED RELEASE ORAL at 04:45

## 2021-08-16 RX ADMIN — Medication 30 MILLIGRAM(S): at 03:17

## 2021-08-16 NOTE — ED PROVIDER NOTE - CLINICAL SUMMARY MEDICAL DECISION MAKING FREE TEXT BOX
ed w/u negative. serial abd exams benign. no significant vomiting/diarrhea. no e/o dehydration.  will rx supportive care measures, rec outpt gi f/u

## 2021-08-16 NOTE — ED PROVIDER NOTE - PROGRESS NOTE DETAILS
pt missing from bed assignment for some time. found outside. advised cannot leave ED with IV in place, returned to bed. appears comfortable on exam, found smoking outside, walking without any issues ROSEY: Patient with difficult IV access I intend to get an US guided IV Authored by Gosia Saldaña DO: Pt signed out to me from DOV Medeiros. Patient ambulating around ED, not vomiting at this time. Will provide GI follow up. Strict return precautions discussed.

## 2021-08-16 NOTE — ED PROVIDER NOTE - NS ED ROS FT
Constitutional: no fever, chills, no recent weight loss, change in appetite or malaise  Eyes: no redness/discharge/pain/vision changes  ENT: no rhinorrhea/ear pain/sore throat  Cardiac: No chest pain, SOB or edema.  Respiratory: No cough or respiratory distress  GI: No nausea, vomiting  : No dysuria, frequency, urgency or hematuria  MS: no pain to back or extremities, no loss of ROM, no weakness  Neuro: No headache or weakness. No LOC.  Skin: No skin rash.  Except as documented in the HPI, all other systems are negative.

## 2021-08-16 NOTE — ED PROVIDER NOTE - PATIENT PORTAL LINK FT
You can access the FollowMyHealth Patient Portal offered by [facility] by registering at the following website: [email or web address]. By joining [facility]’s FollowMyHealth portal, you will also be able to view your health information using other applications (apps) compatible with our system.

## 2021-08-16 NOTE — ED PROVIDER NOTE - NSFOLLOWUPINSTRUCTIONS_ED_ALL_ED_FT
Abdominal Pain    Many things can cause abdominal pain. Usually, abdominal pain is not caused by a disease and will improve without treatment. Your health care provider will do a physical exam and possibly order blood tests and imaging to help determine the seriousness of your pain. However, in many cases, no cause may be found and you may need further testing as an outpatient. Monitor your abdominal pain for any changes.     SEEK IMMEDIATE MEDICAL CARE IF YOU HAVE THE FOLLOWING SYMPTOMS: worsening abdominal pain, vomiting, diarrhea, inability to have bowel movements or pass gas, black or bloody stool, fever accompanying chest pain or back pain, or dizziness/lightheadedness. Take florastor daily and Maalox as need for symptoms relief. Follow up with gastroenterology in 1 week and primary care doctor in 2-4 days.     Abdominal Pain    Many things can cause abdominal pain. Usually, abdominal pain is not caused by a disease and will improve without treatment. Your health care provider will do a physical exam and possibly order blood tests and imaging to help determine the seriousness of your pain. However, in many cases, no cause may be found and you may need further testing as an outpatient. Monitor your abdominal pain for any changes.     SEEK IMMEDIATE MEDICAL CARE IF YOU HAVE THE FOLLOWING SYMPTOMS: worsening abdominal pain, vomiting, diarrhea, inability to have bowel movements or pass gas, black or bloody stool, fever accompanying chest pain or back pain, or dizziness/lightheadedness.

## 2021-08-16 NOTE — ED PROVIDER NOTE - ATTENDING CONTRIBUTION TO CARE
54 yo m hx  COPD, active smoker, HTN, HLD, chronic pain on morphine, hx of appendectomy and cholecystectomy, and colitis in 2009   pt presents for eval of n/v/d and lower abd pain. sx ongoing for weeks. pt was seen in ER and had negative labs/ct. pt was admitted for further monitoring and was ultimately dc. pt did not provide stool sample at that time. pt was briefly on abx but not dc on them per pt. no fevers. no trauma. pt eating ok,    vss  gen- NAD, aaox3  card-rrr  lungs-ctab, no wheezing or rhonchi  abd-sntnd, mild lower abd discomfort, no guarding or rebound  neuro- full str/sensation, cn ii-xii grossly intact, normal coordination and gait    will get interval labs  ctap given duration of sx, will attempt to get stool for cdiff, will provide supportive care, serial exam and ED observation period

## 2021-08-16 NOTE — ED PROVIDER NOTE - CARE PROVIDER_API CALL
Uvaldo Valles)  Gastroenterology; Internal Medicine  [address] [place] [address]  Phone: [phone]  Fax: [phone]  Follow Up Time: Routine

## 2021-08-18 DIAGNOSIS — I87.8 OTHER SPECIFIED DISORDERS OF VEINS: ICD-10-CM

## 2021-08-18 DIAGNOSIS — Z79.899 OTHER LONG TERM (CURRENT) DRUG THERAPY: ICD-10-CM

## 2021-08-18 DIAGNOSIS — Z79.891 LONG TERM (CURRENT) USE OF OPIATE ANALGESIC: ICD-10-CM

## 2021-08-18 DIAGNOSIS — M19.011 PRIMARY OSTEOARTHRITIS, RIGHT SHOULDER: ICD-10-CM

## 2021-08-18 DIAGNOSIS — Z96.653 PRESENCE OF ARTIFICIAL KNEE JOINT, BILATERAL: ICD-10-CM

## 2021-08-18 DIAGNOSIS — R10.9 UNSPECIFIED ABDOMINAL PAIN: ICD-10-CM

## 2021-08-18 DIAGNOSIS — E66.9 OBESITY, UNSPECIFIED: ICD-10-CM

## 2021-08-18 DIAGNOSIS — K44.9 DIAPHRAGMATIC HERNIA WITHOUT OBSTRUCTION OR GANGRENE: ICD-10-CM

## 2021-08-18 DIAGNOSIS — M16.0 BILATERAL PRIMARY OSTEOARTHRITIS OF HIP: ICD-10-CM

## 2021-08-18 DIAGNOSIS — R00.0 TACHYCARDIA, UNSPECIFIED: ICD-10-CM

## 2021-08-18 DIAGNOSIS — I10 ESSENTIAL (PRIMARY) HYPERTENSION: ICD-10-CM

## 2021-08-18 DIAGNOSIS — Z79.51 LONG TERM (CURRENT) USE OF INHALED STEROIDS: ICD-10-CM

## 2021-08-18 DIAGNOSIS — K21.9 GASTRO-ESOPHAGEAL REFLUX DISEASE WITHOUT ESOPHAGITIS: ICD-10-CM

## 2021-08-18 DIAGNOSIS — R11.2 NAUSEA WITH VOMITING, UNSPECIFIED: ICD-10-CM

## 2021-08-18 DIAGNOSIS — E78.00 PURE HYPERCHOLESTEROLEMIA, UNSPECIFIED: ICD-10-CM

## 2021-08-18 DIAGNOSIS — J44.9 CHRONIC OBSTRUCTIVE PULMONARY DISEASE, UNSPECIFIED: ICD-10-CM

## 2021-08-18 DIAGNOSIS — R19.7 DIARRHEA, UNSPECIFIED: ICD-10-CM

## 2021-08-18 DIAGNOSIS — M19.012 PRIMARY OSTEOARTHRITIS, LEFT SHOULDER: ICD-10-CM

## 2021-08-18 DIAGNOSIS — Z90.49 ACQUIRED ABSENCE OF OTHER SPECIFIED PARTS OF DIGESTIVE TRACT: ICD-10-CM

## 2021-08-18 DIAGNOSIS — F17.210 NICOTINE DEPENDENCE, CIGARETTES, UNCOMPLICATED: ICD-10-CM

## 2021-08-18 DIAGNOSIS — G47.33 OBSTRUCTIVE SLEEP APNEA (ADULT) (PEDIATRIC): ICD-10-CM

## 2021-09-15 ENCOUNTER — EMERGENCY (EMERGENCY)
Facility: HOSPITAL | Age: 56
LOS: 0 days | Discharge: HOME | End: 2021-09-15
Attending: EMERGENCY MEDICINE | Admitting: EMERGENCY MEDICINE
Payer: MEDICAID

## 2021-09-15 VITALS
WEIGHT: 315 LBS | HEART RATE: 89 BPM | OXYGEN SATURATION: 98 % | DIASTOLIC BLOOD PRESSURE: 80 MMHG | SYSTOLIC BLOOD PRESSURE: 122 MMHG | TEMPERATURE: 98 F | HEIGHT: 70 IN | RESPIRATION RATE: 20 BRPM

## 2021-09-15 DIAGNOSIS — Z98.890 OTHER SPECIFIED POSTPROCEDURAL STATES: ICD-10-CM

## 2021-09-15 DIAGNOSIS — R07.9 CHEST PAIN, UNSPECIFIED: ICD-10-CM

## 2021-09-15 DIAGNOSIS — M79.10 MYALGIA, UNSPECIFIED SITE: ICD-10-CM

## 2021-09-15 DIAGNOSIS — Z79.899 OTHER LONG TERM (CURRENT) DRUG THERAPY: ICD-10-CM

## 2021-09-15 DIAGNOSIS — Z96.653 PRESENCE OF ARTIFICIAL KNEE JOINT, BILATERAL: ICD-10-CM

## 2021-09-15 DIAGNOSIS — Z83.3 FAMILY HISTORY OF DIABETES MELLITUS: ICD-10-CM

## 2021-09-15 DIAGNOSIS — Z98.890 OTHER SPECIFIED POSTPROCEDURAL STATES: Chronic | ICD-10-CM

## 2021-09-15 DIAGNOSIS — Z99.89 DEPENDENCE ON OTHER ENABLING MACHINES AND DEVICES: ICD-10-CM

## 2021-09-15 DIAGNOSIS — Z20.822 CONTACT WITH AND (SUSPECTED) EXPOSURE TO COVID-19: ICD-10-CM

## 2021-09-15 DIAGNOSIS — R06.2 WHEEZING: ICD-10-CM

## 2021-09-15 DIAGNOSIS — R06.02 SHORTNESS OF BREATH: ICD-10-CM

## 2021-09-15 DIAGNOSIS — J44.1 CHRONIC OBSTRUCTIVE PULMONARY DISEASE WITH (ACUTE) EXACERBATION: ICD-10-CM

## 2021-09-15 DIAGNOSIS — M19.90 UNSPECIFIED OSTEOARTHRITIS, UNSPECIFIED SITE: ICD-10-CM

## 2021-09-15 DIAGNOSIS — Z96.651 PRESENCE OF RIGHT ARTIFICIAL KNEE JOINT: Chronic | ICD-10-CM

## 2021-09-15 DIAGNOSIS — I10 ESSENTIAL (PRIMARY) HYPERTENSION: ICD-10-CM

## 2021-09-15 DIAGNOSIS — K21.9 GASTRO-ESOPHAGEAL REFLUX DISEASE WITHOUT ESOPHAGITIS: ICD-10-CM

## 2021-09-15 DIAGNOSIS — Z87.09 PERSONAL HISTORY OF OTHER DISEASES OF THE RESPIRATORY SYSTEM: ICD-10-CM

## 2021-09-15 DIAGNOSIS — Z87.19 PERSONAL HISTORY OF OTHER DISEASES OF THE DIGESTIVE SYSTEM: ICD-10-CM

## 2021-09-15 DIAGNOSIS — E78.00 PURE HYPERCHOLESTEROLEMIA, UNSPECIFIED: ICD-10-CM

## 2021-09-15 DIAGNOSIS — Z90.49 ACQUIRED ABSENCE OF OTHER SPECIFIED PARTS OF DIGESTIVE TRACT: ICD-10-CM

## 2021-09-15 DIAGNOSIS — Z80.0 FAMILY HISTORY OF MALIGNANT NEOPLASM OF DIGESTIVE ORGANS: ICD-10-CM

## 2021-09-15 DIAGNOSIS — K40.20 BILATERAL INGUINAL HERNIA, WITHOUT OBSTRUCTION OR GANGRENE, NOT SPECIFIED AS RECURRENT: Chronic | ICD-10-CM

## 2021-09-15 DIAGNOSIS — E66.01 MORBID (SEVERE) OBESITY DUE TO EXCESS CALORIES: ICD-10-CM

## 2021-09-15 LAB
ALBUMIN SERPL ELPH-MCNC: 3.9 G/DL — SIGNIFICANT CHANGE UP (ref 3.5–5.2)
ALP SERPL-CCNC: 122 U/L — HIGH (ref 30–115)
ALT FLD-CCNC: 16 U/L — SIGNIFICANT CHANGE UP (ref 0–41)
ANION GAP SERPL CALC-SCNC: 11 MMOL/L — SIGNIFICANT CHANGE UP (ref 7–14)
AST SERPL-CCNC: 23 U/L — SIGNIFICANT CHANGE UP (ref 0–41)
BILIRUB SERPL-MCNC: 0.4 MG/DL — SIGNIFICANT CHANGE UP (ref 0.2–1.2)
BUN SERPL-MCNC: 9 MG/DL — LOW (ref 10–20)
CALCIUM SERPL-MCNC: 9.5 MG/DL — SIGNIFICANT CHANGE UP (ref 8.5–10.1)
CHLORIDE SERPL-SCNC: 103 MMOL/L — SIGNIFICANT CHANGE UP (ref 98–110)
CO2 SERPL-SCNC: 23 MMOL/L — SIGNIFICANT CHANGE UP (ref 17–32)
CREAT SERPL-MCNC: 0.8 MG/DL — SIGNIFICANT CHANGE UP (ref 0.7–1.5)
GLUCOSE SERPL-MCNC: 104 MG/DL — HIGH (ref 70–99)
HCT VFR BLD CALC: 43.4 % — SIGNIFICANT CHANGE UP (ref 42–52)
HGB BLD-MCNC: 14.8 G/DL — SIGNIFICANT CHANGE UP (ref 14–18)
MCHC RBC-ENTMCNC: 28.5 PG — SIGNIFICANT CHANGE UP (ref 27–31)
MCHC RBC-ENTMCNC: 34.1 G/DL — SIGNIFICANT CHANGE UP (ref 32–37)
MCV RBC AUTO: 83.5 FL — SIGNIFICANT CHANGE UP (ref 80–94)
NRBC # BLD: 0 /100 WBCS — SIGNIFICANT CHANGE UP (ref 0–0)
NT-PROBNP SERPL-SCNC: 91 PG/ML — SIGNIFICANT CHANGE UP (ref 0–300)
PLATELET # BLD AUTO: 280 K/UL — SIGNIFICANT CHANGE UP (ref 130–400)
POTASSIUM SERPL-MCNC: 4.6 MMOL/L — SIGNIFICANT CHANGE UP (ref 3.5–5)
POTASSIUM SERPL-SCNC: 4.6 MMOL/L — SIGNIFICANT CHANGE UP (ref 3.5–5)
PROT SERPL-MCNC: 6.9 G/DL — SIGNIFICANT CHANGE UP (ref 6–8)
RAPID RVP RESULT: SIGNIFICANT CHANGE UP
RBC # BLD: 5.2 M/UL — SIGNIFICANT CHANGE UP (ref 4.7–6.1)
RBC # FLD: 14.6 % — HIGH (ref 11.5–14.5)
SARS-COV-2 RNA SPEC QL NAA+PROBE: SIGNIFICANT CHANGE UP
SODIUM SERPL-SCNC: 137 MMOL/L — SIGNIFICANT CHANGE UP (ref 135–146)
TROPONIN T SERPL-MCNC: <0.01 NG/ML — SIGNIFICANT CHANGE UP
WBC # BLD: 9.71 K/UL — SIGNIFICANT CHANGE UP (ref 4.8–10.8)
WBC # FLD AUTO: 9.71 K/UL — SIGNIFICANT CHANGE UP (ref 4.8–10.8)

## 2021-09-15 PROCEDURE — 71045 X-RAY EXAM CHEST 1 VIEW: CPT | Mod: 26

## 2021-09-15 PROCEDURE — 93010 ELECTROCARDIOGRAM REPORT: CPT

## 2021-09-15 PROCEDURE — 99285 EMERGENCY DEPT VISIT HI MDM: CPT

## 2021-09-15 RX ORDER — MORPHINE SULFATE 50 MG/1
2 CAPSULE, EXTENDED RELEASE ORAL ONCE
Refills: 0 | Status: DISCONTINUED | OUTPATIENT
Start: 2021-09-15 | End: 2021-09-15

## 2021-09-15 RX ORDER — ALBUTEROL 90 UG/1
2.5 AEROSOL, METERED ORAL ONCE
Refills: 0 | Status: COMPLETED | OUTPATIENT
Start: 2021-09-15 | End: 2021-09-15

## 2021-09-15 RX ORDER — IPRATROPIUM/ALBUTEROL SULFATE 18-103MCG
3 AEROSOL WITH ADAPTER (GRAM) INHALATION ONCE
Refills: 0 | Status: COMPLETED | OUTPATIENT
Start: 2021-09-15 | End: 2021-09-15

## 2021-09-15 RX ADMIN — MORPHINE SULFATE 2 MILLIGRAM(S): 50 CAPSULE, EXTENDED RELEASE ORAL at 06:36

## 2021-09-15 RX ADMIN — Medication 3 MILLILITER(S): at 05:35

## 2021-09-15 RX ADMIN — Medication 3 MILLILITER(S): at 05:22

## 2021-09-15 RX ADMIN — Medication 125 MILLIGRAM(S): at 05:35

## 2021-09-15 RX ADMIN — Medication 3 MILLILITER(S): at 05:21

## 2021-09-15 RX ADMIN — ALBUTEROL 2.5 MILLIGRAM(S): 90 AEROSOL, METERED ORAL at 07:01

## 2021-09-15 NOTE — ED PROVIDER NOTE - PROGRESS NOTE DETAILS
Pt feels much better. Will DC w prednisone and pmd f/u. Full DC instructions discussed and patient knows when to seek immediate medical attention. Patient has proper follow-up. All results discussed with the patient they may require further work-up. Limitations of ED work-up discussed. All  questions and concerns from patient or family addressed. Understanding of insturctions verbalized

## 2021-09-15 NOTE — ED PROVIDER NOTE - OBJECTIVE STATEMENT
56 y/o M w PMH of COPD presents w sob and wheezing for a few hrs after smoking. No CP. No abd pain. No back pain. No leg swelling. No hx of PE or dvt.

## 2021-09-15 NOTE — ED PROVIDER NOTE - PATIENT PORTAL LINK FT
You can access the FollowMyHealth Patient Portal offered by [facility] by registering at the following website: [email or web address]. By joining [facility]’s FollowMyHealth portal, you will also be able to view your health information using other applications (apps) compatible with our system.

## 2021-09-15 NOTE — ED PROVIDER NOTE - NSICDXFAMILYHX_GEN_ALL_CORE_FT
Detail Level: Detailed FAMILY HISTORY:  FH: diabetes mellitus    Mother  Still living? Unknown  FH: lung cancer, Age at diagnosis: Age Unknown

## 2021-09-15 NOTE — ED ADULT NURSE NOTE - OBJECTIVE STATEMENT
Pt presented in ED c/o chest pain and body aches, SOB started today. Denies N/V/D, fever. No acute distress noted

## 2021-09-20 NOTE — ED ADULT TRIAGE NOTE - AS HEIGHT TYPE
stated W Plasty Text: The lesion was extirpated to the level of the fat with a #15 scalpel blade.  Given the location of the defect, shape of the defect and the proximity to free margins a W-plasty was deemed most appropriate for repair.  Using a sterile surgical marker, the appropriate transposition arms of the W-plasty were drawn incorporating the defect and placing the expected incisions within the relaxed skin tension lines where possible.    The area thus outlined was incised deep to adipose tissue with a #15 scalpel blade.  The skin margins were undermined to an appropriate distance in all directions utilizing iris scissors.  The opposing transposition arms were then transposed into place in opposite direction and anchored with interrupted buried subcutaneous sutures.

## 2021-09-22 ENCOUNTER — EMERGENCY (EMERGENCY)
Facility: HOSPITAL | Age: 56
LOS: 0 days | Discharge: HOME | End: 2021-09-23
Attending: EMERGENCY MEDICINE | Admitting: EMERGENCY MEDICINE
Payer: MEDICAID

## 2021-09-22 VITALS
HEIGHT: 70 IN | OXYGEN SATURATION: 98 % | SYSTOLIC BLOOD PRESSURE: 161 MMHG | DIASTOLIC BLOOD PRESSURE: 90 MMHG | HEART RATE: 72 BPM | TEMPERATURE: 98 F | RESPIRATION RATE: 18 BRPM

## 2021-09-22 VITALS
HEART RATE: 76 BPM | RESPIRATION RATE: 18 BRPM | SYSTOLIC BLOOD PRESSURE: 158 MMHG | DIASTOLIC BLOOD PRESSURE: 98 MMHG | OXYGEN SATURATION: 98 %

## 2021-09-22 DIAGNOSIS — J44.9 CHRONIC OBSTRUCTIVE PULMONARY DISEASE, UNSPECIFIED: ICD-10-CM

## 2021-09-22 DIAGNOSIS — R10.9 UNSPECIFIED ABDOMINAL PAIN: ICD-10-CM

## 2021-09-22 DIAGNOSIS — Z98.890 OTHER SPECIFIED POSTPROCEDURAL STATES: Chronic | ICD-10-CM

## 2021-09-22 DIAGNOSIS — G89.29 OTHER CHRONIC PAIN: ICD-10-CM

## 2021-09-22 DIAGNOSIS — F11.20 OPIOID DEPENDENCE, UNCOMPLICATED: ICD-10-CM

## 2021-09-22 DIAGNOSIS — R11.10 VOMITING, UNSPECIFIED: ICD-10-CM

## 2021-09-22 DIAGNOSIS — K21.9 GASTRO-ESOPHAGEAL REFLUX DISEASE WITHOUT ESOPHAGITIS: ICD-10-CM

## 2021-09-22 DIAGNOSIS — R07.89 OTHER CHEST PAIN: ICD-10-CM

## 2021-09-22 DIAGNOSIS — Z96.651 PRESENCE OF RIGHT ARTIFICIAL KNEE JOINT: Chronic | ICD-10-CM

## 2021-09-22 DIAGNOSIS — K40.20 BILATERAL INGUINAL HERNIA, WITHOUT OBSTRUCTION OR GANGRENE, NOT SPECIFIED AS RECURRENT: Chronic | ICD-10-CM

## 2021-09-22 DIAGNOSIS — I10 ESSENTIAL (PRIMARY) HYPERTENSION: ICD-10-CM

## 2021-09-22 DIAGNOSIS — Z79.899 OTHER LONG TERM (CURRENT) DRUG THERAPY: ICD-10-CM

## 2021-09-22 LAB
ALBUMIN SERPL ELPH-MCNC: 4.4 G/DL — SIGNIFICANT CHANGE UP (ref 3.5–5.2)
ALP SERPL-CCNC: 128 U/L — HIGH (ref 30–115)
ALT FLD-CCNC: 22 U/L — SIGNIFICANT CHANGE UP (ref 0–41)
ANION GAP SERPL CALC-SCNC: 13 MMOL/L — SIGNIFICANT CHANGE UP (ref 7–14)
AST SERPL-CCNC: 18 U/L — SIGNIFICANT CHANGE UP (ref 0–41)
BASOPHILS # BLD AUTO: 0.05 K/UL — SIGNIFICANT CHANGE UP (ref 0–0.2)
BASOPHILS NFR BLD AUTO: 0.4 % — SIGNIFICANT CHANGE UP (ref 0–1)
BILIRUB SERPL-MCNC: 0.6 MG/DL — SIGNIFICANT CHANGE UP (ref 0.2–1.2)
BUN SERPL-MCNC: 10 MG/DL — SIGNIFICANT CHANGE UP (ref 10–20)
CALCIUM SERPL-MCNC: 9.4 MG/DL — SIGNIFICANT CHANGE UP (ref 8.5–10.1)
CHLORIDE SERPL-SCNC: 101 MMOL/L — SIGNIFICANT CHANGE UP (ref 98–110)
CO2 SERPL-SCNC: 24 MMOL/L — SIGNIFICANT CHANGE UP (ref 17–32)
CREAT SERPL-MCNC: 0.8 MG/DL — SIGNIFICANT CHANGE UP (ref 0.7–1.5)
EOSINOPHIL # BLD AUTO: 0.02 K/UL — SIGNIFICANT CHANGE UP (ref 0–0.7)
EOSINOPHIL NFR BLD AUTO: 0.2 % — SIGNIFICANT CHANGE UP (ref 0–8)
GLUCOSE SERPL-MCNC: 107 MG/DL — HIGH (ref 70–99)
HCT VFR BLD CALC: 43.9 % — SIGNIFICANT CHANGE UP (ref 42–52)
HGB BLD-MCNC: 14.7 G/DL — SIGNIFICANT CHANGE UP (ref 14–18)
IMM GRANULOCYTES NFR BLD AUTO: 0.5 % — HIGH (ref 0.1–0.3)
LYMPHOCYTES # BLD AUTO: 1.52 K/UL — SIGNIFICANT CHANGE UP (ref 1.2–3.4)
LYMPHOCYTES # BLD AUTO: 11.6 % — LOW (ref 20.5–51.1)
MCHC RBC-ENTMCNC: 28.2 PG — SIGNIFICANT CHANGE UP (ref 27–31)
MCHC RBC-ENTMCNC: 33.5 G/DL — SIGNIFICANT CHANGE UP (ref 32–37)
MCV RBC AUTO: 84.1 FL — SIGNIFICANT CHANGE UP (ref 80–94)
MONOCYTES # BLD AUTO: 0.59 K/UL — SIGNIFICANT CHANGE UP (ref 0.1–0.6)
MONOCYTES NFR BLD AUTO: 4.5 % — SIGNIFICANT CHANGE UP (ref 1.7–9.3)
NEUTROPHILS # BLD AUTO: 10.91 K/UL — HIGH (ref 1.4–6.5)
NEUTROPHILS NFR BLD AUTO: 82.8 % — HIGH (ref 42.2–75.2)
NRBC # BLD: 0 /100 WBCS — SIGNIFICANT CHANGE UP (ref 0–0)
PLATELET # BLD AUTO: 306 K/UL — SIGNIFICANT CHANGE UP (ref 130–400)
POTASSIUM SERPL-MCNC: 3.5 MMOL/L — SIGNIFICANT CHANGE UP (ref 3.5–5)
POTASSIUM SERPL-SCNC: 3.5 MMOL/L — SIGNIFICANT CHANGE UP (ref 3.5–5)
PROT SERPL-MCNC: 7.1 G/DL — SIGNIFICANT CHANGE UP (ref 6–8)
RBC # BLD: 5.22 M/UL — SIGNIFICANT CHANGE UP (ref 4.7–6.1)
RBC # FLD: 14.9 % — HIGH (ref 11.5–14.5)
SODIUM SERPL-SCNC: 138 MMOL/L — SIGNIFICANT CHANGE UP (ref 135–146)
TROPONIN T SERPL-MCNC: <0.01 NG/ML — SIGNIFICANT CHANGE UP
WBC # BLD: 13.15 K/UL — HIGH (ref 4.8–10.8)
WBC # FLD AUTO: 13.15 K/UL — HIGH (ref 4.8–10.8)

## 2021-09-22 PROCEDURE — 93010 ELECTROCARDIOGRAM REPORT: CPT

## 2021-09-22 PROCEDURE — 71046 X-RAY EXAM CHEST 2 VIEWS: CPT | Mod: 26

## 2021-09-22 PROCEDURE — 99285 EMERGENCY DEPT VISIT HI MDM: CPT

## 2021-09-22 RX ORDER — SODIUM CHLORIDE 9 MG/ML
1000 INJECTION INTRAMUSCULAR; INTRAVENOUS; SUBCUTANEOUS ONCE
Refills: 0 | Status: COMPLETED | OUTPATIENT
Start: 2021-09-22 | End: 2021-09-22

## 2021-09-22 RX ORDER — MORPHINE SULFATE 50 MG/1
100 CAPSULE, EXTENDED RELEASE ORAL ONCE
Refills: 0 | Status: DISCONTINUED | OUTPATIENT
Start: 2021-09-22 | End: 2021-09-22

## 2021-09-22 RX ORDER — ONDANSETRON 8 MG/1
4 TABLET, FILM COATED ORAL ONCE
Refills: 0 | Status: COMPLETED | OUTPATIENT
Start: 2021-09-22 | End: 2021-09-22

## 2021-09-22 RX ORDER — KETOROLAC TROMETHAMINE 30 MG/ML
30 SYRINGE (ML) INJECTION ONCE
Refills: 0 | Status: DISCONTINUED | OUTPATIENT
Start: 2021-09-22 | End: 2021-09-22

## 2021-09-22 RX ADMIN — MORPHINE SULFATE 100 MILLIGRAM(S): 50 CAPSULE, EXTENDED RELEASE ORAL at 22:53

## 2021-09-22 RX ADMIN — ONDANSETRON 4 MILLIGRAM(S): 8 TABLET, FILM COATED ORAL at 20:46

## 2021-09-22 RX ADMIN — SODIUM CHLORIDE 2000 MILLILITER(S): 9 INJECTION INTRAMUSCULAR; INTRAVENOUS; SUBCUTANEOUS at 20:36

## 2021-09-22 NOTE — ED PROVIDER NOTE - OBJECTIVE STATEMENT
Patient is a 55 years old M pmhx COPD, GERD, HTN presents to the ED for evaluation of chest pain.  As per patient he was doing yard work 2 hours prior to arrival and developed chest pain left sided radiating to left arm.  As per patient sts he took nitro with no improvement of pain. Pt sts he "knows this is his heart" and needs "pain medication"

## 2021-09-22 NOTE — ED PROVIDER NOTE - ATTENDING CONTRIBUTION TO CARE
pt co cp and ab pain. chronic. has 100mg MScontin and 30mg Oxy at home but he ran out.   pt in nad anict, ctab, rrr, ab soft, nt, nd.    ekg, labs, imaging, supportive care

## 2021-09-22 NOTE — ED ADULT NURSE NOTE - OBJECTIVE STATEMENT
pt is 54 y/o male c/o No c/o chest pain. No s/s of chest pain.. pt states he was here recently for similar symptoms. pt denies SOB and fevers. pt c/o nausea.

## 2021-09-22 NOTE — ED PROVIDER NOTE - PATIENT PORTAL LINK FT
You can access the FollowMyHealth Patient Portal offered by Health system by registering at the following website: [email or web address]. By joining [facility]’s FollowMyHealth portal, you will also be able to view your health information using other applications (apps) compatible with our system.

## 2021-09-22 NOTE — ED PROVIDER NOTE - PROGRESS NOTE DETAILS
patient yelling at nursing staff, refused toradol sts he is on morphine at home and wants morphine for pain control, sts no longer has chest pain, patient offered nitro sts he took it prior to arrival to the ED and gave him a headache, patient sts he no longer has chest pain and now has abdominal pain with nausea and vomiting. neg nuc stress 1 year ago.

## 2021-09-22 NOTE — ED PROVIDER NOTE - PHYSICAL EXAMINATION
Gen: Alert, NAD, well appearing  Head: NC, AT, PERRL, EOMI  ENT: normal hearing, patent oropharynx without erythema  Neck: +supple, no tenderness  Pulm: Bilateral BS, normal resp effort  CV: RRR  Abd: soft, NT/ND  Neuro: AAOx3 Gen: Obese male retching   Head: NC, AT, PERRL, EOMI  ENT: normal hearing, patent oropharynx without erythema  Neck: +supple, no tenderness  Pulm: Bilateral BS, normal resp effort  CV: RRR  Abd: soft, NT/ND  Neuro: AAOx3

## 2021-09-22 NOTE — ED PROVIDER NOTE - CARE PLAN
1 Principal Discharge DX:	Chest pain   Principal Discharge DX:	Opioid dependence  Secondary Diagnosis:	Chest wall pain

## 2021-09-23 RX ORDER — FAMOTIDINE 10 MG/ML
20 INJECTION INTRAVENOUS DAILY
Refills: 0 | Status: DISCONTINUED | OUTPATIENT
Start: 2021-09-23 | End: 2021-09-23

## 2021-09-23 RX ORDER — OXYCODONE HYDROCHLORIDE 5 MG/1
30 TABLET ORAL ONCE
Refills: 0 | Status: DISCONTINUED | OUTPATIENT
Start: 2021-09-23 | End: 2021-09-23

## 2021-09-23 RX ADMIN — FAMOTIDINE 20 MILLIGRAM(S): 10 INJECTION INTRAVENOUS at 00:29

## 2021-09-23 RX ADMIN — OXYCODONE HYDROCHLORIDE 30 MILLIGRAM(S): 5 TABLET ORAL at 00:30

## 2021-09-24 ENCOUNTER — EMERGENCY (EMERGENCY)
Facility: HOSPITAL | Age: 56
LOS: 0 days | Discharge: HOME | End: 2021-09-24
Attending: EMERGENCY MEDICINE | Admitting: EMERGENCY MEDICINE
Payer: MEDICAID

## 2021-09-24 VITALS
DIASTOLIC BLOOD PRESSURE: 60 MMHG | HEIGHT: 70 IN | SYSTOLIC BLOOD PRESSURE: 130 MMHG | TEMPERATURE: 99 F | OXYGEN SATURATION: 98 % | HEART RATE: 86 BPM | RESPIRATION RATE: 20 BRPM | WEIGHT: 220.02 LBS

## 2021-09-24 VITALS
DIASTOLIC BLOOD PRESSURE: 64 MMHG | TEMPERATURE: 98 F | SYSTOLIC BLOOD PRESSURE: 126 MMHG | HEART RATE: 82 BPM | OXYGEN SATURATION: 98 % | RESPIRATION RATE: 18 BRPM

## 2021-09-24 DIAGNOSIS — Z79.899 OTHER LONG TERM (CURRENT) DRUG THERAPY: ICD-10-CM

## 2021-09-24 DIAGNOSIS — K40.20 BILATERAL INGUINAL HERNIA, WITHOUT OBSTRUCTION OR GANGRENE, NOT SPECIFIED AS RECURRENT: Chronic | ICD-10-CM

## 2021-09-24 DIAGNOSIS — M19.90 UNSPECIFIED OSTEOARTHRITIS, UNSPECIFIED SITE: ICD-10-CM

## 2021-09-24 DIAGNOSIS — R10.30 LOWER ABDOMINAL PAIN, UNSPECIFIED: ICD-10-CM

## 2021-09-24 DIAGNOSIS — R19.7 DIARRHEA, UNSPECIFIED: ICD-10-CM

## 2021-09-24 DIAGNOSIS — E78.00 PURE HYPERCHOLESTEROLEMIA, UNSPECIFIED: ICD-10-CM

## 2021-09-24 DIAGNOSIS — I10 ESSENTIAL (PRIMARY) HYPERTENSION: ICD-10-CM

## 2021-09-24 DIAGNOSIS — Z98.890 OTHER SPECIFIED POSTPROCEDURAL STATES: Chronic | ICD-10-CM

## 2021-09-24 DIAGNOSIS — G47.33 OBSTRUCTIVE SLEEP APNEA (ADULT) (PEDIATRIC): ICD-10-CM

## 2021-09-24 DIAGNOSIS — Z96.653 PRESENCE OF ARTIFICIAL KNEE JOINT, BILATERAL: ICD-10-CM

## 2021-09-24 DIAGNOSIS — E66.2 MORBID (SEVERE) OBESITY WITH ALVEOLAR HYPOVENTILATION: ICD-10-CM

## 2021-09-24 DIAGNOSIS — K21.9 GASTRO-ESOPHAGEAL REFLUX DISEASE WITHOUT ESOPHAGITIS: ICD-10-CM

## 2021-09-24 DIAGNOSIS — Z90.49 ACQUIRED ABSENCE OF OTHER SPECIFIED PARTS OF DIGESTIVE TRACT: ICD-10-CM

## 2021-09-24 DIAGNOSIS — R11.2 NAUSEA WITH VOMITING, UNSPECIFIED: ICD-10-CM

## 2021-09-24 DIAGNOSIS — J44.9 CHRONIC OBSTRUCTIVE PULMONARY DISEASE, UNSPECIFIED: ICD-10-CM

## 2021-09-24 DIAGNOSIS — Z96.651 PRESENCE OF RIGHT ARTIFICIAL KNEE JOINT: Chronic | ICD-10-CM

## 2021-09-24 DIAGNOSIS — Z87.19 PERSONAL HISTORY OF OTHER DISEASES OF THE DIGESTIVE SYSTEM: ICD-10-CM

## 2021-09-24 LAB
ALBUMIN SERPL ELPH-MCNC: 4.4 G/DL — SIGNIFICANT CHANGE UP (ref 3.5–5.2)
ALP SERPL-CCNC: 124 U/L — HIGH (ref 30–115)
ALT FLD-CCNC: 21 U/L — SIGNIFICANT CHANGE UP (ref 0–41)
ANION GAP SERPL CALC-SCNC: 12 MMOL/L — SIGNIFICANT CHANGE UP (ref 7–14)
AST SERPL-CCNC: 17 U/L — SIGNIFICANT CHANGE UP (ref 0–41)
BASOPHILS # BLD AUTO: 0.05 K/UL — SIGNIFICANT CHANGE UP (ref 0–0.2)
BASOPHILS NFR BLD AUTO: 0.4 % — SIGNIFICANT CHANGE UP (ref 0–1)
BILIRUB SERPL-MCNC: 1 MG/DL — SIGNIFICANT CHANGE UP (ref 0.2–1.2)
BUN SERPL-MCNC: 10 MG/DL — SIGNIFICANT CHANGE UP (ref 10–20)
CALCIUM SERPL-MCNC: 9.2 MG/DL — SIGNIFICANT CHANGE UP (ref 8.5–10.1)
CHLORIDE SERPL-SCNC: 100 MMOL/L — SIGNIFICANT CHANGE UP (ref 98–110)
CO2 SERPL-SCNC: 25 MMOL/L — SIGNIFICANT CHANGE UP (ref 17–32)
CREAT SERPL-MCNC: 0.8 MG/DL — SIGNIFICANT CHANGE UP (ref 0.7–1.5)
EOSINOPHIL # BLD AUTO: 0.02 K/UL — SIGNIFICANT CHANGE UP (ref 0–0.7)
EOSINOPHIL NFR BLD AUTO: 0.2 % — SIGNIFICANT CHANGE UP (ref 0–8)
GLUCOSE SERPL-MCNC: 100 MG/DL — HIGH (ref 70–99)
HCT VFR BLD CALC: 44.9 % — SIGNIFICANT CHANGE UP (ref 42–52)
HGB BLD-MCNC: 15.3 G/DL — SIGNIFICANT CHANGE UP (ref 14–18)
IMM GRANULOCYTES NFR BLD AUTO: 0.7 % — HIGH (ref 0.1–0.3)
LIDOCAIN IGE QN: 12 U/L — SIGNIFICANT CHANGE UP (ref 7–60)
LYMPHOCYTES # BLD AUTO: 1.41 K/UL — SIGNIFICANT CHANGE UP (ref 1.2–3.4)
LYMPHOCYTES # BLD AUTO: 12.3 % — LOW (ref 20.5–51.1)
MCHC RBC-ENTMCNC: 29 PG — SIGNIFICANT CHANGE UP (ref 27–31)
MCHC RBC-ENTMCNC: 34.1 G/DL — SIGNIFICANT CHANGE UP (ref 32–37)
MCV RBC AUTO: 85.2 FL — SIGNIFICANT CHANGE UP (ref 80–94)
MONOCYTES # BLD AUTO: 0.62 K/UL — HIGH (ref 0.1–0.6)
MONOCYTES NFR BLD AUTO: 5.4 % — SIGNIFICANT CHANGE UP (ref 1.7–9.3)
NEUTROPHILS # BLD AUTO: 9.28 K/UL — HIGH (ref 1.4–6.5)
NEUTROPHILS NFR BLD AUTO: 81 % — HIGH (ref 42.2–75.2)
NRBC # BLD: 0 /100 WBCS — SIGNIFICANT CHANGE UP (ref 0–0)
PLATELET # BLD AUTO: 273 K/UL — SIGNIFICANT CHANGE UP (ref 130–400)
POTASSIUM SERPL-MCNC: 4 MMOL/L — SIGNIFICANT CHANGE UP (ref 3.5–5)
POTASSIUM SERPL-SCNC: 4 MMOL/L — SIGNIFICANT CHANGE UP (ref 3.5–5)
PROT SERPL-MCNC: 6.6 G/DL — SIGNIFICANT CHANGE UP (ref 6–8)
RBC # BLD: 5.27 M/UL — SIGNIFICANT CHANGE UP (ref 4.7–6.1)
RBC # FLD: 15 % — HIGH (ref 11.5–14.5)
SODIUM SERPL-SCNC: 137 MMOL/L — SIGNIFICANT CHANGE UP (ref 135–146)
TROPONIN T SERPL-MCNC: <0.01 NG/ML — SIGNIFICANT CHANGE UP
WBC # BLD: 11.46 K/UL — HIGH (ref 4.8–10.8)
WBC # FLD AUTO: 11.46 K/UL — HIGH (ref 4.8–10.8)

## 2021-09-24 PROCEDURE — 93010 ELECTROCARDIOGRAM REPORT: CPT

## 2021-09-24 PROCEDURE — 71045 X-RAY EXAM CHEST 1 VIEW: CPT | Mod: 26

## 2021-09-24 PROCEDURE — 99285 EMERGENCY DEPT VISIT HI MDM: CPT

## 2021-09-24 RX ORDER — MORPHINE SULFATE 50 MG/1
6 CAPSULE, EXTENDED RELEASE ORAL ONCE
Refills: 0 | Status: DISCONTINUED | OUTPATIENT
Start: 2021-09-24 | End: 2021-09-24

## 2021-09-24 RX ORDER — FAMOTIDINE 10 MG/ML
20 INJECTION INTRAVENOUS ONCE
Refills: 0 | Status: COMPLETED | OUTPATIENT
Start: 2021-09-24 | End: 2021-09-24

## 2021-09-24 RX ORDER — SODIUM CHLORIDE 9 MG/ML
1000 INJECTION, SOLUTION INTRAVENOUS ONCE
Refills: 0 | Status: COMPLETED | OUTPATIENT
Start: 2021-09-24 | End: 2021-09-24

## 2021-09-24 RX ORDER — ONDANSETRON 8 MG/1
4 TABLET, FILM COATED ORAL ONCE
Refills: 0 | Status: COMPLETED | OUTPATIENT
Start: 2021-09-24 | End: 2021-09-24

## 2021-09-24 RX ADMIN — ONDANSETRON 4 MILLIGRAM(S): 8 TABLET, FILM COATED ORAL at 08:30

## 2021-09-24 RX ADMIN — MORPHINE SULFATE 6 MILLIGRAM(S): 50 CAPSULE, EXTENDED RELEASE ORAL at 08:30

## 2021-09-24 RX ADMIN — MORPHINE SULFATE 6 MILLIGRAM(S): 50 CAPSULE, EXTENDED RELEASE ORAL at 07:35

## 2021-09-24 RX ADMIN — MORPHINE SULFATE 6 MILLIGRAM(S): 50 CAPSULE, EXTENDED RELEASE ORAL at 09:05

## 2021-09-24 RX ADMIN — MORPHINE SULFATE 6 MILLIGRAM(S): 50 CAPSULE, EXTENDED RELEASE ORAL at 08:32

## 2021-09-24 RX ADMIN — FAMOTIDINE 20 MILLIGRAM(S): 10 INJECTION INTRAVENOUS at 07:20

## 2021-09-24 RX ADMIN — ONDANSETRON 4 MILLIGRAM(S): 8 TABLET, FILM COATED ORAL at 07:19

## 2021-09-24 RX ADMIN — SODIUM CHLORIDE 1000 MILLILITER(S): 9 INJECTION, SOLUTION INTRAVENOUS at 08:00

## 2021-09-24 RX ADMIN — Medication 30 MILLILITER(S): at 08:30

## 2021-09-24 NOTE — ED PROVIDER NOTE - CARE PROVIDER_API CALL
Kamran Saldaña J  GASTROENTEROLOGY  [address] [place] [id number]  Phone: [phone]  Fax: [phone]  Follow Up Time: 1-3 Days

## 2021-09-24 NOTE — ED PROVIDER NOTE - PATIENT PORTAL LINK FT
You can access the FollowMyHealth Patient Portal offered by [facility] by registering at the following website: [email or web address]. By joining [employer]’s FollowMyHealth portal, you will also be able to view your health information using other applications (apps) compatible with our system.

## 2021-09-24 NOTE — ED PROVIDER NOTE - PHYSICAL EXAMINATION
CONSTITUTIONAL: Well-developed; well-nourished;   SKIN: warm, dry  HEAD: Normocephalic; atraumatic.  EYES: PERRL, EOMI, normal sclera and conjunctiva   ENT: No nasal discharge; airway clear.  NECK: Supple; non tender.  CARD: S1, S2 normal; no murmurs, gallops, or rubs. Regular rate and rhythm.   RESP: No wheezes, rales or rhonchi.  ABD: soft mild TTP lower abd, no cva tenderness  EXT: Normal ROM.  No clubbing, cyanosis or edema.   LYMPH: No acute cervical adenopathy.  NEURO: Alert, oriented, grossly unremarkable  PSYCH: Cooperative, appropriate.

## 2021-09-24 NOTE — ED PROVIDER NOTE - OBJECTIVE STATEMENT
54 yo male hx of copd, gerd, colitis presenting with lower abdominal pain constant for the past 3 days, non-radiating, not worse when eating, associated with nausea, vomiting, and diarrhea. denies chest pain, fever, urinary symptoms,  melena. Has had multiple visits for similar pain, takes morphine/oxycodone at home for chronic abd pain. 54 yo male hx of copd, gerd, colitis presenting with similar to prior lower abdominal pain constant for the past 3 days, non-radiating, not worse when eating, associated with nausea, vomiting, and diarrhea. denies chest pain, fever, urinary symptoms,  melena. Has had multiple visits for similar pain, takes morphine/oxycodone at home for chronic abd pain and now requesting pain mediaction. CT a/p x2 last month negative, no signs of colitis.

## 2021-09-24 NOTE — ED PROVIDER NOTE - CLINICAL SUMMARY MEDICAL DECISION MAKING FREE TEXT BOX
Good response to ED tx.  sx improved.  dx testing reviewed.  In my opinion, out patient treatment and follow up are appropriate.

## 2021-09-24 NOTE — ED PROVIDER NOTE - PRIOR HOSPITAL/ED VISITS SUMMARY FREE TEXT FOR MDM OBTAINED AND REVIEWED OLD RECORDS QUESTION
pt has had similar sx on several visits to the ED over the last few months.  one rquired a short admission.  CT non dx on more than one occasion. pt has had similar sx on several visits to the ED over the last few months.  one required a short admission.  CT non dx on more than one occasion.

## 2021-09-24 NOTE — ED PROVIDER NOTE - ATTENDING CONTRIBUTION TO CARE
c/o N/V/D a/w abd pain.  pt is on chronic oral morphine has not been able to tolerate it today because of N/V.  VS noted.  mild ttp lower abd.  no R/G.  morphine given.  await labs.

## 2021-09-24 NOTE — ED PROVIDER NOTE - NS ED ROS FT
Constitutional:  see HPI  Head:  no headache, dizziness, loss of consciousness  Eyes:  no visual changes; no eye pain, redness, or discharge  ENMT:  no ear pain or discharge; no hearing problems; no mouth or throat sores or lesions; no throat pain  Cardiac: no chest pain, tachycardia or palpitations  Respiratory: no cough, wheezing, shortness of breath, chest tightness, or trouble breathing  GI: abd pain, nausea, vomiting, diarrhea  :  no dysuria, frequency, or burning with urination; no change in urine output  MS: no myalgias, muscle weakness, joint pain,or  injury; no joint swelling  Neuro: no weakness; no numbness or tingling; no seizure  Skin:  no rashes or color changes; no lacerations or abrasions

## 2021-10-15 NOTE — ED ADULT TRIAGE NOTE - HEIGHT IN INCHES
Physical Therapy     Referred by: Delroy Mallory MD; Medical Diagnosis (from order):    Diagnosis Information      Diagnosis    V43.65 (ICD-9-CM) - Z96.652 (ICD-10-CM) - Status post total left knee replacement                Daily Treatment Note    Visit:  26   Next referring provider appointment: 6/22/2022    Diagnosis Precautions: 17 weeks TKA 10/13/21  Patient alert and oriented X3.    SUBJECTIVE                                                                                                               Tired. Up to 34 minutes on the cardio workout in the AMs.  1st week with that. Pain has been better. Patch, ultrasound did something last week. Less pain.   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   Pain / Symptoms:  Pain rating (out of 10): Current: 6 (walking a lot, moving a lot)   Location: Points along the pes anserine left knee    OBJECTIVE                                                                                                                     Observed Gait:    Analysis:;   • Left: toe in    • Right: decreased stride length    Taller posture demonstrated today, less of an anterior weight shift in left stance        TREATMENT                                                                                                                  Therapeutic Exercise:  Recumbent bike 4 minutes, level 5.0  Leg press double leg 125#  single leg 70#, pausing mid range into both directions, quad recruitment  NK table quad extension 15# \"feel like I am doing this all with the right\" hamstring curl 15#  3D Treadmill, speed at 1.6 mph, grid, joint points to give feedback on trunk lean, step length, contact time, total 6 minutes.  Tiltboard staggered stance, left in lead to control deceleration  clock tap/star drill, left stance on level floor, to cones at 1-7:00 points.  Close monitoring with the left knee- alignment  Step up 6\"  right LE with left abduction, hip flexion, hip extension vs red thera band 8x each direction- fatigued.  Lateral hip abduction most challenging (strength, not pain)  Lateral gait with soft knee stance 20'x2          Skilled input: verbal instruction/cues, tactile instruction/cues and facilitation    Writer verbally educated and received verbal consent for hand placement, positioning of patient, and techniques to be performed today from patient for clothing adjustments for techniques, modality application and therapist position for techniques as described above and how they are pertinent to the patient's plan of care.    Home Exercise Program/Education Materials: 7-20-21 retro ambulation at counter top   Access Code: [email or web address]  URL: [email or web address]  Date: 07/27/2021  Prepared by: Rand Diaz  Quadricep Stretch with Chair and Counter Support - 2-3 x daily - 7 x weekly - 2-3 sets - 10 reps - 3-5 seconds hold  Reverse Lunge on Slider - 1 x daily - 7 x weekly - 2 sets - 10 reps - 3 seconds hold  Lateral Lunge with Slider - 1 x daily - 7 x weekly - 2 sets - 10 reps - 3 seconds hold  Squat with Chair Touch and Resistance Loop - 1 x daily - 7 x weekly - 2-3 sets - 10 reps - 3 seconds hold  Standing Knee Flexion AROM with Chair Support - 1 x daily - 7 x weekly - 2-3 sets - 10 reps - 3-5 seconds hold     Sidelie hip abduction 8/5/21  She does 30-45 cardio video at home       Iontophoresis ([id number])  Number of applications: 2 of up to: 6  Location: left pes anserine area  IontoPatch Extra Stength  1.0 mL dexamethasone  Dosage: 120 mA-Minutes  Wear Time: Approximately 8 hours  Instruction: remove after stated wear time; monitor any skin reaction  Results: decreased pain  Reaction: no adverse reaction to treatment      ASSESSMENT                                                                                                             Kept session in more of a total kinetic chain strength focus.   She does voice frustration with how difficult \"simple\" movements are in her rehab sessions.  Wanted her to feel successful with weight bearing strength vs using the NMES and requiring AAROM to complete exercises. Isolated step up with quad focus continues to be challenging, slider lunge reps limited due to fatigue, eccentric step down not well tolerated due to the feeling of her knee not supporting her. Using the 3D treadmill did give helpful neuro feedback on step length, trunk lean, contact time with visual feedback on slight corrections. Noting that she was holding posture more tall today, less of a forward lean in left weight bearing. Her left foot veers inward especially with fatigue.  She did note less pain with the iontopatch last session, applied 2nd patch today.   Looking into blood flow restriction therapy, requested by MD office, where to schedule.   External factors contributing stress with work, relationships affecting energy and drive.  Continued goals of reducing pain, improving mobility, improving strength through glut, quad/hamstring balancing.  Pain/symptoms after session (out of 10): 2    Patient Education:   Results of above outlined education: Verbalizes understanding and Demonstrates understanding    Assistant to continue with current plan of care, current goals are appropriate, patient progressing towards set goals        PLAN                                                                                                                           Suggestions for next session as indicated: Progress per plan of care  Quad strength- mix of NMES with isolated exercise, could try decline squats; holding hand weights to over load into modified range lunges, squats  *writer did send in box to Hannah Alberts re: blood flow restriction therapy.  If she is unable pending equipment available, readiness, look into possible providers.          Therapy procedure time and total treatment time can be found  documented on the Time Entry flowsheet   10

## 2021-10-30 ENCOUNTER — EMERGENCY (EMERGENCY)
Facility: HOSPITAL | Age: 56
LOS: 0 days | Discharge: HOME | End: 2021-10-31
Attending: EMERGENCY MEDICINE | Admitting: EMERGENCY MEDICINE
Payer: MEDICAID

## 2021-10-30 VITALS
HEIGHT: 70 IN | HEART RATE: 76 BPM | RESPIRATION RATE: 16 BRPM | WEIGHT: 315 LBS | OXYGEN SATURATION: 99 % | SYSTOLIC BLOOD PRESSURE: 130 MMHG | DIASTOLIC BLOOD PRESSURE: 82 MMHG | TEMPERATURE: 99 F

## 2021-10-30 DIAGNOSIS — Z98.890 OTHER SPECIFIED POSTPROCEDURAL STATES: Chronic | ICD-10-CM

## 2021-10-30 DIAGNOSIS — M25.571 PAIN IN RIGHT ANKLE AND JOINTS OF RIGHT FOOT: ICD-10-CM

## 2021-10-30 DIAGNOSIS — K21.9 GASTRO-ESOPHAGEAL REFLUX DISEASE WITHOUT ESOPHAGITIS: ICD-10-CM

## 2021-10-30 DIAGNOSIS — Z96.651 PRESENCE OF RIGHT ARTIFICIAL KNEE JOINT: Chronic | ICD-10-CM

## 2021-10-30 DIAGNOSIS — F17.200 NICOTINE DEPENDENCE, UNSPECIFIED, UNCOMPLICATED: ICD-10-CM

## 2021-10-30 DIAGNOSIS — R60.0 LOCALIZED EDEMA: ICD-10-CM

## 2021-10-30 DIAGNOSIS — K40.20 BILATERAL INGUINAL HERNIA, WITHOUT OBSTRUCTION OR GANGRENE, NOT SPECIFIED AS RECURRENT: Chronic | ICD-10-CM

## 2021-10-30 DIAGNOSIS — J44.9 CHRONIC OBSTRUCTIVE PULMONARY DISEASE, UNSPECIFIED: ICD-10-CM

## 2021-10-30 DIAGNOSIS — R26.2 DIFFICULTY IN WALKING, NOT ELSEWHERE CLASSIFIED: ICD-10-CM

## 2021-10-30 PROCEDURE — 99283 EMERGENCY DEPT VISIT LOW MDM: CPT

## 2021-10-30 NOTE — ED ADULT TRIAGE NOTE - CHIEF COMPLAINT QUOTE
Biba after reconstructive right ankle surgery 4 months prior due right ankle pain and swelling growing more and more. Pt advised not to weight bear but pt was walking on ankle since day after surgery. Pulses pos in toes.

## 2021-10-31 PROCEDURE — 73600 X-RAY EXAM OF ANKLE: CPT | Mod: 26,RT

## 2021-10-31 PROCEDURE — 73630 X-RAY EXAM OF FOOT: CPT | Mod: 26,RT

## 2021-10-31 NOTE — ED PROVIDER NOTE - NSFOLLOWUPINSTRUCTIONS_ED_ALL_ED_FT
Please follow up with your orthopedic doctor. You are being given the orthopedic clinic if your not able to see your doctor.

## 2021-10-31 NOTE — ED ADULT NURSE NOTE - OBJECTIVE STATEMENT
Patient present to ED in NAD ambulating with steady gait a+ox3 co pain to left ankle worsening since surgery. Pt denies any other complaints

## 2021-10-31 NOTE — ED PROVIDER NOTE - CLINICAL SUMMARY MEDICAL DECISION MAKING FREE TEXT BOX
55 y.o. M, PMH of COPD, GERD, colitis, comes in c/o right ankle pain. Patient reports that 4 months ago he had reconstructive surgery of the right ankle, was told not to walk on it, but started to walk immediately after surgery, since then complaining of having swelling and worsening pain. Was seen by his ortho who referred him for MRI and told him that he will most likely need another surgery. Pt comes in today to see what other options he has. On exam, pt in NAD, AAOx3, head NC/AT, CN II-XII intact, lungs CTA B/L, CV S1S2 regular, abdomen soft/NT/ND/(+)BS, ext (+) swelling, nonpitting edema to right ankle, pulses intact, sensation intact. XR (-) for fracture. Pt does not want any toradol here. On Morphine at home. Will d/c with ortho follow up.

## 2021-10-31 NOTE — ED PROVIDER NOTE - OBJECTIVE STATEMENT
55Y M with PMH of copd, gerd, colitis presents with CC of right ankle pain. Patient reports that 4 months ago he had reconstructive surgery of the right ankle, was told not to walk on it, but started to walk immediately after surgery, since then complaining of having swelling, here for eval.

## 2021-10-31 NOTE — ED PROVIDER NOTE - NS ED ROS FT
Review of Systems:  CONSTITUTIONAL - No fever  SKIN - No rash  HEMATOLOGIC - No abnormal bleeding or bruising  RESPIRATORY - No shortness of breath, No cough  CARDIAC -No chest pain, No palpitations  GI - No abdominal pain, No nausea, No vomiting  NEUROLOGIC - No numbness, No focal weakness, No headache, No dizziness  All other systems negative, unless specified in HPI

## 2021-10-31 NOTE — ED PROVIDER NOTE - PATIENT PORTAL LINK FT
You can access the FollowMyHealth Patient Portal offered by [facility] by registering at the following website: [email or web address]. By joining [employer]’s FollowMyHealth portal, you will also be able to view your health information using other applications (apps) compatible with our system.

## 2021-10-31 NOTE — ED PROVIDER NOTE - PHYSICAL EXAMINATION
VITALS: Reviewed  CONSTITUTIONAL: well developed, well nourished, in no acute distress, speaking in full sentences, nontoxic appearing  SKIN: warm, dry, no rash  HEAD: normocephalic, atraumatic  EYES: PERRL, EOMI, no conjunctival erythema, sclera clear  ENT: patent airway, moist mucous membranes  NECK: supple, no masses  CV:  regular rate, regular rhythm, 2+ radial pulses bilaterally  RESP: no wheezes, no rales, no rhonchi, normal work of breathing  ABD: soft, nontender, nondistended, no rebound, no guarding  MSK: normal ROM, no cyanosis, no edema--R ankle/foot non-pitting swelling, pulses palpable, no point tenderness  NEURO: alert, oriented x3  PSYCH: cooperative, appropriate

## 2021-10-31 NOTE — ED PROVIDER NOTE - NSFOLLOWUPCLINICS_GEN_ALL_ED_FT
[facility]  Orthpedic  [address], [place]   Phone: [phone]  Fax:

## 2021-12-03 NOTE — ED PROVIDER NOTE - CHIEF COMPLAINT
The patient is a 53y Male complaining of nausea, vomiting, diarrhea. Transurethral Resection for Bladder    You have had a transurethral resection (TUR) of the bladder. You may have a small tube called a catheter in your urethra to help stop bleeding and to prevent blockage of the urethra. When the bleeding has stopped, the tube is removed. You may feel the need to urinate frequently for a while after the surgery, but this should improve with time. It may burn when you urinate. Drink lots of fluids to help with the burning. Your urine also may look pink for up to 2 to 3 weeks after surgery. This is because there may be blood in it. You may have to avoid strenuous activity and heavy lifting for about 3 weeks after TUR. This care sheet gives you a general idea about how long it will take for you to recover. But each person recovers at a different pace. Follow the steps below to feel better as quickly as possible. How can you care for yourself at home? Activity  · Rest when you feel tired. Getting enough sleep will help you recover. · Try to walk each day. Start by walking a little more than you did the day before. Bit by bit, increase the amount you walk. Walking boosts blood flow and helps prevent pneumonia and constipation. · Please limit your activities for 3 days. · Avoid strenuous activities, such as bicycle riding, jogging, weight lifting, or aerobic exercise, for about 3 weeks, or until your doctor says it is okay. · For about 3 weeks, avoid lifting anything that would make you strain. This may include heavy grocery bags and milk containers, a heavy briefcase or backpack, cat litter or dog food bags, a vacuum , or a child. · Ask your doctor when you can drive again. · You may shower and take baths when your doctor says it is okay. · Ask your doctor when it is okay for you to have sex. Diet  · You can eat your normal diet. If your stomach is upset, try bland, low-fat foods like plain rice, broiled chicken, toast, and yogurt.   · Drink plenty of fluids. Medicines  · Your doctor will tell you if and when you can restart your medicines. He or she will also give you instructions about taking any new medicines. · If you take blood thinners, such as warfarin (Coumadin), clopidogrel (Plavix), or aspirin, be sure to talk to your doctor. He or she will tell you if and when to start taking those medicines again. Make sure that you understand exactly what your doctor wants you to do. · Take pain medicines exactly as directed. ¨ If the doctor gave you a prescription medicine for pain, take it as prescribed. ¨ If you are not taking a prescription pain medicine, ask your doctor if you can take an over-the-counter medicine. · If you think your pain medicine is making you sick to your stomach:  ¨ Take your medicine after meals (unless your doctor has told you not to). ¨ Ask your doctor for a different pain medicine. · If your doctor prescribed antibiotics, take them as directed. Do not stop taking them just because you feel better. You need to take the full course of antibiotics. Medication Interaction:  During your procedure you potentially received a medication or medications which may reduce the effectiveness of oral contraceptives. Please consider other forms of contraception for 1 month following your procedure if you are currently using oral contraceptives as your primary form of birth control. In addition to this, we recommend continuing your oral contraceptive as prescribed, unless otherwise instructed by your physician, during this time. Follow-up care is a key part of your treatment and safety. Be sure to make and go to all appointments, and call your doctor if you are having problems. It's also a good idea to know your test results and keep a list of the medicines you take. When should you call for help? Call 911 anytime you think you may need emergency care. For example, call if:  · You passed out (lost consciousness).   · You have severe trouble breathing. · You have sudden chest pain and shortness of breath, or you cough up blood. · You have severe pain in your belly. Call your doctor now or seek immediate medical care if:  · You are sick to your stomach or cannot keep fluids down. · You have trouble passing urine or stool, especially if you have pain or swelling in your lower belly. · You have signs of a blood clot, such as:  ¨ Pain in your calf, back of the knee, thigh, or groin. ¨ Redness and swelling in your leg or groin. · You have fever or severe chills. · You have pain in your back just below your rib cage. This is called flank pain. Watch closely for any changes in your health, and be sure to contact your doctor if:  · You have pain or burning when you urinate. A burning feeling is normal for a day or two after the surgery, but call if it does not get better. · The blood has not cleared out of your urine after several weeks. You have a frequent urge to urinate but can pass only small amounts of urine. After general anesthesia or intravenous sedation, for 24 hours or while taking prescription Narcotics:  · Limit your activities  · A responsible adult needs to be with you for the next 24 hours  · Do not drive and operate hazardous machinery  · Do not make important personal or business decisions  · Do not drink alcoholic beverages  · If you have not urinated within 8 hours after discharge, and you are experiencing discomfort from urinary retention, please go to the nearest ED. · If you have sleep apnea and have a CPAP machine, please use it for all naps and sleeping. · Please use caution when taking narcotics and any of your home medications that may cause drowsiness. *  Please give a list of your current medications to your Primary Care Provider. *  Please update this list whenever your medications are discontinued, doses are      changed, or new medications (including over-the-counter products) are added.   *  Please carry medication information at all times in case of emergency situations. These are general instructions for a healthy lifestyle:  No smoking/ No tobacco products/ Avoid exposure to second hand smoke  Surgeon General's Warning:  Quitting smoking now greatly reduces serious risk to your health. Obesity, smoking, and sedentary lifestyle greatly increases your risk for illness  A healthy diet, regular physical exercise & weight monitoring are important for maintaining a healthy lifestyle    You may be retaining fluid if you have a history of heart failure or if you experience any of the following symptoms:  Weight gain of 3 pounds or more overnight or 5 pounds in a week, increased swelling in our hands or feet or shortness of breath while lying flat in bed. Please call your doctor as soon as you notice any of these symptoms; do not wait until your next office visit.

## 2022-01-13 ENCOUNTER — EMERGENCY (EMERGENCY)
Facility: HOSPITAL | Age: 57
LOS: 0 days | Discharge: HOME | End: 2022-01-14
Attending: STUDENT IN AN ORGANIZED HEALTH CARE EDUCATION/TRAINING PROGRAM | Admitting: STUDENT IN AN ORGANIZED HEALTH CARE EDUCATION/TRAINING PROGRAM
Payer: MEDICAID

## 2022-01-13 VITALS
OXYGEN SATURATION: 98 % | WEIGHT: 250 LBS | TEMPERATURE: 98 F | SYSTOLIC BLOOD PRESSURE: 144 MMHG | RESPIRATION RATE: 17 BRPM | DIASTOLIC BLOOD PRESSURE: 94 MMHG | HEART RATE: 77 BPM | HEIGHT: 70 IN

## 2022-01-13 DIAGNOSIS — R06.2 WHEEZING: ICD-10-CM

## 2022-01-13 DIAGNOSIS — M79.10 MYALGIA, UNSPECIFIED SITE: ICD-10-CM

## 2022-01-13 DIAGNOSIS — Z98.890 OTHER SPECIFIED POSTPROCEDURAL STATES: Chronic | ICD-10-CM

## 2022-01-13 DIAGNOSIS — Z87.19 PERSONAL HISTORY OF OTHER DISEASES OF THE DIGESTIVE SYSTEM: ICD-10-CM

## 2022-01-13 DIAGNOSIS — R06.02 SHORTNESS OF BREATH: ICD-10-CM

## 2022-01-13 DIAGNOSIS — R11.2 NAUSEA WITH VOMITING, UNSPECIFIED: ICD-10-CM

## 2022-01-13 DIAGNOSIS — Z90.49 ACQUIRED ABSENCE OF OTHER SPECIFIED PARTS OF DIGESTIVE TRACT: ICD-10-CM

## 2022-01-13 DIAGNOSIS — R05.9 COUGH, UNSPECIFIED: ICD-10-CM

## 2022-01-13 DIAGNOSIS — F17.200 NICOTINE DEPENDENCE, UNSPECIFIED, UNCOMPLICATED: ICD-10-CM

## 2022-01-13 DIAGNOSIS — Z96.651 PRESENCE OF RIGHT ARTIFICIAL KNEE JOINT: Chronic | ICD-10-CM

## 2022-01-13 DIAGNOSIS — K40.20 BILATERAL INGUINAL HERNIA, WITHOUT OBSTRUCTION OR GANGRENE, NOT SPECIFIED AS RECURRENT: Chronic | ICD-10-CM

## 2022-01-13 DIAGNOSIS — J44.9 CHRONIC OBSTRUCTIVE PULMONARY DISEASE, UNSPECIFIED: ICD-10-CM

## 2022-01-13 DIAGNOSIS — I10 ESSENTIAL (PRIMARY) HYPERTENSION: ICD-10-CM

## 2022-01-13 DIAGNOSIS — Z20.822 CONTACT WITH AND (SUSPECTED) EXPOSURE TO COVID-19: ICD-10-CM

## 2022-01-13 DIAGNOSIS — R10.13 EPIGASTRIC PAIN: ICD-10-CM

## 2022-01-13 DIAGNOSIS — E78.5 HYPERLIPIDEMIA, UNSPECIFIED: ICD-10-CM

## 2022-01-13 DIAGNOSIS — K21.9 GASTRO-ESOPHAGEAL REFLUX DISEASE WITHOUT ESOPHAGITIS: ICD-10-CM

## 2022-01-13 LAB
ALBUMIN SERPL ELPH-MCNC: 4.3 G/DL — SIGNIFICANT CHANGE UP (ref 3.5–5.2)
ALP SERPL-CCNC: 116 U/L — HIGH (ref 30–115)
ALT FLD-CCNC: 13 U/L — SIGNIFICANT CHANGE UP (ref 0–41)
ANION GAP SERPL CALC-SCNC: 14 MMOL/L — SIGNIFICANT CHANGE UP (ref 7–14)
APTT BLD: 38 SEC — SIGNIFICANT CHANGE UP (ref 27–39.2)
AST SERPL-CCNC: 16 U/L — SIGNIFICANT CHANGE UP (ref 0–41)
BASOPHILS # BLD AUTO: 0.04 K/UL — SIGNIFICANT CHANGE UP (ref 0–0.2)
BASOPHILS NFR BLD AUTO: 0.3 % — SIGNIFICANT CHANGE UP (ref 0–1)
BILIRUB DIRECT SERPL-MCNC: 0.2 MG/DL — SIGNIFICANT CHANGE UP (ref 0–0.3)
BILIRUB INDIRECT FLD-MCNC: 0.4 MG/DL — SIGNIFICANT CHANGE UP (ref 0.2–1.2)
BILIRUB SERPL-MCNC: 0.6 MG/DL — SIGNIFICANT CHANGE UP (ref 0.2–1.2)
BUN SERPL-MCNC: 9 MG/DL — LOW (ref 10–20)
CALCIUM SERPL-MCNC: 9.7 MG/DL — SIGNIFICANT CHANGE UP (ref 8.5–10.1)
CHLORIDE SERPL-SCNC: 95 MMOL/L — LOW (ref 98–110)
CO2 SERPL-SCNC: 23 MMOL/L — SIGNIFICANT CHANGE UP (ref 17–32)
CREAT SERPL-MCNC: 0.7 MG/DL — SIGNIFICANT CHANGE UP (ref 0.7–1.5)
EOSINOPHIL # BLD AUTO: 0.02 K/UL — SIGNIFICANT CHANGE UP (ref 0–0.7)
EOSINOPHIL NFR BLD AUTO: 0.2 % — SIGNIFICANT CHANGE UP (ref 0–8)
GLUCOSE SERPL-MCNC: 126 MG/DL — HIGH (ref 70–99)
HCT VFR BLD CALC: 45.5 % — SIGNIFICANT CHANGE UP (ref 42–52)
HGB BLD-MCNC: 15.1 G/DL — SIGNIFICANT CHANGE UP (ref 14–18)
IMM GRANULOCYTES NFR BLD AUTO: 0.3 % — SIGNIFICANT CHANGE UP (ref 0.1–0.3)
INR BLD: 1.14 RATIO — SIGNIFICANT CHANGE UP (ref 0.65–1.3)
LACTATE SERPL-SCNC: 1.1 MMOL/L — SIGNIFICANT CHANGE UP (ref 0.7–2)
LIDOCAIN IGE QN: 20 U/L — SIGNIFICANT CHANGE UP (ref 7–60)
LYMPHOCYTES # BLD AUTO: 1.53 K/UL — SIGNIFICANT CHANGE UP (ref 1.2–3.4)
LYMPHOCYTES # BLD AUTO: 11.9 % — LOW (ref 20.5–51.1)
MCHC RBC-ENTMCNC: 27.9 PG — SIGNIFICANT CHANGE UP (ref 27–31)
MCHC RBC-ENTMCNC: 33.2 G/DL — SIGNIFICANT CHANGE UP (ref 32–37)
MCV RBC AUTO: 84.1 FL — SIGNIFICANT CHANGE UP (ref 80–94)
MONOCYTES # BLD AUTO: 0.54 K/UL — SIGNIFICANT CHANGE UP (ref 0.1–0.6)
MONOCYTES NFR BLD AUTO: 4.2 % — SIGNIFICANT CHANGE UP (ref 1.7–9.3)
NEUTROPHILS # BLD AUTO: 10.67 K/UL — HIGH (ref 1.4–6.5)
NEUTROPHILS NFR BLD AUTO: 83.1 % — HIGH (ref 42.2–75.2)
NRBC # BLD: 0 /100 WBCS — SIGNIFICANT CHANGE UP (ref 0–0)
NT-PROBNP SERPL-SCNC: 126 PG/ML — SIGNIFICANT CHANGE UP (ref 0–300)
PLATELET # BLD AUTO: 305 K/UL — SIGNIFICANT CHANGE UP (ref 130–400)
POTASSIUM SERPL-MCNC: 4.2 MMOL/L — SIGNIFICANT CHANGE UP (ref 3.5–5)
POTASSIUM SERPL-SCNC: 4.2 MMOL/L — SIGNIFICANT CHANGE UP (ref 3.5–5)
PROT SERPL-MCNC: 7.5 G/DL — SIGNIFICANT CHANGE UP (ref 6–8)
PROTHROM AB SERPL-ACNC: 13.1 SEC — HIGH (ref 9.95–12.87)
RBC # BLD: 5.41 M/UL — SIGNIFICANT CHANGE UP (ref 4.7–6.1)
RBC # FLD: 15.2 % — HIGH (ref 11.5–14.5)
SARS-COV-2 RNA SPEC QL NAA+PROBE: SIGNIFICANT CHANGE UP
SODIUM SERPL-SCNC: 132 MMOL/L — LOW (ref 135–146)
TROPONIN T SERPL-MCNC: <0.01 NG/ML — SIGNIFICANT CHANGE UP
WBC # BLD: 12.84 K/UL — HIGH (ref 4.8–10.8)
WBC # FLD AUTO: 12.84 K/UL — HIGH (ref 4.8–10.8)

## 2022-01-13 PROCEDURE — 99285 EMERGENCY DEPT VISIT HI MDM: CPT

## 2022-01-13 PROCEDURE — 93970 EXTREMITY STUDY: CPT | Mod: 26

## 2022-01-13 PROCEDURE — 93010 ELECTROCARDIOGRAM REPORT: CPT

## 2022-01-13 PROCEDURE — 71045 X-RAY EXAM CHEST 1 VIEW: CPT | Mod: 26

## 2022-01-13 RX ORDER — ONDANSETRON 8 MG/1
4 TABLET, FILM COATED ORAL ONCE
Refills: 0 | Status: COMPLETED | OUTPATIENT
Start: 2022-01-13 | End: 2022-01-13

## 2022-01-13 RX ORDER — FAMOTIDINE 10 MG/ML
20 INJECTION INTRAVENOUS ONCE
Refills: 0 | Status: COMPLETED | OUTPATIENT
Start: 2022-01-13 | End: 2022-01-13

## 2022-01-13 RX ORDER — IPRATROPIUM/ALBUTEROL SULFATE 18-103MCG
3 AEROSOL WITH ADAPTER (GRAM) INHALATION ONCE
Refills: 0 | Status: COMPLETED | OUTPATIENT
Start: 2022-01-13 | End: 2022-01-13

## 2022-01-13 RX ORDER — MORPHINE SULFATE 50 MG/1
4 CAPSULE, EXTENDED RELEASE ORAL ONCE
Refills: 0 | Status: DISCONTINUED | OUTPATIENT
Start: 2022-01-13 | End: 2022-01-13

## 2022-01-13 RX ADMIN — MORPHINE SULFATE 4 MILLIGRAM(S): 50 CAPSULE, EXTENDED RELEASE ORAL at 23:29

## 2022-01-13 RX ADMIN — MORPHINE SULFATE 4 MILLIGRAM(S): 50 CAPSULE, EXTENDED RELEASE ORAL at 21:00

## 2022-01-13 RX ADMIN — ONDANSETRON 4 MILLIGRAM(S): 8 TABLET, FILM COATED ORAL at 21:00

## 2022-01-13 RX ADMIN — Medication 3 MILLILITER(S): at 21:50

## 2022-01-13 RX ADMIN — FAMOTIDINE 20 MILLIGRAM(S): 10 INJECTION INTRAVENOUS at 21:38

## 2022-01-13 RX ADMIN — Medication 125 MILLIGRAM(S): at 20:59

## 2022-01-13 NOTE — ED PROVIDER NOTE - OBJECTIVE STATEMENT
56 year old M with hs of htn, hld, copd current smoker 2ppd, gerd, cholecystectomy, appendectomy, b/l inguinal hernia repair c/o 2 days of sob, cough, nausea, vomiting and epigastric abd pain. Pt also c/o RLE swelling x 6 months s/p reconstructive sx to rt foot. No fever, hematemesis, bloody stools, urinary symptoms, sick contacts, recent travel. Pt utd with covid vaccine.

## 2022-01-13 NOTE — ED PROVIDER NOTE - CARE PROVIDER_API CALL
Edison Bowie)  [facility]  [address] [place] [address]  Phone: [phone]  Fax: [phone]  Established Patient  Follow Up Time: 1-3 Days

## 2022-01-13 NOTE — ED PROVIDER NOTE - IV ALTEPLASE EXCL REL HIDDEN
Patient Care Team:  Ahsan Fournier MD (Tony) as PCP - General (Internal Medicine)  Provider, No Known as PCP - Family Medicine  Mazin Yañez MD as Consulting Physician (Hematology and Oncology)  Rama Barnett MD as Referring Physician (Breast Surgery)  Avis Simmons MD as Consulting Physician (Obstetrics and Gynecology)    CHIEF COMPLAINT: Family hx of CRC or polyps    HISTORY OF PRESENT ILLNESS:  Last exam was 2010    Past Medical History:   Diagnosis Date   • Anemia    • Breast cancer (CMS/HCC) 2011    left   • History of abnormal cervical Pap smear    • History of foreign travel 2011 [place] to [facility]   • History of prior pregnancies     x2   • Hypertension 2014   • Hypothyroidism      Past Surgical History:   Procedure Laterality Date   • BREAST LUMPECTOMY Left 2011   • BREAST SURGERY Left 09/09/2011    Lumpectomy, left axillary sentinel node biopsy   • ENDOMETRIAL ABLATION  2008   • MOLE REMOVAL      atypical mole      Family History   Problem Relation Age of Onset   • Leukemia Father 76        Lymphoid   • Hypertension Father    • Hypertension Mother    • Prostate cancer Brother 56   • Heart disease Brother    • Heart attack Maternal Grandfather    • Breast cancer Neg Hx      Social History     Tobacco Use   • Smoking status: Never Smoker   • Smokeless tobacco: Never Used   Substance Use Topics   • Alcohol use: Yes     Comment: Rare   • Drug use: No     Medications Prior to Admission   Medication Sig Dispense Refill Last Dose   • calcium carbonate-vitamin d 600-400 MG-UNIT per tablet Take 1 tablet by mouth 2 (Two) Times a Day.      • levothyroxine (SYNTHROID, LEVOTHROID) 50 MCG tablet TAKE 1 TABLET BY MOUTH EVERY MORNING ON AN EMPTY STOMACH  4    • lisinopril (PRINIVIL,ZESTRIL) 20 MG tablet TAKE 1 TABLET BY MOUTH EVERY DAY  2      Allergies:  Amoxicillin    REVIEW OF SYSTEMS:  Please see the above history of present illness for pertinent positives and  negatives.  The remainder of the patient's systems have been reviewed and are negative.     Vital Signs  Temp:  [97.9 °F (36.6 °C)] 97.9 °F (36.6 °C)  Heart Rate:  [99] 99  Resp:  [16] 16  BP: (166)/(96) 166/96    Flowsheet Rows      First Filed Value   Admission Height  --   Admission Weight  120 kg (264 lb 3.2 oz) Documented at 02/15/2021 0727           Physical Exam:  Physical Exam   Constitutional: Patient appears well-developed and well-nourished and in no acute distress   HEENT:   Head: Normocephalic and atraumatic.   Eyes:  Pupils are equal, round, and reactive to light. EOM are intact. Sclerae are anicteric and non-injected.  Mouth and Throat: Patient has moist mucous membranes. Oropharynx is clear of any erythema or exudate.     Neck: Neck supple. No JVD present. No thyromegaly present. No lymphadenopathy present.  Cardiovascular: Regular rate, regular rhythm, S1 normal and S2 normal.  Exam reveals no gallop and no friction rub.  No murmur heard.  Pulmonary/Chest: Lungs are clear to auscultation bilaterally. No respiratory distress. No wheezes. No rhonchi. No rales.   Abdominal: Soft. Bowel sounds are normal. No distension and no mass. There is no hepatosplenomegaly. There is no tenderness.   Musculoskeletal: Normal Muscle tone  Extremities: No edema. Pulses are palpable in all 4 extremities.  Neurological: Patient is alert and oriented to person, place, and time. Cranial nerves II-XII are grossly intact with no focal deficits.  Skin: Skin is warm. No rash noted. Nails show no clubbing.  No cyanosis or erythema.    Debilities/Disabilities Identified: None  Emotional Behavior: Appropriate     Results Review:    I reviewed the patient's new clinical results.  Lab Results (most recent)     None          Imaging Results (Most Recent)     None        reviewed    ECG/EMG Results (most recent)     None        reviewed    Assessment/Plan   Personal hx colon polyps/  colonoscopy      I discussed the patients  show findings and my recommendations with patient.     Christiano Gracia MD  02/15/21  09:03 EST    Time: 10 min prior to procedure.

## 2022-01-13 NOTE — ED PROVIDER NOTE - PHYSICAL EXAMINATION
CONSTITUTIONAL: Well-appearing; well-nourished; in no apparent distress.   EYES: PERRL; EOM intact.   ENT: normal nose; no rhinorrhea; normal pharynx with no tonsillar hypertrophy.   NECK: Supple; non-tender; no cervical lymphadenopathy.   CARDIOVASCULAR: Normal S1, S2; no murmurs, rubs, or gallops.   RESPIRATORY: no sign of resp distress/ No tachypnea. Pt speaking full sentences. + b/l rhonchi /wheezing.   GI/: Normal bowel sounds; non-distended; + mild epigastric ttp. No rebound or guarding  MS: No evidence of trauma or deformity. Normal ROM in all four extremities; non-tender to palpation; distal pulses are normal.   Extrem: + mild edema noted to rt foot/ankle. Pedal pulses intact  SKIN: Normal for age and race; warm; dry; good turgor; no apparent lesions or exudate.   NEURO/PSYCH: A & O x 4; grossly unremarkable. mood and manner are appropriate. Grooming and personal hygiene are appropriate.

## 2022-01-13 NOTE — ED PROVIDER NOTE - NS ED ROS FT
Constitutional: no fever, chills, no recent weight loss, change in appetite or malaise  Eyes: no redness/discharge/pain/vision changes  ENT: no rhinorrhea/ear pain/sore throat  Cardiac: + sob  Respiratory: + cough  GI: + n/v/abd pain. No diarrhea  : No dysuria, frequency, urgency or hematuria  MS: no pain to back or extremities, no loss of ROM, no weakness  Neuro: No headache or weakness. No LOC.  Skin: No skin rash.  Endocrine: No history of thyroid disease or diabetes.  Except as documented in the HPI, all other systems are negative.

## 2022-01-13 NOTE — ED PROVIDER NOTE - PATIENT PORTAL LINK FT
You can access the FollowMyHealth Patient Portal offered by [facility] by registering at the following website: [email or web address]. By joining [employer]’s FollowMyHealth portal, you will also be able to view your health information using other applications (apps) compatible with our system.

## 2022-01-13 NOTE — ED PROVIDER NOTE - CLINICAL SUMMARY MEDICAL DECISION MAKING FREE TEXT BOX
56M with mult comorbidities, mult abd surgeries Who presented with multiple complaints ranging from cough shortness of breath, vomiting, diarrhea.  Exam reassuring, EKG nonischemic, labs and imaging reviewed.  CT angio chest, abdomen pelvis negative for dissection or other life-threatening emergencies.  On reassessment patient drank multiple juice boxes and is sleeping comfortably.  Abdomen is soft, nontender and patient is appropriate for discharge. I have fully discussed the medical management and delivery of care with the patient. I have discussed any available labs, imaging and treatment options with the patient. All Questions answered at the bedside and printed copies of all results provided and recommended to review with PCP. Patient confirms understanding and has been given detailed return precautions. Patient instructed to return to the ED should symptoms persist or worsen. Patient has demonstrated capacity and has verbalized understanding. Patient is well appearing upon discharge, ambulatory with a steady gait.

## 2022-01-13 NOTE — ED ADULT NURSE NOTE - OBJECTIVE STATEMENT
BIBA from home with reports of worsening SOB, cough, congestions, chest discomfort, and nausea with vomiting.

## 2022-01-13 NOTE — ED PROVIDER NOTE - PROGRESS NOTE DETAILS
ck: patient endorsed to me by DOV Clements pending CTs  patient endorsed to Dr Chaparro pending CT read  vasc duplex prelim negative CO- pt endorsed to Dr. Reyes- pending final reads on CT, reassess, dispo CO- pt reassessed, resting comfortably TD: Pt informed of results of testing including labs and radiographic studies. Pt ready for discharge, indicates understanding and agreement with return precautions.

## 2022-01-13 NOTE — ED PROVIDER NOTE - ATTENDING CONTRIBUTION TO CARE
56 year old M with hs of htn, hld, copd current smoker 2ppd, gerd, cholecystectomy, appendectomy, b/l inguinal hernia repair presents for multiple complaints. pt endorses cough, nausea, vomiting, epigastric ap, sob. sob feels like copd exacerbation.  pt also w/ chronic RLE swelling. no fever/hematemesis/blood in stool. no dysuria. no known sick contacts.      vss  gen- NAD, aaox3  card-rrr  lungs-no resp distress, diffuse wheezing w/ good air entry, speaking full sentences  abd-mild diffuse tenderness, no guarding or rebound  neuro- full str/sensation, cn ii-xii grossly intact, normal coordination    c/f viral illness causing copd exacerbation  cta chest given recent surgery  given multiple surgeries, will get belly labs, ctap  will provide supportive care, serial exam and ED observation period

## 2022-01-14 VITALS
RESPIRATION RATE: 17 BRPM | HEART RATE: 81 BPM | DIASTOLIC BLOOD PRESSURE: 76 MMHG | SYSTOLIC BLOOD PRESSURE: 132 MMHG | OXYGEN SATURATION: 98 % | TEMPERATURE: 99 F

## 2022-01-14 PROCEDURE — 74177 CT ABD & PELVIS W/CONTRAST: CPT | Mod: 26,MA

## 2022-01-14 PROCEDURE — 71275 CT ANGIOGRAPHY CHEST: CPT | Mod: 26,MA

## 2022-01-14 RX ORDER — KETOROLAC TROMETHAMINE 30 MG/ML
30 SYRINGE (ML) INJECTION ONCE
Refills: 0 | Status: DISCONTINUED | OUTPATIENT
Start: 2022-01-14 | End: 2022-01-14

## 2022-01-14 RX ORDER — MORPHINE SULFATE 50 MG/1
4 CAPSULE, EXTENDED RELEASE ORAL ONCE
Refills: 0 | Status: DISCONTINUED | OUTPATIENT
Start: 2022-01-14 | End: 2022-01-14

## 2022-01-14 RX ORDER — ALBUTEROL 90 UG/1
2 AEROSOL, METERED ORAL ONCE
Refills: 0 | Status: COMPLETED | OUTPATIENT
Start: 2022-01-14 | End: 2022-01-14

## 2022-01-14 RX ORDER — MORPHINE SULFATE 50 MG/1
2 CAPSULE, EXTENDED RELEASE ORAL ONCE
Refills: 0 | Status: DISCONTINUED | OUTPATIENT
Start: 2022-01-14 | End: 2022-01-14

## 2022-01-14 RX ADMIN — ALBUTEROL 2 PUFF(S): 90 AEROSOL, METERED ORAL at 03:03

## 2022-01-14 RX ADMIN — MORPHINE SULFATE 2 MILLIGRAM(S): 50 CAPSULE, EXTENDED RELEASE ORAL at 02:07

## 2022-01-14 RX ADMIN — MORPHINE SULFATE 4 MILLIGRAM(S): 50 CAPSULE, EXTENDED RELEASE ORAL at 03:58

## 2022-01-14 RX ADMIN — Medication 30 MILLIGRAM(S): at 03:02

## 2022-01-14 RX ADMIN — ONDANSETRON 4 MILLIGRAM(S): 8 TABLET, FILM COATED ORAL at 00:02

## 2022-02-10 ENCOUNTER — OUTPATIENT (OUTPATIENT)
Dept: OUTPATIENT SERVICES | Facility: HOSPITAL | Age: 57
LOS: 1 days | Discharge: HOME | End: 2022-02-10
Payer: MEDICAID

## 2022-02-10 DIAGNOSIS — K40.20 BILATERAL INGUINAL HERNIA, WITHOUT OBSTRUCTION OR GANGRENE, NOT SPECIFIED AS RECURRENT: Chronic | ICD-10-CM

## 2022-02-10 DIAGNOSIS — Z96.651 PRESENCE OF RIGHT ARTIFICIAL KNEE JOINT: Chronic | ICD-10-CM

## 2022-02-10 DIAGNOSIS — Z98.890 OTHER SPECIFIED POSTPROCEDURAL STATES: Chronic | ICD-10-CM

## 2022-02-10 DIAGNOSIS — M54.16 RADICULOPATHY, LUMBAR REGION: ICD-10-CM

## 2022-02-10 PROCEDURE — 72110 X-RAY EXAM L-2 SPINE 4/>VWS: CPT | Mod: 26

## 2022-02-16 NOTE — ED PROVIDER NOTE - EKG ADDITIONAL QUESTION - PERFORMED INDEPENDENT VISUALIZATION
ARAVIND called [place] [phone] from [facility]. [place] was not in the office as of yet. ARAVIND will follow up at a later time.    Yes

## 2022-03-28 NOTE — ED PROVIDER NOTE - NS_ATTENDINGSCRIBE_ED_ALL_ED
March 28, 2022    AnnieGhanshyamVI Le  [address]  [place] [address]              [facility]  [address] [address]  [place] [address]  Phone: [phone]  Fax: [phone]      To Whom It May Concern:    Ms. Mcgraw is currently under our care for pregnancy.  Estimated Date of Delivery: 7/10/22    She is cleared to have routine cleaning done.       Sincerely,      Nirali Miranda MD         I personally performed the service described in the documentation recorded by the scribe in my presence, and it accurately and completely records my words and actions.

## 2022-04-22 ENCOUNTER — EMERGENCY (EMERGENCY)
Facility: HOSPITAL | Age: 57
LOS: 0 days | Discharge: HOME | End: 2022-04-22
Attending: EMERGENCY MEDICINE | Admitting: EMERGENCY MEDICINE
Payer: MEDICAID

## 2022-04-22 VITALS
TEMPERATURE: 98 F | RESPIRATION RATE: 16 BRPM | DIASTOLIC BLOOD PRESSURE: 69 MMHG | OXYGEN SATURATION: 96 % | SYSTOLIC BLOOD PRESSURE: 141 MMHG | HEIGHT: 70 IN | HEART RATE: 89 BPM

## 2022-04-22 DIAGNOSIS — Z87.891 PERSONAL HISTORY OF NICOTINE DEPENDENCE: ICD-10-CM

## 2022-04-22 DIAGNOSIS — E78.00 PURE HYPERCHOLESTEROLEMIA, UNSPECIFIED: ICD-10-CM

## 2022-04-22 DIAGNOSIS — Z96.651 PRESENCE OF RIGHT ARTIFICIAL KNEE JOINT: Chronic | ICD-10-CM

## 2022-04-22 DIAGNOSIS — K40.20 BILATERAL INGUINAL HERNIA, WITHOUT OBSTRUCTION OR GANGRENE, NOT SPECIFIED AS RECURRENT: Chronic | ICD-10-CM
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
## 2022-04-22 DIAGNOSIS — Z98.890 OTHER SPECIFIED POSTPROCEDURAL STATES: Chronic | ICD-10-CM

## 2022-04-22 DIAGNOSIS — J44.9 CHRONIC OBSTRUCTIVE PULMONARY DISEASE, UNSPECIFIED: ICD-10-CM

## 2022-04-22 DIAGNOSIS — K21.9 GASTRO-ESOPHAGEAL REFLUX DISEASE WITHOUT ESOPHAGITIS: ICD-10-CM

## 2022-04-22 DIAGNOSIS — E66.01 MORBID (SEVERE) OBESITY DUE TO EXCESS CALORIES: ICD-10-CM
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
## 2022-04-22 DIAGNOSIS — S61.215A LACERATION WITHOUT FOREIGN BODY OF LEFT RING FINGER WITHOUT DAMAGE TO NAIL, INITIAL ENCOUNTER: ICD-10-CM

## 2022-04-22 DIAGNOSIS — M19.90 UNSPECIFIED OSTEOARTHRITIS, UNSPECIFIED SITE: ICD-10-CM

## 2022-04-22 DIAGNOSIS — I10 ESSENTIAL (PRIMARY) HYPERTENSION: ICD-10-CM

## 2022-04-22 DIAGNOSIS — G47.33 OBSTRUCTIVE SLEEP APNEA (ADULT) (PEDIATRIC): ICD-10-CM
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
## 2022-04-22 PROCEDURE — 99283 EMERGENCY DEPT VISIT LOW MDM: CPT | Mod: 25

## 2022-04-22 PROCEDURE — 12002 RPR S/N/AX/GEN/TRNK2.6-7.5CM: CPT

## 2022-04-22 NOTE — ED PROVIDER NOTE - PATIENT PORTAL LINK FT
You can access the FollowMyHealth Patient Portal offered by [facility] by registering at the following website: [email or web address]. By joining [employer]’s FollowMyHealth portal, you will also be able to view your health information using other applications (apps) compatible with our system.

## 2022-04-22 NOTE — ED PROVIDER NOTE - OBJECTIVE STATEMENT
56 year old male former smoker with a history of COPD presents with laceration to left ring finger. Patient cut his finger on a piece of broken glass. Wound is actively bleeding however he reports no retained pieces of glass. 56 year old male former smoker with a history of COPD and multiple joint replacements presents with laceration to left ring finger. Patient cut his finger on a piece of broken glass 40 minutes prior to arrival. Wound is actively bleeding however he reports no retained pieces of glass. Reports full range of motion and sensation in the affected finger.

## 2022-04-22 NOTE — ED PROVIDER NOTE - NSFOLLOWUPINSTRUCTIONS_ED_ALL_ED_FT
Return to the ED or your primary care provider in 7-10 days for suture removal. Please also return if you have severe pain of the finger, re opening of the wound, bleeding that will not stop, redness around the wound, pus discharge.     Laceration    WHAT YOU NEED TO KNOW:    A laceration is an injury to the skin and the soft tissue underneath it. Lacerations happen when you are cut or hit by something. They can happen anywhere on the body.     DISCHARGE INSTRUCTIONS:    Return to the emergency department if:     You have heavy bleeding or bleeding that does not stop after 10 minutes of holding firm, direct pressure over the wound.       Your wound opens up.     Contact your healthcare provider if:     You have a fever or chills.       Your laceration is red, warm, or swollen.      You have red streaks on your skin coming from your wound.      You have white or yellow drainage from the wound that smells bad.      You have pain that gets worse, even after treatment.       You have questions or concerns about your condition or care.     Medicines:     Prescription pain medicine may be given. Ask how to take this medicine safely.       Antibiotics help treat or prevent a bacterial infection.       Take your medicine as directed. Contact your healthcare provider if you think your medicine is not helping or if you have side effects. Tell him or her if you are allergic to any medicine. Keep a list of the medicines, vitamins, and herbs you take. Include the amounts, and when and why you take them. Bring the list or the pill bottles to follow-up visits. Carry your medicine list with you in case of an emergency.    Care for your wound as directed:     Do not get your wound wet until your healthcare provider says it is okay. Do not soak your wound in water. Do not go swimming until your healthcare provider says it is okay. Carefully wash the wound with soap and water. Gently pat the area dry or allow it to air dry.       Change your bandages when they get wet, dirty, or after washing. Apply new, clean bandages as directed. Do not apply elastic bandages or tape too tight. Do not put powders or lotions over your incision.       Apply antibiotic ointment as directed. Your healthcare provider may give you antibiotic ointment to put over your wound if you have stitches. If you have strips of tape over your incision, let them dry up and fall off on their own. If they do not fall off within 14 days, gently remove them. If you have glue over your wound, do not remove or pick at it. If your glue comes off, do not replace it with glue that you have at home.       Check your wound every day for signs of infection such as swelling, redness, or pus.     Self-care:     Apply ice on your wound for 15 to 20 minutes every hour or as directed. Use an ice pack, or put crushed ice in a plastic bag. Cover it with a towel. Ice helps prevent tissue damage and decreases swelling and pain.      Use a splint as directed. A splint will decrease movement and stress on your wound. It may help it heal faster. A splint may be used for lacerations over joints or areas of your body that bend. Ask your healthcare provider how to apply and remove a splint.       Decrease scarring of your wound by applying ointments as directed. Do not apply ointments until your healthcare provider says it is okay. You may need to wait until your wound is healed. Ask which ointment to buy and how often to use it. After your wound is healed, use sunscreen over the area when you are out in the sun. You should do this for at least 6 months to 1 year after your injury.     Follow up with your healthcare provider as directed: You may need to follow up in 24 to 48 hours to have your wound checked for infection. You will need to return in 3 to 14 days if you have stitches or staples so they can be removed. Care for your wound as directed to prevent infection and help it heal. Write down your questions so you remember to ask them during your visits.       © Copyright [employer] 2019 All illustrations and images included in CareNotes are the copyrighted property of [employer] or [employer].

## 2022-04-22 NOTE — ED PROVIDER NOTE - PHYSICAL EXAMINATION
Physical Exam    Vital Signs: I have reviewed the initial vital signs.  Constitutional: well-nourished, appears stated age, no acute distress  Eyes: Conjunctiva pink, Sclera clear, PERRLA, EOMI.  Cardiovascular: S1 and S2, regular rate, regular rhythm, well-perfused extremities, radial pulses equal and 2+  Respiratory: unlabored respiratory effort, bilateral crackles.   Gastrointestinal: soft, non-tender abdomen, no pulsatile mass, normal bowel sounds  Musculoskeletal: supple neck, no lower extremity edema, no midline tenderness  Integumentary: warm, dry, no rash. 3cm Laceration left proximal ring finger actively bleeding. Wound edges well approximated. No retained foreign bodies.   Neurologic: awake, alert, cranial nerves II-XII grossly intact, extremities’ motor and sensory functions grossly intact  Psychiatric: appropriate mood, appropriate affect

## 2022-04-22 NOTE — ED ADULT NURSE NOTE - OBJECTIVE STATEMENT
Pt reports to ed for L ring finger lac. Pt was cleaning glass & it broke & cut his finger. Reports throbbing pain.

## 2022-04-22 NOTE — ED PROVIDER NOTE - NS ED ROS FT
Constitutional: (-) fever  Eyes/ENT: (-) blurry vision, (-) epistaxis  Cardiovascular: (-) chest pain, (-) syncope  Respiratory: (-) cough, (-) shortness of breath  Gastrointestinal: (-) vomiting, (-) diarrhea  Musculoskeletal: (-) neck pain, (+) joint pain chronically  Integumentary: (-) rash, (-) edema (+) laceration to left ring finger  Neurological: (-) headache, (-) altered mental status  Psychiatric: (-) hallucinations  Allergic/Immunologic: (-) pruritus

## 2022-04-22 NOTE — ED PROVIDER NOTE - CLINICAL SUMMARY MEDICAL DECISION MAKING FREE TEXT BOX
Patient presented status post accidental laceration to the left ring finger on broken glass prior to arrival.  Otherwise afebrile, hemodynamically stable, neurovascular intact with full range of motion of all joints. No signs of tendon involvement. Wound irrigated and sutured without complication ED.  Given the above, will discharge home with outpatient follow up. Patient agreeable with plan. Agrees to return to ED for any new or worsening symptoms.

## 2022-05-03 ENCOUNTER — EMERGENCY (EMERGENCY)
Facility: HOSPITAL | Age: 57
LOS: 0 days | Discharge: HOME | End: 2022-05-03
Attending: EMERGENCY MEDICINE | Admitting: EMERGENCY MEDICINE
Payer: MEDICAID

## 2022-05-03 VITALS
WEIGHT: 300.05 LBS | TEMPERATURE: 96 F | RESPIRATION RATE: 20 BRPM | SYSTOLIC BLOOD PRESSURE: 143 MMHG | HEIGHT: 70 IN | HEART RATE: 91 BPM | DIASTOLIC BLOOD PRESSURE: 71 MMHG | OXYGEN SATURATION: 100 %

## 2022-05-03 DIAGNOSIS — Z96.651 PRESENCE OF RIGHT ARTIFICIAL KNEE JOINT: Chronic | ICD-10-CM

## 2022-05-03 DIAGNOSIS — Z98.890 OTHER SPECIFIED POSTPROCEDURAL STATES: Chronic | ICD-10-CM

## 2022-05-03 DIAGNOSIS — S61.215D LACERATION WITHOUT FOREIGN BODY OF LEFT RING FINGER WITHOUT DAMAGE TO NAIL, SUBSEQUENT ENCOUNTER: ICD-10-CM
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
## 2022-05-03 DIAGNOSIS — Z48.02 ENCOUNTER FOR REMOVAL OF SUTURES: ICD-10-CM

## 2022-05-03 DIAGNOSIS — K40.20 BILATERAL INGUINAL HERNIA, WITHOUT OBSTRUCTION OR GANGRENE, NOT SPECIFIED AS RECURRENT: Chronic | ICD-10-CM

## 2022-05-03 PROCEDURE — L9995: CPT

## 2022-05-03 NOTE — ED PROVIDER NOTE - PATIENT PORTAL LINK FT
You can access the FollowMyHealth Patient Portal offered by [facility] by registering at the following website: [email or web address]. By joining [employer]’s FollowMyHealth portal, you will also be able to view your health information using other applications (apps) compatible with our system.

## 2022-05-03 NOTE — ED PROVIDER NOTE - ATTENDING CONTRIBUTION TO CARE
56-year-old male presenting for suture removal following finger laceration repair on 4/22/2022.  Denies any complaints.  The wound is healing, mild erythema surrounding the sutures without any purulent discharge/fluctuance or tenderness to palpation, 2 sutures were already undone, FROM of all joints, no lymphangitic streaking.  Sutures were removed, wound was reinforced with Steri-Strips, wound care was discussed with the patient, stable for discharge home.

## 2022-05-03 NOTE — ED PROVIDER NOTE - NS ED ROS FT
Constitutional:  See HPI  Eyes:  No visual changes  ENMT: No neck pain or stiffness  Cardiac:  No chest pain  Respiratory:  No cough or respiratory distress.   GI:  No nausea, vomiting, diarrhea or abdominal pain.  :  No dysuria, frequency or burning.  MS:  No back pain. L 4th finger lac w/ sutures  Neuro:  No headache   Skin:  No skin rash  Except as documented in the HPI,  all other systems are negative

## 2022-05-03 NOTE — ED PROVIDER NOTE - OBJECTIVE STATEMENT
56 year old male former smoker with a history of COPD and multiple joint replacements presents with laceration to left ring finger presents for suture removal. pt received sutures at [facility] 4/22/22. Per patient, one suture fell out but otherwise has no complaints. denies pain, pus drainage, abnormal smells, or pain along the finger.

## 2022-05-03 NOTE — ED PROVIDER NOTE - PHYSICAL EXAMINATION
CONSTITUTIONAL: Well-appearing; well-nourished; in no apparent distress.   HEAD: Normocephalic; .   EYES: PERRL; EOM intact.   ENT: Normal pharynx with no tonsillar hypertrophy. MMM.  NECK: Supple; non-tender;   CHEST: Normal chest excursion with respiration.   CARDIOVASCULAR: full pulses  RESPIRATORY: Normal chest excursion with respiration;   GI/: Normal bowel sounds; non-distended;   BACK: No evidence of trauma or deformity.  EXT: Normal ROM in all four extremities; non-tender to palpation; distal pulses are normal.   SKIN: Normal for age and race; warm; dry; good turgor.  NEURO: A & O x 4; CN 2-12 intact. Grossly unremarkable.

## 2022-05-03 NOTE — ED PROVIDER NOTE - NSFOLLOWUPCLINICS_GEN_ALL_ED_FT
[facility]  Medicine  [address], [place]   Phone: [phone]  Fax:

## 2022-05-08 ENCOUNTER — EMERGENCY (EMERGENCY)
Facility: HOSPITAL | Age: 57
LOS: 0 days | Discharge: LEFT BEFORE DISCHARGE | End: 2022-05-08
Attending: STUDENT IN AN ORGANIZED HEALTH CARE EDUCATION/TRAINING PROGRAM | Admitting: STUDENT IN AN ORGANIZED HEALTH CARE EDUCATION/TRAINING PROGRAM
Payer: MEDICAID

## 2022-05-08 VITALS
DIASTOLIC BLOOD PRESSURE: 105 MMHG | WEIGHT: 315 LBS | OXYGEN SATURATION: 99 % | HEIGHT: 70 IN | TEMPERATURE: 96 F | SYSTOLIC BLOOD PRESSURE: 150 MMHG | HEART RATE: 74 BPM | RESPIRATION RATE: 24 BRPM

## 2022-05-08 DIAGNOSIS — Z53.29 PROCEDURE AND TREATMENT NOT CARRIED OUT BECAUSE OF PATIENT'S DECISION FOR OTHER REASONS: ICD-10-CM

## 2022-05-08 DIAGNOSIS — Z20.822 CONTACT WITH AND (SUSPECTED) EXPOSURE TO COVID-19: ICD-10-CM

## 2022-05-08 DIAGNOSIS — Z87.19 PERSONAL HISTORY OF OTHER DISEASES OF THE DIGESTIVE SYSTEM: ICD-10-CM

## 2022-05-08 DIAGNOSIS — E78.00 PURE HYPERCHOLESTEROLEMIA, UNSPECIFIED: ICD-10-CM

## 2022-05-08 DIAGNOSIS — Z96.651 PRESENCE OF RIGHT ARTIFICIAL KNEE JOINT: Chronic | ICD-10-CM

## 2022-05-08 DIAGNOSIS — Z98.890 OTHER SPECIFIED POSTPROCEDURAL STATES: Chronic | ICD-10-CM

## 2022-05-08 DIAGNOSIS — Z99.89 DEPENDENCE ON OTHER ENABLING MACHINES AND DEVICES: ICD-10-CM

## 2022-05-08 DIAGNOSIS — F17.200 NICOTINE DEPENDENCE, UNSPECIFIED, UNCOMPLICATED: ICD-10-CM

## 2022-05-08 DIAGNOSIS — I10 ESSENTIAL (PRIMARY) HYPERTENSION: ICD-10-CM

## 2022-05-08 DIAGNOSIS — R06.02 SHORTNESS OF BREATH: ICD-10-CM

## 2022-05-08 DIAGNOSIS — K21.9 GASTRO-ESOPHAGEAL REFLUX DISEASE WITHOUT ESOPHAGITIS: ICD-10-CM

## 2022-05-08 DIAGNOSIS — M17.10 UNILATERAL PRIMARY OSTEOARTHRITIS, UNSPECIFIED KNEE: ICD-10-CM

## 2022-05-08 DIAGNOSIS — K40.20 BILATERAL INGUINAL HERNIA, WITHOUT OBSTRUCTION OR GANGRENE, NOT SPECIFIED AS RECURRENT: Chronic | ICD-10-CM

## 2022-05-08 DIAGNOSIS — E78.5 HYPERLIPIDEMIA, UNSPECIFIED: ICD-10-CM

## 2022-05-08 DIAGNOSIS — M19.019 PRIMARY OSTEOARTHRITIS, UNSPECIFIED SHOULDER: ICD-10-CM

## 2022-05-08 DIAGNOSIS — M16.10 UNILATERAL PRIMARY OSTEOARTHRITIS, UNSPECIFIED HIP: ICD-10-CM

## 2022-05-08 DIAGNOSIS — Z96.653 PRESENCE OF ARTIFICIAL KNEE JOINT, BILATERAL: ICD-10-CM

## 2022-05-08 DIAGNOSIS — E66.01 MORBID (SEVERE) OBESITY DUE TO EXCESS CALORIES: ICD-10-CM

## 2022-05-08 DIAGNOSIS — Z90.49 ACQUIRED ABSENCE OF OTHER SPECIFIED PARTS OF DIGESTIVE TRACT: ICD-10-CM

## 2022-05-08 DIAGNOSIS — J44.9 CHRONIC OBSTRUCTIVE PULMONARY DISEASE, UNSPECIFIED: ICD-10-CM

## 2022-05-08 LAB
ALBUMIN SERPL ELPH-MCNC: 4.4 G/DL — SIGNIFICANT CHANGE UP (ref 3.5–5.2)
ALP SERPL-CCNC: 136 U/L — HIGH (ref 30–115)
ALT FLD-CCNC: 16 U/L — SIGNIFICANT CHANGE UP (ref 0–41)
ANION GAP SERPL CALC-SCNC: 12 MMOL/L — SIGNIFICANT CHANGE UP (ref 7–14)
AST SERPL-CCNC: 14 U/L — SIGNIFICANT CHANGE UP (ref 0–41)
BASE EXCESS BLDV CALC-SCNC: 3.5 MMOL/L — HIGH (ref -2–3)
BASOPHILS # BLD AUTO: 0.05 K/UL — SIGNIFICANT CHANGE UP (ref 0–0.2)
BASOPHILS NFR BLD AUTO: 0.5 % — SIGNIFICANT CHANGE UP (ref 0–1)
BILIRUB SERPL-MCNC: 0.5 MG/DL — SIGNIFICANT CHANGE UP (ref 0.2–1.2)
BUN SERPL-MCNC: 9 MG/DL — LOW (ref 10–20)
CA-I SERPL-SCNC: 1.24 MMOL/L — SIGNIFICANT CHANGE UP (ref 1.15–1.33)
CALCIUM SERPL-MCNC: 9.7 MG/DL — SIGNIFICANT CHANGE UP (ref 8.5–10.1)
CHLORIDE SERPL-SCNC: 100 MMOL/L — SIGNIFICANT CHANGE UP (ref 98–110)
CO2 SERPL-SCNC: 25 MMOL/L — SIGNIFICANT CHANGE UP (ref 17–32)
CREAT SERPL-MCNC: 0.8 MG/DL — SIGNIFICANT CHANGE UP (ref 0.7–1.5)
EGFR: 105 ML/MIN/1.73M2 — SIGNIFICANT CHANGE UP
EOSINOPHIL # BLD AUTO: 0.02 K/UL — SIGNIFICANT CHANGE UP (ref 0–0.7)
EOSINOPHIL NFR BLD AUTO: 0.2 % — SIGNIFICANT CHANGE UP (ref 0–8)
GAS PNL BLDV: 134 MMOL/L — LOW (ref 136–145)
GAS PNL BLDV: SIGNIFICANT CHANGE UP
GLUCOSE SERPL-MCNC: 151 MG/DL — HIGH (ref 70–99)
HCO3 BLDV-SCNC: 30 MMOL/L — HIGH (ref 22–29)
HCT VFR BLD CALC: 46.6 % — SIGNIFICANT CHANGE UP (ref 42–52)
HCT VFR BLDA CALC: 50 % — SIGNIFICANT CHANGE UP (ref 39–51)
HGB BLD CALC-MCNC: 16.7 G/DL — SIGNIFICANT CHANGE UP (ref 12.6–17.4)
HGB BLD-MCNC: 16.3 G/DL — SIGNIFICANT CHANGE UP (ref 14–18)
IMM GRANULOCYTES NFR BLD AUTO: 0.4 % — HIGH (ref 0.1–0.3)
LACTATE BLDV-MCNC: 2.2 MMOL/L — HIGH (ref 0.5–2)
LYMPHOCYTES # BLD AUTO: 1.22 K/UL — SIGNIFICANT CHANGE UP (ref 1.2–3.4)
LYMPHOCYTES # BLD AUTO: 12.2 % — LOW (ref 20.5–51.1)
MCHC RBC-ENTMCNC: 29.6 PG — SIGNIFICANT CHANGE UP (ref 27–31)
MCHC RBC-ENTMCNC: 35 G/DL — SIGNIFICANT CHANGE UP (ref 32–37)
MCV RBC AUTO: 84.6 FL — SIGNIFICANT CHANGE UP (ref 80–94)
MONOCYTES # BLD AUTO: 0.37 K/UL — SIGNIFICANT CHANGE UP (ref 0.1–0.6)
MONOCYTES NFR BLD AUTO: 3.7 % — SIGNIFICANT CHANGE UP (ref 1.7–9.3)
NEUTROPHILS # BLD AUTO: 8.31 K/UL — HIGH (ref 1.4–6.5)
NEUTROPHILS NFR BLD AUTO: 83 % — HIGH (ref 42.2–75.2)
NRBC # BLD: 0 /100 WBCS — SIGNIFICANT CHANGE UP (ref 0–0)
PCO2 BLDV: 49 MMHG — SIGNIFICANT CHANGE UP (ref 42–55)
PH BLDV: 7.39 — SIGNIFICANT CHANGE UP (ref 7.32–7.43)
PLATELET # BLD AUTO: 324 K/UL — SIGNIFICANT CHANGE UP (ref 130–400)
PO2 BLDV: 23 MMHG — SIGNIFICANT CHANGE UP
POTASSIUM BLDV-SCNC: 4.1 MMOL/L — SIGNIFICANT CHANGE UP (ref 3.5–5.1)
POTASSIUM SERPL-MCNC: 4.2 MMOL/L — SIGNIFICANT CHANGE UP (ref 3.5–5)
POTASSIUM SERPL-SCNC: 4.2 MMOL/L — SIGNIFICANT CHANGE UP (ref 3.5–5)
PROT SERPL-MCNC: 7.3 G/DL — SIGNIFICANT CHANGE UP (ref 6–8)
RBC # BLD: 5.51 M/UL — SIGNIFICANT CHANGE UP (ref 4.7–6.1)
RBC # FLD: 14.5 % — SIGNIFICANT CHANGE UP (ref 11.5–14.5)
SAO2 % BLDV: 33.8 % — SIGNIFICANT CHANGE UP
SARS-COV-2 RNA SPEC QL NAA+PROBE: SIGNIFICANT CHANGE UP
SODIUM SERPL-SCNC: 137 MMOL/L — SIGNIFICANT CHANGE UP (ref 135–146)
TROPONIN T SERPL-MCNC: <0.01 NG/ML — SIGNIFICANT CHANGE UP
WBC # BLD: 10.01 K/UL — SIGNIFICANT CHANGE UP (ref 4.8–10.8)
WBC # FLD AUTO: 10.01 K/UL — SIGNIFICANT CHANGE UP (ref 4.8–10.8)

## 2022-05-08 PROCEDURE — 99285 EMERGENCY DEPT VISIT HI MDM: CPT

## 2022-05-08 PROCEDURE — 71045 X-RAY EXAM CHEST 1 VIEW: CPT | Mod: 26

## 2022-05-08 PROCEDURE — 93010 ELECTROCARDIOGRAM REPORT: CPT

## 2022-05-08 RX ORDER — ONDANSETRON 8 MG/1
4 TABLET, FILM COATED ORAL ONCE
Refills: 0 | Status: COMPLETED | OUTPATIENT
Start: 2022-05-08 | End: 2022-05-08

## 2022-05-08 RX ORDER — OXYCODONE HYDROCHLORIDE 5 MG/1
30 TABLET ORAL ONCE
Refills: 0 | Status: DISCONTINUED | OUTPATIENT
Start: 2022-05-08 | End: 2022-05-08

## 2022-05-08 RX ORDER — MORPHINE SULFATE 50 MG/1
6 CAPSULE, EXTENDED RELEASE ORAL ONCE
Refills: 0 | Status: DISCONTINUED | OUTPATIENT
Start: 2022-05-08 | End: 2022-05-08

## 2022-05-08 RX ORDER — IPRATROPIUM/ALBUTEROL SULFATE 18-103MCG
3 AEROSOL WITH ADAPTER (GRAM) INHALATION ONCE
Refills: 0 | Status: COMPLETED | OUTPATIENT
Start: 2022-05-08 | End: 2022-05-08

## 2022-05-08 RX ADMIN — OXYCODONE HYDROCHLORIDE 30 MILLIGRAM(S): 5 TABLET ORAL at 11:06

## 2022-05-08 RX ADMIN — Medication 3 MILLILITER(S): at 10:12

## 2022-05-08 RX ADMIN — ONDANSETRON 4 MILLIGRAM(S): 8 TABLET, FILM COATED ORAL at 10:13

## 2022-05-08 RX ADMIN — MORPHINE SULFATE 6 MILLIGRAM(S): 50 CAPSULE, EXTENDED RELEASE ORAL at 10:17

## 2022-05-08 RX ADMIN — MORPHINE SULFATE 6 MILLIGRAM(S): 50 CAPSULE, EXTENDED RELEASE ORAL at 10:32

## 2022-05-08 RX ADMIN — Medication 125 MILLIGRAM(S): at 10:12

## 2022-05-08 NOTE — ED PROVIDER NOTE - CLINICAL SUMMARY MEDICAL DECISION MAKING FREE TEXT BOX
.      Patient with shortness of breath.  On exam patient appeared very comfortable, was requesting opioid medication.  Patient observed in ED walking without distress.  Patient asked nurse multiple times for opioid medication.  Patient came to me at nursing station requesting opioid medication.  I stop reviewed.  Patient has multiple prescriptions for opioid medication.  I gave patient 1 dose of prescribed oxycodone.  Patient later requested IV removed.  Patient eloped from ED in the middle of work-up.    .

## 2022-05-08 NOTE — ED ADULT NURSE NOTE - OBJECTIVE STATEMENT
Pt c/o SOB, cough and vomiting since 2AM. C/O generalized pain and needing "meds". Hx of COPD and chronic generalized pain. Not on O2 at home.

## 2022-05-08 NOTE — ED PROVIDER NOTE - PHYSICAL EXAMINATION
CONSTITUTIONAL: NAD, pt found lying down on R side, no resp distress, looks comfortable  SKIN: Warm dry  HEAD: NCAT  EYES: NL inspection  ENT: MMM  NECK: Supple; non tender.  CARD: RRR  RESP: + harsh breath sounds b/l, no resp distress  ABD: S/NT no R/G  EXT: no pedal edema  NEURO: Grossly unremarkable  PSYCH: Cooperative, appropriate.

## 2022-05-08 NOTE — ED PROVIDER NOTE - OBJECTIVE STATEMENT
56-year-old male past medical history COPD not on home O2, REBECCA, HTN, HLD, osteoarthritis with chronic knee hip and shoulder pain, p/w gradual onset shortness of breath since last night.  Symptoms similar to prior.  Patient did not take nebulized treatments.  Patient is requesting pain medication.  Complaining also of some chest pressure last night that has resolved.

## 2022-05-08 NOTE — ED PROVIDER NOTE - NS ED ROS FT
Constitutional:  No fever  Eyes:  No visual changes  ENMT: No neck pain or stiffness  Cardiac:  No chest pain, See HPI  Respiratory:  See HPI  GI:  No nausea, vomiting, diarrhea or abdominal pain.  :  No dysuria, frequency or burning.  MS:  No back pain. No leg swelling  Neuro:  No headache   Skin:  No skin rash

## 2022-05-10 ENCOUNTER — EMERGENCY (EMERGENCY)
Facility: HOSPITAL | Age: 57
LOS: 0 days | Discharge: HOME | End: 2022-05-10
Attending: STUDENT IN AN ORGANIZED HEALTH CARE EDUCATION/TRAINING PROGRAM | Admitting: STUDENT IN AN ORGANIZED HEALTH CARE EDUCATION/TRAINING PROGRAM
Payer: MEDICAID

## 2022-05-10 VITALS — OXYGEN SATURATION: 96 % | HEART RATE: 67 BPM | RESPIRATION RATE: 16 BRPM

## 2022-05-10 VITALS
SYSTOLIC BLOOD PRESSURE: 166 MMHG | RESPIRATION RATE: 16 BRPM | HEIGHT: 70 IN | TEMPERATURE: 98 F | DIASTOLIC BLOOD PRESSURE: 90 MMHG | OXYGEN SATURATION: 99 % | HEART RATE: 71 BPM

## 2022-05-10 DIAGNOSIS — R07.9 CHEST PAIN, UNSPECIFIED: ICD-10-CM

## 2022-05-10 DIAGNOSIS — Z98.890 OTHER SPECIFIED POSTPROCEDURAL STATES: Chronic | ICD-10-CM

## 2022-05-10 DIAGNOSIS — E07.9 DISORDER OF THYROID, UNSPECIFIED: ICD-10-CM

## 2022-05-10 DIAGNOSIS — Z90.49 ACQUIRED ABSENCE OF OTHER SPECIFIED PARTS OF DIGESTIVE TRACT: ICD-10-CM

## 2022-05-10 DIAGNOSIS — M19.90 UNSPECIFIED OSTEOARTHRITIS, UNSPECIFIED SITE: ICD-10-CM

## 2022-05-10 DIAGNOSIS — K40.20 BILATERAL INGUINAL HERNIA, WITHOUT OBSTRUCTION OR GANGRENE, NOT SPECIFIED AS RECURRENT: Chronic | ICD-10-CM

## 2022-05-10 DIAGNOSIS — G47.33 OBSTRUCTIVE SLEEP APNEA (ADULT) (PEDIATRIC): ICD-10-CM

## 2022-05-10 DIAGNOSIS — Z96.653 PRESENCE OF ARTIFICIAL KNEE JOINT, BILATERAL: ICD-10-CM

## 2022-05-10 DIAGNOSIS — I10 ESSENTIAL (PRIMARY) HYPERTENSION: ICD-10-CM

## 2022-05-10 DIAGNOSIS — K21.9 GASTRO-ESOPHAGEAL REFLUX DISEASE WITHOUT ESOPHAGITIS: ICD-10-CM

## 2022-05-10 DIAGNOSIS — J44.9 CHRONIC OBSTRUCTIVE PULMONARY DISEASE, UNSPECIFIED: ICD-10-CM

## 2022-05-10 DIAGNOSIS — Z96.651 PRESENCE OF RIGHT ARTIFICIAL KNEE JOINT: Chronic | ICD-10-CM

## 2022-05-10 DIAGNOSIS — J44.1 CHRONIC OBSTRUCTIVE PULMONARY DISEASE WITH (ACUTE) EXACERBATION: ICD-10-CM

## 2022-05-10 DIAGNOSIS — Z20.822 CONTACT WITH AND (SUSPECTED) EXPOSURE TO COVID-19: ICD-10-CM

## 2022-05-10 DIAGNOSIS — F17.210 NICOTINE DEPENDENCE, CIGARETTES, UNCOMPLICATED: ICD-10-CM

## 2022-05-10 DIAGNOSIS — R06.02 SHORTNESS OF BREATH: ICD-10-CM

## 2022-05-10 LAB
ALBUMIN SERPL ELPH-MCNC: 4.1 G/DL — SIGNIFICANT CHANGE UP (ref 3.5–5.2)
ALP SERPL-CCNC: 113 U/L — SIGNIFICANT CHANGE UP (ref 30–115)
ALT FLD-CCNC: 29 U/L — SIGNIFICANT CHANGE UP (ref 0–41)
ANION GAP SERPL CALC-SCNC: 14 MMOL/L — SIGNIFICANT CHANGE UP (ref 7–14)
AST SERPL-CCNC: 34 U/L — SIGNIFICANT CHANGE UP (ref 0–41)
BASE EXCESS BLDV CALC-SCNC: 8.7 MMOL/L — HIGH (ref -2–3)
BASOPHILS # BLD AUTO: 0.03 K/UL — SIGNIFICANT CHANGE UP (ref 0–0.2)
BASOPHILS NFR BLD AUTO: 0.3 % — SIGNIFICANT CHANGE UP (ref 0–1)
BILIRUB SERPL-MCNC: 1.3 MG/DL — HIGH (ref 0.2–1.2)
BUN SERPL-MCNC: 24 MG/DL — HIGH (ref 10–20)
CA-I SERPL-SCNC: 1.09 MMOL/L — LOW (ref 1.15–1.33)
CALCIUM SERPL-MCNC: 9 MG/DL — SIGNIFICANT CHANGE UP (ref 8.5–10.1)
CHLORIDE SERPL-SCNC: 91 MMOL/L — LOW (ref 98–110)
CO2 SERPL-SCNC: 28 MMOL/L — SIGNIFICANT CHANGE UP (ref 17–32)
CREAT SERPL-MCNC: 1.1 MG/DL — SIGNIFICANT CHANGE UP (ref 0.7–1.5)
EGFR: 80 ML/MIN/1.73M2 — SIGNIFICANT CHANGE UP
EOSINOPHIL # BLD AUTO: 0.01 K/UL — SIGNIFICANT CHANGE UP (ref 0–0.7)
EOSINOPHIL NFR BLD AUTO: 0.1 % — SIGNIFICANT CHANGE UP (ref 0–8)
GAS PNL BLDV: 126 MMOL/L — LOW (ref 136–145)
GAS PNL BLDV: SIGNIFICANT CHANGE UP
GLUCOSE SERPL-MCNC: 97 MG/DL — SIGNIFICANT CHANGE UP (ref 70–99)
HCO3 BLDV-SCNC: 35 MMOL/L — HIGH (ref 22–29)
HCT VFR BLD CALC: 45.5 % — SIGNIFICANT CHANGE UP (ref 42–52)
HCT VFR BLDA CALC: 51 % — SIGNIFICANT CHANGE UP (ref 39–51)
HGB BLD CALC-MCNC: 16.9 G/DL — SIGNIFICANT CHANGE UP (ref 12.6–17.4)
HGB BLD-MCNC: 15.7 G/DL — SIGNIFICANT CHANGE UP (ref 14–18)
IMM GRANULOCYTES NFR BLD AUTO: 0.4 % — HIGH (ref 0.1–0.3)
LACTATE BLDV-MCNC: 1.5 MMOL/L — SIGNIFICANT CHANGE UP (ref 0.5–2)
LYMPHOCYTES # BLD AUTO: 1.58 K/UL — SIGNIFICANT CHANGE UP (ref 1.2–3.4)
LYMPHOCYTES # BLD AUTO: 13.7 % — LOW (ref 20.5–51.1)
MCHC RBC-ENTMCNC: 28.9 PG — SIGNIFICANT CHANGE UP (ref 27–31)
MCHC RBC-ENTMCNC: 34.5 G/DL — SIGNIFICANT CHANGE UP (ref 32–37)
MCV RBC AUTO: 83.8 FL — SIGNIFICANT CHANGE UP (ref 80–94)
MONOCYTES # BLD AUTO: 0.87 K/UL — HIGH (ref 0.1–0.6)
MONOCYTES NFR BLD AUTO: 7.6 % — SIGNIFICANT CHANGE UP (ref 1.7–9.3)
NEUTROPHILS # BLD AUTO: 8.98 K/UL — HIGH (ref 1.4–6.5)
NEUTROPHILS NFR BLD AUTO: 77.9 % — HIGH (ref 42.2–75.2)
NRBC # BLD: 0 /100 WBCS — SIGNIFICANT CHANGE UP (ref 0–0)
NT-PROBNP SERPL-SCNC: 172 PG/ML — SIGNIFICANT CHANGE UP (ref 0–300)
PCO2 BLDV: 53 MMHG — SIGNIFICANT CHANGE UP (ref 42–55)
PH BLDV: 7.43 — SIGNIFICANT CHANGE UP (ref 7.32–7.43)
PLATELET # BLD AUTO: 268 K/UL — SIGNIFICANT CHANGE UP (ref 130–400)
PO2 BLDV: 21 MMHG — SIGNIFICANT CHANGE UP
POTASSIUM BLDV-SCNC: 3.2 MMOL/L — LOW (ref 3.5–5.1)
POTASSIUM SERPL-MCNC: 3.5 MMOL/L — SIGNIFICANT CHANGE UP (ref 3.5–5)
POTASSIUM SERPL-SCNC: 3.5 MMOL/L — SIGNIFICANT CHANGE UP (ref 3.5–5)
PROT SERPL-MCNC: 7 G/DL — SIGNIFICANT CHANGE UP (ref 6–8)
RAPID RVP RESULT: SIGNIFICANT CHANGE UP
RBC # BLD: 5.43 M/UL — SIGNIFICANT CHANGE UP (ref 4.7–6.1)
RBC # FLD: 14.6 % — HIGH (ref 11.5–14.5)
SAO2 % BLDV: 30.5 % — SIGNIFICANT CHANGE UP
SARS-COV-2 RNA SPEC QL NAA+PROBE: SIGNIFICANT CHANGE UP
SARS-COV-2 RNA SPEC QL NAA+PROBE: SIGNIFICANT CHANGE UP
SODIUM SERPL-SCNC: 133 MMOL/L — LOW (ref 135–146)
TROPONIN T SERPL-MCNC: <0.01 NG/ML — SIGNIFICANT CHANGE UP
WBC # BLD: 11.52 K/UL — HIGH (ref 4.8–10.8)
WBC # FLD AUTO: 11.52 K/UL — HIGH (ref 4.8–10.8)

## 2022-05-10 PROCEDURE — 76937 US GUIDE VASCULAR ACCESS: CPT | Mod: 26

## 2022-05-10 PROCEDURE — 99285 EMERGENCY DEPT VISIT HI MDM: CPT | Mod: 25

## 2022-05-10 PROCEDURE — 36000 PLACE NEEDLE IN VEIN: CPT

## 2022-05-10 PROCEDURE — 71045 X-RAY EXAM CHEST 1 VIEW: CPT | Mod: 26

## 2022-05-10 PROCEDURE — 93010 ELECTROCARDIOGRAM REPORT: CPT

## 2022-05-10 RX ORDER — IPRATROPIUM/ALBUTEROL SULFATE 18-103MCG
3 AEROSOL WITH ADAPTER (GRAM) INHALATION
Refills: 0 | Status: COMPLETED | OUTPATIENT
Start: 2022-05-10 | End: 2022-05-10

## 2022-05-10 RX ORDER — ENOXAPARIN SODIUM 100 MG/ML
40 INJECTION SUBCUTANEOUS EVERY 24 HOURS
Refills: 0 | Status: DISCONTINUED | OUTPATIENT
Start: 2022-05-10 | End: 2022-05-10

## 2022-05-10 RX ORDER — ONDANSETRON 8 MG/1
4 TABLET, FILM COATED ORAL ONCE
Refills: 0 | Status: COMPLETED | OUTPATIENT
Start: 2022-05-10 | End: 2022-05-10

## 2022-05-10 RX ORDER — OXYCODONE AND ACETAMINOPHEN 5; 325 MG/1; MG/1
1 TABLET ORAL ONCE
Refills: 0 | Status: DISCONTINUED | OUTPATIENT
Start: 2022-05-10 | End: 2022-05-10

## 2022-05-10 RX ORDER — IPRATROPIUM/ALBUTEROL SULFATE 18-103MCG
3 AEROSOL WITH ADAPTER (GRAM) INHALATION EVERY 4 HOURS
Refills: 0 | Status: DISCONTINUED | OUTPATIENT
Start: 2022-05-10 | End: 2022-05-10

## 2022-05-10 RX ADMIN — OXYCODONE AND ACETAMINOPHEN 1 TABLET(S): 5; 325 TABLET ORAL at 09:59

## 2022-05-10 RX ADMIN — Medication 3 MILLILITER(S): at 08:18

## 2022-05-10 RX ADMIN — OXYCODONE AND ACETAMINOPHEN 1 TABLET(S): 5; 325 TABLET ORAL at 08:18

## 2022-05-10 RX ADMIN — OXYCODONE AND ACETAMINOPHEN 1 TABLET(S): 5; 325 TABLET ORAL at 08:48

## 2022-05-10 RX ADMIN — Medication 3 MILLILITER(S): at 08:19

## 2022-05-10 RX ADMIN — Medication 125 MILLIGRAM(S): at 07:31

## 2022-05-10 RX ADMIN — ONDANSETRON 4 MILLIGRAM(S): 8 TABLET, FILM COATED ORAL at 07:30

## 2022-05-10 RX ADMIN — Medication 3 MILLILITER(S): at 07:30

## 2022-05-10 NOTE — H&P ADULT - HISTORY OF PRESENT ILLNESS
Patient is a 55 yo male with PMHx of REBECCA, COPD not on home O2, GERD, colitis, HTN, HLD c/o SOB and chest pain since yesterday. Patient started with increased SOB at rest, associated midsternal, sharp, intermittent, chest pain, radiating to the left. Denies fever, chills, cough, abdominal pain, n/v/d.      In The ED:  Vitals: T 97.7, /90, Hr 71, RR 16, 99% on RA  CXR: Diffuse mild interstitial prominence, slightly increased. No pneumothorax, focal consolidation or significant pleural effusion on frontal view.  EKG: NSR  Labs: WBC 11.5, Na 133, Trop 0.01,

## 2022-05-10 NOTE — H&P ADULT - NSHPLABSRESULTS_GEN_ALL_CORE
15.7   11.52 )-----------( 268      ( 10 May 2022 08:14 )             45.5       05-10    133<L>  |  91<L>  |  24<H>  ----------------------------<  97  3.5   |  28  |  1.1    Ca    9.0      10 May 2022 08:14    TPro  7.0  /  Alb  4.1  /  TBili  1.3<H>  /  DBili  x   /  AST  34  /  ALT  29  /  AlkPhos  113  05-10

## 2022-05-10 NOTE — ED PROVIDER NOTE - PHYSICAL EXAMINATION
CONSTITUTIONAL: in no acute distress, laying on the bed  SKIN: warm, dry  HEAD: Normocephalic; atraumatic.  EYES: no conjunctival injection. PERRL.   ENT: No nasal discharge; airway clear.  NECK: Supple; non tender.  CARD: S1, S2 normal; no murmurs, gallops, or rubs. Regular rate and rhythm.   RESP: Mild respiratory accessory muscle use. Diffuse wheezing. No rales or rhonchi.  ABD: soft ntnd  EXT: Normal ROM.  No clubbing, cyanosis or edema.   LYMPH: No acute cervical adenopathy.  NEURO: Alert, oriented, grossly unremarkable  PSYCH: Cooperative, appropriate.

## 2022-05-10 NOTE — ED PROVIDER NOTE - ATTENDING CONTRIBUTION TO CARE
55 yo male with PMHx of REBECCA, COPD not on home O2, GERD, colitis, HTN, HLD   pt presents for eval of CP/SOB.  sx ongoing for 3 days. sx more like his COPD sx. pt was seen in ED recently and left AMA.  pt continues to smoke which further exacerbated his COPD prompting return to ED. no fevers, new cough, uri sx . pt w/ chronic pain and on chronic opioids for it.    vss  gen- NAD, aaox3  card-rrr  lungs-diffuse wheezing, mild resp distress, speaking full sentences  abd-sntnd, no guarding or rebound  neuro- full str/sensation, cn ii-xii grossly intact, normal coordination    more likely COPD, r/o acs, assess for pna  trigger like smoking  labs w/ trop, ekg, cxr  will provide supportive care, serial exam and ED observation period

## 2022-05-10 NOTE — ED PROVIDER NOTE - PROGRESS NOTE DETAILS
MQ: Will give duonebs, steroids, obtain labs, ecg, cxr and reassess MQ: Still with SOB, improved slightly, will admit

## 2022-05-10 NOTE — ED ADULT NURSE REASSESSMENT NOTE - NS ED NURSE REASSESS COMMENT FT1
patient awake alert oriented x3 noted to have loose cough with rhonchi throughout lung fields. NSR on monitor. patient awaiting lab results and reassessment after neb tx

## 2022-05-10 NOTE — ED PROVIDER NOTE - OBJECTIVE STATEMENT
Patient is a 55 yo male with PMHx of REBECCA, COPD not on home O2, GERD, colitis, HTN, HLD c/o SOB and chest pain since yesterday. Patient started with increased SOB at rest, associated midsternal, sharp, intermittent, chest pain, radiating to the left. Denies fever, chills, cough, abdominal pain, n/v/d.

## 2022-05-10 NOTE — ED PROVIDER NOTE - CLINICAL SUMMARY MEDICAL DECISION MAKING FREE TEXT BOX
CP w/u somewhat reassuring w/ negative trop and bnp. pt treated for COPD and still w/ significant wheezing. will admit for acs r/o and further copd management

## 2022-05-10 NOTE — ED ADULT TRIAGE NOTE - CHIEF COMPLAINT QUOTE
Biba due to chest pain 8/10 with SOB x1.5 days. Biba due to chest pain 8/10 with SOB x1.5 days. History of COPD. Pt signed out AMA yesterday.

## 2022-05-10 NOTE — ED PROVIDER NOTE - NS ED ROS FT
Review of Systems:  •	CONSTITUTIONAL - No fever, No diaphoresis, No weight change  •	SKIN - No rash  •	HEMATOLOGIC - No abnormal bleeding or bruising  •	EYES - No eye pain, No blurred vision  •	ENT - No change in hearing, No sore throat, No neck pain, No rhinorrhea, No ear pain  •	RESPIRATORY - + shortness of breath, No cough  •	CARDIAC - + chest pain, No palpitations  •	GI - No abdominal pain, No nausea, No vomiting, No diarrhea, No constipation, No bright red blood per rectum or melena. No flank pain  •                 - No dysuria, frequency, hematuria.   •	ENDO - No polydypsia, No polyuria, No heat/cold intolerance  •	MUSCULOSKELETAL - No joint paint, No swelling, No back pain  •	NEUROLOGIC - No numbness, No focal weakness, No headache, No dizziness  All other systems negative, unless specified in HPI

## 2022-05-10 NOTE — ED ADULT NURSE NOTE - OBJECTIVE STATEMENT
Pt c/o of chest pain, back pain, left arm numbness. Pt signed out AMA yesterday, says "I shouldn't have left".

## 2022-06-05 ENCOUNTER — EMERGENCY (EMERGENCY)
Facility: HOSPITAL | Age: 57
LOS: 0 days | Discharge: HOME | End: 2022-06-05
Attending: EMERGENCY MEDICINE | Admitting: EMERGENCY MEDICINE
Payer: MEDICAID

## 2022-06-05 VITALS
HEART RATE: 84 BPM | SYSTOLIC BLOOD PRESSURE: 148 MMHG | TEMPERATURE: 97 F | RESPIRATION RATE: 18 BRPM | DIASTOLIC BLOOD PRESSURE: 88 MMHG | HEIGHT: 70 IN | OXYGEN SATURATION: 95 %

## 2022-06-05 DIAGNOSIS — I10 ESSENTIAL (PRIMARY) HYPERTENSION: ICD-10-CM

## 2022-06-05 DIAGNOSIS — K40.20 BILATERAL INGUINAL HERNIA, WITHOUT OBSTRUCTION OR GANGRENE, NOT SPECIFIED AS RECURRENT: Chronic | ICD-10-CM

## 2022-06-05 DIAGNOSIS — G47.33 OBSTRUCTIVE SLEEP APNEA (ADULT) (PEDIATRIC): ICD-10-CM

## 2022-06-05 DIAGNOSIS — Z96.651 PRESENCE OF RIGHT ARTIFICIAL KNEE JOINT: Chronic | ICD-10-CM

## 2022-06-05 DIAGNOSIS — R06.02 SHORTNESS OF BREATH: ICD-10-CM

## 2022-06-05 DIAGNOSIS — J44.1 CHRONIC OBSTRUCTIVE PULMONARY DISEASE WITH (ACUTE) EXACERBATION: ICD-10-CM

## 2022-06-05 DIAGNOSIS — E78.5 HYPERLIPIDEMIA, UNSPECIFIED: ICD-10-CM

## 2022-06-05 DIAGNOSIS — Z87.891 PERSONAL HISTORY OF NICOTINE DEPENDENCE: ICD-10-CM

## 2022-06-05 DIAGNOSIS — Z98.890 OTHER SPECIFIED POSTPROCEDURAL STATES: Chronic | ICD-10-CM

## 2022-06-05 PROCEDURE — 99284 EMERGENCY DEPT VISIT MOD MDM: CPT

## 2022-06-05 RX ORDER — IPRATROPIUM/ALBUTEROL SULFATE 18-103MCG
3 AEROSOL WITH ADAPTER (GRAM) INHALATION
Refills: 0 | Status: DISCONTINUED | OUTPATIENT
Start: 2022-06-05 | End: 2022-06-05

## 2022-06-05 RX ORDER — ALBUTEROL 90 UG/1
2.5 AEROSOL, METERED ORAL
Refills: 0 | Status: DISCONTINUED | OUTPATIENT
Start: 2022-06-05 | End: 2022-06-05

## 2022-06-05 RX ADMIN — Medication 3 MILLILITER(S): at 05:04

## 2022-06-05 RX ADMIN — Medication 3 MILLILITER(S): at 04:39

## 2022-06-05 RX ADMIN — Medication 50 MILLIGRAM(S): at 04:38

## 2022-06-05 NOTE — ED PROVIDER NOTE - NSFOLLOWUPINSTRUCTIONS_ED_ALL_ED_FT
You have a history of COPD. In the hospital you were treated for an exacerbation of this condition. Continue using your home inhalers upon discharge. Follow-up with your primary care doctor within one week of discharge from rehab for further monitoring of this condition

## 2022-06-05 NOTE — ED PROVIDER NOTE - NS ED ROS FT
Constitutional: No fever, chills.  Eyes:  No visual changes  ENMT:  No neck pain  Cardiac:  No chest pain  Respiratory: (+)SOB. No cough  GI:  No nausea, vomiting, diarrhea, abdominal pain.  :  No dysuria, hematuria  MS:  No back pain.  Neuro:  No headache or lightheadedness  Skin:  No skin rash    Except as documented in the HPI,  all other systems are negative.

## 2022-06-05 NOTE — ED PROVIDER NOTE - PHYSICAL EXAMINATION
GENERAL: Well-nourished, Well-developed. NAD.  HEAD: No visible or palpable bumps or hematomas. No ecchymosis behind ears B/L.  Eyes: PERRLA, EOMI. No asymmetry. No nystagmus. No conjunctival injection. Non-icteric sclera.  ENMT: MMM.   Neck: Supple. FROM  CVS: RRR. Normal S1,S2. No murmurs appreciated on auscultation   RESP: No use of accessory muscles. Chest rise symmetrical with good expansion. (+)diffuse expiratory wheezing  Skin: Warm, Dry. No rashes or lesions. Good cap refill < 2 sec B/L.  EXT: Radial and pedal pulses present B/L. No calf tenderness or swelling B/L. No palpable cords. No pedal edema B/L.  Neuro: AA&O x 3. Sensation grossly intact. Strength 5/5 B/L. Gait within normal limits.

## 2022-06-05 NOTE — ED PROVIDER NOTE - CLINICAL SUMMARY MEDICAL DECISION MAKING FREE TEXT BOX
56-year-old male with a history of COPD presents to the ED for COPD exacerbation.  Intermittent shortness of breath for a month that has been alleviated with beta agonist therapy at home.  Vitals reviewed by me noted to be wnl.  Physical exam today reveals diffuse wheezing during the expiratory phase.  Mild retractions but able to speak in full sentences.  Given duo nebs along with steroids.  On reevaluation with moderate resolution of wheezing.  Patient speaking in full sentences.  97% on room air.  Prescription for steroids sent to the pharmacy.  Return precautions for worsening shortness of breath advised the patient.  Patient understood plan.  Discharged home.

## 2022-06-05 NOTE — ED PROVIDER NOTE - OBJECTIVE STATEMENT
56-year-old male past medical history of REBECCA, COPD (not on home O2), hypertension, hyperlipidemia presenting to the ED for evaluation of COPD exacerbation.  Patient reports for 1 month intermittent episodes of shortness of breath that are alleviated by inhalers/nebulizers.  Patient reports earlier today he took nebulizer with minimal relief presenting to ED for further evaluation.  Denies fever, chills, chest pain, lower extremity swelling, calf pain, nausea, vomiting, abdominal pain, dizziness, weakness.

## 2022-06-05 NOTE — ED PROVIDER NOTE - PATIENT PORTAL LINK FT
You can access the FollowMyHealth Patient Portal offered by [facility] by registering at the following website: [email or web address]. By joining [employer]s FollowMyHealth portal, you will also be able to view your health information using other applications (apps) compatible with our system.

## 2022-06-11 NOTE — DISCHARGE NOTE NURSING/CASE MANAGEMENT/SOCIAL WORK - NSDPDISTO_GEN_ALL_CORE
[facility] Emergency Department/Urgent Care Lab result notification:    [facility] ED lab result protocol used  Rotavirus    Reason for call  Notify of lab results, assess symptoms,  review ED providers recommendations/discharge instructions (if necessary) and advise per ED lab result f/u protocol    Lab Result   Final Enteric Bacteria and Virus Panel by [facility] Stool is POSITIVE for Rotavirus  Recommendations in treatment per [facility] ED Lab Result Enteric Bacteria and Virus Panel protocol.  Information table from Emergency Dept Provider visit on 6/9/22  Symptoms reported at ED visit (Chief complaint, HPI) Nausea, Vomiting, & Diarrhea      Fatigue      The history is provided by the father.      Lily Najera is a 4 year old female here with nausea, vomiting and diarrhea for the past three days.  She has been tired and sleepy as well. Dad and Mom thought it was the stomach flu but she is not getting any better. No fever, no cough or shortness of breath.      No sick contacts at home. The adults at home are not vaccinated against COVID.      ED providers Impression and Plan (applicable information) Lily Najera is a 4 year old female here with nausea, vomiting and diarrhea for the past three days.  She has been tired and sleepy as well. Dad and Mom thought it was the stomach flu but she is not getting any better. No fever, no cough or shortness of breath.  No sick contacts at home. The adults at home are not vaccinated against COVID.  VS in the ED on room air Pulse 130   Temp 99.5  F (37.5  C)   Resp 30   Wt 11.4 kg (25 lb 0.9 oz)   SpO2 100%   Exam shows an ill appearing but not toxic 5 yo female. Heart has tachycardia but heart sounds are normal, lungs are clear. Abdomen is soft and non-tender. She has a flat affect so we started an IV and gave a bolus of 20 mL/ kg.  Labs show CBC normal, BMP with CO2 14, anion gap 19, glucose 68, UA has not been collected yet. 4 Plex negative.   Stool culture pending.  She is now getting D5 NS at 50 mL/hour.  7:00 PM  I am signing out her care to Dr Beltran at change of shift.    Miscellaneous information Assessment and Plan:  We will urine obtained from straight cath and urine bag.  At this point diagnosis seems most consistent with viral gastroenteritis versus a UTI that is associated with diarrhea, particularly with exposure to neighborhood children with similar symptoms.  Patient perking up and now a bit more interactive appears from prior interaction with dayshift provider.  Father requesting discharge home which seems appropriate at this point.  Zofran prescription provided. Attached instructions on diagnosis provided including ED return precautions.  Disposition: home     Patient instructions:   Suspect Lily has a viral infection of her gastrointestinal (GI) system. Use zofran 2 mg (cut in half and give her half tab) for any significant vomiting to help keep fluids down. Follow up with PCP if not improving by Monday. Please review attached instructions including reasons to return to the emergency department, including urinating less than 4 times in a 24 hour period.     RN Assessment (Patient s current Symptoms), include time called.  [Insert Left message here if message left]  1:29PM: Spoke with patient's father. He states that the patient is starting to improve today.   RN Recommendations/Instructions per [facility] ED lab result protocol  Patient's dad notified of lab result and treatment recommendations.   RN reviewed information about Rotavirus from protocol patient education and [facility] reference sheet on Rotavirus and RN reference guide.   Advised to increase fluids. Reviewed household and toy sanitizing. Advised to wash hands well. Etc  The dad states that the ED provider thought that this was the cause of the patient's symptoms. He has no further questions.     Please Contact your PCP clinic or return to the Emergency department if  your:    Symptoms worsen or other concerning symptom's.    PCP follow-up Questions asked: YES       Susan Alfred RN  [facility]  Emergency Dept Lab Result RN  Ph# [phone]     Copy of Lab result   Enteric Bacteria and Virus Panel by [facility] Stool  Order: [id number]   Status: Final result     Visible to patient: No (inaccessible in MyChart)    Specimen Information: Per Rectum; Stool         1 Result Note     Component Ref Range & Units 2 d ago     Campylobacter group Not Detected Not Detected     Salmonella species Not Detected Not Detected     Shigella species Not Detected Not Detected     Vibrio group Not Detected Not Detected     Rotavirus Not Detected Detected Abnormal      Shiga toxin 1 gene Not Detected Not Detected     Shiga toxin 2 gene Not Detected Not Detected     Norovirus I and II Not Detected Not Detected     Yersinia enterocolitica Not Detected Not Detected    Resulting Agency  [facility]             Narrative  Performed by: [facility]  Testing performed by multiplexed, qualitative PCR using the [employer] Enteric Pathogens Nucleic Acid Test. Results should not be used as the sole basis for diagnosis, treatment or other patient management decisions. Positive results do not rule out co-infection with other organisms that are not detected by this test and may not be the sole or definitive cause of patient illness. Negative results in the setting of clinical illness compatible with gastroenteritis may be due to infection by pathogens that are not detected by this test or non-infectious causes such as ulcerative colitis, irritable bowel syndrome or Crohn's disease. Note: Shiga toxin producing E. coli (STEC) typically harbor one or both genes that encode for Shiga toxins 1 and 2.      Specimen Collected: 06/09/22  6:03 PM Last Resulted: 06/10/22  6:02 PM                  Home

## 2022-06-17 NOTE — ED ADULT NURSE NOTE - PERIPHERAL VASCULAR WDL
[facility]   Progress Note and Procedure Note      Venita Headings  AGE: 70 y.o. GENDER: female  : 1946  TODAY'S DATE:  2022    Subjective:     Chief Complaint   Patient presents with    Wound Check     Wound coccyx         HISTORY of PRESENT ILLNESS HPI   69year old female Venita Headings presents today for wound evaluation. Pt was seen in [facility] and discharged after wound closed in 2021. Seen by Dr. Mar Jara. History of Wound:  \"not exactly sure may have been months but put off coming  in to have wound seen\". Knew it was there due to drainage on pad. with healed sacral decubitus ulcer stage 3. Pt reports has a Roho cushion at home, and admits to prolonged sitting and sleeping in recliner even \"I have been talked to about this by Dr. Mar Jara in the past\".     Wound Type:  pressure  Wound Location: coccyx/sacrum  Wound Pain:  small amount  Severity:  1 / 10   Timing: resolved  Modifying Factors:  diabetes, poor glucose control, decreased mobility and malnutrition  Associated Signs/Symptoms: bloody drainage, moderate to small amount. 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  Thyroid disease        PAST SURGICAL HISTORY    Past Surgical History:   Procedure Laterality Date    CARPAL TUNNEL RELEASE      bilateral    CHOLECYSTECTOMY      COLONOSCOPY      ELBOW SURGERY      ENDOSCOPY, COLON, DIAGNOSTIC      EYE SURGERY      FOOT SURGERY      SHOULDER SURGERY         FAMILY HISTORY    History reviewed. No pertinent family history.     SOCIAL HISTORY    Social History     Tobacco Use    Smoking status: Never Smoker    Smokeless tobacco: Never Used   Vaping Use    Vaping Use: Never used   Substance Use Topics    Alcohol use: Not Currently    Drug use: Never       ALLERGIES    Allergies   Allergen Reactions    Other Other (See Comments)     Preservatives in eyedrops-blurred vision    Ace Inhibitors Other (See Comments)    Metoprolol Other (See Comments)    Tetrahydrozoline Hcl Other (See Comments)     Blurry vision    Timolol Other (See Comments)     Blurry vision DO NOT USE       MEDICATIONS    Current Outpatient Medications on File Prior to Encounter   Medication Sig Dispense Refill    traMADol (ULTRAM) 50 MG tablet Take 50 mg by mouth 3 times daily as needed for Pain.  DULoxetine (CYMBALTA) 60 MG extended release capsule Take 1 capsule by mouth daily 30 capsule 3    sodium chloride (OCEAN, BABY AYR) 0.65 % nasal spray 1 spray by Nasal route every 2 hours as needed for Congestion 1 Bottle 0    mometasone-formoterol (DULERA) 200-5 MCG/ACT inhaler Inhale 2 puffs into the lungs every 12 hours 1 Inhaler 1    spironolactone (ALDACTONE) 25 MG tablet Take 12.5 mg by mouth daily      Travoprost, BAK Free, (TRAVATAN Z) 0.004 % SOLN ophthalmic solution Place 1 drop into both eyes nightly      DICLOFENAC PO Take  by mouth.  acetaminophen (TYLENOL) 500 MG tablet Take 500 mg by mouth every 6 hours as needed for Pain or Fever       aspirin EC 81 MG EC tablet Take 81 mg by mouth daily May restart in AM.      furosemide (LASIX) 40 MG tablet Take 40 mg by mouth daily       levothyroxine (LEVOXYL) 200 MCG tablet Take 200 mcg by mouth Daily       Insulin Detemir (LEVEMIR FLEXPEN SC) Inject 33 Units into the skin nightly       simvastatin (ZOCOR) 40 MG tablet Take 40 mg by mouth nightly.         Insulin Lispro, Human, (HUMALOG SC) Inject 10 Units into the skin 3 times daily (with meals) With sliding      Calcium Carbonate-Vitamin D (CALCIUM + D PO) Take by mouth daily        No current facility-administered medications on file prior to encounter. REVIEW OF SYSTEMS    Pertinent items are noted in HPI. Objective:      /84   Pulse (!) 114   Resp 16     PHYSICAL EXAM    General Appearance: alert and oriented to person, place and time, well-developed and well-nourished, in no acute distress, obese and pale  Skin: warm and dry  Head: normocephalic and atraumatic  Eyes: pupils equal, round, and reactive to light and extraocular eye movements abnormal- chronic nystagmus. Pt has hx of cataracts as a child and had surgery. Pt is active with opthalmology. Pulmonary/Chest: clear to auscultation bilaterally- no wheezes, rales or rhonchi, normal air movement, no respiratory distress  Cardiovascular: normal rate and regular rhythm  Wound:  Wound reoccurrence in same area that was healed in Sept 2021. Wound deep, with red, moist wound base with small amount of slough on wound sides. Challenge is to find a dressing that will stay in place secondary to location between buttocks. 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         Devitalized Tissue Debrided:  fibrin, biofilm and slough    Pre Debridement Measurements:  Are located in the Wound Documentation Flow Sheet    Wound #: 1     Post  Debridement Measurements:  Wound 06/16/22 Coccyx Mid #1 (Active)   Wound Image 06/16/22 1515   Wound Etiology Other 06/16/22 1515   Wound Cleansed Cleansed with saline 06/16/22 1515   Wound Length (cm) 6.6 cm 06/16/22 1515   Wound Width (cm) 3 cm 06/16/22 1515   Wound Depth (cm) 3.2 cm 06/16/22 1515   Wound Surface Area (cm^2) 19.8 cm^2 06/16/22 1515   Wound Volume (cm^3) 63.36 cm^3 06/16/22 1515   Post-Procedure Length (cm) 6.7 cm 06/16/22 1600   Post-Procedure Width (cm) 3.1 cm 06/16/22 1600   Post-Procedure Depth (cm) 3.3 cm 06/16/22 1600   Post-Procedure Surface Area (cm^2) 20.77 cm^2 06/16/22 1600   Post-Procedure Volume (cm^3) 68.541 cm^3 06/16/22 1600   Wound Assessment Bleeding 06/16/22 1600   Drainage Amount Moderate 06/16/22 1600   Drainage Description Serosanguinous 06/16/22 1600   Odor None 06/16/22 1515   Saba-wound Assessment Excoriated 06/16/22 1515   Margins Attached edges; Defined edges 06/16/22 1515   Number of days: 0       Total Surface Area Debrided:  20.77sq cm     Percentage of wound debrided 100%    Bleeding:  Estimated amount of blood loss is 1ml. Hemostasis Achieved:  by pressure    Procedural Pain:  1  / 10     Post Procedural Pain:  0 / 10     Response to treatment:  Well tolerated by patient. Plan:     The nature of the patient's condition was explained in depth. The patient was informed that their compliance to the treatment plan is paramount to successful healing and prevention of further ulceration and/or infection     Discharge Treatment   Dressing care: Aquacel Silver Rope, Abd Pad- change Daily. Turn & reposition every 1-2 hours and as needed for pressure relief and comfort. Keep pressure off of your wound as much as possible.  Eat plenty of protein.       Written Patient Discharge Instructions Given            Electronically signed by CARLA Freeman CNP on 6/17/2022 at 11:44 AM Pulses equal bilaterally, no edema present.

## 2022-06-30 PROBLEM — G47.33 OBSTRUCTIVE SLEEP APNEA (ADULT) (PEDIATRIC): Chronic | Status: INACTIVE | Noted: 2018-10-14 | Resolved: 2021-04-03

## 2022-06-30 PROBLEM — R06.02 SHORTNESS OF BREATH: Chronic | Status: INACTIVE | Noted: 2019-01-23 | Resolved: 2021-04-03

## 2022-06-30 PROBLEM — E78.00 PURE HYPERCHOLESTEROLEMIA, UNSPECIFIED: Chronic | Status: INACTIVE | Noted: 2018-11-07 | Resolved: 2021-04-03

## 2022-07-05 ENCOUNTER — EMERGENCY (EMERGENCY)
Facility: HOSPITAL | Age: 57
LOS: 0 days | Discharge: HOME | End: 2022-07-05
Attending: STUDENT IN AN ORGANIZED HEALTH CARE EDUCATION/TRAINING PROGRAM | Admitting: STUDENT IN AN ORGANIZED HEALTH CARE EDUCATION/TRAINING PROGRAM

## 2022-07-05 VITALS
SYSTOLIC BLOOD PRESSURE: 126 MMHG | HEART RATE: 94 BPM | DIASTOLIC BLOOD PRESSURE: 72 MMHG | RESPIRATION RATE: 18 BRPM | TEMPERATURE: 97 F | OXYGEN SATURATION: 96 % | HEIGHT: 70 IN

## 2022-07-05 DIAGNOSIS — G89.29 OTHER CHRONIC PAIN: ICD-10-CM

## 2022-07-05 DIAGNOSIS — M54.42 LUMBAGO WITH SCIATICA, LEFT SIDE: ICD-10-CM

## 2022-07-05 DIAGNOSIS — Z98.890 OTHER SPECIFIED POSTPROCEDURAL STATES: Chronic | ICD-10-CM

## 2022-07-05 DIAGNOSIS — Z96.651 PRESENCE OF RIGHT ARTIFICIAL KNEE JOINT: Chronic | ICD-10-CM

## 2022-07-05 DIAGNOSIS — M54.50 LOW BACK PAIN, UNSPECIFIED: ICD-10-CM

## 2022-07-05 DIAGNOSIS — M54.2 CERVICALGIA: ICD-10-CM

## 2022-07-05 DIAGNOSIS — K40.20 BILATERAL INGUINAL HERNIA, WITHOUT OBSTRUCTION OR GANGRENE, NOT SPECIFIED AS RECURRENT: Chronic | ICD-10-CM

## 2022-07-05 PROCEDURE — 99284 EMERGENCY DEPT VISIT MOD MDM: CPT

## 2022-07-05 RX ORDER — KETOROLAC TROMETHAMINE 30 MG/ML
15 SYRINGE (ML) INJECTION ONCE
Refills: 0 | Status: DISCONTINUED | OUTPATIENT
Start: 2022-07-05 | End: 2022-07-05

## 2022-07-05 RX ADMIN — Medication 15 MILLIGRAM(S): at 13:44

## 2022-07-05 NOTE — ED PROVIDER NOTE - NS ED ROS FT
Constitutional: Negative for fever, chills, and fatigue.  HENT: Negative for headache  Cardiovascular: Negative for chest pain, and palpitation.  Respiratory: Negative for SOB  Gastrointestinal: Negative for nausea, vomiting, abdominal pain  Musculoskeletal: + back pain. Negative for joint swelling, arthralgias, neck pain, and calf cramps.

## 2022-07-05 NOTE — ED PROVIDER NOTE - PHYSICAL EXAMINATION
yes CONSTITUTIONAL: Well-appearing; in no apparent distress.   ENT: Hearing is intact with good acuity to spoken voice. Patient is speaking clearly, not muffled and airway is intact.   RESPIRATORY: No signs of respiratory distress.   CARDIOVASCULAR: Regular rate and rhythm.   GI: Abdomen is soft, non-tender, and without distention.   BACK: Tender to palpation in the L lower back; no rash noticed. No evidence of trauma or deformity. No midline tenderness. No CVA tenderness bilaterally. Normal ROM.   PELVIS: No evidence of trauma or deformity. No tenderness to palpation.  NEURO: A & O x 3. Normal speech.  PSYCHOLOGICAL: Appropriate mood and affect. Good judgement and insight.

## 2022-07-05 NOTE — ED PROVIDER NOTE - ATTENDING APP SHARED VISIT CONTRIBUTION OF CARE
56-year-old male past medical history of sciatica presents with back pain.  Patient has history of chronic back pain over the left lower back.  Patient states that back pain became more severe few days ago.  No recent injury or inciting incident.  No urinary/bowel incontinence, no saddle anesthesia, no fever/chills, no history of IVDA, no chest pain, no shortness of breath, no abdominal pain, no nausea/vomiting/diarrhea, no urinary symptoms.  Patient has been taking oxycodone at home for back pain.    CONSTITUTIONAL: Well-developed; well-nourished; in no acute distress. Sitting up and providing appropriate history and physical examination  SKIN: skin exam is warm and dry, no acute rash.  HEAD: Normocephalic; atraumatic.  EYES: PERRL, 3 mm bilateral, no nystagmus, EOM intact; conjunctiva and sclera clear.  ENT: No nasal discharge; airway clear.  NECK: Supple; non tender. + full passive ROM in all directions. No JVD  CARD: S1, S2 normal; no murmurs, gallops, or rubs. Regular rate and rhythm. + Symmetric Strong Pulses  RESP: No wheezes, rales or rhonchi. Good air movement bilaterally  ABD: soft; non-distended; non-tender. No Rebound, No Guarding, No signs of peritonitis, No CVA tenderness. No pulsatile abdominal mass. + Strong and Symmetric Pulses  EXT: + TTP over left paraspinal lumbar back.  No midline spinal tenderness.  Normal ROM. No clubbing, cyanosis or edema. Dp and Pt Pulses intact. Cap refill less than 3 seconds  NEURO: CN 2-12 intact, normal finger to nose, normal romberg, stable gait, no sensory or motor deficits, Alert, oriented, grossly unremarkable. No Focal deficits. GCS 15. NIH 0  PSYCH: Cooperative, appropriate.

## 2022-07-05 NOTE — ED PROVIDER NOTE - CLINICAL SUMMARY MEDICAL DECISION MAKING FREE TEXT BOX
I personally evaluated the patient. I reviewed the Resident´s or Physician Assistant´s note (as assigned above), and agree with the findings and plan except as documented in my note.  Patient evaluated for back pain.  Patient neurovascularly intact, ambulatory in the ED.  Patient has history of chronic back pain that he is currently on oxycodone for.  Patient given 1 dose of Toradol in the ED, instructed to follow-up with his pain management doctor and PMD.  I have fully discussed the medical management and delivery of care with the patient. I have discussed any available labs, imaging and treatment options with the patient. Patient confirms understanding and has been given detailed return precautions. Patient instructed to return to the ED should symptoms persist or worsen. Patient has demonstrated capacity and has verbalized understanding. Patient is well appearing upon discharge.

## 2022-07-05 NOTE — ED PROVIDER NOTE - OBJECTIVE STATEMENT
56-year-old male with a past medical history of sciatica who presents with neck pain.  Reports that back pain is on the left lower back.  Reports that he has chronic back pain, but this pain seems to be more severe since a few days ago.  Denies recent trauma or injury or fall.  Denies saddle esthesia, urinary incontinence, loss of bowel control.  Denies fever, shortness of breath, chest pain, nausea, vomiting, abdominal pain, urinary symptoms continue bowel movement.  Reports that he has been taking oxycodone for his chronic back pain.

## 2022-07-05 NOTE — ED PROVIDER NOTE - PROGRESS NOTE DETAILS
Physical exam is consistent with soft tissue injury. No midline tenderness. Pt is already on oxycodone. Toraldo given in the ED. Will refer pt to ortho outpatient with rapid referral. Pt is stable for discharge.

## 2022-07-05 NOTE — ED PROVIDER NOTE - NSFOLLOWUPINSTRUCTIONS_ED_ALL_ED_FT
Our Emergency Department Referral Coordinators will be reaching out ot you in the next 24-48 hours from 9:00am to 5:00pm (Monday to Friday) with a follow up appointment. Please expect a phone call from the hospital in that time frame. If you do not receive a call or if you have any questions or concerns, you can reach them at [phone] or [phone].      Chronic Back Pain      When back pain lasts longer than 3 months, it is called chronic back pain. The cause of your back pain may not be known. Some common causes include:  •Wear and tear (degenerative disease) of the bones, ligaments, or disks in your back.      •Inflammation and stiffness in your back (arthritis).      People who have chronic back pain often go through certain periods in which the pain is more intense (flare-ups). Many people can learn to manage the pain with home care.      Follow these instructions at home:    Pay attention to any changes in your symptoms. Take these actions to help with your pain:      Managing pain and stiffness                 •If directed, apply ice to the painful area. Your health care provider may recommend applying ice during the first 24–48 hours after a flare-up begins. To do this:  •Put ice in a plastic bag.      •Place a towel between your skin and the bag.      •Leave the ice on for 20 minutes, 2–3 times per day.      •If directed, apply heat to the affected area as often as told by your health care provider. Use the heat source that your health care provider recommends, such as a moist heat pack or a heating pad.  •Place a towel between your skin and the heat source.      •Leave the heat on for 20–30 minutes.      •Remove the heat if your skin turns bright red. This is especially important if you are unable to feel pain, heat, or cold. You may have a greater risk of getting burned.        •Try soaking in a warm tub.        Activity      •Avoid bending and other activities that make the problem worse.    •Maintain a proper position when standing or sitting:  •When standing, keep your upper back and neck straight, with your shoulders pulled back. Avoid slouching.      •When sitting, keep your back straight and relax your shoulders. Do not round your shoulders or pull them backward.        • Do not sit or  one place for long periods of time.      •Take brief periods of rest throughout the day. This will reduce your pain. Resting in a lying or standing position is usually better than sitting to rest.      •When you are resting for longer periods, mix in some mild activity or stretching between periods of rest. This will help to prevent stiffness and pain.      •Get regular exercise. Ask your health care provider what activities are safe for you.    • Do not lift anything that is heavier than 10 lb (4.5 kg), or the limit that you are told, until your health care provider says that it is safe. Always use proper lifting technique, which includes:  •Bending your knees.      •Keeping the load close to your body.      •Avoiding twisting.        •Sleep on a firm mattress in a comfortable position. Try lying on your side with your knees slightly bent. If you lie on your back, put a pillow under your knees.      Medicines     •Treatment may include medicines for pain and inflammation taken by mouth or applied to the skin, prescription pain medicine, or muscle relaxants. Take over-the-counter and prescription medicines only as told by your health care provider.    •Ask your health care provider if the medicine prescribed to you:  •Requires you to avoid driving or using machinery.    •Can cause constipation. You may need to take these actions to prevent or treat constipation:  •Drink enough fluid to keep your urine pale yellow.      •Take over-the-counter or prescription medicines.      •Eat foods that are high in fiber, such as beans, whole grains, and fresh fruits and vegetables.      •Limit foods that are high in fat and processed sugars, such as fried or sweet foods.          General instructions     • Do not use any products that contain nicotine or tobacco, such as cigarettes, e-cigarettes, and chewing tobacco. If you need help quitting, ask your health care provider.      •Keep all follow-up visits as told by your health care provider. This is important.        Contact a health care provider if:    •You have pain that is not relieved with rest or medicine.      •Your pain gets worse, or you have new pain.      •You have a high fever.      •You have rapid weight loss.      •You have trouble doing your normal activities.        Get help right away if:    •You have weakness or numbness in one or both of your legs or feet.      •You have trouble controlling your bladder or your bowels.    •You have severe back pain and have any of the following:  •Nausea or vomiting.      •Pain in your abdomen.      •Shortness of breath or you faint.          Summary    •Chronic back pain is back pain that lasts longer than 3 months.      •When a flare-up begins, apply ice to the painful area for the first 24–48 hours.      •Apply a moist heat pad or use a heating pad on the painful area as directed by your health care provider.      •When you are resting for longer periods, mix in some mild activity or stretching between periods of rest. This will help to prevent stiffness and pain.      This information is not intended to replace advice given to you by your health care provider. Make sure you discuss any questions you have with your health care provider.

## 2022-07-05 NOTE — ED PROVIDER NOTE - PATIENT PORTAL LINK FT
You can access the FollowMyHealth Patient Portal offered by [facility] by registering at the following website: [email or web address]. By joining [employer]’s FollowMyHealth portal, you will also be able to view your health information using other applications (apps) compatible with our system.

## 2022-07-20 ENCOUNTER — EMERGENCY (EMERGENCY)
Facility: HOSPITAL | Age: 57
LOS: 0 days | Discharge: HOME | End: 2022-07-20
Attending: EMERGENCY MEDICINE | Admitting: EMERGENCY MEDICINE

## 2022-07-20 VITALS
HEART RATE: 98 BPM | HEIGHT: 70 IN | SYSTOLIC BLOOD PRESSURE: 114 MMHG | TEMPERATURE: 98 F | WEIGHT: 289.03 LBS | DIASTOLIC BLOOD PRESSURE: 78 MMHG | OXYGEN SATURATION: 99 % | RESPIRATION RATE: 18 BRPM

## 2022-07-20 VITALS
SYSTOLIC BLOOD PRESSURE: 118 MMHG | TEMPERATURE: 98 F | OXYGEN SATURATION: 99 % | DIASTOLIC BLOOD PRESSURE: 70 MMHG | HEART RATE: 79 BPM | RESPIRATION RATE: 18 BRPM

## 2022-07-20 DIAGNOSIS — Z98.890 OTHER SPECIFIED POSTPROCEDURAL STATES: Chronic | ICD-10-CM

## 2022-07-20 DIAGNOSIS — Z96.651 PRESENCE OF RIGHT ARTIFICIAL KNEE JOINT: Chronic | ICD-10-CM

## 2022-07-20 DIAGNOSIS — G47.33 OBSTRUCTIVE SLEEP APNEA (ADULT) (PEDIATRIC): ICD-10-CM

## 2022-07-20 DIAGNOSIS — K21.9 GASTRO-ESOPHAGEAL REFLUX DISEASE WITHOUT ESOPHAGITIS: ICD-10-CM

## 2022-07-20 DIAGNOSIS — R30.0 DYSURIA: ICD-10-CM

## 2022-07-20 DIAGNOSIS — K40.20 BILATERAL INGUINAL HERNIA, WITHOUT OBSTRUCTION OR GANGRENE, NOT SPECIFIED AS RECURRENT: Chronic | ICD-10-CM

## 2022-07-20 DIAGNOSIS — J44.9 CHRONIC OBSTRUCTIVE PULMONARY DISEASE, UNSPECIFIED: ICD-10-CM

## 2022-07-20 DIAGNOSIS — M54.50 LOW BACK PAIN, UNSPECIFIED: ICD-10-CM

## 2022-07-20 DIAGNOSIS — M54.41 LUMBAGO WITH SCIATICA, RIGHT SIDE: ICD-10-CM

## 2022-07-20 DIAGNOSIS — E78.5 HYPERLIPIDEMIA, UNSPECIFIED: ICD-10-CM

## 2022-07-20 DIAGNOSIS — I10 ESSENTIAL (PRIMARY) HYPERTENSION: ICD-10-CM

## 2022-07-20 LAB
ALBUMIN SERPL ELPH-MCNC: 3.9 G/DL — SIGNIFICANT CHANGE UP (ref 3.5–5.2)
ALP SERPL-CCNC: 126 U/L — HIGH (ref 30–115)
ALT FLD-CCNC: 12 U/L — SIGNIFICANT CHANGE UP (ref 0–41)
ANION GAP SERPL CALC-SCNC: 13 MMOL/L — SIGNIFICANT CHANGE UP (ref 7–14)
APPEARANCE UR: CLEAR — SIGNIFICANT CHANGE UP
AST SERPL-CCNC: 21 U/L — SIGNIFICANT CHANGE UP (ref 0–41)
BACTERIA # UR AUTO: NEGATIVE — SIGNIFICANT CHANGE UP
BASOPHILS # BLD AUTO: 0.07 K/UL — SIGNIFICANT CHANGE UP (ref 0–0.2)
BASOPHILS NFR BLD AUTO: 0.6 % — SIGNIFICANT CHANGE UP (ref 0–1)
BILIRUB SERPL-MCNC: 0.3 MG/DL — SIGNIFICANT CHANGE UP (ref 0.2–1.2)
BILIRUB UR-MCNC: NEGATIVE — SIGNIFICANT CHANGE UP
BUN SERPL-MCNC: 7 MG/DL — LOW (ref 10–20)
CALCIUM SERPL-MCNC: 9.2 MG/DL — SIGNIFICANT CHANGE UP (ref 8.5–10.1)
CHLORIDE SERPL-SCNC: 98 MMOL/L — SIGNIFICANT CHANGE UP (ref 98–110)
CO2 SERPL-SCNC: 25 MMOL/L — SIGNIFICANT CHANGE UP (ref 17–32)
COLOR SPEC: YELLOW — SIGNIFICANT CHANGE UP
CREAT SERPL-MCNC: 0.7 MG/DL — SIGNIFICANT CHANGE UP (ref 0.7–1.5)
DIFF PNL FLD: NEGATIVE — SIGNIFICANT CHANGE UP
EGFR: 110 ML/MIN/1.73M2 — SIGNIFICANT CHANGE UP
EOSINOPHIL # BLD AUTO: 0.2 K/UL — SIGNIFICANT CHANGE UP (ref 0–0.7)
EOSINOPHIL NFR BLD AUTO: 1.8 % — SIGNIFICANT CHANGE UP (ref 0–8)
EPI CELLS # UR: 1 /HPF — SIGNIFICANT CHANGE UP (ref 0–5)
GLUCOSE SERPL-MCNC: 102 MG/DL — HIGH (ref 70–99)
GLUCOSE UR QL: SIGNIFICANT CHANGE UP
HCT VFR BLD CALC: 41.9 % — LOW (ref 42–52)
HGB BLD-MCNC: 14.6 G/DL — SIGNIFICANT CHANGE UP (ref 14–18)
HYALINE CASTS # UR AUTO: 0 /LPF — SIGNIFICANT CHANGE UP (ref 0–7)
IMM GRANULOCYTES NFR BLD AUTO: 0.3 % — SIGNIFICANT CHANGE UP (ref 0.1–0.3)
KETONES UR-MCNC: NEGATIVE — SIGNIFICANT CHANGE UP
LEUKOCYTE ESTERASE UR-ACNC: NEGATIVE — SIGNIFICANT CHANGE UP
LIDOCAIN IGE QN: 12 U/L — SIGNIFICANT CHANGE UP (ref 7–60)
LYMPHOCYTES # BLD AUTO: 2.83 K/UL — SIGNIFICANT CHANGE UP (ref 1.2–3.4)
LYMPHOCYTES # BLD AUTO: 24.9 % — SIGNIFICANT CHANGE UP (ref 20.5–51.1)
MCHC RBC-ENTMCNC: 29.4 PG — SIGNIFICANT CHANGE UP (ref 27–31)
MCHC RBC-ENTMCNC: 34.8 G/DL — SIGNIFICANT CHANGE UP (ref 32–37)
MCV RBC AUTO: 84.3 FL — SIGNIFICANT CHANGE UP (ref 80–94)
MONOCYTES # BLD AUTO: 0.82 K/UL — HIGH (ref 0.1–0.6)
MONOCYTES NFR BLD AUTO: 7.2 % — SIGNIFICANT CHANGE UP (ref 1.7–9.3)
NEUTROPHILS # BLD AUTO: 7.41 K/UL — HIGH (ref 1.4–6.5)
NEUTROPHILS NFR BLD AUTO: 65.2 % — SIGNIFICANT CHANGE UP (ref 42.2–75.2)
NITRITE UR-MCNC: NEGATIVE — SIGNIFICANT CHANGE UP
NRBC # BLD: 0 /100 WBCS — SIGNIFICANT CHANGE UP (ref 0–0)
PH UR: 6 — SIGNIFICANT CHANGE UP (ref 5–8)
PLATELET # BLD AUTO: 258 K/UL — SIGNIFICANT CHANGE UP (ref 130–400)
POTASSIUM SERPL-MCNC: 4.2 MMOL/L — SIGNIFICANT CHANGE UP (ref 3.5–5)
POTASSIUM SERPL-SCNC: 4.2 MMOL/L — SIGNIFICANT CHANGE UP (ref 3.5–5)
PROT SERPL-MCNC: 6.6 G/DL — SIGNIFICANT CHANGE UP (ref 6–8)
PROT UR-MCNC: ABNORMAL
RBC # BLD: 4.97 M/UL — SIGNIFICANT CHANGE UP (ref 4.7–6.1)
RBC # FLD: 14.1 % — SIGNIFICANT CHANGE UP (ref 11.5–14.5)
RBC CASTS # UR COMP ASSIST: 2 /HPF — SIGNIFICANT CHANGE UP (ref 0–4)
SODIUM SERPL-SCNC: 136 MMOL/L — SIGNIFICANT CHANGE UP (ref 135–146)
SP GR SPEC: 1.03 — SIGNIFICANT CHANGE UP (ref 1.01–1.03)
UROBILINOGEN FLD QL: ABNORMAL
WBC # BLD: 11.36 K/UL — HIGH (ref 4.8–10.8)
WBC # FLD AUTO: 11.36 K/UL — HIGH (ref 4.8–10.8)
WBC UR QL: 1 /HPF — SIGNIFICANT CHANGE UP (ref 0–5)

## 2022-07-20 PROCEDURE — 74177 CT ABD & PELVIS W/CONTRAST: CPT | Mod: 26,MA

## 2022-07-20 PROCEDURE — 99285 EMERGENCY DEPT VISIT HI MDM: CPT

## 2022-07-20 RX ORDER — LIDOCAINE 4 G/100G
1 CREAM TOPICAL
Qty: 10 | Refills: 0
Start: 2022-07-20 | End: 2022-07-29

## 2022-07-20 RX ORDER — LIDOCAINE 4 G/100G
1 CREAM TOPICAL ONCE
Refills: 0 | Status: COMPLETED | OUTPATIENT
Start: 2022-07-20 | End: 2022-07-20

## 2022-07-20 RX ORDER — MORPHINE SULFATE 50 MG/1
4 CAPSULE, EXTENDED RELEASE ORAL ONCE
Refills: 0 | Status: DISCONTINUED | OUTPATIENT
Start: 2022-07-20 | End: 2022-07-20

## 2022-07-20 RX ORDER — OXYCODONE AND ACETAMINOPHEN 5; 325 MG/1; MG/1
1 TABLET ORAL ONCE
Refills: 0 | Status: DISCONTINUED | OUTPATIENT
Start: 2022-07-20 | End: 2022-07-20

## 2022-07-20 RX ADMIN — LIDOCAINE 1 PATCH: 4 CREAM TOPICAL at 20:27

## 2022-07-20 RX ADMIN — MORPHINE SULFATE 4 MILLIGRAM(S): 50 CAPSULE, EXTENDED RELEASE ORAL at 16:30

## 2022-07-20 NOTE — ED PROVIDER NOTE - PROGRESS NOTE DETAILS
Labs unremarkable. CT shows no pathology. Symptom likely due to soft tissue injury. Patient is well appearing with normal vitals and clinically stable to be discharged. Discussed D/C instruction. Strict return to ER precautions discussed. Patient understands discharge instruction, ER return precautions, and follow-up instructions without further questions.

## 2022-07-20 NOTE — ED PROVIDER NOTE - PHYSICAL EXAMINATION
CONSTITUTIONAL: in no apparent distress.   HEAD: Normocephalic; atraumatic.   ENT: Hearing is intact with good acuity to spoken voice. Patient is speaking clearly, not muffled and airway is intact.   RESPIRATORY: No signs of respiratory distress.   CARDIOVASCULAR: Regular rate and rhythm.   GI: Abdomen is soft, non-tender, and without distention. Bowel sounds are present and normoactive in all four quadrants. No masses are noted.   BACK: R lower  to palpation with no discoloration or rash. No evidence of trauma or deformity. No midline tenderness. No CVA tenderness bilaterally. Normal ROM.   NEURO: A & O x 3. Normal speech.   PSYCHOLOGICAL: Appropriate mood and affect. Good judgement and insight.

## 2022-07-20 NOTE — ED PROVIDER NOTE - ATTENDING APP SHARED VISIT CONTRIBUTION OF CARE
No
No
57 y/o male wit h/o REBECCA, COPD, HTN, HLD, sciatica, in ER with c/o R lower back pain.  Pt states he's been having this pain for the past ~ 10 months, worse with movements.  pain has been getting worse recently.  for the past few days having mild dysuria as well as loose stool.  no n/v.  no f/c.  no bowel/bladder dysfunction.  no midline pain.  no radiation down legs.  no cp/sob.  no ha/dizziness/loc.    PE - nad, nc/at, eomi, perrl, op - clear, mmm, cta b/l, no w/r/r, rrr, abd - soft, nt/nd, nabs, R CVAT, from x 4, no LE swelling/tenderness, A&O x 3, cn 2-12 intact, motor 5/5 b/l UE and LE, no sensory deficits.  -check labs, ua, ct abd, re-eval.

## 2022-07-20 NOTE — ED PROVIDER NOTE - OBJECTIVE STATEMENT
56-year-old male with past medical history of COPD, hypertension, hyperlipidemia, and GERD who presents with right lower back pain.  Reports that symptom has been bothering him for the past 10 months.  Reports that pain is constant and worse with movement.  Also endorses dysuria.  Denies saddle esthesia, urine incontinence, and loss of bladder control.  Denies recent fall or injury to his back.  Denies fever, shortness breath, chest pain, nausea, vomiting, and change in bowel movement.

## 2022-07-20 NOTE — ED PROVIDER NOTE - CLINICAL SUMMARY MEDICAL DECISION MAKING FREE TEXT BOX
labs reviewed and unremarkable, UA neg.  Ct abd: neg.  results d/w pt, to d/c home, pt to f/u with pmd, told to return to ER for worsening pain, fever, vomiting, or for any other new/concerning symptoms.  pt understands and agrees with plan.

## 2022-07-20 NOTE — ED PROVIDER NOTE - PATIENT PORTAL LINK FT
You can access the FollowMyHealth Patient Portal offered by [facility] by registering at the following website: [email or web address]. By joining [employer]’s FollowMyHealth portal, you will also be able to view your health information using other applications (apps) compatible with our system.

## 2022-07-20 NOTE — ED PROVIDER NOTE - NS ED ROS FT
Constitutional: Negative for fever, chills, and fatigue.  HENT: Negative for headache  Cardiovascular: Negative for chest pain  Respiratory: Negative for SOB  Gastrointestinal: Negative for nausea, vomiting, abdominal pain, constipation, diarrhea, hematochezia, and melena.  Genitourinary: + dysuria. Negative for frequency, and hematuria.  Neurological: Negative for dizziness, syncope, and loss of consciousness.  Musculoskeletal: + R lower back pain. Negative for joint swelling, arthralgias, neck pain, and calf cramps.  Hematological: Does not bruise/bleed easily.

## 2022-07-22 LAB
CULTURE RESULTS: SIGNIFICANT CHANGE UP
SPECIMEN SOURCE: SIGNIFICANT CHANGE UP

## 2022-07-25 ENCOUNTER — INPATIENT (INPATIENT)
Facility: HOSPITAL | Age: 57
LOS: 3 days | Discharge: HOME | End: 2022-07-29
Attending: HOSPITALIST | Admitting: HOSPITALIST

## 2022-07-25 VITALS
HEIGHT: 70 IN | HEART RATE: 88 BPM | RESPIRATION RATE: 21 BRPM | WEIGHT: 315 LBS | OXYGEN SATURATION: 95 % | SYSTOLIC BLOOD PRESSURE: 120 MMHG | TEMPERATURE: 98 F | DIASTOLIC BLOOD PRESSURE: 65 MMHG

## 2022-07-25 DIAGNOSIS — Z98.890 OTHER SPECIFIED POSTPROCEDURAL STATES: Chronic | ICD-10-CM

## 2022-07-25 DIAGNOSIS — K40.20 BILATERAL INGUINAL HERNIA, WITHOUT OBSTRUCTION OR GANGRENE, NOT SPECIFIED AS RECURRENT: Chronic | ICD-10-CM

## 2022-07-25 DIAGNOSIS — Z96.651 PRESENCE OF RIGHT ARTIFICIAL KNEE JOINT: Chronic | ICD-10-CM

## 2022-07-25 PROCEDURE — 93010 ELECTROCARDIOGRAM REPORT: CPT

## 2022-07-25 PROCEDURE — 99285 EMERGENCY DEPT VISIT HI MDM: CPT

## 2022-07-25 RX ORDER — IPRATROPIUM/ALBUTEROL SULFATE 18-103MCG
3 AEROSOL WITH ADAPTER (GRAM) INHALATION ONCE
Refills: 0 | Status: COMPLETED | OUTPATIENT
Start: 2022-07-25 | End: 2022-07-25

## 2022-07-25 RX ADMIN — Medication 3 MILLILITER(S): at 22:16

## 2022-07-25 NOTE — ED PROCEDURE NOTE - CPROC ED SUTURE REMOV DETAILS1
Medication changes made today:  None      Tests Ordered:  None      Test Results:  Nothing Pending      Understanding your instructions:  If you feel that things may have been explained in a way that you do not understand or did not receive easy to understand instruction, please contact me at [phone] and I would be more than happy to review your plan of care with you. Your healthcare is important to us and we want to make sure you understand your directions.    Our Electrophysiology Team will continue to collaborate and work very closely together to best meet your needs.    Thank you for choosing us to take care of your electrophysiology needs. It is a pleasure to work with you, and again, please contact us for any questions or concerns anytime.      The location identified, area draped and prepped as per norm, sterile technique maintained.

## 2022-07-26 LAB
ALBUMIN SERPL ELPH-MCNC: 3.8 G/DL — SIGNIFICANT CHANGE UP (ref 3.5–5.2)
ALP SERPL-CCNC: 149 U/L — HIGH (ref 30–115)
ALT FLD-CCNC: 17 U/L — SIGNIFICANT CHANGE UP (ref 0–41)
ANION GAP SERPL CALC-SCNC: 13 MMOL/L — SIGNIFICANT CHANGE UP (ref 7–14)
ANION GAP SERPL CALC-SCNC: 15 MMOL/L — HIGH (ref 7–14)
AST SERPL-CCNC: 23 U/L — SIGNIFICANT CHANGE UP (ref 0–41)
BASE EXCESS BLDV CALC-SCNC: 2.5 MMOL/L — SIGNIFICANT CHANGE UP (ref -2–3)
BASOPHILS # BLD AUTO: 0.04 K/UL — SIGNIFICANT CHANGE UP (ref 0–0.2)
BASOPHILS NFR BLD AUTO: 0.4 % — SIGNIFICANT CHANGE UP (ref 0–1)
BILIRUB SERPL-MCNC: 0.4 MG/DL — SIGNIFICANT CHANGE UP (ref 0.2–1.2)
BUN SERPL-MCNC: 10 MG/DL — SIGNIFICANT CHANGE UP (ref 10–20)
BUN SERPL-MCNC: 8 MG/DL — LOW (ref 10–20)
CA-I SERPL-SCNC: 1.19 MMOL/L — SIGNIFICANT CHANGE UP (ref 1.15–1.33)
CALCIUM SERPL-MCNC: 9 MG/DL — SIGNIFICANT CHANGE UP (ref 8.5–10.1)
CALCIUM SERPL-MCNC: 9.2 MG/DL — SIGNIFICANT CHANGE UP (ref 8.5–10.1)
CHLORIDE SERPL-SCNC: 100 MMOL/L — SIGNIFICANT CHANGE UP (ref 98–110)
CHLORIDE SERPL-SCNC: 98 MMOL/L — SIGNIFICANT CHANGE UP (ref 98–110)
CO2 SERPL-SCNC: 21 MMOL/L — SIGNIFICANT CHANGE UP (ref 17–32)
CO2 SERPL-SCNC: 26 MMOL/L — SIGNIFICANT CHANGE UP (ref 17–32)
CREAT SERPL-MCNC: 0.7 MG/DL — SIGNIFICANT CHANGE UP (ref 0.7–1.5)
CREAT SERPL-MCNC: 0.8 MG/DL — SIGNIFICANT CHANGE UP (ref 0.7–1.5)
D DIMER BLD IA.RAPID-MCNC: 494 NG/ML DDU — HIGH (ref 0–230)
EGFR: 105 ML/MIN/1.73M2 — SIGNIFICANT CHANGE UP
EGFR: 110 ML/MIN/1.73M2 — SIGNIFICANT CHANGE UP
EOSINOPHIL # BLD AUTO: 0.06 K/UL — SIGNIFICANT CHANGE UP (ref 0–0.7)
EOSINOPHIL NFR BLD AUTO: 0.6 % — SIGNIFICANT CHANGE UP (ref 0–8)
GAS PNL BLDV: 135 MMOL/L — LOW (ref 136–145)
GAS PNL BLDV: SIGNIFICANT CHANGE UP
GLUCOSE SERPL-MCNC: 141 MG/DL — HIGH (ref 70–99)
GLUCOSE SERPL-MCNC: 148 MG/DL — HIGH (ref 70–99)
HCO3 BLDV-SCNC: 28 MMOL/L — SIGNIFICANT CHANGE UP (ref 22–29)
HCT VFR BLD CALC: 41 % — LOW (ref 42–52)
HCT VFR BLD CALC: 41.8 % — LOW (ref 42–52)
HCT VFR BLDA CALC: 41 % — SIGNIFICANT CHANGE UP (ref 39–51)
HGB BLD CALC-MCNC: 13.8 G/DL — SIGNIFICANT CHANGE UP (ref 12.6–17.4)
HGB BLD-MCNC: 14.1 G/DL — SIGNIFICANT CHANGE UP (ref 14–18)
HGB BLD-MCNC: 14.7 G/DL — SIGNIFICANT CHANGE UP (ref 14–18)
IMM GRANULOCYTES NFR BLD AUTO: 0.4 % — HIGH (ref 0.1–0.3)
LACTATE BLDV-MCNC: 1.8 MMOL/L — SIGNIFICANT CHANGE UP (ref 0.5–2)
LYMPHOCYTES # BLD AUTO: 0.82 K/UL — LOW (ref 1.2–3.4)
LYMPHOCYTES # BLD AUTO: 8.1 % — LOW (ref 20.5–51.1)
MAGNESIUM SERPL-MCNC: 2.1 MG/DL — SIGNIFICANT CHANGE UP (ref 1.8–2.4)
MCHC RBC-ENTMCNC: 29.2 PG — SIGNIFICANT CHANGE UP (ref 27–31)
MCHC RBC-ENTMCNC: 29.7 PG — SIGNIFICANT CHANGE UP (ref 27–31)
MCHC RBC-ENTMCNC: 34.4 G/DL — SIGNIFICANT CHANGE UP (ref 32–37)
MCHC RBC-ENTMCNC: 35.2 G/DL — SIGNIFICANT CHANGE UP (ref 32–37)
MCV RBC AUTO: 84.4 FL — SIGNIFICANT CHANGE UP (ref 80–94)
MCV RBC AUTO: 84.9 FL — SIGNIFICANT CHANGE UP (ref 80–94)
MONOCYTES # BLD AUTO: 0.17 K/UL — SIGNIFICANT CHANGE UP (ref 0.1–0.6)
MONOCYTES NFR BLD AUTO: 1.7 % — SIGNIFICANT CHANGE UP (ref 1.7–9.3)
NEUTROPHILS # BLD AUTO: 8.99 K/UL — HIGH (ref 1.4–6.5)
NEUTROPHILS NFR BLD AUTO: 88.8 % — HIGH (ref 42.2–75.2)
NRBC # BLD: 0 /100 WBCS — SIGNIFICANT CHANGE UP (ref 0–0)
NRBC # BLD: 0 /100 WBCS — SIGNIFICANT CHANGE UP (ref 0–0)
NT-PROBNP SERPL-SCNC: 41 PG/ML — SIGNIFICANT CHANGE UP (ref 0–300)
PCO2 BLDV: 45 MMHG — SIGNIFICANT CHANGE UP (ref 42–55)
PH BLDV: 7.4 — SIGNIFICANT CHANGE UP (ref 7.32–7.43)
PLATELET # BLD AUTO: 233 K/UL — SIGNIFICANT CHANGE UP (ref 130–400)
PLATELET # BLD AUTO: 263 K/UL — SIGNIFICANT CHANGE UP (ref 130–400)
PO2 BLDV: 48 MMHG — SIGNIFICANT CHANGE UP
POTASSIUM BLDV-SCNC: 3.7 MMOL/L — SIGNIFICANT CHANGE UP (ref 3.5–5.1)
POTASSIUM SERPL-MCNC: 4 MMOL/L — SIGNIFICANT CHANGE UP (ref 3.5–5)
POTASSIUM SERPL-MCNC: 5.5 MMOL/L — HIGH (ref 3.5–5)
POTASSIUM SERPL-SCNC: 4 MMOL/L — SIGNIFICANT CHANGE UP (ref 3.5–5)
POTASSIUM SERPL-SCNC: 5.5 MMOL/L — HIGH (ref 3.5–5)
PROT SERPL-MCNC: 6.7 G/DL — SIGNIFICANT CHANGE UP (ref 6–8)
RAPID RVP RESULT: SIGNIFICANT CHANGE UP
RBC # BLD: 4.83 M/UL — SIGNIFICANT CHANGE UP (ref 4.7–6.1)
RBC # BLD: 4.95 M/UL — SIGNIFICANT CHANGE UP (ref 4.7–6.1)
RBC # FLD: 14 % — SIGNIFICANT CHANGE UP (ref 11.5–14.5)
RBC # FLD: 14 % — SIGNIFICANT CHANGE UP (ref 11.5–14.5)
SAO2 % BLDV: 84.1 % — SIGNIFICANT CHANGE UP
SARS-COV-2 RNA SPEC QL NAA+PROBE: SIGNIFICANT CHANGE UP
SARS-COV-2 RNA SPEC QL NAA+PROBE: SIGNIFICANT CHANGE UP
SODIUM SERPL-SCNC: 134 MMOL/L — LOW (ref 135–146)
SODIUM SERPL-SCNC: 139 MMOL/L — SIGNIFICANT CHANGE UP (ref 135–146)
TROPONIN T SERPL-MCNC: <0.01 NG/ML — SIGNIFICANT CHANGE UP
WBC # BLD: 10.12 K/UL — SIGNIFICANT CHANGE UP (ref 4.8–10.8)
WBC # BLD: 11.15 K/UL — HIGH (ref 4.8–10.8)
WBC # FLD AUTO: 10.12 K/UL — SIGNIFICANT CHANGE UP (ref 4.8–10.8)
WBC # FLD AUTO: 11.15 K/UL — HIGH (ref 4.8–10.8)

## 2022-07-26 PROCEDURE — 99222 1ST HOSP IP/OBS MODERATE 55: CPT

## 2022-07-26 PROCEDURE — 71045 X-RAY EXAM CHEST 1 VIEW: CPT | Mod: 26

## 2022-07-26 PROCEDURE — 99223 1ST HOSP IP/OBS HIGH 75: CPT

## 2022-07-26 RX ORDER — KETOROLAC TROMETHAMINE 30 MG/ML
15 SYRINGE (ML) INJECTION ONCE
Refills: 0 | Status: DISCONTINUED | OUTPATIENT
Start: 2022-07-26 | End: 2022-07-26

## 2022-07-26 RX ORDER — AZITHROMYCIN 500 MG/1
250 TABLET, FILM COATED ORAL EVERY 24 HOURS
Refills: 0 | Status: DISCONTINUED | OUTPATIENT
Start: 2022-07-27 | End: 2022-07-27

## 2022-07-26 RX ORDER — LIDOCAINE 4 G/100G
1 CREAM TOPICAL ONCE
Refills: 0 | Status: COMPLETED | OUTPATIENT
Start: 2022-07-26 | End: 2022-07-26

## 2022-07-26 RX ORDER — AZITHROMYCIN 500 MG/1
500 TABLET, FILM COATED ORAL ONCE
Refills: 0 | Status: COMPLETED | OUTPATIENT
Start: 2022-07-26 | End: 2022-07-26

## 2022-07-26 RX ORDER — IPRATROPIUM/ALBUTEROL SULFATE 18-103MCG
3 AEROSOL WITH ADAPTER (GRAM) INHALATION EVERY 6 HOURS
Refills: 0 | Status: DISCONTINUED | OUTPATIENT
Start: 2022-07-26 | End: 2022-07-27

## 2022-07-26 RX ORDER — ENOXAPARIN SODIUM 100 MG/ML
40 INJECTION SUBCUTANEOUS EVERY 12 HOURS
Refills: 0 | Status: DISCONTINUED | OUTPATIENT
Start: 2022-07-26 | End: 2022-07-27

## 2022-07-26 RX ORDER — OXYCODONE HYDROCHLORIDE 5 MG/1
30 TABLET ORAL EVERY 4 HOURS
Refills: 0 | Status: DISCONTINUED | OUTPATIENT
Start: 2022-07-26 | End: 2022-07-27

## 2022-07-26 RX ORDER — ENOXAPARIN SODIUM 100 MG/ML
40 INJECTION SUBCUTANEOUS EVERY 24 HOURS
Refills: 0 | Status: DISCONTINUED | OUTPATIENT
Start: 2022-07-26 | End: 2022-07-26

## 2022-07-26 RX ORDER — ALBUTEROL 90 UG/1
1 AEROSOL, METERED ORAL EVERY 8 HOURS
Refills: 0 | Status: DISCONTINUED | OUTPATIENT
Start: 2022-07-26 | End: 2022-07-29

## 2022-07-26 RX ORDER — PANTOPRAZOLE SODIUM 20 MG/1
40 TABLET, DELAYED RELEASE ORAL DAILY
Refills: 0 | Status: DISCONTINUED | OUTPATIENT
Start: 2022-07-26 | End: 2022-07-27

## 2022-07-26 RX ORDER — SIMVASTATIN 20 MG/1
20 TABLET, FILM COATED ORAL AT BEDTIME
Refills: 0 | Status: DISCONTINUED | OUTPATIENT
Start: 2022-07-26 | End: 2022-07-29

## 2022-07-26 RX ORDER — ALPRAZOLAM 0.25 MG
2 TABLET ORAL
Refills: 0 | Status: DISCONTINUED | OUTPATIENT
Start: 2022-07-26 | End: 2022-07-27

## 2022-07-26 RX ORDER — BUDESONIDE AND FORMOTEROL FUMARATE DIHYDRATE 160; 4.5 UG/1; UG/1
2 AEROSOL RESPIRATORY (INHALATION)
Refills: 0 | Status: COMPLETED | OUTPATIENT
Start: 2022-07-26 | End: 2023-06-24

## 2022-07-26 RX ORDER — LISINOPRIL 2.5 MG/1
5 TABLET ORAL DAILY
Refills: 0 | Status: DISCONTINUED | OUTPATIENT
Start: 2022-07-26 | End: 2022-07-29

## 2022-07-26 RX ORDER — FUROSEMIDE 40 MG
40 TABLET ORAL DAILY
Refills: 0 | Status: DISCONTINUED | OUTPATIENT
Start: 2022-07-26 | End: 2022-07-29

## 2022-07-26 RX ORDER — MORPHINE SULFATE 50 MG/1
4 CAPSULE, EXTENDED RELEASE ORAL ONCE
Refills: 0 | Status: DISCONTINUED | OUTPATIENT
Start: 2022-07-26 | End: 2022-07-26

## 2022-07-26 RX ORDER — MORPHINE SULFATE 50 MG/1
100 CAPSULE, EXTENDED RELEASE ORAL DAILY
Refills: 0 | Status: DISCONTINUED | OUTPATIENT
Start: 2022-07-26 | End: 2022-07-27

## 2022-07-26 RX ORDER — ZOLPIDEM TARTRATE 10 MG/1
5 TABLET ORAL AT BEDTIME
Refills: 0 | Status: DISCONTINUED | OUTPATIENT
Start: 2022-07-26 | End: 2022-07-29

## 2022-07-26 RX ORDER — MORPHINE SULFATE 50 MG/1
8 CAPSULE, EXTENDED RELEASE ORAL ONCE
Refills: 0 | Status: DISCONTINUED | OUTPATIENT
Start: 2022-07-26 | End: 2022-07-26

## 2022-07-26 RX ADMIN — OXYCODONE HYDROCHLORIDE 30 MILLIGRAM(S): 5 TABLET ORAL at 23:20

## 2022-07-26 RX ADMIN — Medication 40 MILLIGRAM(S): at 06:34

## 2022-07-26 RX ADMIN — OXYCODONE HYDROCHLORIDE 30 MILLIGRAM(S): 5 TABLET ORAL at 22:50

## 2022-07-26 RX ADMIN — OXYCODONE HYDROCHLORIDE 30 MILLIGRAM(S): 5 TABLET ORAL at 11:28

## 2022-07-26 RX ADMIN — Medication 2 MILLIGRAM(S): at 23:28

## 2022-07-26 RX ADMIN — LISINOPRIL 5 MILLIGRAM(S): 2.5 TABLET ORAL at 06:28

## 2022-07-26 RX ADMIN — MORPHINE SULFATE 4 MILLIGRAM(S): 50 CAPSULE, EXTENDED RELEASE ORAL at 01:58

## 2022-07-26 RX ADMIN — SIMVASTATIN 20 MILLIGRAM(S): 20 TABLET, FILM COATED ORAL at 23:27

## 2022-07-26 RX ADMIN — Medication 60 MILLIGRAM(S): at 18:43

## 2022-07-26 RX ADMIN — Medication 2 MILLIGRAM(S): at 18:43

## 2022-07-26 RX ADMIN — Medication 60 MILLIGRAM(S): at 06:30

## 2022-07-26 RX ADMIN — OXYCODONE HYDROCHLORIDE 30 MILLIGRAM(S): 5 TABLET ORAL at 20:51

## 2022-07-26 RX ADMIN — MORPHINE SULFATE 100 MILLIGRAM(S): 50 CAPSULE, EXTENDED RELEASE ORAL at 10:10

## 2022-07-26 RX ADMIN — ZOLPIDEM TARTRATE 5 MILLIGRAM(S): 10 TABLET ORAL at 22:50

## 2022-07-26 RX ADMIN — OXYCODONE HYDROCHLORIDE 30 MILLIGRAM(S): 5 TABLET ORAL at 16:18

## 2022-07-26 RX ADMIN — Medication 2 MILLIGRAM(S): at 12:12

## 2022-07-26 RX ADMIN — AZITHROMYCIN 500 MILLIGRAM(S): 500 TABLET, FILM COATED ORAL at 06:28

## 2022-07-26 RX ADMIN — MORPHINE SULFATE 8 MILLIGRAM(S): 50 CAPSULE, EXTENDED RELEASE ORAL at 10:10

## 2022-07-26 NOTE — ED PROVIDER NOTE - OBJECTIVE STATEMENT
56M COPD, hypertension, hyperlipidemia, and GERD who present with 2d of worsening SOB. EMS gave him 2 rounds or duonebs and one injection of steroids enroute to ED, which helped improved sx. pt baseline saturation is 92%. denies f c n v cp back pain abd pain.

## 2022-07-26 NOTE — CONSULT NOTE ADULT - SUBJECTIVE AND OBJECTIVE BOX
Patient is a 56y M with PMHx of REBECCA, COPD, GERD, HTN, HLD, presenting for SOB.   Patient is a paramedic, and works outdoors frequently.    Patient reports that for the past few days he has been complaining of increasing shortness of breath, now on rest, with increased cough, without any change in sputum production/ color which is consistently white.  Patient also reports increased swelling over his lower extremities.  No chest pain, palpitations, viral URI, abdominal pain, changes in urinary/ bowel habits, or any constitutional symptoms.    Patient reports not being compliant to his inhalers, and his NV, has been admitted for the last year multiple times for COPD exacerbation.    Physical exam reveals decreased air entry bilaterally, with occasional rhonchi.  2+ pitting edema in lower extremities.      ED Course:   Vitals: T 98F, HR 70, /71, 20RR and 95% on RA, RR 21 ---> 99% on BiPAP   VBG: pH 7.4, pCO2 45, pO2 48, pHCO3 28, sat 84%   CXR shows lower airways thickening, no volume overload, remainder of labs are not significant, covid is negative, RVP is pending.    Patient was given duonebs in the ED, solumedrol 60 mg IV, as well as BiPAP ( 20/8/ 60        Patient admitted for COPD Exacerbation.   Patient is a 56y old  Male who presents with a chief complaint of SOB (26 Jul 2022 09:19)      HPI:  Patient is a 56y M with PMHx of REBECCA, COPD, GERD, colitis, HTN, HLD, presenting for SOB.   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 Dyspnea got so severe he was unable breathe, and called EMS. EMS gave patients duonebs, steroids en route to ED.     *** Med Rec confirmed with patient bedside, but surescripts shows additional filled prescriptions; Opiate pain medications not seen on surescripts. Please verify remainder of medications with [facility] pharmacy in the AM ***    ED Course:   Vitals: T 98F, HR 70, /71, 20RR and 95% on RA, RR 21 ---> 99% on BiPAP   VBG: pH 7.4, pCO2 45, pO2 48, pHCO3 28, sat 84%   CXR pending read, but increased infiltrates on the L side     Patient admitted for Acute Hypoxic Respiratory Failure secondary to COPD Exacerbation  (26 Jul 2022 04:46)      PAST MEDICAL & SURGICAL HISTORY:  HTN (hypertension)      High cholesterol      GERD (gastroesophageal reflux disease)      Morbid obesity      Osteoarthritis      Hiatal hernia  GERD      Colitis  LARGE INTESTINE   NO RECENT FLARES      COPD (chronic obstructive pulmonary disease)      REBECCA treated with BiPAP      History of cholecystectomy      History of appendectomy      History of knee replacement procedure of right knee  BILATERAL      Hernia, inguinal, bilateral  3 months old      H/O knee surgery  arthoscopic x4      H/O foot surgery  right big toe surgery          SOCIAL HX:   Smoking    Active                      ETOH                            Other    FAMILY HISTORY:  FH: lung cancer (Mother)  mother    FH: diabetes mellitus    :  No known cardiovacular family hisotry     Review Of Systems:     All ROS are negative except per HPI       Allergies    No Known Allergies    Intolerances          PHYSICAL EXAM    ICU Vital Signs Last 24 Hrs  T(C): 35.6 (26 Jul 2022 15:33), Max: 36.8 (25 Jul 2022 20:08)  T(F): 96 (26 Jul 2022 15:33), Max: 98.2 (25 Jul 2022 20:08)  HR: 91 (26 Jul 2022 15:33) (70 - 94)  BP: 104/58 (26 Jul 2022 15:33) (104/58 - 152/71)  BP(mean): --  ABP: --  ABP(mean): --  RR: 20 (26 Jul 2022 15:33) (18 - 21)  SpO2: 100% (26 Jul 2022 15:33) (95% - 100%)    O2 Parameters below as of 26 Jul 2022 15:33  Patient On (Oxygen Delivery Method): BiPAP/CPAP            CONSTITUTIONAL:  Well nourished.   NAD    ENT:   Airway patent,   Mouth with normal mucosa.       CARDIAC:   Normal rate,   Regular rhythm.    No edema      RESPIRATORY:   No wheezing  Bilateral BS   Not tachypneic,  No use of accessory muscles    GASTROINTESTINAL:  Abdomen soft,   Non-tender,   No guarding,   + BS      NEUROLOGICAL:   Alert and oriented   No motor deficits.    SKIN:   Skin normal color for race,   No evidence of rash.                LABS:                          14.7   11.15 )-----------( 263      ( 26 Jul 2022 05:45 )             41.8                                               07-26    134<L>  |  98  |  10  ----------------------------<  141<H>  5.5<H>   |  21  |  0.7    Ca    9.2      26 Jul 2022 05:45  Mg     2.1     07-26    TPro  6.7  /  Alb  3.8  /  TBili  0.4  /  DBili  x   /  AST  23  /  ALT  17  /  AlkPhos  149<H>  07-26                                                 CARDIAC MARKERS ( 26 Jul 2022 00:05 )  x     / <0.01 ng/mL / x     / x     / x                                                LIVER FUNCTIONS - ( 26 Jul 2022 05:45 )  Alb: 3.8 g/dL / Pro: 6.7 g/dL / ALK PHOS: 149 U/L / ALT: 17 U/L / AST: 23 U/L / GGT: x                                                                                                                                       X-Rays reviewed                                                                                     ECHO      MEDICATIONS  (STANDING):  albuterol/ipratropium for Nebulization 3 milliLiter(s) Nebulizer every 6 hours  ALPRAZolam 2 milliGRAM(s) Oral four times a day  budesonide 160 MICROgram(s)/formoterol 4.5 MICROgram(s) Inhaler 2 Puff(s) Inhalation two times a day  enoxaparin Injectable 40 milliGRAM(s) SubCutaneous every 12 hours  furosemide    Tablet 40 milliGRAM(s) Oral daily  lisinopril 5 milliGRAM(s) Oral daily  methylPREDNISolone sodium succinate Injectable 60 milliGRAM(s) IV Push two times a day  morphine ER Tablet 100 milliGRAM(s) Oral daily  pantoprazole    Tablet 40 milliGRAM(s) Oral daily  simvastatin 20 milliGRAM(s) Oral at bedtime    MEDICATIONS  (PRN):  ALBUTerol    90 MICROgram(s) HFA Inhaler 1 Puff(s) Inhalation every 8 hours PRN Bronchospasm  oxyCODONE   IR Oral Tab/Cap - Peds 30 milliGRAM(s) Oral every 4 hours PRN Severe Pain (7 - 10)  zolpidem 5 milliGRAM(s) Oral at bedtime PRN Insomnia           Patient is a 56y old  Male who presents with a chief complaint of SOB (26 Jul 2022 09:19)      HPI:  Patient is a 56y M with PMHx of REBECCA, COPD, GERD, colitis, HTN, HLD, presenting for SOB.   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 Dyspnea got so severe he was unable breathe, and called EMS. EMS gave patients duonebs, steroids en route to ED.     *** Med Rec confirmed with patient bedside, but surescripts shows additional filled prescriptions; Opiate pain medications not seen on surescripts. Please verify remainder of medications with [facility] pharmacy in the AM ***    ED Course:   Vitals: T 98F, HR 70, /71, 20RR and 95% on RA, RR 21 ---> 99% on BiPAP   VBG: pH 7.4, pCO2 45, pO2 48, pHCO3 28, sat 84%   CXR pending read, but increased infiltrates on the L side     Patient admitted for Acute Hypoxic Respiratory Failure secondary to COPD Exacerbation  (26 Jul 2022 04:46)      PAST MEDICAL & SURGICAL HISTORY:  HTN (hypertension)      High cholesterol      GERD (gastroesophageal reflux disease)      Morbid obesity      Osteoarthritis      Hiatal hernia  GERD      Colitis  LARGE INTESTINE   NO RECENT FLARES      COPD (chronic obstructive pulmonary disease)      REBECCA treated with BiPAP      History of cholecystectomy      History of appendectomy      History of knee replacement procedure of right knee  BILATERAL      Hernia, inguinal, bilateral  3 months old      H/O knee surgery  arthoscopic x4      H/O foot surgery  right big toe surgery          SOCIAL HX:   Smoking    Active                      ETOH                            Other    FAMILY HISTORY:  FH: lung cancer (Mother)  mother    FH: diabetes mellitus    :  No known cardiovacular family hisotry     Review Of Systems:     All ROS are negative except per HPI       Allergies    No Known Allergies    Intolerances          PHYSICAL EXAM    ICU Vital Signs Last 24 Hrs  T(C): 35.6 (26 Jul 2022 15:33), Max: 36.8 (25 Jul 2022 20:08)  T(F): 96 (26 Jul 2022 15:33), Max: 98.2 (25 Jul 2022 20:08)  HR: 91 (26 Jul 2022 15:33) (70 - 94)  BP: 104/58 (26 Jul 2022 15:33) (104/58 - 152/71)  BP(mean): --  ABP: --  ABP(mean): --  RR: 20 (26 Jul 2022 15:33) (18 - 21)  SpO2: 100% (26 Jul 2022 15:33) (95% - 100%)    O2 Parameters below as of 26 Jul 2022 15:33  Patient On (Oxygen Delivery Method): BiPAP/CPAP            CONSTITUTIONAL:  Well nourished.   NAD    ENT:   Airway patent,   Mouth with normal mucosa.       CARDIAC:   Normal rate,   Regular rhythm.    No edema      RESPIRATORY:   Expiratory wheezing  Not tachypneic,  No use of accessory muscles    GASTROINTESTINAL:  Abdomen soft,   Non-tender,   No guarding,   + BS      NEUROLOGICAL:   Alert and oriented   No motor deficits.    SKIN:   Skin normal color for race,   No evidence of rash.                LABS:                          14.7   11.15 )-----------( 263      ( 26 Jul 2022 05:45 )             41.8                                               07-26    134<L>  |  98  |  10  ----------------------------<  141<H>  5.5<H>   |  21  |  0.7    Ca    9.2      26 Jul 2022 05:45  Mg     2.1     07-26    TPro  6.7  /  Alb  3.8  /  TBili  0.4  /  DBili  x   /  AST  23  /  ALT  17  /  AlkPhos  149<H>  07-26                                                 CARDIAC MARKERS ( 26 Jul 2022 00:05 )  x     / <0.01 ng/mL / x     / x     / x                                                LIVER FUNCTIONS - ( 26 Jul 2022 05:45 )  Alb: 3.8 g/dL / Pro: 6.7 g/dL / ALK PHOS: 149 U/L / ALT: 17 U/L / AST: 23 U/L / GGT: x                                                                                                                                       X-Rays reviewed                                                                                     ECHO      MEDICATIONS  (STANDING):  albuterol/ipratropium for Nebulization 3 milliLiter(s) Nebulizer every 6 hours  ALPRAZolam 2 milliGRAM(s) Oral four times a day  budesonide 160 MICROgram(s)/formoterol 4.5 MICROgram(s) Inhaler 2 Puff(s) Inhalation two times a day  enoxaparin Injectable 40 milliGRAM(s) SubCutaneous every 12 hours  furosemide    Tablet 40 milliGRAM(s) Oral daily  lisinopril 5 milliGRAM(s) Oral daily  methylPREDNISolone sodium succinate Injectable 60 milliGRAM(s) IV Push two times a day  morphine ER Tablet 100 milliGRAM(s) Oral daily  pantoprazole    Tablet 40 milliGRAM(s) Oral daily  simvastatin 20 milliGRAM(s) Oral at bedtime    MEDICATIONS  (PRN):  ALBUTerol    90 MICROgram(s) HFA Inhaler 1 Puff(s) Inhalation every 8 hours PRN Bronchospasm  oxyCODONE   IR Oral Tab/Cap - Peds 30 milliGRAM(s) Oral every 4 hours PRN Severe Pain (7 - 10)  zolpidem 5 milliGRAM(s) Oral at bedtime PRN Insomnia

## 2022-07-26 NOTE — H&P ADULT - ATTENDING COMMENTS
56y M with PMHx of REBECCA, COPD, GERD, colitis, HTN, HLD, presenting for SOB.   Patient admitted for Acute Hypoxic Respiratory Failure secondary to COPD Exacerbation     #Acute Hypoxic Respiratory failure   #COPD exacerbation  #Hx of REBECCA  CXR 07/26: no acute cardiopulmonary disease  on RA 97%  - Cont IV solumedrol 60 BID  - Duonebs ATC and PRN  - Pulm eval requested  - Cont symbicort  - BIPAP qHs and PRN  - Azithromycin 250mg to completion (5d)  - Echocardiogram  - D-Dimer  - Likely DC 24-48h, pt ambulating outside    #HTN/HLD  - Cont lasix 40mg qD  - Cont lisinopril 5mg qD  - Cont simvastatin 20mg qHs    #Chronic Back pain  ISTOP Reference# : [id number]  - Cont morphine ER 100mg qD  - Cont oxycodone 30mg q4h PRN (prescribed 150 pills for 30d)    #Anxiety/Insomnia  - Cont xanax 2mg QID  - Cont ambien 10mg qHs    DVT PPX, Lovenox    #Progress Note Handoff  Pending (specify): Cont IV steroids, pulm eval  Family discussion: d/w pt regaridng tx for COPD exacerbation  Disposition: Home

## 2022-07-26 NOTE — ED ADULT NURSE NOTE - NS ED NURSE RECORD ANOTHER HT AND WT
----- Message from Jus Alfredo MD sent at 3/28/2022  1:23 PM CDT -----  Great results. And still normal potassium.   Yes

## 2022-07-26 NOTE — H&P ADULT - HISTORY OF PRESENT ILLNESS
Patient is a 56y M with PMHx of REBECCA, COPD, GERD, colitis, HTN, HLD, presenting for SOB.   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 Dyspnea got so severe he was unable breathe, and called EMS. EMS gave patients duonebs, steroids en route to ED.     *** Med Rec confirmed with patient bedside, but surescripts shows additional filled prescriptions; Opiate pain medications not seen on surescripts. Please verify remainder of medications with [facility] pharmacy in the AM ***    ED Course:   Vitals: T 98F, HR 70, /71, 20RR and 95% on RA, RR 21 ---> 99% on BiPAP   VBG: pH 7.4, pCO2 45, pO2 48, pHCO3 28, sat 84%   CXR pending read, but increased infiltrates on the L side     Patient admitted for Acute Hypoxic Respiratory Failure secondary to COPD Exacerbation

## 2022-07-26 NOTE — H&P ADULT - ASSESSMENT
Patient is a 56y M with PMHx of REBECCA, COPD, GERD, colitis, HTN, HLD, presenting for SOB.   Patient admitted for Acute Hypoxic Respiratory Failure secondary to COPD Exacerbation     *** Med Rec confirmed with patient bedside, but surescripts shows additional filled prescriptions; Opiate pain medications not seen on surescripts. Please verify remainder of medications with [facility] pharmacy in the AM ***    # Acute Hypoxic Respiratory Failure from COPD Exacerbation   # REBECCA   - Tachypneic on admission, unclear what admitting O2 sat was (documented as 95% on RA)   - VBG: pH 7.4, pCO2 45, pO2 48, pHCO3 28, sat 84%   - repeat CXR in the AM   - c/w BIPAP; reinforced importance of compliance   - start Duonebs   - start Symbicort   - start Solumedrol 60mg IV BID   - start rescue inhaler (albuterol)   - Start Azithromycin   - f/u pulm consult (Dr. Bassett)   - f/u repeat ABG   - f/u RVP     # GERD   - c/w Omeprazole 40mg qd     # HTN   - c/w Lisinopril 5mg qd     # DLD   - c/w Zocor 20mg qd   - restart Fenofibrate once dose confirmed (145 vs. 160)     # Insomnia   - restart home ambien when respiratory status improves   - Restart Xanax home med when respiratory status improves     # Chronic R Lower Back Pain   - confirm home Morphine ER, Oxycodone dosages and start PRN     DVT ppx: Lovenox   GI ppx: Protonix   Diet: DASH/TLC   Code Status: Full   Dispo: Acute

## 2022-07-26 NOTE — H&P ADULT - NSHPLABSRESULTS_GEN_ALL_CORE
14.1   10.12 )-----------( 233      ( 26 Jul 2022 00:05 )             41.0   07-26    139  |  100  |  8<L>  ----------------------------<  148<H>  4.0   |  26  |  0.8    Ca    9.0      26 Jul 2022 00:05

## 2022-07-26 NOTE — ED PROVIDER NOTE - PHYSICAL EXAMINATION
SKIN: warm, dry  HEAD: Normocephalic  EYES: no conjunctival erythema  ENT: no nasal discharge, airway clear  NECK: full ROM, non-tender  CARD: regular rate and rhythm  RESP: tachypneic, coarse breath sounds, upper airway congestion, expiratory wheezing   ABD: soft, non-distended, non-tender  EXT: moving all extremities spontaneously  NEURO: alert and oriented, grossly unremarkable  PSYCH: cooperative, appropriate

## 2022-07-26 NOTE — H&P ADULT - NSHPREVIEWOFSYSTEMS_GEN_ALL_CORE
General:	Denies fevers, chills, headaches  Respiratory: Admits to dyspnea, wheezing   Cardiovascular: Denies chest pain, palpitations, chest wall tenderness  Gastrointestinal: diarrhea, abdominal pain  Extremities: Admits to point tenderness over L flank  Neurological: Denies numbness and tingling in extremities

## 2022-07-26 NOTE — H&P ADULT - NSHPPHYSICALEXAM_GEN_ALL_CORE
Constitutional: pt is comfortable in bed; no acute distress  HEENT: Full ROM in bilateral eyes; pupils symmetrical and equal in size; neck supple  Respiratory: (+) sounds in all lung fields; severe wheezing in all lung fields bilaterally; no crackles   Cardiovascular: S1 S2 regular rate and rhythm; no murmurs or gallops appreciated  Gastrointestinal: abdomen is non-distended, non-tender to superficial and deep palpation  Extremities: extremities are slightly edematous, bilateral LE   Vascular: Warm and Well perfused UE and LE; no mottling or dusky skin   Neurological: AxO x3 to self, place and year  Skin: no raches or ulcerations noted; no scaling present

## 2022-07-26 NOTE — ED PROVIDER NOTE - CLINICAL SUMMARY MEDICAL DECISION MAKING FREE TEXT BOX
56-year-old male PMH of COPD presents today with shortness of breath and COPD exacerbation.  Patient is hemodynamically stable however visibly tachypneic with diffuse bilateral expiratory wheezing.  Patient was given DuoNeb's treatment multiple times and received nebs on route as well.  Patient also given steroids, BiPAP treatment to assist with his breathing.  I attempted to ambulate patient after treatment and he was still having significant tachypnea and thus was admitted for COPD exacerbation given current presentation.    I personally evaluated the patient. I reviewed the Resident’s or Physician Assistant’s note (as assigned above), and agree with the findings and plan except as documented in my note.,    CONSTITUTIONAL: Well-developed; well-nourished; in no acute distress.   SKIN: warm, dry  HEAD: Normocephalic; atraumatic.  EYES: PERRL, EOMI, normal sclera and conjunctiva   ENT: No nasal discharge; airway clear.  NECK: Supple; non tender.  CARD: S1, S2 normal; no murmurs, gallops, or rubs. Regular rate and rhythm.   RESP:   + Diffuse coarse rhonchi, tachypneic  ABD: soft ntnd  EXT: Normal ROM.  No clubbing, cyanosis or edema.   LYMPH: No acute cervical adenopathy.  NEURO: Alert, oriented, grossly unremarkable  PSYCH: Cooperative, appropriate.

## 2022-07-26 NOTE — CONSULT NOTE ADULT - ASSESSMENT
Assessment:       Patient is a 56y M with PMHx of REBECCA, COPD, GERD, HTN, HLD, presenting for increasing shortness of breath and frequency of cough, related to COPD exacerbation.      Impression:    Patient meets 2 out of 3 criteria of COPD exacerbation  Keep duonebs q4h + ventolin + solumedrol 60 mg IV BID; can switch to oral prednisone before discharge with gradual tapering if course is prolonged i.e. 10-14 days  Switch azithromycin to levofloxacin as patient was admitted previously in this year for COPD exacerbation  Continue with NIV at night and as needed during the day, if dictated by symptoms/ repeat ABG, keep the current NIV parameters  Target SpO2 between 88 and 92%  Obtain D-dimers, and TTE to evaluate for any RV dysfunction as patient has increasing lower extremities edema  Insist on compliance  Patient might also benefit from outpatient roflumilast or 3 times weekly azithro 250 mg Impression    COPD exacerbation   Active smoker     Plan:    Solumedrol 60 mg daily   Nebs Q4 and PRN   Short ABX course   Smoking cessation  OP CT chest for lung cancer screening   OP PFT  Impression    COPD exacerbation   Active smoker   REBECCA     Plan:    Solumedrol 60 mg daily   Nebs Q4 and PRN   Short ABX course   Smoking cessation  OP CT chest for lung cancer screening   OP PFT   Dimer and Lower extremity duplex   BiPAP during sleep

## 2022-07-27 LAB
ALBUMIN SERPL ELPH-MCNC: 3.4 G/DL — LOW (ref 3.5–5.2)
ALBUMIN SERPL ELPH-MCNC: 4.2 G/DL — SIGNIFICANT CHANGE UP (ref 3.5–5.2)
ALP SERPL-CCNC: 145 U/L — HIGH (ref 30–115)
ALP SERPL-CCNC: 147 U/L — HIGH (ref 30–115)
ALT FLD-CCNC: 13 U/L — SIGNIFICANT CHANGE UP (ref 0–41)
ALT FLD-CCNC: 15 U/L — SIGNIFICANT CHANGE UP (ref 0–41)
ANION GAP SERPL CALC-SCNC: 10 MMOL/L — SIGNIFICANT CHANGE UP (ref 7–14)
ANION GAP SERPL CALC-SCNC: 12 MMOL/L — SIGNIFICANT CHANGE UP (ref 7–14)
AST SERPL-CCNC: 10 U/L — SIGNIFICANT CHANGE UP (ref 0–41)
AST SERPL-CCNC: 13 U/L — SIGNIFICANT CHANGE UP (ref 0–41)
BASOPHILS # BLD AUTO: 0.01 K/UL — SIGNIFICANT CHANGE UP (ref 0–0.2)
BASOPHILS NFR BLD AUTO: 0.1 % — SIGNIFICANT CHANGE UP (ref 0–1)
BILIRUB SERPL-MCNC: 0.2 MG/DL — SIGNIFICANT CHANGE UP (ref 0.2–1.2)
BILIRUB SERPL-MCNC: 0.4 MG/DL — SIGNIFICANT CHANGE UP (ref 0.2–1.2)
BLD GP AB SCN SERPL QL: SIGNIFICANT CHANGE UP
BUN SERPL-MCNC: 19 MG/DL — SIGNIFICANT CHANGE UP (ref 10–20)
BUN SERPL-MCNC: 19 MG/DL — SIGNIFICANT CHANGE UP (ref 10–20)
CALCIUM SERPL-MCNC: 8.8 MG/DL — SIGNIFICANT CHANGE UP (ref 8.5–10.1)
CALCIUM SERPL-MCNC: 9.9 MG/DL — SIGNIFICANT CHANGE UP (ref 8.5–10.1)
CHLORIDE SERPL-SCNC: 97 MMOL/L — LOW (ref 98–110)
CHLORIDE SERPL-SCNC: 98 MMOL/L — SIGNIFICANT CHANGE UP (ref 98–110)
CO2 SERPL-SCNC: 27 MMOL/L — SIGNIFICANT CHANGE UP (ref 17–32)
CO2 SERPL-SCNC: 32 MMOL/L — SIGNIFICANT CHANGE UP (ref 17–32)
CREAT SERPL-MCNC: 0.7 MG/DL — SIGNIFICANT CHANGE UP (ref 0.7–1.5)
CREAT SERPL-MCNC: 0.8 MG/DL — SIGNIFICANT CHANGE UP (ref 0.7–1.5)
EGFR: 105 ML/MIN/1.73M2 — SIGNIFICANT CHANGE UP
EGFR: 110 ML/MIN/1.73M2 — SIGNIFICANT CHANGE UP
EOSINOPHIL # BLD AUTO: 0 K/UL — SIGNIFICANT CHANGE UP (ref 0–0.7)
EOSINOPHIL NFR BLD AUTO: 0 % — SIGNIFICANT CHANGE UP (ref 0–8)
GLUCOSE SERPL-MCNC: 104 MG/DL — HIGH (ref 70–99)
GLUCOSE SERPL-MCNC: 120 MG/DL — HIGH (ref 70–99)
HCT VFR BLD CALC: 40.9 % — LOW (ref 42–52)
HCT VFR BLD CALC: 44.6 % — SIGNIFICANT CHANGE UP (ref 42–52)
HCT VFR BLD CALC: 45.8 % — SIGNIFICANT CHANGE UP (ref 42–52)
HGB BLD-MCNC: 13.6 G/DL — LOW (ref 14–18)
HGB BLD-MCNC: 15.2 G/DL — SIGNIFICANT CHANGE UP (ref 14–18)
HGB BLD-MCNC: 15.4 G/DL — SIGNIFICANT CHANGE UP (ref 14–18)
IMM GRANULOCYTES NFR BLD AUTO: 0.7 % — HIGH (ref 0.1–0.3)
LYMPHOCYTES # BLD AUTO: 1.16 K/UL — LOW (ref 1.2–3.4)
LYMPHOCYTES # BLD AUTO: 8 % — LOW (ref 20.5–51.1)
MAGNESIUM SERPL-MCNC: 2.2 MG/DL — SIGNIFICANT CHANGE UP (ref 1.8–2.4)
MCHC RBC-ENTMCNC: 28.4 PG — SIGNIFICANT CHANGE UP (ref 27–31)
MCHC RBC-ENTMCNC: 28.8 PG — SIGNIFICANT CHANGE UP (ref 27–31)
MCHC RBC-ENTMCNC: 28.9 PG — SIGNIFICANT CHANGE UP (ref 27–31)
MCHC RBC-ENTMCNC: 33.3 G/DL — SIGNIFICANT CHANGE UP (ref 32–37)
MCHC RBC-ENTMCNC: 33.6 G/DL — SIGNIFICANT CHANGE UP (ref 32–37)
MCHC RBC-ENTMCNC: 34.1 G/DL — SIGNIFICANT CHANGE UP (ref 32–37)
MCV RBC AUTO: 83.2 FL — SIGNIFICANT CHANGE UP (ref 80–94)
MCV RBC AUTO: 85.8 FL — SIGNIFICANT CHANGE UP (ref 80–94)
MCV RBC AUTO: 86.8 FL — SIGNIFICANT CHANGE UP (ref 80–94)
MONOCYTES # BLD AUTO: 0.7 K/UL — HIGH (ref 0.1–0.6)
MONOCYTES NFR BLD AUTO: 4.8 % — SIGNIFICANT CHANGE UP (ref 1.7–9.3)
NEUTROPHILS # BLD AUTO: 12.54 K/UL — HIGH (ref 1.4–6.5)
NEUTROPHILS NFR BLD AUTO: 86.4 % — HIGH (ref 42.2–75.2)
NRBC # BLD: 0 /100 WBCS — SIGNIFICANT CHANGE UP (ref 0–0)
PLATELET # BLD AUTO: 223 K/UL — SIGNIFICANT CHANGE UP (ref 130–400)
PLATELET # BLD AUTO: 250 K/UL — SIGNIFICANT CHANGE UP (ref 130–400)
PLATELET # BLD AUTO: 283 K/UL — SIGNIFICANT CHANGE UP (ref 130–400)
POTASSIUM SERPL-MCNC: 4.3 MMOL/L — SIGNIFICANT CHANGE UP (ref 3.5–5)
POTASSIUM SERPL-MCNC: 4.5 MMOL/L — SIGNIFICANT CHANGE UP (ref 3.5–5)
POTASSIUM SERPL-SCNC: 4.3 MMOL/L — SIGNIFICANT CHANGE UP (ref 3.5–5)
POTASSIUM SERPL-SCNC: 4.5 MMOL/L — SIGNIFICANT CHANGE UP (ref 3.5–5)
PROT SERPL-MCNC: 5.9 G/DL — LOW (ref 6–8)
PROT SERPL-MCNC: 7.3 G/DL — SIGNIFICANT CHANGE UP (ref 6–8)
RBC # BLD: 4.71 M/UL — SIGNIFICANT CHANGE UP (ref 4.7–6.1)
RBC # BLD: 5.34 M/UL — SIGNIFICANT CHANGE UP (ref 4.7–6.1)
RBC # BLD: 5.36 M/UL — SIGNIFICANT CHANGE UP (ref 4.7–6.1)
RBC # FLD: 14.1 % — SIGNIFICANT CHANGE UP (ref 11.5–14.5)
RBC # FLD: 14.4 % — SIGNIFICANT CHANGE UP (ref 11.5–14.5)
RBC # FLD: 14.4 % — SIGNIFICANT CHANGE UP (ref 11.5–14.5)
SODIUM SERPL-SCNC: 136 MMOL/L — SIGNIFICANT CHANGE UP (ref 135–146)
SODIUM SERPL-SCNC: 140 MMOL/L — SIGNIFICANT CHANGE UP (ref 135–146)
WBC # BLD: 14.51 K/UL — HIGH (ref 4.8–10.8)
WBC # BLD: 18.25 K/UL — HIGH (ref 4.8–10.8)
WBC # BLD: 19.16 K/UL — HIGH (ref 4.8–10.8)
WBC # FLD AUTO: 14.51 K/UL — HIGH (ref 4.8–10.8)
WBC # FLD AUTO: 18.25 K/UL — HIGH (ref 4.8–10.8)
WBC # FLD AUTO: 19.16 K/UL — HIGH (ref 4.8–10.8)

## 2022-07-27 PROCEDURE — 99233 SBSQ HOSP IP/OBS HIGH 50: CPT

## 2022-07-27 PROCEDURE — 93010 ELECTROCARDIOGRAM REPORT: CPT

## 2022-07-27 RX ORDER — ONDANSETRON 8 MG/1
4 TABLET, FILM COATED ORAL EVERY 6 HOURS
Refills: 0 | Status: DISCONTINUED | OUTPATIENT
Start: 2022-07-27 | End: 2022-07-29

## 2022-07-27 RX ORDER — AZITHROMYCIN 500 MG/1
250 TABLET, FILM COATED ORAL DAILY
Refills: 0 | Status: DISCONTINUED | OUTPATIENT
Start: 2022-07-28 | End: 2022-07-29

## 2022-07-27 RX ORDER — HYDRALAZINE HCL 50 MG
5 TABLET ORAL DAILY
Refills: 0 | Status: DISCONTINUED | OUTPATIENT
Start: 2022-07-27 | End: 2022-07-27

## 2022-07-27 RX ORDER — BUDESONIDE AND FORMOTEROL FUMARATE DIHYDRATE 160; 4.5 UG/1; UG/1
2 AEROSOL RESPIRATORY (INHALATION)
Refills: 0 | Status: DISCONTINUED | OUTPATIENT
Start: 2022-07-27 | End: 2022-07-29

## 2022-07-27 RX ORDER — SODIUM CHLORIDE 9 MG/ML
1000 INJECTION, SOLUTION INTRAVENOUS
Refills: 0 | Status: DISCONTINUED | OUTPATIENT
Start: 2022-07-27 | End: 2022-07-29

## 2022-07-27 RX ORDER — PANTOPRAZOLE SODIUM 20 MG/1
40 TABLET, DELAYED RELEASE ORAL EVERY 12 HOURS
Refills: 0 | Status: DISCONTINUED | OUTPATIENT
Start: 2022-07-27 | End: 2022-07-28

## 2022-07-27 RX ORDER — PROCHLORPERAZINE MALEATE 5 MG
10 TABLET ORAL EVERY 8 HOURS
Refills: 0 | Status: DISCONTINUED | OUTPATIENT
Start: 2022-07-27 | End: 2022-07-29

## 2022-07-27 RX ORDER — NALOXONE HYDROCHLORIDE 4 MG/.1ML
1 SPRAY NASAL ONCE
Refills: 0 | Status: DISCONTINUED | OUTPATIENT
Start: 2022-07-27 | End: 2022-07-29

## 2022-07-27 RX ORDER — MORPHINE SULFATE 50 MG/1
5 CAPSULE, EXTENDED RELEASE ORAL EVERY 4 HOURS
Refills: 0 | Status: DISCONTINUED | OUTPATIENT
Start: 2022-07-27 | End: 2022-07-28

## 2022-07-27 RX ORDER — MORPHINE SULFATE 50 MG/1
2 CAPSULE, EXTENDED RELEASE ORAL ONCE
Refills: 0 | Status: DISCONTINUED | OUTPATIENT
Start: 2022-07-27 | End: 2022-07-27

## 2022-07-27 RX ORDER — PROCHLORPERAZINE MALEATE 5 MG
10 TABLET ORAL ONCE
Refills: 0 | Status: COMPLETED | OUTPATIENT
Start: 2022-07-27 | End: 2022-07-27

## 2022-07-27 RX ORDER — SCOPALAMINE 1 MG/3D
1 PATCH, EXTENDED RELEASE TRANSDERMAL
Refills: 0 | Status: DISCONTINUED | OUTPATIENT
Start: 2022-07-27 | End: 2022-07-29

## 2022-07-27 RX ORDER — IPRATROPIUM/ALBUTEROL SULFATE 18-103MCG
3 AEROSOL WITH ADAPTER (GRAM) INHALATION EVERY 4 HOURS
Refills: 0 | Status: DISCONTINUED | OUTPATIENT
Start: 2022-07-27 | End: 2022-07-29

## 2022-07-27 RX ORDER — FUROSEMIDE 40 MG
20 TABLET ORAL DAILY
Refills: 0 | Status: DISCONTINUED | OUTPATIENT
Start: 2022-07-27 | End: 2022-07-27

## 2022-07-27 RX ADMIN — MORPHINE SULFATE 2 MILLIGRAM(S): 50 CAPSULE, EXTENDED RELEASE ORAL at 15:08

## 2022-07-27 RX ADMIN — OXYCODONE HYDROCHLORIDE 30 MILLIGRAM(S): 5 TABLET ORAL at 04:10

## 2022-07-27 RX ADMIN — Medication 60 MILLIGRAM(S): at 17:15

## 2022-07-27 RX ADMIN — Medication 2 MILLIGRAM(S): at 05:55

## 2022-07-27 RX ADMIN — MORPHINE SULFATE 5 MILLIGRAM(S): 50 CAPSULE, EXTENDED RELEASE ORAL at 20:00

## 2022-07-27 RX ADMIN — Medication 10 MILLIGRAM(S): at 14:26

## 2022-07-27 RX ADMIN — PANTOPRAZOLE SODIUM 40 MILLIGRAM(S): 20 TABLET, DELAYED RELEASE ORAL at 11:24

## 2022-07-27 RX ADMIN — Medication 60 MILLIGRAM(S): at 05:55

## 2022-07-27 RX ADMIN — Medication 10 MILLIGRAM(S): at 19:59

## 2022-07-27 RX ADMIN — Medication 2 MILLIGRAM(S): at 11:23

## 2022-07-27 RX ADMIN — OXYCODONE HYDROCHLORIDE 30 MILLIGRAM(S): 5 TABLET ORAL at 04:40

## 2022-07-27 RX ADMIN — MORPHINE SULFATE 100 MILLIGRAM(S): 50 CAPSULE, EXTENDED RELEASE ORAL at 11:23

## 2022-07-27 RX ADMIN — SCOPALAMINE 1 PATCH: 1 PATCH, EXTENDED RELEASE TRANSDERMAL at 17:15

## 2022-07-27 RX ADMIN — SODIUM CHLORIDE 100 MILLILITER(S): 9 INJECTION, SOLUTION INTRAVENOUS at 15:08

## 2022-07-27 RX ADMIN — Medication 3 MILLILITER(S): at 09:18

## 2022-07-27 RX ADMIN — MORPHINE SULFATE 5 MILLIGRAM(S): 50 CAPSULE, EXTENDED RELEASE ORAL at 20:30

## 2022-07-27 RX ADMIN — Medication 3 MILLILITER(S): at 13:36

## 2022-07-27 RX ADMIN — Medication 2 MILLIGRAM(S): at 17:15

## 2022-07-27 RX ADMIN — OXYCODONE HYDROCHLORIDE 30 MILLIGRAM(S): 5 TABLET ORAL at 09:42

## 2022-07-27 RX ADMIN — AZITHROMYCIN 250 MILLIGRAM(S): 500 TABLET, FILM COATED ORAL at 11:25

## 2022-07-27 RX ADMIN — ZOLPIDEM TARTRATE 5 MILLIGRAM(S): 10 TABLET ORAL at 22:33

## 2022-07-27 NOTE — PROGRESS NOTE ADULT - SUBJECTIVE AND OBJECTIVE BOX
Patient is a 56y old  Male who presents with a chief complaint of SOB (26 Jul 2022 09:19)      Patient seen and examined at bedside.    ALLERGIES:  No Known Allergies    MEDICATIONS:  ALBUTerol    90 MICROgram(s) HFA Inhaler 1 Puff(s) Inhalation every 8 hours PRN  albuterol/ipratropium for Nebulization 3 milliLiter(s) Nebulizer every 4 hours PRN  ALPRAZolam 2 milliGRAM(s) Oral four times a day  azithromycin   Tablet 250 milliGRAM(s) Oral every 24 hours  budesonide 160 MICROgram(s)/formoterol 4.5 MICROgram(s) Inhaler 2 Puff(s) Inhalation two times a day  furosemide    Tablet 40 milliGRAM(s) Oral daily  lactated ringers. 1000 milliLiter(s) IV Continuous <Continuous>  lisinopril 5 milliGRAM(s) Oral daily  LORazepam   Injectable 1 milliGRAM(s) IV Push every 6 hours PRN  methylPREDNISolone sodium succinate Injectable 60 milliGRAM(s) IV Push two times a day  morphine  - Injectable 5 milliGRAM(s) IV Push every 4 hours PRN  morphine ER Tablet 100 milliGRAM(s) Oral daily  naloxone Injectable 1 milliGRAM(s) IV Push once PRN  oxyCODONE   IR Oral Tab/Cap - Peds 30 milliGRAM(s) Oral every 4 hours PRN  pantoprazole  Injectable 40 milliGRAM(s) IV Push every 12 hours  scopolamine 1 mG/72 Hr(s) Patch 1 Patch Transdermal every 72 hours  simvastatin 20 milliGRAM(s) Oral at bedtime  zolpidem 5 milliGRAM(s) Oral at bedtime PRN    Vital Signs Last 24 Hrs  T(F): 98 (27 Jul 2022 13:28), Max: 98 (27 Jul 2022 13:28)  HR: 93 (27 Jul 2022 13:28) (72 - 93)  BP: 110/73 (27 Jul 2022 13:28) (106/61 - 129/81)  RR: 18 (27 Jul 2022 04:59) (16 - 18)  SpO2: 94% (27 Jul 2022 13:28) (94% - 100%)  I&O's Summary      PHYSICAL EXAM:  General: actively vomiting A/O x 3  ENT: MMM  Neck: Supple, No JVD  Lungs: Clear to auscultation bilaterally  Cardio: RRR, S1/S2, No murmurs  Abdomen: Soft, +tender, Nondistended; obese  Extremities: No cyanosis, No edema    LABS:                        15.2   19.16 )-----------( 283      ( 27 Jul 2022 14:24 )             44.6     07-27    136  |  97  |  19  ----------------------------<  120  4.5   |  27  |  0.7    Ca    8.8      27 Jul 2022 09:31  Mg     2.2     07-27    TPro  5.9  /  Alb  3.4  /  TBili  0.2  /  DBili  x   /  AST  10  /  ALT  13  /  AlkPhos  145  07-27                  02:11 - VBG - pH: 7.40  | pCO2: 45    | pO2: 48    | Lactate: 1.80                     COVID-19 PCR: NotDetec (07-25-22 @ 21:33)      RADIOLOGY & ADDITIONAL TESTS:  < from: Xray Chest 1 View-PORTABLE IMMEDIATE (07.26.22 @ 00:06) >    Impression:    Low lung volumes with no radiographic evidence of acute cardiopulmonary   disease.    < end of copied text >  < from: CT Abdomen and Pelvis w/ IV Cont (07.20.22 @ 16:56) >    IMPRESSION:    No CT evidence of acute abdominal pathology    < end of copied text >    Care Discussed with Consultants/Other Providers:

## 2022-07-27 NOTE — MEDICAL STUDENT PROGRESS NOTE(EDUCATION) - SUBJECTIVE AND OBJECTIVE BOX
KAYLEIGH TOMAS 56y Male  MRN#: [id number]   CODE STATUS: Full    Hospital Day: 1d    Pt is currently admitted with the primary diagnosis of COPD exacerbation    SUBJECTIVE  Hospital Course    Overnight events     Subjective complaints: Mr. Tomas was sleeping on BIPAP on approach, pt. was seen and examined at the bedside. This AM, he states he feels better but still endorses SOB and dull chest pain from persistent coughing. He denies confusion, palpitations, abdominal pain, n/v.                                           ----------------------------------------------------------  OBJECTIVE  PAST MEDICAL & SURGICAL HISTORY  HTN (hypertension)    High cholesterol    GERD (gastroesophageal reflux disease)    Morbid obesity    Osteoarthritis    Hiatal hernia  GERD    Colitis  LARGE INTESTINE   NO RECENT FLARES    COPD (chronic obstructive pulmonary disease)    REBECCA treated with BiPAP    History of cholecystectomy    History of appendectomy    History of knee replacement procedure of right knee  BILATERAL    Hernia, inguinal, bilateral  3 months old    H/O knee surgery  arthoscopic x4    H/O foot surgery  right big toe surgery                                              -----------------------------------------------------------  ALLERGIES:  No Known Allergies                                            ------------------------------------------------------------    HOME MEDICATIONS  Home Medications:  Ambien 10 mg oral tablet: 0.5 tab(s) orally once a day (at bedtime), As Needed (04 Aug 2021 11:40)  fenofibrate 160 mg oral tablet: 1 tab(s) orally once a day (04 Aug 2021 11:40)  Lasix 40 mg oral tablet: 1 tab(s) orally once a day (04 Aug 2021 11:40)  lisinopril 5 mg oral tablet: 1 tab(s) orally once a day (04 Aug 2021 11:40)  Wellbutrin  mg/24 hours oral tablet, extended release: 1 tab(s) orally every 24 hours (28 Oct 2020 17:46)  Zocor 20 mg oral tablet: 1 tab(s) orally once a day (at bedtime) (28 Oct 2020 17:46)                           MEDICATIONS:  STANDING MEDICATIONS  albuterol/ipratropium for Nebulization 3 milliLiter(s) Nebulizer every 6 hours  ALPRAZolam 2 milliGRAM(s) Oral four times a day  azithromycin   Tablet 250 milliGRAM(s) Oral every 24 hours  budesonide 160 MICROgram(s)/formoterol 4.5 MICROgram(s) Inhaler 2 Puff(s) Inhalation two times a day  enoxaparin Injectable 40 milliGRAM(s) SubCutaneous every 12 hours  furosemide    Tablet 40 milliGRAM(s) Oral daily  lisinopril 5 milliGRAM(s) Oral daily  methylPREDNISolone sodium succinate Injectable 60 milliGRAM(s) IV Push two times a day  morphine ER Tablet 100 milliGRAM(s) Oral daily  pantoprazole    Tablet 40 milliGRAM(s) Oral daily  simvastatin 20 milliGRAM(s) Oral at bedtime    PRN MEDICATIONS  ALBUTerol    90 MICROgram(s) HFA Inhaler 1 Puff(s) Inhalation every 8 hours PRN  naloxone Injectable 1 milliGRAM(s) IV Push once PRN  oxyCODONE   IR Oral Tab/Cap - Peds 30 milliGRAM(s) Oral every 4 hours PRN  zolpidem 5 milliGRAM(s) Oral at bedtime PRN                                            ------------------------------------------------------------  VITAL SIGNS: Last 24 Hours  T(C): 35.4 (27 Jul 2022 04:59), Max: 36.1 (26 Jul 2022 20:45)  T(F): 95.7 (27 Jul 2022 04:59), Max: 96.9 (26 Jul 2022 20:45)  HR: 72 (27 Jul 2022 04:59) (72 - 91)  BP: 106/61 (27 Jul 2022 04:59) (104/58 - 129/81)  BP(mean): --  RR: 18 (27 Jul 2022 04:59) (16 - 20)  SpO2: 95% (27 Jul 2022 04:59) (95% - 100%)                                             --------------------------------------------------------------  LABS:                        13.6   14.51 )-----------( 250      ( 27 Jul 2022 09:36 )             40.9     07-27    136  |  97<L>  |  19  ----------------------------<  120<H>  4.5   |  27  |  0.7    Ca    8.8      27 Jul 2022 09:31  Mg     2.2     07-27    TPro  5.9<L>  /  Alb  3.4<L>  /  TBili  0.2  /  DBili  x   /  AST  10  /  ALT  13  /  AlkPhos  145<H>  07-27    CARDIAC MARKERS ( 26 Jul 2022 00:05 )  x     / <0.01 ng/mL / x     / x     / x          WBC Count: 14.51 K/uL (07-27 @ 09:36)  WBC Count: 11.15 K/uL (07-26 @ 05:45)  WBC Count: 10.12 K/uL (07-26 @ 00:05)                                            -------------------------------------------------------------  RADIOLOGY:  < from: Xray Chest 1 View-PORTABLE IMMEDIATE (07.26.22 @ 00:06) >  Low lung volumes with no radiographic evidence of acute cardiopulmonary   disease.    < end of copied text >                                            --------------------------------------------------------------    PHYSICAL EXAM:  General: Well appearing, not in acute distress  HEENT: No cervical LAD, or JVD  LUNGS: Rhonchi heard in all lung fields, no stridor, on RA  HEART: RRR, no murmur  ABDOMEN: soft, nontender to palpation, no guarding or rebound  EXT: no peripheral pitting edema b/l  NEURO: AAOx3                                             --------------------------------------------------------------    ASSESSMENT & PLAN    55 y/o male with PHM of COPD (not on home O2), REBECCA, GERD,colitis, HTN, HLD, 120 pack-year hx presents with increasing dyspnea, productive cough with white sputum x 4 days admitted to medicine for management of AHRF 2/2 to COPD exacerbation.     #AHRF 2/2 COPD exacerbation   #REBECCA  - On admission, VBG: pH 7.4, pCO2 45, pO2 48, pHCO3 28, sat 84%, RVP (-), d-dimer (494)  - c/w IV solumedrol 60 BID, Duoneb, Symbicort, BIPAP qhs, PRN  - c/w Azithromycin 250mg (day 2 of 5)  - Pulm evaluated: change dounebs q4 prn, OP CTchest, OP PFTs  - f/u LE duplex    #Chronic back pain  - R lower back pain  - c/w home morphine ER 100mg qq  - c/w home oxycodone 30mg q4h prn  - confirmed dosages with [facility] [place] [facility] this AM  - narcan ordered     #Anxiety  - c/w home xanax 2mg qid   - c/w ambien 5mg qhs   - dosage confirmed this AM    #GERD  - c/w protonix     #HTN  - c/w home lisinopril 5mg qd  - c/w home po lasix 40mg     #HLD  - c/w home simvastain 20mg qhs   - restart home fenofibrate 160mg qd    #h/o Colitis                                                                                 ----------------------------------------------------  # DVT prophylaxis Lovenox 40mg bid   # GI prophylaxis: protonix 40mg   # Diet: DASH  # Activity: as tolerated, ambulates on RA  # Code status: RA   # Disposition:                                                                              --------------------------------------------------------    # Handoff: f/u LE duplex, c/w steroids,pending improved resp. status     KAYLEIGH TOMAS 56y Male  MRN#: [id number]   CODE STATUS: Full    Hospital Day: 1d    Pt is currently admitted with the primary diagnosis of COPD exacerbation    SUBJECTIVE  Overnight events: no overnight events reported.    Subjective complaints: Mr. Tomas was sleeping on BIPAP on approach, pt. was seen and examined at the bedside. This AM, he states he feels better but still endorses SOB and dull chest pain from persistent coughing. He denies confusion, palpitations, abdominal pain, n/v.                                           ----------------------------------------------------------  OBJECTIVE  PAST MEDICAL & SURGICAL HISTORY  HTN (hypertension)    High cholesterol    GERD (gastroesophageal reflux disease)    Morbid obesity    Osteoarthritis    Hiatal hernia  GERD    Colitis  LARGE INTESTINE   NO RECENT FLARES    COPD (chronic obstructive pulmonary disease)    REBECCA treated with BiPAP    History of cholecystectomy    History of appendectomy    History of knee replacement procedure of right knee  BILATERAL    Hernia, inguinal, bilateral  3 months old    H/O knee surgery  arthoscopic x4    H/O foot surgery  right big toe surgery                                              -----------------------------------------------------------  ALLERGIES:  No Known Allergies                                            ------------------------------------------------------------    HOME MEDICATIONS  Home Medications:  Ambien 10 mg oral tablet: 0.5 tab(s) orally once a day (at bedtime), As Needed (04 Aug 2021 11:40)  fenofibrate 160 mg oral tablet: 1 tab(s) orally once a day (04 Aug 2021 11:40)  Lasix 40 mg oral tablet: 1 tab(s) orally once a day (04 Aug 2021 11:40)  lisinopril 5 mg oral tablet: 1 tab(s) orally once a day (04 Aug 2021 11:40)  Wellbutrin  mg/24 hours oral tablet, extended release: 1 tab(s) orally every 24 hours (28 Oct 2020 17:46)  Zocor 20 mg oral tablet: 1 tab(s) orally once a day (at bedtime) (28 Oct 2020 17:46)                           MEDICATIONS:  STANDING MEDICATIONS  albuterol/ipratropium for Nebulization 3 milliLiter(s) Nebulizer every 6 hours  ALPRAZolam 2 milliGRAM(s) Oral four times a day  azithromycin   Tablet 250 milliGRAM(s) Oral every 24 hours  budesonide 160 MICROgram(s)/formoterol 4.5 MICROgram(s) Inhaler 2 Puff(s) Inhalation two times a day  enoxaparin Injectable 40 milliGRAM(s) SubCutaneous every 12 hours  furosemide    Tablet 40 milliGRAM(s) Oral daily  lisinopril 5 milliGRAM(s) Oral daily  methylPREDNISolone sodium succinate Injectable 60 milliGRAM(s) IV Push two times a day  morphine ER Tablet 100 milliGRAM(s) Oral daily  pantoprazole    Tablet 40 milliGRAM(s) Oral daily  simvastatin 20 milliGRAM(s) Oral at bedtime    PRN MEDICATIONS  ALBUTerol    90 MICROgram(s) HFA Inhaler 1 Puff(s) Inhalation every 8 hours PRN  naloxone Injectable 1 milliGRAM(s) IV Push once PRN  oxyCODONE   IR Oral Tab/Cap - Peds 30 milliGRAM(s) Oral every 4 hours PRN  zolpidem 5 milliGRAM(s) Oral at bedtime PRN                                            ------------------------------------------------------------  VITAL SIGNS: Last 24 Hours  T(C): 35.4 (27 Jul 2022 04:59), Max: 36.1 (26 Jul 2022 20:45)  T(F): 95.7 (27 Jul 2022 04:59), Max: 96.9 (26 Jul 2022 20:45)  HR: 72 (27 Jul 2022 04:59) (72 - 91)  BP: 106/61 (27 Jul 2022 04:59) (104/58 - 129/81)  BP(mean): --  RR: 18 (27 Jul 2022 04:59) (16 - 20)  SpO2: 95% (27 Jul 2022 04:59) (95% - 100%)                                             --------------------------------------------------------------  LABS:                        13.6   14.51 )-----------( 250      ( 27 Jul 2022 09:36 )             40.9     07-27    136  |  97<L>  |  19  ----------------------------<  120<H>  4.5   |  27  |  0.7    Ca    8.8      27 Jul 2022 09:31  Mg     2.2     07-27    TPro  5.9<L>  /  Alb  3.4<L>  /  TBili  0.2  /  DBili  x   /  AST  10  /  ALT  13  /  AlkPhos  145<H>  07-27    CARDIAC MARKERS ( 26 Jul 2022 00:05 )  x     / <0.01 ng/mL / x     / x     / x          WBC Count: 14.51 K/uL (07-27 @ 09:36)  WBC Count: 11.15 K/uL (07-26 @ 05:45)  WBC Count: 10.12 K/uL (07-26 @ 00:05)                                            -------------------------------------------------------------  RADIOLOGY:  < from: Xray Chest 1 View-PORTABLE IMMEDIATE (07.26.22 @ 00:06) >  Low lung volumes with no radiographic evidence of acute cardiopulmonary   disease.    < end of copied text >                                            --------------------------------------------------------------    PHYSICAL EXAM:  General: Well appearing, not in acute distress  HEENT: No cervical LAD, or JVD  LUNGS: Rhonchi heard in all lung fields, no stridor, on RA  HEART: RRR, no murmur  ABDOMEN: soft, nontender to palpation, no guarding or rebound  EXT: no peripheral pitting edema b/l  NEURO: AAOx3                                             --------------------------------------------------------------    ASSESSMENT & PLAN    57 y/o male with PHM of COPD (not on home O2), REBECCA, GERD,colitis, HTN, HLD, 120 pack-year hx presents with increasing dyspnea, productive cough with white sputum x 4 days admitted to medicine for management of AHRF 2/2 to COPD exacerbation.     #AHRF 2/2 COPD exacerbation   #REBECCA  - On admission, VBG: pH 7.4, pCO2 45, pO2 48, pHCO3 28, sat 84%, RVP (-), d-dimer (494)  - c/w IV solumedrol 60 BID, Duoneb, Symbicort, BIPAP qhs, PRN  - c/w Azithromycin 250mg (day 2 of 5)  - Pulm evaluated: change dounebs q4 prn, OP CTchest, OP PFTs  - f/u LE duplex    #Chronic back pain  - R lower back pain  - c/w home morphine ER 100mg qq  - c/w home oxycodone 30mg q4h prn  - confirmed dosages with [place] pharmacy this AM  - narcan ordered     #Anxiety  - c/w home xanax 2mg qid   - c/w ambien 5mg qhs   - dosage confirmed this AM  - restarting home haloperidol 2mg solution   - restarting home buproprion 150mg qd     #GERD  - c/w protonix     #HTN  - c/w home lisinopril 5mg qd  - c/w home po lasix 40mg     #HLD  - c/w home simvastain 20mg qhs   - restart home fenofibrate 160mg qd    #h/o Colitis                                                                                 ----------------------------------------------------  # DVT prophylaxis Lovenox 40mg bid   # GI prophylaxis: protonix 40mg   # Diet: DASH  # Activity: as tolerated, ambulates on RA  # Code status: RA   # Disposition:                                                                              --------------------------------------------------------    # Handoff: f/u LE duplex, c/w steroids,pending improved resp. status     KAYLEIGH TOMAS 56y Male  MRN#: [id number]   CODE STATUS: Full    Hospital Day: 1d    Pt is currently admitted with the primary diagnosis of COPD exacerbation    SUBJECTIVE  Overnight events: no overnight events reported.    Subjective complaints: Mr. Tomas was sleeping on BIPAP on approach, pt. was seen and examined at the bedside. This AM, he states he feels better but still endorses SOB and dull chest pain from persistent coughing. He denies confusion, palpitations, abdominal pain, n/v.                                           ----------------------------------------------------------  OBJECTIVE  PAST MEDICAL & SURGICAL HISTORY  HTN (hypertension)    High cholesterol    GERD (gastroesophageal reflux disease)    Morbid obesity    Osteoarthritis    Hiatal hernia  GERD    Colitis  LARGE INTESTINE   NO RECENT FLARES    COPD (chronic obstructive pulmonary disease)    REBECCA treated with BiPAP    History of cholecystectomy    History of appendectomy    History of knee replacement procedure of right knee  BILATERAL    Hernia, inguinal, bilateral  3 months old    H/O knee surgery  arthoscopic x4    H/O foot surgery  right big toe surgery                                              -----------------------------------------------------------  ALLERGIES:  No Known Allergies                                            ------------------------------------------------------------    HOME MEDICATIONS  Home Medications:  Ambien 10 mg oral tablet: 0.5 tab(s) orally once a day (at bedtime), As Needed (04 Aug 2021 11:40)  fenofibrate 160 mg oral tablet: 1 tab(s) orally once a day (04 Aug 2021 11:40)  Lasix 40 mg oral tablet: 1 tab(s) orally once a day (04 Aug 2021 11:40)  lisinopril 5 mg oral tablet: 1 tab(s) orally once a day (04 Aug 2021 11:40)  Wellbutrin  mg/24 hours oral tablet, extended release: 1 tab(s) orally every 24 hours (28 Oct 2020 17:46)  Zocor 20 mg oral tablet: 1 tab(s) orally once a day (at bedtime) (28 Oct 2020 17:46)                           MEDICATIONS:  STANDING MEDICATIONS  albuterol/ipratropium for Nebulization 3 milliLiter(s) Nebulizer every 6 hours  ALPRAZolam 2 milliGRAM(s) Oral four times a day  azithromycin   Tablet 250 milliGRAM(s) Oral every 24 hours  budesonide 160 MICROgram(s)/formoterol 4.5 MICROgram(s) Inhaler 2 Puff(s) Inhalation two times a day  enoxaparin Injectable 40 milliGRAM(s) SubCutaneous every 12 hours  furosemide    Tablet 40 milliGRAM(s) Oral daily  lisinopril 5 milliGRAM(s) Oral daily  methylPREDNISolone sodium succinate Injectable 60 milliGRAM(s) IV Push two times a day  morphine ER Tablet 100 milliGRAM(s) Oral daily  pantoprazole    Tablet 40 milliGRAM(s) Oral daily  simvastatin 20 milliGRAM(s) Oral at bedtime    PRN MEDICATIONS  ALBUTerol    90 MICROgram(s) HFA Inhaler 1 Puff(s) Inhalation every 8 hours PRN  naloxone Injectable 1 milliGRAM(s) IV Push once PRN  oxyCODONE   IR Oral Tab/Cap - Peds 30 milliGRAM(s) Oral every 4 hours PRN  zolpidem 5 milliGRAM(s) Oral at bedtime PRN                                            ------------------------------------------------------------  VITAL SIGNS: Last 24 Hours  T(C): 35.4 (27 Jul 2022 04:59), Max: 36.1 (26 Jul 2022 20:45)  T(F): 95.7 (27 Jul 2022 04:59), Max: 96.9 (26 Jul 2022 20:45)  HR: 72 (27 Jul 2022 04:59) (72 - 91)  BP: 106/61 (27 Jul 2022 04:59) (104/58 - 129/81)  BP(mean): --  RR: 18 (27 Jul 2022 04:59) (16 - 20)  SpO2: 95% (27 Jul 2022 04:59) (95% - 100%)                                             --------------------------------------------------------------  LABS:                        13.6   14.51 )-----------( 250      ( 27 Jul 2022 09:36 )             40.9     07-27    136  |  97<L>  |  19  ----------------------------<  120<H>  4.5   |  27  |  0.7    Ca    8.8      27 Jul 2022 09:31  Mg     2.2     07-27    TPro  5.9<L>  /  Alb  3.4<L>  /  TBili  0.2  /  DBili  x   /  AST  10  /  ALT  13  /  AlkPhos  145<H>  07-27    CARDIAC MARKERS ( 26 Jul 2022 00:05 )  x     / <0.01 ng/mL / x     / x     / x          WBC Count: 14.51 K/uL (07-27 @ 09:36)  WBC Count: 11.15 K/uL (07-26 @ 05:45)  WBC Count: 10.12 K/uL (07-26 @ 00:05)                                            -------------------------------------------------------------  RADIOLOGY:  < from: Xray Chest 1 View-PORTABLE IMMEDIATE (07.26.22 @ 00:06) >  Low lung volumes with no radiographic evidence of acute cardiopulmonary   disease.    < end of copied text >                                            --------------------------------------------------------------    PHYSICAL EXAM:  General: Well appearing, not in acute distress  HEENT: No cervical LAD, or JVD  LUNGS: Rhonchi heard in all lung fields, no stridor, on RA  HEART: RRR, no murmur  ABDOMEN: soft, nontender to palpation, no guarding or rebound  EXT: no peripheral pitting edema b/l  NEURO: AAOx3                                             --------------------------------------------------------------    ASSESSMENT & PLAN    55 y/o male with PHM of COPD (not on home O2), REBECCA, GERD,colitis, HTN, HLD, 120 pack-year hx presents with increasing dyspnea, productive cough with white sputum x 4 days admitted to medicine for management of AHRF 2/2 to COPD exacerbation.     # COPD exacerbation   # REBECCA  - Pulmonary onboard   - On admission, VBG: pH 7.4, pCO2 45, pO2 48, pHCO3 28, sat 84%, RVP (-), d-dimer (494)  - c/w IV solumedrol 60 BID, Duoneb q4, Symbicort 160 BID, Albuterol 90 q8 , BIPAP qhs, PRN  - c/w Azithromycin 250mg (day 2 of 5)  - OP CTchest, OP PFTs  - f/u LE duplex    # Hematemesis - suspected Charlotte Santamaria tear  - today 5-6 ep of bloody emesis (bright red)  - NPO  - LR at 100/h  - GI consulted f/u reccs   - f/u cbc , VS   - Active T/S, consented for blood     #Chronic R lower back pain  - morphine 5mg IV q4 PRN   - Due to vomiting :  holding home morphine ER 100mg qd, xycodone 30mg q4h prn (confirmed dosages with [facility] )  - narcan ordered     #Anxiety  - Ativan 1mg IV 6 PRN  - f/u ECG for QTc    - Due to vomiting holding :   - holding home xanax 2mg qid  , home ambien 10mg qhs   - holding home haloperidol 2mg solution   - holding home buproprion 150mg qd     #GERD  - c/w protonix     #HTN  - c/w home lisinopril 5mg qd  - c/w home po lasix 40mg     #HLD  - c/w home simvastain 20mg qhs   - restart home fenofibrate 160mg qd when vomiting ends                                                                                ----------------------------------------------------  # DVT prophylaxis Lovenox 40mg bid   # GI prophylaxis: protonix 40mg   # Diet: NPO  # Activity: as tolerated, ambulates on RA  # Code status: RA   # Disposition: from home                                                                             --------------------------------------------------------    # Handoff: f/u LE duplex, GI Reccs, cbc, VS, pain, anxiety control      KAYLEIGH TOMAS 56y Male  MRN#: [id number]   CODE STATUS: Full    Hospital Day: 1d    Pt is currently admitted with the primary diagnosis of COPD exacerbation    SUBJECTIVE  Overnight events: no overnight events reported.    Subjective complaints: Mr. Tomas was sleeping on BIPAP on approach, pt. was seen and examined at the bedside. This AM, he states he feels better but still endorses SOB and dull chest pain from persistent coughing. He denies confusion, palpitations, abdominal pain, n/v.                                           ----------------------------------------------------------  OBJECTIVE  PAST MEDICAL & SURGICAL HISTORY  HTN (hypertension)    High cholesterol    GERD (gastroesophageal reflux disease)    Morbid obesity    Osteoarthritis    Hiatal hernia  GERD    Colitis  LARGE INTESTINE   NO RECENT FLARES    COPD (chronic obstructive pulmonary disease)    REBECCA treated with BiPAP    History of cholecystectomy    History of appendectomy    History of knee replacement procedure of right knee  BILATERAL    Hernia, inguinal, bilateral  3 months old    H/O knee surgery  arthoscopic x4    H/O foot surgery  right big toe surgery                                              -----------------------------------------------------------  ALLERGIES:  No Known Allergies                                            ------------------------------------------------------------    HOME MEDICATIONS  Home Medications:  Ambien 10 mg oral tablet: 0.5 tab(s) orally once a day (at bedtime), As Needed (04 Aug 2021 11:40)  fenofibrate 160 mg oral tablet: 1 tab(s) orally once a day (04 Aug 2021 11:40)  Lasix 40 mg oral tablet: 1 tab(s) orally once a day (04 Aug 2021 11:40)  lisinopril 5 mg oral tablet: 1 tab(s) orally once a day (04 Aug 2021 11:40)  Wellbutrin  mg/24 hours oral tablet, extended release: 1 tab(s) orally every 24 hours (28 Oct 2020 17:46)  Zocor 20 mg oral tablet: 1 tab(s) orally once a day (at bedtime) (28 Oct 2020 17:46)                           MEDICATIONS:  STANDING MEDICATIONS  albuterol/ipratropium for Nebulization 3 milliLiter(s) Nebulizer every 6 hours  ALPRAZolam 2 milliGRAM(s) Oral four times a day  azithromycin   Tablet 250 milliGRAM(s) Oral every 24 hours  budesonide 160 MICROgram(s)/formoterol 4.5 MICROgram(s) Inhaler 2 Puff(s) Inhalation two times a day  enoxaparin Injectable 40 milliGRAM(s) SubCutaneous every 12 hours  furosemide    Tablet 40 milliGRAM(s) Oral daily  lisinopril 5 milliGRAM(s) Oral daily  methylPREDNISolone sodium succinate Injectable 60 milliGRAM(s) IV Push two times a day  morphine ER Tablet 100 milliGRAM(s) Oral daily  pantoprazole    Tablet 40 milliGRAM(s) Oral daily  simvastatin 20 milliGRAM(s) Oral at bedtime    PRN MEDICATIONS  ALBUTerol    90 MICROgram(s) HFA Inhaler 1 Puff(s) Inhalation every 8 hours PRN  naloxone Injectable 1 milliGRAM(s) IV Push once PRN  oxyCODONE   IR Oral Tab/Cap - Peds 30 milliGRAM(s) Oral every 4 hours PRN  zolpidem 5 milliGRAM(s) Oral at bedtime PRN                                            ------------------------------------------------------------  VITAL SIGNS: Last 24 Hours  T(C): 35.4 (27 Jul 2022 04:59), Max: 36.1 (26 Jul 2022 20:45)  T(F): 95.7 (27 Jul 2022 04:59), Max: 96.9 (26 Jul 2022 20:45)  HR: 72 (27 Jul 2022 04:59) (72 - 91)  BP: 106/61 (27 Jul 2022 04:59) (104/58 - 129/81)  BP(mean): --  RR: 18 (27 Jul 2022 04:59) (16 - 20)  SpO2: 95% (27 Jul 2022 04:59) (95% - 100%)                                             --------------------------------------------------------------  LABS:                        13.6   14.51 )-----------( 250      ( 27 Jul 2022 09:36 )             40.9     07-27    136  |  97<L>  |  19  ----------------------------<  120<H>  4.5   |  27  |  0.7    Ca    8.8      27 Jul 2022 09:31  Mg     2.2     07-27    TPro  5.9<L>  /  Alb  3.4<L>  /  TBili  0.2  /  DBili  x   /  AST  10  /  ALT  13  /  AlkPhos  145<H>  07-27    CARDIAC MARKERS ( 26 Jul 2022 00:05 )  x     / <0.01 ng/mL / x     / x     / x          WBC Count: 14.51 K/uL (07-27 @ 09:36)  WBC Count: 11.15 K/uL (07-26 @ 05:45)  WBC Count: 10.12 K/uL (07-26 @ 00:05)                                            -------------------------------------------------------------  RADIOLOGY:  < from: Xray Chest 1 View-PORTABLE IMMEDIATE (07.26.22 @ 00:06) >  Low lung volumes with no radiographic evidence of acute cardiopulmonary   disease.    < end of copied text >                                            --------------------------------------------------------------    PHYSICAL EXAM:  General: Well appearing, not in acute distress  HEENT: No cervical LAD, or JVD  LUNGS: Rhonchi heard in all lung fields, no stridor, on RA  HEART: RRR, no murmur  ABDOMEN: soft, nontender to palpation, no guarding or rebound  EXT: no peripheral pitting edema b/l  NEURO: AAOx3                                             --------------------------------------------------------------    ASSESSMENT & PLAN    55 y/o male with PHM of COPD (not on home O2), REBECCA, GERD,colitis, HTN, HLD, 120 pack-year hx presents with increasing dyspnea, productive cough with white sputum x 4 days admitted to medicine for management of AHRF 2/2 to COPD exacerbation.     # COPD exacerbation   # REBECCA  - Pulmonary onboard   - On admission, VBG: pH 7.4, pCO2 45, pO2 48, pHCO3 28, sat 84%, RVP (-), d-dimer (494)  - c/w IV solumedrol 60 BID, Duoneb q4, Symbicort 160 BID, Albuterol 90 q8 , BIPAP qhs, PRN  - c/w Azithromycin 250mg (day 2 of 5)  - OP CTchest, OP PFTs  - f/u LE duplex    # Hematemesis - suspected Charlotte Santamaria tear  - today 5-6 ep of bloody emesis (bright red)  - NPO  - LR at 100/h  - GI consulted f/u reccs   - f/u cbc , VS   - Active T/S, consented for blood   - Protonix 40 IV BID     #Chronic R lower back pain  - morphine 5mg IV q4 PRN   - Due to vomiting :  holding home morphine ER 100mg qd, xycodone 30mg q4h prn (confirmed dosages with [facility] )  - narcan ordered     #Anxiety  - Ativan 1mg IV 6 PRN  - f/u ECG for QTc    - Due to vomiting holding :   - holding home xanax 2mg qid  , home ambien 10mg qhs   - holding home haloperidol 2mg solution   - holding home buproprion 150mg qd     #GERD  - c/w protonix IV     #HTN  - Hydralazine 5 IV qd  - Lasix IV 20 qd    Due to vomiting holding :  - home lisinopril 5mg qd  - home po lasix 40mg     #HLD  - due to vomiting holding : home simvastain 20mg qhs   - restart home fenofibrate 160mg qd when vomiting ends                                                                                ----------------------------------------------------  # DVT prophylaxis Lovenox 40mg bid   # GI prophylaxis: protonix 40mg   # Diet: NPO  # Activity: as tolerated, ambulates on RA  # Code status: RA   # Disposition: from home                                                                             --------------------------------------------------------    # Handoff: f/u LE duplex, GI Reccs, cbc, VS, pain, anxiety control      KAYLEIGH TOMAS 56y Male  MRN#: [id number]   CODE STATUS: Full    Hospital Day: 1d    Pt is currently admitted with the primary diagnosis of COPD exacerbation    SUBJECTIVE  Overnight events: no overnight events reported.    Subjective complaints: Mr. Tomas was sleeping on BIPAP on approach, pt. was seen and examined at the bedside. This AM, he states he feels better but still endorses SOB and dull chest pain from persistent coughing. He denies confusion, palpitations, abdominal pain, n/v.                                           ----------------------------------------------------------  OBJECTIVE  PAST MEDICAL & SURGICAL HISTORY  HTN (hypertension)    High cholesterol    GERD (gastroesophageal reflux disease)    Morbid obesity    Osteoarthritis    Hiatal hernia  GERD    Colitis  LARGE INTESTINE   NO RECENT FLARES    COPD (chronic obstructive pulmonary disease)    REBECCA treated with BiPAP    History of cholecystectomy    History of appendectomy    History of knee replacement procedure of right knee  BILATERAL    Hernia, inguinal, bilateral  3 months old    H/O knee surgery  arthoscopic x4    H/O foot surgery  right big toe surgery                                              -----------------------------------------------------------  ALLERGIES:  No Known Allergies                                            ------------------------------------------------------------    HOME MEDICATIONS  Home Medications:  Ambien 10 mg oral tablet: 0.5 tab(s) orally once a day (at bedtime), As Needed (04 Aug 2021 11:40)  fenofibrate 160 mg oral tablet: 1 tab(s) orally once a day (04 Aug 2021 11:40)  Lasix 40 mg oral tablet: 1 tab(s) orally once a day (04 Aug 2021 11:40)  lisinopril 5 mg oral tablet: 1 tab(s) orally once a day (04 Aug 2021 11:40)  Wellbutrin  mg/24 hours oral tablet, extended release: 1 tab(s) orally every 24 hours (28 Oct 2020 17:46)  Zocor 20 mg oral tablet: 1 tab(s) orally once a day (at bedtime) (28 Oct 2020 17:46)                           MEDICATIONS:  STANDING MEDICATIONS  albuterol/ipratropium for Nebulization 3 milliLiter(s) Nebulizer every 6 hours  ALPRAZolam 2 milliGRAM(s) Oral four times a day  azithromycin   Tablet 250 milliGRAM(s) Oral every 24 hours  budesonide 160 MICROgram(s)/formoterol 4.5 MICROgram(s) Inhaler 2 Puff(s) Inhalation two times a day  enoxaparin Injectable 40 milliGRAM(s) SubCutaneous every 12 hours  furosemide    Tablet 40 milliGRAM(s) Oral daily  lisinopril 5 milliGRAM(s) Oral daily  methylPREDNISolone sodium succinate Injectable 60 milliGRAM(s) IV Push two times a day  morphine ER Tablet 100 milliGRAM(s) Oral daily  pantoprazole    Tablet 40 milliGRAM(s) Oral daily  simvastatin 20 milliGRAM(s) Oral at bedtime    PRN MEDICATIONS  ALBUTerol    90 MICROgram(s) HFA Inhaler 1 Puff(s) Inhalation every 8 hours PRN  naloxone Injectable 1 milliGRAM(s) IV Push once PRN  oxyCODONE   IR Oral Tab/Cap - Peds 30 milliGRAM(s) Oral every 4 hours PRN  zolpidem 5 milliGRAM(s) Oral at bedtime PRN                                            ------------------------------------------------------------  VITAL SIGNS: Last 24 Hours  T(C): 35.4 (27 Jul 2022 04:59), Max: 36.1 (26 Jul 2022 20:45)  T(F): 95.7 (27 Jul 2022 04:59), Max: 96.9 (26 Jul 2022 20:45)  HR: 72 (27 Jul 2022 04:59) (72 - 91)  BP: 106/61 (27 Jul 2022 04:59) (104/58 - 129/81)  BP(mean): --  RR: 18 (27 Jul 2022 04:59) (16 - 20)  SpO2: 95% (27 Jul 2022 04:59) (95% - 100%)                                             --------------------------------------------------------------  LABS:                        13.6   14.51 )-----------( 250      ( 27 Jul 2022 09:36 )             40.9     07-27    136  |  97<L>  |  19  ----------------------------<  120<H>  4.5   |  27  |  0.7    Ca    8.8      27 Jul 2022 09:31  Mg     2.2     07-27    TPro  5.9<L>  /  Alb  3.4<L>  /  TBili  0.2  /  DBili  x   /  AST  10  /  ALT  13  /  AlkPhos  145<H>  07-27    CARDIAC MARKERS ( 26 Jul 2022 00:05 )  x     / <0.01 ng/mL / x     / x     / x          WBC Count: 14.51 K/uL (07-27 @ 09:36)  WBC Count: 11.15 K/uL (07-26 @ 05:45)  WBC Count: 10.12 K/uL (07-26 @ 00:05)                                            -------------------------------------------------------------  RADIOLOGY:  < from: Xray Chest 1 View-PORTABLE IMMEDIATE (07.26.22 @ 00:06) >  Low lung volumes with no radiographic evidence of acute cardiopulmonary   disease.    < end of copied text >                                            --------------------------------------------------------------    PHYSICAL EXAM:  General: Well appearing, not in acute distress  HEENT: No cervical LAD, or JVD  LUNGS: Rhonchi heard in all lung fields, no stridor, on RA  HEART: RRR, no murmur  ABDOMEN: soft, nontender to palpation, no guarding or rebound  EXT: no peripheral pitting edema b/l  NEURO: AAOx3                                             --------------------------------------------------------------    ASSESSMENT & PLAN    55 y/o male with PHM of COPD (not on home O2), REBECCA, GERD,colitis, HTN, HLD, 120 pack-year hx presents with increasing dyspnea, productive cough with white sputum x 4 days admitted to medicine for management of AHRF 2/2 to COPD exacerbation.     DUE TO VOMITING PT IS NPO : USE IV alternatives not PO Meds      # COPD exacerbation   # REBECCA  - Pulmonary onboard   - On admission, VBG: pH 7.4, pCO2 45, pO2 48, pHCO3 28, sat 84%, RVP (-), d-dimer (494)  - c/w IV solumedrol 60 BID, Duoneb q4, Symbicort 160 BID, Albuterol 90 q8 , BIPAP qhs, PRN  - holding  Azithromycin 250mg PO (day 2 of 5) due to vomiting   - OP CTchest, OP PFTs  - f/u LE duplex    # Hematemesis - suspected Charlotte Santamaria tear  - today 5-6 ep of bloody emesis (bright red)  - NPO  - LR at 100/h  - GI consulted f/u reccs   - f/u cbc , VS   - Active T/S, consented for blood   - Protonix 40 IV BID     #Chronic R lower back pain  - morphine 5mg IV q4 PRN   - Due to vomiting :  holding home morphine ER 100mg qd, xycodone 30mg q4h prn (confirmed dosages with [facility] )  - narcan ordered     #Anxiety  - Ativan 1mg IV 6 PRN  - f/u ECG for QTc    - Due to vomiting holding :   - holding home xanax 2mg qid  , home ambien 10mg qhs   - holding home haloperidol 2mg solution   - holding home buproprion 150mg qd     #GERD  - c/w protonix IV     #HTN  - Hydralazine 5 IV qd  - Lasix IV 20 qd    Due to vomiting holding :  - home lisinopril 5mg qd  - home po lasix 40mg     #HLD  - due to vomiting holding : home simvastain 20mg qhs   - restart home fenofibrate 160mg qd when vomiting ends                                                                                ----------------------------------------------------  # DVT prophylaxis Lovenox 40mg bid   # GI prophylaxis: protonix 40mg   # Diet: NPO  # Activity: as tolerated, ambulates on RA  # Code status: RA   # Disposition: from home                                                                           --------------------------------------------------------    # Handoff: f/u LE duplex, GI Reccs, cbc, VS, pain, anxiety control      KAYLEIGH TOMAS 56y Male  MRN#: [id number]   CODE STATUS: Full    Hospital Day: 1d    Pt is currently admitted with the primary diagnosis of COPD exacerbation    SUBJECTIVE  Overnight events: no overnight events reported.    Subjective complaints: Mr. Tomas was sleeping on BIPAP on approach, pt. was seen and examined at the bedside. This AM, he states he feels better but still endorses SOB and dull chest pain from persistent coughing. He denies confusion, palpitations, abdominal pain, n/v.                                           ----------------------------------------------------------  OBJECTIVE  PAST MEDICAL & SURGICAL HISTORY  HTN (hypertension)    High cholesterol    GERD (gastroesophageal reflux disease)    Morbid obesity    Osteoarthritis    Hiatal hernia  GERD    Colitis  LARGE INTESTINE   NO RECENT FLARES    COPD (chronic obstructive pulmonary disease)    REBECCA treated with BiPAP    History of cholecystectomy    History of appendectomy    History of knee replacement procedure of right knee  BILATERAL    Hernia, inguinal, bilateral  3 months old    H/O knee surgery  arthoscopic x4    H/O foot surgery  right big toe surgery                                              -----------------------------------------------------------  ALLERGIES:  No Known Allergies                                            ------------------------------------------------------------    HOME MEDICATIONS  Home Medications:  Ambien 10 mg oral tablet: 0.5 tab(s) orally once a day (at bedtime), As Needed (04 Aug 2021 11:40)  fenofibrate 160 mg oral tablet: 1 tab(s) orally once a day (04 Aug 2021 11:40)  Lasix 40 mg oral tablet: 1 tab(s) orally once a day (04 Aug 2021 11:40)  lisinopril 5 mg oral tablet: 1 tab(s) orally once a day (04 Aug 2021 11:40)  Wellbutrin  mg/24 hours oral tablet, extended release: 1 tab(s) orally every 24 hours (28 Oct 2020 17:46)  Zocor 20 mg oral tablet: 1 tab(s) orally once a day (at bedtime) (28 Oct 2020 17:46)                           MEDICATIONS:  STANDING MEDICATIONS  albuterol/ipratropium for Nebulization 3 milliLiter(s) Nebulizer every 6 hours  ALPRAZolam 2 milliGRAM(s) Oral four times a day  azithromycin   Tablet 250 milliGRAM(s) Oral every 24 hours  budesonide 160 MICROgram(s)/formoterol 4.5 MICROgram(s) Inhaler 2 Puff(s) Inhalation two times a day  enoxaparin Injectable 40 milliGRAM(s) SubCutaneous every 12 hours  furosemide    Tablet 40 milliGRAM(s) Oral daily  lisinopril 5 milliGRAM(s) Oral daily  methylPREDNISolone sodium succinate Injectable 60 milliGRAM(s) IV Push two times a day  morphine ER Tablet 100 milliGRAM(s) Oral daily  pantoprazole    Tablet 40 milliGRAM(s) Oral daily  simvastatin 20 milliGRAM(s) Oral at bedtime    PRN MEDICATIONS  ALBUTerol    90 MICROgram(s) HFA Inhaler 1 Puff(s) Inhalation every 8 hours PRN  naloxone Injectable 1 milliGRAM(s) IV Push once PRN  oxyCODONE   IR Oral Tab/Cap - Peds 30 milliGRAM(s) Oral every 4 hours PRN  zolpidem 5 milliGRAM(s) Oral at bedtime PRN                                            ------------------------------------------------------------  VITAL SIGNS: Last 24 Hours  T(C): 35.4 (27 Jul 2022 04:59), Max: 36.1 (26 Jul 2022 20:45)  T(F): 95.7 (27 Jul 2022 04:59), Max: 96.9 (26 Jul 2022 20:45)  HR: 72 (27 Jul 2022 04:59) (72 - 91)  BP: 106/61 (27 Jul 2022 04:59) (104/58 - 129/81)  BP(mean): --  RR: 18 (27 Jul 2022 04:59) (16 - 20)  SpO2: 95% (27 Jul 2022 04:59) (95% - 100%)                                             --------------------------------------------------------------  LABS:                        13.6   14.51 )-----------( 250      ( 27 Jul 2022 09:36 )             40.9     07-27    136  |  97<L>  |  19  ----------------------------<  120<H>  4.5   |  27  |  0.7    Ca    8.8      27 Jul 2022 09:31  Mg     2.2     07-27    TPro  5.9<L>  /  Alb  3.4<L>  /  TBili  0.2  /  DBili  x   /  AST  10  /  ALT  13  /  AlkPhos  145<H>  07-27    CARDIAC MARKERS ( 26 Jul 2022 00:05 )  x     / <0.01 ng/mL / x     / x     / x          WBC Count: 14.51 K/uL (07-27 @ 09:36)  WBC Count: 11.15 K/uL (07-26 @ 05:45)  WBC Count: 10.12 K/uL (07-26 @ 00:05)                                            -------------------------------------------------------------  RADIOLOGY:  < from: Xray Chest 1 View-PORTABLE IMMEDIATE (07.26.22 @ 00:06) >  Low lung volumes with no radiographic evidence of acute cardiopulmonary   disease.    < end of copied text >                                            --------------------------------------------------------------    PHYSICAL EXAM:  General: Well appearing, not in acute distress  HEENT: No cervical LAD, or JVD  LUNGS: Rhonchi heard in all lung fields, no stridor, on RA  HEART: RRR, no murmur  ABDOMEN: soft, nontender to palpation, no guarding or rebound  EXT: no peripheral pitting edema b/l  NEURO: AAOx3                                             --------------------------------------------------------------    ASSESSMENT & PLAN    55 y/o male with PHM of COPD (not on home O2), REBECCA, GERD,colitis, HTN, HLD, 120 pack-year hx presents with increasing dyspnea, productive cough with white sputum x 4 days admitted to medicine for management of AHRF 2/2 to COPD exacerbation.     DUE TO VOMITING PT IS NPO : USE IV alternatives not PO Meds      # COPD exacerbation   # REBECCA  - Pulmonary onboard   - On admission, VBG: pH 7.4, pCO2 45, pO2 48, pHCO3 28, sat 84%, RVP (-), d-dimer (494)  - c/w IV solumedrol 60 BID, Duoneb q4, Symbicort 160 BID, Albuterol 90 q8 , BIPAP qhs, PRN  - holding  Azithromycin 250mg PO (day 2 of 5) due to vomiting   - OP CTchest, OP PFTs  - f/u LE duplex    # Hematemesis - suspected Charlotte Santamaria tear  - today 5-6 ep of bloody emesis (bright red)  - NPO  - LR at 100/h  - GI consulted f/u reccs   - f/u cbc , VS   - Active T/S, consented for blood   - Protonix 40 IV BID     #Chronic R lower back pain  - morphine 5mg IV q4 PRN   - Due to vomiting :  holding home morphine ER 100mg qd, xycodone 30mg q4h prn (confirmed dosages with [place] [facility] )  - narcan ordered     #Anxiety  - Ativan 1mg IV 6 PRN  - f/u ECG for QTc    - Due to vomiting holding :   - holding home xanax 2mg qid  , home ambien 10mg qhs   - holding home haloperidol 2mg solution   - holding home buproprion 150mg qd     #GERD  - c/w protonix IV     #HTN  - Hydralazine 5 IV qd  - Lasix IV 20 qd    Due to vomiting holding :  - home lisinopril 5mg qd  - home po lasix 40mg     #HLD  - due to vomiting holding : home simvastain 20mg qhs   - restart home fenofibrate 160mg qd when vomiting ends                                                                                ----------------------------------------------------  # DVT prophylaxis Lovenox 40mg bid   # GI prophylaxis: protonix 40mg   # Diet: NPO  # Activity: as tolerated, ambulates on RA  # Code status: RA   # Disposition: from home                                                                           --------------------------------------------------------    # Handoff: f/u LE duplex, GI Reccs, cbc, VS, pain, anxiety control      KAYLEIGH TOMAS 56y Male  MRN#: [id number]   CODE STATUS: Full    Hospital Day: 1d    Pt is currently admitted with the primary diagnosis of COPD exacerbation    SUBJECTIVE  Overnight events: no overnight events reported.    Subjective complaints: Mr. Tomas was sleeping on BIPAP on approach, pt. was seen and examined at the bedside. This AM, he states he feels better but still endorses SOB and dull chest pain from persistent coughing. He denies confusion, palpitations, abdominal pain, n/v.                                           ----------------------------------------------------------  OBJECTIVE  PAST MEDICAL & SURGICAL HISTORY  HTN (hypertension)    High cholesterol    GERD (gastroesophageal reflux disease)    Morbid obesity    Osteoarthritis    Hiatal hernia  GERD    Colitis  LARGE INTESTINE   NO RECENT FLARES    COPD (chronic obstructive pulmonary disease)    REBECCA treated with BiPAP    History of cholecystectomy    History of appendectomy    History of knee replacement procedure of right knee  BILATERAL    Hernia, inguinal, bilateral  3 months old    H/O knee surgery  arthoscopic x4    H/O foot surgery  right big toe surgery                                              -----------------------------------------------------------  ALLERGIES:  No Known Allergies                                            ------------------------------------------------------------    HOME MEDICATIONS  Home Medications:  Ambien 10 mg oral tablet: 0.5 tab(s) orally once a day (at bedtime), As Needed (04 Aug 2021 11:40)  fenofibrate 160 mg oral tablet: 1 tab(s) orally once a day (04 Aug 2021 11:40)  Lasix 40 mg oral tablet: 1 tab(s) orally once a day (04 Aug 2021 11:40)  lisinopril 5 mg oral tablet: 1 tab(s) orally once a day (04 Aug 2021 11:40)  Wellbutrin  mg/24 hours oral tablet, extended release: 1 tab(s) orally every 24 hours (28 Oct 2020 17:46)  Zocor 20 mg oral tablet: 1 tab(s) orally once a day (at bedtime) (28 Oct 2020 17:46)                           MEDICATIONS:  STANDING MEDICATIONS  albuterol/ipratropium for Nebulization 3 milliLiter(s) Nebulizer every 6 hours  ALPRAZolam 2 milliGRAM(s) Oral four times a day  azithromycin   Tablet 250 milliGRAM(s) Oral every 24 hours  budesonide 160 MICROgram(s)/formoterol 4.5 MICROgram(s) Inhaler 2 Puff(s) Inhalation two times a day  enoxaparin Injectable 40 milliGRAM(s) SubCutaneous every 12 hours  furosemide    Tablet 40 milliGRAM(s) Oral daily  lisinopril 5 milliGRAM(s) Oral daily  methylPREDNISolone sodium succinate Injectable 60 milliGRAM(s) IV Push two times a day  morphine ER Tablet 100 milliGRAM(s) Oral daily  pantoprazole    Tablet 40 milliGRAM(s) Oral daily  simvastatin 20 milliGRAM(s) Oral at bedtime    PRN MEDICATIONS  ALBUTerol    90 MICROgram(s) HFA Inhaler 1 Puff(s) Inhalation every 8 hours PRN  naloxone Injectable 1 milliGRAM(s) IV Push once PRN  oxyCODONE   IR Oral Tab/Cap - Peds 30 milliGRAM(s) Oral every 4 hours PRN  zolpidem 5 milliGRAM(s) Oral at bedtime PRN                                            ------------------------------------------------------------  VITAL SIGNS: Last 24 Hours  T(C): 35.4 (27 Jul 2022 04:59), Max: 36.1 (26 Jul 2022 20:45)  T(F): 95.7 (27 Jul 2022 04:59), Max: 96.9 (26 Jul 2022 20:45)  HR: 72 (27 Jul 2022 04:59) (72 - 91)  BP: 106/61 (27 Jul 2022 04:59) (104/58 - 129/81)  BP(mean): --  RR: 18 (27 Jul 2022 04:59) (16 - 20)  SpO2: 95% (27 Jul 2022 04:59) (95% - 100%)                                             --------------------------------------------------------------  LABS:                        13.6   14.51 )-----------( 250      ( 27 Jul 2022 09:36 )             40.9     07-27    136  |  97<L>  |  19  ----------------------------<  120<H>  4.5   |  27  |  0.7    Ca    8.8      27 Jul 2022 09:31  Mg     2.2     07-27    TPro  5.9<L>  /  Alb  3.4<L>  /  TBili  0.2  /  DBili  x   /  AST  10  /  ALT  13  /  AlkPhos  145<H>  07-27    CARDIAC MARKERS ( 26 Jul 2022 00:05 )  x     / <0.01 ng/mL / x     / x     / x          WBC Count: 14.51 K/uL (07-27 @ 09:36)  WBC Count: 11.15 K/uL (07-26 @ 05:45)  WBC Count: 10.12 K/uL (07-26 @ 00:05)                                            -------------------------------------------------------------  RADIOLOGY:  < from: Xray Chest 1 View-PORTABLE IMMEDIATE (07.26.22 @ 00:06) >  Low lung volumes with no radiographic evidence of acute cardiopulmonary   disease.    < end of copied text >                                            --------------------------------------------------------------    PHYSICAL EXAM:  General: Well appearing, not in acute distress  HEENT: No cervical LAD, or JVD  LUNGS: Rhonchi heard in all lung fields, no stridor, on RA  HEART: RRR, no murmur  ABDOMEN: soft, nontender to palpation, no guarding or rebound  EXT: no peripheral pitting edema b/l  NEURO: AAOx3                                             --------------------------------------------------------------    ASSESSMENT & PLAN    57 y/o male with PHM of COPD (not on home O2), REBECCA, GERD,colitis, HTN, HLD, 120 pack-year hx presents with increasing dyspnea, productive cough with white sputum x 4 days admitted to medicine for management of AHRF 2/2 to COPD exacerbation.     DUE TO VOMITING PT IS NPO : USE IV alternatives not PO Meds      # COPD exacerbation   # REBECCA  - Pulmonary onboard   - On admission, VBG: pH 7.4, pCO2 45, pO2 48, pHCO3 28, sat 84%, RVP (-), d-dimer (494)  - c/w IV solumedrol 60 BID, Duoneb q4, Symbicort 160 BID, Albuterol 90 q8 , BIPAP qhs, PRN  - holding  Azithromycin 250mg PO (day 2 of 5) due to vomiting   - OP CTchest, OP PFTs  - f/u LE duplex    # Hematemesis - suspected Charlotte Santamaria tear  - today 5-6 ep of bloody emesis (bright red)  - NPO  - LR at 100/h  - GI consulted f/u reccs   - f/u cbc , VS   - Active T/S, consented for blood   - Protonix 40 IV BID     #Chronic R lower back pain  - morphine 5mg IV q4 PRN   - Due to vomiting :  holding home morphine ER 100mg qd, xycodone 30mg q4h prn (confirmed dosages with [facility] )  - narcan ordered     #Anxiety  - Ativan 1mg IV 6 PRN  - f/u ECG for QTc    - Due to vomiting holding :   - holding home xanax 2mg qid  , home ambien 10mg qhs   - holding home haloperidol 2mg solution   - holding home buproprion 150mg qd     #GERD  - c/w protonix IV     #HTN  Due to vomiting and soft BP holding :  - home lisinopril 5mg qd  - home po lasix 40mg     #HLD  - due to vomiting holding : home simvastain 20mg qhs   - restart home fenofibrate 160mg qd when vomiting ends                                                                                ----------------------------------------------------  # DVT prophylaxis Lovenox 40mg bid   # GI prophylaxis: protonix 40mg   # Diet: NPO  # Activity: as tolerated, ambulates on RA  # Code status: RA   # Disposition: from home                                                                           --------------------------------------------------------    # Handoff: f/u LE duplex, GI Reccs, cbc, VS, pain, anxiety control

## 2022-07-27 NOTE — PROGRESS NOTE ADULT - ASSESSMENT
55 y/o male with PHM of COPD (not on home O2), REBECCA, GERD,colitis, HTN, HLD, chronic pain with large home opioid use, 120 pack-year hx presents with increasing dyspnea, productive cough with white sputum x 4 days admitted to medicine for management of AHRF 2/2 to COPD exacerbation.     DUE TO VOMITING PT IS NPO : USE IV alternatives not PO Meds      # COPD exacerbation   # REBECCA  - Pulmonary onboard   - On admission, VBG: pH 7.4, pCO2 45, pO2 48, pHCO3 28, sat 84%, RVP (-), d-dimer (494)  - c/w IV solumedrol 60 BID, Duoneb q4, Symbicort 160 BID, Albuterol 90 q8 , BIPAP qhs, PRN  -Azithro day 2/5  - OP CTchest, OP PFTs  - f/u LE duplex    # Hematemesis - suspected Charlotte Santamaria tear  - today 5-6 ep of bloody emesis (bright red)  - NPO  - LR at 100/h  - GI consulted f/u reccs   - f/u cbc , VS   - Active T/S, consented for blood   - Protonix 40 IV BID   - Zofran 4mg q6hrs, compazine for severe nausea, scopolamine patch ordered     #Chronic R lower back pain  - morphine 5mg IV q4 PRN   - Due to vomiting :  holding home morphine ER 100mg qd, xycodone 30mg q4h prn (confirmed dosages with [place] [facility] )  - narcan ordered     #Anxiety  - Ativan 1mg IV 6 PRN  - f/u ECG for QTc  - home xanax dose is 2mg g5oglkc    - Due to vomiting holding :   - holding home xanax 2mg qid  , home ambien 10mg qhs   - holding home haloperidol 2mg solution   - holding home buproprion 150mg qd     #GERD  - c/w protonix IV     #HTN  Due to vomiting and soft BP holding :  - home lisinopril 5mg qd  - home po lasix 40mg     #HLD  - due to vomiting holding : home simvastain 20mg qhs   - restart home fenofibrate 160mg qd when vomiting ends                                                                                ----------------------------------------------------  # DVT prophylaxis Lovenox 40mg bid   # GI prophylaxis: protonix 40mg   # Diet: NPO  # Activity: as tolerated, ambulates on RA  # Code status: RA   # Disposition: from home                                                                           --------------------------------------------------------    # Handoff: f/u LE duplex, GI Reccs, cbc, VS, pain, anxiety control, monitor for more hematemesis

## 2022-07-27 NOTE — PATIENT PROFILE ADULT - FALL HARM RISK - HARM RISK INTERVENTIONS

## 2022-07-27 NOTE — PATIENT PROFILE ADULT - FUNCTIONAL ASSESSMENT - BASIC MOBILITY ASSESSMENT TYPE
"              After Visit Summary   2/3/2017    Suha Caldera    MRN: [id number]           Patient Information     Date Of Birth          1981        Visit Information        Provider Department      2/3/2017 2:00 PM Kofi Broussard MD [facility]        Today's Diagnoses     Moderate persistent asthma with acute exacerbation    -  1     Non-seasonal allergic rhinitis due to animal hair and dander            Follow-ups after your visit        Follow-up notes from your care team     Return in about 3 months (around 5/3/2017), or if symptoms worsen or fail to improve, for asthma follow up, rhinitis follow up.      Who to contact     If you have questions or need follow up information about today's clinic visit or your schedule please contact [facility] directly at [phone].  Normal or non-critical lab and imaging results will be communicated to you by MyChart, letter or phone within 4 business days after the clinic has received the results. If you do not hear from us within 7 days, please contact the clinic through MyChart or phone. If you have a critical or abnormal lab result, we will notify you by phone as soon as possible.  Submit refill requests through [employer] or call your pharmacy and they will forward the refill request to us. Please allow 3 business days for your refill to be completed.          Additional Information About Your Visit        MyChart Information     [employer] lets you send messages to your doctor, view your test results, renew your prescriptions, schedule appointments and more. To sign up, go to [email or web address] . Click on \"Log in\" on the left side of the screen, which will take you to the Welcome page. Then click on \"Sign up Now\" on the right side of the page.     You will be asked to enter the access code listed below, as well as some personal information. Please follow the directions to create your username and password.     Your access code is: " [email or web address]  Expires: 2017 11:51 AM     Your access code will  in 90 days. If you need help or a new code, please call your [place] clinic or [phone].        Care EveryWhere ID     This is your Care EveryWhere ID. This could be used by other organizations to access your [facility] medical records  [id number]        Your Vitals Were     Pulse Height BMI (Body Mass Index) Pulse Oximetry          75 5' 4.75\" (1.645 m) 24.57 kg/m2 98%         Blood Pressure from Last 3 Encounters:   17 108/72   16 110/72   16 109/73    Weight from Last 3 Encounters:   17 146 lb 9.6 oz (66.497 kg)   16 145 lb 12.8 oz (66.134 kg)   16 157 lb (71.215 kg)              We Performed the Following     Spirometry, Breathing Capacity: Normal Order, Clinic Performed          Today's Medication Changes          These changes are accurate as of: 2/3/17  5:51 PM.  If you have any questions, ask your nurse or doctor.               Stop taking these medicines if you haven't already. Please contact your care team if you have questions.     fluticasone 50 MCG/ACT spray   Commonly known as:  FLONASE   Stopped by:  Kofi Brosusard MD                Where to get your medicines      These medications were sent to [id number] [address] [address]    Hours:  Closed 10-16-08 business to [facility] Phone:  [phone]    - fluticasone-salmeterol 230-21 MCG/ACT inhaler  - montelukast 10 MG tablet             Primary Care Provider Office Phone #    [facility] [phone]       No address on file        Thank you!     Thank you for choosing [facility]  for your care. Our goal is always to provide you with excellent care. Hearing back from our patients is one way we can continue to improve our services. Please take a few minutes to complete the written survey that you may receive in the mail after your visit " with us. Thank you!             Your Updated Medication List - Protect others around you: Learn how to safely use, store and throw away your medicines at www.disposemymeds.org.          This list is accurate as of: 2/3/17  5:51 PM.  Always use your most recent med list.                   Brand Name Dispense Instructions for use    albuterol 108 (90 BASE) MCG/ACT Inhaler    VENTOLIN HFA    1 Inhaler    Inhale 2 puffs into the lungs every 4 hours as needed 2 puffs every 4 hours for cough or wheeze.  2 puffs 15 minutes before exercise.       azelastine 0.1 % spray    ASTELIN    1 Bottle    Spray 2 sprays into both nostrils 2 times daily as needed for rhinitis       beclomethasone 80 MCG/ACT Inhaler    QVAR    1 Inhaler    1 puff into both nares twice daily with adaptor Needs to be seen for further refills       cetirizine 10 MG tablet    zyrTEC    30 tablet    Take 1 tablet (10 mg) by mouth every evening       fluticasone-salmeterol 230-21 MCG/ACT inhaler    ADVAIR-HFA    1 Inhaler    Inhale 2 puffs into the lungs 2 times daily       montelukast 10 MG tablet    SINGULAIR    30 tablet    Take 1 tablet (10 mg) by mouth At Bedtime       ORENCIA 125 MG/ML Sosy   Generic drug:  Abatacept      Inject 10 mLs Subcutaneous every 7 days          Admission

## 2022-07-28 LAB — GLUCOSE BLDC GLUCOMTR-MCNC: 121 MG/DL — HIGH (ref 70–99)

## 2022-07-28 PROCEDURE — 93970 EXTREMITY STUDY: CPT | Mod: 26

## 2022-07-28 PROCEDURE — 99232 SBSQ HOSP IP/OBS MODERATE 35: CPT

## 2022-07-28 PROCEDURE — 74177 CT ABD & PELVIS W/CONTRAST: CPT | Mod: 26

## 2022-07-28 RX ORDER — ALPRAZOLAM 0.25 MG
2 TABLET ORAL
Refills: 0 | Status: DISCONTINUED | OUTPATIENT
Start: 2022-07-28 | End: 2022-07-29

## 2022-07-28 RX ORDER — MORPHINE SULFATE 50 MG/1
100 CAPSULE, EXTENDED RELEASE ORAL DAILY
Refills: 0 | Status: DISCONTINUED | OUTPATIENT
Start: 2022-07-28 | End: 2022-07-29

## 2022-07-28 RX ORDER — BUPROPION HYDROCHLORIDE 150 MG/1
150 TABLET, EXTENDED RELEASE ORAL DAILY
Refills: 0 | Status: DISCONTINUED | OUTPATIENT
Start: 2022-07-28 | End: 2022-07-29

## 2022-07-28 RX ORDER — PANTOPRAZOLE SODIUM 20 MG/1
40 TABLET, DELAYED RELEASE ORAL DAILY
Refills: 0 | Status: DISCONTINUED | OUTPATIENT
Start: 2022-07-28 | End: 2022-07-29

## 2022-07-28 RX ORDER — BUPROPION HYDROCHLORIDE 150 MG/1
150 TABLET, EXTENDED RELEASE ORAL DAILY
Refills: 0 | Status: DISCONTINUED | OUTPATIENT
Start: 2022-07-28 | End: 2022-07-28

## 2022-07-28 RX ORDER — OXYCODONE HYDROCHLORIDE 5 MG/1
30 TABLET ORAL EVERY 4 HOURS
Refills: 0 | Status: DISCONTINUED | OUTPATIENT
Start: 2022-07-28 | End: 2022-07-29

## 2022-07-28 RX ORDER — IOHEXOL 300 MG/ML
30 INJECTION, SOLUTION INTRAVENOUS ONCE
Refills: 0 | Status: DISCONTINUED | OUTPATIENT
Start: 2022-07-28 | End: 2022-07-28

## 2022-07-28 RX ORDER — HALOPERIDOL DECANOATE 100 MG/ML
2 INJECTION INTRAMUSCULAR DAILY
Refills: 0 | Status: DISCONTINUED | OUTPATIENT
Start: 2022-07-28 | End: 2022-07-28

## 2022-07-28 RX ORDER — HALOPERIDOL DECANOATE 100 MG/ML
2 INJECTION INTRAMUSCULAR THREE TIMES A DAY
Refills: 0 | Status: DISCONTINUED | OUTPATIENT
Start: 2022-07-28 | End: 2022-07-29

## 2022-07-28 RX ORDER — FENOFIBRATE,MICRONIZED 130 MG
145 CAPSULE ORAL DAILY
Refills: 0 | Status: DISCONTINUED | OUTPATIENT
Start: 2022-07-28 | End: 2022-07-29

## 2022-07-28 RX ORDER — DIATRIZOATE MEGLUMINE 180 MG/ML
30 INJECTION, SOLUTION INTRAVESICAL ONCE
Refills: 0 | Status: COMPLETED | OUTPATIENT
Start: 2022-07-28 | End: 2022-07-28

## 2022-07-28 RX ORDER — MORPHINE SULFATE 50 MG/1
8 CAPSULE, EXTENDED RELEASE ORAL ONCE
Refills: 0 | Status: DISCONTINUED | OUTPATIENT
Start: 2022-07-28 | End: 2022-07-28

## 2022-07-28 RX ADMIN — Medication 60 MILLIGRAM(S): at 17:01

## 2022-07-28 RX ADMIN — Medication 10 MILLIGRAM(S): at 15:18

## 2022-07-28 RX ADMIN — MORPHINE SULFATE 5 MILLIGRAM(S): 50 CAPSULE, EXTENDED RELEASE ORAL at 02:25

## 2022-07-28 RX ADMIN — Medication 2 MILLIGRAM(S): at 11:39

## 2022-07-28 RX ADMIN — MORPHINE SULFATE 8 MILLIGRAM(S): 50 CAPSULE, EXTENDED RELEASE ORAL at 09:54

## 2022-07-28 RX ADMIN — HALOPERIDOL DECANOATE 2 MILLIGRAM(S): 100 INJECTION INTRAMUSCULAR at 21:28

## 2022-07-28 RX ADMIN — PANTOPRAZOLE SODIUM 40 MILLIGRAM(S): 20 TABLET, DELAYED RELEASE ORAL at 12:23

## 2022-07-28 RX ADMIN — HALOPERIDOL DECANOATE 2 MILLIGRAM(S): 100 INJECTION INTRAMUSCULAR at 17:01

## 2022-07-28 RX ADMIN — AZITHROMYCIN 250 MILLIGRAM(S): 500 TABLET, FILM COATED ORAL at 11:39

## 2022-07-28 RX ADMIN — SIMVASTATIN 20 MILLIGRAM(S): 20 TABLET, FILM COATED ORAL at 21:28

## 2022-07-28 RX ADMIN — BUPROPION HYDROCHLORIDE 150 MILLIGRAM(S): 150 TABLET, EXTENDED RELEASE ORAL at 12:11

## 2022-07-28 RX ADMIN — Medication 2 MILLIGRAM(S): at 17:00

## 2022-07-28 RX ADMIN — Medication 60 MILLIGRAM(S): at 06:05

## 2022-07-28 RX ADMIN — MORPHINE SULFATE 100 MILLIGRAM(S): 50 CAPSULE, EXTENDED RELEASE ORAL at 11:39

## 2022-07-28 RX ADMIN — Medication 10 MILLIGRAM(S): at 04:04

## 2022-07-28 RX ADMIN — MORPHINE SULFATE 5 MILLIGRAM(S): 50 CAPSULE, EXTENDED RELEASE ORAL at 01:51

## 2022-07-28 RX ADMIN — SCOPALAMINE 1 PATCH: 1 PATCH, EXTENDED RELEASE TRANSDERMAL at 19:00

## 2022-07-28 RX ADMIN — OXYCODONE HYDROCHLORIDE 30 MILLIGRAM(S): 5 TABLET ORAL at 11:46

## 2022-07-28 RX ADMIN — SODIUM CHLORIDE 100 MILLILITER(S): 9 INJECTION, SOLUTION INTRAVENOUS at 13:36

## 2022-07-28 RX ADMIN — MORPHINE SULFATE 5 MILLIGRAM(S): 50 CAPSULE, EXTENDED RELEASE ORAL at 06:34

## 2022-07-28 RX ADMIN — OXYCODONE HYDROCHLORIDE 30 MILLIGRAM(S): 5 TABLET ORAL at 20:00

## 2022-07-28 RX ADMIN — BUDESONIDE AND FORMOTEROL FUMARATE DIHYDRATE 2 PUFF(S): 160; 4.5 AEROSOL RESPIRATORY (INHALATION) at 21:28

## 2022-07-28 RX ADMIN — Medication 145 MILLIGRAM(S): at 12:11

## 2022-07-28 RX ADMIN — OXYCODONE HYDROCHLORIDE 30 MILLIGRAM(S): 5 TABLET ORAL at 19:55

## 2022-07-28 RX ADMIN — PANTOPRAZOLE SODIUM 40 MILLIGRAM(S): 20 TABLET, DELAYED RELEASE ORAL at 06:05

## 2022-07-28 RX ADMIN — DIATRIZOATE MEGLUMINE 30 MILLILITER(S): 180 INJECTION, SOLUTION INTRAVESICAL at 16:33

## 2022-07-28 RX ADMIN — OXYCODONE HYDROCHLORIDE 30 MILLIGRAM(S): 5 TABLET ORAL at 15:20

## 2022-07-28 NOTE — PROGRESS NOTE ADULT - ASSESSMENT
55 y/o male with PHM of COPD (not on home O2), REBECCA, GERD,colitis, HTN, HLD, 120 pack-year hx presents with increasing dyspnea, productive cough with white sputum x 4 days admitted to medicine for management of AHRF 2/2 to COPD exacerbation.     # COPD exacerbation   # REBECCA  - Pulmonary onboard   - On admission, VBG: pH 7.4, pCO2 45, pO2 48, pHCO3 28, sat 84%, RVP (-), d-dimer (494)  - c/w IV solumedrol 60 BID, Duoneb q4, Symbicort 160 BID, Albuterol 90 q8 , BIPAP qhs, PRN  - holding  Azithromycin 250mg PO (day 2 of 5) due to vomiting   - OP CT chest, OP PFTs  - f/u LE duplex (ordered not done)    # Hematemesis - suspected Charlotte Santamaria tear  - today 5-6 ep of bloody emesis (bright red)  - NPO  - LR at 100/h  - GI consulted : c/w PPI , opt EGD and screening colonoscopy  - f/u cbc , VS   - Active T/S, consented for blood   - Protonix 40 IV qd     #Chronic R lower back pain  - c/w home morphine ER 100mg qd, Oxycodone 30mg q4h prn (confirmed dosages with [place] pharmacy )  - narcan ordered     #Anxiety  -  ECG for QTc :407  - c/w home xanax 2mg qid  , home ambien 10mg qhs   -  ? c/w home haloperidol 2mg solution   - ? c/w home buproprion 150mg qd     #GERD  - c/w protonix IV 40 qd    #HTN  - c/w home lisinopril 5mg qd  - c/w home po lasix 40mg     #HLD  -c/w home simvastatin 20mg qhs   -c/w home fenofibrate 148mg qd                                                                                 ----------------------------------------------------  # DVT prophylaxis Lovenox 40mg bid   # GI prophylaxis: protonix 40mg   # Diet: NPO  # Activity: as tolerated, ambulates on RA  # Code status: RA   # Disposition: from home                                                                           --------------------------------------------------------    # Handoff: f/u LE duplex,  pain, anxiety control    57 y/o male with PHM of COPD (not on home O2), REBECCA, GERD,colitis, HTN, HLD, 120 pack-year hx presents with increasing dyspnea, productive cough with white sputum x 4 days admitted to medicine for management of AHRF 2/2 to COPD exacerbation.     # COPD exacerbation   # REBECCA  - Pulmonary onboard   - On admission, VBG: pH 7.4, pCO2 45, pO2 48, pHCO3 28, sat 84%, RVP (-), d-dimer (494)  - c/w IV solumedrol 60 BID, Duoneb q4, Symbicort 160 BID, Albuterol 90 q8 , BIPAP qhs, PRN  - holding  Azithromycin 250mg PO (day 2 of 5) due to vomiting   - OP CT chest, OP PFTs  - f/u LE duplex (ordered not done)    # Hematemesis - suspected Charlotte Santamaria tear  # Abdominal Pain  - Gi onboard  - today 5-6 ep of bloody emesis (bright red)  - f/u CT abdomen/pelvis with Po/IV contrast  - NPO  - LR at 100/h  - GI consulted : c/w PPI , opt EGD and screening colonoscopy  - f/u cbc , VS   - Active T/S, consented for blood   - Protonix 40 IV qd     #Chronic R lower back pain  - c/w home morphine ER 100mg qd, Oxycodone 30mg q4h prn (confirmed dosages with [place] pharmacy )  - narcan ordered     #Anxiety  -  ECG for QTc :407  - c/w home xanax 2mg qid  , home ambien 10mg qhs   -  ? c/w home haloperidol 2mg solution   - ? c/w home buproprion 150mg qd     #GERD  - c/w protonix IV 40 qd    #HTN  - c/w home lisinopril 5mg qd  - c/w home po lasix 40mg     #HLD  -c/w home simvastatin 20mg qhs   -c/w home fenofibrate 148mg qd                                                                                 ----------------------------------------------------  # DVT prophylaxis Lovenox 40mg bid   # GI prophylaxis: protonix 40mg   # Diet: NPO  # Activity: as tolerated, ambulates on RA  # Code status: RA   # Disposition: from home                                                                           --------------------------------------------------------    # Handoff: f/u LE duplex,  pain, anxiety control    57 y/o male with PHM of COPD (not on home O2), REBECCA, GERD,colitis, HTN, HLD, 120 pack-year hx presents with increasing dyspnea, productive cough with white sputum x 4 days admitted to medicine for management of AHRF 2/2 to COPD exacerbation.     # COPD exacerbation   # REBECCA  - Pulmonary onboard   - On admission, VBG: pH 7.4, pCO2 45, pO2 48, pHCO3 28, sat 84%, RVP (-), d-dimer (494)  - c/w IV solumedrol 60 BID, Duoneb q4, Symbicort 160 BID, Albuterol 90 q8 , BIPAP qhs, PRN  - Azithromycin 250mg PO (day 3 of 5)  - OP CT chest, OP PFTs  - f/u LE duplex (pending read)    # Hematemesis - suspected Charlotte Santamaria tear  # Abdominal Pain  - Gi onboard  - today 5-6 ep of bloody emesis (bright red)  - f/u CT abdomen/pelvis with PO/IV contrast  - NPO  - LR at 100/h  - GI consulted : f/u CT, c/w PPI , opt EGD and screening colonoscopy  - f/u cbc , VS   - Active T/S, consented for blood   - Protonix 40 IV qd     #Chronic R lower back pain  - c/w home morphine ER 100mg qd, Oxycodone 30mg q4h prn (confirmed dosages with [place] [facility] )  - Narcan ordered     #Anxiety  -  ECG for QTc :407  - c/w home xanax 2mg qid  , home ambien 10mg qhs   -  c/w home haloperidol 2mg solution qd  - c/w home buproprion 150mg qd     #GERD  - c/w protonix IV 40 qd    #HTN  - c/w home lisinopril 5mg qd  - holding  home po lasix 40mg  - TEODORO    #HLD  -c/w home simvastatin 20mg qhs   -c/w fenofibrate 148mg qd                                                                                 ----------------------------------------------------  # DVT prophylaxis Lovenox 40mg bid   # GI prophylaxis: protonix 40mg qd  # Diet: NPO  # Activity: as tolerated, ambulates on RA  # Code status: RA   # Disposition: from home                                                                           --------------------------------------------------------    # Handoff: f/u LE duplex,  pain, anxiety control , CT abdomen/pelvis

## 2022-07-28 NOTE — PROGRESS NOTE ADULT - SUBJECTIVE AND OBJECTIVE BOX
Patient is a 56y old  Male who presents with a chief complaint of SOB (28 Jul 2022 11:08)      Patient seen and examined at bedside.    ALLERGIES:  No Known Allergies    MEDICATIONS:  ALBUTerol    90 MICROgram(s) HFA Inhaler 1 Puff(s) Inhalation every 8 hours PRN  albuterol/ipratropium for Nebulization 3 milliLiter(s) Nebulizer every 4 hours PRN  ALPRAZolam 2 milliGRAM(s) Oral four times a day  azithromycin  IVPB 250 milliGRAM(s) IV Intermittent daily  budesonide 160 MICROgram(s)/formoterol 4.5 MICROgram(s) Inhaler 2 Puff(s) Inhalation two times a day  buPROPion XL (24-Hour) . 150 milliGRAM(s) Oral daily  fenofibrate Tablet 145 milliGRAM(s) Oral daily  furosemide    Tablet 40 milliGRAM(s) Oral daily  haloperidol    Concentrate 2 milliGRAM(s) Oral three times a day  lactated ringers. 1000 milliLiter(s) IV Continuous <Continuous>  lisinopril 5 milliGRAM(s) Oral daily  methylPREDNISolone sodium succinate Injectable 60 milliGRAM(s) IV Push two times a day  morphine ER Tablet 100 milliGRAM(s) Oral daily  naloxone Injectable 1 milliGRAM(s) IV Push once PRN  ondansetron Injectable 4 milliGRAM(s) IV Push every 6 hours PRN  oxyCODONE    IR 30 milliGRAM(s) Oral every 4 hours PRN  pantoprazole  Injectable 40 milliGRAM(s) IV Push daily  prochlorperazine   Injectable 10 milliGRAM(s) IV Push every 8 hours PRN  scopolamine 1 mG/72 Hr(s) Patch 1 Patch Transdermal every 72 hours  simvastatin 20 milliGRAM(s) Oral at bedtime  zolpidem 5 milliGRAM(s) Oral at bedtime PRN    Vital Signs Last 24 Hrs  T(F): 98.1 (28 Jul 2022 12:48), Max: 98.9 (27 Jul 2022 21:00)  HR: 67 (28 Jul 2022 12:48) (67 - 85)  BP: 155/79 (28 Jul 2022 12:48) (136/81 - 165/81)  RR: 17 (28 Jul 2022 12:48) (16 - 18)  SpO2: 96% (28 Jul 2022 05:00) (96% - 99%)  I&O's Summary    28 Jul 2022 07:01  -  28 Jul 2022 17:35  --------------------------------------------------------  IN: 0 mL / OUT: 700 mL / NET: -700 mL        PHYSICAL EXAM:  General: NAD, A/O x 3  ENT: MMM  Neck: Supple, No JVD  Lungs: diminished bilaterally, basal crackles   Cardio: RRR, S1/S2, No murmurs  Abdomen: Soft, Nontender, Nondistended; obese   Extremities: No cyanosis, No edema    LABS:                        15.4   18.25 )-----------( 223      ( 27 Jul 2022 18:31 )             45.8     07-27    140  |  98  |  19  ----------------------------<  104  4.3   |  32  |  0.8    Ca    9.9      27 Jul 2022 18:31  Mg     2.2     07-27    TPro  7.3  /  Alb  4.2  /  TBili  0.4  /  DBili  x   /  AST  13  /  ALT  15  /  AlkPhos  147  07-27                  02:11 - VBG - pH: 7.40  | pCO2: 45    | pO2: 48    | Lactate: 1.80             POCT Blood Glucose.: 121 mg/dL (28 Jul 2022 04:08)          COVID-19 PCR: [place] (07-25-22 @ 21:33)      RADIOLOGY & ADDITIONAL TESTS:    Care Discussed with Consultants/Other Providers:

## 2022-07-28 NOTE — CONSULT NOTE ADULT - SUBJECTIVE AND OBJECTIVE BOX
Gastroenterology Consultation:    Patient is a 56y old  Male who presents with a chief complaint of SOB (27 Jul 2022 17:56)        Admitted on: 07-26-22      HPI:  Patient is a 56y M with PMHx of REBECCA, COPD, GERD, colitis, HTN, HLD, presenting for SOB.   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 Dyspnea got so severe he was unable breathe, and called EMS. EMS gave patients duonebs, steroids en route to ED.     *** Med Rec confirmed with patient bedside, but surescripts shows additional filled prescriptions; Opiate pain medications not seen on surescripts. Please verify remainder of medications with [facility] pharmacy in the AM ***    ED Course:   Vitals: T 98F, HR 70, /71, 20RR and 95% on RA, RR 21 ---> 99% on BiPAP   VBG: pH 7.4, pCO2 45, pO2 48, pHCO3 28, sat 84%   CXR pending read, but increased infiltrates on the L side     Patient admitted for Acute Hypoxic Respiratory Failure secondary to COPD Exacerbation  (26 Jul 2022 04:46)      Prior EGD: 2013 for abdominal pain, gastritis, duodenitis, -ve H. pylori     Prior Colonoscopy: none      PAST MEDICAL & SURGICAL HISTORY:  HTN (hypertension)      High cholesterol      GERD (gastroesophageal reflux disease)      Morbid obesity      Osteoarthritis      Hiatal hernia  GERD      Colitis  LARGE INTESTINE   NO RECENT FLARES      COPD (chronic obstructive pulmonary disease)      REBECCA treated with BiPAP      History of cholecystectomy      History of appendectomy      History of knee replacement procedure of right knee  BILATERAL      Hernia, inguinal, bilateral  3 months old      H/O knee surgery  arthoscopic x4      H/O foot surgery  right big toe surgery            FAMILY HISTORY:  FH: lung cancer (Mother)  mother    FH: diabetes mellitus        Social History:  Tobacco: smoker  Alcohol: denies   Drugs: denies    Home Medications:  Ambien 10 mg oral tablet: 0.5 tab(s) orally once a day (at bedtime), As Needed (04 Aug 2021 11:40)  fenofibrate 160 mg oral tablet: 1 tab(s) orally once a day (04 Aug 2021 11:40)  Lasix 40 mg oral tablet: 1 tab(s) orally once a day (04 Aug 2021 11:40)  lisinopril 5 mg oral tablet: 1 tab(s) orally once a day (04 Aug 2021 11:40)  Wellbutrin  mg/24 hours oral tablet, extended release: 1 tab(s) orally every 24 hours (28 Oct 2020 17:46)  Zocor 20 mg oral tablet: 1 tab(s) orally once a day (at bedtime) (28 Oct 2020 17:46)        MEDICATIONS  (STANDING):  azithromycin  IVPB 250 milliGRAM(s) IV Intermittent daily  budesonide 160 MICROgram(s)/formoterol 4.5 MICROgram(s) Inhaler 2 Puff(s) Inhalation two times a day  furosemide    Tablet 40 milliGRAM(s) Oral daily  lactated ringers. 1000 milliLiter(s) (100 mL/Hr) IV Continuous <Continuous>  lisinopril 5 milliGRAM(s) Oral daily  methylPREDNISolone sodium succinate Injectable 60 milliGRAM(s) IV Push two times a day  pantoprazole  Injectable 40 milliGRAM(s) IV Push every 12 hours  scopolamine 1 mG/72 Hr(s) Patch 1 Patch Transdermal every 72 hours  simvastatin 20 milliGRAM(s) Oral at bedtime    MEDICATIONS  (PRN):  ALBUTerol    90 MICROgram(s) HFA Inhaler 1 Puff(s) Inhalation every 8 hours PRN Bronchospasm  albuterol/ipratropium for Nebulization 3 milliLiter(s) Nebulizer every 4 hours PRN Shortness of Breath and/or Wheezing  naloxone Injectable 1 milliGRAM(s) IV Push once PRN unresponsiveness  ondansetron Injectable 4 milliGRAM(s) IV Push every 6 hours PRN Vomiting  prochlorperazine   Injectable 10 milliGRAM(s) IV Push every 8 hours PRN severe nausea or vomiting  zolpidem 5 milliGRAM(s) Oral at bedtime PRN Insomnia      Allergies  No Known Allergies      Review of Systems:   Constitutional:  No Fever, No Chills  ENT/Mouth:  No Hearing Changes,  No Difficulty Swallowing  Eyes:  No Eye Pain, No Vision Changes  Cardiovascular:  No Chest Pain, No Palpitations  Respiratory:  No Cough, + Dyspnea  Gastrointestinal:  As described in HPI  Musculoskeletal:  No Joint Swelling, No Back Pain  Skin:  No Skin Lesions, No Jaundice  Neuro:  No Syncope, No Dizziness  Heme/Lymph:  No Bruising, No Bleeding.          Physical Examination:  T(C): 37.1 (07-28-22 @ 05:00), Max: 37.2 (07-27-22 @ 21:00)  HR: 72 (07-28-22 @ 05:00) (72 - 93)  BP: 165/81 (07-28-22 @ 05:00) (110/73 - 165/81)  RR: 16 (07-28-22 @ 05:00) (16 - 18)  SpO2: 96% (07-28-22 @ 05:00) (94% - 99%)          GENERAL: AAOx3, no acute distress.  HEAD:  Atraumatic, Normocephalic  EYES: conjunctiva and sclera clear  NECK: Supple, no JVD or thyromegaly  CHEST/LUNG: Clear to auscultation bilaterally; No wheeze, rhonchi, or rales  HEART: Regular rate and rhythm; normal S1, S2, No murmurs.  ABDOMEN: Soft, nontender, nondistended; Bowel sounds present  NEUROLOGY: No asterixis or tremor.   SKIN: Intact, no jaundice        Data:                        15.4   18.25 )-----------( 223      ( 27 Jul 2022 18:31 )             45.8     Hgb Trend:  15.4  07-27-22 @ 18:31  15.2  07-27-22 @ 14:24  13.6  07-27-22 @ 09:36  14.7  07-26-22 @ 05:45  14.1  07-26-22 @ 00:05      07-27    140  |  98  |  19  ----------------------------<  104<H>  4.3   |  32  |  0.8    Ca    9.9      27 Jul 2022 18:31  Mg     2.2     07-27    TPro  7.3  /  Alb  4.2  /  TBili  0.4  /  DBili  x   /  AST  13  /  ALT  15  /  AlkPhos  147<H>  07-27    Liver panel trend:  TBili 0.4   /   AST 13   /   ALT 15   /   AlkP 147   /   Tptn 7.3   /   Alb 4.2    /   DBili --      07-27  TBili 0.2   /   AST 10   /   ALT 13   /   AlkP 145   /   Tptn 5.9   /   Alb 3.4    /   DBili --      07-27  TBili 0.4   /   AST 23   /   ALT 17   /   AlkP 149   /   Tptn 6.7   /   Alb 3.8    /   DBili -- 07-26  TBili 0.3   /   AST 21   /   ALT 12   /   AlkP 126   /   Tptn 6.6   /   Alb 3.9    /   DBili -- 07-20              Radiology:

## 2022-07-28 NOTE — PROGRESS NOTE ADULT - ASSESSMENT
55 y/o male with PHM of COPD (not on home O2), REBECCA, GERD,colitis, HTN, HLD, chronic pain with large home opioid use, 120 pack-year hx presents with increasing dyspnea, productive cough with white sputum x 4 days admitted to medicine for management of AHRF 2/2 to COPD exacerbation.       # COPD exacerbation   # REBECCA  - Pulmonary onboard   - On admission, VBG: pH 7.4, pCO2 45, pO2 48, pHCO3 28, sat 84%, RVP (-), d-dimer (494)  - reduce IV solumedrol 60 to qd, continue Duoneb q4, Symbicort 160 BID, Albuterol 90 q8 , BIPAP qhs, PRN  -Azithro day 3/5  - OP CTchest, OP PFTs  - f/u LE duplex    # Hematemesis - suspected Charlotte Santamaria tear  - 1 episode of bright red blood 7/27, emesis has reduced   - Moved to clear fluids   - LR at 100/h  - GI consult appreciated     - CT abd ordered     - OP GI clinic for colonscopy and possible EGD  - f/u cbc , VS   - Active T/S, consented for blood   - Protonix 40 IV BID   - Zofran 4mg q6hrs, compazine for severe nausea, scopolamine patch ordered     #Chronic R lower back pain  - Restarted  home morphine ER 100mg qd, xycodone 30mg q4h prn (confirmed dosages with [facility] )  - narcan ordered     #Anxiety  - Home haldol and wellbutrin restarted   - home xanax dose is 2mg [id number]      #GERD  - c/w protonix IV     #HTN  Due to vomiting and soft BP holding :  - home lisinopril 5mg qd  - home po lasix 40mg     #HLD  - due to vomiting holding : home simvastain 20mg qhs   - restart home fenofibrate 160mg qd when vomiting ends                                                                                ----------------------------------------------------  # DVT prophylaxis Lovenox 40mg bid   # GI prophylaxis: protonix 40mg   # Diet: NPO  # Activity: as tolerated, ambulates on RA  # Code status: RA   # Disposition: from home  # Pending: CT abd, respiratory status, h/h trend                                                                            --------------------------------------------------------    # Handoff: f/u LE duplex, GI Reccs, cbc, VS, pain, anxiety control, monitor for more hematemesis

## 2022-07-28 NOTE — CONSULT NOTE ADULT - ATTENDING COMMENTS
No urgency for endoscopy currently in absence of ongoing/overt GI bleeding and stable Hb. Pt also continues to be optimized from respiratory standpoint. Recommend obtain CT abd/pelvis to further evaluate abdominal pain. Discussed avoidance of narcotics.
Impression    COPD exacerbation   Active smoker   REBECCA on BiPAP       Plan as outlined above

## 2022-07-28 NOTE — CONSULT NOTE ADULT - ASSESSMENT
Patient is a 56y M with PMHx of REBECCA, COPD, GERD, colitis, HTN, HLD, presenting for SOB. GI cons Patient is a 56y M with PMHx of REBECCA, COPD, GERD, colitis, HTN, HLD, presenting for SOB. GI consulted for multiple episodes of vomiting with concern of bloody vomit.     # Multiple episodes of vomiting most likely 2/2 Charlotte Santamaria tear: R/O other pathology : resolved   - pt is hemodynamically stable  - had 10 episodes of vomiting with coughing yesterday with last episode with small streaks of blood  - Hb is stable  - no evidence of active GI bleed  - Prior EGD: 2013 for abdominal pain, gastritis, duodenitis, -ve H. pylori     Recommendations:  - monitor H/H  - pt can follow up with GI as outpatient for evaluation for need for repeat EGD    # GERD:  - c/w PPI daily  - wt loss and life style modification     # CRC screening: never had colonoscopy  - pt will need outpatient screening colonoscopy     * Follow up with our [facility] located at [address] [place] [address]. Phone Number: [phone]      will sign off  recall JOEL Rivas  PGY 5  Gastroenterology Fellow  Patient is a 56y M with PMHx of REBECCA, COPD, GERD, colitis, HTN, HLD, presenting for SOB. GI consulted for multiple episodes of vomiting with concern of bloody vomit.     # Multiple episodes of vomiting most likely 2/2 Charlotte Santamaria tear: R/O other pathology : resolved   - pt is hemodynamically stable  - had 10 episodes of vomiting with coughing yesterday with last episode with small streaks of blood  - Hb is stable  - no evidence of active GI bleed  - Prior EGD: 2013 for abdominal pain, gastritis, duodenitis, -ve H. pylori     Recommendations:  - monitor H/H  - Recommend obtain CT abd/pelvis to evaluate abdominal pain  - pt can follow up with GI as outpatient for evaluation for need for repeat EGD, sooner if evidence of GI bleeding and pending CT results    # GERD:  - c/w PPI daily  - wt loss and life style modification     # CRC screening: never had colonoscopy  - pt will need outpatient screening colonoscopy     * Follow up with our [facility] located at [address] [place] [address]. Phone Number: [phone]      will sign off  recall JOEL Rivas  PGY 5  Gastroenterology Fellow

## 2022-07-29 ENCOUNTER — TRANSCRIPTION ENCOUNTER (OUTPATIENT)
Age: 57
End: 2022-07-29

## 2022-07-29 VITALS — HEART RATE: 80 BPM | OXYGEN SATURATION: 98 % | RESPIRATION RATE: 20 BRPM

## 2022-07-29 LAB
ALBUMIN SERPL ELPH-MCNC: 3.8 G/DL — SIGNIFICANT CHANGE UP (ref 3.5–5.2)
ALP SERPL-CCNC: 129 U/L — HIGH (ref 30–115)
ALT FLD-CCNC: 28 U/L — SIGNIFICANT CHANGE UP (ref 0–41)
ANION GAP SERPL CALC-SCNC: 14 MMOL/L — SIGNIFICANT CHANGE UP (ref 7–14)
AST SERPL-CCNC: 31 U/L — SIGNIFICANT CHANGE UP (ref 0–41)
BILIRUB SERPL-MCNC: 0.5 MG/DL — SIGNIFICANT CHANGE UP (ref 0.2–1.2)
BUN SERPL-MCNC: 14 MG/DL — SIGNIFICANT CHANGE UP (ref 10–20)
CALCIUM SERPL-MCNC: 8.9 MG/DL — SIGNIFICANT CHANGE UP (ref 8.5–10.1)
CHLORIDE SERPL-SCNC: 96 MMOL/L — LOW (ref 98–110)
CO2 SERPL-SCNC: 26 MMOL/L — SIGNIFICANT CHANGE UP (ref 17–32)
CREAT SERPL-MCNC: 1 MG/DL — SIGNIFICANT CHANGE UP (ref 0.7–1.5)
EGFR: 89 ML/MIN/1.73M2 — SIGNIFICANT CHANGE UP
GLUCOSE SERPL-MCNC: 101 MG/DL — HIGH (ref 70–99)
HCT VFR BLD CALC: 45.8 % — SIGNIFICANT CHANGE UP (ref 42–52)
HGB BLD-MCNC: 15.3 G/DL — SIGNIFICANT CHANGE UP (ref 14–18)
MCHC RBC-ENTMCNC: 28.5 PG — SIGNIFICANT CHANGE UP (ref 27–31)
MCHC RBC-ENTMCNC: 33.4 G/DL — SIGNIFICANT CHANGE UP (ref 32–37)
MCV RBC AUTO: 85.3 FL — SIGNIFICANT CHANGE UP (ref 80–94)
NRBC # BLD: 0 /100 WBCS — SIGNIFICANT CHANGE UP (ref 0–0)
PLATELET # BLD AUTO: 190 K/UL — SIGNIFICANT CHANGE UP (ref 130–400)
POTASSIUM SERPL-MCNC: 4.2 MMOL/L — SIGNIFICANT CHANGE UP (ref 3.5–5)
POTASSIUM SERPL-SCNC: 4.2 MMOL/L — SIGNIFICANT CHANGE UP (ref 3.5–5)
PROT SERPL-MCNC: 6.5 G/DL — SIGNIFICANT CHANGE UP (ref 6–8)
RBC # BLD: 5.37 M/UL — SIGNIFICANT CHANGE UP (ref 4.7–6.1)
RBC # FLD: 14.5 % — SIGNIFICANT CHANGE UP (ref 11.5–14.5)
SODIUM SERPL-SCNC: 136 MMOL/L — SIGNIFICANT CHANGE UP (ref 135–146)
WBC # BLD: 10.22 K/UL — SIGNIFICANT CHANGE UP (ref 4.8–10.8)
WBC # FLD AUTO: 10.22 K/UL — SIGNIFICANT CHANGE UP (ref 4.8–10.8)

## 2022-07-29 PROCEDURE — 99239 HOSP IP/OBS DSCHRG MGMT >30: CPT

## 2022-07-29 RX ADMIN — Medication 2 MILLIGRAM(S): at 00:17

## 2022-07-29 RX ADMIN — OXYCODONE HYDROCHLORIDE 30 MILLIGRAM(S): 5 TABLET ORAL at 00:30

## 2022-07-29 RX ADMIN — LISINOPRIL 5 MILLIGRAM(S): 2.5 TABLET ORAL at 05:06

## 2022-07-29 RX ADMIN — ZOLPIDEM TARTRATE 5 MILLIGRAM(S): 10 TABLET ORAL at 03:44

## 2022-07-29 RX ADMIN — OXYCODONE HYDROCHLORIDE 30 MILLIGRAM(S): 5 TABLET ORAL at 05:07

## 2022-07-29 RX ADMIN — Medication 2 MILLIGRAM(S): at 05:07

## 2022-07-29 RX ADMIN — OXYCODONE HYDROCHLORIDE 30 MILLIGRAM(S): 5 TABLET ORAL at 00:16

## 2022-07-29 RX ADMIN — HALOPERIDOL DECANOATE 2 MILLIGRAM(S): 100 INJECTION INTRAMUSCULAR at 05:06

## 2022-07-29 RX ADMIN — Medication 60 MILLIGRAM(S): at 05:06

## 2022-07-29 RX ADMIN — Medication 40 MILLIGRAM(S): at 05:06

## 2022-07-29 NOTE — DISCHARGE NOTE PROVIDER - CARE PROVIDER_API CALL
Paulette Camacho)  Gastroenterology; Internal Medicine  [address] [place] [address]  Phone: [phone]  Fax: [phone]  Follow Up Time: 1 week    Murali Brooks)  Critical Care Medicine; Pulmonary Disease; Sleep Medicine  [address] [place], [place] [id number]  [place] [address]  Phone: [phone]  Fax: [phone]  Follow Up Time: 1 week    Edison Bowie)  [address] [place] [address]  Phone: [phone]  Fax: [phone]  Follow Up Time: 1 week

## 2022-07-29 NOTE — DISCHARGE NOTE PROVIDER - PROVIDER TOKENS
PROVIDER:[TOKEN:[[id number]],FOLLOWUP:[1 week]],PROVIDER:[TOKEN:[[id number]],FOLLOWUP:[1 week]],PROVIDER:[TOKEN:[[id number]],FOLLOWUP:[1 week]]

## 2022-07-29 NOTE — DISCHARGE NOTE PROVIDER - ATTENDING DISCHARGE PHYSICAL EXAMINATION:
Vital Signs Last 24 Hrs  T(F): 93.6 (29 Jul 2022 04:58), Max: 97.7 (28 Jul 2022 20:27)  HR: 80 (29 Jul 2022 09:04) (65 - 84)  BP: 123/57 (29 Jul 2022 04:58) (108/71 - 123/57)  RR: 20 (29 Jul 2022 09:04) (18 - 20)  SpO2: 98% (29 Jul 2022 09:04) (97% - 98%)    PHYSICAL EXAM:  GENERAL: NAD, poorly-groomed, well-developed  HEAD:  Atraumatic, Normocephalic  EYES: EOMI, conjunctiva and sclera clear  ENMT: Moist mucous membranes, Good dentition, no thrush  NECK: Supple, No JVD  CHEST/LUNG: Clear to auscultation bilaterally, good air entry, non-labored breathing  HEART: RRR; S1/S2, No murmur  ABDOMEN: Soft, Nontender, Nondistended; morbid obese  VASCULAR: Normal pulses, Normal capillary refill  EXTREMITIES: No calf tenderness, No cyanosis, No edema  LYMPH: Normal; No lymphadenopathy noted  SKIN: Warm, Intact  PSYCH: Normal mood, Normal affect  NERVOUS SYSTEM:  A/O x3, Good concentration; CN 2-12 intact, No focal deficits

## 2022-07-29 NOTE — DISCHARGE NOTE PROVIDER - HOSPITAL COURSE
57 y/o male with PHM of COPD (not on home O2), REBECCA, GERD, colitis, HTN, HLD, chronic pain with large home opioid use, 120 pack-year hx presents with increasing dyspnea, productive cough with white sputum x 4 days admitted to medicine for management of AHRF 2/2 to COPD exacerbation. Pulmonology was consulted and the patient was placed on iv solumedrol, dounebs, symbicort, BIPAP qhs, and azithromycin with symptom improvement. However, his hospital course was complicated by multiple episodes of emesis with 1-2 episodes hematemesis concerning for possible Charlotte Santamaria tear on 7/27 (HD#1). GI was consulted and CT a/p was (-) for acute intra-abdominal pathology, he was placed on NPO, given IV Protonix, Zofran, compazine, and scopolamine for symptom management. No further episodes of hematemesis were noted and the patient remained HD stable. His diet was advanced and his home medications were resumed. Of note the patient      # COPD exacerbation   # REBECCA  - Pulmonary onboard   - On admission, VBG: pH 7.4, pCO2 45, pO2 48, pHCO3 28, sat 84%, RVP (-), d-dimer (494)  - reduce IV solumedrol 60 to qd, continue Duoneb q4, Symbicort 160 BID, Albuterol 90 q8 , BIPAP qhs, PRN  -Azithro day 3/5  - OP CT chest, OP PFTs  - f/u LE duplex    # Hematemesis - suspected Charlotte Santamaria tear  - 1 episode of bright red blood 7/27, emesis has reduced   - Moved to clear fluids   - LR at 100/h  - GI consult appreciated     - CT abd ordered     - OP GI clinic for colonscopy and possible EGD  - f/u cbc , VS   - Active T/S, consented for blood   - Protonix 40 IV BID   - Zofran 4mg q6hrs, compazine for severe nausea, scopolamine patch ordered     #Chronic R lower back pain  - Restarted  home morphine ER 100mg qd, xycodone 30mg q4h prn (confirmed dosages with [facility] )  - narcan ordered     #Anxiety  - Home haldol and wellbutrin restarted   - home xanax dose is 2mg [id number]      #GERD  - c/w protonix IV     #HTN  Due to vomiting and soft BP holding :  - home lisinopril 5mg qd  - home po lasix 40mg     #HLD  - due to vomiting holding : home simvastain 20mg qhs   - restart home fenofibrate 160mg qd when vomiting ends                                                                                ----------------------------------------------------  # DVT prophylaxis Lovenox 40mg bid   # GI prophylaxis: protonix 40mg   # Diet: NPO  # Activity: as tolerated, ambulates on RA  # Code status: RA   # Disposition: from home  # Pending: CT abd, respiratory status, h/h trend                                                                            --------------------------------------------------------    # Handoff: f/u LE duplex, GI Reccs, cbc, VS, pain, anxiety control, monitor for more hematemesis    55 y/o male with PHM of COPD (not on home O2), REBECCA, GERD, colitis, HTN, HLD, chronic pain with large home opioid use, 120 pack-year hx presents with increasing dyspnea, productive cough with white sputum x 4 days admitted to medicine for management of AHRF 2/2 to COPD exacerbation. Pulmonology was consulted and the patient was placed on iv solumedrol, dounebs, symbicort, BIPAP qhs, and azithromycin with symptom improvement. However, his hospital course was complicated by multiple episodes of emesis with 1-2 episodes hematemesis concerning for possible Charlotte Santamaria tear on 7/27 (HD#1). GI was consulted and CT a/p was (-) for acute intra-abdominal pathology, he was placed on NPO, given IV Protonix, Zofran, compazine, and scopolamine for symptom management. No further episodes of hematemesis were noted and the patient remained HD stable. His diet was advanced and his home medications were resumed. Of note the patient is on an excessive pain and anxiety regiment including:  morphine ER 100mg qd, xycodone 30mg q4h prn, haldol and xanax dose is 2mg [id number].  He will be discharged with a steroid taper. he will follow outpatient with Gi for EGD / colonoscopy.     55 y/o male with PHM of COPD (not on home O2), REBECCA, GERD, colitis, HTN, HLD, chronic pain with large home opioid use, 120 pack-year hx presents with increasing dyspnea, productive cough with white sputum x 4 days admitted to medicine for management of AHRF 2/2 to COPD exacerbation. Pulmonology was consulted and the patient was placed on iv solumedrol, dounebs, symbicort, BIPAP qhs, and azithromycin with symptom improvement. However, his hospital course was complicated by multiple episodes of emesis with 1-2 episodes hematemesis concerning for possible Charlotte Santamaria tear on 7/27 (HD#1). GI was consulted and CT a/p was (-) for acute intra-abdominal pathology, he was placed on NPO, given IV Protonix, Zofran, compazine, and scopolamine for symptom management. No further episodes of hematemesis were noted and the patient remained HD stable. His diet was advanced and his home medications were resumed. Of note the patient is on an excessive pain and anxiety regiment including:  morphine ER 100mg qd, xycodone 30mg q4h prn, haldol and xanax dose is 2mg g5dexxz.  He will be discharged with a steroid taper. he will follow outpatient with Gi for EGD / colonoscopy.    I have personally seen and examined patient on the above date.  I discussed the case with Dr. Mejia and I agree with findings and plan as detailed per note above, which I have amended where appropriate.

## 2022-07-29 NOTE — DISCHARGE NOTE NURSING/CASE MANAGEMENT/SOCIAL WORK - NSDCPEFALRISK_GEN_ALL_CORE
For information on Fall & Injury Prevention, visit: [email or web address] [place]  [email or web address] [place]  https://www.cdc.gov/steadi/patient.html

## 2022-07-29 NOTE — DISCHARGE NOTE NURSING/CASE MANAGEMENT/SOCIAL WORK - PATIENT PORTAL LINK FT
You can access the FollowMyHealth Patient Portal offered by [facility] by registering at the following website: [email or web address]. By joining [facility]’s FollowMyHealth portal, you will also be able to view your health information using other applications (apps) compatible with our system.

## 2022-07-29 NOTE — DISCHARGE NOTE PROVIDER - NSDCMRMEDTOKEN_GEN_ALL_CORE_FT
albuterol 90 mcg/inh inhalation aerosol: 2 puff(s) inhaled every 6 hours AS NEEDED   Ambien 10 mg oral tablet: 0.5 tab(s) orally once a day (at bedtime), As Needed  fenofibrate 160 mg oral tablet: 1 tab(s) orally once a day  Florastor 250 mg oral capsule: 1 cap(s) orally 2 times a day   Lasix 40 mg oral tablet: 1 tab(s) orally once a day  lidocaine 4% topical film: Apply topically to affected area once a day MDD:Please remove the patch 12 hours after being applied  lisinopril 5 mg oral tablet: 1 tab(s) orally once a day  magnesium hydroxide/aluminum hydroxide/simethicone 200 mg-200 mg-20 mg/5 mL oral suspension: 10 milliliter(s) orally 2 times a day PRN abdominal discomfort, bloating  MS Contin 100 mg oral tablet, extended release: 1 tab(s) orally 4 times a day MDD:400mg  nicotine 21 mg/24 hr transdermal film, extended release: 1 patch transdermal once a day   omeprazole 40 mg oral delayed release capsule: 1 cap(s) orally 2 times a day   oxyCODONE 30 mg oral tablet: 1 tab(s) orally every 4 hours, As needed, Severe Pain (7 - 10) MDD:180mg  polyethylene glycol 3350 oral powder for reconstitution: 17 gram(s) orally once a day AS NEEDED FOR CONSTIPATION  senna oral tablet: 2 tab(s) orally once a day (at bedtime)  Spiriva HandiHaler 18 mcg inhalation capsule: 1 cap(s) inhaled once a day   Symbicort 160 mcg-4.5 mcg/inh inhalation aerosol: 1 puff(s) inhaled 2 times a day   Wellbutrin  mg/24 hours oral tablet, extended release: 1 tab(s) orally every 24 hours  Zocor 20 mg oral tablet: 1 tab(s) orally once a day (at bedtime)   albuterol 90 mcg/inh inhalation aerosol: 2 puff(s) inhaled every 6 hours AS NEEDED   Ambien 10 mg oral tablet: 0.5 tab(s) orally once a day (at bedtime), As Needed  fenofibrate 160 mg oral tablet: 1 tab(s) orally once a day  Florastor 250 mg oral capsule: 1 cap(s) orally 2 times a day   Lasix 40 mg oral tablet: 1 tab(s) orally once a day  lidocaine 4% topical film: Apply topically to affected area once a day MDD:Please remove the patch 12 hours after being applied  lisinopril 5 mg oral tablet: 1 tab(s) orally once a day  MS Contin 100 mg oral tablet, extended release: 1 tab(s) orally 4 times a day MDD:400mg  nicotine 21 mg/24 hr transdermal film, extended release: 1 patch transdermal once a day   omeprazole 40 mg oral delayed release capsule: 1 cap(s) orally 2 times a day   oxyCODONE 30 mg oral tablet: 1 tab(s) orally every 4 hours, As needed, Severe Pain (7 - 10) MDD:180mg  polyethylene glycol 3350 oral powder for reconstitution: 17 gram(s) orally once a day AS NEEDED FOR CONSTIPATION  predniSONE 10 mg oral tablet: 4 tab(s) orally once a day x 3 days  2 tab(s) orally once a day x 3 days  1 tab(s) orally once a day x 3 days  senna oral tablet: 2 tab(s) orally once a day (at bedtime)  Spiriva HandiHaler 18 mcg inhalation capsule: 1 cap(s) inhaled once a day   Symbicort 160 mcg-4.5 mcg/inh inhalation aerosol: 1 puff(s) inhaled 2 times a day   Wellbutrin  mg/24 hours oral tablet, extended release: 1 tab(s) orally every 24 hours  Zocor 20 mg oral tablet: 1 tab(s) orally once a day (at bedtime)

## 2022-07-29 NOTE — DISCHARGE NOTE NURSING/CASE MANAGEMENT/SOCIAL WORK - NSDCVIVACCINE_GEN_ALL_CORE_FT
influenza, injectable, quadrivalent, preservative free; 19-Oct-2018 18:53; Zina Muro (RN); [employer]; 449DE (Exp. Date: 30-Jun-2019); IntraMuscular; Deltoid Left.; 0.5 milliLiter(s); VIS (VIS Published: 07-Aug-2015, VIS Presented: 19-Oct-2018);

## 2022-08-01 DIAGNOSIS — F17.210 NICOTINE DEPENDENCE, CIGARETTES, UNCOMPLICATED: ICD-10-CM

## 2022-08-01 DIAGNOSIS — K22.6 GASTRO-ESOPHAGEAL LACERATION-HEMORRHAGE SYNDROME: ICD-10-CM

## 2022-08-01 DIAGNOSIS — J44.1 CHRONIC OBSTRUCTIVE PULMONARY DISEASE WITH (ACUTE) EXACERBATION: ICD-10-CM

## 2022-08-01 DIAGNOSIS — K92.0 HEMATEMESIS: ICD-10-CM

## 2022-08-01 DIAGNOSIS — F41.9 ANXIETY DISORDER, UNSPECIFIED: ICD-10-CM

## 2022-08-01 DIAGNOSIS — G89.29 OTHER CHRONIC PAIN: ICD-10-CM

## 2022-08-01 DIAGNOSIS — G47.00 INSOMNIA, UNSPECIFIED: ICD-10-CM

## 2022-08-01 DIAGNOSIS — M54.50 LOW BACK PAIN, UNSPECIFIED: ICD-10-CM

## 2022-08-01 DIAGNOSIS — Z71.6 TOBACCO ABUSE COUNSELING: ICD-10-CM

## 2022-08-01 DIAGNOSIS — E78.00 PURE HYPERCHOLESTEROLEMIA, UNSPECIFIED: ICD-10-CM

## 2022-08-01 DIAGNOSIS — K21.9 GASTRO-ESOPHAGEAL REFLUX DISEASE WITHOUT ESOPHAGITIS: ICD-10-CM

## 2022-08-01 DIAGNOSIS — J96.01 ACUTE RESPIRATORY FAILURE WITH HYPOXIA: ICD-10-CM

## 2022-08-01 DIAGNOSIS — G47.33 OBSTRUCTIVE SLEEP APNEA (ADULT) (PEDIATRIC): ICD-10-CM

## 2022-08-01 DIAGNOSIS — Z96.653 PRESENCE OF ARTIFICIAL KNEE JOINT, BILATERAL: ICD-10-CM

## 2022-08-01 DIAGNOSIS — Z90.49 ACQUIRED ABSENCE OF OTHER SPECIFIED PARTS OF DIGESTIVE TRACT: ICD-10-CM

## 2022-08-01 DIAGNOSIS — Z79.51 LONG TERM (CURRENT) USE OF INHALED STEROIDS: ICD-10-CM

## 2022-08-01 DIAGNOSIS — I10 ESSENTIAL (PRIMARY) HYPERTENSION: ICD-10-CM

## 2022-08-01 DIAGNOSIS — Z79.52 LONG TERM (CURRENT) USE OF SYSTEMIC STEROIDS: ICD-10-CM

## 2022-08-01 DIAGNOSIS — Z79.891 LONG TERM (CURRENT) USE OF OPIATE ANALGESIC: ICD-10-CM

## 2022-08-05 ENCOUNTER — RESULT REVIEW (OUTPATIENT)
Age: 57
End: 2022-08-05

## 2022-08-05 ENCOUNTER — OUTPATIENT (OUTPATIENT)
Dept: OUTPATIENT SERVICES | Facility: HOSPITAL | Age: 57
LOS: 1 days | Discharge: HOME | End: 2022-08-05

## 2022-08-05 DIAGNOSIS — Z98.890 OTHER SPECIFIED POSTPROCEDURAL STATES: Chronic | ICD-10-CM

## 2022-08-05 DIAGNOSIS — N28.1 CYST OF KIDNEY, ACQUIRED: ICD-10-CM

## 2022-08-05 DIAGNOSIS — K40.20 BILATERAL INGUINAL HERNIA, WITHOUT OBSTRUCTION OR GANGRENE, NOT SPECIFIED AS RECURRENT: Chronic | ICD-10-CM

## 2022-08-05 DIAGNOSIS — Z96.651 PRESENCE OF RIGHT ARTIFICIAL KNEE JOINT: Chronic | ICD-10-CM

## 2022-08-05 PROCEDURE — 76775 US EXAM ABDO BACK WALL LIM: CPT | Mod: 26

## 2022-08-10 NOTE — ED ADULT NURSE NOTE - NSICDXPASTSURGICALHX_GEN_ALL_CORE_FT
show PAST SURGICAL HISTORY:  H/O foot surgery right big toe surgery    H/O knee surgery arthoscopic x4    Hernia, inguinal, bilateral 3 months old    History of appendectomy     History of cholecystectomy     History of knee replacement procedure of right knee BILATERAL

## 2022-08-30 NOTE — ED ADULT NURSE NOTE - NURSING ED EENT NOSE
Brittani Gaviria was evaluated at [facility] on August 30, 2022 at which time her blood pressure was:    BP Readings from Last 3 Encounters:   08/30/22 (!) 149/91   06/03/22 (!) 149/93   06/03/22 (!) 149/93     Pulse Readings from Last 3 Encounters:   08/30/22 90   06/03/22 83   06/03/22 83       Reviewed lifestyle modifications for blood pressure control and reduction: including making healthy food choices, managing weight, getting regular exercise, smoking cessation, reducing alcohol consumption, monitoring blood pressure regularly.     Symptoms: Patient c/o head pain    BP Goal:< 140/90 mmHg    BP Assessment:  BP too high    Potential Reasons for BP too high: Dose of BP medication too low    BP Follow-Up Plan: Recheck BP in 30 days at pharmacy    Recommendation to Provider: Increase diltiazem dose    Note completed by: Mara Burleson Pharm.D.    
normal
disoriented to time
disoriented to time/disoriented to situation

## 2022-08-31 NOTE — BRIEF OPERATIVE NOTE - ESTIMATED BLOOD LOSS
Patient presents to ER with complaints of balance issues, and dizziness x 3 days. Fatigue.    Hx of stroke
200

## 2022-09-22 NOTE — ED ADULT TRIAGE NOTE - AS TEMP SITE
Quality 110: Preventive Care And Screening: Influenza Immunization: Influenza immunization was not ordered or administered, reason not given Detail Level: Detailed Quality 130: Documentation Of Current Medications In The Medical Record: Current Medications Documented oral

## 2022-09-28 ENCOUNTER — APPOINTMENT (OUTPATIENT)
Age: 57
End: 2022-09-28

## 2022-09-28 VITALS
WEIGHT: 315 LBS | HEART RATE: 83 BPM | BODY MASS INDEX: 45.1 KG/M2 | OXYGEN SATURATION: 95 % | DIASTOLIC BLOOD PRESSURE: 80 MMHG | SYSTOLIC BLOOD PRESSURE: 140 MMHG | HEIGHT: 70 IN

## 2022-09-28 DIAGNOSIS — J44.1 CHRONIC OBSTRUCTIVE PULMONARY DISEASE WITH (ACUTE) EXACERBATION: ICD-10-CM

## 2022-09-28 PROCEDURE — G0296 VISIT TO DETERM LDCT ELIG: CPT

## 2022-09-28 PROCEDURE — 99214 OFFICE O/P EST MOD 30 MIN: CPT | Mod: 25

## 2022-09-28 RX ORDER — PREDNISONE 20 MG/1
20 TABLET ORAL DAILY
Qty: 10 | Refills: 0 | Status: DISCONTINUED | COMMUNITY
Start: 2019-09-27 | End: 2022-09-28

## 2022-09-28 RX ORDER — ALBUTEROL SULFATE 90 UG/1
108 (90 BASE) AEROSOL, METERED RESPIRATORY (INHALATION)
Qty: 1 | Refills: 3 | Status: ACTIVE | COMMUNITY
Start: 2022-09-28 | End: 1900-01-01

## 2022-09-28 RX ORDER — PREDNISONE 20 MG/1
20 TABLET ORAL
Qty: 18 | Refills: 1 | Status: ACTIVE | COMMUNITY
Start: 2022-09-28 | End: 1900-01-01

## 2022-09-28 RX ORDER — FLUTICASONE PROPIONATE AND SALMETEROL 250; 50 UG/1; UG/1
250-50 POWDER RESPIRATORY (INHALATION)
Qty: 60 | Refills: 5 | Status: ACTIVE | COMMUNITY
Start: 2022-09-28 | End: 1900-01-01

## 2022-09-28 NOTE — HISTORY OF PRESENT ILLNESS
[Doing Poorly] : doing poorly [Feelings Of Weakness On Exertion] : worsened exercise intolerance [Cough] : worsened coughing [Wheezing] : worsened wheezing [Adherent] : the patient is adherent with ~his/her~ medication regimen [Goals--Doing Well] : the patient is not doing well with ~his/her~ COPD goals [PFTs] : pulmonary function tests [Follow-Up - Routine Clinic] : a routine clinic follow-up of [Snoring] : snoring [Unrefreshing Sleep] : unrefreshing sleep [Currently Experiencing] : The patient is currently experiencing symptoms. [None] : No associated symptoms are reported [BiPAP] : BiPAP [Good Compliance] : good compliance with treatment [Poor Tolerance] : poor tolerance of treatment [Poor Symptom Control] : poor symptom control

## 2022-09-28 NOTE — ASSESSMENT
[FreeTextEntry1] : COPD exacerbation \par HO COPD \par smoker continues to smoke.  Over 30 PY \par HO REBECCA not tolerating his BiPAP very well \par Was a Milli Casillas, and Venessa patient

## 2022-09-28 NOTE — COUNSELING
[Cessation strategies including cessation program discussed] : Cessation strategies including cessation program discussed [Use of nicotine replacement therapies and other medications discussed] : Use of nicotine replacement therapies and other medications discussed [Yes] : Willing to quit smoking [ - Annual Lung Cancer Screening/Share Decision Making Discussion] : Annual Lung Cancer Screening/Share Decision Making Discussion. (I have advised this patient to have a Low Dose CT (LDCT) scan of the lungs and have discussed the following with the patient in a shared decision making discussion:   Benefits of Detection and Early Treatment: There is adequate evidence that annual screening for lung cancer with LDCT in a population of high-risk persons can prevent a substantial number of lung cancer–related deaths. The magnitude of benefit depends on the individual patient's risk for lung cancer, as those who are at highest risk are most likely to benefit. Screening cannot prevent most lung cancer–related deaths, and does not replace smoking cessation. Harms of Detection and Early Intervention and Treatment: The harms associated with LDCT screening include false-negative and false-positive results, incidental findings, over diagnosis, and radiation exposure. False-positive LDCT results occur in a substantial proportion of screened persons; 95% of all positive results do not lead to a diagnosis of cancer. In a high-quality screening program, further imaging can resolve most false-positive results; however, some patients may require invasive procedures. Radiation harms, including cancer resulting from cumulative exposure to radiation, vary depending on the age at the start of screening; the number of scans received; and the person's exposure to other sources of radiation, particularly other medical imaging.)

## 2022-10-10 ENCOUNTER — APPOINTMENT (OUTPATIENT)
Dept: CARDIOTHORACIC SURGERY | Facility: CLINIC | Age: 57
End: 2022-10-10
Payer: MEDICAID

## 2022-10-10 VITALS — HEIGHT: 70 IN | BODY MASS INDEX: 42.95 KG/M2 | WEIGHT: 300 LBS

## 2022-10-10 DIAGNOSIS — F17.210 NICOTINE DEPENDENCE, CIGARETTES, UNCOMPLICATED: ICD-10-CM

## 2022-10-10 PROCEDURE — G0296 VISIT TO DETERM LDCT ELIG: CPT

## 2022-10-10 NOTE — HISTORY OF PRESENT ILLNESS
[Home] : at home, [unfilled] , at the time of the visit. [Medical Office: ([facility])___] : at the medical office located in  [Verbal consent obtained from patient] : the patient, [unfilled] [Current] : Current [>=20 Pack-Years] : Twenty pack years or greater smoking history: Yes [TextBox_13] : Referred by Dr. Brooks\par \par Mr. KAYLEIGH TOMAS  is a 56 year old man with a history of Emphysema, HTN.\par \par He  was seen in the office by Dr. Brooks for review of eligibility for, as well as, discussion of Low-Dose CT lung cancer screening program. Over the telephone today we reviewed and confirmed that the patient meets screening eligibility criteria:\par -Age: 56 year \par Smoking status:\par -Current smoker\par -Number of pack(s) per day: 2-3\par -Number of years smoked: 40\par -Number of pack years smokin+ \par Recently cut back to half PPD\par \par Mr. TOMAS denies any signs or symptoms of lung cancer including new cough, change in cough, hemoptysis and unintentional weight loss. \par \par Mr. TOMAS denies any personal history of lung cancer.  Denies any history of occupational exposures.\par \par Mother had Lung Cancer [PacksperDay] : 2-3 [N_Years] : 40 [PacksperYear] : 90+

## 2022-10-10 NOTE — PLAN
[Lifestlye changes] : lifestyle changes [Age appropriate screenings] : Age appropriate screenings [Immunizations] : immunizations [Regular Exercise] : regular exercise [Healthful dietary options] : healthful dietary options [Regular follow-up with healthcare provider] : regular follow-up with healthcare provider [Medication prescribed/changed as per orders] : medication prescribed/changed as per orders [FreeTextEntry1] : Plan:\par -Low Dose CT chest for lung cancer screening\par -Follow up with patient and his referring provider after his LDCT results have been reviewed by the multi-disciplinary clinical team\par -Encouraged smoking cessation\par -Patient declined [facility] Smokers Quitline literature\par -Smoking Cessation was offered, refuses CTC and medication, prefers to quit on own, already cut back\par \par Should I Screen? tool utilized. 6 year risk of lung cancer is 3.4%. Patient wishes to proceed with screening.\par \par Engaged in shared decision making with . KAYLEIGH TOMAS . Answered all questions. He verbalized understanding and agreement. He knows to call back with any questions or concerns\par \par \par

## 2022-10-13 ENCOUNTER — EMERGENCY (EMERGENCY)
Facility: HOSPITAL | Age: 57
LOS: 0 days | Discharge: HOME | End: 2022-10-13
Attending: EMERGENCY MEDICINE | Admitting: EMERGENCY MEDICINE
Payer: MEDICAID

## 2022-10-13 ENCOUNTER — EMERGENCY (EMERGENCY)
Facility: HOSPITAL | Age: 57
LOS: 0 days | Discharge: LEFT BEFORE DISCHARGE | End: 2022-10-13
Attending: EMERGENCY MEDICINE | Admitting: EMERGENCY MEDICINE

## 2022-10-13 VITALS
DIASTOLIC BLOOD PRESSURE: 84 MMHG | RESPIRATION RATE: 15 BRPM | WEIGHT: 311.95 LBS | SYSTOLIC BLOOD PRESSURE: 149 MMHG | OXYGEN SATURATION: 94 % | HEART RATE: 122 BPM | HEIGHT: 70 IN | TEMPERATURE: 98 F

## 2022-10-13 VITALS
TEMPERATURE: 97 F | SYSTOLIC BLOOD PRESSURE: 137 MMHG | HEART RATE: 98 BPM | DIASTOLIC BLOOD PRESSURE: 90 MMHG | OXYGEN SATURATION: 95 % | RESPIRATION RATE: 20 BRPM | WEIGHT: 315 LBS | HEIGHT: 70 IN

## 2022-10-13 VITALS
RESPIRATION RATE: 18 BRPM | OXYGEN SATURATION: 96 % | DIASTOLIC BLOOD PRESSURE: 87 MMHG | HEART RATE: 93 BPM | SYSTOLIC BLOOD PRESSURE: 108 MMHG

## 2022-10-13 DIAGNOSIS — G47.33 OBSTRUCTIVE SLEEP APNEA (ADULT) (PEDIATRIC): ICD-10-CM

## 2022-10-13 DIAGNOSIS — Z96.651 PRESENCE OF RIGHT ARTIFICIAL KNEE JOINT: Chronic | ICD-10-CM

## 2022-10-13 DIAGNOSIS — R06.2 WHEEZING: ICD-10-CM

## 2022-10-13 DIAGNOSIS — Z98.890 OTHER SPECIFIED POSTPROCEDURAL STATES: Chronic | ICD-10-CM

## 2022-10-13 DIAGNOSIS — J44.9 CHRONIC OBSTRUCTIVE PULMONARY DISEASE, UNSPECIFIED: ICD-10-CM
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
## 2022-10-13 DIAGNOSIS — Z90.49 ACQUIRED ABSENCE OF OTHER SPECIFIED PARTS OF DIGESTIVE TRACT: ICD-10-CM

## 2022-10-13 DIAGNOSIS — Z98.890 OTHER SPECIFIED POSTPROCEDURAL STATES: ICD-10-CM

## 2022-10-13 DIAGNOSIS — Z53.21 PROCEDURE AND TREATMENT NOT CARRIED OUT DUE TO PATIENT LEAVING PRIOR TO BEING SEEN BY HEALTH CARE PROVIDER: ICD-10-CM

## 2022-10-13 DIAGNOSIS — E66.9 OBESITY, UNSPECIFIED: ICD-10-CM

## 2022-10-13 DIAGNOSIS — E78.00 PURE HYPERCHOLESTEROLEMIA, UNSPECIFIED: ICD-10-CM

## 2022-10-13 DIAGNOSIS — M19.90 UNSPECIFIED OSTEOARTHRITIS, UNSPECIFIED SITE: ICD-10-CM
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
## 2022-10-13 DIAGNOSIS — Z96.651 PRESENCE OF RIGHT ARTIFICIAL KNEE JOINT: ICD-10-CM

## 2022-10-13 DIAGNOSIS — F11.129 OPIOID ABUSE WITH INTOXICATION, UNSPECIFIED: ICD-10-CM
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
## 2022-10-13 DIAGNOSIS — S00.31XA ABRASION OF NOSE, INITIAL ENCOUNTER: ICD-10-CM

## 2022-10-13 DIAGNOSIS — K40.20 BILATERAL INGUINAL HERNIA, WITHOUT OBSTRUCTION OR GANGRENE, NOT SPECIFIED AS RECURRENT: Chronic | ICD-10-CM

## 2022-10-13 DIAGNOSIS — K21.9 GASTRO-ESOPHAGEAL REFLUX DISEASE WITHOUT ESOPHAGITIS: ICD-10-CM

## 2022-10-13 DIAGNOSIS — Z87.19 PERSONAL HISTORY OF OTHER DISEASES OF THE DIGESTIVE SYSTEM: ICD-10-CM

## 2022-10-13 LAB
ALBUMIN SERPL ELPH-MCNC: 3.9 G/DL — SIGNIFICANT CHANGE UP (ref 3.5–5.2)
ALP SERPL-CCNC: 115 U/L — SIGNIFICANT CHANGE UP (ref 30–115)
ALT FLD-CCNC: 19 U/L — SIGNIFICANT CHANGE UP (ref 0–41)
ANION GAP SERPL CALC-SCNC: 10 MMOL/L — SIGNIFICANT CHANGE UP (ref 7–14)
APAP SERPL-MCNC: <5 UG/ML — LOW (ref 10–30)
APTT BLD: 30.7 SEC — SIGNIFICANT CHANGE UP (ref 27–39.2)
AST SERPL-CCNC: 20 U/L — SIGNIFICANT CHANGE UP (ref 0–41)
BASOPHILS # BLD AUTO: 0.06 K/UL — SIGNIFICANT CHANGE UP (ref 0–0.2)
BASOPHILS NFR BLD AUTO: 0.5 % — SIGNIFICANT CHANGE UP (ref 0–1)
BILIRUB SERPL-MCNC: 0.3 MG/DL — SIGNIFICANT CHANGE UP (ref 0.2–1.2)
BUN SERPL-MCNC: 8 MG/DL — LOW (ref 10–20)
CALCIUM SERPL-MCNC: 9.1 MG/DL — SIGNIFICANT CHANGE UP (ref 8.4–10.5)
CHLORIDE SERPL-SCNC: 99 MMOL/L — SIGNIFICANT CHANGE UP (ref 98–110)
CO2 SERPL-SCNC: 26 MMOL/L — SIGNIFICANT CHANGE UP (ref 17–32)
CREAT SERPL-MCNC: 0.9 MG/DL — SIGNIFICANT CHANGE UP (ref 0.7–1.5)
EGFR: 101 ML/MIN/1.73M2 — SIGNIFICANT CHANGE UP
EOSINOPHIL # BLD AUTO: 0.06 K/UL — SIGNIFICANT CHANGE UP (ref 0–0.7)
EOSINOPHIL NFR BLD AUTO: 0.5 % — SIGNIFICANT CHANGE UP (ref 0–8)
ETHANOL SERPL-MCNC: <10 MG/DL — SIGNIFICANT CHANGE UP
GLUCOSE SERPL-MCNC: 118 MG/DL — HIGH (ref 70–99)
HCT VFR BLD CALC: 48.4 % — SIGNIFICANT CHANGE UP (ref 42–52)
HGB BLD-MCNC: 15.9 G/DL — SIGNIFICANT CHANGE UP (ref 14–18)
IMM GRANULOCYTES NFR BLD AUTO: 0.5 % — HIGH (ref 0.1–0.3)
INR BLD: 1.03 RATIO — SIGNIFICANT CHANGE UP (ref 0.65–1.3)
LYMPHOCYTES # BLD AUTO: 1.44 K/UL — SIGNIFICANT CHANGE UP (ref 1.2–3.4)
LYMPHOCYTES # BLD AUTO: 11.5 % — LOW (ref 20.5–51.1)
MCHC RBC-ENTMCNC: 28.1 PG — SIGNIFICANT CHANGE UP (ref 27–31)
MCHC RBC-ENTMCNC: 32.9 G/DL — SIGNIFICANT CHANGE UP (ref 32–37)
MCV RBC AUTO: 85.7 FL — SIGNIFICANT CHANGE UP (ref 80–94)
MONOCYTES # BLD AUTO: 0.97 K/UL — HIGH (ref 0.1–0.6)
MONOCYTES NFR BLD AUTO: 7.8 % — SIGNIFICANT CHANGE UP (ref 1.7–9.3)
NEUTROPHILS # BLD AUTO: 9.91 K/UL — HIGH (ref 1.4–6.5)
NEUTROPHILS NFR BLD AUTO: 79.2 % — HIGH (ref 42.2–75.2)
NRBC # BLD: 0 /100 WBCS — SIGNIFICANT CHANGE UP (ref 0–0)
PLATELET # BLD AUTO: 256 K/UL — SIGNIFICANT CHANGE UP (ref 130–400)
POTASSIUM SERPL-MCNC: 4.6 MMOL/L — SIGNIFICANT CHANGE UP (ref 3.5–5)
POTASSIUM SERPL-SCNC: 4.6 MMOL/L — SIGNIFICANT CHANGE UP (ref 3.5–5)
PROT SERPL-MCNC: 6.8 G/DL — SIGNIFICANT CHANGE UP (ref 6–8)
PROTHROM AB SERPL-ACNC: 11.8 SEC — SIGNIFICANT CHANGE UP (ref 9.95–12.87)
RBC # BLD: 5.65 M/UL — SIGNIFICANT CHANGE UP (ref 4.7–6.1)
RBC # FLD: 15.3 % — HIGH (ref 11.5–14.5)
SALICYLATES SERPL-MCNC: <0.3 MG/DL — LOW (ref 4–30)
SODIUM SERPL-SCNC: 135 MMOL/L — SIGNIFICANT CHANGE UP (ref 135–146)
TROPONIN T SERPL-MCNC: <0.01 NG/ML — SIGNIFICANT CHANGE UP
WBC # BLD: 12.5 K/UL — HIGH (ref 4.8–10.8)
WBC # FLD AUTO: 12.5 K/UL — HIGH (ref 4.8–10.8)

## 2022-10-13 PROCEDURE — 71045 X-RAY EXAM CHEST 1 VIEW: CPT | Mod: 26

## 2022-10-13 PROCEDURE — 70450 CT HEAD/BRAIN W/O DYE: CPT | Mod: 26,MA

## 2022-10-13 PROCEDURE — 72125 CT NECK SPINE W/O DYE: CPT | Mod: 26,MA

## 2022-10-13 PROCEDURE — 99285 EMERGENCY DEPT VISIT HI MDM: CPT

## 2022-10-13 PROCEDURE — L9991: CPT

## 2022-10-13 PROCEDURE — 93010 ELECTROCARDIOGRAM REPORT: CPT

## 2022-10-13 RX ORDER — BUPROPION HYDROCHLORIDE 150 MG/1
1 TABLET, EXTENDED RELEASE ORAL
Qty: 0 | Refills: 0 | DISCHARGE

## 2022-10-13 NOTE — ED ADULT NURSE NOTE - NSFALLRSKHMRSKTYOTFT_ED_ALL_ED
Provided patient with a blanket. Patient is resting comfortably on stretcher, wife at bedside.
Pt D/c'd by prior RN, pt left dept ambulatory with steady gait, friend at side to help transport pt  Follow-up Information     Follow up With Specialties Details Why Contact Info    Barb Mccracken MD Urology Schedule an appointment as soon as possible for a visit  for urologist if needed [address] [facility] [address] [facility]  [phone]
Reviewed discharge instructions and medications with patient. Patient verbalized understanding of instructions. All questions answered    Armband removed and shredded. Patient ambulatory to exit with wife.
confusion

## 2022-10-13 NOTE — ED PROVIDER NOTE - CARE PROVIDER_API CALL
Edison Bowie)  [address] [facility]  [address] [place] [address]  Phone: [phone]  Fax: [phone]  Follow Up Time: 1-3 Days

## 2022-10-13 NOTE — ED PROVIDER NOTE - PHYSICAL EXAMINATION
VITAL SIGNS: I have reviewed nursing notes and confirm.  CONSTITUTIONAL: Obese male who is responsive to loud voice.   SKIN: Warm dry, normal skin turgor  HEAD: NC. Small abrasion to top of nasal bridge. Otherwise atrauamtic.   EYES: No conjunctival injection, scleral anicteric. PERRLA, EOMI. Pinpoint pupils.   ENT: Moist mucous membranes,   NECK: Supple; full ROM.   CARD: RRR, no murmurs, rubs or gallops  RESP: Clear to ausculation bilaterally.    ABD: Soft, non-distended, non-tender,   EXT: Full ROM, no bony tenderness, no pedal edema, no calf tenderness  NEURO: PERRLA, EOMI. CN grossly intact. Pt moves all extremities when instructed. No FND.   PSYCH: Somnolent

## 2022-10-13 NOTE — ED ADULT NURSE NOTE - BEFAST ARM SIDE DRIFT
ED to inpatient nurses report    Chief Complaint   Patient presents with    Altered Mental Status      Present to ED from home  LOC: alert to only name  Vital signs   Vitals:    05/03/22 0323 05/03/22 0354 05/03/22 0429 05/03/22 0543   BP: 119/84  105/62 96/61   Pulse: 77  86 104   Resp: 20 18 18   Temp:  98.5 °F (36.9 °C)  98.6 °F (37 °C)   TempSrc:    Axillary   SpO2: 100%  100% 99%   Weight:          Oxygen Baseline room air    Current needs required room air   LDAs:   Peripheral IV 05/02/22 Left Forearm (Active)   Site Assessment Clean, dry & intact 05/03/22 0429   Line Status Normal saline locked 05/03/22 0429   Dressing Status Clean, dry & intact 05/03/22 0429       Peripheral IV 05/02/22 Right Hand (Active)   Site Assessment Clean, dry & intact 05/03/22 0429   Line Status Normal saline locked 05/03/22 0429   Dressing Status Clean, dry & intact 05/03/22 0429     Mobility: Fully dependent  Pending ED orders: N/A  Present condition: Minimally responsive to pain. Respires even and unlabored. No acute distress noted.        Electronically signed by Hay Hernandez RN on 5/3/2022 at 5:51 AM       Hay Hernandez RN  05/03/22 7670 No

## 2022-10-13 NOTE — ED PROVIDER NOTE - ATTENDING CONTRIBUTION TO CARE
Assessment/Plan:    Patient is a 63 y o male presenting with enlarging right breast mass for past 1-2 years  Discussed with patient given chronic presentation will order US of breast to r/o any underlying pathology  Mass appearance more likely abscess or cyst secondary to folliculitis will treat with course 2 week course of doxycycline and follow up in 2 weeks  Diagnoses and all orders for this visit:    Breast mass in male  -     US breast right limited (diagnostic); Future  -     doxycycline hyclate (VIBRAMYCIN) 100 mg capsule; Take 1 capsule (100 mg total) by mouth every 12 (twelve) hours for 14 days        Subjective:      Patient ID: Sylvia Hartman is a 63 y o  male  Patient states that this "bump" first appeared 1-2 years ago  Patient states he doesn't recall seeing a rash, however, for the last 3-4 months he has noted redness in the area  Patient states "this is the first time I can remember a rash where the bump has been"  Patient notes the lesion is painful only to deep palpation  Patient denies pus, discharge or bleeding from the site  Patient reports no recent changes to soaps,shampoos, or lotions  Patient denies fevers, chills, unexpected weight loss, or chest pain associated with mass  Patient cannot recall outside exposure to ticks or insects at this time  Patient is unsure of any pertinent family Hx for breast cancer  The following portions of the patient's history were reviewed and updated as appropriate: allergies, current medications, past family history, past medical history, past social history, past surgical history and problem list     Review of Systems   Constitutional: Negative for appetite change, fatigue and unexpected weight change  HENT: Negative for hearing loss and trouble swallowing  Eyes: Negative for visual disturbance  Respiratory: Negative for chest tightness and shortness of breath  Cardiovascular: Negative for chest pain and palpitations  Gastrointestinal: Negative for blood in stool, nausea and vomiting  Endocrine: Negative for polydipsia  Genitourinary: Negative for decreased urine volume and difficulty urinating  Musculoskeletal: Negative for arthralgias  Skin: Positive for rash  Neurological: Negative for dizziness and headaches  Objective:      /78 (BP Location: Right arm, Patient Position: Sitting, Cuff Size: Large)   Pulse 92   Temp 97 5 °F (36 4 °C) (Tympanic)   Ht 5' 9" (1 753 m)   Wt 76 7 kg (169 lb 2 oz)   SpO2 97%   BMI 24 98 kg/m²          Physical Exam   Constitutional: He is oriented to person, place, and time  He appears well-developed and well-nourished  HENT:   Head: Normocephalic and atraumatic  Nose: Nose normal    Eyes: Conjunctivae are normal    Neck: Normal range of motion  Neck supple  Cardiovascular: Normal rate, regular rhythm and normal heart sounds  Pulmonary/Chest: Effort normal and breath sounds normal  He exhibits mass, tenderness, edema and swelling  Right breast exhibits mass, skin change and tenderness  Right breast exhibits no inverted nipple and no nipple discharge  Left breast exhibits no inverted nipple, no mass, no nipple discharge, no skin change and no tenderness  Breasts are symmetrical    4x4 cm indurated and fixed mass in the mid-clavicular T2 region         Abdominal: Soft  Bowel sounds are normal    Musculoskeletal: Normal range of motion  Neurological: He is alert and oriented to person, place, and time  Skin: Skin is warm and dry  Psychiatric: He has a normal mood and affect  Vitals reviewed  The patient indicates understanding of these issues and agrees with the plan      Lane Cotter MD 56y male BIBEMS for suspected OD after being found on street, narcan not given, on exam pt somnolent but arousable head nc with facial abrasions pupils 2mm rr neck supple cor reg lungs with scattered wheeze abd snt neuro nonfocal, observed, eventually more awake, states he was going to get cigarettes and felt tired, is on pain management but denies taking more than prescribed or using other drugs, labs and studies reviewed and d/w patient, while suspect opiate effect offered admission for syncope, refuses, will discharge; speech clear gait steady AAO x 3. Patient counseled regarding conditions which should prompt return.

## 2022-10-13 NOTE — ED ADULT TRIAGE NOTE - CHIEF COMPLAINT QUOTE
found on street, pupils pinpoint, sleepy, abrasion to forehead, nose, swollen left eye area, does not remember what happened after walking to store.

## 2022-10-13 NOTE — ED PROVIDER NOTE - PATIENT PORTAL LINK FT
You can access the FollowMyHealth Patient Portal offered by [facility] by registering at the following website: [email or web address]. By joining [facility]’s FollowMyHealth portal, you will also be able to view your health information using other applications (apps) compatible with our system.

## 2022-10-13 NOTE — ED ADULT TRIAGE NOTE - PATIENT ON (OXYGEN DELIVERY METHOD)
"Subjective   Pgegy Davenport is a 35 y.o. female.     History of Present Illness   Patient presenting to the office today with concerns over insomnia.  Patient states she has a lot going on in her life right now the Celexa did help to take the edge off but she still having issues with sleep.  She is grinding her teeth at night which is making her wake up with jaw pain and headaches she's having a hard time getting comfortable heart time shutting her mind off constantly moving around in the bed she feels like it is all related to was going on in her life.  She is always worried about her 2 children, she's getting  in September, she's buying and  Home and moving soon.  The following portions of the patient's history were reviewed and updated as appropriate: allergies, current medications, past social history and problem list.    Review of Systems   All other systems reviewed and are negative.      Objective   Visit Vitals   • /76 (BP Location: Right arm, Patient Position: Sitting)   • Pulse 57   • Temp 98.5 °F (36.9 °C)   • Ht 65\" (165.1 cm)   • Wt 116 lb 3.2 oz (52.7 kg)   • SpO2 100%   • BMI 19.34 kg/m2     Physical Exam   Constitutional: She is oriented to person, place, and time. Vital signs are normal. She appears well-developed and well-nourished. No distress.   HENT:   Head: Normocephalic.   Cardiovascular: Normal rate, regular rhythm and normal heart sounds.    Pulmonary/Chest: Effort normal and breath sounds normal.   Neurological: She is alert and oriented to person, place, and time. Gait normal.   Psychiatric: She has a normal mood and affect. Her behavior is normal. Judgment and thought content normal.   Vitals reviewed.      Assessment/Plan   Problem List Items Addressed This Visit        Other    Anxiety - Primary    Relevant Medications    citalopram (CELEXA) 40 MG tablet    busPIRone (BUSPAR) 30 MG tablet    Insomnia    Relevant Medications    busPIRone (BUSPAR) 30 MG tablet           " call Monday with results of adding buspar  Increase Celexa to 40 mg   room air

## 2022-10-13 NOTE — ED PROVIDER NOTE - CLINICAL SUMMARY MEDICAL DECISION MAKING FREE TEXT BOX
56y male BIBEMS for suspected OD after being found on street, narcan not given, on exam pt somnolent but arousable head nc with facial abrasions pupils 2mm rr neck supple cor reg lungs with scattered wheeze abd snt neuro nonfocal, observed, eventually more awake, states he was going to get cigarettes and felt tired, is on pain management but denies taking more than prescribed or using other drugs, labs and studies reviewed and d/w patient, while suspect opiate effect offered admission for syncope, refuses, will discharge; speech clear gait steady AAO x 3. Patient counseled regarding conditions which should prompt return.

## 2022-10-13 NOTE — ED PROVIDER NOTE - OBJECTIVE STATEMENT
55yo male with chronic back pain on multiple opioid medications BIBEMS with intoxication. The patient is somnolent, but wakes up to deny any alcohol or drug use, denies any complaints at this time.    Per EMS, the patient was found lying face down in the middle of the street. Unclear of any drug or alcohol use. Was somnolent for duration of trip to ED. No interventions in the field.

## 2022-10-13 NOTE — ED PROVIDER NOTE - PROGRESS NOTE DETAILS
SOBIA: pt is much more awake and is able to discuss with me what happened. Says he was walking to the gas station to get cigarettes, and he was sleepy, and fell asleep on the ground. Tells me he is not concerned and that it's normal for him to fall asleep like that. Denies taking his opioids in higher dose or frequency than he should,. denies any other drugs or alcohol. Tells me he had a heart cath done 6 months ago with Harish, and can schedule an appt for next Wednesday. Pt was offered admission for further evaluation, but he says he doesn't want to and will follow-up outpatient. pt is ambulating n ED with steady gait. Bedside his HR is improved to 92, breathing normally/unlabored.

## 2022-10-13 NOTE — ED ADULT NURSE NOTE - NS ED NURSE LEVEL OF CONSCIOUSNESS AFFECT
[No Acute Distress] : no acute distress [Well Developed] : well developed [de-identified] : mid right forearm approximately 2 x 3 cm firm mass. Non tender and no erythema.  Calm

## 2022-10-13 NOTE — ED PROVIDER NOTE - NSFOLLOWUPINSTRUCTIONS_ED_ALL_ED_FT
Intoxication    Intoxication occurs when the amount of alcohol or opioid medications that a person has consumed impairs his or her ability to mentally and physically function. Chronic consumption can also lead to a variety of health issues including neurological disease, stomach disease, heart disease, liver disease, etc. Do not drive after using your prescription medications or alcohol. Drinking enough alcohol to end up in an Emergency Room suggests you may have an alcohol abuse problem. Seek help at a drug addiction center.    SEEK IMMEDIATE MEDICAL CARE IF YOU HAVE ANY OF THE FOLLOWING SYMPTOMS: seizures, vomiting blood, blood in your stool, lightheadedness/dizziness, or becoming shaky to tremulous when you stop drinking.

## 2022-10-23 LAB — DRUG SCREEN, SERUM: SIGNIFICANT CHANGE UP

## 2022-10-27 ENCOUNTER — OUTPATIENT (OUTPATIENT)
Dept: OUTPATIENT SERVICES | Facility: HOSPITAL | Age: 57
LOS: 1 days | Discharge: HOME | End: 2022-10-27

## 2022-10-27 ENCOUNTER — RESULT REVIEW (OUTPATIENT)
Age: 57
End: 2022-10-27

## 2022-10-27 DIAGNOSIS — Z98.890 OTHER SPECIFIED POSTPROCEDURAL STATES: Chronic | ICD-10-CM

## 2022-10-27 DIAGNOSIS — K40.20 BILATERAL INGUINAL HERNIA, WITHOUT OBSTRUCTION OR GANGRENE, NOT SPECIFIED AS RECURRENT: Chronic | ICD-10-CM

## 2022-10-27 DIAGNOSIS — F17.210 NICOTINE DEPENDENCE, CIGARETTES, UNCOMPLICATED: ICD-10-CM

## 2022-10-27 DIAGNOSIS — Z96.651 PRESENCE OF RIGHT ARTIFICIAL KNEE JOINT: Chronic | ICD-10-CM

## 2022-10-27 PROCEDURE — 71271 CT THORAX LUNG CANCER SCR C-: CPT | Mod: 26

## 2022-11-23 NOTE — ED PROVIDER NOTE - NSTIMEPROVIDERCAREINITIATE_GEN_ER
Health Maintenance Due   Topic Date Due   • COVID-19 Vaccine (4 - Booster for Pfizer series) 11/25/2021   • Influenza Vaccine (1) 09/01/2022   • Depression Screening  01/07/2023       Patient is due for topics as listed above but is not proceeding with Immunization(s) COVID-19 and Depression Screening  at this time. States she received flu vaccine through work.     
Patient is doing well today. Reports good fm. No contractions or cramping, no lof. Baby is breech on US. We briefly discussed version vs scheduled  section if breech persists.   
13-Jan-2022 20:27

## 2022-12-10 NOTE — ED PROVIDER NOTE - DISCHARGE DATE
Constitutional: Awake, alert, in no acute distress  Eyes: no scleral icterus  HENT: normocephalic, 3 cm laceration to left frontal scalp  Neck: supple  CV: RRR, no murmur, 2+ distal pulses in all extremities  Pulm: non-labored respirations, CTAB  Abdomen: soft, non-tender, non-distended, no CVAT  Extremities: no edema, no deformity  Skin: no rash, no jaundice  Neuro: moving all extremities equally 15-May-2019 Constitutional: Awake, alert, in no acute distress  Eyes: no scleral icterus  HENT: normocephalic, 4 cm laceration to left frontal scalp, no involvement of galea; +superficial abrasion to left parietal scalp  Neck: supple  CV: RRR, no murmur  Pulm: non-labored respirations, CTAB  Abdomen: soft, non-tender, non-distended  Back: no tenderness  Extremities: no edema, no deformity  Skin: no rash, no jaundice  Neuro: moving all extremities equally

## 2023-01-19 ENCOUNTER — INPATIENT (INPATIENT)
Facility: HOSPITAL | Age: 58
LOS: 1 days | Discharge: HOME | End: 2023-01-21
Attending: INTERNAL MEDICINE | Admitting: INTERNAL MEDICINE
Payer: MEDICAID

## 2023-01-19 VITALS
WEIGHT: 300.05 LBS | SYSTOLIC BLOOD PRESSURE: 142 MMHG | RESPIRATION RATE: 22 BRPM | HEART RATE: 98 BPM | TEMPERATURE: 98 F | DIASTOLIC BLOOD PRESSURE: 66 MMHG | OXYGEN SATURATION: 96 %

## 2023-01-19 DIAGNOSIS — K40.20 BILATERAL INGUINAL HERNIA, WITHOUT OBSTRUCTION OR GANGRENE, NOT SPECIFIED AS RECURRENT: Chronic | ICD-10-CM

## 2023-01-19 DIAGNOSIS — Z98.890 OTHER SPECIFIED POSTPROCEDURAL STATES: Chronic | ICD-10-CM

## 2023-01-19 DIAGNOSIS — Z96.651 PRESENCE OF RIGHT ARTIFICIAL KNEE JOINT: Chronic | ICD-10-CM

## 2023-01-19 LAB
ALBUMIN SERPL ELPH-MCNC: 3.7 G/DL — SIGNIFICANT CHANGE UP (ref 3.5–5.2)
ALP SERPL-CCNC: 126 U/L — HIGH (ref 30–115)
ALT FLD-CCNC: 11 U/L — SIGNIFICANT CHANGE UP (ref 0–41)
ANION GAP SERPL CALC-SCNC: 9 MMOL/L — SIGNIFICANT CHANGE UP (ref 7–14)
AST SERPL-CCNC: 14 U/L — SIGNIFICANT CHANGE UP (ref 0–41)
BASE EXCESS BLDV CALC-SCNC: 5.5 MMOL/L — HIGH (ref -2–3)
BASOPHILS # BLD AUTO: 0.05 K/UL — SIGNIFICANT CHANGE UP (ref 0–0.2)
BASOPHILS NFR BLD AUTO: 0.6 % — SIGNIFICANT CHANGE UP (ref 0–1)
BILIRUB SERPL-MCNC: 0.3 MG/DL — SIGNIFICANT CHANGE UP (ref 0.2–1.2)
BUN SERPL-MCNC: 11 MG/DL — SIGNIFICANT CHANGE UP (ref 10–20)
CA-I SERPL-SCNC: 1.2 MMOL/L — SIGNIFICANT CHANGE UP (ref 1.15–1.33)
CALCIUM SERPL-MCNC: 9.2 MG/DL — SIGNIFICANT CHANGE UP (ref 8.4–10.5)
CHLORIDE SERPL-SCNC: 100 MMOL/L — SIGNIFICANT CHANGE UP (ref 98–110)
CO2 SERPL-SCNC: 31 MMOL/L — SIGNIFICANT CHANGE UP (ref 17–32)
CREAT SERPL-MCNC: 0.6 MG/DL — LOW (ref 0.7–1.5)
EGFR: 114 ML/MIN/1.73M2 — SIGNIFICANT CHANGE UP
EOSINOPHIL # BLD AUTO: 0.2 K/UL — SIGNIFICANT CHANGE UP (ref 0–0.7)
EOSINOPHIL NFR BLD AUTO: 2.4 % — SIGNIFICANT CHANGE UP (ref 0–8)
GAS PNL BLDV: 133 MMOL/L — LOW (ref 136–145)
GAS PNL BLDV: SIGNIFICANT CHANGE UP
GAS PNL BLDV: SIGNIFICANT CHANGE UP
GLUCOSE SERPL-MCNC: 91 MG/DL — SIGNIFICANT CHANGE UP (ref 70–99)
HCO3 BLDV-SCNC: 34 MMOL/L — HIGH (ref 22–29)
HCT VFR BLD CALC: 41.9 % — LOW (ref 42–52)
HCT VFR BLDA CALC: 43 % — SIGNIFICANT CHANGE UP (ref 39–51)
HGB BLD CALC-MCNC: 14.4 G/DL — SIGNIFICANT CHANGE UP (ref 12.6–17.4)
HGB BLD-MCNC: 14.1 G/DL — SIGNIFICANT CHANGE UP (ref 14–18)
IMM GRANULOCYTES NFR BLD AUTO: 0.2 % — SIGNIFICANT CHANGE UP (ref 0.1–0.3)
LACTATE BLDV-MCNC: 1.5 MMOL/L — SIGNIFICANT CHANGE UP (ref 0.5–2)
LYMPHOCYTES # BLD AUTO: 1.79 K/UL — SIGNIFICANT CHANGE UP (ref 1.2–3.4)
LYMPHOCYTES # BLD AUTO: 21.6 % — SIGNIFICANT CHANGE UP (ref 20.5–51.1)
MCHC RBC-ENTMCNC: 29 PG — SIGNIFICANT CHANGE UP (ref 27–31)
MCHC RBC-ENTMCNC: 33.7 G/DL — SIGNIFICANT CHANGE UP (ref 32–37)
MCV RBC AUTO: 86.2 FL — SIGNIFICANT CHANGE UP (ref 80–94)
MONOCYTES # BLD AUTO: 0.56 K/UL — SIGNIFICANT CHANGE UP (ref 0.1–0.6)
MONOCYTES NFR BLD AUTO: 6.8 % — SIGNIFICANT CHANGE UP (ref 1.7–9.3)
NEUTROPHILS # BLD AUTO: 5.67 K/UL — SIGNIFICANT CHANGE UP (ref 1.4–6.5)
NEUTROPHILS NFR BLD AUTO: 68.4 % — SIGNIFICANT CHANGE UP (ref 42.2–75.2)
NRBC # BLD: 0 /100 WBCS — SIGNIFICANT CHANGE UP (ref 0–0)
NT-PROBNP SERPL-SCNC: 110 PG/ML — SIGNIFICANT CHANGE UP (ref 0–300)
PCO2 BLDV: 64 MMHG — HIGH (ref 42–55)
PH BLDV: 7.33 — SIGNIFICANT CHANGE UP (ref 7.32–7.43)
PLATELET # BLD AUTO: 225 K/UL — SIGNIFICANT CHANGE UP (ref 130–400)
PO2 BLDV: 32 MMHG — SIGNIFICANT CHANGE UP
POTASSIUM BLDV-SCNC: 4.2 MMOL/L — SIGNIFICANT CHANGE UP (ref 3.5–5.1)
POTASSIUM SERPL-MCNC: 5.1 MMOL/L — HIGH (ref 3.5–5)
POTASSIUM SERPL-SCNC: 5.1 MMOL/L — HIGH (ref 3.5–5)
PROT SERPL-MCNC: 6.3 G/DL — SIGNIFICANT CHANGE UP (ref 6–8)
RBC # BLD: 4.86 M/UL — SIGNIFICANT CHANGE UP (ref 4.7–6.1)
RBC # FLD: 15.2 % — HIGH (ref 11.5–14.5)
SAO2 % BLDV: 48.1 % — SIGNIFICANT CHANGE UP
SARS-COV-2 RNA SPEC QL NAA+PROBE: SIGNIFICANT CHANGE UP
SODIUM SERPL-SCNC: 140 MMOL/L — SIGNIFICANT CHANGE UP (ref 135–146)
TROPONIN T SERPL-MCNC: <0.01 NG/ML — SIGNIFICANT CHANGE UP
WBC # BLD: 8.29 K/UL — SIGNIFICANT CHANGE UP (ref 4.8–10.8)
WBC # FLD AUTO: 8.29 K/UL — SIGNIFICANT CHANGE UP (ref 4.8–10.8)

## 2023-01-19 PROCEDURE — 93010 ELECTROCARDIOGRAM REPORT: CPT | Mod: 77

## 2023-01-19 PROCEDURE — 71045 X-RAY EXAM CHEST 1 VIEW: CPT | Mod: 26

## 2023-01-19 PROCEDURE — 93010 ELECTROCARDIOGRAM REPORT: CPT

## 2023-01-19 PROCEDURE — 99285 EMERGENCY DEPT VISIT HI MDM: CPT

## 2023-01-19 PROCEDURE — 93970 EXTREMITY STUDY: CPT | Mod: 26

## 2023-01-19 RX ORDER — IPRATROPIUM/ALBUTEROL SULFATE 18-103MCG
3 AEROSOL WITH ADAPTER (GRAM) INHALATION
Refills: 0 | Status: DISCONTINUED | OUTPATIENT
Start: 2023-01-19 | End: 2023-01-21

## 2023-01-19 RX ORDER — OXYCODONE HYDROCHLORIDE 5 MG/1
30 TABLET ORAL ONCE
Refills: 0 | Status: DISCONTINUED | OUTPATIENT
Start: 2023-01-19 | End: 2023-01-19

## 2023-01-19 RX ORDER — IPRATROPIUM/ALBUTEROL SULFATE 18-103MCG
3 AEROSOL WITH ADAPTER (GRAM) INHALATION EVERY 6 HOURS
Refills: 0 | Status: DISCONTINUED | OUTPATIENT
Start: 2023-01-19 | End: 2023-01-21

## 2023-01-19 RX ORDER — LISINOPRIL 2.5 MG/1
5 TABLET ORAL DAILY
Refills: 0 | Status: DISCONTINUED | OUTPATIENT
Start: 2023-01-19 | End: 2023-01-21

## 2023-01-19 RX ORDER — FENOFIBRATE,MICRONIZED 130 MG
145 CAPSULE ORAL DAILY
Refills: 0 | Status: DISCONTINUED | OUTPATIENT
Start: 2023-01-19 | End: 2023-01-21

## 2023-01-19 RX ORDER — DEXAMETHASONE 0.5 MG/5ML
10 ELIXIR ORAL ONCE
Refills: 0 | Status: COMPLETED | OUTPATIENT
Start: 2023-01-19 | End: 2023-01-19

## 2023-01-19 RX ORDER — ALPRAZOLAM 0.25 MG
2 TABLET ORAL ONCE
Refills: 0 | Status: DISCONTINUED | OUTPATIENT
Start: 2023-01-19 | End: 2023-01-19

## 2023-01-19 RX ORDER — AZITHROMYCIN 500 MG/1
TABLET, FILM COATED ORAL
Refills: 0 | Status: DISCONTINUED | OUTPATIENT
Start: 2023-01-19 | End: 2023-01-20

## 2023-01-19 RX ORDER — SENNA PLUS 8.6 MG/1
2 TABLET ORAL AT BEDTIME
Refills: 0 | Status: DISCONTINUED | OUTPATIENT
Start: 2023-01-19 | End: 2023-01-21

## 2023-01-19 RX ORDER — SIMVASTATIN 20 MG/1
20 TABLET, FILM COATED ORAL AT BEDTIME
Refills: 0 | Status: DISCONTINUED | OUTPATIENT
Start: 2023-01-19 | End: 2023-01-21

## 2023-01-19 RX ORDER — PANTOPRAZOLE SODIUM 20 MG/1
40 TABLET, DELAYED RELEASE ORAL
Refills: 0 | Status: DISCONTINUED | OUTPATIENT
Start: 2023-01-19 | End: 2023-01-21

## 2023-01-19 RX ORDER — MAGNESIUM SULFATE 500 MG/ML
2 VIAL (ML) INJECTION ONCE
Refills: 0 | Status: COMPLETED | OUTPATIENT
Start: 2023-01-19 | End: 2023-01-19

## 2023-01-19 RX ORDER — IPRATROPIUM/ALBUTEROL SULFATE 18-103MCG
3 AEROSOL WITH ADAPTER (GRAM) INHALATION ONCE
Refills: 0 | Status: COMPLETED | OUTPATIENT
Start: 2023-01-19 | End: 2023-01-19

## 2023-01-19 RX ORDER — FUROSEMIDE 40 MG
40 TABLET ORAL DAILY
Refills: 0 | Status: DISCONTINUED | OUTPATIENT
Start: 2023-01-19 | End: 2023-01-21

## 2023-01-19 RX ORDER — OXYCODONE HYDROCHLORIDE 5 MG/1
30 TABLET ORAL
Refills: 0 | Status: DISCONTINUED | OUTPATIENT
Start: 2023-01-19 | End: 2023-01-20

## 2023-01-19 RX ORDER — MORPHINE SULFATE 50 MG/1
100 CAPSULE, EXTENDED RELEASE ORAL ONCE
Refills: 0 | Status: DISCONTINUED | OUTPATIENT
Start: 2023-01-19 | End: 2023-01-19

## 2023-01-19 RX ORDER — ZOLPIDEM TARTRATE 10 MG/1
5 TABLET ORAL AT BEDTIME
Refills: 0 | Status: DISCONTINUED | OUTPATIENT
Start: 2023-01-19 | End: 2023-01-21

## 2023-01-19 RX ORDER — ENOXAPARIN SODIUM 100 MG/ML
40 INJECTION SUBCUTANEOUS EVERY 24 HOURS
Refills: 0 | Status: DISCONTINUED | OUTPATIENT
Start: 2023-01-19 | End: 2023-01-21

## 2023-01-19 RX ORDER — BUDESONIDE AND FORMOTEROL FUMARATE DIHYDRATE 160; 4.5 UG/1; UG/1
2 AEROSOL RESPIRATORY (INHALATION)
Refills: 0 | Status: DISCONTINUED | OUTPATIENT
Start: 2023-01-19 | End: 2023-01-21

## 2023-01-19 RX ORDER — AZITHROMYCIN 500 MG/1
500 TABLET, FILM COATED ORAL ONCE
Refills: 0 | Status: COMPLETED | OUTPATIENT
Start: 2023-01-19 | End: 2023-01-19

## 2023-01-19 RX ORDER — AZITHROMYCIN 500 MG/1
500 TABLET, FILM COATED ORAL EVERY 24 HOURS
Refills: 0 | Status: DISCONTINUED | OUTPATIENT
Start: 2023-01-20 | End: 2023-01-20

## 2023-01-19 RX ADMIN — Medication 10 MILLIGRAM(S): at 15:39

## 2023-01-19 RX ADMIN — Medication 3 MILLILITER(S): at 17:02

## 2023-01-19 RX ADMIN — Medication 3 MILLILITER(S): at 20:35

## 2023-01-19 RX ADMIN — Medication 3 MILLILITER(S): at 16:14

## 2023-01-19 RX ADMIN — OXYCODONE HYDROCHLORIDE 30 MILLIGRAM(S): 5 TABLET ORAL at 17:17

## 2023-01-19 RX ADMIN — Medication 150 GRAM(S): at 17:17

## 2023-01-19 RX ADMIN — MORPHINE SULFATE 100 MILLIGRAM(S): 50 CAPSULE, EXTENDED RELEASE ORAL at 17:17

## 2023-01-19 RX ADMIN — Medication 3 MILLILITER(S): at 17:09

## 2023-01-19 RX ADMIN — AZITHROMYCIN 255 MILLIGRAM(S): 500 TABLET, FILM COATED ORAL at 20:33

## 2023-01-19 RX ADMIN — Medication 2 MILLIGRAM(S): at 18:32

## 2023-01-19 NOTE — ED PROVIDER NOTE - INPATIENT RESIDENT/ACP NOTIFIED
CLINICAL INDICATORS    1/2 Consult Note Adult-Neurosurgery Physician Assistant: … 57y F pmhx GBM s/p resection April 2022 by Dr. Dumont, seen in office 12.15.22 noted to have chronic expressive aphasia, HA and declining mental status likely as part of disease progression presents as code stroke w/ aphasia, nausea, vomiting. NI cancelled CTA negative for LVO. PT noted to be on home steroid taper.  - No neurosurgical intervention indicated … - Agree with Rad/Onc Cx & Hospitalist. - Dex 10mg x1 then  4mg q6, to be further managed by oncology …    1/5 Consult Note Adult-Heme/Onc Resident/Attending: … - on 6/30/2022, S/P chemoRT with concurrent Temodar, with worsening symptoms secondary to recurrent edema and was restarted on Decadron 4 mg BID … - s/p Bevacizumab 10 mg/kg every 2 weeks (last given on 12/29/2022 and next cycle due on 1/12/22 which can be given outpatient). - continue to hold Temodar 400 mg (200 mg/m2) PO QD D1-5 cycle 4 for now due to thrombocytopenia, can restart as outpatient after seeing Dr. Dao. - MR brain result noted showing posttreatment effect but no sign of progression …    1/4 MR Head w/wo IV Cont: … New small cortical/subcortical acute infarct in the right parietal lobe. Progressively increasing confluent signal abnormality in bilateral periventricular white matter, with redemonstrated restricted diffusion in bilateral inferior frontal lobe and rostrum of the corpus callosum. No associated enhancement. The finding is nonspecific but likely reflects post radiation changes. Recommend attention on follow-up. Further decrease in heterogeneous enhancements associated with the persistently expansile left occipital mass compared to the prior brain MRI from 12/6/2020 which may reflect posttreatment change. Increased scattered calcifications and trace petechial hemorrhage as demonstrated on the prior head CT from [1/20/2023] 1/2/2023 likely representing posttreatment change. Unchanged extension of the mass into the splenium of the corpus callosum and contiguous expansile FLAIR signal abnormality in bilateral periatrial white matter extending to the left temporal lobe …    Medication Orders Dexamethasone: 1/2: 10mg IV; 1/3: 4mg IV; 1/5: 4mg IV; 1/6-7: 4mg Oral; 1/8: 2mg Oral      Based on your professional judgment and the clinical indicators, please clarify if the condition for which dexamethasone was ordered can be further specified as:    •  Dexamethasone was ordered for the treatment of intracranial edema  •  Dexamethasone was ordered for the treatment of cerebral edema  •  Other (please specify):  •  Clinically unable to further specify the condition for which dexamethasone was ordered      Thank you,  Yeni Lee RN, CCS, CCDS
patient signed out to OSIRIS Sims

## 2023-01-19 NOTE — H&P ADULT - ATTENDING COMMENTS
#COPD exacerbation  #Hx of REBECCA  CXR 07/26: no acute cardiopulmonary disease  on RA 97%  - Cont IV solumedrol 60 BID  - Duonebs ATC and PRN  - Pulm eval requested  - Full RVP  - procal  - Cont symbicort  - BIPAP qHs and PRN  - Azithromycin 250mg to completion (5d)  - Echocardiogram  - D-Dimer, LE venous duplex  - Likely DC 24-48h, pt ambulating outside    #HTN/HLD  - Cont lasix 40mg qD  - Cont lisinopril 5mg qD  - Cont simvastatin 20mg qHs    #Chronic Back pain  ISTOP Reference# : [id number]  - Cont morphine ER 100mg qD  - Cont oxycodone 30mg q4h PRN (prescribed 150 pills for 30d)    #Anxiety/Insomnia  - Cont xanax 2mg QID  - Cont ambien 10mg qHs    DVT PPX, Lovenox    #Progress Note Handoff  Pending (specify): Cont IV steroids, pulm eval  Family discussion: d/w pt regarding tx for COPD exacerbation  Disposition: Home when stable    Dr. Calderon Flannery  Attending Hospitalist

## 2023-01-19 NOTE — H&P ADULT - NSHPLABSRESULTS_GEN_ALL_CORE
14.1   8.29  )-----------( 225      ( 19 Jan 2023 16:18 )             41.9     01-19    140  |  100  |  11  ----------------------------<  91  5.1<H>   |  31  |  0.6<L>    Ca    9.2      19 Jan 2023 16:18    TPro  6.3  /  Alb  3.7  /  TBili  0.3  /  DBili  x   /  AST  14  /  ALT  11  /  AlkPhos  126<H>  01-19        CARDIAC MARKERS ( 19 Jan 2023 16:18 )  x     / <0.01 ng/mL / x     / x     / x            LIVER FUNCTIONS - ( 19 Jan 2023 16:18 )  Alb: 3.7 g/dL / Pro: 6.3 g/dL / ALK PHOS: 126 U/L / ALT: 11 U/L / AST: 14 U/L / GGT: x                     RADIOLOGY & ADDITIONAL STUDIES: 14.1   8.29  )-----------( 225      ( 19 Jan 2023 16:18 )             41.9     01-19    140  |  100  |  11  ----------------------------<  91  5.1<H>   |  31  |  0.6<L>    Ca    9.2      19 Jan 2023 16:18    TPro  6.3  /  Alb  3.7  /  TBili  0.3  /  DBili  x   /  AST  14  /  ALT  11  /  AlkPhos  126<H>  01-19        CARDIAC MARKERS ( 19 Jan 2023 16:18 )  x     / <0.01 ng/mL / x     / x     / x            LIVER FUNCTIONS - ( 19 Jan 2023 16:18 )  Alb: 3.7 g/dL / Pro: 6.3 g/dL / ALK PHOS: 126 U/L / ALT: 11 U/L / AST: 14 U/L / GGT: x

## 2023-01-19 NOTE — ED PROVIDER NOTE - OBJECTIVE STATEMENT
57-year-old male with past history of COPD not on home O2 GERD hypertension hyperlipidemia REBECCA on BiPAP CHF on Lasix presents with acute onset shortness of breath that started this morning.  Patient states that he forgot to take his medication this morning and was working out in the yard and felt acutely short of breath.  Patient has both inspiratory and expiratory wheezing on exam.  Patient denies any fever, chills, abdominal pain, dark or bloody stool, leg swelling.

## 2023-01-19 NOTE — ED PROVIDER NOTE - NS ED ROS FT
Constitutional:  No fevers or chills.  Eyes:  No visual changes, eye pain, or discharge.  ENT:  No hearing changes. No sore throat.  Neck:  No neck pain or stiffness.  Cardiac:  No CP or edema.  Resp:  No cough (+) SOB. No hemoptysis.   GI:  No nausea, vomiting, diarrhea, or abdominal pain.  :  No dysuria, frequency, or hematuria.  MSK:  No myalgias or joint pain/swelling.  Neuro:  No headache, dizziness, or weakness.  Skin:  No skin rash.

## 2023-01-19 NOTE — H&P ADULT - NSHPPHYSICALEXAM_GEN_ALL_CORE
LOS:     VITALS:   T(C): 36.6 (01-19-23 @ 14:28), Max: 36.6 (01-19-23 @ 14:28)  HR: 98 (01-19-23 @ 14:28) (98 - 98)  BP: 142/66 (01-19-23 @ 14:28) (142/66 - 142/66)  RR: 22 (01-19-23 @ 14:28) (22 - 22)  SpO2: 96% (01-19-23 @ 14:28) (96% - 96%)    GENERAL: NAD, lying in bed comfortably  HEAD:  Atraumatic, Normocephalic  EYES: EOMI, PERRLA, conjunctiva and sclera clear  ENT: Moist mucous membranes  NECK: Supple, No JVD  CHEST/LUNG: Clear to auscultation bilaterally; No rales, rhonchi, wheezing, or rubs. Unlabored respirations  HEART: Regular rate and rhythm; No murmurs, rubs, or gallops  ABDOMEN: BSx4; Soft, nontender, nondistended  EXTREMITIES:  2+ Peripheral Pulses, brisk capillary refill. No clubbing, cyanosis, or edema  NERVOUS SYSTEM:  A&Ox3, no focal deficits   SKIN: No rashes or lesions VITALS:   T(C): 36.6 (01-19-23 @ 14:28), Max: 36.6 (01-19-23 @ 14:28)  HR: 98 (01-19-23 @ 14:28) (98 - 98)  BP: 142/66 (01-19-23 @ 14:28) (142/66 - 142/66)  RR: 22 (01-19-23 @ 14:28) (22 - 22)  SpO2: 96% (01-19-23 @ 14:28) (96% - 96%)    GENERAL: NAD, lying in bed comfortably  HEAD:  Atraumatic, Normocephalic  CHEST/LUNG: Wheezing bilaterally   HEART: Regular rate and rhythm; No murmurs, rubs, or gallops  ABDOMEN: BSx4; Soft, nontender, nondistended  EXTREMITIES:  2+ Peripheral Pulses, brisk capillary refill. No clubbing, cyanosis, or edema  NERVOUS SYSTEM:  A&Ox3, no focal deficits

## 2023-01-19 NOTE — ED PROVIDER NOTE - PROGRESS NOTE DETAILS
kondrat- Patient not improved with 3x nebs and Decadron.  We will give 1X nebs and magnesium.  Patient dyspneic and short of breath on ambulation.  Will admit for COPD exacerbation.

## 2023-01-19 NOTE — H&P ADULT - HISTORY OF PRESENT ILLNESS
57-year-old male with past history of COPD not on home O2 GERD hypertension hyperlipidemia REBECCA on BiPAP CHF on Lasix presents with acute onset shortness of breath that started this morning.  Patient states that he forgot to take his medication this morning and was working out in the yard and felt acutely short of breath.  Patient has both inspiratory and expiratory wheezing on exam.  Patient denies any fever, chills, abdominal pain, dark or bloody stool, leg swelling 56 y/o male with PMHx of COPD not home O2, HTN, HLD, REBECCA on BIPAP, CHFpEF, GERD presented to the ED for 1 week of worsening SOB. He reports that he did not take his medications today, went out to the yard to work when he felt acutely short of breath. Denies any fevers, chills, chest pain, abdominal pain, nausea, vomiting, diarrhea.     In the ED, VS noted for T 36.6, HR 98, /66, SpO2 96% on RA. Labs noted for WBC 8, Hg 14, trop 0.01.

## 2023-01-19 NOTE — ED PROVIDER NOTE - PHYSICAL EXAMINATION
PHYSICAL EXAM: I have reviewed current vital signs.  GENERAL: NAD, well-nourished; well-developed.  HEAD:  Normocephalic, atraumatic.  EYES: EOMI, PERRL, conjunctiva and sclera clear.  ENT: MMM, no erythema/exudates.  NECK: Supple, no JVD.  CHEST/LUNG: Patient has inspiratory and expiratory wheezing on exam.  HEART: Regular rate and rhythm, normal S1 and S2; no murmurs, rubs, or gallops.  ABDOMEN: Soft, nontender, nondistended.  EXTREMITIES:  2+ peripheral pulses; no clubbing, cyanosis, or edema.  PSYCH: Cooperative, appropriate, normal mood and affect.  NEUROLOGY: A&O x 3. Motor 5/5. Sensory intact. No focal neurological deficits. CN II - XII intact. (-) dysmetria, facial droop, pronator drift.  SKIN: Warm and dry.

## 2023-01-19 NOTE — ED PROVIDER NOTE - CLINICAL SUMMARY MEDICAL DECISION MAKING FREE TEXT BOX
57-year-old male with a history of COPD presents to the ED for worsening shortness of breath.  Started this morning.  Present day smoker.  Vitals noted to have mild tachypnea.  96% on room air for oxygen saturation.  Physical exam revealed diffuse wheezing bilaterally with 2+ pitting edema.  Concern for CHF versus COPD exacerbation.  Given beta agonist along with steroids.  On reevaluation patient still noted to have diffuse wheezing.  Additional mag and beta agonist given.  VBG revealed CO2 retention at 64.  On reevaluation patient is resting comfortably in bed satting at 96% on room air nontachypneic but wheezing.  Speaking in full sentences.  EKG unremarkable.  Labs noted to have mild hyperkalemia at 5.1.  Due to persistent wheezing and failing outpatient therapy the decision was made to admit for COPD exacerbation.

## 2023-01-20 LAB
ALBUMIN SERPL ELPH-MCNC: 3.8 G/DL — SIGNIFICANT CHANGE UP (ref 3.5–5.2)
ALP SERPL-CCNC: 116 U/L — HIGH (ref 30–115)
ALT FLD-CCNC: 11 U/L — SIGNIFICANT CHANGE UP (ref 0–41)
ANION GAP SERPL CALC-SCNC: 8 MMOL/L — SIGNIFICANT CHANGE UP (ref 7–14)
AST SERPL-CCNC: 13 U/L — SIGNIFICANT CHANGE UP (ref 0–41)
BASOPHILS # BLD AUTO: 0.01 K/UL — SIGNIFICANT CHANGE UP (ref 0–0.2)
BASOPHILS NFR BLD AUTO: 0.1 % — SIGNIFICANT CHANGE UP (ref 0–1)
BILIRUB SERPL-MCNC: 0.3 MG/DL — SIGNIFICANT CHANGE UP (ref 0.2–1.2)
BUN SERPL-MCNC: 13 MG/DL — SIGNIFICANT CHANGE UP (ref 10–20)
CALCIUM SERPL-MCNC: 9.3 MG/DL — SIGNIFICANT CHANGE UP (ref 8.4–10.4)
CHLORIDE SERPL-SCNC: 99 MMOL/L — SIGNIFICANT CHANGE UP (ref 98–110)
CHOLEST SERPL-MCNC: 155 MG/DL — SIGNIFICANT CHANGE UP
CO2 SERPL-SCNC: 29 MMOL/L — SIGNIFICANT CHANGE UP (ref 17–32)
CREAT SERPL-MCNC: 0.6 MG/DL — LOW (ref 0.7–1.5)
D DIMER BLD IA.RAPID-MCNC: 362 NG/ML DDU — HIGH
EGFR: 114 ML/MIN/1.73M2 — SIGNIFICANT CHANGE UP
EOSINOPHIL # BLD AUTO: 0 K/UL — SIGNIFICANT CHANGE UP (ref 0–0.7)
EOSINOPHIL NFR BLD AUTO: 0 % — SIGNIFICANT CHANGE UP (ref 0–8)
GLUCOSE SERPL-MCNC: 138 MG/DL — HIGH (ref 70–99)
HCT VFR BLD CALC: 42.4 % — SIGNIFICANT CHANGE UP (ref 42–52)
HDLC SERPL-MCNC: 62 MG/DL — SIGNIFICANT CHANGE UP
HGB BLD-MCNC: 14.2 G/DL — SIGNIFICANT CHANGE UP (ref 14–18)
IMM GRANULOCYTES NFR BLD AUTO: 0.4 % — HIGH (ref 0.1–0.3)
LIPID PNL WITH DIRECT LDL SERPL: 79 MG/DL — SIGNIFICANT CHANGE UP
LYMPHOCYTES # BLD AUTO: 0.76 K/UL — LOW (ref 1.2–3.4)
LYMPHOCYTES # BLD AUTO: 9.2 % — LOW (ref 20.5–51.1)
MCHC RBC-ENTMCNC: 28.3 PG — SIGNIFICANT CHANGE UP (ref 27–31)
MCHC RBC-ENTMCNC: 33.5 G/DL — SIGNIFICANT CHANGE UP (ref 32–37)
MCV RBC AUTO: 84.6 FL — SIGNIFICANT CHANGE UP (ref 80–94)
MONOCYTES # BLD AUTO: 0.23 K/UL — SIGNIFICANT CHANGE UP (ref 0.1–0.6)
MONOCYTES NFR BLD AUTO: 2.8 % — SIGNIFICANT CHANGE UP (ref 1.7–9.3)
NEUTROPHILS # BLD AUTO: 7.2 K/UL — HIGH (ref 1.4–6.5)
NEUTROPHILS NFR BLD AUTO: 87.5 % — HIGH (ref 42.2–75.2)
NON HDL CHOLESTEROL: 93 MG/DL — SIGNIFICANT CHANGE UP
NRBC # BLD: 0 /100 WBCS — SIGNIFICANT CHANGE UP (ref 0–0)
PLATELET # BLD AUTO: 234 K/UL — SIGNIFICANT CHANGE UP (ref 130–400)
POTASSIUM SERPL-MCNC: 4.8 MMOL/L — SIGNIFICANT CHANGE UP (ref 3.5–5)
POTASSIUM SERPL-SCNC: 4.8 MMOL/L — SIGNIFICANT CHANGE UP (ref 3.5–5)
PROCALCITONIN SERPL-MCNC: 0.05 NG/ML — SIGNIFICANT CHANGE UP (ref 0.02–0.1)
PROT SERPL-MCNC: 6.7 G/DL — SIGNIFICANT CHANGE UP (ref 6–8)
RBC # BLD: 5.01 M/UL — SIGNIFICANT CHANGE UP (ref 4.7–6.1)
RBC # FLD: 15 % — HIGH (ref 11.5–14.5)
SODIUM SERPL-SCNC: 136 MMOL/L — SIGNIFICANT CHANGE UP (ref 135–146)
TRIGL SERPL-MCNC: 67 MG/DL — SIGNIFICANT CHANGE UP
TROPONIN T SERPL-MCNC: <0.01 NG/ML — SIGNIFICANT CHANGE UP
WBC # BLD: 8.23 K/UL — SIGNIFICANT CHANGE UP (ref 4.8–10.8)
WBC # FLD AUTO: 8.23 K/UL — SIGNIFICANT CHANGE UP (ref 4.8–10.8)

## 2023-01-20 PROCEDURE — 99232 SBSQ HOSP IP/OBS MODERATE 35: CPT

## 2023-01-20 RX ORDER — MORPHINE SULFATE 50 MG/1
100 CAPSULE, EXTENDED RELEASE ORAL DAILY
Refills: 0 | Status: DISCONTINUED | OUTPATIENT
Start: 2023-01-20 | End: 2023-01-21

## 2023-01-20 RX ORDER — ALPRAZOLAM 0.25 MG
1 TABLET ORAL
Qty: 0 | Refills: 0 | DISCHARGE
Start: 2023-01-20

## 2023-01-20 RX ORDER — MORPHINE SULFATE 50 MG/1
1 CAPSULE, EXTENDED RELEASE ORAL
Qty: 0 | Refills: 0 | DISCHARGE
Start: 2023-01-20

## 2023-01-20 RX ORDER — OXYCODONE HYDROCHLORIDE 5 MG/1
30 TABLET ORAL EVERY 6 HOURS
Refills: 0 | Status: DISCONTINUED | OUTPATIENT
Start: 2023-01-20 | End: 2023-01-21

## 2023-01-20 RX ORDER — ALPRAZOLAM 0.25 MG
2 TABLET ORAL EVERY 6 HOURS
Refills: 0 | Status: DISCONTINUED | OUTPATIENT
Start: 2023-01-20 | End: 2023-01-21

## 2023-01-20 RX ORDER — AZITHROMYCIN 500 MG/1
250 TABLET, FILM COATED ORAL DAILY
Refills: 0 | Status: DISCONTINUED | OUTPATIENT
Start: 2023-01-20 | End: 2023-01-21

## 2023-01-20 RX ORDER — GUAIFENESIN/DEXTROMETHORPHAN 600MG-30MG
15 TABLET, EXTENDED RELEASE 12 HR ORAL EVERY 6 HOURS
Refills: 0 | Status: DISCONTINUED | OUTPATIENT
Start: 2023-01-20 | End: 2023-01-21

## 2023-01-20 RX ORDER — AZITHROMYCIN 500 MG/1
1 TABLET, FILM COATED ORAL
Qty: 5 | Refills: 0
Start: 2023-01-20 | End: 2023-01-24

## 2023-01-20 RX ADMIN — AZITHROMYCIN 250 MILLIGRAM(S): 500 TABLET, FILM COATED ORAL at 11:48

## 2023-01-20 RX ADMIN — Medication 2 MILLIGRAM(S): at 11:47

## 2023-01-20 RX ADMIN — SIMVASTATIN 20 MILLIGRAM(S): 20 TABLET, FILM COATED ORAL at 23:24

## 2023-01-20 RX ADMIN — Medication 145 MILLIGRAM(S): at 13:07

## 2023-01-20 RX ADMIN — OXYCODONE HYDROCHLORIDE 30 MILLIGRAM(S): 5 TABLET ORAL at 11:47

## 2023-01-20 RX ADMIN — Medication 60 MILLIGRAM(S): at 05:11

## 2023-01-20 RX ADMIN — ZOLPIDEM TARTRATE 5 MILLIGRAM(S): 10 TABLET ORAL at 03:29

## 2023-01-20 RX ADMIN — MORPHINE SULFATE 100 MILLIGRAM(S): 50 CAPSULE, EXTENDED RELEASE ORAL at 18:53

## 2023-01-20 RX ADMIN — OXYCODONE HYDROCHLORIDE 30 MILLIGRAM(S): 5 TABLET ORAL at 12:17

## 2023-01-20 RX ADMIN — ZOLPIDEM TARTRATE 5 MILLIGRAM(S): 10 TABLET ORAL at 23:44

## 2023-01-20 RX ADMIN — SENNA PLUS 2 TABLET(S): 8.6 TABLET ORAL at 23:24

## 2023-01-20 RX ADMIN — Medication 40 MILLIGRAM(S): at 05:10

## 2023-01-20 RX ADMIN — PANTOPRAZOLE SODIUM 40 MILLIGRAM(S): 20 TABLET, DELAYED RELEASE ORAL at 05:10

## 2023-01-20 RX ADMIN — Medication 3 MILLILITER(S): at 16:55

## 2023-01-20 RX ADMIN — LISINOPRIL 5 MILLIGRAM(S): 2.5 TABLET ORAL at 05:17

## 2023-01-20 RX ADMIN — OXYCODONE HYDROCHLORIDE 30 MILLIGRAM(S): 5 TABLET ORAL at 05:14

## 2023-01-20 RX ADMIN — MORPHINE SULFATE 100 MILLIGRAM(S): 50 CAPSULE, EXTENDED RELEASE ORAL at 19:23

## 2023-01-20 NOTE — DISCHARGE NOTE PROVIDER - NSDCMRMEDTOKEN_GEN_ALL_CORE_FT
albuterol 90 mcg/inh inhalation aerosol: 2 puff(s) inhaled every 6 hours AS NEEDED   ALPRAZolam 2 mg oral tablet: 1 tab(s) orally every 6 hours, As needed, anxiety  Ambien 10 mg oral tablet: 0.5 tab(s) orally once a day (at bedtime), As Needed  azithromycin 250 mg oral tablet: 1 tab(s) orally once a day  fenofibrate 160 mg oral tablet: 1 tab(s) orally once a day  guaiFENesin 400 mg oral tablet: 1 tab(s) orally 2 times a day   Lasix 40 mg oral tablet: 1 tab(s) orally once a day  lisinopril 5 mg oral tablet: 1 tab(s) orally once a day  morphine 100 mg/8 to 12 hr oral tablet, extended release: 1 tab(s) orally once a day, As needed, pain  oxyCODONE 30 mg oral tablet: 1 tab(s) orally every 4 hours, As needed, Severe Pain (7 - 10) MDD:180mg  predniSONE 20 mg oral tablet: 2 tab(s) orally once a day  senna oral tablet: 2 tab(s) orally once a day (at bedtime)  Spiriva HandiHaler 18 mcg inhalation capsule: 1 cap(s) inhaled once a day   Symbicort 160 mcg-4.5 mcg/inh inhalation aerosol: 1 puff(s) inhaled 2 times a day   Zocor 20 mg oral tablet: 1 tab(s) orally once a day (at bedtime)

## 2023-01-20 NOTE — PROGRESS NOTE ADULT - ATTENDING COMMENTS
Subjective   Jose Conway is a 33 y.o. male.     Diarrhea    This is a recurrent problem. The current episode started in the past 7 days. The problem occurs 2 to 4 times per day. The problem has been gradually improving. The stool consistency is described as watery. The patient states that diarrhea does not awaken him from sleep. Associated symptoms include vomiting (resolved now). Pertinent negatives include no abdominal pain, arthralgias, chills, coughing, fever, headaches or myalgias. There are no known risk factors. He has tried increased fluids for the symptoms. His past medical history is significant for irritable bowel syndrome.   Nausea   This is a new problem. The current episode started in the past 7 days. The problem occurs daily. The problem has been waxing and waning. Associated symptoms include a change in bowel habit, fatigue, nausea and vomiting (resolved now). Pertinent negatives include no abdominal pain, arthralgias, chest pain, chills, congestion, coughing, diaphoresis, fever, headaches, myalgias, rash or sore throat. Nothing aggravates the symptoms. He has tried rest for the symptoms.       The following portions of the patient's history were reviewed and updated as appropriate: allergies, current medications, past family history, past medical history, past social history, past surgical history and problem list.     No Known Allergies   Current Outpatient Medications on File Prior to Visit   Medication Sig   • albuterol (PROVENTIL HFA;VENTOLIN HFA) 108 (90 Base) MCG/ACT inhaler Inhale 2 puffs Every 4 (Four) Hours As Needed for Wheezing or Shortness of Air.   • BREO ELLIPTA 100-25 MCG/INH aerosol powder  INHALE 1 PUFF BY MOUTH EVERY DAY. GARGLE AND SPIT AFTER PUFFS   • buPROPion XL (WELLBUTRIN XL) 150 MG 24 hr tablet TK 1 T PO QAM FOR 7 DAYS THEN TK 2 TS PO QAM   • clonazePAM (KlonoPIN) 0.5 MG tablet TK ONE T PO QD PRF SEVERE ANXIETY   • diclofenac (VOLTAREN) 1 % gel gel APPLY 4 GRAMS  EXTERNALLY TO THE AFFECTED AREA THREE TIMES DAILY   • EPINEPHrine (EPIPEN 2-HUGO IJ) Inject  as directed.   • hydrOXYzine (ATARAX) 10 MG tablet TK 1 TO 3 TS PO Q 8 H PRA   • lamoTRIgine (LaMICtal) 100 MG tablet TK SS T QD   • loperamide (IMODIUM) 2 MG capsule Take 2 mg by mouth As Needed for Diarrhea.   • loratadine (CLARITIN) 10 MG tablet TAKE 1 TABLET BY MOUTH DAILY   • montelukast (SINGULAIR) 10 MG tablet Take 1 tablet by mouth Every Night.   • oxyCODONE-acetaminophen (PERCOCET) 5-325 MG per tablet Take 1-2 tablets by mouth every 6 hours as needed for pain.   • promethazine (PHENERGAN) 25 MG tablet Take 1 tablet by mouth Every 6 (Six) Hours As Needed for nausea.   • propranolol (INDERAL) 10 MG tablet TK 1 T PO  QID PRF ANXIETY/PANIC   • Tiotropium Bromide Monohydrate (SPIRIVA RESPIMAT) 1.25 MCG/ACT aerosol solution inhaler Inhale Daily.   • varenicline (CHANTIX) 1 MG tablet Take 1 tablet by mouth 2 (Two) Times a Day.     No current facility-administered medications on file prior to visit.      Review of Systems   Constitutional: Positive for appetite change and fatigue. Negative for chills, diaphoresis and fever.   HENT: Negative for congestion, ear pain, sinus pain and sore throat.    Respiratory: Negative for cough, chest tightness, shortness of breath and wheezing.    Cardiovascular: Negative.  Negative for chest pain.   Gastrointestinal: Positive for change in bowel habit, diarrhea, nausea and vomiting (resolved now). Negative for abdominal pain and blood in stool.        Had EGD 11/16/2017   Genitourinary: Positive for discharge and dysuria.   Musculoskeletal: Negative for arthralgias, back pain and myalgias.   Skin: Negative for color change and rash.   Neurological: Negative for dizziness and headaches.   Psychiatric/Behavioral:        Patient has been going to behavioral health services for management of his anxiety, depression       Objective   Physical Exam   Constitutional: He is oriented to person,  place, and time. Vital signs are normal. He appears well-developed and well-nourished. He is cooperative.  Non-toxic appearance. He does not have a sickly appearance. No distress.   HENT:   Head: Normocephalic and atraumatic.   Right Ear: External ear normal.   Left Ear: External ear normal.   Nose: Nose normal.   Mouth/Throat: Uvula is midline, oropharynx is clear and moist and mucous membranes are normal.   Eyes: No scleral icterus.   Neck: Normal range of motion. Neck supple. No thyromegaly present.   Cardiovascular: Normal rate, regular rhythm and normal heart sounds.   Pulmonary/Chest: Effort normal and breath sounds normal.   Abdominal: Soft. He exhibits no distension. There is no tenderness.   Lymphadenopathy:     He has no cervical adenopathy.   Neurological: He is alert and oriented to person, place, and time.   Skin: Skin is warm, dry and intact. No rash noted. He is not diaphoretic.   Psychiatric: He has a normal mood and affect. His behavior is normal. Judgment and thought content normal.   Vitals reviewed.    Vitals:    12/16/18 1439   BP: 102/78   Pulse: 90   Resp: 18   Temp: 98.3 °F (36.8 °C)   SpO2: 96%   Body mass index is 32.91 kg/m².     Assessment/Plan   Jose was seen today for vomiting and diarrhea.    Diagnoses and all orders for this visit:    Gastroesophageal reflux disease, esophagitis presence not specified  -     pantoprazole (PROTONIX) 40 MG EC tablet; Take 1 tablet by mouth Daily.    Nausea and vomiting, intractability of vomiting not specified, unspecified vomiting type  -     Comprehensive Metabolic Panel  -     CBC Auto Differential    Diarrhea, unspecified type  -     Comprehensive Metabolic Panel  -     CBC Auto Differential    Exposure to STD  -     POC Urinalysis Dipstick, Automated  -     RPR  -     HIV-1 / O / 2 Ag / Antibody 4th Generation  -     Hepatitis Panel, Acute  -     cefTRIAXone (ROCEPHIN) injection 250 mg; Inject 250 mg into the appropriate muscle as directed by  Patient is a 58 y/o male with PMHx of COPD not home O2, HTN, HLD, Obesity, REBECCA on BIPAP, CHFpEF, GERD presented to the ED for 1 week of worsening SOB dx. with acute on chronic COPD exacerbation.       #COPD exacerbation- clinically improved, ambulating w/o difficulty , no hypoxia, decreased wheezing on exam   #Hx of REBECCA  CXR 07/26: no acute cardiopulmonary disease  on RA 97%  - taper down  IV solumedrol 60 BID to PO prednisone 40 mg once daily   - Duonebs ATC and PRN, symbicort   - Covid negative, f/up  Full RVP  - BIPAP qHs and PRN  - Azithromycin 250mg to completion (5d)      #HTN/HLD  - Cont lasix 40mg qD  - Cont lisinopril 5mg qD  - Cont simvastatin 20mg qHs    #Chronic Back pain  ISTOP Reference# : [id number]  - Cont morphine ER 100mg qD  - Cont oxycodone 30mg q4h PRN (prescribed 150 pills for 30d)    #Anxiety/Insomnia  - Cont xanax 2mg QID  - Cont ambien 10mg qHs    DVT PPX, Lovenox    #Progress Note Handoff  Pending (specify): transition to PO  steroids, d/c anticipation for tomorrow   Family discussion: medical plan of care d/w pt. by bedside   Disposition: Home tomorrow     Total time spent to complete patient's bedside assessment, review medical chart, discuss medical plan of care with covering medical team was more than 35 minutes with >50% of time spent face to face with patient, discussion with patient/family and/or coordination of care prescriber 1 (One) Time.  -     azithromycin (ZITHROMAX) 500 MG tablet; Take 2 tablets by mouth 1 (One) Time for 1 dose.  -     metroNIDAZOLE (FLAGYL) 500 MG tablet; Take 1 tablet by mouth 2 (Two) Times a Day for 7 days.  -     Urine Culture - Urine, Urine, Clean Catch        -     UroSwab for STD's    Healthcare maintenance  -     TSH  -     Lipid Panel    Abnormal penile discharge, without blood  -     cefTRIAXone (ROCEPHIN) injection 250 mg; Inject 250 mg into the appropriate muscle as directed by prescriber 1 (One) Time.  -     azithromycin (ZITHROMAX) 500 MG tablet; Take 2 tablets by mouth 1 (One) Time for 1 dose.  -     metroNIDAZOLE (FLAGYL) 500 MG tablet; Take 1 tablet by mouth 2 (Two) Times a Day for 7 days.  -     Urine Culture - Urine, Urine, Clean Catch    Dysuria  -     Urine Culture - Urine, Urine, Clean Catch    Brief Urine Lab Results  (Last result in the past 365 days)      Color   Clarity   Blood   Leuk Est   Nitrite   Protein   CREAT   Urine HCG        12/16/18 1539 Dark Yellow Clear Trace Large (3+) Negative Negative             Leukocytes in urine  -     Urine Culture - Urine, Urine, Clean Catch       Discussed the nature of the medical condition(s) risks, complications, implications, management, safe and proper use of medications. Encouraged medication compliance, and keeping scheduled follow up appointments with me and any other providers.   Laboratory testing ordered today. Further recommendations after lab evaluation.

## 2023-01-20 NOTE — DISCHARGE NOTE PROVIDER - PROVIDER TOKENS
PROVIDER:[TOKEN:[[id number]]] PROVIDER:[TOKEN:[[id number]]],PROVIDER:[TOKEN:[[id number]],FOLLOWUP:[2 weeks]]

## 2023-01-20 NOTE — DISCHARGE NOTE PROVIDER - HOSPITAL COURSE
58 y/o male with PMHx of COPD not home O2, HTN, HLD, REBECCA on BIPAP, CHFpEF, GERD presented to the ED for 1 week of worsening SOB. He reports that he did not take his medications today, went out to the yard to work when he felt acutely short of breath. Denies any fevers, chills, chest pain, abdominal pain, nausea, vomiting, diarrhea.     In the ED, VS noted for T 36.6, HR 98, /66, SpO2 96% on RA. Labs noted for WBC 8, Hg 14, trop 0.01.     Admitted for acute COPD exacerbation      #Worsening SOB likely COPD exacerbation  #Hx of REBECCA on BIPAP  - On admission, RA 97%, diffuse wheezing, afebrile  - no leukocytosis, Trop negative x1,   - CXR no opacities appreciated  - s/p duonebs, Dexamethasone 10mg  - Solumedrol 60mg BID   - f/u full RVP  - continue with duonebs   - Azithro     #HTN/HLD  - Cont lasix 40mg qD  - Cont lisinopril 5mg qD  - continue with simvastatin     #Chronic Back pain  ISTOP Reference# : [id number]  - Cont morphine ER 100mg qD  - Cont oxycodone 30mg q4h PRN (prescribed 150 pills for 30d)    #Anxiety/Insomnia  - Cont xanax 2mg QID  - Cont ambien 10mg qHs    Patient seen and comfortable on room air during the day, stable for discharge

## 2023-01-20 NOTE — PROGRESS NOTE ADULT - SUBJECTIVE AND OBJECTIVE BOX
KAYLEIGH TOMAS 57y Male  MRN#: [id number]   Hospital Day: 1d    HPI:  56 y/o male with PMHx of COPD not home O2, HTN, HLD, REBECCA on BIPAP, CHFpEF, GERD presented to the ED for 1 week of worsening SOB. He reports that he did not take his medications today, went out to the yard to work when he felt acutely short of breath. Denies any fevers, chills, chest pain, abdominal pain, nausea, vomiting, diarrhea.     In the ED, VS noted for T 36.6, HR 98, /66, SpO2 96% on RA. Labs noted for WBC 8, Hg 14, trop 0.01.  (19 Jan 2023 17:56)      SUBJECTIVE  Patient is a 57y old Male who presents with a chief complaint of SOB (19 Jan 2023 17:56)  Currently admitted to medicine with the primary diagnosis of COPD exacerbation      INTERVAL HPI AND OVERNIGHT EVENTS:  Patient was examined and seen at bedside. This morning he feels slightly SOB.     OBJECTIVE  PAST MEDICAL & SURGICAL HISTORY  HTN (hypertension)    High cholesterol    GERD (gastroesophageal reflux disease)    Morbid obesity    Osteoarthritis    Hiatal hernia  GERD    Colitis  LARGE INTESTINE   NO RECENT FLARES    COPD (chronic obstructive pulmonary disease)    REBECCA treated with BiPAP    History of cholecystectomy    History of appendectomy    History of knee replacement procedure of right knee  BILATERAL    Hernia, inguinal, bilateral  3 months old    H/O knee surgery  arthoscopic x4    H/O foot surgery  right big toe surgery      ALLERGIES:  No Known Allergies    MEDICATIONS:  STANDING MEDICATIONS  albuterol/ipratropium for Nebulization 3 milliLiter(s) Nebulizer every 6 hours  albuterol/ipratropium for Nebulization 3 milliLiter(s) Nebulizer every 20 minutes  azithromycin   Tablet 250 milliGRAM(s) Oral daily  budesonide 160 MICROgram(s)/formoterol 4.5 MICROgram(s) Inhaler 2 Puff(s) Inhalation two times a day  enoxaparin Injectable 40 milliGRAM(s) SubCutaneous every 24 hours  fenofibrate Tablet 145 milliGRAM(s) Oral daily  furosemide    Tablet 40 milliGRAM(s) Oral daily  lisinopril 5 milliGRAM(s) Oral daily  methylPREDNISolone sodium succinate Injectable 60 milliGRAM(s) IV Push every 12 hours  pantoprazole    Tablet 40 milliGRAM(s) Oral before breakfast  senna 2 Tablet(s) Oral at bedtime  simvastatin 20 milliGRAM(s) Oral at bedtime    PRN MEDICATIONS  ALPRAZolam 2 milliGRAM(s) Oral every 6 hours PRN  guaifenesin/dextromethorphan Oral Liquid 15 milliLiter(s) Oral every 6 hours PRN  morphine ER Tablet 100 milliGRAM(s) Oral daily PRN  oxyCODONE    IR 30 milliGRAM(s) Oral every 6 hours PRN  zolpidem 5 milliGRAM(s) Oral at bedtime PRN      VITAL SIGNS: Last 24 Hours  T(C): 35.9 (20 Jan 2023 04:54), Max: 36.6 (19 Jan 2023 14:28)  T(F): 96.7 (20 Jan 2023 04:54), Max: 97.9 (19 Jan 2023 14:28)  HR: 81 (20 Jan 2023 04:54) (78 - 98)  BP: 123/66 (20 Jan 2023 04:54) (123/66 - 142/66)  BP(mean): --  RR: 18 (20 Jan 2023 04:54) (18 - 22)  SpO2: 95% (20 Jan 2023 04:54) (93% - 97%)    LABS:                        14.2   8.23  )-----------( 234      ( 20 Jan 2023 06:26 )             42.4     01-20    136  |  99  |  13  ----------------------------<  138<H>  4.8   |  29  |  0.6<L>    Ca    9.3      20 Jan 2023 06:26    TPro  6.7  /  Alb  3.8  /  TBili  0.3  /  DBili  x   /  AST  13  /  ALT  11  /  AlkPhos  116<H>  01-20          Troponin T, Serum: <0.01 ng/mL (01-20-23 @ 06:26)  Troponin T, Serum: <0.01 ng/mL (01-19-23 @ 16:18)      CARDIAC MARKERS ( 20 Jan 2023 06:26 )  x     / <0.01 ng/mL / x     / x     / x      CARDIAC MARKERS ( 19 Jan 2023 16:18 )  x     / <0.01 ng/mL / x     / x     / x          RADIOLOGY:      PHYSICAL EXAM:    GENERAL: NAD, lying in bed comfortably  HEAD:  Atraumatic, Normocephalic  CHEST/LUNG: Wheezing bilaterally with rhonchi   HEART: Regular rate and rhythm; No murmurs, rubs, or gallops  ABDOMEN: BSx4; Soft, nontender, nondistended  EXTREMITIES:  2+ Peripheral Pulses, brisk capillary refill. No clubbing, cyanosis, or edema  NERVOUS SYSTEM:  A&Ox3, no focal deficits    Assessment and Plan    56 y/o male with PMHx of COPD not home O2, HTN, HLD, REBECCA on BIPAP, CHFpEF, GERD presented to the ED for 1 week of worsening SOB    #Worsening SOB likely COPD exacerbation  #Hx of REBECCA on BIPAP  - On admission, RA 97%, diffuse wheezing, afebrile  - no leukocytosis, Trop negative x1,   - CXR no opacities appreciated  - s/p duonebs, Dexamethasone 10mg  - continue with Solumedrol 60mg BID   - f/u COVID PCR  - continue with duonebs   - Azithro     #HTN/HLD  - Cont lasix 40mg qD  - Cont lisinopril 5mg qD  - continue with simvastatin     #Chronic Back pain  ISTOP Reference# : [id number]  - Cont morphine ER 100mg qD  - Cont oxycodone 30mg q4h PRN (prescribed 150 pills for 30d)    #Anxiety/Insomnia  - Cont xanax 2mg QID  - Cont ambien 10mg qHs    DVT PPX, Lovenox  GI PPx: PPI  DASH

## 2023-01-20 NOTE — DISCHARGE NOTE PROVIDER - CARE PROVIDER_API CALL
Edison Bowie)  [facility]  [address] [place] [address]  Phone: [phone]  Fax: [phone]  Follow Up Time:    Edison Bowie)  [address]  [address] [place] [address]  Phone: [phone]  Fax: [phone]  Follow Up Time:     Murali Brooks)  Critical Care Medicine; Pulmonary Disease; Sleep Medicine  [address] [place] [address]  Phone: [phone]  Fax: [phone]  Follow Up Time: 2 weeks

## 2023-01-20 NOTE — DISCHARGE NOTE PROVIDER - NSDCCPCAREPLAN_GEN_ALL_CORE_FT
PRINCIPAL DISCHARGE DIAGNOSIS  Diagnosis: COPD exacerbation  Assessment and Plan of Treatment: You had an exacerbatiion of your copd which caused you to feel shortness of breath. Take the medications prescribed for 5 days to completely heal. Follow up with your primary care doctor

## 2023-01-20 NOTE — PATIENT PROFILE ADULT - FALL HARM RISK - HARM RISK INTERVENTIONS

## 2023-01-21 ENCOUNTER — TRANSCRIPTION ENCOUNTER (OUTPATIENT)
Age: 58
End: 2023-01-21

## 2023-01-21 VITALS — HEART RATE: 99 BPM | OXYGEN SATURATION: 99 %

## 2023-01-21 PROCEDURE — 99239 HOSP IP/OBS DSCHRG MGMT >30: CPT

## 2023-01-21 RX ADMIN — AZITHROMYCIN 250 MILLIGRAM(S): 500 TABLET, FILM COATED ORAL at 11:07

## 2023-01-21 RX ADMIN — LISINOPRIL 5 MILLIGRAM(S): 2.5 TABLET ORAL at 05:12

## 2023-01-21 RX ADMIN — ENOXAPARIN SODIUM 40 MILLIGRAM(S): 100 INJECTION SUBCUTANEOUS at 05:12

## 2023-01-21 RX ADMIN — OXYCODONE HYDROCHLORIDE 30 MILLIGRAM(S): 5 TABLET ORAL at 10:57

## 2023-01-21 RX ADMIN — PANTOPRAZOLE SODIUM 40 MILLIGRAM(S): 20 TABLET, DELAYED RELEASE ORAL at 05:12

## 2023-01-21 RX ADMIN — OXYCODONE HYDROCHLORIDE 30 MILLIGRAM(S): 5 TABLET ORAL at 10:27

## 2023-01-21 RX ADMIN — OXYCODONE HYDROCHLORIDE 30 MILLIGRAM(S): 5 TABLET ORAL at 04:15

## 2023-01-21 RX ADMIN — Medication 3 MILLILITER(S): at 08:38

## 2023-01-21 RX ADMIN — OXYCODONE HYDROCHLORIDE 30 MILLIGRAM(S): 5 TABLET ORAL at 04:45

## 2023-01-21 RX ADMIN — Medication 145 MILLIGRAM(S): at 11:07

## 2023-01-21 RX ADMIN — MORPHINE SULFATE 100 MILLIGRAM(S): 50 CAPSULE, EXTENDED RELEASE ORAL at 09:55

## 2023-01-21 RX ADMIN — MORPHINE SULFATE 100 MILLIGRAM(S): 50 CAPSULE, EXTENDED RELEASE ORAL at 09:25

## 2023-01-21 RX ADMIN — Medication 2 MILLIGRAM(S): at 04:15

## 2023-01-21 RX ADMIN — Medication 40 MILLIGRAM(S): at 05:14

## 2023-01-21 RX ADMIN — Medication 2 MILLIGRAM(S): at 10:27

## 2023-01-21 RX ADMIN — Medication 40 MILLIGRAM(S): at 05:11

## 2023-01-21 NOTE — PROGRESS NOTE ADULT - SUBJECTIVE AND OBJECTIVE BOX
GENOVEVAKAYLEIGH HOWARD  [facility] T2-3A 026 A ([facility] T[facility]3A)      Patient was evaluated and examined  by bedside, reports feeling better today, no dyspnea at rest or during ambulation, no hypoxia.       REVIEW OF SYSTEMS:  please see pertinent positives mentioned above, all other 12 ROS negative      T(C): , Max: 36.4 (01-21-23 @ 05:16)  HR: 99 (01-21-23 @ 08:15)  BP: 145/78 (01-21-23 @ 05:16)  RR: 18 (01-21-23 @ 05:16)  SpO2: 99% (01-21-23 @ 08:15)  CAPILLARY BLOOD GLUCOSE      PHYSICAL EXAM:  General: NAD, AAOX3, patient is sitting  comfortably in bed  HEENT: AT, NC, Supple, NO JVD, NO CB  Lungs: improved breath sounds  B/L, mild expiratory wheezing, no rhonchi  CVS: normal S1, S2, RRR, NO M/G/R  Abdomen: soft, bowel sounds present, non-tender, non-distended  Extremities: no edema, no clubbing, no cyanosis, positive peripheral pulses b/l  Neuro: no acute focal neurological deficits  Skin: no rash, no ecchymosis      LAB  CBC  Date: 01-20-23 @ 06:26  Mean cell Sruvkstobz96.3  Mean cell Hemoglobin Conc33.5  Mean cell Volum 84.6  Platelet count-Automate 234  RBC Count 5.01  Red Cell Distrib Width15.0  WBC Count8.23  % Albumin, Urine--  Hematocrit 42.4  Hemoglobin 14.2  CBC  Date: 01-19-23 @ 16:18  Mean cell Kyauhqgjuo76.0  Mean cell Hemoglobin Conc33.7  Mean cell Volum 86.2  Platelet count-Automate 225  RBC Count 4.86  Red Cell Distrib Width15.2  WBC Count8.29  % Albumin, Urine--  Hematocrit 41.9  Hemoglobin 14.1    BMP  01-20-23 @ 06:26  Blood Gas Arterial-Calcium,Ionized--  Blood Urea Nitrogen, Serum 13 mg/dL [10 - 20]  Carbon Dioxide, Serum29 mmol/L [17 - 32]  Chloride, Serum99 mmol/L [98 - 110]  Creatinie, Serum0.6 mg/dL<L> [0.7 - 1.5]  Glucose, [id number] mg/dL<H> [70 - 99]  Potassium, Serum4.8 mmol/L [3.5 - 5.0]  Sodium, Serum 136 mmol/L [135 - 146]  [facility] [place]  01-19-23 @ 16:18  Blood Gas Arterial-Calcium,Ionized--  Blood Urea Nitrogen, Serum 11 mg/dL [10 - 20]  Carbon Dioxide, Serum31 mmol/L [17 - 32]  Chloride, Ptwfr357 mmol/L [98 - 110]  Creatinie, Serum0.6 mg/dL<L> [0.7 - 1.5]  Glucose, Serum91 mg/dL [70 - 99]  Potassium, Serum5.1 mmol/L<H> [3.5 - 5.0]  Sodium, Serum 140 mmol/L [135 - 146]      Medications:  albuterol/ipratropium for Nebulization 3 milliLiter(s) Nebulizer every 6 hours  albuterol/ipratropium for Nebulization 3 milliLiter(s) Nebulizer every 20 minutes  ALPRAZolam 2 milliGRAM(s) Oral every 6 hours PRN  azithromycin   Tablet 250 milliGRAM(s) Oral daily  budesonide 160 MICROgram(s)/formoterol 4.5 MICROgram(s) Inhaler 2 Puff(s) Inhalation two times a day  enoxaparin Injectable 40 milliGRAM(s) SubCutaneous every 24 hours  fenofibrate Tablet 145 milliGRAM(s) Oral daily  furosemide    Tablet 40 milliGRAM(s) Oral daily  guaifenesin/dextromethorphan Oral Liquid 15 milliLiter(s) Oral every 6 hours PRN  lisinopril 5 milliGRAM(s) Oral daily  morphine ER Tablet 100 milliGRAM(s) Oral daily PRN  oxyCODONE    IR 30 milliGRAM(s) Oral every 6 hours PRN  pantoprazole    Tablet 40 milliGRAM(s) Oral before breakfast  predniSONE   Tablet 40 milliGRAM(s) Oral daily  senna 2 Tablet(s) Oral at bedtime  simvastatin 20 milliGRAM(s) Oral at bedtime  zolpidem 5 milliGRAM(s) Oral at bedtime PRN      Assessment and Plan:  Patient is a 58 y/o male with PMHx of COPD not home O2, HTN, HLD, Obesity, REBECCA on BIPAP, CHFpEF, GERD presented to the ED for 1 week of worsening SOB dx. with acute on chronic COPD exacerbation.       #COPD exacerbation- clinically improved, ambulating w/o difficulty , no hypoxia, decreased wheezing on exam   #Hx of REBECCA  CXR 07/26: no acute cardiopulmonary disease  on RA 97%  - tapered  down  IV solumedrol 60 BID to PO prednisone 40 mg once daily x 5 days   - Duonebs ATC and PRN, symbicort   - Covid negative,   - BIPAP qHs and PRN  - Azithromycin 250mg to completion (5d)  - Patient was instructed to follow up with outpatient Pulmonary specialist to further manage COPD tx.      #HTN/HLD  - Cont lasix 40mg qD  - Cont lisinopril 5mg qD  - Cont simvastatin 20mg qHs    #Chronic Back pain  ISTOP Reference# : [id number]  - Cont morphine ER 100mg qD  - Cont oxycodone 30mg q4h PRN (prescribed 150 pills for 30d)    #Anxiety/Insomnia  - Cont xanax 2mg QID  - Cont ambien 10mg qHs    DVT PPX, Lovenox    #Progress Note Handoff  Pending (specify): patient was medically optimized, stable for d/c home today   Family discussion: Patient verbalized good understanding of discharge instructions and agreed with discharge plan.  Disposition: Home today     Total time spent to complete patient's bedside assessment, review medical chart, discuss discharge  plan of care with covering medical team was more than 35 minutes with >50% of time spent face to face with patient, discussion with patient/family and/or coordination of care

## 2023-01-21 NOTE — DISCHARGE NOTE NURSING/CASE MANAGEMENT/SOCIAL WORK - NSDCVIVACCINE_GEN_ALL_CORE_FT
influenza, injectable, quadrivalent, preservative free; 19-Oct-2018 18:53; Zina Muro (RN); Outright; 449DE (Exp. Date: 30-Jun-2019); IntraMuscular; Deltoid Left.; 0.5 milliLiter(s); VIS (VIS Published: 07-Aug-2015, VIS Presented: 19-Oct-2018);

## 2023-01-21 NOTE — DISCHARGE NOTE NURSING/CASE MANAGEMENT/SOCIAL WORK - NSDCPEFALRISK_GEN_ALL_CORE
For information on Fall & Injury Prevention, visit: [email or web address] OR  [email or web address] OR  https://www.cdc.gov/steadi/patient.html

## 2023-01-21 NOTE — DISCHARGE NOTE NURSING/CASE MANAGEMENT/SOCIAL WORK - PATIENT PORTAL LINK FT
You can access the FollowMyHealth Patient Portal offered by [facility] by registering at the following website: [email or web address]. By joining [employer]’s FollowMyHealth portal, you will also be able to view your health information using other applications (apps) compatible with our system.

## 2023-01-23 NOTE — ED ADULT TRIAGE NOTE - CCCP TRG CHIEF CMPLNT
Physical Therapy Evaluation    Visit Type: Daily Treatment Note  Visit: 6  Referring Provider: Ge Sood MD  Medical Diagnosis (from order): Diagnosis Information    Diagnosis  719.41 (ICD-9-CM) - M25.511, M25.512 (ICD-10-CM) - Bilateral shoulder pain, unspecified chronicity         SUBJECTIVE                                                                                                               Patient states she tried exercising more and pushing it farther which increased her pain and soreness. Patient continues to report pain in her right shoulder but denies increased pain.     Pain / Symptoms  - Pain rating (out of 10): Current: 5       OBJECTIVE                                                                                                                    Observation   Tightness and tenderness with palpation throughout the left infraspinatus and supraspinatus    Range of Motion (ROM)   (degrees unless noted; active unless noted; norms in ( ); negative=lacking to 0, positive=beyond 0)  Shoulder:    - Flexion (180):        • Left: pain    Passive: 90    - Abduction (180):        • Left: pain   Passive: 90    - Internal Rotation:         • Left: pain        - at 45°:            • Left:   Passive: 30  Comments: Significant guarding with shoulder PROM                       Treatment     Therapeutic Exercise  Seated pulley assisted shoulder flexion and abduction 1 x 10 each  Band resisted shoulder internal rotation/external rotation isometric walk outs with yellow theraband left 1 x 10 each  Review HEP    Manual Therapy   STM with TPR infraspinatus and supraspinatus left   Glenohumeral joint flexion, abduction, and internal rotation physiologic glides left   Glenohumeral joint distraction left   Inferior glenohumeral joint mobilization with shoulder abduction left - grade II   Posterior inferior glenohumeral joint mobilization with shoulder flexion left - grade II   Posterior lateral glenohumeral joint  chest pain/shortness of breath mobilization with shoulder internal rotation left - grade II     Skilled input: verbal instruction/cues and tactile instruction/cues    Writer verbally educated and received verbal consent for hand placement, positioning of patient, and techniques to be performed today from patient for clothing adjustments for techniques, therapist position for techniques and hand placement and palpation for techniques as described above and how they are pertinent to the patient's plan of care.    Home Exercise Program  Access Code: [email or web address]  ? Seated Shoulder Flexion Towel Slide at Table Top - 2-3 x daily - 7 x weekly - 10-15 reps  ? Seated Shoulder Abduction Towel Slide at Table Top - 2-3 x daily - 7 x weekly - 10-15 reps  ? Standing Single Arm Shoulder Flexion Towel Slide at Table Top - 2-3 x daily - 7 x weekly - 10-15 reps  ? Shoulder Abduction Towel Slide at Table Top - 2-3 x daily - 7 x weekly - 10-15 reps  ? Seated Shoulder Pendulum Exercise - 2-3 x daily - 7 x weekly - 10-15 reps  ? Flexion-Extension Shoulder Pendulum with Table Support - 2-3 x daily - 7 x weekly - 10-15 reps  ? Seated Scapular Retraction - 2-3 x daily - 7 x weekly - 10-15 reps  ? Standing Isometric Shoulder External Rotation with Doorway - 2-3 x daily - 7 x weekly - 10-15 reps  ? Standing Isometric Shoulder Internal Rotation with Towel Roll at Doorway - 2-3 x daily - 7 x weekly - 10-15 reps  ? Standing Isometric Shoulder Abduction with Doorway - Arm Bent - 2-3 x daily - 7 x weekly - 10-15 reps  ? Standing Shoulder External Rotation AAROM with Dowel - 2-3 x daily - 7 x weekly - 10-15 reps  ? Standing Shoulder Posterior Capsule Stretch - 2-3 x daily - 7 x weekly - 10-15 reps  ? Standing Shoulder Internal Rotation Stretch with Towel - 2-3 x daily - 7 x weekly - 10-15 reps      ASSESSMENT                                                                                                            Patient presents with reports of increased shoulder pain and  soreness on this date that impairs her performance of daily tasks. Patient continues to exhibit tightness and tenderness with palpation throughout the left infraspinatus and supraspinatus as well as significantly limited passive shoulder range of motion in all planes. Patient was instructed to continue with her current HEP with an emphasis on improving shoulder mobility as tolerated.    PLAN                                                                                                                           Suggestions for next session as indicated: Progress per plan of care       Therapy procedure time and total treatment time can be found documented on the Time Entry flowsheet

## 2023-01-25 DIAGNOSIS — E78.5 HYPERLIPIDEMIA, UNSPECIFIED: ICD-10-CM

## 2023-01-25 DIAGNOSIS — I50.32 CHRONIC DIASTOLIC (CONGESTIVE) HEART FAILURE: ICD-10-CM

## 2023-01-25 DIAGNOSIS — G47.33 OBSTRUCTIVE SLEEP APNEA (ADULT) (PEDIATRIC): ICD-10-CM

## 2023-01-25 DIAGNOSIS — K21.9 GASTRO-ESOPHAGEAL REFLUX DISEASE WITHOUT ESOPHAGITIS: ICD-10-CM

## 2023-01-25 DIAGNOSIS — E66.9 OBESITY, UNSPECIFIED: ICD-10-CM

## 2023-01-25 DIAGNOSIS — F41.9 ANXIETY DISORDER, UNSPECIFIED: ICD-10-CM

## 2023-01-25 DIAGNOSIS — G47.00 INSOMNIA, UNSPECIFIED: ICD-10-CM

## 2023-01-25 DIAGNOSIS — I10 ESSENTIAL (PRIMARY) HYPERTENSION: ICD-10-CM

## 2023-01-25 DIAGNOSIS — J44.1 CHRONIC OBSTRUCTIVE PULMONARY DISEASE WITH (ACUTE) EXACERBATION: ICD-10-CM

## 2023-01-25 DIAGNOSIS — M54.9 DORSALGIA, UNSPECIFIED: ICD-10-CM

## 2023-01-25 DIAGNOSIS — G89.29 OTHER CHRONIC PAIN: ICD-10-CM

## 2023-01-30 NOTE — ED ADULT NURSE NOTE - CAS EDN DISCHARGE INTERVENTIONS
Shikha sent over Lotemax to the [place] [facility] at [facility]. Should be arriving today for the patient.  Emi is unable to get the Lotemax. Let patient know to wait to hear from [place] [facility] on the status of the Lotemax. Patient was going to call over there and find out how much it would be with her insurance.     Patient wanted Dr. Santos to know since stopping the Doxycycline, still has the rash around her mouth, so may not have been from the Doxycycline but unsure where it's coming from then.    IV intact

## 2023-02-03 ENCOUNTER — EMERGENCY (EMERGENCY)
Facility: HOSPITAL | Age: 58
LOS: 0 days | Discharge: HOME | End: 2023-02-04
Attending: EMERGENCY MEDICINE | Admitting: EMERGENCY MEDICINE
Payer: MEDICAID

## 2023-02-03 VITALS
SYSTOLIC BLOOD PRESSURE: 144 MMHG | DIASTOLIC BLOOD PRESSURE: 82 MMHG | OXYGEN SATURATION: 98 % | RESPIRATION RATE: 18 BRPM | TEMPERATURE: 97 F | HEART RATE: 82 BPM

## 2023-02-03 VITALS — HEIGHT: 70 IN | WEIGHT: 300.05 LBS

## 2023-02-03 DIAGNOSIS — K21.9 GASTRO-ESOPHAGEAL REFLUX DISEASE WITHOUT ESOPHAGITIS: ICD-10-CM

## 2023-02-03 DIAGNOSIS — J44.9 CHRONIC OBSTRUCTIVE PULMONARY DISEASE, UNSPECIFIED: ICD-10-CM

## 2023-02-03 DIAGNOSIS — M19.90 UNSPECIFIED OSTEOARTHRITIS, UNSPECIFIED SITE: ICD-10-CM

## 2023-02-03 DIAGNOSIS — Z96.651 PRESENCE OF RIGHT ARTIFICIAL KNEE JOINT: Chronic | ICD-10-CM

## 2023-02-03 DIAGNOSIS — S91.111A LACERATION WITHOUT FOREIGN BODY OF RIGHT GREAT TOE WITHOUT DAMAGE TO NAIL, INITIAL ENCOUNTER: ICD-10-CM
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
## 2023-02-03 DIAGNOSIS — G47.33 OBSTRUCTIVE SLEEP APNEA (ADULT) (PEDIATRIC): ICD-10-CM

## 2023-02-03 DIAGNOSIS — K40.20 BILATERAL INGUINAL HERNIA, WITHOUT OBSTRUCTION OR GANGRENE, NOT SPECIFIED AS RECURRENT: Chronic | ICD-10-CM
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
## 2023-02-03 DIAGNOSIS — Z98.890 OTHER SPECIFIED POSTPROCEDURAL STATES: Chronic | ICD-10-CM

## 2023-02-03 DIAGNOSIS — Z23 ENCOUNTER FOR IMMUNIZATION: ICD-10-CM

## 2023-02-03 DIAGNOSIS — E78.00 PURE HYPERCHOLESTEROLEMIA, UNSPECIFIED: ICD-10-CM

## 2023-02-03 DIAGNOSIS — Z99.89 DEPENDENCE ON OTHER ENABLING MACHINES AND DEVICES: ICD-10-CM

## 2023-02-03 DIAGNOSIS — I11.0 HYPERTENSIVE HEART DISEASE WITH HEART FAILURE: ICD-10-CM

## 2023-02-03 DIAGNOSIS — Z90.49 ACQUIRED ABSENCE OF OTHER SPECIFIED PARTS OF DIGESTIVE TRACT: ICD-10-CM

## 2023-02-03 DIAGNOSIS — I50.9 HEART FAILURE, UNSPECIFIED: ICD-10-CM

## 2023-02-03 PROCEDURE — 12001 RPR S/N/AX/GEN/TRNK 2.5CM/<: CPT

## 2023-02-03 PROCEDURE — 99284 EMERGENCY DEPT VISIT MOD MDM: CPT | Mod: 25

## 2023-02-03 RX ORDER — TETANUS TOXOID, REDUCED DIPHTHERIA TOXOID AND ACELLULAR PERTUSSIS VACCINE, ADSORBED 5; 2.5; 8; 8; 2.5 [IU]/.5ML; [IU]/.5ML; UG/.5ML; UG/.5ML; UG/.5ML
0.5 SUSPENSION INTRAMUSCULAR ONCE
Refills: 0 | Status: COMPLETED | OUTPATIENT
Start: 2023-02-03 | End: 2023-02-03

## 2023-02-03 RX ADMIN — TETANUS TOXOID, REDUCED DIPHTHERIA TOXOID AND ACELLULAR PERTUSSIS VACCINE, ADSORBED 0.5 MILLILITER(S): 5; 2.5; 8; 8; 2.5 SUSPENSION INTRAMUSCULAR at 23:21

## 2023-02-03 NOTE — ED PROVIDER NOTE - PHYSICAL EXAMINATION
Vital Signs: I have reviewed the initial vital signs.  Constitutional: well-nourished, appears stated age, no acute distress  Musculoskeletal: + mild right 1st toe tenderness with 1.5 cm laceration to plantar surface of toe; mild bleeding; no subungual hematoma;   foot / metatarsals non tender;

## 2023-02-03 NOTE — ED ADULT TRIAGE NOTE - CHIEF COMPLAINT QUOTE
Pt presents to the Ed c/o of R big toe pain and cut. Pt states he was kicking pavers around his backyard when he cut his R toe open.

## 2023-02-03 NOTE — ED PROVIDER NOTE - PATIENT PORTAL LINK FT
You can access the FollowMyHealth Patient Portal offered by [facility] by registering at the following website: [email or web address]. By joining [employer]’s FollowMyHealth portal, you will also be able to view your health information using other applications (apps) compatible with our system.

## 2023-02-03 NOTE — ED PROVIDER NOTE - CARE PROVIDER_API CALL
Edison Bowie)  [address] [facility] [address]  [address] [place] [address]  Phone: [phone]  Fax: [phone]  Established Patient  Follow Up Time: 1-3 Days

## 2023-02-03 NOTE — ED PROVIDER NOTE - OBJECTIVE STATEMENT
57 yold male to ED Pmhx Copd not on home O2, Htn, Hld, CHF, gerd c/o right 1st toe pain s/p kicking  barefoot earlier today sustaining laceration to plantar surface of toe; pt denies ankle or tib/fib pain; td >10 yrs;

## 2023-02-03 NOTE — ED PROVIDER NOTE - WR INTERPRETATION 1
The patient is a 60y Male complaining of palpitations. MSK XR negative - No fracture, No dislocation, No foreign body

## 2023-02-03 NOTE — ED ADULT TRIAGE NOTE - ESI TRIAGE ACUITY LEVEL, MLM
[Takes medication as prescribed] : takes [None] : Patient does not have any barriers to medication adherence [Yes] : Reviewed medication list for presence of high-risk medications. [Blood Thinners] : blood thinners 4

## 2023-02-03 NOTE — ED PROVIDER NOTE - ATTENDING APP SHARED VISIT CONTRIBUTION OF CARE
I personally evaluated the patient. I reviewed the Resident’s or Physician Assistant’s note (as assigned above), and agree with the findings and plan except as documented in my note.  57-year-old male presents with complaints of right big toe pain and laceration after kicking a  while trying to move it.  Associated with bleeding that has since stopped.  On exam he has a 1 cm laceration on the plantar surface of the right big toe.  Neurovascularly intact.  Plan is imaging, wound care, and reassess.

## 2023-02-03 NOTE — ED PROVIDER NOTE - CARE PLAN
Principal Discharge DX:	Laceration of toe, right  Secondary Diagnosis:	Contusion of toenail of right foot   1

## 2023-02-03 NOTE — ED PROVIDER NOTE - NSFOLLOWUPINSTRUCTIONS_ED_ALL_ED_FT
No-Patient/Caregiver offered and refused free interpretation services.
Laceration Care, Adult  ImageA laceration is a cut that goes through all of the layers of the skin and into the tissue that is right under the skin. Some lacerations heal on their own. Others need to be closed with stitches (sutures), staples, skin adhesive strips, or skin glue. Proper laceration care minimizes the risk of infection and helps the laceration to heal better.    How is this treated?  If sutures or staples were used:     Keep the wound clean and dry.  If you were given a bandage (dressing), you should change it at least one time per day or as told by your health care provider. You should also change it if it becomes wet or dirty.  Keep the wound completely dry for the first 24 hours or as told by your health care provider. After that time, you may shower or bathe. However, make sure that the wound is not soaked in water until after the sutures or staples have been removed.  Clean the wound one time each day or as told by your health care provider:    Wash the wound with soap and water.  Rinse the wound with water to remove all soap.  Pat the wound dry with a clean towel. Do not rub the wound.    After cleaning the wound, apply a thin layer of antibiotic ointment as told by your health care provider. This will help to prevent infection and keep the dressing from sticking to the wound.  Have the sutures or staples removed as told by your health care provider.  If skin adhesive strips were used:     Keep the wound clean and dry.  If you were given a bandage (dressing), you should change it at least one time per day or as told by your health care provider. You should also change it if it becomes dirty or wet.  Do not get the skin adhesive strips wet. You may shower or bathe, but be careful to keep the wound dry.  If the wound gets wet, pat it dry with a clean towel. Do not rub the wound.  Skin adhesive strips fall off on their own. You may trim the strips as the wound heals. Do not remove skin adhesive strips that are still stuck to the wound. They will fall off in time.  If skin glue was used:     Try to keep the wound dry, but you may briefly wet it in the shower or bath. Do not soak the wound in water, such as by swimming.  After you have showered or bathed, gently pat the wound dry with a clean towel. Do not rub the wound.  Do not do any activities that will make you sweat heavily until the skin glue has fallen off on its own.  Do not apply liquid, cream, or ointment medicine to the wound while the skin glue is in place. Using those may loosen the film before the wound has healed.  If you were given a bandage (dressing), you should change it at least one time per day or as told by your health care provider. You should also change it if it becomes dirty or wet.  If a dressing is placed over the wound, be careful not to apply tape directly over the skin glue. Doing that may cause the glue to be pulled off before the wound has healed.  Do not pick at the glue. The skin glue usually remains in place for 5–10 days, then it falls off of the skin.  General Instructions     Take over-the-counter and prescription medicines only as told by your health care provider.  If you were prescribed an antibiotic medicine or ointment, take or apply it as told by your doctor. Do not stop using it even if your condition improves.  To help prevent scarring, make sure to cover your wound with sunscreen whenever you are outside after stitches are removed, after adhesive strips are removed, or when glue remains in place and the wound is healed. Make sure to wear a sunscreen of at least 30 SPF.  Do not scratch or pick at the wound.  Keep all follow-up visits as told by your health care provider. This is important.  Check your wound every day for signs of infection. Watch for:    Redness, swelling, or pain.  Fluid, blood, or pus.    Raise (elevate) the injured area above the level of your heart while you are sitting or lying down, if possible.  Contact a health care provider if:  You received a tetanus shot and you have swelling, severe pain, redness, or bleeding at the injection site.  You have a fever.  A wound that was closed breaks open.  You notice a bad smell coming from your wound or your dressing.  You notice something coming out of the wound, such as wood or glass.  Your pain is not controlled with medicine.  You have increased redness, swelling, or pain at the site of your wound.  You have fluid, blood, or pus coming from your wound.  You notice a change in the color of your skin near your wound.  You need to change the dressing frequently due to fluid, blood, or pus draining from the wound.  You develop a new rash.  You develop numbness around the wound.  Get help right away if:  You develop severe swelling around the wound.  Your pain suddenly increases and is severe.  You develop painful lumps near the wound or on skin that is anywhere on your body.  You have a red streak going away from your wound.  The wound is on your hand or foot and you cannot properly move a finger or toe.  The wound is on your hand or foot and you notice that your fingers or toes look pale or bluish.  This information is not intended to replace advice given to you by your health care provider. Make sure you discuss any questions you have with your health care provider.      Contusion  A contusion is a deep bruise. Contusions are the result of a blunt injury to tissues and muscle fibers under the skin. The injury causes bleeding under the skin. The skin overlying the contusion may turn blue, purple, or yellow. Minor injuries will give you a painless contusion, but more severe contusions may stay painful and swollen for a few weeks.    What are the causes?  This condition is usually caused by a blow, trauma, or direct force to an area of the body.    What are the signs or symptoms?  Symptoms of this condition include:    Swelling of the injured area.  Pain and tenderness in the injured area.  Discoloration. The area may have redness and then turn blue, purple, or yellow.    How is this diagnosed?  This condition is diagnosed based on a physical exam and medical history. An X-ray, CT scan, or MRI may be needed to determine if there are any associated injuries, such as broken bones (fractures).    How is this treated?  Specific treatment for this condition depends on what area of the body was injured. In general, the best treatment for a contusion is resting, icing, applying pressure to (compression), and elevating the injured area. This is often called the RICE strategy. Over-the-counter anti-inflammatory medicines may also be recommended for pain control.    Follow these instructions at home:  Rest the injured area.  If directed, apply ice to the injured area:    Put ice in a plastic bag.  Place a towel between your skin and the bag.  Leave the ice on for 20 minutes, 2–3 times per day.    If directed, apply light compression to the injured area using an elastic bandage. Make sure the bandage is not wrapped too tightly. Remove and reapply the bandage as directed by your health care provider.  If possible, raise (elevate) the injured area above the level of your heart while you are sitting or lying down.  Take over-the-counter and prescription medicines only as told by your health care provider.  Contact a health care provider if:  Your symptoms do not improve after several days of treatment.  Your symptoms get worse.  You have difficulty moving the injured area.  Get help right away if:  You have severe pain.  You have numbness in a hand or foot.  Your hand or foot turns pale or cold.  This information is not intended to replace advice given to you by your health care provider. Make sure you discuss any questions you have with your health care provider.

## 2023-02-03 NOTE — ED PROVIDER NOTE - CLINICAL SUMMARY MEDICAL DECISION MAKING FREE TEXT BOX
Patient presented for evaluation and management of toe injury.  Required imaging and sutures.  Will be discharged with prophylactic antibiotics and outpatient follow-up.

## 2023-02-04 PROCEDURE — 73660 X-RAY EXAM OF TOE(S): CPT | Mod: 26,RT

## 2023-02-04 RX ORDER — CEPHALEXIN 500 MG
1 CAPSULE ORAL
Qty: 15 | Refills: 0
Start: 2023-02-04 | End: 2023-02-08

## 2023-02-14 ENCOUNTER — EMERGENCY (EMERGENCY)
Facility: HOSPITAL | Age: 58
LOS: 0 days | Discharge: HOME | End: 2023-02-14
Attending: STUDENT IN AN ORGANIZED HEALTH CARE EDUCATION/TRAINING PROGRAM
Payer: MEDICAID

## 2023-02-14 VITALS
SYSTOLIC BLOOD PRESSURE: 142 MMHG | HEIGHT: 70 IN | HEART RATE: 88 BPM | DIASTOLIC BLOOD PRESSURE: 90 MMHG | TEMPERATURE: 98 F | WEIGHT: 300.05 LBS | RESPIRATION RATE: 17 BRPM | OXYGEN SATURATION: 98 %

## 2023-02-14 DIAGNOSIS — S91.111D LACERATION WITHOUT FOREIGN BODY OF RIGHT GREAT TOE WITHOUT DAMAGE TO NAIL, SUBSEQUENT ENCOUNTER: ICD-10-CM

## 2023-02-14 DIAGNOSIS — Z98.890 OTHER SPECIFIED POSTPROCEDURAL STATES: Chronic | ICD-10-CM

## 2023-02-14 DIAGNOSIS — K40.20 BILATERAL INGUINAL HERNIA, WITHOUT OBSTRUCTION OR GANGRENE, NOT SPECIFIED AS RECURRENT: Chronic | ICD-10-CM
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
## 2023-02-14 DIAGNOSIS — Z96.651 PRESENCE OF RIGHT ARTIFICIAL KNEE JOINT: Chronic | ICD-10-CM

## 2023-02-14 PROCEDURE — L9995: CPT

## 2023-02-14 PROCEDURE — 99212 OFFICE O/P EST SF 10 MIN: CPT

## 2023-02-14 NOTE — ED PROVIDER NOTE - CLINICAL SUMMARY MEDICAL DECISION MAKING FREE TEXT BOX
57-year-old male presenting for suture removal of sutures placed toe approximately 10 days ago.  No medical complaints at this time.  Wound well-healed, no surrounding erythema.  3 sutures removed without difficulty.  Wound care precautions discussed.

## 2023-02-14 NOTE — ED PROVIDER NOTE - PHYSICAL EXAMINATION
CONSTITUTIONAL: Well-appearing; in no apparent distress.   RESPIRATORY: No signs of respiratory distress.   CARDIOVASCULAR: Regular rate and rhythm.   Skin: R first toe noticed with well healed laceration with no active discharge or surrounding erythema.   NEURO: A & O x 3. Normal speech. No focal deficit.  PSYCHOLOGICAL: Appropriate mood and affect. Good judgement and insight.

## 2023-02-14 NOTE — ED PROVIDER NOTE - OBJECTIVE STATEMENT
57-year-old male with past medical history of COPD not on home oxygen who presents to the ED for suture removal in the right first toe.  Reports that 3 stitches were placed 10 days ago after he accidentally stepped on something sharp.  Reports that laceration has been healing well and denies any other discomfort.  Tetanus shot was updated last visit.

## 2023-02-14 NOTE — ED PROVIDER NOTE - PATIENT PORTAL LINK FT
You can access the FollowMyHealth Patient Portal offered by [facility] by registering at the following website: [email or web address]. By joining [employer]’s FollowMyHealth portal, you will also be able to view your health information using other applications (apps) compatible with our system.

## 2023-03-13 ENCOUNTER — EMERGENCY (EMERGENCY)
Facility: HOSPITAL | Age: 58
LOS: 0 days | Discharge: HOME | End: 2023-03-14
Attending: EMERGENCY MEDICINE
Payer: MEDICAID

## 2023-03-13 ENCOUNTER — APPOINTMENT (OUTPATIENT)
Dept: ORTHOPEDIC SURGERY | Facility: CLINIC | Age: 58
End: 2023-03-13

## 2023-03-13 VITALS
WEIGHT: 300.05 LBS | SYSTOLIC BLOOD PRESSURE: 139 MMHG | RESPIRATION RATE: 20 BRPM | HEART RATE: 79 BPM | HEIGHT: 70 IN | TEMPERATURE: 99 F | DIASTOLIC BLOOD PRESSURE: 101 MMHG | OXYGEN SATURATION: 99 %

## 2023-03-13 DIAGNOSIS — Z98.890 OTHER SPECIFIED POSTPROCEDURAL STATES: Chronic | ICD-10-CM

## 2023-03-13 DIAGNOSIS — K21.9 GASTRO-ESOPHAGEAL REFLUX DISEASE WITHOUT ESOPHAGITIS: ICD-10-CM

## 2023-03-13 DIAGNOSIS — Z96.651 PRESENCE OF RIGHT ARTIFICIAL KNEE JOINT: ICD-10-CM

## 2023-03-13 DIAGNOSIS — F17.200 NICOTINE DEPENDENCE, UNSPECIFIED, UNCOMPLICATED: ICD-10-CM

## 2023-03-13 DIAGNOSIS — R06.02 SHORTNESS OF BREATH: ICD-10-CM

## 2023-03-13 DIAGNOSIS — I10 ESSENTIAL (PRIMARY) HYPERTENSION: ICD-10-CM

## 2023-03-13 DIAGNOSIS — J44.1 CHRONIC OBSTRUCTIVE PULMONARY DISEASE WITH (ACUTE) EXACERBATION: ICD-10-CM

## 2023-03-13 DIAGNOSIS — M19.90 UNSPECIFIED OSTEOARTHRITIS, UNSPECIFIED SITE: ICD-10-CM

## 2023-03-13 DIAGNOSIS — Z98.890 OTHER SPECIFIED POSTPROCEDURAL STATES: ICD-10-CM

## 2023-03-13 DIAGNOSIS — G47.33 OBSTRUCTIVE SLEEP APNEA (ADULT) (PEDIATRIC): ICD-10-CM

## 2023-03-13 DIAGNOSIS — E78.00 PURE HYPERCHOLESTEROLEMIA, UNSPECIFIED: ICD-10-CM

## 2023-03-13 DIAGNOSIS — R05.1 ACUTE COUGH: ICD-10-CM

## 2023-03-13 DIAGNOSIS — Z96.651 PRESENCE OF RIGHT ARTIFICIAL KNEE JOINT: Chronic | ICD-10-CM

## 2023-03-13 DIAGNOSIS — Z90.49 ACQUIRED ABSENCE OF OTHER SPECIFIED PARTS OF DIGESTIVE TRACT: ICD-10-CM

## 2023-03-13 DIAGNOSIS — K40.20 BILATERAL INGUINAL HERNIA, WITHOUT OBSTRUCTION OR GANGRENE, NOT SPECIFIED AS RECURRENT: Chronic | ICD-10-CM

## 2023-03-13 DIAGNOSIS — R06.2 WHEEZING: ICD-10-CM

## 2023-03-13 DIAGNOSIS — Z87.19 PERSONAL HISTORY OF OTHER DISEASES OF THE DIGESTIVE SYSTEM: ICD-10-CM

## 2023-03-13 DIAGNOSIS — Z20.822 CONTACT WITH AND (SUSPECTED) EXPOSURE TO COVID-19: ICD-10-CM

## 2023-03-13 LAB
BASOPHILS # BLD AUTO: 0.06 K/UL — SIGNIFICANT CHANGE UP (ref 0–0.2)
BASOPHILS NFR BLD AUTO: 0.6 % — SIGNIFICANT CHANGE UP (ref 0–1)
EOSINOPHIL # BLD AUTO: 0.28 K/UL — SIGNIFICANT CHANGE UP (ref 0–0.7)
EOSINOPHIL NFR BLD AUTO: 3 % — SIGNIFICANT CHANGE UP (ref 0–8)
FLUAV AG NPH QL: SIGNIFICANT CHANGE UP
FLUBV AG NPH QL: SIGNIFICANT CHANGE UP
GAS PNL BLDV: SIGNIFICANT CHANGE UP
HCT VFR BLD CALC: 43.4 % — SIGNIFICANT CHANGE UP (ref 42–52)
HGB BLD-MCNC: 14.4 G/DL — SIGNIFICANT CHANGE UP (ref 14–18)
IMM GRANULOCYTES NFR BLD AUTO: 0.5 % — HIGH (ref 0.1–0.3)
LYMPHOCYTES # BLD AUTO: 2.63 K/UL — SIGNIFICANT CHANGE UP (ref 1.2–3.4)
LYMPHOCYTES # BLD AUTO: 28.4 % — SIGNIFICANT CHANGE UP (ref 20.5–51.1)
MCHC RBC-ENTMCNC: 29 PG — SIGNIFICANT CHANGE UP (ref 27–31)
MCHC RBC-ENTMCNC: 33.2 G/DL — SIGNIFICANT CHANGE UP (ref 32–37)
MCV RBC AUTO: 87.3 FL — SIGNIFICANT CHANGE UP (ref 80–94)
MONOCYTES # BLD AUTO: 0.71 K/UL — HIGH (ref 0.1–0.6)
MONOCYTES NFR BLD AUTO: 7.7 % — SIGNIFICANT CHANGE UP (ref 1.7–9.3)
NEUTROPHILS # BLD AUTO: 5.52 K/UL — SIGNIFICANT CHANGE UP (ref 1.4–6.5)
NEUTROPHILS NFR BLD AUTO: 59.8 % — SIGNIFICANT CHANGE UP (ref 42.2–75.2)
NRBC # BLD: 0 /100 WBCS — SIGNIFICANT CHANGE UP (ref 0–0)
PLATELET # BLD AUTO: 213 K/UL — SIGNIFICANT CHANGE UP (ref 130–400)
RBC # BLD: 4.97 M/UL — SIGNIFICANT CHANGE UP (ref 4.7–6.1)
RBC # FLD: 15.4 % — HIGH (ref 11.5–14.5)
RSV RNA NPH QL NAA+NON-PROBE: SIGNIFICANT CHANGE UP
SARS-COV-2 RNA SPEC QL NAA+PROBE: SIGNIFICANT CHANGE UP
WBC # BLD: 9.25 K/UL — SIGNIFICANT CHANGE UP (ref 4.8–10.8)
WBC # FLD AUTO: 9.25 K/UL — SIGNIFICANT CHANGE UP (ref 4.8–10.8)

## 2023-03-13 PROCEDURE — 94640 AIRWAY INHALATION TREATMENT: CPT

## 2023-03-13 PROCEDURE — 0241U: CPT

## 2023-03-13 PROCEDURE — 83605 ASSAY OF LACTIC ACID: CPT

## 2023-03-13 PROCEDURE — 71046 X-RAY EXAM CHEST 2 VIEWS: CPT

## 2023-03-13 PROCEDURE — 99285 EMERGENCY DEPT VISIT HI MDM: CPT | Mod: 25

## 2023-03-13 PROCEDURE — 99285 EMERGENCY DEPT VISIT HI MDM: CPT

## 2023-03-13 PROCEDURE — 84132 ASSAY OF SERUM POTASSIUM: CPT

## 2023-03-13 PROCEDURE — 71046 X-RAY EXAM CHEST 2 VIEWS: CPT | Mod: 26

## 2023-03-13 PROCEDURE — 82803 BLOOD GASES ANY COMBINATION: CPT

## 2023-03-13 PROCEDURE — 84295 ASSAY OF SERUM SODIUM: CPT

## 2023-03-13 PROCEDURE — 36415 COLL VENOUS BLD VENIPUNCTURE: CPT

## 2023-03-13 PROCEDURE — 76937 US GUIDE VASCULAR ACCESS: CPT

## 2023-03-13 PROCEDURE — 96374 THER/PROPH/DIAG INJ IV PUSH: CPT

## 2023-03-13 PROCEDURE — 85018 HEMOGLOBIN: CPT

## 2023-03-13 PROCEDURE — 85014 HEMATOCRIT: CPT

## 2023-03-13 PROCEDURE — 80053 COMPREHEN METABOLIC PANEL: CPT

## 2023-03-13 PROCEDURE — 85025 COMPLETE CBC W/AUTO DIFF WBC: CPT

## 2023-03-13 PROCEDURE — 93005 ELECTROCARDIOGRAM TRACING: CPT

## 2023-03-13 PROCEDURE — 93010 ELECTROCARDIOGRAM REPORT: CPT

## 2023-03-13 PROCEDURE — 82330 ASSAY OF CALCIUM: CPT

## 2023-03-13 RX ORDER — IPRATROPIUM/ALBUTEROL SULFATE 18-103MCG
3 AEROSOL WITH ADAPTER (GRAM) INHALATION
Refills: 0 | Status: COMPLETED | OUTPATIENT
Start: 2023-03-13 | End: 2023-03-13

## 2023-03-13 RX ORDER — IPRATROPIUM/ALBUTEROL SULFATE 18-103MCG
3 AEROSOL WITH ADAPTER (GRAM) INHALATION ONCE
Refills: 0 | Status: DISCONTINUED | OUTPATIENT
Start: 2023-03-13 | End: 2023-03-14

## 2023-03-13 RX ADMIN — Medication 3 MILLILITER(S): at 22:54

## 2023-03-13 RX ADMIN — Medication 125 MILLIGRAM(S): at 22:46

## 2023-03-13 RX ADMIN — Medication 3 MILLILITER(S): at 22:55

## 2023-03-13 RX ADMIN — Medication 3 MILLILITER(S): at 21:38

## 2023-03-13 NOTE — ED ADULT NURSE REASSESSMENT NOTE - NS ED NURSE REASSESS COMMENT FT1
patient alert, oriented x4. non-compliant with safety. refused to be hooked to monitor for close monitoring since he has difficulty of breathing. patient walking around the hallway despite education and safety measures reminded and was encouraged to stay on his stretcher in his room. due meds given. will continue to monitor.

## 2023-03-13 NOTE — ED PROVIDER NOTE - PATIENT PORTAL LINK FT
You can access the FollowMyHealth Patient Portal offered by [facility] by registering at the following website: [email or web address]. By joining [employer]’s FollowMyHealth portal, you will also be able to view your health information using other applications (apps) compatible with our system.

## 2023-03-13 NOTE — ED PROVIDER NOTE - PROGRESS NOTE DETAILS
KA - Pt reassessed. Improved pulmonary exam from previous. Pt feeling better with resolution of shortness of breath. He is requesting discharge. Will dc home GW: I personally evaluated the patient. I reviewed the Physician Assistant Fellow's note and agree with the findings and plan.

## 2023-03-13 NOTE — ED PROVIDER NOTE - NSFOLLOWUPINSTRUCTIONS_ED_ALL_ED_FT
Please follow up with your Pulmonologist.     Shortness of breath could indicate a medical problem. Causes include lung diseases, heart disease, low amount of red blood cells (anemia), poor physical fitness, being overweight, smoking, etc. Your health care provider may not be able to find a cause for your shortness of breath after your exam. In this case, it is important to have a follow-up exam with your primary care physician as instructed. If medicines were prescribed, take them as directed for the full length of time directed. Refrain from tobacco products.    SEEK IMMEDIATE MEDICAL CARE IF YOU HAVE THE FOLLOWING SYMPTOMS: worsening shortness of breath, chest pain, back pain, abdominal pain, fever, coughing up blood, lightheadedness/dizziness.
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
## 2023-03-13 NOTE — ED PROVIDER NOTE - OBJECTIVE STATEMENT
Pt is a 56 y/o male with PMHx of HTN, HLD, COPD not on home O2, REBECCA on bipap, and osteoarthritis presents for SoB, cough, and wheezing x 4 hours PTA. Pt is a 58 y/o male with PMHx of HTN, HLD, COPD not on home O2, REBECCA on bipap, and osteoarthritis presents for SoB, cough, and wheezing x 4 hours PTA. Pt was moving pavers around his yard when the SoB began and it has not resolved. Pt took normal inhalers earlier in the morning but did not use further inhaler or nebulizer device. This has happened before to pt and he states it resolved after neb treatments. He denies any chest pain, fever, chills, sore throat, or productive component to his cough. He denies any cardiac history and was recently assessed with low dose CT scan without significant findings. He denies any other complaints at this time.     Pulm Dr Nelson   Cardio Dr Bravo   PCP Dr Beltran

## 2023-03-13 NOTE — ED PROCEDURE NOTE - ATTENDING APP SHARED VISIT CONTRIBUTION OF CARE
Patient with difficult IV access I intend to get an US guided IV, I personally supervised the study.  I reviewed the images and interpretation by the resident/ACP and have edited where appropriate.

## 2023-03-13 NOTE — ED ADULT NURSE NOTE - CHIEF COMPLAINT QUOTE
Pt c/o shortness of breath x 4 hours with cough and congestion. hx COPD and asthma H/H, Creatinine, Na, K, BUN, Glucose

## 2023-03-13 NOTE — ED PROVIDER NOTE - PHYSICAL EXAMINATION
As Follows:  CONST: Well appearing in NAD.   EYES: EOMI, Sclera and conjunctiva clear.   ENT: No nasal discharge. Oropharynx normal appearing, no erythema or exudates. Uvula midline.  CARD: Normal S1 S2; Normal rate and rhythm  RESP: BS present B/L, Diffuse wheezing with some coughing during exam. No distress/accessory muscle use.   MS: No lower extremity pitting edema. Normal ROM in all extremities.   SKIN: Warm, dry, no acute rashes. Good turgor  NEURO: A&Ox3, No focal deficits. Strength and sensation intact. Steady gait

## 2023-03-13 NOTE — ED PROVIDER NOTE - ATTENDING APP SHARED VISIT CONTRIBUTION OF CARE
56 yo M with PMH of obesity, HH, HTN, HLD, COPD (not on home O2), REBECCA on bipap, and osteoarthritis presents for SOB, cough, and wheezing x 4 hours PTA. Pt was moving pavers around his yard when the SOB began. Pt used his prescribed inhalers earlier in the morning but did not use anything after wheezing/sob began. This has happened before to pt, and he states it usually resolves after neb treatments. He denies any chest pain, fever, chills, sore throat, or productive component to his cough. He denies any cardiac history and was recently assessed with low dose CT scan without significant findings. He denies any other complaints at this time.     Of note on exam pt in NAD, AVSS (sats 99% on RA), Lungs: +diffuse wheezing but pt able to speak in full sentences, no bluish tint to lips or skin, , no stridor and handling secretions normally,  in no severe respiratory distress. Remainder of exam per PA note.     A/P COPD exacerbation, seems to be trigged by strenuous activity in cool weather today. Will give nebs/steroids, check xray, ekg, labs. To reassess.     ALL: nkda  PMH as above  Psurg Hx: cholecystectomy, appendectomy  SH: +smoker, denies ETOH  Meds: albuterol MDI  Pulm Dr Nelson   Cardio Dr Wen   PCP Dr Vega

## 2023-03-13 NOTE — ED PROVIDER NOTE - CLINICAL SUMMARY MEDICAL DECISION MAKING FREE TEXT BOX
58 yo M with PMH of obesity, HH, HTN, HLD, COPD (not on home O2), REBECCA on bipap, and osteoarthritis presents for SOB, cough, and wheezing x 4 hours PTA after doing some strenuous work outdoors.  Given nebs/steroids. Observed in ER for several hours while getting treatments and getting results of workup. No acute pneumonia. Labs ok (K is hemolyzed but BUN/Cr normal and EKG shows no peaked Ts therefore agree this is a hemolyzed and inaccurate serum K reported by lab). VBG done showed normal K.    Pt felt better after treatment/meds. Requesting to go home. To dc with care instructions and return precautions.

## 2023-03-13 NOTE — ED ADULT NURSE NOTE - DISCHARGE DATE/TIME
Is This A New Presentation, Or A Follow-Up?: Skin Lesions
How Severe Is Your Skin Lesion?: mild
Have Your Skin Lesions Been Treated?: not been treated
14-Mar-2023 01:03

## 2023-03-14 VITALS
OXYGEN SATURATION: 99 % | TEMPERATURE: 98 F | RESPIRATION RATE: 18 BRPM | DIASTOLIC BLOOD PRESSURE: 92 MMHG | SYSTOLIC BLOOD PRESSURE: 142 MMHG | HEART RATE: 75 BPM

## 2023-03-14 LAB
ALBUMIN SERPL ELPH-MCNC: 3.9 G/DL — SIGNIFICANT CHANGE UP (ref 3.5–5.2)
ALP SERPL-CCNC: 116 U/L — HIGH (ref 30–115)
ALT FLD-CCNC: 20 U/L — SIGNIFICANT CHANGE UP (ref 0–41)
ANION GAP SERPL CALC-SCNC: 6 MMOL/L — LOW (ref 7–14)
AST SERPL-CCNC: 34 U/L — SIGNIFICANT CHANGE UP (ref 0–41)
BILIRUB SERPL-MCNC: 0.3 MG/DL — SIGNIFICANT CHANGE UP (ref 0.2–1.2)
BUN SERPL-MCNC: 6 MG/DL — LOW (ref 10–20)
CALCIUM SERPL-MCNC: 9.2 MG/DL — SIGNIFICANT CHANGE UP (ref 8.4–10.5)
CHLORIDE SERPL-SCNC: 96 MMOL/L — LOW (ref 98–110)
CO2 SERPL-SCNC: 32 MMOL/L — SIGNIFICANT CHANGE UP (ref 17–32)
CREAT SERPL-MCNC: 0.7 MG/DL — SIGNIFICANT CHANGE UP (ref 0.7–1.5)
EGFR: 109 ML/MIN/1.73M2 — SIGNIFICANT CHANGE UP
GLUCOSE SERPL-MCNC: 99 MG/DL — SIGNIFICANT CHANGE UP (ref 70–99)
POTASSIUM SERPL-MCNC: 6.3 MMOL/L — CRITICAL HIGH (ref 3.5–5)
POTASSIUM SERPL-SCNC: 6.3 MMOL/L — CRITICAL HIGH (ref 3.5–5)
PROT SERPL-MCNC: 7 G/DL — SIGNIFICANT CHANGE UP (ref 6–8)
SODIUM SERPL-SCNC: 134 MMOL/L — LOW (ref 135–146)

## 2023-03-14 RX ORDER — ALBUTEROL 90 UG/1
1 AEROSOL, METERED ORAL ONCE
Refills: 0 | Status: COMPLETED | OUTPATIENT
Start: 2023-03-14 | End: 2023-03-14

## 2023-03-14 RX ADMIN — ALBUTEROL 1 PUFF(S): 90 AEROSOL, METERED ORAL at 00:59

## 2023-04-07 ENCOUNTER — APPOINTMENT (OUTPATIENT)
Dept: ORTHOPEDIC SURGERY | Facility: CLINIC | Age: 58
End: 2023-04-07

## 2023-04-13 ENCOUNTER — EMERGENCY (EMERGENCY)
Facility: HOSPITAL | Age: 58
LOS: 0 days | Discharge: HOME | End: 2023-04-13
Attending: EMERGENCY MEDICINE
Payer: MEDICAID

## 2023-04-13 VITALS
TEMPERATURE: 98 F | HEART RATE: 76 BPM | OXYGEN SATURATION: 97 % | HEIGHT: 70 IN | DIASTOLIC BLOOD PRESSURE: 79 MMHG | WEIGHT: 289.91 LBS | SYSTOLIC BLOOD PRESSURE: 129 MMHG | RESPIRATION RATE: 18 BRPM

## 2023-04-13 DIAGNOSIS — Z98.890 OTHER SPECIFIED POSTPROCEDURAL STATES: Chronic | ICD-10-CM

## 2023-04-13 DIAGNOSIS — K40.20 BILATERAL INGUINAL HERNIA, WITHOUT OBSTRUCTION OR GANGRENE, NOT SPECIFIED AS RECURRENT: Chronic | ICD-10-CM

## 2023-04-13 DIAGNOSIS — Z96.651 PRESENCE OF RIGHT ARTIFICIAL KNEE JOINT: Chronic | ICD-10-CM

## 2023-04-13 LAB
ALBUMIN SERPL ELPH-MCNC: 4 G/DL — SIGNIFICANT CHANGE UP (ref 3.5–5.2)
ALP SERPL-CCNC: 118 U/L — HIGH (ref 30–115)
ALT FLD-CCNC: 17 U/L — SIGNIFICANT CHANGE UP (ref 0–41)
ANION GAP SERPL CALC-SCNC: 10 MMOL/L — SIGNIFICANT CHANGE UP (ref 7–14)
AST SERPL-CCNC: 25 U/L — SIGNIFICANT CHANGE UP (ref 0–41)
BASE EXCESS BLDV CALC-SCNC: 3 MMOL/L — SIGNIFICANT CHANGE UP (ref -2–3)
BASOPHILS # BLD AUTO: 0.05 K/UL — SIGNIFICANT CHANGE UP (ref 0–0.2)
BASOPHILS NFR BLD AUTO: 0.4 % — SIGNIFICANT CHANGE UP (ref 0–1)
BILIRUB SERPL-MCNC: 0.4 MG/DL — SIGNIFICANT CHANGE UP (ref 0.2–1.2)
BUN SERPL-MCNC: 7 MG/DL — LOW (ref 10–20)
CA-I SERPL-SCNC: 1.2 MMOL/L — SIGNIFICANT CHANGE UP (ref 1.15–1.33)
CALCIUM SERPL-MCNC: 8.8 MG/DL — SIGNIFICANT CHANGE UP (ref 8.4–10.5)
CHLORIDE SERPL-SCNC: 93 MMOL/L — LOW (ref 98–110)
CO2 SERPL-SCNC: 28 MMOL/L — SIGNIFICANT CHANGE UP (ref 17–32)
CREAT SERPL-MCNC: 0.6 MG/DL — LOW (ref 0.7–1.5)
EGFR: 114 ML/MIN/1.73M2 — SIGNIFICANT CHANGE UP
EOSINOPHIL # BLD AUTO: 0.07 K/UL — SIGNIFICANT CHANGE UP (ref 0–0.7)
EOSINOPHIL NFR BLD AUTO: 0.6 % — SIGNIFICANT CHANGE UP (ref 0–8)
GAS PNL BLDV: 130 MMOL/L — LOW (ref 136–145)
GAS PNL BLDV: SIGNIFICANT CHANGE UP
GLUCOSE SERPL-MCNC: 138 MG/DL — HIGH (ref 70–99)
HCO3 BLDV-SCNC: 30 MMOL/L — HIGH (ref 22–29)
HCT VFR BLD CALC: 42.2 % — SIGNIFICANT CHANGE UP (ref 42–52)
HCT VFR BLDA CALC: 43 % — SIGNIFICANT CHANGE UP (ref 39–51)
HGB BLD CALC-MCNC: 14.3 G/DL — SIGNIFICANT CHANGE UP (ref 12.6–17.4)
HGB BLD-MCNC: 14.1 G/DL — SIGNIFICANT CHANGE UP (ref 14–18)
IMM GRANULOCYTES NFR BLD AUTO: 0.3 % — SIGNIFICANT CHANGE UP (ref 0.1–0.3)
LACTATE BLDV-MCNC: 2.4 MMOL/L — HIGH (ref 0.5–2)
LYMPHOCYTES # BLD AUTO: 1.52 K/UL — SIGNIFICANT CHANGE UP (ref 1.2–3.4)
LYMPHOCYTES # BLD AUTO: 13.3 % — LOW (ref 20.5–51.1)
MCHC RBC-ENTMCNC: 29.2 PG — SIGNIFICANT CHANGE UP (ref 27–31)
MCHC RBC-ENTMCNC: 33.4 G/DL — SIGNIFICANT CHANGE UP (ref 32–37)
MCV RBC AUTO: 87.4 FL — SIGNIFICANT CHANGE UP (ref 80–94)
MONOCYTES # BLD AUTO: 0.6 K/UL — SIGNIFICANT CHANGE UP (ref 0.1–0.6)
MONOCYTES NFR BLD AUTO: 5.2 % — SIGNIFICANT CHANGE UP (ref 1.7–9.3)
NEUTROPHILS # BLD AUTO: 9.16 K/UL — HIGH (ref 1.4–6.5)
NEUTROPHILS NFR BLD AUTO: 80.2 % — HIGH (ref 42.2–75.2)
NRBC # BLD: 0 /100 WBCS — SIGNIFICANT CHANGE UP (ref 0–0)
NT-PROBNP SERPL-SCNC: 132 PG/ML — SIGNIFICANT CHANGE UP (ref 0–300)
PCO2 BLDV: 56 MMHG — HIGH (ref 42–55)
PH BLDV: 7.34 — SIGNIFICANT CHANGE UP (ref 7.32–7.43)
PLATELET # BLD AUTO: 233 K/UL — SIGNIFICANT CHANGE UP (ref 130–400)
PMV BLD: 10.3 FL — SIGNIFICANT CHANGE UP (ref 7.4–10.4)
PO2 BLDV: 45 MMHG — SIGNIFICANT CHANGE UP
POTASSIUM BLDV-SCNC: 3.6 MMOL/L — SIGNIFICANT CHANGE UP (ref 3.5–5.1)
POTASSIUM SERPL-MCNC: 3.9 MMOL/L — SIGNIFICANT CHANGE UP (ref 3.5–5)
POTASSIUM SERPL-SCNC: 3.9 MMOL/L — SIGNIFICANT CHANGE UP (ref 3.5–5)
PROT SERPL-MCNC: 6.6 G/DL — SIGNIFICANT CHANGE UP (ref 6–8)
RBC # BLD: 4.83 M/UL — SIGNIFICANT CHANGE UP (ref 4.7–6.1)
RBC # FLD: 14.7 % — HIGH (ref 11.5–14.5)
SAO2 % BLDV: 78.8 % — SIGNIFICANT CHANGE UP
SODIUM SERPL-SCNC: 131 MMOL/L — LOW (ref 135–146)
TROPONIN T SERPL-MCNC: <0.01 NG/ML — SIGNIFICANT CHANGE UP
WBC # BLD: 11.43 K/UL — HIGH (ref 4.8–10.8)
WBC # FLD AUTO: 11.43 K/UL — HIGH (ref 4.8–10.8)

## 2023-04-13 PROCEDURE — 84484 ASSAY OF TROPONIN QUANT: CPT

## 2023-04-13 PROCEDURE — 80053 COMPREHEN METABOLIC PANEL: CPT

## 2023-04-13 PROCEDURE — 85025 COMPLETE CBC W/AUTO DIFF WBC: CPT

## 2023-04-13 PROCEDURE — 82330 ASSAY OF CALCIUM: CPT

## 2023-04-13 PROCEDURE — 99285 EMERGENCY DEPT VISIT HI MDM: CPT

## 2023-04-13 PROCEDURE — 96374 THER/PROPH/DIAG INJ IV PUSH: CPT

## 2023-04-13 PROCEDURE — 93005 ELECTROCARDIOGRAM TRACING: CPT

## 2023-04-13 PROCEDURE — 96375 TX/PRO/DX INJ NEW DRUG ADDON: CPT

## 2023-04-13 PROCEDURE — 71045 X-RAY EXAM CHEST 1 VIEW: CPT

## 2023-04-13 PROCEDURE — 83605 ASSAY OF LACTIC ACID: CPT

## 2023-04-13 PROCEDURE — 82803 BLOOD GASES ANY COMBINATION: CPT

## 2023-04-13 PROCEDURE — 36415 COLL VENOUS BLD VENIPUNCTURE: CPT

## 2023-04-13 PROCEDURE — 71045 X-RAY EXAM CHEST 1 VIEW: CPT | Mod: 26

## 2023-04-13 PROCEDURE — 85018 HEMOGLOBIN: CPT

## 2023-04-13 PROCEDURE — 94640 AIRWAY INHALATION TREATMENT: CPT

## 2023-04-13 PROCEDURE — 84132 ASSAY OF SERUM POTASSIUM: CPT

## 2023-04-13 PROCEDURE — 99285 EMERGENCY DEPT VISIT HI MDM: CPT | Mod: 25

## 2023-04-13 PROCEDURE — 93010 ELECTROCARDIOGRAM REPORT: CPT

## 2023-04-13 PROCEDURE — 84295 ASSAY OF SERUM SODIUM: CPT

## 2023-04-13 PROCEDURE — 83880 ASSAY OF NATRIURETIC PEPTIDE: CPT

## 2023-04-13 PROCEDURE — 85014 HEMATOCRIT: CPT

## 2023-04-13 RX ORDER — MAGNESIUM SULFATE 500 MG/ML
2 VIAL (ML) INJECTION ONCE
Refills: 0 | Status: COMPLETED | OUTPATIENT
Start: 2023-04-13 | End: 2023-04-13

## 2023-04-13 RX ORDER — ALBUTEROL 90 UG/1
2.5 AEROSOL, METERED ORAL ONCE
Refills: 0 | Status: COMPLETED | OUTPATIENT
Start: 2023-04-13 | End: 2023-04-13

## 2023-04-13 RX ORDER — IPRATROPIUM/ALBUTEROL SULFATE 18-103MCG
3 AEROSOL WITH ADAPTER (GRAM) INHALATION ONCE
Refills: 0 | Status: COMPLETED | OUTPATIENT
Start: 2023-04-13 | End: 2023-04-13

## 2023-04-13 RX ORDER — DEXAMETHASONE 0.5 MG/5ML
10 ELIXIR ORAL ONCE
Refills: 0 | Status: COMPLETED | OUTPATIENT
Start: 2023-04-13 | End: 2023-04-13

## 2023-04-13 RX ORDER — SODIUM CHLORIDE 9 MG/ML
1000 INJECTION INTRAMUSCULAR; INTRAVENOUS; SUBCUTANEOUS ONCE
Refills: 0 | Status: COMPLETED | OUTPATIENT
Start: 2023-04-13 | End: 2023-04-13

## 2023-04-13 RX ADMIN — Medication 10 MILLIGRAM(S): at 11:20

## 2023-04-13 RX ADMIN — Medication 3 MILLILITER(S): at 11:20

## 2023-04-13 RX ADMIN — ALBUTEROL 2.5 MILLIGRAM(S): 90 AEROSOL, METERED ORAL at 12:24

## 2023-04-13 RX ADMIN — SODIUM CHLORIDE 1000 MILLILITER(S): 9 INJECTION INTRAMUSCULAR; INTRAVENOUS; SUBCUTANEOUS at 11:18

## 2023-04-13 RX ADMIN — Medication 150 GRAM(S): at 11:20

## 2023-04-13 NOTE — ED PROVIDER NOTE - OBJECTIVE STATEMENT
58 yo male, PMHx of HTN, HLD, COPD, presents with shortness of breath x4 days, improved with nebs, no aggravating factors, associated with coughing. Denies fevers, chills, chest pain, nausea, vomiting, headache, dizziness, abdominal pain, URI symptoms.

## 2023-04-13 NOTE — ED PROVIDER NOTE - NS ED ROS FT
GEN:  no fever, no chills, no generalized weakness  NEURO:  no headache, no dizziness  ENT: no sore throat, no runny nose  CV:  no chest pain, no palpitations  RESP:  +sob, +cough  GI:  no nausea, no vomiting, no abdominal pain, no diarrhea  :  no dysuria  MSK:  no joint pain, no edema  SKIN:  no rash, no bruising

## 2023-04-13 NOTE — ED PROVIDER NOTE - CONSIDERATION OF ADMISSION OBSERVATION
EKG non ischemic, CXR normal. Feels better.  VSS.  DC home. Strict return instructions discussed. Consideration of Admission/Observation

## 2023-04-13 NOTE — ED PROVIDER NOTE - PATIENT PORTAL LINK FT
You can access the FollowMyHealth Patient Portal offered by [facility] by registering at the following website: [email or web address]. By joining [employer]’s FollowMyHealth portal, you will also be able to view your health information using other applications (apps) compatible with our system.

## 2023-04-13 NOTE — ED PROVIDER NOTE - PHYSICAL EXAMINATION
CONSTITUTIONAL: Obese; well-nourished; in no acute distress.   SKIN: warm, dry  HEAD: Normocephalic  ENT: No nasal discharge; airway clear.  NECK: Supple; non tender.  CARD: S1, S2 normal. Regular rate and rhythm.   RESP: b/l diffuse wheezing  ABD: soft ntnd  EXT: Normal ROM.  No clubbing, cyanosis or edema.   NEURO: Alert, oriented, grossly unremarkable  PSYCH: Cooperative, appropriate.

## 2023-04-18 ENCOUNTER — INPATIENT (INPATIENT)
Facility: HOSPITAL | Age: 58
LOS: 3 days | Discharge: LEFT BEFORE DISCHARGE | DRG: 140 | End: 2023-04-22
Attending: HOSPITALIST | Admitting: HOSPITALIST
Payer: MEDICAID

## 2023-04-18 VITALS
DIASTOLIC BLOOD PRESSURE: 79 MMHG | TEMPERATURE: 97 F | HEIGHT: 70 IN | WEIGHT: 240.08 LBS | HEART RATE: 79 BPM | OXYGEN SATURATION: 97 % | RESPIRATION RATE: 20 BRPM | SYSTOLIC BLOOD PRESSURE: 166 MMHG

## 2023-04-18 DIAGNOSIS — Z96.651 PRESENCE OF RIGHT ARTIFICIAL KNEE JOINT: Chronic | ICD-10-CM

## 2023-04-18 DIAGNOSIS — Z98.890 OTHER SPECIFIED POSTPROCEDURAL STATES: Chronic | ICD-10-CM

## 2023-04-18 DIAGNOSIS — R07.9 CHEST PAIN, UNSPECIFIED: ICD-10-CM

## 2023-04-18 DIAGNOSIS — K40.20 BILATERAL INGUINAL HERNIA, WITHOUT OBSTRUCTION OR GANGRENE, NOT SPECIFIED AS RECURRENT: Chronic | ICD-10-CM

## 2023-04-18 LAB
ALBUMIN SERPL ELPH-MCNC: 4.5 G/DL — SIGNIFICANT CHANGE UP (ref 3.5–5.2)
ALP SERPL-CCNC: 137 U/L — HIGH (ref 30–115)
ALT FLD-CCNC: 22 U/L — SIGNIFICANT CHANGE UP (ref 0–41)
ANION GAP SERPL CALC-SCNC: 16 MMOL/L — HIGH (ref 7–14)
AST SERPL-CCNC: 22 U/L — SIGNIFICANT CHANGE UP (ref 0–41)
BASOPHILS # BLD AUTO: 0.03 K/UL — SIGNIFICANT CHANGE UP (ref 0–0.2)
BASOPHILS NFR BLD AUTO: 0.2 % — SIGNIFICANT CHANGE UP (ref 0–1)
BILIRUB SERPL-MCNC: 0.7 MG/DL — SIGNIFICANT CHANGE UP (ref 0.2–1.2)
BUN SERPL-MCNC: 12 MG/DL — SIGNIFICANT CHANGE UP (ref 10–20)
CALCIUM SERPL-MCNC: 10 MG/DL — SIGNIFICANT CHANGE UP (ref 8.4–10.4)
CHLORIDE SERPL-SCNC: 95 MMOL/L — LOW (ref 98–110)
CO2 SERPL-SCNC: 22 MMOL/L — SIGNIFICANT CHANGE UP (ref 17–32)
CREAT SERPL-MCNC: 0.7 MG/DL — SIGNIFICANT CHANGE UP (ref 0.7–1.5)
EGFR: 109 ML/MIN/1.73M2 — SIGNIFICANT CHANGE UP
EOSINOPHIL # BLD AUTO: 0 K/UL — SIGNIFICANT CHANGE UP (ref 0–0.7)
EOSINOPHIL NFR BLD AUTO: 0 % — SIGNIFICANT CHANGE UP (ref 0–8)
GLUCOSE SERPL-MCNC: 133 MG/DL — HIGH (ref 70–99)
HCT VFR BLD CALC: 49.3 % — SIGNIFICANT CHANGE UP (ref 42–52)
HGB BLD-MCNC: 17.7 G/DL — SIGNIFICANT CHANGE UP (ref 14–18)
IMM GRANULOCYTES NFR BLD AUTO: 0.3 % — SIGNIFICANT CHANGE UP (ref 0.1–0.3)
LACTATE SERPL-SCNC: 1.5 MMOL/L — SIGNIFICANT CHANGE UP (ref 0.7–2)
LIDOCAIN IGE QN: 11 U/L — SIGNIFICANT CHANGE UP (ref 7–60)
LYMPHOCYTES # BLD AUTO: 1.05 K/UL — LOW (ref 1.2–3.4)
LYMPHOCYTES # BLD AUTO: 8.3 % — LOW (ref 20.5–51.1)
MCHC RBC-ENTMCNC: 29.7 PG — SIGNIFICANT CHANGE UP (ref 27–31)
MCHC RBC-ENTMCNC: 35.9 G/DL — SIGNIFICANT CHANGE UP (ref 32–37)
MCV RBC AUTO: 82.9 FL — SIGNIFICANT CHANGE UP (ref 80–94)
MONOCYTES # BLD AUTO: 0.34 K/UL — SIGNIFICANT CHANGE UP (ref 0.1–0.6)
MONOCYTES NFR BLD AUTO: 2.7 % — SIGNIFICANT CHANGE UP (ref 1.7–9.3)
NEUTROPHILS # BLD AUTO: 11.2 K/UL — HIGH (ref 1.4–6.5)
NEUTROPHILS NFR BLD AUTO: 88.5 % — HIGH (ref 42.2–75.2)
NRBC # BLD: 0 /100 WBCS — SIGNIFICANT CHANGE UP (ref 0–0)
NT-PROBNP SERPL-SCNC: 147 PG/ML — SIGNIFICANT CHANGE UP (ref 0–300)
PLATELET # BLD AUTO: 296 K/UL — SIGNIFICANT CHANGE UP (ref 130–400)
PMV BLD: 9.4 FL — SIGNIFICANT CHANGE UP (ref 7.4–10.4)
POTASSIUM SERPL-MCNC: 4.8 MMOL/L — SIGNIFICANT CHANGE UP (ref 3.5–5)
POTASSIUM SERPL-SCNC: 4.8 MMOL/L — SIGNIFICANT CHANGE UP (ref 3.5–5)
PROT SERPL-MCNC: 7.7 G/DL — SIGNIFICANT CHANGE UP (ref 6–8)
RBC # BLD: 5.95 M/UL — SIGNIFICANT CHANGE UP (ref 4.7–6.1)
RBC # FLD: 15.2 % — HIGH (ref 11.5–14.5)
SODIUM SERPL-SCNC: 133 MMOL/L — LOW (ref 135–146)
TROPONIN T SERPL-MCNC: <0.01 NG/ML — SIGNIFICANT CHANGE UP
WBC # BLD: 12.66 K/UL — HIGH (ref 4.8–10.8)
WBC # FLD AUTO: 12.66 K/UL — HIGH (ref 4.8–10.8)

## 2023-04-18 PROCEDURE — 85025 COMPLETE CBC W/AUTO DIFF WBC: CPT

## 2023-04-18 PROCEDURE — 86850 RBC ANTIBODY SCREEN: CPT

## 2023-04-18 PROCEDURE — 83735 ASSAY OF MAGNESIUM: CPT

## 2023-04-18 PROCEDURE — 80053 COMPREHEN METABOLIC PANEL: CPT

## 2023-04-18 PROCEDURE — 99285 EMERGENCY DEPT VISIT HI MDM: CPT

## 2023-04-18 PROCEDURE — 93005 ELECTROCARDIOGRAM TRACING: CPT

## 2023-04-18 PROCEDURE — 80061 LIPID PANEL: CPT

## 2023-04-18 PROCEDURE — 36415 COLL VENOUS BLD VENIPUNCTURE: CPT

## 2023-04-18 PROCEDURE — 93010 ELECTROCARDIOGRAM REPORT: CPT

## 2023-04-18 PROCEDURE — 86900 BLOOD TYPING SEROLOGIC ABO: CPT

## 2023-04-18 PROCEDURE — 87640 STAPH A DNA AMP PROBE: CPT

## 2023-04-18 PROCEDURE — 71045 X-RAY EXAM CHEST 1 VIEW: CPT

## 2023-04-18 PROCEDURE — 87641 MR-STAPH DNA AMP PROBE: CPT

## 2023-04-18 PROCEDURE — 84484 ASSAY OF TROPONIN QUANT: CPT

## 2023-04-18 PROCEDURE — 85027 COMPLETE CBC AUTOMATED: CPT

## 2023-04-18 PROCEDURE — 0225U NFCT DS DNA&RNA 21 SARSCOV2: CPT

## 2023-04-18 PROCEDURE — 84145 PROCALCITONIN (PCT): CPT

## 2023-04-18 PROCEDURE — 85652 RBC SED RATE AUTOMATED: CPT

## 2023-04-18 PROCEDURE — 94660 CPAP INITIATION&MGMT: CPT

## 2023-04-18 PROCEDURE — 86901 BLOOD TYPING SEROLOGIC RH(D): CPT

## 2023-04-18 PROCEDURE — 71275 CT ANGIOGRAPHY CHEST: CPT | Mod: 26,MA

## 2023-04-18 PROCEDURE — 74174 CTA ABD&PLVS W/CONTRAST: CPT | Mod: 26,MA

## 2023-04-18 PROCEDURE — 84100 ASSAY OF PHOSPHORUS: CPT

## 2023-04-18 PROCEDURE — 94640 AIRWAY INHALATION TREATMENT: CPT

## 2023-04-18 PROCEDURE — 86140 C-REACTIVE PROTEIN: CPT

## 2023-04-18 RX ORDER — ONDANSETRON 8 MG/1
4 TABLET, FILM COATED ORAL ONCE
Refills: 0 | Status: COMPLETED | OUTPATIENT
Start: 2023-04-18 | End: 2023-04-18

## 2023-04-18 RX ORDER — MORPHINE SULFATE 50 MG/1
4 CAPSULE, EXTENDED RELEASE ORAL ONCE
Refills: 0 | Status: DISCONTINUED | OUTPATIENT
Start: 2023-04-18 | End: 2023-04-18

## 2023-04-18 RX ORDER — MORPHINE SULFATE 50 MG/1
8 CAPSULE, EXTENDED RELEASE ORAL ONCE
Refills: 0 | Status: DISCONTINUED | OUTPATIENT
Start: 2023-04-18 | End: 2023-04-18

## 2023-04-18 RX ADMIN — MORPHINE SULFATE 8 MILLIGRAM(S): 50 CAPSULE, EXTENDED RELEASE ORAL at 22:49

## 2023-04-18 RX ADMIN — MORPHINE SULFATE 8 MILLIGRAM(S): 50 CAPSULE, EXTENDED RELEASE ORAL at 19:58

## 2023-04-18 RX ADMIN — ONDANSETRON 4 MILLIGRAM(S): 8 TABLET, FILM COATED ORAL at 22:18

## 2023-04-18 RX ADMIN — ONDANSETRON 4 MILLIGRAM(S): 8 TABLET, FILM COATED ORAL at 19:58

## 2023-04-18 RX ADMIN — MORPHINE SULFATE 4 MILLIGRAM(S): 50 CAPSULE, EXTENDED RELEASE ORAL at 22:18

## 2023-04-18 NOTE — ED PROVIDER NOTE - OBJECTIVE STATEMENT
57 year old male with pmhx of htn, hld, copd presents to ed with chest and abdominal pain x 1 day. pain sharp, stabbing and constant. + multiple episodes of vomiting. Pt denies sob, cough, fever, chills, or diarrhea.

## 2023-04-18 NOTE — ED PROVIDER NOTE - CLINICAL SUMMARY MEDICAL DECISION MAKING FREE TEXT BOX
Patient presented with chest and abdominal pain x 1 day. (+) tender on exam but otherwise afebrile, HD stable. EKG obtained and non-ischemic. Obtained labs which were grossly unremarkable including no significant leukocytosis, anemia, signs of dehydration/TE[place]ORO, transaminitis or significant electrolyte abnormalities. Trop negative. CTA c/a/p negative for dissection or any other emergent pathologies. Despite tx in ED patient unable to tolerate PO, in persistent pain. Given the above, will require admission for further IV hydration and work up. Patient agreeable with plan. HD stable at time of admission.

## 2023-04-18 NOTE — ED PROVIDER NOTE - CONSIDERATION OF ADMISSION OBSERVATION
Patient with persistent chest and abdominal pain despite negative work up and treatment in ED, unable to tolerate PO. Will require admission for further IV hydration and work up. Consideration of Admission/Observation

## 2023-04-18 NOTE — ED PROVIDER NOTE - DIFFERENTIAL DIAGNOSIS
Chest/abd pain r/o ACS, dissection, PE, esophageal rupture, tamponade, pneumothorax, obstruction, perforation, abscess, appendicitis, ischemia, hepatobiliary abnormality or any other emergent intra-abdominal pathology. Differential Diagnosis

## 2023-04-19 LAB
ALBUMIN SERPL ELPH-MCNC: 4.6 G/DL — SIGNIFICANT CHANGE UP (ref 3.5–5.2)
ALP SERPL-CCNC: 129 U/L — HIGH (ref 30–115)
ALT FLD-CCNC: 22 U/L — SIGNIFICANT CHANGE UP (ref 0–41)
ANION GAP SERPL CALC-SCNC: 16 MMOL/L — HIGH (ref 7–14)
AST SERPL-CCNC: 23 U/L — SIGNIFICANT CHANGE UP (ref 0–41)
BASOPHILS # BLD AUTO: 0.04 K/UL — SIGNIFICANT CHANGE UP (ref 0–0.2)
BASOPHILS NFR BLD AUTO: 0.2 % — SIGNIFICANT CHANGE UP (ref 0–1)
BILIRUB SERPL-MCNC: 0.8 MG/DL — SIGNIFICANT CHANGE UP (ref 0.2–1.2)
BUN SERPL-MCNC: 13 MG/DL — SIGNIFICANT CHANGE UP (ref 10–20)
CALCIUM SERPL-MCNC: 10 MG/DL — SIGNIFICANT CHANGE UP (ref 8.4–10.4)
CHLORIDE SERPL-SCNC: 95 MMOL/L — LOW (ref 98–110)
CO2 SERPL-SCNC: 26 MMOL/L — SIGNIFICANT CHANGE UP (ref 17–32)
CREAT SERPL-MCNC: 0.9 MG/DL — SIGNIFICANT CHANGE UP (ref 0.7–1.5)
EGFR: 101 ML/MIN/1.73M2 — SIGNIFICANT CHANGE UP
EOSINOPHIL # BLD AUTO: 0.02 K/UL — SIGNIFICANT CHANGE UP (ref 0–0.7)
EOSINOPHIL NFR BLD AUTO: 0.1 % — SIGNIFICANT CHANGE UP (ref 0–8)
GLUCOSE SERPL-MCNC: 110 MG/DL — HIGH (ref 70–99)
HCT VFR BLD CALC: 47.5 % — SIGNIFICANT CHANGE UP (ref 42–52)
HGB BLD-MCNC: 16.4 G/DL — SIGNIFICANT CHANGE UP (ref 14–18)
IMM GRANULOCYTES NFR BLD AUTO: 0.4 % — HIGH (ref 0.1–0.3)
LYMPHOCYTES # BLD AUTO: 15.8 % — LOW (ref 20.5–51.1)
LYMPHOCYTES # BLD AUTO: 2.66 K/UL — SIGNIFICANT CHANGE UP (ref 1.2–3.4)
MAGNESIUM SERPL-MCNC: 2.4 MG/DL — SIGNIFICANT CHANGE UP (ref 1.8–2.4)
MCHC RBC-ENTMCNC: 29.4 PG — SIGNIFICANT CHANGE UP (ref 27–31)
MCHC RBC-ENTMCNC: 34.5 G/DL — SIGNIFICANT CHANGE UP (ref 32–37)
MCV RBC AUTO: 85.3 FL — SIGNIFICANT CHANGE UP (ref 80–94)
MONOCYTES # BLD AUTO: 1.05 K/UL — HIGH (ref 0.1–0.6)
MONOCYTES NFR BLD AUTO: 6.2 % — SIGNIFICANT CHANGE UP (ref 1.7–9.3)
MRSA PCR RESULT.: NEGATIVE — SIGNIFICANT CHANGE UP
NEUTROPHILS # BLD AUTO: 13.01 K/UL — HIGH (ref 1.4–6.5)
NEUTROPHILS NFR BLD AUTO: 77.3 % — HIGH (ref 42.2–75.2)
NRBC # BLD: 0 /100 WBCS — SIGNIFICANT CHANGE UP (ref 0–0)
PHOSPHATE SERPL-MCNC: 4.4 MG/DL — SIGNIFICANT CHANGE UP (ref 2.1–4.9)
PLATELET # BLD AUTO: 274 K/UL — SIGNIFICANT CHANGE UP (ref 130–400)
PMV BLD: 9.4 FL — SIGNIFICANT CHANGE UP (ref 7.4–10.4)
POTASSIUM SERPL-MCNC: 4.8 MMOL/L — SIGNIFICANT CHANGE UP (ref 3.5–5)
POTASSIUM SERPL-SCNC: 4.8 MMOL/L — SIGNIFICANT CHANGE UP (ref 3.5–5)
PROT SERPL-MCNC: 7.7 G/DL — SIGNIFICANT CHANGE UP (ref 6–8)
RAPID RVP RESULT: SIGNIFICANT CHANGE UP
RBC # BLD: 5.57 M/UL — SIGNIFICANT CHANGE UP (ref 4.7–6.1)
RBC # FLD: 15.5 % — HIGH (ref 11.5–14.5)
SARS-COV-2 RNA SPEC QL NAA+PROBE: SIGNIFICANT CHANGE UP
SODIUM SERPL-SCNC: 137 MMOL/L — SIGNIFICANT CHANGE UP (ref 135–146)
TROPONIN T SERPL-MCNC: <0.01 NG/ML — SIGNIFICANT CHANGE UP
WBC # BLD: 16.85 K/UL — HIGH (ref 4.8–10.8)
WBC # FLD AUTO: 16.85 K/UL — HIGH (ref 4.8–10.8)

## 2023-04-19 PROCEDURE — 99223 1ST HOSP IP/OBS HIGH 75: CPT

## 2023-04-19 PROCEDURE — 71045 X-RAY EXAM CHEST 1 VIEW: CPT | Mod: 26

## 2023-04-19 RX ORDER — PANTOPRAZOLE SODIUM 20 MG/1
40 TABLET, DELAYED RELEASE ORAL
Refills: 0 | Status: DISCONTINUED | OUTPATIENT
Start: 2023-04-19 | End: 2023-04-22

## 2023-04-19 RX ORDER — ACETAMINOPHEN 500 MG
1000 TABLET ORAL ONCE
Refills: 0 | Status: COMPLETED | OUTPATIENT
Start: 2023-04-19 | End: 2023-04-19

## 2023-04-19 RX ORDER — AZITHROMYCIN 500 MG/1
500 TABLET, FILM COATED ORAL ONCE
Refills: 0 | Status: COMPLETED | OUTPATIENT
Start: 2023-04-19 | End: 2023-04-19

## 2023-04-19 RX ORDER — FENOFIBRATE,MICRONIZED 130 MG
145 CAPSULE ORAL DAILY
Refills: 0 | Status: DISCONTINUED | OUTPATIENT
Start: 2023-04-19 | End: 2023-04-22

## 2023-04-19 RX ORDER — METOCLOPRAMIDE HCL 10 MG
10 TABLET ORAL EVERY 8 HOURS
Refills: 0 | Status: DISCONTINUED | OUTPATIENT
Start: 2023-04-19 | End: 2023-04-21

## 2023-04-19 RX ORDER — MORPHINE SULFATE 50 MG/1
2 CAPSULE, EXTENDED RELEASE ORAL ONCE
Refills: 0 | Status: DISCONTINUED | OUTPATIENT
Start: 2023-04-19 | End: 2023-04-19

## 2023-04-19 RX ORDER — ZOLPIDEM TARTRATE 10 MG/1
5 TABLET ORAL AT BEDTIME
Refills: 0 | Status: DISCONTINUED | OUTPATIENT
Start: 2023-04-19 | End: 2023-04-21

## 2023-04-19 RX ORDER — AZITHROMYCIN 500 MG/1
TABLET, FILM COATED ORAL
Refills: 0 | Status: DISCONTINUED | OUTPATIENT
Start: 2023-04-19 | End: 2023-04-20

## 2023-04-19 RX ORDER — NICOTINE POLACRILEX 2 MG
1 GUM BUCCAL ONCE
Refills: 0 | Status: COMPLETED | OUTPATIENT
Start: 2023-04-19 | End: 2023-04-20

## 2023-04-19 RX ORDER — METOCLOPRAMIDE HCL 10 MG
10 TABLET ORAL EVERY 8 HOURS
Refills: 0 | Status: DISCONTINUED | OUTPATIENT
Start: 2023-04-19 | End: 2023-04-19

## 2023-04-19 RX ORDER — SODIUM CHLORIDE 9 MG/ML
1000 INJECTION, SOLUTION INTRAVENOUS
Refills: 0 | Status: DISCONTINUED | OUTPATIENT
Start: 2023-04-19 | End: 2023-04-20

## 2023-04-19 RX ORDER — HYDROMORPHONE HYDROCHLORIDE 2 MG/ML
1 INJECTION INTRAMUSCULAR; INTRAVENOUS; SUBCUTANEOUS ONCE
Refills: 0 | Status: DISCONTINUED | OUTPATIENT
Start: 2023-04-19 | End: 2023-04-19

## 2023-04-19 RX ORDER — ENOXAPARIN SODIUM 100 MG/ML
40 INJECTION SUBCUTANEOUS EVERY 24 HOURS
Refills: 0 | Status: DISCONTINUED | OUTPATIENT
Start: 2023-04-19 | End: 2023-04-22

## 2023-04-19 RX ORDER — SIMVASTATIN 20 MG/1
20 TABLET, FILM COATED ORAL AT BEDTIME
Refills: 0 | Status: DISCONTINUED | OUTPATIENT
Start: 2023-04-19 | End: 2023-04-22

## 2023-04-19 RX ORDER — IPRATROPIUM/ALBUTEROL SULFATE 18-103MCG
3 AEROSOL WITH ADAPTER (GRAM) INHALATION EVERY 6 HOURS
Refills: 0 | Status: DISCONTINUED | OUTPATIENT
Start: 2023-04-19 | End: 2023-04-22

## 2023-04-19 RX ORDER — AZITHROMYCIN 500 MG/1
500 TABLET, FILM COATED ORAL EVERY 24 HOURS
Refills: 0 | Status: DISCONTINUED | OUTPATIENT
Start: 2023-04-20 | End: 2023-04-20

## 2023-04-19 RX ORDER — CEFTRIAXONE 500 MG/1
INJECTION, POWDER, FOR SOLUTION INTRAMUSCULAR; INTRAVENOUS
Refills: 0 | Status: DISCONTINUED | OUTPATIENT
Start: 2023-04-19 | End: 2023-04-19

## 2023-04-19 RX ORDER — OXYCODONE HYDROCHLORIDE 5 MG/1
30 TABLET ORAL EVERY 4 HOURS
Refills: 0 | Status: DISCONTINUED | OUTPATIENT
Start: 2023-04-19 | End: 2023-04-22

## 2023-04-19 RX ORDER — ALPRAZOLAM 0.25 MG
2 TABLET ORAL EVERY 6 HOURS
Refills: 0 | Status: DISCONTINUED | OUTPATIENT
Start: 2023-04-19 | End: 2023-04-22

## 2023-04-19 RX ORDER — CEFTRIAXONE 500 MG/1
1000 INJECTION, POWDER, FOR SOLUTION INTRAMUSCULAR; INTRAVENOUS ONCE
Refills: 0 | Status: COMPLETED | OUTPATIENT
Start: 2023-04-19 | End: 2023-04-19

## 2023-04-19 RX ADMIN — MORPHINE SULFATE 2 MILLIGRAM(S): 50 CAPSULE, EXTENDED RELEASE ORAL at 12:09

## 2023-04-19 RX ADMIN — CEFTRIAXONE 100 MILLIGRAM(S): 500 INJECTION, POWDER, FOR SOLUTION INTRAMUSCULAR; INTRAVENOUS at 04:08

## 2023-04-19 RX ADMIN — AZITHROMYCIN 500 MILLIGRAM(S): 500 TABLET, FILM COATED ORAL at 05:11

## 2023-04-19 RX ADMIN — HYDROMORPHONE HYDROCHLORIDE 1 MILLIGRAM(S): 2 INJECTION INTRAMUSCULAR; INTRAVENOUS; SUBCUTANEOUS at 04:00

## 2023-04-19 RX ADMIN — Medication 10 MILLIGRAM(S): at 05:10

## 2023-04-19 RX ADMIN — SIMVASTATIN 20 MILLIGRAM(S): 20 TABLET, FILM COATED ORAL at 23:27

## 2023-04-19 RX ADMIN — Medication 2 MILLIGRAM(S): at 15:20

## 2023-04-19 RX ADMIN — MORPHINE SULFATE 2 MILLIGRAM(S): 50 CAPSULE, EXTENDED RELEASE ORAL at 12:46

## 2023-04-19 RX ADMIN — ZOLPIDEM TARTRATE 5 MILLIGRAM(S): 10 TABLET ORAL at 05:10

## 2023-04-19 RX ADMIN — Medication 145 MILLIGRAM(S): at 15:14

## 2023-04-19 RX ADMIN — Medication 10 MILLIGRAM(S): at 21:16

## 2023-04-19 RX ADMIN — OXYCODONE HYDROCHLORIDE 30 MILLIGRAM(S): 5 TABLET ORAL at 21:16

## 2023-04-19 RX ADMIN — Medication 10 MILLIGRAM(S): at 15:14

## 2023-04-19 RX ADMIN — Medication 30 MILLILITER(S): at 17:38

## 2023-04-19 RX ADMIN — OXYCODONE HYDROCHLORIDE 30 MILLIGRAM(S): 5 TABLET ORAL at 15:50

## 2023-04-19 RX ADMIN — Medication 2 MILLIGRAM(S): at 08:35

## 2023-04-19 RX ADMIN — OXYCODONE HYDROCHLORIDE 30 MILLIGRAM(S): 5 TABLET ORAL at 15:20

## 2023-04-19 RX ADMIN — Medication 400 MILLIGRAM(S): at 17:38

## 2023-04-19 RX ADMIN — ZOLPIDEM TARTRATE 5 MILLIGRAM(S): 10 TABLET ORAL at 23:20

## 2023-04-19 RX ADMIN — OXYCODONE HYDROCHLORIDE 30 MILLIGRAM(S): 5 TABLET ORAL at 08:31

## 2023-04-19 RX ADMIN — OXYCODONE HYDROCHLORIDE 30 MILLIGRAM(S): 5 TABLET ORAL at 09:03

## 2023-04-19 NOTE — H&P ADULT - HISTORY OF PRESENT ILLNESS
57 year old male with pmhx of htn, hld, copd presents to ed with chest and abdominal pain x 1 day. pain sharp, stabbing and constant. + multiple episodes of vomiting. Pt denies sob, cough, fever, chills, or diarrhea    Vital Signs Last 24 Hrs  T(C): 37 (18 Apr 2023 23:36), Max: 37 (18 Apr 2023 23:36)  T(F): 98.6 (18 Apr 2023 23:36), Max: 98.6 (18 Apr 2023 23:36)  HR: 95 (18 Apr 2023 23:36) (79 - 95)  BP: 115/61 (18 Apr 2023 23:36) (115/61 - 166/79)  BP(mean): 78 (18 Apr 2023 23:36) (78 - 78)  RR: 20 (18 Apr 2023 18:14) (20 - 20)  SpO2: 85% (18 Apr 2023 23:36) (85% - 97%)    Parameters below as of 18 Apr 2023 18:14  Patient On (Oxygen Delivery Method): room air     57 year old male with PMHx of HTN, HLD, Chronic PAx of smoking)  , copd presents to ed with chest and abdominal pain x 1 day. pain sharp, stabbing and constant. + multiple episodes of vomiting. Pt denies sob, cough, fever, chills, or diarrhea    Vital Signs Last 24 Hrs  T(C): 37 (18 Apr 2023 23:36), Max: 37 (18 Apr 2023 23:36)  T(F): 98.6 (18 Apr 2023 23:36), Max: 98.6 (18 Apr 2023 23:36)  HR: 95 (18 Apr 2023 23:36) (79 - 95)  BP: 115/61 (18 Apr 2023 23:36) (115/61 - 166/79)  BP(mean): 78 (18 Apr 2023 23:36) (78 - 78)  RR: 20 (18 Apr 2023 18:14) (20 - 20)  SpO2: 85% (18 Apr 2023 23:36) (85% - 97%)    Parameters below as of 18 Apr 2023 18:14  Patient On (Oxygen Delivery Method): room air     57 year old male with PMHx of HTN, HLD, Chronic Pain (Bilateral knee, hip replacement with pain, takes oxycodone) and COPD (Hx smoking) presented to the ED with chest and abdominal pain x 1 day. Patient describes that the past 2 days he developed acute epigastric abdominal pain associated with persistent vomiting (red tinged) non bilious in color. Symptoms associated with severe epigastric pain sharp, stabbing and constant as well as multiple episodes of watery diarrhea.  Mentions having SOB also, specially on . Pt denies sob, cough, fever, sick constant or recent travel.     Vital Signs Last 24 Hrs  T(C): 37 (18 Apr 2023 23:36), Max: 37 (18 Apr 2023 23:36)  T(F): 98.6 (18 Apr 2023 23:36), Max: 98.6 (18 Apr 2023 23:36)  HR: 95 (18 Apr 2023 23:36) (79 - 95)  BP: 115/61 (18 Apr 2023 23:36) (115/61 - 166/79)  BP(mean): 78 (18 Apr 2023 23:36) (78 - 78)  RR: 20 (18 Apr 2023 18:14) (20 - 20)  SpO2: 85% (18 Apr 2023 23:36) (85% - 97%)    Parameters below as of 18 Apr 2023 18:14  Patient On (Oxygen Delivery Method): room air

## 2023-04-19 NOTE — CONSULT NOTE ADULT - SUBJECTIVE AND OBJECTIVE BOX
Gastroenterology Consultation: Abdominal pain and diarrhea     Patient is a 57y old  Male who presents with a chief complaint of abdominal pain (19 Apr 2023 03:24)    Admitted on: 04-18-23  HPI:  57 year old male with PMHx of HTN, HLD, Chronic Pain (Bilateral knee, hip replacement with pain, takes oxycodone) and COPD (Hx smoking) presented to the ED with chest and abdominal pain x 1 day. Patient describes that the past 2 days he developed acute epigastric abdominal pain associated with persistent vomiting (red tinged) non bilious in color. Symptoms associated with severe epigastric pain sharp, stabbing and constant as well as multiple episodes of watery diarrhea.  Mentions having SOB also, specially on . Pt denies sob, cough, fever, sick constant or recent travel.     Vital Signs Last 24 Hrs  T(C): 37 (18 Apr 2023 23:36), Max: 37 (18 Apr 2023 23:36)  T(F): 98.6 (18 Apr 2023 23:36), Max: 98.6 (18 Apr 2023 23:36)  HR: 95 (18 Apr 2023 23:36) (79 - 95)  BP: 115/61 (18 Apr 2023 23:36) (115/61 - 166/79)  BP(mean): 78 (18 Apr 2023 23:36) (78 - 78)  RR: 20 (18 Apr 2023 18:14) (20 - 20)  SpO2: 85% (18 Apr 2023 23:36) (85% - 97%)    GI HX: 56 y/o M with PMHx of HTN, HLD, COPD, GERD, Chronic pain presenting with diffuse abdominal pain and diarrhea associated with nausea and vomiting with decreased PO intake since Friday. Describes the abdominal pain as constant and throbbing; radiating to the lower back; aggravated by food and with no alleviating factors. He is also having Non-bloody and non-mucoid diarrhea; 10-15 loose yellow stool per day. Patient is also endorsing NBNB emesis with poor PO intake. Patient denies any sick contacts, any recent travels or antibiotics use. Patient has hx of GERD controlled on PPI, patient denies dysphagia, odynophagia or weight loss. Patient denies any melena or hematochezia. Patient has hx of constipation and states that he takes colace daily for BMs. Patient is a daily smoker, denies alcohol or NSAIDs use. Patient had a EGD but never had colonoscopies. Patient denies any FHx of GI malignancy.       Prior records Reviewed (Y/N): Y  History obtained from person other than patient (Y/N): N    Prior EGD:  Prior Colonoscopy:      PAST MEDICAL & SURGICAL HISTORY:  HTN (hypertension)  High cholesterol  GERD (gastroesophageal reflux disease)  Morbid obesity  Osteoarthritis  Hiatal hernia  GERD  Colitis  LARGE INTESTINE   NO RECENT FLARES  COPD (chronic obstructive pulmonary disease)  REBECCA treated with BiPAP  History of cholecystectomy  History of appendectomy  History of knee replacement procedure of right knee  BILATERAL  Hernia, inguinal, bilateral  3 months old  H/O knee surgery  arthoscopic x4  H/O foot surgery  right big toe surgery    FAMILY HISTORY:  FH: lung cancer (Mother)  mother    FH: diabetes mellitus    Social History:  Tobacco: Yes 1/2 pack/day  Alcohol: none  Drugs: none    Home Medications:  ALPRAZolam 2 mg oral tablet: 1 tab(s) orally every 6 hours, As needed, anxiety (19 Apr 2023 04:00)  Ambien 10 mg oral tablet: 0.5 tab(s) orally once a day (at bedtime), As Needed (19 Apr 2023 04:00)  fenofibrate 160 mg oral tablet: 1 tab(s) orally once a day (19 Apr 2023 04:00)  Lasix 40 mg oral tablet: 1 tab(s) orally once a day (19 Apr 2023 04:00)  lisinopril 5 mg oral tablet: 1 tab(s) orally once a day (19 Apr 2023 04:00)  Zocor 20 mg oral tablet: 1 tab(s) orally once a day (at bedtime) (19 Apr 2023 04:00)    MEDICATIONS  (STANDING):  azithromycin  IVPB      cefTRIAXone   IVPB      enoxaparin Injectable 40 milliGRAM(s) SubCutaneous every 24 hours  fenofibrate Tablet 145 milliGRAM(s) Oral daily  lactated ringers. 1000 milliLiter(s) (75 mL/Hr) IV Continuous <Continuous>  metoclopramide Injectable 10 milliGRAM(s) IV Push every 8 hours  pantoprazole    Tablet 40 milliGRAM(s) Oral before breakfast  simvastatin 20 milliGRAM(s) Oral at bedtime    MEDICATIONS  (PRN):  albuterol/ipratropium for Nebulization 3 milliLiter(s) Nebulizer every 6 hours PRN Bronchospasm  ALPRAZolam 2 milliGRAM(s) Oral every 6 hours PRN anxiety  oxyCODONE    IR 30 milliGRAM(s) Oral every 4 hours PRN Severe Pain (7 - 10)  zolpidem 5 milliGRAM(s) Oral at bedtime PRN Insomnia      Allergies  No Known Allergies      Review of Systems:   Constitutional:  No Fever, No Chills  ENT/Mouth:  No Hearing Changes,  No Difficulty Swallowing  Eyes:  No Eye Pain, No Vision Changes  Cardiovascular:  No Chest Pain, No Palpitations  Respiratory:  No Cough, No Dyspnea  Gastrointestinal:  As described in HPI  Musculoskeletal:  No Joint Swelling, No Back Pain  Skin:  No Skin Lesions, No Jaundice  Neuro:  No Syncope, No Dizziness  Heme/Lymph:  No Bruising, No Bleeding.      Physical Examination:  T(C): 37 (04-18-23 @ 23:36), Max: 37 (04-18-23 @ 23:36)  HR: 95 (04-18-23 @ 23:36) (79 - 95)  BP: 115/61 (04-18-23 @ 23:36) (115/61 - 166/79)  RR: 20 (04-18-23 @ 18:14) (20 - 20)  SpO2: 85% (04-18-23 @ 23:36) (85% - 97%)  Height (cm): 177.8 (04-18-23 @ 18:14)  Weight (kg): 108.9 (04-18-23 @ 18:14)    Constitutional: No acute distress.  Eyes:. Conjunctivae are clear, Sclera is non-icteric.  Ears Nose and Throat: The external ears are normal appearing,  Oral mucosa is pink and moist.  Respiratory:  No signs of respiratory distress. Lung sounds are clear bilaterally.  Cardiovascular:  S1 S2, Regular rate and rhythm.  GI: Abdomen is soft, symmetric, and diffusely +tender to palpation without distention. There are no visible lesions. Bowel sounds are present and normoactive in all four quadrants. No masses, hepatomegaly, or splenomegaly are noted.   Neuro: No Tremor, No involuntary movements  Skin: No rashes, No Jaundice.      Data: (reviewed by attending)                        17.7   12.66 )-----------( 296      ( 18 Apr 2023 19:50 )             49.3     Hgb Trend:  17.7  04-18-23 @ 19:50      04-18    133<L>  |  95<L>  |  12  ----------------------------<  133<H>  4.8   |  22  |  0.7    Ca    10.0      18 Apr 2023 19:50    TPro  7.7  /  Alb  4.5  /  TBili  0.7  /  DBili  x   /  AST  22  /  ALT  22  /  AlkPhos  137<H>  04-18    Liver panel trend:  TBili 0.7   /   AST 22   /   ALT 22   /   AlkP 137   /   Tptn 7.7   /   Alb 4.5    /   DBili --      04-18  TBili 0.4   /   AST 25   /   ALT 17   /   AlkP 118   /   Tptn 6.6   /   Alb 4.0    /   DBili --      04-13        Radiology:(reviewed by attending)  CT Angio Abdomen and Pelvis w/ IV Cont:   ACC: [id number] EXAM:  CT ANGIO ABD PELV (W)AW IC   ORDERED BY: BRIGIDA KURTZ     ACC: [id number] EXAM:  CT ANGIO CHEST AORTA WAWIC   ORDERED BY: BRIGIDA KURTZ     PROCEDURE DATE:  04/18/2023          INTERPRETATION:  REASON FOR EXAM / CLINICAL STATEMENT: Back pain.    Evaluate for aortic dissection. Abdominal pain. WBC 12.66   PMHx of HTN,   HLD, COPD    TECHNIQUE: Contiguous axial CT images were obtained from the thoracic   inlet through the upper abdomen without contrast and from the thoracic   inlet through the pelvis with intravenous contrast, in the arterial phase.    100 ml of Omnipaque 350 was administered IV with 0 ml discarded.    Reformatted images in the coronal and sagittal planes were acquired, as   well as 3D, Volume rendering,and MIP reconstructed images.    COMPARISON CT: CT scan of the chest dated 10/27/2022    OTHER STUDIES USED FOR CORRELATION: None.      FINDINGS CHEST:    TUBES AND LINES: None.    HEART AND VESSELS: The heart size is normal. There is no pericardial   effusion.    Normal pulmonary artery. No evidence of central pulmonary embolism.    There is stable, mild aneurysmal dilatation of the ascending thoracic   aorta, measuring 4.2 cm.    There is no evidence of thoracic aortic dissection. No intimal flap or   double lumen.    The brachiocephalic vessels are unremarkable.      MEDIASTINUM: There are no suspicious mediastinal, hilar or axillary lymph   nodes. The visualized portion of the thyroid gland is unremarkable.    AIRWAYS, LUNGS AND PLEURA: The central tracheobronchial tree is patent. A   wedge shaped opacity, with air bronchograms, is noted in the region of   the lingula compatible with an area of infiltrate/atelectasis (3/155).   Short-term follow-up in 3 months is recommended to confirm resolution.   There is no pleural effusion. There is no pneumothorax.    CHEST WALL/BONES/SOFT TISSUES: Degenerative changes of the spine.    FINDINGS ABDOMEN AND PELVIS:    HEPATIC: The liver is normal in size with no evidence of bile duct   dilatation. Calcifications are noted within the right lobe of liver. The   portal vein is patent.    BILIARY: Post cholecystectomy.    SPLEEN: Unremarkable.    PANCREAS: The pancreas is normal in size and configuration. No evidence   of mass or pancreatitis.    ADRENAL GLANDS: Unremarkable.    KIDNEYS: No evidence of hydronephrosis or solid renal mass. Bilateral    ABDOMINOPELVIC NODES: Unremarkable.    PELVIC ORGANS: No evidence of pelvic mass, lymphadenopathy, or fluid   collection.    BLADDER: Unremarkable.    PERITONEUM/MESENTERY/BOWEL: No evidence of obstruction, colitis,   inflammatory process, or ascites within the abdomen or pelvis.    BONES/SOFT TISSUES: Degenerative changes of the spine are noted.    VASCULAR: No evidence of abdominal aortic aneurysm or dissection. No   intimal flap or double lumen.  The celiac axis, the superior mesenteric   artery, the inferior mesenteric artery, and the renal arteries are patent   without flow-limiting stenosis. Single renal arteries are seen   bilaterally. Incidentally noted are bilateral corona mortis arteries in   the pelvis (between the distal external iliac arteries and obturator   arteries (anatomic variants)        IMPRESSION:    There is stable, mild aneurysmal dilatation of the ascending thoracic   aorta, measuring 4.2 cm.    No evidence of thoracic or abdominal aortic dissection.    A wedge shaped opacity, with air bronchograms, is noted in the region of   the lingula compatible with an area of infiltrate/atelectasis (3/155).   Short-term follow-up in 3 months is recommended to confirm resolution.    --- End of Report ---            RYANNE YORK MD; Attending Interventional Radiologist  This document has been electronically signed. Apr 18 2023  9:47PM (04-18-23 @ 21:08)       Gastroenterology Consultation: Abdominal pain and diarrhea     Patient is a 57y old  Male who presents with a chief complaint of abdominal pain (19 Apr 2023 03:24)    Admitted on: 04-18-23  HPI:  57 year old male with PMHx of HTN, HLD, Chronic Pain (Bilateral knee, hip replacement with pain, takes oxycodone) and COPD (Hx smoking) presented to the ED with chest and abdominal pain x 1 day. Patient describes that the past 2 days he developed acute epigastric abdominal pain associated with persistent vomiting (red tinged) non bilious in color. Symptoms associated with severe epigastric pain sharp, stabbing and constant as well as multiple episodes of watery diarrhea.  Mentions having SOB also, specially on . Pt denies sob, cough, fever, sick constant or recent travel.     Vital Signs Last 24 Hrs  T(C): 37 (18 Apr 2023 23:36), Max: 37 (18 Apr 2023 23:36)  T(F): 98.6 (18 Apr 2023 23:36), Max: 98.6 (18 Apr 2023 23:36)  HR: 95 (18 Apr 2023 23:36) (79 - 95)  BP: 115/61 (18 Apr 2023 23:36) (115/61 - 166/79)  BP(mean): 78 (18 Apr 2023 23:36) (78 - 78)  RR: 20 (18 Apr 2023 18:14) (20 - 20)  SpO2: 85% (18 Apr 2023 23:36) (85% - 97%)    GI HX: 56 y/o M with PMHx of HTN, HLD, COPD, GERD, Chronic pain presenting with diffuse abdominal pain and diarrhea associated with nausea and vomiting with decreased PO intake since Friday. Describes the abdominal pain as constant and throbbing; radiating to the lower back; aggravated by food and with no alleviating factors. He is also having Non-bloody and non-mucoid diarrhea; 10-15 loose yellow stool per day. Patient is also endorsing NBNB emesis with poor PO intake. Patient denies any sick contacts, any recent travels or antibiotics use. Patient has hx of GERD controlled on PPI, patient denies dysphagia, odynophagia or weight loss. Patient denies any melena or hematochezia. Patient has hx of constipation and states that he takes colace (100mg x 8tabs) daily for BMs. Patient is a daily smoker, denies alcohol or NSAIDs use. Patient had a EGD but never had colonoscopies. Patient denies any FHx of GI malignancy.       Prior records Reviewed (Y/N): Y  History obtained from person other than patient (Y/N): N    Prior EGD: 2013 for abdominal pain, gastritis, duodenitis, -ve H. pylori   Prior Colonoscopy: none       PAST MEDICAL & SURGICAL HISTORY:  HTN (hypertension)  High cholesterol  GERD (gastroesophageal reflux disease)  Morbid obesity  Osteoarthritis  Hiatal hernia  GERD  Colitis  LARGE INTESTINE   NO RECENT FLARES  COPD (chronic obstructive pulmonary disease)  REBECCA treated with BiPAP  History of cholecystectomy  History of appendectomy  History of knee replacement procedure of right knee  BILATERAL  Hernia, inguinal, bilateral  3 months old  H/O knee surgery  arthoscopic x4  H/O foot surgery  right big toe surgery    FAMILY HISTORY:  FH: lung cancer (Mother)  mother    FH: diabetes mellitus    Social History:  Tobacco: Yes 1/2 pack/day  Alcohol: none  Drugs: none    Home Medications:  ALPRAZolam 2 mg oral tablet: 1 tab(s) orally every 6 hours, As needed, anxiety (19 Apr 2023 04:00)  Ambien 10 mg oral tablet: 0.5 tab(s) orally once a day (at bedtime), As Needed (19 Apr 2023 04:00)  fenofibrate 160 mg oral tablet: 1 tab(s) orally once a day (19 Apr 2023 04:00)  Lasix 40 mg oral tablet: 1 tab(s) orally once a day (19 Apr 2023 04:00)  lisinopril 5 mg oral tablet: 1 tab(s) orally once a day (19 Apr 2023 04:00)  Zocor 20 mg oral tablet: 1 tab(s) orally once a day (at bedtime) (19 Apr 2023 04:00)    MEDICATIONS  (STANDING):  azithromycin  IVPB      cefTRIAXone   IVPB      enoxaparin Injectable 40 milliGRAM(s) SubCutaneous every 24 hours  fenofibrate Tablet 145 milliGRAM(s) Oral daily  lactated ringers. 1000 milliLiter(s) (75 mL/Hr) IV Continuous <Continuous>  metoclopramide Injectable 10 milliGRAM(s) IV Push every 8 hours  pantoprazole    Tablet 40 milliGRAM(s) Oral before breakfast  simvastatin 20 milliGRAM(s) Oral at bedtime    MEDICATIONS  (PRN):  albuterol/ipratropium for Nebulization 3 milliLiter(s) Nebulizer every 6 hours PRN Bronchospasm  ALPRAZolam 2 milliGRAM(s) Oral every 6 hours PRN anxiety  oxyCODONE    IR 30 milliGRAM(s) Oral every 4 hours PRN Severe Pain (7 - 10)  zolpidem 5 milliGRAM(s) Oral at bedtime PRN Insomnia      Allergies  No Known Allergies      Review of Systems:   Constitutional:  No Fever, No Chills  ENT/Mouth:  No Hearing Changes,  No Difficulty Swallowing  Eyes:  No Eye Pain, No Vision Changes  Cardiovascular:  No Chest Pain, No Palpitations  Respiratory:  No Cough, No Dyspnea  Gastrointestinal:  As described in HPI  Musculoskeletal:  No Joint Swelling, No Back Pain  Skin:  No Skin Lesions, No Jaundice  Neuro:  No Syncope, No Dizziness  Heme/Lymph:  No Bruising, No Bleeding.      Physical Examination:  T(C): 37 (04-18-23 @ 23:36), Max: 37 (04-18-23 @ 23:36)  HR: 95 (04-18-23 @ 23:36) (79 - 95)  BP: 115/61 (04-18-23 @ 23:36) (115/61 - 166/79)  RR: 20 (04-18-23 @ 18:14) (20 - 20)  SpO2: 85% (04-18-23 @ 23:36) (85% - 97%)  Height (cm): 177.8 (04-18-23 @ 18:14)  Weight (kg): 108.9 (04-18-23 @ 18:14)    Constitutional: No acute distress.  Eyes:. Conjunctivae are clear, Sclera is non-icteric.  Ears Nose and Throat: The external ears are normal appearing,  Oral mucosa is pink and moist.  Respiratory:  No signs of respiratory distress. Lung sounds are clear bilaterally.  Cardiovascular:  S1 S2, Regular rate and rhythm.  GI: Abdomen is soft, symmetric, and diffusely +tender to palpation without distention. There are no visible lesions. Bowel sounds are present and normoactive in all four quadrants. No masses, hepatomegaly, or splenomegaly are noted.   Neuro: No Tremor, No involuntary movements  Skin: No rashes, No Jaundice.      Data: (reviewed by attending)                        17.7   12.66 )-----------( 296      ( 18 Apr 2023 19:50 )             49.3     Hgb Trend:  17.7  04-18-23 @ 19:50      04-18    133<L>  |  95<L>  |  12  ----------------------------<  133<H>  4.8   |  22  |  0.7    Ca    10.0      18 Apr 2023 19:50    TPro  7.7  /  Alb  4.5  /  TBili  0.7  /  DBili  x   /  AST  22  /  ALT  22  /  AlkPhos  137<H>  04-18    Liver panel trend:  TBili 0.7   /   AST 22   /   ALT 22   /   AlkP 137   /   Tptn 7.7   /   Alb 4.5    /   DBili --      04-18  TBili 0.4   /   AST 25   /   ALT 17   /   AlkP 118   /   Tptn 6.6   /   Alb 4.0    /   DBili --      04-13        Radiology:(reviewed by attending)  CT Angio Abdomen and Pelvis w/ IV Cont:   ACC: [id number] EXAM:  CT ANGIO ABD PELV (W)AW IC   ORDERED BY: BRIGIDA KURTZ     ACC: [id number] EXAM:  CT ANGIO CHEST AORTA WAWIC   ORDERED BY: BRIGIDA KURTZ     PROCEDURE DATE:  04/18/2023          INTERPRETATION:  REASON FOR EXAM / CLINICAL STATEMENT: Back pain.    Evaluate for aortic dissection. Abdominal pain. WBC 12.66   PMHx of HTN,   HLD, COPD    TECHNIQUE: Contiguous axial CT images were obtained from the thoracic   inlet through the upper abdomen without contrast and from the thoracic   inlet through the pelvis with intravenous contrast, in the arterial phase.    100 ml of Omnipaque 350 was administered IV with 0 ml discarded.    Reformatted images in the coronal and sagittal planes were acquired, as   well as 3D, Volume rendering,and MIP reconstructed images.    COMPARISON CT: CT scan of the chest dated 10/27/2022    OTHER STUDIES USED FOR CORRELATION: None.      FINDINGS CHEST:    TUBES AND LINES: None.    HEART AND VESSELS: The heart size is normal. There is no pericardial   effusion.    Normal pulmonary artery. No evidence of central pulmonary embolism.    There is stable, mild aneurysmal dilatation of the ascending thoracic   aorta, measuring 4.2 cm.    There is no evidence of thoracic aortic dissection. No intimal flap or   double lumen.    The brachiocephalic vessels are unremarkable.      MEDIASTINUM: There are no suspicious mediastinal, hilar or axillary lymph   nodes. The visualized portion of the thyroid gland is unremarkable.    AIRWAYS, LUNGS AND PLEURA: The central tracheobronchial tree is patent. A   wedge shaped opacity, with air bronchograms, is noted in the region of   the lingula compatible with an area of infiltrate/atelectasis (3/155).   Short-term follow-up in 3 months is recommended to confirm resolution.   There is no pleural effusion. There is no pneumothorax.    CHEST WALL/BONES/SOFT TISSUES: Degenerative changes of the spine.    FINDINGS ABDOMEN AND PELVIS:    HEPATIC: The liver is normal in size with no evidence of bile duct   dilatation. Calcifications are noted within the right lobe of liver. The   portal vein is patent.    BILIARY: Post cholecystectomy.    SPLEEN: Unremarkable.    PANCREAS: The pancreas is normal in size and configuration. No evidence   of mass or pancreatitis.    ADRENAL GLANDS: Unremarkable.    KIDNEYS: No evidence of hydronephrosis or solid renal mass. Bilateral    ABDOMINOPELVIC NODES: Unremarkable.    PELVIC ORGANS: No evidence of pelvic mass, lymphadenopathy, or fluid   collection.    BLADDER: Unremarkable.    PERITONEUM/MESENTERY/BOWEL: No evidence of obstruction, colitis,   inflammatory process, or ascites within the abdomen or pelvis.    BONES/SOFT TISSUES: Degenerative changes of the spine are noted.    VASCULAR: No evidence of abdominal aortic aneurysm or dissection. No   intimal flap or double lumen.  The celiac axis, the superior mesenteric   artery, the inferior mesenteric artery, and the renal arteries are patent   without flow-limiting stenosis. Single renal arteries are seen   bilaterally. Incidentally noted are bilateral corona mortis arteries in   the pelvis (between the distal external iliac arteries and obturator   arteries (anatomic variants)        IMPRESSION:    There is stable, mild aneurysmal dilatation of the ascending thoracic   aorta, measuring 4.2 cm.    No evidence of thoracic or abdominal aortic dissection.    A wedge shaped opacity, with air bronchograms, is noted in the region of   the lingula compatible with an area of infiltrate/atelectasis (3/155).   Short-term follow-up in 3 months is recommended to confirm resolution.    --- End of Report ---            RAYNNE YORK MD; Attending Interventional Radiologist  This document has been electronically signed. Apr 18 2023  9:47PM (04-18-23 @ 21:08)       Gastroenterology Consultation: Abdominal pain and diarrhea     Patient is a 57y old  Male who presents with a chief complaint of abdominal pain (19 Apr 2023 03:24)    Admitted on: 04-18-23  HPI:  57 year old male with PMHx of HTN, HLD, Chronic Pain (Bilateral knee, hip replacement with pain, takes oxycodone) and COPD (Hx smoking) presented to the ED with chest and abdominal pain x 1 day. Patient describes that the past 2 days he developed acute epigastric abdominal pain associated with persistent vomiting (red tinged) non bilious in color. Symptoms associated with severe epigastric pain sharp, stabbing and constant as well as multiple episodes of watery diarrhea.  Mentions having SOB also, specially on . Pt denies sob, cough, fever, sick constant or recent travel.     Vital Signs Last 24 Hrs  T(C): 37 (18 Apr 2023 23:36), Max: 37 (18 Apr 2023 23:36)  T(F): 98.6 (18 Apr 2023 23:36), Max: 98.6 (18 Apr 2023 23:36)  HR: 95 (18 Apr 2023 23:36) (79 - 95)  BP: 115/61 (18 Apr 2023 23:36) (115/61 - 166/79)  BP(mean): 78 (18 Apr 2023 23:36) (78 - 78)  RR: 20 (18 Apr 2023 18:14) (20 - 20)  SpO2: 85% (18 Apr 2023 23:36) (85% - 97%)    GI HX: 56 y/o M with PMHx of HTN, HLD, COPD, GERD, Chronic pain presenting with diffuse abdominal pain and diarrhea associated with nausea and vomiting with decreased PO intake since Friday. Describes the abdominal pain as constant and throbbing; radiating to the lower back; aggravated by food and with no alleviating factors. He is also having Non-bloody and non-mucoid diarrhea; 10-15 loose yellow stool per day. Patient is also endorsing NBNB emesis with poor PO intake. Patient denies any sick contacts, any recent travels or antibiotics use. Patient has hx of GERD controlled on PPI, patient denies dysphagia, odynophagia or weight loss. Patient denies any melena or hematochezia. Patient has hx of constipation and states that he takes colace (100mg x 8tabs) daily for BMs. Patient is a daily smoker, denies alcohol or NSAIDs use. Patient had a EGD but never had colonoscopies. Patient denies any FHx of GI malignancy.       Prior records Reviewed (Y/N): Y  History obtained from person other than patient (Y/N): N    Prior EGD: 2013 for abdominal pain, gastritis, duodenitis, -ve H. pylori   Prior Colonoscopy: none       PAST MEDICAL & SURGICAL HISTORY:  HTN (hypertension)  High cholesterol  GERD (gastroesophageal reflux disease)  Morbid obesity  Osteoarthritis  Hiatal hernia  GERD  Colitis  LARGE INTESTINE   NO RECENT FLARES  COPD (chronic obstructive pulmonary disease)  REBECCA treated with BiPAP  History of cholecystectomy  History of appendectomy  History of knee replacement procedure of right knee  BILATERAL  Hernia, inguinal, bilateral  3 months old  H/O knee surgery  arthoscopic x4  H/O foot surgery  right big toe surgery    FAMILY HISTORY:  FH: lung cancer (Mother)  mother    FH: diabetes mellitus    Social History:  Tobacco: Yes 1/2 pack/day  Alcohol: none  Drugs: none    Home Medications:  ALPRAZolam 2 mg oral tablet: 1 tab(s) orally every 6 hours, As needed, anxiety (19 Apr 2023 04:00)  Ambien 10 mg oral tablet: 0.5 tab(s) orally once a day (at bedtime), As Needed (19 Apr 2023 04:00)  fenofibrate 160 mg oral tablet: 1 tab(s) orally once a day (19 Apr 2023 04:00)  Lasix 40 mg oral tablet: 1 tab(s) orally once a day (19 Apr 2023 04:00)  lisinopril 5 mg oral tablet: 1 tab(s) orally once a day (19 Apr 2023 04:00)  Zocor 20 mg oral tablet: 1 tab(s) orally once a day (at bedtime) (19 Apr 2023 04:00)    MEDICATIONS  (STANDING):  azithromycin  IVPB      cefTRIAXone   IVPB      enoxaparin Injectable 40 milliGRAM(s) SubCutaneous every 24 hours  fenofibrate Tablet 145 milliGRAM(s) Oral daily  lactated ringers. 1000 milliLiter(s) (75 mL/Hr) IV Continuous <Continuous>  metoclopramide Injectable 10 milliGRAM(s) IV Push every 8 hours  pantoprazole    Tablet 40 milliGRAM(s) Oral before breakfast  simvastatin 20 milliGRAM(s) Oral at bedtime    MEDICATIONS  (PRN):  albuterol/ipratropium for Nebulization 3 milliLiter(s) Nebulizer every 6 hours PRN Bronchospasm  ALPRAZolam 2 milliGRAM(s) Oral every 6 hours PRN anxiety  oxyCODONE    IR 30 milliGRAM(s) Oral every 4 hours PRN Severe Pain (7 - 10)  zolpidem 5 milliGRAM(s) Oral at bedtime PRN Insomnia      Allergies  No Known Allergies      Review of Systems:   Constitutional:  No Fever, No Chills  ENT/Mouth:  No Hearing Changes,  No Difficulty Swallowing  Eyes:  No Eye Pain, No Vision Changes  Cardiovascular:  No Chest Pain, No Palpitations  Respiratory:  No Cough, No Dyspnea  Gastrointestinal:  As described in HPI  Musculoskeletal:  No Joint Swelling, No Back Pain  Skin:  No Skin Lesions, No Jaundice  Neuro:  No Syncope, No Dizziness  Heme/Lymph:  No Bruising, No Bleeding.      Physical Examination:  T(C): 37 (04-18-23 @ 23:36), Max: 37 (04-18-23 @ 23:36)  HR: 95 (04-18-23 @ 23:36) (79 - 95)  BP: 115/61 (04-18-23 @ 23:36) (115/61 - 166/79)  RR: 20 (04-18-23 @ 18:14) (20 - 20)  SpO2: 85% (04-18-23 @ 23:36) (85% - 97%)  Height (cm): 177.8 (04-18-23 @ 18:14)  Weight (kg): 108.9 (04-18-23 @ 18:14)    Constitutional: No acute distress.  Eyes:. Conjunctivae are clear, Sclera is non-icteric.  Ears Nose and Throat: The external ears are normal appearing,  Oral mucosa is pink and moist.  Respiratory:  No signs of respiratory distress. Lung sounds are clear bilaterally.  Cardiovascular:  S1 S2, Regular rate and rhythm.  GI: Abdomen is soft, symmetric, and diffusely +tender to palpation without distention. There are no visible lesions. Bowel sounds are present and normoactive in all four quadrants. No masses, hepatomegaly, or splenomegaly are noted.   Neuro: No Tremor, No involuntary movements  Skin: No rashes, No Jaundice.      Data: (reviewed by attending)                        17.7   12.66 )-----------( 296      ( 18 Apr 2023 19:50 )             49.3     Hgb Trend:  17.7  04-18-23 @ 19:50      04-18    133<L>  |  95<L>  |  12  ----------------------------<  133<H>  4.8   |  22  |  0.7    Ca    10.0      18 Apr 2023 19:50    TPro  7.7  /  Alb  4.5  /  TBili  0.7  /  DBili  x   /  AST  22  /  ALT  22  /  AlkPhos  137<H>  04-18    Liver panel trend:  TBili 0.7   /   AST 22   /   ALT 22   /   AlkP 137   /   Tptn 7.7   /   Alb 4.5    /   DBili --      04-18  TBili 0.4   /   AST 25   /   ALT 17   /   AlkP 118   /   Tptn 6.6   /   Alb 4.0    /   DBili --      04-13        Radiology:(reviewed by attending)  CT Angio Abdomen and Pelvis w/ IV Cont:   ACC: [id number] EXAM:  CT ANGIO ABD PELV (W)AW IC   ORDERED BY: BRIGIDA KURTZ     ACC: [id number] EXAM:  CT ANGIO CHEST AORTA WAWIC   ORDERED BY: BRIGIDA KURTZ     PROCEDURE DATE:  04/18/2023          INTERPRETATION:  REASON FOR EXAM / CLINICAL STATEMENT: Back pain.    Evaluate for aortic dissection. Abdominal pain. WBC 12.66   PMHx of HTN,   HLD, COPD    TECHNIQUE: Contiguous axial CT images were obtained from the thoracic   inlet through the upper abdomen without contrast and from the thoracic   inlet through the pelvis with intravenous contrast, in the arterial phase.    100 ml of Omnipaque 350 was administered IV with 0 ml discarded.    Reformatted images in the coronal and sagittal planes were acquired, as   well as 3D, Volume rendering,and MIP reconstructed images.    COMPARISON CT: CT scan of the chest dated 10/27/2022    OTHER STUDIES USED FOR CORRELATION: None.      FINDINGS CHEST:    TUBES AND LINES: None.    HEART AND VESSELS: The heart size is normal. There is no pericardial   effusion.    Normal pulmonary artery. No evidence of central pulmonary embolism.    There is stable, mild aneurysmal dilatation of the ascending thoracic   aorta, measuring 4.2 cm.    There is no evidence of thoracic aortic dissection. No intimal flap or   double lumen.    The brachiocephalic vessels are unremarkable.      MEDIASTINUM: There are no suspicious mediastinal, hilar or axillary lymph   nodes. The visualized portion of the thyroid gland is unremarkable.    AIRWAYS, LUNGS AND PLEURA: The central tracheobronchial tree is patent. A   wedge shaped opacity, with air bronchograms, is noted in the region of   the lingula compatible with an area of infiltrate/atelectasis (3/155).   Short-term follow-up in 3 months is recommended to confirm resolution.   There is no pleural effusion. There is no pneumothorax.    CHEST WALL/BONES/SOFT TISSUES: Degenerative changes of the spine.    FINDINGS ABDOMEN AND PELVIS:    HEPATIC: The liver is normal in size with no evidence of bile duct   dilatation. Calcifications are noted within the right lobe of liver. The   portal vein is patent.    BILIARY: Post cholecystectomy.    SPLEEN: Unremarkable.    PANCREAS: The pancreas is normal in size and configuration. No evidence   of mass or pancreatitis.    ADRENAL GLANDS: Unremarkable.    KIDNEYS: No evidence of hydronephrosis or solid renal mass. Bilateral    ABDOMINOPELVIC NODES: Unremarkable.    PELVIC ORGANS: No evidence of pelvic mass, lymphadenopathy, or fluid   collection.    BLADDER: Unremarkable.    PERITONEUM/MESENTERY/BOWEL: No evidence of obstruction, colitis,   inflammatory process, or ascites within the abdomen or pelvis.    BONES/SOFT TISSUES: Degenerative changes of the spine are noted.    VASCULAR: No evidence of abdominal aortic aneurysm or dissection. No   intimal flap or double lumen.  The celiac axis, the superior mesenteric   artery, the inferior mesenteric artery, and the renal arteries are patent   without flow-limiting stenosis. Single renal arteries are seen   bilaterally. Incidentally noted are bilateral corona mortis arteries in   the pelvis (between the distal external iliac arteries and obturator   arteries (anatomic variants)        IMPRESSION:    There is stable, mild aneurysmal dilatation of the ascending thoracic   aorta, measuring 4.2 cm.    No evidence of thoracic or abdominal aortic dissection.    A wedge shaped opacity, with air bronchograms, is noted in the region of   the lingula compatible with an area of infiltrate/atelectasis (3/155).   Short-term follow-up in 3 months is recommended to confirm resolution.    --- End of Report ---            RYANNE YORK MD; Attending Interventional Radiologist  This document has been electronically signed. Apr 18 2023  9:47PM (04-18-23 @ 21:08)

## 2023-04-19 NOTE — CONSULT NOTE ADULT - ASSESSMENT
ASSESSMENT  56 y/o M with PMHx of HTN, HLD, Chronic Pain (Bilateral knee, hip replacement with pain, takes oxycodone) and COPD (Hx smoking) presented to the ED with chest and abdominal pain x 1 day. Admitted to medicine for likely viral gastroenteritis. ID consulted for possible CAP given wedge-shaped opacity on CT.     IMPRESSION  #Likely Viral Gastroenteritis  - P/w abd pain, non bilious emesis & diarrhea  - CT A/P showed no colitis  - Afebrile  - Leukocytosis 12.66K, uptrending  - No recent travel, sick contacts or ingestion of tainted food  - Sxs most consistent w/ viral gastroenteritis    RECOMMENDATIONS  - F/u GI PCR    #COPD Exacerbation  - C/o chronic cough w/ whitish sputum, SOB  - Coarse breath sound on auscultation  - CT Chest showed wedge shaped opacity, with air bronchograms, is noted in the region of   the lingula compatible with an area of infiltrate/atelectasis  - Afebrile  - Leukocytosis 12.66K, uptrending  - On IV Ceftriaxone and Azithromycin for possible CAP  - Doubt CAP given absence of fever, radiographic findings more consistent w/ atelectasis    RECOMMENDATIONS  - D/c IV Ceftriaxone   - Can c/w IV or PO azithromycin for possible COPD exacerbation x 5 days  - F/u procal  - F/u RVP

## 2023-04-19 NOTE — CONSULT NOTE ADULT - TIME BILLING
I have personally seen and examined this patient.  I have reviewed all pertinent clinical information and reviewed all relevant imaging and diagnostic studies personally.   If possible, I counseled the patient about diagnostic testing and treatment plan.   I discussed my recommendations with the primary team.
Coordination of care

## 2023-04-19 NOTE — CONSULT NOTE ADULT - ASSESSMENT
56 y/o M with PMHx of HTN, HLD, COPD, GERD, Chronic pain presenting with chest pain, diffuse abdominal pain and diarrhea associated with nausea and vomiting with decreased PO intake since Friday  58 y/o M with PMHx of HTN, HLD, COPD, GERD, Chronic pain presenting with chest pain, diffuse abdominal pain and diarrhea associated with nausea and vomiting with decreased PO intake since Friday. GI was consulted for acute onset diarrhea and abdominal pain.    #Acute onset diarrhea and abdominal pain likely 2/2 gastroenteritis   - non-bloody, non-mucoid diarrhea associated with NBNB emesis and abdominal pain   - Takes colace (100mg x 8tabs) daily at home for constipation   - Prior EGD: 2013 for abdominal pain, gastritis, duodenitis, -ve H. pylori     Plan  - Check GI PCR, stool C Diff,  o&p, culture/stain, giardia, esr/crp, stool lactoferrin, calprotectin   - IVFs  - Zofran PRN for nausea   - Can give clear liquids no dairy if tolerating     #Screening colonoscopy   #Hx of constipation likley 2/2 opioid use  - Outpatient colonoscopy   - High fiber diet      **Incomplete note** 56 y/o M with PMHx of HTN, HLD, COPD, GERD, Chronic pain presenting with chest pain, diffuse abdominal pain and diarrhea associated with nausea and vomiting with decreased PO intake since Friday. GI was consulted for acute onset diarrhea and abdominal pain.    #Acute onset diarrhea and abdominal pain likely secondary to viral gastroenteritis   - non-bloody, non-mucoid diarrhea (10-15x daily) associated with NBNB emesis and abdominal pain   - CT A/P without colitis  - Afebrile, Leukocytosis 16.85K,   - Takes colace (100mg x 8tabs) daily at home for constipation   - Prior EGD: 2013 for abdominal pain, gastritis, duodenitis, -ve H. pylori   - Currently being treated for COPD exacerbation on Abx (azithromycin)    Plan  - Check GI PCR, stool C Diff, o&p, culture/stain, giardia, esr/crp, stool lactoferrin, calprotectin   - IVFs  - Zofran PRN for nausea   - Can give clear liquids no dairy if tolerating, advance as tolerated     #Screening colonoscopy   #Hx of constipation in the setting of opioid use  - Outpatient colonoscopy for screening   - High fiber diet         56 y/o M with PMHx of HTN, HLD, COPD, GERD, Chronic pain presenting with chest pain, diffuse abdominal pain and diarrhea associated with nausea and vomiting with decreased PO intake since Friday. GI was consulted for acute onset diarrhea and abdominal pain.    #Acute onset diarrhea and abdominal pain likely secondary to viral gastroenteritis   - non-bloody, non-mucoid diarrhea (10-15x daily) associated with NBNB emesis and abdominal pain   - CT A/P without colitis  - Afebrile, Leukocytosis 16.85K,   - Takes colace (100mg x 8tabs) daily at home for constipation   - Prior EGD: 2013 for abdominal pain, gastritis, duodenitis, -ve H. pylori   - Currently being treated for COPD exacerbation on Abx (azithromycin)    Plan  - Check GI PCR, stool C Diff, o&p, culture/stain, giardia, esr/crp, stool lactoferrin, calprotectin   - Supportive care   - Zofran PRN for nausea   - Can give clear liquids no dairy if tolerating, advance as tolerated     #Screening colonoscopy   #Hx of constipation in the setting of opioid use  - Outpatient colonoscopy for screening   - High fiber diet

## 2023-04-19 NOTE — H&P ADULT - NSHPLABSRESULTS_GEN_ALL_CORE
17.7   12.66 )-----------( 296      ( 18 Apr 2023 19:50 )             49.3       04-18    133<L>  |  95<L>  |  12  ----------------------------<  133<H>  4.8   |  22  |  0.7    Ca    10.0      18 Apr 2023 19:50    TPro  7.7  /  Alb  4.5  /  TBili  0.7  /  DBili  x   /  AST  22  /  ALT  22  /  AlkPhos  137<H>  04-18 17.7   12.66 )-----------( 296      ( 18 Apr 2023 19:50 )             49.3       04-18    133<L>  |  95<L>  |  12  ----------------------------<  133<H>  4.8   |  22  |  0.7    Ca    10.0      18 Apr 2023 19:50    TPro  7.7  /  Alb  4.5  /  TBili  0.7  /  DBili  x   /  AST  22  /  ALT  22  /  AlkPhos  137<H>  04-18    < from: CT Angio Chest Aorta w/wo IV Cont (04.18.23 @ 21:09) >        IMPRESSION:    There is stable, mild aneurysmal dilatation of the ascending thoracic   aorta, measuring 4.2 cm.    No evidence of thoracic or abdominal aortic dissection.    A wedge shaped opacity, with air bronchograms, is noted in the region of   the lingula compatible with an area of infiltrate/atelectasis (3/155).   Short-term follow-up in 3 months is recommended to confirm resolution.    < end of copied text >

## 2023-04-19 NOTE — CONSULT NOTE ADULT - ATTENDING COMMENTS
I have personally seen and examined this patient.  I have fully participated in the care of this patient.  I have reviewed all pertinent clinical information, including history, physical exam, plan and note.  I have reviewed all pertinent clinical information and reviewed all relevant imaging and diagnostic studies personally.  My addendum includes the final recommendations.      Admitted with abd pain, vomiting, CTA no dissection, no colitis, or other GI pathology  CT Chest with wedge shape area of opacity- looks most like atelectasis  Possible GI viral illness  Suspected COPD exacerbation (wheezing, increased whitish sputum production)  Lactic acidosis  Hyponatremia  Sepsis ruled out on admission    - pilar 5 days as above  - send GI PCR   - Please recall ID PRN. Please inform ID of any patient clinical change or any new pertinent laboratory or radiographic data     If any questions, please call or send a message on [employer] Teams  Please continue to update ID with any pertinent new laboratory or radiographic findings  Spectra [id number]
I edited the note

## 2023-04-19 NOTE — CONSULT NOTE ADULT - SUBJECTIVE AND OBJECTIVE BOX
KAYLEIGH TOMAS  57y, Male  Allergy: No Known Allergies      CHIEF COMPLAINT: abdominal pain (19 Apr 2023 03:24)      HPI:  56 y/o M with PMHx of HTN, HLD, Chronic Pain (Bilateral knee, hip replacement with pain, takes oxycodone) and COPD (Hx smoking) presented to the ED with chest and abdominal pain x 1 day. Patient describes that the past 2 days he developed acute epigastric abdominal pain associated with persistent vomiting (red tinged) non bilious in color. Symptoms associated with severe epigastric pain sharp, stabbing and constant as well as multiple episodes of watery diarrhea.  Mentions having SOB also, specially on. Denies SOB, cough, fever, sick constant or recent travel.     Vital Signs Last 24 Hrs  T(C): 37 (18 Apr 2023 23:36), Max: 37 (18 Apr 2023 23:36)  T(F): 98.6 (18 Apr 2023 23:36), Max: 98.6 (18 Apr 2023 23:36)  HR: 95 (18 Apr 2023 23:36) (79 - 95)  BP: 115/61 (18 Apr 2023 23:36) (115/61 - 166/79)  BP(mean): 78 (18 Apr 2023 23:36) (78 - 78)  RR: 20 (18 Apr 2023 18:14) (20 - 20)  SpO2: 85% (18 Apr 2023 23:36) (85% - 97%)    Parameters below as of 18 Apr 2023 18:14  Patient On (Oxygen Delivery Method): room air    Infectious Diseases History:  Old Micro Data/Cultures:     FAMILY HISTORY:  FH: lung cancer (Mother)  mother    FH: diabetes mellitus      PAST MEDICAL & SURGICAL HISTORY:  HTN (hypertension)      High cholesterol      GERD (gastroesophageal reflux disease)      Morbid obesity      Osteoarthritis      Hiatal hernia  GERD      Colitis  LARGE INTESTINE   NO RECENT FLARES      COPD (chronic obstructive pulmonary disease)      REBECCA treated with BiPAP      History of cholecystectomy      History of appendectomy      History of knee replacement procedure of right knee  BILATERAL      Hernia, inguinal, bilateral  3 months old      H/O knee surgery  arthoscopic x4      H/O foot surgery  right big toe surgery          SOCIAL HISTORY  Social History:  120 pack year history (3 packs daily, 40 years)   Minimal EtOH use   no drug use (26 Jul 2022 04:46)      Recent Travel:  Other Exposures:     ROS  General: Denies rigors, nightsweats  HEENT: Denies headache, rhinorrhea, sore throat, eye pain  CV: Denies CP, palpitations  PULM: Denies wheezing, hemoptysis  GI: Denies hematemesis, hematochezia, melena  : Denies discharge, hematuria  MSK: Denies arthralgias, myalgias  SKIN: Denies rash, lesions  NEURO: Denies paresthesias, weakness  PSYCH: Denies depression, anxiety    VITALS:  T(F): 98.6, Max: 98.6 (04-18-23 @ 23:36)  HR: 95  BP: 115/61  RR: 20Vital Signs Last 24 Hrs  T(C): 37 (18 Apr 2023 23:36), Max: 37 (18 Apr 2023 23:36)  T(F): 98.6 (18 Apr 2023 23:36), Max: 98.6 (18 Apr 2023 23:36)  HR: 95 (18 Apr 2023 23:36) (79 - 95)  BP: 115/61 (18 Apr 2023 23:36) (115/61 - 166/79)  BP(mean): 78 (18 Apr 2023 23:36) (78 - 78)  RR: 20 (18 Apr 2023 18:14) (20 - 20)  SpO2: 85% (18 Apr 2023 23:36) (85% - 97%)    Parameters below as of 18 Apr 2023 18:14  Patient On (Oxygen Delivery Method): room air        PHYSICAL EXAM:  Gen: NAD, resting in bed  HEENT: Normocephalic, atraumatic  Neck: supple, no lymphadenopathy  CV: Regular rate & regular rhythm  Lungs: CTAB  Abdomen: Soft, BS present  Ext: Warm, well perfused  Neuro: non focal, awake  Skin: no rash, no lesions  Lines: no phlebitis    TESTS & MEASUREMENTS:                        17.7   12.66 )-----------( 296      ( 18 Apr 2023 19:50 )             49.3     04-18    133<L>  |  95<L>  |  12  ----------------------------<  133<H>  4.8   |  22  |  0.7    Ca    10.0      18 Apr 2023 19:50    TPro  7.7  /  Alb  4.5  /  TBili  0.7  /  DBili  x   /  AST  22  /  ALT  22  /  AlkPhos  137<H>  04-18      LIVER FUNCTIONS - ( 18 Apr 2023 19:50 )  Alb: 4.5 g/dL / Pro: 7.7 g/dL / ALK PHOS: 137 U/L / ALT: 22 U/L / AST: 22 U/L / GGT: x                 Lactate, Blood: 1.5 mmol/L (04-18-23 @ 19:50)      INFECTIOUS DISEASES TESTING      RADIOLOGY & ADDITIONAL TESTS:  I have personally reviewed the last available Chest xray  CXR      CT      CARDIOLOGY TESTING  12 Lead ECG:   Ventricular Rate 79 BPM    Atrial Rate 79 BPM    P-R Interval 158 ms    QRS Duration 82 ms    Q-T Interval 394 ms    QTC Calculation(Bazett) 451 ms    P Axis 28 degrees    R Axis 54 degrees    T Axis 22 degrees    Diagnosis Line Normal sinus rhythm  Normal ECG    Confirmed by Hilda Parekh MD ([id number]) on 4/19/2023 7:16:17 AM (04-18-23 @ 18:15)  12 Lead ECG:   Ventricular Rate 71 BPM    Atrial Rate 71 BPM    P-R Interval 174 ms    QRS Duration 86 ms    Q-T Interval 398 ms    QTC Calculation(Bazett) 432 ms    P Axis 33 degrees    R Axis 46 degrees    T Axis 33 degrees    Diagnosis Line Normal sinus rhythm  Normal ECG    Confirmed by Avelino Camacho ([id number]) on 4/13/2023 12:29:17 PM (04-13-23 @ 10:37)      All available historical records have been reviewed    MEDICATIONS  azithromycin  IVPB   cefTRIAXone   IVPB   enoxaparin Injectable 40  fenofibrate Tablet 145  lactated ringers. 1000  metoclopramide Injectable 10  pantoprazole    Tablet 40  simvastatin 20      ANTIBIOTICS:  azithromycin  IVPB      cefTRIAXone   IVPB          All available historical data has been reviewed    ASSESSMENT  56 y/o M with PMHx of HTN, HLD, Chronic Pain (Bilateral knee, hip replacement with pain, takes oxycodone) and COPD (Hx smoking) presented to the ED with chest and abdominal pain x 1 day.    IMPRESSION  #     RECOMMENDATIONS  - f/u pending cultures  - ***    This is a pended note. All final recommendations to follow pending discussion with ID Attending    KAYLEIGH TOMAS  57y, Male  Allergy: No Known Allergies      CHIEF COMPLAINT: abdominal pain (19 Apr 2023 03:24)      HPI:  58 y/o M with PMHx of HTN, HLD, Chronic Pain (Bilateral knee, hip replacement with pain, takes oxycodone) and COPD (Hx smoking) presented to the ED with chest and abdominal pain x 1 day. Patient describes that the past 2 days he developed acute epigastric abdominal pain associated with persistent vomiting (red tinged) non bilious in color. Symptoms associated with severe epigastric pain sharp, stabbing and constant as well as multiple episodes of watery diarrhea.  Mentions having SOB also, specially on. Denies SOB, cough, fever, sick constant or recent travel.     Vital Signs Last 24 Hrs  T(C): 37 (18 Apr 2023 23:36), Max: 37 (18 Apr 2023 23:36)  T(F): 98.6 (18 Apr 2023 23:36), Max: 98.6 (18 Apr 2023 23:36)  HR: 95 (18 Apr 2023 23:36) (79 - 95)  BP: 115/61 (18 Apr 2023 23:36) (115/61 - 166/79)  BP(mean): 78 (18 Apr 2023 23:36) (78 - 78)  RR: 20 (18 Apr 2023 18:14) (20 - 20)  SpO2: 85% (18 Apr 2023 23:36) (85% - 97%)    Parameters below as of 18 Apr 2023 18:14  Patient On (Oxygen Delivery Method): room air    Infectious Diseases History:  Old Micro Data/Cultures:     FAMILY HISTORY:  FH: lung cancer (Mother)  mother    FH: diabetes mellitus      PAST MEDICAL & SURGICAL HISTORY:  HTN (hypertension)      High cholesterol      GERD (gastroesophageal reflux disease)      Morbid obesity      Osteoarthritis      Hiatal hernia  GERD      Colitis  LARGE INTESTINE   NO RECENT FLARES      COPD (chronic obstructive pulmonary disease)      REBECCA treated with BiPAP      History of cholecystectomy      History of appendectomy      History of knee replacement procedure of right knee  BILATERAL      Hernia, inguinal, bilateral  3 months old      H/O knee surgery  arthoscopic x4      H/O foot surgery  right big toe surgery          SOCIAL HISTORY  Social History:  120 pack year history (3 packs daily, 40 years)   Minimal EtOH use   no drug use (26 Jul 2022 04:46)      Recent Travel:  Other Exposures:     ROS  General: Denies rigors, nightsweats  HEENT: Denies headache, rhinorrhea, sore throat, eye pain  CV: Denies CP, palpitations  PULM: Denies wheezing, hemoptysis  GI: Denies hematemesis, hematochezia, melena  : Denies discharge, hematuria  MSK: Denies arthralgias, myalgias  SKIN: Denies rash, lesions  NEURO: Denies paresthesias, weakness  PSYCH: Denies depression, anxiety    VITALS:  T(F): 98.6, Max: 98.6 (04-18-23 @ 23:36)  HR: 95  BP: 115/61  RR: 20Vital Signs Last 24 Hrs  T(C): 37 (18 Apr 2023 23:36), Max: 37 (18 Apr 2023 23:36)  T(F): 98.6 (18 Apr 2023 23:36), Max: 98.6 (18 Apr 2023 23:36)  HR: 95 (18 Apr 2023 23:36) (79 - 95)  BP: 115/61 (18 Apr 2023 23:36) (115/61 - 166/79)  BP(mean): 78 (18 Apr 2023 23:36) (78 - 78)  RR: 20 (18 Apr 2023 18:14) (20 - 20)  SpO2: 85% (18 Apr 2023 23:36) (85% - 97%)    Parameters below as of 18 Apr 2023 18:14  Patient On (Oxygen Delivery Method): room air        PHYSICAL EXAM:  Gen: NAD, resting in bed  HEENT: Normocephalic, atraumatic  CV: Regular rate & regular rhythm  Lungs: + Coarse breath sounds b/l  Abdomen: Soft, + diffuse TTP, predominately over upper abdomen; No rebound or guarding  Ext: Warm, well perfused  Neuro: grossly oriented, awake  Lines: no phlebitis    TESTS & MEASUREMENTS:                        17.7   12.66 )-----------( 296      ( 18 Apr 2023 19:50 )             49.3     04-18    133<L>  |  95<L>  |  12  ----------------------------<  133<H>  4.8   |  22  |  0.7    Ca    10.0      18 Apr 2023 19:50    TPro  7.7  /  Alb  4.5  /  TBili  0.7  /  DBili  x   /  AST  22  /  ALT  22  /  AlkPhos  137<H>  04-18      LIVER FUNCTIONS - ( 18 Apr 2023 19:50 )  Alb: 4.5 g/dL / Pro: 7.7 g/dL / ALK PHOS: 137 U/L / ALT: 22 U/L / AST: 22 U/L / GGT: x                 Lactate, Blood: 1.5 mmol/L (04-18-23 @ 19:50)      INFECTIOUS DISEASES TESTING      RADIOLOGY & ADDITIONAL TESTS:  I have personally reviewed the last available     EXAM:  CT ANGIO CHEST AORTA [facility]     PROCEDURE DATE:  04/18/2023      IMPRESSION:    There is stable, mild aneurysmal dilatation of the ascending thoracic   aorta, measuring 4.2 cm.    No evidence of thoracic or abdominal aortic dissection.    A wedge shaped opacity, with air bronchograms, is noted in the region of   the lingula compatible with an area of infiltrate/atelectasis (3/155).   Short-term follow-up in 3 months is recommended to confirm resolution.      CARDIOLOGY TESTING  12 Lead ECG:   Ventricular Rate 79 BPM    Atrial Rate 79 BPM    P-R Interval 158 ms    QRS Duration 82 ms    Q-T Interval 394 ms    QTC Calculation(Bazett) 451 ms    P Axis 28 degrees    R Axis 54 degrees    T Axis 22 degrees    Diagnosis Line Normal sinus rhythm  Normal ECG    Confirmed by Hilda Parekh MD ([id number]) on 4/19/2023 7:16:17 AM (04-18-23 @ 18:15)  12 Lead ECG:   Ventricular Rate 71 BPM    Atrial Rate 71 BPM    P-R Interval 174 ms    QRS Duration 86 ms    Q-T Interval 398 ms    QTC Calculation(Bazett) 432 ms    P Axis 33 degrees    R Axis 46 degrees    T Axis 33 degrees    Diagnosis Line Normal sinus rhythm  Normal ECG    Confirmed by Avelino Camacho ([id number]) on 4/13/2023 12:29:17 PM (04-13-23 @ 10:37)      All available historical records have been reviewed    MEDICATIONS  azithromycin  IVPB   cefTRIAXone   IVPB   enoxaparin Injectable 40  fenofibrate Tablet 145  lactated ringers. 1000  metoclopramide Injectable 10  pantoprazole    Tablet 40  simvastatin 20      ANTIBIOTICS:  azithromycin  IVPB      cefTRIAXone   IVPB          All available historical data has been reviewed    ASSESSMENT  58 y/o M with PMHx of HTN, HLD, Chronic Pain (Bilateral knee, hip replacement with pain, takes oxycodone) and COPD (Hx smoking) presented to the ED with chest and abdominal pain x 1 day. Admitted to medicine for likely viral gastroenteritis. ID consulted for possible CAP given wedge-shaped opacity on CT.     IMPRESSION  #Likely Viral Gastroenteritis  - P/w abd pain, non bilious emesis & diarrhea  - CT A/P showed no colitis  - Afebrile  - Leukocytosis 12.66K, uptrending  - No recent travel, sick contacts or ingestion of tainted food  - Sxs most consistent w/ viral gastroenteritis    RECOMMENDATIONS  - F/u GI PCR    #COPD Exacerbation  - C/o chronic cough w/ whitish sputum, SOB  - Coarse breath sound on auscultation  - CT Chest showed wedge shaped opacity, with air bronchograms, is noted in the region of   the lingula compatible with an area of infiltrate/atelectasis  - Afebrile  - Leukocytosis 12.66K, uptrending  - On IV Ceftriaxone and Azithromycin for possible CAP  - Doubt CAP given absence of fever, radiographic findings more consistent w/ atelectasis    RECOMMENDATIONS  - D/c IV Ceftriaxone   - Can c/w IV or PO azithromycin for possible COPD exacerbation x 5 days  - F/u procal  - F/u RVP    This is a pended note. All final recommendations to follow pending discussion with ID Attending    KAYLEIGH TOMAS  57y, Male  Allergy: No Known Allergies      CHIEF COMPLAINT: abdominal pain (19 Apr 2023 03:24)      HPI:  58 y/o M with PMHx of HTN, HLD, Chronic Pain (Bilateral knee, hip replacement with pain, takes oxycodone) and COPD (Hx smoking) presented to the ED with chest and abdominal pain x 1 day. Patient describes that the past 2 days he developed acute epigastric abdominal pain associated with persistent vomiting (red tinged) non bilious in color. Symptoms associated with severe epigastric pain sharp, stabbing and constant as well as multiple episodes of watery diarrhea.  Mentions having SOB also, specially on. Denies SOB, cough, fever, sick constant or recent travel.     Vital Signs Last 24 Hrs  T(C): 37 (18 Apr 2023 23:36), Max: 37 (18 Apr 2023 23:36)  T(F): 98.6 (18 Apr 2023 23:36), Max: 98.6 (18 Apr 2023 23:36)  HR: 95 (18 Apr 2023 23:36) (79 - 95)  BP: 115/61 (18 Apr 2023 23:36) (115/61 - 166/79)  BP(mean): 78 (18 Apr 2023 23:36) (78 - 78)  RR: 20 (18 Apr 2023 18:14) (20 - 20)  SpO2: 85% (18 Apr 2023 23:36) (85% - 97%)    Parameters below as of 18 Apr 2023 18:14  Patient On (Oxygen Delivery Method): room air    Infectious Diseases History:  Old Micro Data/Cultures:  NA    FAMILY HISTORY:  FH: lung cancer (Mother)  mother    FH: diabetes mellitus      PAST MEDICAL & SURGICAL HISTORY:  HTN (hypertension)      High cholesterol      GERD (gastroesophageal reflux disease)      Morbid obesity      Osteoarthritis      Hiatal hernia  GERD      Colitis  LARGE INTESTINE   NO RECENT FLARES      COPD (chronic obstructive pulmonary disease)      REBECCA treated with BiPAP      History of cholecystectomy      History of appendectomy      History of knee replacement procedure of right knee  BILATERAL      Hernia, inguinal, bilateral  3 months old      H/O knee surgery  arthoscopic x4      H/O foot surgery  right big toe surgery          SOCIAL HISTORY  Social History:  120 pack year history (3 packs daily, 40 years)   Minimal EtOH use   no drug use (26 Jul 2022 04:46)      Recent Travel:  Other Exposures:     ROS  General: Denies rigors, nightsweats  HEENT: Denies headache, rhinorrhea, sore throat, eye pain  CV: Denies CP, palpitations  PULM: as noted above   GI: as noted above   : Denies discharge, hematuria  MSK: Denies arthralgias, myalgias  SKIN: Denies rash, lesions  NEURO: Denies paresthesias, weakness  PSYCH: Denies depression, anxiety    VITALS:  T(F): 98.6, Max: 98.6 (04-18-23 @ 23:36)  HR: 95  BP: 115/61  RR: 20Vital Signs Last 24 Hrs  T(C): 37 (18 Apr 2023 23:36), Max: 37 (18 Apr 2023 23:36)  T(F): 98.6 (18 Apr 2023 23:36), Max: 98.6 (18 Apr 2023 23:36)  HR: 95 (18 Apr 2023 23:36) (79 - 95)  BP: 115/61 (18 Apr 2023 23:36) (115/61 - 166/79)  BP(mean): 78 (18 Apr 2023 23:36) (78 - 78)  RR: 20 (18 Apr 2023 18:14) (20 - 20)  SpO2: 85% (18 Apr 2023 23:36) (85% - 97%)    Parameters below as of 18 Apr 2023 18:14  Patient On (Oxygen Delivery Method): room air        PHYSICAL EXAM:  Gen: NAD, resting in bed  HEENT: Normocephalic, atraumatic  CV: Regular rate & regular rhythm  Lungs: + Coarse breath sounds b/l  Abdomen: Soft, + diffuse TTP, predominately over upper abdomen; No rebound or guarding  Ext: Warm, well perfused, trace LE edema  Neuro: grossly oriented, awake  Lines: no phlebitis    TESTS & MEASUREMENTS:                        17.7   12.66 )-----------( 296      ( 18 Apr 2023 19:50 )             49.3     04-18    133<L>  |  95<L>  |  12  ----------------------------<  133<H>  4.8   |  22  |  0.7    Ca    10.0      18 Apr 2023 19:50    TPro  7.7  /  Alb  4.5  /  TBili  0.7  /  DBili  x   /  AST  22  /  ALT  22  /  AlkPhos  137<H>  04-18      LIVER FUNCTIONS - ( 18 Apr 2023 19:50 )  Alb: 4.5 g/dL / Pro: 7.7 g/dL / ALK PHOS: 137 U/L / ALT: 22 U/L / AST: 22 U/L / GGT: x                 Lactate, Blood: 1.5 mmol/L (04-18-23 @ 19:50)      INFECTIOUS DISEASES TESTING      RADIOLOGY & ADDITIONAL TESTS:  I have personally reviewed the last available     EXAM:  CT ANGIO CHEST AORTA [facility]     PROCEDURE DATE:  04/18/2023      IMPRESSION:    There is stable, mild aneurysmal dilatation of the ascending thoracic   aorta, measuring 4.2 cm.    No evidence of thoracic or abdominal aortic dissection.    A wedge shaped opacity, with air bronchograms, is noted in the region of   the lingula compatible with an area of infiltrate/atelectasis (3/155).   Short-term follow-up in 3 months is recommended to confirm resolution.      CARDIOLOGY TESTING  12 Lead ECG:   Ventricular Rate 79 BPM    Atrial Rate 79 BPM    P-R Interval 158 ms    QRS Duration 82 ms    Q-T Interval 394 ms    QTC Calculation(Bazett) 451 ms    P Axis 28 degrees    R Axis 54 degrees    T Axis 22 degrees    Diagnosis Line Normal sinus rhythm  Normal ECG    Confirmed by Hilda Parekh MD ([id number]) on 4/19/2023 7:16:17 AM (04-18-23 @ 18:15)  12 Lead ECG:   Ventricular Rate 71 BPM    Atrial Rate 71 BPM    P-R Interval 174 ms    QRS Duration 86 ms    Q-T Interval 398 ms    QTC Calculation(Bazett) 432 ms    P Axis 33 degrees    R Axis 46 degrees    T Axis 33 degrees    Diagnosis Line Normal sinus rhythm  Normal ECG    Confirmed by Avelino Camacho ([id number]) on 4/13/2023 12:29:17 PM (04-13-23 @ 10:37)      All available historical records have been reviewed    MEDICATIONS  azithromycin  IVPB   cefTRIAXone   IVPB   enoxaparin Injectable 40  fenofibrate Tablet 145  lactated ringers. 1000  metoclopramide Injectable 10  pantoprazole    Tablet 40  simvastatin 20      ANTIBIOTICS:  azithromycin  IVPB      cefTRIAXone   IVPB          All available historical data has been reviewed

## 2023-04-19 NOTE — H&P ADULT - NSHPPHYSICALEXAM_GEN_ALL_CORE
T(C): 37 (04-18-23 @ 23:36), Max: 37 (04-18-23 @ 23:36)  HR: 95 (04-18-23 @ 23:36) (79 - 95)  BP: 115/61 (04-18-23 @ 23:36) (115/61 - 166/79)  RR: 20 (04-18-23 @ 18:14) (20 - 20)  SpO2: 85% (04-18-23 @ 23:36) (85% - 97%)    CONSTITUTIONAL: Well groomed, no apparent distress  RESP: No respiratory distress, no use of accessory muscles; CTA b/l, no WRR  CV: RRR, +S1S2, no MRG; no JVD; no peripheral edema  GI: Soft, NT, ND, no rebound, no guarding; no palpable masses; no hepatosplenomegaly; no hernia palpated  PSYCH: Appropriate insight/judgment; A+O x 3, mood and affect appropriate, recent/remote memory intact T(C): 37 (04-18-23 @ 23:36), Max: 37 (04-18-23 @ 23:36)  HR: 95 (04-18-23 @ 23:36) (79 - 95)  BP: 115/61 (04-18-23 @ 23:36) (115/61 - 166/79)  RR: 20 (04-18-23 @ 18:14) (20 - 20)  SpO2: 85% (04-18-23 @ 23:36) (85% - 97%)    CONSTITUTIONAL: Looks in pain   RESP: No respiratory distress, bilateral diffused ronchi   CV: RRR, +S1S2, no MRG; no JVD; no peripheral edema  GI: Soft, epigastric tenderness, ND, no rebound, no guarding  PSYCH: Appropriate insight/judgment; A+O x 3

## 2023-04-19 NOTE — H&P ADULT - ATTENDING COMMENTS
patient seen and examined independently on morning rounds for the first time today in ED4, chart reviewed and discussed with the medicine resident and on interdisciplinary rounds and agree with the above overnight h/p with the following addendum:    in brief, 56 yo man with h/o HTN, dyslipidemia, chronic pain (back and bilateral knee/hip on chronic opiates--oxycodone 30 mg q4 hrs), COPD chronic , +active tobacco smoker and REBECCA on home cpap qhs who p/w chest and abdominal pain x 1-2 days.  no h/o recent travel or known sick contacts.  patient continues to ambulate out of ED4-->suspect going outside to smoke-->with frequent requests for pain meds.   continues to c/o abdominal and chest pain--serial trop neg x2- remains on telemetry    PCP- Dr. Azeb TORRES- as per above otherwise review of systems unremarable    PE:  GEN-NAD, AAOx3  PULM- fair air entry with diffuse coarse breath sounds  CVS- +s1/s2 RRR   GI- soft NT ND +bs, no rebound, no guarding  EXT- trace LE Edema    labs/radiology reviewed    a/p:  #Acute hypoxic respiratory failure---2/2 COPD Exacerbation  #abdominal pain n/v/d 2/2 suspected viral gastroenteritis  #Thoracic Aneurysm (4.2 cm)  -monitor o2 sat---suppl o2 prn with prn   -ABG prn  -repeat CXR in am  -f/u RVP, legionella, mrsa and stool pcr  -continue azithromycin x 5 days---stop ceftriaxone  -monitor on telemetry x 24 hrs--if no events dc tele    #Chronic pain  -pain control---will get pain management for difficult to control pain  -continue oxycodone IR 30 mg q4hr   -avoid opiate induced constipation--bowel regimen    DVT/GI ppx  guarded prognosis    FULL CODE

## 2023-04-19 NOTE — H&P ADULT - ASSESSMENT
57 year old male with PMHx of HTN, HLD, Chronic Pain (Bilateral knee, hip replacement with pain, takes oxycodone) and COPD (Hx smoking) presented to the ED with chest and abdominal pain x 1 day.    # CAP   # Left lingula consolidation   # Associated Gastroenteritis   - Patient presenting with persistent nausea, vomiting and diarrhea   - Hemodynamically stable but desaturating on RA to 85%   - WBC 12.6 and Hgb 17  - CTA Chest abd/pelv: Thoracic aneurysm 4.2cm with opacity Left lingula with air bronchograms   - LFts normal (Hx of cholecystectomy) and Lipase 11  - RVP, nasal MRSA and GI PCR  - Witll start Ceftriaxone / Azithromycin for ttx of pneumonia and coverage of atypical organisms   - Get Legionella in urine   - C/S ID and C/s GI   - IVF   - Diet as tolerated for now   - Metoclopramide q8hrs     # Thoracic Aortic Aneurysm   - 4.2cm, no indication for repair   - Will need outpatient follow up    # COPD   - Smoker (quit 2 weeks)   - USes BIPAP at night > resume inpatient if not vomiting   - Duones PRN     # HTN   # HLD  - hold lisinopril   - c/w fenofibrate and simvastatin   - lipid panel     # Chronic Pain   - Pain from bilateral knees (s/p replacement), bilateral ankles and bilareal shoulders  - takes oxycodone 30 q4 hrs daily >> high dose. Would resume for now as PRN   - Might need pain medicine consult     #DVT - lovenox sq   #GI - pantoprazole   #Diet - DASH  #Activity - IAT   #Code - Full code     #Disposition - IP  MEd.Rec: completed bedside

## 2023-04-20 DIAGNOSIS — R10.9 UNSPECIFIED ABDOMINAL PAIN: ICD-10-CM

## 2023-04-20 LAB
ALBUMIN SERPL ELPH-MCNC: 3.7 G/DL — SIGNIFICANT CHANGE UP (ref 3.5–5.2)
ALP SERPL-CCNC: 99 U/L — SIGNIFICANT CHANGE UP (ref 30–115)
ALT FLD-CCNC: 17 U/L — SIGNIFICANT CHANGE UP (ref 0–41)
ANION GAP SERPL CALC-SCNC: 12 MMOL/L — SIGNIFICANT CHANGE UP (ref 7–14)
AST SERPL-CCNC: 13 U/L — SIGNIFICANT CHANGE UP (ref 0–41)
BASOPHILS # BLD AUTO: 0.05 K/UL — SIGNIFICANT CHANGE UP (ref 0–0.2)
BASOPHILS NFR BLD AUTO: 0.6 % — SIGNIFICANT CHANGE UP (ref 0–1)
BILIRUB SERPL-MCNC: 0.5 MG/DL — SIGNIFICANT CHANGE UP (ref 0.2–1.2)
BUN SERPL-MCNC: 13 MG/DL — SIGNIFICANT CHANGE UP (ref 10–20)
CALCIUM SERPL-MCNC: 9 MG/DL — SIGNIFICANT CHANGE UP (ref 8.4–10.5)
CHLORIDE SERPL-SCNC: 93 MMOL/L — LOW (ref 98–110)
CHOLEST SERPL-MCNC: 100 MG/DL — SIGNIFICANT CHANGE UP
CO2 SERPL-SCNC: 28 MMOL/L — SIGNIFICANT CHANGE UP (ref 17–32)
CREAT SERPL-MCNC: 0.8 MG/DL — SIGNIFICANT CHANGE UP (ref 0.7–1.5)
CRP SERPL-MCNC: <3 MG/L — SIGNIFICANT CHANGE UP
EGFR: 105 ML/MIN/1.73M2 — SIGNIFICANT CHANGE UP
EOSINOPHIL # BLD AUTO: 0.05 K/UL — SIGNIFICANT CHANGE UP (ref 0–0.7)
EOSINOPHIL NFR BLD AUTO: 0.6 % — SIGNIFICANT CHANGE UP (ref 0–8)
ERYTHROCYTE [SEDIMENTATION RATE] IN BLOOD: 5 MM/HR — SIGNIFICANT CHANGE UP (ref 0–10)
GLUCOSE SERPL-MCNC: 110 MG/DL — HIGH (ref 70–99)
HCT VFR BLD CALC: 44.2 % — SIGNIFICANT CHANGE UP (ref 42–52)
HDLC SERPL-MCNC: 46 MG/DL — SIGNIFICANT CHANGE UP
HGB BLD-MCNC: 14.7 G/DL — SIGNIFICANT CHANGE UP (ref 14–18)
IMM GRANULOCYTES NFR BLD AUTO: 0.4 % — HIGH (ref 0.1–0.3)
LIPID PNL WITH DIRECT LDL SERPL: 36 MG/DL — SIGNIFICANT CHANGE UP
LYMPHOCYTES # BLD AUTO: 2.45 K/UL — SIGNIFICANT CHANGE UP (ref 1.2–3.4)
LYMPHOCYTES # BLD AUTO: 29.4 % — SIGNIFICANT CHANGE UP (ref 20.5–51.1)
MAGNESIUM SERPL-MCNC: 2.3 MG/DL — SIGNIFICANT CHANGE UP (ref 1.8–2.4)
MCHC RBC-ENTMCNC: 28.7 PG — SIGNIFICANT CHANGE UP (ref 27–31)
MCHC RBC-ENTMCNC: 33.3 G/DL — SIGNIFICANT CHANGE UP (ref 32–37)
MCV RBC AUTO: 86.3 FL — SIGNIFICANT CHANGE UP (ref 80–94)
MONOCYTES # BLD AUTO: 0.71 K/UL — HIGH (ref 0.1–0.6)
MONOCYTES NFR BLD AUTO: 8.5 % — SIGNIFICANT CHANGE UP (ref 1.7–9.3)
NEUTROPHILS # BLD AUTO: 5.04 K/UL — SIGNIFICANT CHANGE UP (ref 1.4–6.5)
NEUTROPHILS NFR BLD AUTO: 60.5 % — SIGNIFICANT CHANGE UP (ref 42.2–75.2)
NON HDL CHOLESTEROL: 54 MG/DL — SIGNIFICANT CHANGE UP
NRBC # BLD: 0 /100 WBCS — SIGNIFICANT CHANGE UP (ref 0–0)
PLATELET # BLD AUTO: 229 K/UL — SIGNIFICANT CHANGE UP (ref 130–400)
PMV BLD: 10.4 FL — SIGNIFICANT CHANGE UP (ref 7.4–10.4)
POTASSIUM SERPL-MCNC: 3.8 MMOL/L — SIGNIFICANT CHANGE UP (ref 3.5–5)
POTASSIUM SERPL-SCNC: 3.8 MMOL/L — SIGNIFICANT CHANGE UP (ref 3.5–5)
PROCALCITONIN SERPL-MCNC: 0.04 NG/ML — SIGNIFICANT CHANGE UP (ref 0.02–0.1)
PROT SERPL-MCNC: 6.1 G/DL — SIGNIFICANT CHANGE UP (ref 6–8)
RBC # BLD: 5.12 M/UL — SIGNIFICANT CHANGE UP (ref 4.7–6.1)
RBC # FLD: 15.1 % — HIGH (ref 11.5–14.5)
SODIUM SERPL-SCNC: 133 MMOL/L — LOW (ref 135–146)
TRIGL SERPL-MCNC: 89 MG/DL — SIGNIFICANT CHANGE UP
TROPONIN T SERPL-MCNC: <0.01 NG/ML — SIGNIFICANT CHANGE UP
WBC # BLD: 8.33 K/UL — SIGNIFICANT CHANGE UP (ref 4.8–10.8)
WBC # FLD AUTO: 8.33 K/UL — SIGNIFICANT CHANGE UP (ref 4.8–10.8)

## 2023-04-20 PROCEDURE — 99223 1ST HOSP IP/OBS HIGH 75: CPT

## 2023-04-20 PROCEDURE — 99232 SBSQ HOSP IP/OBS MODERATE 35: CPT

## 2023-04-20 PROCEDURE — 71045 X-RAY EXAM CHEST 1 VIEW: CPT | Mod: 26

## 2023-04-20 RX ORDER — TRAMADOL HYDROCHLORIDE 50 MG/1
25 TABLET ORAL ONCE
Refills: 0 | Status: DISCONTINUED | OUTPATIENT
Start: 2023-04-20 | End: 2023-04-20

## 2023-04-20 RX ORDER — MORPHINE SULFATE 50 MG/1
100 CAPSULE, EXTENDED RELEASE ORAL DAILY
Refills: 0 | Status: DISCONTINUED | OUTPATIENT
Start: 2023-04-20 | End: 2023-04-21

## 2023-04-20 RX ORDER — ONDANSETRON 8 MG/1
4 TABLET, FILM COATED ORAL EVERY 6 HOURS
Refills: 0 | Status: DISCONTINUED | OUTPATIENT
Start: 2023-04-20 | End: 2023-04-22

## 2023-04-20 RX ORDER — MORPHINE SULFATE 50 MG/1
4 CAPSULE, EXTENDED RELEASE ORAL EVERY 6 HOURS
Refills: 0 | Status: DISCONTINUED | OUTPATIENT
Start: 2023-04-20 | End: 2023-04-20

## 2023-04-20 RX ORDER — HYDROMORPHONE HYDROCHLORIDE 2 MG/ML
2 INJECTION INTRAMUSCULAR; INTRAVENOUS; SUBCUTANEOUS
Refills: 0 | Status: DISCONTINUED | OUTPATIENT
Start: 2023-04-20 | End: 2023-04-21

## 2023-04-20 RX ORDER — AZITHROMYCIN 500 MG/1
500 TABLET, FILM COATED ORAL DAILY
Refills: 0 | Status: DISCONTINUED | OUTPATIENT
Start: 2023-04-20 | End: 2023-04-22

## 2023-04-20 RX ORDER — MORPHINE SULFATE 50 MG/1
2 CAPSULE, EXTENDED RELEASE ORAL ONCE
Refills: 0 | Status: DISCONTINUED | OUTPATIENT
Start: 2023-04-20 | End: 2023-04-20

## 2023-04-20 RX ADMIN — OXYCODONE HYDROCHLORIDE 30 MILLIGRAM(S): 5 TABLET ORAL at 16:39

## 2023-04-20 RX ADMIN — Medication 2 MILLIGRAM(S): at 05:08

## 2023-04-20 RX ADMIN — HYDROMORPHONE HYDROCHLORIDE 2 MILLIGRAM(S): 2 INJECTION INTRAMUSCULAR; INTRAVENOUS; SUBCUTANEOUS at 22:43

## 2023-04-20 RX ADMIN — Medication 2 MILLIGRAM(S): at 22:06

## 2023-04-20 RX ADMIN — ONDANSETRON 4 MILLIGRAM(S): 8 TABLET, FILM COATED ORAL at 16:39

## 2023-04-20 RX ADMIN — Medication 1 PATCH: at 08:12

## 2023-04-20 RX ADMIN — MORPHINE SULFATE 4 MILLIGRAM(S): 50 CAPSULE, EXTENDED RELEASE ORAL at 14:06

## 2023-04-20 RX ADMIN — Medication 1 PATCH: at 19:00

## 2023-04-20 RX ADMIN — ENOXAPARIN SODIUM 40 MILLIGRAM(S): 100 INJECTION SUBCUTANEOUS at 05:05

## 2023-04-20 RX ADMIN — OXYCODONE HYDROCHLORIDE 30 MILLIGRAM(S): 5 TABLET ORAL at 00:06

## 2023-04-20 RX ADMIN — Medication 10 MILLIGRAM(S): at 13:05

## 2023-04-20 RX ADMIN — MORPHINE SULFATE 2 MILLIGRAM(S): 50 CAPSULE, EXTENDED RELEASE ORAL at 09:44

## 2023-04-20 RX ADMIN — SIMVASTATIN 20 MILLIGRAM(S): 20 TABLET, FILM COATED ORAL at 21:00

## 2023-04-20 RX ADMIN — Medication 2 MILLIGRAM(S): at 15:58

## 2023-04-20 RX ADMIN — OXYCODONE HYDROCHLORIDE 30 MILLIGRAM(S): 5 TABLET ORAL at 09:48

## 2023-04-20 RX ADMIN — HYDROMORPHONE HYDROCHLORIDE 2 MILLIGRAM(S): 2 INJECTION INTRAMUSCULAR; INTRAVENOUS; SUBCUTANEOUS at 18:40

## 2023-04-20 RX ADMIN — MORPHINE SULFATE 4 MILLIGRAM(S): 50 CAPSULE, EXTENDED RELEASE ORAL at 16:17

## 2023-04-20 RX ADMIN — OXYCODONE HYDROCHLORIDE 30 MILLIGRAM(S): 5 TABLET ORAL at 06:44

## 2023-04-20 RX ADMIN — Medication 10 MILLIGRAM(S): at 21:00

## 2023-04-20 RX ADMIN — Medication 10 MILLIGRAM(S): at 05:05

## 2023-04-20 RX ADMIN — PANTOPRAZOLE SODIUM 40 MILLIGRAM(S): 20 TABLET, DELAYED RELEASE ORAL at 05:07

## 2023-04-20 RX ADMIN — MORPHINE SULFATE 100 MILLIGRAM(S): 50 CAPSULE, EXTENDED RELEASE ORAL at 16:12

## 2023-04-20 RX ADMIN — AZITHROMYCIN 255 MILLIGRAM(S): 500 TABLET, FILM COATED ORAL at 05:06

## 2023-04-20 RX ADMIN — OXYCODONE HYDROCHLORIDE 30 MILLIGRAM(S): 5 TABLET ORAL at 05:08

## 2023-04-20 RX ADMIN — OXYCODONE HYDROCHLORIDE 30 MILLIGRAM(S): 5 TABLET ORAL at 21:43

## 2023-04-20 RX ADMIN — Medication 145 MILLIGRAM(S): at 12:46

## 2023-04-20 RX ADMIN — OXYCODONE HYDROCHLORIDE 30 MILLIGRAM(S): 5 TABLET ORAL at 22:13

## 2023-04-20 RX ADMIN — HYDROMORPHONE HYDROCHLORIDE 2 MILLIGRAM(S): 2 INJECTION INTRAMUSCULAR; INTRAVENOUS; SUBCUTANEOUS at 22:13

## 2023-04-20 RX ADMIN — Medication 3 MILLILITER(S): at 09:49

## 2023-04-20 RX ADMIN — ZOLPIDEM TARTRATE 5 MILLIGRAM(S): 10 TABLET ORAL at 20:23

## 2023-04-20 RX ADMIN — MORPHINE SULFATE 100 MILLIGRAM(S): 50 CAPSULE, EXTENDED RELEASE ORAL at 12:46

## 2023-04-20 RX ADMIN — OXYCODONE HYDROCHLORIDE 30 MILLIGRAM(S): 5 TABLET ORAL at 08:53

## 2023-04-20 NOTE — MEDICAL STUDENT PROGRESS NOTE(EDUCATION) - SUBJECTIVE AND OBJECTIVE BOX
LENGTH OF HOSPITAL STAY: 2d    CHIEF COMPLAINT:    Patient is a 57y old  Male who presents with a chief complaint of abdominal pain (19 Apr 2023 08:16)    OVERNIGHT EVENTS:  constant pain    FOLLOW UP:  f/u RVP    HOSPITAL COURSE:    HPI:    HPI:  57 year old male with PMHx of HTN, HLD, Chronic Pain (Bilateral knee, hip replacement with pain, takes oxycodone) and COPD (Hx smoking) presented to the ED with chest and abdominal pain x 1 day. Patient describes that the past 2 days he developed acute epigastric abdominal pain associated with persistent vomiting (red tinged) non bilious in color. Symptoms associated with severe epigastric pain sharp, stabbing and constant as well as multiple episodes of watery diarrhea.  Mentions having SOB also, specially on . Pt denies sob, cough, fever, sick constant or recent travel.     In ED: VS: 97.2 F, 166/79 HR 79, RR 20 97% o2    Vital Signs Last 24 Hrs  T(C): 37 (18 Apr 2023 23:36), Max: 37 (18 Apr 2023 23:36)  T(F): 98.6 (18 Apr 2023 23:36), Max: 98.6 (18 Apr 2023 23:36)  HR: 95 (18 Apr 2023 23:36) (79 - 95)  BP: 115/61 (18 Apr 2023 23:36) (115/61 - 166/79)  BP(mean): 78 (18 Apr 2023 23:36) (78 - 78)  RR: 20 (18 Apr 2023 18:14) (20 - 20)  SpO2: 85% (18 Apr 2023 23:36) (85% - 97%)    Parameters below as of 18 Apr 2023 18:14  Patient On (Oxygen Delivery Method): room air     (19 Apr 2023 03:24)      ALLERGIES:    Allergies    No Known Allergies    Intolerances        PMHx:    PAST MEDICAL & SURGICAL HISTORY:  HTN (hypertension)      High cholesterol      GERD (gastroesophageal reflux disease)      Morbid obesity      Osteoarthritis      Hiatal hernia  GERD      Colitis  LARGE INTESTINE   NO RECENT FLARES      COPD (chronic obstructive pulmonary disease)      REBECCA treated with BiPAP      History of cholecystectomy      History of appendectomy      History of knee replacement procedure of right knee  BILATERAL      Hernia, inguinal, bilateral  3 months old      H/O knee surgery  arthoscopic x4      H/O foot surgery  right big toe surgery          SOCIAL Hx:  daily smoker  no alcohol  no recreational drug use    MEDICATIONS:  STANDING MEDICATIONS  azithromycin  IVPB      azithromycin  IVPB 500 milliGRAM(s) IV Intermittent every 24 hours  enoxaparin Injectable 40 milliGRAM(s) SubCutaneous every 24 hours  fenofibrate Tablet 145 milliGRAM(s) Oral daily  metoclopramide Injectable 10 milliGRAM(s) IV Push every 8 hours  pantoprazole    Tablet 40 milliGRAM(s) Oral before breakfast  simvastatin 20 milliGRAM(s) Oral at bedtime    PRN MEDICATIONS  albuterol/ipratropium for Nebulization 3 milliLiter(s) Nebulizer every 6 hours PRN  ALPRAZolam 2 milliGRAM(s) Oral every 6 hours PRN  morphine  - Injectable 2 milliGRAM(s) IV Push once PRN  oxyCODONE    IR 30 milliGRAM(s) Oral every 4 hours PRN  zolpidem 5 milliGRAM(s) Oral at bedtime PRN      LABS:                        14.7   8.33  )-----------( 229      ( 20 Apr 2023 06:28 )             44.2     04-19    137  |  95<L>  |  13  ----------------------------<  110<H>  4.8   |  26  |  0.9    Ca    10.0      19 Apr 2023 08:33  Phos  4.4     04-19  Mg     2.4     04-19    TPro  7.7  /  Alb  4.6  /  TBili  0.8  /  DBili  x   /  AST  23  /  ALT  22  /  AlkPhos  129<H>  04-19          Troponin T, Serum: <0.01 ng/mL (04-19-23 @ 17:13)      CARDIAC MARKERS ( 19 Apr 2023 17:13 )  x     / <0.01 ng/mL / x     / x     / x      CARDIAC MARKERS ( 18 Apr 2023 19:50 )  x     / <0.01 ng/mL / x     / x     / x          RADIOLOGY:  < from: Xray Chest 1 View-PORTABLE IMMEDIATE (Xray Chest 1 View-PORTABLE IMMEDIATE .) (04.19.23 @ 00:30) >  Impression:    Lingula atelectasis.    < end of copied text >  < from: CT Angio Abdomen and Pelvis w/ IV Cont (04.18.23 @ 21:08) >  IMPRESSION:    There is stable, mild aneurysmal dilatation of the ascending thoracic   aorta, measuring 4.2 cm.    No evidence of thoracic or abdominal aortic dissection.    A wedge shaped opacity, with air bronchograms, is noted in the region of   the lingula compatible with an area of infiltrate/atelectasis (3/155).   Short-term follow-up in 3 months is recommended to confirm resolution.    < end of copied text >  < from: 12 Lead ECG (04.18.23 @ 18:15) >  Normal ECG    < end of copied text >        VITALS:   T(F): 98  HR: 76  BP: 110/75  RR: 18  SpO2: 96%        PHYSICAL EXAM:    Gen: NAD, resting in bed  HEENT: Normocephalic, atraumatic  Neck: supple, no lymphadenopathy  CV: Regular rate & regular rhythm  Lungs: b/l wheezing noted in all lung fields, no rhonchi or rales  Abdomen: NOrmal BS, Soft, DIFFUSE abdominal tenderness (particularly in epigastric area), non-distended. no cva tenderness. normal BS, no CVA tenderness. no distention  Ext: Warm, well perfused  Neuro: non focal, awake  Skin: no rash, no erythema   LENGTH OF HOSPITAL STAY: 2d    CHIEF COMPLAINT:    Patient is a 57y old  Male who presents with a chief complaint of abdominal pain (19 Apr 2023 08:16)    OVERNIGHT EVENTS:  constant pain    FOLLOW UP:  f/u RVP    HOSPITAL COURSE:    HPI:    HPI:  57 year old male with PMHx of HTN, HLD, Chronic Pain (Bilateral knee, hip replacement with pain, takes oxycodone) and COPD (Hx smoking) presented to the ED with chest and abdominal pain x 1 day. Pain is sharp, stabbing, and constant. Scaled 10/10, no radiation. Did not try any medications particularly for abdominal pain. Nothing alleviates or worsens pain. Admits to multiple episodes of vomiting (red tinged) non bilious in color, watery diarrhea, nausea, cough, and SOB. Pt denies fever, chills, chest pain, sick contacts, or recent travel.     In ED: VS: 97.2 F, 166/79 HR 79, RR 20 97% o2    Vital Signs Last 24 Hrs  T(C): 37 (18 Apr 2023 23:36), Max: 37 (18 Apr 2023 23:36)  T(F): 98.6 (18 Apr 2023 23:36), Max: 98.6 (18 Apr 2023 23:36)  HR: 95 (18 Apr 2023 23:36) (79 - 95)  BP: 115/61 (18 Apr 2023 23:36) (115/61 - 166/79)  BP(mean): 78 (18 Apr 2023 23:36) (78 - 78)  RR: 20 (18 Apr 2023 18:14) (20 - 20)  SpO2: 85% (18 Apr 2023 23:36) (85% - 97%)    Parameters below as of 18 Apr 2023 18:14  Patient On (Oxygen Delivery Method): room air     (19 Apr 2023 03:24)      ALLERGIES:    Allergies    No Known Allergies    Intolerances        PMHx:    PAST MEDICAL & SURGICAL HISTORY:  HTN (hypertension)      High cholesterol      GERD (gastroesophageal reflux disease)      Morbid obesity      Osteoarthritis      Hiatal hernia  GERD      Colitis  LARGE INTESTINE   NO RECENT FLARES      COPD (chronic obstructive pulmonary disease)      REBECCA treated with BiPAP      History of cholecystectomy      History of appendectomy      History of knee replacement procedure of right knee  BILATERAL      Hernia, inguinal, bilateral  3 months old      H/O knee surgery  arthoscopic x4      H/O foot surgery  right big toe surgery          SOCIAL Hx:  daily smoker  no alcohol  no recreational drug use    MEDICATIONS:  STANDING MEDICATIONS  azithromycin  IVPB      azithromycin  IVPB 500 milliGRAM(s) IV Intermittent every 24 hours  enoxaparin Injectable 40 milliGRAM(s) SubCutaneous every 24 hours  fenofibrate Tablet 145 milliGRAM(s) Oral daily  metoclopramide Injectable 10 milliGRAM(s) IV Push every 8 hours  pantoprazole    Tablet 40 milliGRAM(s) Oral before breakfast  simvastatin 20 milliGRAM(s) Oral at bedtime    PRN MEDICATIONS  albuterol/ipratropium for Nebulization 3 milliLiter(s) Nebulizer every 6 hours PRN  ALPRAZolam 2 milliGRAM(s) Oral every 6 hours PRN  morphine  - Injectable 2 milliGRAM(s) IV Push once PRN  oxyCODONE    IR 30 milliGRAM(s) Oral every 4 hours PRN  zolpidem 5 milliGRAM(s) Oral at bedtime PRN      LABS:                        14.7   8.33  )-----------( 229      ( 20 Apr 2023 06:28 )             44.2     04-19    137  |  95<L>  |  13  ----------------------------<  110<H>  4.8   |  26  |  0.9    Ca    10.0      19 Apr 2023 08:33  Phos  4.4     04-19  Mg     2.4     04-19    TPro  7.7  /  Alb  4.6  /  TBili  0.8  /  DBili  x   /  AST  23  /  ALT  22  /  AlkPhos  129<H>  04-19          Troponin T, Serum: <0.01 ng/mL (04-19-23 @ 17:13)      CARDIAC MARKERS ( 19 Apr 2023 17:13 )  x     / <0.01 ng/mL / x     / x     / x      CARDIAC MARKERS ( 18 Apr 2023 19:50 )  x     / <0.01 ng/mL / x     / x     / x          RADIOLOGY:  < from: Xray Chest 1 View-PORTABLE IMMEDIATE (Xray Chest 1 View-PORTABLE IMMEDIATE .) (04.19.23 @ 00:30) >  Impression:    Lingula atelectasis.    < end of copied text >  < from: CT Angio Abdomen and Pelvis w/ IV Cont (04.18.23 @ 21:08) >  IMPRESSION:    There is stable, mild aneurysmal dilatation of the ascending thoracic   aorta, measuring 4.2 cm.    No evidence of thoracic or abdominal aortic dissection.    A wedge shaped opacity, with air bronchograms, is noted in the region of   the lingula compatible with an area of infiltrate/atelectasis (3/155).   Short-term follow-up in 3 months is recommended to confirm resolution.    < end of copied text >  < from: 12 Lead ECG (04.18.23 @ 18:15) >  Normal ECG    < end of copied text >        VITALS:   T(F): 98  HR: 76  BP: 110/75  RR: 18  SpO2: 96%        PHYSICAL EXAM:    Gen: NAD, resting in bed  HEENT: Normocephalic, atraumatic  Neck: supple, no lymphadenopathy  CV: Regular rate & regular rhythm  Lungs: b/l wheezing noted in all lung fields, no rhonchi or rales  Abdomen: NOrmal BS, Soft, DIFFUSE abdominal tenderness (particularly in epigastric area), non-distended. no cva tenderness. normal BS, no CVA tenderness. no distention  Ext: Warm, well perfused  Neuro: non focal, awake  Skin: no rash, no erythema   LENGTH OF HOSPITAL STAY: 2d    CHIEF COMPLAINT:    Patient is a 57y old  Male who presents with a chief complaint of abdominal pain (19 Apr 2023 08:16)    OVERNIGHT EVENTS:  constant pain, pt is ambulating and asking for pain meds    FOLLOW UP:  f/u P    HOSPITAL COURSE:    HPI:    HPI:  57 year old male with PMHx of HTN, HLD, Chronic Pain (Bilateral knee, hip replacement with pain, takes oxycodone follows Dr. Chirinos in [place]) and COPD (Hx smoking) presented to the ED with chest and abdominal pain x 1 day. Pain is sharp, stabbing, and constant. Scaled 10/10, no radiation. Did not try any medications particularly for abdominal pain. Nothing alleviates or worsens pain. Admits to multiple episodes of vomiting (red tinged) non bilious in color, watery diarrhea, nausea, cough, and SOB. Pt denies fever, chills, chest pain, sick contacts, or recent travel.     In ED: VS: 97.2 F, 166/79 HR 79, RR 20 97% o2    Vital Signs Last 24 Hrs  T(C): 37 (18 Apr 2023 23:36), Max: 37 (18 Apr 2023 23:36)  T(F): 98.6 (18 Apr 2023 23:36), Max: 98.6 (18 Apr 2023 23:36)  HR: 95 (18 Apr 2023 23:36) (79 - 95)  BP: 115/61 (18 Apr 2023 23:36) (115/61 - 166/79)  BP(mean): 78 (18 Apr 2023 23:36) (78 - 78)  RR: 20 (18 Apr 2023 18:14) (20 - 20)  SpO2: 85% (18 Apr 2023 23:36) (85% - 97%)    Parameters below as of 18 Apr 2023 18:14  Patient On (Oxygen Delivery Method): room air     (19 Apr 2023 03:24)      ALLERGIES:    Allergies    No Known Allergies    Intolerances        PMHx:    PAST MEDICAL & SURGICAL HISTORY:  HTN (hypertension)      High cholesterol      GERD (gastroesophageal reflux disease)      Morbid obesity      Osteoarthritis      Hiatal hernia  GERD      Colitis  LARGE INTESTINE   NO RECENT FLARES      COPD (chronic obstructive pulmonary disease)      REBECCA treated with BiPAP      History of cholecystectomy      History of appendectomy      History of knee replacement procedure of right knee  BILATERAL      Hernia, inguinal, bilateral  3 months old      H/O knee surgery  arthoscopic x4      H/O foot surgery  right big toe surgery          SOCIAL Hx:  daily smoker  no alcohol  no recreational drug use    MEDICATIONS:  STANDING MEDICATIONS  azithromycin  IVPB      azithromycin  IVPB 500 milliGRAM(s) IV Intermittent every 24 hours  enoxaparin Injectable 40 milliGRAM(s) SubCutaneous every 24 hours  fenofibrate Tablet 145 milliGRAM(s) Oral daily  metoclopramide Injectable 10 milliGRAM(s) IV Push every 8 hours  pantoprazole    Tablet 40 milliGRAM(s) Oral before breakfast  simvastatin 20 milliGRAM(s) Oral at bedtime    PRN MEDICATIONS  albuterol/ipratropium for Nebulization 3 milliLiter(s) Nebulizer every 6 hours PRN  ALPRAZolam 2 milliGRAM(s) Oral every 6 hours PRN  morphine  - Injectable 2 milliGRAM(s) IV Push once PRN  oxyCODONE    IR 30 milliGRAM(s) Oral every 4 hours PRN  zolpidem 5 milliGRAM(s) Oral at bedtime PRN      LABS:                        14.7   8.33  )-----------( 229      ( 20 Apr 2023 06:28 )             44.2     04-19    137  |  95<L>  |  13  ----------------------------<  110<H>  4.8   |  26  |  0.9    Ca    10.0      19 Apr 2023 08:33  Phos  4.4     04-19  Mg     2.4     04-19    TPro  7.7  /  Alb  4.6  /  TBili  0.8  /  DBili  x   /  AST  23  /  ALT  22  /  AlkPhos  129<H>  04-19          Troponin T, Serum: <0.01 ng/mL (04-19-23 @ 17:13)      CARDIAC MARKERS ( 19 Apr 2023 17:13 )  x     / <0.01 ng/mL / x     / x     / x      CARDIAC MARKERS ( 18 Apr 2023 19:50 )  x     / <0.01 ng/mL / x     / x     / x          RADIOLOGY:  < from: Xray Chest 1 View-PORTABLE IMMEDIATE (Xray Chest 1 View-PORTABLE IMMEDIATE .) (04.19.23 @ 00:30) >  Impression:    Lingula atelectasis.    < end of copied text >  < from: CT Angio Abdomen and Pelvis w/ IV Cont (04.18.23 @ 21:08) >  IMPRESSION:    There is stable, mild aneurysmal dilatation of the ascending thoracic   aorta, measuring 4.2 cm.    No evidence of thoracic or abdominal aortic dissection.    A wedge shaped opacity, with air bronchograms, is noted in the region of   the lingula compatible with an area of infiltrate/atelectasis (3/155).   Short-term follow-up in 3 months is recommended to confirm resolution.    < end of copied text >  < from: 12 Lead ECG (04.18.23 @ 18:15) >  Normal ECG    < end of copied text >        VITALS:   T(F): 98  HR: 76  BP: 110/75  RR: 18  SpO2: 96%        PHYSICAL EXAM:    Gen: NAD, resting in bed  HEENT: Normocephalic, atraumatic  Neck: supple, no lymphadenopathy  CV: Regular rate & regular rhythm  Lungs: b/l wheezing noted in all lung fields, no rhonchi or rales  Abdomen: NOrmal BS, Soft, DIFFUSE abdominal tenderness (particularly in epigastric area), non-distended. no cva tenderness. normal BS, no CVA tenderness. no distention  Ext: Warm, well perfused  Neuro: non focal, awake  Skin: no rash, no erythema

## 2023-04-20 NOTE — PROGRESS NOTE ADULT - ASSESSMENT
58 y/o M with PMHx of HTN, HLD, COPD, GERD, Chronic pain presenting with chest pain, diffuse abdominal pain and diarrhea associated with nausea and vomiting with decreased PO intake since Friday. GI was consulted for acute onset diarrhea and abdominal pain.     #Acute onset diarrhea and abdominal pain likely secondary to viral gastroenteritis   - non-bloody, non-mucoid diarrhea  associated with NBNB emesis and abdominal pain   - Patient's diarrhea is improving  - CT A/P without colitis  - Afebrile  - Prior EGD: 2013 for abdominal pain, gastritis, duodenitis, -ve H. pylori   - Currently being treated for COPD exacerbation on Abx (azithromycin)    Plan  - Check GI PCR, stool C Diff, o&p, culture/stain, giardia, esr/crp, stool lactoferrin, calprotectin   - Supportive care   - Zofran PRN for nausea   - Advance to lactose free diet as tolerated     #Screening colonoscopy   #Hx of constipation in the setting of opioid use  - Outpatient colonoscopy for screening   - High fiber diet

## 2023-04-20 NOTE — CONSULT NOTE ADULT - SUBJECTIVE AND OBJECTIVE BOX
Pain Medicine Consult Note    History of Present Illness  Patient is a 58 y/o man with history of HTN, HL, COPD, GERD, REBECCA, obesity, thoracic aortic aneurysm, and chronic hip/knee pain on very high dose chronic opioid therapy who was admitted on 4/18/2023 with abdominal pain, nausea/vomiting, and diarrhea. The patient states that he has been having pain in the epigastric region for two days prior to admission. He has been unable to tolerate PO intake of liquids and solids. The patient notes nonbilious, blood tinged vomit as well as profuse water diarrhea. He states that he has had symptoms similar to this in the past and been diagnosed with colitis. The patient endorses having a longstanding history of chronic pain s/p multiple knee replacements and hip replacements that required revision. He has been on chronic opioid therapy for 30 years, including morphine ER 100mg 4x/day and oxycodone 30mg 5x/day prn. The patient states that he cannot tolerate PO medications at this time.     Current Inpatient Medication Regimen:  albuterol/ipratropium for Nebulization 3 milliLiter(s) Nebulizer every 6 hours PRN  ALPRAZolam 2 milliGRAM(s) Oral every 6 hours PRN  azithromycin   Tablet 500 milliGRAM(s) Oral daily  enoxaparin Injectable 40 milliGRAM(s) SubCutaneous every 24 hours  fenofibrate Tablet 145 milliGRAM(s) Oral daily  HYDROmorphone  Injectable 2 milliGRAM(s) IV Push every 3 hours PRN  metoclopramide Injectable 10 milliGRAM(s) IV Push every 8 hours  morphine ER Tablet 100 milliGRAM(s) Oral daily  oxyCODONE    IR 30 milliGRAM(s) Oral every 4 hours PRN  pantoprazole    Tablet 40 milliGRAM(s) Oral before breakfast  simvastatin 20 milliGRAM(s) Oral at bedtime  zolpidem 5 milliGRAM(s) Oral at bedtime PRN      Home Analgesic Regimen:  Morphine ER 100mg 4x/day  Oxycodone IR 30mg 5x/day prn (MED = 625)    Allergies:  No Known Allergies      Past Medical History:  COPD (chronic obstructive pulmonary disease)  HTN (hypertension)  High cholesterol  GERD (gastroesophageal reflux disease)  Obstructive sleep apnea  Morbid obesity  Hyperlipidemia  Osteoarthritis  Hiatal hernia  Colitis    Past Surgical History:  Bilateral TKA w/ multiple revisions  Bilateral BULL w/ multiple revisions  Appendectomy  Cholecystectomy  Right ankle fusion    Family History:  lung cancer (Mother)  diabetes mellitus    Social History:  Tobacco - quit smoking x3 weeks  EtOH - denies   Drugs - denies      Review of Systems:  General: no fevers or chills  Eyes: no diplopia or blurred vision  ENT: no rhinorrhea  CV: no chest pain  Resp: no cough or dyspnea  GI: as per HPI  : no urinary incontinence or dysuria  Neuro: no focal weakness or numbness in extremities    Physical Exam:  T(C): 36.6 (04-20-23 @ 13:01), Max: 36.9 (04-19-23 @ 20:00)  HR: 80 (04-20-23 @ 13:01) (76 - 97)  BP: 113/81 (04-20-23 @ 13:01) (110/75 - 140/92)  RR: 18 (04-20-23 @ 13:01) (18 - 18)  SpO2: 97% (04-20-23 @ 13:01) (90% - 100%)  Gen: Mild discomfort, laying on his side; patient is obese  Eyes: no glasses or scleral icterus  Head: Normocephalic / Atraumatic  CV: no JVD  Lungs: nonlabored breathing  Abdomen: nondistended, soft  : no williamson catheter in place  Neuro: AOx3, Cranial nerves intact  Extremities: full ROM in upper/lower extremities  Psych: normal affect      Labs:  CBC  8.33 K/uL [4.80 - 10.80] > 14.7 g/dL [14.0 - 18.0] / 44.2 % [42.0 - 52.0] < 229 K/uL [130 - 400]      BMP  133 mmol/L<L> [135 - 146] | 93 mmol/L<L> [98 - 110] | 13 mg/dL [10 - 20]  3.8 mmol/L [3.5 - 5.0] | 28 mmol/L [17 - 32] | 0.8 mg/dL [0.7 - 1.5]    110 mg/dL<H> [70 - 99]        Imaging Studies:  CT Angio Abdomen/Pelvis (4/18/2023)  FINDINGS CHEST:    TUBES AND LINES: None.    HEART AND VESSELS: The heart size is normal. There is no pericardial   effusion.    Normal pulmonary artery. No evidence of central pulmonary embolism.    There is stable, mild aneurysmal dilatation of the ascending thoracic   aorta, measuring 4.2 cm.    There is no evidence of thoracic aortic dissection. No intimal flap or   double lumen.    The brachiocephalic vessels are unremarkable.      MEDIASTINUM: There are no suspicious mediastinal, hilar or axillary lymph   nodes. The visualized portion of the thyroid gland is unremarkable.    AIRWAYS, LUNGS AND PLEURA: The central tracheobronchial tree is patent. A   wedge shaped opacity, with air bronchograms, is noted in the region of   the lingula compatible with an area of infiltrate/atelectasis (3/155).   Short-term follow-up in 3 months is recommended to confirm resolution.   There is no pleural effusion. There is no pneumothorax.    CHEST WALL/BONES/SOFT TISSUES: Degenerative changes of the spine.    FINDINGS ABDOMEN AND PELVIS:    HEPATIC: The liver is normal in size with no evidence of bile duct   dilatation. Calcifications are noted within the right lobe of liver. The   portal vein is patent.    BILIARY: Post cholecystectomy.    SPLEEN: Unremarkable.    PANCREAS: The pancreas is normal in size and configuration. No evidence   of mass or pancreatitis.    ADRENAL GLANDS: Unremarkable.    KIDNEYS: No evidence of hydronephrosis or solid renal mass. Bilateral    ABDOMINOPELVIC NODES: Unremarkable.    PELVIC ORGANS: No evidence of pelvic mass, lymphadenopathy, or fluid   collection.    BLADDER: Unremarkable.    PERITONEUM/MESENTERY/BOWEL: No evidence of obstruction, colitis,   inflammatory process, or ascites within the abdomen or pelvis.    BONES/SOFT TISSUES: Degenerative changes of the spine are noted.    VASCULAR: No evidence of abdominal aortic aneurysm or dissection. No   intimal flap or double lumen.  The celiac axis, the superior mesenteric   artery, the inferior mesenteric artery, and the renal arteries are patent   without flow-limiting stenosis. Single renal arteries are seen   bilaterally. Incidentally noted are bilateral corona mortis arteries in   the pelvis (between the distal external iliac arteries and obturator   arteries (anatomic variants)        IMPRESSION:    There is stable, mild aneurysmal dilatation of the ascending thoracic   aorta, measuring 4.2 cm.    No evidence of thoracic or abdominal aortic dissection.    A wedge shaped opacity, with air bronchograms, is noted in the region of   the lingula compatible with an area of infiltrate/atelectasis (3/155).   Short-term follow-up in 3 months is recommended to confirm resolution.      Opioid Risk Assessment Tool                                                                         Female       Male  Family History  Alcohol                                                              1                3  Illegal drugs                                                       2                3  Rx drugs                                                            4                4    Personal History   Alcohol                                                              3                3  Illegal drugs                                                       4                4  Rx drugs                                                            5                5    Age between 16—45 years                                1                1  History of preadolescent sexual abuse               3                0    Psychological disease  ADD, OCD, bipolar, schizophrenia                      2                2  Depression                                                       1                1    Total Score                                                      __              0    0 - 3 = low risk for future opioid abuse  4 - 7 = moderate risk for future opioid abuse  8+ = high risk for future opioid abuse

## 2023-04-20 NOTE — CONSULT NOTE ADULT - PROBLEM SELECTOR RECOMMENDATION 9
+Longstanding history of very high dose (MED = 625) opioids, likely to have extreme opioid tolerance. Low risk of opioid abuse per ORT.  1) Start hydromorphone IV 2mg Q3h standing (MED = 280)  2) When able to start PO meds, may restart home regimen of:  -morphine ER 100mg 4x/day  -oxycodone IR 30mg Q4h prn  3) Do not provide prescriptions for opioids on discharge; the patient receives these from his physician  4) Provide a prescription for naloxone intranasal on discharge (high MED, use of sedatives)

## 2023-04-20 NOTE — PROGRESS NOTE ADULT - ASSESSMENT
a/p:  #Acute hypoxic respiratory failure---2/2 COPD Exacerbation  #abdominal pain n/v/d 2/2 suspected viral gastroenteritis  #Thoracic Aneurysm (4.2 cm)  #REBECCA- bipap qhs  -monitor o2 sat---suppl o2 prn with prn   -ABG prn  -repeat CXR in am  -continue azithromycin x 5 days---stop ceftriaxone  -monitor on telemetry x 24 hrs--if no events dc tele  -f/u legionella and strep  -cdiff negative --f/u stool pcr    #Chronic longstanding pain with suspected high opiate tolerance  -f/u pain management recomm  -IStop confirmed patient on standing oxycodone and morphine ER as per above  -avoid opiate induced constipation--bowel regimen    DVT/GI ppx  guarded prognosis    FULL CODE     #Progress Note Handoff  Pending (specify):  pain managment recommendations and pain control  Disposition: Home__x (when medically stable)  a/p:  #Acute hypoxic respiratory failure---2/2 COPD Exacerbation  #abdominal pain n/v/d 2/2 suspected viral gastroenteritis  #Thoracic Aneurysm (4.2 cm)  #REBECCA- bipap qhs  -monitor o2 sat---suppl o2 prn with prn   -ABG prn  -repeat CXR in am  -continue azithromycin x 5 days---stop ceftriaxone  -monitor on telemetry x 24 hrs--if no events dc tele  -f/u legionella and strep  -cdiff negative --f/u stool pcr    #Chronic longstanding pain with suspected high opiate tolerance  -f/u pain management recomm  -IStop confirmed patient on standing oxycodone and morphine ER as per above  -avoid opiate induced constipation--bowel regimen    DVT/GI ppx  guarded prognosis    FULL CODE     #Progress Note Handoff  Pending (specify):  pain managment recommendations and pain control  Disposition: Home__x (when medically stable)   '    Total time spent to complete patient's bedside assessment, review medical chart, discuss medical plan of care with covering medical team was more than 35 minutes  with >50% of time spendt face to face with patient, discussion with patient/family and/or coordination of care

## 2023-04-20 NOTE — CONSULT NOTE ADULT - ASSESSMENT
Patient is a 56 y/o man with history of HTN, HL, COPD, GERD, REBECCA, obesity, thoracic aortic aneurysm, and chronic hip/knee pain on very high dose chronic opioid therapy who was admitted on 4/18/2023 with abdominal pain, nausea/vomiting, and diarrhea.

## 2023-04-20 NOTE — MEDICAL STUDENT PROGRESS NOTE(EDUCATION) - NS MD HP STUD ASPLAN PLAN FT
# Abdominal pain, most likely viral gastroenteritiis  CT abdomen/pelvis & angiogram:  wedge shaped opacity, with air bronchograms, is noted in the region of   the lingula compatible with an area of infiltrate/atelectasis  as per id, d/c rocephin and c/w azithromycin  as per GI, Check GI PCR, stool C Diff, o&p, culture/stain, giardia, esr/crp, stool lactoferrin, calprotectin   Zofran for nausea PRN  give clear liquids no dairy if tolerating, advance as tolerated   monitor constipation 2/2 to oxycodone    # COPD exacerbation  Chest xray: Lingula atelectasis.  as per id, d/c rocephin and c/w 5 day course of azithromycin   albuterol/ipratropium for Nebulization 3 milliLiter(s) Nebulizer every 6 hours PRN Bronchospasm  f/u RVP # Abdominal pain, most likely viral gastroenteritiis  CT abdomen/pelvis & angiogram:  wedge shaped opacity, with air bronchograms, is noted in the region of   the lingula compatible with an area of infiltrate/atelectasis  as per id, d/c rocephin 1 gram and c/w azithromycin 500mg  as per GI, Check GI PCR, stool C Diff, o&p, culture/stain, giardia, esr/crp, stool lactoferrin, calprotectin   Zofran for nausea PRN  give clear liquids no dairy if tolerating, advance as tolerated   monitor constipation 2/2 to oxycodone    # COPD exacerbation  Chest xray: Lingula atelectasis.  as per id, d/c rocephin and c/w 5 day course of azithromycin   albuterol/ipratropium for Nebulization 3 milliLiter(s) Nebulizer every 6 hours PRN Bronchospasm  f/u RVP    #HLD  c/w fenofibrate    # chronic B/L knee pain  c/w oxycodone 30 mg q4 # Abdominal pain, most likely viral gastroenteritiis  CT abdomen/pelvis & angiogram:  wedge shaped opacity, with air bronchograms, is noted in the region of   the lingula compatible with an area of infiltrate/atelectasis  as per id, d/c rocephin 1 gram and c/w azithromycin 500mg  as per GI, Check GI PCR, stool C Diff, o&p, culture/stain, giardia, esr/crp, stool lactoferrin, calprotectin   Zofran for nausea PRN  give clear liquids no dairy if tolerating, advance as tolerated   monitor constipation 2/2 to oxycodone    # COPD exacerbation  Chest xray: Lingula atelectasis.  as per id, d/c rocephin and c/w 5 day course of azithromycin   albuterol/ipratropium for Nebulization 3 milliLiter(s) Nebulizer every 6 hours PRN Bronchospasm  f/u RVP    #HLD  c/w fenofibrate    # chronic B/L knee pain  c/w oxycodone 30 mg q4  pain management consult appreciated # Abdominal pain, most likely viral gastroenteritiis  CT abdomen/pelvis & angiogram:  wedge shaped opacity, with air bronchograms, is noted in the region of the lingula compatible with an area of infiltrate/atelectasis  as per id, d/c rocephin 1 gram and c/w azithromycin 500mg  as per GI, Check GI PCR, stool C Diff, o&p, culture/stain, giardia, esr/crp, stool lactoferrin, calprotectin   Zofran for nausea PRN  give clear liquids no dairy if tolerating, advance as tolerated   monitor constipation 2/2 to oxycodone    # COPD exacerbation  Chest xray: Lingula atelectasis.  as per id, d/c rocephin and c/w 5 day course of azithromycin   albuterol/ipratropium for Nebulization 3 milliLiter(s) Nebulizer every 6 hours PRN Bronchospasm  f/u RVP    #HLD  c/w fenofibrate    # chronic B/L knee pain  c/w oxycodone 30 mg q4  pain management consult appreciated

## 2023-04-21 LAB
ALBUMIN SERPL ELPH-MCNC: 3.8 G/DL — SIGNIFICANT CHANGE UP (ref 3.5–5.2)
ALP SERPL-CCNC: 119 U/L — HIGH (ref 30–115)
ALT FLD-CCNC: 14 U/L — SIGNIFICANT CHANGE UP (ref 0–41)
ANION GAP SERPL CALC-SCNC: 10 MMOL/L — SIGNIFICANT CHANGE UP (ref 7–14)
AST SERPL-CCNC: 12 U/L — SIGNIFICANT CHANGE UP (ref 0–41)
BASOPHILS # BLD AUTO: 0.05 K/UL — SIGNIFICANT CHANGE UP (ref 0–0.2)
BASOPHILS NFR BLD AUTO: 0.6 % — SIGNIFICANT CHANGE UP (ref 0–1)
BILIRUB SERPL-MCNC: 0.5 MG/DL — SIGNIFICANT CHANGE UP (ref 0.2–1.2)
BLD GP AB SCN SERPL QL: SIGNIFICANT CHANGE UP
BUN SERPL-MCNC: 14 MG/DL — SIGNIFICANT CHANGE UP (ref 10–20)
CALCIUM SERPL-MCNC: 8.8 MG/DL — SIGNIFICANT CHANGE UP (ref 8.4–10.5)
CHLORIDE SERPL-SCNC: 96 MMOL/L — LOW (ref 98–110)
CO2 SERPL-SCNC: 29 MMOL/L — SIGNIFICANT CHANGE UP (ref 17–32)
CREAT SERPL-MCNC: 0.8 MG/DL — SIGNIFICANT CHANGE UP (ref 0.7–1.5)
EGFR: 105 ML/MIN/1.73M2 — SIGNIFICANT CHANGE UP
EOSINOPHIL # BLD AUTO: 0.09 K/UL — SIGNIFICANT CHANGE UP (ref 0–0.7)
EOSINOPHIL NFR BLD AUTO: 1 % — SIGNIFICANT CHANGE UP (ref 0–8)
GLUCOSE SERPL-MCNC: 82 MG/DL — SIGNIFICANT CHANGE UP (ref 70–99)
HCT VFR BLD CALC: 44.1 % — SIGNIFICANT CHANGE UP (ref 42–52)
HCT VFR BLD CALC: 44.3 % — SIGNIFICANT CHANGE UP (ref 42–52)
HGB BLD-MCNC: 14.5 G/DL — SIGNIFICANT CHANGE UP (ref 14–18)
HGB BLD-MCNC: 14.7 G/DL — SIGNIFICANT CHANGE UP (ref 14–18)
IMM GRANULOCYTES NFR BLD AUTO: 0.3 % — SIGNIFICANT CHANGE UP (ref 0.1–0.3)
LYMPHOCYTES # BLD AUTO: 2.8 K/UL — SIGNIFICANT CHANGE UP (ref 1.2–3.4)
LYMPHOCYTES # BLD AUTO: 31.3 % — SIGNIFICANT CHANGE UP (ref 20.5–51.1)
MAGNESIUM SERPL-MCNC: 2.5 MG/DL — HIGH (ref 1.8–2.4)
MCHC RBC-ENTMCNC: 28.9 PG — SIGNIFICANT CHANGE UP (ref 27–31)
MCHC RBC-ENTMCNC: 28.9 PG — SIGNIFICANT CHANGE UP (ref 27–31)
MCHC RBC-ENTMCNC: 32.9 G/DL — SIGNIFICANT CHANGE UP (ref 32–37)
MCHC RBC-ENTMCNC: 33.2 G/DL — SIGNIFICANT CHANGE UP (ref 32–37)
MCV RBC AUTO: 87.2 FL — SIGNIFICANT CHANGE UP (ref 80–94)
MCV RBC AUTO: 88 FL — SIGNIFICANT CHANGE UP (ref 80–94)
MONOCYTES # BLD AUTO: 0.86 K/UL — HIGH (ref 0.1–0.6)
MONOCYTES NFR BLD AUTO: 9.6 % — HIGH (ref 1.7–9.3)
NEUTROPHILS # BLD AUTO: 5.13 K/UL — SIGNIFICANT CHANGE UP (ref 1.4–6.5)
NEUTROPHILS NFR BLD AUTO: 57.2 % — SIGNIFICANT CHANGE UP (ref 42.2–75.2)
NRBC # BLD: 0 /100 WBCS — SIGNIFICANT CHANGE UP (ref 0–0)
NRBC # BLD: 0 /100 WBCS — SIGNIFICANT CHANGE UP (ref 0–0)
PLATELET # BLD AUTO: 221 K/UL — SIGNIFICANT CHANGE UP (ref 130–400)
PLATELET # BLD AUTO: 223 K/UL — SIGNIFICANT CHANGE UP (ref 130–400)
PMV BLD: 10.3 FL — SIGNIFICANT CHANGE UP (ref 7.4–10.4)
PMV BLD: 10.5 FL — HIGH (ref 7.4–10.4)
POTASSIUM SERPL-MCNC: 4.5 MMOL/L — SIGNIFICANT CHANGE UP (ref 3.5–5)
POTASSIUM SERPL-SCNC: 4.5 MMOL/L — SIGNIFICANT CHANGE UP (ref 3.5–5)
PROT SERPL-MCNC: 6 G/DL — SIGNIFICANT CHANGE UP (ref 6–8)
RBC # BLD: 5.01 M/UL — SIGNIFICANT CHANGE UP (ref 4.7–6.1)
RBC # BLD: 5.08 M/UL — SIGNIFICANT CHANGE UP (ref 4.7–6.1)
RBC # FLD: 14.7 % — HIGH (ref 11.5–14.5)
RBC # FLD: 14.9 % — HIGH (ref 11.5–14.5)
SODIUM SERPL-SCNC: 135 MMOL/L — SIGNIFICANT CHANGE UP (ref 135–146)
WBC # BLD: 8.93 K/UL — SIGNIFICANT CHANGE UP (ref 4.8–10.8)
WBC # BLD: 8.96 K/UL — SIGNIFICANT CHANGE UP (ref 4.8–10.8)
WBC # FLD AUTO: 8.93 K/UL — SIGNIFICANT CHANGE UP (ref 4.8–10.8)
WBC # FLD AUTO: 8.96 K/UL — SIGNIFICANT CHANGE UP (ref 4.8–10.8)

## 2023-04-21 PROCEDURE — 99231 SBSQ HOSP IP/OBS SF/LOW 25: CPT

## 2023-04-21 PROCEDURE — 93010 ELECTROCARDIOGRAM REPORT: CPT

## 2023-04-21 RX ORDER — SENNA PLUS 8.6 MG/1
2 TABLET ORAL AT BEDTIME
Refills: 0 | Status: DISCONTINUED | OUTPATIENT
Start: 2023-04-21 | End: 2023-04-22

## 2023-04-21 RX ORDER — ZOLPIDEM TARTRATE 10 MG/1
5 TABLET ORAL DAILY
Refills: 0 | Status: DISCONTINUED | OUTPATIENT
Start: 2023-04-21 | End: 2023-04-22

## 2023-04-21 RX ORDER — MORPHINE SULFATE 50 MG/1
100 CAPSULE, EXTENDED RELEASE ORAL
Refills: 0 | Status: DISCONTINUED | OUTPATIENT
Start: 2023-04-21 | End: 2023-04-22

## 2023-04-21 RX ORDER — POLYETHYLENE GLYCOL 3350 17 G/17G
17 POWDER, FOR SOLUTION ORAL
Refills: 0 | Status: DISCONTINUED | OUTPATIENT
Start: 2023-04-21 | End: 2023-04-22

## 2023-04-21 RX ORDER — NALOXONE HYDROCHLORIDE 4 MG/.1ML
4 SPRAY NASAL
Qty: 2 | Refills: 0
Start: 2023-04-21

## 2023-04-21 RX ADMIN — SENNA PLUS 2 TABLET(S): 8.6 TABLET ORAL at 21:01

## 2023-04-21 RX ADMIN — Medication 10 MILLIGRAM(S): at 05:00

## 2023-04-21 RX ADMIN — Medication 1200 MILLIGRAM(S): at 18:21

## 2023-04-21 RX ADMIN — OXYCODONE HYDROCHLORIDE 30 MILLIGRAM(S): 5 TABLET ORAL at 11:32

## 2023-04-21 RX ADMIN — SENNA PLUS 2 TABLET(S): 8.6 TABLET ORAL at 10:15

## 2023-04-21 RX ADMIN — HYDROMORPHONE HYDROCHLORIDE 2 MILLIGRAM(S): 2 INJECTION INTRAMUSCULAR; INTRAVENOUS; SUBCUTANEOUS at 09:34

## 2023-04-21 RX ADMIN — MORPHINE SULFATE 100 MILLIGRAM(S): 50 CAPSULE, EXTENDED RELEASE ORAL at 13:17

## 2023-04-21 RX ADMIN — ZOLPIDEM TARTRATE 5 MILLIGRAM(S): 10 TABLET ORAL at 00:31

## 2023-04-21 RX ADMIN — PANTOPRAZOLE SODIUM 40 MILLIGRAM(S): 20 TABLET, DELAYED RELEASE ORAL at 06:15

## 2023-04-21 RX ADMIN — OXYCODONE HYDROCHLORIDE 30 MILLIGRAM(S): 5 TABLET ORAL at 15:13

## 2023-04-21 RX ADMIN — POLYETHYLENE GLYCOL 3350 17 GRAM(S): 17 POWDER, FOR SOLUTION ORAL at 18:19

## 2023-04-21 RX ADMIN — MORPHINE SULFATE 100 MILLIGRAM(S): 50 CAPSULE, EXTENDED RELEASE ORAL at 19:07

## 2023-04-21 RX ADMIN — OXYCODONE HYDROCHLORIDE 30 MILLIGRAM(S): 5 TABLET ORAL at 10:15

## 2023-04-21 RX ADMIN — MORPHINE SULFATE 100 MILLIGRAM(S): 50 CAPSULE, EXTENDED RELEASE ORAL at 18:19

## 2023-04-21 RX ADMIN — ENOXAPARIN SODIUM 40 MILLIGRAM(S): 100 INJECTION SUBCUTANEOUS at 05:00

## 2023-04-21 RX ADMIN — Medication 2 MILLIGRAM(S): at 04:41

## 2023-04-21 RX ADMIN — OXYCODONE HYDROCHLORIDE 30 MILLIGRAM(S): 5 TABLET ORAL at 14:20

## 2023-04-21 RX ADMIN — Medication 1 PATCH: at 10:34

## 2023-04-21 RX ADMIN — MORPHINE SULFATE 100 MILLIGRAM(S): 50 CAPSULE, EXTENDED RELEASE ORAL at 23:48

## 2023-04-21 RX ADMIN — MORPHINE SULFATE 100 MILLIGRAM(S): 50 CAPSULE, EXTENDED RELEASE ORAL at 12:00

## 2023-04-21 RX ADMIN — OXYCODONE HYDROCHLORIDE 30 MILLIGRAM(S): 5 TABLET ORAL at 21:02

## 2023-04-21 RX ADMIN — POLYETHYLENE GLYCOL 3350 17 GRAM(S): 17 POWDER, FOR SOLUTION ORAL at 10:14

## 2023-04-21 RX ADMIN — Medication 1 PATCH: at 07:57

## 2023-04-21 RX ADMIN — AZITHROMYCIN 500 MILLIGRAM(S): 500 TABLET, FILM COATED ORAL at 11:07

## 2023-04-21 RX ADMIN — Medication 2 MILLIGRAM(S): at 17:13

## 2023-04-21 RX ADMIN — ZOLPIDEM TARTRATE 5 MILLIGRAM(S): 10 TABLET ORAL at 21:01

## 2023-04-21 RX ADMIN — HYDROMORPHONE HYDROCHLORIDE 2 MILLIGRAM(S): 2 INJECTION INTRAMUSCULAR; INTRAVENOUS; SUBCUTANEOUS at 03:57

## 2023-04-21 RX ADMIN — Medication 2 MILLIGRAM(S): at 11:07

## 2023-04-21 RX ADMIN — Medication 145 MILLIGRAM(S): at 11:07

## 2023-04-21 RX ADMIN — HYDROMORPHONE HYDROCHLORIDE 2 MILLIGRAM(S): 2 INJECTION INTRAMUSCULAR; INTRAVENOUS; SUBCUTANEOUS at 08:36

## 2023-04-21 RX ADMIN — SIMVASTATIN 20 MILLIGRAM(S): 20 TABLET, FILM COATED ORAL at 21:01

## 2023-04-21 RX ADMIN — OXYCODONE HYDROCHLORIDE 30 MILLIGRAM(S): 5 TABLET ORAL at 22:02

## 2023-04-21 NOTE — DISCHARGE NOTE PROVIDER - PROVIDER TOKENS
PROVIDER:[TOKEN:[[id number]],FOLLOWUP:[1 week],ESTABLISHEDPATIENT:[T]],PROVIDER:[TOKEN:[[id number]],FOLLOWUP:[1 week]]

## 2023-04-21 NOTE — PROGRESS NOTE ADULT - ASSESSMENT
a/p:  #Acute hypoxic respiratory failure---2/2 COPD Exacerbation  #abdominal pain n/v/d 2/2 suspected viral gastroenteritis  #suspected UGIB (unwitnessed episode of hemetesis today)  #Thoracic Aneurysm (4.2 cm)  #REBECCA- bipap qhs  -monitor o2 sat---suppl o2 prn with prn   -ABG prn  -repeat CXR daily  -continue azithromycin x 5 days (today day #3 of 5)  -RVP negative/ covid negative/ procal 0.04  -cdiff negative but stool pcr not sent and he is no longer having diarrhea  -reports blood in vomit after returning from [facility]  -GI f/u and repeat CBC---type and screen and tranfuse if hb<7  -patient asked to save and show RN/MD any further hematemesis  -GI f/u    #Chronic longstanding pain with suspected high opiate tolerance  -f/u pain management recomm  -IStop confirmed patient on standing oxycodone and morphine ER as per above  -avoid opiate induced constipation--bowel regimen  -pain managment following---paitent to f/u with pcp and pain managment as outpatient for continued prescritpions    DVT/GI ppx  guarded prognosis    FULL CODE     #Progress Note Handoff  Pending (specify):  GI f/u and 2 more days of azithromycin  Disposition: Home__x (when medically stable)--reports new onset Hemetemesis---if patient elopes again would require him to return to ED for readmission--IV line removed from patient upon his return today      Total time spent to complete patient's bedside assessment, review medical chart, discuss medical plan of care with covering medical team was more than 35 minutes  with >50% of time spendt face to face with patient, discussion with patient/family and/or coordination of care

## 2023-04-21 NOTE — CHART NOTE - NSCHARTNOTEFT_GEN_A_CORE
Patient again endorsed episode of vomiting (Non-bloody) 1hr ago which was not observed by any staff members- Patient again instructed to inform staff of any vomiting episode. Will change diet to clears for now. Advance diet as tolerated.  Vitals stable  F/u CBC 4PM Patient again endorsed episode of vomiting (Non-bloody) 1hr ago which was not observed by any staff members- Patient again instructed to inform staff of any vomiting episode. Will change diet to clears for now. Advance diet as tolerated.  Vitals stable  F/u CBC 4PM  Not able to reach GI team- will place a new consult for routine follow up Patient again endorsed episode of vomiting (Non-bloody) 1hr ago which was not observed by any staff members- Patient again instructed to inform staff of any vomiting episode. Will change diet to clears for now. Advance diet as tolerated.  Vitals stable  F/u CBC 4PM  Talked to GI team- will follow up if symptoms worsen, patient stable for now

## 2023-04-21 NOTE — PROGRESS NOTE ADULT - SUBJECTIVE AND OBJECTIVE BOX
Patient is a 57y old  Male who presents with a chief complaint of abdominal pain (20 Apr 2023 15:36)    HPI:  57 year old male with PMHx of HTN, HLD, Chronic Pain (Bilateral knee, hip replacement with pain, takes oxycodone) and COPD (Hx smoking) presented to the ED with chest and abdominal pain x 1 day. Patient describes that the past 2 days he developed acute epigastric abdominal pain associated with persistent vomiting (red tinged) non bilious in color. Symptoms associated with severe epigastric pain sharp, stabbing and constant as well as multiple episodes of watery diarrhea.  Mentions having SOB also, specially on . Pt denies sob, cough, fever, sick constant or recent travel.    (19 Apr 2023 03:24)    PAST MEDICAL & SURGICAL HISTORY:  HTN (hypertension)  High cholesterol  GERD (gastroesophageal reflux disease)  Morbid obesity  Osteoarthritis  Hiatal hernia  GERD  Colitis  LARGE INTESTINE   NO RECENT FLARES  COPD (chronic obstructive pulmonary disease)  REBECCA treated with BiPAP  History of cholecystectomy  History of appendectomy  History of knee replacement procedure of right knee  BILATERAL  Hernia, inguinal, bilateral  3 months old  H/O knee surgery  arthoscopic x4  H/O foot surgery  right big toe surgery    patient seen and examined independently on morning rounds, chart reviewed and discussed with the medicine resident and on interdisciplinary rounds.    no overnight events--still c/o pain    Vital Signs Last 24 Hrs  T(C): 36.6 (20 Apr 2023 13:01), Max: 36.9 (19 Apr 2023 20:00)  T(F): 97.8 (20 Apr 2023 13:01), Max: 98.4 (19 Apr 2023 20:00)  HR: 80 (20 Apr 2023 13:01) (76 - 97)  BP: 113/81 (20 Apr 2023 13:01) (110/75 - 136/90)  BP(mean): --  RR: 18 (20 Apr 2023 13:01) (18 - 18)  SpO2: 97% (20 Apr 2023 13:01) (90% - 97%)    Parameters below as of 20 Apr 2023 07:50  Patient On (Oxygen Delivery Method): room air    PE:  GEN-NAD, AAOx3  PULM-  fair air entry, decreased bs bilateral bases  CVS- +s1/s2 RRR   GI- soft NT ND +bs, no rebound, no guarding  EXT- +edema                          14.7   8.33  )-----------( 229      ( 20 Apr 2023 06:28 )             44.2     04-20    133<L>  |  93<L>  |  13  ----------------------------<  110<H>  3.8   |  28  |  0.8    Ca    9.0      20 Apr 2023 06:28  Phos  4.4     04-19  Mg     2.3     04-20    TPro  6.1  /  Alb  3.7  /  TBili  0.5  /  DBili  x   /  AST  13  /  ALT  17  /  AlkPhos  99  04-20    CARDIAC MARKERS ( 20 Apr 2023 06:28 )  x     / <0.01 ng/mL / x     / x     / x      CARDIAC MARKERS ( 19 Apr 2023 17:13 )  x     / <0.01 ng/mL / x     / x     / x      CARDIAC MARKERS ( 18 Apr 2023 19:50 )  x     / <0.01 ng/mL / x     / x     / x                MEDICATIONS  (STANDING):  azithromycin   Tablet 500 milliGRAM(s) Oral daily  enoxaparin Injectable 40 milliGRAM(s) SubCutaneous every 24 hours  fenofibrate Tablet 145 milliGRAM(s) Oral daily  metoclopramide Injectable 10 milliGRAM(s) IV Push every 8 hours  morphine ER Tablet 100 milliGRAM(s) Oral daily  pantoprazole    Tablet 40 milliGRAM(s) Oral before breakfast  simvastatin 20 milliGRAM(s) Oral at bedtime  
Gastroenterology progress note: Abdominal pain and diarrhea      Patient is a 57y old  Male who presents with a chief complaint of abdominal pain (19 Apr 2023 08:16)       Admitted on: 04-18-23    We are following the patient for: Acute onset diarrhea and abdominal pain likely secondary to viral gastroenteritis      Interval History: Patient has 2-3 Loose BMs yesterday and so far 3 loose BMs today. His diarrhea is improving but without improvement of abdominal pain. He reports episodes of NBNB emesis     Patient's medical problems are improving/stable/not improving/unstable/   Prior records reviewed (Y/N): Y  History obtained from someone other than patient (Y/N): N    PAST MEDICAL & SURGICAL HISTORY:  HTN (hypertension)  High cholesterol  GERD (gastroesophageal reflux disease)  Morbid obesity  Osteoarthritis  Hiatal hernia  GERD  Colitis  LARGE INTESTINE   NO RECENT FLARES  COPD (chronic obstructive pulmonary disease)  REBECCA treated with BiPAP  History of cholecystectomy  History of appendectomy  History of knee replacement procedure of right knee  BILATERAL  Hernia, inguinal, bilateral  3 months old  H/O knee surgery  arthoscopic x4  H/O foot surgery  right big toe surgery    MEDICATIONS  (STANDING):  azithromycin   Tablet 500 milliGRAM(s) Oral daily  enoxaparin Injectable 40 milliGRAM(s) SubCutaneous every 24 hours  fenofibrate Tablet 145 milliGRAM(s) Oral daily  metoclopramide Injectable 10 milliGRAM(s) IV Push every 8 hours  morphine ER Tablet 100 milliGRAM(s) Oral daily  pantoprazole    Tablet 40 milliGRAM(s) Oral before breakfast  simvastatin 20 milliGRAM(s) Oral at bedtime    MEDICATIONS  (PRN):  albuterol/ipratropium for Nebulization 3 milliLiter(s) Nebulizer every 6 hours PRN Bronchospasm  ALPRAZolam 2 milliGRAM(s) Oral every 6 hours PRN anxiety  morphine  - Injectable 4 milliGRAM(s) IV Push every 6 hours PRN Severe Pain (7 - 10)  oxyCODONE    IR 30 milliGRAM(s) Oral every 4 hours PRN Severe Pain (7 - 10)  zolpidem 5 milliGRAM(s) Oral at bedtime PRN Insomnia      Allergies  No Known Allergies      Review of Systems:   Cardiovascular:  No Chest Pain, No Palpitations  Respiratory:  No Cough, No Dyspnea  Gastrointestinal:  As described in HPI    Physical Examination:  T(C): 36.7 (04-20-23 @ 05:06), Max: 36.9 (04-19-23 @ 20:00)  HR: 76 (04-20-23 @ 05:06) (76 - 97)  BP: 110/75 (04-20-23 @ 05:06) (110/75 - 140/92)  RR: 18 (04-20-23 @ 05:06) (18 - 18)  SpO2: 96% (04-20-23 @ 07:50) (90% - 100%)      Constitutional: No acute distress.  Respiratory:  No signs of respiratory distress. Lung sounds are clear bilaterally.  Cardiovascular:  S1 S2, Regular rate and rhythm.  Abdominal: Abdomen is soft, symmetric, and +tender to palpation in all 4 quadrant without distention. There are no visible lesions. Bowel sounds are present and normoactive in all four quadrants. No masses, hepatomegaly, or splenomegaly are noted.   Skin: No rashes, No Jaundice.        Data: (reviewed by attending)                        14.7   8.33  )-----------( 229      ( 20 Apr 2023 06:28 )             44.2     Hgb trend:  14.7  04-20-23 @ 06:28  16.4  04-19-23 @ 08:33  17.7  04-18-23 @ 19:50      04-20    133<L>  |  93<L>  |  13  ----------------------------<  110<H>  3.8   |  28  |  0.8    Ca    9.0      20 Apr 2023 06:28  Phos  4.4     04-19  Mg     2.3     04-20    TPro  6.1  /  Alb  3.7  /  TBili  0.5  /  DBili  x   /  AST  13  /  ALT  17  /  AlkPhos  99  04-20    Liver panel trend:  TBili 0.5   /   AST 13   /   ALT 17   /   AlkP 99   /   Tptn 6.1   /   Alb 3.7    /   DBili --      04-20  TBili 0.8   /   AST 23   /   ALT 22   /   AlkP 129   /   Tptn 7.7   /   Alb 4.6    /   DBili --      04-19  TBili 0.7   /   AST 22   /   ALT 22   /   AlkP 137   /   Tptn 7.7   /   Alb 4.5    /   DBili --      04-18  TBili 0.4   /   AST 25   /   ALT 17   /   AlkP 118   /   Tptn 6.6   /   Alb 4.0    /   DBili --      04-13      CT Angio Abdomen and Pelvis w/ IV Cont:   ACC: [id number] EXAM:  CT ANGIO ABD PELV (W)AW IC   ORDERED BY: BRIGIDA KURTZ     ACC: [id number] EXAM:  CT ANGIO CHEST AORTA WAWIC   ORDERED BY: BRIGIDA KURTZ     PROCEDURE DATE:  04/18/2023          INTERPRETATION:  REASON FOR EXAM / CLINICAL STATEMENT: Back pain.    Evaluate for aortic dissection. Abdominal pain. WBC 12.66   PMHx of HTN,   HLD, COPD    TECHNIQUE: Contiguous axial CT images were obtained from the thoracic   inlet through the upper abdomen without contrast and from the thoracic   inlet through the pelvis with intravenous contrast, in the arterial phase.    100 ml of Omnipaque 350 was administered IV with 0 ml discarded.    Reformatted images in the coronal and sagittal planes were acquired, as   well as 3D, Volume rendering,and MIP reconstructed images.    COMPARISON CT: CT scan of the chest dated 10/27/2022    OTHER STUDIES USED FOR CORRELATION: None.      FINDINGS CHEST:    TUBES AND LINES: None.    HEART AND VESSELS: The heart size is normal. There is no pericardial   effusion.    Normal pulmonary artery. No evidence of central pulmonary embolism.    There is stable, mild aneurysmal dilatation of the ascending thoracic   aorta, measuring 4.2 cm.    There is no evidence of thoracic aortic dissection. No intimal flap or   double lumen.    The brachiocephalic vessels are unremarkable.      MEDIASTINUM: There are no suspicious mediastinal, hilar or axillary lymph   nodes. The visualized portion of the thyroid gland is unremarkable.    AIRWAYS, LUNGS AND PLEURA: The central tracheobronchial tree is patent. A   wedge shaped opacity, with air bronchograms, is noted in the region of   the lingula compatible with an area of infiltrate/atelectasis (3/155).   Short-term follow-up in 3 months is recommended to confirm resolution.   There is no pleural effusion. There is no pneumothorax.    CHEST WALL/BONES/SOFT TISSUES: Degenerative changes of the spine.    FINDINGS ABDOMEN AND PELVIS:    HEPATIC: The liver is normal in size with no evidence of bile duct   dilatation. Calcifications are noted within the right lobe of liver. The   portal vein is patent.    BILIARY: Post cholecystectomy.    SPLEEN: Unremarkable.    PANCREAS: The pancreas is normal in size and configuration. No evidence   of mass or pancreatitis.    ADRENAL GLANDS: Unremarkable.    KIDNEYS: No evidence of hydronephrosis or solid renal mass. Bilateral    ABDOMINOPELVIC NODES: Unremarkable.    PELVIC ORGANS: No evidence of pelvic mass, lymphadenopathy, or fluid   collection.    BLADDER: Unremarkable.    PERITONEUM/MESENTERY/BOWEL: No evidence of obstruction, colitis,   inflammatory process, or ascites within the abdomen or pelvis.    BONES/SOFT TISSUES: Degenerative changes of the spine are noted.    VASCULAR: No evidence of abdominal aortic aneurysm or dissection. No   intimal flap or double lumen.  The celiac axis, the superior mesenteric   artery, the inferior mesenteric artery, and the renal arteries are patent   without flow-limiting stenosis. Single renal arteries are seen   bilaterally. Incidentally noted are bilateral corona mortis arteries in   the pelvis (between the distal external iliac arteries and obturator   arteries (anatomic variants)        IMPRESSION:    There is stable, mild aneurysmal dilatation of the ascending thoracic   aorta, measuring 4.2 cm.    No evidence of thoracic or abdominal aortic dissection.    A wedge shaped opacity, with air bronchograms, is noted in the region of   the lingula compatible with an area of infiltrate/atelectasis (3/155).   Short-term follow-up in 3 months is recommended to confirm resolution.    --- End of Report ---       Radiology: (reviewed by attending)      
  Patient is a 57y old  Male who presents with a chief complaint of abdominal pain (20 Apr 2023 15:36)    HPI:  57 year old male with PMHx of HTN, HLD, Chronic Pain (Bilateral knee, hip replacement with pain, takes oxycodone) and COPD (Hx smoking) presented to the ED with chest and abdominal pain x 1 day. Patient describes that the past 2 days he developed acute epigastric abdominal pain associated with persistent vomiting (red tinged) non bilious in color. Symptoms associated with severe epigastric pain sharp, stabbing and constant as well as multiple episodes of watery diarrhea.  Mentions having SOB also, specially on . Pt denies sob, cough, fever, sick constant or recent travel.    (19 Apr 2023 03:24)    PAST MEDICAL & SURGICAL HISTORY:  HTN (hypertension)  High cholesterol  GERD (gastroesophageal reflux disease)  Morbid obesity  Osteoarthritis  Hiatal hernia  GERD  Colitis  LARGE INTESTINE   NO RECENT FLARES  COPD (chronic obstructive pulmonary disease)  REBECCA treated with BiPAP  History of cholecystectomy  History of appendectomy  History of knee replacement procedure of right knee  BILATERAL  Hernia, inguinal, bilateral  3 months old  H/O knee surgery  arthoscopic x4  H/O foot surgery  right big toe surgery    patient seen and examined independently on morning rounds, chart reviewed and discussed with the medicine resident and on interdisciplinary rounds.    code flight called after patient was not in room- he was off the unit and unable to be located but  he returned after appx 1 hour and reported blood in vomit upon his return (was unwitnessed)                          14.7   8.33  )-----------( 229      ( 20 Apr 2023 06:28 )             44.2     04-20    133<L>  |  93<L>  |  13  ----------------------------<  110<H>  3.8   |  28  |  0.8    Ca    9.0      20 Apr 2023 06:28  Phos  4.4     04-19  Mg     2.3     04-20    TPro  6.1  /  Alb  3.7  /  TBili  0.5  /  DBili  x   /  AST  13  /  ALT  17  /  AlkPhos  99  04-20    CARDIAC MARKERS ( 20 Apr 2023 06:28 )  x     / <0.01 ng/mL / x     / x     / x      CARDIAC MARKERS ( 19 Apr 2023 17:13 )  x     / <0.01 ng/mL / x     / x     / x      CARDIAC MARKERS ( 18 Apr 2023 19:50 )  x     / <0.01 ng/mL / x     / x     / x                MEDICATIONS  (STANDING):  azithromycin   Tablet 500 milliGRAM(s) Oral daily  enoxaparin Injectable 40 milliGRAM(s) SubCutaneous every 24 hours  fenofibrate Tablet 145 milliGRAM(s) Oral daily  metoclopramide Injectable 10 milliGRAM(s) IV Push every 8 hours  morphine ER Tablet 100 milliGRAM(s) Oral daily  pantoprazole    Tablet 40 milliGRAM(s) Oral before breakfast  simvastatin 20 milliGRAM(s) Oral at bedtime

## 2023-04-21 NOTE — DISCHARGE NOTE PROVIDER - NSDCMRMEDTOKEN_GEN_ALL_CORE_FT
albuterol 90 mcg/inh inhalation aerosol: 2 puff(s) inhaled every 6 hours AS NEEDED   ALPRAZolam 2 mg oral tablet: 1 tab(s) orally every 6 hours, As needed, anxiety  Ambien 10 mg oral tablet: 0.5 tab(s) orally once a day (at bedtime), As Needed  fenofibrate 160 mg oral tablet: 1 tab(s) orally once a day  Lasix 40 mg oral tablet: 1 tab(s) orally once a day  lisinopril 5 mg oral tablet: 1 tab(s) orally once a day  Narcan 4 mg/0.1 mL nasal spray: 4 milligram(s) intranasally once a day as needed for  opioid overdose  oxyCODONE 30 mg oral tablet: 1 tab(s) orally every 4 hours, As needed, Severe Pain (7 - 10) MDD:180mg  Spiriva HandiHaler 18 mcg inhalation capsule: 1 cap(s) inhaled once a day   Symbicort 160 mcg-4.5 mcg/inh inhalation aerosol: 1 puff(s) inhaled 2 times a day   Zocor 20 mg oral tablet: 1 tab(s) orally once a day (at bedtime)

## 2023-04-21 NOTE — MEDICAL STUDENT PROGRESS NOTE(EDUCATION) - SUBJECTIVE AND OBJECTIVE BOX
LENGTH OF HOSPITAL STAY: 3d    CHIEF COMPLAINT:    Patient is a 57y old  Male who presents with a chief complaint of abdominal pain (20 Apr 2023 17:42)      OVERNIGHT EVENTS:  Pt sitting comfortably in bed eating breakfast. States he had 4 episodes of vomiting overnight and abdominal pain has not improved.     FOLLOW UP:  f/u GI PCR, o&p, culture/stain, giardia, stool lactoferrin, calprotectin      HOSPITAL COURSE:    HPI:    HPI:  57 year old male with PMHx of HTN, HLD, Chronic Pain (Bilateral knee, hip replacement with pain, takes oxycodone) and COPD (Hx smoking) presented to the ED with chest and abdominal pain x 1 day. Patient describes that the past 2 days he developed acute epigastric abdominal pain associated with persistent vomiting (red tinged) non bilious in color. Symptoms associated with severe epigastric pain sharp, stabbing and constant as well as multiple episodes of watery diarrhea.  Mentions having SOB also, specially on . Pt denies sob, cough, fever, sick constant or recent travel.     Vital Signs Last 24 Hrs  T(C): 37 (18 Apr 2023 23:36), Max: 37 (18 Apr 2023 23:36)  T(F): 98.6 (18 Apr 2023 23:36), Max: 98.6 (18 Apr 2023 23:36)  HR: 95 (18 Apr 2023 23:36) (79 - 95)  BP: 115/61 (18 Apr 2023 23:36) (115/61 - 166/79)  BP(mean): 78 (18 Apr 2023 23:36) (78 - 78)  RR: 20 (18 Apr 2023 18:14) (20 - 20)  SpO2: 85% (18 Apr 2023 23:36) (85% - 97%)    Parameters below as of 18 Apr 2023 18:14  Patient On (Oxygen Delivery Method): room air     (19 Apr 2023 03:24)      ALLERGIES:    Allergies    No Known Allergies    Intolerances        PMHx:    PAST MEDICAL & SURGICAL HISTORY:  HTN (hypertension)      High cholesterol      GERD (gastroesophageal reflux disease)      Morbid obesity      Osteoarthritis      Hiatal hernia  GERD      Colitis  LARGE INTESTINE   NO RECENT FLARES      COPD (chronic obstructive pulmonary disease)      REBECCA treated with BiPAP      History of cholecystectomy      History of appendectomy      History of knee replacement procedure of right knee  BILATERAL      Hernia, inguinal, bilateral  3 months old      H/O knee surgery  arthoscopic x4      H/O foot surgery  right big toe surgery          SOCIAL Hx:  daily smoker  no alcohol  no recreational drug use    MEDICATIONS:  STANDING MEDICATIONS  azithromycin   Tablet 500 milliGRAM(s) Oral daily  enoxaparin Injectable 40 milliGRAM(s) SubCutaneous every 24 hours  fenofibrate Tablet 145 milliGRAM(s) Oral daily  metoclopramide Injectable 10 milliGRAM(s) IV Push every 8 hours  morphine ER Tablet 100 milliGRAM(s) Oral daily  pantoprazole    Tablet 40 milliGRAM(s) Oral before breakfast  simvastatin 20 milliGRAM(s) Oral at bedtime    PRN MEDICATIONS  albuterol/ipratropium for Nebulization 3 milliLiter(s) Nebulizer every 6 hours PRN  ALPRAZolam 2 milliGRAM(s) Oral every 6 hours PRN  HYDROmorphone  Injectable 2 milliGRAM(s) IV Push every 3 hours PRN  ondansetron Injectable 4 milliGRAM(s) IV Push every 6 hours PRN  oxyCODONE    IR 30 milliGRAM(s) Oral every 4 hours PRN  zolpidem 5 milliGRAM(s) Oral at bedtime PRN      LABS:                        14.7   8.33  )-----------( 229      ( 20 Apr 2023 06:28 )             44.2     04-20    133<L>  |  93<L>  |  13  ----------------------------<  110<H>  3.8   |  28  |  0.8    Ca    9.0      20 Apr 2023 06:28  Phos  4.4     04-19  Mg     2.3     04-20    TPro  6.1  /  Alb  3.7  /  TBili  0.5  /  DBili  x   /  AST  13  /  ALT  17  /  AlkPhos  99  04-20          Sedimentation Rate, Erythrocyte: 5 mm/Hr (04-20-23 @ 10:45)      CARDIAC MARKERS ( 20 Apr 2023 06:28 )  x     / <0.01 ng/mL / x     / x     / x      CARDIAC MARKERS ( 19 Apr 2023 17:13 )  x     / <0.01 ng/mL / x     / x     / x          RADIOLOGY:  < from: Xray Chest 1 View-PORTABLE IMMEDIATE (Xray Chest 1 View-PORTABLE IMMEDIATE .) (04.19.23 @ 00:30) >  Impression:    Lingula atelectasis.      < from: CT Angio Abdomen and Pelvis w/ IV Cont (04.18.23 @ 21:08) >  IMPRESSION:    There is stable, mild aneurysmal dilatation of the ascending thoracic   aorta, measuring 4.2 cm.    No evidence of thoracic or abdominal aortic dissection.    A wedge shaped opacity, with air bronchograms, is noted in the region of   the lingula compatible with an area of infiltrate/atelectasis (3/155).   Short-term follow-up in 3 months is recommended to confirm resolution.        < from: 12 Lead ECG (04.18.23 @ 18:15) >  Normal ECG      VITALS:   T(F): 98  HR: 86  BP: 101/62  RR: 18  SpO2: 99%        PHYSICAL EXAM:  Gen: NAD, resting in bed  HEENT: Normocephalic, atraumatic  Neck: supple, no lymphadenopathy  CV: Regular rate & regular rhythm  Lungs: improved breathing and clear to auscultation in all lung fields, no rhonchi or rales,   Abdomen: NOrmal BS, Soft, DIFFUSE abdominal tenderness (particularly in epigastric area), non-distended. no cva tenderness. normal BS, no CVA tenderness.   Ext: Warm, well perfused  Neuro: non focal, awake  Skin: no rash, no erythema     LENGTH OF HOSPITAL STAY: 3d    CHIEF COMPLAINT:    Patient is a 57y old  Male who presents with a chief complaint of abdominal pain (20 Apr 2023 17:42)      OVERNIGHT EVENTS:  Pt sitting comfortably in bed eating breakfast. States he had 4 episodes of vomiting overnight and abdominal pain has not improved.     FOLLOW UP:  f/u GI PCR, o&p, culture/stain, giardia, stool lactoferrin, calprotectin      HOSPITAL COURSE:    HPI:    HPI:  57 year old male with PMHx of HTN, HLD, Chronic Pain (Bilateral knee, hip replacement with pain, takes oxycodone) and COPD (Hx smoking) presented to the ED with chest and abdominal pain x 1 day. Patient describes that the past 2 days he developed acute epigastric abdominal pain associated with persistent vomiting (red tinged) non bilious in color. Symptoms associated with severe epigastric pain sharp, stabbing and constant as well as multiple episodes of watery diarrhea.  Mentions having SOB also, specially on . Pt denies sob, cough, fever, sick constant or recent travel.     Vital Signs Last 24 Hrs  T(C): 37 (18 Apr 2023 23:36), Max: 37 (18 Apr 2023 23:36)  T(F): 98.6 (18 Apr 2023 23:36), Max: 98.6 (18 Apr 2023 23:36)  HR: 95 (18 Apr 2023 23:36) (79 - 95)  BP: 115/61 (18 Apr 2023 23:36) (115/61 - 166/79)  BP(mean): 78 (18 Apr 2023 23:36) (78 - 78)  RR: 20 (18 Apr 2023 18:14) (20 - 20)  SpO2: 85% (18 Apr 2023 23:36) (85% - 97%)    Parameters below as of 18 Apr 2023 18:14  Patient On (Oxygen Delivery Method): room air     (19 Apr 2023 03:24)      ALLERGIES:    Allergies    No Known Allergies    Intolerances        PMHx:    PAST MEDICAL & SURGICAL HISTORY:  HTN (hypertension)      High cholesterol      GERD (gastroesophageal reflux disease)      Morbid obesity      Osteoarthritis      Hiatal hernia  GERD      Colitis  LARGE INTESTINE   NO RECENT FLARES      COPD (chronic obstructive pulmonary disease)      REBECCA treated with BiPAP      History of cholecystectomy      History of appendectomy      History of knee replacement procedure of right knee  BILATERAL      Hernia, inguinal, bilateral  3 months old      H/O knee surgery  arthoscopic x4      H/O foot surgery  right big toe surgery          SOCIAL Hx:  daily smoker  no alcohol  no recreational drug use    MEDICATIONS:  STANDING MEDICATIONS  azithromycin   Tablet 500 milliGRAM(s) Oral daily  enoxaparin Injectable 40 milliGRAM(s) SubCutaneous every 24 hours  fenofibrate Tablet 145 milliGRAM(s) Oral daily  metoclopramide Injectable 10 milliGRAM(s) IV Push every 8 hours  morphine ER Tablet 100 milliGRAM(s) Oral daily  pantoprazole    Tablet 40 milliGRAM(s) Oral before breakfast  simvastatin 20 milliGRAM(s) Oral at bedtime    PRN MEDICATIONS  albuterol/ipratropium for Nebulization 3 milliLiter(s) Nebulizer every 6 hours PRN  ALPRAZolam 2 milliGRAM(s) Oral every 6 hours PRN  HYDROmorphone  Injectable 2 milliGRAM(s) IV Push every 3 hours PRN  ondansetron Injectable 4 milliGRAM(s) IV Push every 6 hours PRN  oxyCODONE    IR 30 milliGRAM(s) Oral every 4 hours PRN  zolpidem 5 milliGRAM(s) Oral at bedtime PRN      LABS:                        14.7   8.33  )-----------( 229      ( 20 Apr 2023 06:28 )             44.2     04-20    133<L>  |  93<L>  |  13  ----------------------------<  110<H>  3.8   |  28  |  0.8    Ca    9.0      20 Apr 2023 06:28  Phos  4.4     04-19  Mg     2.3     04-20    TPro  6.1  /  Alb  3.7  /  TBili  0.5  /  DBili  x   /  AST  13  /  ALT  17  /  AlkPhos  99  04-20          Sedimentation Rate, Erythrocyte: 5 mm/Hr (04-20-23 @ 10:45)      CARDIAC MARKERS ( 20 Apr 2023 06:28 )  x     / <0.01 ng/mL / x     / x     / x      CARDIAC MARKERS ( 19 Apr 2023 17:13 )  x     / <0.01 ng/mL / x     / x     / x          RADIOLOGY:  < from: Xray Chest 1 View-PORTABLE IMMEDIATE (Xray Chest 1 View-PORTABLE IMMEDIATE .) (04.19.23 @ 00:30) >  Impression:    Lingula atelectasis.      < from: CT Angio Abdomen and Pelvis w/ IV Cont (04.18.23 @ 21:08) >  IMPRESSION:    There is stable, mild aneurysmal dilatation of the ascending thoracic   aorta, measuring 4.2 cm.    No evidence of thoracic or abdominal aortic dissection.    A wedge shaped opacity, with air bronchograms, is noted in the region of   the lingula compatible with an area of infiltrate/atelectasis (3/155).   Short-term follow-up in 3 months is recommended to confirm resolution.        < from: 12 Lead ECG (04.18.23 @ 18:15) >  Normal ECG      VITALS:   T(F): 98  HR: 86  BP: 101/62  RR: 18  SpO2: 99%        PHYSICAL EXAM:  Gen: NAD, resting in bed  HEENT: Normocephalic, atraumatic  Neck: supple, no lymphadenopathy  CV: Regular rate & regular rhythm  Lungs: Coarse crackles- clear after coughing  Abdomen: NOrmal BS, Soft, DIFFUSE abdominal tenderness (particularly in epigastric area), non-distended. no cva tenderness. normal BS, no CVA tenderness.   Ext: Warm, well perfused, B/L pitting edema 1+  Neuro: non focal, awake

## 2023-04-21 NOTE — DISCHARGE NOTE PROVIDER - CARE PROVIDER_API CALL
Edison Bowie)  [id number]  [address] [place] [address]  Phone: [phone]  Fax: [phone]  Established Patient  Follow Up Time: 1 week    Rebel Martino)  Critical Care Medicine; Internal Medicine; Pulmonary Disease; Sleep Medicine  [address] [place] [address]  Phone: [phone]  Fax: [phone]  Follow Up Time: 1 week

## 2023-04-21 NOTE — DISCHARGE NOTE PROVIDER - NSDCCPCAREPLAN_GEN_ALL_CORE_FT
PRINCIPAL DISCHARGE DIAGNOSIS  Diagnosis: Gastroenteritis  Assessment and Plan of Treatment: You had a viral stomach infection for which we monitored you. Your symptoms have improved now. If vomiting or diarrhea develops report to ED. Also make sure to hydrate yourself. Follow up with your primary care doctor in 1 week of discharge.      SECONDARY DISCHARGE DIAGNOSES  Diagnosis: COPD exacerbation  Assessment and Plan of Treatment: Chronic obstructive pulmonary disease (COPD) is a lung condition in which airflow from the lungs is limited. Causes include smoking, secondhand smoke exposure, genetics, or recurrent infections. Take all medicines (inhaled or pills) as directed by your health care provider. Avoid exposure to irritants such as smoke, chemicals, and fumes that aggravate your breathing.  If you are a smoker, the most important thing that you can do is stop smoking. Continuing to smoke will cause further lung damage and breathing trouble. Ask your health care provider for help with quitting smoking.  SEEK IMMEDIATE MEDICAL CARE IF YOU HAVE ANY OF THE FOLLOWING SYMPTOMS: shortness of breath at rest or when talking, bluish discoloration of lips, skin, fever, worsening cough, unexplained chest pain, or lightheadedness/dizziness.    Diagnosis: Nausea & vomiting  Assessment and Plan of Treatment:

## 2023-04-21 NOTE — DISCHARGE NOTE PROVIDER - HOSPITAL COURSE
HPI:  57 year old male with PMHx of HTN, HLD, Chronic Pain (Bilateral knee, hip replacement with pain, takes oxycodone) and COPD (Hx smoking) presented to the ED with chest and abdominal pain x 1 day. Patient describes that the past 2 days he developed acute epigastric abdominal pain associated with persistent vomiting (red tinged) non bilious in color. Symptoms associated with severe epigastric pain sharp, stabbing and constant as well as multiple episodes of watery diarrhea.  Mentions having SOB also, specially on . Pt denies sob, cough, fever, sick constant or recent travel.       Discharge A/P:  # Abdominal pain, most likely viral gastroenteritiis  CT abdomen/pelvis & angiogram:  wedge shaped opacity, with air bronchograms, is noted in the region of the lingula compatible with an area of infiltrate/atelectasis  as per id, d/c rocephin 1 gram and c/w azithromycin 500mg for 5 days  as per GI, check GI PCR, o&p, culture/stain, giardia, stool lactoferrin, calprotectin- No BM since order placed  C diff negative, CRP <3.0, ESR 5  Tolerating Diet but reported episode of vomiting with dark blood overnight  Started Senna Miralax    # COPD exacerbation  Chest xray: Lingula atelectasis, RVP -ve  as per id, d/c rocephin and c/w 5 day course of azithromycin     #HLD  c/w fenofibrate    # chronic B/L knee pain  pain management recs noted - switch to morphine  mg 4x/day, oxycodone 30mg q4 PRN  Provide intranasal naloxone on discharge    Patient is medically stable and ready for discharge.

## 2023-04-21 NOTE — MEDICAL STUDENT PROGRESS NOTE(EDUCATION) - NS MD HP STUD ASPLAN PLAN FT
# Abdominal pain, most likely viral gastroenteritiis  CT abdomen/pelvis & angiogram:  wedge shaped opacity, with air bronchograms, is noted in the region of the lingula compatible with an area of infiltrate/atelectasis  as per id, d/c rocephin 1 gram and c/w azithromycin 500mg  as per GI, check GI PCR, o&p, culture/stain, giardia, stool lactoferrin, calprotectin   C diff negative, CRP <3.0, ESR 5  give clear liquids no dairy if tolerating, advance as tolerated   monitor constipation 2/2 to oxycodone    # COPD exacerbation  Chest xray: Lingula atelectasis.  as per id, d/c rocephin and c/w 5 day course of azithromycin   albuterol/ipratropium for Nebulization 3 milliLiter(s) Nebulizer every 6 hours PRN Bronchospasm  f/u RVP    #HLD  c/w fenofibrate    # chronic B/L knee pain  c/w oxycodone 30 mg q4  pain management recs noted - start IV dilaudid 2mg q3, switch to morphine  mg 4x/day, oxycodone 30mg q4 PRN when PO meds tolerated # Abdominal pain, most likely viral gastroenteritiis  CT abdomen/pelvis & angiogram:  wedge shaped opacity, with air bronchograms, is noted in the region of the lingula compatible with an area of infiltrate/atelectasis  as per id, d/c rocephin 1 gram and c/w azithromycin 500mg for 5 days  as per GI, check GI PCR, o&p, culture/stain, giardia, stool lactoferrin, calprotectin- No BM since order placed  C diff negative, CRP <3.0, ESR 5  Tolerating Diet but reported episode of vomiting with dark blood overnight- RN instructed to inform MD if vomits again  monitor constipation 2/2 to Narcotics- Started Senna Miralax    # COPD exacerbation  Chest xray: Lingula atelectasis, RVP -ve  as per id, d/c rocephin and c/w 5 day course of azithromycin   albuterol/ipratropium for Nebulization 3 milliLiter(s) Nebulizer every 6 hours PRN Bronchospasm    #HLD  c/w fenofibrate    # chronic B/L knee pain  pain management recs noted - switch to morphine  mg 4x/day, oxycodone 30mg q4 PRN- Use IV Dilaudid only if not tolerating PO   Provide intranasal naloxone on discharge # Abdominal pain, most likely viral gastroenteritiis  CT abdomen/pelvis & angiogram:  wedge shaped opacity, with air bronchograms, is noted in the region of the lingula compatible with an area of infiltrate/atelectasis  as per id, d/c rocephin 1 gram and c/w azithromycin 500mg for 5 days  as per GI, check GI PCR, o&p, culture/stain, giardia, stool lactoferrin, calprotectin- No BM since order placed  C diff negative, CRP <3.0, ESR 5  Tolerating Diet but reported episode of vomiting with dark blood overnight- RN instructed to inform MD if vomits again  monitor constipation 2/2 to Narcotics- Started Senna Miralax    # COPD exacerbation  Chest xray: Lingula atelectasis, RVP -ve  as per id, d/c rocephin and c/w 5 day course of azithromycin   albuterol/ipratropium for Nebulization 3 milliLiter(s) Nebulizer every 6 hours PRN Bronchospasm    #HLD  c/w fenofibrate    # chronic B/L knee pain  pain management recs noted - switch to morphine  mg 4x/day, oxycodone 30mg q4 PRN- Use IV Dilaudid only if not tolerating PO   Provide intranasal naloxone on discharge    #Misc  -DVT prophylaxis: Lovenox  -GI prophylaxis: Protonix  -Diet: DASH- High fibre, lactose restricted  -Code status: Full  -Activity: IAT  -Bowel regimen: Senna Miralax  -Dispo: Home when ready # Abdominal pain, most likely viral gastroenteritiis  CT abdomen/pelvis & angiogram:  wedge shaped opacity, with air bronchograms, is noted in the region of the lingula compatible with an area of infiltrate/atelectasis  as per id, d/c rocephin 1 gram and c/w azithromycin 500mg for 5 days  as per GI, check GI PCR, o&p, culture/stain, giardia, stool lactoferrin, calprotectin- No BM since order placed  C diff negative, CRP <3.0, ESR 5  Tolerating Diet but reported episode of vomiting with dark blood overnight- RN instructed to inform MD if vomits again  monitor constipation 2/2 to Narcotics- Started Senna Miralax    # COPD exacerbation  Chest xray: Lingula atelectasis, RVP -ve  as per id, d/c rocephin and c/w 5 day course of azithromycin   albuterol/ipratropium for Nebulization 3 milliLiter(s) Nebulizer every 6 hours PRN Bronchospasm  -F/u EKG- On azithro, metoclopramide, zofran    #HLD  c/w fenofibrate    # chronic B/L knee pain  pain management recs noted - switch to morphine  mg 4x/day, oxycodone 30mg q4 PRN- Use IV Dilaudid only if not tolerating PO   Provide intranasal naloxone on discharge    #Misc  -DVT prophylaxis: Lovenox  -GI prophylaxis: Protonix  -Diet: DASH- High fibre, lactose restricted  -Code status: Full  -Activity: IAT  -Bowel regimen: Senna Miralax  -Dispo: Home when ready # Abdominal pain, most likely viral gastroenteritiis  # Hematemesis ?- unwitnessed- endorsed by patient  CT abdomen/pelvis & angiogram:  wedge shaped opacity, with air bronchograms, is noted in the region of the lingula compatible with an area of infiltrate/atelectasis  as per id, d/c rocephin 1 gram and c/w azithromycin 500mg for 5 days  as per GI, check GI PCR, o&p, culture/stain, giardia, stool lactoferrin, calprotectin- No BM since order placed  C diff negative, CRP <3.0, ESR 5  Tolerating Diet but reported episode of vomiting with dark blood overnight- RN instructed to inform MD if vomits again  monitor constipation 2/2 to Narcotics- Started Senna Miralax  -Today patient endorsed episode of vomiting overnight with dark red blood, CBC was stable, vitals stable, the hematemesis was not observed by any staff members, will monitor patient for now, CBC 4 pm and patient instructed to inform the RN if repeat episode of hematemesis occurs- Call GI and make NPO if repeat episode observed    # COPD exacerbation  Chest xray: Lingula atelectasis, RVP -ve  as per id, d/c rocephin and c/w 5 day course of azithromycin   albuterol/ipratropium for Nebulization 3 milliLiter(s) Nebulizer every 6 hours PRN Bronchospasm  -QTC wnl- On azithro, metoclopramide, zofran    #HLD  c/w fenofibrate    # chronic B/L knee pain  pain management recs noted - switch to morphine  mg 4x/day, oxycodone 30mg q4 PRN- Use IV Dilaudid only if not tolerating PO   Provide intranasal naloxone on discharge    #Misc  -DVT prophylaxis: Lovenox  -GI prophylaxis: Protonix  -Diet: DASH- High fibre, lactose restricted  -Code status: Full  -Activity: IAT  -Bowel regimen: Senna Miralax  -Dispo: Home when ready

## 2023-04-22 VITALS
SYSTOLIC BLOOD PRESSURE: 141 MMHG | DIASTOLIC BLOOD PRESSURE: 82 MMHG | TEMPERATURE: 97 F | RESPIRATION RATE: 19 BRPM | HEART RATE: 91 BPM | OXYGEN SATURATION: 98 %

## 2023-04-22 RX ADMIN — MORPHINE SULFATE 100 MILLIGRAM(S): 50 CAPSULE, EXTENDED RELEASE ORAL at 00:48

## 2023-04-22 RX ADMIN — ENOXAPARIN SODIUM 40 MILLIGRAM(S): 100 INJECTION SUBCUTANEOUS at 05:01

## 2023-04-22 RX ADMIN — POLYETHYLENE GLYCOL 3350 17 GRAM(S): 17 POWDER, FOR SOLUTION ORAL at 05:02

## 2023-04-22 RX ADMIN — OXYCODONE HYDROCHLORIDE 30 MILLIGRAM(S): 5 TABLET ORAL at 05:02

## 2023-04-22 RX ADMIN — Medication 2 MILLIGRAM(S): at 00:12

## 2023-04-22 RX ADMIN — Medication 1200 MILLIGRAM(S): at 05:02

## 2023-04-22 NOTE — CHART NOTE - NSCHARTNOTEFT_GEN_A_CORE
patient again eloped from the medical floor this morning-  pvl had been removed after he returned to unit yesterday so he left with no IV in place  as per medical team who spoke with family (wife) yesterday, the patient has chronic opiate abuse and has a h/o of frequent hospital admissions with similar behaviour of elopement.    if patient returns to unit he should be instructed to go to the ED and reevaluated--patient was not to be discharged with script for pain medications as per pain management as he gets his medications from his pcp and outpatient pain mgt.

## 2023-04-23 ENCOUNTER — EMERGENCY (EMERGENCY)
Facility: HOSPITAL | Age: 58
LOS: 0 days | Discharge: HOME | End: 2023-04-23
Attending: EMERGENCY MEDICINE
Payer: MEDICAID

## 2023-04-23 VITALS
OXYGEN SATURATION: 91 % | HEIGHT: 70 IN | SYSTOLIC BLOOD PRESSURE: 138 MMHG | DIASTOLIC BLOOD PRESSURE: 99 MMHG | HEART RATE: 117 BPM | TEMPERATURE: 97 F | RESPIRATION RATE: 17 BRPM

## 2023-04-23 VITALS — RESPIRATION RATE: 15 BRPM | OXYGEN SATURATION: 95 %

## 2023-04-23 DIAGNOSIS — Z98.890 OTHER SPECIFIED POSTPROCEDURAL STATES: Chronic | ICD-10-CM

## 2023-04-23 DIAGNOSIS — T40.601A POISONING BY UNSPECIFIED NARCOTICS, ACCIDENTAL (UNINTENTIONAL), INITIAL ENCOUNTER: ICD-10-CM

## 2023-04-23 DIAGNOSIS — Z96.653 PRESENCE OF ARTIFICIAL KNEE JOINT, BILATERAL: ICD-10-CM

## 2023-04-23 DIAGNOSIS — Z96.643 PRESENCE OF ARTIFICIAL HIP JOINT, BILATERAL: ICD-10-CM

## 2023-04-23 DIAGNOSIS — F17.210 NICOTINE DEPENDENCE, CIGARETTES, UNCOMPLICATED: ICD-10-CM

## 2023-04-23 DIAGNOSIS — I10 ESSENTIAL (PRIMARY) HYPERTENSION: ICD-10-CM

## 2023-04-23 DIAGNOSIS — E78.5 HYPERLIPIDEMIA, UNSPECIFIED: ICD-10-CM
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
## 2023-04-23 DIAGNOSIS — J44.9 CHRONIC OBSTRUCTIVE PULMONARY DISEASE, UNSPECIFIED: ICD-10-CM
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
## 2023-04-23 DIAGNOSIS — K40.20 BILATERAL INGUINAL HERNIA, WITHOUT OBSTRUCTION OR GANGRENE, NOT SPECIFIED AS RECURRENT: Chronic | ICD-10-CM

## 2023-04-23 DIAGNOSIS — Z96.651 PRESENCE OF RIGHT ARTIFICIAL KNEE JOINT: Chronic | ICD-10-CM

## 2023-04-23 PROCEDURE — 93005 ELECTROCARDIOGRAM TRACING: CPT

## 2023-04-23 PROCEDURE — 96372 THER/PROPH/DIAG INJ SC/IM: CPT

## 2023-04-23 PROCEDURE — 99284 EMERGENCY DEPT VISIT MOD MDM: CPT

## 2023-04-23 PROCEDURE — 82962 GLUCOSE BLOOD TEST: CPT

## 2023-04-23 PROCEDURE — 93010 ELECTROCARDIOGRAM REPORT: CPT

## 2023-04-23 PROCEDURE — 99285 EMERGENCY DEPT VISIT HI MDM: CPT | Mod: 25

## 2023-04-23 RX ORDER — NALOXONE HYDROCHLORIDE 4 MG/.1ML
0.4 SPRAY NASAL ONCE
Refills: 0 | Status: COMPLETED | OUTPATIENT
Start: 2023-04-23 | End: 2023-04-23

## 2023-04-23 RX ADMIN — NALOXONE HYDROCHLORIDE 0.4 MILLIGRAM(S): 4 SPRAY NASAL at 21:00

## 2023-04-23 RX ADMIN — NALOXONE HYDROCHLORIDE 0.4 MILLIGRAM(S): 4 SPRAY NASAL at 18:14

## 2023-04-23 NOTE — ED ADULT TRIAGE NOTE - CHIEF COMPLAINT QUOTE
Pt BIBA found down on the floor, apneic and unresponsive. given 2 mg of intranasal Narcan on scene. Pt awake in triage. Pt takes morphine and oxycodone daily for knee pain.  as per EMS.

## 2023-04-23 NOTE — ED PROVIDER NOTE - ATTENDING APP SHARED VISIT CONTRIBUTION OF CARE
I personally evaluated the patient. I reviewed the Resident’s or Physician Assistant’s note (as assigned above), and agree with the findings and plan except as documented in my note.    57-year-old male presents to the emergency department for opiate overdose.  Patient brought to the ED by EMS, after given intranasal naloxone for apnea with response to follow and improvement in ventilatory status.  Admits to taking oxycodone as well as morphine for chronic pain, follows up with a pain management doctor in [place].  No other symptoms.    The review of systems otherwise unremarkable    GENERAL: male in no distress.  Sleepy.  HEENT: EOMI non icteric pupils 4 mm, tongue dry, mostly edentulous  CHEST: normal work of breathing noted. CTA bilateral.  Respiratory rate 10-14, noninvasive end-tidal CO2 40  CV: pulses intact  EXTR: FROM   NEURO: AAO 3 no focal deficits when aroused, sleepy.  SKIN: normal no pallor      Impression: Opiate use disorder    Plan: Observation pending sobriety

## 2023-04-23 NOTE — ED PROVIDER NOTE - PROGRESS NOTE DETAILS
Dr. Cedeno–nurses report end-tidal CO2 is 40 but oxygen saturation drops to the high 80s, respiratory rate approximately 8, patient given verbal stimulus, respiratory rate improved and normal numbers normalized.  Offered patient IV naloxone, he refused.  Advised him that if his respiratory rate drops again, we will administer intranasal naloxone again.  He agrees and is understanding. Department: Patient is ambulatory, walking around the ED, markedly improved respiratory and cognitive state after administration of naloxone 0.4 mg IM in the ED. Dr. Cedeno - Patient went outside, states he is "smoked a cigarette "came back with a return to his initial opiate use disorder symptoms.  He behaviorally denies taking any additional pain medications, prior to walking out stated he wanted to go home to be with his wife and have dinner.  I am very suspicious that he took additional opiate medications while he was outside.  We will continue to monitor and observe him, his discharge has been rescinded.

## 2023-04-23 NOTE — ED ADULT NURSE NOTE - NSIMPLEMENTINTERV_GEN_ALL_ED
Implemented All Fall with Harm Risk Interventions:  [place] to call system. Call bell, personal items and telephone within reach. Instruct patient to call for assistance. Room bathroom lighting operational. Non-slip footwear when patient is off stretcher. Physically safe environment: no spills, clutter or unnecessary equipment. Stretcher in lowest position, wheels locked, appropriate side rails in place. Provide visual cue, wrist band, yellow gown, etc. Monitor gait and stability. Monitor for mental status changes and reorient to person, place, and time. Review medications for side effects contributing to fall risk. Reinforce activity limits and safety measures with patient and family. Provide visual clues: red socks.

## 2023-04-23 NOTE — ED PROVIDER NOTE - OBJECTIVE STATEMENT
57 year old male with PMHx of HTN, HLD, Chronic Pain (Bilateral knee, hip replacement with pain, takes oxycodone and supposedly morphine) and COPD (Hx smoking) who presents s/p overdose. Patient had a witnessed unresponsive episode at a bus stop today.  Patient was apneic on scene, was given Narcan in both nostrils.  Became responsive shortly afterwards. States he remembers everything until he got to the bus stop, and then he woke up to EMS surrounding him. Patient states that he takes morphine 100 mg 4 times a day, along with oxycodone for chronic pain.  States he follows with a pain doctor in [place].  Denies IV drug use.  Patient was recently admitted to the hospital for abdominal pain, likely secondary to viral gastroenteritis.  Currently denies fevers, chills, chest pain, shortness of breath, nausea, vomiting, diarrhea, abdominal pain, weakness, numbness, trauma.  Endorses tobacco use, alcohol use, substance use.

## 2023-04-23 NOTE — ED ADULT NURSE REASSESSMENT NOTE - NS ED NURSE REASSESS COMMENT FT1
Pt. received from previous RN. Pt. lying on stretcher, breathing with ease on RA. Pt. A&O x4. Awaiting MD dispo.
Pt. requested to leave AMA, while papers were being printed, patient eloped. Pt. came back moments later, altered. Pt. placed back on monitor, D/C canceled.

## 2023-04-23 NOTE — ED PROVIDER NOTE - ATTENDING CONTRIBUTION TO CARE
I personally evaluated the patient. I reviewed the Resident’s or Physician Assistant’s note (as assigned above), and agree with the findings and plan except as documented in my note.    57-year-old male presents to the emergency department for opiate overdose.  Patient brought to the ED by EMS, after given intranasal naloxone for apnea with response to follow and improvement in ventilatory status.  Admits to taking oxycodone as well as morphine for chronic pain, follows up with a pain management doctor in [place].  No other symptoms.    The review of systems otherwise unremarkable    GENERAL: male in no distress.  Sleepy.  HEENT: EOMI non icteric pupils 4 mm, tongue dry, mostly edentulous  CHEST: normal work of breathing noted. CTA bilateral.  Respiratory rate 10-14, noninvasive end-tidal CO2 40  CV: pulses intact  EXTR: FROM   NEURO: AAO 3 no focal deficits when aroused, sleepy.  SKIN: normal no pallor      Impression: Opiate use disorder    Plan: Observation pending sobriety

## 2023-04-23 NOTE — ED PROVIDER NOTE - NS ED ATTENDING STATEMENT MOD
This was a shared visit with the SHANNAN. I reviewed and verified the documentation and independently performed the documented: Attending with

## 2023-04-23 NOTE — ED PROVIDER NOTE - PATIENT PORTAL LINK FT
You can access the FollowMyHealth Patient Portal offered by [facility] by registering at the following website: [email or web address]. By joining [employer]’s FollowMyHealth portal, you will also be able to view your health information using other applications (apps) compatible with our system. You can access the FollowMyHealth Patient Portal offered by [facility] by registering at the following website: [email or web address]. By joining [employer]’s FollowMyHealth portal, you will also be able to view your health information using other applications (apps) compatible with our system.

## 2023-04-23 NOTE — ED PROVIDER NOTE - NS ED MD DISPO DISCHARGE
Patient has acute respiratory failure with hypoxia secondary to acute on chronic diastolic CHF exacerbation with left-sided pleural effusion  Currently patient is requiring 2 L of oxygen    Patient does not use any oxygen at home  Status post thoracentesis with removal of 2 2 L serosanguineous fluid-complicated with mild pneumothorax, continue to monitor Home

## 2023-04-23 NOTE — ED PROVIDER NOTE - PHYSICAL EXAMINATION
VITAL SIGNS: I have reviewed nursing notes and confirm.  CONSTITUTIONAL: well-appearing, non-toxic, NAD  SKIN: Warm dry, normal skin turgor  HEAD: NCAT  EYES: EOMI, PERRLA, no scleral icterus  ENT: Moist mucous membranes, normal pharynx with no erythema or exudates  NECK: Supple; non tender. Full ROM. No cervical LAD  CARD: RRR, no murmurs, rubs or gallops  RESP: clear to ausculation b/l.  No rales, rhonchi, or wheezing.  ABD: soft, + BS, non-tender, non-distended, no rebound or guarding. No CVA tenderness  EXT: Full ROM, no bony tenderness, no pedal edema, no calf tenderness  NEURO: normal motor. normal sensory. CN II-XII intact. Cerebellar testing normal. Normal gait.  PSYCH: Cooperative, appropriate. AxOx4.

## 2023-04-23 NOTE — ED PROVIDER NOTE - CLINICAL SUMMARY MEDICAL DECISION MAKING FREE TEXT BOX
57 male here for opiate use disorder. Is being weaned off his Rx, felt additional pain today and took additional doses on his own. Was noted to be in distress and 911 activated by bystanders. EMS arrived, concerned for opiate use, had naloxone PTA with improvement and was subsequently observed in the ED, with requirements of additional naloxone in the ED. After ED dosing he became more awake and was ambulating throughout the ED. Does not want to wait four hours for observation, will be d/c'd AMA. Has decisional capacity at the time of disposition. The patient wishes to leave against medical advice.  I have discussed the risks, benefits and alternatives (including the possibility of worsening of disease, pain, permanent disability, and/or death) with the patient and his/her family (if available).  The patient voices understanding of these risks, benefits, and alternatives and still wishes to sign out against medical advice.  The patient is awake, alert, oriented  x 3 and has demonstrated capacity to refuse/direct care.  I have advised the patient that they can and should return immediately should they develop any worse/different/additional symptoms, or if they change their mind and want to continue their care.

## 2023-04-26 DIAGNOSIS — A08.4 VIRAL INTESTINAL INFECTION, UNSPECIFIED: ICD-10-CM

## 2023-04-26 DIAGNOSIS — Z96.649 PRESENCE OF UNSPECIFIED ARTIFICIAL HIP JOINT: ICD-10-CM

## 2023-04-26 DIAGNOSIS — E66.01 MORBID (SEVERE) OBESITY DUE TO EXCESS CALORIES: ICD-10-CM

## 2023-04-26 DIAGNOSIS — M19.90 UNSPECIFIED OSTEOARTHRITIS, UNSPECIFIED SITE: ICD-10-CM

## 2023-04-26 DIAGNOSIS — E87.20 ACIDOSIS, UNSPECIFIED: ICD-10-CM

## 2023-04-26 DIAGNOSIS — Z20.822 CONTACT WITH AND (SUSPECTED) EXPOSURE TO COVID-19: ICD-10-CM

## 2023-04-26 DIAGNOSIS — E78.00 PURE HYPERCHOLESTEROLEMIA, UNSPECIFIED: ICD-10-CM

## 2023-04-26 DIAGNOSIS — G89.29 OTHER CHRONIC PAIN: ICD-10-CM

## 2023-04-26 DIAGNOSIS — Z79.51 LONG TERM (CURRENT) USE OF INHALED STEROIDS: ICD-10-CM

## 2023-04-26 DIAGNOSIS — Z96.653 PRESENCE OF ARTIFICIAL KNEE JOINT, BILATERAL: ICD-10-CM

## 2023-04-26 DIAGNOSIS — E87.1 HYPO-OSMOLALITY AND HYPONATREMIA: ICD-10-CM

## 2023-04-26 DIAGNOSIS — M25.561 PAIN IN RIGHT KNEE: ICD-10-CM

## 2023-04-26 DIAGNOSIS — J96.01 ACUTE RESPIRATORY FAILURE WITH HYPOXIA: ICD-10-CM

## 2023-04-26 DIAGNOSIS — K92.0 HEMATEMESIS: ICD-10-CM

## 2023-04-26 DIAGNOSIS — M25.571 PAIN IN RIGHT ANKLE AND JOINTS OF RIGHT FOOT: ICD-10-CM

## 2023-04-26 DIAGNOSIS — I10 ESSENTIAL (PRIMARY) HYPERTENSION: ICD-10-CM

## 2023-04-26 DIAGNOSIS — M25.511 PAIN IN RIGHT SHOULDER: ICD-10-CM

## 2023-04-26 DIAGNOSIS — M25.512 PAIN IN LEFT SHOULDER: ICD-10-CM

## 2023-04-26 DIAGNOSIS — Z79.52 LONG TERM (CURRENT) USE OF SYSTEMIC STEROIDS: ICD-10-CM

## 2023-04-26 DIAGNOSIS — Z79.899 OTHER LONG TERM (CURRENT) DRUG THERAPY: ICD-10-CM

## 2023-04-26 DIAGNOSIS — I71.20 THORACIC AORTIC ANEURYSM, WITHOUT RUPTURE, UNSPECIFIED: ICD-10-CM

## 2023-04-26 DIAGNOSIS — F17.210 NICOTINE DEPENDENCE, CIGARETTES, UNCOMPLICATED: ICD-10-CM

## 2023-04-26 DIAGNOSIS — Z53.29 PROCEDURE AND TREATMENT NOT CARRIED OUT BECAUSE OF PATIENT'S DECISION FOR OTHER REASONS: ICD-10-CM

## 2023-04-26 DIAGNOSIS — K21.9 GASTRO-ESOPHAGEAL REFLUX DISEASE WITHOUT ESOPHAGITIS: ICD-10-CM

## 2023-04-26 DIAGNOSIS — M25.562 PAIN IN LEFT KNEE: ICD-10-CM

## 2023-04-26 DIAGNOSIS — M25.572 PAIN IN LEFT ANKLE AND JOINTS OF LEFT FOOT: ICD-10-CM

## 2023-04-26 DIAGNOSIS — J44.1 CHRONIC OBSTRUCTIVE PULMONARY DISEASE WITH (ACUTE) EXACERBATION: ICD-10-CM

## 2023-04-26 DIAGNOSIS — J98.11 ATELECTASIS: ICD-10-CM

## 2023-04-26 DIAGNOSIS — Z90.49 ACQUIRED ABSENCE OF OTHER SPECIFIED PARTS OF DIGESTIVE TRACT: ICD-10-CM

## 2023-04-26 DIAGNOSIS — G47.33 OBSTRUCTIVE SLEEP APNEA (ADULT) (PEDIATRIC): ICD-10-CM

## 2023-05-17 NOTE — ED ADULT NURSE NOTE - NSFALLRSKASSESASSIST_ED_ALL_ED
May 17, 2023     Vicki L Spatz  [address]  [place] [address]    Dear Vicki L Spatz:    Welcome and thank you for choosing the [facility]!  At [facility] we believe that every patient deserves the best care and we look forward to providing that for you. You have a scheduled appointment with:        Effie Greenberg PA-C   DATE: 7/5/2023  Time: ***    Please complete the attached new patient medical history forms and mail it back at least 2 weeks prior to your appointment time.    If you arrive late, or Pain Management has not received your medical health history forms, your appointment may be rescheduled for a later date. If you need to cancel your appointment for any reason, please contact us at [phone] at least 24 hours prior to your appointment. If you use transportation services, plan extra time for travel.     In order to provide you with the best possible care, please bring along the following to your appointment:     Insurance [id number]  Photo ID  Any applicable co-pay  Any Medications that you take    Our address is:    [facility]  [address]  [place] [address]  Phone: [phone]  Fax: [phone]    If you have any questions, please do not hesitate to contact our office, and we will be happy to assist you.  We look forward to meeting you.    Sincerely,    {Signature Options:[id number]}     no

## 2023-05-19 ENCOUNTER — INPATIENT (INPATIENT)
Facility: HOSPITAL | Age: 58
LOS: 2 days | Discharge: HOME | DRG: 140 | End: 2023-05-22
Attending: INTERNAL MEDICINE | Admitting: INTERNAL MEDICINE
Payer: MEDICAID

## 2023-05-19 VITALS
SYSTOLIC BLOOD PRESSURE: 161 MMHG | HEART RATE: 76 BPM | OXYGEN SATURATION: 98 % | TEMPERATURE: 98 F | DIASTOLIC BLOOD PRESSURE: 86 MMHG | HEIGHT: 70 IN | RESPIRATION RATE: 18 BRPM | WEIGHT: 315 LBS

## 2023-05-19 DIAGNOSIS — Z98.890 OTHER SPECIFIED POSTPROCEDURAL STATES: Chronic | ICD-10-CM

## 2023-05-19 DIAGNOSIS — K40.20 BILATERAL INGUINAL HERNIA, WITHOUT OBSTRUCTION OR GANGRENE, NOT SPECIFIED AS RECURRENT: Chronic | ICD-10-CM

## 2023-05-19 DIAGNOSIS — Z96.651 PRESENCE OF RIGHT ARTIFICIAL KNEE JOINT: Chronic | ICD-10-CM

## 2023-05-19 PROCEDURE — 99285 EMERGENCY DEPT VISIT HI MDM: CPT

## 2023-05-19 PROCEDURE — 71045 X-RAY EXAM CHEST 1 VIEW: CPT | Mod: 26

## 2023-05-19 NOTE — ED ADULT TRIAGE NOTE - CHIEF COMPLAINT QUOTE
pt biba complaining of chest pain and vomiting for the past 3 days pt biba complaining of chest pain and vomiting for the past 3 days.  mg PO given by EMS pt biba complaining of chest pain and vomiting for the past 3 days.  mg PO given by EMS. multiple times attempted to do an EKG on the patient and the patient refused and kept pulling the leads off.

## 2023-05-20 DIAGNOSIS — R07.1 CHEST PAIN ON BREATHING: ICD-10-CM

## 2023-05-20 LAB
ALBUMIN SERPL ELPH-MCNC: 4.3 G/DL — SIGNIFICANT CHANGE UP (ref 3.5–5.2)
ALP SERPL-CCNC: 117 U/L — HIGH (ref 30–115)
ALT FLD-CCNC: 13 U/L — SIGNIFICANT CHANGE UP (ref 0–41)
ANION GAP SERPL CALC-SCNC: 13 MMOL/L — SIGNIFICANT CHANGE UP (ref 7–14)
APTT BLD: 33.6 SEC — SIGNIFICANT CHANGE UP (ref 27–39.2)
AST SERPL-CCNC: 16 U/L — SIGNIFICANT CHANGE UP (ref 0–41)
BASOPHILS # BLD AUTO: 0.06 K/UL — SIGNIFICANT CHANGE UP (ref 0–0.2)
BASOPHILS NFR BLD AUTO: 0.5 % — SIGNIFICANT CHANGE UP (ref 0–1)
BILIRUB SERPL-MCNC: 0.6 MG/DL — SIGNIFICANT CHANGE UP (ref 0.2–1.2)
BUN SERPL-MCNC: 12 MG/DL — SIGNIFICANT CHANGE UP (ref 10–20)
CALCIUM SERPL-MCNC: 9.5 MG/DL — SIGNIFICANT CHANGE UP (ref 8.4–10.5)
CHLORIDE SERPL-SCNC: 102 MMOL/L — SIGNIFICANT CHANGE UP (ref 98–110)
CO2 SERPL-SCNC: 25 MMOL/L — SIGNIFICANT CHANGE UP (ref 17–32)
CREAT SERPL-MCNC: 0.8 MG/DL — SIGNIFICANT CHANGE UP (ref 0.7–1.5)
EGFR: 105 ML/MIN/1.73M2 — SIGNIFICANT CHANGE UP
EOSINOPHIL # BLD AUTO: 0.07 K/UL — SIGNIFICANT CHANGE UP (ref 0–0.7)
EOSINOPHIL NFR BLD AUTO: 0.5 % — SIGNIFICANT CHANGE UP (ref 0–8)
GLUCOSE SERPL-MCNC: 134 MG/DL — HIGH (ref 70–99)
HCT VFR BLD CALC: 46.4 % — SIGNIFICANT CHANGE UP (ref 42–52)
HGB BLD-MCNC: 16.2 G/DL — SIGNIFICANT CHANGE UP (ref 14–18)
IMM GRANULOCYTES NFR BLD AUTO: 0.3 % — SIGNIFICANT CHANGE UP (ref 0.1–0.3)
INR BLD: 1.04 RATIO — SIGNIFICANT CHANGE UP (ref 0.65–1.3)
LYMPHOCYTES # BLD AUTO: 1.75 K/UL — SIGNIFICANT CHANGE UP (ref 1.2–3.4)
LYMPHOCYTES # BLD AUTO: 13.3 % — LOW (ref 20.5–51.1)
MCHC RBC-ENTMCNC: 29.6 PG — SIGNIFICANT CHANGE UP (ref 27–31)
MCHC RBC-ENTMCNC: 34.9 G/DL — SIGNIFICANT CHANGE UP (ref 32–37)
MCV RBC AUTO: 84.8 FL — SIGNIFICANT CHANGE UP (ref 80–94)
MONOCYTES # BLD AUTO: 0.71 K/UL — HIGH (ref 0.1–0.6)
MONOCYTES NFR BLD AUTO: 5.4 % — SIGNIFICANT CHANGE UP (ref 1.7–9.3)
NEUTROPHILS # BLD AUTO: 10.51 K/UL — HIGH (ref 1.4–6.5)
NEUTROPHILS NFR BLD AUTO: 80 % — HIGH (ref 42.2–75.2)
NRBC # BLD: 0 /100 WBCS — SIGNIFICANT CHANGE UP (ref 0–0)
PLATELET # BLD AUTO: 262 K/UL — SIGNIFICANT CHANGE UP (ref 130–400)
PMV BLD: 9.7 FL — SIGNIFICANT CHANGE UP (ref 7.4–10.4)
POTASSIUM SERPL-MCNC: 3.7 MMOL/L — SIGNIFICANT CHANGE UP (ref 3.5–5)
POTASSIUM SERPL-SCNC: 3.7 MMOL/L — SIGNIFICANT CHANGE UP (ref 3.5–5)
PROT SERPL-MCNC: 7.2 G/DL — SIGNIFICANT CHANGE UP (ref 6–8)
PROTHROM AB SERPL-ACNC: 11.9 SEC — SIGNIFICANT CHANGE UP (ref 9.95–12.87)
RBC # BLD: 5.47 M/UL — SIGNIFICANT CHANGE UP (ref 4.7–6.1)
RBC # FLD: 14.7 % — HIGH (ref 11.5–14.5)
SODIUM SERPL-SCNC: 140 MMOL/L — SIGNIFICANT CHANGE UP (ref 135–146)
TROPONIN T SERPL-MCNC: <0.01 NG/ML — SIGNIFICANT CHANGE UP
WBC # BLD: 13.14 K/UL — HIGH (ref 4.8–10.8)
WBC # FLD AUTO: 13.14 K/UL — HIGH (ref 4.8–10.8)

## 2023-05-20 PROCEDURE — 84145 PROCALCITONIN (PCT): CPT

## 2023-05-20 PROCEDURE — 84484 ASSAY OF TROPONIN QUANT: CPT

## 2023-05-20 PROCEDURE — 83036 HEMOGLOBIN GLYCOSYLATED A1C: CPT

## 2023-05-20 PROCEDURE — 94640 AIRWAY INHALATION TREATMENT: CPT

## 2023-05-20 PROCEDURE — 85610 PROTHROMBIN TIME: CPT

## 2023-05-20 PROCEDURE — 99223 1ST HOSP IP/OBS HIGH 75: CPT

## 2023-05-20 PROCEDURE — 36415 COLL VENOUS BLD VENIPUNCTURE: CPT

## 2023-05-20 PROCEDURE — 74174 CTA ABD&PLVS W/CONTRAST: CPT | Mod: 26,MA

## 2023-05-20 PROCEDURE — 93005 ELECTROCARDIOGRAM TRACING: CPT

## 2023-05-20 PROCEDURE — 71275 CT ANGIOGRAPHY CHEST: CPT | Mod: 26,MA

## 2023-05-20 PROCEDURE — 83735 ASSAY OF MAGNESIUM: CPT

## 2023-05-20 PROCEDURE — 85730 THROMBOPLASTIN TIME PARTIAL: CPT

## 2023-05-20 PROCEDURE — 80053 COMPREHEN METABOLIC PANEL: CPT

## 2023-05-20 PROCEDURE — 80061 LIPID PANEL: CPT

## 2023-05-20 PROCEDURE — 93010 ELECTROCARDIOGRAM REPORT: CPT

## 2023-05-20 PROCEDURE — 85025 COMPLETE CBC W/AUTO DIFF WBC: CPT

## 2023-05-20 RX ORDER — HEPARIN SODIUM 5000 [USP'U]/ML
5000 INJECTION INTRAVENOUS; SUBCUTANEOUS EVERY 12 HOURS
Refills: 0 | Status: DISCONTINUED | OUTPATIENT
Start: 2023-05-20 | End: 2023-05-22

## 2023-05-20 RX ORDER — POLYETHYLENE GLYCOL 3350 17 G/17G
17 POWDER, FOR SOLUTION ORAL DAILY
Refills: 0 | Status: DISCONTINUED | OUTPATIENT
Start: 2023-05-20 | End: 2023-05-22

## 2023-05-20 RX ORDER — AZITHROMYCIN 500 MG/1
500 TABLET, FILM COATED ORAL DAILY
Refills: 0 | Status: DISCONTINUED | OUTPATIENT
Start: 2023-05-20 | End: 2023-05-22

## 2023-05-20 RX ORDER — MORPHINE SULFATE 50 MG/1
4 CAPSULE, EXTENDED RELEASE ORAL ONCE
Refills: 0 | Status: DISCONTINUED | OUTPATIENT
Start: 2023-05-20 | End: 2023-05-20

## 2023-05-20 RX ORDER — SODIUM CHLORIDE 9 MG/ML
1000 INJECTION, SOLUTION INTRAVENOUS ONCE
Refills: 0 | Status: COMPLETED | OUTPATIENT
Start: 2023-05-20 | End: 2023-05-20

## 2023-05-20 RX ORDER — NITROGLYCERIN 6.5 MG
0.4 CAPSULE, EXTENDED RELEASE ORAL ONCE
Refills: 0 | Status: COMPLETED | OUTPATIENT
Start: 2023-05-20 | End: 2023-05-20

## 2023-05-20 RX ORDER — HYDROMORPHONE HYDROCHLORIDE 2 MG/ML
0.5 INJECTION INTRAMUSCULAR; INTRAVENOUS; SUBCUTANEOUS ONCE
Refills: 0 | Status: DISCONTINUED | OUTPATIENT
Start: 2023-05-20 | End: 2023-05-21

## 2023-05-20 RX ORDER — HYDROMORPHONE HYDROCHLORIDE 2 MG/ML
0.5 INJECTION INTRAMUSCULAR; INTRAVENOUS; SUBCUTANEOUS ONCE
Refills: 0 | Status: DISCONTINUED | OUTPATIENT
Start: 2023-05-20 | End: 2023-05-20

## 2023-05-20 RX ORDER — BUDESONIDE AND FORMOTEROL FUMARATE DIHYDRATE 160; 4.5 UG/1; UG/1
2 AEROSOL RESPIRATORY (INHALATION)
Refills: 0 | Status: DISCONTINUED | OUTPATIENT
Start: 2023-05-20 | End: 2023-05-22

## 2023-05-20 RX ORDER — ONDANSETRON 8 MG/1
4 TABLET, FILM COATED ORAL ONCE
Refills: 0 | Status: COMPLETED | OUTPATIENT
Start: 2023-05-20 | End: 2023-05-20

## 2023-05-20 RX ORDER — SIMVASTATIN 20 MG/1
20 TABLET, FILM COATED ORAL AT BEDTIME
Refills: 0 | Status: DISCONTINUED | OUTPATIENT
Start: 2023-05-20 | End: 2023-05-22

## 2023-05-20 RX ORDER — SODIUM CHLORIDE 9 MG/ML
1000 INJECTION, SOLUTION INTRAVENOUS
Refills: 0 | Status: DISCONTINUED | OUTPATIENT
Start: 2023-05-20 | End: 2023-05-20

## 2023-05-20 RX ORDER — ACETAMINOPHEN 500 MG
975 TABLET ORAL ONCE
Refills: 0 | Status: COMPLETED | OUTPATIENT
Start: 2023-05-20 | End: 2023-05-20

## 2023-05-20 RX ORDER — METOCLOPRAMIDE HCL 10 MG
10 TABLET ORAL ONCE
Refills: 0 | Status: COMPLETED | OUTPATIENT
Start: 2023-05-20 | End: 2023-05-20

## 2023-05-20 RX ORDER — ALBUTEROL 90 UG/1
2 AEROSOL, METERED ORAL EVERY 6 HOURS
Refills: 0 | Status: DISCONTINUED | OUTPATIENT
Start: 2023-05-20 | End: 2023-05-22

## 2023-05-20 RX ORDER — HYDROMORPHONE HYDROCHLORIDE 2 MG/ML
2 INJECTION INTRAMUSCULAR; INTRAVENOUS; SUBCUTANEOUS EVERY 4 HOURS
Refills: 0 | Status: DISCONTINUED | OUTPATIENT
Start: 2023-05-20 | End: 2023-05-21

## 2023-05-20 RX ORDER — HYDROMORPHONE HYDROCHLORIDE 2 MG/ML
1.5 INJECTION INTRAMUSCULAR; INTRAVENOUS; SUBCUTANEOUS ONCE
Refills: 0 | Status: DISCONTINUED | OUTPATIENT
Start: 2023-05-20 | End: 2023-05-20

## 2023-05-20 RX ORDER — TIOTROPIUM BROMIDE 18 UG/1
2 CAPSULE ORAL; RESPIRATORY (INHALATION) DAILY
Refills: 0 | Status: DISCONTINUED | OUTPATIENT
Start: 2023-05-20 | End: 2023-05-22

## 2023-05-20 RX ORDER — ONDANSETRON 8 MG/1
4 TABLET, FILM COATED ORAL EVERY 8 HOURS
Refills: 0 | Status: DISCONTINUED | OUTPATIENT
Start: 2023-05-20 | End: 2023-05-22

## 2023-05-20 RX ORDER — LISINOPRIL 2.5 MG/1
5 TABLET ORAL DAILY
Refills: 0 | Status: DISCONTINUED | OUTPATIENT
Start: 2023-05-20 | End: 2023-05-22

## 2023-05-20 RX ORDER — SENNA PLUS 8.6 MG/1
2 TABLET ORAL AT BEDTIME
Refills: 0 | Status: DISCONTINUED | OUTPATIENT
Start: 2023-05-20 | End: 2023-05-22

## 2023-05-20 RX ORDER — FUROSEMIDE 40 MG
40 TABLET ORAL DAILY
Refills: 0 | Status: DISCONTINUED | OUTPATIENT
Start: 2023-05-20 | End: 2023-05-22

## 2023-05-20 RX ORDER — IPRATROPIUM/ALBUTEROL SULFATE 18-103MCG
3 AEROSOL WITH ADAPTER (GRAM) INHALATION
Refills: 0 | Status: COMPLETED | OUTPATIENT
Start: 2023-05-20 | End: 2023-05-20

## 2023-05-20 RX ADMIN — HYDROMORPHONE HYDROCHLORIDE 2 MILLIGRAM(S): 2 INJECTION INTRAMUSCULAR; INTRAVENOUS; SUBCUTANEOUS at 18:07

## 2023-05-20 RX ADMIN — ONDANSETRON 4 MILLIGRAM(S): 8 TABLET, FILM COATED ORAL at 18:07

## 2023-05-20 RX ADMIN — Medication 125 MILLIGRAM(S): at 00:32

## 2023-05-20 RX ADMIN — HYDROMORPHONE HYDROCHLORIDE 2 MILLIGRAM(S): 2 INJECTION INTRAMUSCULAR; INTRAVENOUS; SUBCUTANEOUS at 22:17

## 2023-05-20 RX ADMIN — HYDROMORPHONE HYDROCHLORIDE 1.5 MILLIGRAM(S): 2 INJECTION INTRAMUSCULAR; INTRAVENOUS; SUBCUTANEOUS at 10:13

## 2023-05-20 RX ADMIN — HEPARIN SODIUM 5000 UNIT(S): 5000 INJECTION INTRAVENOUS; SUBCUTANEOUS at 17:45

## 2023-05-20 RX ADMIN — MORPHINE SULFATE 4 MILLIGRAM(S): 50 CAPSULE, EXTENDED RELEASE ORAL at 01:31

## 2023-05-20 RX ADMIN — Medication 40 MILLIGRAM(S): at 13:55

## 2023-05-20 RX ADMIN — ONDANSETRON 4 MILLIGRAM(S): 8 TABLET, FILM COATED ORAL at 00:33

## 2023-05-20 RX ADMIN — Medication 102 MILLIGRAM(S): at 19:36

## 2023-05-20 RX ADMIN — LISINOPRIL 5 MILLIGRAM(S): 2.5 TABLET ORAL at 13:55

## 2023-05-20 RX ADMIN — HYDROMORPHONE HYDROCHLORIDE 0.5 MILLIGRAM(S): 2 INJECTION INTRAMUSCULAR; INTRAVENOUS; SUBCUTANEOUS at 05:21

## 2023-05-20 RX ADMIN — MORPHINE SULFATE 4 MILLIGRAM(S): 50 CAPSULE, EXTENDED RELEASE ORAL at 00:31

## 2023-05-20 RX ADMIN — SODIUM CHLORIDE 1000 MILLILITER(S): 9 INJECTION, SOLUTION INTRAVENOUS at 06:58

## 2023-05-20 RX ADMIN — HYDROMORPHONE HYDROCHLORIDE 2 MILLIGRAM(S): 2 INJECTION INTRAMUSCULAR; INTRAVENOUS; SUBCUTANEOUS at 13:54

## 2023-05-20 RX ADMIN — HYDROMORPHONE HYDROCHLORIDE 0.5 MILLIGRAM(S): 2 INJECTION INTRAMUSCULAR; INTRAVENOUS; SUBCUTANEOUS at 09:05

## 2023-05-20 RX ADMIN — SENNA PLUS 2 TABLET(S): 8.6 TABLET ORAL at 22:06

## 2023-05-20 RX ADMIN — SIMVASTATIN 20 MILLIGRAM(S): 20 TABLET, FILM COATED ORAL at 22:06

## 2023-05-20 RX ADMIN — Medication 3 MILLILITER(S): at 01:31

## 2023-05-20 RX ADMIN — Medication 10 MILLIGRAM(S): at 05:21

## 2023-05-20 RX ADMIN — Medication 3 MILLILITER(S): at 00:32

## 2023-05-20 RX ADMIN — ONDANSETRON 4 MILLIGRAM(S): 8 TABLET, FILM COATED ORAL at 03:24

## 2023-05-20 RX ADMIN — BUDESONIDE AND FORMOTEROL FUMARATE DIHYDRATE 2 PUFF(S): 160; 4.5 AEROSOL RESPIRATORY (INHALATION) at 20:45

## 2023-05-20 NOTE — H&P ADULT - NSHPPHYSICALEXAM_GEN_ALL_CORE
GENERAL: NAD, lying in bed complaining of pain  CHEST/LUNG: Unlabored respirations. Coarse crackles with scattered b/l end expiratory wheezing. Generalized tenderness over chest  HEART: Regular rate and rhythm  ABDOMEN: Soft, Nondistended, generalized tenderness  EXTREMITIES:  No clubbing, cyanosis, or edema  NERVOUS SYSTEM:  Alert & Oriented X3, speech clear. No deficits

## 2023-05-20 NOTE — H&P ADULT - ATTENDING COMMENTS
57-year-old male with past medical history of hypertension, hyperlipidemia, chronic pain, COPD presenting for evaluation of generalized pain (including chest pain), vomiting, and diarrhea. At baseline, patient uses cane for ambulation. Of note had a recent admission in which patient eloped.    #Thoracic Aneurysm (4.2 cm)  #REBECCA- bipap qhs    1. Generalized pain associated with vomiting and diarrhea. Opioid withdrawal?   - Admit to medicine   - IVF       2. Chronic longstanding pain with suspected high opiate tolerance  * Istop checked patient go to pain management every 5-7 days to get morphine 100mg 28 tab for 7 days and he also get oxycodone prn   - Admit to medicine   - Hold po meds. now on hydromorphone 2mg IV q3hr (equivalent to 30-40mg po morphine)   -IStop confirmed patient on standing oxycodone and morphine ER as per above  - Pain management consult:pending         3. H/o COPD  - Contr inhalers     4. HLD  c/w fenofibrat 57-year-old male with past medical history of hypertension, hyperlipidemia, chronic pain, COPD presenting for evaluation of generalized pain (including chest pain), vomiting, and diarrhea. At baseline, patient uses cane for ambulation. Of note had a recent admission in which patient eloped.    1. SOB and cough due to acute COPD exacerbation   - Admit to medicine   - Was on IVF that was stopped   - Inhalers  - Solumedrol 40mg IV q12hr   - Azithromycin   - Mucinex bid  - Bipap HS       2. Diffuse pain associated with diarrhea and vomiting. Chronic longstanding pain with suspected high opiate tolerance  * pt looks in opioid withdrawal but he stated that he was taking his meds   * Istop checked patient go to pain management every 5-7 days to get morphine 100mg 28 tab for 7 days and he also get oxycodone prn   - Admit to medicine   - Zofran prn   - Hold po meds. now on hydromorphone 2mg IV q3hr (equivalent to 30-40mg po morphine)   - Urine tox to confirm patient was taking his morphine:pending   -IStop confirmed patient on standing oxycodone and morphine ER as per above  - Pain management consult:pending       3. H/o COPD  - Cont inhalers     4. HLD  c/w fenofibrat    5. Thoracic Aneurysm (4.2 cm)- Stable    6. Diastolic CHF  - Cont lasix 40mg po daily   - Was started on IVF that I will stop     7. DVT/GI px

## 2023-05-20 NOTE — ED PROVIDER NOTE - OBJECTIVE STATEMENT
57-year-old male with past medical history of hypertension, hyperlipidemia, chronic pain, COPD presenting for evaluation of left-sided chest pain radiating to the back, left jaw and left arm.  Reports associated left arm numbness.  Symptoms are moderate.  No alleviating or aggravating factors.  Reports associated nausea and vomiting. No sob, fever, chills, abdominal pain, nausea, vomiting, diarrhea, urinary symptoms, headache, dizziness, paresthesias, or weakness. 57-year-old male with past medical history of hypertension, hyperlipidemia, chronic pain, COPD presenting for evaluation of left-sided chest pain radiating to the back, left jaw and left arm.  Reports associated left arm numbness.  Symptoms are moderate.  No alleviating or aggravating factors.  Reports associated nausea, vomiting, diarrhea. No sob, fever, chills, abdominal pain, urinary symptoms, headache, dizziness, paresthesias, or weakness.

## 2023-05-20 NOTE — H&P ADULT - HISTORY OF PRESENT ILLNESS
57-year-old male with past medical history of hypertension, hyperlipidemia, chronic pain, COPD presenting for evaluation of left-sided chest pain radiating to the back, left jaw and left arm.  Reports associated left arm numbness.  Symptoms are moderate.  No alleviating or aggravating factors.  Reports associated nausea and vomiting. No sob, fever, chills, abdominal pain, nausea, vomiting, diarrhea, urinary symptoms, headache, dizziness, paresthesias, or weakness.        #Thoracic Aneurysm (4.2 cm)  #REBECCA- bipap qhs        #Chronic longstanding pain with suspected high opiate tolerance  -f/u pain management recomm  -IStop confirmed patient on standing oxycodone and morphine ER as per above  -avoid opiate induced constipation--bowel regimen  -pain managment following---paitent to f/u with pcp and pain managment as outpatient for continued prescritpions    #H/o COPD    #HLD  c/w fenofibrate    # chronic B/L knee pain  pain management recs noted - switch to morphine  mg 4x/day, oxycodone 30mg q4 PRN- Use IV Dilaudid only if not tolerating PO   Provide intranasal naloxone on discharge 57-year-old male with past medical history of hypertension, hyperlipidemia, chronic pain, COPD presenting for evaluation of generalized pain (including chest pain), vomiting, and diarrhea. At baseline, patient uses cane for ambulation. Of note had a recent admission in which patient eloped.    Patient was in his usual state of health 3 days ago when he started having Left sided chest pain radiating to left arm, left jaw (w/ associated numbness) (stabbing quality, 10/10 intensity, increased with exertion, not relieved with rest), associated with nausea, vomiting (NBNB, 10 episodes per day, last episode this AM), diarrhea (watery, no melena, no blood, 10 episodes per day, last episode yesterday 10PM), epigastric pain and lower back pain. Also endorsed chills without fever. The pain was reproducible all over with palpation, no skin abnormality noted. Denies HA, dizziness, fever, SOB and change in urination.     In the ED, trops -ve X1, EKG shows sinus rhythm w/significant artefact. CT angio chest, abdomen and pelvis shows stable mild aortic aneurysm.  Vital Signs Last 24 Hrs:  T(F): 96.7 (20 May 2023 07:30), Max: 98 (19 May 2023 22:43)  HR: 86 (20 May 2023 07:30) (76 - 86)  BP: 152/77 (20 May 2023 07:30) (152/77 - 176/97)  RR: 18 (20 May 2023 07:30) (18 - 19)  SpO2: 100% (20 May 2023 07:30) (97% - 100%) on RA

## 2023-05-20 NOTE — ED PROVIDER NOTE - ATTENDING APP SHARED VISIT CONTRIBUTION OF CARE
57-year-old male with past medical history of hypertension, hyperlipidemia, chronic pain, COPD  pt presents for eval of cp radiating to back/L jaw and L arm. some associated numbness. pt also c/o nausea, vomiting. no fevers/chills.

## 2023-05-20 NOTE — ED PROVIDER NOTE - PROGRESS NOTE DETAILS
pt w/ persistent pain, nausea, vomiting despite medical management. w/u without acute findings, dissection study w/o acute process

## 2023-05-20 NOTE — ED ADULT NURSE NOTE - SUICIDE SCREENING QUESTION 2
Would continue to decrease Lantus and would decrease from current 42 down to 32 units. Monitor Accu-Cheks and will adjust further if needed. However if patient is getting steroids, that usually would increase blood sugar.   Certainly it may increase the s No

## 2023-05-20 NOTE — ED PROVIDER NOTE - CLINICAL SUMMARY MEDICAL DECISION MAKING FREE TEXT BOX
w/u without acute findings, leukocytosis more likely related to stress   imaging negative  trop neg x1  given persistent pain, n,v will admit for further w/u and symptom control

## 2023-05-20 NOTE — H&P ADULT - ASSESSMENT
57-year-old male with past medical history of hypertension, hyperlipidemia, chronic pain, COPD presenting for evaluation of generalized pain (including chest pain), vomiting, and diarrhea. At baseline, patient uses cane for ambulation. Of note had a recent admission in which patient eloped.        #Thoracic Aneurysm (4.2 cm)  #REBECCA- bipap qhs        #Chronic longstanding pain with suspected high opiate tolerance  -f/u pain management recomm  -IStop confirmed patient on standing oxycodone and morphine ER as per above  -avoid opiate induced constipation--bowel regimen  -pain managment following---paitent to f/u with pcp and pain managment as outpatient for continued prescritpions  1) Start hydromorphone IV 2mg Q3h standing (MED = 280)  2) When able to start PO meds, may restart home regimen of:  -morphine ER 100mg 4x/day  -oxycodone IR 30mg Q4h prn      #H/o COPD    #HLD  c/w fenofibrate    # chronic B/L knee pain  pain management recs noted - switch to morphine  mg 4x/day, oxycodone 30mg q4 PRN- Use IV Dilaudid only if not tolerating PO   Provide intranasal naloxone on discharge 57-year-old male with past medical history of hypertension, hyperlipidemia, chronic pain, COPD presenting for evaluation of generalized pain (including chest pain), vomiting, and diarrhea. At baseline, patient uses cane for ambulation. Of note had a recent admission in which patient eloped.    -Med rec not confirmed, [facility] closed    #Generalized pain w/ GI symptoms 2/2 Suspected opioid withdrawal vs Viral gastroenteritis vs r/o ACS vs Drug seeking behaviour  #Suspected high Opioid tolerance  #Reactive leukocytosis  -Not Septic  -Clinically looks non-toxic, able to ambulate easily, asking RN about pain medicines very frequently  -On very high dose narcotics at home, likely has high opioid tolerance  -During initial interview patient was symptomatic which seemed to improve after Dilaudid IV  -CT chest A&P shows no acute changes  -Trend Trops, Repeat EKG (showed artefacts)- Low suspicion for ACS- Chest pain reproducible  -GI PCR- Monitor off Laxatives- If diarrhea still present send C.diff  -Zofran 4mg q8 PRN, Tylenol PRN  -Monitor Off Abx  -Start Dilaudid IV 2mg q4hrs PRN- Monitor for opioid overdose  -Pain management consult    #Suspected mild COPD exacerbation  #REBECCA  -Complaining of SOB, mild end expiratory wheezing on exam  -Start Solumedrol 40mg QD for 5 days  -c/w Home Symbicort, Spiriva, Albuterol PRN  -RVP panel, Procal  -Hold off on Abx for now- not complaining of cough or phlegm  -Bipap QHS    #Thoracic Aneurysm (4.2 cm)- Stable  #HLD- c/w Statin  #HTN- c/w Lisinopril  #H/o HFpEF (?)- c/w Lasix    #Misc  -DVT prophylaxis: Heparin SQ  -GI prophylaxis: None  -Diet: DASH  -Code status: Full  -Activity:IAT  -Bowel regimen: None for now- at high risk for constipation given the high dose opioid  -Urinary catheter: None  -Dispo: Tele- given the chest pain- dc tele if cardiac work up -ve    -Med rec not confirmed, [facility] closed

## 2023-05-21 LAB
ALBUMIN SERPL ELPH-MCNC: 3.7 G/DL — SIGNIFICANT CHANGE UP (ref 3.5–5.2)
ALP SERPL-CCNC: 87 U/L — SIGNIFICANT CHANGE UP (ref 30–115)
ALT FLD-CCNC: 13 U/L — SIGNIFICANT CHANGE UP (ref 0–41)
ANION GAP SERPL CALC-SCNC: 13 MMOL/L — SIGNIFICANT CHANGE UP (ref 7–14)
APTT BLD: 31.8 SEC — SIGNIFICANT CHANGE UP (ref 27–39.2)
AST SERPL-CCNC: 14 U/L — SIGNIFICANT CHANGE UP (ref 0–41)
BASOPHILS # BLD AUTO: 0.02 K/UL — SIGNIFICANT CHANGE UP (ref 0–0.2)
BASOPHILS NFR BLD AUTO: 0.2 % — SIGNIFICANT CHANGE UP (ref 0–1)
BILIRUB SERPL-MCNC: 0.6 MG/DL — SIGNIFICANT CHANGE UP (ref 0.2–1.2)
BUN SERPL-MCNC: 14 MG/DL — SIGNIFICANT CHANGE UP (ref 10–20)
CALCIUM SERPL-MCNC: 9.1 MG/DL — SIGNIFICANT CHANGE UP (ref 8.4–10.4)
CHLORIDE SERPL-SCNC: 99 MMOL/L — SIGNIFICANT CHANGE UP (ref 98–110)
CHOLEST SERPL-MCNC: 113 MG/DL — SIGNIFICANT CHANGE UP
CO2 SERPL-SCNC: 23 MMOL/L — SIGNIFICANT CHANGE UP (ref 17–32)
CREAT SERPL-MCNC: 0.7 MG/DL — SIGNIFICANT CHANGE UP (ref 0.7–1.5)
EGFR: 109 ML/MIN/1.73M2 — SIGNIFICANT CHANGE UP
EOSINOPHIL # BLD AUTO: 0 K/UL — SIGNIFICANT CHANGE UP (ref 0–0.7)
EOSINOPHIL NFR BLD AUTO: 0 % — SIGNIFICANT CHANGE UP (ref 0–8)
GLUCOSE SERPL-MCNC: 135 MG/DL — HIGH (ref 70–99)
HCT VFR BLD CALC: 44 % — SIGNIFICANT CHANGE UP (ref 42–52)
HDLC SERPL-MCNC: 47 MG/DL — SIGNIFICANT CHANGE UP
HGB BLD-MCNC: 14.8 G/DL — SIGNIFICANT CHANGE UP (ref 14–18)
IMM GRANULOCYTES NFR BLD AUTO: 0.3 % — SIGNIFICANT CHANGE UP (ref 0.1–0.3)
INR BLD: 1.08 RATIO — SIGNIFICANT CHANGE UP (ref 0.65–1.3)
LIPID PNL WITH DIRECT LDL SERPL: 51 MG/DL — SIGNIFICANT CHANGE UP
LYMPHOCYTES # BLD AUTO: 1.15 K/UL — LOW (ref 1.2–3.4)
LYMPHOCYTES # BLD AUTO: 9.1 % — LOW (ref 20.5–51.1)
MAGNESIUM SERPL-MCNC: 2 MG/DL — SIGNIFICANT CHANGE UP (ref 1.8–2.4)
MCHC RBC-ENTMCNC: 28.8 PG — SIGNIFICANT CHANGE UP (ref 27–31)
MCHC RBC-ENTMCNC: 33.6 G/DL — SIGNIFICANT CHANGE UP (ref 32–37)
MCV RBC AUTO: 85.6 FL — SIGNIFICANT CHANGE UP (ref 80–94)
MONOCYTES # BLD AUTO: 0.66 K/UL — HIGH (ref 0.1–0.6)
MONOCYTES NFR BLD AUTO: 5.2 % — SIGNIFICANT CHANGE UP (ref 1.7–9.3)
NEUTROPHILS # BLD AUTO: 10.72 K/UL — HIGH (ref 1.4–6.5)
NEUTROPHILS NFR BLD AUTO: 85.2 % — HIGH (ref 42.2–75.2)
NON HDL CHOLESTEROL: 66 MG/DL — SIGNIFICANT CHANGE UP
NRBC # BLD: 0 /100 WBCS — SIGNIFICANT CHANGE UP (ref 0–0)
PLATELET # BLD AUTO: 246 K/UL — SIGNIFICANT CHANGE UP (ref 130–400)
PMV BLD: 10 FL — SIGNIFICANT CHANGE UP (ref 7.4–10.4)
POTASSIUM SERPL-MCNC: 4 MMOL/L — SIGNIFICANT CHANGE UP (ref 3.5–5)
POTASSIUM SERPL-SCNC: 4 MMOL/L — SIGNIFICANT CHANGE UP (ref 3.5–5)
PROCALCITONIN SERPL-MCNC: 0.04 NG/ML — SIGNIFICANT CHANGE UP (ref 0.02–0.1)
PROT SERPL-MCNC: 6.1 G/DL — SIGNIFICANT CHANGE UP (ref 6–8)
PROTHROM AB SERPL-ACNC: 12.4 SEC — SIGNIFICANT CHANGE UP (ref 9.95–12.87)
RBC # BLD: 5.14 M/UL — SIGNIFICANT CHANGE UP (ref 4.7–6.1)
RBC # FLD: 14.9 % — HIGH (ref 11.5–14.5)
SODIUM SERPL-SCNC: 135 MMOL/L — SIGNIFICANT CHANGE UP (ref 135–146)
TRIGL SERPL-MCNC: 74 MG/DL — SIGNIFICANT CHANGE UP
WBC # BLD: 12.59 K/UL — HIGH (ref 4.8–10.8)
WBC # FLD AUTO: 12.59 K/UL — HIGH (ref 4.8–10.8)

## 2023-05-21 PROCEDURE — 99232 SBSQ HOSP IP/OBS MODERATE 35: CPT

## 2023-05-21 RX ORDER — HYDROMORPHONE HYDROCHLORIDE 2 MG/ML
0.5 INJECTION INTRAMUSCULAR; INTRAVENOUS; SUBCUTANEOUS ONCE
Refills: 0 | Status: DISCONTINUED | OUTPATIENT
Start: 2023-05-21 | End: 2023-05-21

## 2023-05-21 RX ORDER — SODIUM CHLORIDE 9 MG/ML
250 INJECTION INTRAMUSCULAR; INTRAVENOUS; SUBCUTANEOUS ONCE
Refills: 0 | Status: COMPLETED | OUTPATIENT
Start: 2023-05-21 | End: 2023-05-21

## 2023-05-21 RX ORDER — HYDROMORPHONE HYDROCHLORIDE 2 MG/ML
2.25 INJECTION INTRAMUSCULAR; INTRAVENOUS; SUBCUTANEOUS EVERY 4 HOURS
Refills: 0 | Status: DISCONTINUED | OUTPATIENT
Start: 2023-05-21 | End: 2023-05-22

## 2023-05-21 RX ADMIN — HYDROMORPHONE HYDROCHLORIDE 2 MILLIGRAM(S): 2 INJECTION INTRAMUSCULAR; INTRAVENOUS; SUBCUTANEOUS at 13:09

## 2023-05-21 RX ADMIN — ALBUTEROL 2 PUFF(S): 90 AEROSOL, METERED ORAL at 20:32

## 2023-05-21 RX ADMIN — HYDROMORPHONE HYDROCHLORIDE 0.5 MILLIGRAM(S): 2 INJECTION INTRAMUSCULAR; INTRAVENOUS; SUBCUTANEOUS at 10:45

## 2023-05-21 RX ADMIN — HYDROMORPHONE HYDROCHLORIDE 0.5 MILLIGRAM(S): 2 INJECTION INTRAMUSCULAR; INTRAVENOUS; SUBCUTANEOUS at 15:25

## 2023-05-21 RX ADMIN — BUDESONIDE AND FORMOTEROL FUMARATE DIHYDRATE 2 PUFF(S): 160; 4.5 AEROSOL RESPIRATORY (INHALATION) at 13:10

## 2023-05-21 RX ADMIN — Medication 600 MILLIGRAM(S): at 18:13

## 2023-05-21 RX ADMIN — Medication 102 MILLIGRAM(S): at 06:04

## 2023-05-21 RX ADMIN — HYDROMORPHONE HYDROCHLORIDE 2 MILLIGRAM(S): 2 INJECTION INTRAMUSCULAR; INTRAVENOUS; SUBCUTANEOUS at 08:58

## 2023-05-21 RX ADMIN — Medication 102 MILLIGRAM(S): at 18:13

## 2023-05-21 RX ADMIN — HYDROMORPHONE HYDROCHLORIDE 2.25 MILLIGRAM(S): 2 INJECTION INTRAMUSCULAR; INTRAVENOUS; SUBCUTANEOUS at 20:20

## 2023-05-21 RX ADMIN — HYDROMORPHONE HYDROCHLORIDE 2.25 MILLIGRAM(S): 2 INJECTION INTRAMUSCULAR; INTRAVENOUS; SUBCUTANEOUS at 21:26

## 2023-05-21 RX ADMIN — HYDROMORPHONE HYDROCHLORIDE 0.5 MILLIGRAM(S): 2 INJECTION INTRAMUSCULAR; INTRAVENOUS; SUBCUTANEOUS at 14:55

## 2023-05-21 RX ADMIN — POLYETHYLENE GLYCOL 3350 17 GRAM(S): 17 POWDER, FOR SOLUTION ORAL at 13:10

## 2023-05-21 RX ADMIN — HEPARIN SODIUM 5000 UNIT(S): 5000 INJECTION INTRAVENOUS; SUBCUTANEOUS at 06:03

## 2023-05-21 RX ADMIN — Medication 60 MILLIGRAM(S): at 15:27

## 2023-05-21 RX ADMIN — BUDESONIDE AND FORMOTEROL FUMARATE DIHYDRATE 2 PUFF(S): 160; 4.5 AEROSOL RESPIRATORY (INHALATION) at 19:37

## 2023-05-21 RX ADMIN — AZITHROMYCIN 500 MILLIGRAM(S): 500 TABLET, FILM COATED ORAL at 13:10

## 2023-05-21 RX ADMIN — HYDROMORPHONE HYDROCHLORIDE 0.5 MILLIGRAM(S): 2 INJECTION INTRAMUSCULAR; INTRAVENOUS; SUBCUTANEOUS at 10:30

## 2023-05-21 RX ADMIN — Medication 600 MILLIGRAM(S): at 07:24

## 2023-05-21 RX ADMIN — SODIUM CHLORIDE 166.67 MILLILITER(S): 9 INJECTION INTRAMUSCULAR; INTRAVENOUS; SUBCUTANEOUS at 14:55

## 2023-05-21 RX ADMIN — HYDROMORPHONE HYDROCHLORIDE 2 MILLIGRAM(S): 2 INJECTION INTRAMUSCULAR; INTRAVENOUS; SUBCUTANEOUS at 13:39

## 2023-05-21 RX ADMIN — HEPARIN SODIUM 5000 UNIT(S): 5000 INJECTION INTRAVENOUS; SUBCUTANEOUS at 18:13

## 2023-05-21 RX ADMIN — TIOTROPIUM BROMIDE 2 PUFF(S): 18 CAPSULE ORAL; RESPIRATORY (INHALATION) at 08:17

## 2023-05-21 RX ADMIN — Medication 40 MILLIGRAM(S): at 07:24

## 2023-05-21 RX ADMIN — SENNA PLUS 2 TABLET(S): 8.6 TABLET ORAL at 21:31

## 2023-05-21 RX ADMIN — HYDROMORPHONE HYDROCHLORIDE 2.25 MILLIGRAM(S): 2 INJECTION INTRAMUSCULAR; INTRAVENOUS; SUBCUTANEOUS at 15:49

## 2023-05-21 RX ADMIN — ONDANSETRON 4 MILLIGRAM(S): 8 TABLET, FILM COATED ORAL at 07:24

## 2023-05-21 RX ADMIN — SIMVASTATIN 20 MILLIGRAM(S): 20 TABLET, FILM COATED ORAL at 22:26

## 2023-05-21 RX ADMIN — HYDROMORPHONE HYDROCHLORIDE 2 MILLIGRAM(S): 2 INJECTION INTRAMUSCULAR; INTRAVENOUS; SUBCUTANEOUS at 08:12

## 2023-05-21 RX ADMIN — HYDROMORPHONE HYDROCHLORIDE 2 MILLIGRAM(S): 2 INJECTION INTRAMUSCULAR; INTRAVENOUS; SUBCUTANEOUS at 04:36

## 2023-05-21 RX ADMIN — HYDROMORPHONE HYDROCHLORIDE 2.25 MILLIGRAM(S): 2 INJECTION INTRAMUSCULAR; INTRAVENOUS; SUBCUTANEOUS at 16:19

## 2023-05-21 RX ADMIN — LISINOPRIL 5 MILLIGRAM(S): 2.5 TABLET ORAL at 07:24

## 2023-05-21 NOTE — PROVIDER CONTACT NOTE (OTHER) - SITUATION
pt c/o pain despite dilaudid administration. pt non-compliant with cardiac monitoring and bipap despite education. safety and comfort measures provided.

## 2023-05-21 NOTE — PATIENT PROFILE ADULT - FUNCTIONAL ASSESSMENT - DAILY ACTIVITY ASSESSMENT TYPE
TOTAL KNEE ARTHROPLASTY OPERATIVE     PATIENT NAME:Violetta Starr     YOB: 1936     ATTENDING PHYSICIAN: Charan Payne MD     DATE OF PROCEDURE: 9/9/2022     PREOPERATIVE DIAGNOSIS: Severe degenerative osteoarthritis, right knee.    POSTOPERATIVE DIAGNOSIS: Post-Op Diagnosis Codes:     * Primary osteoarthritis of right knee [M17.11]    PRINCIPAL DIAGNOSIS: Severe degenerative osteoarthritis right knee with a varus deformity    PROCEDURE: Right total knee arthroplasty.    SURGEON:  Charan Payne M.D.    ASSISTANT: first Daphne assistant was responsible for performing the following activities: Retraction, Suction, Irrigation, Suturing, Closing and Placing Dressing and their skilled assistance was necessary for the success of this case.       ANESTHESIOLOGIST: Dr. Tay MD    ANESTHESIA: Adductor canal block for postop pain control followed by general anesthesia.    POSITION: Supine on the operating table.    DRAINS: None.    COMPLICATIONS: None.    ESTIMATED BLOOD LOSS: 100ml    IMPLANT USED: Anjali Persona total knee replacement system, #9 narrow posterior cruciate retaining femoral component, number E right tibia with a 30 mm intramedullary stem, 10 mm thick MC, medially constrained Vitamin E impregnated polyethylene insert, #38 patella anchored into position using Refobicin antibiotic impregnated bone cement.     SPECIMENS: * No orders in the log *    Please note: We used a Kenna robotic arm system for assistance with making the bone cuts and soft tissue balancing intraoperatively.  This was fully discussed and consented with the patient prior to the surgical intervention.    INDICATION: The patient has been having very significant pain and discomfort in the knee.  We had scheduled the patient for total knee replacement after all forms of conservative nonoperative care had failed to provide adequate relief of symptoms.  Patient understood all the risks and benefits which were discussed in  great detail including the possibility of death, infection, myocardial infarction, DVT, pulmonary embolism, stiffness of the knee, wound infection and breakdown, possibility of revision surgery, neurovascular compromise, pneumonia, pulmonary embolism      DETAILS OF PROCEDURE: Surgical timeout was called. Operative extremity was correctly identified in the operating room suite. Patient was placed under appropriate anesthetic.  Patient was administered IV antibiotics per [facility] protocol and hospital policy. The patient’s operative extremity was correctly identified in the operating room suite.A Tourniquet was applied after the leg has been exsanguinated. The correctly identified extremity was prepped and draped in a standard fashion.      An anterior approach was performed and a quad sparing, subvastus approach was carried out. The patellofemoral ligament was resected. Medial soft tissue release was carried out on the medial face of the tibia to balance the soft tissues and to release the contracture of the knee MCL. Tibial Osteophytes were resected. Severe bone-on-bone appearance was noted.  A thorough synovectomy was carried out. The Synovium was thickened and hypertrophic. The PCL and MCL were carefully protected throughout the entire procedure. The [employer] robotic arm system was brought into the operating room and the distal femur was mapped. A 10 mm thick cut was made off the distal femur. A measuring guide was used and #9 narrow posterior cruciate retaining femoral component jig was found to be the best fit. Anterior, posterior and chamfer cuts were created. Care was taken to prevent creating a distal femoral notch. The proximal tibia was then mapped with navigation. 4 mm of the bone was resected off the medial tibial plateau.  An appropriate tibial slope was built into the cut to match the normal anatomy.  A number E tibia was found to be the best fit resting nicely on the cortical margins of the proximal  metaphysis of the resected tibia A trial reduction was carried out. Rotation and orientation of the components were carefully marked. Flexion and extension gaps were checked and found to be symmetric. The stability of the components was checked in full extension, mid- flexion and full flexion.The patella was everted, it was measured and cut.  A #38 patella was found to the best fit resting nicely on the host bone.  Patellar Tracking was excellent. A Lateral release was not deemed necessary. Femoral lug holes were drilled. The Proximal Tibia was die punched. Patellar anchor holes were drilled.  The posterior capsular structures and the subperiosteal ligamentous insertions were infiltrated with Exparel as a local anesthetic.    The cancellous bone was lavaged with a Pulse lavage  and dried.  We also used diluted Betadine as an antiseptic rinse solution.  Cement was mixed on the back table. Components were cemented into position sequentially, starting with the number in the right tibial component and going to the #9 narrow posterior cruciate retaining femur and then the #38 patella. The excess amounts of bone cement were removed from the bone-metal interface. Trial reduction was carried out once the cement was fully cured. A 10 mm tibial polyethylene insert, MC medially constrained design insert, was then locked into position on the tibial tray. Wound was lavaged with antibiotic containing irrigating solution. Tourniquet was deflated, hemostasis achieved. An analgesic cocktail was injected intra-articularly into the joint capsule and ligamentous insertions on bone for post op pain relief. The arthrotomy was repaired in 60 degrees of flexion. Subcutaneous sutures were applied. Occlusive dressing was applied. No complications were encountered. Sponge count and needle count were correct. The patient was reversed from anesthesia and taken from the operating room to the recovery room in stable condition. I  discussed the satisfactory performance of this procedure with the patient’s family and answered all questions for them.       Charan Payne MD  9/9/2022  13:38 EDT   Admission

## 2023-05-21 NOTE — PATIENT PROFILE ADULT - FALL HARM RISK - UNIVERSAL INTERVENTIONS
Bed in lowest position, wheels locked, appropriate side rails in place/Call bell, personal items and telephone in reach/Instruct patient to call for assistance before getting out of bed or chair/Non-slip footwear when patient is out of bed/[place] to call system/Physically safe environment - no spills, clutter or unnecessary equipment/Purposeful Proactive Rounding/Room/bathroom lighting operational, light cord in reach

## 2023-05-21 NOTE — PATIENT PROFILE ADULT - FUNCTIONAL ASSESSMENT - BASIC MOBILITY 6.
Chief Complaint   Patient presents with   • Sleep Problem       SUBJECTIVE:  Iftikhar is a 22 year old male who is seen for continuing sleep concerns. Patient has had a sleep study performed earlier this year that did not show any sleep apnea. He was told to follow-up with pulmonology if he had any continuing symptoms. Patient tells me that he continues to struggle with daytime sleepiness. He will sleep 8-9 hours at night and naps during the day for about an hour if he can. He tells me he continues to struggle with snoring and has been told he gasps for air. Denies any shortness of breath or wheezing during the daytime. 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 He would like to see pulmonology again and requests referral today.     Patient is a current every Day smoker. Smoking about one half pack per day. Declines any information on smoking cessation today.     Lastly patient would like to discuss right knee pain. He fell directly on his knee about one month ago. Pain started about 2-1/2 weeks ago and is worse with bending and squatting. Pain is present around the kneecap. It is not worse with pressure but more so certain movements. There is also pain underneath the kneecap. There is still a bruise from the fall but denies any noticeable swelling or erythema. Denies any numbness or tingling of his lower extremities. Denies any locking but notes at times he feels his knee pops and he has to extend his knee fully.    History reviewed. No pertinent past medical history.    Family History   Problem Relation Age of Onset   • Thyroid Maternal Aunt    • Diabetes Maternal Aunt    • Thyroid Maternal Grandmother    • Diabetes Maternal Grandfather    • Depression Father    • Cancer Other      grandfather liver cancer       History   Smoking Status   • Not on file   Smokeless Tobacco   • Not on file       ALLERGIES:  No Known Allergies    No current outpatient prescriptions on  file.     No current facility-administered medications for this visit.        REVIEW OF SYSTEMS: Denies shortness of breath, wheezing. Denies any chest pain. Denies any noticeable swelling or erythema of right knee. Denies numbness or tingling of lower extremities.    OBJECTIVE:   Visit Vitals  /78   Pulse 72   Ht 5' 11\" (1.803 m)   Wt (!) 137 kg   BMI 42.12 kg/m²     VITALS:     GENERAL: Appears stated age.  PSYCHIATRIC:  Affect is good, mood good, alert and oriented x3.  CARDIAC: Regular rate and rhythm, S1-S2, no murmurs noted.  RESPIRATORY: Resting breathing effort noted, lungs clear to auscultation bilaterally.  MUSCULOSKELETAL: On inspection of the right knee there is a small area of ecchymosis on the anterior aspect of the knee below the patella. No pain with palpation of the knee. No erythema or edema noted. Negative anterior and posterior drawer test. Negative valgus and varus. Patient does have some pain with Marii's but no popping or clicking. Gait normal. Full knee range of motion.  Rest of exam deferred.     ASSESSMENT/ PLAN:   (R40.0) Daytime sleepiness  (primary encounter diagnosis)  Plan: SERVICE TO PULMONARY MEDICINE        Referral placed to pulmonology today.    (Z23) Need for HPV vaccination  Plan: HPV VACCINE 9 VALENT        Third HPV vaccine given today.    (M25.561) Acute pain of right knee  Plan: XR KNEE 4+ VW RIGHT  X-ray ordered today due to trauma. Patient given meloxicam to use daily with food for pain. Discussed referral to physical therapy but patient will hold off at this time. Return if symptoms worsen or fail to improve.    Patient verbally agrees with plan.  No further questions or concerns at this time.        4 = No assist / stand by assistance

## 2023-05-21 NOTE — PATIENT PROFILE ADULT - PACKS YRS CALCULATION
"SUBJECTIVE:   Gaurang Blair is a 17 year old male who presents to clinic today with father and sibling because of:    Chief Complaint   Patient presents with     Abdominal Pain        HPI  Gaurang Blair is a 17 year old male who presents with abdominal pain, vomiting and heart burn. Gaurang reports abdominal pain, worse with not eating. He reports he does not eat well on school days, skipping breakfast and lunch, and then pain improves with eating dinner. On weekends, with more regular meals, he denies any abdominal pain. Pain is epigastric, cramping in nature. Associated with heart burn sometimes. Heart burn is improving, but worse with not eating or when supine. He also notes occasional \"vomiting\", which he describes as regurgitation, without true vomiting.     He admits his anxiety is worse than usual. Anxious most of the time. No panic attacks. Has missed a lot of school due to covid in December, so now behind in school. He is feeling overwhelmed by the amount of catch up school work he needs to do. Gaurang admits to difficulty with paying attention, though he also has history of ADHD which is not treated at this time. Sleep is ok. Appetite is ok, as above, though he avoids eating during school due to a feeling of bloating regardless of what he eats.     ROS  Constitutional, eye, ENT, skin, respiratory, cardiac, GI, MSK, neuro, and allergy are normal except as otherwise noted.    PROBLEM LIST  Patient Active Problem List    Diagnosis Date Noted     JANETT (generalized anxiety disorder) 08/08/2016     Priority: Medium     Nocturnal enuresis 07/11/2016     Priority: Medium     Seasonal allergies 07/11/2016     Priority: Medium     Overweight 07/11/2016     Priority: Medium     Persistent disorder of initiating or maintaining sleep 02/24/2016     Priority: Medium     ADHD (attention deficit hyperactivity disorder), combined type 08/28/2014     Priority: Medium     Diagnosed by Neuropsychology, Dr. Kassidy Munguia " Onofre, 8/2014.         Autism spectrum disorder 08/28/2014     Priority: Medium     Diagnosed by Neuropsychology, Dr. Kassidy Adhikari, 8/2014.  IEP discontinued 2017  Had been doing behavioral therapy through [facility], will likely transfer back to [place]        MEDICATIONS  omeprazole (PRILOSEC) 20 MG DR capsule, Take 20 mg by mouth daily (Patient not taking: Reported on 1/4/2022)  ondansetron (ZOFRAN-ODT) 4 MG ODT tab, Take 1 tablet (4 mg) by mouth every 8 hours as needed for nausea (Patient not taking: Reported on 3/8/2022)    No current facility-administered medications on file prior to visit.      ALLERGIES  Allergies   Allergen Reactions     No Known Drug Allergies        Reviewed and updated as needed this visit by clinical staff   Tobacco  Allergies  Meds   Med Hx  Surg Hx  Fam Hx  Soc Hx        Reviewed and updated as needed this visit by Provider                OBJECTIVE:     /84   Pulse 118   Temp 98.8  F (37.1  C) (Temporal)   Resp 18   Wt 238 lb 2 oz (108 kg)   SpO2 96%   No height on file for this encounter.  >99 %ile (Z= 2.67) based on CDC (Boys, 2-20 Years) weight-for-age data using vitals from 3/8/2022.  No height and weight on file for this encounter.  No height on file for this encounter.  Repeat blood pressure: 118/78    GENERAL: Active, alert, in no acute distress.  SKIN: Clear. No significant rash, abnormal pigmentation or lesions  MS: no gross musculoskeletal defects noted, no edema  HEAD: Normocephalic.  EYES:  No discharge or erythema. Normal pupils and EOM.  MOUTH/THROAT: Clear. No oral lesions. Teeth intact without obvious abnormalities.  NECK: Supple, no masses.  LYMPH NODES: No adenopathy  LUNGS: Clear. No rales, rhonchi, wheezing or retractions  HEART: Regular rhythm. Normal S1/S2. No murmurs.  ABDOMEN: Soft, non-tender, not distended, no masses or hepatosplenomegaly. Bowel sounds normal. No rebound or guarding.   PSYCH: Age-appropriate alertness and  orientation    DIAGNOSTICS: Diagnostics: None    ASSESSMENT/PLAN:   1. Gastroesophageal reflux disease, unspecified whether esophagitis present  History of GERD symptoms with associated abdominal pain, likely reflecting both GERD and anxiety related GI distress. Recommend 8 week trial of prilosec, then weaning off. If unable to discontinue prilosec, recommend follow up in clinic, with further evaluation if needed at that time. Also discussed dietary changes to limit symptoms of GERD.   - omeprazole (PRILOSEC) 20 MG DR capsule; Take 1 capsule (20 mg) by mouth daily  Dispense: 30 capsule; Refill: 0    2. Abdominal pain, epigastric  Continued epigastric abdominal pain, as noted above. Recommend treatment for GERD and encourage therapy for anxiety. Also recommend working toward eating regular meals through the day, and monitoring symptoms closely for any relationship to specific foods, especially dairy. Follow up if not improving as GERD is treated.     3. JANETT (generalized anxiety disorder)  History of JANETT, previously in therapy, now with significant life stressor worsening his anxiety symptoms. Recommend counseling, which Gaurang is not especially interested in at this time. Father will further discuss it with him. Also discussed that although GERD is playing a role in his GI symptoms, anxiety may also be contributing to the severity of symptoms he is experiencing. Recommend counseling as a component of his abdominal pain treatment. Also recommend follow up in 2-3 weeks if no significant improvement on omeprazole, as an selective serotonin reuptake inhibitor may also be helpful.   - Peds Mental Health Referral; Future     FOLLOW UP: Return in about 10 weeks (around 5/17/2022) for if symptoms remain.     Meenakshi Jasmine DO     A total of 40 minutes were spent on this visit on the day of the encounter, on: chart review, history, assessment, exam, results review, documentation and discussing the assessment and plan as  above with the patient.             30

## 2023-05-21 NOTE — PATIENT PROFILE ADULT - NSTRANSFERBELONGINGSDISPO_GEN_A_NUR
not applicable High Dose Vitamin A Counseling: Side effects reviewed, pt to contact office should one occur.

## 2023-05-21 NOTE — PROGRESS NOTE ADULT - SUBJECTIVE AND OBJECTIVE BOX
GENOVEVA KAYLEIGH  57y  [facility]-N ED Hold 008 A      Patient is a 57y old  Male who presents with a chief complaint of     INTERVAL HPI/OVERNIGHT EVENTS:        REVIEW OF SYSTEMS:        FAMILY HISTORY:  FH: lung cancer (Mother)  mother    FH: diabetes mellitus      T(C): 36.6 (05-21-23 @ 08:09), Max: 37 (05-20-23 @ 16:34)  HR: 88 (05-21-23 @ 08:09) (69 - 101)  BP: 147/68 (05-21-23 @ 08:09) (116/69 - 157/60)  RR: 19 (05-21-23 @ 08:09) (18 - 19)  SpO2: 99% (05-21-23 @ 08:09) (94% - 100%)  Wt(kg): --Vital Signs Last 24 Hrs  T(C): 36.6 (21 May 2023 08:09), Max: 37 (20 May 2023 16:34)  T(F): 97.9 (21 May 2023 08:09), Max: 98.6 (20 May 2023 16:34)  HR: 88 (21 May 2023 08:09) (69 - 101)  BP: 147/68 (21 May 2023 08:09) (116/69 - 157/60)  BP(mean): --  RR: 19 (21 May 2023 08:09) (18 - 19)  SpO2: 99% (21 May 2023 08:09) (94% - 100%)    Parameters below as of 21 May 2023 06:30  Patient On (Oxygen Delivery Method): BiPAP/CPAP        PHYSICAL EXAM:  GENERAL: NAD, well-groomed, well-developed  HEAD:  Atraumatic, Normocephalic  EYES: EOMI, PERRLA, conjunctiva and sclera clear  ENMT: No tonsillar erythema, exudates, or enlargement; Moist mucous membranes, Good dentition, No lesions  NECK: Supple, No JVD, Normal thyroid  NERVOUS SYSTEM:  Alert & Oriented X3, Good concentration; Motor Strength 5/5 B/L upper and lower extremities; DTRs 2+ intact and symmetric  PULM: Clear to auscultation bilaterally  CARDIAC: Regular rate and rhythm; No murmurs, rubs, or gallops  GI: Soft, Nontender, Nondistended; Bowel sounds present  EXTREMITIES:  2+ Peripheral Pulses, No clubbing, cyanosis, or edema  LYMPH: No lymphadenopathy noted  SKIN: No rashes or lesions    Consultant(s) Notes Reviewed:  [x ] YES  [ ] NO  Care Discussed with Consultants/Other Providers [ x] YES  [ ] NO    LABS:                            14.8   12.59 )-----------( 246      ( 21 May 2023 06:30 )             44.0   05-21    135  |  99  |  14  ----------------------------<  135<H>  4.0   |  23  |  0.7    Ca    9.1      21 May 2023 06:30  Mg     2.0     05-21    TPro  6.1  /  Alb  3.7  /  TBili  0.6  /  DBili  x   /  AST  14  /  ALT  13  /  AlkPhos  87  05-21            albuterol    90 MICROgram(s) HFA Inhaler 2 Puff(s) Inhalation every 6 hours PRN  azithromycin   Tablet 500 milliGRAM(s) Oral daily  budesonide 160 MICROgram(s)/formoterol 4.5 MICROgram(s) Inhaler 2 Puff(s) Inhalation two times a day  furosemide    Tablet 40 milliGRAM(s) Oral daily  guaiFENesin  milliGRAM(s) Oral every 12 hours  heparin   Injectable 5000 Unit(s) SubCutaneous every 12 hours  HYDROmorphone  Injectable 2 milliGRAM(s) IV Push every 4 hours PRN  HYDROmorphone  Injectable 0.5 milliGRAM(s) IV Push once  lisinopril 5 milliGRAM(s) Oral daily  methylPREDNISolone sodium succinate IVPB 40 milliGRAM(s) IV Intermittent every 12 hours  ondansetron Injectable 4 milliGRAM(s) IV Push every 8 hours PRN  polyethylene glycol 3350 17 Gram(s) Oral daily  senna 2 Tablet(s) Oral at bedtime  simvastatin 20 milliGRAM(s) Oral at bedtime  tiotropium 2.5 MICROgram(s) Inhaler 2 Puff(s) Inhalation daily    57-year-old male with past medical history of hypertension, hyperlipidemia, chronic pain, COPD presenting for evaluation of generalized pain (including chest pain), vomiting, and diarrhea. At baseline, patient uses cane for ambulation. Of note had a recent admission in which patient eloped.    1. SOB and cough due to acute COPD exacerbation   - Admit to medicine   - Was on IVF that was stopped   - Inhalers  - Solumedrol 40mg IV q12hr   - Azithromycin   - Mucinex bid  - Bipap HS       2. Diffuse pain associated with diarrhea and vomiting. Chronic longstanding pain with suspected high opiate tolerance  * pt looks in opioid withdrawal but he stated that he was taking his meds   * Istop checked patient go to pain management every 5-7 days to get morphine 100mg 28 tab for 7 days and he also get oxycodone prn   - Admit to medicine   - Zofran prn   - Hold po meds. now on hydromorphone 2mg IV q3hr (equivalent to 30-40mg po morphine)   - Urine tox to confirm patient was taking his morphine:pending   -IStop confirmed patient on standing oxycodone and morphine ER as per above  - Pain management consult:pending       3. H/o COPD  - Cont inhalers     4. HLD  c/w fenofibrat    5. Thoracic Aneurysm (4.2 cm)- Stable    6. Diastolic CHF  - Cont lasix 40mg po daily   - Was started on IVF that I will stop     7. DVT/GI px.      GENOVEVA KAYLEIGH  57y  [facility]-N ED Hold 008 A      Patient is a 57y old  Male who presents with a chief complaint of     INTERVAL HPI/OVERNIGHT EVENTS:    Patient still in pain. still wheezing.   productive cough noted  he's asking for more pain meds   reported that he was inducing vomiting by his finger by staff         FAMILY HISTORY:  FH: lung cancer (Mother)  mother    FH: diabetes mellitus      T(C): 36.6 (05-21-23 @ 08:09), Max: 37 (05-20-23 @ 16:34)  HR: 88 (05-21-23 @ 08:09) (69 - 101)  BP: 147/68 (05-21-23 @ 08:09) (116/69 - 157/60)  RR: 19 (05-21-23 @ 08:09) (18 - 19)  SpO2: 99% (05-21-23 @ 08:09) (94% - 100%)  Wt(kg): --Vital Signs Last 24 Hrs  T(C): 36.6 (21 May 2023 08:09), Max: 37 (20 May 2023 16:34)  T(F): 97.9 (21 May 2023 08:09), Max: 98.6 (20 May 2023 16:34)  HR: 88 (21 May 2023 08:09) (69 - 101)  BP: 147/68 (21 May 2023 08:09) (116/69 - 157/60)  BP(mean): --  RR: 19 (21 May 2023 08:09) (18 - 19)  SpO2: 99% (21 May 2023 08:09) (94% - 100%)    Parameters below as of 21 May 2023 06:30  Patient On (Oxygen Delivery Method): BiPAP/CPAP        PHYSICAL EXAM:  GENERAL: NAD, well-groomed, well-developed  NERVOUS SYSTEM:  Alert & Oriented X3,  PULM: Diffuse wheezing and rhonchi   CARDIAC: Regular rate and rhythm;  GI: Soft, Nontender, Nondistended; Bowel sounds present  EXTREMITIES:  2+ Peripheral Pulses,    Consultant(s) Notes Reviewed:  [x ] YES  [ ] NO  Care Discussed with Consultants/Other Providers [ x] YES  [ ] NO    LABS:                            14.8   12.59 )-----------( 246      ( 21 May 2023 06:30 )             44.0   05-21    135  |  99  |  14  ----------------------------<  135<H>  4.0   |  23  |  0.7    Ca    9.1      21 May 2023 06:30  Mg     2.0     05-21    TPro  6.1  /  Alb  3.7  /  TBili  0.6  /  DBili  x   /  AST  14  /  ALT  13  /  AlkPhos  87  05-21            albuterol    90 MICROgram(s) HFA Inhaler 2 Puff(s) Inhalation every 6 hours PRN  azithromycin   Tablet 500 milliGRAM(s) Oral daily  budesonide 160 MICROgram(s)/formoterol 4.5 MICROgram(s) Inhaler 2 Puff(s) Inhalation two times a day  furosemide    Tablet 40 milliGRAM(s) Oral daily  guaiFENesin  milliGRAM(s) Oral every 12 hours  heparin   Injectable 5000 Unit(s) SubCutaneous every 12 hours  HYDROmorphone  Injectable 2 milliGRAM(s) IV Push every 4 hours PRN  HYDROmorphone  Injectable 0.5 milliGRAM(s) IV Push once  lisinopril 5 milliGRAM(s) Oral daily  methylPREDNISolone sodium succinate IVPB 40 milliGRAM(s) IV Intermittent every 12 hours  ondansetron Injectable 4 milliGRAM(s) IV Push every 8 hours PRN  polyethylene glycol 3350 17 Gram(s) Oral daily  senna 2 Tablet(s) Oral at bedtime  simvastatin 20 milliGRAM(s) Oral at bedtime  tiotropium 2.5 MICROgram(s) Inhaler 2 Puff(s) Inhalation daily    57-year-old male with past medical history of hypertension, hyperlipidemia, chronic pain, COPD presenting for evaluation of generalized pain (including chest pain), vomiting, and diarrhea. At baseline, patient uses cane for ambulation. Of note had a recent admission in which patient eloped.    1. SOB and cough due to acute COPD exacerbation   - Admit to medicine   - Was on IVF that was stopped   - Inhalers  - Solumedrol 40mg IV q12hr . Give an extra dose of solumedrol 60mg IV81   - Azithromycin   - Mucinex bid  - Bipap HS       2. Diffuse pain associated with diarrhea and vomiting. Chronic longstanding pain with suspected high opiate tolerance  * pt looks in opioid withdrawal but he stated that he was taking his meds   * Istop checked patient go to pain management every 5-7 days to get morphine 100mg 28 tab for 7 days and he also get oxycodone prn   - Admit to medicine   - Zofran prn   - Hold po meds. now on hydromorphone 2.25mg IV q3hr (equivalent to 40mg po morphine)   - Urine tox to confirm patient was taking his morphine:pending   -IStop confirmed patient on standing oxycodone and morphine ER as per above  - Pain management consult:pending       3. H/o COPD  - Cont inhalers     4. HLD  c/w fenofibrat    5. Thoracic Aneurysm (4.2 cm)- Stable    6. Diastolic CHF  - Cont lasix 40mg po daily   - Was started on IVF that I will stop     7. DVT/GI px.     Acute copd exacerbation still persistent. steroid extra dose given today   still reported pain. his iv hydromorphone will be increased as it's not equivalent to his morphine dose yet but likely more potent as he's not used to it. pain management consult pending

## 2023-05-22 ENCOUNTER — TRANSCRIPTION ENCOUNTER (OUTPATIENT)
Age: 58
End: 2023-05-22

## 2023-05-22 VITALS — OXYGEN SATURATION: 97 %

## 2023-05-22 LAB
A1C WITH ESTIMATED AVERAGE GLUCOSE RESULT: 5.9 % — HIGH (ref 4–5.6)
ESTIMATED AVERAGE GLUCOSE: 123 MG/DL — HIGH (ref 68–114)

## 2023-05-22 PROCEDURE — 99239 HOSP IP/OBS DSCHRG MGMT >30: CPT

## 2023-05-22 RX ORDER — HYDROMORPHONE HYDROCHLORIDE 2 MG/ML
0.5 INJECTION INTRAMUSCULAR; INTRAVENOUS; SUBCUTANEOUS ONCE
Refills: 0 | Status: DISCONTINUED | OUTPATIENT
Start: 2023-05-22 | End: 2023-05-22

## 2023-05-22 RX ORDER — HYDROMORPHONE HYDROCHLORIDE 2 MG/ML
4 INJECTION INTRAMUSCULAR; INTRAVENOUS; SUBCUTANEOUS EVERY 4 HOURS
Refills: 0 | Status: DISCONTINUED | OUTPATIENT
Start: 2023-05-22 | End: 2023-05-22

## 2023-05-22 RX ADMIN — Medication 102 MILLIGRAM(S): at 05:25

## 2023-05-22 RX ADMIN — HYDROMORPHONE HYDROCHLORIDE 0.5 MILLIGRAM(S): 2 INJECTION INTRAMUSCULAR; INTRAVENOUS; SUBCUTANEOUS at 10:19

## 2023-05-22 RX ADMIN — HYDROMORPHONE HYDROCHLORIDE 0.5 MILLIGRAM(S): 2 INJECTION INTRAMUSCULAR; INTRAVENOUS; SUBCUTANEOUS at 10:34

## 2023-05-22 RX ADMIN — Medication 600 MILLIGRAM(S): at 05:22

## 2023-05-22 RX ADMIN — LISINOPRIL 5 MILLIGRAM(S): 2.5 TABLET ORAL at 05:22

## 2023-05-22 RX ADMIN — HYDROMORPHONE HYDROCHLORIDE 2.25 MILLIGRAM(S): 2 INJECTION INTRAMUSCULAR; INTRAVENOUS; SUBCUTANEOUS at 00:19

## 2023-05-22 RX ADMIN — HEPARIN SODIUM 5000 UNIT(S): 5000 INJECTION INTRAVENOUS; SUBCUTANEOUS at 05:47

## 2023-05-22 RX ADMIN — HYDROMORPHONE HYDROCHLORIDE 2.25 MILLIGRAM(S): 2 INJECTION INTRAMUSCULAR; INTRAVENOUS; SUBCUTANEOUS at 08:38

## 2023-05-22 RX ADMIN — HYDROMORPHONE HYDROCHLORIDE 2.25 MILLIGRAM(S): 2 INJECTION INTRAMUSCULAR; INTRAVENOUS; SUBCUTANEOUS at 04:28

## 2023-05-22 RX ADMIN — Medication 40 MILLIGRAM(S): at 05:22

## 2023-05-22 RX ADMIN — BUDESONIDE AND FORMOTEROL FUMARATE DIHYDRATE 2 PUFF(S): 160; 4.5 AEROSOL RESPIRATORY (INHALATION) at 10:19

## 2023-05-22 RX ADMIN — ONDANSETRON 4 MILLIGRAM(S): 8 TABLET, FILM COATED ORAL at 05:29

## 2023-05-22 RX ADMIN — HYDROMORPHONE HYDROCHLORIDE 2.25 MILLIGRAM(S): 2 INJECTION INTRAMUSCULAR; INTRAVENOUS; SUBCUTANEOUS at 08:23

## 2023-05-22 NOTE — PROGRESS NOTE ADULT - ASSESSMENT
57-year-old male with past medical history of hypertension, hyperlipidemia, chronic pain, COPD presenting for evaluation of generalized pain (including chest pain), vomiting, and diarrhea. At baseline, patient uses cane for ambulation. Of note had a recent admission in which patient eloped.    1. SOB and cough due to acute COPD exacerbation   - - Inhalers  - Solumedrol 40mg IV q12hr changed to po prednisone 40mg  - Azithromycin   - Mucinex bid      2. Diffuse pain associated with diarrhea and vomiting. Chronic longstanding pain with suspected high opiate tolerance  * pt looks in opioid withdrawal but he stated that he was taking his meds   * Istop checked patient go to pain management every 5-7 days to get morphine 100mg 28 tab for 7 days and he also get oxycodone prn   - Zofran prn   - now on hydromorphone 2mg IV q3hr (equivalent to 30-40mg po morphine) changed to po dilaudid  - Urine tox to confirm patient was taking his morphine:pending   -IStop confirmed patient on standing oxycodone and morphine ER as per above  - Pain management consult:pending       3. HLD  c/w fenofibrate    4. Thoracic Aneurysm (4.2 cm)- Stable    5. Diastolic CHF  - Cont lasix 40mg po daily   - Was started on IVF that I will stop     patient upset not getting enough dilaudid po-- signed out AMA-- meds sent to his pharmacy--spent more than 30 mins.

## 2023-05-22 NOTE — DISCHARGE NOTE PROVIDER - CARE PROVIDER_API CALL
Edison Bowie)  [address] [facility] [place]  [address] [place] [address]  Phone: [phone]  Fax: [phone]  Established Patient  Follow Up Time: 1-3 days

## 2023-05-22 NOTE — DISCHARGE NOTE PROVIDER - NSDCMRMEDTOKEN_GEN_ALL_CORE_FT
albuterol 90 mcg/inh inhalation aerosol: 2 puff(s) inhaled every 6 hours AS NEEDED   ALPRAZolam 2 mg oral tablet: 1 tab(s) orally every 6 hours, As needed, anxiety  Ambien 10 mg oral tablet: 0.5 tab(s) orally once a day (at bedtime), As Needed  fenofibrate 160 mg oral tablet: 1 tab(s) orally once a day  Lasix 40 mg oral tablet: 1 tab(s) orally once a day  lisinopril 5 mg oral tablet: 1 tab(s) orally once a day  Narcan 4 mg/0.1 mL nasal spray: 4 milligram(s) intranasally once a day as needed for  opioid overdose  oxyCODONE 30 mg oral tablet: 1 tab(s) orally every 4 hours, As needed, Severe Pain (7 - 10) MDD:180mg  predniSONE 20 mg oral tablet: 2 tab(s) orally once a day  Spiriva HandiHaler 18 mcg inhalation capsule: 1 cap(s) inhaled once a day   Symbicort 160 mcg-4.5 mcg/inh inhalation aerosol: 1 puff(s) inhaled 2 times a day   Zocor 20 mg oral tablet: 1 tab(s) orally once a day (at bedtime)

## 2023-05-22 NOTE — PROGRESS NOTE ADULT - SUBJECTIVE AND OBJECTIVE BOX
KAYLEIGH TOMAS 57y Male  MRN#: [id number]     Hospital Day: 2d    SUMMARY: 56 y/o male with PMHx of hypertension, hyperlipidemia, chronic pain, COPD, admitted for for evaluation of generalized pain (including chest pain), vomiting, and diarrhea, in the setting of possible opioid withdrawal. On standing oxycodone and morphine ER as per I-Stop. Currently on Dilaudid 2.25mg IV q3h. Also found to be in COPD exacerbation, currently on albuterol, Symbicort, Spiriva, solumedrol, azithromycin, Mucinex, BiPAP qHS. Of note, patient had a recent admission during which he eloped.    SUBJECTIVE  No reported acute overnight events. Patient complained of severe pain again this morning and was given his PRN Dilaudid. No other complaints.                                             ----------------------------------------------------------  OBJECTIVE  PAST MEDICAL & SURGICAL HISTORY  HTN (hypertension)    High cholesterol    GERD (gastroesophageal reflux disease)    Morbid obesity    Osteoarthritis    Hiatal hernia  GERD    Colitis  LARGE INTESTINE   NO RECENT FLARES    COPD (chronic obstructive pulmonary disease)    REBECCA treated with BiPAP    History of cholecystectomy    History of appendectomy    History of knee replacement procedure of right knee  BILATERAL    Hernia, inguinal, bilateral  3 months old    H/O knee surgery  arthoscopic x4    H/O foot surgery  right big toe surgery                                              -----------------------------------------------------------  ALLERGIES:  No Known Allergies                                            ------------------------------------------------------------    HOME MEDICATIONS  Home Medications:  ALPRAZolam 2 mg oral tablet: 1 tab(s) orally every 6 hours, As needed, anxiety (20 May 2023 09:20)  Ambien 10 mg oral tablet: 0.5 tab(s) orally once a day (at bedtime), As Needed (20 May 2023 09:20)  fenofibrate 160 mg oral tablet: 1 tab(s) orally once a day (20 May 2023 09:20)  Lasix 40 mg oral tablet: 1 tab(s) orally once a day (20 May 2023 09:20)  lisinopril 5 mg oral tablet: 1 tab(s) orally once a day (20 May 2023 09:20)  Zocor 20 mg oral tablet: 1 tab(s) orally once a day (at bedtime) (20 May 2023 09:20)                           MEDICATIONS:  STANDING MEDICATIONS  azithromycin   Tablet 500 milliGRAM(s) Oral daily  budesonide 160 MICROgram(s)/formoterol 4.5 MICROgram(s) Inhaler 2 Puff(s) Inhalation two times a day  furosemide    Tablet 40 milliGRAM(s) Oral daily  guaiFENesin  milliGRAM(s) Oral every 12 hours  heparin   Injectable 5000 Unit(s) SubCutaneous every 12 hours  lisinopril 5 milliGRAM(s) Oral daily  polyethylene glycol 3350 17 Gram(s) Oral daily  predniSONE   Tablet 40 milliGRAM(s) Oral daily  senna 2 Tablet(s) Oral at bedtime  simvastatin 20 milliGRAM(s) Oral at bedtime  tiotropium 2.5 MICROgram(s) Inhaler 2 Puff(s) Inhalation daily    PRN MEDICATIONS  albuterol    90 MICROgram(s) HFA Inhaler 2 Puff(s) Inhalation every 6 hours PRN  HYDROmorphone   Tablet 4 milliGRAM(s) Oral every 4 hours PRN  ondansetron Injectable 4 milliGRAM(s) IV Push every 8 hours PRN                                            ------------------------------------------------------------  VITAL SIGNS: Last 24 Hours  T(C): 36.2 (22 May 2023 05:00), Max: 36.6 (21 May 2023 19:47)  T(F): 97.1 (22 May 2023 05:00), Max: 97.9 (21 May 2023 19:47)  HR: 69 (22 May 2023 05:00) (69 - 87)  BP: 138/94 (22 May 2023 05:00) (138/94 - 173/85)  BP(mean): --  RR: 18 (22 May 2023 05:00) (18 - 20)  SpO2: 97% (22 May 2023 08:02) (97% - 98%)                                             --------------------------------------------------------------  LABS:                        14.8   12.59 )-----------( 246      ( 21 May 2023 06:30 )             44.0     05-21    135  |  99  |  14  ----------------------------<  135<H>  4.0   |  23  |  0.7    Ca    9.1      21 May 2023 06:30  Mg     2.0     05-21    TPro  6.1  /  Alb  3.7  /  TBili  0.6  /  DBili  x   /  AST  14  /  ALT  13  /  AlkPhos  87  05-21    PT/INR - ( 21 May 2023 06:30 )   PT: 12.40 sec;   INR: 1.08 ratio         PTT - ( 21 May 2023 06:30 )  PTT:31.8 sec              CARDIAC MARKERS ( 20 May 2023 16:35 )  x     / <0.01 ng/mL / x     / x     / x      CARDIAC MARKERS ( 20 May 2023 11:10 )  x     / <0.01 ng/mL / x     / x     / x                                                  -------------------------------------------------------------  RADIOLOGY:  ACC: [id number] EXAM: CT ANGIO CHEST AORTA WAWIC ORDERED BY: TAMIKA DAVE  ACC: [id number] EXAM: CT ANGIO ABD PELV (W)AW IC ORDERED BY: TAMIKA DAVE  PROCEDURE DATE: 05/20/2023  IMPRESSION:  1. No evidence of acute aortic, acute intrathoracic or abdominopelvic pathology.  2. Unchanged borderline dilation of ascending thoracic aorta, up to 4.1 cm.  KAYLEIGH BABIN MD; Attending Radiologist  This document has been electronically signed. May 20 2023 4:41AM                                          --------------------------------------------------------------    PHYSICAL EXAM:  General:   HEENT:  LUNGS:  HEART:  ABDOMEN:  EXT:  NEURO:  SKIN:                                           --------------------------------------------------------------       KAYLEIGH TOMAS 57y Male  MRN#: [id number]     Hospital Day: 2d    SUMMARY: 58 y/o male with PMHx of hypertension, hyperlipidemia, chronic pain, COPD, admitted for for evaluation of generalized pain (including chest pain), vomiting, and diarrhea, in the setting of possible opioid withdrawal. On standing oxycodone and morphine ER as per I-Stop. Currently on Dilaudid 2.25mg IV q3h. Also found to be in COPD exacerbation, currently on albuterol, Symbicort, Spiriva, solumedrol, azithromycin, Mucinex, BiPAP qHS. Of note, patient had a recent admission during which he eloped.    SUBJECTIVE  No reported acute overnight events. Patient complained of severe pain again this morning and was given his PRN Dilaudid. No other complaints.                                             ----------------------------------------------------------  OBJECTIVE  PAST MEDICAL & SURGICAL HISTORY  HTN (hypertension)    High cholesterol    GERD (gastroesophageal reflux disease)    Morbid obesity    Osteoarthritis    Hiatal hernia  GERD    Colitis  LARGE INTESTINE   NO RECENT FLARES    COPD (chronic obstructive pulmonary disease)    REBECCA treated with BiPAP    History of cholecystectomy    History of appendectomy    History of knee replacement procedure of right knee  BILATERAL    Hernia, inguinal, bilateral  3 months old    H/O knee surgery  arthoscopic x4    H/O foot surgery  right big toe surgery                                              -----------------------------------------------------------  ALLERGIES:  No Known Allergies                                            ------------------------------------------------------------    HOME MEDICATIONS  Home Medications:  ALPRAZolam 2 mg oral tablet: 1 tab(s) orally every 6 hours, As needed, anxiety (20 May 2023 09:20)  Ambien 10 mg oral tablet: 0.5 tab(s) orally once a day (at bedtime), As Needed (20 May 2023 09:20)  fenofibrate 160 mg oral tablet: 1 tab(s) orally once a day (20 May 2023 09:20)  Lasix 40 mg oral tablet: 1 tab(s) orally once a day (20 May 2023 09:20)  lisinopril 5 mg oral tablet: 1 tab(s) orally once a day (20 May 2023 09:20)  Zocor 20 mg oral tablet: 1 tab(s) orally once a day (at bedtime) (20 May 2023 09:20)                           MEDICATIONS:  STANDING MEDICATIONS  azithromycin   Tablet 500 milliGRAM(s) Oral daily  budesonide 160 MICROgram(s)/formoterol 4.5 MICROgram(s) Inhaler 2 Puff(s) Inhalation two times a day  furosemide    Tablet 40 milliGRAM(s) Oral daily  guaiFENesin  milliGRAM(s) Oral every 12 hours  heparin   Injectable 5000 Unit(s) SubCutaneous every 12 hours  lisinopril 5 milliGRAM(s) Oral daily  polyethylene glycol 3350 17 Gram(s) Oral daily  predniSONE   Tablet 40 milliGRAM(s) Oral daily  senna 2 Tablet(s) Oral at bedtime  simvastatin 20 milliGRAM(s) Oral at bedtime  tiotropium 2.5 MICROgram(s) Inhaler 2 Puff(s) Inhalation daily    PRN MEDICATIONS  albuterol    90 MICROgram(s) HFA Inhaler 2 Puff(s) Inhalation every 6 hours PRN  HYDROmorphone   Tablet 4 milliGRAM(s) Oral every 4 hours PRN  ondansetron Injectable 4 milliGRAM(s) IV Push every 8 hours PRN                                            ------------------------------------------------------------  VITAL SIGNS: Last 24 Hours  T(C): 36.2 (22 May 2023 05:00), Max: 36.6 (21 May 2023 19:47)  T(F): 97.1 (22 May 2023 05:00), Max: 97.9 (21 May 2023 19:47)  HR: 69 (22 May 2023 05:00) (69 - 87)  BP: 138/94 (22 May 2023 05:00) (138/94 - 173/85)  BP(mean): --  RR: 18 (22 May 2023 05:00) (18 - 20)  SpO2: 97% (22 May 2023 08:02) (97% - 98%)                                             --------------------------------------------------------------  LABS:                        14.8   12.59 )-----------( 246      ( 21 May 2023 06:30 )             44.0     05-21    135  |  99  |  14  ----------------------------<  135<H>  4.0   |  23  |  0.7    Ca    9.1      21 May 2023 06:30  Mg     2.0     05-21    TPro  6.1  /  Alb  3.7  /  TBili  0.6  /  DBili  x   /  AST  14  /  ALT  13  /  AlkPhos  87  05-21    PT/INR - ( 21 May 2023 06:30 )   PT: 12.40 sec;   INR: 1.08 ratio         PTT - ( 21 May 2023 06:30 )  PTT:31.8 sec              CARDIAC MARKERS ( 20 May 2023 16:35 )  x     / <0.01 ng/mL / x     / x     / x      CARDIAC MARKERS ( 20 May 2023 11:10 )  x     / <0.01 ng/mL / x     / x     / x                                                  -------------------------------------------------------------  RADIOLOGY:  ACC: [id number] EXAM: CT ANGIO CHEST AORTA WAWIC ORDERED BY: TAMIKA DAVE  ACC: [id number] EXAM: CT ANGIO ABD PELV (W)AW IC ORDERED BY: TAMIKA DAVE  PROCEDURE DATE: 05/20/2023  IMPRESSION:  1. No evidence of acute aortic, acute intrathoracic or abdominopelvic pathology.  2. Unchanged borderline dilation of ascending thoracic aorta, up to 4.1 cm.  KAYLEIGH BABIN MD; Attending Radiologist  This document has been electronically signed. May 20 2023 4:41AM                                          --------------------------------------------------------------    PHYSICAL EXAM:  CONSTITUTIONAL: In mild distress due to pain.    EYES: PERRLA and symmetric, EOMI, No conjunctival injection or pallor. Sclera anicteric.    ENMT: Moist mucous membranes. No external nasal lesions. Nasal mucosa not inflamed. No gross hearing impairment noted.  	NECK: Supple, symmetric and without tracheal deviation.    RESPIRATORY: No respiratory distress. No obvious use of accessory muscles. Diffuse wheezing auscultated over all lung fields.       ----    CARDIOVASCULAR: Regular rate and rhythm. Normal S1 and S2. No murmurs, rubs or gallops. Peripheral pulses 2+.    GASTROINTESTINAL: Soft, non-tender to palpation in all quadrants. No palpable masses. No hepatosplenomegaly. No appreciable hernias.    MUSCULOSKELETAL: Examination of all four extremities without obvious misalignment, normal range of motion without pain, no spinal tenderness, normal muscle strength/tone.    SKIN: No obvious rashes or ulcers.    NEUROLOGIC: CN II-XII intact; normal reflexes in upper and lower extremities, sensation intact in upper and lower extremities b/l to light touch.    PSYCHIATRIC: Appropriate insight/judgment; A+O x 3. Mood and affect appropriate. Recent/remote memory intact.                                           --------------------------------------------------------------

## 2023-05-22 NOTE — PROGRESS NOTE ADULT - SUBJECTIVE AND OBJECTIVE BOX
SUBJECTIVE:    Patient is a 57y old Male who presents with a chief complaint of   Currently admitted to medicine with the primary diagnosis of COPD with acute exacerbation       Today is hospital day 2d.     PAST MEDICAL & SURGICAL HISTORY  HTN (hypertension)    High cholesterol    GERD (gastroesophageal reflux disease)    Morbid obesity    Osteoarthritis    Hiatal hernia  GERD    Colitis  LARGE INTESTINE   NO RECENT FLARES    COPD (chronic obstructive pulmonary disease)    REBECCA treated with BiPAP    History of cholecystectomy    History of appendectomy    History of knee replacement procedure of right knee  BILATERAL    Hernia, inguinal, bilateral  3 months old    H/O knee surgery  arthoscopic x4    H/O foot surgery  right big toe surgery      ALLERGIES:  No Known Allergies    MEDICATIONS:  STANDING MEDICATIONS  azithromycin   Tablet 500 milliGRAM(s) Oral daily  budesonide 160 MICROgram(s)/formoterol 4.5 MICROgram(s) Inhaler 2 Puff(s) Inhalation two times a day  furosemide    Tablet 40 milliGRAM(s) Oral daily  guaiFENesin  milliGRAM(s) Oral every 12 hours  heparin   Injectable 5000 Unit(s) SubCutaneous every 12 hours  lisinopril 5 milliGRAM(s) Oral daily  polyethylene glycol 3350 17 Gram(s) Oral daily  predniSONE   Tablet 40 milliGRAM(s) Oral daily  senna 2 Tablet(s) Oral at bedtime  simvastatin 20 milliGRAM(s) Oral at bedtime  tiotropium 2.5 MICROgram(s) Inhaler 2 Puff(s) Inhalation daily    PRN MEDICATIONS  albuterol    90 MICROgram(s) HFA Inhaler 2 Puff(s) Inhalation every 6 hours PRN  HYDROmorphone   Tablet 4 milliGRAM(s) Oral every 4 hours PRN  ondansetron Injectable 4 milliGRAM(s) IV Push every 8 hours PRN    VITALS:   T(F): 97.1  HR: 69  BP: 138/94  RR: 18  SpO2: 97%    LABS:                        14.8   12.59 )-----------( 246      ( 21 May 2023 06:30 )             44.0     05-21    135  |  99  |  14  ----------------------------<  135<H>  4.0   |  23  |  0.7    Ca    9.1      21 May 2023 06:30  Mg     2.0     05-21    TPro  6.1  /  Alb  3.7  /  TBili  0.6  /  DBili  x   /  AST  14  /  ALT  13  /  AlkPhos  87  05-21    PT/INR - ( 21 May 2023 06:30 )   PT: 12.40 sec;   INR: 1.08 ratio         PTT - ( 21 May 2023 06:30 )  PTT:31.8 sec          CARDIAC MARKERS ( 20 May 2023 16:35 )  x     / <0.01 ng/mL / x     / x     / x          RADIOLOGY:    PHYSICAL EXAM:  GEN: No acute distress  LUNGS: Clear to auscultation bilaterally   HEART: S1/S2 present. RRR.   ABD/ GI: Soft, non-tender, non-distended. Bowel sounds present  EXT: NC/NC/NE/2+PP/CLARK  NEURO: AAOX3

## 2023-05-22 NOTE — DISCHARGE NOTE PROVIDER - HOSPITAL COURSE
58 y/o male with PMHx of hypertension, hyperlipidemia, chronic pain, COPD, admitted for for evaluation of generalized pain (including chest pain), vomiting, and diarrhea, in the setting of possible opioid withdrawal. On standing oxycodone and morphine ER as per I-Stop. Currently on Dilaudid 2.25mg IV q3h. Also found to be in COPD exacerbation, currently on albuterol, Symbicort, Spiriva, solumedrol (transitioned to prednisone), azithromycin, Mucinex, BiPAP qHS. Of note, patient had a recent admission during which he eloped.    Patient eloped (did not sign any AMA forms) on the morning of 5/22/2023 despite repeated warnings from nursing staff on the consequences of eloping. Prednisone 20mg PO x 2 days was sent to his pharmacy on record, [facility]. 58 y/o male with PMHx of hypertension, hyperlipidemia, chronic pain, COPD, admitted for for evaluation of generalized pain (including chest pain), vomiting, and diarrhea, in the setting of possible opioid withdrawal. On standing oxycodone and morphine ER as per I-Stop. Currently on Dilaudid 2.25mg IV q3h. Also found to be in COPD exacerbation, and given albuterol, Symbicort, Spiriva, solumedrol (transitioned to prednisone), azithromycin, Mucinex, BiPAP qHS. Patient was found to be stable for discharge on a 2-day course of prednisone 20mgh PO QD and very close follow-up.

## 2023-05-22 NOTE — DISCHARGE NOTE PROVIDER - NSDCCPCAREPLAN_GEN_ALL_CORE_FT
PRINCIPAL DISCHARGE DIAGNOSIS  Diagnosis: COPD with acute exacerbation  Assessment and Plan of Treatment: You were found to be in acute COPD exacerbation on admission to the hospital. You were given albuterol, Symbicort, Spiriva, solumedrol (later transitioned to prednisone), azithromycin and Mucinex. You were also placed on BiPAP at bedtime. You eloped from the hospital despite repeated counseling from nurses on the risks of eloping from the hospital (including that you will have to be re-admitted as a new patient should you return to the hospital). A two-day supply of oral prednisone was sent to your pharmacy. Please follow up closely with your primary care physician.      SECONDARY DISCHARGE DIAGNOSES  Diagnosis: Opioid dependence  Assessment and Plan of Treatment: You were admitted to the hospital for diffuse pain with nausea and vomiting, most likely as a result of your chronic opioid use. You were given pain medication as appropriate and laxatives. Your care team was planning on consulting pain management, but you eloped from the hospital before this could be done. Please follow up closely with your primary care physician.     PRINCIPAL DISCHARGE DIAGNOSIS  Diagnosis: COPD with acute exacerbation  Assessment and Plan of Treatment: You were found to be in acute COPD exacerbation on admission to the hospital. You were given albuterol, Symbicort, Spiriva, solumedrol (later transitioned to prednisone), azithromycin and Mucinex. You were also placed on BiPAP at bedtime. A two-day supply of oral prednisone was sent to your pharmacy for consumption after discharge. Please follow up closely with your primary care physician.      SECONDARY DISCHARGE DIAGNOSES  Diagnosis: Opioid dependence  Assessment and Plan of Treatment: You were admitted to the hospital for diffuse pain with nausea and vomiting, most likely as a result of your chronic opioid use. You were given pain medication as appropriate and laxatives. Please follow up closely with your primary care physician.

## 2023-05-22 NOTE — CHART NOTE - NSCHARTNOTEFT_GEN_A_CORE
Patient eloped on the AM of 5/22 despite repeated warnings from nursing staff on the consequences of eloping, including having to be re-admitted as a new patient if he returns to this hospital to seek care. Of note, he eloped during his prior admission as well. Patient eloped on the AM of 5/22 despite repeated warnings from nursing staff on the consequences of eloping, including having to be re-admitted as a new patient if he returns to this hospital to seek care. Of note, he eloped during his prior admission as well.    UPDATE: Patient returned to the floor, demanding his discharge papers, which were completed.

## 2023-05-24 DIAGNOSIS — I71.20 THORACIC AORTIC ANEURYSM, WITHOUT RUPTURE, UNSPECIFIED: ICD-10-CM

## 2023-05-24 DIAGNOSIS — F17.210 NICOTINE DEPENDENCE, CIGARETTES, UNCOMPLICATED: ICD-10-CM

## 2023-05-24 DIAGNOSIS — G47.33 OBSTRUCTIVE SLEEP APNEA (ADULT) (PEDIATRIC): ICD-10-CM

## 2023-05-24 DIAGNOSIS — I11.0 HYPERTENSIVE HEART DISEASE WITH HEART FAILURE: ICD-10-CM

## 2023-05-24 DIAGNOSIS — K21.9 GASTRO-ESOPHAGEAL REFLUX DISEASE WITHOUT ESOPHAGITIS: ICD-10-CM

## 2023-05-24 DIAGNOSIS — E78.5 HYPERLIPIDEMIA, UNSPECIFIED: ICD-10-CM

## 2023-05-24 DIAGNOSIS — G89.29 OTHER CHRONIC PAIN: ICD-10-CM

## 2023-05-24 DIAGNOSIS — J44.1 CHRONIC OBSTRUCTIVE PULMONARY DISEASE WITH (ACUTE) EXACERBATION: ICD-10-CM

## 2023-05-24 DIAGNOSIS — Z79.899 OTHER LONG TERM (CURRENT) DRUG THERAPY: ICD-10-CM

## 2023-05-24 DIAGNOSIS — R07.9 CHEST PAIN, UNSPECIFIED: ICD-10-CM

## 2023-05-24 DIAGNOSIS — D72.829 ELEVATED WHITE BLOOD CELL COUNT, UNSPECIFIED: ICD-10-CM

## 2023-05-24 DIAGNOSIS — F11.23 OPIOID DEPENDENCE WITH WITHDRAWAL: ICD-10-CM

## 2023-05-24 DIAGNOSIS — E66.01 MORBID (SEVERE) OBESITY DUE TO EXCESS CALORIES: ICD-10-CM

## 2023-05-24 DIAGNOSIS — I50.30 UNSPECIFIED DIASTOLIC (CONGESTIVE) HEART FAILURE: ICD-10-CM

## 2023-07-07 NOTE — ED ADULT TRIAGE NOTE - BP NONINVASIVE DIASTOLIC (MM HG)
[No Acute Distress] : no acute distress [Well Nourished] : well nourished [Well Developed] : well developed [Well-Appearing] : well-appearing [Normal Sclera/Conjunctiva] : normal sclera/conjunctiva [PERRL] : pupils equal round and reactive to light [EOMI] : extraocular movements intact [Normal Outer Ear/Nose] : the outer ears and nose were normal in appearance [Normal Oropharynx] : the oropharynx was normal [No JVD] : no jugular venous distention [No Lymphadenopathy] : no lymphadenopathy [Supple] : supple [Thyroid Normal, No Nodules] : the thyroid was normal and there were no nodules present [No Respiratory Distress] : no respiratory distress  [No Accessory Muscle Use] : no accessory muscle use 84 [Clear to Auscultation] : lungs were clear to auscultation bilaterally [Normal Rate] : normal rate  [Regular Rhythm] : with a regular rhythm [Normal S1, S2] : normal S1 and S2 [No Murmur] : no murmur heard [No Carotid Bruits] : no carotid bruits [No Abdominal Bruit] : a ~M bruit was not heard ~T in the abdomen [No Varicosities] : no varicosities [Pedal Pulses Present] : the pedal pulses are present [No Edema] : there was no peripheral edema [No Palpable Aorta] : no palpable aorta [No Extremity Clubbing/Cyanosis] : no extremity clubbing/cyanosis [Soft] : abdomen soft [Non Tender] : non-tender [Non-distended] : non-distended [No Masses] : no abdominal mass palpated [No HSM] : no HSM [Normal Bowel Sounds] : normal bowel sounds [Normal Posterior Cervical Nodes] : no posterior cervical lymphadenopathy [Normal Anterior Cervical Nodes] : no anterior cervical lymphadenopathy [No CVA Tenderness] : no CVA  tenderness [No Spinal Tenderness] : no spinal tenderness [No Joint Swelling] : no joint swelling [Grossly Normal Strength/Tone] : grossly normal strength/tone [No Rash] : no rash [Coordination Grossly Intact] : coordination grossly intact [No Focal Deficits] : no focal deficits [Normal Gait] : normal gait [Deep Tendon Reflexes (DTR)] : deep tendon reflexes were 2+ and symmetric [Normal Affect] : the affect was normal [Normal Insight/Judgement] : insight and judgment were intact

## 2023-07-17 ENCOUNTER — EMERGENCY (EMERGENCY)
Facility: HOSPITAL | Age: 58
LOS: 0 days | Discharge: HOME | End: 2023-07-17
Attending: EMERGENCY MEDICINE
Payer: MEDICAID

## 2023-07-17 VITALS
SYSTOLIC BLOOD PRESSURE: 133 MMHG | OXYGEN SATURATION: 99 % | HEIGHT: 70 IN | WEIGHT: 315 LBS | DIASTOLIC BLOOD PRESSURE: 78 MMHG | RESPIRATION RATE: 18 BRPM | TEMPERATURE: 96 F | HEART RATE: 92 BPM
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
## 2023-07-17 DIAGNOSIS — R07.89 OTHER CHEST PAIN: ICD-10-CM

## 2023-07-17 DIAGNOSIS — E78.5 HYPERLIPIDEMIA, UNSPECIFIED: ICD-10-CM

## 2023-07-17 DIAGNOSIS — Z90.49 ACQUIRED ABSENCE OF OTHER SPECIFIED PARTS OF DIGESTIVE TRACT: ICD-10-CM

## 2023-07-17 DIAGNOSIS — R10.11 RIGHT UPPER QUADRANT PAIN: ICD-10-CM

## 2023-07-17 DIAGNOSIS — R07.81 PLEURODYNIA: ICD-10-CM

## 2023-07-17 DIAGNOSIS — Z96.651 PRESENCE OF RIGHT ARTIFICIAL KNEE JOINT: Chronic | ICD-10-CM

## 2023-07-17 DIAGNOSIS — Z98.890 OTHER SPECIFIED POSTPROCEDURAL STATES: Chronic | ICD-10-CM

## 2023-07-17 DIAGNOSIS — M71.311 OTHER BURSAL CYST, RIGHT SHOULDER: ICD-10-CM

## 2023-07-17 DIAGNOSIS — F17.210 NICOTINE DEPENDENCE, CIGARETTES, UNCOMPLICATED: ICD-10-CM

## 2023-07-17 DIAGNOSIS — I10 ESSENTIAL (PRIMARY) HYPERTENSION: ICD-10-CM

## 2023-07-17 DIAGNOSIS — J44.9 CHRONIC OBSTRUCTIVE PULMONARY DISEASE, UNSPECIFIED: ICD-10-CM
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
## 2023-07-17 DIAGNOSIS — K40.20 BILATERAL INGUINAL HERNIA, WITHOUT OBSTRUCTION OR GANGRENE, NOT SPECIFIED AS RECURRENT: Chronic | ICD-10-CM

## 2023-07-17 LAB
ALBUMIN SERPL ELPH-MCNC: 3.7 G/DL — SIGNIFICANT CHANGE UP (ref 3.5–5.2)
ALP SERPL-CCNC: 91 U/L — SIGNIFICANT CHANGE UP (ref 30–115)
ALT FLD-CCNC: 52 U/L — HIGH (ref 0–41)
ANION GAP SERPL CALC-SCNC: 10 MMOL/L — SIGNIFICANT CHANGE UP (ref 7–14)
AST SERPL-CCNC: 107 U/L — HIGH (ref 0–41)
BASOPHILS # BLD AUTO: 0.04 K/UL — SIGNIFICANT CHANGE UP (ref 0–0.2)
BASOPHILS NFR BLD AUTO: 0.4 % — SIGNIFICANT CHANGE UP (ref 0–1)
BILIRUB DIRECT SERPL-MCNC: <0.2 MG/DL — SIGNIFICANT CHANGE UP (ref 0–0.3)
BILIRUB INDIRECT FLD-MCNC: >0.3 MG/DL — SIGNIFICANT CHANGE UP (ref 0.2–1.2)
BILIRUB SERPL-MCNC: 0.5 MG/DL — SIGNIFICANT CHANGE UP (ref 0.2–1.2)
BUN SERPL-MCNC: 10 MG/DL — SIGNIFICANT CHANGE UP (ref 10–20)
CALCIUM SERPL-MCNC: 9.1 MG/DL — SIGNIFICANT CHANGE UP (ref 8.4–10.5)
CHLORIDE SERPL-SCNC: 99 MMOL/L — SIGNIFICANT CHANGE UP (ref 98–110)
CO2 SERPL-SCNC: 29 MMOL/L — SIGNIFICANT CHANGE UP (ref 17–32)
CREAT SERPL-MCNC: 0.7 MG/DL — SIGNIFICANT CHANGE UP (ref 0.7–1.5)
EGFR: 109 ML/MIN/1.73M2 — SIGNIFICANT CHANGE UP
EOSINOPHIL # BLD AUTO: 0.15 K/UL — SIGNIFICANT CHANGE UP (ref 0–0.7)
EOSINOPHIL NFR BLD AUTO: 1.4 % — SIGNIFICANT CHANGE UP (ref 0–8)
ETHANOL SERPL-MCNC: <10 MG/DL — SIGNIFICANT CHANGE UP
GLUCOSE SERPL-MCNC: 95 MG/DL — SIGNIFICANT CHANGE UP (ref 70–99)
HCT VFR BLD CALC: 40.6 % — LOW (ref 42–52)
HGB BLD-MCNC: 13.8 G/DL — LOW (ref 14–18)
IMM GRANULOCYTES NFR BLD AUTO: 0.4 % — HIGH (ref 0.1–0.3)
LYMPHOCYTES # BLD AUTO: 2.31 K/UL — SIGNIFICANT CHANGE UP (ref 1.2–3.4)
LYMPHOCYTES # BLD AUTO: 21.7 % — SIGNIFICANT CHANGE UP (ref 20.5–51.1)
MCHC RBC-ENTMCNC: 29.4 PG — SIGNIFICANT CHANGE UP (ref 27–31)
MCHC RBC-ENTMCNC: 34 G/DL — SIGNIFICANT CHANGE UP (ref 32–37)
MCV RBC AUTO: 86.4 FL — SIGNIFICANT CHANGE UP (ref 80–94)
MONOCYTES # BLD AUTO: 0.74 K/UL — HIGH (ref 0.1–0.6)
MONOCYTES NFR BLD AUTO: 7 % — SIGNIFICANT CHANGE UP (ref 1.7–9.3)
NEUTROPHILS # BLD AUTO: 7.36 K/UL — HIGH (ref 1.4–6.5)
NEUTROPHILS NFR BLD AUTO: 69.1 % — SIGNIFICANT CHANGE UP (ref 42.2–75.2)
NRBC # BLD: 0 /100 WBCS — SIGNIFICANT CHANGE UP (ref 0–0)
PLATELET # BLD AUTO: 241 K/UL — SIGNIFICANT CHANGE UP (ref 130–400)
PMV BLD: 9.9 FL — SIGNIFICANT CHANGE UP (ref 7.4–10.4)
POTASSIUM SERPL-MCNC: 4.4 MMOL/L — SIGNIFICANT CHANGE UP (ref 3.5–5)
POTASSIUM SERPL-SCNC: 4.4 MMOL/L — SIGNIFICANT CHANGE UP (ref 3.5–5)
PROT SERPL-MCNC: 6.1 G/DL — SIGNIFICANT CHANGE UP (ref 6–8)
RBC # BLD: 4.7 M/UL — SIGNIFICANT CHANGE UP (ref 4.7–6.1)
RBC # FLD: 14.2 % — SIGNIFICANT CHANGE UP (ref 11.5–14.5)
SODIUM SERPL-SCNC: 138 MMOL/L — SIGNIFICANT CHANGE UP (ref 135–146)
TROPONIN T SERPL-MCNC: <0.01 NG/ML — SIGNIFICANT CHANGE UP
WBC # BLD: 10.64 K/UL — SIGNIFICANT CHANGE UP (ref 4.8–10.8)
WBC # FLD AUTO: 10.64 K/UL — SIGNIFICANT CHANGE UP (ref 4.8–10.8)

## 2023-07-17 PROCEDURE — 80307 DRUG TEST PRSMV CHEM ANLYZR: CPT

## 2023-07-17 PROCEDURE — 74177 CT ABD & PELVIS W/CONTRAST: CPT | Mod: 26,MA

## 2023-07-17 PROCEDURE — 72170 X-RAY EXAM OF PELVIS: CPT | Mod: 26

## 2023-07-17 PROCEDURE — 80048 BASIC METABOLIC PNL TOTAL CA: CPT

## 2023-07-17 PROCEDURE — 71260 CT THORAX DX C+: CPT | Mod: MA

## 2023-07-17 PROCEDURE — 96374 THER/PROPH/DIAG INJ IV PUSH: CPT | Mod: XU

## 2023-07-17 PROCEDURE — 36415 COLL VENOUS BLD VENIPUNCTURE: CPT

## 2023-07-17 PROCEDURE — 93005 ELECTROCARDIOGRAM TRACING: CPT

## 2023-07-17 PROCEDURE — 71045 X-RAY EXAM CHEST 1 VIEW: CPT | Mod: 26

## 2023-07-17 PROCEDURE — 96376 TX/PRO/DX INJ SAME DRUG ADON: CPT | Mod: XU

## 2023-07-17 PROCEDURE — 83690 ASSAY OF LIPASE: CPT

## 2023-07-17 PROCEDURE — 71045 X-RAY EXAM CHEST 1 VIEW: CPT

## 2023-07-17 PROCEDURE — 72170 X-RAY EXAM OF PELVIS: CPT

## 2023-07-17 PROCEDURE — 85025 COMPLETE CBC W/AUTO DIFF WBC: CPT

## 2023-07-17 PROCEDURE — 74177 CT ABD & PELVIS W/CONTRAST: CPT | Mod: MA

## 2023-07-17 PROCEDURE — 99285 EMERGENCY DEPT VISIT HI MDM: CPT

## 2023-07-17 PROCEDURE — 80076 HEPATIC FUNCTION PANEL: CPT

## 2023-07-17 PROCEDURE — 93010 ELECTROCARDIOGRAM REPORT: CPT

## 2023-07-17 PROCEDURE — 84484 ASSAY OF TROPONIN QUANT: CPT

## 2023-07-17 PROCEDURE — 71260 CT THORAX DX C+: CPT | Mod: 26,MA

## 2023-07-17 PROCEDURE — 96375 TX/PRO/DX INJ NEW DRUG ADDON: CPT | Mod: XU

## 2023-07-17 PROCEDURE — 99285 EMERGENCY DEPT VISIT HI MDM: CPT | Mod: 25

## 2023-07-17 RX ORDER — KETOROLAC TROMETHAMINE 30 MG/ML
30 SYRINGE (ML) INJECTION ONCE
Refills: 0 | Status: DISCONTINUED | OUTPATIENT
Start: 2023-07-17 | End: 2023-07-17

## 2023-07-17 RX ORDER — MORPHINE SULFATE 50 MG/1
4 CAPSULE, EXTENDED RELEASE ORAL ONCE
Refills: 0 | Status: DISCONTINUED | OUTPATIENT
Start: 2023-07-17 | End: 2023-07-17

## 2023-07-17 RX ADMIN — MORPHINE SULFATE 4 MILLIGRAM(S): 50 CAPSULE, EXTENDED RELEASE ORAL at 07:13

## 2023-07-17 RX ADMIN — MORPHINE SULFATE 4 MILLIGRAM(S): 50 CAPSULE, EXTENDED RELEASE ORAL at 09:39

## 2023-07-17 RX ADMIN — MORPHINE SULFATE 4 MILLIGRAM(S): 50 CAPSULE, EXTENDED RELEASE ORAL at 07:14

## 2023-07-17 RX ADMIN — Medication 30 MILLIGRAM(S): at 09:39
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
## 2023-07-17 NOTE — ED PROVIDER NOTE - NSPTACCESSSVCSAPPTDETAILS_ED_ALL_ED_FT
mild dilatation of the ascending thoracic aorta, measuring 4.1 cm, vascular surgery mild dilatation of the ascending thoracic aorta, measuring 4.1 cm, vascular surgery-Dr. Joyce

## 2023-07-17 NOTE — ED PROVIDER NOTE - PATIENT PORTAL LINK FT
You can access the FollowMyHealth Patient Portal offered by [facility] by registering at the following website: [email or web address]. By joining [facility]’s FollowMyHealth portal, you will also be able to view your health information using other applications (apps) compatible with our system.
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
## 2023-07-17 NOTE — ED PROVIDER NOTE - NSFOLLOWUPINSTRUCTIONS_ED_ALL_ED_FT
Our Emergency Department Referral Coordinators will be reaching out to you in the next 24-48 hours from 9:00am to 5:00pm with a follow up appointment. Please expect a phone call from the hospital in that time frame. If you do not receive a call or if you have any questions or concerns, you can reach them at   [phone]      Rib Contusion  A rib contusion is a deep bruise on your rib area. Contusions are the result of a blunt trauma that causes bleeding and injury to the tissues under the skin. A rib contusion may involve bruising of the ribs and of the skin and muscles in the area. The skin over the contusion may turn blue, purple, or yellow. Minor injuries will give you a painless contusion. More severe contusions may stay painful and swollen for a few weeks.    What are the causes?  This condition is usually caused by a blow, trauma, or direct force to an area of the body. This often occurs while playing contact sports.    What are the signs or symptoms?  Symptoms of this condition include:  Swelling and redness of the injured area.  Discoloration of the injured area.  Tenderness and soreness of the injured area.  Pain with or without movement.  How is this diagnosed?  This condition may be diagnosed based on:  Your symptoms and medical history.  A physical exam.  Imaging tests—such as an X-ray, CT scan, or MRI—to determine if there were internal injuries or broken bones (fractures).  How is this treated?  This condition may be treated with:  Rest. This is often the best treatment for a rib contusion.  Icing. This reduces swelling and inflammation.  Deep-breathing exercises. These may be recommended to reduce the risk for lung collapse and pneumonia.  Medicines. Over-the-counter or prescription medicines may be given to control pain.  Injection of a numbing medicine around the nerve near your injury (nerve block).  Follow these instructions at home:    Medicines     Take over-the-counter and prescription medicines only as told by your health care provider.  Do not drive or use heavy machinery while taking prescription pain medicine.  If you are taking prescription pain medicine, take actions to prevent or treat constipation. Your health care provider may recommend that you:   Drink enough fluid to keep your urine pale yellow.   Eat foods that are high in fiber, such as fresh fruits and vegetables, whole grains, and beans.   Limit foods that are high in fat and processed sugars, such as fried or sweet foods.   Take an over-the-counter or prescription medicine for constipation.  Managing pain, stiffness, and swelling     If directed, put ice on the injured area:  Put ice in a plastic bag.  Place a towel between your skin and the bag.  Leave the ice on for 20 minutes, 2–3 times a day.  Rest the injured area. Avoid strenuous activity and any activities or movements that cause pain. Be careful during activities and avoid bumping the injured area.  Do not lift anything that is heavier than 5 lb (2.3 kg), or the limit that you are told, until your health care provider says that it is safe.  General instructions     Do not use any products that contain nicotine or tobacco, such as cigarettes and e-cigarettes. These can delay healing. If you need help quitting, ask your health care provider.  Do deep-breathing exercises as told by your health care provider.  If you were given an incentive spirometer, use it every 1–2 hours while you are awake, or as recommended by your health care provider. This device measures how well you are filling your lungs with each breath.  Keep all follow-up visits as told by your health care provider. This is important.  Contact a health care provider if you have:  Increased bruising or swelling.  Pain that is not controlled with treatment.  A fever.  Get help right away if you:  Have difficulty breathing or shortness of breath.  Develop a continual cough or you cough up thick or bloody sputum.  Feel nauseous or you vomit.  Have pain in your abdomen.  Summary  A rib contusion is a deep bruise on your rib area. Contusions are the result of a blunt trauma that causes bleeding and injury to the tissues under the skin.  The skin overlying the contusion may turn blue, purple, or yellow. Minor injuries may give you a painless contusion. More severe contusions may stay painful and swollen for a few weeks.  Rest the injured area. Avoid strenuous activity and any activities or movements that cause pain.  This information is not intended to replace advice given to you by your health care provider. Make sure you discuss any questions you have with your health care provider.
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
## 2023-07-17 NOTE — ED ADULT NURSE NOTE - NSFALLHARMRISKINTERV_ED_ALL_ED

## 2023-07-17 NOTE — ED ADULT NURSE REASSESSMENT NOTE - NS ED NURSE REASSESS COMMENT FT1
Patient difficult IV access , resident and nurses tried numerous times with and without ultrasound guidance. First successful IV, labs drawn but IV dislodged (18 G to left upper arm placed by resident). IV access removed.  Patient not listening to advice to stay in room, keeps wandering to vending machine, would not sit down after receiving pain medication. Resident managed to place a second IV access , 20G to Right AC. CT scan delayed due to no IV access at time that transporter came to take patient. Patient endorsed to A shift nurse.
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
## 2023-07-17 NOTE — ED PROVIDER NOTE - CARE PROVIDER_API CALL
Joseph Joyce  Vascular Surgery  [address] [place] [address], [address]  [place] [address]  Phone: [phone]  Fax: [phone]  Follow Up Time:

## 2023-07-17 NOTE — ED PROVIDER NOTE - PHYSICAL EXAMINATION
VITAL SIGNS: I have reviewed nursing notes and confirm.  CONSTITUTIONAL: non-toxic, well appearing  SKIN: no rash, no petechiae.  EYES: pink conjunctiva, anicteric  ENT: tongue midline, no exudates, MMM  NECK: Supple; no meningismus  CARD: RRR, no murmurs, equal radial pulses bilaterally 2+  RESP: no respiratory distress, no tachypnea, + b/l rhonchi   ABD: Soft, non-distended, no peritoneal signs, + RUQ ttp w guarding   EXT: Normal ROM x4. No edema. No pelvic or hip pain   MSK: +ttp right anterior and lateral ribs, no obvious deformity   NEURO: Alert, oriented. CN2-12 intact, equal strength bilaterally  PSYCH: Cooperative, appropriate.

## 2023-07-20 ENCOUNTER — APPOINTMENT (OUTPATIENT)
Dept: ORTHOPEDIC SURGERY | Facility: CLINIC | Age: 58
End: 2023-07-20
Payer: MEDICAID

## 2023-07-20 PROCEDURE — 99204 OFFICE O/P NEW MOD 45 MIN: CPT

## 2023-07-20 PROCEDURE — 73030 X-RAY EXAM OF SHOULDER: CPT | Mod: RT

## 2023-07-20 NOTE — HISTORY OF PRESENT ILLNESS
[de-identified] : Patient is here for evaluation for his right shoulder he has painless range of motion going on for years he is tried anti-inflammatories had cortisone shots by another orthopedic surgeon with no relief pain is 10 out of 10\par \par He has a history of COPD hypertension denies any allergies\par \par He has had left and right total knee replacement by Dr. Bose with excellent relief and results\par \par X-rays taken today of the shoulder showing arthritis severe in the glenohumeral joint\par \par Physical exam right shoulder shows 0 to 95 degrees forward flexion external rotation 15 degrees pain and weakness with rotator cuff resistance mild to moderate\par \par Diagnosis is right shoulder arthritis glenohumeral joint\par \par Recommendations we discussed nonoperative treatment as well as no treatment, as well as operative intervention he is interested in shoulder replacement I will order the CAT scan for a total shoulder protocol and send him to Dr. Lars Strickland for evaluation and treatment

## 2023-07-24 ENCOUNTER — EMERGENCY (EMERGENCY)
Facility: HOSPITAL | Age: 58
LOS: 0 days | Discharge: HOME | End: 2023-07-24
Attending: EMERGENCY MEDICINE
Payer: MEDICAID

## 2023-07-24 VITALS
HEART RATE: 77 BPM | TEMPERATURE: 99 F | OXYGEN SATURATION: 98 % | RESPIRATION RATE: 18 BRPM | WEIGHT: 220.02 LBS | HEIGHT: 70 IN | DIASTOLIC BLOOD PRESSURE: 93 MMHG | SYSTOLIC BLOOD PRESSURE: 140 MMHG

## 2023-07-24 VITALS
RESPIRATION RATE: 18 BRPM | SYSTOLIC BLOOD PRESSURE: 182 MMHG | HEART RATE: 89 BPM | TEMPERATURE: 98 F | DIASTOLIC BLOOD PRESSURE: 100 MMHG | OXYGEN SATURATION: 97 %

## 2023-07-24 DIAGNOSIS — G47.33 OBSTRUCTIVE SLEEP APNEA (ADULT) (PEDIATRIC): ICD-10-CM

## 2023-07-24 DIAGNOSIS — F17.200 NICOTINE DEPENDENCE, UNSPECIFIED, UNCOMPLICATED: ICD-10-CM

## 2023-07-24 DIAGNOSIS — Z98.890 OTHER SPECIFIED POSTPROCEDURAL STATES: Chronic | ICD-10-CM

## 2023-07-24 DIAGNOSIS — J44.9 CHRONIC OBSTRUCTIVE PULMONARY DISEASE, UNSPECIFIED: ICD-10-CM

## 2023-07-24 DIAGNOSIS — Z90.49 ACQUIRED ABSENCE OF OTHER SPECIFIED PARTS OF DIGESTIVE TRACT: ICD-10-CM

## 2023-07-24 DIAGNOSIS — R07.2 PRECORDIAL PAIN: ICD-10-CM

## 2023-07-24 DIAGNOSIS — K40.20 BILATERAL INGUINAL HERNIA, WITHOUT OBSTRUCTION OR GANGRENE, NOT SPECIFIED AS RECURRENT: Chronic | ICD-10-CM

## 2023-07-24 DIAGNOSIS — Z96.651 PRESENCE OF RIGHT ARTIFICIAL KNEE JOINT: Chronic | ICD-10-CM

## 2023-07-24 DIAGNOSIS — I10 ESSENTIAL (PRIMARY) HYPERTENSION: ICD-10-CM

## 2023-07-24 DIAGNOSIS — E78.5 HYPERLIPIDEMIA, UNSPECIFIED: ICD-10-CM

## 2023-07-24 DIAGNOSIS — Z99.89 DEPENDENCE ON OTHER ENABLING MACHINES AND DEVICES: ICD-10-CM

## 2023-07-24 DIAGNOSIS — R07.89 OTHER CHEST PAIN: ICD-10-CM

## 2023-07-24 LAB
ALBUMIN SERPL ELPH-MCNC: 4.2 G/DL — SIGNIFICANT CHANGE UP (ref 3.5–5.2)
ALP SERPL-CCNC: 99 U/L — SIGNIFICANT CHANGE UP (ref 30–115)
ALT FLD-CCNC: 15 U/L — SIGNIFICANT CHANGE UP (ref 0–41)
ANION GAP SERPL CALC-SCNC: 9 MMOL/L — SIGNIFICANT CHANGE UP (ref 7–14)
AST SERPL-CCNC: 14 U/L — SIGNIFICANT CHANGE UP (ref 0–41)
BASOPHILS # BLD AUTO: 0.05 K/UL — SIGNIFICANT CHANGE UP (ref 0–0.2)
BASOPHILS NFR BLD AUTO: 0.5 % — SIGNIFICANT CHANGE UP (ref 0–1)
BILIRUB SERPL-MCNC: 0.5 MG/DL — SIGNIFICANT CHANGE UP (ref 0.2–1.2)
BUN SERPL-MCNC: 9 MG/DL — LOW (ref 10–20)
CALCIUM SERPL-MCNC: 9.5 MG/DL — SIGNIFICANT CHANGE UP (ref 8.4–10.5)
CHLORIDE SERPL-SCNC: 103 MMOL/L — SIGNIFICANT CHANGE UP (ref 98–110)
CO2 SERPL-SCNC: 27 MMOL/L — SIGNIFICANT CHANGE UP (ref 17–32)
CREAT SERPL-MCNC: 0.8 MG/DL — SIGNIFICANT CHANGE UP (ref 0.7–1.5)
EGFR: 105 ML/MIN/1.73M2 — SIGNIFICANT CHANGE UP
EOSINOPHIL # BLD AUTO: 0.08 K/UL — SIGNIFICANT CHANGE UP (ref 0–0.7)
EOSINOPHIL NFR BLD AUTO: 0.9 % — SIGNIFICANT CHANGE UP (ref 0–8)
GLUCOSE SERPL-MCNC: 106 MG/DL — HIGH (ref 70–99)
HCT VFR BLD CALC: 44.1 % — SIGNIFICANT CHANGE UP (ref 42–52)
HGB BLD-MCNC: 15.3 G/DL — SIGNIFICANT CHANGE UP (ref 14–18)
IMM GRANULOCYTES NFR BLD AUTO: 0.3 % — SIGNIFICANT CHANGE UP (ref 0.1–0.3)
LYMPHOCYTES # BLD AUTO: 1.46 K/UL — SIGNIFICANT CHANGE UP (ref 1.2–3.4)
LYMPHOCYTES # BLD AUTO: 15.5 % — LOW (ref 20.5–51.1)
MCHC RBC-ENTMCNC: 30.1 PG — SIGNIFICANT CHANGE UP (ref 27–31)
MCHC RBC-ENTMCNC: 34.7 G/DL — SIGNIFICANT CHANGE UP (ref 32–37)
MCV RBC AUTO: 86.6 FL — SIGNIFICANT CHANGE UP (ref 80–94)
MONOCYTES # BLD AUTO: 0.52 K/UL — SIGNIFICANT CHANGE UP (ref 0.1–0.6)
MONOCYTES NFR BLD AUTO: 5.5 % — SIGNIFICANT CHANGE UP (ref 1.7–9.3)
NEUTROPHILS # BLD AUTO: 7.27 K/UL — HIGH (ref 1.4–6.5)
NEUTROPHILS NFR BLD AUTO: 77.3 % — HIGH (ref 42.2–75.2)
NRBC # BLD: 0 /100 WBCS — SIGNIFICANT CHANGE UP (ref 0–0)
PLATELET # BLD AUTO: 279 K/UL — SIGNIFICANT CHANGE UP (ref 130–400)
PMV BLD: 9.9 FL — SIGNIFICANT CHANGE UP (ref 7.4–10.4)
POTASSIUM SERPL-MCNC: 4.2 MMOL/L — SIGNIFICANT CHANGE UP (ref 3.5–5)
POTASSIUM SERPL-SCNC: 4.2 MMOL/L — SIGNIFICANT CHANGE UP (ref 3.5–5)
PROT SERPL-MCNC: 6.7 G/DL — SIGNIFICANT CHANGE UP (ref 6–8)
RBC # BLD: 5.09 M/UL — SIGNIFICANT CHANGE UP (ref 4.7–6.1)
RBC # FLD: 13.9 % — SIGNIFICANT CHANGE UP (ref 11.5–14.5)
SODIUM SERPL-SCNC: 139 MMOL/L — SIGNIFICANT CHANGE UP (ref 135–146)
TROPONIN T SERPL-MCNC: <0.01 NG/ML — SIGNIFICANT CHANGE UP
TROPONIN T SERPL-MCNC: <0.01 NG/ML — SIGNIFICANT CHANGE UP
WBC # BLD: 9.41 K/UL — SIGNIFICANT CHANGE UP (ref 4.8–10.8)
WBC # FLD AUTO: 9.41 K/UL — SIGNIFICANT CHANGE UP (ref 4.8–10.8)

## 2023-07-24 PROCEDURE — 71046 X-RAY EXAM CHEST 2 VIEWS: CPT | Mod: 26

## 2023-07-24 PROCEDURE — 96374 THER/PROPH/DIAG INJ IV PUSH: CPT

## 2023-07-24 PROCEDURE — 78452 HT MUSCLE IMAGE SPECT MULT: CPT | Mod: MA

## 2023-07-24 PROCEDURE — 71046 X-RAY EXAM CHEST 2 VIEWS: CPT

## 2023-07-24 PROCEDURE — 85025 COMPLETE CBC W/AUTO DIFF WBC: CPT

## 2023-07-24 PROCEDURE — 96376 TX/PRO/DX INJ SAME DRUG ADON: CPT

## 2023-07-24 PROCEDURE — 84484 ASSAY OF TROPONIN QUANT: CPT

## 2023-07-24 PROCEDURE — 93010 ELECTROCARDIOGRAM REPORT: CPT

## 2023-07-24 PROCEDURE — G0378: CPT

## 2023-07-24 PROCEDURE — 80053 COMPREHEN METABOLIC PANEL: CPT

## 2023-07-24 PROCEDURE — 99285 EMERGENCY DEPT VISIT HI MDM: CPT | Mod: 25

## 2023-07-24 PROCEDURE — 99223 1ST HOSP IP/OBS HIGH 75: CPT

## 2023-07-24 PROCEDURE — 93005 ELECTROCARDIOGRAM TRACING: CPT

## 2023-07-24 PROCEDURE — 93018 CV STRESS TEST I&R ONLY: CPT

## 2023-07-24 PROCEDURE — 93016 CV STRESS TEST SUPVJ ONLY: CPT

## 2023-07-24 PROCEDURE — 93017 CV STRESS TEST TRACING ONLY: CPT

## 2023-07-24 PROCEDURE — A9500: CPT

## 2023-07-24 PROCEDURE — 78452 HT MUSCLE IMAGE SPECT MULT: CPT | Mod: 26,MA

## 2023-07-24 PROCEDURE — 93010 ELECTROCARDIOGRAM REPORT: CPT | Mod: 77

## 2023-07-24 PROCEDURE — 36415 COLL VENOUS BLD VENIPUNCTURE: CPT

## 2023-07-24 RX ORDER — LISINOPRIL 2.5 MG/1
5 TABLET ORAL DAILY
Refills: 0 | Status: DISCONTINUED | OUTPATIENT
Start: 2023-07-24 | End: 2023-07-24

## 2023-07-24 RX ORDER — ALPRAZOLAM 0.25 MG
2 TABLET ORAL ONCE
Refills: 0 | Status: DISCONTINUED | OUTPATIENT
Start: 2023-07-24 | End: 2023-07-24

## 2023-07-24 RX ORDER — FUROSEMIDE 40 MG
40 TABLET ORAL ONCE
Refills: 0 | Status: COMPLETED | OUTPATIENT
Start: 2023-07-24 | End: 2023-07-24

## 2023-07-24 RX ORDER — MORPHINE SULFATE 50 MG/1
4 CAPSULE, EXTENDED RELEASE ORAL ONCE
Refills: 0 | Status: DISCONTINUED | OUTPATIENT
Start: 2023-07-24 | End: 2023-07-24

## 2023-07-24 RX ORDER — FENOFIBRATE,MICRONIZED 130 MG
145 CAPSULE ORAL ONCE
Refills: 0 | Status: COMPLETED | OUTPATIENT
Start: 2023-07-24 | End: 2023-07-24

## 2023-07-24 RX ORDER — SIMVASTATIN 20 MG/1
20 TABLET, FILM COATED ORAL AT BEDTIME
Refills: 0 | Status: DISCONTINUED | OUTPATIENT
Start: 2023-07-24 | End: 2023-07-24

## 2023-07-24 RX ORDER — REGADENOSON 0.08 MG/ML
0.4 INJECTION, SOLUTION INTRAVENOUS ONCE
Refills: 0 | Status: DISCONTINUED | OUTPATIENT
Start: 2023-07-24 | End: 2023-07-24

## 2023-07-24 RX ADMIN — MORPHINE SULFATE 4 MILLIGRAM(S): 50 CAPSULE, EXTENDED RELEASE ORAL at 10:04

## 2023-07-24 RX ADMIN — MORPHINE SULFATE 4 MILLIGRAM(S): 50 CAPSULE, EXTENDED RELEASE ORAL at 14:31

## 2023-07-24 RX ADMIN — Medication 40 MILLIGRAM(S): at 17:59

## 2023-07-24 RX ADMIN — Medication 2 MILLIGRAM(S): at 17:59

## 2023-07-24 RX ADMIN — Medication 145 MILLIGRAM(S): at 17:59

## 2023-07-24 RX ADMIN — LISINOPRIL 5 MILLIGRAM(S): 2.5 TABLET ORAL at 17:59

## 2023-07-24 RX ADMIN — MORPHINE SULFATE 4 MILLIGRAM(S): 50 CAPSULE, EXTENDED RELEASE ORAL at 08:23

## 2023-07-24 RX ADMIN — MORPHINE SULFATE 4 MILLIGRAM(S): 50 CAPSULE, EXTENDED RELEASE ORAL at 18:24

## 2023-07-24 NOTE — ED CDU PROVIDER DISPOSITION NOTE - CARE PROVIDER_API CALL
Erwin Wen  Cardiovascular Disease  [address] [facility] [facility] [address]  [place] [address]  Phone: [phone]  Fax: [phone]  Follow Up Time: 4-6 Days

## 2023-07-24 NOTE — ED ADULT NURSE REASSESSMENT NOTE - NS ED NURSE REASSESS COMMENT FT1
pt assessed A&OX4, pt on cardiac monitor at this time, pt keeps walking outside , pt educated importance on staying inside. safety & comfort measures maintained will continue to monitor

## 2023-07-24 NOTE — ED CDU PROVIDER DISPOSITION NOTE - PATIENT PORTAL LINK FT
You can access the FollowMyHealth Patient Portal offered by [facility] by registering at the following website: [email or web address]. By joining [facility]’s FollowMyHealth portal, you will also be able to view your health information using other applications (apps) compatible with our system.
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
## 2023-07-24 NOTE — ED CDU PROVIDER DISPOSITION NOTE - CLINICAL COURSE
pt presented to ed for eval of chest pain. pt placed in edou under chest pain protocol. pt with workup including labs wnl, including 2 sets of negative cardiac enzymes, 2 ekg with no ischemic changes, normal cxray, normal nuclear stress test, no events on cardiac monitor. no chest pain at time of dc. pt advised to f/u with cardiology. Return for any concerning signs or symptoms. pain is reproducible. Likely msk in etiology.

## 2023-07-24 NOTE — ED CDU PROVIDER DISPOSITION NOTE - CARE PROVIDERS DIRECT ADDRESSES
[email or web address]

## 2023-07-24 NOTE — ED ADULT NURSE REASSESSMENT NOTE - NS ED NURSE REASSESS COMMENT FT1
pt A&OX3 pt unplugged fall risk alarm on his own pt keeps going outside to smoke MD mAde aware , charge RN aware

## 2023-07-24 NOTE — ED PROVIDER NOTE - PHYSICAL EXAMINATION
CON: ao x 3, HENMT: neck supple,  CV: s1s2 ctab, RESP: course breath sounds b/l, GI:  soft, nontender, no rebound, no guarding, SKIN: no rash, MSK: no deformities, ttp over anterior chest wall, no deformity NEURO: no gross motor or sensory deficit Psychiatric: appropriate mood, appropriate affect

## 2023-07-24 NOTE — ED ADULT NURSE REASSESSMENT NOTE - NS ED NURSE REASSESS COMMENT FT1
pt co chest pain , PA Frederic made aware pt disconnects himself from the monitor continously and walking around despite education to stay in bed and bed alarm

## 2023-07-24 NOTE — ED ADULT NURSE REASSESSMENT NOTE - NSFALLHARMRISKINTERV_ED_ALL_ED

## 2023-07-27 ENCOUNTER — APPOINTMENT (OUTPATIENT)
Dept: PULMONOLOGY | Facility: CLINIC | Age: 58
End: 2023-07-27

## 2023-08-13 NOTE — ASU DISCHARGE PLAN (ADULT/PEDIATRIC) - CARE PROVIDER_API CALL
Zoey Fernandez  [address]  Phone: [phone]  Fax: (   )    -  Follow Up Time: 2 weeks
Unable to assess

## 2023-08-17 ENCOUNTER — APPOINTMENT (OUTPATIENT)
Dept: ORTHOPEDIC SURGERY | Facility: CLINIC | Age: 58
End: 2023-08-17
Payer: MEDICAID

## 2023-08-17 DIAGNOSIS — M19.019 PRIMARY OSTEOARTHRITIS, UNSPECIFIED SHOULDER: ICD-10-CM

## 2023-08-17 PROCEDURE — 99214 OFFICE O/P EST MOD 30 MIN: CPT

## 2023-08-17 NOTE — HISTORY OF PRESENT ILLNESS
[de-identified] : Patient is here for evaluation of right shoulder pain Affecting quality of life Has pain and weakness with loss of rom Wakes up at night due to pain  NAD Right shoulder: TTP ant GH joint, bicipital groove FF 0-140 ER 40 IR L5 Pain with terminal rom Weakness to abduction and ER Pos Impingement Pos Ortiz Pos Cross Arm Adduction Negative instability Positive scapula dyskinesia  XRay right shoulder adv GH OA  Plan went over findings explained his adv OA needs a preop CT scan and MRI right shoulder for planning right total shoulder replacement  Operative and nonoperative options discussed with patient. Surgical risks, benefits, and alternatives explained. Surgical risks include but are not exclusive to bleeding, infection, neurovascular damage, continued pain, stiffness, scarring, rsd, dvt/pe, potential failure of surgery that may require further surgery in the future. I went over incisions and rehabilitation. All questions answered.

## 2023-08-21 PROBLEM — M19.019 SHOULDER ARTHRITIS: Status: ACTIVE | Noted: 2023-07-20

## 2023-08-21 NOTE — HISTORY OF PRESENT ILLNESS
[de-identified] : Patient is here for evaluation of right shoulder pain Affecting quality of life Has pain and weakness with loss of rom Wakes up at night due to pain  NAD Right shoulder: TTP ant GH joint, bicipital groove GH crepitus FF 90 Pain with terminal rom Weakness to abduction and ER Pos Impingement Pos Ortiz Pos Cross Arm Adduction Negative instability Positive scapula dyskinesia  XRay right shoulder GH arthritis  Plan I went over findings with the patient.  I reviewed the x-rays of the right shoulder.  We spoke about his advanced osteoarthritis and we spoke about treatment options including operative versus nonoperative options as well as risk benefits and alternatives.  At this point since he has failed conservative management including therapy medication and injections he would like to proceed with surgery.  He will be scheduled for right total shoulder replacement.  He will be sent for preoperative MRI and CT scan of the right shoulder for preoperative planning  Operative and nonoperative options discussed with patient. Surgical risks, benefits, and alternatives explained. Surgical risks include but are not exclusive to bleeding, infection, neurovascular damage, continued pain, stiffness, scarring, rsd, dvt/pe, potential failure of surgery that may require further surgery in the future. I went over incisions and rehabilitation. All questions answered.

## 2023-08-23 ENCOUNTER — APPOINTMENT (OUTPATIENT)
Dept: PULMONOLOGY | Facility: CLINIC | Age: 58
End: 2023-08-23
Payer: MEDICAID

## 2023-08-23 VITALS
BODY MASS INDEX: 45.1 KG/M2 | WEIGHT: 315 LBS | HEIGHT: 70 IN | OXYGEN SATURATION: 96 % | HEART RATE: 87 BPM | RESPIRATION RATE: 14 BRPM

## 2023-08-23 DIAGNOSIS — J44.9 CHRONIC OBSTRUCTIVE PULMONARY DISEASE, UNSPECIFIED: ICD-10-CM

## 2023-08-23 DIAGNOSIS — G47.33 OBSTRUCTIVE SLEEP APNEA (ADULT) (PEDIATRIC): ICD-10-CM

## 2023-08-23 PROCEDURE — G0296 VISIT TO DETERM LDCT ELIG: CPT

## 2023-08-23 PROCEDURE — 99214 OFFICE O/P EST MOD 30 MIN: CPT | Mod: 25

## 2023-08-23 PROCEDURE — 94010 BREATHING CAPACITY TEST: CPT

## 2023-08-23 NOTE — ASSESSMENT
[FreeTextEntry1] : Significant COPD. Smoker continues to smoke.  Over 30 PY  HO REBECCA not tolerating his BiPAP very well.   Insurance denied in lab testing.    Was a Milli Casillas Castellano, and Venessa patient

## 2023-08-23 NOTE — HISTORY OF PRESENT ILLNESS
[Doing Poorly] : doing poorly [Feelings Of Weakness On Exertion] : stable exercise intolerance [Cough] : stable coughing [Wheezing] : stable wheezing [Adherent] : the patient is not adherent with ~his/her~ medication regimen [Goals--Doing Well] : the patient is not doing well with ~his/her~ COPD goals [PFTs] : pulmonary function tests [Follow-Up - Routine Clinic] : a routine clinic follow-up of [Snoring] : snoring [Unrefreshing Sleep] : unrefreshing sleep [Currently Experiencing] : The patient is currently experiencing symptoms. [None] : No associated symptoms are reported [BiPAP] : BiPAP [Good Compliance] : good compliance with treatment [Poor Tolerance] : poor tolerance of treatment [Poor Symptom Control] : poor symptom control

## 2023-09-17 ENCOUNTER — EMERGENCY (EMERGENCY)
Facility: HOSPITAL | Age: 58
LOS: 0 days | Discharge: HOME | End: 2023-09-17
Attending: EMERGENCY MEDICINE
Payer: MEDICAID

## 2023-09-17 VITALS
TEMPERATURE: 97 F | RESPIRATION RATE: 16 BRPM | SYSTOLIC BLOOD PRESSURE: 133 MMHG | OXYGEN SATURATION: 97 % | DIASTOLIC BLOOD PRESSURE: 80 MMHG | HEART RATE: 84 BPM | HEIGHT: 70 IN | WEIGHT: 315 LBS

## 2023-09-17 VITALS
TEMPERATURE: 98 F | DIASTOLIC BLOOD PRESSURE: 75 MMHG | RESPIRATION RATE: 18 BRPM | HEART RATE: 70 BPM | SYSTOLIC BLOOD PRESSURE: 135 MMHG | OXYGEN SATURATION: 98 %

## 2023-09-17 DIAGNOSIS — G47.33 OBSTRUCTIVE SLEEP APNEA (ADULT) (PEDIATRIC): ICD-10-CM

## 2023-09-17 DIAGNOSIS — E78.00 PURE HYPERCHOLESTEROLEMIA, UNSPECIFIED: ICD-10-CM

## 2023-09-17 DIAGNOSIS — Z98.890 OTHER SPECIFIED POSTPROCEDURAL STATES: Chronic | ICD-10-CM

## 2023-09-17 DIAGNOSIS — Z90.49 ACQUIRED ABSENCE OF OTHER SPECIFIED PARTS OF DIGESTIVE TRACT: ICD-10-CM

## 2023-09-17 DIAGNOSIS — Z96.653 PRESENCE OF ARTIFICIAL KNEE JOINT, BILATERAL: ICD-10-CM

## 2023-09-17 DIAGNOSIS — T40.601A POISONING BY UNSPECIFIED NARCOTICS, ACCIDENTAL (UNINTENTIONAL), INITIAL ENCOUNTER: ICD-10-CM

## 2023-09-17 DIAGNOSIS — K40.20 BILATERAL INGUINAL HERNIA, WITHOUT OBSTRUCTION OR GANGRENE, NOT SPECIFIED AS RECURRENT: Chronic | ICD-10-CM

## 2023-09-17 DIAGNOSIS — M19.90 UNSPECIFIED OSTEOARTHRITIS, UNSPECIFIED SITE: ICD-10-CM

## 2023-09-17 DIAGNOSIS — I10 ESSENTIAL (PRIMARY) HYPERTENSION: ICD-10-CM

## 2023-09-17 DIAGNOSIS — K44.9 DIAPHRAGMATIC HERNIA WITHOUT OBSTRUCTION OR GANGRENE: ICD-10-CM

## 2023-09-17 DIAGNOSIS — Z87.19 PERSONAL HISTORY OF OTHER DISEASES OF THE DIGESTIVE SYSTEM: ICD-10-CM

## 2023-09-17 DIAGNOSIS — Z99.89 DEPENDENCE ON OTHER ENABLING MACHINES AND DEVICES: ICD-10-CM

## 2023-09-17 DIAGNOSIS — Z96.651 PRESENCE OF RIGHT ARTIFICIAL KNEE JOINT: Chronic | ICD-10-CM

## 2023-09-17 DIAGNOSIS — J44.9 CHRONIC OBSTRUCTIVE PULMONARY DISEASE, UNSPECIFIED: ICD-10-CM

## 2023-09-17 DIAGNOSIS — K21.9 GASTRO-ESOPHAGEAL REFLUX DISEASE WITHOUT ESOPHAGITIS: ICD-10-CM

## 2023-09-17 LAB
ALBUMIN SERPL ELPH-MCNC: 4.3 G/DL — SIGNIFICANT CHANGE UP (ref 3.5–5.2)
ALP SERPL-CCNC: 131 U/L — HIGH (ref 30–115)
ALT FLD-CCNC: 14 U/L — SIGNIFICANT CHANGE UP (ref 0–41)
ANION GAP SERPL CALC-SCNC: 13 MMOL/L — SIGNIFICANT CHANGE UP (ref 7–14)
APAP SERPL-MCNC: <5 UG/ML — LOW (ref 10–30)
AST SERPL-CCNC: 16 U/L — SIGNIFICANT CHANGE UP (ref 0–41)
BASOPHILS # BLD AUTO: 0.07 K/UL — SIGNIFICANT CHANGE UP (ref 0–0.2)
BASOPHILS NFR BLD AUTO: 0.7 % — SIGNIFICANT CHANGE UP (ref 0–1)
BILIRUB SERPL-MCNC: 0.3 MG/DL — SIGNIFICANT CHANGE UP (ref 0.2–1.2)
BUN SERPL-MCNC: 14 MG/DL — SIGNIFICANT CHANGE UP (ref 10–20)
CALCIUM SERPL-MCNC: 9.6 MG/DL — SIGNIFICANT CHANGE UP (ref 8.4–10.5)
CHLORIDE SERPL-SCNC: 103 MMOL/L — SIGNIFICANT CHANGE UP (ref 98–110)
CO2 SERPL-SCNC: 24 MMOL/L — SIGNIFICANT CHANGE UP (ref 17–32)
CREAT SERPL-MCNC: 0.9 MG/DL — SIGNIFICANT CHANGE UP (ref 0.7–1.5)
EGFR: 101 ML/MIN/1.73M2 — SIGNIFICANT CHANGE UP
EOSINOPHIL # BLD AUTO: 0.2 K/UL — SIGNIFICANT CHANGE UP (ref 0–0.7)
EOSINOPHIL NFR BLD AUTO: 2 % — SIGNIFICANT CHANGE UP (ref 0–8)
GLUCOSE SERPL-MCNC: 146 MG/DL — HIGH (ref 70–99)
HCT VFR BLD CALC: 47 % — SIGNIFICANT CHANGE UP (ref 42–52)
HGB BLD-MCNC: 15.6 G/DL — SIGNIFICANT CHANGE UP (ref 14–18)
IMM GRANULOCYTES NFR BLD AUTO: 0.4 % — HIGH (ref 0.1–0.3)
LYMPHOCYTES # BLD AUTO: 2.12 K/UL — SIGNIFICANT CHANGE UP (ref 1.2–3.4)
LYMPHOCYTES # BLD AUTO: 21.2 % — SIGNIFICANT CHANGE UP (ref 20.5–51.1)
MCHC RBC-ENTMCNC: 28.8 PG — SIGNIFICANT CHANGE UP (ref 27–31)
MCHC RBC-ENTMCNC: 33.2 G/DL — SIGNIFICANT CHANGE UP (ref 32–37)
MCV RBC AUTO: 86.9 FL — SIGNIFICANT CHANGE UP (ref 80–94)
MONOCYTES # BLD AUTO: 0.66 K/UL — HIGH (ref 0.1–0.6)
MONOCYTES NFR BLD AUTO: 6.6 % — SIGNIFICANT CHANGE UP (ref 1.7–9.3)
NEUTROPHILS # BLD AUTO: 6.89 K/UL — HIGH (ref 1.4–6.5)
NEUTROPHILS NFR BLD AUTO: 69.1 % — SIGNIFICANT CHANGE UP (ref 42.2–75.2)
NRBC # BLD: 0 /100 WBCS — SIGNIFICANT CHANGE UP (ref 0–0)
PLATELET # BLD AUTO: 283 K/UL — SIGNIFICANT CHANGE UP (ref 130–400)
PMV BLD: 9.8 FL — SIGNIFICANT CHANGE UP (ref 7.4–10.4)
POTASSIUM SERPL-MCNC: 4.1 MMOL/L — SIGNIFICANT CHANGE UP (ref 3.5–5)
POTASSIUM SERPL-SCNC: 4.1 MMOL/L — SIGNIFICANT CHANGE UP (ref 3.5–5)
PROT SERPL-MCNC: 7.1 G/DL — SIGNIFICANT CHANGE UP (ref 6–8)
RBC # BLD: 5.41 M/UL — SIGNIFICANT CHANGE UP (ref 4.7–6.1)
RBC # FLD: 14 % — SIGNIFICANT CHANGE UP (ref 11.5–14.5)
SALICYLATES SERPL-MCNC: <0.3 MG/DL — LOW (ref 4–30)
SODIUM SERPL-SCNC: 140 MMOL/L — SIGNIFICANT CHANGE UP (ref 135–146)
WBC # BLD: 9.98 K/UL — SIGNIFICANT CHANGE UP (ref 4.8–10.8)
WBC # FLD AUTO: 9.98 K/UL — SIGNIFICANT CHANGE UP (ref 4.8–10.8)

## 2023-09-17 PROCEDURE — 99285 EMERGENCY DEPT VISIT HI MDM: CPT | Mod: 25

## 2023-09-17 PROCEDURE — 93010 ELECTROCARDIOGRAM REPORT: CPT

## 2023-09-17 PROCEDURE — 80053 COMPREHEN METABOLIC PANEL: CPT

## 2023-09-17 PROCEDURE — 36415 COLL VENOUS BLD VENIPUNCTURE: CPT

## 2023-09-17 PROCEDURE — 93005 ELECTROCARDIOGRAM TRACING: CPT

## 2023-09-17 PROCEDURE — 82962 GLUCOSE BLOOD TEST: CPT

## 2023-09-17 PROCEDURE — 85025 COMPLETE CBC W/AUTO DIFF WBC: CPT

## 2023-09-17 PROCEDURE — 99284 EMERGENCY DEPT VISIT MOD MDM: CPT

## 2023-09-17 PROCEDURE — 80307 DRUG TEST PRSMV CHEM ANLYZR: CPT

## 2023-09-17 RX ORDER — SODIUM CHLORIDE 9 MG/ML
1000 INJECTION, SOLUTION INTRAVENOUS ONCE
Refills: 0 | Status: COMPLETED | OUTPATIENT
Start: 2023-09-17 | End: 2023-09-17

## 2023-09-17 RX ADMIN — SODIUM CHLORIDE 1000 MILLILITER(S): 9 INJECTION, SOLUTION INTRAVENOUS at 21:03

## 2023-09-17 NOTE — ED PROVIDER NOTE - ATTENDING CONTRIBUTION TO CARE
patient evaluated for opiate overdose. took 150 mg of morphine, morphine rx to him. chronic user. denies co ingestion, went on his bus, fell asleep standing up against the wall. brought by ems. denies head injury. no complaints now. on exam pupils pinpoint, lungs with mild wheezing, moves all ext, alert and oriented fall asleep intermittently. plan is to check labs, ivf, observe rr.

## 2023-09-17 NOTE — ED PROVIDER NOTE - PATIENT PORTAL LINK FT
You can access the FollowMyHealth Patient Portal offered by [facility] by registering at the following website: [email or web address]. By joining [employer]’s FollowMyHealth portal, you will also be able to view your health information using other applications (apps) compatible with our system.

## 2023-09-17 NOTE — ED PROVIDER NOTE - NSFOLLOWUPINSTRUCTIONS_ED_ALL_ED_FT
Adult Overdose    WHAT YOU NEED TO KNOW:    An overdose occurs when you take more medicine than is safe to take. An overdose may be mild, or it may be a life-threatening emergency. You may feel drowsy, dizzy, or nauseated, depending on what medicine you took. No specific harm was found to your body as a result of your overdose. Your symptoms have decreased over the last 6 to 12 hours.    DISCHARGE INSTRUCTIONS:    Call 911 if you or someone close to you has any of the following symptoms:     Your face is very pale and clammy to the touch.       Your body is limp or you are unable to speak.      You cannot be awakened.       Your breathing is slower or faster than usual.       Your heart is beating slower than usual.      You feel confused or more tired than usual, or you are sweating more than normal.      Your speech is slurred.       Your fingernails or lips are blue or purple.    Return to the emergency department if:     You have severe nausea and vomiting.      You cannot have a bowel movement or urinate.      Your skin and the whites of your eyes turn yellow.    Contact your healthcare provider if:     You think your medicine is not working.      You have nausea, vomiting, diarrhea, or abdominal cramps.      You have questions or concerns about your medicine.    Take your medicine as directed: Contact your healthcare provider if you think your medicine is not helping or if you have side effects. Do not take more medicine that is prescribed. Keep your medicines in the original containers. Keep a list of the medicines, vitamins, and herbs you take. Include the amounts, and when and why you take them. Do not share your medicine with others.    Prevent another overdose:     Read labels carefully. Read the labels of all the medicines that you take. Never take more than the label says to take. If you have questions, ask your pharmacist or healthcare provider.      Do not drink alcohol. Alcohol increases your risk for another overdose. Alcohol can also hide important symptoms that you need to call your healthcare provider for.      Do not drive or operate machinery until your healthcare provider says it is okay. These activities may be dangerous after an overdose.      Use caution if you take more than one medicine at a time. Mixing medicines or taking more than one medicine at a time can be dangerous.      Tell your family or friends what medicines you are taking. Talk with them about what to do if you have an overdose.     Follow up with your healthcare provider as directed: You may need to see a counselor or psychiatrist. Write down your questions so you remember to ask them during your visits.        © Copyright [employer] 2019 All illustrations and images included in CareNotes are the copyrighted property of [employer] Inc[employer] or [employer].

## 2023-09-17 NOTE — ED ADULT TRIAGE NOTE - CHIEF COMPLAINT QUOTE
bibems for leaning against a fence in [facility]. Pt has chronic pain on morphine and oxycodone. Denies any alcohol use. Patient awake alert and cooperative. No medications given by EMS. Pt denies any complaints at this time

## 2023-09-17 NOTE — ED PROVIDER NOTE - NSFOLLOWUPCLINICS_GEN_ALL_ED_FT
[facility]  [facility]  [address] [place] [address]  Phone: [phone]  Fax:   Follow Up Time: 1-3 Days

## 2023-09-17 NOTE — ED ADULT NURSE NOTE - NSFALLHARMRISKINTERV_ED_ALL_ED

## 2023-09-17 NOTE — ED ADULT NURSE NOTE - OBJECTIVE STATEMENT
bibems for leaning against a fence in [facility]. Pt has chronic pain on morphine and oxycodone. Denies any alcohol use. Patient awake alert and cooperative. No medications given by EMS. Pt denies any complaints at this time

## 2023-09-17 NOTE — ED ADULT NURSE NOTE - CHIEF COMPLAINT QUOTE
bibems for leaning against a fence in [facility]. Pt has chronic pain on morphine and oxycodone. Denies any alcohol use. Patient awake alert and cooperative. No medications given by EMS. Pt denies any complaints at this time

## 2023-09-17 NOTE — ED PROVIDER NOTE - NSICDXPASTMEDICALHX_GEN_ALL_CORE_FT
PAST MEDICAL HISTORY:  Colitis LARGE INTESTINE   NO RECENT FLARES    COPD (chronic obstructive pulmonary disease)     GERD (gastroesophageal reflux disease)     Hiatal hernia GERD    High cholesterol     HTN (hypertension)     Morbid obesity     RBEECCA treated with BiPAP     Osteoarthritis

## 2023-09-17 NOTE — ED PROVIDER NOTE - PHYSICAL EXAMINATION
CONSTITUTIONAL: NAD  SKIN: Warm dry  HEAD: NCAT  EYES: +miotic pupils bl, PERRLA  ENT: MMM  NECK: Supple; non tender.  CARD: RRR  RESP: CTAB  ABD: S/NT no R/G  EXT: no pedal edema  NEURO: Grossly unremarkable  PSYCH: Cooperative, appropriate.

## 2023-09-17 NOTE — ED PROVIDER NOTE - OBJECTIVE STATEMENT
58 yo m ho chronic pain presents with opioid overdose. Patient admits he took 150 mg of morphine today. BIBEMS after found falling asleep standing up on a wall. Denies fall, head trauma, cp, sob, difficulty breathing, denies recreational drug use/other coingestions

## 2023-10-06 ENCOUNTER — APPOINTMENT (OUTPATIENT)
Dept: ORTHOPEDIC SURGERY | Facility: HOSPITAL | Age: 58
End: 2023-10-06

## 2023-10-16 ENCOUNTER — EMERGENCY (EMERGENCY)
Facility: HOSPITAL | Age: 58
LOS: 0 days | Discharge: LEFT BEFORE DISCHARGE | End: 2023-10-16
Attending: EMERGENCY MEDICINE
Payer: MEDICAID

## 2023-10-16 VITALS
DIASTOLIC BLOOD PRESSURE: 97 MMHG | RESPIRATION RATE: 20 BRPM | HEART RATE: 87 BPM | HEIGHT: 70 IN | WEIGHT: 315 LBS | SYSTOLIC BLOOD PRESSURE: 145 MMHG | TEMPERATURE: 97 F | OXYGEN SATURATION: 96 %

## 2023-10-16 DIAGNOSIS — I10 ESSENTIAL (PRIMARY) HYPERTENSION: ICD-10-CM

## 2023-10-16 DIAGNOSIS — Z98.890 OTHER SPECIFIED POSTPROCEDURAL STATES: Chronic | ICD-10-CM

## 2023-10-16 DIAGNOSIS — M19.90 UNSPECIFIED OSTEOARTHRITIS, UNSPECIFIED SITE: ICD-10-CM

## 2023-10-16 DIAGNOSIS — R19.7 DIARRHEA, UNSPECIFIED: ICD-10-CM

## 2023-10-16 DIAGNOSIS — R11.10 VOMITING, UNSPECIFIED: ICD-10-CM

## 2023-10-16 DIAGNOSIS — R11.2 NAUSEA WITH VOMITING, UNSPECIFIED: ICD-10-CM

## 2023-10-16 DIAGNOSIS — R10.30 LOWER ABDOMINAL PAIN, UNSPECIFIED: ICD-10-CM

## 2023-10-16 DIAGNOSIS — J44.9 CHRONIC OBSTRUCTIVE PULMONARY DISEASE, UNSPECIFIED: ICD-10-CM

## 2023-10-16 DIAGNOSIS — K40.20 BILATERAL INGUINAL HERNIA, WITHOUT OBSTRUCTION OR GANGRENE, NOT SPECIFIED AS RECURRENT: Chronic | ICD-10-CM

## 2023-10-16 DIAGNOSIS — E78.5 HYPERLIPIDEMIA, UNSPECIFIED: ICD-10-CM

## 2023-10-16 DIAGNOSIS — Z96.651 PRESENCE OF RIGHT ARTIFICIAL KNEE JOINT: Chronic | ICD-10-CM

## 2023-10-16 DIAGNOSIS — Z53.29 PROCEDURE AND TREATMENT NOT CARRIED OUT BECAUSE OF PATIENT'S DECISION FOR OTHER REASONS: ICD-10-CM

## 2023-10-16 DIAGNOSIS — F17.200 NICOTINE DEPENDENCE, UNSPECIFIED, UNCOMPLICATED: ICD-10-CM

## 2023-10-16 LAB
ALBUMIN SERPL ELPH-MCNC: 4 G/DL — SIGNIFICANT CHANGE UP (ref 3.5–5.2)
ALP SERPL-CCNC: 117 U/L — HIGH (ref 30–115)
ALT FLD-CCNC: 14 U/L — SIGNIFICANT CHANGE UP (ref 0–41)
ANION GAP SERPL CALC-SCNC: 17 MMOL/L — HIGH (ref 7–14)
AST SERPL-CCNC: 22 U/L — SIGNIFICANT CHANGE UP (ref 0–41)
BASOPHILS # BLD AUTO: 0.04 K/UL — SIGNIFICANT CHANGE UP (ref 0–0.2)
BASOPHILS NFR BLD AUTO: 0.5 % — SIGNIFICANT CHANGE UP (ref 0–1)
BILIRUB SERPL-MCNC: 0.8 MG/DL — SIGNIFICANT CHANGE UP (ref 0.2–1.2)
BUN SERPL-MCNC: 11 MG/DL — SIGNIFICANT CHANGE UP (ref 10–20)
CALCIUM SERPL-MCNC: 9.4 MG/DL — SIGNIFICANT CHANGE UP (ref 8.4–10.5)
CHLORIDE SERPL-SCNC: 98 MMOL/L — SIGNIFICANT CHANGE UP (ref 98–110)
CO2 SERPL-SCNC: 20 MMOL/L — SIGNIFICANT CHANGE UP (ref 17–32)
CREAT SERPL-MCNC: 0.8 MG/DL — SIGNIFICANT CHANGE UP (ref 0.7–1.5)
EGFR: 105 ML/MIN/1.73M2 — SIGNIFICANT CHANGE UP
EOSINOPHIL # BLD AUTO: 0.01 K/UL — SIGNIFICANT CHANGE UP (ref 0–0.7)
EOSINOPHIL NFR BLD AUTO: 0.1 % — SIGNIFICANT CHANGE UP (ref 0–8)
GLUCOSE SERPL-MCNC: 112 MG/DL — HIGH (ref 70–99)
HCT VFR BLD CALC: 44.5 % — SIGNIFICANT CHANGE UP (ref 42–52)
HGB BLD-MCNC: 14.9 G/DL — SIGNIFICANT CHANGE UP (ref 14–18)
IMM GRANULOCYTES NFR BLD AUTO: 0.4 % — HIGH (ref 0.1–0.3)
LACTATE SERPL-SCNC: 2.2 MMOL/L — HIGH (ref 0.7–2)
LIDOCAIN IGE QN: 14 U/L — SIGNIFICANT CHANGE UP (ref 7–60)
LYMPHOCYTES # BLD AUTO: 1.03 K/UL — LOW (ref 1.2–3.4)
LYMPHOCYTES # BLD AUTO: 13.7 % — LOW (ref 20.5–51.1)
MCHC RBC-ENTMCNC: 29 PG — SIGNIFICANT CHANGE UP (ref 27–31)
MCHC RBC-ENTMCNC: 33.5 G/DL — SIGNIFICANT CHANGE UP (ref 32–37)
MCV RBC AUTO: 86.7 FL — SIGNIFICANT CHANGE UP (ref 80–94)
MONOCYTES # BLD AUTO: 0.37 K/UL — SIGNIFICANT CHANGE UP (ref 0.1–0.6)
MONOCYTES NFR BLD AUTO: 4.9 % — SIGNIFICANT CHANGE UP (ref 1.7–9.3)
NEUTROPHILS # BLD AUTO: 6.06 K/UL — SIGNIFICANT CHANGE UP (ref 1.4–6.5)
NEUTROPHILS NFR BLD AUTO: 80.4 % — HIGH (ref 42.2–75.2)
NRBC # BLD: 0 /100 WBCS — SIGNIFICANT CHANGE UP (ref 0–0)
PLATELET # BLD AUTO: 229 K/UL — SIGNIFICANT CHANGE UP (ref 130–400)
PMV BLD: 10.3 FL — SIGNIFICANT CHANGE UP (ref 7.4–10.4)
POTASSIUM SERPL-MCNC: 4.1 MMOL/L — SIGNIFICANT CHANGE UP (ref 3.5–5)
POTASSIUM SERPL-SCNC: 4.1 MMOL/L — SIGNIFICANT CHANGE UP (ref 3.5–5)
PROT SERPL-MCNC: 6.9 G/DL — SIGNIFICANT CHANGE UP (ref 6–8)
RBC # BLD: 5.13 M/UL — SIGNIFICANT CHANGE UP (ref 4.7–6.1)
RBC # FLD: 14.1 % — SIGNIFICANT CHANGE UP (ref 11.5–14.5)
SODIUM SERPL-SCNC: 135 MMOL/L — SIGNIFICANT CHANGE UP (ref 135–146)
TROPONIN T SERPL-MCNC: <0.01 NG/ML — SIGNIFICANT CHANGE UP
WBC # BLD: 7.54 K/UL — SIGNIFICANT CHANGE UP (ref 4.8–10.8)
WBC # FLD AUTO: 7.54 K/UL — SIGNIFICANT CHANGE UP (ref 4.8–10.8)

## 2023-10-16 PROCEDURE — 71045 X-RAY EXAM CHEST 1 VIEW: CPT | Mod: 26

## 2023-10-16 PROCEDURE — 71045 X-RAY EXAM CHEST 1 VIEW: CPT

## 2023-10-16 PROCEDURE — 93010 ELECTROCARDIOGRAM REPORT: CPT

## 2023-10-16 PROCEDURE — 93005 ELECTROCARDIOGRAM TRACING: CPT

## 2023-10-16 PROCEDURE — 83605 ASSAY OF LACTIC ACID: CPT

## 2023-10-16 PROCEDURE — 99285 EMERGENCY DEPT VISIT HI MDM: CPT

## 2023-10-16 PROCEDURE — 83690 ASSAY OF LIPASE: CPT

## 2023-10-16 PROCEDURE — 36415 COLL VENOUS BLD VENIPUNCTURE: CPT

## 2023-10-16 PROCEDURE — 85025 COMPLETE CBC W/AUTO DIFF WBC: CPT

## 2023-10-16 PROCEDURE — 99285 EMERGENCY DEPT VISIT HI MDM: CPT | Mod: 25

## 2023-10-16 PROCEDURE — 84484 ASSAY OF TROPONIN QUANT: CPT

## 2023-10-16 PROCEDURE — 80053 COMPREHEN METABOLIC PANEL: CPT

## 2023-10-16 PROCEDURE — 96375 TX/PRO/DX INJ NEW DRUG ADDON: CPT

## 2023-10-16 PROCEDURE — 96374 THER/PROPH/DIAG INJ IV PUSH: CPT

## 2023-10-16 RX ORDER — MORPHINE SULFATE 50 MG/1
6 CAPSULE, EXTENDED RELEASE ORAL ONCE
Refills: 0 | Status: DISCONTINUED | OUTPATIENT
Start: 2023-10-16 | End: 2023-10-16

## 2023-10-16 RX ORDER — ONDANSETRON 8 MG/1
4 TABLET, FILM COATED ORAL ONCE
Refills: 0 | Status: COMPLETED | OUTPATIENT
Start: 2023-10-16 | End: 2023-10-16

## 2023-10-16 RX ORDER — SODIUM CHLORIDE 9 MG/ML
1000 INJECTION INTRAMUSCULAR; INTRAVENOUS; SUBCUTANEOUS ONCE
Refills: 0 | Status: COMPLETED | OUTPATIENT
Start: 2023-10-16 | End: 2023-10-16

## 2023-10-16 RX ADMIN — SODIUM CHLORIDE 1000 MILLILITER(S): 9 INJECTION INTRAMUSCULAR; INTRAVENOUS; SUBCUTANEOUS at 12:27

## 2023-10-16 RX ADMIN — ONDANSETRON 4 MILLIGRAM(S): 8 TABLET, FILM COATED ORAL at 12:26

## 2023-10-16 RX ADMIN — MORPHINE SULFATE 6 MILLIGRAM(S): 50 CAPSULE, EXTENDED RELEASE ORAL at 12:26

## 2023-10-16 NOTE — ED PROVIDER NOTE - PROGRESS NOTE DETAILS
Pt eloped from the ED. I attempted to call phone numbers on file multiple times, but I was unable to get in touch with patient.   Pt eloped prior to getting results or remainder of imaging studies.   pt was seen walking around in the ED.

## 2023-10-16 NOTE — ED ADULT NURSE REASSESSMENT NOTE - NS ED NURSE REASSESS COMMENT FT1
PT anox4 with steady gait. Pt educated on the risks of leaving and benefits of staying prior to pt eloping.

## 2023-10-16 NOTE — ED PROVIDER NOTE - OBJECTIVE STATEMENT
58 yo male with a pmh of htn, hld, copd presents for lower abdominal pain that started several hours prior. pt experienced 7 episodes of NBNB emesis and had NB diarrhea throughout the week. pt denies any other symptoms including fevers, chill, headache, recent illness/travel, cough, chest pain, or SOB.

## 2023-10-16 NOTE — ED PROVIDER NOTE - PHYSICAL EXAMINATION
Gen: NAD, AOx3  Head: NCAT  HEENT: PERRL, oral mucosa moist, normal conjunctiva, oropharynx clear without exudate or erythema  Lung: no respiratory distress, diffuse wheezing  CV: normal s1/s2, rrr, Normal perfusion, pulses 2+ throughout  Abd: soft, NTND, no CVA tenderness  Genitourinary: no pelvic tenderness  MSK: No edema, no visible deformities, full range of motion in all 4 extremities  Neuro: No focal neurologic deficits  Skin: No rash   Psych: normal affect

## 2023-10-16 NOTE — ED PROVIDER NOTE - ATTENDING APP SHARED VISIT CONTRIBUTION OF CARE
Patient with history of colitis, hypertension, arthritis, currently on morphine at home for pain presents with abdominal pain worsening today.  Patient reports pain is similar to when he had colitis in the past.  Pain is lower abdomen.  No fevers no chills.  Patient also with nausea and vomiting.  No chest pain.  No shortness of breath.  Patient is awake alert.  Abdomen diffuse tenderness.  Plan: Labs, EKG, sono, CT, reassess.

## 2023-10-19 ENCOUNTER — APPOINTMENT (OUTPATIENT)
Dept: ORTHOPEDIC SURGERY | Facility: CLINIC | Age: 58
End: 2023-10-19

## 2023-10-25 ENCOUNTER — APPOINTMENT (OUTPATIENT)
Dept: PULMONOLOGY | Facility: CLINIC | Age: 58
End: 2023-10-25

## 2023-11-03 NOTE — H&P ADULT - ASSESSMENT
Patient Education       Well Child Exam 4 Years   About this topic   Your child's 4-year well child exam is a visit with the doctor to check your child's health. The doctor measures your child's weight, height, and head size. The doctor plots these numbers on a growth curve. The growth curve gives a picture of your child's growth at each visit. The doctor may listen to your child's heart, lungs, and belly. Your doctor will do a full exam of your child from the head to the toes.  Your child may also need shots or blood tests during this visit.  General   Growth and Development   Your doctor will ask you how your child is developing. The doctor will focus on the skills that most children your child's age are expected to do. During this time of your child's life, here are some things you can expect.  Movement - Your child may:  Carry a toy when walking  Kick a ball  Stand on tiptoes  Walk down stairs more independently  Climb onto and off of furniture  Imitate your actions  Play at a playground  Hearing, seeing, and talking - Your child will likely:  Know how to say more than 50 words  Say 2 to 4 word sentences or phrases  Follow simple instructions  Repeat words  Know familiar people, objects, and body parts and can point to them  Start to engage in pretend play  Feeling and behavior - Your child will likely:  Become more independent  Enjoy being around other children  Begin to understand no. Try to use distraction if your child is doing something you do not want them to do.  Begin to have temper tantrums. Ignore them if possible.  Become more stubborn. Your child may shake the head no often. Try to help by giving your child words for feelings.  Be afraid of strangers or cry when you leave.  Begin to have fears like loud noises, large dogs, etc.  Feedings - Your child:  Can start to drink lowfat milk  Will be eating 3 meals and 2 to 3 snacks a day. However, your child may eat less than before and this is  normal.  Should be given a variety of healthy foods and textures. Let your child decide how much to eat. Your child should be able to eat without help.  Should have no more than 4 ounces (120 mL) of fruit juice a day. Do not give your child soda.  Will need you to help brush their teeth 2 times each day with a child's toothbrush and a smear of toothpaste with fluoride in it.  Sleep - Your child:  May be ready to sleep in a toddler bed if climbing out of a crib after naps or in the morning  Is likely sleeping about 10 hours in a row at night and takes one nap during the day  Potty training - Your child may be ready for potty training when showing signs like:  Dry diapers for longer periods of time, such as after naps  Can tell you the diaper is wet or dirty  Is interested in going to the potty. Your child may want to watch you or others on the toilet or just sit on the potty chair.  Can pull pants up and down with help  Vaccines - It is important for your child to get shots on time. This protects from very serious illnesses like lung infections, meningitis, or infections that harm the nervous system. Your child may also need a flu shot. Check with your doctor to make sure your child's shots are up to date. Your child may need:  DTaP or diphtheria, tetanus, and pertussis vaccine  IPV or polio vaccine  Hep A or hepatitis A vaccine  Hep B or hepatitis B vaccine  Flu or influenza vaccine  Your child may get some of these combined into one shot. This lowers the number of shots your child may get and yet keeps them protected.  Help for Parents   Play with your child.  Go outside as often as you can. Throw and kick a ball.  Give your child pots, pans, and spoons or a toy vacuum. Children love to imitate what you are doing.  Help your child pretend. Use an empty cup to take a drink. Push a block and make sounds like it is a car or a boat.  Hide a toy under a blanket for your child to find.  Build a tower of blocks with your  child. Sort blocks by color or shape.  Read to your child. Rhyming books and touch and feel books are especially fun at this age. Talk and sing to your child. This helps your child learn language skills.  Give your child crayons and paper to draw or color on. Your child may be able to draw lines or circles.  Here are some things you can do to help keep your child safe and healthy.  Schedule a dentist appointment for your child.  Put sunscreen with a SPF30 or higher on your child at least 15 to 30 minutes before going outside. Put more sunscreen on after about 2 hours.  Do not allow anyone to smoke in your home or around your child.  Have the right size car seat for your child and use it every time your child is in the car. Keep your toddler in a rear facing car seat until they reach the maximum height or weight requirement for safety by the seat .  Be sure furniture, shelves, and TVs are secure and cannot tip over and hurt your child.  Take extra care around water. Close bathroom doors. Never leave your child in the tub alone.  Never leave your child alone. Do not leave your child in the car or at home alone, even for a few minutes.  Protect your child from gun injuries. If you have a gun, use a trigger lock. Keep the gun locked up and the bullets kept in a separate place.  Avoid screen time for children under 2 years old. This means no TV, computers, phones, or video games. They can cause problems with brain development.  Parents need to think about:  Having emergency numbers, including poison control, posted on or near the phone  How to distract your child when doing something you dont want your child to do  Using positive words to tell your child what you want, rather than saying no or what not to do  Using time out to help correct or change behavior  The next well child visit will most likely be when your child is 2.5 years old. At this visit your doctor may:  Do a full check up on your child  Talk  about limiting screen time for your child, how well your child is eating, and how potty training is going  Talk about discipline and how to correct your child  When do I need to call the doctor?   Fever of 100.4°F (38°C) or higher  Has trouble walking or only walks on the toes  Has trouble speaking or following simple instructions  You are worried about your child's development  Where can I learn more?   Centers for Disease Control and Prevention  https://www.cdc.gov/ncbddd/actearly/milestones/milestones-2yr.html   Kids Health  https://kidshealth.org/en/parents/development-24mos.html   [place] Department of Health and Human Services  https://www.cdc.gov/vaccines/parents/downloads/afpfij-epf-wsc-0-6yrs.pdf   Last Reviewed Date   2021  Consumer Information Use and Disclaimer   This information is not specific medical advice and does not replace information you receive from your health care provider. This is only a brief summary of general information. It does NOT include all information about conditions, illnesses, injuries, tests, procedures, treatments, therapies, discharge instructions or life-style choices that may apply to you. You must talk with your health care provider for complete information about your health and treatment options. This information should not be used to decide whether or not to accept your health care providers advice, instructions or recommendations. Only your health care provider has the knowledge and training to provide advice that is right for you.  Copyright   Copyright © 2021 [employer] and its affiliates and/or licensors. All rights reserved.    A child who is at least 2 years old and has outgrown the rear facing seat will be restrained in a forward facing restraint system with an internal harness.  If you have an active [facility] account, please look for your well child questionnaire to come to your [facility] account before your next well child visit.   #Acute Hypoxic Respiratory failure   #COPD exacerbation  #Hx of REBECCA  CXR 07/26: no acute cardiopulmonary disease  on RA 97%  - Cont IV solumedrol 60 BID  - Duonebs ATC and PRN  - Pulm eval requested  - Cont symbicort  - BIPAP qHs and PRN  - Azithromycin 250mg to completion (5d)  - Echocardiogram  - D-Dimer  - Likely DC 24-48h, pt ambulating outside    #HTN/HLD  - Cont lasix 40mg qD  - Cont lisinopril 5mg qD  - Cont simvastatin 20mg qHs    #Chronic Back pain  ISTOP Reference# : [id number]  - Cont morphine ER 100mg qD  - Cont oxycodone 30mg q4h PRN (prescribed 150 pills for 30d)    #Anxiety/Insomnia  - Cont xanax 2mg QID  - Cont ambien 10mg qHs    DVT PPX, Lovenox    #Progress Note Handoff  Pending (specify): Cont IV steroids, pulm eval  Family discussion: d/w pt regaridng tx for COPD exacerbation  Disposition: Home . #COPD exacerbation  #Hx of REBECCA  CXR 07/26: no acute cardiopulmonary disease  on RA 97%  - Cont IV solumedrol 60 BID  - Duonebs ATC and PRN  - Pulm eval requested  - Full RVP  - procal  - Cont symbicort  - BIPAP qHs and PRN  - Azithromycin 250mg to completion (5d)  - Echocardiogram  - D-Dimer, LE venous duplex  - Likely DC 24-48h, pt ambulating outside    #HTN/HLD  - Cont lasix 40mg qD  - Cont lisinopril 5mg qD  - Cont simvastatin 20mg qHs    #Chronic Back pain  ISTOP Reference# : [id number]  - Cont morphine ER 100mg qD  - Cont oxycodone 30mg q4h PRN (prescribed 150 pills for 30d)    #Anxiety/Insomnia  - Cont xanax 2mg QID  - Cont ambien 10mg qHs    DVT PPX, Lovenox    #Progress Note Handoff  Pending (specify): Cont IV steroids, pulm eval  Family discussion: d/w pt regarding tx for COPD exacerbation  Disposition: Home when stable    Dr. Calderon Flannery  Attending Hospitalist  56 y/o male with PMHx of COPD not home O2, HTN, HLD, REBECCA on BIPAP, CHFpEF, GERD presented to the ED for 1 week of worsening SOB    #Worsening SOB likely COPD exacerbation  #Hx of REBECCA on BIPAP  - currently on RA 97%, diffuse wheezing, afebrile  - CXR no opacities appreciated, f/u official read  - s/p duonebs, Dexamethasone 10mg  - continue with Solumedrol 60mg BID   - f/u COVID PCR  - continue with duonebs         #HTN/HLD  - Cont lasix 40mg qD  - Cont lisinopril 5mg qD  - Cont simvastatin 20mg qHs    #Chronic Back pain  ISTOP Reference# : [id number]  - Cont morphine ER 100mg qD  - Cont oxycodone 30mg q4h PRN (prescribed 150 pills for 30d)    #Anxiety/Insomnia  - Cont xanax 2mg QID  - Cont ambien 10mg qHs    DVT PPX, Lovenox    #Progress Note Handoff  Pending (specify): Cont IV steroids, pulm eval  Family discussion: d/w pt regarding tx for COPD exacerbation  Disposition: Home when stable    Dr. Calderon Flannery  Attending Hospitalist  58 y/o male with PMHx of COPD not home O2, HTN, HLD, REBECCA on BIPAP, CHFpEF, GERD presented to the ED for 1 week of worsening SOB    #Worsening SOB likely COPD exacerbation  #Hx of REBECCA on BIPAP  - currently on RA 97%, diffuse wheezing, afebrile  - no leukocytosis, Trop negative x1,   - CXR no opacities appreciated, f/u official read  - s/p duonebs, Dexamethasone 10mg  - continue with Solumedrol 60mg BID   - f/u COVID PCR  - continue with duonebs   - start Azithro       #HTN/HLD  - Cont lasix 40mg qD  - Cont lisinopril 5mg qD    #Chronic Back pain  ISTOP Reference# : [id number]  - Cont morphine ER 100mg qD  - Cont oxycodone 30mg q4h PRN (prescribed 150 pills for 30d)    #Anxiety/Insomnia  - Cont xanax 2mg QID  - Cont ambien 10mg qHs    DVT PPX, Lovenox  GI PPx: PPI  DASH     Dr. Calderon Flannery  Attending Hospitalist  58 y/o male with PMHx of COPD not home O2, HTN, HLD, REBECCA on BIPAP, CHFpEF, GERD presented to the ED for 1 week of worsening SOB    #Worsening SOB likely COPD exacerbation  #Hx of REBECCA on BIPAP  - currently on RA 97%, diffuse wheezing, afebrile  - no leukocytosis, Trop negative x1,   - CXR no opacities appreciated, f/u official read  - s/p duonebs, Dexamethasone 10mg  - continue with Solumedrol 60mg BID   - f/u COVID PCR  - continue with duonebs   - start Azithro       #HTN/HLD  - Cont lasix 40mg qD  - Cont lisinopril 5mg qD  - continue with simvastatin     #Chronic Back pain  ISTOP Reference# : [id number]  - Cont morphine ER 100mg qD  - Cont oxycodone 30mg q4h PRN (prescribed 150 pills for 30d)    #Anxiety/Insomnia  - Cont xanax 2mg QID  - Cont ambien 10mg qHs    DVT PPX, Lovenox  GI PPx: PPI  DASH     Dr. Calderon Flannery  Attending Hospitalist

## 2023-11-04 ENCOUNTER — EMERGENCY (EMERGENCY)
Facility: HOSPITAL | Age: 58
LOS: 0 days | Discharge: HOME | End: 2023-11-04
Attending: STUDENT IN AN ORGANIZED HEALTH CARE EDUCATION/TRAINING PROGRAM
Payer: MEDICAID

## 2023-11-04 VITALS — HEART RATE: 108 BPM | OXYGEN SATURATION: 98 % | RESPIRATION RATE: 25 BRPM

## 2023-11-04 VITALS
HEART RATE: 107 BPM | RESPIRATION RATE: 22 BRPM | DIASTOLIC BLOOD PRESSURE: 88 MMHG | SYSTOLIC BLOOD PRESSURE: 134 MMHG | WEIGHT: 309.97 LBS | OXYGEN SATURATION: 96 % | HEIGHT: 70 IN | TEMPERATURE: 98 F

## 2023-11-04 DIAGNOSIS — G47.33 OBSTRUCTIVE SLEEP APNEA (ADULT) (PEDIATRIC): ICD-10-CM

## 2023-11-04 DIAGNOSIS — Z98.890 OTHER SPECIFIED POSTPROCEDURAL STATES: Chronic | ICD-10-CM

## 2023-11-04 DIAGNOSIS — I10 ESSENTIAL (PRIMARY) HYPERTENSION: ICD-10-CM

## 2023-11-04 DIAGNOSIS — Z87.438 PERSONAL HISTORY OF OTHER DISEASES OF MALE GENITAL ORGANS: ICD-10-CM

## 2023-11-04 DIAGNOSIS — Z87.39 PERSONAL HISTORY OF OTHER DISEASES OF THE MUSCULOSKELETAL SYSTEM AND CONNECTIVE TISSUE: ICD-10-CM

## 2023-11-04 DIAGNOSIS — Z96.651 PRESENCE OF RIGHT ARTIFICIAL KNEE JOINT: Chronic | ICD-10-CM

## 2023-11-04 DIAGNOSIS — Z98.890 OTHER SPECIFIED POSTPROCEDURAL STATES: ICD-10-CM

## 2023-11-04 DIAGNOSIS — R00.0 TACHYCARDIA, UNSPECIFIED: ICD-10-CM

## 2023-11-04 DIAGNOSIS — Z96.651 PRESENCE OF RIGHT ARTIFICIAL KNEE JOINT: ICD-10-CM

## 2023-11-04 DIAGNOSIS — Z87.19 PERSONAL HISTORY OF OTHER DISEASES OF THE DIGESTIVE SYSTEM: ICD-10-CM

## 2023-11-04 DIAGNOSIS — Z90.49 ACQUIRED ABSENCE OF OTHER SPECIFIED PARTS OF DIGESTIVE TRACT: ICD-10-CM

## 2023-11-04 DIAGNOSIS — J44.1 CHRONIC OBSTRUCTIVE PULMONARY DISEASE WITH (ACUTE) EXACERBATION: ICD-10-CM

## 2023-11-04 DIAGNOSIS — I25.10 ATHEROSCLEROTIC HEART DISEASE OF NATIVE CORONARY ARTERY WITHOUT ANGINA PECTORIS: ICD-10-CM

## 2023-11-04 DIAGNOSIS — R06.02 SHORTNESS OF BREATH: ICD-10-CM

## 2023-11-04 DIAGNOSIS — E78.00 PURE HYPERCHOLESTEROLEMIA, UNSPECIFIED: ICD-10-CM

## 2023-11-04 DIAGNOSIS — Z87.898 PERSONAL HISTORY OF OTHER SPECIFIED CONDITIONS: ICD-10-CM

## 2023-11-04 DIAGNOSIS — K40.20 BILATERAL INGUINAL HERNIA, WITHOUT OBSTRUCTION OR GANGRENE, NOT SPECIFIED AS RECURRENT: Chronic | ICD-10-CM

## 2023-11-04 DIAGNOSIS — F17.200 NICOTINE DEPENDENCE, UNSPECIFIED, UNCOMPLICATED: ICD-10-CM

## 2023-11-04 DIAGNOSIS — J18.9 PNEUMONIA, UNSPECIFIED ORGANISM: ICD-10-CM

## 2023-11-04 LAB
ALBUMIN SERPL ELPH-MCNC: 4.1 G/DL — SIGNIFICANT CHANGE UP (ref 3.5–5.2)
ALBUMIN SERPL ELPH-MCNC: 4.1 G/DL — SIGNIFICANT CHANGE UP (ref 3.5–5.2)
ALP SERPL-CCNC: 131 U/L — HIGH (ref 30–115)
ALP SERPL-CCNC: 131 U/L — HIGH (ref 30–115)
ALT FLD-CCNC: 17 U/L — SIGNIFICANT CHANGE UP (ref 0–41)
ALT FLD-CCNC: 17 U/L — SIGNIFICANT CHANGE UP (ref 0–41)
ANION GAP SERPL CALC-SCNC: 13 MMOL/L — SIGNIFICANT CHANGE UP (ref 7–14)
ANION GAP SERPL CALC-SCNC: 13 MMOL/L — SIGNIFICANT CHANGE UP (ref 7–14)
AST SERPL-CCNC: 24 U/L — SIGNIFICANT CHANGE UP (ref 0–41)
AST SERPL-CCNC: 24 U/L — SIGNIFICANT CHANGE UP (ref 0–41)
BASOPHILS # BLD AUTO: 0.05 K/UL — SIGNIFICANT CHANGE UP (ref 0–0.2)
BASOPHILS # BLD AUTO: 0.05 K/UL — SIGNIFICANT CHANGE UP (ref 0–0.2)
BASOPHILS NFR BLD AUTO: 0.5 % — SIGNIFICANT CHANGE UP (ref 0–1)
BASOPHILS NFR BLD AUTO: 0.5 % — SIGNIFICANT CHANGE UP (ref 0–1)
BILIRUB SERPL-MCNC: 0.8 MG/DL — SIGNIFICANT CHANGE UP (ref 0.2–1.2)
BILIRUB SERPL-MCNC: 0.8 MG/DL — SIGNIFICANT CHANGE UP (ref 0.2–1.2)
BUN SERPL-MCNC: 8 MG/DL — LOW (ref 10–20)
BUN SERPL-MCNC: 8 MG/DL — LOW (ref 10–20)
CALCIUM SERPL-MCNC: 9.6 MG/DL — SIGNIFICANT CHANGE UP (ref 8.4–10.5)
CALCIUM SERPL-MCNC: 9.6 MG/DL — SIGNIFICANT CHANGE UP (ref 8.4–10.5)
CHLORIDE SERPL-SCNC: 101 MMOL/L — SIGNIFICANT CHANGE UP (ref 98–110)
CHLORIDE SERPL-SCNC: 101 MMOL/L — SIGNIFICANT CHANGE UP (ref 98–110)
CO2 SERPL-SCNC: 24 MMOL/L — SIGNIFICANT CHANGE UP (ref 17–32)
CO2 SERPL-SCNC: 24 MMOL/L — SIGNIFICANT CHANGE UP (ref 17–32)
CREAT SERPL-MCNC: 0.7 MG/DL — SIGNIFICANT CHANGE UP (ref 0.7–1.5)
CREAT SERPL-MCNC: 0.7 MG/DL — SIGNIFICANT CHANGE UP (ref 0.7–1.5)
EGFR: 109 ML/MIN/1.73M2 — SIGNIFICANT CHANGE UP
EGFR: 109 ML/MIN/1.73M2 — SIGNIFICANT CHANGE UP
EOSINOPHIL # BLD AUTO: 0.05 K/UL — SIGNIFICANT CHANGE UP (ref 0–0.7)
EOSINOPHIL # BLD AUTO: 0.05 K/UL — SIGNIFICANT CHANGE UP (ref 0–0.7)
EOSINOPHIL NFR BLD AUTO: 0.5 % — SIGNIFICANT CHANGE UP (ref 0–8)
EOSINOPHIL NFR BLD AUTO: 0.5 % — SIGNIFICANT CHANGE UP (ref 0–8)
GAS PNL BLDV: SIGNIFICANT CHANGE UP
GAS PNL BLDV: SIGNIFICANT CHANGE UP
GLUCOSE SERPL-MCNC: 177 MG/DL — HIGH (ref 70–99)
GLUCOSE SERPL-MCNC: 177 MG/DL — HIGH (ref 70–99)
HCT VFR BLD CALC: 48.5 % — SIGNIFICANT CHANGE UP (ref 42–52)
HCT VFR BLD CALC: 48.5 % — SIGNIFICANT CHANGE UP (ref 42–52)
HGB BLD-MCNC: 16.1 G/DL — SIGNIFICANT CHANGE UP (ref 14–18)
HGB BLD-MCNC: 16.1 G/DL — SIGNIFICANT CHANGE UP (ref 14–18)
IMM GRANULOCYTES NFR BLD AUTO: 0.3 % — SIGNIFICANT CHANGE UP (ref 0.1–0.3)
IMM GRANULOCYTES NFR BLD AUTO: 0.3 % — SIGNIFICANT CHANGE UP (ref 0.1–0.3)
LYMPHOCYTES # BLD AUTO: 0.53 K/UL — LOW (ref 1.2–3.4)
LYMPHOCYTES # BLD AUTO: 0.53 K/UL — LOW (ref 1.2–3.4)
LYMPHOCYTES # BLD AUTO: 5.3 % — LOW (ref 20.5–51.1)
LYMPHOCYTES # BLD AUTO: 5.3 % — LOW (ref 20.5–51.1)
MCHC RBC-ENTMCNC: 28.9 PG — SIGNIFICANT CHANGE UP (ref 27–31)
MCHC RBC-ENTMCNC: 28.9 PG — SIGNIFICANT CHANGE UP (ref 27–31)
MCHC RBC-ENTMCNC: 33.2 G/DL — SIGNIFICANT CHANGE UP (ref 32–37)
MCHC RBC-ENTMCNC: 33.2 G/DL — SIGNIFICANT CHANGE UP (ref 32–37)
MCV RBC AUTO: 87.1 FL — SIGNIFICANT CHANGE UP (ref 80–94)
MCV RBC AUTO: 87.1 FL — SIGNIFICANT CHANGE UP (ref 80–94)
MONOCYTES # BLD AUTO: 0.18 K/UL — SIGNIFICANT CHANGE UP (ref 0.1–0.6)
MONOCYTES # BLD AUTO: 0.18 K/UL — SIGNIFICANT CHANGE UP (ref 0.1–0.6)
MONOCYTES NFR BLD AUTO: 1.8 % — SIGNIFICANT CHANGE UP (ref 1.7–9.3)
MONOCYTES NFR BLD AUTO: 1.8 % — SIGNIFICANT CHANGE UP (ref 1.7–9.3)
NEUTROPHILS # BLD AUTO: 9.21 K/UL — HIGH (ref 1.4–6.5)
NEUTROPHILS # BLD AUTO: 9.21 K/UL — HIGH (ref 1.4–6.5)
NEUTROPHILS NFR BLD AUTO: 91.6 % — HIGH (ref 42.2–75.2)
NEUTROPHILS NFR BLD AUTO: 91.6 % — HIGH (ref 42.2–75.2)
NRBC # BLD: 0 /100 WBCS — SIGNIFICANT CHANGE UP (ref 0–0)
NRBC # BLD: 0 /100 WBCS — SIGNIFICANT CHANGE UP (ref 0–0)
NT-PROBNP SERPL-SCNC: 95 PG/ML — SIGNIFICANT CHANGE UP (ref 0–300)
NT-PROBNP SERPL-SCNC: 95 PG/ML — SIGNIFICANT CHANGE UP (ref 0–300)
PLATELET # BLD AUTO: 280 K/UL — SIGNIFICANT CHANGE UP (ref 130–400)
PLATELET # BLD AUTO: 280 K/UL — SIGNIFICANT CHANGE UP (ref 130–400)
PMV BLD: 10.5 FL — HIGH (ref 7.4–10.4)
PMV BLD: 10.5 FL — HIGH (ref 7.4–10.4)
POTASSIUM SERPL-MCNC: 4.6 MMOL/L — SIGNIFICANT CHANGE UP (ref 3.5–5)
POTASSIUM SERPL-MCNC: 4.6 MMOL/L — SIGNIFICANT CHANGE UP (ref 3.5–5)
POTASSIUM SERPL-SCNC: 4.6 MMOL/L — SIGNIFICANT CHANGE UP (ref 3.5–5)
POTASSIUM SERPL-SCNC: 4.6 MMOL/L — SIGNIFICANT CHANGE UP (ref 3.5–5)
PROT SERPL-MCNC: 7.3 G/DL — SIGNIFICANT CHANGE UP (ref 6–8)
PROT SERPL-MCNC: 7.3 G/DL — SIGNIFICANT CHANGE UP (ref 6–8)
RBC # BLD: 5.57 M/UL — SIGNIFICANT CHANGE UP (ref 4.7–6.1)
RBC # BLD: 5.57 M/UL — SIGNIFICANT CHANGE UP (ref 4.7–6.1)
RBC # FLD: 14.8 % — HIGH (ref 11.5–14.5)
RBC # FLD: 14.8 % — HIGH (ref 11.5–14.5)
SODIUM SERPL-SCNC: 138 MMOL/L — SIGNIFICANT CHANGE UP (ref 135–146)
SODIUM SERPL-SCNC: 138 MMOL/L — SIGNIFICANT CHANGE UP (ref 135–146)
TROPONIN T SERPL-MCNC: <0.01 NG/ML — SIGNIFICANT CHANGE UP
TROPONIN T SERPL-MCNC: <0.01 NG/ML — SIGNIFICANT CHANGE UP
WBC # BLD: 10.05 K/UL — SIGNIFICANT CHANGE UP (ref 4.8–10.8)
WBC # BLD: 10.05 K/UL — SIGNIFICANT CHANGE UP (ref 4.8–10.8)
WBC # FLD AUTO: 10.05 K/UL — SIGNIFICANT CHANGE UP (ref 4.8–10.8)
WBC # FLD AUTO: 10.05 K/UL — SIGNIFICANT CHANGE UP (ref 4.8–10.8)

## 2023-11-04 PROCEDURE — 36415 COLL VENOUS BLD VENIPUNCTURE: CPT

## 2023-11-04 PROCEDURE — 84132 ASSAY OF SERUM POTASSIUM: CPT

## 2023-11-04 PROCEDURE — 96374 THER/PROPH/DIAG INJ IV PUSH: CPT

## 2023-11-04 PROCEDURE — 84484 ASSAY OF TROPONIN QUANT: CPT

## 2023-11-04 PROCEDURE — 99285 EMERGENCY DEPT VISIT HI MDM: CPT | Mod: 25

## 2023-11-04 PROCEDURE — 80053 COMPREHEN METABOLIC PANEL: CPT

## 2023-11-04 PROCEDURE — 82803 BLOOD GASES ANY COMBINATION: CPT

## 2023-11-04 PROCEDURE — 93010 ELECTROCARDIOGRAM REPORT: CPT

## 2023-11-04 PROCEDURE — 83605 ASSAY OF LACTIC ACID: CPT

## 2023-11-04 PROCEDURE — 71045 X-RAY EXAM CHEST 1 VIEW: CPT | Mod: 26

## 2023-11-04 PROCEDURE — 93005 ELECTROCARDIOGRAM TRACING: CPT

## 2023-11-04 PROCEDURE — 82330 ASSAY OF CALCIUM: CPT

## 2023-11-04 PROCEDURE — 85025 COMPLETE CBC W/AUTO DIFF WBC: CPT

## 2023-11-04 PROCEDURE — 99285 EMERGENCY DEPT VISIT HI MDM: CPT

## 2023-11-04 PROCEDURE — 83880 ASSAY OF NATRIURETIC PEPTIDE: CPT

## 2023-11-04 PROCEDURE — 71045 X-RAY EXAM CHEST 1 VIEW: CPT

## 2023-11-04 PROCEDURE — 85018 HEMOGLOBIN: CPT

## 2023-11-04 PROCEDURE — 94640 AIRWAY INHALATION TREATMENT: CPT

## 2023-11-04 PROCEDURE — 85014 HEMATOCRIT: CPT

## 2023-11-04 PROCEDURE — 84295 ASSAY OF SERUM SODIUM: CPT

## 2023-11-04 RX ORDER — ALBUTEROL 90 UG/1
2.5 AEROSOL, METERED ORAL ONCE
Refills: 0 | Status: COMPLETED | OUTPATIENT
Start: 2023-11-04 | End: 2023-11-04

## 2023-11-04 RX ORDER — LEVOFLOXACIN 5 MG/ML
1 INJECTION, SOLUTION INTRAVENOUS
Qty: 5 | Refills: 0
Start: 2023-11-04 | End: 2023-11-08

## 2023-11-04 RX ORDER — IPRATROPIUM/ALBUTEROL SULFATE 18-103MCG
3 AEROSOL WITH ADAPTER (GRAM) INHALATION
Refills: 0 | Status: COMPLETED | OUTPATIENT
Start: 2023-11-04 | End: 2023-11-04

## 2023-11-04 RX ADMIN — Medication 3 MILLILITER(S): at 08:23

## 2023-11-04 RX ADMIN — Medication 125 MILLIGRAM(S): at 07:49

## 2023-11-04 RX ADMIN — Medication 3 MILLILITER(S): at 07:52

## 2023-11-04 RX ADMIN — Medication 3 MILLILITER(S): at 09:21

## 2023-11-04 RX ADMIN — ALBUTEROL 2.5 MILLIGRAM(S): 90 AEROSOL, METERED ORAL at 11:01

## 2023-11-04 NOTE — ED PROVIDER NOTE - CLINICAL SUMMARY MEDICAL DECISION MAKING FREE TEXT BOX
57-year-old male, past medical history of hypertension, hyperlipidemia, COPD, opioid use, presenting with shortness of breath that started when he was doing some heavy lifting in the yard yesterday, feels like his COPD, not alleviated by his typical medications.  Diffuse bilateral wheezing on exam. 57-year-old male, past medical history of hypertension, hyperlipidemia, COPD, opioid use, presenting with shortness of breath that started when he was doing some heavy lifting in the yard yesterday, feels like his COPD, not alleviated by his typical medications.  Diffuse bilateral wheezing on exam. Labs and EKG were ordered and reviewed. Upon reassessment, patient's wheezing significantly improved. Normal respiratory effort. Imaging was ordered and reviewed by me.  Appropriate medications for patient's presenting complaints were ordered and effects were reassessed.   Escalation to admission/observation was considered.  However patient feels much better and is comfortable with discharge.  Appropriate follow-up was arranged.

## 2023-11-04 NOTE — ED PROVIDER NOTE - NSFOLLOWUPINSTRUCTIONS_ED_ALL_ED_FT
Community-Acquired Pneumonia, Adult  Pneumonia is a lung infection that causes inflammation and the buildup of mucus and fluids in the lungs. This may cause coughing and difficulty breathing. Community-acquired pneumonia is pneumonia that develops in people who are not, and have not recently been, in a hospital or other health care facility.    Usually, pneumonia develops as a result of an illness that is caused by a virus, such as the common cold and the flu (influenza). It can also be caused by bacteria or fungi. While the common cold and influenza can pass from person to person (are contagious), pneumonia itself is not considered contagious.    What are the causes?  The human body, showing how a virus travels from the air to a person's lungs.   This condition may be caused by:  Viruses.  Bacteria.  Fungi.  What increases the risk?  The following factors may make you more likely to develop this condition:  Being over age 65 or having certain medical conditions, such as:  A long-term (chronic) disease, such as: chronic obstructive pulmonary disease (COPD), asthma, heart failure, diabetes, or kidney disease.  A condition that increases the risk of breathing in (aspirating) mucus and other fluids from your mouth and nose.  A weakened body defense system (immune system).  Having had your spleen removed (splenectomy). The spleen is the organ that helps fight germs and infections.  Not cleaning your teeth and gums well (poor dental hygiene).  Using tobacco products.  Traveling to places where germs that cause pneumonia are present or being near certain animals or animal habitats that could have germs that cause pneumonia.  What are the signs or symptoms?  Symptoms of this condition include:  A dry cough or a wet (productive) cough.  A fever, sweating, or chills.  Chest pain, especially when breathing deeply or coughing.  Fast breathing, difficulty breathing, or shortness of breath.  Tiredness (fatigue) and muscle aches.  How is this diagnosed?  An outline of a person's body and an image of an X-ray of the person's chest.   This condition may be diagnosed based on your medical history or a physical exam. You may also have tests, including:  Imaging, such as a chest X-ray or lung ultrasound.  Tests of:  The level of oxygen and other gases in your blood.  Mucus from your lungs (sputum).  Fluid around your lungs (pleural fluid).  Your urine.  How is this treated?  Treatment for this condition depends on many factors, such as the cause of your pneumonia, your medicines, and other medical conditions that you have.    For most adults, pneumonia may be treated at home. In some cases, treatment must happen in a hospital and may include:  Medicines that are given by mouth (orally) or through an IV, including:  Antibiotic medicines, if bacteria caused the pneumonia.  Medicines that kill viruses (antiviral medicines), if a virus caused the pneumonia.  Oxygen therapy.  Severe pneumonia, although rare, may require the following treatments:  Mechanical ventilation.This procedure uses a machine to help you breathe if you cannot breathe well on your own or maintain a safe level of blood oxygen.  Thoracentesis. This procedure removes any buildup of pleural fluid to help with breathing.  Follow these instructions at home:  A comparison of three sample cups showing dark yellow, yellow, and pale yellow urine.  Medicines    Take over-the-counter and prescription medicines only as told by your health care provider.  Take cough medicine only if you have trouble sleeping. Cough medicine can prevent your body from removing mucus from your lungs.  If you were prescribed antibiotics, take them as told by your health care provider. Do not stop taking the antibiotic even if you start to feel better.  Lifestyle    A sign showing that a person should not drink alcohol.  A sign showing that a person should not smoke.  Do not drink alcohol.  Do not use any products that contain nicotine or tobacco. These products include cigarettes, chewing tobacco, and vaping devices, such as e-cigarettes. If you need help quitting, ask your health care provider.  Eat a healthy diet. This includes plenty of vegetables, fruits, whole grains, low-fat dairy products, and lean protein.  General instructions    Rest a lot and get at least 8 hours of sleep each night.  Sleep in a partly upright position at night. Place a few pillows under your head or sleep in a reclining chair.  Return to your normal activities as told by your health care provider. Ask your health care provider what activities are safe for you.  Drink enough fluid to keep your urine pale yellow. This helps to thin the mucus in your lungs.  If your throat is sore, gargle with a mixture of salt and water 3–4 times a day or as needed. To make salt water, completely dissolve ½–1 tsp (3–6 g) of salt in 1 cup (237 mL) of warm water.  Keep all follow-up visits.  How is this prevented?  You can lower your risk of developing community-acquired pneumonia by:  Getting the pneumonia vaccine. There are different types and schedules of pneumonia vaccines. Ask your health care provider which option is best for you. Consider getting the pneumonia vaccine if:  You are older than 65 years of age.  You are 19–65 years of age and are receiving cancer treatment, have chronic lung disease, or have other medical conditions that affect your immune system. Ask your health care provider if this applies to you.  Getting your influenza vaccine every year. Ask your health care provider which type of vaccine is best for you.  Getting regular dental checkups.  Washing your hands often with soap and water for at least 20 seconds. If soap and water are not available, use hand .  Contact a health care provider if:  You have a fever.  You have trouble sleeping because you cannot control your cough with cough medicine.  Get help right away if:  Your shortness of breath becomes worse.  Your chest pain increases.  Your sickness becomes worse, especially if you are an older adult or have a weak immune system.  You cough up blood.  These symptoms may be an emergency. Get help right away. Call 911.  Do not wait to see if the symptoms will go away.  Do not drive yourself to the hospital.  Summary  Pneumonia is an infection of the lungs.  Community-acquired pneumonia develops in people who have not been in the hospital. It can be caused by bacteria, viruses, or fungi.  This condition may be treated with antibiotics or antiviral medicines.  Severe pneumonia may require a hospital stay and treatment to help with breathing.  This information is not intended to replace advice given to you by your health care provider. Make sure you discuss any questions you have with your health care provider.        Shortness of breath    Shortness of breath (dyspnea) means you have trouble breathing and could indicate a medical problem. Causes include lung disease, heart disease, low amount of red blood cells (anemia), poor physical fitness, being overweight, smoking, etc. Your health care provider today may not be able to find a cause for your shortness of breath after your exam. In this case, it is important to have a follow-up exam with your primary care physician as instructed. If medicines were prescribed, take them as directed for the full length of time directed. Refrain from tobacco products.    SEEK IMMEDIATE MEDICAL CARE IF YOU HAVE ANY OF THE FOLLOWING SYMPTOMS: worsening shortness of breath, chest pain, back pain, abdominal pain, fever, coughing up blood, lightheadedness/dizziness.

## 2023-11-04 NOTE — ED PROVIDER NOTE - OBJECTIVE STATEMENT
patient is a 56yo male PMH htn, hld, BPH, COPD, asthma, CAD complaining of shortness of breath. pt states he was working in his yard yesterday and became short of breath, did not resolve with rest. states he frequently gets short of breath but it is not usually this persistent or severe. pt has albuterol inhaler that did not help symptoms. denies chest pain, abdominal pain, fever.

## 2023-11-04 NOTE — ED PROVIDER NOTE - CARE PLAN
1 Principal Discharge DX:	SOB (shortness of breath)  Secondary Diagnosis:	COPD exacerbation  Secondary Diagnosis:	Pneumonia

## 2023-11-04 NOTE — ED ADULT NURSE NOTE - NSFALLUNIVINTERV_ED_ALL_ED
Bed/Stretcher in lowest position, wheels locked, appropriate side rails in place/Call bell, personal items and telephone in reach/Instruct patient to call for assistance before getting out of bed/chair/stretcher/Non-slip footwear applied when patient is off stretcher/[place] to call system/Physically safe environment - no spills, clutter or unnecessary equipment/Purposeful proactive rounding/Room/bathroom lighting operational, light cord in reach

## 2023-11-04 NOTE — ED ADULT NURSE NOTE - OBJECTIVE STATEMENT
Pt brought to Ed for shortness of breath since yesterday. Hx of COPD and used at home o2 with no relief. Denies fever, n/v/d.

## 2023-11-04 NOTE — ED PROVIDER NOTE - PATIENT PORTAL LINK FT
You can access the FollowMyHealth Patient Portal offered by [facility] by registering at the following website: [email or web address]. By joining [facility]’s FollowMyHealth portal, you will also be able to view your health information using other applications (apps) compatible with our system.

## 2023-11-04 NOTE — ED ADULT NURSE NOTE - DOES PATIENT HAVE ADVANCE DIRECTIVE
INTERVAL HISTORY:   - No acute events overnight. Lactates has been stable.   - He was intermittently tachypneic to 70-80s.  - Baby is saturating high 80s and low 90s.     RESPIRATORY SUPPORT: RA  NUTRITION: NPO    Intake and output:      @ 07:01  -   @ 07:00  --------------------------------------------------------  IN: 251.2 mL / OUT: 69 mL / NET: 182.2 mL      INTRAVASCULAR ACCESS: UAC and UVC    MEDICATIONS:  alprostadil Infusion - Peds 0.01 MICROgram(s)/kG/Min IV Continuous <Continuous>  heparin   Infusion -  0.073 Unit(s)/kG/Hr IV Continuous <Continuous>  heparin   Infusion -  0.073 Unit(s)/kG/Hr IV Continuous <Continuous>  Parenteral Nutrition -  1 Each TPN Continuous <Continuous>  Parenteral Nutrition -  1 Each TPN Continuous <Continuous>    PHYSICAL EXAMINATION:  Vital signs - Weight (kg): 3.445 ( @ 20:51)  T(C): 37.4 (21 @ 06:00), Max: 38.4 (21 @ 20:30)  HR: 152 (21 @ 06:00) (136 - 152)  BP: 61/32 (21 @ 06:00) (51/33 - 64/36)  ABP:  (49/34 - 56/37)  RR: 76 (21 @ 06:00) (40 - 90)  SpO2: 92% (21 @ 06:00) (90% - 95%)    General - non-dysmorphic appearance, well-developed, in no distress.  Skin - no cyanosis.  Eyes / ENT -mucous membranes moist.  Pulmonary - normal inspiratory effort, no retractions, lungs clear to auscultation bilaterally, no wheezes, no rales.  Cardiovascular - normal rate, regular rhythm, normal S1 & S2, 2/6 EMILY at LSB, no rubs, no gallops, capillary refill < 2sec, normal pulses.  Gastrointestinal - soft, non-distended, non-tender, no hepatosplenomegaly   Musculoskeletal - no joint swelling, no clubbing, no edema.  Neurologic / Psychiatric - alert, oriented as age-appropriate, affect appropriate, moves all extremities, normal tone.  .    LABORATORY TESTS:                          13.9  CBC:   11.97 )-----------( 290   (21 @ 22:17)                          40.4               133   |  102   |  20                 Ca: 9.3    BMP:   ----------------------------< 88     M.6   (21 @ 01:23)             3.3    |  19    | 0.56               Ph: 4.8            ABG:   pH: 7.37 / pCO2: 36 / pO2: 50 / HCO3: 21 / Base Excess: -4.1 / SaO2: 87.6 / Lactate: x / iCa: 1.31   (21 @ 01:10)      IMAGING STUDIES:  Electrocardiogram - ()- NSR with ventricular trigeminy , possible RVH, T wave inversion in lateral leads and prolong QTc 470 msec    Telemetry- ()- Intermittent PVCs overnight.     Echocardiogram -  (21)   1. S-Atrial situs solitus; D-Ventricular loop; D-Transposition of the great arteries.   2. Double outlet right ventricle      -remote ventricular septal defect.      -aortic valve anterior and rightward of pulmonary valve.      -no sub-aortic obstruction.      -no sub-pulmonic obstruction.      -conotruncal anatomy: bilateral conus.   3. Moderate posterior muscular ventricular septal defect with left to right flow. Additional moderate inferior mid-muscular ventricular septal defect. Cannot rule out additional muscular ventricular septal defects.   4. Small, secundum type defect in interatrial septum, with left to right flow across the interatrial septum.   5. Mildly dilated right atrium.   6. Moderate right ventricular hypertrophy and moderately dilated right ventricle.   7. Qualitatively normal right ventricular systolic function.   8. Normal left ventricular systolic function.   9. Long and thin but apex-forming left ventricle.  10. Pre-ductal coarctation of the aorta.  11. Large patent ductus arteriosus with bidirectional shunt.  12. No pericardial effusion.       No

## 2023-11-04 NOTE — ED PROVIDER NOTE - ATTENDING APP SHARED VISIT CONTRIBUTION OF CARE
57-year-old male, past medical history of hypertension, hyperlipidemia, COPD, opioid use, presenting with shortness of breath that started when he was doing some heavy lifting in the yard yesterday, feels like his COPD, not alleviated by his typical medications.  Denies fevers, chills, chest pain, nausea, vomiting, abdominal pain.    CONSTITUTIONAL: Well-developed; well-nourished; in no acute distress.   SKIN: warm, dry  HEAD: Normocephalic  NECK: Supple; non tender.  CARD: S1, S2 normal; Regular rate and rhythm.   RESP: +diffuse b/l wheezing, mild increased respiratory effort  ABD: soft ntnd  EXT: Normal ROM.  No clubbing, cyanosis or edema.   NEURO: Alert, oriented, grossly unremarkable  PSYCH: Cooperative, appropriate. 57-year-old male, past medical history of hypertension, hyperlipidemia, COPD, opioid use, presenting with shortness of breath that started when he was doing some heavy lifting in the yard yesterday, feels like his COPD, not alleviated by his typical medications.  Denies fevers, chills, chest pain, nausea, vomiting, abdominal pain.    CONSTITUTIONAL: Well-developed; well-nourished; in no acute distress.   SKIN: warm, dry  HEAD: Normocephalic  NECK: Supple; non tender.  CARD: S1, S2 normal; Regular rate and rhythm.   RESP: +diffuse b/l wheezing, no increased respiratory effort  ABD: soft ntnd  EXT: Normal ROM.  No clubbing, cyanosis or edema.   NEURO: Alert, oriented, grossly unremarkable  PSYCH: Cooperative, appropriate.

## 2023-11-04 NOTE — ED PROVIDER NOTE - PHYSICAL EXAMINATION
CARD: Normal S1 S2; Normal rate and rhythm  RESP: Equal BS B/L, diffuse wheeze and crackles bilaterally  GI: Soft, non-tender,   MS: Normal ROM in all extremities. No midline spinal tenderness.  SKIN: Warm, dry, no acute rashes. Good turgor  NEURO: A&Ox3, No focal deficits. Strength 5/5 with no sensory deficits. Steady gait

## 2023-11-05 ENCOUNTER — EMERGENCY (EMERGENCY)
Facility: HOSPITAL | Age: 58
LOS: 0 days | Discharge: HOME | End: 2023-11-05
Attending: EMERGENCY MEDICINE
Payer: MEDICAID

## 2023-11-05 VITALS
RESPIRATION RATE: 20 BRPM | HEART RATE: 98 BPM | DIASTOLIC BLOOD PRESSURE: 93 MMHG | HEIGHT: 70 IN | SYSTOLIC BLOOD PRESSURE: 148 MMHG | TEMPERATURE: 98 F | OXYGEN SATURATION: 96 % | WEIGHT: 300.05 LBS

## 2023-11-05 DIAGNOSIS — R06.02 SHORTNESS OF BREATH: ICD-10-CM

## 2023-11-05 DIAGNOSIS — I25.10 ATHEROSCLEROTIC HEART DISEASE OF NATIVE CORONARY ARTERY WITHOUT ANGINA PECTORIS: ICD-10-CM

## 2023-11-05 DIAGNOSIS — K40.20 BILATERAL INGUINAL HERNIA, WITHOUT OBSTRUCTION OR GANGRENE, NOT SPECIFIED AS RECURRENT: Chronic | ICD-10-CM

## 2023-11-05 DIAGNOSIS — E78.5 HYPERLIPIDEMIA, UNSPECIFIED: ICD-10-CM

## 2023-11-05 DIAGNOSIS — Z87.891 PERSONAL HISTORY OF NICOTINE DEPENDENCE: ICD-10-CM

## 2023-11-05 DIAGNOSIS — I10 ESSENTIAL (PRIMARY) HYPERTENSION: ICD-10-CM

## 2023-11-05 DIAGNOSIS — Z98.890 OTHER SPECIFIED POSTPROCEDURAL STATES: Chronic | ICD-10-CM

## 2023-11-05 DIAGNOSIS — J44.1 CHRONIC OBSTRUCTIVE PULMONARY DISEASE WITH (ACUTE) EXACERBATION: ICD-10-CM

## 2023-11-05 DIAGNOSIS — R07.89 OTHER CHEST PAIN: ICD-10-CM

## 2023-11-05 DIAGNOSIS — Z96.651 PRESENCE OF RIGHT ARTIFICIAL KNEE JOINT: Chronic | ICD-10-CM

## 2023-11-05 DIAGNOSIS — N40.0 BENIGN PROSTATIC HYPERPLASIA WITHOUT LOWER URINARY TRACT SYMPTOMS: ICD-10-CM

## 2023-11-05 DIAGNOSIS — J18.9 PNEUMONIA, UNSPECIFIED ORGANISM: ICD-10-CM

## 2023-11-05 DIAGNOSIS — M79.602 PAIN IN LEFT ARM: ICD-10-CM

## 2023-11-05 LAB
ALBUMIN SERPL ELPH-MCNC: 4.1 G/DL — SIGNIFICANT CHANGE UP (ref 3.5–5.2)
ALBUMIN SERPL ELPH-MCNC: 4.1 G/DL — SIGNIFICANT CHANGE UP (ref 3.5–5.2)
ALP SERPL-CCNC: 121 U/L — HIGH (ref 30–115)
ALP SERPL-CCNC: 121 U/L — HIGH (ref 30–115)
ALT FLD-CCNC: 16 U/L — SIGNIFICANT CHANGE UP (ref 0–41)
ALT FLD-CCNC: 16 U/L — SIGNIFICANT CHANGE UP (ref 0–41)
ANION GAP SERPL CALC-SCNC: 11 MMOL/L — SIGNIFICANT CHANGE UP (ref 7–14)
ANION GAP SERPL CALC-SCNC: 11 MMOL/L — SIGNIFICANT CHANGE UP (ref 7–14)
AST SERPL-CCNC: 14 U/L — SIGNIFICANT CHANGE UP (ref 0–41)
AST SERPL-CCNC: 14 U/L — SIGNIFICANT CHANGE UP (ref 0–41)
BASOPHILS # BLD AUTO: 0.01 K/UL — SIGNIFICANT CHANGE UP (ref 0–0.2)
BASOPHILS # BLD AUTO: 0.01 K/UL — SIGNIFICANT CHANGE UP (ref 0–0.2)
BASOPHILS NFR BLD AUTO: 0.1 % — SIGNIFICANT CHANGE UP (ref 0–1)
BASOPHILS NFR BLD AUTO: 0.1 % — SIGNIFICANT CHANGE UP (ref 0–1)
BILIRUB SERPL-MCNC: 0.4 MG/DL — SIGNIFICANT CHANGE UP (ref 0.2–1.2)
BILIRUB SERPL-MCNC: 0.4 MG/DL — SIGNIFICANT CHANGE UP (ref 0.2–1.2)
BUN SERPL-MCNC: 13 MG/DL — SIGNIFICANT CHANGE UP (ref 10–20)
BUN SERPL-MCNC: 13 MG/DL — SIGNIFICANT CHANGE UP (ref 10–20)
CALCIUM SERPL-MCNC: 9.9 MG/DL — SIGNIFICANT CHANGE UP (ref 8.4–10.5)
CALCIUM SERPL-MCNC: 9.9 MG/DL — SIGNIFICANT CHANGE UP (ref 8.4–10.5)
CHLORIDE SERPL-SCNC: 102 MMOL/L — SIGNIFICANT CHANGE UP (ref 98–110)
CHLORIDE SERPL-SCNC: 102 MMOL/L — SIGNIFICANT CHANGE UP (ref 98–110)
CO2 SERPL-SCNC: 24 MMOL/L — SIGNIFICANT CHANGE UP (ref 17–32)
CO2 SERPL-SCNC: 24 MMOL/L — SIGNIFICANT CHANGE UP (ref 17–32)
CREAT SERPL-MCNC: 0.7 MG/DL — SIGNIFICANT CHANGE UP (ref 0.7–1.5)
CREAT SERPL-MCNC: 0.7 MG/DL — SIGNIFICANT CHANGE UP (ref 0.7–1.5)
EGFR: 109 ML/MIN/1.73M2 — SIGNIFICANT CHANGE UP
EGFR: 109 ML/MIN/1.73M2 — SIGNIFICANT CHANGE UP
EOSINOPHIL # BLD AUTO: 0 K/UL — SIGNIFICANT CHANGE UP (ref 0–0.7)
EOSINOPHIL # BLD AUTO: 0 K/UL — SIGNIFICANT CHANGE UP (ref 0–0.7)
EOSINOPHIL NFR BLD AUTO: 0 % — SIGNIFICANT CHANGE UP (ref 0–8)
EOSINOPHIL NFR BLD AUTO: 0 % — SIGNIFICANT CHANGE UP (ref 0–8)
GAS PNL BLDV: SIGNIFICANT CHANGE UP
GAS PNL BLDV: SIGNIFICANT CHANGE UP
GLUCOSE SERPL-MCNC: 129 MG/DL — HIGH (ref 70–99)
GLUCOSE SERPL-MCNC: 129 MG/DL — HIGH (ref 70–99)
HCT VFR BLD CALC: 42.4 % — SIGNIFICANT CHANGE UP (ref 42–52)
HCT VFR BLD CALC: 42.4 % — SIGNIFICANT CHANGE UP (ref 42–52)
HGB BLD-MCNC: 14.3 G/DL — SIGNIFICANT CHANGE UP (ref 14–18)
HGB BLD-MCNC: 14.3 G/DL — SIGNIFICANT CHANGE UP (ref 14–18)
IMM GRANULOCYTES NFR BLD AUTO: 0.4 % — HIGH (ref 0.1–0.3)
IMM GRANULOCYTES NFR BLD AUTO: 0.4 % — HIGH (ref 0.1–0.3)
LYMPHOCYTES # BLD AUTO: 1.21 K/UL — SIGNIFICANT CHANGE UP (ref 1.2–3.4)
LYMPHOCYTES # BLD AUTO: 1.21 K/UL — SIGNIFICANT CHANGE UP (ref 1.2–3.4)
LYMPHOCYTES # BLD AUTO: 7.8 % — LOW (ref 20.5–51.1)
LYMPHOCYTES # BLD AUTO: 7.8 % — LOW (ref 20.5–51.1)
MCHC RBC-ENTMCNC: 28.9 PG — SIGNIFICANT CHANGE UP (ref 27–31)
MCHC RBC-ENTMCNC: 28.9 PG — SIGNIFICANT CHANGE UP (ref 27–31)
MCHC RBC-ENTMCNC: 33.7 G/DL — SIGNIFICANT CHANGE UP (ref 32–37)
MCHC RBC-ENTMCNC: 33.7 G/DL — SIGNIFICANT CHANGE UP (ref 32–37)
MCV RBC AUTO: 85.7 FL — SIGNIFICANT CHANGE UP (ref 80–94)
MCV RBC AUTO: 85.7 FL — SIGNIFICANT CHANGE UP (ref 80–94)
MONOCYTES # BLD AUTO: 0.94 K/UL — HIGH (ref 0.1–0.6)
MONOCYTES # BLD AUTO: 0.94 K/UL — HIGH (ref 0.1–0.6)
MONOCYTES NFR BLD AUTO: 6 % — SIGNIFICANT CHANGE UP (ref 1.7–9.3)
MONOCYTES NFR BLD AUTO: 6 % — SIGNIFICANT CHANGE UP (ref 1.7–9.3)
NEUTROPHILS # BLD AUTO: 13.39 K/UL — HIGH (ref 1.4–6.5)
NEUTROPHILS # BLD AUTO: 13.39 K/UL — HIGH (ref 1.4–6.5)
NEUTROPHILS NFR BLD AUTO: 85.7 % — HIGH (ref 42.2–75.2)
NEUTROPHILS NFR BLD AUTO: 85.7 % — HIGH (ref 42.2–75.2)
NRBC # BLD: 0 /100 WBCS — SIGNIFICANT CHANGE UP (ref 0–0)
NRBC # BLD: 0 /100 WBCS — SIGNIFICANT CHANGE UP (ref 0–0)
NT-PROBNP SERPL-SCNC: 284 PG/ML — SIGNIFICANT CHANGE UP (ref 0–300)
NT-PROBNP SERPL-SCNC: 284 PG/ML — SIGNIFICANT CHANGE UP (ref 0–300)
PLATELET # BLD AUTO: 300 K/UL — SIGNIFICANT CHANGE UP (ref 130–400)
PLATELET # BLD AUTO: 300 K/UL — SIGNIFICANT CHANGE UP (ref 130–400)
PMV BLD: 10.1 FL — SIGNIFICANT CHANGE UP (ref 7.4–10.4)
PMV BLD: 10.1 FL — SIGNIFICANT CHANGE UP (ref 7.4–10.4)
POTASSIUM SERPL-MCNC: 4.2 MMOL/L — SIGNIFICANT CHANGE UP (ref 3.5–5)
POTASSIUM SERPL-MCNC: 4.2 MMOL/L — SIGNIFICANT CHANGE UP (ref 3.5–5)
POTASSIUM SERPL-SCNC: 4.2 MMOL/L — SIGNIFICANT CHANGE UP (ref 3.5–5)
POTASSIUM SERPL-SCNC: 4.2 MMOL/L — SIGNIFICANT CHANGE UP (ref 3.5–5)
PROT SERPL-MCNC: 6.9 G/DL — SIGNIFICANT CHANGE UP (ref 6–8)
PROT SERPL-MCNC: 6.9 G/DL — SIGNIFICANT CHANGE UP (ref 6–8)
RBC # BLD: 4.95 M/UL — SIGNIFICANT CHANGE UP (ref 4.7–6.1)
RBC # BLD: 4.95 M/UL — SIGNIFICANT CHANGE UP (ref 4.7–6.1)
RBC # FLD: 14.8 % — HIGH (ref 11.5–14.5)
RBC # FLD: 14.8 % — HIGH (ref 11.5–14.5)
SODIUM SERPL-SCNC: 137 MMOL/L — SIGNIFICANT CHANGE UP (ref 135–146)
SODIUM SERPL-SCNC: 137 MMOL/L — SIGNIFICANT CHANGE UP (ref 135–146)
TROPONIN T SERPL-MCNC: <0.01 NG/ML — SIGNIFICANT CHANGE UP
TROPONIN T SERPL-MCNC: <0.01 NG/ML — SIGNIFICANT CHANGE UP
WBC # BLD: 15.61 K/UL — HIGH (ref 4.8–10.8)
WBC # BLD: 15.61 K/UL — HIGH (ref 4.8–10.8)
WBC # FLD AUTO: 15.61 K/UL — HIGH (ref 4.8–10.8)
WBC # FLD AUTO: 15.61 K/UL — HIGH (ref 4.8–10.8)

## 2023-11-05 PROCEDURE — 96374 THER/PROPH/DIAG INJ IV PUSH: CPT

## 2023-11-05 PROCEDURE — 93010 ELECTROCARDIOGRAM REPORT: CPT

## 2023-11-05 PROCEDURE — 93005 ELECTROCARDIOGRAM TRACING: CPT

## 2023-11-05 PROCEDURE — 71045 X-RAY EXAM CHEST 1 VIEW: CPT

## 2023-11-05 PROCEDURE — 99285 EMERGENCY DEPT VISIT HI MDM: CPT

## 2023-11-05 PROCEDURE — 84484 ASSAY OF TROPONIN QUANT: CPT

## 2023-11-05 PROCEDURE — 82330 ASSAY OF CALCIUM: CPT

## 2023-11-05 PROCEDURE — 85014 HEMATOCRIT: CPT

## 2023-11-05 PROCEDURE — 94640 AIRWAY INHALATION TREATMENT: CPT

## 2023-11-05 PROCEDURE — 99285 EMERGENCY DEPT VISIT HI MDM: CPT | Mod: 25

## 2023-11-05 PROCEDURE — 85025 COMPLETE CBC W/AUTO DIFF WBC: CPT

## 2023-11-05 PROCEDURE — 96376 TX/PRO/DX INJ SAME DRUG ADON: CPT

## 2023-11-05 PROCEDURE — 96375 TX/PRO/DX INJ NEW DRUG ADDON: CPT

## 2023-11-05 PROCEDURE — 85018 HEMOGLOBIN: CPT

## 2023-11-05 PROCEDURE — 84132 ASSAY OF SERUM POTASSIUM: CPT

## 2023-11-05 PROCEDURE — 80053 COMPREHEN METABOLIC PANEL: CPT

## 2023-11-05 PROCEDURE — 82803 BLOOD GASES ANY COMBINATION: CPT

## 2023-11-05 PROCEDURE — 36415 COLL VENOUS BLD VENIPUNCTURE: CPT

## 2023-11-05 PROCEDURE — 71045 X-RAY EXAM CHEST 1 VIEW: CPT | Mod: 26

## 2023-11-05 PROCEDURE — 84295 ASSAY OF SERUM SODIUM: CPT

## 2023-11-05 PROCEDURE — 83605 ASSAY OF LACTIC ACID: CPT

## 2023-11-05 PROCEDURE — 83880 ASSAY OF NATRIURETIC PEPTIDE: CPT

## 2023-11-05 RX ORDER — MORPHINE SULFATE 50 MG/1
4 CAPSULE, EXTENDED RELEASE ORAL ONCE
Refills: 0 | Status: DISCONTINUED | OUTPATIENT
Start: 2023-11-05 | End: 2023-11-05

## 2023-11-05 RX ORDER — AZITHROMYCIN 500 MG/1
500 TABLET, FILM COATED ORAL ONCE
Refills: 0 | Status: COMPLETED | OUTPATIENT
Start: 2023-11-05 | End: 2023-11-05

## 2023-11-05 RX ORDER — MORPHINE SULFATE 50 MG/1
6 CAPSULE, EXTENDED RELEASE ORAL ONCE
Refills: 0 | Status: DISCONTINUED | OUTPATIENT
Start: 2023-11-05 | End: 2023-11-05

## 2023-11-05 RX ORDER — ACETAMINOPHEN 500 MG
975 TABLET ORAL ONCE
Refills: 0 | Status: COMPLETED | OUTPATIENT
Start: 2023-11-05 | End: 2023-11-05

## 2023-11-05 RX ORDER — IPRATROPIUM/ALBUTEROL SULFATE 18-103MCG
3 AEROSOL WITH ADAPTER (GRAM) INHALATION
Refills: 0 | Status: COMPLETED | OUTPATIENT
Start: 2023-11-05 | End: 2023-11-05

## 2023-11-05 RX ADMIN — Medication 125 MILLIGRAM(S): at 03:07

## 2023-11-05 RX ADMIN — MORPHINE SULFATE 4 MILLIGRAM(S): 50 CAPSULE, EXTENDED RELEASE ORAL at 04:41

## 2023-11-05 RX ADMIN — MORPHINE SULFATE 6 MILLIGRAM(S): 50 CAPSULE, EXTENDED RELEASE ORAL at 04:02

## 2023-11-05 RX ADMIN — MORPHINE SULFATE 4 MILLIGRAM(S): 50 CAPSULE, EXTENDED RELEASE ORAL at 05:48

## 2023-11-05 RX ADMIN — Medication 3 MILLILITER(S): at 02:56

## 2023-11-05 RX ADMIN — Medication 3 MILLILITER(S): at 02:31

## 2023-11-05 RX ADMIN — Medication 3 MILLILITER(S): at 02:45

## 2023-11-05 RX ADMIN — AZITHROMYCIN 255 MILLIGRAM(S): 500 TABLET, FILM COATED ORAL at 03:08

## 2023-11-05 NOTE — ED PROVIDER NOTE - PROVIDER TOKENS
FREE:[LAST:[YOUR PRIMARY CARE DOCTOR],PHONE:[(   )    -],FAX:[(   )    -]],PROVIDER:[TOKEN:[[id number]],FOLLOWUP:[Routine]],PROVIDER:[TOKEN:[[id number]],FOLLOWUP:[7-10 Days]]

## 2023-11-05 NOTE — ED PROVIDER NOTE - CARE PROVIDERS DIRECT ADDRESSES
,DirectAddress_Unknown,DirectAddress_Unknown,[email or web address]

## 2023-11-05 NOTE — ED ADULT NURSE REASSESSMENT NOTE - NS ED NURSE REASSESS COMMENT FT1
pt keeps getting out of bed, taking oxygen off and abx off and walking around the ER, pt redirected to room multiple times and educated on the importance of staying in bed and keeping oxygen on and abx on, pt agrees, but then shortly gets up after and continues to walk around, pt walks with steady gait, nad

## 2023-11-05 NOTE — ED PROVIDER NOTE - PATIENT PORTAL LINK FT
You can access the FollowMyHealth Patient Portal offered by [facility] [place] by registering at the following website: [email or web address]. By joining [employer]’s FollowMyHealth portal, you will also be able to view your health information using other applications (apps) compatible with our system.

## 2023-11-05 NOTE — ED PROVIDER NOTE - PHYSICAL EXAMINATION
Physical Exam    Vital Signs: I have reviewed the initial vital signs.  Constitutional: well-nourished, appears stated age, no acute distress  Eyes: Conjunctiva pink, Sclera clear, PERRLA, EOMI without pain.  Cardiovascular: S1 and S2, regular rate, regular rhythm, well-perfused extremities, radial pulses equal and 2+ b/l.   Respiratory: unlabored respiratory effort, (+) b/l wheezing throughout. pt is speaking full sentences. no accessory muscle use.   Gastrointestinal: soft, non-tender, nondistended abdomen, no pulsatile mass, no rebound, no guarding  Musculoskeletal: FROM of b/l upper and lower extremities, no lower extremity edema, no calf tenderness  Integumentary: warm, dry, no rash  Neurologic: awake, alert, steady gait.   Psychiatric: appropriate mood, appropriate affect

## 2023-11-05 NOTE — ED ADULT NURSE REASSESSMENT NOTE - NS ED NURSE REASSESS COMMENT FT1
pt is aaox4, nad, respirations easy and regular, skin is warm, dry, and normal in color, pt is resting and comfortable

## 2023-11-05 NOTE — ED PROVIDER NOTE - OBJECTIVE STATEMENT
57-year-old male with a past medical history hypertension, hyperlipidemia, BPH, COPD, asthma, and CAD presents to the ED for evaluation of midsternal chest pain associated with shortness of breath and left arm pain that began this evening.  Patient was seen in the ED yesterday for shortness of breath and diagnosed with pneumonia and COPD exacerbation discharge on Levaquin and prednisone.  Patient reports he broke his nebulizer machine 2 days ago so he has not been using his nebulizer.  Patient reports he follows cardiologist Dr. Wen and pulmonologist Dr. Nelson.  Patient was seen in the ED on July 24, 2023 and had a nuclear stress test performed which shows no definite evidence for ischemia, normal resting left ventricular wall motion and wall thickening, and left ventricular ejection fraction of 68%.  Patient denies fever, chills, abdominal pain, nausea, vomiting, diarrhea, constipation, leg pain, leg swelling, recent travel, recent trauma, recent surgeries, recent hospitalizations, history of cancer, or history of blood clots.

## 2023-11-05 NOTE — ED PROVIDER NOTE - CARE PROVIDER_API CALL
YOUR PRIMARY CARE DOCTOR,   Phone: (   )    -  Fax: (   )    -  Follow Up Time:     Murali Brooks  Pulmonary Disease  [address] [place] [id number]  Phone: [phone]  Fax: [phone]  Follow Up Time: Erwin Hickey  Cardiovascular Disease  [address] [facility], [facility] [address]  [place] [address]  Phone: [phone]  Fax: [phone]  Follow Up Time: 7-10 Days

## 2023-11-05 NOTE — ED PROVIDER NOTE - NSFOLLOWUPINSTRUCTIONS_ED_ALL_ED_FT
YOU WERE PRESCRIBED PREDNISONE FOR YOUR COPD DURING YOUR ED VISIT YESTERDAY. YOU WERE ALSO PRESCRIBED LEVAQUIN FOR YOUR PNEUMONIA YESTERDAY DURING YOUR ED VISIT. PLEASE BE SURE TO  YOUR MEDICATIONS AT YOUR PHARMACY.     Chronic obstructive pulmonary disease (COPD) is a disease that limits the flow of air in and out of your lungs. This makes it harder to breathe. With COPD, you are also more likely to get lung infections. COPD includes chronic bronchitis and emphysema. COPD is most often caused by heavy, long-term cigarette smoking. If you smoke, quit. It is the best thing you can do for your COPD and your overall health. To avoid infections, wash your hands often. Do your best to keep your hands away from your face. Most germs are spread from your hands to your mouth. Get a flu shot every year. Also ask your provider about pneumonia vaccines. To stay healthy, get enough sleep, exercise regularly, and eat a balanced diet.    Take your medicines exactly as directed. Don't skip doses.    Call your provider immediately if you have any of the following:  Shortness of breath, wheezing, or coughing  Increased mucus  Yellow, green, bloody, or smelly mucus  Fever or chills  Tightness in your chest that does not go away with rest or medicine  An irregular heartbeat or a feeling that your heart is beating very fast  Swollen ankles.    Pneumonia    WHAT YOU NEED TO KNOW:    Pneumonia is an infection in your lungs caused by bacteria, viruses, fungi, or parasites. You can become infected if you come in contact with someone who is sick. You can get pneumonia if you recently had surgery or needed a ventilator to help you breathe. Pneumonia can also be caused by accidentally inhaling saliva or small pieces of food. Pneumonia may cause mild symptoms, or it can be severe and life-threatening. The Lungs         DISCHARGE INSTRUCTIONS:    Return to the emergency department if:     You cough up blood.       Your heart beats more than 100 beats in 1 minute.       You are very tired, confused, and cannot think clearly.      You have chest pain or trouble breathing.       Your lips or fingernails turn gray or blue.     Contact your healthcare provider if:     Your symptoms are the same or get worse 48 hours after you start antibiotics.      Your fever is not below 99°F (37.2°C) 48 hours after you start antibiotics.       You have a fever higher than 101°F (38.3°C).       You cannot eat, or you have loss of appetite, nausea, or are vomiting.      You have questions or concerns about your condition or care.    Medicines:     Antibiotics treat pneumonia caused by bacteria.      Acetaminophen decreases pain and fever. It is available without a doctor's order. Ask how much to take and how often to take it. Follow directions. Read the labels of all other medicines you are using to see if they also contain acetaminophen, or ask your doctor or pharmacist. Acetaminophen can cause liver damage if not taken correctly. Do not use more than 4 grams (4,000 milligrams) total of acetaminophen in one day.       NSAIDs, such as ibuprofen, help decrease swelling, pain, and fever. This medicine is available with or without a doctor's order. NSAIDs can cause stomach bleeding or kidney problems in certain people. If you take blood thinner medicine, always ask your healthcare provider if NSAIDs are safe for you. Always read the medicine label and follow directions.      Take your medicine as directed. Contact your healthcare provider if you think your medicine is not helping or if you have side effects. Tell him or her if you are allergic to any medicine. Keep a list of the medicines, vitamins, and herbs you take. Include the amounts, and when and why you take them. Bring the list or the pill bottles to follow-up visits. Carry your medicine list with you in case of an emergency.    Follow up with your healthcare provider as directed: You will need to return for more tests. Write down your questions so you remember to ask them during your visits.     Manage your symptoms:     Rest as needed. Rest often throughout the day. Alternate times of activity with times of rest.      Drink liquids as directed. Ask how much liquid to drink each day and which liquids are best for you. Liquids help thin your mucus, which may make it easier for you to cough it up.       Do not smoke. Avoid secondhand smoke. Smoking increases your risk for pneumonia. Smoking also makes it harder for you to get better after you have had pneumonia. Ask your healthcare provider for information if you need help to quit smoking.       Use a cool mist humidifier. A humidifier will help increase air moisture in your home. This may make it easier for you to breathe and help decrease your cough.       Keep your head elevated. You may be able to breathe better if you lie down with the head of your bed up.     Prevent pneumonia:     Prevent the spread of germs. Wash your hands often with soap and water. Use gel hand cleanser when there is no soap and water available. Do not touch your eyes, nose, or mouth unless you have washed your hands first. Cover your mouth when you cough. Cough into a tissue or your shirtsleeve so you do not spread germs from your hands. If you are sick, stay away from others as much as possible. Handwashing           Limit alcohol. Women should limit alcohol to 1 drink a day. Men should limit alcohol to 2 drinks a day. A drink of alcohol is 12 ounces of beer, 5 ounces of wine, or 1½ ounces of liquor.      Ask about vaccines. You may need a vaccine to help prevent pneumonia. Get an influenza (flu) vaccine every year as soon as it becomes available.          © Copyright [employer] 2019 All illustrations and images included in CareNotes are the copyrighted property of [employer] or [employer].

## 2023-11-05 NOTE — ED PROVIDER NOTE - CLINICAL SUMMARY MEDICAL DECISION MAKING FREE TEXT BOX
57-year-old male presents to the ED complaining of pain everywhere, states he is not withdrawal from his medications.  Was seen in ED yesterday, diagnosed with  pneumonia, prescription was sent for antibiotics.  He appears well, vital signs are normal, he is ambulating in ED without difficulties, repeatedly requesting pain medications for "withdrawal".  Stable for discharge home, advised to follow-up with his PCP, take his medications as prescribed, strict return precautions given.

## 2023-11-05 NOTE — ED PROVIDER NOTE - PROGRESS NOTE DETAILS
EP: Patient is ambulating in ED without any difficulties walking from nursing station to nursing station, asking for pain medications.  States he filled his  prescription for antibiotic, started taking it, but did not take anything for pain at home because "I spent all day in bed".  CXR completed, appears similar to prior.  Will give a dose of analgesia and d/c home..  He is amenable with the plan. FF: pt is ambulating in the ED without العراقي. pt keeps approaching the nursing station as well as the attending/acp proivder station asking for multiple doses of narcotics. pt reports he takes oral morphine and oxycotin at home but did not today because he was in bed. pt is also requesting to be given dilaudid because morphine makes him itchy, but he takes it daily at home. pt refusing any nausea medication or tylenol. I discussed lab and radiology results with pt. agreeable to dc. f/u with pcp.

## 2023-11-05 NOTE — ED PROVIDER NOTE - ATTENDING APP SHARED VISIT CONTRIBUTION OF CARE
57-year-old male PMH REBECCA on BiPAP, COPD, morbid obesity, GERD, hypertension, hypercholesterol presenting for evaluation of "shortness of breath".  Of note, patient was seen for the same complaint yesterday, had labs, chest x-ray was diagnosed with pneumonia, prescription for levofloxacin was sent to his pharmacy.  He appears well, no acute distress, ambulating in ED without dyspnea or tachypnea or hypoxia, equal air entry bilaterally, speaking full sentences, no accessory muscle use, RRR, well-perfused extremities, awake alert.  Plan: Labs, reassess, anticipate DC home.

## 2023-11-07 ENCOUNTER — EMERGENCY (EMERGENCY)
Facility: HOSPITAL | Age: 58
LOS: 0 days | Discharge: HOME | End: 2023-11-08
Attending: EMERGENCY MEDICINE
Payer: MEDICAID

## 2023-11-07 VITALS
TEMPERATURE: 98 F | OXYGEN SATURATION: 95 % | WEIGHT: 289.91 LBS | SYSTOLIC BLOOD PRESSURE: 127 MMHG | RESPIRATION RATE: 18 BRPM | DIASTOLIC BLOOD PRESSURE: 77 MMHG | HEIGHT: 70 IN | HEART RATE: 91 BPM

## 2023-11-07 DIAGNOSIS — Z96.651 PRESENCE OF RIGHT ARTIFICIAL KNEE JOINT: Chronic | ICD-10-CM

## 2023-11-07 DIAGNOSIS — I25.10 ATHEROSCLEROTIC HEART DISEASE OF NATIVE CORONARY ARTERY WITHOUT ANGINA PECTORIS: ICD-10-CM

## 2023-11-07 DIAGNOSIS — I10 ESSENTIAL (PRIMARY) HYPERTENSION: ICD-10-CM

## 2023-11-07 DIAGNOSIS — Z87.19 PERSONAL HISTORY OF OTHER DISEASES OF THE DIGESTIVE SYSTEM: ICD-10-CM

## 2023-11-07 DIAGNOSIS — Z98.890 OTHER SPECIFIED POSTPROCEDURAL STATES: Chronic | ICD-10-CM

## 2023-11-07 DIAGNOSIS — J44.1 CHRONIC OBSTRUCTIVE PULMONARY DISEASE WITH (ACUTE) EXACERBATION: ICD-10-CM

## 2023-11-07 DIAGNOSIS — F17.200 NICOTINE DEPENDENCE, UNSPECIFIED, UNCOMPLICATED: ICD-10-CM

## 2023-11-07 DIAGNOSIS — R06.02 SHORTNESS OF BREATH: ICD-10-CM

## 2023-11-07 DIAGNOSIS — E78.00 PURE HYPERCHOLESTEROLEMIA, UNSPECIFIED: ICD-10-CM

## 2023-11-07 DIAGNOSIS — M19.90 UNSPECIFIED OSTEOARTHRITIS, UNSPECIFIED SITE: ICD-10-CM

## 2023-11-07 DIAGNOSIS — Z90.49 ACQUIRED ABSENCE OF OTHER SPECIFIED PARTS OF DIGESTIVE TRACT: ICD-10-CM

## 2023-11-07 PROCEDURE — 36415 COLL VENOUS BLD VENIPUNCTURE: CPT

## 2023-11-07 PROCEDURE — 84295 ASSAY OF SERUM SODIUM: CPT

## 2023-11-07 PROCEDURE — 94640 AIRWAY INHALATION TREATMENT: CPT

## 2023-11-07 PROCEDURE — 83605 ASSAY OF LACTIC ACID: CPT

## 2023-11-07 PROCEDURE — 85025 COMPLETE CBC W/AUTO DIFF WBC: CPT

## 2023-11-07 PROCEDURE — 80053 COMPREHEN METABOLIC PANEL: CPT

## 2023-11-07 PROCEDURE — 99284 EMERGENCY DEPT VISIT MOD MDM: CPT | Mod: 25

## 2023-11-07 PROCEDURE — 82803 BLOOD GASES ANY COMBINATION: CPT

## 2023-11-07 PROCEDURE — 84132 ASSAY OF SERUM POTASSIUM: CPT

## 2023-11-07 PROCEDURE — 99285 EMERGENCY DEPT VISIT HI MDM: CPT

## 2023-11-07 PROCEDURE — 85018 HEMOGLOBIN: CPT

## 2023-11-07 PROCEDURE — 82330 ASSAY OF CALCIUM: CPT

## 2023-11-07 PROCEDURE — 85014 HEMATOCRIT: CPT

## 2023-11-07 PROCEDURE — 96374 THER/PROPH/DIAG INJ IV PUSH: CPT

## 2023-11-07 PROCEDURE — 71046 X-RAY EXAM CHEST 2 VIEWS: CPT

## 2023-11-07 NOTE — ED ADULT TRIAGE NOTE - CHIEF COMPLAINT QUOTE
pt biba complaining of shortness of breath. pt sts he was diagnosed here yesterday with pneumonia and he is still coughing and having trouble breathing.

## 2023-11-07 NOTE — ED ADULT TRIAGE NOTE - HEIGHT IN FEET
Continue clindamycin 300 mg 1 capsule 3 times daily.    Norco 7.5/325 medicine one tablet no more than every 6-8 hours as needed for pain.  Take over-the-counter naproxen 2 tablets every 12 hours as needed for breakthrough pain or for fever.    Continue your routine home medications.    Schedule follow-up with the Dr. Larry Mosquera, your oral surgeon, so he can surgically removed your abscess teeth.    You may also follow up with your primary care provider within one week if you're not improving.    Return as needed for worsening condition.   5 (0) independent

## 2023-11-08 VITALS
OXYGEN SATURATION: 93 % | WEIGHT: 289.91 LBS | HEART RATE: 99 BPM | SYSTOLIC BLOOD PRESSURE: 138 MMHG | HEIGHT: 70 IN | DIASTOLIC BLOOD PRESSURE: 67 MMHG | RESPIRATION RATE: 16 BRPM | TEMPERATURE: 99 F

## 2023-11-08 DIAGNOSIS — N40.0 BENIGN PROSTATIC HYPERPLASIA WITHOUT LOWER URINARY TRACT SYMPTOMS: ICD-10-CM

## 2023-11-08 DIAGNOSIS — J44.9 CHRONIC OBSTRUCTIVE PULMONARY DISEASE, UNSPECIFIED: ICD-10-CM

## 2023-11-08 DIAGNOSIS — I25.10 ATHEROSCLEROTIC HEART DISEASE OF NATIVE CORONARY ARTERY WITHOUT ANGINA PECTORIS: ICD-10-CM

## 2023-11-08 DIAGNOSIS — R06.02 SHORTNESS OF BREATH: ICD-10-CM

## 2023-11-08 DIAGNOSIS — Z98.890 OTHER SPECIFIED POSTPROCEDURAL STATES: Chronic | ICD-10-CM

## 2023-11-08 DIAGNOSIS — F17.200 NICOTINE DEPENDENCE, UNSPECIFIED, UNCOMPLICATED: ICD-10-CM

## 2023-11-08 DIAGNOSIS — R06.2 WHEEZING: ICD-10-CM

## 2023-11-08 DIAGNOSIS — Z96.651 PRESENCE OF RIGHT ARTIFICIAL KNEE JOINT: Chronic | ICD-10-CM

## 2023-11-08 DIAGNOSIS — Z87.01 PERSONAL HISTORY OF PNEUMONIA (RECURRENT): ICD-10-CM

## 2023-11-08 DIAGNOSIS — I10 ESSENTIAL (PRIMARY) HYPERTENSION: ICD-10-CM

## 2023-11-08 DIAGNOSIS — R05.9 COUGH, UNSPECIFIED: ICD-10-CM

## 2023-11-08 DIAGNOSIS — K40.20 BILATERAL INGUINAL HERNIA, WITHOUT OBSTRUCTION OR GANGRENE, NOT SPECIFIED AS RECURRENT: Chronic | ICD-10-CM

## 2023-11-08 DIAGNOSIS — E78.5 HYPERLIPIDEMIA, UNSPECIFIED: ICD-10-CM

## 2023-11-08 LAB
ALBUMIN SERPL ELPH-MCNC: 3.9 G/DL — SIGNIFICANT CHANGE UP (ref 3.5–5.2)
ALBUMIN SERPL ELPH-MCNC: 3.9 G/DL — SIGNIFICANT CHANGE UP (ref 3.5–5.2)
ALP SERPL-CCNC: 133 U/L — HIGH (ref 30–115)
ALP SERPL-CCNC: 133 U/L — HIGH (ref 30–115)
ALT FLD-CCNC: 67 U/L — HIGH (ref 0–41)
ALT FLD-CCNC: 67 U/L — HIGH (ref 0–41)
ANION GAP SERPL CALC-SCNC: 12 MMOL/L — SIGNIFICANT CHANGE UP (ref 7–14)
ANION GAP SERPL CALC-SCNC: 12 MMOL/L — SIGNIFICANT CHANGE UP (ref 7–14)
AST SERPL-CCNC: 57 U/L — HIGH (ref 0–41)
AST SERPL-CCNC: 57 U/L — HIGH (ref 0–41)
BASE EXCESS BLDV CALC-SCNC: 1.2 MMOL/L — SIGNIFICANT CHANGE UP (ref -2–3)
BASE EXCESS BLDV CALC-SCNC: 1.2 MMOL/L — SIGNIFICANT CHANGE UP (ref -2–3)
BASOPHILS # BLD AUTO: 0.04 K/UL — SIGNIFICANT CHANGE UP (ref 0–0.2)
BASOPHILS # BLD AUTO: 0.04 K/UL — SIGNIFICANT CHANGE UP (ref 0–0.2)
BASOPHILS NFR BLD AUTO: 0.5 % — SIGNIFICANT CHANGE UP (ref 0–1)
BASOPHILS NFR BLD AUTO: 0.5 % — SIGNIFICANT CHANGE UP (ref 0–1)
BILIRUB SERPL-MCNC: 0.7 MG/DL — SIGNIFICANT CHANGE UP (ref 0.2–1.2)
BILIRUB SERPL-MCNC: 0.7 MG/DL — SIGNIFICANT CHANGE UP (ref 0.2–1.2)
BUN SERPL-MCNC: 17 MG/DL — SIGNIFICANT CHANGE UP (ref 10–20)
BUN SERPL-MCNC: 17 MG/DL — SIGNIFICANT CHANGE UP (ref 10–20)
CA-I SERPL-SCNC: 1.17 MMOL/L — SIGNIFICANT CHANGE UP (ref 1.15–1.33)
CA-I SERPL-SCNC: 1.17 MMOL/L — SIGNIFICANT CHANGE UP (ref 1.15–1.33)
CALCIUM SERPL-MCNC: 9 MG/DL — SIGNIFICANT CHANGE UP (ref 8.4–10.5)
CALCIUM SERPL-MCNC: 9 MG/DL — SIGNIFICANT CHANGE UP (ref 8.4–10.5)
CHLORIDE SERPL-SCNC: 99 MMOL/L — SIGNIFICANT CHANGE UP (ref 98–110)
CHLORIDE SERPL-SCNC: 99 MMOL/L — SIGNIFICANT CHANGE UP (ref 98–110)
CO2 SERPL-SCNC: 25 MMOL/L — SIGNIFICANT CHANGE UP (ref 17–32)
CO2 SERPL-SCNC: 25 MMOL/L — SIGNIFICANT CHANGE UP (ref 17–32)
CREAT SERPL-MCNC: 1.1 MG/DL — SIGNIFICANT CHANGE UP (ref 0.7–1.5)
CREAT SERPL-MCNC: 1.1 MG/DL — SIGNIFICANT CHANGE UP (ref 0.7–1.5)
EGFR: 79 ML/MIN/1.73M2 — SIGNIFICANT CHANGE UP
EGFR: 79 ML/MIN/1.73M2 — SIGNIFICANT CHANGE UP
EOSINOPHIL # BLD AUTO: 0.11 K/UL — SIGNIFICANT CHANGE UP (ref 0–0.7)
EOSINOPHIL # BLD AUTO: 0.11 K/UL — SIGNIFICANT CHANGE UP (ref 0–0.7)
EOSINOPHIL NFR BLD AUTO: 1.3 % — SIGNIFICANT CHANGE UP (ref 0–8)
EOSINOPHIL NFR BLD AUTO: 1.3 % — SIGNIFICANT CHANGE UP (ref 0–8)
GAS PNL BLDV: 131 MMOL/L — LOW (ref 136–145)
GAS PNL BLDV: 131 MMOL/L — LOW (ref 136–145)
GAS PNL BLDV: SIGNIFICANT CHANGE UP
GAS PNL BLDV: SIGNIFICANT CHANGE UP
GLUCOSE SERPL-MCNC: 97 MG/DL — SIGNIFICANT CHANGE UP (ref 70–99)
GLUCOSE SERPL-MCNC: 97 MG/DL — SIGNIFICANT CHANGE UP (ref 70–99)
HCO3 BLDV-SCNC: 27 MMOL/L — SIGNIFICANT CHANGE UP (ref 22–29)
HCO3 BLDV-SCNC: 27 MMOL/L — SIGNIFICANT CHANGE UP (ref 22–29)
HCT VFR BLD CALC: 44.6 % — SIGNIFICANT CHANGE UP (ref 42–52)
HCT VFR BLD CALC: 44.6 % — SIGNIFICANT CHANGE UP (ref 42–52)
HCT VFR BLDA CALC: 46 % — SIGNIFICANT CHANGE UP (ref 39–51)
HCT VFR BLDA CALC: 46 % — SIGNIFICANT CHANGE UP (ref 39–51)
HGB BLD CALC-MCNC: 15.3 G/DL — SIGNIFICANT CHANGE UP (ref 12.6–17.4)
HGB BLD CALC-MCNC: 15.3 G/DL — SIGNIFICANT CHANGE UP (ref 12.6–17.4)
HGB BLD-MCNC: 15 G/DL — SIGNIFICANT CHANGE UP (ref 14–18)
HGB BLD-MCNC: 15 G/DL — SIGNIFICANT CHANGE UP (ref 14–18)
IMM GRANULOCYTES NFR BLD AUTO: 0.3 % — SIGNIFICANT CHANGE UP (ref 0.1–0.3)
IMM GRANULOCYTES NFR BLD AUTO: 0.3 % — SIGNIFICANT CHANGE UP (ref 0.1–0.3)
LACTATE BLDV-MCNC: 1 MMOL/L — SIGNIFICANT CHANGE UP (ref 0.5–2)
LACTATE BLDV-MCNC: 1 MMOL/L — SIGNIFICANT CHANGE UP (ref 0.5–2)
LYMPHOCYTES # BLD AUTO: 2.77 K/UL — SIGNIFICANT CHANGE UP (ref 1.2–3.4)
LYMPHOCYTES # BLD AUTO: 2.77 K/UL — SIGNIFICANT CHANGE UP (ref 1.2–3.4)
LYMPHOCYTES # BLD AUTO: 32 % — SIGNIFICANT CHANGE UP (ref 20.5–51.1)
LYMPHOCYTES # BLD AUTO: 32 % — SIGNIFICANT CHANGE UP (ref 20.5–51.1)
MCHC RBC-ENTMCNC: 29.2 PG — SIGNIFICANT CHANGE UP (ref 27–31)
MCHC RBC-ENTMCNC: 29.2 PG — SIGNIFICANT CHANGE UP (ref 27–31)
MCHC RBC-ENTMCNC: 33.6 G/DL — SIGNIFICANT CHANGE UP (ref 32–37)
MCHC RBC-ENTMCNC: 33.6 G/DL — SIGNIFICANT CHANGE UP (ref 32–37)
MCV RBC AUTO: 86.9 FL — SIGNIFICANT CHANGE UP (ref 80–94)
MCV RBC AUTO: 86.9 FL — SIGNIFICANT CHANGE UP (ref 80–94)
MONOCYTES # BLD AUTO: 0.78 K/UL — HIGH (ref 0.1–0.6)
MONOCYTES # BLD AUTO: 0.78 K/UL — HIGH (ref 0.1–0.6)
MONOCYTES NFR BLD AUTO: 9 % — SIGNIFICANT CHANGE UP (ref 1.7–9.3)
MONOCYTES NFR BLD AUTO: 9 % — SIGNIFICANT CHANGE UP (ref 1.7–9.3)
NEUTROPHILS # BLD AUTO: 4.92 K/UL — SIGNIFICANT CHANGE UP (ref 1.4–6.5)
NEUTROPHILS # BLD AUTO: 4.92 K/UL — SIGNIFICANT CHANGE UP (ref 1.4–6.5)
NEUTROPHILS NFR BLD AUTO: 56.9 % — SIGNIFICANT CHANGE UP (ref 42.2–75.2)
NEUTROPHILS NFR BLD AUTO: 56.9 % — SIGNIFICANT CHANGE UP (ref 42.2–75.2)
NRBC # BLD: 0 /100 WBCS — SIGNIFICANT CHANGE UP (ref 0–0)
NRBC # BLD: 0 /100 WBCS — SIGNIFICANT CHANGE UP (ref 0–0)
PCO2 BLDV: 44 MMHG — SIGNIFICANT CHANGE UP (ref 42–55)
PCO2 BLDV: 44 MMHG — SIGNIFICANT CHANGE UP (ref 42–55)
PH BLDV: 7.39 — SIGNIFICANT CHANGE UP (ref 7.32–7.43)
PH BLDV: 7.39 — SIGNIFICANT CHANGE UP (ref 7.32–7.43)
PLATELET # BLD AUTO: 274 K/UL — SIGNIFICANT CHANGE UP (ref 130–400)
PLATELET # BLD AUTO: 274 K/UL — SIGNIFICANT CHANGE UP (ref 130–400)
PMV BLD: 10.5 FL — HIGH (ref 7.4–10.4)
PMV BLD: 10.5 FL — HIGH (ref 7.4–10.4)
PO2 BLDV: 54 MMHG — SIGNIFICANT CHANGE UP
PO2 BLDV: 54 MMHG — SIGNIFICANT CHANGE UP
POTASSIUM BLDV-SCNC: 4.1 MMOL/L — SIGNIFICANT CHANGE UP (ref 3.5–5.1)
POTASSIUM BLDV-SCNC: 4.1 MMOL/L — SIGNIFICANT CHANGE UP (ref 3.5–5.1)
POTASSIUM SERPL-MCNC: 4.3 MMOL/L — SIGNIFICANT CHANGE UP (ref 3.5–5)
POTASSIUM SERPL-MCNC: 4.3 MMOL/L — SIGNIFICANT CHANGE UP (ref 3.5–5)
POTASSIUM SERPL-SCNC: 4.3 MMOL/L — SIGNIFICANT CHANGE UP (ref 3.5–5)
POTASSIUM SERPL-SCNC: 4.3 MMOL/L — SIGNIFICANT CHANGE UP (ref 3.5–5)
PROT SERPL-MCNC: 6.5 G/DL — SIGNIFICANT CHANGE UP (ref 6–8)
PROT SERPL-MCNC: 6.5 G/DL — SIGNIFICANT CHANGE UP (ref 6–8)
RBC # BLD: 5.13 M/UL — SIGNIFICANT CHANGE UP (ref 4.7–6.1)
RBC # BLD: 5.13 M/UL — SIGNIFICANT CHANGE UP (ref 4.7–6.1)
RBC # FLD: 14.7 % — HIGH (ref 11.5–14.5)
RBC # FLD: 14.7 % — HIGH (ref 11.5–14.5)
SAO2 % BLDV: 85 % — SIGNIFICANT CHANGE UP
SAO2 % BLDV: 85 % — SIGNIFICANT CHANGE UP
SODIUM SERPL-SCNC: 136 MMOL/L — SIGNIFICANT CHANGE UP (ref 135–146)
SODIUM SERPL-SCNC: 136 MMOL/L — SIGNIFICANT CHANGE UP (ref 135–146)
WBC # BLD: 8.65 K/UL — SIGNIFICANT CHANGE UP (ref 4.8–10.8)
WBC # BLD: 8.65 K/UL — SIGNIFICANT CHANGE UP (ref 4.8–10.8)
WBC # FLD AUTO: 8.65 K/UL — SIGNIFICANT CHANGE UP (ref 4.8–10.8)
WBC # FLD AUTO: 8.65 K/UL — SIGNIFICANT CHANGE UP (ref 4.8–10.8)

## 2023-11-08 PROCEDURE — 99283 EMERGENCY DEPT VISIT LOW MDM: CPT | Mod: 25

## 2023-11-08 PROCEDURE — 93010 ELECTROCARDIOGRAM REPORT: CPT

## 2023-11-08 PROCEDURE — 93005 ELECTROCARDIOGRAM TRACING: CPT

## 2023-11-08 PROCEDURE — 99284 EMERGENCY DEPT VISIT MOD MDM: CPT

## 2023-11-08 PROCEDURE — 71046 X-RAY EXAM CHEST 2 VIEWS: CPT | Mod: 26

## 2023-11-08 RX ORDER — IPRATROPIUM/ALBUTEROL SULFATE 18-103MCG
3 AEROSOL WITH ADAPTER (GRAM) INHALATION ONCE
Refills: 0 | Status: COMPLETED | OUTPATIENT
Start: 2023-11-08 | End: 2023-11-08

## 2023-11-08 RX ORDER — SODIUM CHLORIDE 9 MG/ML
3 INJECTION INTRAMUSCULAR; INTRAVENOUS; SUBCUTANEOUS ONCE
Refills: 0 | Status: COMPLETED | OUTPATIENT
Start: 2023-11-08 | End: 2023-11-08

## 2023-11-08 RX ADMIN — SODIUM CHLORIDE 3 MILLILITER(S): 9 INJECTION INTRAMUSCULAR; INTRAVENOUS; SUBCUTANEOUS at 03:45

## 2023-11-08 RX ADMIN — Medication 3 MILLILITER(S): at 01:32

## 2023-11-08 RX ADMIN — Medication 125 MILLIGRAM(S): at 01:32

## 2023-11-08 NOTE — ED PROVIDER NOTE - PATIENT PORTAL LINK FT
You can access the FollowMyHealth Patient Portal offered by [facility] by registering at the following website: [email or web address]. By joining [employer]’s FollowMyHealth portal, you will also be able to view your health information using other applications (apps) compatible with our system.

## 2023-11-08 NOTE — ED PROVIDER NOTE - NSFOLLOWUPINSTRUCTIONS_ED_ALL_ED_FT
Please continue with Levaquin and Prednisone as prescribed. Use Albuterol every 6 hours as needed. Follow up with Dr. Brooks outpatient during next available appointment. Return to the ED with new or worsening symptoms.     Our Emergency Department Referral Coordinators will be reaching out to you in the next 24-48 hours from 9:00am to 5:00pm with a follow up appointment. Please expect a phone call from the hospital in that time frame. If you do not receive a call or if you have any questions or concerns, you can reach them at [phone]     Chronic Obstructive Pulmonary Disease  Chronic obstructive pulmonary disease (COPD) is a long-term (chronic) condition that affects the lungs. COPD is a general term that can be used to describe many different lung problems that cause lung swelling (inflammation) and limit airflow, including chronic bronchitis and emphysema. If you have COPD, your lung function will probably never return to normal. In most cases, it gets worse over time. However, there are steps you can take to slow the progression of the disease and improve your quality of life.    What are the causes?  This condition may be caused by:    Smoking. This is the most common cause.  Certain genes passed down through families.    What increases the risk?  The following factors may make you more likely to develop this condition:    Secondhand smoke from cigarettes, pipes, or cigars.  Exposure to chemicals and other irritants such as fumes and dust in the work environment.  Chronic lung conditions or infections.    What are the signs or symptoms?  Symptoms of this condition include:    Shortness of breath, especially during physical activity.  Chronic cough with a large amount of thick mucus. Sometimes the cough may not have any mucus (dry cough).  Wheezing.  Rapid breaths.  Gray or bluish discoloration (cyanosis) of the skin, especially in your fingers, toes, or lips.  Feeling tired (fatigue).  Weight loss.  Chest tightness.  Frequent infections.  Episodes when breathing symptoms become much worse (exacerbations).  Swelling in the ankles, feet, or legs. This may occur in later stages of the disease.    How is this diagnosed?  This condition is diagnosed based on:    Your medical history.  A physical exam.    You may also have tests, including:    Lung (pulmonary) function tests. This may include a spirometry test, which measures your ability to exhale properly.  Chest X-ray.  CT scan.  Blood tests.    How is this treated?  This condition may be treated with:    Medicines. These may include inhaled rescue medicines to treat acute exacerbations as well as long-term, or maintenance, medicines to prevent flare-ups of COPD.    Bronchodilators help treat COPD by dilating the airways to allow increased airflow and make your breathing more comfortable.  Steroids can reduce airway inflammation and help prevent exacerbations.    Smoking cessation. If you smoke, your health care provider may ask you to quit, and may also recommend therapy or replacement products to help you quit.  Pulmonary rehabilitation. This may involve working with a team of health care providers and specialists, such as respiratory, occupational, and physical therapists.  Exercise and physical activity. These are beneficial for nearly all people with COPD.  Nutrition therapy to gain weight, if you are underweight.  Oxygen. Supplemental oxygen therapy is only helpful if you have a low oxygen level in your blood (hypoxemia).  Lung surgery or transplant.  Palliative care. This is to help people with COPD feel comfortable when treatment is no longer working.    Follow these instructions at home:  Medicines     Take over-the-counter and prescription medicines (inhaled or pills) only as told by your health care provider.  Talk to your health care provider before taking any cough or allergy medicines. You may need to avoid certain medicines that dry out your airways.  Lifestyle     If you are a smoker, the most important thing that you can do is to stop smoking. Do not use any products that contain nicotine or tobacco, such as cigarettes and e-cigarettes. If you need help quitting, ask your health care provider. Continuing to smoke will cause the disease to progress faster.  Avoid exposure to things that irritate your lungs, such as smoke, chemicals, and fumes.  Stay active, but balance activity with periods of rest. Exercise and physical activity will help you maintain your ability to do things you want to do.  Learn and use relaxation techniques to manage stress and to control your breathing.  Get the right amount of sleep and get quality sleep. Most adults need 7 or more hours per night.  Eat healthy foods. Eating smaller, more frequent meals and resting before meals may help you maintain your strength.  Controlled breathing     Learn and use controlled breathing techniques as directed by your health care provider. Controlled breathing techniques include:    Pursed lip breathing. Start by breathing in (inhaling) through your nose for 1 second. Then, purse your lips as if you were going to whistle and breathe out (exhale) through the pursed lips for 2 seconds.  Diaphragmatic breathing. Start by putting one hand on your abdomen just above your waist. Inhale slowly through your nose. The hand on your abdomen should move out. Then purse your lips and exhale slowly. You should be able to feel the hand on your abdomen moving in as you exhale.    Controlled coughing     Learn and use controlled coughing to clear mucus from your lungs. Controlled coughing is a series of short, progressive coughs. The steps of controlled coughing are:    Lean your head slightly forward.    Breathe in deeply using diaphragmatic breathing.    Try to hold your breath for 3 seconds.    Keep your mouth slightly open while coughing twice.    Spit any mucus out into a tissue.    Rest and repeat the steps once or twice as needed.      General instructions     Make sure you receive all the vaccines that your health care provider recommends, especially the pneumococcal and influenza vaccines. Preventing infection and hospitalization is very important when you have COPD.  Use oxygen therapy and pulmonary rehabilitation if directed to by your health care provider. If you require home oxygen therapy, ask your health care provider whether you should purchase a pulse oximeter to measure your oxygen level at home.  Work with your health care provider to develop a COPD action plan. This will help you know what steps to take if your condition gets worse.  Keep other chronic health conditions under control as told by your health care provider.  Avoid extreme temperature and humidity changes.  Avoid contact with people who have an illness that spreads from person to person (is contagious), such as viral infections or pneumonia.  Keep all follow-up visits as told by your health care provider. This is important.  Contact a health care provider if:  You are coughing up more mucus than usual.  There is a change in the color or thickness of your mucus.  Your breathing is more labored than usual.  Your breathing is faster than usual.  You have difficulty sleeping.  You need to use your rescue medicines or inhalers more often than expected.  You have trouble doing routine activities such as getting dressed or walking around the house.  Get help right away if:  You have shortness of breath while you are resting.  You have shortness of breath that prevents you from:    Being able to talk.  Performing your usual physical activities.    You have chest pain lasting longer than 5 minutes.  Your skin color is more blue (cyanotic) than usual.  You measure low oxygen saturations for longer than 5 minutes with a pulse oximeter.  You have a fever.  You feel too tired to breathe normally.  Summary  Chronic obstructive pulmonary disease (COPD) is a long-term (chronic) condition that affects the lungs.  Your lung function will probably never return to normal. In most cases, it gets worse over time. However, there are steps you can take to slow the progression of the disease and improve your quality of life.  Treatment for COPD may include taking medicines, quitting smoking, pulmonary rehabilitation, and changes to diet and exercise. As the disease progresses, you may need oxygen therapy, a lung transplant, or palliative care.  To help manage your condition, do not smoke, avoid exposure to things that irritate your lungs, stay up to date on all vaccines, and follow your health care provider's instructions for taking medicines.  This information is not intended to replace advice given to you by your health care provider. Make sure you discuss any questions you have with your health care provider.

## 2023-11-08 NOTE — ED PROVIDER NOTE - OBJECTIVE STATEMENT
57 year old male, past medical history htn, hdl, bph, copd, asthma, cad, who presents with shortness of breath. patient with recent eval in ed for same, started on levofloxacin/albuterol/prednisone, dc last night. patient presents with persistent symptoms. denies f/c, hemoptysis, chest pain, nausea/vomiting.

## 2023-11-08 NOTE — ED PROVIDER NOTE - CARE PROVIDER_API CALL
Murali Brooks  Pulmonary Disease  [address] [place] [id number]  Phone: [phone]  Fax: [phone]  Follow Up Time:

## 2023-11-08 NOTE — ED ADULT NURSE NOTE - NSFALLUNIVINTERV_ED_ALL_ED
Bed/Stretcher in lowest position, wheels locked, appropriate side rails in place/Call bell, personal items and telephone in reach/Instruct patient to call for assistance before getting out of bed/chair/stretcher/Non-slip footwear applied when patient is off stretcher/[place] to call system/Physically safe environment - no spills, clutter or unnecessary equipment/Purposeful proactive rounding/Room/bathroom lighting operational, light cord in reach

## 2023-11-08 NOTE — ED PROVIDER NOTE - PHYSICAL EXAMINATION
CONST: Well appearing in NAD  EYES: PERRL, EOMI, Sclera and conjunctiva clear.   ENT: Oropharynx normal appearing, no erythema or exudates. Uvula midline.  CARD: Normal S1 S2; Normal rate and rhythm  RESP: Mild wheeze/rhonci throughout lung fields. No distress  GI: Soft, non-tender, non-distended.  MS: Normal ROM in all extremities. No midline spinal tenderness.  SKIN: Warm, dry, no acute rashes.   NEURO: A&Ox3, No focal deficits. Strength 5/5 with no sensory deficits. Steady gait

## 2023-11-08 NOTE — ED PROVIDER NOTE - CLINICAL SUMMARY MEDICAL DECISION MAKING FREE TEXT BOX
58yo M history as above presenting with shortness of breath. states that his nebulizer machine is broken but is getting another one today. No fevers/chills, sore throat. Already on levaquin, albuterol hfa, prednisone. no gi sx, LE pain/swelling. Well appearing, NAD, non toxic. NCAT PERRLA EOMI neck supple non tender normal wob scattered wheezing b/l rrr abdomen s nt nd no rebound no guarding WWPx4 neuro non focal. labs imaging reviewed. pt feeling improved. lungs cta bl. Aware of all results, given a copy of all available results, comfortable with discharge and follow-up outpatient, strict return precautions given. Endorses understanding of all of this and aware that they can return at any time for new or concerning symptoms. No further questions or concerns at this time

## 2023-11-08 NOTE — ED PROVIDER NOTE - NSFOLLOWUPINSTRUCTIONS_ED_ALL_ED_FT
Chronic Cough    Please continue to take your prescribed medications and follow up with your pulmonologist     WHAT YOU NEED TO KNOW:    What is a chronic cough? A chronic cough is a cough that lasts more than 4 weeks in children or 8 weeks in adults.    What causes a chronic cough?    Smoking or exposure to secondhand smoke    Allergies    Acid reflux    Lung conditions such as asthma, COPD, lung cancer, or pneumonia    Medicines such as blood pressure or heart medicines    Conditions such as cystic fibrosis    Lung infections such as pertussis or tuberculosis  What other signs and symptoms might I have?    Wheezing and shortness of breath    A runny or stuffy nose    Pain or itching in your throat    Red, swollen, watery eyes    A raspy or hoarse voice    Heartburn or a sour taste in your mouth  How is a chronic cough diagnosed? Your healthcare provider will examine you and ask about your symptoms. Tell him or her about your medical conditions, medicines, and any recent respiratory infections. Tell him or her if you have ever smoked, currently smoke, or are exposed to secondhand smoke. You may need a chest x-ray to check for problems with your lungs. You may need other tests to find the cause of your chronic cough. This may include blood tests, lung function tests, or an endoscopy. Ask your healthcare provider for more information on these tests.    How is a chronic cough treated? The cough may go away on its own without treatment. You may need medicine to stop the cough or treat the cause of your cough. This may include medicine to treat allergies or acid reflux, or decrease swelling in your airways. You may also need antibiotics to treat a respiratory infection. If you take medicine that causes a chronic cough, it may be stopped or changed. You may need speech therapy. A speech therapist can teach you ways to control your cough.    What can I do to care for myself?    Prevent acid reflex. Acid reflux can make your chronic cough worse. Raise your head and upper back when you sleep. Place 2 or more pillows behind your head or sleep in a recliner. Do not lie down for at least 1 hour after you eat. Do not have foods or drinks that increase heartburn. Ask your healthcare provider for other ways to prevent acid reflux.  Prevent GERD      Do not smoke. Encourage your adolescent child not to smoke. Nicotine and other chemicals in cigarettes and cigars can cause lung damage. They can also make your cough worse. Ask your healthcare provider for information if you currently smoke and need help to quit. E-cigarettes or smokeless tobacco still contain nicotine. Talk to your healthcare provider before you use these products.    Stay away from secondhand smoke. Do not let people smoke in your car, home, or near your child. Do not stand near someone that is smoking. This includes anyone that is smoking an E-cigarrete.    Avoid anything that triggers your allergies or irritates your throat. Allergens and irritants can make your chronic cough worse. Allergens may include dust mites, pollen, pet dander, or mold. Wear a mask if you work around pollutants or irritants. Ask your healthcare provider for more ways to decrease your exposure to allergens or irritants.    Drink plenty of liquids as directed. Liquids may help relieve throat discomfort that causes you to cough. Add honey to tea or hot water to help ease your throat pain. Ask how much liquid to drink each day and which liquids are best for you.  Call your local emergency number (911 in the [place]) for any of the following:    You cough up blood.    You faint when you cough.    You have trouble breathing.  When should I call my doctor?    You have new or worsening symptoms.    You have severe pain when you take a deep breath.    You become very tired after a coughing fit.    You have trouble sleeping because of the coughing.    You have questions or concerns about your condition or care.  CARE AGREEMENT:    You have the right to help plan your care. Learn about your health condition and how it may be treated. Discuss treatment options with your healthcare providers to decide what care you want to receive. You always have the right to refuse treatment.    © [employer] 1973, 2023

## 2023-11-08 NOTE — ED PROVIDER NOTE - CLINICAL SUMMARY MEDICAL DECISION MAKING FREE TEXT BOX
57-year-old male with COPD came to emergency room complaining of cough.  Similar visits over the past several day for same complaint, already being treated with antibiotics and nebs.  We had difficulties locating patient in the ED, after 2 hours he came back, he reportedly was detained by police after an incident in the [employer] shop.  Ambulating without any dyspnea or tachypnea, vital signs normal, advised to follow-up with pulmonologist. Stable for discharge home.

## 2023-11-08 NOTE — ED PROVIDER NOTE - PROGRESS NOTE DETAILS
Patient left without being seen Made multiple attempts to locate the patient , he is not to be found in his assigned room.

## 2023-11-08 NOTE — ED PROVIDER NOTE - CARE PROVIDER_API CALL
Name band; Murali Brooks  Pulmonary Disease  [address] [place] [id number]  Phone: [phone]  Fax: [phone]  Follow Up Time:

## 2023-11-08 NOTE — ED PROVIDER NOTE - PATIENT PORTAL LINK FT
You can access the FollowMyHealth Patient Portal offered by [facility] by registering at the following website: [email or web address]. By joining [facility]’s FollowMyHealth portal, you will also be able to view your health information using other applications (apps) compatible with our system.

## 2023-11-08 NOTE — ED PROVIDER NOTE - OBJECTIVE STATEMENT
57-year-old male with a past medical history of hypertension, hyperlipidemia, BPH, COPD, CAD presents to the ED for evaluation.  Patient with shortness of breath over the last couple days, recently diagnosed with pneumonia, was prescribed levofloxacin, albuterol and prednisone which she has been taking as prescribed.  Patient has been evaluated in the ED multiple times for these symptoms. Patient reports he has still been coughing up green phlem and wanted to get checked to make sure he was on the right medications. No CP, N/V/D, fevers, CP, hemoptysis, N/V.

## 2023-11-09 ENCOUNTER — INPATIENT (INPATIENT)
Facility: HOSPITAL | Age: 58
LOS: 1 days | Discharge: HOME | DRG: 140 | End: 2023-11-11
Attending: INTERNAL MEDICINE | Admitting: STUDENT IN AN ORGANIZED HEALTH CARE EDUCATION/TRAINING PROGRAM
Payer: MEDICAID

## 2023-11-09 VITALS
RESPIRATION RATE: 18 BRPM | WEIGHT: 289.91 LBS | HEART RATE: 92 BPM | TEMPERATURE: 99 F | DIASTOLIC BLOOD PRESSURE: 80 MMHG | SYSTOLIC BLOOD PRESSURE: 127 MMHG | HEIGHT: 70 IN | OXYGEN SATURATION: 95 %

## 2023-11-09 DIAGNOSIS — Z98.890 OTHER SPECIFIED POSTPROCEDURAL STATES: Chronic | ICD-10-CM

## 2023-11-09 DIAGNOSIS — J44.9 CHRONIC OBSTRUCTIVE PULMONARY DISEASE, UNSPECIFIED: ICD-10-CM

## 2023-11-09 DIAGNOSIS — K40.20 BILATERAL INGUINAL HERNIA, WITHOUT OBSTRUCTION OR GANGRENE, NOT SPECIFIED AS RECURRENT: Chronic | ICD-10-CM

## 2023-11-09 DIAGNOSIS — Z96.651 PRESENCE OF RIGHT ARTIFICIAL KNEE JOINT: Chronic | ICD-10-CM

## 2023-11-09 LAB
ALBUMIN SERPL ELPH-MCNC: 4.2 G/DL — SIGNIFICANT CHANGE UP (ref 3.5–5.2)
ALBUMIN SERPL ELPH-MCNC: 4.2 G/DL — SIGNIFICANT CHANGE UP (ref 3.5–5.2)
ALP SERPL-CCNC: 134 U/L — HIGH (ref 30–115)
ALP SERPL-CCNC: 134 U/L — HIGH (ref 30–115)
ALT FLD-CCNC: 40 U/L — SIGNIFICANT CHANGE UP (ref 0–41)
ALT FLD-CCNC: 40 U/L — SIGNIFICANT CHANGE UP (ref 0–41)
ANION GAP SERPL CALC-SCNC: 11 MMOL/L — SIGNIFICANT CHANGE UP (ref 7–14)
ANION GAP SERPL CALC-SCNC: 11 MMOL/L — SIGNIFICANT CHANGE UP (ref 7–14)
AST SERPL-CCNC: 20 U/L — SIGNIFICANT CHANGE UP (ref 0–41)
AST SERPL-CCNC: 20 U/L — SIGNIFICANT CHANGE UP (ref 0–41)
BASOPHILS # BLD AUTO: 0.02 K/UL — SIGNIFICANT CHANGE UP (ref 0–0.2)
BASOPHILS # BLD AUTO: 0.02 K/UL — SIGNIFICANT CHANGE UP (ref 0–0.2)
BASOPHILS NFR BLD AUTO: 0.2 % — SIGNIFICANT CHANGE UP (ref 0–1)
BASOPHILS NFR BLD AUTO: 0.2 % — SIGNIFICANT CHANGE UP (ref 0–1)
BILIRUB SERPL-MCNC: 0.4 MG/DL — SIGNIFICANT CHANGE UP (ref 0.2–1.2)
BILIRUB SERPL-MCNC: 0.4 MG/DL — SIGNIFICANT CHANGE UP (ref 0.2–1.2)
BUN SERPL-MCNC: 14 MG/DL — SIGNIFICANT CHANGE UP (ref 10–20)
BUN SERPL-MCNC: 14 MG/DL — SIGNIFICANT CHANGE UP (ref 10–20)
CALCIUM SERPL-MCNC: 9.6 MG/DL — SIGNIFICANT CHANGE UP (ref 8.4–10.4)
CALCIUM SERPL-MCNC: 9.6 MG/DL — SIGNIFICANT CHANGE UP (ref 8.4–10.4)
CHLORIDE SERPL-SCNC: 101 MMOL/L — SIGNIFICANT CHANGE UP (ref 98–110)
CHLORIDE SERPL-SCNC: 101 MMOL/L — SIGNIFICANT CHANGE UP (ref 98–110)
CO2 SERPL-SCNC: 26 MMOL/L — SIGNIFICANT CHANGE UP (ref 17–32)
CO2 SERPL-SCNC: 26 MMOL/L — SIGNIFICANT CHANGE UP (ref 17–32)
CREAT SERPL-MCNC: 0.8 MG/DL — SIGNIFICANT CHANGE UP (ref 0.7–1.5)
CREAT SERPL-MCNC: 0.8 MG/DL — SIGNIFICANT CHANGE UP (ref 0.7–1.5)
EGFR: 105 ML/MIN/1.73M2 — SIGNIFICANT CHANGE UP
EGFR: 105 ML/MIN/1.73M2 — SIGNIFICANT CHANGE UP
EOSINOPHIL # BLD AUTO: 0.01 K/UL — SIGNIFICANT CHANGE UP (ref 0–0.7)
EOSINOPHIL # BLD AUTO: 0.01 K/UL — SIGNIFICANT CHANGE UP (ref 0–0.7)
EOSINOPHIL NFR BLD AUTO: 0.1 % — SIGNIFICANT CHANGE UP (ref 0–8)
EOSINOPHIL NFR BLD AUTO: 0.1 % — SIGNIFICANT CHANGE UP (ref 0–8)
FLUAV AG NPH QL: SIGNIFICANT CHANGE UP
FLUAV AG NPH QL: SIGNIFICANT CHANGE UP
FLUBV AG NPH QL: SIGNIFICANT CHANGE UP
FLUBV AG NPH QL: SIGNIFICANT CHANGE UP
GAS PNL BLDV: SIGNIFICANT CHANGE UP
GAS PNL BLDV: SIGNIFICANT CHANGE UP
GLUCOSE SERPL-MCNC: 118 MG/DL — HIGH (ref 70–99)
GLUCOSE SERPL-MCNC: 118 MG/DL — HIGH (ref 70–99)
HCT VFR BLD CALC: 44 % — SIGNIFICANT CHANGE UP (ref 42–52)
HCT VFR BLD CALC: 44 % — SIGNIFICANT CHANGE UP (ref 42–52)
HGB BLD-MCNC: 15 G/DL — SIGNIFICANT CHANGE UP (ref 14–18)
HGB BLD-MCNC: 15 G/DL — SIGNIFICANT CHANGE UP (ref 14–18)
IMM GRANULOCYTES NFR BLD AUTO: 1.1 % — HIGH (ref 0.1–0.3)
IMM GRANULOCYTES NFR BLD AUTO: 1.1 % — HIGH (ref 0.1–0.3)
LYMPHOCYTES # BLD AUTO: 1.16 K/UL — LOW (ref 1.2–3.4)
LYMPHOCYTES # BLD AUTO: 1.16 K/UL — LOW (ref 1.2–3.4)
LYMPHOCYTES # BLD AUTO: 13.6 % — LOW (ref 20.5–51.1)
LYMPHOCYTES # BLD AUTO: 13.6 % — LOW (ref 20.5–51.1)
MCHC RBC-ENTMCNC: 29.5 PG — SIGNIFICANT CHANGE UP (ref 27–31)
MCHC RBC-ENTMCNC: 29.5 PG — SIGNIFICANT CHANGE UP (ref 27–31)
MCHC RBC-ENTMCNC: 34.1 G/DL — SIGNIFICANT CHANGE UP (ref 32–37)
MCHC RBC-ENTMCNC: 34.1 G/DL — SIGNIFICANT CHANGE UP (ref 32–37)
MCV RBC AUTO: 86.6 FL — SIGNIFICANT CHANGE UP (ref 80–94)
MCV RBC AUTO: 86.6 FL — SIGNIFICANT CHANGE UP (ref 80–94)
MONOCYTES # BLD AUTO: 0.32 K/UL — SIGNIFICANT CHANGE UP (ref 0.1–0.6)
MONOCYTES # BLD AUTO: 0.32 K/UL — SIGNIFICANT CHANGE UP (ref 0.1–0.6)
MONOCYTES NFR BLD AUTO: 3.7 % — SIGNIFICANT CHANGE UP (ref 1.7–9.3)
MONOCYTES NFR BLD AUTO: 3.7 % — SIGNIFICANT CHANGE UP (ref 1.7–9.3)
NEUTROPHILS # BLD AUTO: 6.94 K/UL — HIGH (ref 1.4–6.5)
NEUTROPHILS # BLD AUTO: 6.94 K/UL — HIGH (ref 1.4–6.5)
NEUTROPHILS NFR BLD AUTO: 81.3 % — HIGH (ref 42.2–75.2)
NEUTROPHILS NFR BLD AUTO: 81.3 % — HIGH (ref 42.2–75.2)
NRBC # BLD: 0 /100 WBCS — SIGNIFICANT CHANGE UP (ref 0–0)
NRBC # BLD: 0 /100 WBCS — SIGNIFICANT CHANGE UP (ref 0–0)
NT-PROBNP SERPL-SCNC: 194 PG/ML — SIGNIFICANT CHANGE UP (ref 0–300)
NT-PROBNP SERPL-SCNC: 194 PG/ML — SIGNIFICANT CHANGE UP (ref 0–300)
PLATELET # BLD AUTO: 255 K/UL — SIGNIFICANT CHANGE UP (ref 130–400)
PLATELET # BLD AUTO: 255 K/UL — SIGNIFICANT CHANGE UP (ref 130–400)
PMV BLD: 10.1 FL — SIGNIFICANT CHANGE UP (ref 7.4–10.4)
PMV BLD: 10.1 FL — SIGNIFICANT CHANGE UP (ref 7.4–10.4)
POTASSIUM SERPL-MCNC: 4.8 MMOL/L — SIGNIFICANT CHANGE UP (ref 3.5–5)
POTASSIUM SERPL-MCNC: 4.8 MMOL/L — SIGNIFICANT CHANGE UP (ref 3.5–5)
POTASSIUM SERPL-SCNC: 4.8 MMOL/L — SIGNIFICANT CHANGE UP (ref 3.5–5)
POTASSIUM SERPL-SCNC: 4.8 MMOL/L — SIGNIFICANT CHANGE UP (ref 3.5–5)
PROT SERPL-MCNC: 6.7 G/DL — SIGNIFICANT CHANGE UP (ref 6–8)
PROT SERPL-MCNC: 6.7 G/DL — SIGNIFICANT CHANGE UP (ref 6–8)
RBC # BLD: 5.08 M/UL — SIGNIFICANT CHANGE UP (ref 4.7–6.1)
RBC # BLD: 5.08 M/UL — SIGNIFICANT CHANGE UP (ref 4.7–6.1)
RBC # FLD: 14.8 % — HIGH (ref 11.5–14.5)
RBC # FLD: 14.8 % — HIGH (ref 11.5–14.5)
RSV RNA NPH QL NAA+NON-PROBE: SIGNIFICANT CHANGE UP
RSV RNA NPH QL NAA+NON-PROBE: SIGNIFICANT CHANGE UP
SARS-COV-2 RNA SPEC QL NAA+PROBE: SIGNIFICANT CHANGE UP
SARS-COV-2 RNA SPEC QL NAA+PROBE: SIGNIFICANT CHANGE UP
SODIUM SERPL-SCNC: 138 MMOL/L — SIGNIFICANT CHANGE UP (ref 135–146)
SODIUM SERPL-SCNC: 138 MMOL/L — SIGNIFICANT CHANGE UP (ref 135–146)
TROPONIN T SERPL-MCNC: <0.01 NG/ML — SIGNIFICANT CHANGE UP
TROPONIN T SERPL-MCNC: <0.01 NG/ML — SIGNIFICANT CHANGE UP
WBC # BLD: 8.54 K/UL — SIGNIFICANT CHANGE UP (ref 4.8–10.8)
WBC # BLD: 8.54 K/UL — SIGNIFICANT CHANGE UP (ref 4.8–10.8)
WBC # FLD AUTO: 8.54 K/UL — SIGNIFICANT CHANGE UP (ref 4.8–10.8)
WBC # FLD AUTO: 8.54 K/UL — SIGNIFICANT CHANGE UP (ref 4.8–10.8)

## 2023-11-09 PROCEDURE — 99497 ADVNCD CARE PLAN 30 MIN: CPT | Mod: 25

## 2023-11-09 PROCEDURE — 99406 BEHAV CHNG SMOKING 3-10 MIN: CPT

## 2023-11-09 PROCEDURE — 99285 EMERGENCY DEPT VISIT HI MDM: CPT

## 2023-11-09 PROCEDURE — 70491 CT SOFT TISSUE NECK W/DYE: CPT

## 2023-11-09 PROCEDURE — 83735 ASSAY OF MAGNESIUM: CPT

## 2023-11-09 PROCEDURE — 84145 PROCALCITONIN (PCT): CPT

## 2023-11-09 PROCEDURE — 0225U NFCT DS DNA&RNA 21 SARSCOV2: CPT

## 2023-11-09 PROCEDURE — 85025 COMPLETE CBC W/AUTO DIFF WBC: CPT

## 2023-11-09 PROCEDURE — 93010 ELECTROCARDIOGRAM REPORT: CPT

## 2023-11-09 PROCEDURE — 36415 COLL VENOUS BLD VENIPUNCTURE: CPT

## 2023-11-09 PROCEDURE — 71046 X-RAY EXAM CHEST 2 VIEWS: CPT | Mod: 26

## 2023-11-09 PROCEDURE — 80053 COMPREHEN METABOLIC PANEL: CPT

## 2023-11-09 PROCEDURE — 85652 RBC SED RATE AUTOMATED: CPT

## 2023-11-09 PROCEDURE — 99223 1ST HOSP IP/OBS HIGH 75: CPT

## 2023-11-09 PROCEDURE — 0241U: CPT

## 2023-11-09 PROCEDURE — 85027 COMPLETE CBC AUTOMATED: CPT

## 2023-11-09 PROCEDURE — 94640 AIRWAY INHALATION TREATMENT: CPT

## 2023-11-09 PROCEDURE — 71260 CT THORAX DX C+: CPT

## 2023-11-09 PROCEDURE — 87070 CULTURE OTHR SPECIMN AEROBIC: CPT

## 2023-11-09 RX ORDER — BUDESONIDE AND FORMOTEROL FUMARATE DIHYDRATE 160; 4.5 UG/1; UG/1
2 AEROSOL RESPIRATORY (INHALATION)
Refills: 0 | Status: DISCONTINUED | OUTPATIENT
Start: 2023-11-09 | End: 2023-11-10

## 2023-11-09 RX ORDER — CEFTRIAXONE 500 MG/1
1000 INJECTION, POWDER, FOR SOLUTION INTRAMUSCULAR; INTRAVENOUS ONCE
Refills: 0 | Status: COMPLETED | OUTPATIENT
Start: 2023-11-09 | End: 2023-11-09

## 2023-11-09 RX ORDER — FUROSEMIDE 40 MG
40 TABLET ORAL DAILY
Refills: 0 | Status: DISCONTINUED | OUTPATIENT
Start: 2023-11-09 | End: 2023-11-11

## 2023-11-09 RX ORDER — POLYETHYLENE GLYCOL 3350 17 G/17G
17 POWDER, FOR SOLUTION ORAL DAILY
Refills: 0 | Status: DISCONTINUED | OUTPATIENT
Start: 2023-11-09 | End: 2023-11-11

## 2023-11-09 RX ORDER — SENNA PLUS 8.6 MG/1
2 TABLET ORAL AT BEDTIME
Refills: 0 | Status: DISCONTINUED | OUTPATIENT
Start: 2023-11-09 | End: 2023-11-11

## 2023-11-09 RX ORDER — ZOLPIDEM TARTRATE 10 MG/1
5 TABLET ORAL AT BEDTIME
Refills: 0 | Status: DISCONTINUED | OUTPATIENT
Start: 2023-11-09 | End: 2023-11-11

## 2023-11-09 RX ORDER — MORPHINE SULFATE 50 MG/1
100 CAPSULE, EXTENDED RELEASE ORAL
Refills: 0 | Status: DISCONTINUED | OUTPATIENT
Start: 2023-11-09 | End: 2023-11-11

## 2023-11-09 RX ORDER — FENOFIBRATE,MICRONIZED 130 MG
145 CAPSULE ORAL DAILY
Refills: 0 | Status: DISCONTINUED | OUTPATIENT
Start: 2023-11-09 | End: 2023-11-11

## 2023-11-09 RX ORDER — SIMVASTATIN 20 MG/1
20 TABLET, FILM COATED ORAL AT BEDTIME
Refills: 0 | Status: DISCONTINUED | OUTPATIENT
Start: 2023-11-09 | End: 2023-11-11

## 2023-11-09 RX ORDER — ASPIRIN/CALCIUM CARB/MAGNESIUM 324 MG
81 TABLET ORAL DAILY
Refills: 0 | Status: DISCONTINUED | OUTPATIENT
Start: 2023-11-09 | End: 2023-11-11

## 2023-11-09 RX ORDER — TIOTROPIUM BROMIDE 18 UG/1
2 CAPSULE ORAL; RESPIRATORY (INHALATION) DAILY
Refills: 0 | Status: DISCONTINUED | OUTPATIENT
Start: 2023-11-09 | End: 2023-11-10

## 2023-11-09 RX ORDER — ALPRAZOLAM 0.25 MG
2 TABLET ORAL EVERY 6 HOURS
Refills: 0 | Status: DISCONTINUED | OUTPATIENT
Start: 2023-11-09 | End: 2023-11-11

## 2023-11-09 RX ORDER — PANTOPRAZOLE SODIUM 20 MG/1
40 TABLET, DELAYED RELEASE ORAL
Refills: 0 | Status: DISCONTINUED | OUTPATIENT
Start: 2023-11-09 | End: 2023-11-11

## 2023-11-09 RX ORDER — ENOXAPARIN SODIUM 100 MG/ML
40 INJECTION SUBCUTANEOUS EVERY 24 HOURS
Refills: 0 | Status: DISCONTINUED | OUTPATIENT
Start: 2023-11-09 | End: 2023-11-11

## 2023-11-09 RX ORDER — LISINOPRIL 2.5 MG/1
5 TABLET ORAL DAILY
Refills: 0 | Status: DISCONTINUED | OUTPATIENT
Start: 2023-11-09 | End: 2023-11-11

## 2023-11-09 RX ORDER — IPRATROPIUM/ALBUTEROL SULFATE 18-103MCG
3 AEROSOL WITH ADAPTER (GRAM) INHALATION
Refills: 0 | Status: COMPLETED | OUTPATIENT
Start: 2023-11-09 | End: 2023-11-09

## 2023-11-09 RX ORDER — ALBUTEROL 90 UG/1
2 AEROSOL, METERED ORAL EVERY 6 HOURS
Refills: 0 | Status: DISCONTINUED | OUTPATIENT
Start: 2023-11-09 | End: 2023-11-10

## 2023-11-09 RX ADMIN — Medication 2 MILLIGRAM(S): at 21:36

## 2023-11-09 RX ADMIN — Medication 3 MILLILITER(S): at 17:00

## 2023-11-09 RX ADMIN — LISINOPRIL 5 MILLIGRAM(S): 2.5 TABLET ORAL at 21:40

## 2023-11-09 RX ADMIN — MORPHINE SULFATE 100 MILLIGRAM(S): 50 CAPSULE, EXTENDED RELEASE ORAL at 21:36

## 2023-11-09 RX ADMIN — SIMVASTATIN 20 MILLIGRAM(S): 20 TABLET, FILM COATED ORAL at 21:40

## 2023-11-09 RX ADMIN — Medication 125 MILLIGRAM(S): at 17:24

## 2023-11-09 RX ADMIN — CEFTRIAXONE 100 MILLIGRAM(S): 500 INJECTION, POWDER, FOR SOLUTION INTRAMUSCULAR; INTRAVENOUS at 17:24

## 2023-11-09 RX ADMIN — BUDESONIDE AND FORMOTEROL FUMARATE DIHYDRATE 2 PUFF(S): 160; 4.5 AEROSOL RESPIRATORY (INHALATION) at 21:41

## 2023-11-09 RX ADMIN — Medication 3 MILLILITER(S): at 18:00

## 2023-11-09 RX ADMIN — PANTOPRAZOLE SODIUM 40 MILLIGRAM(S): 20 TABLET, DELAYED RELEASE ORAL at 21:40

## 2023-11-09 RX ADMIN — ZOLPIDEM TARTRATE 5 MILLIGRAM(S): 10 TABLET ORAL at 21:36

## 2023-11-09 RX ADMIN — Medication 3 MILLILITER(S): at 17:20

## 2023-11-09 RX ADMIN — SENNA PLUS 2 TABLET(S): 8.6 TABLET ORAL at 21:40

## 2023-11-09 NOTE — ED ADULT TRIAGE NOTE - CHIEF COMPLAINT QUOTE
pt biba from home with flu like symptoms since Sunday - has been taking abx since Monday for pneumonia but not helping - fever last night of 102.8

## 2023-11-09 NOTE — ED PROVIDER NOTE - CLINICAL SUMMARY MEDICAL DECISION MAKING FREE TEXT BOX
58 yo M presented to ED with SOB, cough, fever. Labs and EKG were ordered and reviewed.  Imaging was ordered and reviewed by me.  Appropriate medications for patient's presenting complaints were ordered and effects were reassessed. Pt sx not fully improved after meds, likely COPD exacerbation complicated by pneumonia vs viral URI.  Patient's records (prior hospital, ED visit, and/or nursing home notes if available) were reviewed.  Additional history was obtained from EMS, family, and/or PCP (where available).  Escalation to admission/observation was considered. Patient requires inpatient hospitalization, admitted to medical floor for further care.

## 2023-11-09 NOTE — H&P ADULT - CONVERSATION DETAILS
Advanced Care Planning: FULL CODE   I have had goals of care discussion, advanced directive and code status with patient/family today.  I have spent 30 minutes with this patient. Over 50% of the encounter was spent in counseling on and coordination of patient care. The above recommendations were discussed with the patient. The patient and or family had all questions answered and expressed understanding of the plan.

## 2023-11-09 NOTE — H&P ADULT - NSHPPHYSICALEXAM_GEN_ALL_CORE
CONST: Well appearing in NAD  EYES: PERRL, EOMI, Sclera and conjunctiva clear.   ENT: No nasal discharge. Oropharynx normal appearing, no erythema or exudates. Uvula midline  CARD: No murmurs, rubs, or gallops; Normal rate and rhythm  RESP: BS present B/L. increased work of breathing, No wheeze   GI: Soft, non-tender, non-distended.  SKIN: Warm, dry, no acute rashes. MMM  NEURO: A&Ox3, No focal deficits. Strength and sensation intact. Steady gait

## 2023-11-09 NOTE — H&P ADULT - ASSESSMENT
Patient is a 57-year-old male with past medical history of hypertension, hyperlipidemia, GERD, COPD, REBECCA on CPAP, possible new onset CHF recently started on Lasix presents for evaluation of cough with productive sputum and shortness of breath associated with chest tightness since 5 days ago.     #Dyspnea secondary to anxiety vs COPD exacerbation   #Cough with productive sputum   #Hx of COPD  - Afebrile, VS stable, Saturating 95 on RA at time of exam   - Multiple presentation to ED this month for similar complaints: Likely anxiety component   - Labs unremarkable, VBG unremarkable, No hypercapnia, Lactate normal, Trop neg   - CXR clear no cardiopulmonary process  - Says he quit smoking 5 days ago but smells of cigarettes at time of exam   - s/p rocephin and solumedrol in ED   - c/w solumedrol, monitor off antibiotics   - RVP, procal, ESR, sputum culture   - c/w home inhalers, BIPAP at night     #HFpEF - Not in exacerbbation   - Recently diagnosed   - Stress test Aug 2023: NO DEFINITE EVIDENCE FOR ISCHEMIA. NORMAL RESTING LEFT VENTRICULAR WALL MOTION. LEFT VENTRICULAR EJECTION FRACTION OF  68 %  - Not overloaded, . Dry on PE   - c/w lasix     # HTN   # HLD  - c/w lisinopril  - c/w fenofibrate and simvastatin     # Chronic Pain   #Anxiety  - Pain from bilateral knees (s/p replacement), bilateral ankles and bilareal shoulders  - takes oxycodone 30 q4 hrs daily, Morphine suphate 100 q6, Xanax 2mg q6, Ambien 10 qhs   - Will continue morphine BID and Xanax PRN and ambien at night   - Might need pain medicine consult   - Bowel regiment     #DVT - lovenox sq   #GI - pantoprazole   #Diet - DASH  #Activity - IAT   #Code - Full code    Patient is a 57-year-old male with past medical history of hypertension, hyperlipidemia, GERD, COPD, REBECCA on CPAP, possible new onset CHF recently started on Lasix presents for evaluation of cough with productive sputum and shortness of breath associated with chest tightness since 5 days ago.     #Dyspnea secondary to anxiety vs COPD exacerbation   #Cough with productive sputum   #Hx of COPD ( 3L O2 at home)   - Afebrile, VS stable, Saturating 95 on RA at time of exam   - Multiple presentation to ED this month for similar complaints: Likely anxiety component   - Labs unremarkable, VBG unremarkable, No hypercapnia, Lactate normal, Trop neg   - CXR clear no cardiopulmonary process  - Says he quit smoking 5 days ago but smells of cigarettes at time of exam   - s/p rocephin and solumedrol in ED   - c/w solumedrol, monitor off antibiotics   - RVP, procal, ESR, sputum culture   - c/w home inhalers, BIPAP at night     #HFpEF - Not in exacerbbation   - Recently diagnosed   - Stress test Aug 2023: NO DEFINITE EVIDENCE FOR ISCHEMIA. NORMAL RESTING LEFT VENTRICULAR WALL MOTION. LEFT VENTRICULAR EJECTION FRACTION OF  68 %  - Not overloaded, . Dry on PE   - c/w lasix     # HTN   # HLD  - c/w lisinopril  - c/w fenofibrate and simvastatin     # Chronic Pain   #Anxiety  - Pain from bilateral knees (s/p replacement), bilateral ankles and bilareal shoulders  - takes oxycodone 30 q4 hrs daily, Morphine suphate 100 q6, Xanax 2mg q6, Ambien 10 qhs   - Will continue morphine BID and Xanax PRN and ambien at night   - Might need pain medicine consult   - Bowel regiment     #DVT - lovenox sq   #GI - pantoprazole   #Diet - DASH  #Activity - IAT   #Code - Full code    Patient is a 57-year-old male with past medical history of hypertension, hyperlipidemia, GERD, COPD, REBECCA on CPAP, possible new onset CHF recently started on Lasix presents for evaluation of cough with productive sputum and shortness of breath associated with chest tightness since 5 days ago.     #Dyspnea secondary to anxiety vs COPD exacerbation   #Cough with productive sputum   #Hx of COPD ( 3L O2 at home)   - Afebrile, VS stable, Saturating 95 on RA at time of exam   - Multiple presentation to ED this month for similar complaints: Likely anxiety component   - Labs unremarkable, VBG unremarkable, No hypercapnia, Lactate normal, Trop neg   - CXR clear no cardiopulmonary process  - Says he quit smoking 5 days ago but smells of cigarettes at time of exam   - s/p rocephin and solumedrol in ED   - c/w solumedrol, monitor off antibiotics   - RVP, procal, ESR, sputum culture   - c/w home inhalers, BIPAP at night     #HFpEF - Not in exacerbbation   - Recently diagnosed   - Stress test Aug 2023: NO DEFINITE EVIDENCE FOR ISCHEMIA. NORMAL RESTING LEFT VENTRICULAR WALL MOTION. LEFT VENTRICULAR EJECTION FRACTION OF  68 %  - Not overloaded, . Dry on PE   - c/w lasix     # HTN   # HLD  - c/w lisinopril  - c/w fenofibrate and simvastatin     #Chronic Pain   #Anxiety  - Pain from bilateral knees (s/p replacement), bilateral ankles and bilareal shoulders  - takes oxycodone 30 q4 hrs daily, Morphine suphate 100 q6, Xanax 2mg q6, Ambien 10 qhs   - Will continue morphine BID and Xanax PRN and ambien at night   - Might need pain medicine consult   - Bowel regiment     #DVT - lovenox sq   #GI - pantoprazole   #Diet - DASH  #Activity - IAT   #Code - Full code

## 2023-11-09 NOTE — ED PROVIDER NOTE - CARE PLAN
Principal Discharge DX:	COPD exacerbation  Assessment and plan of treatment:	Plan - copd exacerbation treatment, nebs, steroids, labs, VBG, EKG, CXR, admission  Secondary Diagnosis:	Pneumonia   1

## 2023-11-09 NOTE — H&P ADULT - HISTORY OF PRESENT ILLNESS
Patient is a 57-year-old male with past medical history of hypertension, hyperlipidemia, GERD, COPD, REBECCA on CPAP, possible new onset CHF recently started on Lasix presents for evaluation of cough with productive sputum and shortness of breath associated with chest tightness since 5 days ago.  Patient has presented multiple times to the ED this month for cough  and SOB. He was discharged on Levaquin and prednisone but reports no improvement.  He notes a fever of 102.8 prior to arrival.  He denies any nausea, vomiting, chest pain, abdominal pain, urinary complaints.  In the ED, afebrile, VS stable. Labs unremarkable   CXR clear no evidence of cardiopulmonary disease  Patient is a 57-year-old male with past medical history of hypertension, hyperlipidemia, GERD, COPD 3L O2 at home, REBECCA on CPAP, possible new onset CHF recently started on Lasix presents for evaluation of cough with productive sputum and shortness of breath associated with chest tightness since 5 days ago.  Patient has presented multiple times to the ED this month for cough  and SOB. He was discharged on Levaquin and prednisone but reports no improvement.  He notes a fever of 102.8 prior to arrival.  He denies any nausea, vomiting, chest pain, abdominal pain, urinary complaints.  In the ED, afebrile, VS stable. Labs unremarkable   CXR clear no evidence of cardiopulmonary disease

## 2023-11-09 NOTE — ED PROVIDER NOTE - OBJECTIVE STATEMENT
Patient is a 57-year-old male with past medical history of hypertension, hyperlipidemia, GERD, COPD, REBECCA on CPAP, possible new onset CHF recently started on Lasix presents for evaluation of fever, cough, chills, shortness of breath, difficulty breathing since 3 days ago.  Patient was seen in ED and started on Levaquin and prednisone.  He took his Levaquin 750 today as well as prednisone 20 today.  He notes a fever of 102.8 prior to arrival.  He denies any nausea, vomiting, chest pain, abdominal pain, urinary complaints.

## 2023-11-09 NOTE — H&P ADULT - ATTENDING COMMENTS
57-year-old male with past medical history of hypertension, hyperlipidemia, GERD, COPD, REBECCA on CPAP, possible new onset CHF recently started on Lasix presents for evaluation of cough with productive sputum and shortness of breath associated with chest tightness since 5 days ago.     Agree  with assessment  except for changes below.   Vital Signs (24 Hrs):  T(C): 36.2 (11-09-23 @ 21:34), Max: 37.1 (11-09-23 @ 13:50)  HR: 93 (11-09-23 @ 21:34) (92 - 93)  BP: 129/82 (11-09-23 @ 21:34) (127/80 - 129/82)  RR: 18 (11-09-23 @ 21:34) (18 - 18)  SpO2: 95% (11-09-23 @ 21:34) (95% - 95%)  Daily Height in cm: 177.8 (09 Nov 2023 13:50)        16:44 - VBG - pH: 7.43  | pCO2: 42    | pO2: 61    | Lactate: 1.50       IMPRESSION  Acute respiratory Distress Dyspnea secondary to anxiety vs COPD exacerbation   Associated Cough with productive sputum   Hx of COPD ( 3L O2 at home)   Titrate supplemental O2 to maintain SpO2 92-96%; avoid hyperoxia  Symbicort 160-4.5mcg 2 puffs BID; Spiriva 2.5 2 puffss daily.   Nebulizer treatment Q4H PRN .   Continue methylprednisolone 40mg IV QD. BIPAP as needed.   - Multiple presentation to ED this month for similar complaints: Likely anxiety component   - Labs unremarkable, VBG unremarkable, No hypercapnia, Lactate normal, Trop neg   - CXR clear no cardiopulmonary process  - Says he quit smoking 5 days ago but smells of cigarettes at time of exam   - s/p rocephin and solumedrol in ED   - c/w solumedrol, monitor off antibiotics   - f/u  RVP, procal, ESR, sputum culture   - c/w home inhalers, BIPAP at night       Hx HFpEF Currently Euvolemic  - Not overloaded, . Dry on PE   - c/w lasix     Hx  HTN   Hx  HLD  - c/w lisinopril  - c/w fenofibrate and simvastatin     Hx Chronic Pain   Hx  Anxiety  hx Insomnia   - Pain from bilateral knees (s/p replacement), bilateral ankles and bilareal shoulders  - takes oxycodone 30 q4 hrs daily, Morphine suphate 100 q6, Xanax 2mg q6, Ambien 10 qhs   - continue morphine BID and Xanax PRN and ambien at night   - Bowel regiment 57-year-old male with past medical history of hypertension, hyperlipidemia, GERD, COPD, REBECCA on CPAP, possible new onset CHF recently started on Lasix presents for evaluation of cough with productive sputum and shortness of breath associated with chest tightness since 5 days ago.     Agree  with assessment  except for changes below.   Vital Signs (24 Hrs):  T(C): 36.2 (11-09-23 @ 21:34), Max: 37.1 (11-09-23 @ 13:50)  HR: 93 (11-09-23 @ 21:34) (92 - 93)  BP: 129/82 (11-09-23 @ 21:34) (127/80 - 129/82)  RR: 18 (11-09-23 @ 21:34) (18 - 18)  SpO2: 95% (11-09-23 @ 21:34) (95% - 95%)  Daily Height in cm: 177.8 (09 Nov 2023 13:50)        16:44 - VBG - pH: 7.43  | pCO2: 42    | pO2: 61    | Lactate: 1.50       IMPRESSION  Acute respiratory Distress Dyspnea secondary to anxiety vs COPD exacerbation   Associated Cough with productive sputum   Hx of COPD ( 3L O2 at home)   Tobaccco Use  Titrate supplemental O2 to maintain SpO2 92-96%; avoid hyperoxia  Symbicort 160-4.5mcg 2 puffs BID; Spiriva 2.5 2 puffss daily.   Nebulizer treatment Q4H PRN .   Continue methylprednisolone 40mg IV QD. BIPAP as needed.   - Multiple presentation to ED this month for similar complaints: Likely anxiety component   - Labs unremarkable, VBG unremarkable, No hypercapnia, Lactate normal, Trop neg   - CXR clear no cardiopulmonary process  -15 minutes Smoking cessation counseling    - s/p rocephin and solumedrol in ED   - c/w solumedrol, monitor off antibiotics   - f/u  RVP, procal, ESR, sputum culture   - c/w home inhalers, BIPAP at night       Hx HFpEF Currently Euvolemic  - Not overloaded, . Dry on PE   - c/w lasix     Hx  HTN   Hx  HLD  - c/w lisinopril  - c/w fenofibrate and simvastatin     Hx Chronic Pain   Hx  Anxiety  hx Insomnia   - Pain from bilateral knees (s/p replacement), bilateral ankles and bilareal shoulders  - takes oxycodone 30 q4 hrs daily, Morphine suphate 100 q6, Xanax 2mg q6, Ambien 10 qhs   - continue morphine BID and Xanax PRN and ambien at night   - Bowel regiment 57-year-old male with past medical history of hypertension, hyperlipidemia, GERD, COPD, REBECCA on CPAP, possible new onset CHF recently started on Lasix presents for evaluation of cough with productive sputum and shortness of breath associated with chest tightness since 5 days ago.     Agree  with assessment  except for changes below.   Vital Signs (24 Hrs):  T(C): 36.2 (11-09-23 @ 21:34), Max: 37.1 (11-09-23 @ 13:50)  HR: 93 (11-09-23 @ 21:34) (92 - 93)  BP: 129/82 (11-09-23 @ 21:34) (127/80 - 129/82)  RR: 18 (11-09-23 @ 21:34) (18 - 18)  SpO2: 95% (11-09-23 @ 21:34) (95% - 95%)  Daily Height in cm: 177.8 (09 Nov 2023 13:50)        16:44 - VBG - pH: 7.43  | pCO2: 42    | pO2: 61    | Lactate: 1.50     PHYSICAL EXAM  GENERAL: NAD, Obese   HEAD:  NCAT, EOMI, MM  NECK: Supple, Nontender  NERVOUS SYSTEM:  AAOx3, NFD  CHEST/LUNG: +bs b/l, + wheezing   HEART: +s1s2 RRR  ABDOMEN: soft, NT/ND  EXTREMITIES:  pp, no edema  SKIN: age related skin changes       IMPRESSION  Acute respiratory Distress Dyspnea secondary to anxiety vs COPD exacerbation   Associated Cough with productive sputum   Hx of COPD ( 3L O2 at home)   Tobaccco Use  Titrate supplemental O2 to maintain SpO2 92-96%; avoid hyperoxia  Symbicort 160-4.5mcg 2 puffs BID; Spiriva 2.5 2 puffss daily.   Nebulizer treatment Q4H PRN .   Continue methylprednisolone 40mg IV QD. BIPAP as needed.   - Multiple presentation to ED this month for similar complaints: Likely anxiety component   - Labs unremarkable, VBG unremarkable, No hypercapnia, Lactate normal, Trop neg   - CXR clear no cardiopulmonary process  -15 minutes Smoking cessation counseling    - s/p rocephin and solumedrol in ED   - c/w solumedrol, monitor off antibiotics   - f/u  RVP, procal, ESR, sputum culture   - c/w home inhalers, BIPAP at night   -  Pulm eval      Hx HFpEF Currently Euvolemic  - Not overloaded, . Dry on PE   - c/w Lasix     Hx  HTN   Hx  HLD  - c/w lisinopril  - c/w fenofibrate and simvastatin     Hx Chronic Pain   Hx  Anxiety  hx Insomnia   - Pain from bilateral knees (s/p replacement), bilateral ankles and bilareal shoulders  - takes oxycodone 30 q4 hrs daily, Morphine suphate 100 q6, Xanax 2mg q6, Ambien 10 qhs   - continue morphine BID and Xanax PRN and ambien at night   - Bowel regiment    Seen 11/09/23

## 2023-11-09 NOTE — ED PROVIDER NOTE - ATTENDING APP SHARED VISIT CONTRIBUTION OF CARE
57-year-old male PMHx hypertension, hyperlipidemia, BPH, COPD on 3L NC at home PRN, CAD, REBECCA on BIPAP at night, CHF on lasix, presents to the emergency department for evaluation of shortness of breath, cough and fever for the past 5 days.  Patient reports Tmax at home 102.8 last night.  Patient reports being seen in the emergency department few days ago, told he had pneumonia, started his antibiotics but patient reports worsening of symptoms.  Patient noticed his oxygen was 90-91 on room air which is low for him. SOB worse at night and on exertion.  Denies chest pain, nausea, vomiting, abdominal pain, leg pain or swelling.    Vital Signs: I have reviewed the initial vital signs. O2 sat 93 on room air.   Constitutional: Patient in no acute distress, mild tachypnea.   Integumentary: No rash.  ENT: MMM  NECK: Supple.   Cardiovascular: RRR, radial pulses 2/4 bilaterally.   Respiratory: +diffuse wheezing and rhonchi, no accessory muscle use or retractions, gets out of breath with speaking full sentences.   Gastrointestinal: Abdomen soft, non-tender, non-distended, no rebound tenderness or guarding, no CVAT.   Musculoskeletal: FROM, no edema, no calf pain/swelling/erythema.  Neurologic: AAOx3, motor 5/5 and sensation intact throughout upper and lower ext.

## 2023-11-09 NOTE — H&P ADULT - NSHPLABSRESULTS_GEN_ALL_CORE
.  LABS:                         15.0   8.54  )-----------( 255      ( 09 Nov 2023 16:54 )             44.0     11-09    138  |  101  |  14  ----------------------------<  118<H>  4.8   |  26  |  0.8    Ca    9.6      09 Nov 2023 16:54    TPro  6.7  /  Alb  4.2  /  TBili  0.4  /  DBili  x   /  AST  20  /  ALT  40  /  AlkPhos  134<H>  11-09      Urinalysis Basic - ( 09 Nov 2023 16:54 )    Color: x / Appearance: x / SG: x / pH: x  Gluc: 118 mg/dL / Ketone: x  / Bili: x / Urobili: x   Blood: x / Protein: x / Nitrite: x   Leuk Esterase: x / RBC: x / WBC x   Sq Epi: x / Non Sq Epi: x / Bacteria: x      CARDIAC MARKERS ( 09 Nov 2023 16:54 )  x     / <0.01 ng/mL / x     / x     / x                RADIOLOGY, EKG & ADDITIONAL TESTS: Reviewed.

## 2023-11-09 NOTE — ED PROVIDER NOTE - PHYSICAL EXAMINATION
As Follows:  CONST: Well appearing in NAD  EYES: PERRL, EOMI, Sclera and conjunctiva clear.   ENT: No nasal discharge. Oropharynx normal appearing, no erythema or exudates. Uvula midline  CARD: No murmurs, rubs, or gallops; Normal rate and rhythm  RESP: BS present B/L, diffuse wheezes. increased work of breathing, hypoxia to 90/91 while talking, pausing between breaths.  GI: Soft, non-tender, non-distended.  SKIN: Warm, dry, no acute rashes. MMM  NEURO: A&Ox3, No focal deficits. Strength and sensation intact. Steady gait

## 2023-11-10 ENCOUNTER — TRANSCRIPTION ENCOUNTER (OUTPATIENT)
Age: 58
End: 2023-11-10

## 2023-11-10 LAB
ALBUMIN SERPL ELPH-MCNC: 4 G/DL — SIGNIFICANT CHANGE UP (ref 3.5–5.2)
ALBUMIN SERPL ELPH-MCNC: 4 G/DL — SIGNIFICANT CHANGE UP (ref 3.5–5.2)
ALP SERPL-CCNC: 137 U/L — HIGH (ref 30–115)
ALP SERPL-CCNC: 137 U/L — HIGH (ref 30–115)
ALT FLD-CCNC: 33 U/L — SIGNIFICANT CHANGE UP (ref 0–41)
ALT FLD-CCNC: 33 U/L — SIGNIFICANT CHANGE UP (ref 0–41)
ANION GAP SERPL CALC-SCNC: 14 MMOL/L — SIGNIFICANT CHANGE UP (ref 7–14)
ANION GAP SERPL CALC-SCNC: 14 MMOL/L — SIGNIFICANT CHANGE UP (ref 7–14)
AST SERPL-CCNC: 17 U/L — SIGNIFICANT CHANGE UP (ref 0–41)
AST SERPL-CCNC: 17 U/L — SIGNIFICANT CHANGE UP (ref 0–41)
BILIRUB SERPL-MCNC: 0.3 MG/DL — SIGNIFICANT CHANGE UP (ref 0.2–1.2)
BILIRUB SERPL-MCNC: 0.3 MG/DL — SIGNIFICANT CHANGE UP (ref 0.2–1.2)
BUN SERPL-MCNC: 15 MG/DL — SIGNIFICANT CHANGE UP (ref 10–20)
BUN SERPL-MCNC: 15 MG/DL — SIGNIFICANT CHANGE UP (ref 10–20)
CALCIUM SERPL-MCNC: 9.5 MG/DL — SIGNIFICANT CHANGE UP (ref 8.4–10.4)
CALCIUM SERPL-MCNC: 9.5 MG/DL — SIGNIFICANT CHANGE UP (ref 8.4–10.4)
CHLORIDE SERPL-SCNC: 99 MMOL/L — SIGNIFICANT CHANGE UP (ref 98–110)
CHLORIDE SERPL-SCNC: 99 MMOL/L — SIGNIFICANT CHANGE UP (ref 98–110)
CO2 SERPL-SCNC: 26 MMOL/L — SIGNIFICANT CHANGE UP (ref 17–32)
CO2 SERPL-SCNC: 26 MMOL/L — SIGNIFICANT CHANGE UP (ref 17–32)
CREAT SERPL-MCNC: 0.8 MG/DL — SIGNIFICANT CHANGE UP (ref 0.7–1.5)
CREAT SERPL-MCNC: 0.8 MG/DL — SIGNIFICANT CHANGE UP (ref 0.7–1.5)
EGFR: 105 ML/MIN/1.73M2 — SIGNIFICANT CHANGE UP
EGFR: 105 ML/MIN/1.73M2 — SIGNIFICANT CHANGE UP
ERYTHROCYTE [SEDIMENTATION RATE] IN BLOOD: 2 MM/HR — SIGNIFICANT CHANGE UP (ref 0–10)
ERYTHROCYTE [SEDIMENTATION RATE] IN BLOOD: 2 MM/HR — SIGNIFICANT CHANGE UP (ref 0–10)
GLUCOSE SERPL-MCNC: 90 MG/DL — SIGNIFICANT CHANGE UP (ref 70–99)
GLUCOSE SERPL-MCNC: 90 MG/DL — SIGNIFICANT CHANGE UP (ref 70–99)
HCT VFR BLD CALC: 48.2 % — SIGNIFICANT CHANGE UP (ref 42–52)
HCT VFR BLD CALC: 48.2 % — SIGNIFICANT CHANGE UP (ref 42–52)
HGB BLD-MCNC: 15.8 G/DL — SIGNIFICANT CHANGE UP (ref 14–18)
HGB BLD-MCNC: 15.8 G/DL — SIGNIFICANT CHANGE UP (ref 14–18)
MAGNESIUM SERPL-MCNC: 2.3 MG/DL — SIGNIFICANT CHANGE UP (ref 1.8–2.4)
MAGNESIUM SERPL-MCNC: 2.3 MG/DL — SIGNIFICANT CHANGE UP (ref 1.8–2.4)
MCHC RBC-ENTMCNC: 28.8 PG — SIGNIFICANT CHANGE UP (ref 27–31)
MCHC RBC-ENTMCNC: 28.8 PG — SIGNIFICANT CHANGE UP (ref 27–31)
MCHC RBC-ENTMCNC: 32.8 G/DL — SIGNIFICANT CHANGE UP (ref 32–37)
MCHC RBC-ENTMCNC: 32.8 G/DL — SIGNIFICANT CHANGE UP (ref 32–37)
MCV RBC AUTO: 88 FL — SIGNIFICANT CHANGE UP (ref 80–94)
MCV RBC AUTO: 88 FL — SIGNIFICANT CHANGE UP (ref 80–94)
NRBC # BLD: 0 /100 WBCS — SIGNIFICANT CHANGE UP (ref 0–0)
NRBC # BLD: 0 /100 WBCS — SIGNIFICANT CHANGE UP (ref 0–0)
PLATELET # BLD AUTO: 211 K/UL — SIGNIFICANT CHANGE UP (ref 130–400)
PLATELET # BLD AUTO: 211 K/UL — SIGNIFICANT CHANGE UP (ref 130–400)
PMV BLD: 10.9 FL — HIGH (ref 7.4–10.4)
PMV BLD: 10.9 FL — HIGH (ref 7.4–10.4)
POTASSIUM SERPL-MCNC: 5 MMOL/L — SIGNIFICANT CHANGE UP (ref 3.5–5)
POTASSIUM SERPL-MCNC: 5 MMOL/L — SIGNIFICANT CHANGE UP (ref 3.5–5)
POTASSIUM SERPL-SCNC: 5 MMOL/L — SIGNIFICANT CHANGE UP (ref 3.5–5)
POTASSIUM SERPL-SCNC: 5 MMOL/L — SIGNIFICANT CHANGE UP (ref 3.5–5)
PROT SERPL-MCNC: 6.7 G/DL — SIGNIFICANT CHANGE UP (ref 6–8)
PROT SERPL-MCNC: 6.7 G/DL — SIGNIFICANT CHANGE UP (ref 6–8)
RAPID RVP RESULT: DETECTED
RAPID RVP RESULT: DETECTED
RBC # BLD: 5.48 M/UL — SIGNIFICANT CHANGE UP (ref 4.7–6.1)
RBC # BLD: 5.48 M/UL — SIGNIFICANT CHANGE UP (ref 4.7–6.1)
RBC # FLD: 15.3 % — HIGH (ref 11.5–14.5)
RBC # FLD: 15.3 % — HIGH (ref 11.5–14.5)
RV+EV RNA SPEC QL NAA+PROBE: DETECTED
RV+EV RNA SPEC QL NAA+PROBE: DETECTED
SARS-COV-2 RNA SPEC QL NAA+PROBE: SIGNIFICANT CHANGE UP
SARS-COV-2 RNA SPEC QL NAA+PROBE: SIGNIFICANT CHANGE UP
SODIUM SERPL-SCNC: 139 MMOL/L — SIGNIFICANT CHANGE UP (ref 135–146)
SODIUM SERPL-SCNC: 139 MMOL/L — SIGNIFICANT CHANGE UP (ref 135–146)
WBC # BLD: 8.29 K/UL — SIGNIFICANT CHANGE UP (ref 4.8–10.8)
WBC # BLD: 8.29 K/UL — SIGNIFICANT CHANGE UP (ref 4.8–10.8)
WBC # FLD AUTO: 8.29 K/UL — SIGNIFICANT CHANGE UP (ref 4.8–10.8)
WBC # FLD AUTO: 8.29 K/UL — SIGNIFICANT CHANGE UP (ref 4.8–10.8)

## 2023-11-10 PROCEDURE — 70491 CT SOFT TISSUE NECK W/DYE: CPT | Mod: 26

## 2023-11-10 PROCEDURE — 99232 SBSQ HOSP IP/OBS MODERATE 35: CPT | Mod: 25

## 2023-11-10 PROCEDURE — 71260 CT THORAX DX C+: CPT | Mod: 26

## 2023-11-10 PROCEDURE — 99232 SBSQ HOSP IP/OBS MODERATE 35: CPT

## 2023-11-10 PROCEDURE — 31575 DIAGNOSTIC LARYNGOSCOPY: CPT

## 2023-11-10 PROCEDURE — 99222 1ST HOSP IP/OBS MODERATE 55: CPT | Mod: 25

## 2023-11-10 RX ORDER — GUAIFENESIN/DEXTROMETHORPHAN 600MG-30MG
10 TABLET, EXTENDED RELEASE 12 HR ORAL EVERY 6 HOURS
Refills: 0 | Status: DISCONTINUED | OUTPATIENT
Start: 2023-11-10 | End: 2023-11-11

## 2023-11-10 RX ORDER — BUDESONIDE, MICRONIZED 100 %
0.5 POWDER (GRAM) MISCELLANEOUS EVERY 12 HOURS
Refills: 0 | Status: DISCONTINUED | OUTPATIENT
Start: 2023-11-10 | End: 2023-11-11

## 2023-11-10 RX ORDER — CHLORHEXIDINE GLUCONATE 213 G/1000ML
1 SOLUTION TOPICAL
Refills: 0 | Status: DISCONTINUED | OUTPATIENT
Start: 2023-11-10 | End: 2023-11-11

## 2023-11-10 RX ORDER — IPRATROPIUM/ALBUTEROL SULFATE 18-103MCG
3 AEROSOL WITH ADAPTER (GRAM) INHALATION EVERY 6 HOURS
Refills: 0 | Status: DISCONTINUED | OUTPATIENT
Start: 2023-11-10 | End: 2023-11-11

## 2023-11-10 RX ORDER — SODIUM CHLORIDE 9 MG/ML
3 INJECTION INTRAMUSCULAR; INTRAVENOUS; SUBCUTANEOUS EVERY 6 HOURS
Refills: 0 | Status: DISCONTINUED | OUTPATIENT
Start: 2023-11-10 | End: 2023-11-11

## 2023-11-10 RX ORDER — INFLUENZA VIRUS VACCINE 15; 15; 15; 15 UG/.5ML; UG/.5ML; UG/.5ML; UG/.5ML
0.5 SUSPENSION INTRAMUSCULAR ONCE
Refills: 0 | Status: DISCONTINUED | OUTPATIENT
Start: 2023-11-10 | End: 2023-11-11

## 2023-11-10 RX ADMIN — MORPHINE SULFATE 100 MILLIGRAM(S): 50 CAPSULE, EXTENDED RELEASE ORAL at 22:29

## 2023-11-10 RX ADMIN — Medication 40 MILLIGRAM(S): at 05:14

## 2023-11-10 RX ADMIN — Medication 145 MILLIGRAM(S): at 12:16

## 2023-11-10 RX ADMIN — Medication 10 MILLILITER(S): at 22:14

## 2023-11-10 RX ADMIN — Medication 2 MILLIGRAM(S): at 18:46

## 2023-11-10 RX ADMIN — Medication 40 MILLIGRAM(S): at 05:13

## 2023-11-10 RX ADMIN — Medication 2 MILLIGRAM(S): at 12:21

## 2023-11-10 RX ADMIN — MORPHINE SULFATE 100 MILLIGRAM(S): 50 CAPSULE, EXTENDED RELEASE ORAL at 06:27

## 2023-11-10 RX ADMIN — ZOLPIDEM TARTRATE 5 MILLIGRAM(S): 10 TABLET ORAL at 18:47

## 2023-11-10 RX ADMIN — SENNA PLUS 2 TABLET(S): 8.6 TABLET ORAL at 22:14

## 2023-11-10 RX ADMIN — LISINOPRIL 5 MILLIGRAM(S): 2.5 TABLET ORAL at 05:14

## 2023-11-10 RX ADMIN — MORPHINE SULFATE 100 MILLIGRAM(S): 50 CAPSULE, EXTENDED RELEASE ORAL at 05:54

## 2023-11-10 RX ADMIN — Medication 40 MILLIGRAM(S): at 17:58

## 2023-11-10 RX ADMIN — Medication 10 MILLILITER(S): at 12:15

## 2023-11-10 RX ADMIN — SIMVASTATIN 20 MILLIGRAM(S): 20 TABLET, FILM COATED ORAL at 22:14

## 2023-11-10 RX ADMIN — Medication 3 MILLILITER(S): at 20:15

## 2023-11-10 RX ADMIN — Medication 10 MILLILITER(S): at 17:58

## 2023-11-10 RX ADMIN — POLYETHYLENE GLYCOL 3350 17 GRAM(S): 17 POWDER, FOR SOLUTION ORAL at 12:17

## 2023-11-10 RX ADMIN — Medication 81 MILLIGRAM(S): at 12:15

## 2023-11-10 NOTE — DISCHARGE NOTE PROVIDER - PROVIDER TOKENS
PROVIDER:[TOKEN:[[id number]],ESTABLISHEDPATIENT:[T]] PROVIDER:[TOKEN:[[id number]],ESTABLISHEDPATIENT:[T]],PROVIDER:[TOKEN:[[id number]],FOLLOWUP:[1 week]]

## 2023-11-10 NOTE — PATIENT PROFILE ADULT - FALL HARM RISK - UNIVERSAL INTERVENTIONS
Bed in lowest position, wheels locked, appropriate side rails in place/Call bell, personal items and telephone in reach/Instruct patient to call for assistance before getting out of bed or chair/Non-slip footwear when patient is out of bed/[place] to call system/Physically safe environment - no spills, clutter or unnecessary equipment/Purposeful Proactive Rounding/Room/bathroom lighting operational, light cord in reach

## 2023-11-10 NOTE — CONSULT NOTE ADULT - NS ATTEND AMEND GEN_ALL_CORE FT
Seen and examined. Flexible laryngoscopy performed revealing erythematous mass involving right nasopharynx/fossa of rosenmuller. The airway is patent, though the right true vocal fold may be mildly paretic, may be related to patient effort.    Recommend CT neck w contrast to further evaluate

## 2023-11-10 NOTE — DISCHARGE NOTE PROVIDER - HOSPITAL COURSE
Patient is a 57-year-old male with past medical history of hypertension, hyperlipidemia, GERD, COPD 3L O2 at home, REBECCA on CPAP, possible new onset CHF recently started on Lasix presents for evaluation of cough with productive sputum and shortness of breath associated with chest tightness since 5 days ago.  Patient has presented multiple times to the ED this month for cough  and SOB. He was discharged on Levaquin and prednisone but reports no improvement.  He notes a fever of 102.8 prior to arrival.  He denies any nausea, vomiting, chest pain, abdominal pain, urinary complaints.  In the ED, afebrile, VS stable. Labs unremarkable   CXR clear no evidence of cardiopulmonary disease     Patient was admitted to medicine for further evaluation. RVP results were +ve for rhinovirus. Patient has been saturating well off oxygen. ENT was consulted for vocal cord and upper airway eval as patient had been wheezing. Repeat echo ordered due to history of heart failure, though BNP and trops -ve. Pain has been well tolerated. Since admission, patient's symptoms have improved and patient is medically stable for discharge.     #Dyspnea secondary to anxiety vs COPD exacerbation   #Cough with productive sputum   #Hx of COPD ( 3L O2 at home)   #Tobacco Use  - Afebrile, VS stable, Saturating 95 on RA at time of exam   - Multiple presentation to ED this month for similar complaints: Likely anxiety component   - Labs unremarkable, VBG unremarkable, No hypercapnia, Lactate normal, Trop neg   - CXR clear no cardiopulmonary process  - Says he quit smoking 5 days ago but smells of cigarettes at time of exam   - s/p rocephin and solumedrol in ED   - c/w solumedrol, monitor off antibiotics   - f/u RVP, procal, ESR, sputum culture   - c/w home inhalers, BIPAP at night   - f/u ENT for vocal cord/upper airway eval    #HFpEF - Not in exacerbbation   - Recently diagnosed   - Stress test Aug 2023: NO DEFINITE EVIDENCE FOR ISCHEMIA. NORMAL RESTING LEFT VENTRICULAR WALL MOTION. LEFT VENTRICULAR EJECTION FRACTION OF  68 %  - Not overloaded, . Dry on PE   - c/w lasix   - f/u echo    # HTN   # HLD  - c/w lisinopril  - c/w fenofibrate and simvastatin     #Hx Chronic Pain   #Hx Anxiety  #Hx Insomnia  - Pain from bilateral knees (s/p replacement), bilateral ankles and bilateral shoulders  - takes oxycodone 30 q4 hrs daily, Morphine  q6, Xanax 2mg q6, Ambien 10 qhs      Patient is a 57-year-old male with past medical history of hypertension, hyperlipidemia, GERD, COPD 3L O2 at home, REBECCA on CPAP, possible new onset CHF recently started on Lasix presents for evaluation of cough with productive sputum and shortness of breath associated with chest tightness since 5 days ago.  Patient has presented multiple times to the ED this month for cough  and SOB. He was discharged on Levaquin and prednisone but reports no improvement.  He notes a fever of 102.8 prior to arrival.  He denies any nausea, vomiting, chest pain, abdominal pain, urinary complaints.  In the ED, afebrile, VS stable. Labs unremarkable   CXR clear no evidence of cardiopulmonary disease     Patient was admitted to medicine for further evaluation. RVP results were +ve for rhinovirus. Patient has been saturating well off oxygen. ENT was consulted for vocal cord and upper airway eval as patient had been wheezing. ENT recommended CT neck to r/o lesions in the posterior nasopharynx, pending read. Pulmonology was consulted as patient has REBECCA and stated his home CPAP machine was broken. Pain has been well tolerated. Since admission, patient's symptoms have improved. Patient is ambulating without oxygen. Patient is medically stable for discharge.     #Dyspnea secondary to anxiety vs COPD exacerbation   #Cough with productive sputum   #Hx of COPD ( 3L O2 at home)   #Tobacco Use  - Afebrile, VS stable, Saturating 95 on RA at time of exam   - Multiple presentation to ED this month for similar complaints: Likely anxiety component   - Labs unremarkable, VBG unremarkable, No hypercapnia, Lactate normal, Trop neg   - CXR clear no cardiopulmonary process  - Says he quit smoking 5 days ago but smells of cigarettes at time of exam   - s/p rocephin and solumedrol in ED   - c/w solumedrol, monitor off antibiotics   - f/u RVP, procal, ESR, sputum culture   - c/w home inhalers, BIPAP at night   - f/u ENT for vocal cord/upper airway eval    #HFpEF - Not in exacerbbation   - Recently diagnosed   - Stress test Aug 2023: NO DEFINITE EVIDENCE FOR ISCHEMIA. NORMAL RESTING LEFT VENTRICULAR WALL MOTION. LEFT VENTRICULAR EJECTION FRACTION OF  68 %  - Not overloaded, . Dry on PE   - c/w lasix   - f/u echo    # HTN   # HLD  - c/w lisinopril  - c/w fenofibrate and simvastatin     #Hx Chronic Pain   #Hx Anxiety  #Hx Insomnia  - Pain from bilateral knees (s/p replacement), bilateral ankles and bilateral shoulders  - takes oxycodone 30 q4 hrs daily, Morphine  q6, Xanax 2mg q6, Ambien 10 qhs

## 2023-11-10 NOTE — DISCHARGE NOTE PROVIDER - NSDCCPCAREPLAN_GEN_ALL_CORE_FT
PRINCIPAL DISCHARGE DIAGNOSIS  Diagnosis: COPD exacerbation  Assessment and Plan of Treatment: You were evaluated in the hospital for your cough and shortness of breath. You were found to have rhinovirus. Rhinovirus is a tiny germ that can make you sick. It's one of the viruses that can cause the common cold. When someone with a rhinovirus infection coughs, sneezes, or touches something, they can spread the virus to others. If you catch the virus, you might start feeling stuffy, sneezy, or have a sore throat. It's annoying, but usually not too serious. Just rest, drink fluids, and take care of yourself, and you'll likely feel better in a few days.  After discharge, you will need to:   - Follow up with your primary care doctor within 1-2 weeks  - Take all the medications you were discharged with, unless otherwise instructed by your healthcare provider(s).   Please follow up with your providers by calling them to make an appointment so that you can see them in 1-2weeks; bring your paperwork from this hospital stay to that visit. You can access your visit information by signing up for an account for the patient portal at [email or web address]   Seek immediate medical attention if you develop fevers, chills, chest pain, shortness of breath, nausea and vomiting, abdominal pain, passing out, weakness or numbness or tingling on one side of your body, or any other concerning signs or symptoms.      SECONDARY DISCHARGE DIAGNOSES  Diagnosis: Pneumonia  Assessment and Plan of Treatment:

## 2023-11-10 NOTE — CONSULT NOTE ADULT - SUBJECTIVE AND OBJECTIVE BOX
Pt is a 58yo Male who presents with dyspnea, COPD exacerbation - called for hoarseness. Pt states he has had a change in his voice on and off for "awhile". Pt also has noted some right ear fullness in the past few days. PT tolerating PO well, no active SOB/diff breathing. Denies any throat pain or discomfort. ++ smoker, previous 3PPD.     PAST MEDICAL & SURGICAL HISTORY:  HTN (hypertension)  High cholesterol  GERD (gastroesophageal reflux disease)  Morbid obesity  Osteoarthritis  Hiatal hernia  GERD  Colitis  LARGE INTESTINE   NO RECENT FLARES  COPD (chronic obstructive pulmonary disease)  REBECCA treated with BiPAP  History of cholecystectomy  History of appendectomy  History of knee replacement procedure of right knee  BILATERAL  Hernia, inguinal, bilateral  3 months old  H/O knee surgery  arthoscopic x4  H/O foot surgery  right big toe surgery      MEDICATIONS  (STANDING):  albuterol/ipratropium for Nebulization 3 milliLiter(s) Nebulizer every 6 hours  aspirin enteric coated 81 milliGRAM(s) Oral daily  buDESOnide  Inhalation Suspension 0.5 milliGRAM(s) Inhalation every 12 hours  chlorhexidine 4% Liquid 1 Application(s) Topical <User Schedule>  enoxaparin Injectable 40 milliGRAM(s) SubCutaneous every 24 hours  fenofibrate Tablet 145 milliGRAM(s) Oral daily  furosemide Tablet 40 milliGRAM(s) Oral daily  guaifenesin/dextromethorphan Oral Liquid 10 milliLiter(s) Oral every 6 hours  influenza  Vaccine 0.5 milliLiter(s) IntraMuscular once  lisinopril 5 milliGRAM(s) Oral daily  methylPREDNISolone sodium succinate Injectable 40 milliGRAM(s) IV Push two times a day  pantoprazole    Tablet 40 milliGRAM(s) Oral before breakfast  polyethylene glycol 3350 17 Gram(s) Oral daily  senna 2 Tablet(s) Oral at bedtime  simvastatin 20 milliGRAM(s) Oral at bedtime  sodium chloride 0.9% for Nebulization 3 milliLiter(s) Nebulizer every 6 hours    MEDICATIONS  (PRN):  ALPRAZolam 2 milliGRAM(s) Oral every 6 hours PRN anxiety  morphine ER Tablet 100 milliGRAM(s) Oral two times a day PRN Pain  zolpidem 5 milliGRAM(s) Oral at bedtime PRN Insomnia      Allergies    No Known Allergies    Intolerances    FAMILY HISTORY:  FH: lung cancer (Mother)  mother    FH: diabetes mellitus    REVIEW OF SYSTEMS   [x] A ten-point review of systems was otherwise negative except as noted.    Vital Signs Last 24 Hrs  T(C): 36.4 (10 Nov 2023 14:17), Max: 36.4 (10 Nov 2023 14:17)  T(F): 97.5 (10 Nov 2023 14:17), Max: 97.5 (10 Nov 2023 14:17)  HR: 85 (10 Nov 2023 14:17) (72 - 93)  BP: 105/63 (10 Nov 2023 14:17) (105/63 - 133/79)  RR: 18 (10 Nov 2023 14:17) (18 - 18)  SpO2: 95% (10 Nov 2023 03:02) (95% - 95%)    Parameters below as of 10 Nov 2023 03:02  Patient On (Oxygen Delivery Method): room air    GEN: NAD, awake and alert. No drooling or pooling of secretions. No stridor or stertor. Good vocal quality, hoarseness waxes/wanes.   SKIN: Good color, non diaphoretic.  HEENT: Oral mucosa pink and moist. No erythema or edema noted to buccal mucosa, tongue, FOM, uvula or posterior oropharynx. Uvula midline.   NECK: Trachea midline, Neck supple, no TTP to B/L lateral neck, no cervical LAD.  RESP: No dyspnea, non-labored breathing. No use of accessory muscles.   CARDIO: +S1/S2  ABDO: Soft, NT.  EXT: CLARK x 4    Fiberoptic Laryngoscopy: ++ fullness to the post NP, concerning for lesion. Laryngeal structures intact, no edema or erythema noted. Epiglottis crisp, no edema. TVC/FVC mobile and intact, right TVC slightly sluggish.     LABS:                        15.8   8.29  )-----------( 211      ( 10 Nov 2023 07:01 )             48.2     11-10    139  |  99  |  15  ----------------------------<  90  5.0   |  26  |  0.8    Ca    9.5      10 Nov 2023 07:01  Mg     2.3     11-10    TPro  6.7  /  Alb  4.0  /  TBili  0.3  /  DBili  x   /  AST  17  /  ALT  33  /  AlkPhos  137<H>  11-10      RADIOLOGY & ADDITIONAL STUDIES:    No recent pertinent imaging

## 2023-11-10 NOTE — DISCHARGE NOTE PROVIDER - CARE PROVIDER_API CALL
Edison Davis  Internal Medicine  [address] [place] [id number]  Phone: [phone]  Fax: [phone]  Established Patient  Follow Up Time:    Edison Davis  Internal Medicine  [address] [place] [address]  Phone: [phone]  Fax: [phone]  Established Patient  Follow Up Time:     Murali Brooks  Pulmonary Disease  [address] [place] [id number]  Phone: [phone]  Fax: [phone]  Follow Up Time: 1 week

## 2023-11-10 NOTE — DISCHARGE NOTE PROVIDER - NSDCMRMEDTOKEN_GEN_ALL_CORE_FT
albuterol 90 mcg/inh inhalation aerosol: 2 puff(s) inhaled every 6 hours AS NEEDED   ALPRAZolam 2 mg oral tablet: 1 tab(s) orally every 6 hours, As needed, anxiety  Ambien 10 mg oral tablet: 0.5 tab(s) orally once a day (at bedtime), As Needed  Aspir 81 oral delayed release tablet: 1 tab(s) orally once a day  fenofibrate 160 mg oral tablet: 1 tab(s) orally once a day  Lasix 40 mg oral tablet: 1 tab(s) orally once a day  lisinopril 5 mg oral tablet: 1 tab(s) orally once a day  morphine 100 mg/12 to 24 hr oral capsule, extended release: 1 cap(s) orally 2 times a day  Narcan 4 mg/0.1 mL nasal spray: 4 milligram(s) intranasally once a day as needed for  opioid overdose  oxyCODONE 30 mg oral tablet: 1 tab(s) orally every 4 hours, As needed, Severe Pain (7 - 10) MDD:180mg  Spiriva HandiHaler 18 mcg inhalation capsule: 1 cap(s) inhaled once a day   Symbicort 160 mcg-4.5 mcg/inh inhalation aerosol: 1 puff(s) inhaled 2 times a day   Zocor 20 mg oral tablet: 1 tab(s) orally once a day (at bedtime)   albuterol 90 mcg/inh inhalation aerosol: 2 puff(s) inhaled every 6 hours AS NEEDED   ALPRAZolam 2 mg oral tablet: 1 tab(s) orally every 6 hours, As needed, anxiety  Ambien 10 mg oral tablet: 0.5 tab(s) orally once a day (at bedtime), As Needed  Aspir 81 oral delayed release tablet: 1 tab(s) orally once a day  fenofibrate 160 mg oral tablet: 1 tab(s) orally once a day  Lasix 40 mg oral tablet: 1 tab(s) orally once a day  lisinopril 5 mg oral tablet: 1 tab(s) orally once a day  morphine 100 mg/12 to 24 hr oral capsule, extended release: 1 cap(s) orally 2 times a day  Narcan 4 mg/0.1 mL nasal spray: 4 milligram(s) intranasally once a day as needed for  opioid overdose  oxyCODONE 30 mg oral tablet: 1 tab(s) orally every 4 hours, As needed, Severe Pain (7 - 10) MDD:180mg  predniSONE 20 mg oral tablet: 2 tab(s) orally once a day  Spiriva HandiHaler 18 mcg inhalation capsule: 1 cap(s) inhaled once a day   Symbicort 160 mcg-4.5 mcg/inh inhalation aerosol: 1 puff(s) inhaled 2 times a day   Zocor 20 mg oral tablet: 1 tab(s) orally once a day (at bedtime)

## 2023-11-10 NOTE — DISCHARGE NOTE PROVIDER - CARE PROVIDERS DIRECT ADDRESSES
[email or web address]

## 2023-11-10 NOTE — PROGRESS NOTE ADULT - ASSESSMENT
Patient is a 57-year-old male with past medical history of hypertension, hyperlipidemia, GERD, COPD, REBECCA on CPAP, possible new onset CHF recently started on Lasix presents for evaluation of cough with productive sputum and shortness of breath associated with chest tightness since 5 days ago.     #Dyspnea secondary to anxiety vs COPD exacerbation   #Cough with productive sputum   #Hx of COPD ( 3L O2 at home)   #Tobacco Use  - Afebrile, VS stable, Saturating 95 on RA at time of exam   - Multiple presentation to ED this month for similar complaints: Likely anxiety component   - Labs unremarkable, VBG unremarkable, No hypercapnia, Lactate normal, Trop neg   - CXR clear no cardiopulmonary process  - Says he quit smoking 5 days ago but smells of cigarettes at time of exam   - s/p rocephin and solumedrol in ED   - c/w solumedrol, monitor off antibiotics   - f/u RVP, procal, ESR, sputum culture   - c/w home inhalers, BIPAP at night     #HFpEF - Not in exacerbbation   - Recently diagnosed   - Stress test Aug 2023: NO DEFINITE EVIDENCE FOR ISCHEMIA. NORMAL RESTING LEFT VENTRICULAR WALL MOTION. LEFT VENTRICULAR EJECTION FRACTION OF  68 %  - Not overloaded, . Dry on PE   - c/w lasix     # HTN   # HLD  - c/w lisinopril  - c/w fenofibrate and simvastatin     #Hx Chronic Pain   #Hx Anxiety  #Hx Insomnia  - Pain from bilateral knees (s/p replacement), bilateral ankles and bilateral shoulders  - takes oxycodone 30 q4 hrs daily, Morphine  q6, Xanax 2mg q6, Ambien 10 qhs   - Will continue morphine BID and Xanax PRN and ambien at night   - Might need pain medicine consult   - Bowel regimen                                                                              ----------------------------------------------------  # DVT prophylaxis: Lovenox  # GI prophylaxis: Protonix  # Diet: DASH/TLC  # Activity: IAT  # Code status: FULL CODE   # Disposition:                                                                            --------------------------------------------------------    # Handoff:    Patient is a 57-year-old male with past medical history of hypertension, hyperlipidemia, GERD, COPD, REBECCA on CPAP, possible new onset CHF recently started on Lasix presents for evaluation of cough with productive sputum and shortness of breath associated with chest tightness since 5 days ago.     #Dyspnea secondary to anxiety vs COPD exacerbation   #Cough with productive sputum   #Hx of COPD ( 3L O2 at home)   #Tobacco Use  - Afebrile, VS stable, Saturating 95 on RA at time of exam   - Multiple presentation to ED this month for similar complaints: Likely anxiety component   - Labs unremarkable, VBG unremarkable, No hypercapnia, Lactate normal, Trop neg   - CXR clear no cardiopulmonary process  - Says he quit smoking 5 days ago but smells of cigarettes at time of exam   - s/p rocephin and solumedrol in ED   - c/w solumedrol, monitor off antibiotics   - f/u RVP, procal, ESR, sputum culture   - c/w home inhalers, BIPAP at night   - f/u ENT for vocal cord/upper airway eval    #HFpEF - Not in exacerbbation   - Recently diagnosed   - Stress test Aug 2023: NO DEFINITE EVIDENCE FOR ISCHEMIA. NORMAL RESTING LEFT VENTRICULAR WALL MOTION. LEFT VENTRICULAR EJECTION FRACTION OF  68 %  - Not overloaded, . Dry on PE   - c/w lasix   - f/u echo    # HTN   # HLD  - c/w lisinopril  - c/w fenofibrate and simvastatin     #Hx Chronic Pain   #Hx Anxiety  #Hx Insomnia  - Pain from bilateral knees (s/p replacement), bilateral ankles and bilateral shoulders  - takes oxycodone 30 q4 hrs daily, Morphine  q6, Xanax 2mg q6, Ambien 10 qhs   - Will continue morphine BID and Xanax PRN and ambien at night   - Might need pain medicine consult   - Bowel regimen                                                                              ----------------------------------------------------  # DVT prophylaxis: Lovenox  # GI prophylaxis: Protonix  # Diet: DASH/TLC  # Activity: IAT  # Code status: FULL CODE   # Disposition:                                                                            --------------------------------------------------------    # Handoff:   - f/u RVP  - f/u echo  - f/u ENT consult

## 2023-11-10 NOTE — CONSULT NOTE ADULT - ASSESSMENT
Pt is a 56yo Male who presents with dyspnea, COPD exacerbation - called for hoarseness. Limited evaluation, but possible right TVC paresis. Concern for nasopharyngeal mass on FFL.    ·	CT neck with contrast to evaluated post NP, r/o lesion  ·	cont current care  ·	cont nebs  ·	w/d with attng, will follow

## 2023-11-11 ENCOUNTER — INPATIENT (INPATIENT)
Facility: HOSPITAL | Age: 58
LOS: 1 days | Discharge: LEFT BEFORE DISCHARGE | DRG: 140 | End: 2023-11-13
Attending: STUDENT IN AN ORGANIZED HEALTH CARE EDUCATION/TRAINING PROGRAM | Admitting: STUDENT IN AN ORGANIZED HEALTH CARE EDUCATION/TRAINING PROGRAM
Payer: MEDICAID

## 2023-11-11 ENCOUNTER — TRANSCRIPTION ENCOUNTER (OUTPATIENT)
Age: 58
End: 2023-11-11

## 2023-11-11 VITALS
TEMPERATURE: 96 F | DIASTOLIC BLOOD PRESSURE: 84 MMHG | HEART RATE: 85 BPM | RESPIRATION RATE: 18 BRPM | SYSTOLIC BLOOD PRESSURE: 130 MMHG

## 2023-11-11 VITALS
DIASTOLIC BLOOD PRESSURE: 77 MMHG | SYSTOLIC BLOOD PRESSURE: 123 MMHG | HEIGHT: 70 IN | WEIGHT: 289.91 LBS | OXYGEN SATURATION: 96 % | HEART RATE: 85 BPM | RESPIRATION RATE: 18 BRPM

## 2023-11-11 DIAGNOSIS — Z98.890 OTHER SPECIFIED POSTPROCEDURAL STATES: Chronic | ICD-10-CM

## 2023-11-11 DIAGNOSIS — Z96.651 PRESENCE OF RIGHT ARTIFICIAL KNEE JOINT: Chronic | ICD-10-CM

## 2023-11-11 DIAGNOSIS — K40.20 BILATERAL INGUINAL HERNIA, WITHOUT OBSTRUCTION OR GANGRENE, NOT SPECIFIED AS RECURRENT: Chronic | ICD-10-CM

## 2023-11-11 LAB
ALBUMIN SERPL ELPH-MCNC: 4.1 G/DL — SIGNIFICANT CHANGE UP (ref 3.5–5.2)
ALBUMIN SERPL ELPH-MCNC: 4.1 G/DL — SIGNIFICANT CHANGE UP (ref 3.5–5.2)
ALP SERPL-CCNC: 166 U/L — HIGH (ref 30–115)
ALP SERPL-CCNC: 166 U/L — HIGH (ref 30–115)
ALT FLD-CCNC: 24 U/L — SIGNIFICANT CHANGE UP (ref 0–41)
ALT FLD-CCNC: 24 U/L — SIGNIFICANT CHANGE UP (ref 0–41)
ANION GAP SERPL CALC-SCNC: 13 MMOL/L — SIGNIFICANT CHANGE UP (ref 7–14)
ANION GAP SERPL CALC-SCNC: 13 MMOL/L — SIGNIFICANT CHANGE UP (ref 7–14)
AST SERPL-CCNC: 10 U/L — SIGNIFICANT CHANGE UP (ref 0–41)
AST SERPL-CCNC: 10 U/L — SIGNIFICANT CHANGE UP (ref 0–41)
BASOPHILS # BLD AUTO: 0.02 K/UL — SIGNIFICANT CHANGE UP (ref 0–0.2)
BASOPHILS # BLD AUTO: 0.02 K/UL — SIGNIFICANT CHANGE UP (ref 0–0.2)
BASOPHILS NFR BLD AUTO: 0.1 % — SIGNIFICANT CHANGE UP (ref 0–1)
BASOPHILS NFR BLD AUTO: 0.1 % — SIGNIFICANT CHANGE UP (ref 0–1)
BILIRUB SERPL-MCNC: 0.3 MG/DL — SIGNIFICANT CHANGE UP (ref 0.2–1.2)
BILIRUB SERPL-MCNC: 0.3 MG/DL — SIGNIFICANT CHANGE UP (ref 0.2–1.2)
BUN SERPL-MCNC: 19 MG/DL — SIGNIFICANT CHANGE UP (ref 10–20)
BUN SERPL-MCNC: 19 MG/DL — SIGNIFICANT CHANGE UP (ref 10–20)
CALCIUM SERPL-MCNC: 10 MG/DL — SIGNIFICANT CHANGE UP (ref 8.4–10.4)
CALCIUM SERPL-MCNC: 10 MG/DL — SIGNIFICANT CHANGE UP (ref 8.4–10.4)
CHLORIDE SERPL-SCNC: 95 MMOL/L — LOW (ref 98–110)
CHLORIDE SERPL-SCNC: 95 MMOL/L — LOW (ref 98–110)
CO2 SERPL-SCNC: 27 MMOL/L — SIGNIFICANT CHANGE UP (ref 17–32)
CO2 SERPL-SCNC: 27 MMOL/L — SIGNIFICANT CHANGE UP (ref 17–32)
CREAT SERPL-MCNC: 0.8 MG/DL — SIGNIFICANT CHANGE UP (ref 0.7–1.5)
CREAT SERPL-MCNC: 0.8 MG/DL — SIGNIFICANT CHANGE UP (ref 0.7–1.5)
EGFR: 105 ML/MIN/1.73M2 — SIGNIFICANT CHANGE UP
EGFR: 105 ML/MIN/1.73M2 — SIGNIFICANT CHANGE UP
EOSINOPHIL # BLD AUTO: 0 K/UL — SIGNIFICANT CHANGE UP (ref 0–0.7)
EOSINOPHIL # BLD AUTO: 0 K/UL — SIGNIFICANT CHANGE UP (ref 0–0.7)
EOSINOPHIL NFR BLD AUTO: 0 % — SIGNIFICANT CHANGE UP (ref 0–8)
EOSINOPHIL NFR BLD AUTO: 0 % — SIGNIFICANT CHANGE UP (ref 0–8)
GLUCOSE SERPL-MCNC: 183 MG/DL — HIGH (ref 70–99)
GLUCOSE SERPL-MCNC: 183 MG/DL — HIGH (ref 70–99)
HCT VFR BLD CALC: 44.9 % — SIGNIFICANT CHANGE UP (ref 42–52)
HCT VFR BLD CALC: 44.9 % — SIGNIFICANT CHANGE UP (ref 42–52)
HGB BLD-MCNC: 15 G/DL — SIGNIFICANT CHANGE UP (ref 14–18)
HGB BLD-MCNC: 15 G/DL — SIGNIFICANT CHANGE UP (ref 14–18)
IMM GRANULOCYTES NFR BLD AUTO: 1.1 % — HIGH (ref 0.1–0.3)
IMM GRANULOCYTES NFR BLD AUTO: 1.1 % — HIGH (ref 0.1–0.3)
LYMPHOCYTES # BLD AUTO: 1.51 K/UL — SIGNIFICANT CHANGE UP (ref 1.2–3.4)
LYMPHOCYTES # BLD AUTO: 1.51 K/UL — SIGNIFICANT CHANGE UP (ref 1.2–3.4)
LYMPHOCYTES # BLD AUTO: 10.6 % — LOW (ref 20.5–51.1)
LYMPHOCYTES # BLD AUTO: 10.6 % — LOW (ref 20.5–51.1)
MAGNESIUM SERPL-MCNC: 2.1 MG/DL — SIGNIFICANT CHANGE UP (ref 1.8–2.4)
MAGNESIUM SERPL-MCNC: 2.1 MG/DL — SIGNIFICANT CHANGE UP (ref 1.8–2.4)
MCHC RBC-ENTMCNC: 29 PG — SIGNIFICANT CHANGE UP (ref 27–31)
MCHC RBC-ENTMCNC: 29 PG — SIGNIFICANT CHANGE UP (ref 27–31)
MCHC RBC-ENTMCNC: 33.4 G/DL — SIGNIFICANT CHANGE UP (ref 32–37)
MCHC RBC-ENTMCNC: 33.4 G/DL — SIGNIFICANT CHANGE UP (ref 32–37)
MCV RBC AUTO: 86.7 FL — SIGNIFICANT CHANGE UP (ref 80–94)
MCV RBC AUTO: 86.7 FL — SIGNIFICANT CHANGE UP (ref 80–94)
MONOCYTES # BLD AUTO: 0.54 K/UL — SIGNIFICANT CHANGE UP (ref 0.1–0.6)
MONOCYTES # BLD AUTO: 0.54 K/UL — SIGNIFICANT CHANGE UP (ref 0.1–0.6)
MONOCYTES NFR BLD AUTO: 3.8 % — SIGNIFICANT CHANGE UP (ref 1.7–9.3)
MONOCYTES NFR BLD AUTO: 3.8 % — SIGNIFICANT CHANGE UP (ref 1.7–9.3)
NEUTROPHILS # BLD AUTO: 12.08 K/UL — HIGH (ref 1.4–6.5)
NEUTROPHILS # BLD AUTO: 12.08 K/UL — HIGH (ref 1.4–6.5)
NEUTROPHILS NFR BLD AUTO: 84.4 % — HIGH (ref 42.2–75.2)
NEUTROPHILS NFR BLD AUTO: 84.4 % — HIGH (ref 42.2–75.2)
NRBC # BLD: 0 /100 WBCS — SIGNIFICANT CHANGE UP (ref 0–0)
NRBC # BLD: 0 /100 WBCS — SIGNIFICANT CHANGE UP (ref 0–0)
PLATELET # BLD AUTO: 282 K/UL — SIGNIFICANT CHANGE UP (ref 130–400)
PLATELET # BLD AUTO: 282 K/UL — SIGNIFICANT CHANGE UP (ref 130–400)
PMV BLD: 10.3 FL — SIGNIFICANT CHANGE UP (ref 7.4–10.4)
PMV BLD: 10.3 FL — SIGNIFICANT CHANGE UP (ref 7.4–10.4)
POTASSIUM SERPL-MCNC: 4.6 MMOL/L — SIGNIFICANT CHANGE UP (ref 3.5–5)
POTASSIUM SERPL-MCNC: 4.6 MMOL/L — SIGNIFICANT CHANGE UP (ref 3.5–5)
POTASSIUM SERPL-SCNC: 4.6 MMOL/L — SIGNIFICANT CHANGE UP (ref 3.5–5)
POTASSIUM SERPL-SCNC: 4.6 MMOL/L — SIGNIFICANT CHANGE UP (ref 3.5–5)
PROCALCITONIN SERPL-MCNC: 0.03 NG/ML — SIGNIFICANT CHANGE UP (ref 0.02–0.1)
PROCALCITONIN SERPL-MCNC: 0.03 NG/ML — SIGNIFICANT CHANGE UP (ref 0.02–0.1)
PROT SERPL-MCNC: 6.6 G/DL — SIGNIFICANT CHANGE UP (ref 6–8)
PROT SERPL-MCNC: 6.6 G/DL — SIGNIFICANT CHANGE UP (ref 6–8)
RBC # BLD: 5.18 M/UL — SIGNIFICANT CHANGE UP (ref 4.7–6.1)
RBC # BLD: 5.18 M/UL — SIGNIFICANT CHANGE UP (ref 4.7–6.1)
RBC # FLD: 14.9 % — HIGH (ref 11.5–14.5)
RBC # FLD: 14.9 % — HIGH (ref 11.5–14.5)
SODIUM SERPL-SCNC: 135 MMOL/L — SIGNIFICANT CHANGE UP (ref 135–146)
SODIUM SERPL-SCNC: 135 MMOL/L — SIGNIFICANT CHANGE UP (ref 135–146)
WBC # BLD: 14.31 K/UL — HIGH (ref 4.8–10.8)
WBC # BLD: 14.31 K/UL — HIGH (ref 4.8–10.8)
WBC # FLD AUTO: 14.31 K/UL — HIGH (ref 4.8–10.8)
WBC # FLD AUTO: 14.31 K/UL — HIGH (ref 4.8–10.8)

## 2023-11-11 PROCEDURE — 99223 1ST HOSP IP/OBS HIGH 75: CPT

## 2023-11-11 PROCEDURE — 99285 EMERGENCY DEPT VISIT HI MDM: CPT

## 2023-11-11 PROCEDURE — 99239 HOSP IP/OBS DSCHRG MGMT >30: CPT

## 2023-11-11 RX ADMIN — LISINOPRIL 5 MILLIGRAM(S): 2.5 TABLET ORAL at 05:06

## 2023-11-11 RX ADMIN — ENOXAPARIN SODIUM 40 MILLIGRAM(S): 100 INJECTION SUBCUTANEOUS at 05:06

## 2023-11-11 RX ADMIN — Medication 3 MILLILITER(S): at 07:56

## 2023-11-11 RX ADMIN — Medication 10 MILLILITER(S): at 05:07

## 2023-11-11 RX ADMIN — Medication 2 MILLIGRAM(S): at 05:15

## 2023-11-11 RX ADMIN — CHLORHEXIDINE GLUCONATE 1 APPLICATION(S): 213 SOLUTION TOPICAL at 05:16

## 2023-11-11 RX ADMIN — Medication 40 MILLIGRAM(S): at 05:07

## 2023-11-11 RX ADMIN — Medication 40 MILLIGRAM(S): at 05:06

## 2023-11-11 RX ADMIN — PANTOPRAZOLE SODIUM 40 MILLIGRAM(S): 20 TABLET, DELAYED RELEASE ORAL at 05:06

## 2023-11-11 RX ADMIN — MORPHINE SULFATE 100 MILLIGRAM(S): 50 CAPSULE, EXTENDED RELEASE ORAL at 05:06

## 2023-11-11 NOTE — DISCHARGE NOTE NURSING/CASE MANAGEMENT/SOCIAL WORK - PATIENT PORTAL LINK FT
You can access the FollowMyHealth Patient Portal offered by [facility] by registering at the following website: [email or web address]. By joining [employer]’s FollowMyHealth portal, you will also be able to view your health information using other applications (apps) compatible with our system.

## 2023-11-11 NOTE — CHART NOTE - NSCHARTNOTEFT_GEN_A_CORE
Patient is medically stable for discharge. Discharge order put in. When nursing staff went into patient's room to hand him discharge paperwork, patient was nowhere to be found. IV access had not yet been removed. Attending notified.
stable to dc; patient is ambulating without distress. RVP positive rhinovirus    Pt was seeing at [facility] this AM per security.

## 2023-11-11 NOTE — DISCHARGE NOTE NURSING/CASE MANAGEMENT/SOCIAL WORK - NSDCVIVACCINE_GEN_ALL_CORE_FT
influenza, injectable, quadrivalent, preservative free; 19-Oct-2018 18:53; Zina Muro (RN); [employer]; [id number] (Exp. Date: 30-Jun-2019); IntraMuscular; Deltoid Left.; 0.5 milliLiter(s); VIS (VIS Published: 07-Aug-2015, VIS Presented: 19-Oct-2018);   Tdap; 03-Feb-2023 23:21; Leonor Meehan (VEE); [employer]; [id number] (Exp. Date: 08-Dec-2024); IntraMuscular; Deltoid Left.; 0.5 milliLiter(s); VIS (VIS Published: 09-May-2013, VIS Presented: 03-Feb-2023);

## 2023-11-11 NOTE — PROGRESS NOTE ADULT - SUBJECTIVE AND OBJECTIVE BOX
24H events:    Patient is a 57y old Male who presents with a chief complaint of Dyspnea (11 Nov 2023 03:45)    Primary diagnosis of COPD exacerbation      Today is hospital day 2d. This morning patient was seen and examined at bedside, resting comfortably in bed.    No acute or major events overnight. Hemodynamically stable, tolerating oral diet, voiding appropriately with appropriate bowel movements.     Patient states still experiencing shortness of breath, though saturating well on room air.       PAST MEDICAL & SURGICAL HISTORY  HTN (hypertension)    High cholesterol    GERD (gastroesophageal reflux disease)    Morbid obesity    Osteoarthritis    Hiatal hernia  GERD    Colitis  LARGE INTESTINE   NO RECENT FLARES    COPD (chronic obstructive pulmonary disease)    REBECCA treated with BiPAP    History of cholecystectomy    History of appendectomy    History of knee replacement procedure of right knee  BILATERAL    Hernia, inguinal, bilateral  3 months old    H/O knee surgery  arthoscopic x4    H/O foot surgery  right big toe surgery      ALLERGIES:  No Known Allergies    MEDICATIONS:  STANDING MEDICATIONS  albuterol/ipratropium for Nebulization 3 milliLiter(s) Nebulizer every 6 hours  aspirin enteric coated 81 milliGRAM(s) Oral daily  buDESOnide    Inhalation Suspension 0.5 milliGRAM(s) Inhalation every 12 hours  chlorhexidine 4% Liquid 1 Application(s) Topical <User Schedule>  enoxaparin Injectable 40 milliGRAM(s) SubCutaneous every 24 hours  fenofibrate Tablet 145 milliGRAM(s) Oral daily  furosemide    Tablet 40 milliGRAM(s) Oral daily  guaifenesin/dextromethorphan Oral Liquid 10 milliLiter(s) Oral every 6 hours  influenza   Vaccine 0.5 milliLiter(s) IntraMuscular once  lisinopril 5 milliGRAM(s) Oral daily  methylPREDNISolone sodium succinate Injectable 40 milliGRAM(s) IV Push two times a day  pantoprazole    Tablet 40 milliGRAM(s) Oral before breakfast  polyethylene glycol 3350 17 Gram(s) Oral daily  senna 2 Tablet(s) Oral at bedtime  simvastatin 20 milliGRAM(s) Oral at bedtime  sodium chloride 0.9% for Nebulization 3 milliLiter(s) Nebulizer every 6 hours    PRN MEDICATIONS  ALPRAZolam 2 milliGRAM(s) Oral every 6 hours PRN  morphine ER Tablet 100 milliGRAM(s) Oral two times a day PRN  zolpidem 5 milliGRAM(s) Oral at bedtime PRN    VITALS:   T(F): 96  HR: 85  BP: 130/84  RR: 18  SpO2: --    PHYSICAL EXAM:  GENERAL: NAD, lying in bed comfortably  HEAD: NCAD  NECK: Supple, no JVD    HEART: Regular rate and rhythm, normal S1/S2, no murmurs  LUNGS: No acute respiratory distress  ABDOMEN:  soft, nontender, nondistended  EXTREMITIES: nonpitting b/l LE edema  NERVOUS SYSTEM:  A&Ox3         LABS:                        15.8   8.29  )-----------( 211      ( 10 Nov 2023 07:01 )             48.2     11-10    139  |  99  |  15  ----------------------------<  90  5.0   |  26  |  0.8    Ca    9.5      10 Nov 2023 07:01  Mg     2.3     11-10    TPro  6.7  /  Alb  4.0  /  TBili  0.3  /  DBili  x   /  AST  17  /  ALT  33  /  AlkPhos  137<H>  11-10      Urinalysis Basic - ( 10 Nov 2023 07:01 )    Color: x / Appearance: x / SG: x / pH: x  Gluc: 90 mg/dL / Ketone: x  / Bili: x / Urobili: x   Blood: x / Protein: x / Nitrite: x   Leuk Esterase: x / RBC: x / WBC x   Sq Epi: x / Non Sq Epi: x / Bacteria: x            Culture - Blood (collected 09 Nov 2023 17:09)  Source: .Blood Blood-Peripheral  Preliminary Report (10 Nov 2023 23:02):    No growth at 24 hours    Culture - Blood (collected 09 Nov 2023 16:54)  Source: .Blood Blood-Peripheral  Preliminary Report (10 Nov 2023 23:02):    No growth at 24 hours      CARDIAC MARKERS ( 09 Nov 2023 16:54 )  x     / <0.01 ng/mL / x     / x     / x          RADIOLOGY:  No radiographic images in the past 24 hours.     
24H events:    Patient is a 57y old Male who presents with a chief complaint of Dyspnea (09 Nov 2023 19:02)    Primary diagnosis of COPD exacerbation      Day 1: admitted to 3B    Today is hospital day 1d. This morning patient was seen and examined at bedside, resting comfortably in bed.    No acute or major events overnight. Hemodynamically stable, tolerating oral diet, voiding appropriately with appropriate bowel movements.     Patient states is still experiencing cough with productive sputum. States is still experiencing SOB but improvement of chest tightness.    PAST MEDICAL & SURGICAL HISTORY  HTN (hypertension)    High cholesterol    GERD (gastroesophageal reflux disease)    Morbid obesity    Osteoarthritis    Hiatal hernia  GERD    Colitis  LARGE INTESTINE   NO RECENT FLARES    COPD (chronic obstructive pulmonary disease)    REBECCA treated with BiPAP    History of cholecystectomy    History of appendectomy    History of knee replacement procedure of right knee  BILATERAL    Hernia, inguinal, bilateral  3 months old    H/O knee surgery  arthoscopic x4    H/O foot surgery  right big toe surgery      ALLERGIES:  No Known Allergies    MEDICATIONS:  STANDING MEDICATIONS  aspirin enteric coated 81 milliGRAM(s) Oral daily  budesonide 160 MICROgram(s)/formoterol 4.5 MICROgram(s) Inhaler 2 Puff(s) Inhalation two times a day  chlorhexidine 4% Liquid 1 Application(s) Topical <User Schedule>  enoxaparin Injectable 40 milliGRAM(s) SubCutaneous every 24 hours  fenofibrate Tablet 145 milliGRAM(s) Oral daily  furosemide    Tablet 40 milliGRAM(s) Oral daily  influenza   Vaccine 0.5 milliLiter(s) IntraMuscular once  lisinopril 5 milliGRAM(s) Oral daily  methylPREDNISolone sodium succinate Injectable 40 milliGRAM(s) IV Push two times a day  pantoprazole    Tablet 40 milliGRAM(s) Oral before breakfast  polyethylene glycol 3350 17 Gram(s) Oral daily  senna 2 Tablet(s) Oral at bedtime  simvastatin 20 milliGRAM(s) Oral at bedtime  tiotropium 2.5 MICROgram(s) Inhaler 2 Puff(s) Inhalation daily    PRN MEDICATIONS  albuterol    90 MICROgram(s) HFA Inhaler 2 Puff(s) Inhalation every 6 hours PRN  ALPRAZolam 2 milliGRAM(s) Oral every 6 hours PRN  morphine ER Tablet 100 milliGRAM(s) Oral two times a day PRN  zolpidem 5 milliGRAM(s) Oral at bedtime PRN    VITALS:   T(F): 97.1  HR: 72  BP: 123/87  RR: 18  SpO2: 95%    PHYSICAL EXAM:  GENERAL: NAD, lying in bed comfortably  HEAD: NCAD  NECK: Supple, no JVD    HEART: Regular rate and rhythm, normal S1/S2, no murmurs  LUNGS: No acute respiratory distress  ABDOMEN:  soft, nontender, nondistended  EXTREMITIES: nonpitting b/l LE edema  NERVOUS SYSTEM:  A&Ox3      LABS:                        15.8   8.29  )-----------( 211      ( 10 Nov 2023 07:01 )             48.2     11-10    139  |  99  |  15  ----------------------------<  90  5.0   |  26  |  0.8    Ca    9.5      10 Nov 2023 07:01  Mg     2.3     11-10    TPro  6.7  /  Alb  4.0  /  TBili  0.3  /  DBili  x   /  AST  17  /  ALT  33  /  AlkPhos  137<H>  11-10      Urinalysis Basic - ( 10 Nov 2023 07:01 )    Color: x / Appearance: x / SG: x / pH: x  Gluc: 90 mg/dL / Ketone: x  / Bili: x / Urobili: x   Blood: x / Protein: x / Nitrite: x   Leuk Esterase: x / RBC: x / WBC x   Sq Epi: x / Non Sq Epi: x / Bacteria: x        Troponin T, Serum: <0.01 ng/mL (11-09-23 @ 16:54)      CARDIAC MARKERS ( 09 Nov 2023 16:54 )  x     / <0.01 ng/mL / x     / x     / x          RADIOLOGY:      < from: Xray Chest 2 Views PA/Lat (11.09.23 @ 16:19) >    ACC: [id number] EXAM:  XR CHEST PA LAT 2V   ORDERED BY: GRICEL LUCAS     PROCEDURE DATE:  11/09/2023          INTERPRETATION:  Clinical History / Reason for exam: Shortness of breath    Comparison : Chest radiograph 11/8/2023.    Technique/Positioning: PA and lateral chest x-ray obtained.    Findings:    Support devices: None.    Cardiac/mediastinum/hilum: Unchanged.    Lung parenchyma/Pleura: Within normal limits.    Skeleton/soft tissues: Unchanged.    Impression:    No radiographic evidence ofacute cardiopulmonary disease.        --- End of Report ---            KAYLEIGH BABIN MD; Attending Radiologist  This document has been electronically signed. Nov 10 2023 12:25AM    < end of copied text >  
MEDICINE ATTENDING PROGRESS NOTE  57-year-old male with past medical history of hypertension, hyperlipidemia, GERD, COPD, REBECCA on CPAP, possible new onset CHF recently started on Lasix presents for evaluation of cough with productive sputum and shortness of breath associated with chest tightness since 5 days ago.     Interval/Overnight Events  - Still reports Cough, and SOB  - Will get CT Chest with IV contrast  - Will do a trial of Nebulizers (NaCl, Albuterol, Pulmicort) and standing Robitussin-DM    ROS  General: Denies fevers, chills, nightsweats, weight loss  HEENT: Denies headache, rhinorrhea, sore throat, eye pain  CV: Denies CP, palpitations  PULM: Denies SOB, cough  GI: Denies abdominal pain, diarrhea  : Denies dysuria, hematuria  MSK: Denies arthralgias  SKIN: Denies rash   NEURO: Denies paresthesias, weakness  PSYCH: Denies depression    MEDICATIONS  (STANDING):  aspirin enteric coated 81 milliGRAM(s) Oral daily  budesonide 160 MICROgram(s)/formoterol 4.5 MICROgram(s) Inhaler 2 Puff(s) Inhalation two times a day  chlorhexidine 4% Liquid 1 Application(s) Topical <User Schedule>  enoxaparin Injectable 40 milliGRAM(s) SubCutaneous every 24 hours  fenofibrate Tablet 145 milliGRAM(s) Oral daily  furosemide    Tablet 40 milliGRAM(s) Oral daily  influenza   Vaccine 0.5 milliLiter(s) IntraMuscular once  lisinopril 5 milliGRAM(s) Oral daily  methylPREDNISolone sodium succinate Injectable 40 milliGRAM(s) IV Push two times a day  pantoprazole    Tablet 40 milliGRAM(s) Oral before breakfast  polyethylene glycol 3350 17 Gram(s) Oral daily  senna 2 Tablet(s) Oral at bedtime  simvastatin 20 milliGRAM(s) Oral at bedtime  tiotropium 2.5 MICROgram(s) Inhaler 2 Puff(s) Inhalation daily    MEDICATIONS  (PRN):  albuterol    90 MICROgram(s) HFA Inhaler 2 Puff(s) Inhalation every 6 hours PRN Shortness of Breath and/or Wheezing  ALPRAZolam 2 milliGRAM(s) Oral every 6 hours PRN anxiety  morphine ER Tablet 100 milliGRAM(s) Oral two times a day PRN Pain  zolpidem 5 milliGRAM(s) Oral at bedtime PRN Insomnia    ANTIBIOTICS:      VITALS:  T(F): 97.1, Max: 98.7 (11-09-23 @ 13:50)  HR: 72  BP: 123/87  RR: 18Vital Signs Last 24 Hrs  T(C): 36.2 (10 Nov 2023 05:09), Max: 37.1 (09 Nov 2023 13:50)  T(F): 97.1 (10 Nov 2023 05:09), Max: 98.7 (09 Nov 2023 13:50)  HR: 72 (10 Nov 2023 05:09) (72 - 93)  BP: 123/87 (10 Nov 2023 05:09) (121/80 - 133/79)  BP(mean): --  RR: 18 (10 Nov 2023 05:09) (18 - 18)  SpO2: 95% (10 Nov 2023 03:02) (95% - 95%)    Parameters below as of 10 Nov 2023 03:02  Patient On (Oxygen Delivery Method): room air     I&O's Summary    PHYSICAL EXAM:  Gen: NAD, resting in bed  HEENT: Normocephalic, atraumatic  Neck: supple, no lymphadenopathy  CV: Regular rate & regular rhythm  Lungs: CTABL no wheeze  Abdomen: Soft, NTND+ BS present  Ext: Warm, well perfused no CCE  Neuro: non focal, awake, CN II-XII intact   Skin: no rash, no erythema  Psych: no SI, HI, Hallucination     LABS:                        15.8   8.29  )-----------( 211      ( 10 Nov 2023 07:01 )             48.2     11-10    139  |  99  |  15  ----------------------------<  90  5.0   |  26  |  0.8    Ca    9.5      10 Nov 2023 07:01  Mg     2.3     11-10    TPro  6.7  /  Alb  4.0  /  TBili  0.3  /  DBili  x   /  AST  17  /  ALT  33  /  AlkPhos  137<H>  11-10      LIVER FUNCTIONS - ( 10 Nov 2023 07:01 )  Alb: 4.0 g/dL / Pro: 6.7 g/dL / ALK PHOS: 137 U/L / ALT: 33 U/L / AST: 17 U/L / GGT: x               MICROBIOLOGY:  Urinalysis Basic - ( 10 Nov 2023 07:01 )    Color: x / Appearance: x / SG: x / pH: x  Gluc: 90 mg/dL / Ketone: x  / Bili: x / Urobili: x   Blood: x / Protein: x / Nitrite: x   Leuk Esterase: x / RBC: x / WBC x   Sq Epi: x / Non Sq Epi: x / Bacteria: x          Blood Gas Venous - Lactate: 1.50 mmol/L (11-09-23 @ 16:44)  Blood Gas Venous - Lactate: 1.00 mmol/L (11-08-23 @ 01:16)    MRSA PCR Result.: Negative (04-19-23 @ 06:45)      IMAGING:  CXR  Xray Chest 2 Views PA/Lat:   ACC: [id number] EXAM:  XR CHEST PA LAT 2V   ORDERED BY: GRICEL LUCAS     PROCEDURE DATE:  11/09/2023          INTERPRETATION:  Clinical History / Reason for exam: Shortness of breath    Comparison : Chest radiograph 11/8/2023.    Technique/Positioning: PA and lateral chest x-ray obtained.    Findings:    Support devices: None.    Cardiac/mediastinum/hilum: Unchanged.    Lung parenchyma/Pleura: Within normal limits.    Skeleton/soft tissues: Unchanged.    Impression:    No radiographic evidence ofacute cardiopulmonary disease.        --- End of Report ---            KAYLEIGH BABIN MD; Attending Radiologist  This document has been electronically signed. Nov 10 2023 12:25AM (11-09-23 @ 16:19)  Xray Chest 2 Views PA/Lat:   ACC: [id number] EXAM:  XR CHEST PA LAT 2V   ORDERED BY: JOSE ANGEL LUBIN     PROCEDURE DATE:  11/08/2023          INTERPRETATION:  Clinical History / Reason for exam: Cough    . Technique: PA and lateral views of chest obtained.    Comparison: Chest x-ray 11/5/2023.    Findings:    No airspace consolidation, pleural effusion or pneumothorax. Osseous   structures unremarkable. Unremarkable cardiomediastinal silhouette.    Impression:    No evidence of acute cardiopulmonary disease.    --- End of Report ---            KAYLEIGH BABIN MD; Attending Radiologist  This document has been electronically signed. Nov 8 2023  6:36AM (11-08-23 @ 00:54)      CT    CARDIOLOGY TESTING  QRS axis to [] ° and NSR at a rate of [] BPM. There was no atrial enlargement. There was no ventricular hypertrophy. There were no ST-T changes and all intervals were normal.    12 Lead ECG:   Ventricular Rate 89 BPM    Atrial Rate 89 BPM    P-R Interval 136 ms    QRS Duration 76 ms    Q-T Interval 348 ms    QTC Calculation(Bazett) 423 ms    P Axis 17 degrees    R Axis 40 degrees    T Axis 27 degrees    Diagnosis Line Normal sinus rhythm  Normal ECG    Confirmed by parish sparks ([id number]) on 11/9/2023 7:20:59 PM (11-09-23 @ 17:26)  12 Lead ECG:   Ventricular Rate 99 BPM    Atrial Rate 99 BPM    P-R Interval 148 ms    QRS Duration 84 ms    Q-T Interval 350 ms    QTC Calculation(Bazett) 449 ms    P Axis 62 degrees    R Axis 58 degrees    T Axis 47 degrees    Diagnosis Line Normal sinus rhythm  Normal ECG    Confirmed by ROCIO COUCH MD ([id number]) on 11/8/2023 11:54:36 AM (11-08-23 @ 08:51)      ASSESSMENT/PLAN:  57-year-old male with past medical history of hypertension, hyperlipidemia, GERD, COPD, REBECCA on CPAP, possible new onset CHF recently started on Lasix presents for evaluation of cough with productive sputum and shortness of breath associated with chest tightness since 5 days ago.     #Acute respiratory Distress Dyspnea secondary to anxiety vs COPD exacerbation   Associated Cough with productive sputum   Hx of COPD ( 3L O2 at home)   Tobaccco Use  Titrate supplemental O2 to maintain SpO2 92-96%; avoid hyperoxia  Symbicort 160-4.5mcg 2 puffs BID; Spiriva 2.5 2 puffss daily.   Nebulizer treatment Q4H PRN .   Continue methylprednisolone 40mg IV QD. BIPAP as needed.   - Multiple presentation to ED this month for similar complaints: Likely anxiety component   - Labs unremarkable, VBG unremarkable, No hypercapnia, Lactate normal, Trop neg   - CXR clear no cardiopulmonary process  -15 minutes Smoking cessation counseling    - s/p rocephin and solumedrol in ED   - c/w solumedrol, monitor off antibiotics   - f/u  RVP, procal, ESR, sputum culture   - c/w home inhalers, BIPAP at night   -  Pulm eval    Hx HFpEF Currently Euvolemic  - Not overloaded, . Dry on PE   - c/w Lasix     Hx  HTN   Hx  HLD  - c/w lisinopril  - c/w fenofibrate and simvastatin     Hx Chronic Pain   Hx  Anxiety  hx Insomnia   - Pain from bilateral knees (s/p replacement), bilateral ankles and bilareal shoulders  - takes oxycodone 30 q4 hrs daily, Morphine suphate 100 q6, Xanax 2mg q6, Ambien 10 qhs   - continue morphine BID and Xanax PRN and ambien at night   - Bowel regiment    #Supportive Management:  Dispo:   DVT Ppx: enoxaparin Injectable 40 milliGRAM(s) SubCutaneous every 24 hours  GI Ppx: pantoprazole    Tablet 40 milliGRAM(s) Oral before breakfast  Diet:  Diet, DASH/TLC:   Sodium & Cholesterol Restricted (11-09-23 @ 19:52) [Active]    Total time spent to complete patient's bedside assessment, review medical chart, discuss medical plan of care with covering medical team was more than 45 minutes with >50% of time spent face to face with patient, discussion with patient/family and/or coordination of care    Resident's notes reviewed. My notes supersede resident's notes in case of discrepancy.    Robbie Cox MD/Sandra  Attending Physician

## 2023-11-11 NOTE — PROGRESS NOTE ADULT - ASSESSMENT
Patient is a 57-year-old male with past medical history of hypertension, hyperlipidemia, GERD, COPD, REBECCA on CPAP, possible new onset CHF recently started on Lasix presents for evaluation of cough with productive sputum and shortness of breath associated with chest tightness since 5 days ago.     #Dyspnea secondary to anxiety vs COPD exacerbation   #Cough with productive sputum   #Hx of COPD ( 3L O2 at home)   #Tobacco Use  - Afebrile, VS stable, Saturating 95 on RA at time of exam   - Multiple presentation to ED this month for similar complaints: Likely anxiety component   - Labs unremarkable, VBG unremarkable, No hypercapnia, Lactate normal, Trop neg   - CXR clear no cardiopulmonary process  - Says he quit smoking 5 days ago but smells of cigarettes at time of exam   - s/p rocephin and solumedrol in ED   - c/w solumedrol, monitor off antibiotics   - f/u procal, ESR, sputum culture   - c/w home inhalers, BIPAP at night   - RVP +ve for rhinovirus  - f/u ENT for vocal cord/upper airway eval  - possible R TVC paresis  - concern for nasopharyngeal mass on FFL  - f/u CT neck with contrast to evaluate posterior nasopharyngeal, r/o lesion    #REBECCA  - pt states his CPAP machine at home is broken   - f/u pulm    #HFpEF - Not in exacerbation   - Recently diagnosed   - Stress test Aug 2023: NO DEFINITE EVIDENCE FOR ISCHEMIA. NORMAL RESTING LEFT VENTRICULAR WALL MOTION. LEFT VENTRICULAR EJECTION FRACTION OF  68 %  - Not overloaded, . Dry on PE   - c/w lasix     # HTN   # HLD  - c/w lisinopril  - c/w fenofibrate and simvastatin     #Hx Chronic Pain   #Hx Anxiety  #Hx Insomnia  - Pain from bilateral knees (s/p replacement), bilateral ankles and bilateral shoulders  - takes oxycodone 30 q4 hrs daily, Morphine  q6, Xanax 2mg q6, Ambien 10 qhs   - Will continue morphine BID and Xanax PRN and ambien at night   - Might need pain medicine consult   - Bowel regimen                                                                              ----------------------------------------------------  # DVT prophylaxis: Lovenox  # GI prophylaxis: Protonix  # Diet: DASH/TLC  # Activity: IAT  # Code status: FULL CODE   # Disposition: Home                                                                           --------------------------------------------------------    # Handoff:   - f/u pulm  - f/u CT neck to r/o nasopharyngeal mass

## 2023-11-11 NOTE — CONSULT NOTE ADULT - ASSESSMENT
Impression:    COPD exacerbation  Entero rhino virus infection   HO REBECCA not tolerating his BiPAP   Significant smoking history, continues to smoke     Plan  Impression:    COPD exacerbation improved   Entero rhino virus infection   HO REBECCA not tolerating his BiPAP   Significant smoking history, continues to smoke     Plan     Prednisone course  Continue triple inhaler regimen  Smoking cessation   OP Pulm follow up   Will need OP sleep testing to adjust his BiPAP or order Auto biPAP   DVT prophylaxis   OOB to chair   Can DC home from pulm standpoint.

## 2023-11-11 NOTE — DISCHARGE NOTE NURSING/CASE MANAGEMENT/SOCIAL WORK - NSDCPEFALRISK_GEN_ALL_CORE
For information on Fall & Injury Prevention, visit: [email or web address] OR  [email or web address] OR  https://www.cdc.gov/steadi/patient.html

## 2023-11-11 NOTE — CONSULT NOTE ADULT - SUBJECTIVE AND OBJECTIVE BOX
Patient is a 57y old  Male who presents with a chief complaint of Dyspnea (10 Nov 2023 16:24)      HPI:  Patient is a 57-year-old male with past medical history of hypertension, hyperlipidemia, GERD, COPD 3L O2 at home, REBECCA on CPAP, possible new onset CHF recently started on Lasix presents for evaluation of cough with productive sputum and shortness of breath associated with chest tightness since 5 days ago.  Patient has presented multiple times to the ED this month for cough  and SOB. He was discharged on Levaquin and prednisone but reports no improvement.  He notes a fever of 102.8 prior to arrival.  He denies any nausea, vomiting, chest pain, abdominal pain, urinary complaints.  In the ED, afebrile, VS stable. Labs unremarkable   CXR clear no evidence of cardiopulmonary disease  (09 Nov 2023 19:02)      PAST MEDICAL & SURGICAL HISTORY:  HTN (hypertension)      High cholesterol      GERD (gastroesophageal reflux disease)      Morbid obesity      Osteoarthritis      Hiatal hernia  GERD      Colitis  LARGE INTESTINE   NO RECENT FLARES      COPD (chronic obstructive pulmonary disease)      REBECCA treated with BiPAP      History of cholecystectomy      History of appendectomy      History of knee replacement procedure of right knee  BILATERAL      Hernia, inguinal, bilateral  3 months old      H/O knee surgery  arthoscopic x4      H/O foot surgery  right big toe surgery          SOCIAL HX:   Smoking      > 30 PY                    ETOH                            Other    FAMILY HISTORY:  FH: lung cancer (Mother)  mother    FH: diabetes mellitus    :  No known cardiovacular family hisotry     Review Of Systems:     All ROS are negative except per HPI       Allergies    No Known Allergies    Intolerances          PHYSICAL EXAM    ICU Vital Signs Last 24 Hrs  T(C): 35.9 (10 Nov 2023 19:55), Max: 36.4 (10 Nov 2023 14:17)  T(F): 96.6 (10 Nov 2023 19:55), Max: 97.5 (10 Nov 2023 14:17)  HR: 89 (10 Nov 2023 19:55) (72 - 89)  BP: 117/82 (10 Nov 2023 19:55) (105/63 - 123/87)  BP(mean): --  ABP: --  ABP(mean): --  RR: 18 (10 Nov 2023 19:55) (18 - 18)  SpO2: --        CONSTITUTIONAL:  Well nourished.   NAD    ENT:   Airway patent,   Mouth with normal mucosa.   No thrush      CARDIAC:   Normal rate,   Regular rhythm.    No edema      Vascular:   normal systolic impulse  no bruits    RESPIRATORY:   No wheezing  Bilateral BS   Not tachypneic,  No use of accessory muscles    GASTROINTESTINAL:  Abdomen soft,   Non-tender,   No guarding,   + BS      NEUROLOGICAL:   Alert and oriented   No motor deficits.    SKIN:   Skin normal color for race,   No evidence of rash.      HEME LYMPH: .  No cervical  lymphadenopathy.  No inguinal lymphadenopathy              LABS:                          15.8   8.29  )-----------( 211      ( 10 Nov 2023 07:01 )             48.2                                               11-10    139  |  99  |  15  ----------------------------<  90  5.0   |  26  |  0.8    Ca    9.5      10 Nov 2023 07:01  Mg     2.3     11-10    TPro  6.7  /  Alb  4.0  /  TBili  0.3  /  DBili  x   /  AST  17  /  ALT  33  /  AlkPhos  137<H>  11-10                                             Urinalysis Basic - ( 10 Nov 2023 07:01 )    Color: x / Appearance: x / SG: x / pH: x  Gluc: 90 mg/dL / Ketone: x  / Bili: x / Urobili: x   Blood: x / Protein: x / Nitrite: x   Leuk Esterase: x / RBC: x / WBC x   Sq Epi: x / Non Sq Epi: x / Bacteria: x        CARDIAC MARKERS ( 09 Nov 2023 16:54 )  x     / <0.01 ng/mL / x     / x     / x                                                LIVER FUNCTIONS - ( 10 Nov 2023 07:01 )  Alb: 4.0 g/dL / Pro: 6.7 g/dL / ALK PHOS: 137 U/L / ALT: 33 U/L / AST: 17 U/L / GGT: x                                                  Culture - Blood (collected 09 Nov 2023 17:09)  Source: .Blood Blood-Peripheral  Preliminary Report (10 Nov 2023 23:02):    No growth at 24 hours    Culture - Blood (collected 09 Nov 2023 16:54)  Source: .Blood Blood-Peripheral  Preliminary Report (10 Nov 2023 23:02):    No growth at 24 hours                                                                                           X-Rays reviewed                                                                                     ECHO        MEDICATIONS  (STANDING):  albuterol/ipratropium for Nebulization 3 milliLiter(s) Nebulizer every 6 hours  aspirin enteric coated 81 milliGRAM(s) Oral daily  buDESOnide    Inhalation Suspension 0.5 milliGRAM(s) Inhalation every 12 hours  chlorhexidine 4% Liquid 1 Application(s) Topical <User Schedule>  enoxaparin Injectable 40 milliGRAM(s) SubCutaneous every 24 hours  fenofibrate Tablet 145 milliGRAM(s) Oral daily  furosemide    Tablet 40 milliGRAM(s) Oral daily  guaifenesin/dextromethorphan Oral Liquid 10 milliLiter(s) Oral every 6 hours  influenza   Vaccine 0.5 milliLiter(s) IntraMuscular once  lisinopril 5 milliGRAM(s) Oral daily  methylPREDNISolone sodium succinate Injectable 40 milliGRAM(s) IV Push two times a day  pantoprazole    Tablet 40 milliGRAM(s) Oral before breakfast  polyethylene glycol 3350 17 Gram(s) Oral daily  senna 2 Tablet(s) Oral at bedtime  simvastatin 20 milliGRAM(s) Oral at bedtime  sodium chloride 0.9% for Nebulization 3 milliLiter(s) Nebulizer every 6 hours    MEDICATIONS  (PRN):  ALPRAZolam 2 milliGRAM(s) Oral every 6 hours PRN anxiety  morphine ER Tablet 100 milliGRAM(s) Oral two times a day PRN Pain  zolpidem 5 milliGRAM(s) Oral at bedtime PRN Insomnia         Patient is a 57y old  Male who presents with a chief complaint of Dyspnea (10 Nov 2023 16:24)      HPI:  Patient is a 57-year-old male with past medical history of hypertension, hyperlipidemia, GERD, COPD 3L O2 at home, REBECCA on CPAP, possible new onset CHF recently started on Lasix presents for evaluation of cough with productive sputum and shortness of breath associated with chest tightness since 5 days ago.  Patient has presented multiple times to the ED this month for cough  and SOB. He was discharged on Levaquin and prednisone but reports no improvement.  He notes a fever of 102.8 prior to arrival.  He denies any nausea, vomiting, chest pain, abdominal pain, urinary complaints.  In the ED, afebrile, VS stable. Labs unremarkable   CXR clear no evidence of cardiopulmonary disease  (09 Nov 2023 19:02)      PAST MEDICAL & SURGICAL HISTORY:  HTN (hypertension)      High cholesterol      GERD (gastroesophageal reflux disease)      Morbid obesity      Osteoarthritis      Hiatal hernia  GERD      Colitis  LARGE INTESTINE   NO RECENT FLARES      COPD (chronic obstructive pulmonary disease)      REBECCA treated with BiPAP      History of cholecystectomy      History of appendectomy      History of knee replacement procedure of right knee  BILATERAL      Hernia, inguinal, bilateral  3 months old      H/O knee surgery  arthoscopic x4      H/O foot surgery  right big toe surgery          SOCIAL HX:   Smoking      > 30 PY                    ETOH                            Other    FAMILY HISTORY:  FH: lung cancer (Mother)  mother    FH: diabetes mellitus    :  No known cardiovacular family hisotry     Review Of Systems:     All ROS are negative except per HPI       Allergies    No Known Allergies    Intolerances          PHYSICAL EXAM    ICU Vital Signs Last 24 Hrs  T(C): 35.9 (10 Nov 2023 19:55), Max: 36.4 (10 Nov 2023 14:17)  T(F): 96.6 (10 Nov 2023 19:55), Max: 97.5 (10 Nov 2023 14:17)  HR: 89 (10 Nov 2023 19:55) (72 - 89)  BP: 117/82 (10 Nov 2023 19:55) (105/63 - 123/87)  BP(mean): --  ABP: --  ABP(mean): --  RR: 18 (10 Nov 2023 19:55) (18 - 18)  SpO2: --        CONSTITUTIONAL:  Well nourished.   NAD    ENT:   Airway patent,   Mouth with normal mucosa.       CARDIAC:   Normal rate,   Regular rhythm.      RESPIRATORY:   No wheezing  Bilateral BS   Not tachypneic,  No use of accessory muscles    GASTROINTESTINAL:  Abdomen soft,   Non-tender,   No guarding,   + BS      NEUROLOGICAL:   Alert and oriented   No motor deficits.    SKIN:   Skin normal color for race,   No evidence of rash.                    LABS:                          15.8   8.29  )-----------( 211      ( 10 Nov 2023 07:01 )             48.2                                               11-10    139  |  99  |  15  ----------------------------<  90  5.0   |  26  |  0.8    Ca    9.5      10 Nov 2023 07:01  Mg     2.3     11-10    TPro  6.7  /  Alb  4.0  /  TBili  0.3  /  DBili  x   /  AST  17  /  ALT  33  /  AlkPhos  137<H>  11-10                                             Urinalysis Basic - ( 10 Nov 2023 07:01 )    Color: x / Appearance: x / SG: x / pH: x  Gluc: 90 mg/dL / Ketone: x  / Bili: x / Urobili: x   Blood: x / Protein: x / Nitrite: x   Leuk Esterase: x / RBC: x / WBC x   Sq Epi: x / Non Sq Epi: x / Bacteria: x        CARDIAC MARKERS ( 09 Nov 2023 16:54 )  x     / <0.01 ng/mL / x     / x     / x                                                LIVER FUNCTIONS - ( 10 Nov 2023 07:01 )  Alb: 4.0 g/dL / Pro: 6.7 g/dL / ALK PHOS: 137 U/L / ALT: 33 U/L / AST: 17 U/L / GGT: x                                                  Culture - Blood (collected 09 Nov 2023 17:09)  Source: .Blood Blood-Peripheral  Preliminary Report (10 Nov 2023 23:02):    No growth at 24 hours    Culture - Blood (collected 09 Nov 2023 16:54)  Source: .Blood Blood-Peripheral  Preliminary Report (10 Nov 2023 23:02):    No growth at 24 hours                                                                                           X-Rays reviewed                                                                                     ECHO        MEDICATIONS  (STANDING):  albuterol/ipratropium for Nebulization 3 milliLiter(s) Nebulizer every 6 hours  aspirin enteric coated 81 milliGRAM(s) Oral daily  buDESOnide    Inhalation Suspension 0.5 milliGRAM(s) Inhalation every 12 hours  chlorhexidine 4% Liquid 1 Application(s) Topical <User Schedule>  enoxaparin Injectable 40 milliGRAM(s) SubCutaneous every 24 hours  fenofibrate Tablet 145 milliGRAM(s) Oral daily  furosemide    Tablet 40 milliGRAM(s) Oral daily  guaifenesin/dextromethorphan Oral Liquid 10 milliLiter(s) Oral every 6 hours  influenza   Vaccine 0.5 milliLiter(s) IntraMuscular once  lisinopril 5 milliGRAM(s) Oral daily  methylPREDNISolone sodium succinate Injectable 40 milliGRAM(s) IV Push two times a day  pantoprazole    Tablet 40 milliGRAM(s) Oral before breakfast  polyethylene glycol 3350 17 Gram(s) Oral daily  senna 2 Tablet(s) Oral at bedtime  simvastatin 20 milliGRAM(s) Oral at bedtime  sodium chloride 0.9% for Nebulization 3 milliLiter(s) Nebulizer every 6 hours    MEDICATIONS  (PRN):  ALPRAZolam 2 milliGRAM(s) Oral every 6 hours PRN anxiety  morphine ER Tablet 100 milliGRAM(s) Oral two times a day PRN Pain  zolpidem 5 milliGRAM(s) Oral at bedtime PRN Insomnia

## 2023-11-12 DIAGNOSIS — J44.9 CHRONIC OBSTRUCTIVE PULMONARY DISEASE, UNSPECIFIED: ICD-10-CM

## 2023-11-12 LAB
ALBUMIN SERPL ELPH-MCNC: 3.9 G/DL — SIGNIFICANT CHANGE UP (ref 3.5–5.2)
ALBUMIN SERPL ELPH-MCNC: 3.9 G/DL — SIGNIFICANT CHANGE UP (ref 3.5–5.2)
ALP SERPL-CCNC: 143 U/L — HIGH (ref 30–115)
ALP SERPL-CCNC: 143 U/L — HIGH (ref 30–115)
ALT FLD-CCNC: 19 U/L — SIGNIFICANT CHANGE UP (ref 0–41)
ALT FLD-CCNC: 19 U/L — SIGNIFICANT CHANGE UP (ref 0–41)
ANION GAP SERPL CALC-SCNC: 11 MMOL/L — SIGNIFICANT CHANGE UP (ref 7–14)
ANION GAP SERPL CALC-SCNC: 11 MMOL/L — SIGNIFICANT CHANGE UP (ref 7–14)
AST SERPL-CCNC: 11 U/L — SIGNIFICANT CHANGE UP (ref 0–41)
AST SERPL-CCNC: 11 U/L — SIGNIFICANT CHANGE UP (ref 0–41)
BASE EXCESS BLDV CALC-SCNC: 4.7 MMOL/L — HIGH (ref -2–3)
BASE EXCESS BLDV CALC-SCNC: 4.7 MMOL/L — HIGH (ref -2–3)
BASOPHILS # BLD AUTO: 0.04 K/UL — SIGNIFICANT CHANGE UP (ref 0–0.2)
BASOPHILS # BLD AUTO: 0.04 K/UL — SIGNIFICANT CHANGE UP (ref 0–0.2)
BASOPHILS NFR BLD AUTO: 0.3 % — SIGNIFICANT CHANGE UP (ref 0–1)
BASOPHILS NFR BLD AUTO: 0.3 % — SIGNIFICANT CHANGE UP (ref 0–1)
BILIRUB SERPL-MCNC: 0.2 MG/DL — SIGNIFICANT CHANGE UP (ref 0.2–1.2)
BILIRUB SERPL-MCNC: 0.2 MG/DL — SIGNIFICANT CHANGE UP (ref 0.2–1.2)
BUN SERPL-MCNC: 24 MG/DL — HIGH (ref 10–20)
BUN SERPL-MCNC: 24 MG/DL — HIGH (ref 10–20)
CA-I SERPL-SCNC: 1.22 MMOL/L — SIGNIFICANT CHANGE UP (ref 1.15–1.33)
CA-I SERPL-SCNC: 1.22 MMOL/L — SIGNIFICANT CHANGE UP (ref 1.15–1.33)
CALCIUM SERPL-MCNC: 9.7 MG/DL — SIGNIFICANT CHANGE UP (ref 8.4–10.4)
CALCIUM SERPL-MCNC: 9.7 MG/DL — SIGNIFICANT CHANGE UP (ref 8.4–10.4)
CHLORIDE SERPL-SCNC: 98 MMOL/L — SIGNIFICANT CHANGE UP (ref 98–110)
CHLORIDE SERPL-SCNC: 98 MMOL/L — SIGNIFICANT CHANGE UP (ref 98–110)
CO2 SERPL-SCNC: 28 MMOL/L — SIGNIFICANT CHANGE UP (ref 17–32)
CO2 SERPL-SCNC: 28 MMOL/L — SIGNIFICANT CHANGE UP (ref 17–32)
CREAT SERPL-MCNC: 0.8 MG/DL — SIGNIFICANT CHANGE UP (ref 0.7–1.5)
CREAT SERPL-MCNC: 0.8 MG/DL — SIGNIFICANT CHANGE UP (ref 0.7–1.5)
EGFR: 105 ML/MIN/1.73M2 — SIGNIFICANT CHANGE UP
EGFR: 105 ML/MIN/1.73M2 — SIGNIFICANT CHANGE UP
EOSINOPHIL # BLD AUTO: 0.02 K/UL — SIGNIFICANT CHANGE UP (ref 0–0.7)
EOSINOPHIL # BLD AUTO: 0.02 K/UL — SIGNIFICANT CHANGE UP (ref 0–0.7)
EOSINOPHIL NFR BLD AUTO: 0.1 % — SIGNIFICANT CHANGE UP (ref 0–8)
EOSINOPHIL NFR BLD AUTO: 0.1 % — SIGNIFICANT CHANGE UP (ref 0–8)
GAS PNL BLDV: 131 MMOL/L — LOW (ref 136–145)
GAS PNL BLDV: 131 MMOL/L — LOW (ref 136–145)
GAS PNL BLDV: SIGNIFICANT CHANGE UP
GLUCOSE SERPL-MCNC: 81 MG/DL — SIGNIFICANT CHANGE UP (ref 70–99)
GLUCOSE SERPL-MCNC: 81 MG/DL — SIGNIFICANT CHANGE UP (ref 70–99)
HCO3 BLDV-SCNC: 30 MMOL/L — HIGH (ref 22–29)
HCO3 BLDV-SCNC: 30 MMOL/L — HIGH (ref 22–29)
HCT VFR BLD CALC: 44.7 % — SIGNIFICANT CHANGE UP (ref 42–52)
HCT VFR BLD CALC: 44.7 % — SIGNIFICANT CHANGE UP (ref 42–52)
HCT VFR BLDA CALC: 43 % — SIGNIFICANT CHANGE UP (ref 39–51)
HCT VFR BLDA CALC: 43 % — SIGNIFICANT CHANGE UP (ref 39–51)
HGB BLD CALC-MCNC: 14.4 G/DL — SIGNIFICANT CHANGE UP (ref 12.6–17.4)
HGB BLD CALC-MCNC: 14.4 G/DL — SIGNIFICANT CHANGE UP (ref 12.6–17.4)
HGB BLD-MCNC: 14.8 G/DL — SIGNIFICANT CHANGE UP (ref 14–18)
HGB BLD-MCNC: 14.8 G/DL — SIGNIFICANT CHANGE UP (ref 14–18)
IMM GRANULOCYTES NFR BLD AUTO: 1.5 % — HIGH (ref 0.1–0.3)
IMM GRANULOCYTES NFR BLD AUTO: 1.5 % — HIGH (ref 0.1–0.3)
LACTATE BLDV-MCNC: 2.3 MMOL/L — HIGH (ref 0.5–2)
LACTATE BLDV-MCNC: 2.3 MMOL/L — HIGH (ref 0.5–2)
LYMPHOCYTES # BLD AUTO: 30.3 % — SIGNIFICANT CHANGE UP (ref 20.5–51.1)
LYMPHOCYTES # BLD AUTO: 30.3 % — SIGNIFICANT CHANGE UP (ref 20.5–51.1)
LYMPHOCYTES # BLD AUTO: 4.37 K/UL — HIGH (ref 1.2–3.4)
LYMPHOCYTES # BLD AUTO: 4.37 K/UL — HIGH (ref 1.2–3.4)
MCHC RBC-ENTMCNC: 29.1 PG — SIGNIFICANT CHANGE UP (ref 27–31)
MCHC RBC-ENTMCNC: 29.1 PG — SIGNIFICANT CHANGE UP (ref 27–31)
MCHC RBC-ENTMCNC: 33.1 G/DL — SIGNIFICANT CHANGE UP (ref 32–37)
MCHC RBC-ENTMCNC: 33.1 G/DL — SIGNIFICANT CHANGE UP (ref 32–37)
MCV RBC AUTO: 88 FL — SIGNIFICANT CHANGE UP (ref 80–94)
MCV RBC AUTO: 88 FL — SIGNIFICANT CHANGE UP (ref 80–94)
MONOCYTES # BLD AUTO: 0.91 K/UL — HIGH (ref 0.1–0.6)
MONOCYTES # BLD AUTO: 0.91 K/UL — HIGH (ref 0.1–0.6)
MONOCYTES NFR BLD AUTO: 6.3 % — SIGNIFICANT CHANGE UP (ref 1.7–9.3)
MONOCYTES NFR BLD AUTO: 6.3 % — SIGNIFICANT CHANGE UP (ref 1.7–9.3)
NEUTROPHILS # BLD AUTO: 8.84 K/UL — HIGH (ref 1.4–6.5)
NEUTROPHILS # BLD AUTO: 8.84 K/UL — HIGH (ref 1.4–6.5)
NEUTROPHILS NFR BLD AUTO: 61.5 % — SIGNIFICANT CHANGE UP (ref 42.2–75.2)
NEUTROPHILS NFR BLD AUTO: 61.5 % — SIGNIFICANT CHANGE UP (ref 42.2–75.2)
NRBC # BLD: 0 /100 WBCS — SIGNIFICANT CHANGE UP (ref 0–0)
NRBC # BLD: 0 /100 WBCS — SIGNIFICANT CHANGE UP (ref 0–0)
NT-PROBNP SERPL-SCNC: 43 PG/ML — SIGNIFICANT CHANGE UP (ref 0–300)
NT-PROBNP SERPL-SCNC: 43 PG/ML — SIGNIFICANT CHANGE UP (ref 0–300)
PCO2 BLDV: 48 MMHG — SIGNIFICANT CHANGE UP (ref 42–55)
PCO2 BLDV: 48 MMHG — SIGNIFICANT CHANGE UP (ref 42–55)
PH BLDV: 7.41 — SIGNIFICANT CHANGE UP (ref 7.32–7.43)
PH BLDV: 7.41 — SIGNIFICANT CHANGE UP (ref 7.32–7.43)
PLATELET # BLD AUTO: 285 K/UL — SIGNIFICANT CHANGE UP (ref 130–400)
PLATELET # BLD AUTO: 285 K/UL — SIGNIFICANT CHANGE UP (ref 130–400)
PMV BLD: 10.8 FL — HIGH (ref 7.4–10.4)
PMV BLD: 10.8 FL — HIGH (ref 7.4–10.4)
PO2 BLDV: 59 MMHG — SIGNIFICANT CHANGE UP
PO2 BLDV: 59 MMHG — SIGNIFICANT CHANGE UP
POTASSIUM BLDV-SCNC: 5.7 MMOL/L — HIGH (ref 3.5–5.1)
POTASSIUM BLDV-SCNC: 5.7 MMOL/L — HIGH (ref 3.5–5.1)
POTASSIUM SERPL-MCNC: 4.8 MMOL/L — SIGNIFICANT CHANGE UP (ref 3.5–5)
POTASSIUM SERPL-MCNC: 4.8 MMOL/L — SIGNIFICANT CHANGE UP (ref 3.5–5)
POTASSIUM SERPL-SCNC: 4.8 MMOL/L — SIGNIFICANT CHANGE UP (ref 3.5–5)
POTASSIUM SERPL-SCNC: 4.8 MMOL/L — SIGNIFICANT CHANGE UP (ref 3.5–5)
PROT SERPL-MCNC: 6.1 G/DL — SIGNIFICANT CHANGE UP (ref 6–8)
PROT SERPL-MCNC: 6.1 G/DL — SIGNIFICANT CHANGE UP (ref 6–8)
RBC # BLD: 5.08 M/UL — SIGNIFICANT CHANGE UP (ref 4.7–6.1)
RBC # BLD: 5.08 M/UL — SIGNIFICANT CHANGE UP (ref 4.7–6.1)
RBC # FLD: 15.1 % — HIGH (ref 11.5–14.5)
RBC # FLD: 15.1 % — HIGH (ref 11.5–14.5)
SAO2 % BLDV: 88.2 % — SIGNIFICANT CHANGE UP
SAO2 % BLDV: 88.2 % — SIGNIFICANT CHANGE UP
SODIUM SERPL-SCNC: 137 MMOL/L — SIGNIFICANT CHANGE UP (ref 135–146)
SODIUM SERPL-SCNC: 137 MMOL/L — SIGNIFICANT CHANGE UP (ref 135–146)
TROPONIN T SERPL-MCNC: <0.01 NG/ML — SIGNIFICANT CHANGE UP
TROPONIN T SERPL-MCNC: <0.01 NG/ML — SIGNIFICANT CHANGE UP
WBC # BLD: 14.4 K/UL — HIGH (ref 4.8–10.8)
WBC # BLD: 14.4 K/UL — HIGH (ref 4.8–10.8)
WBC # FLD AUTO: 14.4 K/UL — HIGH (ref 4.8–10.8)
WBC # FLD AUTO: 14.4 K/UL — HIGH (ref 4.8–10.8)

## 2023-11-12 PROCEDURE — 93306 TTE W/DOPPLER COMPLETE: CPT | Mod: 26

## 2023-11-12 PROCEDURE — 36415 COLL VENOUS BLD VENIPUNCTURE: CPT

## 2023-11-12 PROCEDURE — 94660 CPAP INITIATION&MGMT: CPT

## 2023-11-12 PROCEDURE — 94640 AIRWAY INHALATION TREATMENT: CPT

## 2023-11-12 PROCEDURE — 99222 1ST HOSP IP/OBS MODERATE 55: CPT

## 2023-11-12 PROCEDURE — 80307 DRUG TEST PRSMV CHEM ANLYZR: CPT

## 2023-11-12 PROCEDURE — 80365 DRUG SCREENING OXYCODONE: CPT

## 2023-11-12 PROCEDURE — 71275 CT ANGIOGRAPHY CHEST: CPT | Mod: 26,MA

## 2023-11-12 PROCEDURE — 93010 ELECTROCARDIOGRAM REPORT: CPT

## 2023-11-12 PROCEDURE — 93306 TTE W/DOPPLER COMPLETE: CPT

## 2023-11-12 PROCEDURE — 80346 BENZODIAZEPINES1-12: CPT

## 2023-11-12 PROCEDURE — 80053 COMPREHEN METABOLIC PANEL: CPT

## 2023-11-12 PROCEDURE — 85025 COMPLETE CBC W/AUTO DIFF WBC: CPT

## 2023-11-12 RX ORDER — MORPHINE SULFATE 50 MG/1
100 CAPSULE, EXTENDED RELEASE ORAL EVERY 12 HOURS
Refills: 0 | Status: DISCONTINUED | OUTPATIENT
Start: 2023-11-12 | End: 2023-11-12

## 2023-11-12 RX ORDER — SENNA PLUS 8.6 MG/1
2 TABLET ORAL AT BEDTIME
Refills: 0 | Status: DISCONTINUED | OUTPATIENT
Start: 2023-11-12 | End: 2023-11-13

## 2023-11-12 RX ORDER — FUROSEMIDE 40 MG
40 TABLET ORAL ONCE
Refills: 0 | Status: COMPLETED | OUTPATIENT
Start: 2023-11-12 | End: 2023-11-12

## 2023-11-12 RX ORDER — FUROSEMIDE 40 MG
40 TABLET ORAL DAILY
Refills: 0 | Status: DISCONTINUED | OUTPATIENT
Start: 2023-11-12 | End: 2023-11-13

## 2023-11-12 RX ORDER — IPRATROPIUM/ALBUTEROL SULFATE 18-103MCG
3 AEROSOL WITH ADAPTER (GRAM) INHALATION
Refills: 0 | Status: COMPLETED | OUTPATIENT
Start: 2023-11-12 | End: 2023-11-12

## 2023-11-12 RX ORDER — IPRATROPIUM/ALBUTEROL SULFATE 18-103MCG
3 AEROSOL WITH ADAPTER (GRAM) INHALATION EVERY 4 HOURS
Refills: 0 | Status: DISCONTINUED | OUTPATIENT
Start: 2023-11-12 | End: 2023-11-13

## 2023-11-12 RX ORDER — MORPHINE SULFATE 50 MG/1
100 CAPSULE, EXTENDED RELEASE ORAL
Refills: 0 | Status: DISCONTINUED | OUTPATIENT
Start: 2023-11-12 | End: 2023-11-12

## 2023-11-12 RX ORDER — BUDESONIDE AND FORMOTEROL FUMARATE DIHYDRATE 160; 4.5 UG/1; UG/1
2 AEROSOL RESPIRATORY (INHALATION)
Refills: 0 | Status: DISCONTINUED | OUTPATIENT
Start: 2023-11-12 | End: 2023-11-13

## 2023-11-12 RX ORDER — OXYCODONE HYDROCHLORIDE 5 MG/1
30 TABLET ORAL EVERY 12 HOURS
Refills: 0 | Status: DISCONTINUED | OUTPATIENT
Start: 2023-11-12 | End: 2023-11-13

## 2023-11-12 RX ORDER — ZOLPIDEM TARTRATE 10 MG/1
5 TABLET ORAL AT BEDTIME
Refills: 0 | Status: DISCONTINUED | OUTPATIENT
Start: 2023-11-12 | End: 2023-11-12

## 2023-11-12 RX ORDER — MORPHINE SULFATE 50 MG/1
100 CAPSULE, EXTENDED RELEASE ORAL
Refills: 0 | Status: DISCONTINUED | OUTPATIENT
Start: 2023-11-12 | End: 2023-11-13

## 2023-11-12 RX ORDER — SIMVASTATIN 20 MG/1
20 TABLET, FILM COATED ORAL AT BEDTIME
Refills: 0 | Status: DISCONTINUED | OUTPATIENT
Start: 2023-11-12 | End: 2023-11-13

## 2023-11-12 RX ORDER — ALPRAZOLAM 0.25 MG
2 TABLET ORAL EVERY 6 HOURS
Refills: 0 | Status: DISCONTINUED | OUTPATIENT
Start: 2023-11-12 | End: 2023-11-13

## 2023-11-12 RX ORDER — LISINOPRIL 2.5 MG/1
5 TABLET ORAL DAILY
Refills: 0 | Status: DISCONTINUED | OUTPATIENT
Start: 2023-11-12 | End: 2023-11-13

## 2023-11-12 RX ORDER — HYDROMORPHONE HYDROCHLORIDE 2 MG/ML
0.2 INJECTION INTRAMUSCULAR; INTRAVENOUS; SUBCUTANEOUS ONCE
Refills: 0 | Status: DISCONTINUED | OUTPATIENT
Start: 2023-11-12 | End: 2023-11-12

## 2023-11-12 RX ORDER — ENOXAPARIN SODIUM 100 MG/ML
40 INJECTION SUBCUTANEOUS EVERY 24 HOURS
Refills: 0 | Status: DISCONTINUED | OUTPATIENT
Start: 2023-11-12 | End: 2023-11-13

## 2023-11-12 RX ORDER — ASPIRIN/CALCIUM CARB/MAGNESIUM 324 MG
81 TABLET ORAL DAILY
Refills: 0 | Status: DISCONTINUED | OUTPATIENT
Start: 2023-11-12 | End: 2023-11-13

## 2023-11-12 RX ORDER — FENOFIBRATE,MICRONIZED 130 MG
145 CAPSULE ORAL DAILY
Refills: 0 | Status: DISCONTINUED | OUTPATIENT
Start: 2023-11-12 | End: 2023-11-13

## 2023-11-12 RX ORDER — IPRATROPIUM/ALBUTEROL SULFATE 18-103MCG
3 AEROSOL WITH ADAPTER (GRAM) INHALATION EVERY 6 HOURS
Refills: 0 | Status: DISCONTINUED | OUTPATIENT
Start: 2023-11-12 | End: 2023-11-13

## 2023-11-12 RX ORDER — NALOXONE HYDROCHLORIDE 4 MG/.1ML
0.4 SPRAY NASAL ONCE
Refills: 0 | Status: DISCONTINUED | OUTPATIENT
Start: 2023-11-12 | End: 2023-11-13

## 2023-11-12 RX ORDER — ALBUTEROL 90 UG/1
2.5 AEROSOL, METERED ORAL ONCE
Refills: 0 | Status: COMPLETED | OUTPATIENT
Start: 2023-11-12 | End: 2023-11-12

## 2023-11-12 RX ORDER — PANTOPRAZOLE SODIUM 20 MG/1
40 TABLET, DELAYED RELEASE ORAL
Refills: 0 | Status: DISCONTINUED | OUTPATIENT
Start: 2023-11-12 | End: 2023-11-13

## 2023-11-12 RX ADMIN — ALBUTEROL 2.5 MILLIGRAM(S): 90 AEROSOL, METERED ORAL at 03:45

## 2023-11-12 RX ADMIN — OXYCODONE HYDROCHLORIDE 30 MILLIGRAM(S): 5 TABLET ORAL at 11:01

## 2023-11-12 RX ADMIN — Medication 600 MILLIGRAM(S): at 13:25

## 2023-11-12 RX ADMIN — Medication 3 MILLILITER(S): at 01:22

## 2023-11-12 RX ADMIN — OXYCODONE HYDROCHLORIDE 30 MILLIGRAM(S): 5 TABLET ORAL at 12:01

## 2023-11-12 RX ADMIN — Medication 40 MILLIGRAM(S): at 17:04

## 2023-11-12 RX ADMIN — Medication 81 MILLIGRAM(S): at 11:01

## 2023-11-12 RX ADMIN — BUDESONIDE AND FORMOTEROL FUMARATE DIHYDRATE 2 PUFF(S): 160; 4.5 AEROSOL RESPIRATORY (INHALATION) at 22:22

## 2023-11-12 RX ADMIN — Medication 145 MILLIGRAM(S): at 11:05

## 2023-11-12 RX ADMIN — Medication 3 MILLILITER(S): at 15:28

## 2023-11-12 RX ADMIN — Medication 3 MILLILITER(S): at 01:48

## 2023-11-12 RX ADMIN — Medication 125 MILLIGRAM(S): at 01:22

## 2023-11-12 RX ADMIN — MORPHINE SULFATE 100 MILLIGRAM(S): 50 CAPSULE, EXTENDED RELEASE ORAL at 15:23

## 2023-11-12 RX ADMIN — Medication 40 MILLIGRAM(S): at 11:04

## 2023-11-12 RX ADMIN — SENNA PLUS 2 TABLET(S): 8.6 TABLET ORAL at 22:20

## 2023-11-12 RX ADMIN — SIMVASTATIN 20 MILLIGRAM(S): 20 TABLET, FILM COATED ORAL at 22:21

## 2023-11-12 RX ADMIN — Medication 2 MILLIGRAM(S): at 23:33

## 2023-11-12 RX ADMIN — Medication 3 MILLILITER(S): at 01:28

## 2023-11-12 RX ADMIN — Medication 2 MILLIGRAM(S): at 17:05

## 2023-11-12 RX ADMIN — Medication 3 MILLILITER(S): at 20:11

## 2023-11-12 RX ADMIN — Medication 600 MILLIGRAM(S): at 17:04

## 2023-11-12 RX ADMIN — OXYCODONE HYDROCHLORIDE 30 MILLIGRAM(S): 5 TABLET ORAL at 23:33

## 2023-11-12 RX ADMIN — Medication 40 MILLIGRAM(S): at 01:22

## 2023-11-12 RX ADMIN — Medication 2 MILLIGRAM(S): at 11:01

## 2023-11-12 RX ADMIN — MORPHINE SULFATE 100 MILLIGRAM(S): 50 CAPSULE, EXTENDED RELEASE ORAL at 16:23

## 2023-11-12 NOTE — ED ADULT NURSE NOTE - NSFALLHARMRISKINTERV_ED_ALL_ED

## 2023-11-12 NOTE — ED PROVIDER NOTE - OBJECTIVE STATEMENT
Patient is a 57-year-old man with a history of hypertension, dyslipidemia, COPD on 3 L oxygen at home, REBECCA on CPAP, possible new onset CHF recently started on Lasix, GERD, presenting to the ED for dyspnea.  Patient was just seen in the ED on 11/4, 11/5, 11/7, 11/8, 11/9 which led to an admission with discharge on 11/10, for shortness of breath.  Patient returns today stating that he has not felt any better at all.  Patient is currently afebrile, but states he had a fever in the middle of the night 2 days ago.  Endorses mildly productive cough.  Patient reports compliance with his CPAP and his medications.  No associated chest pain, but endorses chest tightness.

## 2023-11-12 NOTE — H&P ADULT - NSHPREVIEWOFSYSTEMS_GEN_ALL_CORE
REVIEW OF SYSTEMS:    CONSTITUTIONAL: No weakness, fevers or chills  EYES/ENT: No visual changes;  No vertigo or throat pain, no headache   NECK: No pain or stiffness  RESPIRATORY: No cough, wheezing, hemoptysis; No shortness of breath  CARDIOVASCULAR: No chest pain or palpitations, no lower limb edema  GASTROINTESTINAL: No abdominal or epigastric pain. No nausea, vomiting, or hematemesis; No diarrhea or constipation. No melena or hematochezia.  GENITOURINARY: No dysuria, frequency or hematuria  NEUROLOGICAL: No numbness or weakness  SKIN: No itching, rashes  MUSCOLOSKELETAL: no arthralgia or arthritis

## 2023-11-12 NOTE — ED PROVIDER NOTE - ATTENDING CONTRIBUTION TO CARE
57-year-old male possible history of COPD, asthma, hypertension, hyperlipidemia, CHF presents with worsening shortness of breath.  Recent positive rhinovirus.  Patient also treated with Levaquin and steroid course.  Continues to have shortness of breath.  Was discharged from the hospital yesterday.  States that at home he cannot catch his breath he was unable to lay flat and unable to sleep due to his breathing.  No chest pain or pressure.   No leg swelling or pain.  Patient also reports that he still having fevers.  No nausea vomiting diarrhea.  No abdominal pain.    CONSTITUTIONAL: Well-developed; well-nourished; in no acute distress.   SKIN: warm, dry  HEAD: Normocephalic; atraumatic.  EYES: PERRL, EOMI, no conjunctival erythema  ENT: No nasal discharge; airway clear.  NECK: Supple; non tender.  CARD: S1, S2 normal;  Regular rate and rhythm.   RESP: + expiratory wheezing bilaterally, no respiratory distress.   ABD: soft non tender, non distended, no rebound or guarding  EXT: Normal ROM.  5/5 strength in all 4 extremities. no pedal edema.   LYMPH: No acute cervical adenopathy.  NEURO: Alert, oriented, grossly unremarkable. neurovascularly intact  PSYCH: Cooperative, appropriate.

## 2023-11-12 NOTE — CHART NOTE - NSCHARTNOTEFT_GEN_A_CORE
Pt seen on previous admission for hoarseness, concern for NP mass on FFL. CT results as below, no NP mass seen, incidental small laryngocele noted. No acute intervention, patient can follow up as outpatient for management.     < from: CT Neck Soft Tissue w/ IV Cont (11.10.23 @ 21:34) >    ACC: [id number] EXAM:  CT NECK SOFT TISSUE IC   ORDERED BY: MCKAYLA VILCHIS     PROCEDURE DATE:  11/10/2023      INTERPRETATION:  Clinical history: Hoarseness, evaluate for   nasopharyngeal lesion.    Technique:  Multidetector axial CT images of the neck were obtained   following the intravenous administration of 100 cc of nonionic contrast.   Images were reformatted on multiple planes and reviewed in bone and   soft-tissue windows.    Comparison: None.    Findings:  Aerodigestive structures: There is 0.9 x 0.6 x 1.3 cm air-filled   outpouching in the right aspect of the right piriform sinus (4-152)   likely reflecting a laryngocele.    Thyroid gland: Unremarkable.  Parotid and submandibular glands:  Incidental periparotid and   intraparotid nodular lesions, left more than right, measuring up to 1 cm   could represent lymph nodes.    Lymph nodes: No pathologic adenopathy by imaging size criteria.    Vascular structures: Unremarkable.    Osseous structures: No fracture, dislocation or destructive lesion.    Multiple healed fractures involving the anterior right second, third, and   fourth ribs.    Paranasal sinuses: Mild mucosal thickening in scattered portions of the   visualized paranasal sinuses.  Mastoid air cells:  Complete effusion of the left mastoid.    Partially visualized intracranial structures: Unremarkable.  Orbits: Unremarkable.    Partially visualized lung apices: Unremarkable.    Miscellaneous: Partially imaged fluid-filled lesions in bilateral   axillary regions with dystrophic calcifications likely from glenohumeral   joint degenerative changes.    Impression:    Incidental 1.3 cm air-filled outpouching in the lateral aspect of the   right piriform sinus likely reflecting laryngocele. No evidence of soft   tissue mass.    Nonspecific left mastoid effusion.    --- End of Report ---            SEAN MENDOZA MD; Attending Radiologist  This document has been electronically signed. Nov 11 2023  1:38PM    < end of copied text >

## 2023-11-12 NOTE — H&P ADULT - NSHPPHYSICALEXAM_GEN_ALL_CORE
PHYSICAL EXAM:  GENERAL: NAD  EYES: conjunctiva and sclera clear  ENMT: No tonsillar erythema, exudates, or enlargement; Moist mucous membranes  NECK: Supple, No JVD, Normal thyroid  HEART: Regular rate and rhythm; No murmurs, rubs, or gallops, Normal S1 S2   RESPIRATORY: Clear to auscultation bilaterally, resonant percussion note, no added sounds   ABDOMEN: Soft, Nontender, Nondistended; Bowel sounds present  NEUROLOGY: A&Ox3, grossly intact power and sensation   EXTREMITIES:  2+ Peripheral Pulses, No clubbing, cyanosis, or edema  SKIN: warm, dry, normal color, no rash or abnormal lesions PHYSICAL EXAM:  GENERAL: NAD  EYES: conjunctiva and sclera clear  ENMT: No tonsillar erythema, exudates, or enlargement; Moist mucous membranes  NECK: Supple, No JVD, Normal thyroid  HEART: Regular rate and rhythm; No murmurs, rubs, or gallops, Normal S1 S2   RESPIRATORY: decreased air entry bilaterally with diffuse wheezing and crackles   ABDOMEN: Soft, Nontender, Nondistended; Bowel sounds present  NEUROLOGY: A&Ox3, grossly intact power and sensation   EXTREMITIES:  bilateral lower limb edema   SKIN: warm, dry, normal color, no rash or abnormal lesions

## 2023-11-12 NOTE — PATIENT PROFILE ADULT - FALL HARM RISK - UNIVERSAL INTERVENTIONS
Bed in lowest position, wheels locked, appropriate side rails in place/Call bell, personal items and telephone in reach/Instruct patient to call for assistance before getting out of bed or chair/Non-slip footwear when patient is out of bed/[place] to call system/Physically safe environment - no spills, clutter or unnecessary equipment/Purposeful Proactive Rounding/Room/bathroom lighting operational, light cord in reach

## 2023-11-12 NOTE — H&P ADULT - ASSESSMENT
57-year-old male with past medical history of hypertension, hyperlipidemia, GERD, COPD on home nasal cannula, REBECCA on CPAP, HFpEF. Admitted on 11/9/23 for COPD exacerbation, eloped on 11/11 without getting discharge instructions. Presented same night for worsening shortness of breath.       # COPD exacerbation   # Rhinovirus +  # Hx of COPD ( 3L O2 at home)   # REBECCA on home CPAP   # Active Tobacco Use  - Vitally stable, no documented fever   - CXR unremarkable   - CT chest showed no PE  - procalcitonin 2 days ago were negative   - Continue Symbicort, Duoneb standing and as needed, Prednisone 40 daily  - BIPAP at night   - monitor off antibiotics  - Send urine toxicology       # Laryngocele  # hoarseness  - ENT evaluated the patient: right TVC paresis. Flexible laryngoscopy performed revealing erythematous mass involving right nasopharynx/fossa of rosenmuller  - CT neck showed Incidental 1.3 cm air-filled outpouching in the lateral aspect of the right piriform sinus likely reflecting laryngocele. No evidence of soft tissue mass.  - Will call ENT for follow up       # HFpEF - Not in exacerbation   - Stress test Aug 2023: NO DEFINITE EVIDENCE FOR ISCHEMIA. NORMAL RESTING LEFT VENTRICULAR WALL MOTION. LEFT VENTRICULAR EJECTION FRACTION OF  68 %  - Continue Lasix orally  - TTE  - outpatient cardiology follow up       # HTN   # HLD  - c/w lisinopril  - c/w fenofibrate and simvastatin     #Hx Chronic Pain   #Hx Anxiety  #Hx Insomnia  - Pain from bilateral knees (s/p replacement), bilateral ankles and bilateral shoulders  - takes oxycodone 30 q4 hrs daily, Morphine  q6, Xanax 2mg q6, Ambien 10 qhs       # GI prophylaxis: pantoprazole  # DVT prophylaxis: Lovenox  # Full code  # Diet: regular DASH    57-year-old male with past medical history of hypertension, hyperlipidemia, GERD, COPD on home nasal cannula, REBECCA on CPAP, HFpEF. Admitted on 11/9/23 for COPD exacerbation, eloped on 11/11 without getting discharge instructions. Presented same night for worsening shortness of breath.       # COPD exacerbation   # Rhinovirus +  # Hx of COPD ( 3L O2 at home)   # REBECCA on home CPAP   # Active Tobacco Use  - Vitally stable, no documented fever   - CXR unremarkable   - CT chest showed no PE  - procalcitonin 2 days ago were negative   - Continue Symbicort, Duoneb standing and as needed  - Give Solumedrol 40X2 for 1 days, switch to PO prednisone by tomorrow    - BIPAP at night   - monitor off antibiotics  - Send urine toxicology       # Laryngocele  # hoarseness  - ENT evaluated the patient: right TVC paresis. Flexible laryngoscopy performed revealing erythematous mass involving right nasopharynx/fossa of rosenmuller  - CT neck showed Incidental 1.3 cm air-filled outpouching in the lateral aspect of the right piriform sinus likely reflecting laryngocele. No evidence of soft tissue mass.  - Will call ENT for follow up       # HFpEF - Not in exacerbation   - Stress test Aug 2023: NO DEFINITE EVIDENCE FOR ISCHEMIA. NORMAL RESTING LEFT VENTRICULAR WALL MOTION. LEFT VENTRICULAR EJECTION FRACTION OF  68 %  - Continue Lasix orally  - TTE  - outpatient cardiology follow up       # HTN   # HLD  - c/w lisinopril  - c/w fenofibrate and simvastatin     #Hx Chronic Pain   #Hx Anxiety  #Hx Insomnia  - Pain from bilateral knees (s/p replacement), bilateral ankles and bilateral shoulders  - takes oxycodone 30 q4 hrs daily, Morphine  q6, Xanax 2mg q6, Ambien 10 qhs       # GI prophylaxis: pantoprazole  # DVT prophylaxis: Lovenox  # Full code  # Diet: regular DASH

## 2023-11-12 NOTE — ED PROVIDER NOTE - PHYSICAL EXAMINATION
_  Vital signs reviewed  GENERAL: Well nourished, uncomfortable  SKIN: Warm, dry  HEAD & NECK: NCAT, supple neck  EYES: EOMI, PER B/L  ENT: MMM  CARD: RRR, S1, S2; no murmurs, no rubs, no gallops  RESP: Normal respiratory effort, +rhonchi throughout  ABD: Soft, ND, NT, no rebound, no guarding; no CVAT  EXT: Pulses palpable distally; +mild edema  NEUROMSK: Grossly intact  PSYCH: AAOx3, cooperative, appropriate

## 2023-11-12 NOTE — ED PROVIDER NOTE - CLINICAL SUMMARY MEDICAL DECISION MAKING FREE TEXT BOX
Patient presents with worsening shortness of breath.  Recent admission for similar symptoms and was treated with antibiotics and steroids at that time.  Patient was discharged yesterday.  States that he is worsening shortness of breath.  Labs EKG chest x-ray CTA done.  No acute findings.  Positive wheezing on rales on exam.  Nebulizers steroids given.  Patient admitted for further management of worsening COPD.

## 2023-11-12 NOTE — ED PROVIDER NOTE - PROGRESS NOTE DETAILS
Resident TANNER Evans: Patient reassessed after DuoNeb's x3, Solu-Medrol, and 40 of Lasix.  Patient is moving air much better, however is having rhonchi and diffuse wheezing.  Patient states that he is not feeling better.  Will place on BiPAP, and give additional albuterol, as well as magnesium.  Patient will need admission for further management.

## 2023-11-12 NOTE — H&P ADULT - ATTENDING COMMENTS
57-year-old male with past medical history of hypertension, hyperlipidemia, GERD, COPD on home nasal cannula, REBECCA on CPAP, HFpEF. He was admitted on 11/9/23 for shortness of breath and treated as COPD exacerbation, found to be positive for rhinovirus, discharged on triple inhaler therapy with steroids course but he eloped and didn't get his discharge instructions. Yesterday at night he came back due to no improvement in his symptoms and same shortness of breath.  Patient has been having severe rhonchi and coughing.  States he's been on his pain medications and xanax for years.    General: WN/WD NAD  Neurology: A&Ox3, nonfocal, CLARK x 4  Head:  Normocephalic, atraumatic  ENT:  Mucosa moist, no ulcerations  Neck:  Supple, no sinuses or palpable masses  Lymphatic:  No palpable cervical, supraclavicular, axillary or inguinal adenopathy  Respiratory: severe rhonchi in all lung fields  CV: RRR, S1S2, no murmur  Abdominal: Soft, NT, ND no palpable mass  MSK: No edema, + peripheral pulses, FROM all 4 extremity  Incisions: intact, no erythema or drainage    Assessment    Dyspnea secondary to RSV in the setting of COPD  Chronic hypoxic respiratory failure from COPD on 3L at home  Chronic pain  REBECCA on CPAP  HFpEF EF 65%  Anxiety    Plan    - last eloped before getting instructions, having severe rhonchi, starting guaifenesin c/w steroids, symbicort / duonebs  - c/w home pain medications, monitor for lethargy  - c/w lasix and lisinopril  - c/w home xanax    Pending: clinical improvement in dyspnea    # DVT PPX: lovenox

## 2023-11-12 NOTE — H&P ADULT - NSHPLABSRESULTS_GEN_ALL_CORE
14.8   14.40 )-----------( 285      ( 12 Nov 2023 02:05 )             44.7       11-12    137  |  98  |  24<H>  ----------------------------<  81  4.8   |  28  |  0.8    Ca    9.7      12 Nov 2023 02:05  Mg     2.1     11-11    TPro  6.1  /  Alb  3.9  /  TBili  0.2  /  DBili  x   /  AST  11  /  ALT  19  /  AlkPhos  143<H>  11-12              Urinalysis Basic - ( 12 Nov 2023 02:05 )    Color: x / Appearance: x / SG: x / pH: x  Gluc: 81 mg/dL / Ketone: x  / Bili: x / Urobili: x   Blood: x / Protein: x / Nitrite: x   Leuk Esterase: x / RBC: x / WBC x   Sq Epi: x / Non Sq Epi: x / Bacteria: x            Lactate Trend      CARDIAC MARKERS ( 12 Nov 2023 02:05 )  x     / <0.01 ng/mL / x     / x     / x            CAPILLARY BLOOD GLUCOSE            Culture Results:   No growth at 48 Hours (11-09 @ 17:09)  Culture Results:   No growth at 48 Hours (11-09 @ 16:54)

## 2023-11-12 NOTE — H&P ADULT - HISTORY OF PRESENT ILLNESS
57-year-old male with past medical history of hypertension, hyperlipidemia, GERD, COPD on home nasal cannula, REBECCA on CPAP, HFpEF. He was admitted on 11/9/23 for shortness of breath and treated as COPD exacerbation, found to be positive for rhinovirus, discharged on triple inhaler therapy with steroids course but he eloped and didn't get his discharge instructions. Yesterday at night he came back due to no improvement in his symptoms and same shortness of breath.     In ED vitals, labs were unremarkable.     He was given Solumderol, Duoneb with improvement in his symptoms. admitted for further management.

## 2023-11-13 ENCOUNTER — TRANSCRIPTION ENCOUNTER (OUTPATIENT)
Age: 58
End: 2023-11-13

## 2023-11-13 VITALS
OXYGEN SATURATION: 98 % | HEART RATE: 83 BPM | RESPIRATION RATE: 18 BRPM | SYSTOLIC BLOOD PRESSURE: 94 MMHG | TEMPERATURE: 98 F | DIASTOLIC BLOOD PRESSURE: 55 MMHG

## 2023-11-13 LAB
ALBUMIN SERPL ELPH-MCNC: 3.4 G/DL — LOW (ref 3.5–5.2)
ALBUMIN SERPL ELPH-MCNC: 3.4 G/DL — LOW (ref 3.5–5.2)
ALP SERPL-CCNC: 120 U/L — HIGH (ref 30–115)
ALP SERPL-CCNC: 120 U/L — HIGH (ref 30–115)
ALT FLD-CCNC: 18 U/L — SIGNIFICANT CHANGE UP (ref 0–41)
ALT FLD-CCNC: 18 U/L — SIGNIFICANT CHANGE UP (ref 0–41)
ANION GAP SERPL CALC-SCNC: 5 MMOL/L — LOW (ref 7–14)
ANION GAP SERPL CALC-SCNC: 5 MMOL/L — LOW (ref 7–14)
AST SERPL-CCNC: 10 U/L — SIGNIFICANT CHANGE UP (ref 0–41)
AST SERPL-CCNC: 10 U/L — SIGNIFICANT CHANGE UP (ref 0–41)
BASOPHILS # BLD AUTO: 0.04 K/UL — SIGNIFICANT CHANGE UP (ref 0–0.2)
BASOPHILS # BLD AUTO: 0.04 K/UL — SIGNIFICANT CHANGE UP (ref 0–0.2)
BASOPHILS NFR BLD AUTO: 0.2 % — SIGNIFICANT CHANGE UP (ref 0–1)
BASOPHILS NFR BLD AUTO: 0.2 % — SIGNIFICANT CHANGE UP (ref 0–1)
BILIRUB SERPL-MCNC: 0.2 MG/DL — SIGNIFICANT CHANGE UP (ref 0.2–1.2)
BILIRUB SERPL-MCNC: 0.2 MG/DL — SIGNIFICANT CHANGE UP (ref 0.2–1.2)
BUN SERPL-MCNC: 25 MG/DL — HIGH (ref 10–20)
BUN SERPL-MCNC: 25 MG/DL — HIGH (ref 10–20)
CALCIUM SERPL-MCNC: 9.5 MG/DL — SIGNIFICANT CHANGE UP (ref 8.4–10.5)
CALCIUM SERPL-MCNC: 9.5 MG/DL — SIGNIFICANT CHANGE UP (ref 8.4–10.5)
CHLORIDE SERPL-SCNC: 91 MMOL/L — LOW (ref 98–110)
CHLORIDE SERPL-SCNC: 91 MMOL/L — LOW (ref 98–110)
CO2 SERPL-SCNC: 33 MMOL/L — HIGH (ref 17–32)
CO2 SERPL-SCNC: 33 MMOL/L — HIGH (ref 17–32)
CREAT SERPL-MCNC: 0.7 MG/DL — SIGNIFICANT CHANGE UP (ref 0.7–1.5)
CREAT SERPL-MCNC: 0.7 MG/DL — SIGNIFICANT CHANGE UP (ref 0.7–1.5)
EGFR: 109 ML/MIN/1.73M2 — SIGNIFICANT CHANGE UP
EGFR: 109 ML/MIN/1.73M2 — SIGNIFICANT CHANGE UP
EOSINOPHIL # BLD AUTO: 0 K/UL — SIGNIFICANT CHANGE UP (ref 0–0.7)
EOSINOPHIL # BLD AUTO: 0 K/UL — SIGNIFICANT CHANGE UP (ref 0–0.7)
EOSINOPHIL NFR BLD AUTO: 0 % — SIGNIFICANT CHANGE UP (ref 0–8)
EOSINOPHIL NFR BLD AUTO: 0 % — SIGNIFICANT CHANGE UP (ref 0–8)
GLUCOSE SERPL-MCNC: 99 MG/DL — SIGNIFICANT CHANGE UP (ref 70–99)
GLUCOSE SERPL-MCNC: 99 MG/DL — SIGNIFICANT CHANGE UP (ref 70–99)
HCT VFR BLD CALC: 42.8 % — SIGNIFICANT CHANGE UP (ref 42–52)
HCT VFR BLD CALC: 42.8 % — SIGNIFICANT CHANGE UP (ref 42–52)
HGB BLD-MCNC: 14.3 G/DL — SIGNIFICANT CHANGE UP (ref 14–18)
HGB BLD-MCNC: 14.3 G/DL — SIGNIFICANT CHANGE UP (ref 14–18)
IMM GRANULOCYTES NFR BLD AUTO: 2.9 % — HIGH (ref 0.1–0.3)
IMM GRANULOCYTES NFR BLD AUTO: 2.9 % — HIGH (ref 0.1–0.3)
LYMPHOCYTES # BLD AUTO: 1.95 K/UL — SIGNIFICANT CHANGE UP (ref 1.2–3.4)
LYMPHOCYTES # BLD AUTO: 1.95 K/UL — SIGNIFICANT CHANGE UP (ref 1.2–3.4)
LYMPHOCYTES # BLD AUTO: 10.3 % — LOW (ref 20.5–51.1)
LYMPHOCYTES # BLD AUTO: 10.3 % — LOW (ref 20.5–51.1)
MCHC RBC-ENTMCNC: 29 PG — SIGNIFICANT CHANGE UP (ref 27–31)
MCHC RBC-ENTMCNC: 29 PG — SIGNIFICANT CHANGE UP (ref 27–31)
MCHC RBC-ENTMCNC: 33.4 G/DL — SIGNIFICANT CHANGE UP (ref 32–37)
MCHC RBC-ENTMCNC: 33.4 G/DL — SIGNIFICANT CHANGE UP (ref 32–37)
MCV RBC AUTO: 86.8 FL — SIGNIFICANT CHANGE UP (ref 80–94)
MCV RBC AUTO: 86.8 FL — SIGNIFICANT CHANGE UP (ref 80–94)
MONOCYTES # BLD AUTO: 1.48 K/UL — HIGH (ref 0.1–0.6)
MONOCYTES # BLD AUTO: 1.48 K/UL — HIGH (ref 0.1–0.6)
MONOCYTES NFR BLD AUTO: 7.8 % — SIGNIFICANT CHANGE UP (ref 1.7–9.3)
MONOCYTES NFR BLD AUTO: 7.8 % — SIGNIFICANT CHANGE UP (ref 1.7–9.3)
NEUTROPHILS # BLD AUTO: 14.89 K/UL — HIGH (ref 1.4–6.5)
NEUTROPHILS # BLD AUTO: 14.89 K/UL — HIGH (ref 1.4–6.5)
NEUTROPHILS NFR BLD AUTO: 78.8 % — HIGH (ref 42.2–75.2)
NEUTROPHILS NFR BLD AUTO: 78.8 % — HIGH (ref 42.2–75.2)
NRBC # BLD: 0 /100 WBCS — SIGNIFICANT CHANGE UP (ref 0–0)
NRBC # BLD: 0 /100 WBCS — SIGNIFICANT CHANGE UP (ref 0–0)
PLATELET # BLD AUTO: 244 K/UL — SIGNIFICANT CHANGE UP (ref 130–400)
PLATELET # BLD AUTO: 244 K/UL — SIGNIFICANT CHANGE UP (ref 130–400)
PMV BLD: 10.4 FL — SIGNIFICANT CHANGE UP (ref 7.4–10.4)
PMV BLD: 10.4 FL — SIGNIFICANT CHANGE UP (ref 7.4–10.4)
POTASSIUM SERPL-MCNC: 4.7 MMOL/L — SIGNIFICANT CHANGE UP (ref 3.5–5)
POTASSIUM SERPL-MCNC: 4.7 MMOL/L — SIGNIFICANT CHANGE UP (ref 3.5–5)
POTASSIUM SERPL-SCNC: 4.7 MMOL/L — SIGNIFICANT CHANGE UP (ref 3.5–5)
POTASSIUM SERPL-SCNC: 4.7 MMOL/L — SIGNIFICANT CHANGE UP (ref 3.5–5)
PROT SERPL-MCNC: 5.9 G/DL — LOW (ref 6–8)
PROT SERPL-MCNC: 5.9 G/DL — LOW (ref 6–8)
RBC # BLD: 4.93 M/UL — SIGNIFICANT CHANGE UP (ref 4.7–6.1)
RBC # BLD: 4.93 M/UL — SIGNIFICANT CHANGE UP (ref 4.7–6.1)
RBC # FLD: 15.5 % — HIGH (ref 11.5–14.5)
RBC # FLD: 15.5 % — HIGH (ref 11.5–14.5)
SODIUM SERPL-SCNC: 129 MMOL/L — LOW (ref 135–146)
SODIUM SERPL-SCNC: 129 MMOL/L — LOW (ref 135–146)
WBC # BLD: 18.9 K/UL — HIGH (ref 4.8–10.8)
WBC # BLD: 18.9 K/UL — HIGH (ref 4.8–10.8)
WBC # FLD AUTO: 18.9 K/UL — HIGH (ref 4.8–10.8)
WBC # FLD AUTO: 18.9 K/UL — HIGH (ref 4.8–10.8)

## 2023-11-13 PROCEDURE — 99239 HOSP IP/OBS DSCHRG MGMT >30: CPT

## 2023-11-13 RX ORDER — MORPHINE SULFATE 50 MG/1
100 CAPSULE, EXTENDED RELEASE ORAL
Refills: 0 | Status: DISCONTINUED | OUTPATIENT
Start: 2023-11-13 | End: 2023-11-13

## 2023-11-13 RX ADMIN — OXYCODONE HYDROCHLORIDE 30 MILLIGRAM(S): 5 TABLET ORAL at 00:03

## 2023-11-13 RX ADMIN — Medication 40 MILLIGRAM(S): at 06:25

## 2023-11-13 RX ADMIN — Medication 3 MILLILITER(S): at 08:18

## 2023-11-13 RX ADMIN — PANTOPRAZOLE SODIUM 40 MILLIGRAM(S): 20 TABLET, DELAYED RELEASE ORAL at 06:25

## 2023-11-13 RX ADMIN — Medication 600 MILLIGRAM(S): at 06:25

## 2023-11-13 RX ADMIN — MORPHINE SULFATE 100 MILLIGRAM(S): 50 CAPSULE, EXTENDED RELEASE ORAL at 10:02

## 2023-11-13 RX ADMIN — ENOXAPARIN SODIUM 40 MILLIGRAM(S): 100 INJECTION SUBCUTANEOUS at 06:25

## 2023-11-13 RX ADMIN — Medication 2 MILLIGRAM(S): at 08:51

## 2023-11-13 RX ADMIN — HYDROMORPHONE HYDROCHLORIDE 0.2 MILLIGRAM(S): 2 INJECTION INTRAMUSCULAR; INTRAVENOUS; SUBCUTANEOUS at 00:16

## 2023-11-13 RX ADMIN — BUDESONIDE AND FORMOTEROL FUMARATE DIHYDRATE 2 PUFF(S): 160; 4.5 AEROSOL RESPIRATORY (INHALATION) at 09:43

## 2023-11-13 NOTE — DISCHARGE NOTE PROVIDER - NSDCMRMEDTOKEN_GEN_ALL_CORE_FT
albuterol 90 mcg/inh inhalation aerosol: 2 puff(s) inhaled every 6 hours AS NEEDED   ALPRAZolam 2 mg oral tablet: 1 tab(s) orally every 6 hours, As needed, anxiety  Ambien 10 mg oral tablet: 0.5 tab(s) orally once a day (at bedtime), As Needed  Aspir 81 oral delayed release tablet: 1 tab(s) orally once a day  fenofibrate 160 mg oral tablet: 1 tab(s) orally once a day  Lasix 40 mg oral tablet: 1 tab(s) orally once a day  lisinopril 5 mg oral tablet: 1 tab(s) orally once a day  morphine 100 mg/12 to 24 hr oral capsule, extended release: 1 cap(s) orally 2 times a day  Narcan 4 mg/0.1 mL nasal spray: 4 milligram(s) intranasally once a day as needed for  opioid overdose  oxyCODONE 30 mg oral tablet: 1 tab(s) orally every 4 hours, As needed, Severe Pain (7 - 10) MDD:180mg  predniSONE 20 mg oral tablet: 2 tab(s) orally once a day  Spiriva HandiHaler 18 mcg inhalation capsule: 1 cap(s) inhaled once a day   Symbicort 160 mcg-4.5 mcg/inh inhalation aerosol: 1 puff(s) inhaled 2 times a day   Zocor 20 mg oral tablet: 1 tab(s) orally once a day (at bedtime)

## 2023-11-13 NOTE — DISCHARGE NOTE PROVIDER - CARE PROVIDERS DIRECT ADDRESSES
[email or web address]

## 2023-11-13 NOTE — DISCHARGE NOTE PROVIDER - NSDCCPCAREPLAN_GEN_ALL_CORE_FT
PRINCIPAL DISCHARGE DIAGNOSIS  Diagnosis: COPD exacerbation  Assessment and Plan of Treatment: you came her for Shortness of breathe.  we did all the wrokup and ENT did a lyrangescope and found a sac of 1.3 cm there.  kindly follow up with ENT outpatient for further workup.  You also have a dilated Asending Aorta of 4.3cm ,please follow up with PCP for its further managemnt   Take all the precribed medications on time.  follow all your appoint as as scheduled.  If you experience any symptoms contact a doctor asap .  If you still have any question don't hesitate to ask .

## 2023-11-13 NOTE — DISCHARGE NOTE PROVIDER - CARE PROVIDER_API CALL
Edison Davis  Internal Medicine  [address] [place] [id number]  Phone: [phone]  Fax: [phone]  Follow Up Time: 1 week    ent,   Phone: (   )    -  Fax: (   )    -  Follow Up Time: 1 week

## 2023-11-13 NOTE — CHART NOTE - NSCHARTNOTEFT_GEN_A_CORE
Patient left  AMA Saw patient this morning .  patient wasn't stable for discharge.  Counseled patient about the treatment plan in detail.  Patient eloped around 12 pm 11/13

## 2023-11-13 NOTE — DISCHARGE NOTE PROVIDER - HOSPITAL COURSE
57-year-old male with past medical history of hypertension, hyperlipidemia, GERD, COPD on home nasal cannula, REBECCA on CPAP, HFpEF. Admitted on 11/9/23 for COPD exacerbation, eloped on 11/11 without getting discharge instructions. Presented same night for worsening shortness of breath.   admitted to floor for COPD exacerbation and Rhinovirus +,Vitally stable, no documented fever    CXR unremarkable and CT chest showed no P with procalcitonin 2 days ago were negative   Continued Symbicort, Duoneb standing and as needed and  gave  Solumedrol 40X2 for 1 days, switched to PO   BIPAP at night  and monitored off antibiotics  Sent  urine toxicology   for Laryngocele ENT evaluated the patient: right TVC paresis. 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 No evidence of soft tissue mass.  for HFpEF - Not in exacerbation   had Stress test Aug 2023: NO DEFINITE EVIDENCE FOR ISCHEMIA. NORMAL RESTING LEFT VENTRICULAR WALL MOTION. LEFT VENTRICULAR EJECTION FRACTION OF  68 %  so Continued Lasix orally  - TTE  for HTN and HLD c/w lisinopril and c/w fenofibrate and simvastatin   for Hx Chronic Pain  took  oxycodone 30 q4 hrs daily, Morphine  q6, Xanax 2mg q6, Ambien 10 qhs

## 2023-11-13 NOTE — DISCHARGE NOTE PROVIDER - PROVIDER TOKENS
PROVIDER:[TOKEN:[[id number]],FOLLOWUP:[1 week]],FREE:[LAST:[ent],PHONE:[(   )    -],FAX:[(   )    -],FOLLOWUP:[1 week]]

## 2023-11-14 DIAGNOSIS — F17.210 NICOTINE DEPENDENCE, CIGARETTES, UNCOMPLICATED: ICD-10-CM

## 2023-11-14 DIAGNOSIS — F41.9 ANXIETY DISORDER, UNSPECIFIED: ICD-10-CM

## 2023-11-14 DIAGNOSIS — I50.32 CHRONIC DIASTOLIC (CONGESTIVE) HEART FAILURE: ICD-10-CM

## 2023-11-14 DIAGNOSIS — G47.00 INSOMNIA, UNSPECIFIED: ICD-10-CM

## 2023-11-14 DIAGNOSIS — R50.9 FEVER, UNSPECIFIED: ICD-10-CM

## 2023-11-14 DIAGNOSIS — I11.0 HYPERTENSIVE HEART DISEASE WITH HEART FAILURE: ICD-10-CM

## 2023-11-14 DIAGNOSIS — B97.89 OTHER VIRAL AGENTS AS THE CAUSE OF DISEASES CLASSIFIED ELSEWHERE: ICD-10-CM

## 2023-11-14 DIAGNOSIS — Z96.653 PRESENCE OF ARTIFICIAL KNEE JOINT, BILATERAL: ICD-10-CM

## 2023-11-14 DIAGNOSIS — K21.9 GASTRO-ESOPHAGEAL REFLUX DISEASE WITHOUT ESOPHAGITIS: ICD-10-CM

## 2023-11-14 DIAGNOSIS — Z79.52 LONG TERM (CURRENT) USE OF SYSTEMIC STEROIDS: ICD-10-CM

## 2023-11-14 DIAGNOSIS — E78.5 HYPERLIPIDEMIA, UNSPECIFIED: ICD-10-CM

## 2023-11-14 DIAGNOSIS — G47.33 OBSTRUCTIVE SLEEP APNEA (ADULT) (PEDIATRIC): ICD-10-CM

## 2023-11-14 DIAGNOSIS — J44.1 CHRONIC OBSTRUCTIVE PULMONARY DISEASE WITH (ACUTE) EXACERBATION: ICD-10-CM

## 2023-11-14 DIAGNOSIS — G89.29 OTHER CHRONIC PAIN: ICD-10-CM

## 2023-11-14 DIAGNOSIS — Z99.81 DEPENDENCE ON SUPPLEMENTAL OXYGEN: ICD-10-CM

## 2023-11-14 LAB
CULTURE RESULTS: SIGNIFICANT CHANGE UP
SPECIMEN SOURCE: SIGNIFICANT CHANGE UP

## 2023-11-18 LAB
ALPRAZ UR CFM-MCNC: 228 NG/ML — SIGNIFICANT CHANGE UP
ALPRAZ UR CFM-MCNC: 228 NG/ML — SIGNIFICANT CHANGE UP
ALPRAZOLAM RESULT, UR: POSITIVE
ALPRAZOLAM RESULT, UR: POSITIVE
AMPHET UR-MCNC: NEGATIVE NG/ML — SIGNIFICANT CHANGE UP
AMPHET UR-MCNC: NEGATIVE NG/ML — SIGNIFICANT CHANGE UP
BARBITURATES UR QL SCN: NEGATIVE NG/ML — SIGNIFICANT CHANGE UP
BARBITURATES UR QL SCN: NEGATIVE NG/ML — SIGNIFICANT CHANGE UP
BARBITURATES UR-MCNC: NEGATIVE NG/ML — SIGNIFICANT CHANGE UP
BARBITURATES UR-MCNC: NEGATIVE NG/ML — SIGNIFICANT CHANGE UP
BENZODIAZ UR QL SCN: POSITIVE NG/ML
BENZODIAZ UR QL SCN: POSITIVE NG/ML
BENZODIAZ UR-MCNC: SIGNIFICANT CHANGE UP NG/ML
BENZODIAZ UR-MCNC: SIGNIFICANT CHANGE UP NG/ML
BENZODIAZEPINES RESULT, UR: POSITIVE NG/ML
BENZODIAZEPINES RESULT, UR: POSITIVE NG/ML
CLONAZEPAM RESULT, UR: NEGATIVE — SIGNIFICANT CHANGE UP
CLONAZEPAM RESULT, UR: NEGATIVE — SIGNIFICANT CHANGE UP
COCAINE METAB.OTHER UR-MCNC: NEGATIVE NG/ML — SIGNIFICANT CHANGE UP
COCAINE METAB.OTHER UR-MCNC: NEGATIVE NG/ML — SIGNIFICANT CHANGE UP
CODEINE RESULT, UR: NEGATIVE — SIGNIFICANT CHANGE UP
CODEINE RESULT, UR: NEGATIVE — SIGNIFICANT CHANGE UP
CODEINE UR CFM-MCNC: NEGATIVE — SIGNIFICANT CHANGE UP
CODEINE UR CFM-MCNC: NEGATIVE — SIGNIFICANT CHANGE UP
CREATININE, URINE THERAPEUTIC: 96.8 MG/DL — SIGNIFICANT CHANGE UP (ref 20–300)
CREATININE, URINE THERAPEUTIC: 96.8 MG/DL — SIGNIFICANT CHANGE UP (ref 20–300)
FENTANYL RESULT, UR: SIGNIFICANT CHANGE UP NG/ML
FENTANYL RESULT, UR: SIGNIFICANT CHANGE UP NG/ML
FENTANYL UR CFM-MCNC: 2.3 NG/ML — SIGNIFICANT CHANGE UP
FENTANYL UR CFM-MCNC: 2.3 NG/ML — SIGNIFICANT CHANGE UP
FENTANYL UR CFM-MCNC: POSITIVE
FENTANYL UR CFM-MCNC: POSITIVE
FENTANYL UR QL SCN: SIGNIFICANT CHANGE UP NG/ML
FENTANYL UR QL SCN: SIGNIFICANT CHANGE UP NG/ML
FENTANYL+NORFENTANYL UR QL SCN: 128 NG/ML — SIGNIFICANT CHANGE UP
FENTANYL+NORFENTANYL UR QL SCN: 128 NG/ML — SIGNIFICANT CHANGE UP
FENTANYL+NORFENTANYL UR QL SCN: POSITIVE
FENTANYL+NORFENTANYL UR QL SCN: POSITIVE
FENTANYL/NORFENTANYL RESULT, UR: POSITIVE
FENTANYL/NORFENTANYL RESULT, UR: POSITIVE
FLURAZEPAM RESULT, UR: NEGATIVE — SIGNIFICANT CHANGE UP
FLURAZEPAM RESULT, UR: NEGATIVE — SIGNIFICANT CHANGE UP
HYDROCODONE RESULT, UR: NEGATIVE — SIGNIFICANT CHANGE UP
HYDROCODONE RESULT, UR: NEGATIVE — SIGNIFICANT CHANGE UP
HYDROCODONE UR CFM-MCNC: NEGATIVE — SIGNIFICANT CHANGE UP
HYDROCODONE UR CFM-MCNC: NEGATIVE — SIGNIFICANT CHANGE UP
HYDROMORPHONE RESULT, UR: POSITIVE
HYDROMORPHONE RESULT, UR: POSITIVE
HYDROMORPHONE UR CFM-MCNC: 151 NG/ML — SIGNIFICANT CHANGE UP
HYDROMORPHONE UR CFM-MCNC: 151 NG/ML — SIGNIFICANT CHANGE UP
HYDROMORPHONE UR CFM-MCNC: POSITIVE
HYDROMORPHONE UR CFM-MCNC: POSITIVE
LORAZEPAM RESULT, UR: NEGATIVE — SIGNIFICANT CHANGE UP
LORAZEPAM RESULT, UR: NEGATIVE — SIGNIFICANT CHANGE UP
LORAZEPAM UR CFM-MCNC: NEGATIVE — SIGNIFICANT CHANGE UP
LORAZEPAM UR CFM-MCNC: NEGATIVE — SIGNIFICANT CHANGE UP
Lab: SIGNIFICANT CHANGE UP
Lab: SIGNIFICANT CHANGE UP
METHADONE UR-MCNC: NEGATIVE NG/ML — SIGNIFICANT CHANGE UP
METHADONE UR-MCNC: NEGATIVE NG/ML — SIGNIFICANT CHANGE UP
MIDAZOLAM RESULT, UR: NEGATIVE — SIGNIFICANT CHANGE UP
MIDAZOLAM RESULT, UR: NEGATIVE — SIGNIFICANT CHANGE UP
MORPHINE RESULT, UR: POSITIVE
MORPHINE RESULT, UR: POSITIVE
MORPHINE SERPL-MCNC: POSITIVE
MORPHINE SERPL-MCNC: POSITIVE
MORPHINE UR QL CFM: SIGNIFICANT CHANGE UP NG/ML
MORPHINE UR QL CFM: SIGNIFICANT CHANGE UP NG/ML
NORDIAZEPAM RESULT, UR: NEGATIVE — SIGNIFICANT CHANGE UP
NORDIAZEPAM RESULT, UR: NEGATIVE — SIGNIFICANT CHANGE UP
NORFENTANYL CONFIRMATION, MS RESULT, UR: 128 NG/ML — SIGNIFICANT CHANGE UP
NORFENTANYL CONFIRMATION, MS RESULT, UR: 128 NG/ML — SIGNIFICANT CHANGE UP
NORFENTANYL UR-MCNC: POSITIVE
NORFENTANYL UR-MCNC: POSITIVE
OPIATES RESULT, UR: POSITIVE NG/ML
OPIATES RESULT, UR: POSITIVE NG/ML
OPIATES UR CFM-MCNC: POSITIVE NG/ML
OPIATES UR CFM-MCNC: POSITIVE NG/ML
OPIATES UR-MCNC: SIGNIFICANT CHANGE UP NG/ML
OPIATES UR-MCNC: SIGNIFICANT CHANGE UP NG/ML
OXAZEPAM RESULT, UR: NEGATIVE — SIGNIFICANT CHANGE UP
OXAZEPAM RESULT, UR: NEGATIVE — SIGNIFICANT CHANGE UP
OXAZEPAM UR QL SCN: NEGATIVE — SIGNIFICANT CHANGE UP
OXAZEPAM UR QL SCN: NEGATIVE — SIGNIFICANT CHANGE UP
OXYCODONE RESULT, UR: POSITIVE
OXYCODONE RESULT, UR: POSITIVE
OXYCODONE UR QL CFM: POSITIVE
OXYCODONE UR QL CFM: POSITIVE
OXYCODONE UR QL SCN: SIGNIFICANT CHANGE UP NG/ML
OXYCODONE UR QL SCN: SIGNIFICANT CHANGE UP NG/ML
OXYCODONE UR QL: 1504 NG/ML — SIGNIFICANT CHANGE UP
OXYCODONE UR QL: 1504 NG/ML — SIGNIFICANT CHANGE UP
OXYCODONE UR-MCNC: POSITIVE
OXYCODONE UR-MCNC: POSITIVE
OXYCODONE+OXYMORPHONE UR QL SCN: POSITIVE
OXYCODONE+OXYMORPHONE UR QL SCN: POSITIVE
OXYMORPHONE RESULT, UR: POSITIVE
OXYMORPHONE RESULT, UR: POSITIVE
OXYMORPHONE UR QL: 257 NG/ML — SIGNIFICANT CHANGE UP
OXYMORPHONE UR QL: 257 NG/ML — SIGNIFICANT CHANGE UP
PCP UR-MCNC: NEGATIVE NG/ML — SIGNIFICANT CHANGE UP
PCP UR-MCNC: NEGATIVE NG/ML — SIGNIFICANT CHANGE UP
PH, URINE RESULT: 6.6 — SIGNIFICANT CHANGE UP (ref 4.5–8.9)
PH, URINE RESULT: 6.6 — SIGNIFICANT CHANGE UP (ref 4.5–8.9)
TEMAZEPAM RESULT, UR: NEGATIVE — SIGNIFICANT CHANGE UP
TEMAZEPAM RESULT, UR: NEGATIVE — SIGNIFICANT CHANGE UP
THC UR QL: NEGATIVE NG/ML — SIGNIFICANT CHANGE UP
THC UR QL: NEGATIVE NG/ML — SIGNIFICANT CHANGE UP
TRIAZOLAM RESULT, UR: NEGATIVE — SIGNIFICANT CHANGE UP
TRIAZOLAM RESULT, UR: NEGATIVE — SIGNIFICANT CHANGE UP

## 2023-11-20 DIAGNOSIS — M25.511 PAIN IN RIGHT SHOULDER: ICD-10-CM

## 2023-11-20 DIAGNOSIS — M25.561 PAIN IN RIGHT KNEE: ICD-10-CM

## 2023-11-20 DIAGNOSIS — M25.572 PAIN IN LEFT ANKLE AND JOINTS OF LEFT FOOT: ICD-10-CM

## 2023-11-20 DIAGNOSIS — F41.9 ANXIETY DISORDER, UNSPECIFIED: ICD-10-CM
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
## 2023-11-20 DIAGNOSIS — J44.1 CHRONIC OBSTRUCTIVE PULMONARY DISEASE WITH (ACUTE) EXACERBATION: ICD-10-CM

## 2023-11-20 DIAGNOSIS — G47.33 OBSTRUCTIVE SLEEP APNEA (ADULT) (PEDIATRIC): ICD-10-CM

## 2023-11-20 DIAGNOSIS — I50.32 CHRONIC DIASTOLIC (CONGESTIVE) HEART FAILURE: ICD-10-CM

## 2023-11-20 DIAGNOSIS — Z79.899 OTHER LONG TERM (CURRENT) DRUG THERAPY: ICD-10-CM

## 2023-11-20 DIAGNOSIS — J96.11 CHRONIC RESPIRATORY FAILURE WITH HYPOXIA: ICD-10-CM

## 2023-11-20 DIAGNOSIS — Z99.81 DEPENDENCE ON SUPPLEMENTAL OXYGEN: ICD-10-CM

## 2023-11-20 DIAGNOSIS — Z53.29 PROCEDURE AND TREATMENT NOT CARRIED OUT BECAUSE OF PATIENT'S DECISION FOR OTHER REASONS: ICD-10-CM

## 2023-11-20 DIAGNOSIS — R49.0 DYSPHONIA: ICD-10-CM

## 2023-11-20 DIAGNOSIS — E78.00 PURE HYPERCHOLESTEROLEMIA, UNSPECIFIED: ICD-10-CM

## 2023-11-20 DIAGNOSIS — G47.00 INSOMNIA, UNSPECIFIED: ICD-10-CM

## 2023-11-20 DIAGNOSIS — I11.0 HYPERTENSIVE HEART DISEASE WITH HEART FAILURE: ICD-10-CM

## 2023-11-20 DIAGNOSIS — Z99.89 DEPENDENCE ON OTHER ENABLING MACHINES AND DEVICES: ICD-10-CM
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
## 2023-11-20 DIAGNOSIS — Q31.3 LARYNGOCELE: ICD-10-CM

## 2023-11-20 DIAGNOSIS — K21.9 GASTRO-ESOPHAGEAL REFLUX DISEASE WITHOUT ESOPHAGITIS: ICD-10-CM

## 2023-11-20 DIAGNOSIS — J20.5 ACUTE BRONCHITIS DUE TO RESPIRATORY SYNCYTIAL VIRUS: ICD-10-CM

## 2023-11-20 DIAGNOSIS — E66.01 MORBID (SEVERE) OBESITY DUE TO EXCESS CALORIES: ICD-10-CM

## 2023-11-20 DIAGNOSIS — M25.562 PAIN IN LEFT KNEE: ICD-10-CM

## 2023-11-20 DIAGNOSIS — G89.29 OTHER CHRONIC PAIN: ICD-10-CM

## 2023-11-20 DIAGNOSIS — M25.571 PAIN IN RIGHT ANKLE AND JOINTS OF RIGHT FOOT: ICD-10-CM

## 2023-11-20 DIAGNOSIS — M25.512 PAIN IN LEFT SHOULDER: ICD-10-CM

## 2023-11-20 DIAGNOSIS — Z96.653 PRESENCE OF ARTIFICIAL KNEE JOINT, BILATERAL: ICD-10-CM

## 2023-11-28 NOTE — PATIENT PROFILE ADULT - PACKS PER DAY
Maykel Lara is a 14 month old male presenting with bilateral eye drainage.    Denies known Latex allergy or symptoms of Latex sensitivity.      Medications verified, no changes.      Patient would like communication of their results via:        Home Phone: [phone] (home)  Okay to leave a message containing results? Yes    Cell Phone:   Telephone Information:   Mobile [phone]     Okay to leave a message containing results? Yes   3

## 2023-12-18 NOTE — ED PROVIDER NOTE - BIRTH SEX
[FreeTextEntry1] : 24 min video visit  pt is well now back on Rx  patient counseled on risks/benefits/potential SE of medication  fuv 6 mo  Male

## 2023-12-26 ENCOUNTER — EMERGENCY (EMERGENCY)
Facility: HOSPITAL | Age: 58
LOS: 0 days | Discharge: LEFT BEFORE DISCHARGE | End: 2023-12-26
Attending: EMERGENCY MEDICINE
Payer: MEDICAID

## 2023-12-26 ENCOUNTER — TRANSCRIPTION ENCOUNTER (OUTPATIENT)
Age: 58
End: 2023-12-26

## 2023-12-26 VITALS
RESPIRATION RATE: 20 BRPM | TEMPERATURE: 99 F | OXYGEN SATURATION: 96 % | DIASTOLIC BLOOD PRESSURE: 99 MMHG | HEIGHT: 70 IN | HEART RATE: 95 BPM | SYSTOLIC BLOOD PRESSURE: 136 MMHG

## 2023-12-26 DIAGNOSIS — Z98.890 OTHER SPECIFIED POSTPROCEDURAL STATES: Chronic | ICD-10-CM

## 2023-12-26 DIAGNOSIS — J44.9 CHRONIC OBSTRUCTIVE PULMONARY DISEASE, UNSPECIFIED: ICD-10-CM

## 2023-12-26 DIAGNOSIS — R05.9 COUGH, UNSPECIFIED: ICD-10-CM

## 2023-12-26 DIAGNOSIS — F17.210 NICOTINE DEPENDENCE, CIGARETTES, UNCOMPLICATED: ICD-10-CM

## 2023-12-26 DIAGNOSIS — Z96.651 PRESENCE OF RIGHT ARTIFICIAL KNEE JOINT: Chronic | ICD-10-CM

## 2023-12-26 DIAGNOSIS — K40.20 BILATERAL INGUINAL HERNIA, WITHOUT OBSTRUCTION OR GANGRENE, NOT SPECIFIED AS RECURRENT: Chronic | ICD-10-CM

## 2023-12-26 DIAGNOSIS — Z53.29 PROCEDURE AND TREATMENT NOT CARRIED OUT BECAUSE OF PATIENT'S DECISION FOR OTHER REASONS: ICD-10-CM

## 2023-12-26 DIAGNOSIS — Z20.822 CONTACT WITH AND (SUSPECTED) EXPOSURE TO COVID-19: ICD-10-CM

## 2023-12-26 LAB
FLUAV AG NPH QL: SIGNIFICANT CHANGE UP
FLUAV AG NPH QL: SIGNIFICANT CHANGE UP
FLUBV AG NPH QL: SIGNIFICANT CHANGE UP
FLUBV AG NPH QL: SIGNIFICANT CHANGE UP
RSV RNA NPH QL NAA+NON-PROBE: SIGNIFICANT CHANGE UP
RSV RNA NPH QL NAA+NON-PROBE: SIGNIFICANT CHANGE UP
SARS-COV-2 RNA SPEC QL NAA+PROBE: SIGNIFICANT CHANGE UP
SARS-COV-2 RNA SPEC QL NAA+PROBE: SIGNIFICANT CHANGE UP

## 2023-12-26 PROCEDURE — 71046 X-RAY EXAM CHEST 2 VIEWS: CPT

## 2023-12-26 PROCEDURE — 99284 EMERGENCY DEPT VISIT MOD MDM: CPT

## 2023-12-26 PROCEDURE — 71046 X-RAY EXAM CHEST 2 VIEWS: CPT | Mod: 26

## 2023-12-26 PROCEDURE — 99283 EMERGENCY DEPT VISIT LOW MDM: CPT | Mod: 25

## 2023-12-26 PROCEDURE — 0241U: CPT

## 2023-12-26 RX ORDER — IPRATROPIUM/ALBUTEROL SULFATE 18-103MCG
3 AEROSOL WITH ADAPTER (GRAM) INHALATION ONCE
Refills: 0 | Status: COMPLETED | OUTPATIENT
Start: 2023-12-26 | End: 2023-12-26

## 2023-12-26 NOTE — ED PROVIDER NOTE - OBJECTIVE STATEMENT
58 y/o male presents to the ED with non productive cough over the past 4 days . no sick contacts. patient with hx of copd and is current smoker. no calf pain or swelling. no sore throat , palpitations. no rashes. no hemoptysis.

## 2023-12-26 NOTE — ED PROVIDER NOTE - PHYSICAL EXAMINATION
Vital Signs: I have reviewed the initial vital signs.  Constitutional: well-nourished, no acute distress, normocephalic  Eyes: PERRLA, EOMI clear conjunctiva  ENT: oropharynx - clear wtihout exudates, no nasal congestion, no sinus tenderness  Cardiovascular: regular rate, regular rhythm, no murmur appreciated  Respiratory: unlabored respiratory effort, wheezing throughout   Musculoskeletal: supple neck,  no bony tenderness  Integumentary: warm, dry, no rash  Neurologic: awake, alert, steady gait

## 2023-12-26 NOTE — ED ADULT NURSE NOTE - INTEGUMENTARY WDL
[email or web address] Color consistent with ethnicity/race, warm, dry intact, resilient.

## 2023-12-26 NOTE — ED ADULT NURSE NOTE - NSFALLUNIVINTERV_ED_ALL_ED
Bed/Stretcher in lowest position, wheels locked, appropriate side rails in place/Call bell, personal items and telephone in reach/Instruct patient to call for assistance before getting out of bed/chair/stretcher/Non-slip footwear applied when patient is off stretcher/[place] to call system/Physically safe environment - no spills, clutter or unnecessary equipment/Purposeful proactive rounding/Room/bathroom lighting operational, light cord in reach Bed/Stretcher in lowest position, wheels locked, appropriate side rails in place/Call bell, personal items and telephone in reach/Instruct patient to call for assistance before getting out of bed/chair/stretcher/Non-slip footwear applied when patient is off stretcher/[place] to call system/Physically safe environment - no spills, clutter or unnecessary equipment/Purposeful proactive rounding/Room/bathroom lighting operational, light cord in reach

## 2023-12-26 NOTE — ED ADULT TRIAGE NOTE - CHIEF COMPLAINT QUOTE
neighbors called 911, pt was found falling into cars, pupils pin point, pt denies any drug use. pt takes methadone

## 2023-12-27 NOTE — ED ADULT NURSE NOTE - PERIPHERAL VASCULAR WDL
--Increase hydrea to 1000mg three times weekly (Mon, Wed, Fridays); 500mg all other days  
Pulses equal bilaterally, no edema present.

## 2023-12-29 NOTE — ED ADULT TRIAGE NOTE - SPO2 (%)
TRANSFER - OUT REPORT:    Verbal report given to Haydee on Manuel Boas  being transferred to ER holding 019 for routine progression of patient care       Report consisted of patient's Situation, Background, Assessment and   Recommendations(SBAR). Information from the following report(s) Nurse Handoff Report was reviewed with the receiving nurse. Lines:   Peripheral IV 12/28/23 Left Antecubital (Active)   Site Assessment Clean, dry & intact 12/29/23 1038   Line Status Infusing 12/29/23 1038   Line Care Connections checked and tightened 12/29/23 1038   Phlebitis Assessment No symptoms 12/29/23 1038   Infiltration Assessment 0 12/29/23 1038   Alcohol Cap Used No 12/29/23 1038   Dressing Status Clean, dry & intact 12/29/23 1038   Dressing Type Transparent 12/29/23 1038        Opportunity for questions and clarification was provided.       Patient transported with:  [email or web address]

## 2024-01-11 ENCOUNTER — EMERGENCY (EMERGENCY)
Facility: HOSPITAL | Age: 59
LOS: 0 days | Discharge: HOME | End: 2024-01-11
Attending: EMERGENCY MEDICINE
Payer: MEDICAID

## 2024-01-11 VITALS
RESPIRATION RATE: 20 BRPM | TEMPERATURE: 97 F | SYSTOLIC BLOOD PRESSURE: 136 MMHG | WEIGHT: 240.08 LBS | HEIGHT: 70 IN | HEART RATE: 87 BPM | DIASTOLIC BLOOD PRESSURE: 85 MMHG | OXYGEN SATURATION: 100 %

## 2024-01-11 DIAGNOSIS — Z98.890 OTHER SPECIFIED POSTPROCEDURAL STATES: Chronic | ICD-10-CM

## 2024-01-11 DIAGNOSIS — Z96.651 PRESENCE OF RIGHT ARTIFICIAL KNEE JOINT: Chronic | ICD-10-CM

## 2024-01-11 DIAGNOSIS — J44.1 CHRONIC OBSTRUCTIVE PULMONARY DISEASE WITH (ACUTE) EXACERBATION: ICD-10-CM

## 2024-01-11 DIAGNOSIS — M19.90 UNSPECIFIED OSTEOARTHRITIS, UNSPECIFIED SITE: ICD-10-CM

## 2024-01-11 DIAGNOSIS — E78.5 HYPERLIPIDEMIA, UNSPECIFIED: ICD-10-CM

## 2024-01-11 DIAGNOSIS — G47.33 OBSTRUCTIVE SLEEP APNEA (ADULT) (PEDIATRIC): ICD-10-CM

## 2024-01-11 DIAGNOSIS — Z96.653 PRESENCE OF ARTIFICIAL KNEE JOINT, BILATERAL: ICD-10-CM

## 2024-01-11 DIAGNOSIS — R06.02 SHORTNESS OF BREATH: ICD-10-CM

## 2024-01-11 DIAGNOSIS — Z99.81 DEPENDENCE ON SUPPLEMENTAL OXYGEN: ICD-10-CM

## 2024-01-11 DIAGNOSIS — K40.20 BILATERAL INGUINAL HERNIA, WITHOUT OBSTRUCTION OR GANGRENE, NOT SPECIFIED AS RECURRENT: Chronic | ICD-10-CM

## 2024-01-11 DIAGNOSIS — Z20.822 CONTACT WITH AND (SUSPECTED) EXPOSURE TO COVID-19: ICD-10-CM

## 2024-01-11 DIAGNOSIS — E66.9 OBESITY, UNSPECIFIED: ICD-10-CM

## 2024-01-11 DIAGNOSIS — F17.210 NICOTINE DEPENDENCE, CIGARETTES, UNCOMPLICATED: ICD-10-CM

## 2024-01-11 DIAGNOSIS — I10 ESSENTIAL (PRIMARY) HYPERTENSION: ICD-10-CM

## 2024-01-11 LAB
ALBUMIN SERPL ELPH-MCNC: 3.9 G/DL — SIGNIFICANT CHANGE UP (ref 3.5–5.2)
ALBUMIN SERPL ELPH-MCNC: 3.9 G/DL — SIGNIFICANT CHANGE UP (ref 3.5–5.2)
ALP SERPL-CCNC: 107 U/L — SIGNIFICANT CHANGE UP (ref 30–115)
ALP SERPL-CCNC: 107 U/L — SIGNIFICANT CHANGE UP (ref 30–115)
ALT FLD-CCNC: 12 U/L — SIGNIFICANT CHANGE UP (ref 0–41)
ALT FLD-CCNC: 12 U/L — SIGNIFICANT CHANGE UP (ref 0–41)
ANION GAP SERPL CALC-SCNC: 6 MMOL/L — LOW (ref 7–14)
ANION GAP SERPL CALC-SCNC: 6 MMOL/L — LOW (ref 7–14)
AST SERPL-CCNC: 18 U/L — SIGNIFICANT CHANGE UP (ref 0–41)
AST SERPL-CCNC: 18 U/L — SIGNIFICANT CHANGE UP (ref 0–41)
BASOPHILS # BLD AUTO: 0.06 K/UL — SIGNIFICANT CHANGE UP (ref 0–0.2)
BASOPHILS # BLD AUTO: 0.06 K/UL — SIGNIFICANT CHANGE UP (ref 0–0.2)
BASOPHILS NFR BLD AUTO: 0.8 % — SIGNIFICANT CHANGE UP (ref 0–1)
BASOPHILS NFR BLD AUTO: 0.8 % — SIGNIFICANT CHANGE UP (ref 0–1)
BILIRUB SERPL-MCNC: 0.3 MG/DL — SIGNIFICANT CHANGE UP (ref 0.2–1.2)
BILIRUB SERPL-MCNC: 0.3 MG/DL — SIGNIFICANT CHANGE UP (ref 0.2–1.2)
BUN SERPL-MCNC: 10 MG/DL — SIGNIFICANT CHANGE UP (ref 10–20)
BUN SERPL-MCNC: 10 MG/DL — SIGNIFICANT CHANGE UP (ref 10–20)
CALCIUM SERPL-MCNC: 9.6 MG/DL — SIGNIFICANT CHANGE UP (ref 8.4–10.4)
CALCIUM SERPL-MCNC: 9.6 MG/DL — SIGNIFICANT CHANGE UP (ref 8.4–10.4)
CHLORIDE SERPL-SCNC: 100 MMOL/L — SIGNIFICANT CHANGE UP (ref 98–110)
CHLORIDE SERPL-SCNC: 100 MMOL/L — SIGNIFICANT CHANGE UP (ref 98–110)
CO2 SERPL-SCNC: 30 MMOL/L — SIGNIFICANT CHANGE UP (ref 17–32)
CO2 SERPL-SCNC: 30 MMOL/L — SIGNIFICANT CHANGE UP (ref 17–32)
CREAT SERPL-MCNC: 0.8 MG/DL — SIGNIFICANT CHANGE UP (ref 0.7–1.5)
CREAT SERPL-MCNC: 0.8 MG/DL — SIGNIFICANT CHANGE UP (ref 0.7–1.5)
EGFR: 104 ML/MIN/1.73M2 — SIGNIFICANT CHANGE UP
EGFR: 104 ML/MIN/1.73M2 — SIGNIFICANT CHANGE UP
EOSINOPHIL # BLD AUTO: 0.22 K/UL — SIGNIFICANT CHANGE UP (ref 0–0.7)
EOSINOPHIL # BLD AUTO: 0.22 K/UL — SIGNIFICANT CHANGE UP (ref 0–0.7)
EOSINOPHIL NFR BLD AUTO: 2.8 % — SIGNIFICANT CHANGE UP (ref 0–8)
EOSINOPHIL NFR BLD AUTO: 2.8 % — SIGNIFICANT CHANGE UP (ref 0–8)
FLUAV AG NPH QL: SIGNIFICANT CHANGE UP
FLUAV AG NPH QL: SIGNIFICANT CHANGE UP
FLUBV AG NPH QL: SIGNIFICANT CHANGE UP
FLUBV AG NPH QL: SIGNIFICANT CHANGE UP
GAS PNL BLDV: SIGNIFICANT CHANGE UP
GAS PNL BLDV: SIGNIFICANT CHANGE UP
GLUCOSE SERPL-MCNC: 103 MG/DL — HIGH (ref 70–99)
GLUCOSE SERPL-MCNC: 103 MG/DL — HIGH (ref 70–99)
HCT VFR BLD CALC: 42.6 % — SIGNIFICANT CHANGE UP (ref 42–52)
HCT VFR BLD CALC: 42.6 % — SIGNIFICANT CHANGE UP (ref 42–52)
HGB BLD-MCNC: 14.2 G/DL — SIGNIFICANT CHANGE UP (ref 14–18)
HGB BLD-MCNC: 14.2 G/DL — SIGNIFICANT CHANGE UP (ref 14–18)
IMM GRANULOCYTES NFR BLD AUTO: 0.3 % — SIGNIFICANT CHANGE UP (ref 0.1–0.3)
IMM GRANULOCYTES NFR BLD AUTO: 0.3 % — SIGNIFICANT CHANGE UP (ref 0.1–0.3)
LYMPHOCYTES # BLD AUTO: 1.4 K/UL — SIGNIFICANT CHANGE UP (ref 1.2–3.4)
LYMPHOCYTES # BLD AUTO: 1.4 K/UL — SIGNIFICANT CHANGE UP (ref 1.2–3.4)
LYMPHOCYTES # BLD AUTO: 17.7 % — LOW (ref 20.5–51.1)
LYMPHOCYTES # BLD AUTO: 17.7 % — LOW (ref 20.5–51.1)
MCHC RBC-ENTMCNC: 28.9 PG — SIGNIFICANT CHANGE UP (ref 27–31)
MCHC RBC-ENTMCNC: 28.9 PG — SIGNIFICANT CHANGE UP (ref 27–31)
MCHC RBC-ENTMCNC: 33.3 G/DL — SIGNIFICANT CHANGE UP (ref 32–37)
MCHC RBC-ENTMCNC: 33.3 G/DL — SIGNIFICANT CHANGE UP (ref 32–37)
MCV RBC AUTO: 86.8 FL — SIGNIFICANT CHANGE UP (ref 80–94)
MCV RBC AUTO: 86.8 FL — SIGNIFICANT CHANGE UP (ref 80–94)
MONOCYTES # BLD AUTO: 0.54 K/UL — SIGNIFICANT CHANGE UP (ref 0.1–0.6)
MONOCYTES # BLD AUTO: 0.54 K/UL — SIGNIFICANT CHANGE UP (ref 0.1–0.6)
MONOCYTES NFR BLD AUTO: 6.8 % — SIGNIFICANT CHANGE UP (ref 1.7–9.3)
MONOCYTES NFR BLD AUTO: 6.8 % — SIGNIFICANT CHANGE UP (ref 1.7–9.3)
NEUTROPHILS # BLD AUTO: 5.66 K/UL — SIGNIFICANT CHANGE UP (ref 1.4–6.5)
NEUTROPHILS # BLD AUTO: 5.66 K/UL — SIGNIFICANT CHANGE UP (ref 1.4–6.5)
NEUTROPHILS NFR BLD AUTO: 71.6 % — SIGNIFICANT CHANGE UP (ref 42.2–75.2)
NEUTROPHILS NFR BLD AUTO: 71.6 % — SIGNIFICANT CHANGE UP (ref 42.2–75.2)
NRBC # BLD: 0 /100 WBCS — SIGNIFICANT CHANGE UP (ref 0–0)
NRBC # BLD: 0 /100 WBCS — SIGNIFICANT CHANGE UP (ref 0–0)
PLATELET # BLD AUTO: 247 K/UL — SIGNIFICANT CHANGE UP (ref 130–400)
PLATELET # BLD AUTO: 247 K/UL — SIGNIFICANT CHANGE UP (ref 130–400)
PMV BLD: 9.5 FL — SIGNIFICANT CHANGE UP (ref 7.4–10.4)
PMV BLD: 9.5 FL — SIGNIFICANT CHANGE UP (ref 7.4–10.4)
POTASSIUM SERPL-MCNC: 4.6 MMOL/L — SIGNIFICANT CHANGE UP (ref 3.5–5)
POTASSIUM SERPL-MCNC: 4.6 MMOL/L — SIGNIFICANT CHANGE UP (ref 3.5–5)
POTASSIUM SERPL-SCNC: 4.6 MMOL/L — SIGNIFICANT CHANGE UP (ref 3.5–5)
POTASSIUM SERPL-SCNC: 4.6 MMOL/L — SIGNIFICANT CHANGE UP (ref 3.5–5)
PROT SERPL-MCNC: 6.7 G/DL — SIGNIFICANT CHANGE UP (ref 6–8)
PROT SERPL-MCNC: 6.7 G/DL — SIGNIFICANT CHANGE UP (ref 6–8)
RBC # BLD: 4.91 M/UL — SIGNIFICANT CHANGE UP (ref 4.7–6.1)
RBC # BLD: 4.91 M/UL — SIGNIFICANT CHANGE UP (ref 4.7–6.1)
RBC # FLD: 14.8 % — HIGH (ref 11.5–14.5)
RBC # FLD: 14.8 % — HIGH (ref 11.5–14.5)
RSV RNA NPH QL NAA+NON-PROBE: SIGNIFICANT CHANGE UP
RSV RNA NPH QL NAA+NON-PROBE: SIGNIFICANT CHANGE UP
SARS-COV-2 RNA SPEC QL NAA+PROBE: SIGNIFICANT CHANGE UP
SARS-COV-2 RNA SPEC QL NAA+PROBE: SIGNIFICANT CHANGE UP
SODIUM SERPL-SCNC: 136 MMOL/L — SIGNIFICANT CHANGE UP (ref 135–146)
SODIUM SERPL-SCNC: 136 MMOL/L — SIGNIFICANT CHANGE UP (ref 135–146)
WBC # BLD: 7.9 K/UL — SIGNIFICANT CHANGE UP (ref 4.8–10.8)
WBC # BLD: 7.9 K/UL — SIGNIFICANT CHANGE UP (ref 4.8–10.8)
WBC # FLD AUTO: 7.9 K/UL — SIGNIFICANT CHANGE UP (ref 4.8–10.8)
WBC # FLD AUTO: 7.9 K/UL — SIGNIFICANT CHANGE UP (ref 4.8–10.8)

## 2024-01-11 PROCEDURE — 83605 ASSAY OF LACTIC ACID: CPT

## 2024-01-11 PROCEDURE — 36000 PLACE NEEDLE IN VEIN: CPT

## 2024-01-11 PROCEDURE — 80053 COMPREHEN METABOLIC PANEL: CPT

## 2024-01-11 PROCEDURE — 94640 AIRWAY INHALATION TREATMENT: CPT

## 2024-01-11 PROCEDURE — 71046 X-RAY EXAM CHEST 2 VIEWS: CPT

## 2024-01-11 PROCEDURE — 96375 TX/PRO/DX INJ NEW DRUG ADDON: CPT

## 2024-01-11 PROCEDURE — 71046 X-RAY EXAM CHEST 2 VIEWS: CPT | Mod: 26

## 2024-01-11 PROCEDURE — 82803 BLOOD GASES ANY COMBINATION: CPT

## 2024-01-11 PROCEDURE — 93005 ELECTROCARDIOGRAM TRACING: CPT

## 2024-01-11 PROCEDURE — 84132 ASSAY OF SERUM POTASSIUM: CPT

## 2024-01-11 PROCEDURE — 93010 ELECTROCARDIOGRAM REPORT: CPT

## 2024-01-11 PROCEDURE — 82330 ASSAY OF CALCIUM: CPT

## 2024-01-11 PROCEDURE — 76937 US GUIDE VASCULAR ACCESS: CPT

## 2024-01-11 PROCEDURE — 85014 HEMATOCRIT: CPT

## 2024-01-11 PROCEDURE — 76937 US GUIDE VASCULAR ACCESS: CPT | Mod: 26

## 2024-01-11 PROCEDURE — 84295 ASSAY OF SERUM SODIUM: CPT

## 2024-01-11 PROCEDURE — 96374 THER/PROPH/DIAG INJ IV PUSH: CPT

## 2024-01-11 PROCEDURE — 36415 COLL VENOUS BLD VENIPUNCTURE: CPT

## 2024-01-11 PROCEDURE — 85018 HEMOGLOBIN: CPT

## 2024-01-11 PROCEDURE — 85025 COMPLETE CBC W/AUTO DIFF WBC: CPT

## 2024-01-11 PROCEDURE — 99285 EMERGENCY DEPT VISIT HI MDM: CPT

## 2024-01-11 PROCEDURE — 99285 EMERGENCY DEPT VISIT HI MDM: CPT | Mod: 25

## 2024-01-11 PROCEDURE — 0241U: CPT

## 2024-01-11 RX ORDER — IPRATROPIUM/ALBUTEROL SULFATE 18-103MCG
3 AEROSOL WITH ADAPTER (GRAM) INHALATION
Refills: 0 | Status: COMPLETED | OUTPATIENT
Start: 2024-01-11 | End: 2024-01-11

## 2024-01-11 RX ORDER — MORPHINE SULFATE 50 MG/1
100 CAPSULE, EXTENDED RELEASE ORAL ONCE
Refills: 0 | Status: DISCONTINUED | OUTPATIENT
Start: 2024-01-11 | End: 2024-01-11

## 2024-01-11 RX ORDER — KETOROLAC TROMETHAMINE 30 MG/ML
30 SYRINGE (ML) INJECTION ONCE
Refills: 0 | Status: DISCONTINUED | OUTPATIENT
Start: 2024-01-11 | End: 2024-01-11

## 2024-01-11 RX ORDER — IPRATROPIUM/ALBUTEROL SULFATE 18-103MCG
3 AEROSOL WITH ADAPTER (GRAM) INHALATION ONCE
Refills: 0 | Status: COMPLETED | OUTPATIENT
Start: 2024-01-11 | End: 2024-01-11

## 2024-01-11 RX ADMIN — Medication 3 MILLILITER(S): at 04:30

## 2024-01-11 RX ADMIN — MORPHINE SULFATE 100 MILLIGRAM(S): 50 CAPSULE, EXTENDED RELEASE ORAL at 06:16

## 2024-01-11 RX ADMIN — Medication 125 MILLIGRAM(S): at 04:47

## 2024-01-11 RX ADMIN — Medication 3 MILLILITER(S): at 06:36

## 2024-01-11 RX ADMIN — Medication 3 MILLILITER(S): at 04:19

## 2024-01-11 RX ADMIN — Medication 3 MILLILITER(S): at 04:54

## 2024-01-11 RX ADMIN — Medication 30 MILLIGRAM(S): at 04:47

## 2024-01-11 NOTE — ED ADULT NURSE NOTE - NSFALLUNIVINTERV_ED_ALL_ED
Bed/Stretcher in lowest position, wheels locked, appropriate side rails in place/Call bell, personal items and telephone in reach/Instruct patient to call for assistance before getting out of bed/chair/stretcher/Non-slip footwear applied when patient is off stretcher/[place] to call system/Physically safe environment - no spills, clutter or unnecessary equipment/Purposeful proactive rounding/Room/bathroom lighting operational, light cord in reach Bed/Stretcher in lowest position, wheels locked, appropriate side rails in place/Call bell, personal items and telephone in reach/Instruct patient to call for assistance before getting out of bed/chair/stretcher/Non-slip footwear applied when patient is off stretcher/[place] to call system/Physically safe environment - no spills, clutter or unnecessary equipment/Purposeful proactive rounding/Room/bathroom lighting operational, light cord in reach

## 2024-01-11 NOTE — ED PROVIDER NOTE - CLINICAL SUMMARY MEDICAL DECISION MAKING FREE TEXT BOX
Patient presented for evaluation of wheezing and associated shortness of breath.  No current EKG, labs, and meds.  Frequently in and out of the emergency room to smoke cigarettes.  After getting nebulization treatments and feeling better he will go back out for smoke.  Discussed with patient multiple times that this is detrimental to his care.  Patient is EKG, labs and chest x-ray without acute findings.  Patient to be discharged outpatient follow-up.

## 2024-01-11 NOTE — ED PROVIDER NOTE - ATTENDING CONTRIBUTION TO CARE
I personally evaluated the patient. I reviewed the Resident’s or Physician Assistant’s note (as assigned above), and agree with the findings and plan except as documented in my note.  58-year-old male with history of COPD, HTN, HLD, obesity, REBECCA, osteoarthritis presents for evaluation of progressive shortness of breath and wheezing for the past 3 days.  Patient also reports left-sided rib pain from a fall that he had several days ago.  Reports productive coughing.  No fever, nausea, vomiting, abdominal pain, diarrhea or urinary complaints.  VSS, talking in full sentences, non toxic appearing, NAD, Head NCAT, MMM, neck supple, normal ROM, normal s1s2, lungs with bilateral wheezes, abd s/nt/nd, no guarding or rebound, extremities wnl, AAO x 3, GCS 15, neuro grossly normal. No acute skin lesions. Plan is EKG, labs, chest x-ray, meds as needed and reassess.

## 2024-01-11 NOTE — ED PROVIDER NOTE - OBJECTIVE STATEMENT
57 y/o M, pmhx of HTN, HLD, COPD on home O2 3L NC and bipap at night, b/l knee replacement, comes in for 3 days of progressive worsening sob. Patient reports that 6 months ago, he could walk 6-7 blocks before feeling sob, for the past month, pt would 59 y/o M, pmhx of HTN, HLD, COPD on home O2 3L NC and bipap at night, b/l knee replacement, comes in for 3 days of progressive worsening sob. Patient reports that 6 months ago, he could walk 6-7 blocks before feeling sob, for the past month pt started feeling sob even when he bends over to pick something off the ground. Pt is a current smoker but decided to quit smoking yesterday. Denies fevers, N/V, cough, chest pain, abd pain. Wife recently sick with covid but she was isolated. Patient also endorses left lateral chest pain 2/2 mechanical fall yesterday, landed on his left side, left elbow jabbed into his ribs. Pain increases with movement and laying down. No HT or LOC. 59 y/o M, pmhx of HTN, HLD, COPD on home O2 3L NC, REBECCA on bipap at night, b/l knee replacement, possible CHF started on Lasix in 11/2023, GERD comes in for 3 days of progressive worsening sob. Patient reports that 6 months ago, he could walk 6-7 blocks before feeling sob, for the past month pt started feeling sob even when he bends over to pick something off the ground. Pt is a current smoker but decided to quit smoking yesterday. Denies fevers, N/V, cough, chest pain, abd pain. Wife recently sick with covid but she was isolated. Patient also endorses left lateral chest pain 2/2 mechanical fall yesterday, landed on his left side, left elbow jabbed into his ribs. Pain increases with movement and laying down. No HT or LOC. 57 y/o M, pmhx of HTN, HLD, COPD on home O2 3L NC, REBECCA on bipap at night, b/l knee replacement, possible CHF started on Lasix in 11/2023, GERD comes in for 3 days of progressive worsening sob. Patient reports that 6 months ago, he could walk 6-7 blocks before feeling sob, for the past month pt started feeling sob even when he bends over to pick something off the ground. Pt is a current smoker but decided to quit smoking yesterday. Denies fevers, N/V, cough, chest pain, abd pain. Wife recently sick with covid but she was isolated. Patient also endorses left lateral chest pain 2/2 mechanical fall yesterday, landed on his left side, left elbow jabbed into his ribs. Pain increases with movement and laying down. No HT or LOC.

## 2024-01-11 NOTE — ED PROVIDER NOTE - NSFOLLOWUPINSTRUCTIONS_ED_ALL_ED_FT
Living with COPD  Being diagnosed with chronic obstructive pulmonary disease (COPD) changes your life physically and emotionally. Having COPD can affect your ability to work and do things you enjoy.    COPD is not the same for everyone, and it may change over time. Your health care providers can help you come up with the COPD management plan that works best for you.    How to manage lifestyle changes  Treatment plan    Work closely with your health care providers.  Follow your COPD management plan. This plan includes:  Instructions about activities, exercises, diet, medicines, what to do when COPD flares up, and when to call your health care provider.  A pulmonary rehabilitation program. In pulmonary rehab, you will learn about COPD, do exercises for fitness and breathing, and get support from health care providers and other people who have COPD.  Managing emotions and stress    Person talking with a counselor.  Living with a chronic disease means you may also struggle with stressful emotions, such as sadness, fear, and worry. Here are some ways to manage these emotions:  Talk to someone about your fear, anxiety, depression, or stress.  Learn strategies to avoid or reduce stress and ask for help if you are struggling with depression or anxiety.  Consider joining a COPD support group, online or in person.  Adjusting to changes    COPD may limit the things you can do, but you can make certain changes to help you cope with the diagnosis.  Ask for help when you need it. Getting support from friends, family, and your health care team is an important part of managing the condition.  Try to get regular exercise as prescribed by a health care provider or pulmonary rehab team. Exercising can help COPD, even if you are a bit short of breath.  Take steps to prevent infection and protect your lungs:  Wash your hands often and avoid being in crowds.  Stay away from friends and family members who are sick.  Check your local air quality each day, and stay out of areas where air pollution is likely.  How to recognize changes in your condition  Recognizing changes in your COPD    COPD is a progressive disease. It is important to let the health care team know if your COPD is getting worse. Your treatment plan may need to change. Watch for:  Increased shortness of breath, wheezing, cough, or fatigue.  Loss of ability to exercise or perform daily activities, like climbing stairs.  More frequent symptom flares.  Signs of depression or anxiety.  Recognizing stress    It is normal to have additional stress when you have COPD. However, prolonged stress and anxiety can make COPD worse and lead to depression. Recognize the warning signs, which include:  Feeling sad or worried more often or most of the time.  Having less energy and losing interest in pleasurable activities.  Changes in your appetite or sleeping patterns.  Being easily angered or irritated.  Having unexplained aches and pains, digestive problems, or headaches.  Follow these instructions at home:  Eating and drinking    Foods that contain some fiber, including fruits, vegetables, almonds, bread, and pasta.   Eat foods that are high in fiber, such as fresh fruits and vegetables, whole grains, and beans. Limit foods that are high in fat and processed sugars, such as fried or sweet foods.  Follow a balanced diet and maintain a healthy weight. Being overweight or underweight can make COPD worse. You may work with a dietitian as part of your pulmonary rehab program.  Drink enough fluid to keep your urine pale yellow.  If you drink alcohol:  Limit how much you have to:  0–1 drink a day for women who are not pregnant.  0–2 drinks a day for men.  Know how much alcohol is in your drink. In the [place] one drink equals one 12 oz bottle of beer (355 mL), one 5 oz glass of wine (148 mL), or one 1½ oz glass of hard liquor (44 mL).  Lifestyle    If you smoke, the most important thing that you can do is to stop smoking. Continuing to smoke will cause the disease to progress faster.  Do not use any products that contain nicotine or tobacco. These products include cigarettes, chewing tobacco, and vaping devices, such as e-cigarettes. If you need help quitting, ask your health care provider.  Avoid exposure to things that irritate your lungs, such as smoke, chemicals, and fumes.  Activity    Balance exercise and rest. Take short walks every 1–2 hours. This is important to improve blood flow and breathing. Ask for help if you feel weak or unsteady.  Do exercises that include controlled breathing with body movement, such as lizbeth chi.  General instructions    Take over-the-counter and prescription medicines only as told by your health care provider.  Take vitamin and protein supplements as told by your health care provider or dietitian.  Practice good oral hygiene and see your dental care provider regularly. An oral infection can also spread to your lungs.  Make sure you receive all the vaccines that your health care provider recommends.  Keep all follow-up visits. This is important.  Contact a health care provider if you:  Are struggling to manage your COPD.  Have emotional stress that interferes with your ability to cope with COPD.  Get help right away if you:  Have thoughts of suicide, death, or hurting yourself or others.  If you ever feel like you may hurt yourself or others, or have thoughts about taking your own life, get help right away. Go to your nearest emergency department or:  Call your local emergency services (337 in the [place]  Call a suicide crisis helpline, such as the National Suicide Prevention Lifeline at 1-142.459.1776 or 926 in the [place] This is open 24 hours a day in the [place]  Text the Crisis Text Line at 213839 (in the [place]    Summary  Being diagnosed with chronic obstructive pulmonary disease (COPD) changes your life physically and emotionally.  Work with your health care providers and follow your COPD management plan.  A pulmonary rehabilitation program is an important part of COPD management.  Prolonged stress, anxiety, and depression can make COPD worse.  Let your health care provider know if emotional stress interferes with your ability to cope with and manage COPD.  This information is not intended to replace advice given to you by your health care provider. Make sure you discuss any questions you have with your health care provider.    Document Revised: 07/13/2022 Document Reviewed: 01/05/2022  Elsevier Patient Education © 2023 [employer]vier [employer]. Living with COPD  Being diagnosed with chronic obstructive pulmonary disease (COPD) changes your life physically and emotionally. Having COPD can affect your ability to work and do things you enjoy.    COPD is not the same for everyone, and it may change over time. Your health care providers can help you come up with the COPD management plan that works best for you.    How to manage lifestyle changes  Treatment plan    Work closely with your health care providers.  Follow your COPD management plan. This plan includes:  Instructions about activities, exercises, diet, medicines, what to do when COPD flares up, and when to call your health care provider.  A pulmonary rehabilitation program. In pulmonary rehab, you will learn about COPD, do exercises for fitness and breathing, and get support from health care providers and other people who have COPD.  Managing emotions and stress    Person talking with a counselor.  Living with a chronic disease means you may also struggle with stressful emotions, such as sadness, fear, and worry. Here are some ways to manage these emotions:  Talk to someone about your fear, anxiety, depression, or stress.  Learn strategies to avoid or reduce stress and ask for help if you are struggling with depression or anxiety.  Consider joining a COPD support group, online or in person.  Adjusting to changes    COPD may limit the things you can do, but you can make certain changes to help you cope with the diagnosis.  Ask for help when you need it. Getting support from friends, family, and your health care team is an important part of managing the condition.  Try to get regular exercise as prescribed by a health care provider or pulmonary rehab team. Exercising can help COPD, even if you are a bit short of breath.  Take steps to prevent infection and protect your lungs:  Wash your hands often and avoid being in crowds.  Stay away from friends and family members who are sick.  Check your local air quality each day, and stay out of areas where air pollution is likely.  How to recognize changes in your condition  Recognizing changes in your COPD    COPD is a progressive disease. It is important to let the health care team know if your COPD is getting worse. Your treatment plan may need to change. Watch for:  Increased shortness of breath, wheezing, cough, or fatigue.  Loss of ability to exercise or perform daily activities, like climbing stairs.  More frequent symptom flares.  Signs of depression or anxiety.  Recognizing stress    It is normal to have additional stress when you have COPD. However, prolonged stress and anxiety can make COPD worse and lead to depression. Recognize the warning signs, which include:  Feeling sad or worried more often or most of the time.  Having less energy and losing interest in pleasurable activities.  Changes in your appetite or sleeping patterns.  Being easily angered or irritated.  Having unexplained aches and pains, digestive problems, or headaches.  Follow these instructions at home:  Eating and drinking    Foods that contain some fiber, including fruits, vegetables, almonds, bread, and pasta.   Eat foods that are high in fiber, such as fresh fruits and vegetables, whole grains, and beans. Limit foods that are high in fat and processed sugars, such as fried or sweet foods.  Follow a balanced diet and maintain a healthy weight. Being overweight or underweight can make COPD worse. You may work with a dietitian as part of your pulmonary rehab program.  Drink enough fluid to keep your urine pale yellow.  If you drink alcohol:  Limit how much you have to:  0–1 drink a day for women who are not pregnant.  0–2 drinks a day for men.  Know how much alcohol is in your drink. In the [place] one drink equals one 12 oz bottle of beer (355 mL), one 5 oz glass of wine (148 mL), or one 1½ oz glass of hard liquor (44 mL).  Lifestyle    If you smoke, the most important thing that you can do is to stop smoking. Continuing to smoke will cause the disease to progress faster.  Do not use any products that contain nicotine or tobacco. These products include cigarettes, chewing tobacco, and vaping devices, such as e-cigarettes. If you need help quitting, ask your health care provider.  Avoid exposure to things that irritate your lungs, such as smoke, chemicals, and fumes.  Activity    Balance exercise and rest. Take short walks every 1–2 hours. This is important to improve blood flow and breathing. Ask for help if you feel weak or unsteady.  Do exercises that include controlled breathing with body movement, such as lizbeth chi.  General instructions    Take over-the-counter and prescription medicines only as told by your health care provider.  Take vitamin and protein supplements as told by your health care provider or dietitian.  Practice good oral hygiene and see your dental care provider regularly. An oral infection can also spread to your lungs.  Make sure you receive all the vaccines that your health care provider recommends.  Keep all follow-up visits. This is important.  Contact a health care provider if you:  Are struggling to manage your COPD.  Have emotional stress that interferes with your ability to cope with COPD.  Get help right away if you:  Have thoughts of suicide, death, or hurting yourself or others.  If you ever feel like you may hurt yourself or others, or have thoughts about taking your own life, get help right away. Go to your nearest emergency department or:  Call your local emergency services (450 in the [place]  Call a suicide crisis helpline, such as the National Suicide Prevention Lifeline at 1-882.498.3364 or 565 in the [place] This is open 24 hours a day in the [place]  Text the Crisis Text Line at 080890 (in the [place]    Summary  Being diagnosed with chronic obstructive pulmonary disease (COPD) changes your life physically and emotionally.  Work with your health care providers and follow your COPD management plan.  A pulmonary rehabilitation program is an important part of COPD management.  Prolonged stress, anxiety, and depression can make COPD worse.  Let your health care provider know if emotional stress interferes with your ability to cope with and manage COPD.  This information is not intended to replace advice given to you by your health care provider. Make sure you discuss any questions you have with your health care provider.    Document Revised: 07/13/2022 Document Reviewed: 01/05/2022  Elsevier Patient Education © 2023 Elsevier Inc.

## 2024-01-11 NOTE — ED PROVIDER NOTE - PATIENT PORTAL LINK FT
You can access the FollowMyHealth Patient Portal offered by [facility] by registering at the following website: [email or web address]. By joining [employer]’s FollowMyHealth portal, you will also be able to view your health information using other applications (apps) compatible with our system. You can access the FollowMyHealth Patient Portal offered by [facility] by registering at the following website: [email or web address]. By joining [employer]’s FollowMyHealth portal, you will also be able to view your health information using other applications (apps) compatible with our system.

## 2024-01-11 NOTE — ED PROVIDER NOTE - PHYSICAL EXAMINATION
GENERAL: Well-developed; well-nourished; in moderate acute distress. AAOx3  SKIN: warm, well perfused  HEAD: Normocephalic; atraumatic.  EYES: no conjunctival erythema, ocular motions intact and appropriate  ENT: airway clear  CARD: S1, S2 normal; Regular rate and rhythm.   RESP: b/l expiratory wheezes. tenderness to palpation of the left lateral chest wall, no step off  ABD: soft, nontender  Upper EXT: Rad pulses 2+ symm, cap refill <2 secs, sensation intact and symm  Lower EXT: ambulates well independently

## 2024-01-14 ENCOUNTER — EMERGENCY (EMERGENCY)
Facility: HOSPITAL | Age: 59
LOS: 0 days | Discharge: NURSING, REHAB OR LONG-TERM CARE | End: 2024-01-14
Attending: EMERGENCY MEDICINE | Admitting: INTERNAL MEDICINE
Payer: MEDICAID

## 2024-01-14 VITALS
RESPIRATION RATE: 18 BRPM | DIASTOLIC BLOOD PRESSURE: 87 MMHG | HEART RATE: 66 BPM | SYSTOLIC BLOOD PRESSURE: 161 MMHG | TEMPERATURE: 98 F | OXYGEN SATURATION: 98 %

## 2024-01-14 VITALS
RESPIRATION RATE: 18 BRPM | WEIGHT: 214.95 LBS | HEART RATE: 84 BPM | SYSTOLIC BLOOD PRESSURE: 144 MMHG | TEMPERATURE: 98 F | HEIGHT: 70 IN | OXYGEN SATURATION: 96 % | DIASTOLIC BLOOD PRESSURE: 83 MMHG

## 2024-01-14 DIAGNOSIS — J44.9 CHRONIC OBSTRUCTIVE PULMONARY DISEASE, UNSPECIFIED: ICD-10-CM

## 2024-01-14 DIAGNOSIS — M79.602 PAIN IN LEFT ARM: ICD-10-CM

## 2024-01-14 DIAGNOSIS — F17.210 NICOTINE DEPENDENCE, CIGARETTES, UNCOMPLICATED: ICD-10-CM

## 2024-01-14 DIAGNOSIS — R07.89 OTHER CHEST PAIN: ICD-10-CM

## 2024-01-14 DIAGNOSIS — K40.20 BILATERAL INGUINAL HERNIA, WITHOUT OBSTRUCTION OR GANGRENE, NOT SPECIFIED AS RECURRENT: Chronic | ICD-10-CM

## 2024-01-14 DIAGNOSIS — Z79.82 LONG TERM (CURRENT) USE OF ASPIRIN: ICD-10-CM

## 2024-01-14 DIAGNOSIS — Z98.890 OTHER SPECIFIED POSTPROCEDURAL STATES: Chronic | ICD-10-CM

## 2024-01-14 DIAGNOSIS — Z96.651 PRESENCE OF RIGHT ARTIFICIAL KNEE JOINT: Chronic | ICD-10-CM

## 2024-01-14 DIAGNOSIS — E78.5 HYPERLIPIDEMIA, UNSPECIFIED: ICD-10-CM

## 2024-01-14 DIAGNOSIS — R11.2 NAUSEA WITH VOMITING, UNSPECIFIED: ICD-10-CM

## 2024-01-14 DIAGNOSIS — R06.00 DYSPNEA, UNSPECIFIED: ICD-10-CM

## 2024-01-14 DIAGNOSIS — I10 ESSENTIAL (PRIMARY) HYPERTENSION: ICD-10-CM

## 2024-01-14 DIAGNOSIS — R05.9 COUGH, UNSPECIFIED: ICD-10-CM

## 2024-01-14 DIAGNOSIS — R52 PAIN, UNSPECIFIED: ICD-10-CM

## 2024-01-14 LAB
ALBUMIN SERPL ELPH-MCNC: 4.2 G/DL — SIGNIFICANT CHANGE UP (ref 3.5–5.2)
ALP SERPL-CCNC: 120 U/L — HIGH (ref 30–115)
ALT FLD-CCNC: 14 U/L — SIGNIFICANT CHANGE UP (ref 0–41)
ANION GAP SERPL CALC-SCNC: 13 MMOL/L — SIGNIFICANT CHANGE UP (ref 7–14)
AST SERPL-CCNC: 22 U/L — SIGNIFICANT CHANGE UP (ref 0–41)
BASE EXCESS BLDV CALC-SCNC: 4.4 MMOL/L — HIGH (ref -2–3)
BASOPHILS # BLD AUTO: 0.03 K/UL — SIGNIFICANT CHANGE UP (ref 0–0.2)
BASOPHILS NFR BLD AUTO: 0.4 % — SIGNIFICANT CHANGE UP (ref 0–1)
BILIRUB SERPL-MCNC: 0.8 MG/DL — SIGNIFICANT CHANGE UP (ref 0.2–1.2)
BUN SERPL-MCNC: 11 MG/DL — SIGNIFICANT CHANGE UP (ref 10–20)
CA-I SERPL-SCNC: 1.18 MMOL/L — SIGNIFICANT CHANGE UP (ref 1.15–1.33)
CALCIUM SERPL-MCNC: 9.9 MG/DL — SIGNIFICANT CHANGE UP (ref 8.4–10.4)
CHLORIDE SERPL-SCNC: 99 MMOL/L — SIGNIFICANT CHANGE UP (ref 98–110)
CO2 SERPL-SCNC: 25 MMOL/L — SIGNIFICANT CHANGE UP (ref 17–32)
CREAT SERPL-MCNC: 0.9 MG/DL — SIGNIFICANT CHANGE UP (ref 0.7–1.5)
EGFR: 100 ML/MIN/1.73M2 — SIGNIFICANT CHANGE UP
EOSINOPHIL # BLD AUTO: 0.01 K/UL — SIGNIFICANT CHANGE UP (ref 0–0.7)
EOSINOPHIL NFR BLD AUTO: 0.1 % — SIGNIFICANT CHANGE UP (ref 0–8)
GAS PNL BLDV: 136 MMOL/L — SIGNIFICANT CHANGE UP (ref 136–145)
GAS PNL BLDV: SIGNIFICANT CHANGE UP
GLUCOSE SERPL-MCNC: 115 MG/DL — HIGH (ref 70–99)
HCO3 BLDV-SCNC: 27 MMOL/L — SIGNIFICANT CHANGE UP (ref 22–29)
HCT VFR BLD CALC: 44.6 % — SIGNIFICANT CHANGE UP (ref 42–52)
HCT VFR BLDA CALC: 48 % — SIGNIFICANT CHANGE UP (ref 39–51)
HGB BLD CALC-MCNC: 16 G/DL — SIGNIFICANT CHANGE UP (ref 12.6–17.4)
HGB BLD-MCNC: 15.7 G/DL — SIGNIFICANT CHANGE UP (ref 14–18)
IMM GRANULOCYTES NFR BLD AUTO: 0.5 % — HIGH (ref 0.1–0.3)
LACTATE BLDV-MCNC: 2.2 MMOL/L — HIGH (ref 0.5–2)
LIDOCAIN IGE QN: 15 U/L — SIGNIFICANT CHANGE UP (ref 7–60)
LYMPHOCYTES # BLD AUTO: 1.37 K/UL — SIGNIFICANT CHANGE UP (ref 1.2–3.4)
LYMPHOCYTES # BLD AUTO: 17 % — LOW (ref 20.5–51.1)
MCHC RBC-ENTMCNC: 29.6 PG — SIGNIFICANT CHANGE UP (ref 27–31)
MCHC RBC-ENTMCNC: 35.2 G/DL — SIGNIFICANT CHANGE UP (ref 32–37)
MCV RBC AUTO: 84 FL — SIGNIFICANT CHANGE UP (ref 80–94)
MONOCYTES # BLD AUTO: 0.47 K/UL — SIGNIFICANT CHANGE UP (ref 0.1–0.6)
MONOCYTES NFR BLD AUTO: 5.8 % — SIGNIFICANT CHANGE UP (ref 1.7–9.3)
NEUTROPHILS # BLD AUTO: 6.12 K/UL — SIGNIFICANT CHANGE UP (ref 1.4–6.5)
NEUTROPHILS NFR BLD AUTO: 76.2 % — HIGH (ref 42.2–75.2)
NRBC # BLD: 0 /100 WBCS — SIGNIFICANT CHANGE UP (ref 0–0)
NT-PROBNP SERPL-SCNC: 147 PG/ML — SIGNIFICANT CHANGE UP (ref 0–300)
PCO2 BLDV: 34 MMHG — LOW (ref 42–55)
PH BLDV: 7.51 — HIGH (ref 7.32–7.43)
PLATELET # BLD AUTO: 316 K/UL — SIGNIFICANT CHANGE UP (ref 130–400)
PMV BLD: 9.7 FL — SIGNIFICANT CHANGE UP (ref 7.4–10.4)
PO2 BLDV: 30 MMHG — SIGNIFICANT CHANGE UP (ref 25–45)
POTASSIUM BLDV-SCNC: 3.7 MMOL/L — SIGNIFICANT CHANGE UP (ref 3.5–5.1)
POTASSIUM SERPL-MCNC: 4.6 MMOL/L — SIGNIFICANT CHANGE UP (ref 3.5–5)
POTASSIUM SERPL-SCNC: 4.6 MMOL/L — SIGNIFICANT CHANGE UP (ref 3.5–5)
PROT SERPL-MCNC: 7.5 G/DL — SIGNIFICANT CHANGE UP (ref 6–8)
RBC # BLD: 5.31 M/UL — SIGNIFICANT CHANGE UP (ref 4.7–6.1)
RBC # FLD: 14.6 % — HIGH (ref 11.5–14.5)
SAO2 % BLDV: 52.6 % — LOW (ref 67–88)
SODIUM SERPL-SCNC: 137 MMOL/L — SIGNIFICANT CHANGE UP (ref 135–146)
TROPONIN T, HIGH SENSITIVITY RESULT: 17 NG/L — SIGNIFICANT CHANGE UP (ref 6–21)
WBC # BLD: 8.04 K/UL — SIGNIFICANT CHANGE UP (ref 4.8–10.8)
WBC # FLD AUTO: 8.04 K/UL — SIGNIFICANT CHANGE UP (ref 4.8–10.8)

## 2024-01-14 PROCEDURE — 94640 AIRWAY INHALATION TREATMENT: CPT

## 2024-01-14 PROCEDURE — 93005 ELECTROCARDIOGRAM TRACING: CPT

## 2024-01-14 PROCEDURE — L9981: CPT

## 2024-01-14 PROCEDURE — 83880 ASSAY OF NATRIURETIC PEPTIDE: CPT

## 2024-01-14 PROCEDURE — 83605 ASSAY OF LACTIC ACID: CPT

## 2024-01-14 PROCEDURE — 36415 COLL VENOUS BLD VENIPUNCTURE: CPT

## 2024-01-14 PROCEDURE — 85018 HEMOGLOBIN: CPT

## 2024-01-14 PROCEDURE — 84295 ASSAY OF SERUM SODIUM: CPT

## 2024-01-14 PROCEDURE — 71045 X-RAY EXAM CHEST 1 VIEW: CPT

## 2024-01-14 PROCEDURE — 82330 ASSAY OF CALCIUM: CPT

## 2024-01-14 PROCEDURE — 85025 COMPLETE CBC W/AUTO DIFF WBC: CPT

## 2024-01-14 PROCEDURE — 96374 THER/PROPH/DIAG INJ IV PUSH: CPT

## 2024-01-14 PROCEDURE — 84132 ASSAY OF SERUM POTASSIUM: CPT

## 2024-01-14 PROCEDURE — 96375 TX/PRO/DX INJ NEW DRUG ADDON: CPT

## 2024-01-14 PROCEDURE — 71045 X-RAY EXAM CHEST 1 VIEW: CPT | Mod: 26

## 2024-01-14 PROCEDURE — 84484 ASSAY OF TROPONIN QUANT: CPT

## 2024-01-14 PROCEDURE — 83690 ASSAY OF LIPASE: CPT

## 2024-01-14 PROCEDURE — 82803 BLOOD GASES ANY COMBINATION: CPT

## 2024-01-14 PROCEDURE — 80053 COMPREHEN METABOLIC PANEL: CPT

## 2024-01-14 PROCEDURE — 85014 HEMATOCRIT: CPT

## 2024-01-14 PROCEDURE — 99285 EMERGENCY DEPT VISIT HI MDM: CPT | Mod: 25

## 2024-01-14 RX ORDER — KETOROLAC TROMETHAMINE 30 MG/ML
30 SYRINGE (ML) INJECTION ONCE
Refills: 0 | Status: DISCONTINUED | OUTPATIENT
Start: 2024-01-14 | End: 2024-01-14

## 2024-01-14 RX ORDER — OXYCODONE HYDROCHLORIDE 5 MG/1
30 TABLET ORAL EVERY 4 HOURS
Refills: 0 | Status: DISCONTINUED | OUTPATIENT
Start: 2024-01-14 | End: 2024-01-14

## 2024-01-14 RX ORDER — ONDANSETRON 8 MG/1
4 TABLET, FILM COATED ORAL ONCE
Refills: 0 | Status: COMPLETED | OUTPATIENT
Start: 2024-01-14 | End: 2024-01-14

## 2024-01-14 RX ORDER — LANOLIN ALCOHOL/MO/W.PET/CERES
3 CREAM (GRAM) TOPICAL AT BEDTIME
Refills: 0 | Status: DISCONTINUED | OUTPATIENT
Start: 2024-01-14 | End: 2024-01-14

## 2024-01-14 RX ORDER — IPRATROPIUM/ALBUTEROL SULFATE 18-103MCG
3 AEROSOL WITH ADAPTER (GRAM) INHALATION
Refills: 0 | Status: COMPLETED | OUTPATIENT
Start: 2024-01-14 | End: 2024-01-14

## 2024-01-14 RX ORDER — MORPHINE SULFATE 50 MG/1
8 CAPSULE, EXTENDED RELEASE ORAL ONCE
Refills: 0 | Status: DISCONTINUED | OUTPATIENT
Start: 2024-01-14 | End: 2024-01-14

## 2024-01-14 RX ORDER — ONDANSETRON 8 MG/1
4 TABLET, FILM COATED ORAL EVERY 8 HOURS
Refills: 0 | Status: DISCONTINUED | OUTPATIENT
Start: 2024-01-14 | End: 2024-01-14

## 2024-01-14 RX ORDER — MORPHINE SULFATE 50 MG/1
4 CAPSULE, EXTENDED RELEASE ORAL ONCE
Refills: 0 | Status: DISCONTINUED | OUTPATIENT
Start: 2024-01-14 | End: 2024-01-14

## 2024-01-14 RX ORDER — ACETAMINOPHEN 500 MG
650 TABLET ORAL EVERY 6 HOURS
Refills: 0 | Status: DISCONTINUED | OUTPATIENT
Start: 2024-01-14 | End: 2024-01-14

## 2024-01-14 RX ADMIN — ONDANSETRON 4 MILLIGRAM(S): 8 TABLET, FILM COATED ORAL at 15:53

## 2024-01-14 RX ADMIN — MORPHINE SULFATE 8 MILLIGRAM(S): 50 CAPSULE, EXTENDED RELEASE ORAL at 15:15

## 2024-01-14 RX ADMIN — Medication 3 MILLILITER(S): at 18:34

## 2024-01-14 RX ADMIN — Medication 125 MILLIGRAM(S): at 13:52

## 2024-01-14 RX ADMIN — Medication 3 MILLILITER(S): at 18:42

## 2024-01-14 RX ADMIN — Medication 3 MILLILITER(S): at 13:53

## 2024-01-14 NOTE — ED PROVIDER NOTE - OBJECTIVE STATEMENT
57 y/o M, pmhx of HTN, HLD, COPD, REBECCA, presents to the emergency department with left-sided chest pain radiating into the left arm associated with nausea and vomiting for about 1 week.  Pt also has cough and dyspnea. Patient was seen in the emergency department several days ago with similar complaints.  Active smoker.  Patient reports he is on morphine chronically for pain control.  Denies fever chills neck pain drooling diarrhea abdominal pain 59 y/o M, pmhx of HTN, HLD, COPD, REBECCA, presents to the emergency department with left-sided chest pain radiating into the left arm associated with nausea and vomiting for about 1 week.  Pt also has cough and dyspnea. Patient was seen in the emergency department several days ago with similar complaints.  Active smoker.  Patient reports he is on morphine chronically for pain control.  Denies fever chills neck pain drooling diarrhea abdominal pain

## 2024-01-14 NOTE — ED PROVIDER NOTE - PHYSICAL EXAMINATION
VITAL SIGNS: I have reviewed nursing notes and confirm.  CONSTITUTIONAL: non-toxic, well appearing  SKIN: no rash, no petechiae.  EYES: pink conjunctiva, anicteric  ENT: tongue midline, no exudates, MMM  NECK: Supple; no meningismus  CARD: RRR, no murmurs, equal radial pulses bilaterally 2+  RESP: no respiratory distress, + poor air entry w diffuse wheezing and rhonchi   ABD: Soft, non-tender, + obese   EXT: Normal ROM x4. No edema.   NEURO: Alert, oriented. no focal deficits, nl gait

## 2024-01-14 NOTE — ED ADULT TRIAGE NOTE - CHIEF COMPLAINT QUOTE
Pt presents to the ED BIBEMS w/ c/o of chest pain radiating down the left arm assoicated w/ nausea vomiting x1 week.

## 2024-01-14 NOTE — ED PROVIDER NOTE - PROGRESS NOTE DETAILS
Authored by Dr. Brasher: pt on multiple occasions walks up to staff including multiple physicians, nurses, requesting pain meds. Pt not improved and still not feeling well, intractable pain hence will be admitted.

## 2024-01-14 NOTE — ED PROVIDER NOTE - DIFFERENTIAL DIAGNOSIS
Differential Diagnosis Chest pain, consider ACS vs PE vs dissection vs tamponade vs esophageal rupture vs pneumothorax vs pneumonia vs fluid overload

## 2024-01-14 NOTE — ED ADULT NURSE REASSESSMENT NOTE - NS ED NURSE REASSESS COMMENT FT1
pt stated we was going outside to grab clothes from his friend. pt has been drug seeking since he has been in the er. pt went outside, got into his friends car and drove around the parking lot before getting out on the other side of the lot. me, security and the  present in the er followed pt outside to the car and when th person driving the car saw us, he drove away around the [place]. pt then got out of the car on the other side of the lot while the car was still driving; stating that he was getting cigarettes. when pt returned to his bed, I removed his iv access and notified Dr Rodriguez. pt was checked by security and found nothing on his person. pt stated we was going outside to grab clothes from his friend. pt has been drug seeking since he has been in the er. pt went outside, got into his friends car and drove around the parking lot before getting out on the other side of the lot. me, security and the  present in the er followed pt outside to the car and when th person driving the car saw us, he drove away around the [place]. pt then got out of the car on the other side of the lot while the car was still driving; stating that he was getting cigarettes. when pt returned to his bed, I removed his iv access and notified Dr Rodriguez. pt was checked by security and found nothing on his person.

## 2024-01-14 NOTE — ED PROVIDER NOTE - CONSIDERATION OF ADMISSION OBSERVATION
Patient with intractable chronic pain - will require admission Consideration of Admission/Observation

## 2024-01-14 NOTE — ED ADULT NURSE REASSESSMENT NOTE - NS ED NURSE REASSESS COMMENT FT1
patient received from previous RN. Patient does not have IV in place, MD aware. Patient has been told by previous RN, MD and charge RN and security that he is not allow to keep going in and out of the hospital. Patient was told that the next time he leaves it will be an elopement.  Patient verbalized understanding.

## 2024-01-14 NOTE — ED PROVIDER NOTE - CLINICAL SUMMARY MEDICAL DECISION MAKING FREE TEXT BOX
Patient presented with chest pain, left arm pain and nausea and vomiting x months.  Patient with history of frequent ED visits for the same complaint and has had extensive medical workups that were negative.  On arrival, patient otherwise afebrile, hemodynamically stable, neurovascularly intact.  Obtained EKG which was grossly nonischemic.  Obtained labs which were grossly unremarkable including no significant leukocytosis, anemia, signs of dehydration/TEODORO, transaminitis or significant electrolyte abnormalities.  Troponin negative.  Chest xray negative for pneumothorax, pneumonia, widened mediastinum, evidence of rib fractures, enlarged cardiac silhouette or any other emergent pathologies.  Attempted pain control in the ED without reported improvement patient still in a lot of pain, not able to ambulate.  Given the above, will require admission for further management.  Hemodynamically stable at time of admission.

## 2024-01-26 NOTE — ED ADULT NURSE NOTE - HIV OFFER
Opt out Zoryve Counseling:  I discussed with the patient that Zoryve is not for use in the eyes, mouth or vagina. The most commonly reported side effects include diarrhea, headache, insomnia, application site pain, upper respiratory tract infections, and urinary tract infections.  All of the patient's questions and concerns were addressed.

## 2024-02-07 NOTE — ED ADULT NURSE NOTE - CAS DISCH TRANSFER METHOD
LAST OFFICE VISIT: 11/30/2023    NEXT OFFICE VISIT: 2/29/2024    LAST REFILL: 10/28/23 #90 W/ 1    LAST PAIN CONTRACT: 8/30/23    LAST UDS: 8/30/23    LAST OPIOID SCREEN: 8/30/23      No protocol for requested medication     Order pended, routed to clinician for review.      Private car

## 2024-02-25 ENCOUNTER — EMERGENCY (EMERGENCY)
Facility: HOSPITAL | Age: 59
LOS: 0 days | Discharge: HOME | End: 2024-02-25
Attending: EMERGENCY MEDICINE
Payer: MEDICAID

## 2024-02-25 VITALS
TEMPERATURE: 97 F | WEIGHT: 285.94 LBS | HEART RATE: 76 BPM | DIASTOLIC BLOOD PRESSURE: 85 MMHG | HEIGHT: 70 IN | SYSTOLIC BLOOD PRESSURE: 146 MMHG | OXYGEN SATURATION: 97 % | RESPIRATION RATE: 18 BRPM

## 2024-02-25 VITALS
DIASTOLIC BLOOD PRESSURE: 95 MMHG | HEART RATE: 81 BPM | RESPIRATION RATE: 18 BRPM | TEMPERATURE: 97 F | OXYGEN SATURATION: 97 % | SYSTOLIC BLOOD PRESSURE: 143 MMHG

## 2024-02-25 DIAGNOSIS — I11.0 HYPERTENSIVE HEART DISEASE WITH HEART FAILURE: ICD-10-CM

## 2024-02-25 DIAGNOSIS — K40.20 BILATERAL INGUINAL HERNIA, WITHOUT OBSTRUCTION OR GANGRENE, NOT SPECIFIED AS RECURRENT: Chronic | ICD-10-CM

## 2024-02-25 DIAGNOSIS — J44.1 CHRONIC OBSTRUCTIVE PULMONARY DISEASE WITH (ACUTE) EXACERBATION: ICD-10-CM

## 2024-02-25 DIAGNOSIS — Z98.890 OTHER SPECIFIED POSTPROCEDURAL STATES: Chronic | ICD-10-CM

## 2024-02-25 DIAGNOSIS — R05.8 OTHER SPECIFIED COUGH: ICD-10-CM

## 2024-02-25 DIAGNOSIS — Z96.651 PRESENCE OF RIGHT ARTIFICIAL KNEE JOINT: Chronic | ICD-10-CM

## 2024-02-25 DIAGNOSIS — R06.02 SHORTNESS OF BREATH: ICD-10-CM

## 2024-02-25 DIAGNOSIS — Z79.82 LONG TERM (CURRENT) USE OF ASPIRIN: ICD-10-CM

## 2024-02-25 DIAGNOSIS — F17.200 NICOTINE DEPENDENCE, UNSPECIFIED, UNCOMPLICATED: ICD-10-CM

## 2024-02-25 DIAGNOSIS — E78.5 HYPERLIPIDEMIA, UNSPECIFIED: ICD-10-CM

## 2024-02-25 DIAGNOSIS — G47.33 OBSTRUCTIVE SLEEP APNEA (ADULT) (PEDIATRIC): ICD-10-CM

## 2024-02-25 DIAGNOSIS — I50.9 HEART FAILURE, UNSPECIFIED: ICD-10-CM

## 2024-02-25 LAB
ALBUMIN SERPL ELPH-MCNC: 4.2 G/DL — SIGNIFICANT CHANGE UP (ref 3.5–5.2)
ALP SERPL-CCNC: 90 U/L — SIGNIFICANT CHANGE UP (ref 30–115)
ALT FLD-CCNC: <5 U/L — SIGNIFICANT CHANGE UP (ref 0–41)
ANION GAP SERPL CALC-SCNC: 10 MMOL/L — SIGNIFICANT CHANGE UP (ref 7–14)
AST SERPL-CCNC: 60 U/L — HIGH (ref 0–41)
BASOPHILS # BLD AUTO: 0.06 K/UL — SIGNIFICANT CHANGE UP (ref 0–0.2)
BASOPHILS NFR BLD AUTO: 0.7 % — SIGNIFICANT CHANGE UP (ref 0–1)
BILIRUB SERPL-MCNC: 0.5 MG/DL — SIGNIFICANT CHANGE UP (ref 0.2–1.2)
BUN SERPL-MCNC: 10 MG/DL — SIGNIFICANT CHANGE UP (ref 10–20)
CALCIUM SERPL-MCNC: 9.5 MG/DL — SIGNIFICANT CHANGE UP (ref 8.4–10.5)
CHLORIDE SERPL-SCNC: 102 MMOL/L — SIGNIFICANT CHANGE UP (ref 98–110)
CO2 SERPL-SCNC: 25 MMOL/L — SIGNIFICANT CHANGE UP (ref 17–32)
CREAT SERPL-MCNC: 0.8 MG/DL — SIGNIFICANT CHANGE UP (ref 0.7–1.5)
EGFR: 104 ML/MIN/1.73M2 — SIGNIFICANT CHANGE UP
EOSINOPHIL # BLD AUTO: 0.07 K/UL — SIGNIFICANT CHANGE UP (ref 0–0.7)
EOSINOPHIL NFR BLD AUTO: 0.9 % — SIGNIFICANT CHANGE UP (ref 0–8)
GLUCOSE SERPL-MCNC: 100 MG/DL — HIGH (ref 70–99)
HCT VFR BLD CALC: 43.2 % — SIGNIFICANT CHANGE UP (ref 42–52)
HGB BLD-MCNC: 14.7 G/DL — SIGNIFICANT CHANGE UP (ref 14–18)
IMM GRANULOCYTES NFR BLD AUTO: 0.4 % — HIGH (ref 0.1–0.3)
LYMPHOCYTES # BLD AUTO: 1.54 K/UL — SIGNIFICANT CHANGE UP (ref 1.2–3.4)
LYMPHOCYTES # BLD AUTO: 19.2 % — LOW (ref 20.5–51.1)
MCHC RBC-ENTMCNC: 29.5 PG — SIGNIFICANT CHANGE UP (ref 27–31)
MCHC RBC-ENTMCNC: 34 G/DL — SIGNIFICANT CHANGE UP (ref 32–37)
MCV RBC AUTO: 86.6 FL — SIGNIFICANT CHANGE UP (ref 80–94)
MONOCYTES # BLD AUTO: 0.55 K/UL — SIGNIFICANT CHANGE UP (ref 0.1–0.6)
MONOCYTES NFR BLD AUTO: 6.9 % — SIGNIFICANT CHANGE UP (ref 1.7–9.3)
NEUTROPHILS # BLD AUTO: 5.76 K/UL — SIGNIFICANT CHANGE UP (ref 1.4–6.5)
NEUTROPHILS NFR BLD AUTO: 71.9 % — SIGNIFICANT CHANGE UP (ref 42.2–75.2)
NRBC # BLD: 0 /100 WBCS — SIGNIFICANT CHANGE UP (ref 0–0)
PLATELET # BLD AUTO: 288 K/UL — SIGNIFICANT CHANGE UP (ref 130–400)
PMV BLD: 10.3 FL — SIGNIFICANT CHANGE UP (ref 7.4–10.4)
POTASSIUM SERPL-MCNC: SIGNIFICANT CHANGE UP MMOL/L (ref 3.5–5)
POTASSIUM SERPL-SCNC: SIGNIFICANT CHANGE UP MMOL/L (ref 3.5–5)
PROT SERPL-MCNC: 7.7 G/DL — SIGNIFICANT CHANGE UP (ref 6–8)
RBC # BLD: 4.99 M/UL — SIGNIFICANT CHANGE UP (ref 4.7–6.1)
RBC # FLD: 15.6 % — HIGH (ref 11.5–14.5)
SODIUM SERPL-SCNC: 137 MMOL/L — SIGNIFICANT CHANGE UP (ref 135–146)
WBC # BLD: 8.01 K/UL — SIGNIFICANT CHANGE UP (ref 4.8–10.8)
WBC # FLD AUTO: 8.01 K/UL — SIGNIFICANT CHANGE UP (ref 4.8–10.8)

## 2024-02-25 PROCEDURE — 96375 TX/PRO/DX INJ NEW DRUG ADDON: CPT

## 2024-02-25 PROCEDURE — 36415 COLL VENOUS BLD VENIPUNCTURE: CPT

## 2024-02-25 PROCEDURE — 96374 THER/PROPH/DIAG INJ IV PUSH: CPT

## 2024-02-25 PROCEDURE — 99284 EMERGENCY DEPT VISIT MOD MDM: CPT

## 2024-02-25 PROCEDURE — 71046 X-RAY EXAM CHEST 2 VIEWS: CPT | Mod: 26

## 2024-02-25 PROCEDURE — 85025 COMPLETE CBC W/AUTO DIFF WBC: CPT

## 2024-02-25 PROCEDURE — 80053 COMPREHEN METABOLIC PANEL: CPT

## 2024-02-25 PROCEDURE — 71046 X-RAY EXAM CHEST 2 VIEWS: CPT

## 2024-02-25 PROCEDURE — 99284 EMERGENCY DEPT VISIT MOD MDM: CPT | Mod: 25

## 2024-02-25 PROCEDURE — 94640 AIRWAY INHALATION TREATMENT: CPT

## 2024-02-25 RX ORDER — IPRATROPIUM/ALBUTEROL SULFATE 18-103MCG
3 AEROSOL WITH ADAPTER (GRAM) INHALATION
Refills: 0 | Status: COMPLETED | OUTPATIENT
Start: 2024-02-25 | End: 2024-02-25

## 2024-02-25 RX ORDER — LEVOFLOXACIN 5 MG/ML
1 INJECTION, SOLUTION INTRAVENOUS
Qty: 5 | Refills: 0
Start: 2024-02-25 | End: 2024-02-29

## 2024-02-25 RX ORDER — KETOROLAC TROMETHAMINE 30 MG/ML
15 SYRINGE (ML) INJECTION ONCE
Refills: 0 | Status: DISCONTINUED | OUTPATIENT
Start: 2024-02-25 | End: 2024-02-25

## 2024-02-25 RX ADMIN — Medication 3 MILLILITER(S): at 07:39

## 2024-02-25 RX ADMIN — Medication 125 MILLIGRAM(S): at 07:41

## 2024-02-25 RX ADMIN — Medication 3 MILLILITER(S): at 07:44

## 2024-02-25 RX ADMIN — Medication 15 MILLIGRAM(S): at 07:40

## 2024-02-25 RX ADMIN — Medication 3 MILLILITER(S): at 07:43

## 2024-02-25 NOTE — ED ADULT NURSE REASSESSMENT NOTE - NS ED NURSE REASSESS COMMENT FT1
Patient not in assigned location. Patient observed in the parking lot asking bystanders for cigarettes and smoking. MD Massey aware.

## 2024-02-25 NOTE — ED PROVIDER NOTE - DIFFERENTIAL DIAGNOSIS
Dyspnea, likely COPD exacerbation but will consider ACS vs PE vs dissection vs tamponade vs esophageal rupture vs pneumothorax vs pneumonia vs fluid overload Differential Diagnosis

## 2024-02-25 NOTE — ED ADULT NURSE NOTE - OBJECTIVE STATEMENT
Patient BIBA c/o SOB and chest pain since Friday. Patient states it got progressively worse. As per patient, SOB worse with ambulation. Pt. states he has a history of COPD and emphysema. Patient BIBA c/o SOB and chest pain since Friday. Patient states it got progressively worse. As per patient, SOB worse with ambulation. Pt. states he has a history of COPD on home 02 at 3L and emphysema.

## 2024-02-25 NOTE — ED PROVIDER NOTE - CLINICAL SUMMARY MEDICAL DECISION MAKING FREE TEXT BOX
Patient presented with dyspnea and cough has been worsening over the past few months.  History of COPD and states that this feels exact same as his prior COPD exacerbations.  Otherwise on arrival, patient afebrile, hemodynamically stable, wheezing on exam, but not hypoxic, no acute distress.  Obtained labs which were grossly unremarkable including no significant leukocytosis, anemia, signs of dehydration/TEODORO, transaminitis or significant electrolyte abnormalities.  Chest xray negative for pneumothorax, pneumonia, widened mediastinum, evidence of rib fractures, enlarged cardiac silhouette or any other emergent pathologies.  Patient was treated in ED with complete resolution of wheezing after which point patient ambulating without desaturation, tolerating p.o.  Given the above, will discharge home with outpatient follow up. Patient agreeable with plan. Agrees to return to ED for any new or worsening symptoms.

## 2024-02-25 NOTE — ED PROVIDER NOTE - CARE PROVIDER_API CALL
Murali Brooks  Pulmonary Disease  [address] [place] [id number]  Phone: [phone]  Fax: [phone]  Follow Up Time: 1-3 Days

## 2024-02-25 NOTE — ED PROVIDER NOTE - OBJECTIVE STATEMENT
58-year-old male with a history of COPD on O2 as needed, hypertension, hyperlipidemia, REBECCA, CHF presents with cough and mild shortness of breath.  Admits to worsening productive cough worse from baseline with mild shortness of breath that is also worse from baseline.  Denies chest pain difficulty breathing nausea vomiting fevers chills.  Follows up with Dr. Nelson pul

## 2024-02-25 NOTE — ED PROVIDER NOTE - PATIENT PORTAL LINK FT
You can access the FollowMyHealth Patient Portal offered by [facility] by registering at the following website: [email or web address]. By joining [employer]’s FollowMyHealth portal, you will also be able to view your health information using other applications (apps) compatible with our system.

## 2024-02-25 NOTE — ED PROVIDER NOTE - PHYSICAL EXAMINATION
CONSTITUTIONAL: NAD  SKIN: Warm dry  HEAD: NCAT  EYES: NL inspection  ENT: MMM  NECK: Supple; non tender.  CARD: RRR  RESP: +coarse bs and wheeze bl, speaking full sentences, not tachypneic  ABD: S/NT no R/G  EXT: no pedal edema  NEURO: Grossly unremarkable  PSYCH: Cooperative, appropriate.

## 2024-02-25 NOTE — ED ADULT NURSE NOTE - NSFALLUNIVINTERV_ED_ALL_ED
Bed/Stretcher in lowest position, wheels locked, appropriate side rails in place/Call bell, personal items and telephone in reach/Instruct patient to call for assistance before getting out of bed/chair/stretcher/Non-slip footwear applied when patient is off stretcher/[place] to call system/Physically safe environment - no spills, clutter or unnecessary equipment/Purposeful proactive rounding/Room/bathroom lighting operational, light cord in reach

## 2024-02-25 NOTE — ED ADULT TRIAGE NOTE - HEIGHT IN CM
177.8 Island Pedicle Flap Text: The defect edges were debeveled with a #15 scalpel blade.  Given the location of the defect, shape of the defect and the proximity to free margins an island pedicle advancement flap was deemed most appropriate.  Using a sterile surgical marker, an appropriate advancement flap was drawn incorporating the defect, outlining the appropriate donor tissue and placing the expected incisions within the relaxed skin tension lines where possible.    The area thus outlined was incised deep to adipose tissue with a #15 scalpel blade.  The skin margins were undermined to an appropriate distance in all directions around the primary defect and laterally outward around the island pedicle utilizing iris scissors.  There was minimal undermining beneath the pedicle flap.

## 2024-03-03 ENCOUNTER — INPATIENT (INPATIENT)
Facility: HOSPITAL | Age: 59
LOS: 1 days | Discharge: HOME | DRG: 140 | End: 2024-03-05
Attending: HOSPITALIST | Admitting: STUDENT IN AN ORGANIZED HEALTH CARE EDUCATION/TRAINING PROGRAM
Payer: MEDICAID

## 2024-03-03 VITALS
OXYGEN SATURATION: 95 % | HEIGHT: 70 IN | SYSTOLIC BLOOD PRESSURE: 130 MMHG | TEMPERATURE: 97 F | DIASTOLIC BLOOD PRESSURE: 78 MMHG | RESPIRATION RATE: 20 BRPM | HEART RATE: 98 BPM

## 2024-03-03 DIAGNOSIS — Z98.890 OTHER SPECIFIED POSTPROCEDURAL STATES: Chronic | ICD-10-CM

## 2024-03-03 DIAGNOSIS — K40.20 BILATERAL INGUINAL HERNIA, WITHOUT OBSTRUCTION OR GANGRENE, NOT SPECIFIED AS RECURRENT: Chronic | ICD-10-CM

## 2024-03-03 DIAGNOSIS — Z96.651 PRESENCE OF RIGHT ARTIFICIAL KNEE JOINT: Chronic | ICD-10-CM

## 2024-03-03 LAB
ALBUMIN SERPL ELPH-MCNC: 4.2 G/DL — SIGNIFICANT CHANGE UP (ref 3.5–5.2)
ALP SERPL-CCNC: 122 U/L — HIGH (ref 30–115)
ALT FLD-CCNC: 21 U/L — SIGNIFICANT CHANGE UP (ref 0–41)
ANION GAP SERPL CALC-SCNC: 12 MMOL/L — SIGNIFICANT CHANGE UP (ref 7–14)
AST SERPL-CCNC: 50 U/L — HIGH (ref 0–41)
BASOPHILS # BLD AUTO: 0.05 K/UL — SIGNIFICANT CHANGE UP (ref 0–0.2)
BASOPHILS NFR BLD AUTO: 0.4 % — SIGNIFICANT CHANGE UP (ref 0–1)
BILIRUB SERPL-MCNC: 0.5 MG/DL — SIGNIFICANT CHANGE UP (ref 0.2–1.2)
BUN SERPL-MCNC: 18 MG/DL — SIGNIFICANT CHANGE UP (ref 10–20)
CALCIUM SERPL-MCNC: 9.3 MG/DL — SIGNIFICANT CHANGE UP (ref 8.4–10.5)
CHLORIDE SERPL-SCNC: 97 MMOL/L — LOW (ref 98–110)
CO2 SERPL-SCNC: 27 MMOL/L — SIGNIFICANT CHANGE UP (ref 17–32)
CREAT SERPL-MCNC: 0.8 MG/DL — SIGNIFICANT CHANGE UP (ref 0.7–1.5)
EGFR: 104 ML/MIN/1.73M2 — SIGNIFICANT CHANGE UP
EOSINOPHIL # BLD AUTO: 0.07 K/UL — SIGNIFICANT CHANGE UP (ref 0–0.7)
EOSINOPHIL NFR BLD AUTO: 0.6 % — SIGNIFICANT CHANGE UP (ref 0–8)
GAS PNL BLDV: SIGNIFICANT CHANGE UP
GLUCOSE SERPL-MCNC: 126 MG/DL — HIGH (ref 70–99)
HCT VFR BLD CALC: 41.9 % — LOW (ref 42–52)
HGB BLD-MCNC: 13.9 G/DL — LOW (ref 14–18)
IMM GRANULOCYTES NFR BLD AUTO: 0.4 % — HIGH (ref 0.1–0.3)
LYMPHOCYTES # BLD AUTO: 0.97 K/UL — LOW (ref 1.2–3.4)
LYMPHOCYTES # BLD AUTO: 8.5 % — LOW (ref 20.5–51.1)
MCHC RBC-ENTMCNC: 29.5 PG — SIGNIFICANT CHANGE UP (ref 27–31)
MCHC RBC-ENTMCNC: 33.2 G/DL — SIGNIFICANT CHANGE UP (ref 32–37)
MCV RBC AUTO: 89 FL — SIGNIFICANT CHANGE UP (ref 80–94)
MONOCYTES # BLD AUTO: 0.62 K/UL — HIGH (ref 0.1–0.6)
MONOCYTES NFR BLD AUTO: 5.4 % — SIGNIFICANT CHANGE UP (ref 1.7–9.3)
NEUTROPHILS # BLD AUTO: 9.64 K/UL — HIGH (ref 1.4–6.5)
NEUTROPHILS NFR BLD AUTO: 84.7 % — HIGH (ref 42.2–75.2)
NRBC # BLD: 0 /100 WBCS — SIGNIFICANT CHANGE UP (ref 0–0)
NT-PROBNP SERPL-SCNC: 95 PG/ML — SIGNIFICANT CHANGE UP (ref 0–300)
PLATELET # BLD AUTO: 257 K/UL — SIGNIFICANT CHANGE UP (ref 130–400)
PMV BLD: 10.5 FL — HIGH (ref 7.4–10.4)
POTASSIUM SERPL-MCNC: 5.4 MMOL/L — HIGH (ref 3.5–5)
POTASSIUM SERPL-SCNC: 5.4 MMOL/L — HIGH (ref 3.5–5)
PROT SERPL-MCNC: 7.1 G/DL — SIGNIFICANT CHANGE UP (ref 6–8)
RBC # BLD: 4.71 M/UL — SIGNIFICANT CHANGE UP (ref 4.7–6.1)
RBC # FLD: 15.1 % — HIGH (ref 11.5–14.5)
SODIUM SERPL-SCNC: 136 MMOL/L — SIGNIFICANT CHANGE UP (ref 135–146)
WBC # BLD: 11.4 K/UL — HIGH (ref 4.8–10.8)
WBC # FLD AUTO: 11.4 K/UL — HIGH (ref 4.8–10.8)

## 2024-03-03 PROCEDURE — 71045 X-RAY EXAM CHEST 1 VIEW: CPT | Mod: 26

## 2024-03-03 PROCEDURE — 99285 EMERGENCY DEPT VISIT HI MDM: CPT

## 2024-03-03 PROCEDURE — 93308 TTE F-UP OR LMTD: CPT | Mod: 26

## 2024-03-03 RX ORDER — AZITHROMYCIN 500 MG/1
500 TABLET, FILM COATED ORAL ONCE
Refills: 0 | Status: COMPLETED | OUTPATIENT
Start: 2024-03-03 | End: 2024-03-03

## 2024-03-03 RX ORDER — IPRATROPIUM/ALBUTEROL SULFATE 18-103MCG
3 AEROSOL WITH ADAPTER (GRAM) INHALATION EVERY 6 HOURS
Refills: 0 | Status: DISCONTINUED | OUTPATIENT
Start: 2024-03-03 | End: 2024-03-05

## 2024-03-03 RX ORDER — IPRATROPIUM/ALBUTEROL SULFATE 18-103MCG
3 AEROSOL WITH ADAPTER (GRAM) INHALATION
Refills: 0 | Status: COMPLETED | OUTPATIENT
Start: 2024-03-03 | End: 2024-03-03

## 2024-03-03 RX ORDER — MAGNESIUM SULFATE 500 MG/ML
2 VIAL (ML) INJECTION ONCE
Refills: 0 | Status: COMPLETED | OUTPATIENT
Start: 2024-03-03 | End: 2024-03-03

## 2024-03-03 RX ADMIN — Medication 150 GRAM(S): at 23:54

## 2024-03-03 RX ADMIN — Medication 3 MILLILITER(S): at 21:12

## 2024-03-03 RX ADMIN — Medication 3 MILLILITER(S): at 23:55

## 2024-03-03 RX ADMIN — Medication 125 MILLIGRAM(S): at 21:12

## 2024-03-03 RX ADMIN — AZITHROMYCIN 255 MILLIGRAM(S): 500 TABLET, FILM COATED ORAL at 23:54

## 2024-03-03 NOTE — ED ADULT TRIAGE NOTE - IDEAL BODY WEIGHT(KG)
"HPI    Kaitlin Blue presents for evaluation post discharge from rehabilitation center. On 12/11/18 through 12/18/18 she had been hospitalized due to episodes of dizziness/lightheadedness with feeling of passing out however no loss of consciousness. She was noted to have orthostatic blood pressure changes to medications were adjusted. She was found to be positive for the coronavirus. Due to dementia and increased weakness she was discharged to subacute facility for therapy. On 12/28/18 she was discharged from subacute facility. She lives at home with her son and her other children are actively involved in her care. She is in the adult day program at [facility]; while there she is continuing to receive physical and occupational therapy. I have reviewed the patient's medications and allergies, past medical, surgical, social and family history, updating these as appropriate. See Histories section of the EMR for a display of this information. REVIEW OF SYSTEMS    Unable to complete due to dementia  Her son and daughter accompany to appointment today; no new concerns. PHYSICAL EXAM  Vitals:  Blood pressure 140/60, pulse 64, height 5' 2"" (1.575 m), weight 78.1 kg. Constitutional:  Well developed, well nourished, 82year old,  female who is in no acute distress. Eyes: Conjunctiva clear; non-icteric. HENT:  Normocephalic; atraumatic. Bilateral nares without congestion. Tympanic membranes are intact bilaterally with light reflex. Oropharynx is moist without exudate or lesions. Neck is symmetric and supple. No thyromegaly is noted. Respiratory:  Respirations are easy and nonlabored. Breath sounds are clear to auscultation throughout. No adventitious breath sounds were noted. Cardiovascular:  Heart rate and rhythm are regular. Normal S1, S2. No murmurs, rubs, or gallops appreciated. No peripheral edema noted. GI:  Abdomen soft, nondistended, nontender, with active bowel sounds x4.  " Musculoskeletal:  Posture erect was sitting in wheelchair. Integument:  Well hydrated, no rash   Lymphatic: No cervical, submandibular, or submental lymphadenopathy noted. Neurologic:  Alert & oriented to self. Cranial nerves 2-12 are grossly intact. Psychiatric:  Fatigued appearing. Did answer some questions with couple word answers. Pleasant. ASSESSMENT/PLAN  Javier Calvin was seen today for follow-up.     Diagnoses and all orders for this visit:    Late onset Alzheimer's disease with behavioral disturbance  - family attends to her personal needs   - attend Adult Day program at [facility]  - treated with Namenda    Generalized weakness  - receiving PT and OT while attending [facility] Adult Day program    Essential hypertension  - BP is well controlled without hypertensive medication    Mixed hyperlipidemia    Acquired hypothyroidism  - takes levothyroxine daily    Type 2 diabetes mellitus without complication, without long-term current use of insulin (CMS/[facility])  - 11/5/18 A1c = 5.3  - diabetes resolved    B12 deficiency  - receives injections 2x/month  - will have B12 level checked (standing order already in place) 73

## 2024-03-03 NOTE — ED PROVIDER NOTE - PHYSICAL EXAMINATION
CONSTITUTIONAL: Well-developed; well-nourished; in no acute distress.   SKIN: warm, dry  HEAD: Normocephalic; atraumatic.  EYES: PERRL, EOMI, no conjunctival erythema, Ecchymosis to the right maxilla   ENT: No nasal discharge; airway clear.  NECK: Supple; non tender.  CARD: S1, S2 normal; no murmurs, gallops, or rubs. Regular rate and rhythm.   RESP: Diffuse wheezing and prolonged expiatory phase   ABD: soft ntnd  EXT: Normal ROM.  No clubbing, cyanosis or edema.   LYMPH: No acute cervical adenopathy.  NEURO: Alert, oriented, grossly unremarkable  PSYCH: Cooperative, appropriate.
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
## 2024-03-03 NOTE — ED PROVIDER NOTE - PROGRESS NOTE DETAILS
Toby Jeronimo, DO: assumed care of patient. pt reportedly having dyspnea with short gait. continued wheezing after initial nebs and steroids. will repeat nebs, + azithromycin and mg. Will repeat vbg. discussed with mar agreed for admission.

## 2024-03-03 NOTE — ED PROVIDER NOTE - OBJECTIVE STATEMENT
58-year-old male COPD not on O2 at home, congestive heart failure, hypertension, hyperlipidemia presents for difficulty breathing.  [facility] and also had a COPD exacerbation which was treated.  Patient said over the course of today he has had increased difficulty breathing which prompted him to take an ambulance to his here.  Patient denies any fevers chills chest pain viral like symptoms abdominal pain dysuria.

## 2024-03-03 NOTE — ED ADULT TRIAGE NOTE - MEANS OF ARRIVAL
ambulatory Consent 3/Introductory Paragraph: I gave the patient a chance to ask questions they had about the procedure.  Following this I explained the Mohs procedure and consent was obtained. The risks, benefits and alternatives to therapy were discussed in detail. Specifically, the risks of infection, scarring, bleeding, prolonged wound healing, incomplete removal, allergy to anesthesia, nerve injury and recurrence were addressed. Prior to the procedure, the treatment site was clearly identified and confirmed by the patient. All components of Universal Protocol/PAUSE Rule completed.
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
## 2024-03-04 DIAGNOSIS — J44.9 CHRONIC OBSTRUCTIVE PULMONARY DISEASE, UNSPECIFIED: ICD-10-CM

## 2024-03-04 LAB
ALBUMIN SERPL ELPH-MCNC: 4.2 G/DL — SIGNIFICANT CHANGE UP (ref 3.5–5.2)
ALP SERPL-CCNC: 112 U/L — SIGNIFICANT CHANGE UP (ref 30–115)
ALT FLD-CCNC: 20 U/L — SIGNIFICANT CHANGE UP (ref 0–41)
ANION GAP SERPL CALC-SCNC: 14 MMOL/L — SIGNIFICANT CHANGE UP (ref 7–14)
AST SERPL-CCNC: 33 U/L — SIGNIFICANT CHANGE UP (ref 0–41)
BASOPHILS # BLD AUTO: 0 K/UL — SIGNIFICANT CHANGE UP (ref 0–0.2)
BASOPHILS NFR BLD AUTO: 0 % — SIGNIFICANT CHANGE UP (ref 0–1)
BILIRUB SERPL-MCNC: 0.3 MG/DL — SIGNIFICANT CHANGE UP (ref 0.2–1.2)
BUN SERPL-MCNC: 14 MG/DL — SIGNIFICANT CHANGE UP (ref 10–20)
CALCIUM SERPL-MCNC: 9.6 MG/DL — SIGNIFICANT CHANGE UP (ref 8.4–10.5)
CHLORIDE SERPL-SCNC: 95 MMOL/L — LOW (ref 98–110)
CO2 SERPL-SCNC: 26 MMOL/L — SIGNIFICANT CHANGE UP (ref 17–32)
CREAT SERPL-MCNC: 0.7 MG/DL — SIGNIFICANT CHANGE UP (ref 0.7–1.5)
EGFR: 108 ML/MIN/1.73M2 — SIGNIFICANT CHANGE UP
EOSINOPHIL # BLD AUTO: 0 K/UL — SIGNIFICANT CHANGE UP (ref 0–0.7)
EOSINOPHIL NFR BLD AUTO: 0 % — SIGNIFICANT CHANGE UP (ref 0–8)
GAS PNL BLDV: SIGNIFICANT CHANGE UP
GLUCOSE SERPL-MCNC: 172 MG/DL — HIGH (ref 70–99)
HCT VFR BLD CALC: 39.6 % — LOW (ref 42–52)
HGB BLD-MCNC: 13.5 G/DL — LOW (ref 14–18)
IMM GRANULOCYTES NFR BLD AUTO: 0.5 % — HIGH (ref 0.1–0.3)
LACTATE SERPL-SCNC: 2.8 MMOL/L — HIGH (ref 0.7–2)
LYMPHOCYTES # BLD AUTO: 0.45 K/UL — LOW (ref 1.2–3.4)
LYMPHOCYTES # BLD AUTO: 5.5 % — LOW (ref 20.5–51.1)
MAGNESIUM SERPL-MCNC: 1.9 MG/DL — SIGNIFICANT CHANGE UP (ref 1.8–2.4)
MCHC RBC-ENTMCNC: 29.5 PG — SIGNIFICANT CHANGE UP (ref 27–31)
MCHC RBC-ENTMCNC: 34.1 G/DL — SIGNIFICANT CHANGE UP (ref 32–37)
MCV RBC AUTO: 86.7 FL — SIGNIFICANT CHANGE UP (ref 80–94)
MONOCYTES # BLD AUTO: 0.21 K/UL — SIGNIFICANT CHANGE UP (ref 0.1–0.6)
MONOCYTES NFR BLD AUTO: 2.6 % — SIGNIFICANT CHANGE UP (ref 1.7–9.3)
MRSA PCR RESULT.: POSITIVE
NEUTROPHILS # BLD AUTO: 7.43 K/UL — HIGH (ref 1.4–6.5)
NEUTROPHILS NFR BLD AUTO: 91.4 % — HIGH (ref 42.2–75.2)
NRBC # BLD: 0 /100 WBCS — SIGNIFICANT CHANGE UP (ref 0–0)
PLATELET # BLD AUTO: 258 K/UL — SIGNIFICANT CHANGE UP (ref 130–400)
PMV BLD: 10.4 FL — SIGNIFICANT CHANGE UP (ref 7.4–10.4)
POTASSIUM SERPL-MCNC: 5 MMOL/L — SIGNIFICANT CHANGE UP (ref 3.5–5)
POTASSIUM SERPL-SCNC: 5 MMOL/L — SIGNIFICANT CHANGE UP (ref 3.5–5)
PROT SERPL-MCNC: 6.8 G/DL — SIGNIFICANT CHANGE UP (ref 6–8)
RBC # BLD: 4.57 M/UL — LOW (ref 4.7–6.1)
RBC # FLD: 15 % — HIGH (ref 11.5–14.5)
SODIUM SERPL-SCNC: 135 MMOL/L — SIGNIFICANT CHANGE UP (ref 135–146)
WBC # BLD: 8.13 K/UL — SIGNIFICANT CHANGE UP (ref 4.8–10.8)
WBC # FLD AUTO: 8.13 K/UL — SIGNIFICANT CHANGE UP (ref 4.8–10.8)

## 2024-03-04 PROCEDURE — 36415 COLL VENOUS BLD VENIPUNCTURE: CPT

## 2024-03-04 PROCEDURE — 87640 STAPH A DNA AMP PROBE: CPT

## 2024-03-04 PROCEDURE — 83605 ASSAY OF LACTIC ACID: CPT

## 2024-03-04 PROCEDURE — 83735 ASSAY OF MAGNESIUM: CPT

## 2024-03-04 PROCEDURE — 99223 1ST HOSP IP/OBS HIGH 75: CPT

## 2024-03-04 PROCEDURE — 94640 AIRWAY INHALATION TREATMENT: CPT

## 2024-03-04 PROCEDURE — G0378: CPT

## 2024-03-04 PROCEDURE — 85025 COMPLETE CBC W/AUTO DIFF WBC: CPT

## 2024-03-04 PROCEDURE — 87899 AGENT NOS ASSAY W/OPTIC: CPT

## 2024-03-04 PROCEDURE — 84145 PROCALCITONIN (PCT): CPT

## 2024-03-04 PROCEDURE — 0225U NFCT DS DNA&RNA 21 SARSCOV2: CPT

## 2024-03-04 PROCEDURE — 80053 COMPREHEN METABOLIC PANEL: CPT

## 2024-03-04 PROCEDURE — 87641 MR-STAPH DNA AMP PROBE: CPT

## 2024-03-04 PROCEDURE — 87449 NOS EACH ORGANISM AG IA: CPT

## 2024-03-04 RX ORDER — FUROSEMIDE 40 MG
40 TABLET ORAL
Refills: 0 | Status: DISCONTINUED | OUTPATIENT
Start: 2024-03-04 | End: 2024-03-04

## 2024-03-04 RX ORDER — ASPIRIN/CALCIUM CARB/MAGNESIUM 324 MG
81 TABLET ORAL DAILY
Refills: 0 | Status: DISCONTINUED | OUTPATIENT
Start: 2024-03-04 | End: 2024-03-05

## 2024-03-04 RX ORDER — SENNA PLUS 8.6 MG/1
2 TABLET ORAL AT BEDTIME
Refills: 0 | Status: DISCONTINUED | OUTPATIENT
Start: 2024-03-04 | End: 2024-03-05

## 2024-03-04 RX ORDER — IPRATROPIUM/ALBUTEROL SULFATE 18-103MCG
3 AEROSOL WITH ADAPTER (GRAM) INHALATION EVERY 4 HOURS
Refills: 0 | Status: DISCONTINUED | OUTPATIENT
Start: 2024-03-04 | End: 2024-03-05

## 2024-03-04 RX ORDER — GUAIFENESIN/DEXTROMETHORPHAN 600MG-30MG
10 TABLET, EXTENDED RELEASE 12 HR ORAL EVERY 6 HOURS
Refills: 0 | Status: DISCONTINUED | OUTPATIENT
Start: 2024-03-04 | End: 2024-03-05

## 2024-03-04 RX ORDER — CHLORHEXIDINE GLUCONATE 213 G/1000ML
1 SOLUTION TOPICAL
Refills: 0 | Status: DISCONTINUED | OUTPATIENT
Start: 2024-03-04 | End: 2024-03-05

## 2024-03-04 RX ORDER — PANTOPRAZOLE SODIUM 20 MG/1
40 TABLET, DELAYED RELEASE ORAL
Refills: 0 | Status: DISCONTINUED | OUTPATIENT
Start: 2024-03-04 | End: 2024-03-05

## 2024-03-04 RX ORDER — OXYCODONE HYDROCHLORIDE 5 MG/1
30 TABLET ORAL EVERY 4 HOURS
Refills: 0 | Status: DISCONTINUED | OUTPATIENT
Start: 2024-03-04 | End: 2024-03-05

## 2024-03-04 RX ORDER — LISINOPRIL 2.5 MG/1
5 TABLET ORAL DAILY
Refills: 0 | Status: DISCONTINUED | OUTPATIENT
Start: 2024-03-04 | End: 2024-03-05

## 2024-03-04 RX ORDER — ALPRAZOLAM 0.25 MG
2 TABLET ORAL EVERY 6 HOURS
Refills: 0 | Status: DISCONTINUED | OUTPATIENT
Start: 2024-03-04 | End: 2024-03-05

## 2024-03-04 RX ORDER — BUDESONIDE AND FORMOTEROL FUMARATE DIHYDRATE 160; 4.5 UG/1; UG/1
2 AEROSOL RESPIRATORY (INHALATION)
Refills: 0 | Status: DISCONTINUED | OUTPATIENT
Start: 2024-03-04 | End: 2024-03-05

## 2024-03-04 RX ORDER — ALPRAZOLAM 0.25 MG
1 TABLET ORAL ONCE
Refills: 0 | Status: DISCONTINUED | OUTPATIENT
Start: 2024-03-04 | End: 2024-03-04

## 2024-03-04 RX ORDER — ENOXAPARIN SODIUM 100 MG/ML
40 INJECTION SUBCUTANEOUS EVERY 24 HOURS
Refills: 0 | Status: DISCONTINUED | OUTPATIENT
Start: 2024-03-04 | End: 2024-03-05

## 2024-03-04 RX ORDER — MORPHINE SULFATE 50 MG/1
100 CAPSULE, EXTENDED RELEASE ORAL EVERY 12 HOURS
Refills: 0 | Status: DISCONTINUED | OUTPATIENT
Start: 2024-03-04 | End: 2024-03-05

## 2024-03-04 RX ORDER — INFLUENZA VIRUS VACCINE 15; 15; 15; 15 UG/.5ML; UG/.5ML; UG/.5ML; UG/.5ML
0.5 SUSPENSION INTRAMUSCULAR ONCE
Refills: 0 | Status: DISCONTINUED | OUTPATIENT
Start: 2024-03-04 | End: 2024-03-05

## 2024-03-04 RX ORDER — AZITHROMYCIN 500 MG/1
500 TABLET, FILM COATED ORAL EVERY 24 HOURS
Refills: 0 | Status: DISCONTINUED | OUTPATIENT
Start: 2024-03-04 | End: 2024-03-05

## 2024-03-04 RX ORDER — FENOFIBRATE,MICRONIZED 130 MG
1 CAPSULE ORAL
Qty: 0 | Refills: 0 | DISCHARGE

## 2024-03-04 RX ORDER — ZOLPIDEM TARTRATE 10 MG/1
5 TABLET ORAL AT BEDTIME
Refills: 0 | Status: DISCONTINUED | OUTPATIENT
Start: 2024-03-04 | End: 2024-03-05

## 2024-03-04 RX ORDER — POLYETHYLENE GLYCOL 3350 17 G/17G
17 POWDER, FOR SOLUTION ORAL
Refills: 0 | Status: DISCONTINUED | OUTPATIENT
Start: 2024-03-04 | End: 2024-03-05

## 2024-03-04 RX ORDER — FENOFIBRATE,MICRONIZED 130 MG
145 CAPSULE ORAL DAILY
Refills: 0 | Status: DISCONTINUED | OUTPATIENT
Start: 2024-03-04 | End: 2024-03-05

## 2024-03-04 RX ORDER — ALPRAZOLAM 0.25 MG
2 TABLET ORAL ONCE
Refills: 0 | Status: DISCONTINUED | OUTPATIENT
Start: 2024-03-04 | End: 2024-03-04

## 2024-03-04 RX ORDER — NALOXONE HYDROCHLORIDE 4 MG/.1ML
4 SPRAY NASAL
Refills: 0 | Status: DISCONTINUED | OUTPATIENT
Start: 2024-03-04 | End: 2024-03-05

## 2024-03-04 RX ORDER — SIMVASTATIN 20 MG/1
20 TABLET, FILM COATED ORAL AT BEDTIME
Refills: 0 | Status: DISCONTINUED | OUTPATIENT
Start: 2024-03-04 | End: 2024-03-05

## 2024-03-04 RX ORDER — CEFTRIAXONE 500 MG/1
1000 INJECTION, POWDER, FOR SOLUTION INTRAMUSCULAR; INTRAVENOUS EVERY 24 HOURS
Refills: 0 | Status: DISCONTINUED | OUTPATIENT
Start: 2024-03-04 | End: 2024-03-05

## 2024-03-04 RX ADMIN — Medication 2 MILLIGRAM(S): at 15:33

## 2024-03-04 RX ADMIN — AZITHROMYCIN 255 MILLIGRAM(S): 500 TABLET, FILM COATED ORAL at 09:34

## 2024-03-04 RX ADMIN — Medication 3 MILLILITER(S): at 02:27

## 2024-03-04 RX ADMIN — Medication 40 MILLIGRAM(S): at 05:37

## 2024-03-04 RX ADMIN — Medication 3 MILLILITER(S): at 20:36

## 2024-03-04 RX ADMIN — SENNA PLUS 2 TABLET(S): 8.6 TABLET ORAL at 21:43

## 2024-03-04 RX ADMIN — Medication 40 MILLIGRAM(S): at 14:38

## 2024-03-04 RX ADMIN — Medication 2 MILLIGRAM(S): at 21:43

## 2024-03-04 RX ADMIN — OXYCODONE HYDROCHLORIDE 30 MILLIGRAM(S): 5 TABLET ORAL at 09:31

## 2024-03-04 RX ADMIN — POLYETHYLENE GLYCOL 3350 17 GRAM(S): 17 POWDER, FOR SOLUTION ORAL at 05:34

## 2024-03-04 RX ADMIN — OXYCODONE HYDROCHLORIDE 30 MILLIGRAM(S): 5 TABLET ORAL at 15:34

## 2024-03-04 RX ADMIN — Medication 81 MILLIGRAM(S): at 12:10

## 2024-03-04 RX ADMIN — CHLORHEXIDINE GLUCONATE 1 APPLICATION(S): 213 SOLUTION TOPICAL at 14:38

## 2024-03-04 RX ADMIN — Medication 145 MILLIGRAM(S): at 12:09

## 2024-03-04 RX ADMIN — CEFTRIAXONE 100 MILLIGRAM(S): 500 INJECTION, POWDER, FOR SOLUTION INTRAMUSCULAR; INTRAVENOUS at 09:34

## 2024-03-04 RX ADMIN — SIMVASTATIN 20 MILLIGRAM(S): 20 TABLET, FILM COATED ORAL at 21:43

## 2024-03-04 RX ADMIN — Medication 40 MILLIGRAM(S): at 05:33

## 2024-03-04 RX ADMIN — Medication 10 MILLILITER(S): at 12:10

## 2024-03-04 RX ADMIN — Medication 40 MILLIGRAM(S): at 21:42

## 2024-03-04 RX ADMIN — MORPHINE SULFATE 100 MILLIGRAM(S): 50 CAPSULE, EXTENDED RELEASE ORAL at 05:52

## 2024-03-04 RX ADMIN — POLYETHYLENE GLYCOL 3350 17 GRAM(S): 17 POWDER, FOR SOLUTION ORAL at 18:28

## 2024-03-04 RX ADMIN — BUDESONIDE AND FORMOTEROL FUMARATE DIHYDRATE 2 PUFF(S): 160; 4.5 AEROSOL RESPIRATORY (INHALATION) at 09:33

## 2024-03-04 RX ADMIN — Medication 2 MILLIGRAM(S): at 09:32

## 2024-03-04 RX ADMIN — Medication 10 MILLILITER(S): at 18:27

## 2024-03-04 RX ADMIN — Medication 2 MILLIGRAM(S): at 01:45

## 2024-03-04 RX ADMIN — MORPHINE SULFATE 100 MILLIGRAM(S): 50 CAPSULE, EXTENDED RELEASE ORAL at 06:36

## 2024-03-04 RX ADMIN — MORPHINE SULFATE 100 MILLIGRAM(S): 50 CAPSULE, EXTENDED RELEASE ORAL at 18:25

## 2024-03-04 RX ADMIN — OXYCODONE HYDROCHLORIDE 30 MILLIGRAM(S): 5 TABLET ORAL at 16:04

## 2024-03-04 RX ADMIN — ZOLPIDEM TARTRATE 5 MILLIGRAM(S): 10 TABLET ORAL at 21:43

## 2024-03-04 RX ADMIN — OXYCODONE HYDROCHLORIDE 30 MILLIGRAM(S): 5 TABLET ORAL at 10:00

## 2024-03-04 RX ADMIN — LISINOPRIL 5 MILLIGRAM(S): 2.5 TABLET ORAL at 05:32

## 2024-03-04 RX ADMIN — PANTOPRAZOLE SODIUM 40 MILLIGRAM(S): 20 TABLET, DELAYED RELEASE ORAL at 05:32

## 2024-03-04 NOTE — H&P ADULT - ATTENDING COMMENTS
My note supersedes resident's note.  Patient is a 59 y/o  male with PMH of  hypertension, hyperlipidemia, GERD, COPD, REBECCA on BIPAP, HFpEF last EF 65%. Presented with shortness of breath, dx. with Acute Hypoxic respiratory failure due to COPD exacerbation.       # Acute hypoxic respiratory failure due to COPD exacerbation  - CXR: < from: Xray Chest 1 View- PORTABLE-Urgent (03.03.24 @ 20:48) >FINDINGS/IMPRESSION:  No focal consolidation, pneumothorax or pleural effusion.  Stable cardiomediastinal silhouette.  Unchanged osseous structures.  --- End of Report ---  - ? lower left lung infiltrate on personal CXR eval - started on Rocephin/Zithromax tx, f/up serum procal, blood cxs, spu cxs, MRSA swab positive- start on topical nasal bactroban or mupirocin application. f/up urine legionella ag, strep urine ag, deescalate abx. in 24 hours once all cxs reviewed.   - assess pulse ox on RA, resume on oxygen to maintain pulse ox 88-92%  - taper IV solumedrol to PO Prednisone in 24 hours   - Duonebs/inhalers/ cough suppressant tx.  - outpatient PFTs      # Hx of REBECCA, not compliant with BIPAP - consulted Pulmonary team , f/up rec.  - restarted on nocturnal BIPAP tx.,       # Lactic acidosis- due to mild hypoxia- continue to trend LA , tx underlying cause    # HFpEF last EF 65%- in stable euvolemic state- restart pt. on PO lasix 40 mg once daily tx.  - outpatient Cardiology specialist f/up , resume on home regimen tx.      # Chronic pain syndrome- on high doses of morphine at home , resumed on home meds tx.    GI/DVT proph.     Code status: full code    Total time spent to complete patient's bedside assessment, review medical chart, discuss medical plan of care with covering medical team was more than 75 minutes with >50% of time spent face to face with patient, discussion with patient/family and/or coordination of care

## 2024-03-04 NOTE — CONSULT NOTE ADULT - ASSESSMENT
IMPRESSION    Acute hypoxemic respiratory failure  COPD exacerbation   Volume overload  Chronic hypercapnic respiratory failure  REBECCA  H/o HFpEF      RECOMMENDATIONS     Finish 5 day course of prednisone 40 mg daily  Diurese as tolerated  Obtain echo  Continue home inhalers  Albuterol PRN  Wean oxygen, goal 88-92%  AVAPS at night, , EPAP 8, IPAP min 12, IPAP max 20, RR 12  DVT PPX

## 2024-03-04 NOTE — CONSULT NOTE ADULT - SUBJECTIVE AND OBJECTIVE BOX
Patient is a 58y old  Male who presents with a chief complaint of Shortness of breath (04 Mar 2024 01:42)      HPI:  a 58-year-old male with past medical history of hypertension, hyperlipidemia, GERD, COPD, REBECCA on BIPAP, HFpEF last EF 65%. e presented to the ED complaining from shortness of breath that started 4 days ago, much worse compared to the baseline shortness of breath he has. It was limiting his daily activities. It is associated with orthopnea, worsening cough and whitish sputum production. He had feeling of hotness but no documented fevers or chills.     Vital Signs T(F): 98 HR: 97 BP: 148/72 RR: 20 SpO2: 98% nasal cannula O2 Flow (L/min): 4    CXR showing bilateral mild interstitial edema with cephalization     S/P azithromycin, Solumedrol   Admitted to medicine for further evaluation        (04 Mar 2024 01:42)      PAST MEDICAL & SURGICAL HISTORY:  HTN (hypertension)      High cholesterol      GERD (gastroesophageal reflux disease)      Morbid obesity      Osteoarthritis      Hiatal hernia  GERD      Colitis  LARGE INTESTINE   NO RECENT FLARES      COPD (chronic obstructive pulmonary disease)      REBECCA treated with BiPAP      History of cholecystectomy      History of appendectomy      History of knee replacement procedure of right knee  BILATERAL      Hernia, inguinal, bilateral  3 months old      H/O knee surgery  arthoscopic x4      H/O foot surgery  right big toe surgery          SOCIAL HX:   Smoking      current, 120 pack years    FAMILY HISTORY:  FH: lung cancer (Mother)  mother    FH: diabetes mellitus    .  No cardiovascular or pulmonary family history     REVIEW OF SYSTEMS:    All ROS are negative except per HPI     Allergies    No Known Allergies    Intolerances          PHYSICAL EXAM  Vital Signs Last 24 Hrs  T(C): 36.7 (04 Mar 2024 01:25), Max: 36.7 (04 Mar 2024 00:20)  T(F): 98 (04 Mar 2024 01:25), Max: 98.1 (04 Mar 2024 00:20)  HR: 97 (04 Mar 2024 01:25) (97 - 98)  BP: 148/72 (04 Mar 2024 01:25) (130/78 - 156/88)  BP(mean): --  RR: 20 (03 Mar 2024 20:00) (20 - 20)  SpO2: 98% (04 Mar 2024 01:25) (95% - 98%)    Parameters below as of 04 Mar 2024 01:25  Patient On (Oxygen Delivery Method): nasal cannula  O2 Flow (L/min): 4      CONSTITUTIONAL:  NAD    ENT:   Airway patent,       EYES:   Clear bilaterally,   pupils equal,   round and reactive to light.    CARDIAC:   Normal rate,   regular rhythm.    b/l pedal edema        RESPIRATORY:  B/l crackles   No wheezing  Normal chest expansion  Not tachypneic,  No use of accessory muscles    GASTROINTESTINAL:  Abdomen soft,   non-tender,   no guarding,   + BS        MUSCULOSKELETAL:   no clubbing, cyanosis    NEUROLOGICAL:   Alert and oriented       SKIN:   Skin normal color for race,             LABS:                          13.9   11.40 )-----------( 257      ( 03 Mar 2024 22:22 )             41.9                                               03-03    136  |  97<L>  |  18  ----------------------------<  126<H>  5.4<H>   |  27  |  0.8    Ca    9.3      03 Mar 2024 22:22    TPro  7.1  /  Alb  4.2  /  TBili  0.5  /  DBili  x   /  AST  50<H>  /  ALT  21  /  AlkPhos  122<H>  03-03                                             Urinalysis Basic - ( 03 Mar 2024 22:22 )    Color: x / Appearance: x / SG: x / pH: x  Gluc: 126 mg/dL / Ketone: x  / Bili: x / Urobili: x   Blood: x / Protein: x / Nitrite: x   Leuk Esterase: x / RBC: x / WBC x   Sq Epi: x / Non Sq Epi: x / Bacteria: x                                                  LIVER FUNCTIONS - ( 03 Mar 2024 22:22 )  Alb: 4.2 g/dL / Pro: 7.1 g/dL / ALK PHOS: 122 U/L / ALT: 21 U/L / AST: 50 U/L / GGT: x                                                                                                MEDICATIONS  (STANDING):  albuterol/ipratropium for Nebulization 3 milliLiter(s) Nebulizer every 6 hours  aspirin enteric coated 81 milliGRAM(s) Oral daily  azithromycin  IVPB 500 milliGRAM(s) IV Intermittent every 24 hours  budesonide 160 MICROgram(s)/formoterol 4.5 MICROgram(s) Inhaler 2 Puff(s) Inhalation two times a day  cefTRIAXone   IVPB 1000 milliGRAM(s) IV Intermittent every 24 hours  chlorhexidine 2% Cloths 1 Application(s) Topical <User Schedule>  enoxaparin Injectable 40 milliGRAM(s) SubCutaneous every 24 hours  fenofibrate Tablet 145 milliGRAM(s) Oral daily  guaifenesin/dextromethorphan Oral Liquid 10 milliLiter(s) Oral every 6 hours  influenza   Vaccine 0.5 milliLiter(s) IntraMuscular once  lisinopril 5 milliGRAM(s) Oral daily  methylPREDNISolone sodium succinate Injectable 40 milliGRAM(s) IV Push every 8 hours  morphine ER Tablet 100 milliGRAM(s) Oral every 12 hours  pantoprazole    Tablet 40 milliGRAM(s) Oral before breakfast  polyethylene glycol 3350 17 Gram(s) Oral two times a day  senna 2 Tablet(s) Oral at bedtime  simvastatin 20 milliGRAM(s) Oral at bedtime    MEDICATIONS  (PRN):  albuterol/ipratropium for Nebulization 3 milliLiter(s) Nebulizer every 4 hours PRN Shortness of Breath and/or Wheezing  ALPRAZolam 2 milliGRAM(s) Oral every 6 hours PRN anxiety  naloxone 1 mG/mL Injection for Intranasal Use 4 milliGRAM(s) IntraNasal every 5 minutes PRN Respiratory rate <12  oxyCODONE    IR 30 milliGRAM(s) Oral every 4 hours PRN Severe Pain (7 - 10)  zolpidem 5 milliGRAM(s) Oral at bedtime PRN Insomnia      X-Rays reviewed:    CXR interpreted by me:
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
## 2024-03-04 NOTE — H&P ADULT - ASSESSMENT
a 58-year-old male with past medical history of hypertension, hyperlipidemia, GERD, COPD, REBECCA on BIPAP, HFpEF last EF 65%. Presented with shortness of breath, admitted for further evaluation and management.      # Acute hypercapnic respiratory failure   # Mostly combined picture of acute COPD and HFpEF exacerbation (Orthopnea+LL edema+Wheezing)  # Hx of REBECCA, not compliant with BIPAP   # HFpEF last EF 65%   - On home Lasix 40 mg, Advair 500-50, Spiriva, Albuterol   - CXR showing bilateral mild interstitial edema with cephalization  - WBC 11.4K, VBG pH 7.25, HCO3 31, PCO2 68 on admission. Repeat VBG slightly improves   - S/P azithromycin, Solumedrol, Duoneb in ED  - Start Lasix 40mg IVX2 for 2 days then back to oral  - Start Azithromycin  - Send RVP   - Start Duoneb q4h and as needed  - Start Symbicort   - Start Solumedrol  a 58-year-old male with past medical history of hypertension, hyperlipidemia, GERD, COPD, REBECCA on BIPAP, HFpEF last EF 65%. Presented with shortness of breath, admitted for further evaluation and management.      # Acute hypercapnic respiratory failure   # Mostly combined picture of acute COPD and HFpEF exacerbation (Orthopnea+LL edema+Wheezing)  # Hx of REBECCA, not compliant with BIPAP   # HFpEF last EF 65%   - On home Lasix 40 mg, Advair 500-50, Spiriva, Albuterol   - CXR showing bilateral mild interstitial edema with cephalization  - WBC 11.4K, VBG pH 7.25, HCO3 31, PCO2 68 on admission. Repeat VBG slightly improves   - S/P azithromycin, Solumedrol, Duoneb in ED  - Start Lasix 40mg IVX2 for 2 days then back to oral  - Start Azithromycin  - Send RVP   - Start Duoneb q4h and as needed  - Start Symbicort   - Start Solumedrol 40X1   - Monitor urine output       # HTN  # HLD  # GERD   - Continue home lisinopril, simvastatin, fenofibrate, omeprazole, aspirin    # Diffuse bone pain  # Multiple previous surgeries  - Continue home morphine ER 100X2, oxycodone 30 PRN  - Start senna and miralax    # Generalized anxiety  # Insomnia  - Continue home Xanax and zolpidem ABT      # GI prophylaxis: omeprazole   # DVT prophylaxis: Lovenox   # Full code  # Diet: Regular, DASH

## 2024-03-04 NOTE — H&P ADULT - NSHPLABSRESULTS_GEN_ALL_CORE
13.9   11.40 )-----------( 257      ( 03 Mar 2024 22:22 )             41.9       03-03    136  |  97<L>  |  18  ----------------------------<  126<H>  5.4<H>   |  27  |  0.8    Ca    9.3      03 Mar 2024 22:22    TPro  7.1  /  Alb  4.2  /  TBili  0.5  /  DBili  x   /  AST  50<H>  /  ALT  21  /  AlkPhos  122<H>  03-03              Urinalysis Basic - ( 03 Mar 2024 22:22 )    Color: x / Appearance: x / SG: x / pH: x  Gluc: 126 mg/dL / Ketone: x  / Bili: x / Urobili: x   Blood: x / Protein: x / Nitrite: x   Leuk Esterase: x / RBC: x / WBC x   Sq Epi: x / Non Sq Epi: x / Bacteria: x            Lactate Trend            CAPILLARY BLOOD GLUCOSE

## 2024-03-04 NOTE — H&P ADULT - HISTORY OF PRESENT ILLNESS
a 58-year-old male with past medical history of hypertension, hyperlipidemia, GERD, COPD, REBECCA on BIPAP, HFpEF last E 65%. He presented to the ED complaining from shortness of breath that started 4 days ago, much worse compared to the baseline shortness of breath he has. It was limiting his daily activities. It is associated with orthopnea, worsening cough and whitish sputum production. He had feeling of hotness but no documented fevers or chills.     Vital Signs T(F): 98 HR: 97 BP: 148/72 RR: 20 SpO2: 98% nasal cannula O2 Flow (L/min): 4    CXR showing bilateral mild interstitial edema with cephalization     S/P azithromycin, Solumedrol   Admitted to medicine for further evaluation        a 58-year-old male with past medical history of hypertension, hyperlipidemia, GERD, COPD, REBECCA on BIPAP, HFpEF last EF 65%. e presented to the ED complaining from shortness of breath that started 4 days ago, much worse compared to the baseline shortness of breath he has. It was limiting his daily activities. It is associated with orthopnea, worsening cough and whitish sputum production. He had feeling of hotness but no documented fevers or chills.     Vital Signs T(F): 98 HR: 97 BP: 148/72 RR: 20 SpO2: 98% nasal cannula O2 Flow (L/min): 4    CXR showing bilateral mild interstitial edema with cephalization     S/P azithromycin, Solumedrol   Admitted to medicine for further evaluation

## 2024-03-04 NOTE — H&P ADULT - NSHPPHYSICALEXAM_GEN_ALL_CORE
PHYSICAL EXAM:  GENERAL: NAD  EYES: conjunctiva and sclera clear  ENMT: No tonsillar erythema, exudates, or enlargement; Moist mucous membranes  NECK: Supple, No JVD, Normal thyroid  HEART: Regular rate and rhythm; No murmurs, rubs, or gallops, Normal S1 S2   RESPIRATORY: decreased air entry bilaterally with diffuse wheezing and crackles   ABDOMEN: Soft, Nontender, distended; Bowel sounds present  NEUROLOGY: A&Ox3, grossly intact power and sensation   EXTREMITIES:  bilateral pitting lower limb edema   SKIN: warm, dry, normal color, no rash or abnormal lesions

## 2024-03-05 ENCOUNTER — TRANSCRIPTION ENCOUNTER (OUTPATIENT)
Age: 59
End: 2024-03-05

## 2024-03-05 VITALS
SYSTOLIC BLOOD PRESSURE: 131 MMHG | OXYGEN SATURATION: 91 % | DIASTOLIC BLOOD PRESSURE: 75 MMHG | TEMPERATURE: 98 F | RESPIRATION RATE: 17 BRPM | HEART RATE: 105 BPM

## 2024-03-05 LAB
ALBUMIN SERPL ELPH-MCNC: 4 G/DL — SIGNIFICANT CHANGE UP (ref 3.5–5.2)
ALP SERPL-CCNC: 131 U/L — HIGH (ref 30–115)
ALT FLD-CCNC: 20 U/L — SIGNIFICANT CHANGE UP (ref 0–41)
ANION GAP SERPL CALC-SCNC: 15 MMOL/L — HIGH (ref 7–14)
AST SERPL-CCNC: 21 U/L — SIGNIFICANT CHANGE UP (ref 0–41)
BASOPHILS # BLD AUTO: 0.01 K/UL — SIGNIFICANT CHANGE UP (ref 0–0.2)
BASOPHILS NFR BLD AUTO: 0.1 % — SIGNIFICANT CHANGE UP (ref 0–1)
BILIRUB SERPL-MCNC: 0.3 MG/DL — SIGNIFICANT CHANGE UP (ref 0.2–1.2)
BUN SERPL-MCNC: 15 MG/DL — SIGNIFICANT CHANGE UP (ref 10–20)
CALCIUM SERPL-MCNC: 9.8 MG/DL — SIGNIFICANT CHANGE UP (ref 8.4–10.4)
CHLORIDE SERPL-SCNC: 92 MMOL/L — LOW (ref 98–110)
CO2 SERPL-SCNC: 29 MMOL/L — SIGNIFICANT CHANGE UP (ref 17–32)
CREAT SERPL-MCNC: 0.7 MG/DL — SIGNIFICANT CHANGE UP (ref 0.7–1.5)
EGFR: 108 ML/MIN/1.73M2 — SIGNIFICANT CHANGE UP
EOSINOPHIL # BLD AUTO: 0 K/UL — SIGNIFICANT CHANGE UP (ref 0–0.7)
EOSINOPHIL NFR BLD AUTO: 0 % — SIGNIFICANT CHANGE UP (ref 0–8)
GLUCOSE SERPL-MCNC: 142 MG/DL — HIGH (ref 70–99)
HCT VFR BLD CALC: 39.6 % — LOW (ref 42–52)
HGB BLD-MCNC: 13.2 G/DL — LOW (ref 14–18)
IMM GRANULOCYTES NFR BLD AUTO: 0.5 % — HIGH (ref 0.1–0.3)
LACTATE SERPL-SCNC: 2.8 MMOL/L — HIGH (ref 0.7–2)
LEGIONELLA AG UR QL: NEGATIVE — SIGNIFICANT CHANGE UP
LYMPHOCYTES # BLD AUTO: 0.87 K/UL — LOW (ref 1.2–3.4)
LYMPHOCYTES # BLD AUTO: 6.6 % — LOW (ref 20.5–51.1)
MAGNESIUM SERPL-MCNC: 2.1 MG/DL — SIGNIFICANT CHANGE UP (ref 1.8–2.4)
MCHC RBC-ENTMCNC: 29.6 PG — SIGNIFICANT CHANGE UP (ref 27–31)
MCHC RBC-ENTMCNC: 33.3 G/DL — SIGNIFICANT CHANGE UP (ref 32–37)
MCV RBC AUTO: 88.8 FL — SIGNIFICANT CHANGE UP (ref 80–94)
MONOCYTES # BLD AUTO: 0.55 K/UL — SIGNIFICANT CHANGE UP (ref 0.1–0.6)
MONOCYTES NFR BLD AUTO: 4.2 % — SIGNIFICANT CHANGE UP (ref 1.7–9.3)
NEUTROPHILS # BLD AUTO: 11.62 K/UL — HIGH (ref 1.4–6.5)
NEUTROPHILS NFR BLD AUTO: 88.6 % — HIGH (ref 42.2–75.2)
NRBC # BLD: 0 /100 WBCS — SIGNIFICANT CHANGE UP (ref 0–0)
PLATELET # BLD AUTO: 273 K/UL — SIGNIFICANT CHANGE UP (ref 130–400)
PMV BLD: 11 FL — HIGH (ref 7.4–10.4)
POTASSIUM SERPL-MCNC: 5.5 MMOL/L — HIGH (ref 3.5–5)
POTASSIUM SERPL-SCNC: 5.5 MMOL/L — HIGH (ref 3.5–5)
PROCALCITONIN SERPL-MCNC: 0.07 NG/ML — SIGNIFICANT CHANGE UP (ref 0.02–0.1)
PROT SERPL-MCNC: 6.5 G/DL — SIGNIFICANT CHANGE UP (ref 6–8)
RAPID RVP RESULT: SIGNIFICANT CHANGE UP
RBC # BLD: 4.46 M/UL — LOW (ref 4.7–6.1)
RBC # FLD: 15.5 % — HIGH (ref 11.5–14.5)
S PNEUM AG UR QL: NEGATIVE — SIGNIFICANT CHANGE UP
SARS-COV-2 RNA SPEC QL NAA+PROBE: SIGNIFICANT CHANGE UP
SODIUM SERPL-SCNC: 136 MMOL/L — SIGNIFICANT CHANGE UP (ref 135–146)
WBC # BLD: 13.11 K/UL — HIGH (ref 4.8–10.8)
WBC # FLD AUTO: 13.11 K/UL — HIGH (ref 4.8–10.8)

## 2024-03-05 PROCEDURE — 99239 HOSP IP/OBS DSCHRG MGMT >30: CPT

## 2024-03-05 RX ORDER — SODIUM ZIRCONIUM CYCLOSILICATE 10 G/10G
10 POWDER, FOR SUSPENSION ORAL ONCE
Refills: 0 | Status: COMPLETED | OUTPATIENT
Start: 2024-03-05 | End: 2024-03-05

## 2024-03-05 RX ORDER — NICOTINE POLACRILEX 2 MG
1 GUM BUCCAL DAILY
Refills: 0 | Status: DISCONTINUED | OUTPATIENT
Start: 2024-03-05 | End: 2024-03-05

## 2024-03-05 RX ORDER — MUPIROCIN 20 MG/G
1 OINTMENT TOPICAL
Refills: 0 | Status: DISCONTINUED | OUTPATIENT
Start: 2024-03-05 | End: 2024-03-05

## 2024-03-05 RX ORDER — AZITHROMYCIN 500 MG/1
1 TABLET, FILM COATED ORAL
Qty: 2 | Refills: 0
Start: 2024-03-05 | End: 2024-03-06

## 2024-03-05 RX ORDER — GUAIFENESIN/DEXTROMETHORPHAN 600MG-30MG
10 TABLET, EXTENDED RELEASE 12 HR ORAL
Qty: 560 | Refills: 0
Start: 2024-03-05 | End: 2024-03-18

## 2024-03-05 RX ADMIN — Medication 10 MILLILITER(S): at 06:06

## 2024-03-05 RX ADMIN — AZITHROMYCIN 255 MILLIGRAM(S): 500 TABLET, FILM COATED ORAL at 09:29

## 2024-03-05 RX ADMIN — LISINOPRIL 5 MILLIGRAM(S): 2.5 TABLET ORAL at 06:06

## 2024-03-05 RX ADMIN — Medication 10 MILLILITER(S): at 01:32

## 2024-03-05 RX ADMIN — BUDESONIDE AND FORMOTEROL FUMARATE DIHYDRATE 2 PUFF(S): 160; 4.5 AEROSOL RESPIRATORY (INHALATION) at 09:27

## 2024-03-05 RX ADMIN — POLYETHYLENE GLYCOL 3350 17 GRAM(S): 17 POWDER, FOR SOLUTION ORAL at 06:06

## 2024-03-05 RX ADMIN — CEFTRIAXONE 100 MILLIGRAM(S): 500 INJECTION, POWDER, FOR SOLUTION INTRAMUSCULAR; INTRAVENOUS at 09:29

## 2024-03-05 RX ADMIN — CHLORHEXIDINE GLUCONATE 1 APPLICATION(S): 213 SOLUTION TOPICAL at 06:06

## 2024-03-05 RX ADMIN — OXYCODONE HYDROCHLORIDE 30 MILLIGRAM(S): 5 TABLET ORAL at 07:59

## 2024-03-05 RX ADMIN — Medication 40 MILLIGRAM(S): at 06:06

## 2024-03-05 RX ADMIN — PANTOPRAZOLE SODIUM 40 MILLIGRAM(S): 20 TABLET, DELAYED RELEASE ORAL at 06:13

## 2024-03-05 RX ADMIN — Medication 3 MILLILITER(S): at 14:28

## 2024-03-05 RX ADMIN — Medication 81 MILLIGRAM(S): at 11:36

## 2024-03-05 RX ADMIN — OXYCODONE HYDROCHLORIDE 30 MILLIGRAM(S): 5 TABLET ORAL at 12:45

## 2024-03-05 RX ADMIN — MORPHINE SULFATE 100 MILLIGRAM(S): 50 CAPSULE, EXTENDED RELEASE ORAL at 06:06

## 2024-03-05 RX ADMIN — OXYCODONE HYDROCHLORIDE 30 MILLIGRAM(S): 5 TABLET ORAL at 13:30

## 2024-03-05 RX ADMIN — Medication 145 MILLIGRAM(S): at 11:36

## 2024-03-05 RX ADMIN — Medication 2 MILLIGRAM(S): at 07:55

## 2024-03-05 RX ADMIN — Medication 10 MILLILITER(S): at 11:38

## 2024-03-05 RX ADMIN — OXYCODONE HYDROCHLORIDE 30 MILLIGRAM(S): 5 TABLET ORAL at 09:40

## 2024-03-05 RX ADMIN — OXYCODONE HYDROCHLORIDE 30 MILLIGRAM(S): 5 TABLET ORAL at 01:31

## 2024-03-05 RX ADMIN — Medication 3 MILLILITER(S): at 08:16

## 2024-03-05 RX ADMIN — SODIUM ZIRCONIUM CYCLOSILICATE 10 GRAM(S): 10 POWDER, FOR SUSPENSION ORAL at 11:38

## 2024-03-05 NOTE — DISCHARGE NOTE NURSING/CASE MANAGEMENT/SOCIAL WORK - NSDCPEEMAIL_GEN_ALL_CORE
[facility] for Tobacco Control email [email or web address]

## 2024-03-05 NOTE — DISCHARGE NOTE NURSING/CASE MANAGEMENT/SOCIAL WORK - PATIENT PORTAL LINK FT
You can access the FollowMyHealth Patient Portal offered by [facility] by registering at the following website: [email or web address]. By joining [facility]’s FollowMyHealth portal, you will also be able to view your health information using other applications (apps) compatible with our system.

## 2024-03-05 NOTE — DISCHARGE NOTE PROVIDER - CARE PROVIDERS DIRECT ADDRESSES
[email or web address]

## 2024-03-05 NOTE — PROGRESS NOTE ADULT - SUBJECTIVE AND OBJECTIVE BOX
GENOVEVA KAYLEIGH  [facility] 3A 023 C (SI-N 3A)        Patient was evaluated and examined  by bedside, feeling better today, still gets mild dyspnea during ambulation, pulse ox range between 90-93% on RA during ambulation( personally assessed ) . no fever. tolerating diet well. no legs swelling .       REVIEW OF SYSTEMS:  please see pertinent positives mentioned above, all other 12 ROS negative      T(C): , Max: 36.4 (03-05-24 @ 12:30)  HR: 105 (03-05-24 @ 12:30)  BP: 131/75 (03-05-24 @ 12:30)  RR: 17 (03-05-24 @ 12:30)  SpO2: 91% (03-05-24 @ 12:30)  CAPILLARY BLOOD GLUCOSE          PHYSICAL EXAM:  General: NAD, AAOX3, patient is sitting comfortably in bed, obese   HEENT: AT, NC, Supple, NO JVD, NO CB  Lungs: improved breath sounds  B/L, very minimal expiratory  wheezing, no rhonchi  CVS: normal S1, S2, RRR, NO M/G/R  Abdomen: soft, bowel sounds present, non-tender, non-distended  Extremities: no edema, no clubbing, no cyanosis, positive peripheral pulses b/l  Neuro: no acute focal neurological deficits  Skin: no rash, no ecchymosis      LAB  CBC  Date: 03-05-24 @ 07:28  Mean cell Gzotdekxqx95.6  Mean cell Hemoglobin Conc33.3  Mean cell Volum 88.8  Platelet count-Automate 273  RBC Count 4.46  Red Cell Distrib Width15.5  WBC Count13.11  % Albumin, Urine--  Hematocrit 39.6  Hemoglobin 13.2  CBC  Date: 03-04-24 @ 11:39  Mean cell Wxryujmmxe38.5  Mean cell Hemoglobin Conc34.1  Mean cell Volum 86.7  Platelet count-Automate 258  RBC Count 4.57  Red Cell Distrib Width15.0  WBC Count8.13  % Albumin, Urine--  Hematocrit 39.6  Hemoglobin 13.5  CBC  Date: 03-03-24 @ 22:22  Mean cell Qxahkktcnk28.5  Mean cell Hemoglobin Conc33.2  Mean cell Volum 89.0  Platelet count-Automate 257  RBC Count 4.71  Red Cell Distrib Width15.1  WBC Count11.40  % Albumin, Urine--  Hematocrit 41.9  Hemoglobin 13.9    BMP  03-05-24 @ 07:28  Blood Gas Arterial-Calcium,Ionized--  Blood Urea Nitrogen, Serum 15 mg/dL [10 - 20]  Carbon Dioxide, Serum29 mmol/L [17 - 32]  Chloride, Serum92 mmol/L<L> [98 - 110]  Creatinie, Serum0.7 mg/dL [0.7 - 1.5]  Glucose, Vqtkt661 mg/dL<H> [70 - 99]  Potassium, Serum5.5 mmol/L<H> [3.5 - 5.0]  Sodium, Serum 136 mmol/L [135 - 146]  [facility]  03-04-24 @ 11:39  Blood Gas Arterial-Calcium,Ionized--  Blood Urea Nitrogen, Serum 14 mg/dL [10 - 20]  Carbon Dioxide, Serum26 mmol/L [17 - 32]  Chloride, Serum95 mmol/L<L> [98 - 110]  Creatinie, Serum0.7 mg/dL [0.7 - 1.5]  Glucose, Cgxmf904 mg/dL<H> [70 - 99]  Potassium, Serum5.0 mmol/L [3.5 - 5.0]  Sodium, Serum 135 mmol/L [135 - 146]  [facility]  03-03-24 @ 22:22  Blood Gas Arterial-Calcium,Ionized--  Blood Urea Nitrogen, Serum 18 mg/dL [10 - 20]  Carbon Dioxide, Serum27 mmol/L [17 - 32]  Chloride, Serum97 mmol/L<L> [98 - 110]  Creatinie, Serum0.8 mg/dL [0.7 - 1.5]  Glucose, Mujwo705 mg/dL<H> [70 - 99]  Potassium, Serum5.4 mmol/L<H> [3.5 - 5.0] [Hemolyzed. Interpret with caution]  Sodium, Serum 136 mmol/L [135 - 146]        Medications:  albuterol/ipratropium for Nebulization 3 milliLiter(s) Nebulizer every 4 hours PRN  albuterol/ipratropium for Nebulization 3 milliLiter(s) Nebulizer every 6 hours  ALPRAZolam 2 milliGRAM(s) Oral every 6 hours PRN  aspirin enteric coated 81 milliGRAM(s) Oral daily  azithromycin  IVPB 500 milliGRAM(s) IV Intermittent every 24 hours  budesonide 160 MICROgram(s)/formoterol 4.5 MICROgram(s) Inhaler 2 Puff(s) Inhalation two times a day  cefTRIAXone   IVPB 1000 milliGRAM(s) IV Intermittent every 24 hours  chlorhexidine 2% Cloths 1 Application(s) Topical <User Schedule>  enoxaparin Injectable 40 milliGRAM(s) SubCutaneous every 24 hours  fenofibrate Tablet 145 milliGRAM(s) Oral daily  guaifenesin/dextromethorphan Oral Liquid 10 milliLiter(s) Oral every 6 hours  influenza   Vaccine 0.5 milliLiter(s) IntraMuscular once  lisinopril 5 milliGRAM(s) Oral daily  morphine ER Tablet 100 milliGRAM(s) Oral every 12 hours  mupirocin 2% Nasal 1 Application(s) Both Nostrils two times a day  naloxone 1 mG/mL Injection for Intranasal Use 4 milliGRAM(s) IntraNasal every 5 minutes PRN  nicotine -   7 mG/24Hr(s) Patch 1 Patch Transdermal daily  oxyCODONE    IR 30 milliGRAM(s) Oral every 4 hours PRN  pantoprazole    Tablet 40 milliGRAM(s) Oral before breakfast  polyethylene glycol 3350 17 Gram(s) Oral two times a day  senna 2 Tablet(s) Oral at bedtime  simvastatin 20 milliGRAM(s) Oral at bedtime  zolpidem 5 milliGRAM(s) Oral at bedtime PRN        Assessment and Plan:  Patient is a 59 y/o  male with PMH of  hypertension, hyperlipidemia, GERD, COPD, REBECCA on BIPAP, HFpEF last EF 65%. Presented with shortness of breath, dx. with Acute Hypoxic respiratory failure due to COPD exacerbation.       # Acute hypoxic respiratory failure due to COPD exacerbation  - CXR: < from: Xray Chest 1 View- PORTABLE-Urgent (03.03.24 @ 20:48) >FINDINGS/IMPRESSION:  No focal consolidation, pneumothorax or pleural effusion.  Stable cardiomediastinal silhouette.  Unchanged osseous structures.  --- End of Report ---  - ? lower left lung infiltrate on personal CXR eval - started on Rocephin/Zithromax tx,  serum procal- 0.07, blood cxs- neg, strep urine ag neg, MRSA swab positive- start on topical nasal bactroban or mupirocin application.   deescalate abx. today to PO Zithromax to complete total of 5 days course   - 3/5/24- assessed  pulse ox on RA, sat 90-93% during ambulation on RA  - tapered  IV  solumedrol to PO Prednisone 40 mg po once daily x 5 days   - Duonebs/inhalers/ cough suppressant tx. at home   - outpatient PFTs with Pulmonary specialist f/up       # Hx of REBECCA, not compliant with BIPAP - consulted Pulmonary team , f/up rec.  - restarted on nocturnal BIPAP tx.,       # Lactic acidosis- due to mild hypoxia- trending down  , tx underlying cause    # HFpEF last EF 65%- in stable euvolemic state- restart pt. on PO lasix 40 mg once daily tx.  - outpatient Cardiology specialist f/up , resume on home regimen tx.      # Chronic back pain - on high doses of morphine at home , resumed on home meds tx.    # Obesity - outpatient weight loss tx.     # Mild Hyperkalemia- post lokelma tx, outpatient BMP monitoring     GI/DVT proph.     Code status: full code    #Progress Note Handoff: Patient was medically optimized, stable for d/c home today with outpatient Pulmonary specialist f/up   Family discussion: Patient verbalized good understanding of discharge instructions and agreed with discharge plan.  Disposition: Home__today    Total time spent to complete patient's bedside assessment, review medical chart, discuss discharge  plan of care with covering medical team was more than 35 minutes with >50% of time spent face to face with patient, discussion with patient/family and/or coordination of care

## 2024-03-05 NOTE — DISCHARGE NOTE NURSING/CASE MANAGEMENT/SOCIAL WORK - NSDCVIVACCINE_GEN_ALL_CORE_FT
influenza, injectable, quadrivalent, preservative free; 19-Oct-2018 18:53; Zina Muro (RN); [employer]; [id number] (Exp. Date: 30-Jun-2019); IntraMuscular; Deltoid Left.; 0.5 milliLiter(s); VIS (VIS Published: 07-Aug-2015, VIS Presented: 19-Oct-2018);   Tdap; 03-Feb-2023 23:21; Leonor Meehan (VEE); [employer]; [id number] (Exp. Date: 08-Dec-2024); IntraMuscular; Deltoid Left.; 0.5 milliLiter(s); VIS (VIS Published: 09-May-2013, VIS Presented: 03-Feb-2023);

## 2024-03-05 NOTE — DISCHARGE NOTE PROVIDER - CARE PROVIDER_API CALL
Murali Brooks  Pulmonary Disease  [address] [place] [address]  Phone: [phone]  Fax: [phone]  Established Patient  Follow Up Time: 1 week    Edison Davis  Internal Medicine  [address] [place] [address]  Phone: [phone]  Fax: [phone]  Established Patient  Follow Up Time: 1 week    Erwin Wen  Cardiovascular Disease  [address], [address]  [place] [address]  Phone: [phone]  Fax: [phone]  Established Patient  Follow Up Time: 2 weeks

## 2024-03-05 NOTE — DISCHARGE NOTE PROVIDER - PROVIDER TOKENS
PROVIDER:[TOKEN:[[id number]],FOLLOWUP:[1 week],ESTABLISHEDPATIENT:[T]],PROVIDER:[TOKEN:[[id number]],FOLLOWUP:[1 week],ESTABLISHEDPATIENT:[T]],PROVIDER:[TOKEN:[[id number]],FOLLOWUP:[2 weeks],ESTABLISHEDPATIENT:[T]]

## 2024-03-05 NOTE — DISCHARGE NOTE NURSING/CASE MANAGEMENT/SOCIAL WORK - NSDCPEWEB_GEN_ALL_CORE
[facility] for Tobacco Control website --- [email or web address] website --- [email or web address]

## 2024-03-05 NOTE — DISCHARGE NOTE PROVIDER - NSDCMRMEDTOKEN_GEN_ALL_CORE_FT
Advair Diskus 500 mcg-50 mcg inhalation powder: 1 puff(s) inhaled 2 times a day  albuterol 90 mcg/inh inhalation aerosol: 2 puff(s) inhaled every 6 hours AS NEEDED   ALPRAZolam 2 mg oral tablet: 1 tab(s) orally every 6 hours, As needed, anxiety  Ambien 10 mg oral tablet: 0.5 tab(s) orally once a day (at bedtime), As Needed  Aspir 81 oral delayed release tablet: 1 tab(s) orally once a day  fenofibrate 134 mg oral capsule: 1 cap(s) orally once a day  Lasix 40 mg oral tablet: 1 tab(s) orally once a day  lisinopril 5 mg oral tablet: 1 tab(s) orally once a day  morphine 100 mg/12 to 24 hr oral capsule, extended release: 1 cap(s) orally 2 times a day  Narcan 4 mg/0.1 mL nasal spray: 4 milligram(s) intranasally once a day as needed for  opioid overdose  omeprazole 40 mg oral delayed release capsule: 1 cap(s) orally once a day  oxyCODONE 30 mg oral tablet: 1 tab(s) orally every 4 hours, As needed, Severe Pain (7 - 10) MDD:180mg  Spiriva HandiHaler 18 mcg inhalation capsule: 1 cap(s) inhaled once a day   Zocor 20 mg oral tablet: 1 tab(s) orally once a day (at bedtime)   Advair Diskus 500 mcg-50 mcg inhalation powder: 1 puff(s) inhaled 2 times a day  albuterol 90 mcg/inh inhalation aerosol: 2 puff(s) inhaled every 6 hours AS NEEDED   ALPRAZolam 2 mg oral tablet: 1 tab(s) orally every 6 hours, As needed, anxiety  Ambien 10 mg oral tablet: 0.5 tab(s) orally once a day (at bedtime), As Needed  Aspir 81 oral delayed release tablet: 1 tab(s) orally once a day  dextromethorphan-guaifenesin 10 mg-100 mg/10 mL oral liquid: 10 milliliter(s) orally every 6 hours as needed for  cough  fenofibrate 134 mg oral capsule: 1 cap(s) orally once a day  Lasix 40 mg oral tablet: 1 tab(s) orally once a day  lisinopril 5 mg oral tablet: 1 tab(s) orally once a day  morphine 100 mg/12 to 24 hr oral capsule, extended release: 1 cap(s) orally 2 times a day  Narcan 4 mg/0.1 mL nasal spray: 4 milligram(s) intranasally once a day as needed for  opioid overdose  omeprazole 40 mg oral delayed release capsule: 1 cap(s) orally once a day  oxyCODONE 30 mg oral tablet: 1 tab(s) orally every 4 hours, As needed, Severe Pain (7 - 10) MDD:180mg  predniSONE 20 mg oral tablet: 2 tab(s) orally once a day Take 2 tablets daily from 3/6-3/9  Spiriva HandiHaler 18 mcg inhalation capsule: 1 cap(s) inhaled once a day   Zithromax 500 mg oral tablet: 1 tab(s) orally once a day Take 1 tablet daily on 3/6 and 3/7.  Zocor 20 mg oral tablet: 1 tab(s) orally once a day (at bedtime)

## 2024-03-05 NOTE — DISCHARGE NOTE NURSING/CASE MANAGEMENT/SOCIAL WORK - NSDCPEFALRISK_GEN_ALL_CORE
For information on Fall & Injury Prevention, visit: [email or web address] OR  [email or web address] OR  https://www.cdc.gov/steadi/patient.html

## 2024-03-05 NOTE — DISCHARGE NOTE PROVIDER - HOSPITAL COURSE
58-year-old male with past medical history of hypertension, hyperlipidemia, GERD, COPD, REBECCA on BIPAP, HFpEF last EF 65%. e presented to the ED complaining from shortness of breath that started 4 days ago, much worse compared to the baseline shortness of breath he has. It was limiting his daily activities. It is associated with orthopnea, worsening cough and whitish sputum production. He had feeling of hotness but no documented fevers or chills.     Vital Signs T(F): 98 HR: 97 BP: 148/72 RR: 20 SpO2: 98% nasal cannula O2 Flow (L/min): 4    CXR showing bilateral mild interstitial edema with cephalization     S/P azithromycin, Solumedrol   Admitted to medicine for further evaluation     #Acute hypoxic respiratory failure due to COPD exacerbation  - CXR: No focal consolidation, pneumothorax or pleural effusion. Stable cardiomediastinal silhouette. Unchanged osseous structures  - On subjective read appears to have mild L lower lung infiltrate   - Received Rocephin and Azithromycin  - Procal returned 0.07  - Patient is oxygenating well on RA, walks around unit w/o NC  - Will be discharged on PO Azithromycin and Prednisone to complete course  - Will need outpatient f/u w/ Pulmonology, follows w/ Dr. Brooks    #Hx of REBECCA, not compliant with BIPAP -  - Started on nocturnal BIPAP per Pulm recommendations     #Lactic Acidosis due to mild hypoxia  - Resolved over treatment course w/ steroids and abx    #HFpEF last EF 65%  - Euvolemic on exam  - Will restart home lasix on D/C  - F/u o/p w/ Cardiologist    #Chronic pain syndrome   - C/w home pain medications    Patient is currently medically stable for discharge with oral steroids and antibiotics to complete his course. He will need to follow up with his outpatient pulmonologist, cardiologist, and primary care physician.   58-year-old male with past medical history of hypertension, hyperlipidemia, GERD, COPD, REBECCA on BIPAP, HFpEF last EF 65%. e presented to the ED complaining from shortness of breath that started 4 days ago, much worse compared to the baseline shortness of breath he has. It was limiting his daily activities. It is associated with orthopnea, worsening cough and whitish sputum production. He had feeling of hotness but no documented fevers or chills.     Vital Signs T(F): 98 HR: 97 BP: 148/72 RR: 20 SpO2: 98% nasal cannula O2 Flow (L/min): 4    CXR showing bilateral mild interstitial edema with cephalization     S/P azithromycin, Solumedrol   Admitted to medicine for further evaluation     #Acute hypoxic respiratory failure due to COPD exacerbation  - CXR: No focal consolidation, pneumothorax or pleural effusion. Stable cardiomediastinal silhouette. Unchanged osseous structures  - On subjective read appears to have mild L lower lung infiltrate   - Received Rocephin and Azithromycin  - Procal returned 0.07  - Patient is oxygenating well on RA, walks around unit w/o NC  - Will be discharged on PO Azithromycin and Prednisone to complete course  - Will need outpatient f/u w/ Pulmonology, follows w/ Dr. Brooks    #Hx of REBECCA, not compliant with BIPAP -  - Started on nocturnal BIPAP per Pulm recommendations     #Lactic Acidosis due to mild hypoxia  - Resolved over treatment course w/ steroids and abx    #HFpEF last EF 65%  - Euvolemic on exam  - Will restart home lasix on D/C  - F/u o/p w/ Cardiologist    #Chronic pain syndrome   - C/w home pain medications    Patient is currently medically stable for discharge with oral steroids and antibiotics to complete his course. He will need to follow up with his outpatient pulmonologist, cardiologist, and primary care physician.    For Primary Care Physician: Please repeat CBC and BMP 1 week after discharge  For Pulmonary: F/u for repeat PFTs

## 2024-03-05 NOTE — DISCHARGE NOTE PROVIDER - NSDCCPCAREPLAN_GEN_ALL_CORE_FT
PRINCIPAL DISCHARGE DIAGNOSIS  Diagnosis: COPD exacerbation  Assessment and Plan of Treatment: You were found to have a COPD exacerbation which caused your oxygen levels to decrease. You were treated with supplemental oxygen therapy, steroids, and antibiotics. Your oxygen saturation levels improved with treatment and you will be discharged with oral antibiotics and steroid medications to fully complete your course of treatment.   It is necessary to take your medications as they are prescribed to you. Additionally, ensure to follow up with your Pulomonlogist, Primary care physician, and cardiologist.  If you start to experience worsening shortness of breath, chest pain, dizziness, or fall, call 911 and return to the hospital right away for further evaluation.     PRINCIPAL DISCHARGE DIAGNOSIS  Diagnosis: COPD exacerbation  Assessment and Plan of Treatment: You were found to have a COPD exacerbation which caused your oxygen levels to decrease. You were treated with supplemental oxygen therapy, steroids, and antibiotics. Your oxygen saturation levels improved with treatment and you will be discharged with oral antibiotics and steroid medications to fully complete your course of treatment.   It is necessary to take your medications as they are prescribed to you. Additionally, ensure to follow up with your Pulomonlogist, Primary care physician, and cardiologist.  If you start to experience worsening shortness of breath, chest pain, dizziness, or fall, call 911 and return to the hospital right away for further evaluation.  Instructions are included to repeat lab work (complete blood count and basic metabolic panel) in one week with your primary doctor. Ensure to follow up with your pulmonoloigst to repeat your lung function tests.

## 2024-03-07 ENCOUNTER — EMERGENCY (EMERGENCY)
Facility: HOSPITAL | Age: 59
LOS: 0 days | Discharge: HOME | End: 2024-03-07
Attending: EMERGENCY MEDICINE
Payer: MEDICAID

## 2024-03-07 VITALS
HEART RATE: 95 BPM | RESPIRATION RATE: 18 BRPM | OXYGEN SATURATION: 94 % | SYSTOLIC BLOOD PRESSURE: 134 MMHG | DIASTOLIC BLOOD PRESSURE: 85 MMHG

## 2024-03-07 VITALS
OXYGEN SATURATION: 95 % | SYSTOLIC BLOOD PRESSURE: 162 MMHG | WEIGHT: 279.99 LBS | HEART RATE: 87 BPM | TEMPERATURE: 99 F | HEIGHT: 70 IN | RESPIRATION RATE: 18 BRPM | DIASTOLIC BLOOD PRESSURE: 103 MMHG

## 2024-03-07 DIAGNOSIS — Z98.890 OTHER SPECIFIED POSTPROCEDURAL STATES: Chronic | ICD-10-CM

## 2024-03-07 DIAGNOSIS — I10 ESSENTIAL (PRIMARY) HYPERTENSION: ICD-10-CM

## 2024-03-07 DIAGNOSIS — J44.9 CHRONIC OBSTRUCTIVE PULMONARY DISEASE, UNSPECIFIED: ICD-10-CM

## 2024-03-07 DIAGNOSIS — Z96.651 PRESENCE OF RIGHT ARTIFICIAL KNEE JOINT: Chronic | ICD-10-CM

## 2024-03-07 DIAGNOSIS — K21.9 GASTRO-ESOPHAGEAL REFLUX DISEASE WITHOUT ESOPHAGITIS: ICD-10-CM

## 2024-03-07 DIAGNOSIS — R06.02 SHORTNESS OF BREATH: ICD-10-CM

## 2024-03-07 DIAGNOSIS — E78.5 HYPERLIPIDEMIA, UNSPECIFIED: ICD-10-CM

## 2024-03-07 DIAGNOSIS — K40.20 BILATERAL INGUINAL HERNIA, WITHOUT OBSTRUCTION OR GANGRENE, NOT SPECIFIED AS RECURRENT: Chronic | ICD-10-CM

## 2024-03-07 DIAGNOSIS — F17.200 NICOTINE DEPENDENCE, UNSPECIFIED, UNCOMPLICATED: ICD-10-CM

## 2024-03-07 LAB
ALBUMIN SERPL ELPH-MCNC: 3.9 G/DL — SIGNIFICANT CHANGE UP (ref 3.5–5.2)
ALP SERPL-CCNC: 101 U/L — SIGNIFICANT CHANGE UP (ref 30–115)
ALT FLD-CCNC: 21 U/L — SIGNIFICANT CHANGE UP (ref 0–41)
ANION GAP SERPL CALC-SCNC: 6 MMOL/L — LOW (ref 7–14)
AST SERPL-CCNC: 40 U/L — SIGNIFICANT CHANGE UP (ref 0–41)
BASOPHILS # BLD AUTO: 0.05 K/UL — SIGNIFICANT CHANGE UP (ref 0–0.2)
BASOPHILS NFR BLD AUTO: 0.5 % — SIGNIFICANT CHANGE UP (ref 0–1)
BILIRUB SERPL-MCNC: 0.3 MG/DL — SIGNIFICANT CHANGE UP (ref 0.2–1.2)
BUN SERPL-MCNC: 14 MG/DL — SIGNIFICANT CHANGE UP (ref 10–20)
CALCIUM SERPL-MCNC: 9 MG/DL — SIGNIFICANT CHANGE UP (ref 8.4–10.5)
CHLORIDE SERPL-SCNC: 97 MMOL/L — LOW (ref 98–110)
CO2 SERPL-SCNC: 32 MMOL/L — SIGNIFICANT CHANGE UP (ref 17–32)
CREAT SERPL-MCNC: 0.7 MG/DL — SIGNIFICANT CHANGE UP (ref 0.7–1.5)
EGFR: 108 ML/MIN/1.73M2 — SIGNIFICANT CHANGE UP
EOSINOPHIL # BLD AUTO: 0.13 K/UL — SIGNIFICANT CHANGE UP (ref 0–0.7)
EOSINOPHIL NFR BLD AUTO: 1.3 % — SIGNIFICANT CHANGE UP (ref 0–8)
GAS PNL BLDV: SIGNIFICANT CHANGE UP
GLUCOSE SERPL-MCNC: 95 MG/DL — SIGNIFICANT CHANGE UP (ref 70–99)
HCT VFR BLD CALC: 42.9 % — SIGNIFICANT CHANGE UP (ref 42–52)
HGB BLD-MCNC: 14.7 G/DL — SIGNIFICANT CHANGE UP (ref 14–18)
IMM GRANULOCYTES NFR BLD AUTO: 0.7 % — HIGH (ref 0.1–0.3)
LYMPHOCYTES # BLD AUTO: 3 K/UL — SIGNIFICANT CHANGE UP (ref 1.2–3.4)
LYMPHOCYTES # BLD AUTO: 31 % — SIGNIFICANT CHANGE UP (ref 20.5–51.1)
MCHC RBC-ENTMCNC: 29.8 PG — SIGNIFICANT CHANGE UP (ref 27–31)
MCHC RBC-ENTMCNC: 34.3 G/DL — SIGNIFICANT CHANGE UP (ref 32–37)
MCV RBC AUTO: 87 FL — SIGNIFICANT CHANGE UP (ref 80–94)
MONOCYTES # BLD AUTO: 0.69 K/UL — HIGH (ref 0.1–0.6)
MONOCYTES NFR BLD AUTO: 7.1 % — SIGNIFICANT CHANGE UP (ref 1.7–9.3)
NEUTROPHILS # BLD AUTO: 5.73 K/UL — SIGNIFICANT CHANGE UP (ref 1.4–6.5)
NEUTROPHILS NFR BLD AUTO: 59.4 % — SIGNIFICANT CHANGE UP (ref 42.2–75.2)
NRBC # BLD: 0 /100 WBCS — SIGNIFICANT CHANGE UP (ref 0–0)
PLATELET # BLD AUTO: 247 K/UL — SIGNIFICANT CHANGE UP (ref 130–400)
PMV BLD: 11.7 FL — HIGH (ref 7.4–10.4)
POTASSIUM SERPL-MCNC: 6.2 MMOL/L — CRITICAL HIGH (ref 3.5–5)
POTASSIUM SERPL-SCNC: 6.2 MMOL/L — CRITICAL HIGH (ref 3.5–5)
PROT SERPL-MCNC: 6.8 G/DL — SIGNIFICANT CHANGE UP (ref 6–8)
RBC # BLD: 4.93 M/UL — SIGNIFICANT CHANGE UP (ref 4.7–6.1)
RBC # FLD: 15.9 % — HIGH (ref 11.5–14.5)
SODIUM SERPL-SCNC: 135 MMOL/L — SIGNIFICANT CHANGE UP (ref 135–146)
WBC # BLD: 9.67 K/UL — SIGNIFICANT CHANGE UP (ref 4.8–10.8)
WBC # FLD AUTO: 9.67 K/UL — SIGNIFICANT CHANGE UP (ref 4.8–10.8)

## 2024-03-07 PROCEDURE — 36415 COLL VENOUS BLD VENIPUNCTURE: CPT

## 2024-03-07 PROCEDURE — 99285 EMERGENCY DEPT VISIT HI MDM: CPT | Mod: 25

## 2024-03-07 PROCEDURE — 99285 EMERGENCY DEPT VISIT HI MDM: CPT

## 2024-03-07 PROCEDURE — 85025 COMPLETE CBC W/AUTO DIFF WBC: CPT

## 2024-03-07 PROCEDURE — 71046 X-RAY EXAM CHEST 2 VIEWS: CPT | Mod: 26

## 2024-03-07 PROCEDURE — 71046 X-RAY EXAM CHEST 2 VIEWS: CPT

## 2024-03-07 PROCEDURE — 80053 COMPREHEN METABOLIC PANEL: CPT

## 2024-03-07 PROCEDURE — 94640 AIRWAY INHALATION TREATMENT: CPT

## 2024-03-07 RX ORDER — IPRATROPIUM/ALBUTEROL SULFATE 18-103MCG
3 AEROSOL WITH ADAPTER (GRAM) INHALATION
Refills: 0 | Status: COMPLETED | OUTPATIENT
Start: 2024-03-07 | End: 2024-03-07

## 2024-03-07 RX ADMIN — Medication 3 MILLILITER(S): at 18:59

## 2024-03-07 RX ADMIN — Medication 3 MILLILITER(S): at 18:48

## 2024-03-07 RX ADMIN — Medication 3 MILLILITER(S): at 18:30

## 2024-03-07 NOTE — ED PROVIDER NOTE - PROGRESS NOTE DETAILS
pt stepped out mult times to smoke, ambulating without any issues, not requiring O2 in ED. d/w pt chronicity of sxs/condition, no change since DC, does not meet criteria for admission. dc to f/u outpt

## 2024-03-07 NOTE — ED PROVIDER NOTE - PATIENT PORTAL LINK FT
You can access the FollowMyHealth Patient Portal offered by [facility] by registering at the following website: [email or web address]. By joining [employer]’s FollowMyHealth portal, you will also be able to view your health information using other applications (apps) compatible with our system.

## 2024-03-07 NOTE — ED PROVIDER NOTE - CLINICAL SUMMARY MEDICAL DECISION MAKING FREE TEXT BOX
Labs and CXR obtained,  CXR film interpreted by me and unchanged from previous.  Pt treated with nebs with improvement.  will need to cont treatment at home and follow up with Pulmonary.

## 2024-03-07 NOTE — ED ADULT NURSE NOTE - WHEN WAS YOUR LAST VACCINATION? YEAR
-- DO NOT REPLY / DO NOT REPLY ALL --  -- Message is from Engagement Center Operations (ECO) --    Order Request  Lab: CBC    Message / reason: CBC prior to Jan 31 2023 appointment     Insurance type: See below  Payor: [employer] MEDICARE / Plan: CHOICE PPO MED ADVANTAGE / Product Type: PPO MISC    Preferred Delivery Method   Input in Epic     Caller Information       Type Contact Phone/Fax    12/15/2022 01:27 PM CST Phone (Incoming) Dino Jorge (Self) [phone] (M)          Alternative phone number: No    Can a detailed message be left? Yes    Message Turnaround:     [place]:    Please give this turnaround time to the caller:   \"This message will be sent to [state Provider's name]. The clinical team will fulfill your request as soon as they review your message.\"   2021

## 2024-03-07 NOTE — ED PROVIDER NOTE - OBJECTIVE STATEMENT
pt with pmhx COPD, GERD, HTN, HLD, obesity, presents to ED c/o SOB, unchanged since DC from hospital few days ago. is in the process of obtaining O2 for home use. Denies fever/chill/HA/dizziness/chest pain/palpitation/abd pain/n/v/d/ black stool/bloody stool/urinary sxs

## 2024-03-07 NOTE — ED PROVIDER NOTE - WR INTERPRETED BY 1
Problem: Altered Thought Process (Adult/Pediatric)  Goal: *STG: Participates in treatment plan  Outcome: Progressing Towards Goal  ATTENDS AND PARTICIPATES APPROPRIATELY  Goal: *STG: Remains safe in hospital  Outcome: Progressing Towards Goal  Pt denies any suicidal or homicidal thoughts. Contracts for safety. Remains on q 15 min safety checks.      Goal: *LTG: Returns to baseline functioning  Outcome: Not Progressing Towards Goal  SELF REPORTS\"INCREASE IN DEPRESSION AND URGES TO PULL HER HAIR WHICH I HAVN`T DONE YET\" Pushpa Max

## 2024-03-07 NOTE — ED PROVIDER NOTE - NSFOLLOWUPCLINICS_GEN_ALL_ED_FT
A Family Medicine Doctor  Family Medicine  .  [place]   Phone:   Fax:     A Pulmonologist  Pulmonary Medicine  .  [place]   Phone:   Fax:

## 2024-03-07 NOTE — ED PROVIDER NOTE - PHYSICAL EXAMINATION
CONSTITUTIONAL: Well-appearing; well-nourished; in no apparent distress.   NECK: Supple; non-tender; no cervical lymphadenopathy.  CARDIOVASCULAR: Normal S1, S2; no murmurs, rubs, or gallops.   RESPIRATORY: Normal chest excursion with respiration; +wheezing  GI/: Non-distended; non-tender; no palpable organomegaly.   MS: No evidence of trauma or deformity. Normal ROM in all four extremities; non-tender to palpation; distal pulses are normal.   SKIN: Normal for age and race; warm; dry; good turgor; no apparent lesions or exudate.   NEURO/PSYCH: A & O x 4; grossly unremarkable. mood and manner are appropriate.

## 2024-03-07 NOTE — ED PROVIDER NOTE - ATTENDING APP SHARED VISIT CONTRIBUTION OF CARE
57 yo M PMHx noted presents with SOB. Pt was discharged from hospital yesterday, states still feels SOB, no fevers, States was in the process of getting O2 to his home.  On exam pt in NAD AAO x 3, seen ambulate in the ED with steady gait, Lungs with diffuse wheezing, speaks full clear sentences, no edema,

## 2024-03-07 NOTE — ED ADULT NURSE NOTE - NSFALLUNIVINTERV_ED_ALL_ED
Bed/Stretcher in lowest position, wheels locked, appropriate side rails in place/Call bell, personal items and telephone in reach/Instruct patient to call for assistance before getting out of bed/chair/stretcher/Non-slip footwear applied when patient is off stretcher/Stevens to call system/Physically safe environment - no spills, clutter or unnecessary equipment/Purposeful proactive rounding/Room/bathroom lighting operational, light cord in reach

## 2024-03-08 ENCOUNTER — EMERGENCY (EMERGENCY)
Facility: HOSPITAL | Age: 59
LOS: 0 days | Discharge: HOME | End: 2024-03-08
Attending: EMERGENCY MEDICINE
Payer: MEDICAID

## 2024-03-08 VITALS
OXYGEN SATURATION: 97 % | WEIGHT: 279.99 LBS | SYSTOLIC BLOOD PRESSURE: 157 MMHG | RESPIRATION RATE: 20 BRPM | HEART RATE: 110 BPM | HEIGHT: 70 IN | DIASTOLIC BLOOD PRESSURE: 92 MMHG | TEMPERATURE: 98 F

## 2024-03-08 DIAGNOSIS — R06.02 SHORTNESS OF BREATH: ICD-10-CM

## 2024-03-08 DIAGNOSIS — K21.9 GASTRO-ESOPHAGEAL REFLUX DISEASE WITHOUT ESOPHAGITIS: ICD-10-CM

## 2024-03-08 DIAGNOSIS — M19.90 UNSPECIFIED OSTEOARTHRITIS, UNSPECIFIED SITE: ICD-10-CM

## 2024-03-08 DIAGNOSIS — Z96.651 PRESENCE OF RIGHT ARTIFICIAL KNEE JOINT: Chronic | ICD-10-CM

## 2024-03-08 DIAGNOSIS — R42 DIZZINESS AND GIDDINESS: ICD-10-CM

## 2024-03-08 DIAGNOSIS — R06.2 WHEEZING: ICD-10-CM

## 2024-03-08 DIAGNOSIS — Z98.890 OTHER SPECIFIED POSTPROCEDURAL STATES: Chronic | ICD-10-CM

## 2024-03-08 DIAGNOSIS — J44.1 CHRONIC OBSTRUCTIVE PULMONARY DISEASE WITH (ACUTE) EXACERBATION: ICD-10-CM

## 2024-03-08 DIAGNOSIS — F17.200 NICOTINE DEPENDENCE, UNSPECIFIED, UNCOMPLICATED: ICD-10-CM

## 2024-03-08 DIAGNOSIS — I10 ESSENTIAL (PRIMARY) HYPERTENSION: ICD-10-CM

## 2024-03-08 DIAGNOSIS — G47.33 OBSTRUCTIVE SLEEP APNEA (ADULT) (PEDIATRIC): ICD-10-CM

## 2024-03-08 DIAGNOSIS — E78.5 HYPERLIPIDEMIA, UNSPECIFIED: ICD-10-CM

## 2024-03-08 DIAGNOSIS — K40.20 BILATERAL INGUINAL HERNIA, WITHOUT OBSTRUCTION OR GANGRENE, NOT SPECIFIED AS RECURRENT: Chronic | ICD-10-CM

## 2024-03-08 LAB
ALBUMIN SERPL ELPH-MCNC: 4.1 G/DL — SIGNIFICANT CHANGE UP (ref 3.5–5.2)
ALP SERPL-CCNC: 110 U/L — SIGNIFICANT CHANGE UP (ref 30–115)
ALT FLD-CCNC: 22 U/L — SIGNIFICANT CHANGE UP (ref 0–41)
ANION GAP SERPL CALC-SCNC: 10 MMOL/L — SIGNIFICANT CHANGE UP (ref 7–14)
AST SERPL-CCNC: 46 U/L — HIGH (ref 0–41)
BASE EXCESS BLDV CALC-SCNC: 5.8 MMOL/L — HIGH (ref -2–3)
BASOPHILS # BLD AUTO: 0.02 K/UL — SIGNIFICANT CHANGE UP (ref 0–0.2)
BASOPHILS NFR BLD AUTO: 0.2 % — SIGNIFICANT CHANGE UP (ref 0–1)
BILIRUB SERPL-MCNC: 0.2 MG/DL — SIGNIFICANT CHANGE UP (ref 0.2–1.2)
BUN SERPL-MCNC: 18 MG/DL — SIGNIFICANT CHANGE UP (ref 10–20)
CA-I SERPL-SCNC: 1.18 MMOL/L — SIGNIFICANT CHANGE UP (ref 1.15–1.33)
CALCIUM SERPL-MCNC: 9.2 MG/DL — SIGNIFICANT CHANGE UP (ref 8.4–10.4)
CHLORIDE SERPL-SCNC: 95 MMOL/L — LOW (ref 98–110)
CO2 SERPL-SCNC: 26 MMOL/L — SIGNIFICANT CHANGE UP (ref 17–32)
CREAT SERPL-MCNC: 0.8 MG/DL — SIGNIFICANT CHANGE UP (ref 0.7–1.5)
EGFR: 104 ML/MIN/1.73M2 — SIGNIFICANT CHANGE UP
EOSINOPHIL # BLD AUTO: 0 K/UL — SIGNIFICANT CHANGE UP (ref 0–0.7)
EOSINOPHIL NFR BLD AUTO: 0 % — SIGNIFICANT CHANGE UP (ref 0–8)
GAS PNL BLDV: 127 MMOL/L — LOW (ref 136–145)
GAS PNL BLDV: SIGNIFICANT CHANGE UP
GAS PNL BLDV: SIGNIFICANT CHANGE UP
GLUCOSE SERPL-MCNC: 89 MG/DL — SIGNIFICANT CHANGE UP (ref 70–99)
HCO3 BLDV-SCNC: 31 MMOL/L — HIGH (ref 22–29)
HCT VFR BLD CALC: 42.3 % — SIGNIFICANT CHANGE UP (ref 42–52)
HCT VFR BLDA CALC: 44 % — SIGNIFICANT CHANGE UP (ref 39–51)
HGB BLD CALC-MCNC: 14.8 G/DL — SIGNIFICANT CHANGE UP (ref 12.6–17.4)
HGB BLD-MCNC: 14.4 G/DL — SIGNIFICANT CHANGE UP (ref 14–18)
IMM GRANULOCYTES NFR BLD AUTO: 0.8 % — HIGH (ref 0.1–0.3)
LACTATE BLDV-MCNC: 2.2 MMOL/L — HIGH (ref 0.5–2)
LYMPHOCYTES # BLD AUTO: 0.97 K/UL — LOW (ref 1.2–3.4)
LYMPHOCYTES # BLD AUTO: 8 % — LOW (ref 20.5–51.1)
MCHC RBC-ENTMCNC: 29.5 PG — SIGNIFICANT CHANGE UP (ref 27–31)
MCHC RBC-ENTMCNC: 34 G/DL — SIGNIFICANT CHANGE UP (ref 32–37)
MCV RBC AUTO: 86.7 FL — SIGNIFICANT CHANGE UP (ref 80–94)
MONOCYTES # BLD AUTO: 0.58 K/UL — SIGNIFICANT CHANGE UP (ref 0.1–0.6)
MONOCYTES NFR BLD AUTO: 4.8 % — SIGNIFICANT CHANGE UP (ref 1.7–9.3)
NEUTROPHILS # BLD AUTO: 10.47 K/UL — HIGH (ref 1.4–6.5)
NEUTROPHILS NFR BLD AUTO: 86.2 % — HIGH (ref 42.2–75.2)
NRBC # BLD: 0 /100 WBCS — SIGNIFICANT CHANGE UP (ref 0–0)
PCO2 BLDV: 47 MMHG — SIGNIFICANT CHANGE UP (ref 42–55)
PH BLDV: 7.43 — SIGNIFICANT CHANGE UP (ref 7.32–7.43)
PLATELET # BLD AUTO: 274 K/UL — SIGNIFICANT CHANGE UP (ref 130–400)
PMV BLD: 10.7 FL — HIGH (ref 7.4–10.4)
PO2 BLDV: 45 MMHG — SIGNIFICANT CHANGE UP (ref 25–45)
POTASSIUM BLDV-SCNC: 5.5 MMOL/L — HIGH (ref 3.5–5.1)
POTASSIUM SERPL-MCNC: SIGNIFICANT CHANGE UP MMOL/L (ref 3.5–5)
POTASSIUM SERPL-SCNC: SIGNIFICANT CHANGE UP MMOL/L (ref 3.5–5)
PROT SERPL-MCNC: 7.1 G/DL — SIGNIFICANT CHANGE UP (ref 6–8)
RBC # BLD: 4.88 M/UL — SIGNIFICANT CHANGE UP (ref 4.7–6.1)
RBC # FLD: 15.7 % — HIGH (ref 11.5–14.5)
SAO2 % BLDV: 79.1 % — SIGNIFICANT CHANGE UP (ref 67–88)
SODIUM SERPL-SCNC: 131 MMOL/L — LOW (ref 135–146)
WBC # BLD: 12.14 K/UL — HIGH (ref 4.8–10.8)
WBC # FLD AUTO: 12.14 K/UL — HIGH (ref 4.8–10.8)

## 2024-03-08 PROCEDURE — 36415 COLL VENOUS BLD VENIPUNCTURE: CPT

## 2024-03-08 PROCEDURE — 71046 X-RAY EXAM CHEST 2 VIEWS: CPT

## 2024-03-08 PROCEDURE — 82330 ASSAY OF CALCIUM: CPT

## 2024-03-08 PROCEDURE — 84132 ASSAY OF SERUM POTASSIUM: CPT

## 2024-03-08 PROCEDURE — 85014 HEMATOCRIT: CPT

## 2024-03-08 PROCEDURE — 85018 HEMOGLOBIN: CPT

## 2024-03-08 PROCEDURE — 93010 ELECTROCARDIOGRAM REPORT: CPT

## 2024-03-08 PROCEDURE — 84295 ASSAY OF SERUM SODIUM: CPT

## 2024-03-08 PROCEDURE — 83605 ASSAY OF LACTIC ACID: CPT

## 2024-03-08 PROCEDURE — 93005 ELECTROCARDIOGRAM TRACING: CPT

## 2024-03-08 PROCEDURE — 80053 COMPREHEN METABOLIC PANEL: CPT

## 2024-03-08 PROCEDURE — 82803 BLOOD GASES ANY COMBINATION: CPT

## 2024-03-08 PROCEDURE — 85025 COMPLETE CBC W/AUTO DIFF WBC: CPT

## 2024-03-08 PROCEDURE — 71046 X-RAY EXAM CHEST 2 VIEWS: CPT | Mod: 26

## 2024-03-08 PROCEDURE — 99284 EMERGENCY DEPT VISIT MOD MDM: CPT

## 2024-03-08 PROCEDURE — 99285 EMERGENCY DEPT VISIT HI MDM: CPT | Mod: 25

## 2024-03-08 RX ORDER — DEXAMETHASONE 0.5 MG/5ML
10 ELIXIR ORAL ONCE
Refills: 0 | Status: DISCONTINUED | OUTPATIENT
Start: 2024-03-08 | End: 2024-03-08

## 2024-03-08 RX ORDER — IPRATROPIUM/ALBUTEROL SULFATE 18-103MCG
3 AEROSOL WITH ADAPTER (GRAM) INHALATION
Refills: 0 | Status: DISCONTINUED | OUTPATIENT
Start: 2024-03-08 | End: 2024-03-08

## 2024-03-08 NOTE — ED PROVIDER NOTE - OBJECTIVE STATEMENT
58-year-old male with past medical history of COPD, REBECCA, colitis, hiatal hernia, OA, GERD, HLD, HTN on 4 L home O2 active smoker presenting with shortness of breath for the past 2 weeks.  Patient endorsing dizziness.  No fever, headache, nausea, vomiting, chest pain, abdominal pain, diarrhea, constipation,  symptoms.  Patient presented to ED last night and was discharged after DuoNeb treatments.  Patient has no other complaints at this time.

## 2024-03-08 NOTE — ED PROVIDER NOTE - PATIENT PORTAL LINK FT
You can access the FollowMyHealth Patient Portal offered by [facility] by registering at the following website: [email or web address]. By joining [employer]’s FollowMyHealth portal, you will also be able to view your health information using other applications (apps) compatible with our system.

## 2024-03-08 NOTE — ED PROVIDER NOTE - HOW PATIENT ADDRESSED, PROFILE
javier Render Post-Care Instructions In Note?: no Detail Level: Detailed Duration Of Freeze Thaw-Cycle (Seconds): 0 Post-Care Instructions: I reviewed with the patient in detail post-care instructions. Patient is to wear sunprotection, and avoid picking at any of the treated lesions. Pt may apply Vaseline to crusted or scabbing areas. Show Applicator Variable?: Yes Consent: The patient's consent was obtained including but not limited to risks of crusting, scabbing, blistering, scarring, darker or lighter pigmentary change, recurrence, incomplete removal and infection.

## 2024-03-08 NOTE — ED PROVIDER NOTE - ATTENDING CONTRIBUTION TO CARE
58-year-old male with past medical history of REBECCA, COPD, colitis, hiatal hernia, osteoarthritis, hypertension, hyperlipidemia, still actively smoking, presents with shortness of breath.  Patient was admitted for COPD exacerbation.  Patient had requested to go home with home O2, but patient continued to desaturate above criteria so was not approved for home O2.  Patient says he feels better with home O2, coming in today because he says he needs home O2.  Patient still smoking and has a chronic wheeze.  Patient has no fever or chills.  Patient has no chest pain.  Exam: nad, ncat, perrl, eomi, mmm, rrr, bilateral wheezing and coarse breath sounds, abd soft, nt, nd aox3, no calf tenderness, no pitting edema, ambulating in the ED and trying to go outside to smoke.  Impression: Patient with complaint of shortness of breath, still smoking, trying to actively smoke right now.  Patient wanting home O2, however has a 97% O2 sats in the ED.  Patient has chronic wheezing, will check labs and chest x-ray, nebulizer treatments and steroids

## 2024-03-08 NOTE — ED PROVIDER NOTE - PHYSICAL EXAMINATION
Constitutional: Well developed, well nourished, no acute distress  Head: Normocephalic, Atraumatic  Eyes: PERRLA, EOMI, conjunctiva and sclera WNL  ENT: Moist mucous membranes, no rhinorrhea   Neck: Supple, Nontender   Respiratory: Normal chest excursion with respiration; decrease breath sounds b/l with diffuse b/l wheezes, NO rales, or rhonchi   Cardiovascular: RRR; Normal S1, S2; No murmurs, rubs or gallops   ABD/GI: Nondistended; Nontender; No guarding, rigidity or rebound   Neurologic: AAO x 4; ; Normal motor and sensory function  Psychiatric: Appropriate affect, normal mood

## 2024-03-08 NOTE — ED PROVIDER NOTE - CLINICAL SUMMARY MEDICAL DECISION MAKING FREE TEXT BOX
Patient with complaint of shortness of breath, still smoking, trying to actively smoke right now.  Patient wanting home O2, however has a 97% O2 sats in the ED.  Patient has chronic wheezing,Labs and chest x-ray reassuring.  Patient says if he cannot have his home O2 he wants to be admitted.  Explained that he does not clinically require to be admitted to the hospital.  Patient to follow-up with pulmonology outpatient.  Any ordered labs and EKG were reviewed.  Any imaging was ordered and reviewed by me.  Appropriate medications for patient's presenting complaints were ordered and effects were reassessed.  Patient's records (prior hospital, ED visit, and/or nursing home notes if available) were reviewed.  Additional history was obtained from EMS, family, and/or PCP (where available).  Escalation to admission/observation was considered.  1) However patient feels much better and is comfortable with discharge.  Appropriate follow-up was arranged.

## 2024-03-09 ENCOUNTER — EMERGENCY (EMERGENCY)
Facility: HOSPITAL | Age: 59
LOS: 0 days | Discharge: HOME | End: 2024-03-09
Attending: STUDENT IN AN ORGANIZED HEALTH CARE EDUCATION/TRAINING PROGRAM
Payer: MEDICAID

## 2024-03-09 VITALS
WEIGHT: 274.92 LBS | SYSTOLIC BLOOD PRESSURE: 144 MMHG | HEIGHT: 70 IN | DIASTOLIC BLOOD PRESSURE: 89 MMHG | HEART RATE: 81 BPM | RESPIRATION RATE: 18 BRPM | OXYGEN SATURATION: 97 % | TEMPERATURE: 98 F

## 2024-03-09 DIAGNOSIS — Z98.890 OTHER SPECIFIED POSTPROCEDURAL STATES: Chronic | ICD-10-CM

## 2024-03-09 DIAGNOSIS — R42 DIZZINESS AND GIDDINESS: ICD-10-CM

## 2024-03-09 DIAGNOSIS — R06.02 SHORTNESS OF BREATH: ICD-10-CM

## 2024-03-09 DIAGNOSIS — F17.200 NICOTINE DEPENDENCE, UNSPECIFIED, UNCOMPLICATED: ICD-10-CM

## 2024-03-09 DIAGNOSIS — K21.9 GASTRO-ESOPHAGEAL REFLUX DISEASE WITHOUT ESOPHAGITIS: ICD-10-CM

## 2024-03-09 DIAGNOSIS — Z99.81 DEPENDENCE ON SUPPLEMENTAL OXYGEN: ICD-10-CM

## 2024-03-09 DIAGNOSIS — E78.5 HYPERLIPIDEMIA, UNSPECIFIED: ICD-10-CM

## 2024-03-09 DIAGNOSIS — G47.33 OBSTRUCTIVE SLEEP APNEA (ADULT) (PEDIATRIC): ICD-10-CM

## 2024-03-09 DIAGNOSIS — M19.90 UNSPECIFIED OSTEOARTHRITIS, UNSPECIFIED SITE: ICD-10-CM

## 2024-03-09 DIAGNOSIS — K40.20 BILATERAL INGUINAL HERNIA, WITHOUT OBSTRUCTION OR GANGRENE, NOT SPECIFIED AS RECURRENT: Chronic | ICD-10-CM

## 2024-03-09 DIAGNOSIS — J44.9 CHRONIC OBSTRUCTIVE PULMONARY DISEASE, UNSPECIFIED: ICD-10-CM

## 2024-03-09 DIAGNOSIS — Z87.19 PERSONAL HISTORY OF OTHER DISEASES OF THE DIGESTIVE SYSTEM: ICD-10-CM

## 2024-03-09 DIAGNOSIS — I10 ESSENTIAL (PRIMARY) HYPERTENSION: ICD-10-CM

## 2024-03-09 DIAGNOSIS — Z96.651 PRESENCE OF RIGHT ARTIFICIAL KNEE JOINT: Chronic | ICD-10-CM

## 2024-03-09 PROCEDURE — 94640 AIRWAY INHALATION TREATMENT: CPT

## 2024-03-09 PROCEDURE — 93010 ELECTROCARDIOGRAM REPORT: CPT

## 2024-03-09 PROCEDURE — 93005 ELECTROCARDIOGRAM TRACING: CPT

## 2024-03-09 PROCEDURE — 99285 EMERGENCY DEPT VISIT HI MDM: CPT | Mod: 25

## 2024-03-09 PROCEDURE — 99284 EMERGENCY DEPT VISIT MOD MDM: CPT

## 2024-03-09 RX ORDER — IPRATROPIUM/ALBUTEROL SULFATE 18-103MCG
3 AEROSOL WITH ADAPTER (GRAM) INHALATION
Refills: 0 | Status: COMPLETED | OUTPATIENT
Start: 2024-03-09 | End: 2024-03-09

## 2024-03-09 RX ADMIN — Medication 50 MILLIGRAM(S): at 19:21

## 2024-03-09 RX ADMIN — Medication 3 MILLILITER(S): at 19:21

## 2024-03-09 RX ADMIN — Medication 3 MILLILITER(S): at 19:40

## 2024-03-09 RX ADMIN — Medication 3 MILLILITER(S): at 19:51

## 2024-03-09 NOTE — ED PROVIDER NOTE - OBJECTIVE STATEMENT
Patient is a 58-year-old male with past medical history of COPD, REBECCA, colitis, hiatal hernia, OA, GERD, HLD, HTN on 4 L home O2 active smoker presenting with shortness of breath for the past 2 weeks.  Patient endorsing dizziness.  No fever, headache, nausea, vomiting, chest pain, abdominal pain, diarrhea, constipation,  symptoms.  Patient presented to ED last night and was discharged after DuoNeb treatments. Patient has an appointment with Dr. Nelson on Tuesday; pending home oxygen device once insurance clears. Patient has no acute complaints.

## 2024-03-09 NOTE — ED PROVIDER NOTE - PATIENT PORTAL LINK FT
You can access the FollowMyHealth Patient Portal offered by [facility] by registering at the following website: [email or web address]. By joining [employer]’s FollowMyHealth portal, you will also be able to view your health information using other applications (apps) compatible with our system.

## 2024-03-09 NOTE — ED PROVIDER NOTE - CARE PROVIDER_API CALL
Murali Brooks  Pulmonary Disease  [address] [place] [id number]  Phone: [phone]  Fax: [phone]  Follow Up Time: Routine

## 2024-03-09 NOTE — ED PROVIDER NOTE - ATTENDING CONTRIBUTION TO CARE
I personally evaluated the patient. I reviewed the Resident’s or Physician Assistant’s note (as assigned above), and agree with the findings and plan except as documented in my note.    58-year-old man history of COPD REBECCA colitis hiatal hernia REBECCA GERD hypertension hyperlipidemia active smoker is presenting here complaining of shortness of breath.  Patient was discharged from the hospital on March 5 after he was admitted on March 3 for COPD exacerbation at that time was told that he was going to get oxygen but then did not return back to the ED on March 7 March 8 and today shortness of breath.  Patient states he needed oxygen no chest pain nausea vomiting fevers or chills states has an appointment with his pulmonologist  Dr. Brooks on Tuesday testing today Home after my evaluation CONSTITUTIONAL: WA / WN / NAD  HEAD: NCAT  EYES: PERRL; EOMI;   ENT: Normal pharynx; mucous membranes pink/moist, no erythema.  NECK: Supple; no meningeal signs  CARD: RRR; nl S1/S2; no M/R/G.   RESP: Respiratory rate and effort are normal speaking full sentences wheezing b/l   MSK/EXT: No gross deformities; full range of motion.  SKIN: Warm and dry;   NEURO: AAOx3  PSYCH: Memory Intact, Normal Affect

## 2024-03-09 NOTE — ED PROVIDER NOTE - PHYSICAL EXAMINATION
VITAL SIGNS: I have reviewed nursing notes and confirm.  CONSTITUTIONAL: Well-appearing, non-toxic, in NAD  SKIN: Warm dry, normal skin turgor  HEAD: NCAT  EYES: No conjunctival injection, scleral anicteric  ENT: Moist mucous membranes, normal pharynx with no erythema or exudates  NECK: Supple; full ROM. Nontender. No cervical LAD  CARD: RRR, no murmurs, rubs or gallops  RESP: Rhonchi to ausculation bilaterally.  No rales or wheezing.  ABD: Soft, non-distended, non-tender, no rebound or guarding. No CVA tenderness  EXT: Full ROM, no bony tenderness, no pedal edema, no calf tenderness  NEURO: Normal motor, normal sensory. CN II-XII grossly intact. Cerebellar testing normal. Normal gait.  PSYCH: Cooperative, appropriate.

## 2024-03-09 NOTE — ED PROVIDER NOTE - NSFOLLOWUPINSTRUCTIONS_ED_ALL_ED_FT
Shortness of breath could indicate a medical problem. Causes include lung diseases, heart disease, low amount of red blood cells (anemia), poor physical fitness, being overweight, smoking, etc. Your health care provider may not be able to find a cause for your shortness of breath after your exam. In this case, it is important to have a follow-up exam with your primary care physician as instructed. If medicines were prescribed, take them as directed for the full length of time directed. Refrain from tobacco products.    SEEK IMMEDIATE MEDICAL CARE IF YOU HAVE THE FOLLOWING SYMPTOMS: worsening shortness of breath, chest pain, back pain, abdominal pain, fever, coughing up blood, lightheadedness/dizziness.

## 2024-03-09 NOTE — ED PROVIDER NOTE - CLINICAL SUMMARY MEDICAL DECISION MAKING FREE TEXT BOX
58-year-old man history of COPD REBECCA colitis hiatal hernia REBECCA GERD hypertension hyperlipidemia active smoker is presenting here complaining of shortness of breath.  Patient was discharged from the hospital on March 5 after he was admitted on March 3 for COPD exacerbation at that time was told that he was going to get oxygen but then did not return back to the ED on March 7 March 8 and today shortness of breath.  Patient states he needed oxygen no chest pain nausea vomiting fevers or chills states has an appointment with his pulmonologist  Dr. Brooks on Tuesday testing today Home after my evaluation  vs reviewed meds given . ED EKG: my independent interpretation - Dr. Reddy Pascal : [NSR, nl axis, nl intervals, no ST elevation] patient refused cxr as it was performed yesterday. Patient a spoken to in detail about results  All questions addressed.  Results of ED work up discussed  Patient has proper follow up. Return precautions given.

## 2024-03-09 NOTE — ED ADULT NURSE NOTE - NSICDXPASTSURGICALHX_GEN_ALL_CORE_FT
MD at bedside.    PAST SURGICAL HISTORY:  H/O foot surgery right big toe surgery    H/O knee surgery arthoscopic x4    Hernia, inguinal, bilateral 3 months old    History of appendectomy     History of cholecystectomy     History of knee replacement procedure of right knee BILATERAL

## 2024-03-11 ENCOUNTER — INPATIENT (INPATIENT)
Facility: HOSPITAL | Age: 59
LOS: 0 days | Discharge: HOME | DRG: 140 | End: 2024-03-12
Attending: HOSPITALIST | Admitting: INTERNAL MEDICINE
Payer: MEDICAID

## 2024-03-11 VITALS
HEART RATE: 94 BPM | SYSTOLIC BLOOD PRESSURE: 110 MMHG | RESPIRATION RATE: 19 BRPM | TEMPERATURE: 97 F | DIASTOLIC BLOOD PRESSURE: 62 MMHG | HEIGHT: 70 IN | OXYGEN SATURATION: 97 %

## 2024-03-11 DIAGNOSIS — I11.0 HYPERTENSIVE HEART DISEASE WITH HEART FAILURE: ICD-10-CM

## 2024-03-11 DIAGNOSIS — Z91.199 PATIENT'S NONCOMPLIANCE WITH OTHER MEDICAL TREATMENT AND REGIMEN DUE TO UNSPECIFIED REASON: ICD-10-CM

## 2024-03-11 DIAGNOSIS — Z98.890 OTHER SPECIFIED POSTPROCEDURAL STATES: Chronic | ICD-10-CM

## 2024-03-11 DIAGNOSIS — Z96.651 PRESENCE OF RIGHT ARTIFICIAL KNEE JOINT: Chronic | ICD-10-CM

## 2024-03-11 DIAGNOSIS — I50.32 CHRONIC DIASTOLIC (CONGESTIVE) HEART FAILURE: ICD-10-CM

## 2024-03-11 DIAGNOSIS — K21.9 GASTRO-ESOPHAGEAL REFLUX DISEASE WITHOUT ESOPHAGITIS: ICD-10-CM

## 2024-03-11 DIAGNOSIS — K40.20 BILATERAL INGUINAL HERNIA, WITHOUT OBSTRUCTION OR GANGRENE, NOT SPECIFIED AS RECURRENT: Chronic | ICD-10-CM

## 2024-03-11 DIAGNOSIS — E78.5 HYPERLIPIDEMIA, UNSPECIFIED: ICD-10-CM

## 2024-03-11 DIAGNOSIS — F17.210 NICOTINE DEPENDENCE, CIGARETTES, UNCOMPLICATED: ICD-10-CM

## 2024-03-11 DIAGNOSIS — Z79.52 LONG TERM (CURRENT) USE OF SYSTEMIC STEROIDS: ICD-10-CM

## 2024-03-11 DIAGNOSIS — G89.4 CHRONIC PAIN SYNDROME: ICD-10-CM

## 2024-03-11 DIAGNOSIS — J96.01 ACUTE RESPIRATORY FAILURE WITH HYPOXIA: ICD-10-CM

## 2024-03-11 DIAGNOSIS — J44.1 CHRONIC OBSTRUCTIVE PULMONARY DISEASE WITH (ACUTE) EXACERBATION: ICD-10-CM

## 2024-03-11 DIAGNOSIS — Z96.651 PRESENCE OF RIGHT ARTIFICIAL KNEE JOINT: ICD-10-CM

## 2024-03-11 DIAGNOSIS — Z90.49 ACQUIRED ABSENCE OF OTHER SPECIFIED PARTS OF DIGESTIVE TRACT: ICD-10-CM

## 2024-03-11 DIAGNOSIS — E87.20 ACIDOSIS, UNSPECIFIED: ICD-10-CM

## 2024-03-11 DIAGNOSIS — R06.00 DYSPNEA, UNSPECIFIED: ICD-10-CM

## 2024-03-11 DIAGNOSIS — G47.33 OBSTRUCTIVE SLEEP APNEA (ADULT) (PEDIATRIC): ICD-10-CM

## 2024-03-11 DIAGNOSIS — M19.90 UNSPECIFIED OSTEOARTHRITIS, UNSPECIFIED SITE: ICD-10-CM

## 2024-03-11 LAB
ALBUMIN SERPL ELPH-MCNC: 3.8 G/DL — SIGNIFICANT CHANGE UP (ref 3.5–5.2)
ALP SERPL-CCNC: 93 U/L — SIGNIFICANT CHANGE UP (ref 30–115)
ALT FLD-CCNC: 22 U/L — SIGNIFICANT CHANGE UP (ref 0–41)
ANION GAP SERPL CALC-SCNC: 9 MMOL/L — SIGNIFICANT CHANGE UP (ref 7–14)
AST SERPL-CCNC: 30 U/L — SIGNIFICANT CHANGE UP (ref 0–41)
BASOPHILS # BLD AUTO: 0.04 K/UL — SIGNIFICANT CHANGE UP (ref 0–0.2)
BASOPHILS NFR BLD AUTO: 0.3 % — SIGNIFICANT CHANGE UP (ref 0–1)
BILIRUB SERPL-MCNC: 0.3 MG/DL — SIGNIFICANT CHANGE UP (ref 0.2–1.2)
BUN SERPL-MCNC: 18 MG/DL — SIGNIFICANT CHANGE UP (ref 10–20)
CALCIUM SERPL-MCNC: 9.1 MG/DL — SIGNIFICANT CHANGE UP (ref 8.4–10.5)
CHLORIDE SERPL-SCNC: 96 MMOL/L — LOW (ref 98–110)
CO2 SERPL-SCNC: 29 MMOL/L — SIGNIFICANT CHANGE UP (ref 17–32)
CREAT SERPL-MCNC: 1.1 MG/DL — SIGNIFICANT CHANGE UP (ref 0.7–1.5)
EGFR: 79 ML/MIN/1.73M2 — SIGNIFICANT CHANGE UP
EOSINOPHIL # BLD AUTO: 0.06 K/UL — SIGNIFICANT CHANGE UP (ref 0–0.7)
EOSINOPHIL NFR BLD AUTO: 0.5 % — SIGNIFICANT CHANGE UP (ref 0–8)
GLUCOSE SERPL-MCNC: 82 MG/DL — SIGNIFICANT CHANGE UP (ref 70–99)
HCT VFR BLD CALC: 44.1 % — SIGNIFICANT CHANGE UP (ref 42–52)
HGB BLD-MCNC: 14.6 G/DL — SIGNIFICANT CHANGE UP (ref 14–18)
IMM GRANULOCYTES NFR BLD AUTO: 1.1 % — HIGH (ref 0.1–0.3)
LYMPHOCYTES # BLD AUTO: 2.64 K/UL — SIGNIFICANT CHANGE UP (ref 1.2–3.4)
LYMPHOCYTES # BLD AUTO: 21.7 % — SIGNIFICANT CHANGE UP (ref 20.5–51.1)
MCHC RBC-ENTMCNC: 29.7 PG — SIGNIFICANT CHANGE UP (ref 27–31)
MCHC RBC-ENTMCNC: 33.1 G/DL — SIGNIFICANT CHANGE UP (ref 32–37)
MCV RBC AUTO: 89.6 FL — SIGNIFICANT CHANGE UP (ref 80–94)
MONOCYTES # BLD AUTO: 1.26 K/UL — HIGH (ref 0.1–0.6)
MONOCYTES NFR BLD AUTO: 10.3 % — HIGH (ref 1.7–9.3)
NEUTROPHILS # BLD AUTO: 8.06 K/UL — HIGH (ref 1.4–6.5)
NEUTROPHILS NFR BLD AUTO: 66.1 % — SIGNIFICANT CHANGE UP (ref 42.2–75.2)
NRBC # BLD: 0 /100 WBCS — SIGNIFICANT CHANGE UP (ref 0–0)
PLATELET # BLD AUTO: 247 K/UL — SIGNIFICANT CHANGE UP (ref 130–400)
PMV BLD: 9.9 FL — SIGNIFICANT CHANGE UP (ref 7.4–10.4)
POTASSIUM SERPL-MCNC: 5 MMOL/L — SIGNIFICANT CHANGE UP (ref 3.5–5)
POTASSIUM SERPL-SCNC: 5 MMOL/L — SIGNIFICANT CHANGE UP (ref 3.5–5)
PROT SERPL-MCNC: 6.4 G/DL — SIGNIFICANT CHANGE UP (ref 6–8)
RBC # BLD: 4.92 M/UL — SIGNIFICANT CHANGE UP (ref 4.7–6.1)
RBC # FLD: 16 % — HIGH (ref 11.5–14.5)
SODIUM SERPL-SCNC: 134 MMOL/L — LOW (ref 135–146)
TROPONIN T, HIGH SENSITIVITY RESULT: 23 NG/L — HIGH (ref 6–21)
TROPONIN T, HIGH SENSITIVITY RESULT: 28 NG/L — HIGH (ref 6–21)
WBC # BLD: 12.19 K/UL — HIGH (ref 4.8–10.8)
WBC # FLD AUTO: 12.19 K/UL — HIGH (ref 4.8–10.8)

## 2024-03-11 PROCEDURE — G0378: CPT

## 2024-03-11 PROCEDURE — 0225U NFCT DS DNA&RNA 21 SARSCOV2: CPT

## 2024-03-11 PROCEDURE — 94640 AIRWAY INHALATION TREATMENT: CPT

## 2024-03-11 PROCEDURE — 99285 EMERGENCY DEPT VISIT HI MDM: CPT

## 2024-03-11 PROCEDURE — 94660 CPAP INITIATION&MGMT: CPT

## 2024-03-11 RX ORDER — IPRATROPIUM/ALBUTEROL SULFATE 18-103MCG
3 AEROSOL WITH ADAPTER (GRAM) INHALATION
Refills: 0 | Status: COMPLETED | OUTPATIENT
Start: 2024-03-11 | End: 2024-03-11

## 2024-03-11 RX ORDER — IPRATROPIUM/ALBUTEROL SULFATE 18-103MCG
3 AEROSOL WITH ADAPTER (GRAM) INHALATION ONCE
Refills: 0 | Status: COMPLETED | OUTPATIENT
Start: 2024-03-11 | End: 2024-03-11

## 2024-03-11 RX ADMIN — Medication 3 MILLILITER(S): at 19:08

## 2024-03-11 RX ADMIN — Medication 3 MILLILITER(S): at 17:20

## 2024-03-11 RX ADMIN — Medication 3 MILLILITER(S): at 17:22

## 2024-03-11 RX ADMIN — Medication 3 MILLILITER(S): at 17:21

## 2024-03-11 RX ADMIN — Medication 125 MILLIGRAM(S): at 17:20

## 2024-03-11 NOTE — ED PROVIDER NOTE - PHYSICAL EXAMINATION
VSS, awake, alert, non-toxic appearing, oropharynx clear, no skin rash or lesions, no tracheal deviation, non-labored breathing without accessory muscle use apparent or pursed lip breathing, no retractions, speaks full sentences, equal BS BL, BL exp wheeze and rhonchi, +S1/S2, RRR, no m/r/g, abdomen soft, NT, ND, +BS, AO x 3, clear speech.

## 2024-03-11 NOTE — ED PROVIDER NOTE - CLINICAL SUMMARY MEDICAL DECISION MAKING FREE TEXT BOX
DISPLAY PLAN FREE TEXT Patient's VSS, labs, EKG, XR, non-diagnostic. This patient presents with dyspnea, most likely secondary to new oxygen requirements, has appt with pulm in AM and getting his new oxygen tomorrow. Presentation not consistent with acute cardiac etiologies to include ACS (non ischemic ekg, unremarkable trop), CHF, pericardial effusion / tamponade . Presentation not consistent with acute respiratory etiologies to include acute PE, pneumothorax , asthma, COPD exacerbation, allergic etiologies, or infectious etiologies such as PNA. Presentation also not consistent with non-cardiopulmonary causes to include toxidromes, metabolic etiologies such as acidemia or electrolyte derangements, sepsis, neurologic causes (i.e. demyelinating diseases).    The patient was given detailed return precautions and advised to return to the emergency department if any new symptoms developed, symptoms worsened or for any concerns. The patient was offered the opportunity to ask questions and verbalized that they understand the diagnosis and discharge instructions. Independent interpretation of the EKG performed by MD Anthony  Independent interpretation of the X-Ray film(s) performed by MD Anthony    Patient's VSS, labs, EKG, XR, non-diagnostic. This patient presents with dyspnea, most likely secondary to new oxygen requirements, has appt with pulm in AM and getting his new oxygen tomorrow. Presentation not consistent with acute cardiac etiologies to include ACS (non ischemic ekg, unremarkable trop), CHF, pericardial effusion / tamponade . Presentation not consistent with acute respiratory etiologies to include acute PE, pneumothorax , asthma, COPD exacerbation, allergic etiologies, or infectious etiologies such as PNA. Presentation also not consistent with non-cardiopulmonary causes to include toxidromes, metabolic etiologies such as acidemia or electrolyte derangements, sepsis, neurologic causes (i.e. demyelinating diseases).    The patient was given detailed return precautions and advised to return to the emergency department if any new symptoms developed, symptoms worsened or for any concerns. The patient was offered the opportunity to ask questions and verbalized that they understand the diagnosis and discharge instructions. Independent interpretation of the EKG performed by MD Anthony  Independent interpretation of the X-Ray film(s) performed by MD Anthony    Patient's VSS, labs, EKG, XR, non-diagnostic. This patient presents with dyspnea, most likely secondary to new oxygen requirements, has appt with pulm in AM and getting his new oxygen some time this week. Presentation not consistent with acute cardiac etiologies to include ACS (non ischemic ekg, unremarkable trop), CHF, pericardial effusion / tamponade . Presentation not consistent with acute respiratory etiologies to include acute PE, pneumothorax , asthma, COPD exacerbation, allergic etiologies, or infectious etiologies such as PNA. Presentation also not consistent with non-cardiopulmonary causes to include toxidromes, metabolic etiologies such as acidemia or electrolyte derangements, sepsis, neurologic causes (i.e. demyelinating diseases).

## 2024-03-11 NOTE — ED ADULT NURSE NOTE - NSFALLUNIVINTERV_ED_ALL_ED
Bed/Stretcher in lowest position, wheels locked, appropriate side rails in place/Call bell, personal items and telephone in reach/Instruct patient to call for assistance before getting out of bed/chair/stretcher/Non-slip footwear applied when patient is off stretcher/[place] to call system/Physically safe environment - no spills, clutter or unnecessary equipment/Purposeful proactive rounding/Room/bathroom lighting operational, light cord in reach

## 2024-03-11 NOTE — ED PROVIDER NOTE - PATIENT PORTAL LINK FT
OFFICE VISIT      Patient: Checo Rosales   : 2005 MRN: [id number]    SUBJECTIVE:  Chief Complaint   Patient presents with   • Office Visit     fell, landed on right knee while playing basketball on Friday-continues with pain and swelling       A 16 year old male is here for a concern of right knee pain.     HISTORY OF PRESENT ILLNESS:    Patient has given consent to record this visit for documentation in their clinical record.    Historian: Self.  Acute pain of right knee: Patient reports knee pain as he had a fall where he landed on the right knee. The knee immediately swelled up after fall on Friday. Was unable to walk immediately.  Has pain on the outside of the knee.     Moderate major depression (CMS/HCC); Anxiety: Mood is stable.  He feels like his normal self. Denies any current suicidal and homicidal ideations; however, occasionally feels it would have been better if he was not here. No plan for suicide completion. Feels safe at home and school. Is adherent to Prozac 10 mg; however, missed few doses. Does not report any side effects. Feels okay on the current dose.       PAST MEDICAL HISTORY:  History reviewed. No pertinent past medical history.    MEDICATIONS:  Current Outpatient Medications   Medication Sig   • FLUoxetine (PROzac) 10 MG capsule Take 1 capsule by mouth daily.     No current facility-administered medications for this visit.       ALLERGIES:  ALLERGIES:   Allergen Reactions   • Ibuprofen Other (See Comments)     [place] (nose bleeds)       PAST SURGICAL HISTORY:  History reviewed. No pertinent surgical history.    FAMILY HISTORY:  History reviewed. No pertinent family history.    SOCIAL HISTORY:  Social History     Tobacco Use   Smoking Status Never Smoker   Smokeless Tobacco Never Used     Social History     Substance and Sexual Activity   Alcohol Use Never       ROS  Musculoskeletal: Per HPI.  Psychiatric/Behavioral: Per HPI.      OBJECTIVE:    Vitals:    22 1411   BP:  124/60   Pulse: 95   Resp: 18   SpO2: 100%       There is no height or weight on file to calculate BMI.    PHYSICAL EXAM  Constitutional: In no acute distress. Well developed  Eyes: The conjunctiva exhibited no abnormalities. The sclera was normal.  Musculoskeletal: Significant knee effusion (+1-+2) with Ballottement and milking, tenderness to palpation on lateral collateral ligament and lateral joint line, as well as the lateral margin of the patella. Mild laxity with varus stress, no laxity with valgus stress, anterior drawer and Lachman are negative. No erythema or bruising on the knee.   Psychiatric: Oriented to time, place, and person. The mood was normal. The affect was normal. The memory was unimpaired.   Skin: Skin moisture and turgor normal.            DIAGNOSTIC STUDIES:  LAB RESULTS:  No visits with results within 1 Month(s) from this visit.   Latest known visit with results is:   Office Visit on 02/27/2020   Component Date Value Ref Range Status   • Influenza A by PCR 02/27/2020 Not Detected  Not Detected Final   • Influenza B by PCR 02/27/2020 Not Detected  Not Detected Final       ASSESSMENT AND PLAN:  This is a 16 year old male who presents with :    1. Acute pain of right knee    2. Moderate major depression (CMS/HCC)    3. Anxiety        Orders Placed This Encounter   • XR Knee 3 View Right   • SERVICE TO ORTHOPEDICS       Plan:  Acute pain of right knee:   Examination suggestive of lateral collateral ligament tear.   Ordered X-ray knee.  XR showed small patellar fracture, so we put him in knee immobilizer. He is weight-bearing as tolerated, we will have him see Orthopedic, in the meantime he is advised to rest, ice and elevate.  I did offer crutches the patient declined.   Plan of care was conveyed to his mother.  Referred service to Orthopedic.     Moderate major depression (CMS/HCC); Anxiety:  Continue Prozac 10 mg.   Supportive care discussed.      Refer to orders.  Medical compliance with plan  discussed and risks of non-compliance reviewed.  Patient education completed on disease process, etiology & prognosis.  Proper usage and side effects of medications reviewed & discussed.  Patient understands and agrees with the plan.  Return to clinic as clinically indicated as discussed with patient who verbalized understanding of the plan and is in agreement with the plan.    Return if symptoms worsen or fail to improve.    I,  Dr. Lindsey Mccauley, have created a visit summary document based on the audio recording between Dr. Gurvinder Pyle MD and this patient for the physician to review, edit as needed, and authenticate.  Creation Date: 3/10/2022     I have reviewed and edited the visit summary above and attest that it is accurate.    Gurvinder Pyle MD     You can access the FollowMyHealth Patient Portal offered by [facility] by registering at the following website: [email or web address]. By joining [employer]’s FollowMyHealth portal, you will also be able to view your health information using other applications (apps) compatible with our system.

## 2024-03-11 NOTE — ED ADULT NURSE NOTE - NSFALLRISKASMT_ED_ALL_ED_DT
Opioid Pregnancy And Lactation Text: These medications can lead to premature delivery and should be avoided during pregnancy. These medications are also present in breast milk in small amounts. 11-Mar-2024 18:43

## 2024-03-11 NOTE — ED PROVIDER NOTE - OBJECTIVE STATEMENT
58-year-old male  with past medical history of the LEEP REBECCA, colitis, hiatal hernia, OA, GERD, HLD, HTN, COPD on 4 L home O2 coming in with complaints of shortness of breath.  Patient reports that earlier today he felt short of breath with some chest tightening and mild chest pressure, left-sided that was localized.  Saying that he has been here multiple times in the past couple days due to the fact he does not have home oxygen device that he requires which will come through tomorrow and now he just needs an oxygen.  Patient notes he felt some relief when he received some oxygen while on the way to the hospital, but now with some minor symptoms again. Denies recent fever, chills, nausea, vomiting, changes in urination, or changes in bowel movements.

## 2024-03-11 NOTE — ED PROVIDER NOTE - ATTENDING CONTRIBUTION TO CARE
I have reviewed and agree with the mid-level note, except as documented in my note below.    57 y/o male h/o  HTN, HLD, GERD, hiatal hernia, COPD (on 4Lhnc), PSH significant for appendectomy, cholecystectomy, hernia repair, orthopedic surgeries, cig smoker, now presents with shortness of breath for the past 2 weeks, has had multiple visits to the emergency department for the same complaint, is pending delivery of home oxygen device for treatment of this symptom, states is compliant with home medications, denies fever, hemoptysis, orthopnea, PND, chest pain, LE pain or swelling, recent immobilization or travel or other associated complaints at present. Old chart reviewed. I have reviewed and agree with the initial nursing note, except as documented in my note.    VSS, awake, alert, non-toxic appearing, oropharynx clear, no skin rash or lesions, no tracheal deviation, non-labored breathing without accessory muscle use apparent or pursed lip breathing, no retractions, speaks full sentences, equal BS BL, BL exp wheeze and rhonchi, +S1/S2, RRR, no m/r/g, abdomen soft, NT, ND, +BS, AO x 3, clear speech.

## 2024-03-11 NOTE — ED PROVIDER NOTE - NSFOLLOWUPINSTRUCTIONS_ED_ALL_ED_FT
FOLLOW UP WITH DR SANTOS TOMORROW AS SCHEDULED.    Shortness of Breath, Adult  Shortness of breath is when a person has trouble breathing or when a person feels like she or he is having trouble breathing in enough air. Shortness of breath could be a sign of a medical problem.    Follow these instructions at home:  A sign showing that a person should not smoke.  Pollutants    Do not use any products that contain nicotine or tobacco. These products include cigarettes, chewing tobacco, and vaping devices, such as e-cigarettes. This also includes cigars and pipes. If you need help quitting, ask your health care provider.  Avoid things that can irritate your airways, including:  Smoke. This includes campfire smoke, forest fire smoke, and secondhand smoke from tobacco products. Do not smoke or allow others to smoke in your home.  Mold.  Dust.  Air pollution.  Chemical fumes.  Things that can give you an allergic reaction (allergens) if you have allergies. Common allergens include pollen from grasses or trees and animal dander.  Keep your living space clean and free of mold and dust.  General instructions    Pay attention to any changes in your symptoms.  Take over-the-counter and prescription medicines only as told by your health care provider. This includes oxygen therapy and inhaled medicines.  Rest as needed.  Return to your normal activities as told by your health care provider. Ask your health care provider what activities are safe for you.  Keep all follow-up visits. This is important.  Contact a health care provider if:  Your condition does not improve as soon as expected.  You have a hard time doing your normal activities, even after you rest.  You have new symptoms.  You cannot walk up stairs or exercise the way that you normally do.  Get help right away if:  Your shortness of breath gets worse.  You have shortness of breath when you are resting.  You feel light-headed or you faint.  You have a cough that is not controlled with medicines.  You cough up blood.  You have pain with breathing.  You have pain in your chest, arms, shoulders, or abdomen.  You have a fever.  These symptoms may be an emergency. Get help right away. Call 911.  Do not wait to see if the symptoms will go away.  Do not drive yourself to the hospital.  Summary  Shortness of breath is when a person has trouble breathing enough air. It can be a sign of a medical problem.  Avoid things that irritate your lungs, such as smoking, pollution, mold, and dust.  Pay attention to changes in your symptoms and contact your health care provider if you have a hard time completing daily activities because of shortness of breath.  This information is not intended to replace advice given to you by your health care provider. Make sure you discuss any questions you have with your health care provider.

## 2024-03-11 NOTE — ED PROVIDER NOTE - DIFFERENTIAL DIAGNOSIS
see mdm    Independent interpretation of the EKG performed by MD Anthony  Independent interpretation of the X-Ray film(s) performed by MD Anthony Differential Diagnosis see mdm

## 2024-03-12 ENCOUNTER — TRANSCRIPTION ENCOUNTER (OUTPATIENT)
Age: 59
End: 2024-03-12

## 2024-03-12 VITALS — OXYGEN SATURATION: 94 % | RESPIRATION RATE: 18 BRPM

## 2024-03-12 LAB
RAPID RVP RESULT: SIGNIFICANT CHANGE UP
SARS-COV-2 RNA SPEC QL NAA+PROBE: SIGNIFICANT CHANGE UP

## 2024-03-12 PROCEDURE — 99236 HOSP IP/OBS SAME DATE HI 85: CPT

## 2024-03-12 PROCEDURE — 99223 1ST HOSP IP/OBS HIGH 75: CPT

## 2024-03-12 RX ORDER — MORPHINE SULFATE 50 MG/1
100 CAPSULE, EXTENDED RELEASE ORAL
Refills: 0 | Status: DISCONTINUED | OUTPATIENT
Start: 2024-03-12 | End: 2024-03-12

## 2024-03-12 RX ORDER — MORPHINE SULFATE 50 MG/1
100 CAPSULE, EXTENDED RELEASE ORAL ONCE
Refills: 0 | Status: DISCONTINUED | OUTPATIENT
Start: 2024-03-12 | End: 2024-03-12

## 2024-03-12 RX ORDER — AZITHROMYCIN 500 MG/1
1 TABLET, FILM COATED ORAL
Qty: 3 | Refills: 0
Start: 2024-03-12 | End: 2024-03-14

## 2024-03-12 RX ORDER — SIMVASTATIN 20 MG/1
20 TABLET, FILM COATED ORAL AT BEDTIME
Refills: 0 | Status: DISCONTINUED | OUTPATIENT
Start: 2024-03-12 | End: 2024-03-12

## 2024-03-12 RX ORDER — AZITHROMYCIN 500 MG/1
500 TABLET, FILM COATED ORAL EVERY 24 HOURS
Refills: 0 | Status: DISCONTINUED | OUTPATIENT
Start: 2024-03-13 | End: 2024-03-12

## 2024-03-12 RX ORDER — ASPIRIN/CALCIUM CARB/MAGNESIUM 324 MG
81 TABLET ORAL DAILY
Refills: 0 | Status: DISCONTINUED | OUTPATIENT
Start: 2024-03-12 | End: 2024-03-12

## 2024-03-12 RX ORDER — LISINOPRIL 2.5 MG/1
5 TABLET ORAL DAILY
Refills: 0 | Status: DISCONTINUED | OUTPATIENT
Start: 2024-03-12 | End: 2024-03-12

## 2024-03-12 RX ORDER — PANTOPRAZOLE SODIUM 20 MG/1
40 TABLET, DELAYED RELEASE ORAL
Refills: 0 | Status: DISCONTINUED | OUTPATIENT
Start: 2024-03-12 | End: 2024-03-12

## 2024-03-12 RX ORDER — AZITHROMYCIN 500 MG/1
TABLET, FILM COATED ORAL
Refills: 0 | Status: DISCONTINUED | OUTPATIENT
Start: 2024-03-12 | End: 2024-03-12

## 2024-03-12 RX ORDER — FENOFIBRATE,MICRONIZED 130 MG
145 CAPSULE ORAL DAILY
Refills: 0 | Status: DISCONTINUED | OUTPATIENT
Start: 2024-03-12 | End: 2024-03-12

## 2024-03-12 RX ORDER — ALBUTEROL 90 UG/1
2 AEROSOL, METERED ORAL EVERY 6 HOURS
Refills: 0 | Status: DISCONTINUED | OUTPATIENT
Start: 2024-03-12 | End: 2024-03-12

## 2024-03-12 RX ORDER — AZITHROMYCIN 500 MG/1
500 TABLET, FILM COATED ORAL ONCE
Refills: 0 | Status: COMPLETED | OUTPATIENT
Start: 2024-03-12 | End: 2024-03-12

## 2024-03-12 RX ORDER — TIOTROPIUM BROMIDE 18 UG/1
2 CAPSULE ORAL; RESPIRATORY (INHALATION) DAILY
Refills: 0 | Status: DISCONTINUED | OUTPATIENT
Start: 2024-03-12 | End: 2024-03-12

## 2024-03-12 RX ORDER — ALPRAZOLAM 0.25 MG
2 TABLET ORAL EVERY 6 HOURS
Refills: 0 | Status: DISCONTINUED | OUTPATIENT
Start: 2024-03-12 | End: 2024-03-12

## 2024-03-12 RX ORDER — FUROSEMIDE 40 MG
40 TABLET ORAL DAILY
Refills: 0 | Status: DISCONTINUED | OUTPATIENT
Start: 2024-03-12 | End: 2024-03-12

## 2024-03-12 RX ORDER — ZOLPIDEM TARTRATE 10 MG/1
5 TABLET ORAL AT BEDTIME
Refills: 0 | Status: DISCONTINUED | OUTPATIENT
Start: 2024-03-12 | End: 2024-03-12

## 2024-03-12 RX ADMIN — Medication 145 MILLIGRAM(S): at 11:38

## 2024-03-12 RX ADMIN — MORPHINE SULFATE 100 MILLIGRAM(S): 50 CAPSULE, EXTENDED RELEASE ORAL at 02:24

## 2024-03-12 RX ADMIN — LISINOPRIL 5 MILLIGRAM(S): 2.5 TABLET ORAL at 05:48

## 2024-03-12 RX ADMIN — AZITHROMYCIN 255 MILLIGRAM(S): 500 TABLET, FILM COATED ORAL at 11:38

## 2024-03-12 RX ADMIN — Medication 2 MILLIGRAM(S): at 11:40

## 2024-03-12 RX ADMIN — Medication 81 MILLIGRAM(S): at 11:40

## 2024-03-12 RX ADMIN — TIOTROPIUM BROMIDE 2 PUFF(S): 18 CAPSULE ORAL; RESPIRATORY (INHALATION) at 09:00

## 2024-03-12 RX ADMIN — Medication 40 MILLIGRAM(S): at 05:48

## 2024-03-12 RX ADMIN — Medication 2 MILLIGRAM(S): at 02:24

## 2024-03-12 RX ADMIN — PANTOPRAZOLE SODIUM 40 MILLIGRAM(S): 20 TABLET, DELAYED RELEASE ORAL at 05:48

## 2024-03-12 RX ADMIN — Medication 60 MILLIGRAM(S): at 13:35

## 2024-03-12 NOTE — DISCHARGE NOTE PROVIDER - PROVIDER TOKENS
PROVIDER:[TOKEN:[[id number]],FOLLOWUP:[2 weeks]],PROVIDER:[TOKEN:[[id number]],FOLLOWUP:[2 weeks]]

## 2024-03-12 NOTE — DISCHARGE NOTE PROVIDER - CARE PROVIDER_API CALL
Edison Davis  Internal Medicine  [address] [place] [address]  Phone: [phone]  Fax: [phone]  Follow Up Time: 2 weeks    Murali Brooks  Pulmonary Disease  [address] [place] [id number]  Phone: [phone]  Fax: [phone]  Follow Up Time: 2 weeks

## 2024-03-12 NOTE — CONSULT NOTE ADULT - ASSESSMENT
IMPRESSION    COPD exacerbation   Volume overload  REBECCA on BPAP  H/o HFpEF      RECOMMENDATIONS     Solumedrol 40 BID  Maximise cardiac status, recommend diurese as tolerated  Obtain BNP  RVP noted  Continue home inhalers  Albuterol PRN  Check oxygen on ambulation  BPAP at night  DVT PPX  When discharged needs pulmonary f/u for lung cancer screening, PFT, and sleep study   IMPRESSION    COPD exacerbation   REBECCA on BPAP  H/o HFpEF      RECOMMENDATIONS     Prednisone course.    Maximise cardiac status, recommend diurese as tolerated  Obtain BNP  RVP noted  Continue home inhalers triple therapy.    Albuterol PRN  Check oxygen on ambulation  BPAP at night  DVT PPX  OP Pulm follow up

## 2024-03-12 NOTE — CONSULT NOTE ADULT - SUBJECTIVE AND OBJECTIVE BOX
Patient is a 58y old  Male who presents with a chief complaint of need of oxygen (12 Mar 2024 01:01)      HPI:   58-year-old male  with past medical history LEEP REBECCA, colitis, hiatal hernia, OA, GERD, HLD, HTN, COPD coming in with complaints of shortness of breath.  Patient reports that earlier today he felt short of breath with some chest tightening and mild chest pressure, left-sided that was localized.  Saying that he has been here multiple times in the past couple days due to the fact he does not have home oxygen device that he requires which will come through tomorrow and now he just needs an oxygen.   Denies recent fever, chills, nausea, vomiting, changes in urination, or changes in bowel movements.    In the ED: Vitals were /62, HR 94, RR 19, afebrile     (12 Mar 2024 01:01)      PAST MEDICAL & SURGICAL HISTORY:  HTN (hypertension)      High cholesterol      GERD (gastroesophageal reflux disease)      Morbid obesity      Osteoarthritis      Hiatal hernia  GERD      Colitis  LARGE INTESTINE   NO RECENT FLARES      COPD (chronic obstructive pulmonary disease)      REBECCA treated with BiPAP      History of cholecystectomy      History of appendectomy      History of knee replacement procedure of right knee  BILATERAL      Hernia, inguinal, bilateral  3 months old      H/O knee surgery  arthoscopic x4      H/O foot surgery  right big toe surgery          SOCIAL HX:   Smoking              40 py, quit 2 weeks ago    FAMILY HISTORY:  FH: lung cancer (Mother)  mother    FH: diabetes mellitus    .  No cardiovascular or pulmonary family history     REVIEW OF SYSTEMS:    All ROS are negative except per HPI     Allergies    No Known Allergies    Intolerances          PHYSICAL EXAM  Vital Signs Last 24 Hrs  T(C): 36.7 (12 Mar 2024 07:58), Max: 36.7 (12 Mar 2024 07:58)  T(F): 98 (12 Mar 2024 07:58), Max: 98 (12 Mar 2024 07:58)  HR: 72 (12 Mar 2024 07:58) (72 - 94)  BP: 122/76 (12 Mar 2024 07:58) (103/58 - 122/76)  BP(mean): --  RR: 18 (12 Mar 2024 07:58) (18 - 19)  SpO2: 97% (12 Mar 2024 07:58) (95% - 97%)    Parameters below as of 12 Mar 2024 05:05  Patient On (Oxygen Delivery Method): room air        CONSTITUTIONAL:   NAD    ENT:   Airway patent,     EYES:   Clear bilaterally,   pupils equal,   round and reactive to light.    CARDIAC:   Normal rate,   regular rhythm.    b/l LE edema      RESPIRATORY:   b/l wheezing  Nnormal chest expansion  Not tachypneic,  No use of accessory muscles    GASTROINTESTINAL:  Abdomen soft,   non-tender,   no guarding,   + BS        MUSCULOSKELETAL:     no clubbing, cyanosis    NEUROLOGICAL:   Alert and oriented       SKIN:   Skin normal color for race,             LABS:                          14.6   12.19 )-----------( 247      ( 11 Mar 2024 17:28 )             44.1                                               03-11    134<L>  |  96<L>  |  18  ----------------------------<  82  5.0   |  29  |  1.1    Ca    9.1      11 Mar 2024 17:28    TPro  6.4  /  Alb  3.8  /  TBili  0.3  /  DBili  x   /  AST  30  /  ALT  22  /  AlkPhos  93  03-11                                             Urinalysis Basic - ( 11 Mar 2024 17:28 )    Color: x / Appearance: x / SG: x / pH: x  Gluc: 82 mg/dL / Ketone: x  / Bili: x / Urobili: x   Blood: x / Protein: x / Nitrite: x   Leuk Esterase: x / RBC: x / WBC x   Sq Epi: x / Non Sq Epi: x / Bacteria: x                                                  LIVER FUNCTIONS - ( 11 Mar 2024 17:28 )  Alb: 3.8 g/dL / Pro: 6.4 g/dL / ALK PHOS: 93 U/L / ALT: 22 U/L / AST: 30 U/L / GGT: x                                                                                                MEDICATIONS  (STANDING):  aspirin enteric coated 81 milliGRAM(s) Oral daily  azithromycin  IVPB      fenofibrate Tablet 145 milliGRAM(s) Oral daily  furosemide    Tablet 40 milliGRAM(s) Oral daily  lisinopril 5 milliGRAM(s) Oral daily  morphine ER Tablet 100 milliGRAM(s) Oral two times a day  pantoprazole    Tablet 40 milliGRAM(s) Oral before breakfast  predniSONE   Tablet 60 milliGRAM(s) Oral daily  simvastatin 20 milliGRAM(s) Oral at bedtime  tiotropium 2.5 MICROgram(s) Inhaler 2 Puff(s) Inhalation daily    MEDICATIONS  (PRN):  albuterol    90 MICROgram(s) HFA Inhaler 2 Puff(s) Inhalation every 6 hours PRN Bronchospasm  ALPRAZolam 2 milliGRAM(s) Oral every 6 hours PRN anxiety  zolpidem 5 milliGRAM(s) Oral at bedtime PRN Insomnia      X-Rays reviewed:    CXR interpreted by me:

## 2024-03-12 NOTE — DISCHARGE NOTE NURSING/CASE MANAGEMENT/SOCIAL WORK - NSDCVIVACCINE_GEN_ALL_CORE_FT
influenza, injectable, quadrivalent, preservative free; 19-Oct-2018 18:53; Zina Muro (RN); [employer] [employer]; [id number] (Exp. Date: 30-Jun-2019); IntraMuscular; Deltoid Left.; 0.5 milliLiter(s); VIS (VIS Published: 07-Aug-2015, VIS Presented: 19-Oct-2018);   Tdap; 03-Feb-2023 23:21; Leonor Meehan (VEE); [employer]; [id number] (Exp. Date: 08-Dec-2024); IntraMuscular; Deltoid Left.; 0.5 milliLiter(s); VIS (VIS Published: 09-May-2013, VIS Presented: 03-Feb-2023);

## 2024-03-12 NOTE — DISCHARGE NOTE PROVIDER - NSDCMRMEDTOKEN_GEN_ALL_CORE_FT
Advair Diskus 500 mcg-50 mcg inhalation powder: 1 puff(s) inhaled 2 times a day  albuterol 90 mcg/inh inhalation aerosol: 2 puff(s) inhaled every 6 hours AS NEEDED   ALPRAZolam 2 mg oral tablet: 1 tab(s) orally every 6 hours, As needed, anxiety  Ambien 10 mg oral tablet: 0.5 tab(s) orally once a day (at bedtime), As Needed  Aspir 81 oral delayed release tablet: 1 tab(s) orally once a day  fenofibrate 134 mg oral capsule: 1 cap(s) orally once a day  Lasix 40 mg oral tablet: 1 tab(s) orally once a day  lisinopril 5 mg oral tablet: 1 tab(s) orally once a day  morphine 100 mg/12 to 24 hr oral capsule, extended release: 1 cap(s) orally 2 times a day  Narcan 4 mg/0.1 mL nasal spray: 4 milligram(s) intranasally once a day as needed for  opioid overdose  omeprazole 40 mg oral delayed release capsule: 1 cap(s) orally once a day  Spiriva HandiHaler 18 mcg inhalation capsule: 1 cap(s) inhaled once a day   Zocor 20 mg oral tablet: 1 tab(s) orally once a day (at bedtime)   Advair Diskus 500 mcg-50 mcg inhalation powder: 1 puff(s) inhaled 2 times a day  albuterol 90 mcg/inh inhalation aerosol: 2 puff(s) inhaled every 6 hours AS NEEDED   ALPRAZolam 2 mg oral tablet: 1 tab(s) orally every 6 hours, As needed, anxiety  Ambien 10 mg oral tablet: 0.5 tab(s) orally once a day (at bedtime), As Needed  Aspir 81 oral delayed release tablet: 1 tab(s) orally once a day  azithromycin 500 mg oral tablet: 1 tab(s) orally once a day ONCE DAILY FOR 3 DAYS 3/13-15  fenofibrate 134 mg oral capsule: 1 cap(s) orally once a day  Lasix 40 mg oral tablet: 1 tab(s) orally once a day  lisinopril 5 mg oral tablet: 1 tab(s) orally once a day  morphine 100 mg/12 to 24 hr oral capsule, extended release: 1 cap(s) orally 2 times a day  Narcan 4 mg/0.1 mL nasal spray: 4 milligram(s) intranasally once a day as needed for  opioid overdose  omeprazole 40 mg oral delayed release capsule: 1 cap(s) orally once a day  predniSONE 20 mg oral tablet: 3 tab(s) orally once a day PLEASE TAKE 3 TABLETS PER DAY FROM 3/12-13, THEN 2 TABLETS PER DAY FROM 3/14-16, THEN 1 TABLET PER DAY FROM 3/17-19, THEN STOP  Spiriva HandiHaler 18 mcg inhalation capsule: 1 cap(s) inhaled once a day   Zocor 20 mg oral tablet: 1 tab(s) orally once a day (at bedtime)

## 2024-03-12 NOTE — H&P ADULT - ASSESSMENT
58-year-old male with past medical history of hypertension, hyperlipidemia, GERD, COPD, REBECCA on BIPAP, HFpEF last EF 65%. Presented with shortness of breath, admitted for home o2 requirement.      # Hx of copd on 4L home o2,  # Hx of REBECCA, not compliant with BIPAP   # HFpEF last EF 65%   - On home Lasix 40 mg, Advair 500-50, Spiriva, Albuterol  - 3 admissions in month of march  - pt doesnot have home o2 so wanted to be admitte, have outpt appointment with Dr hannah tomorrow  - mild cough present, whitish sputum  - completed course of prednisone and azithro 2 days back  - moniter off abx and off steriods  - recent CT scan negative for lung cancer  - last smoking 18 days ago, motivated to quit       # HTN  # HLD  # GERD   - Continue home lisinopril, simvastatin, fenofibrate, omeprazole, aspirin    # Diffuse bone pain  # Multiple previous surgeries  - Continue home morphine ER 100X2    # Generalized anxiety  # Insomnia  - Continue home Xanax and zolpidem       # GI prophylaxis: pantoprazole  # Full code  # Diet: Regular, DASH     58-year-old male with past medical history of hypertension, hyperlipidemia, GERD, COPD, REBECCA on BIPAP, HFpEF last EF 65%. Presented with shortness of breath, admitted for home o2 requirement.    #copd exacerbation   # Hx of copd on 4L home o2,  # Hx of REBECCA, not compliant with BIPAP   # chronic HFpEF last EF 65%   - On home Lasix 40 mg, Advair 500-50, Spiriva, Albuterol  - 3 admissions in month of march  - pt doesnot have home o2 so wanted to be admitte, have outpt appointment with Dr hannah tomorrow  - mild cough present, whitish sputum  - completed course of prednisone and azithro 2 days back  - moniter off abx and off steriods  - recent CT scan negative for lung cancer  - last smoking 18 days ago, motivated to quit       # HTN  # HLD  # GERD   - Continue home lisinopril, simvastatin, fenofibrate, omeprazole, aspirin    # Diffuse bone pain  # Multiple previous surgeries  - Continue home morphine ER 100X2    # Generalized anxiety  # Insomnia  - Continue home Xanax and zolpidem       # GI prophylaxis: pantoprazole  # Full code  # Diet: Regular, DASH

## 2024-03-12 NOTE — DISCHARGE NOTE PROVIDER - CARE PROVIDERS DIRECT ADDRESSES
[email or web address]

## 2024-03-12 NOTE — PATIENT PROFILE ADULT - FALL HARM RISK - HARM RISK INTERVENTIONS

## 2024-03-12 NOTE — CHART NOTE - NSCHARTNOTEFT_GEN_A_CORE
Patient was found to be saturating 95% on room air while laying and 94% on room air while ambulating

## 2024-03-12 NOTE — H&P ADULT - NSHPLABSRESULTS_GEN_ALL_CORE
LABS:                          14.6   12.19 )-----------( 247      ( 11 Mar 2024 17:28 )             44.1     03-11    134<L>  |  96<L>  |  18  ----------------------------<  82  5.0   |  29  |  1.1    Ca    9.1      11 Mar 2024 17:28    TPro  6.4  /  Alb  3.8  /  TBili  0.3  /  DBili  x   /  AST  30  /  ALT  22  /  AlkPhos  93  03-11    LIVER FUNCTIONS - ( 11 Mar 2024 17:28 )  Alb: 3.8 g/dL / Pro: 6.4 g/dL / ALK PHOS: 93 U/L / ALT: 22 U/L / AST: 30 U/L / GGT: x             Urinalysis Basic - ( 11 Mar 2024 17:28 )    Color: x / Appearance: x / SG: x / pH: x  Gluc: 82 mg/dL / Ketone: x  / Bili: x / Urobili: x   Blood: x / Protein: x / Nitrite: x   Leuk Esterase: x / RBC: x / WBC x   Sq Epi: x / Non Sq Epi: x / Bacteria: x

## 2024-03-12 NOTE — H&P ADULT - NSHPPHYSICALEXAM_GEN_ALL_CORE
GENERAL: NAD, lying in bed comfortably  HEAD:  Atraumatic, normocephalic  EYES: EOMI, PERRLA, conjunctiva and sclera clear  NECK: Supple, trachea midline, no JVD  HEART: Regular rate and rhythm, no murmurs, rubs, or gallops  LUNGS: Unlabored respirations.  Clear to auscultation bilaterally, no crackles, rhonchi +  ABDOMEN: Soft, nontender, nondistended, +BS  EXTREMITIES: 2+ peripheral pulses bilaterally. No clubbing, cyanosis, or edema  NERVOUS SYSTEM:  A&Ox3, moving all extremities, no focal deficits   SKIN: No rashes or lesions

## 2024-03-12 NOTE — DISCHARGE NOTE PROVIDER - ATTENDING DISCHARGE PHYSICAL EXAMINATION:
Attending attestation  Attending DC note  Pt seen and examined at bedside. No cp or sob. COpd exacerbation, unlikley cardiac, pbnp recent 93, trop stable, echo this year stable, jatin Pulm, outpt follow up , stress test 7/23- normal   vitals, labs, exam stable  Hospital course as above.  Plan dw pt and agreed to plan  Medically cleared for DC. Med recc completed.  JATIN resident. Spent 32 mins on case

## 2024-03-12 NOTE — DISCHARGE NOTE PROVIDER - NSDCCPCAREPLAN_GEN_ALL_CORE_FT
PRINCIPAL DISCHARGE DIAGNOSIS  Diagnosis: Dyspnea  Assessment and Plan of Treatment: You were found to have shortness of breath from copd exacerbation that was treated with steroids and antibiotics. Please take azithromycin for 3 days from 3/13-15, and prednisone 3 tablets per day for 2 days 3/13-14, 2 tablets per day for 3 days 3/15-17, and 1 tablet per day for 3 days 3/18-20. You were found not to require home O2. Please follow up with Dr. Brooks. Please follow up with your primary care provider. Please return to the ED if you feel unwell, or if you experience worsening symptoms, or any of the following symptoms but not limited to: shortness of breath, chest pain, falls, confusion

## 2024-03-12 NOTE — PATIENT PROFILE ADULT - MEDICATIONS/VISITS
Diabetes is unchanged.   Continue current treatment regimen.  Diabetes will be reassessed in 6 months.  
no

## 2024-03-12 NOTE — H&P ADULT - HISTORY OF PRESENT ILLNESS
58-year-old male  with past medical history LEEP REBECCA, colitis, hiatal hernia, OA, GERD, HLD, HTN, COPD on 4 L home O2 coming in with complaints of shortness of breath.  Patient reports that earlier today he felt short of breath with some chest tightening and mild chest pressure, left-sided that was localized.  Saying that he has been here multiple times in the past couple days due to the fact he does not have home oxygen device that he requires which will come through tomorrow and now he just needs an oxygen.   Denies recent fever, chills, nausea, vomiting, changes in urination, or changes in bowel movements.    In the ED: Vitals were /62, HR 94, RR 19, afebrile

## 2024-03-12 NOTE — DISCHARGE NOTE PROVIDER - HOSPITAL COURSE
58-year-old male  with past medical history LEEP REBECCA, colitis, hiatal hernia, OA, GERD, HLD, HTN, COPD on 4 L home O2 coming in with complaints of shortness of breath.  Patient reports that earlier today he felt short of breath with some chest tightening and mild chest pressure, left-sided that was localized.  Saying that he has been here multiple times in the past couple days due to the fact he does not have home oxygen device that he requires which will come through tomorrow and now he just needs an oxygen.   Denies recent fever, chills, nausea, vomiting, changes in urination, or changes in bowel movements.    In the ED: Vitals were /62, HR 94, RR 19, afebrile    ED HOLD COURSE  Evaluated for need for oxygen however patient saturating 94% on ambulation so not required. Evaluated by Dr. Brooks, pulmonology who patient follows outpatient, Discharged on prednisone taper and antibiotics for copd exacerbation.

## 2024-03-12 NOTE — H&P ADULT - ATTENDING COMMENTS
HPI as above.  Interval history: Pt seen and examined at bedside. No cp or sob.   Vital Signs (24 Hrs):  T(C): 36.7 (03-12-24 @ 07:58), Max: 36.7 (03-12-24 @ 07:58)  HR: 72 (03-12-24 @ 07:58) (72 - 94)  BP: 122/76 (03-12-24 @ 07:58) (103/58 - 122/76)  RR: 18 (03-12-24 @ 11:59) (18 - 19)  SpO2: 94% (03-12-24 @ 11:59) (94% - 97%)  Wt(kg): --  Daily Height in cm: 177.8 (11 Mar 2024 16:13)    Daily     I&O's Summary    PHYSICAL EXAM:  GENERAL: NAD, well-developed  HEAD:  Atraumatic, Normocephalic  EYES: EOMI, PERRLA, conjunctiva and sclera clear  NECK: Supple, No JVD  CHEST/LUNG: mild wheeze   HEART: Regular rate and rhythm; No murmurs, rubs, or gallops  ABDOMEN: Soft, Nontender, Nondistended; Bowel sounds present  EXTREMITIES:  2+ Peripheral Pulses, No clubbing, cyanosis, or edema  PSYCH: AAOx3  NEUROLOGY: non-focal  SKIN: No rashes or lesions  Labs reviewed  Imaging reviewed independently and reviewed read  EKG reviewed independently and reviewed read    Plan  #COPD exacerabtion- prednisone pilar whitney, recent stress test neg,  pbnp normal, pt does not qualify for home o2. see chart note.

## 2024-03-12 NOTE — DISCHARGE NOTE NURSING/CASE MANAGEMENT/SOCIAL WORK - PATIENT PORTAL LINK FT
You can access the FollowMyHealth Patient Portal offered by [facility] by registering at the following website: [email or web address]. By joining [facility]’s FollowMyHealth portal, you will also be able to view your health information using other applications (apps) compatible with our system.

## 2024-03-13 ENCOUNTER — EMERGENCY (EMERGENCY)
Facility: HOSPITAL | Age: 59
LOS: 0 days | Discharge: LEFT BEFORE DISCHARGE | End: 2024-03-13
Attending: EMERGENCY MEDICINE
Payer: MEDICAID

## 2024-03-13 VITALS
OXYGEN SATURATION: 91 % | HEIGHT: 70 IN | TEMPERATURE: 96 F | HEART RATE: 108 BPM | RESPIRATION RATE: 20 BRPM | DIASTOLIC BLOOD PRESSURE: 82 MMHG | SYSTOLIC BLOOD PRESSURE: 130 MMHG | WEIGHT: 274.92 LBS

## 2024-03-13 DIAGNOSIS — Z98.890 OTHER SPECIFIED POSTPROCEDURAL STATES: Chronic | ICD-10-CM

## 2024-03-13 DIAGNOSIS — R07.9 CHEST PAIN, UNSPECIFIED: ICD-10-CM

## 2024-03-13 DIAGNOSIS — Z53.21 PROCEDURE AND TREATMENT NOT CARRIED OUT DUE TO PATIENT LEAVING PRIOR TO BEING SEEN BY HEALTH CARE PROVIDER: ICD-10-CM

## 2024-03-13 DIAGNOSIS — Z96.651 PRESENCE OF RIGHT ARTIFICIAL KNEE JOINT: Chronic | ICD-10-CM

## 2024-03-13 DIAGNOSIS — K40.20 BILATERAL INGUINAL HERNIA, WITHOUT OBSTRUCTION OR GANGRENE, NOT SPECIFIED AS RECURRENT: Chronic | ICD-10-CM

## 2024-03-13 PROCEDURE — 93010 ELECTROCARDIOGRAM REPORT: CPT

## 2024-03-13 PROCEDURE — 93005 ELECTROCARDIOGRAM TRACING: CPT

## 2024-03-13 PROCEDURE — L9991: CPT

## 2024-03-13 NOTE — ED ADULT NURSE NOTE - FALL HARM RISK CONCLUSION
The skin of the bilateral groins was clipped, prepped and draped in the usual sterile manner. (If not otherwise specified, skin prep was bilateral.) Universal Safety Interventions

## 2024-03-13 NOTE — ED ADULT TRIAGE NOTE - CHIEF COMPLAINT QUOTE
Pt c/o 3 weeks of chest pain w Shortness of breath. received ASA 324mg in transit. Pt has hx of COPD. Pt was here yesterday for the same thing, was discharged this morning.

## 2024-03-15 ENCOUNTER — EMERGENCY (EMERGENCY)
Facility: HOSPITAL | Age: 59
LOS: 0 days | Discharge: HOME | End: 2024-03-15
Attending: EMERGENCY MEDICINE
Payer: MEDICAID

## 2024-03-15 VITALS
OXYGEN SATURATION: 94 % | WEIGHT: 274.92 LBS | DIASTOLIC BLOOD PRESSURE: 76 MMHG | HEART RATE: 98 BPM | RESPIRATION RATE: 25 BRPM | TEMPERATURE: 98 F | SYSTOLIC BLOOD PRESSURE: 141 MMHG | HEIGHT: 70 IN

## 2024-03-15 DIAGNOSIS — Z98.890 OTHER SPECIFIED POSTPROCEDURAL STATES: Chronic | ICD-10-CM

## 2024-03-15 DIAGNOSIS — E78.5 HYPERLIPIDEMIA, UNSPECIFIED: ICD-10-CM

## 2024-03-15 DIAGNOSIS — G47.33 OBSTRUCTIVE SLEEP APNEA (ADULT) (PEDIATRIC): ICD-10-CM

## 2024-03-15 DIAGNOSIS — R06.02 SHORTNESS OF BREATH: ICD-10-CM

## 2024-03-15 DIAGNOSIS — K40.20 BILATERAL INGUINAL HERNIA, WITHOUT OBSTRUCTION OR GANGRENE, NOT SPECIFIED AS RECURRENT: Chronic | ICD-10-CM

## 2024-03-15 DIAGNOSIS — Z96.651 PRESENCE OF RIGHT ARTIFICIAL KNEE JOINT: Chronic | ICD-10-CM

## 2024-03-15 DIAGNOSIS — I10 ESSENTIAL (PRIMARY) HYPERTENSION: ICD-10-CM

## 2024-03-15 DIAGNOSIS — K21.9 GASTRO-ESOPHAGEAL REFLUX DISEASE WITHOUT ESOPHAGITIS: ICD-10-CM

## 2024-03-15 DIAGNOSIS — R06.2 WHEEZING: ICD-10-CM

## 2024-03-15 DIAGNOSIS — M19.90 UNSPECIFIED OSTEOARTHRITIS, UNSPECIFIED SITE: ICD-10-CM

## 2024-03-15 DIAGNOSIS — J44.1 CHRONIC OBSTRUCTIVE PULMONARY DISEASE WITH (ACUTE) EXACERBATION: ICD-10-CM

## 2024-03-15 DIAGNOSIS — F17.200 NICOTINE DEPENDENCE, UNSPECIFIED, UNCOMPLICATED: ICD-10-CM

## 2024-03-15 DIAGNOSIS — R07.89 OTHER CHEST PAIN: ICD-10-CM

## 2024-03-15 LAB
ALBUMIN SERPL ELPH-MCNC: 3.8 G/DL — SIGNIFICANT CHANGE UP (ref 3.5–5.2)
ALP SERPL-CCNC: 95 U/L — SIGNIFICANT CHANGE UP (ref 30–115)
ALT FLD-CCNC: 36 U/L — SIGNIFICANT CHANGE UP (ref 0–41)
ANION GAP SERPL CALC-SCNC: 7 MMOL/L — SIGNIFICANT CHANGE UP (ref 7–14)
AST SERPL-CCNC: 22 U/L — SIGNIFICANT CHANGE UP (ref 0–41)
BASOPHILS # BLD AUTO: 0.02 K/UL — SIGNIFICANT CHANGE UP (ref 0–0.2)
BASOPHILS NFR BLD AUTO: 0.1 % — SIGNIFICANT CHANGE UP (ref 0–1)
BILIRUB SERPL-MCNC: 0.3 MG/DL — SIGNIFICANT CHANGE UP (ref 0.2–1.2)
BUN SERPL-MCNC: 15 MG/DL — SIGNIFICANT CHANGE UP (ref 10–20)
CALCIUM SERPL-MCNC: 9.2 MG/DL — SIGNIFICANT CHANGE UP (ref 8.4–10.4)
CHLORIDE SERPL-SCNC: 96 MMOL/L — LOW (ref 98–110)
CO2 SERPL-SCNC: 34 MMOL/L — HIGH (ref 17–32)
CREAT SERPL-MCNC: 0.7 MG/DL — SIGNIFICANT CHANGE UP (ref 0.7–1.5)
EGFR: 108 ML/MIN/1.73M2 — SIGNIFICANT CHANGE UP
EOSINOPHIL # BLD AUTO: 0.05 K/UL — SIGNIFICANT CHANGE UP (ref 0–0.7)
EOSINOPHIL NFR BLD AUTO: 0.3 % — SIGNIFICANT CHANGE UP (ref 0–8)
GLUCOSE SERPL-MCNC: 117 MG/DL — HIGH (ref 70–99)
HCT VFR BLD CALC: 40.3 % — LOW (ref 42–52)
HGB BLD-MCNC: 13.4 G/DL — LOW (ref 14–18)
IMM GRANULOCYTES NFR BLD AUTO: 0.9 % — HIGH (ref 0.1–0.3)
LYMPHOCYTES # BLD AUTO: 16.6 % — LOW (ref 20.5–51.1)
LYMPHOCYTES # BLD AUTO: 2.73 K/UL — SIGNIFICANT CHANGE UP (ref 1.2–3.4)
MCHC RBC-ENTMCNC: 29.8 PG — SIGNIFICANT CHANGE UP (ref 27–31)
MCHC RBC-ENTMCNC: 33.3 G/DL — SIGNIFICANT CHANGE UP (ref 32–37)
MCV RBC AUTO: 89.6 FL — SIGNIFICANT CHANGE UP (ref 80–94)
MONOCYTES # BLD AUTO: 1.23 K/UL — HIGH (ref 0.1–0.6)
MONOCYTES NFR BLD AUTO: 7.5 % — SIGNIFICANT CHANGE UP (ref 1.7–9.3)
NEUTROPHILS # BLD AUTO: 12.29 K/UL — HIGH (ref 1.4–6.5)
NEUTROPHILS NFR BLD AUTO: 74.6 % — SIGNIFICANT CHANGE UP (ref 42.2–75.2)
NRBC # BLD: 0 /100 WBCS — SIGNIFICANT CHANGE UP (ref 0–0)
NT-PROBNP SERPL-SCNC: 230 PG/ML — SIGNIFICANT CHANGE UP (ref 0–300)
PLATELET # BLD AUTO: 211 K/UL — SIGNIFICANT CHANGE UP (ref 130–400)
PMV BLD: 10.1 FL — SIGNIFICANT CHANGE UP (ref 7.4–10.4)
POTASSIUM SERPL-MCNC: 3.9 MMOL/L — SIGNIFICANT CHANGE UP (ref 3.5–5)
POTASSIUM SERPL-SCNC: 3.9 MMOL/L — SIGNIFICANT CHANGE UP (ref 3.5–5)
PROT SERPL-MCNC: 5.7 G/DL — LOW (ref 6–8)
RBC # BLD: 4.5 M/UL — LOW (ref 4.7–6.1)
RBC # FLD: 16 % — HIGH (ref 11.5–14.5)
SODIUM SERPL-SCNC: 137 MMOL/L — SIGNIFICANT CHANGE UP (ref 135–146)
TROPONIN T, HIGH SENSITIVITY RESULT: 26 NG/L — HIGH (ref 6–21)
WBC # BLD: 16.46 K/UL — HIGH (ref 4.8–10.8)
WBC # FLD AUTO: 16.46 K/UL — HIGH (ref 4.8–10.8)

## 2024-03-15 PROCEDURE — 83880 ASSAY OF NATRIURETIC PEPTIDE: CPT

## 2024-03-15 PROCEDURE — 36415 COLL VENOUS BLD VENIPUNCTURE: CPT

## 2024-03-15 PROCEDURE — 96374 THER/PROPH/DIAG INJ IV PUSH: CPT

## 2024-03-15 PROCEDURE — 80053 COMPREHEN METABOLIC PANEL: CPT

## 2024-03-15 PROCEDURE — 99285 EMERGENCY DEPT VISIT HI MDM: CPT

## 2024-03-15 PROCEDURE — 93010 ELECTROCARDIOGRAM REPORT: CPT | Mod: 76

## 2024-03-15 PROCEDURE — 94640 AIRWAY INHALATION TREATMENT: CPT

## 2024-03-15 PROCEDURE — 93005 ELECTROCARDIOGRAM TRACING: CPT

## 2024-03-15 PROCEDURE — 71045 X-RAY EXAM CHEST 1 VIEW: CPT | Mod: 26

## 2024-03-15 PROCEDURE — 85025 COMPLETE CBC W/AUTO DIFF WBC: CPT

## 2024-03-15 PROCEDURE — 96375 TX/PRO/DX INJ NEW DRUG ADDON: CPT

## 2024-03-15 PROCEDURE — 99285 EMERGENCY DEPT VISIT HI MDM: CPT | Mod: 25

## 2024-03-15 PROCEDURE — 71045 X-RAY EXAM CHEST 1 VIEW: CPT

## 2024-03-15 PROCEDURE — 84484 ASSAY OF TROPONIN QUANT: CPT

## 2024-03-15 RX ORDER — MAGNESIUM SULFATE 500 MG/ML
2 VIAL (ML) INJECTION ONCE
Refills: 0 | Status: COMPLETED | OUTPATIENT
Start: 2024-03-15 | End: 2024-03-15

## 2024-03-15 RX ORDER — DEXAMETHASONE 0.5 MG/5ML
10 ELIXIR ORAL ONCE
Refills: 0 | Status: COMPLETED | OUTPATIENT
Start: 2024-03-15 | End: 2024-03-15

## 2024-03-15 RX ORDER — ALBUTEROL 90 UG/1
2.5 AEROSOL, METERED ORAL
Refills: 0 | Status: DISCONTINUED | OUTPATIENT
Start: 2024-03-15 | End: 2024-03-15

## 2024-03-15 RX ORDER — IPRATROPIUM/ALBUTEROL SULFATE 18-103MCG
3 AEROSOL WITH ADAPTER (GRAM) INHALATION
Refills: 0 | Status: DISCONTINUED | OUTPATIENT
Start: 2024-03-15 | End: 2024-03-15

## 2024-03-15 RX ADMIN — ALBUTEROL 2.5 MILLIGRAM(S): 90 AEROSOL, METERED ORAL at 04:13

## 2024-03-15 RX ADMIN — Medication 3 MILLILITER(S): at 02:53

## 2024-03-15 RX ADMIN — Medication 10 MILLIGRAM(S): at 02:54

## 2024-03-15 RX ADMIN — Medication 150 GRAM(S): at 02:54

## 2024-03-15 NOTE — ED PROVIDER NOTE - CONSIDERATION OF ADMISSION OBSERVATION
admission was offered but pt states he feels better and wants to go home. Consideration of Admission/Observation

## 2024-03-15 NOTE — ED PROVIDER NOTE - CARE PROVIDER_API CALL
Edison Davis  Internal Medicine  [address] [place] [id number]  Phone: [phone]  Fax: [phone]  Established Patient  Follow Up Time: 4-6 Days

## 2024-03-15 NOTE — ED PROVIDER NOTE - OBJECTIVE STATEMENT
58 yold male to ED [facility], Copd pending authorization for O2; OA, Htn, HLd, hiatal hernia c/o sob/wheezing after carrying his dog; pt attempted his nebs, prednisone without signif  improvement; pt admits to smoking again today; pt also with mild chest pain/chest tightness; deneis feve, chills, cough, n/v, back pain

## 2024-03-15 NOTE — ED PROVIDER NOTE - CLINICAL SUMMARY MEDICAL DECISION MAKING FREE TEXT BOX
58-year-old male  with past medical history of the LEEP REBECCA, colitis, hiatal hernia, OA, GERD, HLD, HTN, COPD (should be on home O2 but hasn't been able to get it yet), comes in with complaints of shortness of breath which started after he carried his dog from the car to the house. Pt reports chest tightness for 3 wks, not worse today. Pt is currently on Prednisone 60mg PO for his COPD. Did not try nebs bc nebulizer broke. No leg pain or swelling. No fever/chills. No cough. On exam, pt in NAD, AAOx3, head NC/AT, CN II-XII intact, PEERL, EOMi, neck (-) midline tenderness, lungs diffuse wheezing B/L, CV S1S2 regular, abdomen soft/NT/ND/(+)BS, ext (-) edema, motor 5/5x4, sensation intact, ambulating with steady gait. Work up reviewed. Pt feels better. Wants to go home. Pulm is working on getting pt oxygen at home. Strict return precautions provided.

## 2024-03-15 NOTE — ED ADULT TRIAGE NOTE - MEANS OF ARRIVAL
Medrol dose pack sent to her [facility] pharmacy to combat the acute flare up of pain. May use NSAIDs prn after completion of medrol dose pack.   ambulatory

## 2024-03-15 NOTE — ED ADULT TRIAGE NOTE - PATIENT'S PREFERRED PRONOUN
Internal Medicine Office Visit  [facility]   Patient Name: Taylor Aburto  Patient Age: 66 y.o.  YOB: 1954  MRN: [id number]    Date of Visit: 2020  Reason for Office Visit:   Chief Complaint   Patient presents with     Follow-up     DM     Covid 19 Testing     would like to get a covid test No current sx. Boyfriend was exposed to COVID but he tested negative.           Assessment / Plan / Medical Decision Makin. Type 2 diabetes mellitus without complication, without long-term current use of insulin (H)  Routine labs and sounds like improvement.  Discussed diet and exercise.  Refill medications.  - simvastatin (ZOCOR) 20 MG tablet; Take 1 tablet (20 mg total) by mouth every evening.  Dispense: 90 tablet; Refill: 2  - metFORMIN (GLUCOPHAGE-XR) 500 MG 24 hr tablet; Take 1 tablet (500 mg total) by mouth 2 (two) times a day.  Dispense: 180 tablet; Refill: 2  - lisinopriL (PRINIVIL,ZESTRIL) 5 MG tablet; Take 1 tablet (5 mg total) by mouth daily.  Dispense: 90 tablet; Refill: 2  - Diabetes foot exam  - Microalbumin, Random Urine  - Lipid panel  - Hemoglobin A1c  - Comprehensive Metabolic Panel    2. Cigarette nicotine dependence with other nicotine-induced disorder  Recommend smoking cessation    3. Routine general medical examination at a health care facility  Routine screening.  - Hepatitis C Antibody (Anti-HCV)  - HIV Antigen/Antibody Screening [place]        Health Maintenance Review  Health Maintenance   Topic Date Due     PREVENTIVE CARE VISIT  1954     ADVANCE CARE PLANNING  1974     COLORECTAL CANCER SCREENING  1974     ZOSTER VACCINES (1 of 2) 2006     A1C  2021     DIABETIC EYE EXAM  2021     BMP  2021     DIABETIC FOOT EXAM  2021     LIPID  2021     MICROALBUMIN  2021     MAMMOGRAM  2022     PAP SMEAR  2023     HPV TEST  2023     TD 18+ HE  2030     HEPATITIS C SCREENING   "Completed     HIV SCREENING  Completed     Pneumococcal Vaccine: Pediatrics (0 to 5 Years) and At-Risk Patients (6 to 64 Years)  Completed     INFLUENZA VACCINE RULE BASED  Completed     TDAP ADULT ONE TIME DOSE  Completed         I have changed Taylor Aburto's metFORMIN. I am also having her maintain her polyvinyl alcohol, docosahexaenoic acid-epa, cholecalciferol (vitamin D3), aspirin, MAGNESIUM HYDROXIDE ORAL, ascorbic acid (VITAMIN C ORAL), vitamin B complex-folic acid, vitamin A-vitamin C-vit E-min, blood glucose meter, generic lancets, Accu-Chek Guide test strips, simvastatin, and lisinopriL.      HPI:  Taylor Aburto is a 66 y.o. year old who presents to the office today for diabetes interval visit.  Patient reports changing to a \"strict\" diet that includes not eating sweats or things with added sugars.  Patient also reports reducing the amount of \"fast food\".  Patient reports her AM blood glucose readings have been ranging 150s-170s and PM blood glucose readings have been in the 130s.  Patient indicates that her blood glucose in the afternoon last Monday was 240 after eating a banana, an orange and a cup of coffee.  Patient reports \"not getting enough activity\" but when she does exercise it is primarily on the weekend and walking. Patient indicates she is under a lot of stress lately due to family related to her boyfriend's illness.  She is also concerned about COVID and the recent political events.          Review of Systems- pertinent positive in bold:  Constitutional: Fainting, weight change, fatigue, dizziness, sleeping difficulties  Eyes: change in vision, blurred, patient states \"it is probably because I stare at a computer all day\" or double vision, redness/eye pain  Ears, nose, mouth, throat: change in hearing, ear pain, difficulty swallowing, sores in the mouth or throat  Respiratory: shortness of breath, cough, wheezing  Cardiovascular: chest pain, palpitations   Gastrointestinal: " nausea/vomiting, change in appetite, change in bowel habits, constipation or diarrhea, difficulty swallowing  Urinary: painful urination, frequent urination, urinary urgency/incontinence, nocturia  Genital: WOMEN: vaginal discharge or odor, bleeding/pain with intercourse, pelvic pain, vulvar/vaginal itching or burning  Musculoskeletal: backache/back pain (new or increasing), weakness, joint pain/stiffness (new or increasing), muscle cramps, swelling of hands, feet, ankles, leg pain/redness  Skin: change in moles/freckles, rash, nodules  Hematologic/lymphatic: swollen lymph glands, abnormal bruising/bleeding  Endocrine: excessive thirst/urination, cold or heat intolerance  Breast: breast lump, breast pain, nipple discharge/skin changes  Neurologic/emotional: worrisome memory change, numbness/tingling, anxiety, mood swings      Current Scheduled Meds:  Outpatient Encounter Medications as of 11/9/2020   Medication Sig Dispense Refill     ACCU-CHEK GUIDE TEST STRIPS strips TEST BLOOD GLUCOSE LEVELS TWICE A  strip 3     ascorbic acid (VITAMIN C ORAL) Take by mouth.       aspirin 81 MG EC tablet Take 81 mg by mouth.       blood glucose meter (GLUCOMETER) Use 1 each As Directed as needed. Dispense glucometer brand per patient's insurance at pharmacy discretion. DX: E11.9 1 each 0     cholecalciferol, vitamin D3, 50 mcg (2,000 unit) Tab Take 4,000 Units by mouth.       docosahexaenoic acid-epa 120-180 mg cap Take 2 g by mouth.       generic lancets (FINGERSTIX LANCETS) Dispense brand per patient's insurance at pharmacy discretion. 100 each 2     lisinopriL (PRINIVIL,ZESTRIL) 5 MG tablet Take 1 tablet (5 mg total) by mouth daily. 90 tablet 2     MAGNESIUM HYDROXIDE ORAL Take 1 tablet by mouth.       metFORMIN (GLUCOPHAGE-XR) 500 MG 24 hr tablet Take 1 tablet (500 mg total) by mouth 2 (two) times a day. 180 tablet 2     polyvinyl alcohol (LIQUIFILM TEARS) 1.4 % ophthalmic solution 1 drop.       simvastatin (ZOCOR) 20  MG tablet Take 1 tablet (20 mg total) by mouth every evening. 90 tablet 2     vitamin A-vitamin C-vit E-min (OCUVITE) Tab tablet Take by mouth daily.       vitamin B complex-folic acid (B COMPLEX 1, WITH FOLIC ACID,) 0.4 mg Tab Take by mouth.       [DISCONTINUED] lisinopriL (PRINIVIL,ZESTRIL) 5 MG tablet Take 1 tablet (5 mg total) by mouth daily. 30 tablet 11     [DISCONTINUED] metFORMIN (GLUCOPHAGE-XR) 500 MG 24 hr tablet TAKE 1 TABLET BY MOUTH TWICE A DAY 60 tablet 2     [DISCONTINUED] simvastatin (ZOCOR) 20 MG tablet Take 1 tablet (20 mg total) by mouth every evening. 30 tablet 11     No facility-administered encounter medications on file as of 11/9/2020.      Post Discharge Medication Reconciliation Status: patient was not discharged from an inpatient facility    Past Medical History:   Diagnosis Date     Arthritis 6/27/2013     H/O ETOH abuse 2/6/2018     Moderate smoker (20 or less per day) 6/27/2013     Vitamin D deficiency 2/6/2018     No past surgical history on file.  Social History     Tobacco Use     Smoking status: Current Every Day Smoker     Packs/day: 0.50     Smokeless tobacco: Never Used   Substance Use Topics     Alcohol use: Not on file     Drug use: Not on file       Objective / Physical Examination:  Vitals:    11/09/20 1633   BP: 130/70   Patient Site: Right Arm   Patient Position: Sitting   Cuff Size: Adult Regular   Pulse: (!) 101   SpO2: 98%   Weight: 117 lb (53.1 kg)     Wt Readings from Last 3 Encounters:   11/09/20 117 lb (53.1 kg)   07/20/20 113 lb 11.2 oz (51.6 kg)   10/26/19 130 lb (59 kg)     Body mass index is 18.32 kg/m .     Constitutional: In no apparent distress  Eyes: PERRL, fundi appear clear bilaterally. No AV nicking or microhemmorhages. Conjunctivae clear. Non-icteric.   ENT: Septum midline, nares patent, no visible polyps, mucosa moist and without drainage. Lips and mucosa moist. Pharynx without erythema or exudate. Neck is supple, trachea midline. No cervical  adenopathy  Respiratory: Bilateral expiratory wheezing. Normal inspiratory and expiratory effort  Cardiovascular: Regular rate and rhythm. No murmurs, rubs, or gallops. No edema.  Skin: Warm and dry. Without suspicious looking lesions  Neuro: Normal gait  Psych: Alert and oriented x3.     Pancho Ray, MELISSA Student    I was present with the medical student who participated in the serviceand in the documentation of this note. I have verified the history and personally performed the physical exam and medical decision making, and have verified the content of the note, which accurately reflects my assessment of the patient and the plan of care.    Margaret Holly MD     Him/He

## 2024-03-15 NOTE — ED PROVIDER NOTE - NSFOLLOWUPINSTRUCTIONS_ED_ALL_ED_FT
Chronic Obstructive Pulmonary Disease  ImageChronic obstructive pulmonary disease (COPD) is a long-term (chronic) condition that affects the lungs. COPD is a general term that can be used to describe many different lung problems that cause lung swelling (inflammation) and limit airflow, including chronic bronchitis and emphysema. If you have COPD, your lung function will probably never return to normal. In most cases, it gets worse over time. However, there are steps you can take to slow the progression of the disease and improve your quality of life.    What are the causes?  This condition may be caused by:    Smoking. This is the most common cause.  Certain genes passed down through families.    What increases the risk?  The following factors may make you more likely to develop this condition:    Secondhand smoke from cigarettes, pipes, or cigars.  Exposure to chemicals and other irritants such as fumes and dust in the work environment.  Chronic lung conditions or infections.    What are the signs or symptoms?  Symptoms of this condition include:    Shortness of breath, especially during physical activity.  Chronic cough with a large amount of thick mucus. Sometimes the cough may not have any mucus (dry cough).  Wheezing.  Rapid breaths.  Gray or bluish discoloration (cyanosis) of the skin, especially in your fingers, toes, or lips.  Feeling tired (fatigue).  Weight loss.  Chest tightness.  Frequent infections.  Episodes when breathing symptoms become much worse (exacerbations).  Swelling in the ankles, feet, or legs. This may occur in later stages of the disease.    How is this diagnosed?  This condition is diagnosed based on:    Your medical history.  A physical exam.    You may also have tests, including:    Lung (pulmonary) function tests. This may include a spirometry test, which measures your ability to exhale properly.  Chest X-ray.  CT scan.  Blood tests.    How is this treated?  This condition may be treated with:    Medicines. These may include inhaled rescue medicines to treat acute exacerbations as well as long-term, or maintenance, medicines to prevent flare-ups of COPD.    Bronchodilators help treat COPD by dilating the airways to allow increased airflow and make your breathing more comfortable.  Steroids can reduce airway inflammation and help prevent exacerbations.    Smoking cessation. If you smoke, your health care provider may ask you to quit, and may also recommend therapy or replacement products to help you quit.  Pulmonary rehabilitation. This may involve working with a team of health care providers and specialists, such as respiratory, occupational, and physical therapists.  Exercise and physical activity. These are beneficial for nearly all people with COPD.  Nutrition therapy to gain weight, if you are underweight.  Oxygen. Supplemental oxygen therapy is only helpful if you have a low oxygen level in your blood (hypoxemia).  Lung surgery or transplant.  Palliative care. This is to help people with COPD feel comfortable when treatment is no longer working.    Follow these instructions at home:  Medicines     Take over-the-counter and prescription medicines (inhaled or pills) only as told by your health care provider.  Talk to your health care provider before taking any cough or allergy medicines. You may need to avoid certain medicines that dry out your airways.  Lifestyle     If you are a smoker, the most important thing that you can do is to stop smoking. Do not use any products that contain nicotine or tobacco, such as cigarettes and e-cigarettes. If you need help quitting, ask your health care provider. Continuing to smoke will cause the disease to progress faster.  Avoid exposure to things that irritate your lungs, such as smoke, chemicals, and fumes.  Stay active, but balance activity with periods of rest. Exercise and physical activity will help you maintain your ability to do things you want to do.  Learn and use relaxation techniques to manage stress and to control your breathing.  Get the right amount of sleep and get quality sleep. Most adults need 7 or more hours per night.  Eat healthy foods. Eating smaller, more frequent meals and resting before meals may help you maintain your strength.  Controlled breathing     Learn and use controlled breathing techniques as directed by your health care provider. Controlled breathing techniques include:    Pursed lip breathing. Start by breathing in (inhaling) through your nose for 1 second. Then, purse your lips as if you were going to whistle and breathe out (exhale) through the pursed lips for 2 seconds.  Diaphragmatic breathing. Start by putting one hand on your abdomen just above your waist. Inhale slowly through your nose. The hand on your abdomen should move out. Then purse your lips and exhale slowly. You should be able to feel the hand on your abdomen moving in as you exhale.    Controlled coughing     Learn and use controlled coughing to clear mucus from your lungs. Controlled coughing is a series of short, progressive coughs. The steps of controlled coughing are:    Lean your head slightly forward.    Breathe in deeply using diaphragmatic breathing.    Try to hold your breath for 3 seconds.    Keep your mouth slightly open while coughing twice.    Spit any mucus out into a tissue.    Rest and repeat the steps once or twice as needed.      General instructions     Make sure you receive all the vaccines that your health care provider recommends, especially the pneumococcal and influenza vaccines. Preventing infection and hospitalization is very important when you have COPD.  Use oxygen therapy and pulmonary rehabilitation if directed to by your health care provider. If you require home oxygen therapy, ask your health care provider whether you should purchase a pulse oximeter to measure your oxygen level at home.  Work with your health care provider to develop a COPD action plan. This will help you know what steps to take if your condition gets worse.  Keep other chronic health conditions under control as told by your health care provider.  Avoid extreme temperature and humidity changes.  Avoid contact with people who have an illness that spreads from person to person (is contagious), such as viral infections or pneumonia.  Keep all follow-up visits as told by your health care provider. This is important.  Contact a health care provider if:  You are coughing up more mucus than usual.  There is a change in the color or thickness of your mucus.  Your breathing is more labored than usual.  Your breathing is faster than usual.  You have difficulty sleeping.  You need to use your rescue medicines or inhalers more often than expected.  You have trouble doing routine activities such as getting dressed or walking around the house.  Get help right away if:  You have shortness of breath while you are resting.  You have shortness of breath that prevents you from:    Being able to talk.  Performing your usual physical activities.    You have chest pain lasting longer than 5 minutes.  Your skin color is more blue (cyanotic) than usual.  You measure low oxygen saturations for longer than 5 minutes with a pulse oximeter.  You have a fever.  You feel too tired to breathe normally.  Summary  Chronic obstructive pulmonary disease (COPD) is a long-term (chronic) condition that affects the lungs.  Your lung function will probably never return to normal. In most cases, it gets worse over time. However, there are steps you can take to slow the progression of the disease and improve your quality of life.  Treatment for COPD may include taking medicines, quitting smoking, pulmonary rehabilitation, and changes to diet and exercise. As the disease progresses, you may need oxygen therapy, a lung transplant, or palliative care.  To help manage your condition, do not smoke, avoid exposure to things that irritate your lungs, stay up to date on all vaccines, and follow your health care provider's instructions for taking medicines.  This information is not intended to replace advice given to you by your health care provider. Make sure you discuss any questions you have with your health care provider.

## 2024-03-15 NOTE — ED PROVIDER NOTE - ATTENDING APP SHARED VISIT CONTRIBUTION OF CARE
58-year-old male  with past medical history of the LEEP REBECCA, colitis, hiatal hernia, OA, GERD, HLD, HTN, COPD on 4 L home O2 coming in with complaints of shortness of breath. 58-year-old male  with past medical history of the ArabP REBECCA, colitis, hiatal hernia, OA, GERD, HLD, HTN, COPD (should be on home O2 but hasn't been able to get it yet), comes in with complaints of shortness of breath which started after he carried his dog from the car to the house. Pt reports chest tightness for 3 wks, not worse today. Pt is currently on Prednisone 60mg PO for his COPD. Did not try nebs bc nebulizer broke. No leg pain or swelling. No fever/chills. No cough. On exam, pt in NAD, AAOx3, head NC/AT, CN II-XII intact, PEERL, EOMi, neck (-) midline tenderness, lungs diffuse wheezing B/L, CV S1S2 regular, abdomen soft/NT/ND/(+)BS, ext (-) edema, motor 5/5x4, sensation intact, ambulating with steady gait. Will do labs, EKG, nebs and reevaluate.    Pt states he took prednisone 60mg PO prior to arrival.

## 2024-03-15 NOTE — ED PROVIDER NOTE - PATIENT PORTAL LINK FT
You can access the FollowMyHealth Patient Portal offered by [facility] by registering at the following website: [email or web address]. By joining [employer]’s FollowMyHealth portal, you will also be able to view your health information using other applications (apps) compatible with our system.

## 2024-03-15 NOTE — ED PROVIDER NOTE - PHYSICAL EXAMINATION
Constitutional: Well developed, well nourished. NAD  Head: Normocephalic, atraumatic.  Eyes: PERRL, EOMI.  ENT: No nasal discharge. Mucous membranes dry.  Neck: Supple. Painless ROM.  Cardiovascular: Regular rate and rhythm.   Pulmonary:  + bilat wheezing   Abdominal: Soft. Nondistended. No rebound, guarding, rigidity.  Extremities. Pelvis stable. No lower extremity edema, symmetric calves.  Skin: No rashes, cyanosis.  Neuro: AAOx3. No focal neurological deficits.  Psych: Normal mood. Normal affect.

## 2024-03-15 NOTE — ED ADULT NURSE NOTE - NSFALLUNIVINTERV_ED_ALL_ED
Bed/Stretcher in lowest position, wheels locked, appropriate side rails in place/Call bell, personal items and telephone in reach/Instruct patient to call for assistance before getting out of bed/chair/stretcher/Non-slip footwear applied when patient is off stretcher/[place] to call system/Physically safe environment - no spills, clutter or unnecessary equipment/Purposeful proactive rounding/Room/bathroom lighting operational, light cord in reach

## 2024-03-16 ENCOUNTER — EMERGENCY (EMERGENCY)
Facility: HOSPITAL | Age: 59
LOS: 0 days | Discharge: HOME | End: 2024-03-16
Attending: EMERGENCY MEDICINE
Payer: MEDICAID

## 2024-03-16 ENCOUNTER — INPATIENT (INPATIENT)
Facility: HOSPITAL | Age: 59
LOS: 3 days | Discharge: HOME HEALTH | DRG: 140 | End: 2024-03-20
Attending: INTERNAL MEDICINE | Admitting: STUDENT IN AN ORGANIZED HEALTH CARE EDUCATION/TRAINING PROGRAM
Payer: MEDICAID

## 2024-03-16 VITALS
DIASTOLIC BLOOD PRESSURE: 88 MMHG | RESPIRATION RATE: 18 BRPM | OXYGEN SATURATION: 95 % | WEIGHT: 270.07 LBS | HEART RATE: 86 BPM | SYSTOLIC BLOOD PRESSURE: 139 MMHG | HEIGHT: 70 IN | TEMPERATURE: 98 F

## 2024-03-16 VITALS
WEIGHT: 274.92 LBS | HEIGHT: 70 IN | TEMPERATURE: 98 F | DIASTOLIC BLOOD PRESSURE: 92 MMHG | SYSTOLIC BLOOD PRESSURE: 121 MMHG | RESPIRATION RATE: 18 BRPM | HEART RATE: 88 BPM | OXYGEN SATURATION: 96 %

## 2024-03-16 VITALS
TEMPERATURE: 98 F | DIASTOLIC BLOOD PRESSURE: 83 MMHG | SYSTOLIC BLOOD PRESSURE: 127 MMHG | OXYGEN SATURATION: 97 % | HEART RATE: 80 BPM | RESPIRATION RATE: 18 BRPM

## 2024-03-16 DIAGNOSIS — Z98.890 OTHER SPECIFIED POSTPROCEDURAL STATES: Chronic | ICD-10-CM

## 2024-03-16 DIAGNOSIS — Z96.651 PRESENCE OF RIGHT ARTIFICIAL KNEE JOINT: Chronic | ICD-10-CM

## 2024-03-16 DIAGNOSIS — R06.2 WHEEZING: ICD-10-CM

## 2024-03-16 DIAGNOSIS — R06.02 SHORTNESS OF BREATH: ICD-10-CM

## 2024-03-16 DIAGNOSIS — K40.20 BILATERAL INGUINAL HERNIA, WITHOUT OBSTRUCTION OR GANGRENE, NOT SPECIFIED AS RECURRENT: Chronic | ICD-10-CM

## 2024-03-16 DIAGNOSIS — E78.5 HYPERLIPIDEMIA, UNSPECIFIED: ICD-10-CM

## 2024-03-16 DIAGNOSIS — J44.1 CHRONIC OBSTRUCTIVE PULMONARY DISEASE WITH (ACUTE) EXACERBATION: ICD-10-CM

## 2024-03-16 DIAGNOSIS — F17.200 NICOTINE DEPENDENCE, UNSPECIFIED, UNCOMPLICATED: ICD-10-CM

## 2024-03-16 DIAGNOSIS — I10 ESSENTIAL (PRIMARY) HYPERTENSION: ICD-10-CM

## 2024-03-16 DIAGNOSIS — M19.90 UNSPECIFIED OSTEOARTHRITIS, UNSPECIFIED SITE: ICD-10-CM

## 2024-03-16 DIAGNOSIS — J44.9 CHRONIC OBSTRUCTIVE PULMONARY DISEASE, UNSPECIFIED: ICD-10-CM

## 2024-03-16 LAB
ALBUMIN SERPL ELPH-MCNC: 3.7 G/DL — SIGNIFICANT CHANGE UP (ref 3.5–5.2)
ALP SERPL-CCNC: 110 U/L — SIGNIFICANT CHANGE UP (ref 30–115)
ALT FLD-CCNC: 59 U/L — HIGH (ref 0–41)
ANION GAP SERPL CALC-SCNC: 8 MMOL/L — SIGNIFICANT CHANGE UP (ref 7–14)
AST SERPL-CCNC: 39 U/L — SIGNIFICANT CHANGE UP (ref 0–41)
BASOPHILS # BLD AUTO: 0.02 K/UL — SIGNIFICANT CHANGE UP (ref 0–0.2)
BASOPHILS NFR BLD AUTO: 0.1 % — SIGNIFICANT CHANGE UP (ref 0–1)
BILIRUB SERPL-MCNC: 0.4 MG/DL — SIGNIFICANT CHANGE UP (ref 0.2–1.2)
BUN SERPL-MCNC: 14 MG/DL — SIGNIFICANT CHANGE UP (ref 10–20)
CALCIUM SERPL-MCNC: 9.1 MG/DL — SIGNIFICANT CHANGE UP (ref 8.4–10.5)
CHLORIDE SERPL-SCNC: 96 MMOL/L — LOW (ref 98–110)
CO2 SERPL-SCNC: 34 MMOL/L — HIGH (ref 17–32)
CREAT SERPL-MCNC: 0.7 MG/DL — SIGNIFICANT CHANGE UP (ref 0.7–1.5)
EGFR: 108 ML/MIN/1.73M2 — SIGNIFICANT CHANGE UP
EOSINOPHIL # BLD AUTO: 0 K/UL — SIGNIFICANT CHANGE UP (ref 0–0.7)
EOSINOPHIL NFR BLD AUTO: 0 % — SIGNIFICANT CHANGE UP (ref 0–8)
GAS PNL BLDV: SIGNIFICANT CHANGE UP
GLUCOSE SERPL-MCNC: 98 MG/DL — SIGNIFICANT CHANGE UP (ref 70–99)
HCT VFR BLD CALC: 40.2 % — LOW (ref 42–52)
HGB BLD-MCNC: 13.5 G/DL — LOW (ref 14–18)
IMM GRANULOCYTES NFR BLD AUTO: 1 % — HIGH (ref 0.1–0.3)
LYMPHOCYTES # BLD AUTO: 13.5 % — LOW (ref 20.5–51.1)
LYMPHOCYTES # BLD AUTO: 2.28 K/UL — SIGNIFICANT CHANGE UP (ref 1.2–3.4)
MCHC RBC-ENTMCNC: 30 PG — SIGNIFICANT CHANGE UP (ref 27–31)
MCHC RBC-ENTMCNC: 33.6 G/DL — SIGNIFICANT CHANGE UP (ref 32–37)
MCV RBC AUTO: 89.3 FL — SIGNIFICANT CHANGE UP (ref 80–94)
MONOCYTES # BLD AUTO: 1.1 K/UL — HIGH (ref 0.1–0.6)
MONOCYTES NFR BLD AUTO: 6.5 % — SIGNIFICANT CHANGE UP (ref 1.7–9.3)
NEUTROPHILS # BLD AUTO: 13.26 K/UL — HIGH (ref 1.4–6.5)
NEUTROPHILS NFR BLD AUTO: 78.9 % — HIGH (ref 42.2–75.2)
NRBC # BLD: 0 /100 WBCS — SIGNIFICANT CHANGE UP (ref 0–0)
NT-PROBNP SERPL-SCNC: 236 PG/ML — SIGNIFICANT CHANGE UP (ref 0–300)
PLATELET # BLD AUTO: 230 K/UL — SIGNIFICANT CHANGE UP (ref 130–400)
PMV BLD: 10 FL — SIGNIFICANT CHANGE UP (ref 7.4–10.4)
POTASSIUM SERPL-MCNC: 4.1 MMOL/L — SIGNIFICANT CHANGE UP (ref 3.5–5)
POTASSIUM SERPL-SCNC: 4.1 MMOL/L — SIGNIFICANT CHANGE UP (ref 3.5–5)
PROT SERPL-MCNC: 5.9 G/DL — LOW (ref 6–8)
RBC # BLD: 4.5 M/UL — LOW (ref 4.7–6.1)
RBC # FLD: 16.7 % — HIGH (ref 11.5–14.5)
SODIUM SERPL-SCNC: 138 MMOL/L — SIGNIFICANT CHANGE UP (ref 135–146)
TROPONIN T, HIGH SENSITIVITY RESULT: 21 NG/L — SIGNIFICANT CHANGE UP (ref 6–21)
WBC # BLD: 16.83 K/UL — HIGH (ref 4.8–10.8)
WBC # FLD AUTO: 16.83 K/UL — HIGH (ref 4.8–10.8)

## 2024-03-16 PROCEDURE — 85027 COMPLETE CBC AUTOMATED: CPT

## 2024-03-16 PROCEDURE — 36415 COLL VENOUS BLD VENIPUNCTURE: CPT

## 2024-03-16 PROCEDURE — 94640 AIRWAY INHALATION TREATMENT: CPT

## 2024-03-16 PROCEDURE — 99284 EMERGENCY DEPT VISIT MOD MDM: CPT

## 2024-03-16 PROCEDURE — 80048 BASIC METABOLIC PNL TOTAL CA: CPT

## 2024-03-16 PROCEDURE — 71045 X-RAY EXAM CHEST 1 VIEW: CPT | Mod: 26

## 2024-03-16 PROCEDURE — 80053 COMPREHEN METABOLIC PANEL: CPT

## 2024-03-16 PROCEDURE — 94660 CPAP INITIATION&MGMT: CPT

## 2024-03-16 PROCEDURE — 85379 FIBRIN DEGRADATION QUANT: CPT

## 2024-03-16 PROCEDURE — 85025 COMPLETE CBC W/AUTO DIFF WBC: CPT

## 2024-03-16 PROCEDURE — 84145 PROCALCITONIN (PCT): CPT

## 2024-03-16 PROCEDURE — 99285 EMERGENCY DEPT VISIT HI MDM: CPT | Mod: 25

## 2024-03-16 PROCEDURE — 71275 CT ANGIOGRAPHY CHEST: CPT | Mod: MC

## 2024-03-16 PROCEDURE — 83735 ASSAY OF MAGNESIUM: CPT

## 2024-03-16 PROCEDURE — 0241U: CPT

## 2024-03-16 PROCEDURE — 0225U NFCT DS DNA&RNA 21 SARSCOV2: CPT

## 2024-03-16 PROCEDURE — 99285 EMERGENCY DEPT VISIT HI MDM: CPT

## 2024-03-16 RX ORDER — NICOTINE POLACRILEX 2 MG
1 GUM BUCCAL ONCE
Refills: 0 | Status: COMPLETED | OUTPATIENT
Start: 2024-03-16 | End: 2024-03-16

## 2024-03-16 RX ORDER — IPRATROPIUM/ALBUTEROL SULFATE 18-103MCG
3 AEROSOL WITH ADAPTER (GRAM) INHALATION ONCE
Refills: 0 | Status: COMPLETED | OUTPATIENT
Start: 2024-03-16 | End: 2024-03-16

## 2024-03-16 RX ORDER — IPRATROPIUM/ALBUTEROL SULFATE 18-103MCG
3 AEROSOL WITH ADAPTER (GRAM) INHALATION
Refills: 0 | Status: COMPLETED | OUTPATIENT
Start: 2024-03-16 | End: 2024-03-16

## 2024-03-16 RX ORDER — ALPRAZOLAM 0.25 MG
1 TABLET ORAL ONCE
Refills: 0 | Status: DISCONTINUED | OUTPATIENT
Start: 2024-03-16 | End: 2024-03-16

## 2024-03-16 RX ORDER — ALBUTEROL 90 UG/1
2.5 AEROSOL, METERED ORAL
Refills: 0 | Status: COMPLETED | OUTPATIENT
Start: 2024-03-16 | End: 2024-03-16

## 2024-03-16 RX ADMIN — ALBUTEROL 2.5 MILLIGRAM(S): 90 AEROSOL, METERED ORAL at 05:05

## 2024-03-16 RX ADMIN — Medication 3 MILLILITER(S): at 03:41

## 2024-03-16 RX ADMIN — ALBUTEROL 2.5 MILLIGRAM(S): 90 AEROSOL, METERED ORAL at 04:45

## 2024-03-16 RX ADMIN — Medication 3 MILLILITER(S): at 03:20

## 2024-03-16 RX ADMIN — Medication 1 MILLIGRAM(S): at 21:06

## 2024-03-16 RX ADMIN — Medication 3 MILLILITER(S): at 19:29

## 2024-03-16 RX ADMIN — Medication 3 MILLILITER(S): at 03:00

## 2024-03-16 RX ADMIN — Medication 1 PATCH: at 21:05

## 2024-03-16 RX ADMIN — Medication 125 MILLIGRAM(S): at 19:30

## 2024-03-16 RX ADMIN — ALBUTEROL 2.5 MILLIGRAM(S): 90 AEROSOL, METERED ORAL at 04:43

## 2024-03-16 NOTE — ED PROVIDER NOTE - PATIENT PORTAL LINK FT
You can access the FollowMyHealth Patient Portal offered by [facility] by registering at the following website: [email or web address]. By joining [employer]’s FollowMyHealth portal, you will also be able to view your health information using other applications (apps) compatible with our system.

## 2024-03-16 NOTE — ED PROVIDER NOTE - OBJECTIVE STATEMENT
58 yold male to ED [facility], Copd pending authorization for O2; OA, HTN, HLD, hiatal hernia c/o sob/wheezing after carrying his dog; pt attempted his nebs, prednisone without significant  improvement; pt admits to smoking again today. seen here yesterday for the same complaint and received steroids and nebulizer treatments. Otherwise denies any fever, chills, headache, changes in vision, cough, congestion, cp, palpitations, n/v/d, abd pain, constipation, urinary complaints, lower extremity pain/swelling.

## 2024-03-16 NOTE — ED PROVIDER NOTE - ATTENDING CONTRIBUTION TO CARE
58-year-old male with past medical history of the LEEP REBECCA, colitis, hiatal hernia, OA, GERD, HLD, HTN, COPD (should be on home O2 but hasn't been able to get it yet), comes in with complaints of shortness of breath which started earlier today. Pt was seen in the ED for same yesterday. 4th visit in 2 wks. Pt is currently on Prednisone 60mg PO for his COPD. Did not try nebs bc nebulizer broke. No leg pain or swelling. No fever/chills. No cough. On exam, pt in NAD, AAOx3, head NC/AT, CN II-XII intact, PEERL, EOMi, neck (-) midline tenderness, lungs diffuse wheezing B/L, CV S1S2 regular, abdomen soft/NT/ND/(+)BS, ext (-) edema, motor 5/5x4, sensation intact, ambulating with steady gait. Labs were done yesterday. Pt received decadron last night and took prednisone 60mg PO prior to arrival. Will give nebs and reassess. Admission was offered to pt, but he said he needs to think about it. Will reapproach.

## 2024-03-16 NOTE — ED PROVIDER NOTE - PHYSICAL EXAMINATION
Physical Exam    Constitutional: No acute distress.   Eyes: Conjunctiva pink, Sclera clear, PERRLA, EOMI.  ENT: No sinus tenderness. No nasal discharge. No oropharyngeal erythema, edema, or exudates. Uvula midline.   Cardiovascular: Regular rate, regular rhythm. No noted murmurs rubs or gallops  Respiratory: unlabored respiratory effort, bilateral rhonchi crackles and wheezing  Gastrointestinal: Normal bowel sounds. soft, non distended, non-tender abdomen.   Musculoskeletal: supple neck, no midline tenderness. bruises on upper extremities  Integumentary: warm, dry, no rash  Neurologic: awake, alert, cranial nerves II-XII grossly intact, extremities’ motor and sensory functions grossly intact

## 2024-03-16 NOTE — ED ADULT TRIAGE NOTE - CHIEF COMPLAINT QUOTE
SOB, left AMA from hospital waiting for home O2. Pt states they told him his O2 wasn't low enough for home O2 but a  said he can get it for him.,

## 2024-03-16 NOTE — ED PROVIDER NOTE - PROGRESS NOTE DETAILS
pk: pt reports improvement with nebulizer treatments. Admission was offered but pt states he feels better and wants to go home. strict return precautions discussed, rec outpt f.u with pulmonologist and PCP

## 2024-03-16 NOTE — ED PROVIDER NOTE - WHICH SHOWED
EKG reviewed by me Dr. Rogers and shows, sinus rhythm, AL/QRS/QTC intervals are in acceptable range, no significant ST/T wave changes noted.  Chest X-rays reviewed and interpreted by me Dr. Rogers and shows no actue findings. No Pneumothorax, no free air, no effusions, and these findings discussed with patient.

## 2024-03-16 NOTE — ED ADULT NURSE NOTE - NSFALLRISKINTERV_ED_ALL_ED

## 2024-03-16 NOTE — ED ADULT NURSE REASSESSMENT NOTE - NS ED NURSE REASSESS COMMENT FT1
received handoff from RN. Pt ANOx4, RR even and unlabored, no distress noted at this time., pt on 4L NC. pt denies chest pain.

## 2024-03-16 NOTE — ED PROVIDER NOTE - IV ALTEPLASE EXCL ABS HIDDEN
show Scribe Attestation (For Scribes USE Only)... Attending Attestation (For Attendings USE Only).../Scribe Attestation (For Scribes USE Only)...

## 2024-03-16 NOTE — ED ADULT NURSE NOTE - NS_SISCREENINGSR_GEN_ALL_ED
After discussing with Dr. Caicedo, orthopedic surgeon. Patient would need aspiration of joint effusion.  pls schedule her for aspiration of joint effusion on left knee joint and injection of steroid for faster onset.  Since she will be leaving out of town, orthovisc or any HA supplement injection will take up to one month to work.  She will also need her bilateral L3-S1 mbb done   Negative

## 2024-03-16 NOTE — ED PROVIDER NOTE - CARE PLAN
1 Principal Discharge DX:	COPD exacerbation  Secondary Diagnosis:	Requires continuous at home supplemental oxygen  Secondary Diagnosis:	Shortness of breath

## 2024-03-16 NOTE — ED PROVIDER NOTE - ATTENDING APP SHARED VISIT CONTRIBUTION OF CARE
Patient is c/o العراقي/chest tightness and sob. Denies f/c/n/v/abd pain. Patient is hypoxic on ambulation with saturation of 84-89% on RA. Patient is not on home oxygen.   Vitals reviewed.   Lungs: B/L air entry, +scattered wheezing, no crackles.   A/P: Dyspnea/hypoxia,   Labs, EKG, CXR,   reevaluation.   Medications.   supplemental oxygen.

## 2024-03-16 NOTE — ED PROVIDER NOTE - PHYSICAL EXAMINATION
daily
CONSTITUTIONAL: well-appearing, in NAD  SKIN: Warm dry, normal skin turgor  HEAD: NCAT  EYES: EOMI, PERRLA, no scleral icterus, conjunctiva pink  ENT: normal pharynx with no erythema or exudates  NECK: Supple; non tender. Full ROM.  CARD: RRR, no murmurs.  RESP: scant wheezing bilaterally  ABD: soft, non-tender, non-distended, no rebound or guarding.  EXT: Full ROM, no bony tenderness, no pedal edema, no calf tenderness  NEURO: normal motor. normal sensory. CN II-XII intact. Cerebellar testing normal. Normal gait.  PSYCH: Cooperative, appropriate.

## 2024-03-16 NOTE — ED PROVIDER NOTE - OBJECTIVE STATEMENT
58 year old male with a history of LEEP REBECCA, colitis, hiatal hernia, OA, GERD, HLD, HTN, COPD pending authorization for home O2 presents to the ED with shortness of breath. Patient has had shortness of breath and intermittent left sided chest pain for weeks. He presented to the ED yesterday and was recommended admission to arrange home O2 but did not want to stay. Today he was walking down the street and became lightheaded with severe shortness of breath. Upon EMS arrival patient was noted to be 83% on room air. Patient is an active smoker states that he is trying to quit. 58 year old male with a history of LEEP REBECCA, colitis, hiatal hernia, OA, GERD, HLD, HTN, COPD pending authorization for home O2 presents to the ED with shortness of breath. Patient has had shortness of breath and intermittent left sided chest pain for weeks. He presented to the ED yesterday and was recommended admission to arrange home O2 but did not want to stay. Today he was walking down the street and became lightheaded with severe shortness of breath. Upon EMS arrival patient was noted to be 83% on room air. Patient is an active smoker but states that he is trying to quit. Denies fever, chills, abdominal pain, nausea, vomiting, diarrhea, constipation, dysuria, hematuria, lower extremity swelling, rash.

## 2024-03-16 NOTE — ED PROVIDER NOTE - EKG/XRAY ADDITIONAL INFORMATION
Chest X-rays reviewed and interpreted by me Dr. Rogers and shows no actue findings. No Pneumothorax, no free air, no effusions, and these findings discussed with patient.  EKG reviewed by me Dr. Rogers and shows, sinus rhythm, MN/QRS/QTC intervals are in acceptable range, no significant ST/T wave changes noted.

## 2024-03-16 NOTE — ED PROVIDER NOTE - CLINICAL SUMMARY MEDICAL DECISION MAKING FREE TEXT BOX
58-year-old male with past medical history of the LEEP REBECCA, colitis, hiatal hernia, OA, GERD, HLD, HTN, COPD (should be on home O2 but hasn't been able to get it yet), comes in with complaints of shortness of breath which started earlier today. Pt was seen in the ED for same yesterday. 4th visit in 2 wks. Pt is currently on Prednisone 60mg PO for his COPD. Did not try nebs bc nebulizer broke. No leg pain or swelling. No fever/chills. No cough. On exam, pt in NAD, AAOx3, head NC/AT, CN II-XII intact, PEERL, EOMi, neck (-) midline tenderness, lungs diffuse wheezing B/L, CV S1S2 regular, abdomen soft/NT/ND/(+)BS, ext (-) edema, motor 5/5x4, sensation intact, ambulating with steady gait. Labs were done yesterday. Pt received decadron last night and took prednisone 60mg PO prior to arrival. Will give nebs and reassess. Admission was offered to pt, but he refused. Will d/c.

## 2024-03-16 NOTE — ED ADULT NURSE NOTE - SUICIDE SCREENING QUESTION 1
[facility]ia calling for Juan Pablo. He is having trouble with his feet. (Joints)  He was hoping to be seen tomorrow. Would like a call back today  
Pt refuses to go to Urgent care now    Been having problems with swelling in both feet, right worse than left   \" on/off for about a week, but seems to be worsening over the last 3-4 days\" per pt    Feet are reddened  Swollen feet and big toe on right foot \" is huge\" per pt  Shoes are tight  Hasn't weighed self recently  No change in breathing  \" my normal\" per pt  \"i think it's gout\" per pt  Explained to pt that we need to make sure is not cellulitis as well as could be gout.    Encouraged elevate as much as possible this evening  Low salt diet    Again asked pt to go to urgent care, and he stated \" I want to see Dr Thai srivastava\" per Juan Pablo    Appt offered for 8:30 carola Sept 19th and pt accepted.  [place] 8:15 a.m  
No

## 2024-03-16 NOTE — ED ADULT NURSE NOTE - OBJECTIVE STATEMENT
58 year old male complaining of sob today worse on exertion. Pt states he needs home O2 but left AMA today. Pt complains of L sided chest pain that worsens with movement x3 weeks. Pt reports history of COPD and emphysema

## 2024-03-16 NOTE — ED ADULT NURSE NOTE - NSFALLRISKINTERV_ED_ALL_ED
Assistance with ambulation/Communicate fall risk and risk factors to all staff, patient, and family/Monitor gait and stability/Move patient closer to nursing station/within visual sight of ED staff/Provide patient with walking aids/Provide visual cue: yellow wristband, yellow gown, etc/Reinforce activity limits and safety measures with patient and family/Use of alarms - bed, stretcher, chair and/or video monitoring/Call bell, personal items and telephone in reach/Instruct patient to call for assistance before getting out of bed/chair/stretcher/Non-slip footwear applied when patient is off stretcher/[place] to call system/Physically safe environment - no spills, clutter or unnecessary equipment/Purposeful Proactive Rounding/Room/bathroom lighting operational, light cord in reach

## 2024-03-16 NOTE — ED PROVIDER NOTE - PROGRESS NOTE DETAILS
DOV Mckeon - Patient getting out of bed without Oxygen to smoke cigarettes outside. Advised against getting up independently without oxygen given hypoxia and fall risk. Patient understands risks.

## 2024-03-17 LAB
ALBUMIN SERPL ELPH-MCNC: 3.7 G/DL — SIGNIFICANT CHANGE UP (ref 3.5–5.2)
ALP SERPL-CCNC: 109 U/L — SIGNIFICANT CHANGE UP (ref 30–115)
ALT FLD-CCNC: 59 U/L — HIGH (ref 0–41)
ANION GAP SERPL CALC-SCNC: 10 MMOL/L — SIGNIFICANT CHANGE UP (ref 7–14)
AST SERPL-CCNC: 32 U/L — SIGNIFICANT CHANGE UP (ref 0–41)
BASOPHILS # BLD AUTO: 0.01 K/UL — SIGNIFICANT CHANGE UP (ref 0–0.2)
BASOPHILS NFR BLD AUTO: 0.1 % — SIGNIFICANT CHANGE UP (ref 0–1)
BILIRUB SERPL-MCNC: 0.3 MG/DL — SIGNIFICANT CHANGE UP (ref 0.2–1.2)
BUN SERPL-MCNC: 19 MG/DL — SIGNIFICANT CHANGE UP (ref 10–20)
CALCIUM SERPL-MCNC: 9.1 MG/DL — SIGNIFICANT CHANGE UP (ref 8.4–10.4)
CHLORIDE SERPL-SCNC: 97 MMOL/L — LOW (ref 98–110)
CO2 SERPL-SCNC: 29 MMOL/L — SIGNIFICANT CHANGE UP (ref 17–32)
CREAT SERPL-MCNC: 0.7 MG/DL — SIGNIFICANT CHANGE UP (ref 0.7–1.5)
EGFR: 108 ML/MIN/1.73M2 — SIGNIFICANT CHANGE UP
EOSINOPHIL # BLD AUTO: 0 K/UL — SIGNIFICANT CHANGE UP (ref 0–0.7)
EOSINOPHIL NFR BLD AUTO: 0 % — SIGNIFICANT CHANGE UP (ref 0–8)
GLUCOSE SERPL-MCNC: 148 MG/DL — HIGH (ref 70–99)
HCT VFR BLD CALC: 37.7 % — LOW (ref 42–52)
HGB BLD-MCNC: 12.7 G/DL — LOW (ref 14–18)
IMM GRANULOCYTES NFR BLD AUTO: 1 % — HIGH (ref 0.1–0.3)
LYMPHOCYTES # BLD AUTO: 0.81 K/UL — LOW (ref 1.2–3.4)
LYMPHOCYTES # BLD AUTO: 6.6 % — LOW (ref 20.5–51.1)
MAGNESIUM SERPL-MCNC: 2.1 MG/DL — SIGNIFICANT CHANGE UP (ref 1.8–2.4)
MCHC RBC-ENTMCNC: 29.2 PG — SIGNIFICANT CHANGE UP (ref 27–31)
MCHC RBC-ENTMCNC: 33.7 G/DL — SIGNIFICANT CHANGE UP (ref 32–37)
MCV RBC AUTO: 86.7 FL — SIGNIFICANT CHANGE UP (ref 80–94)
MONOCYTES # BLD AUTO: 0.34 K/UL — SIGNIFICANT CHANGE UP (ref 0.1–0.6)
MONOCYTES NFR BLD AUTO: 2.8 % — SIGNIFICANT CHANGE UP (ref 1.7–9.3)
NEUTROPHILS # BLD AUTO: 11.07 K/UL — HIGH (ref 1.4–6.5)
NEUTROPHILS NFR BLD AUTO: 89.5 % — HIGH (ref 42.2–75.2)
NRBC # BLD: 0 /100 WBCS — SIGNIFICANT CHANGE UP (ref 0–0)
PLATELET # BLD AUTO: 221 K/UL — SIGNIFICANT CHANGE UP (ref 130–400)
PMV BLD: 10.3 FL — SIGNIFICANT CHANGE UP (ref 7.4–10.4)
POTASSIUM SERPL-MCNC: 4.6 MMOL/L — SIGNIFICANT CHANGE UP (ref 3.5–5)
POTASSIUM SERPL-SCNC: 4.6 MMOL/L — SIGNIFICANT CHANGE UP (ref 3.5–5)
PROT SERPL-MCNC: 5.7 G/DL — LOW (ref 6–8)
RBC # BLD: 4.35 M/UL — LOW (ref 4.7–6.1)
RBC # FLD: 16.5 % — HIGH (ref 11.5–14.5)
SODIUM SERPL-SCNC: 136 MMOL/L — SIGNIFICANT CHANGE UP (ref 135–146)
WBC # BLD: 12.35 K/UL — HIGH (ref 4.8–10.8)
WBC # FLD AUTO: 12.35 K/UL — HIGH (ref 4.8–10.8)

## 2024-03-17 PROCEDURE — 99223 1ST HOSP IP/OBS HIGH 75: CPT

## 2024-03-17 RX ORDER — ZOLPIDEM TARTRATE 10 MG/1
5 TABLET ORAL AT BEDTIME
Refills: 0 | Status: DISCONTINUED | OUTPATIENT
Start: 2024-03-17 | End: 2024-03-20

## 2024-03-17 RX ORDER — CEFTRIAXONE 500 MG/1
1000 INJECTION, POWDER, FOR SOLUTION INTRAMUSCULAR; INTRAVENOUS EVERY 24 HOURS
Refills: 0 | Status: DISCONTINUED | OUTPATIENT
Start: 2024-03-17 | End: 2024-03-17

## 2024-03-17 RX ORDER — ALPRAZOLAM 0.25 MG
2 TABLET ORAL EVERY 6 HOURS
Refills: 0 | Status: DISCONTINUED | OUTPATIENT
Start: 2024-03-17 | End: 2024-03-20

## 2024-03-17 RX ORDER — LISINOPRIL 2.5 MG/1
5 TABLET ORAL DAILY
Refills: 0 | Status: DISCONTINUED | OUTPATIENT
Start: 2024-03-17 | End: 2024-03-20

## 2024-03-17 RX ORDER — SIMVASTATIN 20 MG/1
20 TABLET, FILM COATED ORAL AT BEDTIME
Refills: 0 | Status: DISCONTINUED | OUTPATIENT
Start: 2024-03-17 | End: 2024-03-20

## 2024-03-17 RX ORDER — ASPIRIN/CALCIUM CARB/MAGNESIUM 324 MG
81 TABLET ORAL DAILY
Refills: 0 | Status: DISCONTINUED | OUTPATIENT
Start: 2024-03-17 | End: 2024-03-20

## 2024-03-17 RX ORDER — ALPRAZOLAM 0.25 MG
1 TABLET ORAL ONCE
Refills: 0 | Status: DISCONTINUED | OUTPATIENT
Start: 2024-03-17 | End: 2024-03-17

## 2024-03-17 RX ORDER — OXYCODONE HYDROCHLORIDE 5 MG/1
10 TABLET ORAL ONCE
Refills: 0 | Status: DISCONTINUED | OUTPATIENT
Start: 2024-03-17 | End: 2024-03-17

## 2024-03-17 RX ORDER — MORPHINE SULFATE 50 MG/1
100 CAPSULE, EXTENDED RELEASE ORAL
Refills: 0 | Status: DISCONTINUED | OUTPATIENT
Start: 2024-03-17 | End: 2024-03-20

## 2024-03-17 RX ORDER — AZITHROMYCIN 500 MG/1
TABLET, FILM COATED ORAL
Refills: 0 | Status: DISCONTINUED | OUTPATIENT
Start: 2024-03-17 | End: 2024-03-17

## 2024-03-17 RX ORDER — ALBUTEROL 90 UG/1
2 AEROSOL, METERED ORAL EVERY 6 HOURS
Refills: 0 | Status: DISCONTINUED | OUTPATIENT
Start: 2024-03-17 | End: 2024-03-20

## 2024-03-17 RX ORDER — ENOXAPARIN SODIUM 100 MG/ML
40 INJECTION SUBCUTANEOUS ONCE
Refills: 0 | Status: COMPLETED | OUTPATIENT
Start: 2024-03-17 | End: 2024-03-17

## 2024-03-17 RX ORDER — AZITHROMYCIN 500 MG/1
500 TABLET, FILM COATED ORAL DAILY
Refills: 0 | Status: COMPLETED | OUTPATIENT
Start: 2024-03-17 | End: 2024-03-19

## 2024-03-17 RX ORDER — PANTOPRAZOLE SODIUM 20 MG/1
40 TABLET, DELAYED RELEASE ORAL
Refills: 0 | Status: DISCONTINUED | OUTPATIENT
Start: 2024-03-17 | End: 2024-03-20

## 2024-03-17 RX ORDER — FUROSEMIDE 40 MG
40 TABLET ORAL DAILY
Refills: 0 | Status: DISCONTINUED | OUTPATIENT
Start: 2024-03-17 | End: 2024-03-20

## 2024-03-17 RX ORDER — NALOXONE HYDROCHLORIDE 4 MG/.1ML
4 SPRAY NASAL ONCE
Refills: 0 | Status: DISCONTINUED | OUTPATIENT
Start: 2024-03-17 | End: 2024-03-20

## 2024-03-17 RX ORDER — ENOXAPARIN SODIUM 100 MG/ML
40 INJECTION SUBCUTANEOUS EVERY 24 HOURS
Refills: 0 | Status: DISCONTINUED | OUTPATIENT
Start: 2024-03-17 | End: 2024-03-20

## 2024-03-17 RX ORDER — AZITHROMYCIN 500 MG/1
500 TABLET, FILM COATED ORAL ONCE
Refills: 0 | Status: COMPLETED | OUTPATIENT
Start: 2024-03-17 | End: 2024-03-17

## 2024-03-17 RX ORDER — FENOFIBRATE,MICRONIZED 130 MG
145 CAPSULE ORAL DAILY
Refills: 0 | Status: DISCONTINUED | OUTPATIENT
Start: 2024-03-17 | End: 2024-03-20

## 2024-03-17 RX ADMIN — MORPHINE SULFATE 100 MILLIGRAM(S): 50 CAPSULE, EXTENDED RELEASE ORAL at 04:26

## 2024-03-17 RX ADMIN — CEFTRIAXONE 100 MILLIGRAM(S): 500 INJECTION, POWDER, FOR SOLUTION INTRAMUSCULAR; INTRAVENOUS at 02:01

## 2024-03-17 RX ADMIN — OXYCODONE HYDROCHLORIDE 10 MILLIGRAM(S): 5 TABLET ORAL at 01:12

## 2024-03-17 RX ADMIN — Medication 2 MILLIGRAM(S): at 09:51

## 2024-03-17 RX ADMIN — Medication 1 PATCH: at 21:57

## 2024-03-17 RX ADMIN — Medication 145 MILLIGRAM(S): at 11:53

## 2024-03-17 RX ADMIN — AZITHROMYCIN 500 MILLIGRAM(S): 500 TABLET, FILM COATED ORAL at 11:54

## 2024-03-17 RX ADMIN — Medication 81 MILLIGRAM(S): at 11:53

## 2024-03-17 RX ADMIN — MORPHINE SULFATE 100 MILLIGRAM(S): 50 CAPSULE, EXTENDED RELEASE ORAL at 18:16

## 2024-03-17 RX ADMIN — LISINOPRIL 5 MILLIGRAM(S): 2.5 TABLET ORAL at 05:46

## 2024-03-17 RX ADMIN — ENOXAPARIN SODIUM 40 MILLIGRAM(S): 100 INJECTION SUBCUTANEOUS at 05:47

## 2024-03-17 RX ADMIN — Medication 60 MILLIGRAM(S): at 18:18

## 2024-03-17 RX ADMIN — MORPHINE SULFATE 100 MILLIGRAM(S): 50 CAPSULE, EXTENDED RELEASE ORAL at 18:24

## 2024-03-17 RX ADMIN — ENOXAPARIN SODIUM 40 MILLIGRAM(S): 100 INJECTION SUBCUTANEOUS at 22:14

## 2024-03-17 RX ADMIN — SIMVASTATIN 20 MILLIGRAM(S): 20 TABLET, FILM COATED ORAL at 22:14

## 2024-03-17 RX ADMIN — Medication 60 MILLIGRAM(S): at 05:46

## 2024-03-17 RX ADMIN — AZITHROMYCIN 255 MILLIGRAM(S): 500 TABLET, FILM COATED ORAL at 03:15

## 2024-03-17 RX ADMIN — Medication 2 MILLIGRAM(S): at 18:51

## 2024-03-17 RX ADMIN — ZOLPIDEM TARTRATE 5 MILLIGRAM(S): 10 TABLET ORAL at 22:14

## 2024-03-17 RX ADMIN — Medication 1 MILLIGRAM(S): at 01:12

## 2024-03-17 RX ADMIN — Medication 40 MILLIGRAM(S): at 05:46

## 2024-03-17 NOTE — PROGRESS NOTE ADULT - SUBJECTIVE AND OBJECTIVE BOX
GENOVEVAKAYLEIGH HOWARD  58y Male    CHIEF COMPLAINT:    Patient is a 58y old  Male who presents with a chief complaint of SOB (17 Mar 2024 01:08)      INTERVAL HPI/OVERNIGHT EVENTS:    Patient seen and examined.    ROS: All other systems are negative.    Vital Signs:    T(F): 96.7 (24 @ 04:05), Max: 97.9 (24 @ 17:34)  HR: 76 (24 @ 04:05) (76 - 100)  BP: 121/79 (24 @ 04:05) (121/79 - 127/73)  RR: 18 (24 @ 04:05) (18 - 18)  SpO2: 96% (24 @ 20:53) (96% - 96%)  I&O's Summary    Daily Height in cm: 177.8 (16 Mar 2024 22:53)    Daily Weight in k.2 (17 Mar 2024 04:05)  CAPILLARY BLOOD GLUCOSE          PHYSICAL EXAM:    GENERAL:  NAD  SKIN: No rashes or lesions  HENT: Atraumatic. Normocephalic. PERRL. Moist membranes.  NECK: Supple, No JVD. No lymphadenopathy.  PULMONARY: CTA B/L. No wheezing. No rales  CVS: Normal S1, S2. Rate and Rhythm are regular. No murmurs.  ABDOMEN/GI: Soft, Nontender, Nondistended; BS present  EXTREMITIES: Peripheral pulses intact. No edema B/L LE.  NEUROLOGIC:  No motor or sensory deficit.  PSYCH: Alert & oriented x 3    Consultant(s) Notes Reviewed:  [x ] YES  [ ] NO  Care Discussed with Consultants/Other Providers [ x] YES  [ ] NO    EKG reviewed  Telemetry reviewed    LABS:                        13.5   16.83 )-----------( 230      ( 16 Mar 2024 20:33 )             40.2     03-16    138  |  96<L>  |  14  ----------------------------<  98  4.1   |  34<H>  |  0.7    Ca    9.1      16 Mar 2024 20:33    TPro  5.9<L>  /  Alb  3.7  /  TBili  0.4  /  DBili  x   /  AST  39  /  ALT  59<H>  /  AlkPhos  110  03-16              RADIOLOGY & ADDITIONAL TESTS:    < from: Xray Chest 1 View- PORTABLE-Urgent (03.15.24 @ 02:30) >    Impression:      No acute pulmonary process    < end of copied text >    Imaging or report Personally Reviewed:  [x ] YES  [ ] NO    Medications:  Standing  aspirin enteric coated 81 milliGRAM(s) Oral daily  azithromycin   Tablet 500 milliGRAM(s) Oral daily  cefTRIAXone   IVPB 1000 milliGRAM(s) IV Intermittent every 24 hours  fenofibrate Tablet 145 milliGRAM(s) Oral daily  furosemide    Tablet 40 milliGRAM(s) Oral daily  lisinopril 5 milliGRAM(s) Oral daily  methylPREDNISolone sodium succinate Injectable 60 milliGRAM(s) IV Push two times a day  morphine ER Tablet 100 milliGRAM(s) Oral two times a day  pantoprazole    Tablet 40 milliGRAM(s) Oral before breakfast  simvastatin 20 milliGRAM(s) Oral at bedtime    PRN Meds  ALPRAZolam 2 milliGRAM(s) Oral every 6 hours PRN  naloxone 1 mG/mL Injection for Intranasal Use 4 milliGRAM(s) IntraNasal once PRN  zolpidem 5 milliGRAM(s) Oral at bedtime PRN      Case discussed with resident    Care discussed with pt/family           GENOVEVAKAYLEIGH HOWARD  58y Male    CHIEF COMPLAINT:    Patient is a 58y old  Male who presents with a chief complaint of SOB (17 Mar 2024 01:08)      INTERVAL HPI/OVERNIGHT EVENTS:    Patient seen and examined. C/O sob and cough with clear sputum.  No fever. Also c/o constant L sided cp which gets worse on movement and coughing    ROS: All other systems are negative.    Vital Signs:    T(F): 96.7 (24 @ 04:05), Max: 97.9 (24 @ 17:34)  HR: 76 (24 @ 04:05) (76 - 100)  BP: 121/79 (24 @ 04:05) (121/79 - 127/73)  RR: 18 (24 @ 04:05) (18 - 18)  SpO2: 96% (24 @ 20:53) (96% - 96%)  I&O's Summary    Daily Height in cm: 177.8 (16 Mar 2024 22:53)    Daily Weight in k.2 (17 Mar 2024 04:05)  CAPILLARY BLOOD GLUCOSE          PHYSICAL EXAM:    GENERAL:  NAD  SKIN: No rashes or lesions  HENT: Atraumatic. Normocephalic. PERRL. Moist membranes.  NECK: Supple, No JVD. No lymphadenopathy.  PULMONARY: Wheezing B/L. No rales  CVS: Normal S1, S2. Rate and Rhythm are regular. No murmurs.  ABDOMEN/GI: Soft, Nontender, Nondistended; BS present  EXTREMITIES: Peripheral pulses intact. +ve edema B/L LE.  NEUROLOGIC:  No motor or sensory deficit.  PSYCH: Alert & oriented x 3    Consultant(s) Notes Reviewed:  [x ] YES  [ ] NO  Care Discussed with Consultants/Other Providers [ x] YES  [ ] NO    EKG reviewed  Telemetry reviewed    LABS:                        13.5   16.83 )-----------( 230      ( 16 Mar 2024 20:33 )             40.2     03-16    138  |  96<L>  |  14  ----------------------------<  98  4.1   |  34<H>  |  0.7    Ca    9.1      16 Mar 2024 20:33    TPro  5.9<L>  /  Alb  3.7  /  TBili  0.4  /  DBili  x   /  AST  39  /  ALT  59<H>  /  AlkPhos  110  16              RADIOLOGY & ADDITIONAL TESTS:    < from: Xray Chest 1 View- PORTABLE-Urgent (03.15.24 @ 02:30) >    Impression:      No acute pulmonary process    < end of copied text >    Imaging or report Personally Reviewed:  [x ] YES  [ ] NO    Medications:  Standing  aspirin enteric coated 81 milliGRAM(s) Oral daily  azithromycin   Tablet 500 milliGRAM(s) Oral daily  cefTRIAXone   IVPB 1000 milliGRAM(s) IV Intermittent every 24 hours  fenofibrate Tablet 145 milliGRAM(s) Oral daily  furosemide    Tablet 40 milliGRAM(s) Oral daily  lisinopril 5 milliGRAM(s) Oral daily  methylPREDNISolone sodium succinate Injectable 60 milliGRAM(s) IV Push two times a day  morphine ER Tablet 100 milliGRAM(s) Oral two times a day  pantoprazole    Tablet 40 milliGRAM(s) Oral before breakfast  simvastatin 20 milliGRAM(s) Oral at bedtime    PRN Meds  ALPRAZolam 2 milliGRAM(s) Oral every 6 hours PRN  naloxone 1 mG/mL Injection for Intranasal Use 4 milliGRAM(s) IntraNasal once PRN  zolpidem 5 milliGRAM(s) Oral at bedtime PRN      Case discussed with resident    Care discussed with pt/family

## 2024-03-17 NOTE — H&P ADULT - ATTENDING COMMENTS
58 year old male with a history of LEEP REBECCA, colitis, hiatal hernia, OA, GERD, HLD, HTN, COPD pending authorization for home O2 presents to the ED with shortness of breath. Patient has had shortness of breath and intermittent left sided chest pain for weeks. He presented to the ED yesterday and was recommended admission to arrange home O2 but did not want to stay. Today he was walking down the street and became lightheaded with severe shortness of breath. Upon EMS arrival patient was noted to be 83% on room air.     Acute hypercapnic hypoxic respiratory failure 2/2 COPD exacerbation  Obesity / REBECCA  HTN / DL  Chronic HFpEF              PLAN:    Tele reviewed  EKG on admission: NSR 83/min ( Interpreted by me)  Troponin: 26-->21  CXR is unremarkable  Rapid RVP is negative  Check RA POX  Old record reviewed. Had nuclear stress done on 7/24/23 which was negative for ischemia 58 year old male with a history of LEEP REBECCA, colitis, hiatal hernia, OA, GERD, HLD, HTN, COPD pending authorization for home O2 presents to the ED with shortness of breath and cough. Patient has had shortness of breath and intermittent left sided chest pain for weeks. Pt describes the cp as constant which gets worse on coughing and movement. He presented to the ED yesterday and was recommended admission to arrange home O2 but did not want to stay. Today he was walking down the street and became lightheaded with severe shortness of breath. Upon EMS arrival patient was noted to be 83% on room air.     Acute hypercapnic hypoxic respiratory failure 2/2 COPD exacerbation  CP musculoskeletal.   Obesity / REBECCA  HTN / DL  Chronic HFpEF  Tobacco use              PLAN:    Tele reviewed. No events. Can d/c tele  EKG on admission: NSR 83/min ( Interpreted by me)  Troponin: 26-->21  CXR is unremarkable  Rapid RVP is negative  Check RA POX   for home O2 arrangement  Cont Azithromycin. D/C Ceftriaxone  Cont IV Solumedrol today. Will switch to PO Prednisone in AM  Pulmonary eval  Alb/Atrovent Neb treatment PRN  Counselled to quit smoking  Old record reviewed. Had nuclear stress done on 7/24/23 which was negative for ischemia    Progress Note Handoff    Pending (specify):  Consults_________, Tests________, Test Results_______, Other_Social services for home O2________  Family discussion:  Disposition: Home___/SNF___/Other________/Unknown at this time________    Rudi Aguilar MD  Spectra: [phone]

## 2024-03-17 NOTE — PROGRESS NOTE ADULT - ASSESSMENT
58 year old male with a history of LEEP REBECCA, colitis, hiatal hernia, OA, GERD, HLD, HTN, COPD pending authorization for home O2 presents to the ED with shortness of breath. Patient has had shortness of breath and intermittent left sided chest pain for weeks. He presented to the ED yesterday and was recommended admission to arrange home O2 but did not want to stay. Today he was walking down the street and became lightheaded with severe shortness of breath. Upon EMS arrival patient was noted to be 83% on room air.     Acute hypercapnic hypoxic respiratory failure 2/2 COPD exacerbation  Obesity / REBECCA  HTN / DL  Chronic HFpEF              PLAN:    ·	Tele reviewed  ·	EKG on admission: NSR 83/min ( Interpreted by me)  ·	Troponin: 26-->21  ·	CXR is unremarkable  ·	Rapid RVP is negative  ·	Check RA POX  ·	Old record reviewed. Had nuclear stress done on 7/24/23 which was negative for ischemia 58 year old male with a history of LEEP REBECCA, colitis, hiatal hernia, OA, GERD, HLD, HTN, COPD pending authorization for home O2 presents to the ED with shortness of breath and cough. Patient has had shortness of breath and intermittent left sided chest pain for weeks. Pt describes the cp as constant which gets worse on coughing and movement. He presented to the ED yesterday and was recommended admission to arrange home O2 but did not want to stay. Today he was walking down the street and became lightheaded with severe shortness of breath. Upon EMS arrival patient was noted to be 83% on room air.     Acute hypercapnic hypoxic respiratory failure 2/2 COPD exacerbation  CP musculoskeletal.   Obesity / REBECCA  HTN / DL  Chronic HFpEF  Tobacco use              PLAN:    ·	Tele reviewed. No events. Can d/c tele  ·	EKG on admission: NSR 83/min ( Interpreted by me)  ·	Troponin: 26-->21  ·	CXR is unremarkable  ·	Rapid RVP is negative  ·	Check RA POX  ·	 for home O2 arrangement  ·	Cont Azithromycin. D/C Ceftriaxone  ·	Cont IV Solumedrol today. Will switch to PO Prednisone in AM  ·	Pulmonary eval  ·	Alb/Atrovent Neb treatment PRN  ·	Counselled to quit smoking  ·	Old record reviewed. Had nuclear stress done on 7/24/23 which was negative for ischemia    Progress Note Handoff    Pending (specify):  Consults_________, Tests________, Test Results_______, Other_Social services for home O2________  Family discussion:  Disposition: Home___/SNF___/Other________/Unknown at this time________    Rudi Aguilar MD  Spectra: [phone]

## 2024-03-17 NOTE — H&P ADULT - HISTORY OF PRESENT ILLNESS
58 year old male with a history of LEEP REBECCA, colitis, hiatal hernia, OA, GERD, HLD, HTN, COPD pending authorization for home O2 presents to the ED with shortness of breath. Patient has had shortness of breath and intermittent left sided chest pain for weeks. He presented to the ED yesterday and was recommended admission to arrange home O2 but did not want to stay. Today he was walking down the street and became lightheaded with severe shortness of breath. Upon EMS arrival patient was noted to be 83% on room air. Patient is an active smoker but states that he is trying to quit. Denies fever, chills, abdominal pain, nausea, vomiting, diarrhea, constipation, dysuria, hematuria, lower extremity swelling, rash.    ED Course: VSS  EKG showed NSR with LVH criteria  Labs notable for WBC of 16k, VBG lactate 3.8, CO2 67  CXR showed no acute pulmonary process  Pt was given duonebs and solumedrol   58 year old male with a history of LEEP REBECCA, colitis, hiatal hernia, OA, GERD, HLD, HTN, COPD pending authorization for home O2 (reports that he was needing 6L however prior admission indicated 4L) presents to the ED with shortness of breath and intermittent left sided chest pain for weeks. He presented to the ED yesterday and was recommended admission to arrange home O2 but did not want to stay. Today he was walking down the street and became lightheaded with severe shortness of breath. Upon EMS arrival patient was noted to be 83% on room air. Pt  currently smokes and has smoked for 42 years. He denies fever, chills, abdominal pain, nausea, vomiting, diarrhea, constipation, dysuria, hematuria, lower extremity swelling, rash.    ED Course: VSS  EKG showed NSR with LVH criteria  Labs notable for WBC of 16k, VBG lactate 3.8, CO2 67  CXR showed no acute pulmonary process  Pt was given duonebs and solumedrol   58 year old male with a history of LEEP REBECCA, colitis, hiatal hernia, OA, GERD, HLD, HTN, COPD pending authorization for home O2 (reports that he was needing 6L however prior admission indicated 4L) presents to the ED with shortness of breath and intermittent left sided chest pain for weeks. He presented to the ED yesterday and was recommended admission to arrange home O2 but did not want to stay. Today he was walking down the street and became lightheaded with severe shortness of breath. Upon EMS arrival patient was noted to be 83% on room air. Pt was recently admitted on 3/12 for the same complaints and in the ED he was evaluated for need for oxygen however he was saturating 94% on ambulation so O2 was not required and was discharged on prednisone taper and antibiotics for COPD exacerbation. He follows with Dr. Brooks outpatient and mentioned that he has BIPAP at home however is noncompliant as per pt it does not work and he had an appointment on Monday with Dr. Brooks for re-eval. He currently smokes and has smoked for 42 years. He denies fever, chills, abdominal pain, nausea, vomiting, diarrhea, constipation, dysuria, hematuria, lower extremity swelling, rash.    ED Course: VSS  EKG showed NSR with LVH criteria  Labs notable for WBC of 16k, VBG lactate 3.8, CO2 67  CXR showed no acute pulmonary process  Pt was given duonebs and solumedrol

## 2024-03-17 NOTE — H&P ADULT - NSHPPHYSICALEXAM_GEN_ALL_CORE
General: NAD, lying in bed comfortably  Head:  Atraumatic, Normocephalic  Eyes: conjunctiva and sclera clear  ENT: Moist mucous membranes  Pulm: Clear to auscultation bilaterally; No rales, rhonchi, wheezing, or rubs. Unlabored respirations  CVS: Regular rate and rhythm; No murmurs, rubs, or gallops  Abdomen: Soft, nontender, nondistended  Extremities:  No clubbing, cyanosis, or edema  Nervous System:  A&Ox3

## 2024-03-17 NOTE — H&P ADULT - ASSESSMENT
58 year old male with a history of LEEP REBECCA, colitis, hiatal hernia, OA, GERD, HLD, HTN, COPD pending authorization for home O2 presents to the ED with shortness of breath. Patient has had shortness of breath and intermittent left sided chest pain for weeks. He presented to the ED yesterday and was recommended admission to arrange home O2 but did not want to stay. Today he was walking down the street and became lightheaded with severe shortness of breath. Upon EMS arrival patient was noted to be 83% on room air. Patient is an active smoker but states that he is trying to quit. Denies fever, chills, abdominal pain, nausea, vomiting, diarrhea, constipation, dysuria, hematuria, lower extremity swelling, rash.    #Acute hypercapnic hypoxic respiratory failure 2/2 COPD exacerbation  #H/o REBECCA  #Chronic HFpEF last EF 65%   - SOB and lightheaded with exertion and low O2 saturation  - does not use BIPAP at home  - s/p solumedrol x 1 in the ED and duonebs  - c/w solumedrol 60 IV BID  - will get procal and sputum culture  - Bipap at night, on 4L NC per last admission when off Bipap  - start on rocephin and azithro      # HTN  # HLD  # GERD   - Continue home lisinopril, simvastatin, fenofibrate, aspirin  - on omerazole at home, pantoprazole here      # Knee pain  # Multiple previous surgeries  - Continue home morphine ER 100X2      # Generalized anxiety  # Insomnia  - Continue home Xanax and zolpidem         #DVT ppx: lovenox  #GI ppx: Protonix  #Diet: Dash  #Activity: AAT 58 year old male with a history of LEEP REBECCA, colitis, hiatal hernia, OA, GERD, HLD, HTN, COPD pending authorization for home O2 presents to the ED with shortness of breath. Patient has had shortness of breath and intermittent left sided chest pain for weeks. He presented to the ED yesterday and was recommended admission to arrange home O2 but did not want to stay. Today he was walking down the street and became lightheaded with severe shortness of breath. Upon EMS arrival patient was noted to be 83% on room air. Patient is an active smoker but states that he is trying to quit. Denies fever, chills, abdominal pain, nausea, vomiting, diarrhea, constipation, dysuria, hematuria, lower extremity swelling, rash.    #Acute hypercapnic hypoxic respiratory failure 2/2 COPD exacerbation  #H/o REBECCA  #Chronic HFpEF (EF 65% 11/2023)  - SOB and lightheaded with exertion and low O2 saturation  - does not use BIPAP at home  - s/p solumedrol x 1 in the ED and duonebs  - will do solumedrol 60 IV BID  - will get procal and sputum culture  - Bipap at night, on 4L NC per last admission when off Bipap  - start on rocephin and azithro      # HTN  # HLD  # GERD   - Continue home lisinopril, simvastatin, fenofibrate, aspirin  - on omerazole at home, pantoprazole here      # Knee pain  # Multiple previous surgeries  - Continue home morphine ER 100X2      # Generalized anxiety  # Insomnia  - Continue home Xanax and zolpidem         #DVT ppx: lovenox  #GI ppx: Protonix  #Diet: Dash  #Activity: AAT 58 year old male with a history of LEEP REBECCA, colitis, hiatal hernia, OA, GERD, HLD, HTN, COPD pending authorization for home O2 presents to the ED with shortness of breath. Patient has had shortness of breath and intermittent left sided chest pain for weeks. He presented to the ED yesterday and was recommended admission to arrange home O2 but did not want to stay. Today he was walking down the street and became lightheaded with severe shortness of breath. Upon EMS arrival patient was noted to be 83% on room air. Patient is an active smoker but states that he is trying to quit. Denies fever, chills, abdominal pain, nausea, vomiting, diarrhea, constipation, dysuria, hematuria, lower extremity swelling, rash.    #Acute hypercapnic hypoxic respiratory failure 2/2 COPD exacerbation  #H/o REBECCA  #Chronic HFpEF (EF 65% 11/2023)  #Leukocytosis  - SOB and lightheaded with exertion and low O2 saturation  - does not use BIPAP at home  - s/p solumedrol x 1 in the ED and duonebs  - will do solumedrol 60 IV BID  - will get procal and sputum culture  - Bipap at night, on 4L NC per last admission when off BIPAP  - will start on rocephin and azithro      # HTN  # HLD  # GERD   - Continue home lisinopril, simvastatin, fenofibrate, aspirin  - on omeprazole at home, pantoprazole here      # Knee pain  # Multiple previous surgeries  - Continue home morphine  BID      # Generalized anxiety  # Insomnia  - Continue home Xanax and zolpidem         #DVT ppx: Lovenox  #GI ppx: Protonix  #Diet: Dash  #Activity: AAT 58 year old male with a history of LEEP REBECCA, colitis, hiatal hernia, OA, GERD, HLD, HTN, COPD pending authorization for home O2 presents to the ED with shortness of breath. Patient has had shortness of breath and intermittent left sided chest pain for weeks. He presented to the ED yesterday and was recommended admission to arrange home O2 but did not want to stay. Today he was walking down the street and became lightheaded with severe shortness of breath. Upon EMS arrival patient was noted to be 83% on room air. Patient is an active smoker but states that he is trying to quit. Denies fever, chills, abdominal pain, nausea, vomiting, diarrhea, constipation, dysuria, hematuria, lower extremity swelling, rash.    #Acute hypercapnic hypoxic respiratory failure 2/2 COPD exacerbation  #H/o REBECCA  #Chronic HFpEF (EF 65% 11/2023)  #Leukocytosis  - SOB and lightheaded with exertion and low O2 saturation  - does not use BIPAP at home as he mentions it is not working for him  - recently admitted for similar complaints and requiring O2 at home however during last admission pt's O2 saturation on ambulation was 94% on RA and he was not discharged with home O2  - s/p solumedrol x 1 in the ED and duonebs  - will do solumedrol 60 IV BID  - c/w duonebs  - will get procal and sputum culture  - Bipap at night, on 4L NC per last admission when off BIPAP  - will start on rocephin and azithro      # HTN  # HLD  # GERD   - Continue home lisinopril, simvastatin, fenofibrate, aspirin  - on omeprazole at home, pantoprazole here      # Knee pain  # Multiple previous surgeries  - Continue home morphine  BID      # Generalized anxiety  # Insomnia  - Continue home Xanax and zolpidem         #DVT ppx: Lovenox  #GI ppx: Protonix  #Diet: Dash  #Activity: AAT 58 year old male with a history of LEEP REBECCA, colitis, hiatal hernia, OA, GERD, HLD, HTN, COPD pending authorization for home O2 presents to the ED with shortness of breath. Patient has had shortness of breath and intermittent left sided chest pain for weeks. He presented to the ED yesterday and was recommended admission to arrange home O2 but did not want to stay. Today he was walking down the street and became lightheaded with severe shortness of breath. Upon EMS arrival patient was noted to be 83% on room air. Patient is an active smoker but states that he is trying to quit. Denies fever, chills, abdominal pain, nausea, vomiting, diarrhea, constipation, dysuria, hematuria, lower extremity swelling, rash.    #Acute hypercapnic hypoxic respiratory failure 2/2 COPD exacerbation  #H/o REBECCA  #Chronic HFpEF (EF 65% 11/2023)  #Leukocytosis  - SOB and lightheaded with exertion and low O2 saturation  - does not use BIPAP at home as he mentions it is not working for him  - recently admitted for similar complaints and requiring O2 at home however during last admission pt's O2 saturation on ambulation was 94% on RA and he was not discharged with home O2  - s/p solumedrol x 1 in the ED and duonebs  - will do solumedrol 60 IV BID  - c/w duonebs  - will get procal and sputum culture  - Bipap at night and place on NC when off BIPAP  - will start on rocephin and azithro      # HTN  # HLD  # GERD   - Continue home lisinopril, simvastatin, fenofibrate, aspirin  - on omeprazole at home, pantoprazole here      # Knee pain  # Multiple previous surgeries  - Continue home morphine  BID      # Generalized anxiety  # Insomnia  - Continue home Xanax and zolpidem         #DVT ppx: Lovenox  #GI ppx: Protonix  #Diet: Dash  #Activity: AAT 58 year old male with a history of LEEP REBECCA, colitis, hiatal hernia, OA, GERD, HLD, HTN, COPD pending authorization for home O2 presents to the ED with shortness of breath. Patient has had shortness of breath and intermittent left sided chest pain for weeks. He presented to the ED yesterday and was recommended admission to arrange home O2 but did not want to stay. Today he was walking down the street and became lightheaded with severe shortness of breath. Upon EMS arrival patient was noted to be 83% on room air. Patient is an active smoker but states that he is trying to quit. Denies fever, chills, abdominal pain, nausea, vomiting, diarrhea, constipation, dysuria, hematuria, lower extremity swelling, rash.    #Acute hypercapnic hypoxic respiratory failure 2/2 COPD exacerbation  #H/o REBECCA  #Chronic HFpEF (EF 65% 11/2023)  #Leukocytosis  - SOB and lightheaded with exertion and low O2 saturation  - does not use BIPAP at home as he mentions it is not working for him  - recently admitted for similar complaints and requiring O2 at home however during last admission pt's O2 saturation on ambulation was 94% on RA and he was not discharged with home O2  - s/p solumedrol x 1 in the ED and duonebs  - will do solumedrol 60 IV BID  - c/w duonebs  - will get procal and sputum culture  - Bipap at night and place on NC when off BIPAP  - although low concern for pna, will start on rocephin and azithro for now      # HTN  # HLD  # GERD   - Continue home lisinopril, simvastatin, fenofibrate, aspirin  - on omeprazole at home, pantoprazole here      # Knee pain  # Multiple previous surgeries  - Continue home morphine  BID      # Generalized anxiety  # Insomnia  - Continue home Xanax and zolpidem         #DVT ppx: Lovenox  #GI ppx: Protonix  #Diet: Dash/CC  #Activity: AAT 58 year old male with a history of LEEP REBECCA, colitis, hiatal hernia, OA, GERD, HLD, HTN, COPD pending authorization for home O2 presents to the ED with shortness of breath. Patient has had shortness of breath and intermittent left sided chest pain for weeks. He presented to the ED yesterday and was recommended admission to arrange home O2 but did not want to stay. Today he was walking down the street and became lightheaded with severe shortness of breath. Upon EMS arrival patient was noted to be 83% on room air. Patient is an active smoker but states that he is trying to quit. Denies fever, chills, abdominal pain, nausea, vomiting, diarrhea, constipation, dysuria, hematuria, lower extremity swelling, rash.    #Acute hypercapnic hypoxic respiratory failure 2/2 COPD exacerbation  #H/o REBECCA  #Chronic HFpEF (EF 65% 11/2023)  #Leukocytosis  - SOB and lightheaded with exertion and low O2 saturation  - does not use BIPAP at home as he mentions it is not working for him  - recently admitted for similar complaints and requiring O2 at home however during last admission pt's O2 saturation on ambulation was 94% on RA and he was not discharged with home O2  - s/p solumedrol x 1 in the ED and duonebs  - will do solumedrol 60 IV BID  - c/w duonebs  - will get procal and sputum culture  - Bipap at night and place on NC when off BIPAP  - will start on rocephin and azithro for now      # HTN  # HLD  # GERD   - Continue home lisinopril, simvastatin, fenofibrate, aspirin  - on omeprazole at home, pantoprazole here      # Knee pain  # Multiple previous surgeries  - Continue home morphine  BID      # Generalized anxiety  # Insomnia  - Continue home Xanax and zolpidem         #DVT ppx: Lovenox  #GI ppx: Protonix  #Diet: Dash/CC  #Activity: AAT

## 2024-03-17 NOTE — H&P ADULT - NSHPLABSRESULTS_GEN_ALL_CORE
13.5   16.83 )-----------( 230      ( 16 Mar 2024 20:33 )             40.2       03-16    138  |  96<L>  |  14  ----------------------------<  98  4.1   |  34<H>  |  0.7    Ca    9.1      16 Mar 2024 20:33    TPro  5.9<L>  /  Alb  3.7  /  TBili  0.4  /  DBili  x   /  AST  39  /  ALT  59<H>  /  AlkPhos  110  03-16

## 2024-03-18 ENCOUNTER — TRANSCRIPTION ENCOUNTER (OUTPATIENT)
Age: 59
End: 2024-03-18

## 2024-03-18 DIAGNOSIS — K21.9 GASTRO-ESOPHAGEAL REFLUX DISEASE WITHOUT ESOPHAGITIS: ICD-10-CM

## 2024-03-18 DIAGNOSIS — G47.33 OBSTRUCTIVE SLEEP APNEA (ADULT) (PEDIATRIC): ICD-10-CM

## 2024-03-18 DIAGNOSIS — Z90.49 ACQUIRED ABSENCE OF OTHER SPECIFIED PARTS OF DIGESTIVE TRACT: ICD-10-CM

## 2024-03-18 DIAGNOSIS — Z99.81 DEPENDENCE ON SUPPLEMENTAL OXYGEN: ICD-10-CM

## 2024-03-18 DIAGNOSIS — J44.1 CHRONIC OBSTRUCTIVE PULMONARY DISEASE WITH (ACUTE) EXACERBATION: ICD-10-CM

## 2024-03-18 DIAGNOSIS — I11.0 HYPERTENSIVE HEART DISEASE WITH HEART FAILURE: ICD-10-CM

## 2024-03-18 DIAGNOSIS — F41.1 GENERALIZED ANXIETY DISORDER: ICD-10-CM

## 2024-03-18 DIAGNOSIS — M19.90 UNSPECIFIED OSTEOARTHRITIS, UNSPECIFIED SITE: ICD-10-CM

## 2024-03-18 DIAGNOSIS — G47.00 INSOMNIA, UNSPECIFIED: ICD-10-CM

## 2024-03-18 DIAGNOSIS — I50.32 CHRONIC DIASTOLIC (CONGESTIVE) HEART FAILURE: ICD-10-CM

## 2024-03-18 DIAGNOSIS — E78.5 HYPERLIPIDEMIA, UNSPECIFIED: ICD-10-CM

## 2024-03-18 DIAGNOSIS — K44.9 DIAPHRAGMATIC HERNIA WITHOUT OBSTRUCTION OR GANGRENE: ICD-10-CM

## 2024-03-18 LAB
D DIMER BLD IA.RAPID-MCNC: 325 NG/ML DDU — HIGH
FLUAV AG NPH QL: SIGNIFICANT CHANGE UP
FLUBV AG NPH QL: SIGNIFICANT CHANGE UP
PROCALCITONIN SERPL-MCNC: 0.06 NG/ML — SIGNIFICANT CHANGE UP (ref 0.02–0.1)
RSV RNA NPH QL NAA+NON-PROBE: SIGNIFICANT CHANGE UP
SARS-COV-2 RNA SPEC QL NAA+PROBE: SIGNIFICANT CHANGE UP

## 2024-03-18 PROCEDURE — 99233 SBSQ HOSP IP/OBS HIGH 50: CPT

## 2024-03-18 RX ORDER — BUDESONIDE AND FORMOTEROL FUMARATE DIHYDRATE 160; 4.5 UG/1; UG/1
2 AEROSOL RESPIRATORY (INHALATION)
Refills: 0 | Status: DISCONTINUED | OUTPATIENT
Start: 2024-03-18 | End: 2024-03-20

## 2024-03-18 RX ORDER — IPRATROPIUM/ALBUTEROL SULFATE 18-103MCG
3 AEROSOL WITH ADAPTER (GRAM) INHALATION EVERY 6 HOURS
Refills: 0 | Status: DISCONTINUED | OUTPATIENT
Start: 2024-03-18 | End: 2024-03-20

## 2024-03-18 RX ORDER — ALBUTEROL 90 UG/1
2 AEROSOL, METERED ORAL EVERY 6 HOURS
Refills: 0 | Status: DISCONTINUED | OUTPATIENT
Start: 2024-03-18 | End: 2024-03-20

## 2024-03-18 RX ORDER — TIOTROPIUM BROMIDE 18 UG/1
2 CAPSULE ORAL; RESPIRATORY (INHALATION) DAILY
Refills: 0 | Status: DISCONTINUED | OUTPATIENT
Start: 2024-03-18 | End: 2024-03-20

## 2024-03-18 RX ADMIN — Medication 145 MILLIGRAM(S): at 12:36

## 2024-03-18 RX ADMIN — Medication 3 MILLILITER(S): at 20:08

## 2024-03-18 RX ADMIN — Medication 2 MILLIGRAM(S): at 10:27

## 2024-03-18 RX ADMIN — ZOLPIDEM TARTRATE 5 MILLIGRAM(S): 10 TABLET ORAL at 21:24

## 2024-03-18 RX ADMIN — Medication 81 MILLIGRAM(S): at 12:37

## 2024-03-18 RX ADMIN — TIOTROPIUM BROMIDE 2 PUFF(S): 18 CAPSULE ORAL; RESPIRATORY (INHALATION) at 09:47

## 2024-03-18 RX ADMIN — MORPHINE SULFATE 100 MILLIGRAM(S): 50 CAPSULE, EXTENDED RELEASE ORAL at 18:08

## 2024-03-18 RX ADMIN — Medication 60 MILLIGRAM(S): at 05:40

## 2024-03-18 RX ADMIN — Medication 40 MILLIGRAM(S): at 05:40

## 2024-03-18 RX ADMIN — LISINOPRIL 5 MILLIGRAM(S): 2.5 TABLET ORAL at 05:39

## 2024-03-18 RX ADMIN — PANTOPRAZOLE SODIUM 40 MILLIGRAM(S): 20 TABLET, DELAYED RELEASE ORAL at 05:39

## 2024-03-18 RX ADMIN — Medication 2 MILLIGRAM(S): at 17:27

## 2024-03-18 RX ADMIN — AZITHROMYCIN 500 MILLIGRAM(S): 500 TABLET, FILM COATED ORAL at 12:36

## 2024-03-18 RX ADMIN — Medication 2 MILLIGRAM(S): at 23:37

## 2024-03-18 RX ADMIN — MORPHINE SULFATE 100 MILLIGRAM(S): 50 CAPSULE, EXTENDED RELEASE ORAL at 17:52

## 2024-03-18 RX ADMIN — MORPHINE SULFATE 100 MILLIGRAM(S): 50 CAPSULE, EXTENDED RELEASE ORAL at 06:48

## 2024-03-18 RX ADMIN — Medication 3 MILLILITER(S): at 13:35

## 2024-03-18 RX ADMIN — SIMVASTATIN 20 MILLIGRAM(S): 20 TABLET, FILM COATED ORAL at 21:24

## 2024-03-18 NOTE — PROGRESS NOTE ADULT - ASSESSMENT
58 year old male with a history of LEEP REBECCA, colitis, hiatal hernia, OA, GERD, HLD, HTN, COPD pending authorization for home O2 presents to the ED with shortness of breath and cough. Patient has had shortness of breath and intermittent left sided chest pain for weeks. Pt describes the cp as constant which gets worse on coughing and movement. He presented to the ED yesterday and was recommended admission to arrange home O2 but did not want to stay. Today he was walking down the street and became lightheaded with severe shortness of breath. Upon EMS arrival patient was noted to be 83% on room air.    #Acute hypercapnic hypoxic respiratory failure 2/2 COPD exacerbation  #H/o REBECCA #Chronic HFpEF (EF 65% 11/2023)  #Leukocytosis  - SOB and lightheaded with exertion and low O2 saturation  - does not use BIPAP at home as he mentions it is not working for him  - recently admitted for similar complaints and requiring O2 at home however during last admission pt's O2 saturation on ambulation was 94% on RA and he was not discharged with home O2  - will do solumedrol 60 IV BID -> switch to prednisone 40 mg OD  - c/w duonebs   - spiriva; symbicort  - procal negative  - Bipap at night and place on NC when off BIPAP  - on azithro for now  - patient desated on room air and on ambulation  - pulm consult  - RVP  - will get dimer  # HTN # HLD # GERD   - Continue home lisinopril, simvastatin, fenofibrate, aspirin  - on omeprazole at home, pantoprazole here    # Knee pain # Multiple previous surgeries  - Continue home morphine  BID    # Generalized anxiety # Insomnia  - Continue home Xanax and zolpidem PRN    #DVT ppx: Lovenox  #GI ppx: Protonix  #Diet: Dash/CC  #Activity: AAT

## 2024-03-18 NOTE — PHARMACOTHERAPY INTERVENTION NOTE - COMMENTS
Patient with COPD - uses Spiriva and Advair prior to admission. Maintenance inhalers have been on hold during admission recommend restarting Spiriva and can substitute Advair with Symbicort.

## 2024-03-18 NOTE — CHART NOTE - NSCHARTNOTEFT_GEN_A_CORE
Rx: Oxygen concentrator with portable tanks 2 LPM via Nc continuously    Height: 177.8cm  Weight: 124.7kg    Pt resting O2 sat on RA 92%  Pt desaturates to 79% on RA in ambulation.   Pt needs 2L of O2 NC to sat 96% on ambulation.     Given the presence of consistent acute respiratory failure w/ hypoxia, the patient will need supplemental O2 therapy via NC in order to leave the hospital.

## 2024-03-18 NOTE — PROGRESS NOTE ADULT - ASSESSMENT
58 year old male with a history of LEEP REBECCA, colitis, hiatal hernia, OA, GERD, HLD, HTN, COPD pending authorization for home O2 presents to the ED with shortness of breath and cough. Patient has had shortness of breath and intermittent left sided chest pain for weeks. Pt describes the cp as constant which gets worse on coughing and movement. He presented to the ED yesterday and was recommended admission to arrange home O2 but did not want to stay. Today he was walking down the street and became lightheaded with severe shortness of breath. Upon EMS arrival patient was noted to be 83% on room air.     Acute hypercapnic hypoxic respiratory failure 2/2 COPD exacerbation  CP musculoskeletal.   Obesity / REBECCA  HTN / DL  Chronic HFpEF  Tobacco use              PLAN:    ·	Tele reviewed. No events. Can d/c tele  ·	EKG on admission: NSR 83/min ( Interpreted by me)  ·	Troponin: 26-->21  ·	CXR is unremarkable  ·	Rapid RVP is negative  ·	Check RA POX  ·	 for home O2 arrangement  ·	Cont Azithromycin. D/C Ceftriaxone  ·	Cont IV Solumedrol today. Will switch to PO Prednisone in AM  ·	Pulmonary eval  ·	Alb/Atrovent Neb treatment PRN  ·	Counselled to quit smoking  ·	Old record reviewed. Had nuclear stress done on 7/24/23 which was negative for ischemia    Progress Note Handoff    Pending (specify):  Consults_________, Tests________, Test Results_______, Other_Social services for home O2________  Family discussion:  Disposition: Home___/SNF___/Other________/Unknown at this time________    Rudi Aguilar MD  Spectra: [phone]           58 year old male with a history of LEEP REBECCA, colitis, hiatal hernia, OA, GERD, HLD, HTN, COPD pending authorization for home O2 presents to the ED with shortness of breath and cough. Patient has had shortness of breath and intermittent left sided chest pain for weeks. Pt describes the cp as constant which gets worse on coughing and movement. He presented to the ED yesterday and was recommended admission to arrange home O2 but did not want to stay. Today he was walking down the street and became lightheaded with severe shortness of breath. Upon EMS arrival patient was noted to be 83% on room air.     Acute hypercapnic hypoxic respiratory failure 2/2 COPD exacerbation  CP musculoskeletal.   Obesity / REBECCA  HTN / DL  Chronic HFpEF  Tobacco use              PLAN:    ·	Tele reviewed. No events. Can d/c tele  ·	EKG on admission: NSR 83/min ( Interpreted by me)  ·	Troponin: 26-->21  ·	CXR is unremarkable  ·	Rapid RVP is negative  ·	POX on RA at rest is 86 and on RA on exertion is 79  ·	 for home O2 arrangement  ·	Cont Azithromycin.   ·	D/C IV Solumedrol and switch him to Prednisone 40 mg po daily for 5-7 days.   ·	Pulmonary eval  ·	Alb/Atrovent Neb treatment q 6h and PRN  ·	Restart home dose of Spiriva and add Symbicort 2 puffs twice a day  ·	Counselled to quit smoking  ·	Old record reviewed. Had nuclear stress done on 7/24/23 which was negative for ischemia    Progress Note Handoff    Pending (specify):  Consults_________, Tests________, Test Results_______, Other_Social services for home O2________  Family discussion:  Disposition: Home___/SNF___/Other________/Unknown at this time________    Rudi Aguilar MD  Spectra: [phone]

## 2024-03-18 NOTE — CONSULT NOTE ADULT - ATTENDING COMMENTS
IMPRESSION:    COPD with recurrent exacerbation   REBECCA on BiPAP   HO HFpEF  Smoker on and off     Plan as outlined above

## 2024-03-18 NOTE — PROGRESS NOTE ADULT - SUBJECTIVE AND OBJECTIVE BOX
24H events:    Patient is a 58y old Male who presents with a chief complaint of SOB (18 Mar 2024 14:35)    Primary diagnosis of COPD exacerbation       Today is hospital day 2d. This morning patient was seen and examined at bedside, resting comfortably in bed.    No acute or major events overnight.    PAST MEDICAL & SURGICAL HISTORY  HTN (hypertension)    High cholesterol    GERD (gastroesophageal reflux disease)    Morbid obesity    Osteoarthritis    Hiatal hernia  GERD    Colitis  LARGE INTESTINE   NO RECENT FLARES    COPD (chronic obstructive pulmonary disease)    REBECCA treated with BiPAP    History of cholecystectomy    History of appendectomy    History of knee replacement procedure of right knee  BILATERAL    Hernia, inguinal, bilateral  3 months old    H/O knee surgery  arthoscopic x4    H/O foot surgery  right big toe surgery      SOCIAL HISTORY:  Social History:      ALLERGIES:  No Known Allergies    MEDICATIONS:  STANDING MEDICATIONS  albuterol    90 MICROgram(s) HFA Inhaler 2 Puff(s) Inhalation every 6 hours  albuterol/ipratropium for Nebulization 3 milliLiter(s) Nebulizer every 6 hours  aspirin enteric coated 81 milliGRAM(s) Oral daily  azithromycin   Tablet 500 milliGRAM(s) Oral daily  budesonide 160 MICROgram(s)/formoterol 4.5 MICROgram(s) Inhaler 2 Puff(s) Inhalation two times a day  enoxaparin Injectable 40 milliGRAM(s) SubCutaneous every 24 hours  fenofibrate Tablet 145 milliGRAM(s) Oral daily  furosemide    Tablet 40 milliGRAM(s) Oral daily  lisinopril 5 milliGRAM(s) Oral daily  morphine ER Tablet 100 milliGRAM(s) Oral two times a day  pantoprazole    Tablet 40 milliGRAM(s) Oral before breakfast  simvastatin 20 milliGRAM(s) Oral at bedtime  tiotropium 2.5 MICROgram(s) Inhaler 2 Puff(s) Inhalation daily    PRN MEDICATIONS  albuterol    90 MICROgram(s) HFA Inhaler 2 Puff(s) Inhalation every 6 hours PRN  ALPRAZolam 2 milliGRAM(s) Oral every 6 hours PRN  naloxone 1 mG/mL Injection for Intranasal Use 4 milliGRAM(s) IntraNasal once PRN  zolpidem 5 milliGRAM(s) Oral at bedtime PRN    VITALS:   T(F): 95.9  HR: 83  BP: 119/65  RR: 17  SpO2: 100%    PHYSICAL EXAM:  GENERAL: NAD,obese  NERVOUS SYSTEM:  Alert & Oriented X3, non focal   CHEST/LUNG: rhonchi, wheezing bilaterally. short of breath after ambulation  HEART: Regular rate and rhythm; No murmurs, rubs, or gallops  ABDOMEN: Soft, Nontender, Nondistended; Bowel sounds present  EXTREMITIES:  2+ Peripheral Pulses, No clubbing, cyanosis, or edema  LYMPH: No lymphadenopathy noted  SKIN: No rashes or lesions    LABS:                        12.7   12.35 )-----------( 221      ( 17 Mar 2024 06:03 )             37.7     03-17    136  |  97<L>  |  19  ----------------------------<  148<H>  4.6   |  29  |  0.7    Ca    9.1      17 Mar 2024 06:03  Mg     2.1     03-17    TPro  5.7<L>  /  Alb  3.7  /  TBili  0.3  /  DBili  x   /  AST  32  /  ALT  59<H>  /  AlkPhos  109  03-17      Urinalysis Basic - ( 17 Mar 2024 06:03 )    Color: x / Appearance: x / SG: x / pH: x  Gluc: 148 mg/dL / Ketone: x  / Bili: x / Urobili: x   Blood: x / Protein: x / Nitrite: x   Leuk Esterase: x / RBC: x / WBC x   Sq Epi: x / Non Sq Epi: x / Bacteria: x

## 2024-03-18 NOTE — DISCHARGE NOTE NURSING/CASE MANAGEMENT/SOCIAL WORK - NSDCVIVACCINE_GEN_ALL_CORE_FT
influenza, injectable, quadrivalent, preservative free; 19-Oct-2018 18:53; Zina Muro (RN); [employer]; [id number] (Exp. Date: 30-Jun-2019); IntraMuscular; Deltoid Left.; 0.5 milliLiter(s); VIS (VIS Published: 07-Aug-2015, VIS Presented: 19-Oct-2018);   Tdap; 03-Feb-2023 23:21; Leonor Meehan (VEE); [employer]; [id number] (Exp. Date: 08-Dec-2024); IntraMuscular; Deltoid Left.; 0.5 milliLiter(s); VIS (VIS Published: 09-May-2013, VIS Presented: 03-Feb-2023);

## 2024-03-18 NOTE — CONSULT NOTE ADULT - SUBJECTIVE AND OBJECTIVE BOX
Patient is a 58y old  Male who presents with a chief complaint of SOB (17 Mar 2024 07:12)      HPI:  58 year old male with a history of LEEP REBECCA, colitis, hiatal hernia, OA, GERD, HLD, HTN, COPD pending authorization for home O2 (reports that he was needing 6L however prior admission indicated 4L) presents to the ED with shortness of breath and intermittent left sided chest pain for weeks. He presented to the ED yesterday and was recommended admission to arrange home O2 but did not want to stay. Today he was walking down the street and became lightheaded with severe shortness of breath. Upon EMS arrival patient was noted to be 83% on room air. Pt was recently admitted on 3/12 for the same complaints and in the ED he was evaluated for need for oxygen however he was saturating 94% on ambulation so O2 was not required and was discharged on prednisone taper and antibiotics for COPD exacerbation. He follows with Dr. Brooks outpatient and mentioned that he has BIPAP at home however is noncompliant as per pt it does not work and he had an appointment on Monday with Dr. Brooks for re-eval. He currently smokes and has smoked for 42 years. He denies fever, chills, abdominal pain, nausea, vomiting, diarrhea, constipation, dysuria, hematuria, lower extremity swelling, rash.    ED Course: VSS  EKG showed NSR with LVH criteria  Labs notable for WBC of 16k, VBG lactate 3.8, CO2 67  CXR showed no acute pulmonary process  Pt was given duonebs and solumedrol   (17 Mar 2024 01:08)      PAST MEDICAL & SURGICAL HISTORY:  HTN (hypertension)      High cholesterol      GERD (gastroesophageal reflux disease)      Morbid obesity      Osteoarthritis      Hiatal hernia  GERD      Colitis  LARGE INTESTINE   NO RECENT FLARES      COPD (chronic obstructive pulmonary disease)      REBECCA treated with BiPAP      History of cholecystectomy      History of appendectomy      History of knee replacement procedure of right knee  BILATERAL      Hernia, inguinal, bilateral  3 months old      H/O knee surgery  arthoscopic x4      H/O foot surgery  right big toe surgery          SOCIAL HX:   Smoking                         ETOH                            Other    FAMILY HISTORY:  FH: lung cancer (Mother)  mother    FH: diabetes mellitus    .  No cardiovascular or pulmonary family history     REVIEW OF SYSTEMS:    All ROS are negative exept per HPI       Allergies    No Known Allergies    Intolerances          PHYSICAL EXAM  Vital Signs Last 24 Hrs  T(C): 36.6 (17 Mar 2024 20:25), Max: 36.6 (17 Mar 2024 20:25)  T(F): 97.8 (17 Mar 2024 20:25), Max: 97.8 (17 Mar 2024 20:25)  HR: 91 (17 Mar 2024 20:25) (91 - 91)  BP: 95/58 (17 Mar 2024 20:25) (95/58 - 104/55)  BP(mean): --  RR: 18 (17 Mar 2024 20:25) (17 - 18)  SpO2: 100% (18 Mar 2024 09:48) (79% - 100%)    Parameters below as of 18 Mar 2024 09:48  Patient On (Oxygen Delivery Method): nasal cannula  O2 Flow (L/min): 2      CONSTITUTIONAL:  NAD    ENT:   Airway patent,   No thrush    EYES:   Clear bilaterally,   pupils equal,   round and reactive to light.    CARDIAC:   Normal rate,   regular rhythm.    no edema      RESPIRATORY:   No wheezing   Normal chest expansion  Not tachypneic,  No use of accessory muscles    GASTROINTESTINAL:  Abdomen soft, non-tender,   No guarding,   Positive BS    MUSCULOSKELETAL:   Range of motion is not limited,  No clubbing, cyanosis    NEUROLOGICAL:   Alert and oriented   No motor deficits.    SKIN:   Skin normal color for race,   No evidence of rash.        LABS:                          12.7   12.35 )-----------( 221      ( 17 Mar 2024 06:03 )             37.7                                               03-17    136  |  97<L>  |  19  ----------------------------<  148<H>  4.6   |  29  |  0.7    Ca    9.1      17 Mar 2024 06:03  Mg     2.1     03-17    TPro  5.7<L>  /  Alb  3.7  /  TBili  0.3  /  DBili  x   /  AST  32  /  ALT  59<H>  /  AlkPhos  109  03-17                                             Urinalysis Basic - ( 17 Mar 2024 06:03 )    Color: x / Appearance: x / SG: x / pH: x  Gluc: 148 mg/dL / Ketone: x  / Bili: x / Urobili: x   Blood: x / Protein: x / Nitrite: x   Leuk Esterase: x / RBC: x / WBC x   Sq Epi: x / Non Sq Epi: x / Bacteria: x                                                  LIVER FUNCTIONS - ( 17 Mar 2024 06:03 )  Alb: 3.7 g/dL / Pro: 5.7 g/dL / ALK PHOS: 109 U/L / ALT: 59 U/L / AST: 32 U/L / GGT: x        Pro-Brain Natriuretic Peptide: 236 pg/mL (03-16-24 @ 20:33)  Pro-Brain Natriuretic Peptide: 230 pg/mL (03-15-24 @ 03:05)                                                                                           MEDICATIONS  (STANDING):  albuterol    90 MICROgram(s) HFA Inhaler 2 Puff(s) Inhalation every 6 hours  albuterol/ipratropium for Nebulization 3 milliLiter(s) Nebulizer every 6 hours  aspirin enteric coated 81 milliGRAM(s) Oral daily  azithromycin   Tablet 500 milliGRAM(s) Oral daily  budesonide 160 MICROgram(s)/formoterol 4.5 MICROgram(s) Inhaler 2 Puff(s) Inhalation two times a day  enoxaparin Injectable 40 milliGRAM(s) SubCutaneous every 24 hours  fenofibrate Tablet 145 milliGRAM(s) Oral daily  furosemide    Tablet 40 milliGRAM(s) Oral daily  lisinopril 5 milliGRAM(s) Oral daily  morphine ER Tablet 100 milliGRAM(s) Oral two times a day  pantoprazole    Tablet 40 milliGRAM(s) Oral before breakfast  simvastatin 20 milliGRAM(s) Oral at bedtime  tiotropium 2.5 MICROgram(s) Inhaler 2 Puff(s) Inhalation daily    MEDICATIONS  (PRN):  albuterol    90 MICROgram(s) HFA Inhaler 2 Puff(s) Inhalation every 6 hours PRN Wheezing  ALPRAZolam 2 milliGRAM(s) Oral every 6 hours PRN anxiety  naloxone 1 mG/mL Injection for Intranasal Use 4 milliGRAM(s) IntraNasal once PRN opioid intoxication  zolpidem 5 milliGRAM(s) Oral at bedtime PRN Insomnia     Patient is a 58y old  Male who presents with a chief complaint of SOB (17 Mar 2024 07:12)      HPI:  58 year old male with a history of LEEP REBECCA, colitis, hiatal hernia, OA, GERD, HLD, HTN, COPD pending authorization for home O2 (reports that he was needing 6L however prior admission indicated 4L) presents to the ED with shortness of breath and intermittent left sided chest pain for weeks. He presented to the ED yesterday and was recommended admission to arrange home O2 but did not want to stay. Today he was walking down the street and became lightheaded with severe shortness of breath. Upon EMS arrival patient was noted to be 83% on room air. Pt was recently admitted on 3/12 for the same complaints and in the ED he was evaluated for need for oxygen however he was saturating 94% on ambulation so O2 was not required and was discharged on prednisone taper and antibiotics for COPD exacerbation. He follows with Dr. Brooks outpatient and mentioned that he has BIPAP at home however is noncompliant as per pt it does not work and he had an appointment on Monday with Dr. Brooks for re-eval. He currently smokes and has smoked for 42 years. He denies fever, chills, abdominal pain, nausea, vomiting, diarrhea, constipation, dysuria, hematuria, lower extremity swelling, rash.    ED Course: VSS  EKG showed NSR with LVH criteria  Labs notable for WBC of 16k, VBG lactate 3.8, CO2 67  CXR showed no acute pulmonary process  Pt was given duonebs and solumedrol   (17 Mar 2024 01:08)      PAST MEDICAL & SURGICAL HISTORY:  HTN (hypertension)      High cholesterol      GERD (gastroesophageal reflux disease)      Morbid obesity      Osteoarthritis      Hiatal hernia  GERD      Colitis  LARGE INTESTINE   NO RECENT FLARES      COPD (chronic obstructive pulmonary disease)      REBECCA treated with BiPAP      History of cholecystectomy      History of appendectomy      History of knee replacement procedure of right knee  BILATERAL      Hernia, inguinal, bilateral  3 months old      H/O knee surgery  arthoscopic x4      H/O foot surgery  right big toe surgery          SOCIAL HX:   Smoking       Former; quit 16 days ago            ETOH                            Other    FAMILY HISTORY:  FH: lung cancer (Mother)  mother    FH: diabetes mellitus    .  No cardiovascular or pulmonary family history     REVIEW OF SYSTEMS:    All ROS are negative exept per HPI       Allergies    No Known Allergies    Intolerances          PHYSICAL EXAM  Vital Signs Last 24 Hrs  T(C): 36.6 (17 Mar 2024 20:25), Max: 36.6 (17 Mar 2024 20:25)  T(F): 97.8 (17 Mar 2024 20:25), Max: 97.8 (17 Mar 2024 20:25)  HR: 91 (17 Mar 2024 20:25) (91 - 91)  BP: 95/58 (17 Mar 2024 20:25) (95/58 - 104/55)  BP(mean): --  RR: 18 (17 Mar 2024 20:25) (17 - 18)  SpO2: 100% (18 Mar 2024 09:48) (79% - 100%)    Parameters below as of 18 Mar 2024 09:48  Patient On (Oxygen Delivery Method): nasal cannula  O2 Flow (L/min): 2      CONSTITUTIONAL:  NAD    ENT:   Airway patent,   No thrush    EYES:   Clear bilaterally,   pupils equal,   round and reactive to light.    CARDIAC:   Normal rate,   regular rhythm.    no edema    RESPIRATORY:   BL wheezing   Normal chest expansion  Not tachypneic,  No use of accessory muscles    GASTROINTESTINAL:  Abdomen soft, non-tender,   No guarding,   Positive BS    MUSCULOSKELETAL:   Range of motion is not limited,  No clubbing, cyanosis    NEUROLOGICAL:   Alert and oriented   No motor deficits.    SKIN:   Skin normal color for race,   No evidence of rash.        LABS:                          12.7   12.35 )-----------( 221      ( 17 Mar 2024 06:03 )             37.7                                               03-17    136  |  97<L>  |  19  ----------------------------<  148<H>  4.6   |  29  |  0.7    Ca    9.1      17 Mar 2024 06:03  Mg     2.1     03-17    TPro  5.7<L>  /  Alb  3.7  /  TBili  0.3  /  DBili  x   /  AST  32  /  ALT  59<H>  /  AlkPhos  109  03-17                                             Urinalysis Basic - ( 17 Mar 2024 06:03 )    Color: x / Appearance: x / SG: x / pH: x  Gluc: 148 mg/dL / Ketone: x  / Bili: x / Urobili: x   Blood: x / Protein: x / Nitrite: x   Leuk Esterase: x / RBC: x / WBC x   Sq Epi: x / Non Sq Epi: x / Bacteria: x                                                  LIVER FUNCTIONS - ( 17 Mar 2024 06:03 )  Alb: 3.7 g/dL / Pro: 5.7 g/dL / ALK PHOS: 109 U/L / ALT: 59 U/L / AST: 32 U/L / GGT: x        Pro-Brain Natriuretic Peptide: 236 pg/mL (03-16-24 @ 20:33)  Pro-Brain Natriuretic Peptide: 230 pg/mL (03-15-24 @ 03:05)                                                                                           MEDICATIONS  (STANDING):  albuterol    90 MICROgram(s) HFA Inhaler 2 Puff(s) Inhalation every 6 hours  albuterol/ipratropium for Nebulization 3 milliLiter(s) Nebulizer every 6 hours  aspirin enteric coated 81 milliGRAM(s) Oral daily  azithromycin   Tablet 500 milliGRAM(s) Oral daily  budesonide 160 MICROgram(s)/formoterol 4.5 MICROgram(s) Inhaler 2 Puff(s) Inhalation two times a day  enoxaparin Injectable 40 milliGRAM(s) SubCutaneous every 24 hours  fenofibrate Tablet 145 milliGRAM(s) Oral daily  furosemide    Tablet 40 milliGRAM(s) Oral daily  lisinopril 5 milliGRAM(s) Oral daily  morphine ER Tablet 100 milliGRAM(s) Oral two times a day  pantoprazole    Tablet 40 milliGRAM(s) Oral before breakfast  simvastatin 20 milliGRAM(s) Oral at bedtime  tiotropium 2.5 MICROgram(s) Inhaler 2 Puff(s) Inhalation daily    MEDICATIONS  (PRN):  albuterol    90 MICROgram(s) HFA Inhaler 2 Puff(s) Inhalation every 6 hours PRN Wheezing  ALPRAZolam 2 milliGRAM(s) Oral every 6 hours PRN anxiety  naloxone 1 mG/mL Injection for Intranasal Use 4 milliGRAM(s) IntraNasal once PRN opioid intoxication  zolpidem 5 milliGRAM(s) Oral at bedtime PRN Insomnia

## 2024-03-18 NOTE — DISCHARGE NOTE NURSING/CASE MANAGEMENT/SOCIAL WORK - NSDCPEFALRISK_GEN_ALL_CORE
For information on Fall & Injury Prevention, visit: [email or web address] OR  [email or web address] OR  https://www.cdc.gov/steadi/patient.html

## 2024-03-18 NOTE — CONSULT NOTE ADULT - ASSESSMENT
IMPRESSION:    COPD  REBECCA  HO HFpEF    PLAN:    CPAP qHS  CW home inhalers - on triple therapy  HOB @ 45 degrees.  Aspiration precautions   DVT prophylaxis. IMPRESSION:    COPD  REBECCA  HO HFpEF  Ho Tobacco Abuse in Remission - Quit 16 days ago    PLAN:    Obtain D-Dimer  Prednisone 50mq q24 x5 days  Zpack  Wean O2 as tolerated  BiPAP qHS  CW home inhalers - on triple therapy  HOB @ 45 degrees.  Aspiration precautions   DVT prophylaxis. IMPRESSION:    COPD with recurrent exacerbation   REBECCA on BiPAP   HO HFpEF  Smoker on and off     PLAN:    CT noted.    Prednisone taper   Zpack  Triple inhaler regimen   Smoking cessation   Wean O2 as tolerated  BiPAP qHS  HOB @ 45 degrees.  Aspiration precautions   DVT prophylaxis.  Might need home O2   OP Pulm follow up

## 2024-03-18 NOTE — DISCHARGE NOTE NURSING/CASE MANAGEMENT/SOCIAL WORK - PATIENT PORTAL LINK FT
You can access the FollowMyHealth Patient Portal offered by [facility] by registering at the following website: [email or web address]. By joining [employer]’s FollowMyHealth portal, you will also be able to view your health information using other applications (apps) compatible with our system.

## 2024-03-18 NOTE — PROGRESS NOTE ADULT - SUBJECTIVE AND OBJECTIVE BOX
GENOVEVAKAYLEIGH HOWARD  58y Male    CHIEF COMPLAINT:    Patient is a 58y old  Male who presents with a chief complaint of SOB (18 Mar 2024 12:40)      INTERVAL HPI/OVERNIGHT EVENTS:    Patient seen and examined. C/O sob, wheezing and cough with clear sputum. No fever.     ROS: All other systems are negative.    Vital Signs:    T(F): 95.9 (03-18-24 @ 13:46), Max: 97.8 (03-17-24 @ 20:25)  HR: 83 (03-18-24 @ 13:46) (83 - 91)  BP: 119/65 (03-18-24 @ 13:46) (95/58 - 119/65)  RR: 17 (03-18-24 @ 13:46) (17 - 18)  SpO2: 100% (03-18-24 @ 09:48) (79% - 100%)  I&O's Summary    17 Mar 2024 07:01  -  18 Mar 2024 07:00  --------------------------------------------------------  IN: 1070 mL / OUT: 300 mL / NET: 770 mL      Daily     Daily   CAPILLARY BLOOD GLUCOSE          PHYSICAL EXAM:    GENERAL:  NAD  SKIN: No rashes or lesions  HENT: Atraumatic. Normocephalic. PERRL. Moist membranes.  NECK: Supple, No JVD. No lymphadenopathy.  PULMONARY: Severe wheezing B/L. No rales  CVS: Normal S1, S2. Rate and Rhythm are regular. No murmurs.  ABDOMEN/GI: Soft, Nontender, Nondistended; BS present  EXTREMITIES: Peripheral pulses intact. No edema B/L LE.  NEUROLOGIC:  No motor or sensory deficit.  PSYCH: Alert & oriented x 3    Consultant(s) Notes Reviewed:  [x ] YES  [ ] NO  Care Discussed with Consultants/Other Providers [ x] YES  [ ] NO    EKG reviewed  Telemetry reviewed    LABS:                        12.7   12.35 )-----------( 221      ( 17 Mar 2024 06:03 )             37.7     03-17    136  |  97<L>  |  19  ----------------------------<  148<H>  4.6   |  29  |  0.7    Ca    9.1      17 Mar 2024 06:03  Mg     2.1     03-17    TPro  5.7<L>  /  Alb  3.7  /  TBili  0.3  /  DBili  x   /  AST  32  /  ALT  59<H>  /  AlkPhos  109  03-17              RADIOLOGY & ADDITIONAL TESTS:      Imaging or report Personally Reviewed:  [ ] YES  [ ] NO    Medications:  Standing  albuterol    90 MICROgram(s) HFA Inhaler 2 Puff(s) Inhalation every 6 hours  albuterol/ipratropium for Nebulization 3 milliLiter(s) Nebulizer every 6 hours  aspirin enteric coated 81 milliGRAM(s) Oral daily  azithromycin   Tablet 500 milliGRAM(s) Oral daily  budesonide 160 MICROgram(s)/formoterol 4.5 MICROgram(s) Inhaler 2 Puff(s) Inhalation two times a day  enoxaparin Injectable 40 milliGRAM(s) SubCutaneous every 24 hours  fenofibrate Tablet 145 milliGRAM(s) Oral daily  furosemide    Tablet 40 milliGRAM(s) Oral daily  lisinopril 5 milliGRAM(s) Oral daily  morphine ER Tablet 100 milliGRAM(s) Oral two times a day  pantoprazole    Tablet 40 milliGRAM(s) Oral before breakfast  simvastatin 20 milliGRAM(s) Oral at bedtime  tiotropium 2.5 MICROgram(s) Inhaler 2 Puff(s) Inhalation daily    PRN Meds  albuterol    90 MICROgram(s) HFA Inhaler 2 Puff(s) Inhalation every 6 hours PRN  ALPRAZolam 2 milliGRAM(s) Oral every 6 hours PRN  naloxone 1 mG/mL Injection for Intranasal Use 4 milliGRAM(s) IntraNasal once PRN  zolpidem 5 milliGRAM(s) Oral at bedtime PRN      Case discussed with resident    Care discussed with pt/family

## 2024-03-19 ENCOUNTER — TRANSCRIPTION ENCOUNTER (OUTPATIENT)
Age: 59
End: 2024-03-19

## 2024-03-19 LAB
BASOPHILS # BLD AUTO: 0.01 K/UL — SIGNIFICANT CHANGE UP (ref 0–0.2)
BASOPHILS NFR BLD AUTO: 0.1 % — SIGNIFICANT CHANGE UP (ref 0–1)
EOSINOPHIL # BLD AUTO: 0.01 K/UL — SIGNIFICANT CHANGE UP (ref 0–0.7)
EOSINOPHIL NFR BLD AUTO: 0.1 % — SIGNIFICANT CHANGE UP (ref 0–8)
HCT VFR BLD CALC: 39.1 % — LOW (ref 42–52)
HGB BLD-MCNC: 13.2 G/DL — LOW (ref 14–18)
IMM GRANULOCYTES NFR BLD AUTO: 1.4 % — HIGH (ref 0.1–0.3)
LYMPHOCYTES # BLD AUTO: 1.6 K/UL — SIGNIFICANT CHANGE UP (ref 1.2–3.4)
LYMPHOCYTES # BLD AUTO: 13.6 % — LOW (ref 20.5–51.1)
MCHC RBC-ENTMCNC: 29.7 PG — SIGNIFICANT CHANGE UP (ref 27–31)
MCHC RBC-ENTMCNC: 33.8 G/DL — SIGNIFICANT CHANGE UP (ref 32–37)
MCV RBC AUTO: 87.9 FL — SIGNIFICANT CHANGE UP (ref 80–94)
MONOCYTES # BLD AUTO: 0.94 K/UL — HIGH (ref 0.1–0.6)
MONOCYTES NFR BLD AUTO: 8 % — SIGNIFICANT CHANGE UP (ref 1.7–9.3)
NEUTROPHILS # BLD AUTO: 9.01 K/UL — HIGH (ref 1.4–6.5)
NEUTROPHILS NFR BLD AUTO: 76.8 % — HIGH (ref 42.2–75.2)
NRBC # BLD: 0 /100 WBCS — SIGNIFICANT CHANGE UP (ref 0–0)
PLATELET # BLD AUTO: 213 K/UL — SIGNIFICANT CHANGE UP (ref 130–400)
PMV BLD: 10.4 FL — SIGNIFICANT CHANGE UP (ref 7.4–10.4)
RBC # BLD: 4.45 M/UL — LOW (ref 4.7–6.1)
RBC # FLD: 16.6 % — HIGH (ref 11.5–14.5)
WBC # BLD: 11.74 K/UL — HIGH (ref 4.8–10.8)
WBC # FLD AUTO: 11.74 K/UL — HIGH (ref 4.8–10.8)

## 2024-03-19 PROCEDURE — 99232 SBSQ HOSP IP/OBS MODERATE 35: CPT

## 2024-03-19 PROCEDURE — 99223 1ST HOSP IP/OBS HIGH 75: CPT

## 2024-03-19 PROCEDURE — 71275 CT ANGIOGRAPHY CHEST: CPT | Mod: 26

## 2024-03-19 RX ADMIN — Medication 3 MILLILITER(S): at 08:35

## 2024-03-19 RX ADMIN — ZOLPIDEM TARTRATE 5 MILLIGRAM(S): 10 TABLET ORAL at 21:15

## 2024-03-19 RX ADMIN — AZITHROMYCIN 500 MILLIGRAM(S): 500 TABLET, FILM COATED ORAL at 12:25

## 2024-03-19 RX ADMIN — MORPHINE SULFATE 100 MILLIGRAM(S): 50 CAPSULE, EXTENDED RELEASE ORAL at 05:38

## 2024-03-19 RX ADMIN — Medication 3 MILLILITER(S): at 13:18

## 2024-03-19 RX ADMIN — TIOTROPIUM BROMIDE 2 PUFF(S): 18 CAPSULE ORAL; RESPIRATORY (INHALATION) at 08:26

## 2024-03-19 RX ADMIN — PANTOPRAZOLE SODIUM 40 MILLIGRAM(S): 20 TABLET, DELAYED RELEASE ORAL at 05:38

## 2024-03-19 RX ADMIN — Medication 2 MILLIGRAM(S): at 18:47

## 2024-03-19 RX ADMIN — MORPHINE SULFATE 100 MILLIGRAM(S): 50 CAPSULE, EXTENDED RELEASE ORAL at 06:10

## 2024-03-19 RX ADMIN — Medication 2 MILLIGRAM(S): at 12:58

## 2024-03-19 RX ADMIN — Medication 2 MILLIGRAM(S): at 12:59

## 2024-03-19 RX ADMIN — Medication 2 MILLIGRAM(S): at 06:30

## 2024-03-19 RX ADMIN — MORPHINE SULFATE 100 MILLIGRAM(S): 50 CAPSULE, EXTENDED RELEASE ORAL at 18:27

## 2024-03-19 RX ADMIN — BUDESONIDE AND FORMOTEROL FUMARATE DIHYDRATE 2 PUFF(S): 160; 4.5 AEROSOL RESPIRATORY (INHALATION) at 08:25

## 2024-03-19 RX ADMIN — Medication 145 MILLIGRAM(S): at 12:25

## 2024-03-19 RX ADMIN — LISINOPRIL 5 MILLIGRAM(S): 2.5 TABLET ORAL at 05:38

## 2024-03-19 RX ADMIN — SIMVASTATIN 20 MILLIGRAM(S): 20 TABLET, FILM COATED ORAL at 21:14

## 2024-03-19 RX ADMIN — Medication 3 MILLILITER(S): at 20:11

## 2024-03-19 RX ADMIN — Medication 40 MILLIGRAM(S): at 05:38

## 2024-03-19 RX ADMIN — Medication 50 MILLIGRAM(S): at 05:38

## 2024-03-19 RX ADMIN — MORPHINE SULFATE 100 MILLIGRAM(S): 50 CAPSULE, EXTENDED RELEASE ORAL at 18:11

## 2024-03-19 RX ADMIN — Medication 81 MILLIGRAM(S): at 12:24

## 2024-03-19 NOTE — DISCHARGE NOTE PROVIDER - CARE PROVIDERS DIRECT ADDRESSES
[email or web address]

## 2024-03-19 NOTE — PROGRESS NOTE ADULT - SUBJECTIVE AND OBJECTIVE BOX
GENOVEVAKAYLEIGH HOWARD  58y Male    CHIEF COMPLAINT:    Patient is a 58y old  Male who presents with a chief complaint of SOB (18 Mar 2024 15:54)      INTERVAL HPI/OVERNIGHT EVENTS:    Patient seen and examined. Still having sob and wheezing. No fever. Requiring 3L NC.     ROS: All other systems are negative.    Vital Signs:    T(F): 96.3 (24 @ 04:31), Max: 96.9 (24 @ 20:30)  HR: 95 (24 @ 04:31) (83 - 95)  BP: 125/72 (24 @ 04:31) (102/59 - 125/72)  RR: 18 (24 @ 04:31) (17 - 19)  SpO2: 80% (24 @ 09:04) (80% - 96%)  I&O's Summary    18 Mar 2024 07:  -  19 Mar 2024 07:00  --------------------------------------------------------  IN: 2447 mL / OUT: 2100 mL / NET: 347 mL    19 Mar 2024 07:01  -  19 Mar 2024 11:33  --------------------------------------------------------  IN: 220 mL / OUT: 1000 mL / NET: -780 mL      Daily     Daily Weight in k (19 Mar 2024 04:31)  CAPILLARY BLOOD GLUCOSE          PHYSICAL EXAM:    GENERAL:  NAD  SKIN: No rashes or lesions  HENT: Atraumatic. Normocephalic. PERRL. Moist membranes.  NECK: Supple, No JVD. No lymphadenopathy.  PULMONARY: Sever wheezing B/L. No rales  CVS: Normal S1, S2. Rate and Rhythm are regular. No murmurs.  ABDOMEN/GI: Soft, Nontender, Nondistended; BS present  EXTREMITIES: Peripheral pulses intact. No edema B/L LE.  NEUROLOGIC:  No motor or sensory deficit.  PSYCH: Alert & oriented x 3    Consultant(s) Notes Reviewed:  [x ] YES  [ ] NO  Care Discussed with Consultants/Other Providers [ x] YES  [ ] NO    EKG reviewed  Telemetry reviewed    LABS:                        13.2   11.74 )-----------( 213      ( 19 Mar 2024 07:35 )             39.1                     RADIOLOGY & ADDITIONAL TESTS:      Imaging or report Personally Reviewed:  [ ] YES  [ ] NO    Medications:  Standing  albuterol    90 MICROgram(s) HFA Inhaler 2 Puff(s) Inhalation every 6 hours    albuterol/ipratropium for Nebulization 3 milliLiter(s) Nebulizer every 6 hours  aspirin enteric coated 81 milliGRAM(s) Oral daily  azithromycin   Tablet 500 milliGRAM(s) Oral daily  budesonide 160 MICROgram(s)/formoterol 4.5 MICROgram(s) Inhaler 2 Puff(s) Inhalation two times a day  enoxaparin Injectable 40 milliGRAM(s) SubCutaneous every 24 hours  fenofibrate Tablet 145 milliGRAM(s) Oral daily  furosemide    Tablet 40 milliGRAM(s) Oral daily  lisinopril 5 milliGRAM(s) Oral daily  morphine ER Tablet 100 milliGRAM(s) Oral two times a day  pantoprazole    Tablet 40 milliGRAM(s) Oral before breakfast  predniSONE   Tablet 50 milliGRAM(s) Oral daily  simvastatin 20 milliGRAM(s) Oral at bedtime  tiotropium 2.5 MICROgram(s) Inhaler 2 Puff(s) Inhalation daily    PRN Meds  albuterol    90 MICROgram(s) HFA Inhaler 2 Puff(s) Inhalation every 6 hours PRN  ALPRAZolam 2 milliGRAM(s) Oral every 6 hours PRN  naloxone 1 mG/mL Injection for Intranasal Use 4 milliGRAM(s) IntraNasal once PRN  zolpidem 5 milliGRAM(s) Oral at bedtime PRN      Case discussed with resident    Care discussed with pt/family

## 2024-03-19 NOTE — DISCHARGE NOTE PROVIDER - NSDCCPCAREPLAN_GEN_ALL_CORE_FT
PRINCIPAL DISCHARGE DIAGNOSIS  Diagnosis: COPD exacerbation  Assessment and Plan of Treatment: You came for shortness of breath.   Chest CT scan, blood work and respiratory viral panel check were normal.   Do not smoke. This is the most important step you can take to prevent more damage to your lungs and prevent problems. If you already smoke, it is never too late to stop.  Patient is medically cleared for discharge.   Patient is to follow up with Primary care physician in 1 week.   Follow up with lung doctor in 1 week   Use the BIPAP overnight   Use the oxygen at home during the day.   Use the inhalers as prescribed   If you experience chest pain, worsenning shortness of breath or have any other major concerns; seek immediate medical attention or call 911.      SECONDARY DISCHARGE DIAGNOSES  Diagnosis: Requires continuous at home supplemental oxygen  Assessment and Plan of Treatment:     Diagnosis: Shortness of breath  Assessment and Plan of Treatment:

## 2024-03-19 NOTE — PROGRESS NOTE ADULT - ASSESSMENT
58 year old male with a history of LEEP REBECCA, colitis, hiatal hernia, OA, GERD, HLD, HTN, COPD pending authorization for home O2 presents to the ED with shortness of breath and cough. Patient has had shortness of breath and intermittent left sided chest pain for weeks. Pt describes the cp as constant which gets worse on coughing and movement. He presented to the ED yesterday and was recommended admission to arrange home O2 but did not want to stay. Today he was walking down the street and became lightheaded with severe shortness of breath. Upon EMS arrival patient was noted to be 83% on room air.     Acute hypercapnic hypoxic respiratory failure 2/2 COPD exacerbation  CP musculoskeletal.   Obesity / REBECCA  HTN / DL  Chronic HFpEF  Tobacco use              PLAN:    ·	Tele reviewed. No events. Can d/c tele  ·	EKG on admission: NSR 83/min ( Interpreted by me)  ·	Troponin: 26-->21  ·	CXR is unremarkable  ·	Check full RVP   ·	Pulmonary eval noted. Cont current management.   ·	POX on RA at rest is 86 and on RA on exertion is 79  ·	 for home O2 arrangement  ·	Cont Azithromycin.   ·	Cont Prednisone 50 mg po daily for 5-7 days.   ·	Alb/Atrovent Neb treatment q 6h and PRN  ·	Cont Spiriva Symbicort 2 puffs twice a day  ·	Counselled to quit smoking  ·	Old record reviewed. Had nuclear stress done on 7/24/23 which was negative for ischemia    Progress Note Handoff    Pending (specify):  Consults_________, Tests________, Test Results_______, Other_Social services for home O2________  Family discussion:  Disposition: Home___/SNF___/Other________/Unknown at this time________    Rudi Aguilar MD  Spectra: [id number]

## 2024-03-19 NOTE — DISCHARGE NOTE PROVIDER - HOSPITAL COURSE
58 year old male with a history of LEEP REBECCA, colitis, hiatal hernia, OA, GERD, HLD, HTN, COPD pending authorization for home O2 presents to the ED with shortness of breath and cough. Patient has had shortness of breath and intermittent left sided chest pain for weeks. Pt describes the cp as constant which gets worse on coughing and movement. He presented to the ED yesterday and was recommended admission to arrange home O2 but did not want to stay. Today he was walking down the street and became lightheaded with severe shortness of breath. Upon EMS arrival patient was noted to be 83% on room air.    #Acute hypercapnic hypoxic respiratory failure 2/2 COPD exacerbation  #H/o REBECCA #Chronic HFpEF (EF 65% 11/2023)  #Leukocytosis  - SOB and lightheaded with exertion and low O2 saturation  - does not use BIPAP at home as he mentions it is not working for him  - recently admitted for similar complaints  - received solumedrol 60 IV BID then switched to prednisone 40 mg OD starting 3/19   - c/w duonebs   - spiriva; symbicort (tripe therapy on D/C)  - procal negative  - Bipap at night and place on NC when off BIPAP  - received a course of azithro  - patient desated on room air and on ambulation  - pulm evaluated the patient  - CT -A PE protocol: negative  - RVP: negative    # HTN # HLD # GERD   - Continue home lisinopril, simvastatin, fenofibrate, aspirin  - on omeprazole at home, pantoprazole here    # Knee pain # Multiple previous surgeries  - Continue home morphine  BID    # Generalized anxiety # Insomnia  - Continue home Xanax and zolpidem PRN    Patient is medically cleared for discharge.   Patient is to follow up with Primary care physician in 1 week.   Follow up with lung doctor in 1 week   Use the BIPAP overnight   Use the oxygen at home during the day.   Use the inhalers as prescribed   Avoid smoking

## 2024-03-19 NOTE — DISCHARGE NOTE PROVIDER - CARE PROVIDER_API CALL
Murali Brooks  Pulmonary Disease  [address] [place] [address]  Phone: [phone]  Fax: [phone]  Follow Up Time: 1 week    Edison Davis  Internal Medicine  [address] [place] [id number]  Phone: [phone]  Fax: [phone]  Follow Up Time: 1 week

## 2024-03-19 NOTE — DISCHARGE NOTE PROVIDER - PROVIDER TOKENS
PROVIDER:[TOKEN:[[id number]],FOLLOWUP:[1 week]],PROVIDER:[TOKEN:[[id number]],FOLLOWUP:[1 week]]

## 2024-03-20 VITALS
DIASTOLIC BLOOD PRESSURE: 68 MMHG | RESPIRATION RATE: 18 BRPM | HEART RATE: 88 BPM | SYSTOLIC BLOOD PRESSURE: 109 MMHG | TEMPERATURE: 98 F

## 2024-03-20 LAB
ANION GAP SERPL CALC-SCNC: 12 MMOL/L — SIGNIFICANT CHANGE UP (ref 7–14)
BUN SERPL-MCNC: 25 MG/DL — HIGH (ref 10–20)
CALCIUM SERPL-MCNC: 9.1 MG/DL — SIGNIFICANT CHANGE UP (ref 8.4–10.5)
CHLORIDE SERPL-SCNC: 94 MMOL/L — LOW (ref 98–110)
CO2 SERPL-SCNC: 32 MMOL/L — SIGNIFICANT CHANGE UP (ref 17–32)
CREAT SERPL-MCNC: 0.8 MG/DL — SIGNIFICANT CHANGE UP (ref 0.7–1.5)
EGFR: 104 ML/MIN/1.73M2 — SIGNIFICANT CHANGE UP
GLUCOSE SERPL-MCNC: 155 MG/DL — HIGH (ref 70–99)
HCT VFR BLD CALC: 42.3 % — SIGNIFICANT CHANGE UP (ref 42–52)
HGB BLD-MCNC: 14.1 G/DL — SIGNIFICANT CHANGE UP (ref 14–18)
MAGNESIUM SERPL-MCNC: 2.3 MG/DL — SIGNIFICANT CHANGE UP (ref 1.8–2.4)
MCHC RBC-ENTMCNC: 29.5 PG — SIGNIFICANT CHANGE UP (ref 27–31)
MCHC RBC-ENTMCNC: 33.3 G/DL — SIGNIFICANT CHANGE UP (ref 32–37)
MCV RBC AUTO: 88.5 FL — SIGNIFICANT CHANGE UP (ref 80–94)
NRBC # BLD: 0 /100 WBCS — SIGNIFICANT CHANGE UP (ref 0–0)
PLATELET # BLD AUTO: 220 K/UL — SIGNIFICANT CHANGE UP (ref 130–400)
PMV BLD: 9.8 FL — SIGNIFICANT CHANGE UP (ref 7.4–10.4)
POTASSIUM SERPL-MCNC: 4.8 MMOL/L — SIGNIFICANT CHANGE UP (ref 3.5–5)
POTASSIUM SERPL-SCNC: 4.8 MMOL/L — SIGNIFICANT CHANGE UP (ref 3.5–5)
RAPID RVP RESULT: SIGNIFICANT CHANGE UP
RBC # BLD: 4.78 M/UL — SIGNIFICANT CHANGE UP (ref 4.7–6.1)
RBC # FLD: 16.7 % — HIGH (ref 11.5–14.5)
SARS-COV-2 RNA SPEC QL NAA+PROBE: SIGNIFICANT CHANGE UP
SODIUM SERPL-SCNC: 138 MMOL/L — SIGNIFICANT CHANGE UP (ref 135–146)
WBC # BLD: 13.32 K/UL — HIGH (ref 4.8–10.8)
WBC # FLD AUTO: 13.32 K/UL — HIGH (ref 4.8–10.8)

## 2024-03-20 PROCEDURE — 99232 SBSQ HOSP IP/OBS MODERATE 35: CPT

## 2024-03-20 RX ORDER — FLUTICASONE PROPIONATE AND SALMETEROL 50; 250 UG/1; UG/1
1 POWDER ORAL; RESPIRATORY (INHALATION)
Qty: 1 | Refills: 0
Start: 2024-03-20

## 2024-03-20 RX ORDER — ALBUTEROL 90 UG/1
2 AEROSOL, METERED ORAL
Qty: 2 | Refills: 0
Start: 2024-03-20

## 2024-03-20 RX ORDER — IPRATROPIUM/ALBUTEROL SULFATE 18-103MCG
3 AEROSOL WITH ADAPTER (GRAM) INHALATION
Qty: 360 | Refills: 0
Start: 2024-03-20

## 2024-03-20 RX ORDER — TIOTROPIUM BROMIDE 18 UG/1
1 CAPSULE ORAL; RESPIRATORY (INHALATION)
Qty: 30 | Refills: 0
Start: 2024-03-20

## 2024-03-20 RX ORDER — FLUTICASONE PROPIONATE AND SALMETEROL 50; 250 UG/1; UG/1
1 POWDER ORAL; RESPIRATORY (INHALATION)
Refills: 0 | DISCHARGE

## 2024-03-20 RX ADMIN — Medication 2 MILLIGRAM(S): at 06:30

## 2024-03-20 RX ADMIN — Medication 40 MILLIGRAM(S): at 05:30

## 2024-03-20 RX ADMIN — Medication 81 MILLIGRAM(S): at 11:52

## 2024-03-20 RX ADMIN — Medication 145 MILLIGRAM(S): at 11:52

## 2024-03-20 RX ADMIN — PANTOPRAZOLE SODIUM 40 MILLIGRAM(S): 20 TABLET, DELAYED RELEASE ORAL at 06:22

## 2024-03-20 RX ADMIN — LISINOPRIL 5 MILLIGRAM(S): 2.5 TABLET ORAL at 05:30

## 2024-03-20 RX ADMIN — MORPHINE SULFATE 100 MILLIGRAM(S): 50 CAPSULE, EXTENDED RELEASE ORAL at 05:30

## 2024-03-20 RX ADMIN — Medication 2 MILLIGRAM(S): at 11:52

## 2024-03-20 RX ADMIN — Medication 50 MILLIGRAM(S): at 05:30

## 2024-03-20 RX ADMIN — Medication 3 MILLILITER(S): at 13:50

## 2024-03-20 RX ADMIN — Medication 3 MILLILITER(S): at 08:04

## 2024-03-20 RX ADMIN — MORPHINE SULFATE 100 MILLIGRAM(S): 50 CAPSULE, EXTENDED RELEASE ORAL at 06:00

## 2024-03-20 RX ADMIN — ZOLPIDEM TARTRATE 5 MILLIGRAM(S): 10 TABLET ORAL at 13:12

## 2024-03-20 RX ADMIN — BUDESONIDE AND FORMOTEROL FUMARATE DIHYDRATE 2 PUFF(S): 160; 4.5 AEROSOL RESPIRATORY (INHALATION) at 08:04

## 2024-03-20 NOTE — PROGRESS NOTE ADULT - SUBJECTIVE AND OBJECTIVE BOX
GENOVEVAKAYLEIGH HOWARD  58y Male    CHIEF COMPLAINT:    Patient is a 58y old  Male who presents with a chief complaint of SOB (19 Mar 2024 12:42)      INTERVAL HPI/OVERNIGHT EVENTS:    Patient seen and examined. Pt states that he is still having sob and wheezing. No fever. Cough has improved.     ROS: All other systems are negative.    Vital Signs:    T(F): 96.4 (24 @ 05:07), Max: 96.6 (24 @ 13:05)  HR: 92 (24 @ 05:07) (92 - 109)  BP: 123/76 (24 @ 05:07) (105/49 - 123/76)  RR: 18 (24 @ 05:07) (18 - 18)  SpO2: --  I&O's Summary    19 Mar 2024 07:01  -  20 Mar 2024 07:00  --------------------------------------------------------  IN: 1420 mL / OUT: 2700 mL / NET: -1280 mL      Daily     Daily Weight in k (20 Mar 2024 05:07)  CAPILLARY BLOOD GLUCOSE          PHYSICAL EXAM:    GENERAL:  NAD  SKIN: No rashes or lesions  HENT: Atraumatic. Normocephalic. PERRL. Moist membranes.  NECK: Supple, No JVD. No lymphadenopathy.  PULMONARY: +ve wheezing B/L. No rales  CVS: Normal S1, S2. Rate and Rhythm are regular. No murmurs.  ABDOMEN/GI: Soft, Nontender, Nondistended; BS present  EXTREMITIES: Peripheral pulses intact. No edema B/L LE.  NEUROLOGIC:  No motor or sensory deficit.  PSYCH: Alert & oriented x 3    Consultant(s) Notes Reviewed:  [x ] YES  [ ] NO  Care Discussed with Consultants/Other Providers [ x] YES  [ ] NO    EKG reviewed  Telemetry reviewed    LABS:                        14.1   13.32 )-----------( 220      ( 20 Mar 2024 08:15 )             42.3     03-20    138  |  94<L>  |  25<H>  ----------------------------<  155<H>  4.8   |  32  |  0.8    Ca    9.1      20 Mar 2024 08:15  Mg     2.3                     RADIOLOGY & ADDITIONAL TESTS:      Imaging or report Personally Reviewed:  [ ] YES  [ ] NO    Medications:  Standing  albuterol    90 MICROgram(s) HFA Inhaler 2 Puff(s) Inhalation every 6 hours  albuterol/ipratropium for Nebulization 3 milliLiter(s) Nebulizer every 6 hours  aspirin enteric coated 81 milliGRAM(s) Oral daily  budesonide 160 MICROgram(s)/formoterol 4.5 MICROgram(s) Inhaler 2 Puff(s) Inhalation two times a day  enoxaparin Injectable 40 milliGRAM(s) SubCutaneous every 24 hours  fenofibrate Tablet 145 milliGRAM(s) Oral daily  furosemide    Tablet 40 milliGRAM(s) Oral daily  lisinopril 5 milliGRAM(s) Oral daily  morphine ER Tablet 100 milliGRAM(s) Oral two times a day  pantoprazole    Tablet 40 milliGRAM(s) Oral before breakfast  predniSONE   Tablet 50 milliGRAM(s) Oral daily  simvastatin 20 milliGRAM(s) Oral at bedtime  tiotropium 2.5 MICROgram(s) Inhaler 2 Puff(s) Inhalation daily    PRN Meds  albuterol    90 MICROgram(s) HFA Inhaler 2 Puff(s) Inhalation every 6 hours PRN  ALPRAZolam 2 milliGRAM(s) Oral every 6 hours PRN  naloxone 1 mG/mL Injection for Intranasal Use 4 milliGRAM(s) IntraNasal once PRN  zolpidem 5 milliGRAM(s) Oral at bedtime PRN      Case discussed with resident    Care discussed with pt/family

## 2024-03-20 NOTE — PROGRESS NOTE ADULT - ASSESSMENT
58 year old male with a history of LEEP REBECCA, colitis, hiatal hernia, OA, GERD, HLD, HTN, COPD pending authorization for home O2 presents to the ED with shortness of breath and cough. Patient has had shortness of breath and intermittent left sided chest pain for weeks. Pt describes the cp as constant which gets worse on coughing and movement. He presented to the ED yesterday and was recommended admission to arrange home O2 but did not want to stay. Today he was walking down the street and became lightheaded with severe shortness of breath. Upon EMS arrival patient was noted to be 83% on room air.     Acute hypercapnic hypoxic respiratory failure 2/2 COPD exacerbation  CP musculoskeletal.   Obesity / REBECCA  HTN / DL  Chronic HFpEF  Tobacco use              PLAN:    ·	Cont his current management  ·	Waiting for home O2.  working on that  ·	EKG on admission: NSR 83/min ( Interpreted by me)  ·	Troponin: 26-->21  ·	CXR is unremarkable  ·	RVP is negative.   ·	Pulmonary eval noted. Cont current management.   ·	POX on RA at rest is 86 and on RA on exertion is 79  ·	 for home O2 arrangement  ·	Cont Azithromycin for 5 days.    ·	Cont Prednisone 50 mg po daily for 5-7 days.   ·	Alb/Atrovent Neb treatment q 6h and PRN  ·	Cont Spiriva Symbicort 2 puffs twice a day  ·	Counselled to quit smoking  ·	Old record reviewed. Had nuclear stress done on 7/24/23 which was negative for ischemia    Progress Note Handoff    Pending (specify):  Consults_________, Tests________, Test Results_______, Other_Social services for home O2________  Family discussion:  Disposition: Home___/SNF___/Other________/Unknown at this time________    Rudi Aguilar MD  Spectra: [phone]

## 2024-03-22 ENCOUNTER — INPATIENT (INPATIENT)
Facility: HOSPITAL | Age: 59
LOS: 3 days | Discharge: LEFT BEFORE DISCHARGE | DRG: 203 | End: 2024-03-26
Attending: STUDENT IN AN ORGANIZED HEALTH CARE EDUCATION/TRAINING PROGRAM | Admitting: HOSPITALIST
Payer: MEDICAID

## 2024-03-22 VITALS
DIASTOLIC BLOOD PRESSURE: 88 MMHG | HEART RATE: 102 BPM | HEIGHT: 70 IN | WEIGHT: 274.92 LBS | SYSTOLIC BLOOD PRESSURE: 131 MMHG | OXYGEN SATURATION: 95 % | TEMPERATURE: 98 F | RESPIRATION RATE: 20 BRPM

## 2024-03-22 DIAGNOSIS — Z98.890 OTHER SPECIFIED POSTPROCEDURAL STATES: Chronic | ICD-10-CM

## 2024-03-22 DIAGNOSIS — K40.20 BILATERAL INGUINAL HERNIA, WITHOUT OBSTRUCTION OR GANGRENE, NOT SPECIFIED AS RECURRENT: Chronic | ICD-10-CM

## 2024-03-22 DIAGNOSIS — Z96.651 PRESENCE OF RIGHT ARTIFICIAL KNEE JOINT: Chronic | ICD-10-CM

## 2024-03-22 LAB
ALBUMIN SERPL ELPH-MCNC: 3.5 G/DL — SIGNIFICANT CHANGE UP (ref 3.5–5.2)
ALP SERPL-CCNC: 122 U/L — HIGH (ref 30–115)
ALT FLD-CCNC: 123 U/L — HIGH (ref 0–41)
ANION GAP SERPL CALC-SCNC: 5 MMOL/L — LOW (ref 7–14)
AST SERPL-CCNC: 72 U/L — HIGH (ref 0–41)
BASE EXCESS BLDV CALC-SCNC: 10.9 MMOL/L — HIGH (ref -2–3)
BASOPHILS # BLD AUTO: 0.03 K/UL — SIGNIFICANT CHANGE UP (ref 0–0.2)
BASOPHILS NFR BLD AUTO: 0.3 % — SIGNIFICANT CHANGE UP (ref 0–1)
BILIRUB SERPL-MCNC: 0.5 MG/DL — SIGNIFICANT CHANGE UP (ref 0.2–1.2)
BUN SERPL-MCNC: 21 MG/DL — HIGH (ref 10–20)
CA-I SERPL-SCNC: 1.18 MMOL/L — SIGNIFICANT CHANGE UP (ref 1.15–1.33)
CALCIUM SERPL-MCNC: 8.9 MG/DL — SIGNIFICANT CHANGE UP (ref 8.4–10.5)
CHLORIDE SERPL-SCNC: 98 MMOL/L — SIGNIFICANT CHANGE UP (ref 98–110)
CO2 SERPL-SCNC: 36 MMOL/L — HIGH (ref 17–32)
CREAT SERPL-MCNC: 0.9 MG/DL — SIGNIFICANT CHANGE UP (ref 0.7–1.5)
EGFR: 100 ML/MIN/1.73M2 — SIGNIFICANT CHANGE UP
EOSINOPHIL # BLD AUTO: 0.27 K/UL — SIGNIFICANT CHANGE UP (ref 0–0.7)
EOSINOPHIL NFR BLD AUTO: 2.8 % — SIGNIFICANT CHANGE UP (ref 0–8)
FLUAV AG NPH QL: SIGNIFICANT CHANGE UP
FLUBV AG NPH QL: SIGNIFICANT CHANGE UP
GAS PNL BLDV: 133 MMOL/L — LOW (ref 136–145)
GAS PNL BLDV: SIGNIFICANT CHANGE UP
GAS PNL BLDV: SIGNIFICANT CHANGE UP
GLUCOSE SERPL-MCNC: 91 MG/DL — SIGNIFICANT CHANGE UP (ref 70–99)
HCO3 BLDV-SCNC: 40 MMOL/L — HIGH (ref 22–29)
HCT VFR BLD CALC: 42.6 % — SIGNIFICANT CHANGE UP (ref 42–52)
HCT VFR BLDA CALC: 43 % — SIGNIFICANT CHANGE UP (ref 39–51)
HGB BLD CALC-MCNC: 14.2 G/DL — SIGNIFICANT CHANGE UP (ref 12.6–17.4)
HGB BLD-MCNC: 14 G/DL — SIGNIFICANT CHANGE UP (ref 14–18)
IMM GRANULOCYTES NFR BLD AUTO: 1.1 % — HIGH (ref 0.1–0.3)
LACTATE BLDV-MCNC: 1.4 MMOL/L — SIGNIFICANT CHANGE UP (ref 0.5–2)
LYMPHOCYTES # BLD AUTO: 2.18 K/UL — SIGNIFICANT CHANGE UP (ref 1.2–3.4)
LYMPHOCYTES # BLD AUTO: 22.6 % — SIGNIFICANT CHANGE UP (ref 20.5–51.1)
MAGNESIUM SERPL-MCNC: 2.2 MG/DL — SIGNIFICANT CHANGE UP (ref 1.8–2.4)
MCHC RBC-ENTMCNC: 29.4 PG — SIGNIFICANT CHANGE UP (ref 27–31)
MCHC RBC-ENTMCNC: 32.9 G/DL — SIGNIFICANT CHANGE UP (ref 32–37)
MCV RBC AUTO: 89.3 FL — SIGNIFICANT CHANGE UP (ref 80–94)
MONOCYTES # BLD AUTO: 0.79 K/UL — HIGH (ref 0.1–0.6)
MONOCYTES NFR BLD AUTO: 8.2 % — SIGNIFICANT CHANGE UP (ref 1.7–9.3)
NEUTROPHILS # BLD AUTO: 6.25 K/UL — SIGNIFICANT CHANGE UP (ref 1.4–6.5)
NEUTROPHILS NFR BLD AUTO: 65 % — SIGNIFICANT CHANGE UP (ref 42.2–75.2)
NRBC # BLD: 0 /100 WBCS — SIGNIFICANT CHANGE UP (ref 0–0)
NT-PROBNP SERPL-SCNC: 90 PG/ML — SIGNIFICANT CHANGE UP (ref 0–300)
PCO2 BLDV: 70 MMHG — HIGH (ref 42–55)
PH BLDV: 7.36 — SIGNIFICANT CHANGE UP (ref 7.32–7.43)
PLATELET # BLD AUTO: 206 K/UL — SIGNIFICANT CHANGE UP (ref 130–400)
PMV BLD: 10.1 FL — SIGNIFICANT CHANGE UP (ref 7.4–10.4)
PO2 BLDV: 22 MMHG — LOW (ref 25–45)
POTASSIUM BLDV-SCNC: 5.2 MMOL/L — HIGH (ref 3.5–5.1)
POTASSIUM SERPL-MCNC: 5.4 MMOL/L — HIGH (ref 3.5–5)
POTASSIUM SERPL-SCNC: 5.4 MMOL/L — HIGH (ref 3.5–5)
PROT SERPL-MCNC: 5.8 G/DL — LOW (ref 6–8)
RBC # BLD: 4.77 M/UL — SIGNIFICANT CHANGE UP (ref 4.7–6.1)
RBC # FLD: 16.8 % — HIGH (ref 11.5–14.5)
RSV RNA NPH QL NAA+NON-PROBE: SIGNIFICANT CHANGE UP
SAO2 % BLDV: 29.1 % — LOW (ref 67–88)
SARS-COV-2 RNA SPEC QL NAA+PROBE: SIGNIFICANT CHANGE UP
SODIUM SERPL-SCNC: 139 MMOL/L — SIGNIFICANT CHANGE UP (ref 135–146)
TROPONIN T, HIGH SENSITIVITY RESULT: 30 NG/L — HIGH (ref 6–21)
WBC # BLD: 9.63 K/UL — SIGNIFICANT CHANGE UP (ref 4.8–10.8)
WBC # FLD AUTO: 9.63 K/UL — SIGNIFICANT CHANGE UP (ref 4.8–10.8)

## 2024-03-22 PROCEDURE — 99223 1ST HOSP IP/OBS HIGH 75: CPT

## 2024-03-22 PROCEDURE — 93010 ELECTROCARDIOGRAM REPORT: CPT

## 2024-03-22 PROCEDURE — 71045 X-RAY EXAM CHEST 1 VIEW: CPT | Mod: 26

## 2024-03-22 NOTE — ED PROVIDER NOTE - OBJECTIVE STATEMENT
58-year-old male past medical history colitis, hiatal hernia, OA, GERD, HLD, HTN, COPD on 5 L O2 at home, HFpEF (65%) presents with complaint of chest pain x 3 weeks.  Reports chest pain is constant, localized to left side of chest, nonradiating, nonpositional.  Reports chest pain is worsened with exertion and relieved with oxygen.  Denies fever/chills, cough, runny nose, sore throat, sneezing, shortness of breath, abdominal pain, nausea/vomiting/diarrhea, change in bowel/bladder habits, lightheadedness, dizziness, focal numbness/weakness.

## 2024-03-22 NOTE — ED CDU PROVIDER INITIAL DAY NOTE - PHYSICAL EXAMINATION
Vital Signs: I have reviewed the initial vital signs.  Constitutional: appears stated age, no acute distress  Eyes: Sclera clear, EOMI.  Cardiovascular: S1 and S2, regular rate, regular rhythm, well-perfused extremities, radial pulses equal and 2+, pedal pulses 2+ and equal  Respiratory: unlabored respiratory effort, clear to auscultation bilaterally  Gastrointestinal:  abdomen soft, non-tender  Musculoskeletal: supple neck, no lower extremity edema  Integumentary: warm, dry, no rash  Neurologic: awake, alert, oriented x3, extremities’ motor and sensory functions grossly intact

## 2024-03-22 NOTE — ED PROVIDER NOTE - ATTENDING APP SHARED VISIT CONTRIBUTION OF CARE
58-year-old male past medical history as above recent hospitalization for rule out PE ruled out.  Prescribed home oxygen presents with chest pain.  Plan is troponin, if negative will place in ops for CCTA.  Patient has a negative nuclear stress test that was over a-year-old.

## 2024-03-22 NOTE — ED PROVIDER NOTE - PHYSICAL EXAMINATION
Vital Signs: I have reviewed the initial vital signs.  Constitutional: appears stated age, no acute distress  Eyes: Sclera clear, EOMI.  Cardiovascular: S1 and S2, regular rate, regular rhythm, well-perfused extremities, radial pulses equal and 2+, pedal pulses 2+ and equal  Respiratory: unlabored respiratory effort, clear to auscultation bilaterally  Gastrointestinal:  abdomen soft, non-tender  Musculoskeletal: supple neck, no lower extremity edema  Integumentary: warm, dry, no rash  Neurologic: awake, alert, oriented x3, extremities’ motor and sensory functions grossly intact Vital Signs: I have reviewed the initial vital signs.  Constitutional: appears stated age, no acute distress  Eyes: Sclera clear, EOMI.  Cardiovascular: S1 and S2, regular rate, regular rhythm, well-perfused extremities, radial pulses equal and 2+, pedal pulses 2+ and equal  Respiratory: unlabored respiratory effort, clear to auscultation bilaterally  Gastrointestinal:  abdomen soft, non-tender  Musculoskeletal: supple neck, no lower extremity edema,  + left chest wall tenderness  Integumentary: warm, dry, no rash  Neurologic: awake, alert, oriented x3, extremities’ motor and sensory functions grossly intact

## 2024-03-22 NOTE — CHART NOTE - NSCHARTNOTEFT_GEN_A_CORE
Pt is a 58 year old male who was BIBEMS with a c/o chest pain x 3 weeks.  Supportive counseling was provided to pt.

## 2024-03-23 DIAGNOSIS — R07.9 CHEST PAIN, UNSPECIFIED: ICD-10-CM

## 2024-03-23 LAB
ALBUMIN SERPL ELPH-MCNC: 3.5 G/DL — SIGNIFICANT CHANGE UP (ref 3.5–5.2)
ALP SERPL-CCNC: 129 U/L — HIGH (ref 30–115)
ALT FLD-CCNC: 116 U/L — HIGH (ref 0–41)
ANION GAP SERPL CALC-SCNC: 8 MMOL/L — SIGNIFICANT CHANGE UP (ref 7–14)
AST SERPL-CCNC: 59 U/L — HIGH (ref 0–41)
BILIRUB SERPL-MCNC: 0.6 MG/DL — SIGNIFICANT CHANGE UP (ref 0.2–1.2)
BUN SERPL-MCNC: 20 MG/DL — SIGNIFICANT CHANGE UP (ref 10–20)
CALCIUM SERPL-MCNC: 8.7 MG/DL — SIGNIFICANT CHANGE UP (ref 8.4–10.4)
CHLORIDE SERPL-SCNC: 95 MMOL/L — LOW (ref 98–110)
CO2 SERPL-SCNC: 32 MMOL/L — SIGNIFICANT CHANGE UP (ref 17–32)
CREAT SERPL-MCNC: 0.7 MG/DL — SIGNIFICANT CHANGE UP (ref 0.7–1.5)
EGFR: 108 ML/MIN/1.73M2 — SIGNIFICANT CHANGE UP
GLUCOSE SERPL-MCNC: 122 MG/DL — HIGH (ref 70–99)
LIDOCAIN IGE QN: 15 U/L — SIGNIFICANT CHANGE UP (ref 7–60)
POTASSIUM SERPL-MCNC: 4.4 MMOL/L — SIGNIFICANT CHANGE UP (ref 3.5–5)
POTASSIUM SERPL-SCNC: 4.4 MMOL/L — SIGNIFICANT CHANGE UP (ref 3.5–5)
PROT SERPL-MCNC: 5.7 G/DL — LOW (ref 6–8)
SODIUM SERPL-SCNC: 135 MMOL/L — SIGNIFICANT CHANGE UP (ref 135–146)
TROPONIN T, HIGH SENSITIVITY RESULT: 28 NG/L — HIGH (ref 6–21)

## 2024-03-23 PROCEDURE — 99233 SBSQ HOSP IP/OBS HIGH 50: CPT

## 2024-03-23 PROCEDURE — 76705 ECHO EXAM OF ABDOMEN: CPT

## 2024-03-23 PROCEDURE — 93010 ELECTROCARDIOGRAM REPORT: CPT

## 2024-03-23 PROCEDURE — 80053 COMPREHEN METABOLIC PANEL: CPT

## 2024-03-23 PROCEDURE — 76705 ECHO EXAM OF ABDOMEN: CPT | Mod: 26

## 2024-03-23 PROCEDURE — 36415 COLL VENOUS BLD VENIPUNCTURE: CPT

## 2024-03-23 PROCEDURE — 85027 COMPLETE CBC AUTOMATED: CPT

## 2024-03-23 PROCEDURE — 99223 1ST HOSP IP/OBS HIGH 75: CPT

## 2024-03-23 PROCEDURE — 94640 AIRWAY INHALATION TREATMENT: CPT

## 2024-03-23 PROCEDURE — 83735 ASSAY OF MAGNESIUM: CPT

## 2024-03-23 RX ORDER — SIMVASTATIN 20 MG/1
20 TABLET, FILM COATED ORAL AT BEDTIME
Refills: 0 | Status: DISCONTINUED | OUTPATIENT
Start: 2024-03-23 | End: 2024-03-23

## 2024-03-23 RX ORDER — IPRATROPIUM/ALBUTEROL SULFATE 18-103MCG
3 AEROSOL WITH ADAPTER (GRAM) INHALATION
Refills: 0 | Status: DISCONTINUED | OUTPATIENT
Start: 2024-03-23 | End: 2024-03-26

## 2024-03-23 RX ORDER — IPRATROPIUM/ALBUTEROL SULFATE 18-103MCG
3 AEROSOL WITH ADAPTER (GRAM) INHALATION ONCE
Refills: 0 | Status: COMPLETED | OUTPATIENT
Start: 2024-03-23 | End: 2024-03-23

## 2024-03-23 RX ORDER — ALPRAZOLAM 0.25 MG
2 TABLET ORAL EVERY 6 HOURS
Refills: 0 | Status: DISCONTINUED | OUTPATIENT
Start: 2024-03-23 | End: 2024-03-26

## 2024-03-23 RX ORDER — FENOFIBRATE,MICRONIZED 130 MG
145 CAPSULE ORAL ONCE
Refills: 0 | Status: COMPLETED | OUTPATIENT
Start: 2024-03-23 | End: 2024-03-23

## 2024-03-23 RX ORDER — ZOLPIDEM TARTRATE 10 MG/1
5 TABLET ORAL ONCE
Refills: 0 | Status: DISCONTINUED | OUTPATIENT
Start: 2024-03-23 | End: 2024-03-23

## 2024-03-23 RX ORDER — LIDOCAINE 4 G/100G
1 CREAM TOPICAL DAILY
Refills: 0 | Status: DISCONTINUED | OUTPATIENT
Start: 2024-03-23 | End: 2024-03-26

## 2024-03-23 RX ORDER — ASPIRIN/CALCIUM CARB/MAGNESIUM 324 MG
81 TABLET ORAL DAILY
Refills: 0 | Status: DISCONTINUED | OUTPATIENT
Start: 2024-03-23 | End: 2024-03-26

## 2024-03-23 RX ORDER — LISINOPRIL 2.5 MG/1
5 TABLET ORAL DAILY
Refills: 0 | Status: DISCONTINUED | OUTPATIENT
Start: 2024-03-23 | End: 2024-03-26

## 2024-03-23 RX ORDER — FUROSEMIDE 40 MG
40 TABLET ORAL DAILY
Refills: 0 | Status: DISCONTINUED | OUTPATIENT
Start: 2024-03-23 | End: 2024-03-26

## 2024-03-23 RX ORDER — AZITHROMYCIN 500 MG/1
500 TABLET, FILM COATED ORAL DAILY
Refills: 0 | Status: DISCONTINUED | OUTPATIENT
Start: 2024-03-23 | End: 2024-03-26

## 2024-03-23 RX ORDER — ENOXAPARIN SODIUM 100 MG/ML
40 INJECTION SUBCUTANEOUS EVERY 24 HOURS
Refills: 0 | Status: DISCONTINUED | OUTPATIENT
Start: 2024-03-23 | End: 2024-03-26

## 2024-03-23 RX ORDER — PANTOPRAZOLE SODIUM 20 MG/1
40 TABLET, DELAYED RELEASE ORAL
Refills: 0 | Status: DISCONTINUED | OUTPATIENT
Start: 2024-03-23 | End: 2024-03-26

## 2024-03-23 RX ORDER — METOPROLOL TARTRATE 50 MG
100 TABLET ORAL ONCE
Refills: 0 | Status: COMPLETED | OUTPATIENT
Start: 2024-03-23 | End: 2024-03-23

## 2024-03-23 RX ORDER — KETOROLAC TROMETHAMINE 30 MG/ML
15 SYRINGE (ML) INJECTION ONCE
Refills: 0 | Status: DISCONTINUED | OUTPATIENT
Start: 2024-03-23 | End: 2024-03-24

## 2024-03-23 RX ORDER — AZITHROMYCIN 500 MG/1
500 TABLET, FILM COATED ORAL ONCE
Refills: 0 | Status: DISCONTINUED | OUTPATIENT
Start: 2024-03-23 | End: 2024-03-23

## 2024-03-23 RX ORDER — ALBUTEROL 90 UG/1
2 AEROSOL, METERED ORAL EVERY 6 HOURS
Refills: 0 | Status: DISCONTINUED | OUTPATIENT
Start: 2024-03-23 | End: 2024-03-26

## 2024-03-23 RX ORDER — ZOLPIDEM TARTRATE 10 MG/1
5 TABLET ORAL AT BEDTIME
Refills: 0 | Status: DISCONTINUED | OUTPATIENT
Start: 2024-03-23 | End: 2024-03-26

## 2024-03-23 RX ADMIN — Medication 40 MILLIGRAM(S): at 11:39

## 2024-03-23 RX ADMIN — Medication 20 MILLIGRAM(S): at 06:11

## 2024-03-23 RX ADMIN — Medication 3 MILLILITER(S): at 08:37

## 2024-03-23 RX ADMIN — ENOXAPARIN SODIUM 40 MILLIGRAM(S): 100 INJECTION SUBCUTANEOUS at 20:54

## 2024-03-23 RX ADMIN — Medication 81 MILLIGRAM(S): at 11:39

## 2024-03-23 RX ADMIN — Medication 145 MILLIGRAM(S): at 20:54

## 2024-03-23 RX ADMIN — AZITHROMYCIN 500 MILLIGRAM(S): 500 TABLET, FILM COATED ORAL at 11:39

## 2024-03-23 RX ADMIN — Medication 3 MILLILITER(S): at 01:06

## 2024-03-23 RX ADMIN — ZOLPIDEM TARTRATE 5 MILLIGRAM(S): 10 TABLET ORAL at 01:28

## 2024-03-23 RX ADMIN — Medication 2 MILLIGRAM(S): at 20:55

## 2024-03-23 RX ADMIN — Medication 40 MILLIGRAM(S): at 06:11

## 2024-03-23 RX ADMIN — Medication 40 MILLIGRAM(S): at 20:55

## 2024-03-23 RX ADMIN — Medication 2 MILLIGRAM(S): at 01:28

## 2024-03-23 RX ADMIN — Medication 3 MILLILITER(S): at 08:49

## 2024-03-23 RX ADMIN — PANTOPRAZOLE SODIUM 40 MILLIGRAM(S): 20 TABLET, DELAYED RELEASE ORAL at 06:11

## 2024-03-23 RX ADMIN — Medication 100 MILLIGRAM(S): at 06:58

## 2024-03-23 RX ADMIN — LIDOCAINE 1 PATCH: 4 CREAM TOPICAL at 11:39

## 2024-03-23 RX ADMIN — LISINOPRIL 5 MILLIGRAM(S): 2.5 TABLET ORAL at 06:10

## 2024-03-23 RX ADMIN — Medication 2 MILLIGRAM(S): at 11:42

## 2024-03-23 RX ADMIN — ZOLPIDEM TARTRATE 5 MILLIGRAM(S): 10 TABLET ORAL at 01:05

## 2024-03-23 NOTE — PATIENT PROFILE ADULT - FALL HARM RISK - HARM RISK INTERVENTIONS

## 2024-03-23 NOTE — ED CDU PROVIDER DISPOSITION NOTE - CLINICAL COURSE
58-year-old male with past medical history colitis, hiatal hernia, OA, GERD, HLD, HTN, COPD on 5 L O2 at home, HFpEF (65%) presents with complaint of chest pain x 3 weeks.  pt placed in observation unit for getting CCTA. In my assessment in the morning before ccta, pt is wheezing and short of breath his heart rate cannot be controlled for CCTA and beta blockers cannot be given due to acute copd exacerbation/ wheezing. BP is also in 110s/70s cannot safely given nitroglycerin for optimization. There are multiple comorbidity affecting patient care including not limited to heart failure, copd and cad. labs noted for elevated trop, but no significant delta increase, cxr no acute pathologies and ecg no acute ischemic changes. pt given 3 more rounds of Duoneb and admitted to medicine team for further management.

## 2024-03-23 NOTE — H&P ADULT - HISTORY OF PRESENT ILLNESS
58-year-old male past medical history colitis, hiatal hernia, OA, GERD, HLD, HTN, COPD on 5 L O2 at home, HFpEF (65%) presents with complaint of chest pain x 3 weeks. Per patient the pain is in the midchest, nonradiating, sharp in nature, aggravated with coughing/deep breaths/palpation. Denies fever/chills, cough, runny nose, sore throat, sneezing, shortness of breath, abdominal pain, nausea/vomiting/diarrhea, change in bowel/bladder habits, lightheadedness, dizziness, focal numbness/weakness. Patient was recently admitted for COPD exacerbation and discharged on the 20th of March. had NST in July 2023 and was negative for ischemia.     In the ED, /88, , satting 95% on RA. Labs showed trops 30 (repeat 28), BNP 90, LFTs (AST 59//). VBG po2 22, pco2 70. RVP negative. patient was given prednisone 20, azithro 500 PO, duonebs, 100 metoprolol tartrate and admitted to medicine.

## 2024-03-23 NOTE — ED CDU PROVIDER SUBSEQUENT DAY NOTE - NS ED MD CDU HISTORY DATE TIME DT
2135 RN applied new dressing. Noticed increased bleeding. New dressing became saturated right away. Notified CC Dr. Angeles.   Orders: Page surgery and stat hgb    2137 KARLY Morales for Dr. Montgomery notified of increased bleeding   Orders: Apply pressure dressing and notify if hgb <7.0    2345 Dr. Angeles bedside. RN changing dressing   Orders: 1 PRBC    0010 Art line placed per CC. BP 87/28 MAP 44.  CC notified. Levo started at 5 mcg/min   ABG drawn and pt placed on bipap d/t increased lethargy         23-Mar-2024 01:29

## 2024-03-23 NOTE — ED CDU PROVIDER SUBSEQUENT DAY NOTE - ATTENDING CONTRIBUTION TO CARE
I personally evaluated the patient. I reviewed the Resident’s or Physician Assistant’s note (as assigned above), and agree with the findings and plan except as documented in my note.  details in mdm

## 2024-03-23 NOTE — ED CDU PROVIDER SUBSEQUENT DAY NOTE - PROGRESS NOTE DETAILS
Patient's BP 98/63, wheezing on exam. Abdomen soft NT/ND. Labs and CMP repeated. +improvement of wheeze after nebs. Will cancel CCTA and admit

## 2024-03-23 NOTE — H&P ADULT - NSHPLABSRESULTS_GEN_ALL_CORE
.  LABS:                         14.0   9.63  )-----------( 206      ( 22 Mar 2024 21:24 )             42.6     03-23    135  |  95<L>  |  20  ----------------------------<  122<H>  4.4   |  32  |  0.7    Ca    8.7      23 Mar 2024 08:30  Mg     2.2     03-22    TPro  5.7<L>  /  Alb  3.5  /  TBili  0.6  /  DBili  x   /  AST  59<H>  /  ALT  116<H>  /  AlkPhos  129<H>  03-23      Urinalysis Basic - ( 23 Mar 2024 08:30 )    Color: x / Appearance: x / SG: x / pH: x  Gluc: 122 mg/dL / Ketone: x  / Bili: x / Urobili: x   Blood: x / Protein: x / Nitrite: x   Leuk Esterase: x / RBC: x / WBC x   Sq Epi: x / Non Sq Epi: x / Bacteria: x            RADIOLOGY, EKG & ADDITIONAL TESTS: Reviewed.

## 2024-03-23 NOTE — H&P ADULT - ATTENDING COMMENTS
HPI as above.  Interval history: Pt seen and examined at bedside. No sob. CP when coughs and moves.   Previous records reviewed, pt recently here for copd exacerbation, CP was evaluated found to be MSK.      Vital Signs reviewed    PHYSICAL EXAM:  GENERAL: NAD  HEAD:  Atraumatic, Normocephalic  EYES: EOMI, PERRLA, conjunctiva and sclera clear  NECK: Supple, No JVD  CHEST/LUNG: wheeze bl, tenderness on left chest   HEART: Regular rate and rhythm; No murmurs, rubs, or gallops  ABDOMEN: Soft, Nontender, Nondistended; Bowel sounds present  EXTREMITIES:  2+ Peripheral Pulses, No clubbing, cyanosis, or edema  PSYCH: AAOx3  NEUROLOGY: non-focal  SKIN: No rashes or lesions  Labs reviewed  Imaging reviewed independently and reviewed read  cxr reviewed, neg for pneumonia, follow up read  EKG reviewed independently and reviewed read  NSR     Plan  58-year-old male past medical history colitis, hiatal hernia, OA, GERD, HLD, HTN, COPD on 5 L O2 at home, HFpEF (65%) presents with complaint of chest pain x 3 weeks.      #chest pain suspected MSK  -appears MSK   - trops likely ischemic demand  - echo 11/2023, wnl   - cardio consult   - trop 31-->28   - toradol IV x1   - lido patch   - asa    #COPD exacerbation on 5L at home  #Chronic Hypercapnia   - ABg noted, pt is on RA   - solumedrol 40 mg bid x2 doses, start prednisone 40 mg daily in am  - azithormycin   - cxr neg for pna, rvp neg   - outpt pulm   - duo nebs and inhalers    #transaminitis- trend, RUQ US     #GERD- ppi  # HLD- statin, fenofibrate  #chronic HFpEF- lasix   #HTN- lisinopril, lasix   #anxiety- continue xanax      #rest as above dw reisdent     #Progress Note Handoff  Pending (specify):  as above  Disposition: pending: home  Decision to admit the pt is based on acuity as above

## 2024-03-23 NOTE — ED CDU PROVIDER SUBSEQUENT DAY NOTE - PHYSICAL EXAMINATION
Vital Signs: I have reviewed the initial vital signs.  Constitutional: appears stated age, no acute distress  Eyes: Sclera clear, EOMI.  Cardiovascular: S1 and S2, regular rate, regular rhythm, well-perfused extremities, radial pulses equal and 2+, pedal pulses 2+ and equal  Respiratory: unlabored respiratory effort, clear to auscultation bilaterally  Gastrointestinal:  abdomen soft, non-tender  Musculoskeletal: supple neck, no lower extremity edema,  + left chest wall tenderness  Integumentary: warm, dry, no rash  Neurologic: awake, alert, oriented x3, extremities’ motor and sensory functions grossly intact

## 2024-03-23 NOTE — H&P ADULT - ASSESSMENT
A 58-year-old male past medical history colitis, hiatal hernia, OA, GERD, HLD, HTN, COPD on 5 L O2 at home, HFpEF (65%) presents with complaint of chest pain x 3 weeks. Per patient the pain is in the midchest, nonradiating, sharp in nature, aggravated with coughing/deep breaths/palpation. Denies fever/chills, cough, runny nose, sore throat, sneezing, shortness of breath, abdominal pain, nausea/vomiting/diarrhea, change in bowel/bladder habits, lightheadedness, dizziness, focal numbness/weakness. Patient was recently admitted for COPD exacerbation and discharged on the 20th of March. had NST in July 2023 and was negative for ischemia.     #chest pain- MSK in nature   #HO HFpEF (65%)   #HTN   #HLD   - ED, /88, , satting 95% on RA.  - trops 30 (repeat 28). ACS ruled out   - CXR noted   - VBG noted   - Stress test negative in the past year (july 2023)     plan:   - Patient requesting to speak with Dr. stokes. Consult in.   - lidocaine patch for the pain   - cw home dose of lasix   - cw aspirin   - cw home meds for HTN. Would hold statin in the setting of transaminitis.     #COPD- recent admission for ecaxerbation    - VBG noted. Patient has chronic hypercapnia   - RVP negative. CTA on the 19th negative for PE.   - audrey quezada in the Ed with neda     plan:   - Bipap overnight   - patient still wheezing, will give solumedrol today and switch back to prednisone 40 tomorrow (was on 40 at home)   - cw home inhalers     #transaminitis   - downtrending   - RUQ US   - Avoid hepatotoxic medications.     #GERD   - cw PPI     # DVT ppx- lovenox   # GI ppx- PPI   # Activity- AAT   # Diet- DASH   # Dispo- Possible dc in 24 hours

## 2024-03-23 NOTE — ED CDU PROVIDER DISPOSITION NOTE - ATTENDING CONTRIBUTION TO CARE
I personally evaluated the patient. I reviewed the Resident’s or Physician Assistant’s note (as assigned above), and agree with the findings and plan except as documented in my note.  detailes in clinical course.

## 2024-03-23 NOTE — ED CDU PROVIDER SUBSEQUENT DAY NOTE - CLINICAL SUMMARY MEDICAL DECISION MAKING FREE TEXT BOX
58-year-old male past medical history colitis, hiatal hernia, OA, GERD, HLD, HTN, COPD on 5 L O2 at home, HFpEF (65%) presents with complaint of chest pain x 3 weeks. pt placed on observation unit for CCTA. pt still wheezing, will admit.

## 2024-03-23 NOTE — CONSULT NOTE ADULT - SUBJECTIVE AND OBJECTIVE BOX
Reason for Admission: Chest pain  History of Present Illness:    58-year-old male past medical history colitis, hiatal hernia, OA, GERD, HLD, HTN, COPD on 5 L O2 at home, HFpEF (65%) presents with complaint of chest pain x 3 weeks. Per patient the pain is in the midchest, nonradiating, sharp in nature, aggravated with coughing/deep breaths/palpation. Denies fever/chills, cough, runny nose, sore throat, sneezing, shortness of breath, abdominal pain, nausea/vomiting/diarrhea, change in bowel/bladder habits, lightheadedness, dizziness, focal numbness/weakness. Patient was recently admitted for COPD exacerbation and discharged on the 20th of March. had NST in July 2023 and was negative for ischemia.     In the ED, /88, , satting 95% on RA. Labs showed trops 30 (repeat 28), BNP 90, LFTs (AST 59//). VBG po2 22, pco2 70. RVP negative. patient was given prednisone 20, azithro 500 PO, duonebs, 100 metoprolol tartrate and admitted to medicine.     cardiology addendum:  his chest pain appears atypical. symptoms are mostly secondary to COPD.            Review of Systems:  Other Review of Systems: All other review of systems negative, except as noted in HPI      Allergies and Intolerances:        Allergies:  	No Known Allergies:     Home Medications:   * Patient Currently Takes Medications as of 20-Mar-2024 13:43 documented in Structured Notes  · 	Advair Diskus 500 mcg-50 mcg inhalation powder: 1 puff(s) inhaled 2 times a day  · 	predniSONE 20 mg oral tablet: 1 tab(s) orally once a day PLEASE TAKE 2 TABLETS AFTER BREAKFAST FOR 3 DAYS (FROM 3/21 TILL 3/24), THEN 1 TABLET AFTER BREAKFAST FOR 3 DAYS (FROM 3/25 TILL 3/28), THEN HALF A TABLET AFTER BREAKFAST FOR 3 DAYS FROM (3/29 TILL 3/31), THEN STOP  · 	ipratropium-albuterol 0.5 mg-2.5 mg/3 mL inhalation solution: 3 milliliter(s) inhaled every 6 hours  · 	Spiriva HandiHaler 18 mcg inhalation capsule: 1 cap(s) inhaled once a day  · 	albuterol 90 mcg/inh inhalation aerosol: 2 puff(s) inhaled every 6 hours as needed for  shortness of breath and/or wheezing  · 	ALPRAZolam 2 mg oral tablet: 1 tab(s) orally every 6 hours, As needed, anxiety  · 	Lasix 40 mg oral tablet: 1 tab(s) orally once a day  · 	Aspir 81 oral delayed release tablet: 1 tab(s) orally once a day  · 	Ambien 10 mg oral tablet: 0.5 tab(s) orally once a day (at bedtime), As Needed  · 	Zocor 20 mg oral tablet: 1 tab(s) orally once a day (at bedtime)  · 	lisinopril 5 mg oral tablet: 1 tab(s) orally once a day  · 	morphine 100 mg/12 to 24 hr oral capsule, extended release: 1 cap(s) orally 2 times a day  · 	omeprazole 40 mg oral delayed release capsule: 1 cap(s) orally once a day  · 	fenofibrate 134 mg oral capsule: 1 cap(s) orally once a day    Patient History:    Past Medical, Past Surgical, and Family History:  PAST MEDICAL HISTORY:  Colitis LARGE INTESTINE   NO RECENT FLARES    COPD (chronic obstructive pulmonary disease)     GERD (gastroesophageal reflux disease)     Hiatal hernia GERD    High cholesterol     HTN (hypertension)     Morbid obesity     REBECCA treated with BiPAP     Osteoarthritis.     PAST SURGICAL HISTORY:  H/O foot surgery right big toe surgery    H/O knee surgery arthoscopic x4    Hernia, inguinal, bilateral 3 months old    History of appendectomy     History of cholecystectomy     History of knee replacement procedure of right knee BILATERAL.     FAMILY HISTORY:  FH: diabetes mellitus    Mother  Still living? Unknown  FH: lung cancer, Age at diagnosis: Age Unknown.     Social History:  · Substance use	No     Tobacco Screening:  · Core Measure Site	No      Physical Exam:   Physical Exam: VITALS:   T(C): 36.1 (03-23-24 @ 07:24), Max: 36.5 (03-22-24 @ 20:25)  HR: 83 (03-23-24 @ 07:24) (83 - 102)  BP: 98/63 (03-23-24 @ 07:24) (98/63 - 131/88)  RR: 19 (03-23-24 @ 07:24) (19 - 20)  SpO2: 94% (03-23-24 @ 07:24) (94% - 95%)    GENERAL: NAD, lying in bed comfortably  HEAD:  Atraumatic, normocephalic  EYES: EOMI, PERRLA, conjunctiva and sclera clear  ENT: Moist mucous membranes  NECK: Supple, no JVD  HEART: Regular rate and rhythm, no murmurs, rubs, or gallops  LUNGS: Unlabored respirations.  Clear to auscultation bilaterally, no crackles, wheezing, or rhonchi  ABDOMEN: Soft, nontender, nondistended, +BS  EXTREMITIES: 2+ peripheral pulses bilaterally. No clubbing, cyanosis, or edema  NERVOUS SYSTEM:  A&Ox3, no focal deficits   SKIN: No rashes or lesions       Labs and Results:  Labs, Radiology, Cardiology, and Other Results: .  LABS:                         14.0   9.63  )-----------( 206      ( 22 Mar 2024 21:24 )             42.6     03-23    135  |  95<L>  |  20  ----------------------------<  122<H>  4.4   |  32  |  0.7    Ca    8.7      23 Mar 2024 08:30  Mg     2.2     03-22    TPro  5.7<L>  /  Alb  3.5  /  TBili  0.6  /  DBili  x   /  AST  59<H>  /  ALT  116<H>  /  AlkPhos  129<H>  03-23      Urinalysis Basic - ( 23 Mar 2024 08:30 )    Color: x / Appearance: x / SG: x / pH: x  Gluc: 122 mg/dL / Ketone: x  / Bili: x / Urobili: x   Blood: x / Protein: x / Nitrite: x   Leuk Esterase: x / RBC: x / WBC x   Sq Epi: x / Non Sq Epi: x / Bacteria: x

## 2024-03-23 NOTE — CONSULT NOTE ADULT - ASSESSMENT
#chest pain- likely MSK in nature with a recent NST less than a year ago neg for ischemia. if troponins remain negative would stop cardiac ischemia workup.   #HO HFpEF (65%)   #HTN   #HLD   - ED, /88, , satting 95% on RA.  - trops 30 (repeat 28). ACS ruled out   - CXR noted    - work up elevated liver enzymes, agree with holding statin   - cw home dose of lasix   - cw aspirin   - cw home meds for HTN.  #COPD- recent admission for ecaxerbation

## 2024-03-23 NOTE — H&P ADULT - NSHPPHYSICALEXAM_GEN_ALL_CORE
VITALS:   T(C): 36.1 (03-23-24 @ 07:24), Max: 36.5 (03-22-24 @ 20:25)  HR: 83 (03-23-24 @ 07:24) (83 - 102)  BP: 98/63 (03-23-24 @ 07:24) (98/63 - 131/88)  RR: 19 (03-23-24 @ 07:24) (19 - 20)  SpO2: 94% (03-23-24 @ 07:24) (94% - 95%)    GENERAL: NAD, lying in bed comfortably  HEAD:  Atraumatic, normocephalic  EYES: EOMI, PERRLA, conjunctiva and sclera clear  ENT: Moist mucous membranes  NECK: Supple, no JVD  HEART: Regular rate and rhythm, no murmurs, rubs, or gallops  LUNGS: Unlabored respirations.  Clear to auscultation bilaterally, no crackles, wheezing, or rhonchi  ABDOMEN: Soft, nontender, nondistended, +BS  EXTREMITIES: 2+ peripheral pulses bilaterally. No clubbing, cyanosis, or edema  NERVOUS SYSTEM:  A&Ox3, no focal deficits   SKIN: No rashes or lesions

## 2024-03-23 NOTE — H&P ADULT - NS ATTEST RISK PROBLEM GEN_ALL_CORE FT
cp, copd exacerbation, cardiology consult, lasb reviewed, cardiac telemonitoring, cxr reviewed, labs ordered, reviewed prior records cp, copd exacerbation, cardiology consult, lab reviewed, cardiac telemonitoring, cxr reviewed, labs ordered, reviewed prior records

## 2024-03-24 PROCEDURE — 99232 SBSQ HOSP IP/OBS MODERATE 35: CPT

## 2024-03-24 RX ORDER — OXYCODONE HYDROCHLORIDE 5 MG/1
30 TABLET ORAL EVERY 8 HOURS
Refills: 0 | Status: DISCONTINUED | OUTPATIENT
Start: 2024-03-24 | End: 2024-03-26

## 2024-03-24 RX ORDER — ZOLPIDEM TARTRATE 10 MG/1
5 TABLET ORAL ONCE
Refills: 0 | Status: DISCONTINUED | OUTPATIENT
Start: 2024-03-24 | End: 2024-03-24

## 2024-03-24 RX ORDER — SENNA PLUS 8.6 MG/1
2 TABLET ORAL AT BEDTIME
Refills: 0 | Status: DISCONTINUED | OUTPATIENT
Start: 2024-03-24 | End: 2024-03-26

## 2024-03-24 RX ADMIN — Medication 81 MILLIGRAM(S): at 12:23

## 2024-03-24 RX ADMIN — OXYCODONE HYDROCHLORIDE 30 MILLIGRAM(S): 5 TABLET ORAL at 16:42

## 2024-03-24 RX ADMIN — LISINOPRIL 5 MILLIGRAM(S): 2.5 TABLET ORAL at 06:11

## 2024-03-24 RX ADMIN — Medication 2 MILLIGRAM(S): at 03:38

## 2024-03-24 RX ADMIN — Medication 2 MILLIGRAM(S): at 10:06

## 2024-03-24 RX ADMIN — ENOXAPARIN SODIUM 40 MILLIGRAM(S): 100 INJECTION SUBCUTANEOUS at 21:33

## 2024-03-24 RX ADMIN — SENNA PLUS 2 TABLET(S): 8.6 TABLET ORAL at 21:33

## 2024-03-24 RX ADMIN — Medication 2 MILLIGRAM(S): at 16:44

## 2024-03-24 RX ADMIN — LIDOCAINE 1 PATCH: 4 CREAM TOPICAL at 23:58

## 2024-03-24 RX ADMIN — ZOLPIDEM TARTRATE 5 MILLIGRAM(S): 10 TABLET ORAL at 21:33

## 2024-03-24 RX ADMIN — LIDOCAINE 1 PATCH: 4 CREAM TOPICAL at 19:49

## 2024-03-24 RX ADMIN — OXYCODONE HYDROCHLORIDE 30 MILLIGRAM(S): 5 TABLET ORAL at 17:11

## 2024-03-24 RX ADMIN — LIDOCAINE 1 PATCH: 4 CREAM TOPICAL at 12:22

## 2024-03-24 RX ADMIN — PANTOPRAZOLE SODIUM 40 MILLIGRAM(S): 20 TABLET, DELAYED RELEASE ORAL at 06:11

## 2024-03-24 RX ADMIN — AZITHROMYCIN 500 MILLIGRAM(S): 500 TABLET, FILM COATED ORAL at 12:28

## 2024-03-24 RX ADMIN — Medication 2 MILLIGRAM(S): at 23:06

## 2024-03-24 RX ADMIN — Medication 15 MILLIGRAM(S): at 06:13

## 2024-03-24 RX ADMIN — ZOLPIDEM TARTRATE 5 MILLIGRAM(S): 10 TABLET ORAL at 23:12

## 2024-03-24 RX ADMIN — Medication 40 MILLIGRAM(S): at 06:11

## 2024-03-24 NOTE — PROGRESS NOTE ADULT - SUBJECTIVE AND OBJECTIVE BOX
KAYLEIGH TOMAS  58y  Male      Patient is a 58y old  Male who presents with a chief complaint of Chest pain (23 Mar 2024 15:31)      INTERVAL HPI/OVERNIGHT EVENTS:      Vital Signs Last 24 Hrs  T(C): 36.4 (24 Mar 2024 05:15), Max: 36.5 (23 Mar 2024 15:49)  T(F): 97.6 (24 Mar 2024 05:15), Max: 97.7 (23 Mar 2024 15:49)  HR: 63 (24 Mar 2024 05:15) (63 - 66)  BP: 89/50 (24 Mar 2024 05:15) (89/50 - 108/64)  BP(mean): 70 (23 Mar 2024 15:49) (70 - 70)  RR: 18 (24 Mar 2024 05:15) (18 - 18)  SpO2: 97% (23 Mar 2024 15:49) (97% - 97%)    Parameters below as of 23 Mar 2024 15:49  Patient On (Oxygen Delivery Method): nasal cannula    03-24-24 @ 07:01  -  03-24-24 @ 09:28  --------------------------------------------------------  IN: 414 mL / OUT: 0 mL / NET: 414 mL    PHYSICAL EXAM:      Consultant(s) Notes Reviewed:  [x ] YES  [ ] NO      LABS                          14.0   9.63  )-----------( 206      ( 22 Mar 2024 21:24 )             42.6     03-23    135  |  95<L>  |  20  ----------------------------<  122<H>  4.4   |  32  |  0.7    Ca    8.7      23 Mar 2024 08:30  Mg     2.2     03-22    TPro  5.7<L>  /  Alb  3.5  /  TBili  0.6  /  DBili  x   /  AST  59<H>  /  ALT  116<H>  /  AlkPhos  129<H>  03-23               KAYLEIGH TOMAS  58y  Male      Patient is a 58y old  Male who presents with a chief complaint of Chest pain (23 Mar 2024 15:31)      INTERVAL HPI/OVERNIGHT EVENTS:      Vital Signs Last 24 Hrs  T(C): 36.4 (24 Mar 2024 05:15), Max: 36.5 (23 Mar 2024 15:49)  T(F): 97.6 (24 Mar 2024 05:15), Max: 97.7 (23 Mar 2024 15:49)  HR: 63 (24 Mar 2024 05:15) (63 - 66)  BP: 89/50 (24 Mar 2024 05:15) (89/50 - 108/64)  BP(mean): 70 (23 Mar 2024 15:49) (70 - 70)  RR: 18 (24 Mar 2024 05:15) (18 - 18)  SpO2: 97% (23 Mar 2024 15:49) (97% - 97%)    Parameters below as of 23 Mar 2024 15:49  Patient On (Oxygen Delivery Method): nasal cannula    03-24-24 @ 07:01  -  03-24-24 @ 09:28  --------------------------------------------------------  IN: 414 mL / OUT: 0 mL / NET: 414 mL    PHYSICAL EXAM:      Consultant(s) Notes Reviewed:  [x ] YES  [ ] NO      LABS                          14.0   9.63  )-----------( 206      ( 22 Mar 2024 21:24 )             42.6     03-23    135  |  95<L>  |  20  ----------------------------<  122<H>  4.4   |  32  |  0.7    Ca    8.7      23 Mar 2024 08:30  Mg     2.2     03-22    TPro  5.7<L>  /  Alb  3.5  /  TBili  0.6  /  DBili  x   /  AST  59<H>  /  ALT  116<H>  /  AlkPhos  129<H>  03-23      ISTOP:    PDI	Current Rx	Drug Type	Rx Written	Rx Dispensed	Drug	Quantity	Days Supply	Prescriber Name	Prescriber SERGIO #	Payment Method	Dispenser  A	Y	O	03/18/2024	03/20/2024	morphine sulf er 100 mg tablet	28	7	Erick Womack MD	[id number]	Medicaid	[facility]  A	N	O	03/11/2024	03/13/2024	morphine sulf er 100 mg tablet	28	7	Erick Womack MD	[id number]	Medicaid	[facility]  A	N	O	03/04/2024	03/06/2024	morphine sulf er 100 mg tablet	28	7	Erick Womack MD	[id number]	Medicaid	[facility]  A	N	O	02/26/2024	02/27/2024	morphine sulf er 100 mg tablet	28	7	Erick Womack MD	[id number]	Medicaid	[facility]  A	Y	B	02/26/2024	02/27/2024	alprazolam 2 mg tablet	120	30	Azeem Delgado	[id number]	Medicaid	[facility]  A	Y		02/26/2024	02/27/2024	zolpidem tartrate 10 mg tablet	30	30	Azeem Delgado	[id number]	Medicaid	[facility]  A	N	O	02/19/2024	02/19/2024	morphine sulf er 100 mg tablet	28	7	Erick Womack MD	[id number]	Medicaid	[facility]  A	N	O	02/14/2024	02/15/2024	oxycodone hcl (ir) 30 mg tab	150	30	Erick Womack MD	[id number]	Medicaid	[facility]           KAYLEIGH TOMAS  58y  Male      Patient is a 58y old  Male who presents with a chief complaint of Chest pain (23 Mar 2024 15:31)      INTERVAL HPI/OVERNIGHT EVENTS: Pt reports chronic pain in multiple joints. He reports being on nracotics including Oxycodone 30mg 5 times a day and morphine sulfate ER 100mg       Vital Signs Last 24 Hrs  T(C): 36.4 (24 Mar 2024 05:15), Max: 36.5 (23 Mar 2024 15:49)  T(F): 97.6 (24 Mar 2024 05:15), Max: 97.7 (23 Mar 2024 15:49)  HR: 63 (24 Mar 2024 05:15) (63 - 66)  BP: 89/50 (24 Mar 2024 05:15) (89/50 - 108/64)  BP(mean): 70 (23 Mar 2024 15:49) (70 - 70)  RR: 18 (24 Mar 2024 05:15) (18 - 18)  SpO2: 97% (23 Mar 2024 15:49) (97% - 97%)    Parameters below as of 23 Mar 2024 15:49  Patient On (Oxygen Delivery Method): nasal cannula    03-24-24 @ 07:01  -  03-24-24 @ 09:28  --------------------------------------------------------  IN: 414 mL / OUT: 0 mL / NET: 414 mL    PHYSICAL EXAM:  GEN: No acute distress, obese  LUNGS: Dec air entry b/l  HEART: S1/S2 present. RRR.   ABD/ GI: Soft, non-tender, distended. Bowel sounds present  EXT: No edema  NEURO: AAOX3    Consultant(s) Notes Reviewed:  [x ] YES  [ ] NO      LABS                          14.0   9.63  )-----------( 206      ( 22 Mar 2024 21:24 )             42.6     03-23    135  |  95<L>  |  20  ----------------------------<  122<H>  4.4   |  32  |  0.7    Ca    8.7      23 Mar 2024 08:30  Mg     2.2     03-22    TPro  5.7<L>  /  Alb  3.5  /  TBili  0.6  /  DBili  x   /  AST  59<H>  /  ALT  116<H>  /  AlkPhos  129<H>  03-23      ISTOP:    PDI	Current Rx	Drug Type	Rx Written	Rx Dispensed	Drug	Quantity	Days Supply	Prescriber Name	Prescriber SERGIO #	Payment Method	Dispenser  A	Y	O	03/18/2024	03/20/2024	morphine sulf er 100 mg tablet	28	7	Erick Womack MD	[id number]	Medicaid	[facility]  A	N	O	03/11/2024	03/13/2024	morphine sulf er 100 mg tablet	28	7	Erick Womack MD	[id number]	Medicaid	[facility]  A	N	O	03/04/2024	03/06/2024	morphine sulf er 100 mg tablet	28	7	Erick Womack MD	[id number]	Medicaid	[facility]  A	N	O	02/26/2024	02/27/2024	morphine sulf er 100 mg tablet	28	7	Erick Womack MD	[id number]	Medicaid	[facility]  A	Y	B	02/26/2024	02/27/2024	alprazolam 2 mg tablet	120	30	Azeem Delgado	[id number]	Medicaid	[facility]  A	Y		02/26/2024	02/27/2024	zolpidem tartrate 10 mg tablet	30	30	Azeem Delgado	[id number]	Medicaid	[facility]  A	N	O	02/19/2024	02/19/2024	morphine sulf er 100 mg tablet	28	7	Erick Womack MD	[id number]	Medicaid	[facility]  A	N	O	02/14/2024	02/15/2024	oxycodone hcl (ir) 30 mg tab	150	30	Erick Womack MD	[id number]	Medicaid	[facility]

## 2024-03-24 NOTE — PROGRESS NOTE ADULT - ASSESSMENT
Assessment and Recommendation:   	  #chest pain- likely MSK in nature with a recent NST less than a year ago neg for ischemia. if troponins remain negative would stop cardiac ischemia workup.   #HO HFpEF (65%)   #HTN   #HLD   - ED, /88, , satting 95% on RA.  - trops 30 (repeat 28). ACS ruled out   - CXR noted    - work up elevated liver enzymes, agree with holding statin   - cw home dose of lasix   - cw aspirin   - cw home meds for HTN.  #COPD- recent admission for exacerbation

## 2024-03-24 NOTE — PROGRESS NOTE ADULT - ASSESSMENT
58-year-old male past medical history colitis, hiatal hernia, OA, GERD, HLD, HTN, COPD on 5 L O2 at home, HFpEF (65%) presents with complaint of chest pain x 3 weeks.      #chest pain suspected MSK  -appears MSK   -trops likely ischemic demand  -echo 11/2023, wnl   -cardio consult : likely MSK in nature with a recent NST less than a year ago neg for ischemia. if troponins remain negative would stop cardiac ischemia workup.   -trop 31-->28   - toradol IV x1   - lido patch   - asa  - DC tele    #COPD exacerbation on 5L at home  #Chronic Hypercapnia   - ABg noted, pt is on RA   - solumedrol 40 mg bid x2 doses, start prednisone 40 mg daily  - azithormycin   - cxr neg for pna, rvp neg   - outpt pulm   - duo nebs and inhalers    #transaminitis- trend, RUQ US     #GERD- ppi  # HLD- statin, fenofibrate  #chronic HFpEF- lasix   #HTN- lisinopril, lasix   #anxiety- continue xanax    #Progress Note Handoff  Pending (specify):  as above  Disposition: pending: home: Anticipate for dc    58-year-old male past medical history colitis, hiatal hernia, OA, GERD, HLD, HTN, COPD on 5 L O2 at home, HFpEF (65%) presents with complaint of chest pain x 3 weeks.      #chest pain suspected MSK  -appears MSK   -trops likely ischemic demand  -echo 11/2023, wnl   -cardio consult : likely MSK in nature with a recent NST less than a year ago neg for ischemia. if troponins remain negative would stop cardiac ischemia workup.   -trop 31-->28 : ACS ruled out  - toradol IV x1   - lido patch   - asa  - DC tele    #COPD exacerbation on 5L at home  #Chronic Hypercapnia   - ABg noted, pt is on RA   - solumedrol 40 mg bid x2 doses, start prednisone 40 mg daily  - azithormycin   - cxr neg for pna, rvp neg   - outpt pulm   - duo nebs and inhalers    #Transaminitis- trend, RUQ US ; Hold statin    #Chronic musculoskeletal pain: Pt asking for narcotics  -Istop reviewed (see above): Pt on Morphine sulfate ER 100mg as well as Oxycodone IR 30mg 5 times a day  -will restart oxycodone at a lower dose. Resume meds as ordered at DC.       #GERD- ppi  # HLD- statin, fenofibrate  #chronic HFpEF- lasix   #HTN- lisinopril, lasix   #anxiety- continue xanax    #Progress Note Handoff  Pending (specify):  as above  Disposition: pending: home: Anticipate for dc   58-year-old male past medical history colitis, hiatal hernia, OA, GERD, HLD, HTN, COPD on 5 L O2 at home, HFpEF (65%) presents with complaint of chest pain x 3 weeks.      #chest pain suspected MSK  -appears MSK   -trops likely ischemic demand  -echo 11/2023, wnl   -cardio consult : likely MSK in nature with a recent NST less than a year ago neg for ischemia. if troponins remain negative would stop cardiac ischemia workup.   -trop 31-->28 : ACS ruled out  - toradol IV x1   - lido patch   - asa  - DC tele    #COPD exacerbation on 5L at home  #Chronic Hypercapnia   - ABg noted, pt is on RA   - solumedrol 40 mg bid x2 doses, start prednisone 40 mg daily x 5 days  - azithromycin   - cxr neg for pna, rvp neg   - outpt pulm   - duo nebs and inhalers    #Transaminitis- trend, RUQ US ; Hold statin    #Chronic musculoskeletal pain: Pt asking for narcotics  -Istop reviewed (see above): Pt on Morphine sulfate ER 100mg as well as Oxycodone IR 30mg 5 times a day  -will restart oxycodone at a lower dose here. Resume home meds as ordered at DC.       #GERD- ppi  # HLD- statin, fenofibrate  #chronic HFpEF- lasix   #HTN- lisinopril, lasix   #anxiety- continue xanax    #Progress Note Handoff  Pending (specify):  as above  Disposition: pending: home: Anticipate for dc

## 2024-03-25 PROCEDURE — 99232 SBSQ HOSP IP/OBS MODERATE 35: CPT

## 2024-03-25 RX ORDER — TIOTROPIUM BROMIDE 18 UG/1
2 CAPSULE ORAL; RESPIRATORY (INHALATION) DAILY
Refills: 0 | Status: DISCONTINUED | OUTPATIENT
Start: 2024-03-25 | End: 2024-03-26

## 2024-03-25 RX ORDER — BUDESONIDE AND FORMOTEROL FUMARATE DIHYDRATE 160; 4.5 UG/1; UG/1
2 AEROSOL RESPIRATORY (INHALATION)
Refills: 0 | Status: DISCONTINUED | OUTPATIENT
Start: 2024-03-25 | End: 2024-03-26

## 2024-03-25 RX ADMIN — OXYCODONE HYDROCHLORIDE 30 MILLIGRAM(S): 5 TABLET ORAL at 01:06

## 2024-03-25 RX ADMIN — ZOLPIDEM TARTRATE 5 MILLIGRAM(S): 10 TABLET ORAL at 21:01

## 2024-03-25 RX ADMIN — Medication 81 MILLIGRAM(S): at 12:32

## 2024-03-25 RX ADMIN — Medication 2 MILLIGRAM(S): at 14:50

## 2024-03-25 RX ADMIN — Medication 3 MILLILITER(S): at 04:56

## 2024-03-25 RX ADMIN — Medication 40 MILLIGRAM(S): at 05:30

## 2024-03-25 RX ADMIN — Medication 2 MILLIGRAM(S): at 21:01

## 2024-03-25 RX ADMIN — OXYCODONE HYDROCHLORIDE 30 MILLIGRAM(S): 5 TABLET ORAL at 01:39

## 2024-03-25 RX ADMIN — OXYCODONE HYDROCHLORIDE 30 MILLIGRAM(S): 5 TABLET ORAL at 12:32

## 2024-03-25 RX ADMIN — LIDOCAINE 1 PATCH: 4 CREAM TOPICAL at 20:45

## 2024-03-25 RX ADMIN — LISINOPRIL 5 MILLIGRAM(S): 2.5 TABLET ORAL at 05:30

## 2024-03-25 RX ADMIN — OXYCODONE HYDROCHLORIDE 30 MILLIGRAM(S): 5 TABLET ORAL at 20:08

## 2024-03-25 RX ADMIN — PANTOPRAZOLE SODIUM 40 MILLIGRAM(S): 20 TABLET, DELAYED RELEASE ORAL at 05:30

## 2024-03-25 RX ADMIN — LIDOCAINE 1 PATCH: 4 CREAM TOPICAL at 12:32

## 2024-03-25 RX ADMIN — Medication 40 MILLIGRAM(S): at 05:31

## 2024-03-25 RX ADMIN — SENNA PLUS 2 TABLET(S): 8.6 TABLET ORAL at 21:01

## 2024-03-25 RX ADMIN — ENOXAPARIN SODIUM 40 MILLIGRAM(S): 100 INJECTION SUBCUTANEOUS at 21:01

## 2024-03-25 RX ADMIN — OXYCODONE HYDROCHLORIDE 30 MILLIGRAM(S): 5 TABLET ORAL at 13:39

## 2024-03-25 RX ADMIN — AZITHROMYCIN 500 MILLIGRAM(S): 500 TABLET, FILM COATED ORAL at 12:32

## 2024-03-25 RX ADMIN — Medication 2 MILLIGRAM(S): at 06:19

## 2024-03-25 NOTE — PROGRESS NOTE ADULT - ASSESSMENT
58-year-old male past medical history colitis, hiatal hernia, OA, GERD, HLD, HTN, COPD on 5 L O2 at home, HFpEF (65%) presents with complaint of chest pain x 3 weeks.      #chest pain likely secondary to msk  #chronic musculoskeletal pain   - appreciated recommendations from: cardiology   - tenderness to palpation   - recent NST less than a year ago neg for ischemia  - trops 30 (repeat 28). ACS ruled out   - echo 11/2023, unremarkable   - Istop reviewed (see above): Pt on Morphine sulfate ER 100mg as well as Oxycodone IR 30mg 5 times a day  PLAN  - lidocaine patch   - aspirin 81mg daily   - restarted oxycodone at lower dose - oxycodone 30mg three times daily prn  - to consider resuming home meds as ordered at DC    #COPD exacerbation on 5L at home  #Chronic Hypercapnia   - pending recommendations from: pulm   - ABg noted, pt is on RA   - cxr neg for pneumonia  - rvp neg   PLAN  - s/p solumedrol 40 mg bid x2 doses  - azithromycin   - albuterol 90mcg 2 puffs q6h PRN   - symbicort   - spiriva   - prednisone 40mg daily for 5 days 3/24-28  - duoneb 3mL every 6 hours PRN     #Transaminitis  #DLD  - RUQ US Suboptimal exam. No definite hepatic mass or biliary ductal dilatation.  PLAN  - hold statin  - monitor     #HFpEF   #HTN  - furosemide 40mg daily  - lisinopril 5mg daily    #GERD  - pantoprazole 40qd    #insomnia  #anxiety  - xanax 2mg q6h prn   - zolpidem 5mg nightly prn     anticipate discharge when wheezing improved

## 2024-03-25 NOTE — PROGRESS NOTE ADULT - SUBJECTIVE AND OBJECTIVE BOX
Interval events:    Patient is a 58y old Male who presents with a chief complaint of Chest pain (25 Mar 2024 11:43)    Primary diagnosis of Chest pain    Today is hospital day 2d  This morning patient was seen and examined at bedside  The following symptoms/complaints/issues (or lack there of) are of note since last evaluation (if any):  - comfortable on 5L NC    Code Status:  see end of document    Family communication:  Contact date:  Name of person contacted:  Relationship to patient:  Communication details:  What matters most:    PAST MEDICAL & SURGICAL HISTORY  HTN (hypertension)    High cholesterol    GERD (gastroesophageal reflux disease)    Morbid obesity    Osteoarthritis    Hiatal hernia  GERD    Colitis  LARGE INTESTINE   NO RECENT FLARES    COPD (chronic obstructive pulmonary disease)    REBECCA treated with BiPAP    History of cholecystectomy    History of appendectomy    History of knee replacement procedure of right knee  BILATERAL    Hernia, inguinal, bilateral  3 months old    H/O knee surgery  arthoscopic x4    H/O foot surgery  right big toe surgery      SOCIAL HISTORY:  Social History:      ALLERGIES:  No Known Allergies    MEDICATIONS:  STANDING MEDICATIONS  aspirin enteric coated 81 milliGRAM(s) Oral daily  azithromycin   Tablet 500 milliGRAM(s) Oral daily  budesonide 160 MICROgram(s)/formoterol 4.5 MICROgram(s) Inhaler 2 Puff(s) Inhalation two times a day  enoxaparin Injectable 40 milliGRAM(s) SubCutaneous every 24 hours  furosemide    Tablet 40 milliGRAM(s) Oral daily  lidocaine   4% Patch 1 Patch Transdermal daily  lisinopril 5 milliGRAM(s) Oral daily  pantoprazole    Tablet 40 milliGRAM(s) Oral before breakfast  predniSONE   Tablet 40 milliGRAM(s) Oral daily  senna 2 Tablet(s) Oral at bedtime  tiotropium 2.5 MICROgram(s) Inhaler 2 Puff(s) Inhalation daily    PRN MEDICATIONS  albuterol    90 MICROgram(s) HFA Inhaler 2 Puff(s) Inhalation every 6 hours PRN  albuterol/ipratropium for Nebulization.. 3 milliLiter(s) Nebulizer every 20 minutes PRN  ALPRAZolam 2 milliGRAM(s) Oral every 6 hours PRN  oxyCODONE    IR 30 milliGRAM(s) Oral every 8 hours PRN  zolpidem 5 milliGRAM(s) Oral at bedtime PRN    VITALS:   T(F): 97.6  HR: 82  BP: 92/60  RR: 17  SpO2: 97%    PHYSICAL EXAM:  GENERAL:   ( x ) NAD, lying in bed comfortably     (  ) obtunded     (  ) lethargic     (  ) somnolent    HEAD:   ( x ) Atraumatic     (  ) hematoma     (  ) laceration (specify location:       )     NECK:  ( x ) Supple     (  ) neck stiffness     (  ) nuchal rigidity     (  )  no JVD     (  ) JVD present ( -- cm)    CHEST:  (  ) Chest wall tenderness (specify location:       )    HEART:  Rate -->     ( x ) normal rate     (  ) bradycardic     (  ) tachycardic  Rhythm -->     ( x ) regular     (  ) regularly irregular     (  ) irregularly irregular  Murmurs -->     ( x ) normal s1s2     (  ) systolic murmur     (  ) diastolic murmur     (  ) continuous murmur      (  ) S3 present     (  ) S4 present    LUNG:   ( x ) Unlabored respirations     (  ) tachypnea  ( x ) B/L air entry     (  ) decreased breath sounds in:  (location     )    (  ) no adventitious sound     (  ) crackles     ( x ) wheezing bilateral     (  ) rhonchi      (specify location:       )    ABDOMEN:   ( x ) Soft     (  ) tense   |   ( x ) nondistended     (  ) distended   |   ( x ) +BS     (  ) hypoactive bowel sounds     (  ) hyperactive bowel sounds  ( x ) nontender     (  ) RUQ tenderness     (  ) RLQ tenderness     (  ) LLQ tenderness     (  ) epigastric tenderness     (  ) diffuse tenderness  (  ) Splenomegaly      (  ) Hepatomegaly      (  ) Jaundice     (  ) ecchymosis     EXTREMITIES:   (  ) Cyanosis    (  ) varicose veins    (  ) clubbing    (  ) gangrene:     (location:     )  (  ) pitting edema     (  ) non-pitting edema         SKIN:   (  ) rash:     (  ) pustules     (  ) vesicles     (  ) ulcer     (  ) ecchymosis     (specify location:     )    NERVOUS SYSTEM:    ( x ) A&Ox3     (  ) confused     (  ) lethargic  CN II-XII:     (  ) grossly intact     (  ) deficits found     (Specify:     )   Upper extremities:     (  ) no sensorimotor deficits     (  ) weakness     (  ) loss of proprioception/vibration     (  ) loss of touch/temperature (specify:    )  Lower extremities:     (  ) no sensorimotor deficits     (  ) weakness     (  ) loss of proprioception/vibration     (  ) loss of touch/temperature (specify:    )    LABS:                          ECHO, ENDO, EEG, RAD:    < from: TTE Echo Complete w/ Contrast w/ Doppler (11.12.23 @ 10:26) >     1. Normal global left ventricular systolic function with a biplane EF of   65%. Normal diastolic function. With infusion of echocontrast there are   no wall motion abnormalities or left ventricular thrombus.   2. Normal right ventricular size and function.   3. Normal left atrial size.   4. No hemodynamically significant valvular disease.   5. No echocardiographic evidence of pulmonary hypertension.   6. Dilatation of the ascending aorta (4.3cm, borderline when indexed to   1.76cm/m2).    < end of copied text >    < from: Xray Chest 1 View- PORTABLE-Urgent (03.22.24 @ 22:20) >    Findings:    Support devices: None.    Cardiac/mediastinum/hilum: Unremarkable.    Lung parenchyma/Pleura: Within normal limits.    Skeleton/soft tissues: Unremarkable.    Impression:    No radiographic evidence of acute cardiopulmonary disease.    < end of copied text >    < from: US Abdomen Upper Quadrant Right (03.23.24 @ 18:12) >    FINDINGS:    The examination is suboptimal secondary to patient's positioning and body   habitus.  Liver: No definite hepatic mass.  Bile ducts: Normal caliber. Common bile duct measures 6 mm.  Gallbladder: Cholecystectomy.  Pancreas: Poorly visualized.  Right kidney: 11.4 cm. No hydronephrosis.  Ascites: None.  IVC: Visualized portions are within normal limits.      IMPRESSION:      Suboptimal exam    No definite hepatic mass or biliary ductal dilatation.    < end of copied text >

## 2024-03-25 NOTE — PROGRESS NOTE ADULT - SUBJECTIVE AND OBJECTIVE BOX
AKYLEIGH TOMAS  58y Male    CHIEF COMPLAINT:    Patient is a 58y old  Male who presents with a chief complaint of Chest pain (25 Mar 2024 11:43)      INTERVAL HPI/OVERNIGHT EVENTS:    Patient seen and examined.    ROS: All other systems are negative.    Vital Signs:    T(F): 97.6 (24 @ 12:15), Max: 97.6 (24 @ 12:15)  HR: 82 (24 @ 12:15) (82 - 97)  BP: 92/60 (24 @ 12:15) (92/60 - 127/77)  RR: 17 (24 @ 12:15) (17 - 18)  SpO2: 97% (24 @ 05:44) (94% - 97%)  I&O's Summary    24 Mar 2024 07:01  -  25 Mar 2024 07:00  --------------------------------------------------------  IN: 1657 mL / OUT: 740 mL / NET: 917 mL    25 Mar 2024 07:01  -  25 Mar 2024 13:50  --------------------------------------------------------  IN: 414 mL / OUT: 800 mL / NET: -386 mL      Daily     Daily Weight in k.6 (25 Mar 2024 05:20)  CAPILLARY BLOOD GLUCOSE          PHYSICAL EXAM:    GENERAL:  NAD  SKIN: No rashes or lesions  HENT: Atraumatic. Normocephalic. PERRL. Moist membranes.  NECK: Supple, No JVD. No lymphadenopathy.  PULMONARY: CTA B/L. No wheezing. No rales  CVS: Normal S1, S2. Rate and Rhythm are regular. No murmurs.  ABDOMEN/GI: Soft, Nontender, Nondistended; BS present  EXTREMITIES: Peripheral pulses intact. No edema B/L LE.  NEUROLOGIC:  No motor or sensory deficit.  PSYCH: Alert & oriented x 3    Consultant(s) Notes Reviewed:  [x ] YES  [ ] NO  Care Discussed with Consultants/Other Providers [ x] YES  [ ] NO    EKG reviewed  Telemetry reviewed    LABS:                    RADIOLOGY & ADDITIONAL TESTS:    < from: CT Angio Chest PE Protocol w/ IV Cont (24 @ 03:04) >    IMPRESSION:    1.  No central or proximal segmental pulmonary emboli.  2.  Bilateral multi lobar opacities, possibly atelectasis although   superimposed infectious process is considered in the appropriate clinical   setting.    < end of copied text >  < from: Xray Chest 1 View- PORTABLE-Urgent (24 @ 22:20) >  Impression:    No radiographic evidence of acute cardiopulmonary disease.    < end of copied text >  < from: US Abdomen Upper Quadrant Right (24 @ 18:12) >    IMPRESSION:      Suboptimal exam    No definite hepatic mass or biliary ductal dilatation.    < end of copied text >    Imaging or report Personally Reviewed:  [x ] YES  [ ] NO    Medications:  Standing  aspirin enteric coated 81 milliGRAM(s) Oral daily  azithromycin   Tablet 500 milliGRAM(s) Oral daily  budesonide 160 MICROgram(s)/formoterol 4.5 MICROgram(s) Inhaler 2 Puff(s) Inhalation two times a day  enoxaparin Injectable 40 milliGRAM(s) SubCutaneous every 24 hours  furosemide    Tablet 40 milliGRAM(s) Oral daily  lidocaine   4% Patch 1 Patch Transdermal daily  lisinopril 5 milliGRAM(s) Oral daily  pantoprazole    Tablet 40 milliGRAM(s) Oral before breakfast  predniSONE   Tablet 40 milliGRAM(s) Oral daily  senna 2 Tablet(s) Oral at bedtime  tiotropium 2.5 MICROgram(s) Inhaler 2 Puff(s) Inhalation daily    PRN Meds  albuterol    90 MICROgram(s) HFA Inhaler 2 Puff(s) Inhalation every 6 hours PRN  albuterol/ipratropium for Nebulization.. 3 milliLiter(s) Nebulizer every 20 minutes PRN  ALPRAZolam 2 milliGRAM(s) Oral every 6 hours PRN  oxyCODONE    IR 30 milliGRAM(s) Oral every 8 hours PRN  zolpidem 5 milliGRAM(s) Oral at bedtime PRN      Case discussed with resident    Care discussed with pt/family           GENOVEVAKAYLEIGH  58y Male    CHIEF COMPLAINT:    Patient is a 58y old  Male who presents with a chief complaint of Chest pain (25 Mar 2024 11:43)      INTERVAL HPI/OVERNIGHT EVENTS:    Patient seen and examined. Pt states he came back because he had cp and sob. No cp now. Having sob. No fever.     ROS: All other systems are negative.    Vital Signs:    T(F): 97.6 (24 @ 12:15), Max: 97.6 (24 @ 12:15)  HR: 82 (24 @ 12:15) (82 - 97)  BP: 92/60 (24 @ 12:15) (92/60 - 127/77)  RR: 17 (24 @ 12:15) (17 - 18)  SpO2: 97% (24 @ 05:44) (94% - 97%)  I&O's Summary    24 Mar 2024 07:  -  25 Mar 2024 07:00  --------------------------------------------------------  IN: 1657 mL / OUT: 740 mL / NET: 917 mL    25 Mar 2024 07:01  -  25 Mar 2024 13:50  --------------------------------------------------------  IN: 414 mL / OUT: 800 mL / NET: -386 mL      Daily     Daily Weight in k.6 (25 Mar 2024 05:20)  CAPILLARY BLOOD GLUCOSE          PHYSICAL EXAM:    GENERAL:  NAD  SKIN: No rashes or lesions  HENT: Atraumatic. Normocephalic. PERRL. Moist membranes.  NECK: Supple, No JVD. No lymphadenopathy.  PULMONARY: +ve wheezing B/L. No rales  CVS: Normal S1, S2. Rate and Rhythm are regular. No murmurs.  ABDOMEN/GI: Soft, Nontender, Nondistended; BS present  EXTREMITIES: Peripheral pulses intact. No edema B/L LE.  NEUROLOGIC:  No motor or sensory deficit.  PSYCH: Alert & oriented x 3    Consultant(s) Notes Reviewed:  [x ] YES  [ ] NO  Care Discussed with Consultants/Other Providers [ x] YES  [ ] NO    EKG reviewed  Telemetry reviewed    LABS:                    RADIOLOGY & ADDITIONAL TESTS:    < from: CT Angio Chest PE Protocol w/ IV Cont (24 @ 03:04) >    IMPRESSION:    1.  No central or proximal segmental pulmonary emboli.  2.  Bilateral multi lobar opacities, possibly atelectasis although   superimposed infectious process is considered in the appropriate clinical   setting.    < end of copied text >  < from: Xray Chest 1 View- PORTABLE-Urgent (24 @ 22:20) >  Impression:    No radiographic evidence of acute cardiopulmonary disease.    < end of copied text >  < from: US Abdomen Upper Quadrant Right (24 @ 18:12) >    IMPRESSION:      Suboptimal exam    No definite hepatic mass or biliary ductal dilatation.    < end of copied text >    Imaging or report Personally Reviewed:  [x ] YES  [ ] NO    Medications:  Standing  aspirin enteric coated 81 milliGRAM(s) Oral daily  azithromycin   Tablet 500 milliGRAM(s) Oral daily  budesonide 160 MICROgram(s)/formoterol 4.5 MICROgram(s) Inhaler 2 Puff(s) Inhalation two times a day  enoxaparin Injectable 40 milliGRAM(s) SubCutaneous every 24 hours  furosemide    Tablet 40 milliGRAM(s) Oral daily  lidocaine   4% Patch 1 Patch Transdermal daily  lisinopril 5 milliGRAM(s) Oral daily  pantoprazole    Tablet 40 milliGRAM(s) Oral before breakfast  predniSONE   Tablet 40 milliGRAM(s) Oral daily  senna 2 Tablet(s) Oral at bedtime  tiotropium 2.5 MICROgram(s) Inhaler 2 Puff(s) Inhalation daily    PRN Meds  albuterol    90 MICROgram(s) HFA Inhaler 2 Puff(s) Inhalation every 6 hours PRN  albuterol/ipratropium for Nebulization.. 3 milliLiter(s) Nebulizer every 20 minutes PRN  ALPRAZolam 2 milliGRAM(s) Oral every 6 hours PRN  oxyCODONE    IR 30 milliGRAM(s) Oral every 8 hours PRN  zolpidem 5 milliGRAM(s) Oral at bedtime PRN      Case discussed with resident    Care discussed with pt/family

## 2024-03-25 NOTE — PHARMACOTHERAPY INTERVENTION NOTE - COMMENTS
Patient recently here with COPD exacerbation, was sent home on maintenance inhalers of Symbicort + Spiriva. If patient is stable recommend to restart maintenance inhalers.

## 2024-03-25 NOTE — CONSULT NOTE ADULT - SUBJECTIVE AND OBJECTIVE BOX
Pulmonary Consult    Patient is a 58y old  Male who presents with a chief complaint of Chest pain (24 Mar 2024 13:50)      HPI:   58-year-old male past medical history colitis, hiatal hernia, OA, GERD, HLD, HTN, COPD on 5 L O2 at home, HFpEF (65%) presents with complaint of chest pain x 3 weeks. Per patient the pain is in the midchest, nonradiating, sharp in nature, aggravated with coughing/deep breaths/palpation. Denies fever/chills, cough, runny nose, sore throat, sneezing, shortness of breath, abdominal pain, nausea/vomiting/diarrhea, change in bowel/bladder habits, lightheadedness, dizziness, focal numbness/weakness. Patient was recently admitted for COPD exacerbation and discharged on the 20th of March. had NST in July 2023 and was negative for ischemia.     In the ED, /88, , satting 95% on RA. Labs showed trops 30 (repeat 28), BNP 90, LFTs (AST 59//). VBG po2 22, pco2 70. RVP negative. patient was given prednisone 20, azithro 500 PO, duonebs, 100 metoprolol tartrate and admitted to medicine.     pulm consulted for readmission for COPDE     (23 Mar 2024 10:42)      PAST MEDICAL & SURGICAL HISTORY:  HTN (hypertension)      High cholesterol      GERD (gastroesophageal reflux disease)      Morbid obesity      Osteoarthritis      Hiatal hernia  GERD      Colitis  LARGE INTESTINE   NO RECENT FLARES      COPD (chronic obstructive pulmonary disease)      REBECCA treated with BiPAP      History of cholecystectomy      History of appendectomy      History of knee replacement procedure of right knee  BILATERAL      Hernia, inguinal, bilateral  3 months old      H/O knee surgery  arthoscopic x4      H/O foot surgery  right big toe surgery        FAMILY HISTORY:  FH: lung cancer (Mother)  mother    FH: diabetes mellitus      Social History:    Allergies    No Known Allergies    Intolerances      REVIEW OF SYSTEMS:  Constitutional: No fevers or chills or weight loss.   Eyes: No itching or discharge from the eyes  ENT:  No post nasal drip. No epistaxis. No throat pain. . No difficulty swallowing.   CV: No chest pain. No palpitations.  or dizziness.   Resp: No sob or cough or wheezing or hemoptysis or pleuritic chest pain   GI: No nausea. No vomiting. No diarrhea or abdominal pain   MSK: No joint pain or pain in any extremities  Integumentary: No skin lesions. No pedal edema.  Neurological: No gross motor weakness. No sensory changes.    PHYSICAL EXAM:  Vital Signs Last 24 Hrs  T(C): 36.1 (25 Mar 2024 05:20), Max: 36.2 (24 Mar 2024 19:57)  T(F): 97 (25 Mar 2024 05:20), Max: 97.2 (24 Mar 2024 19:57)  HR: 93 (25 Mar 2024 05:20) (80 - 97)  BP: 114/59 (25 Mar 2024 05:20) (114/59 - 127/77)  BP(mean): --  RR: 18 (25 Mar 2024 05:20) (17 - 18)  SpO2: 97% (25 Mar 2024 05:44) (94% - 97%)    Parameters below as of 24 Mar 2024 19:38  Patient On (Oxygen Delivery Method): nasal cannula  O2 Flow (L/min): 5    General: Awake, alert, oriented X 3.   HEENT: Atraumatic, normocephalic.   Neck: No JVD no lymphadenopathy   Respiratory: normal vesicular breathing with no wheeze or rales on the exam .  Cardiovascular: S1 S2 normal. No murmurs.   Abdomen: Soft, non-tender, non-distended. No organomegaly.  Extremities: No edema of calf tenderness   Skin: No rashes or skin lesions  Neurological: moving all the extremties with out weakness       HOSPITAL MEDICATIONS:  MEDICATIONS  (STANDING):  aspirin enteric coated 81 milliGRAM(s) Oral daily  azithromycin   Tablet 500 milliGRAM(s) Oral daily  budesonide 160 MICROgram(s)/formoterol 4.5 MICROgram(s) Inhaler 2 Puff(s) Inhalation two times a day  enoxaparin Injectable 40 milliGRAM(s) SubCutaneous every 24 hours  furosemide    Tablet 40 milliGRAM(s) Oral daily  lidocaine   4% Patch 1 Patch Transdermal daily  lisinopril 5 milliGRAM(s) Oral daily  pantoprazole    Tablet 40 milliGRAM(s) Oral before breakfast  predniSONE   Tablet 40 milliGRAM(s) Oral daily  senna 2 Tablet(s) Oral at bedtime  tiotropium 2.5 MICROgram(s) Inhaler 2 Puff(s) Inhalation daily    MEDICATIONS  (PRN):  albuterol    90 MICROgram(s) HFA Inhaler 2 Puff(s) Inhalation every 6 hours PRN for shortness of breath and/or wheezing  albuterol/ipratropium for Nebulization.. 3 milliLiter(s) Nebulizer every 20 minutes PRN Shortness of Breath and/or Wheezing  ALPRAZolam 2 milliGRAM(s) Oral every 6 hours PRN anxiety  oxyCODONE    IR 30 milliGRAM(s) Oral every 8 hours PRN Severe Pain (7 - 10)  zolpidem 5 milliGRAM(s) Oral at bedtime PRN Insomnia      LABS:                       Pulmonary Consult    Patient is a 58y old  Male who presents with a chief complaint of Chest pain (24 Mar 2024 13:50)      HPI:   58-year-old male past medical history colitis, hiatal hernia, OA, GERD, HLD, HTN, COPD on 5 L O2 at home, HFpEF (65%) presents with complaint of chest pain x 3 weeks. Per patient the pain is in the midchest, nonradiating, sharp in nature, aggravated with coughing/deep breaths/palpation. Denies fever/chills, cough, runny nose, sore throat, sneezing, shortness of breath, abdominal pain, nausea/vomiting/diarrhea, change in bowel/bladder habits, lightheadedness, dizziness, focal numbness/weakness. Patient was recently admitted for COPD exacerbation and discharged on the 20th of March. had NST in July 2023 and was negative for ischemia.     In the ED, /88, , satting 95% on RA. Labs showed trops 30 (repeat 28), BNP 90, LFTs (AST 59//). VBG po2 22, pco2 70. RVP negative. patient was given prednisone 20, azithro 500 PO, duonebs, 100 metoprolol tartrate and admitted to medicine.     pulm consulted for readmission for COPDE  at encounter pt sleeping in bed.  Co SOB and increased cough over the past week with whitish sputum.  Says his BiPAP at home is not working, and that he needs a new sleep study before he can get a new one.  OP pulm: Dr. Brooks, says he is working on getting him a BiPAP     (23 Mar 2024 10:42)      PAST MEDICAL & SURGICAL HISTORY:  HTN (hypertension)      High cholesterol      GERD (gastroesophageal reflux disease)      Morbid obesity      Osteoarthritis      Hiatal hernia  GERD      Colitis  LARGE INTESTINE   NO RECENT FLARES      COPD (chronic obstructive pulmonary disease)      REBECCA treated with BiPAP      History of cholecystectomy      History of appendectomy      History of knee replacement procedure of right knee  BILATERAL      Hernia, inguinal, bilateral  3 months old      H/O knee surgery  arthoscopic x4      H/O foot surgery  right big toe surgery        FAMILY HISTORY:  FH: lung cancer (Mother)  mother    FH: diabetes mellitus      Social History:    Allergies    No Known Allergies    Intolerances      REVIEW OF SYSTEMS:  Constitutional: No fevers or chills or weight loss.   Eyes: No itching or discharge from the eyes  ENT:  No post nasal drip. No epistaxis. No throat pain. . No difficulty swallowing.   CV: No chest pain. No palpitations.  or dizziness.   Resp: No sob or cough or wheezing or hemoptysis or pleuritic chest pain   GI: No nausea. No vomiting. No diarrhea or abdominal pain   MSK: No joint pain or pain in any extremities  Integumentary: No skin lesions. No pedal edema.  Neurological: No gross motor weakness. No sensory changes.    PHYSICAL EXAM:  Vital Signs Last 24 Hrs  T(C): 36.1 (25 Mar 2024 05:20), Max: 36.2 (24 Mar 2024 19:57)  T(F): 97 (25 Mar 2024 05:20), Max: 97.2 (24 Mar 2024 19:57)  HR: 93 (25 Mar 2024 05:20) (80 - 97)  BP: 114/59 (25 Mar 2024 05:20) (114/59 - 127/77)  BP(mean): --  RR: 18 (25 Mar 2024 05:20) (17 - 18)  SpO2: 97% (25 Mar 2024 05:44) (94% - 97%)    Parameters below as of 24 Mar 2024 19:38  Patient On (Oxygen Delivery Method): nasal cannula  O2 Flow (L/min): 5    General: Awake, alert, oriented X 3.   HEENT: Atraumatic, normocephalic.   Neck: No JVD no lymphadenopathy   Respiratory: normal vesicular breathing with no wheeze or rales on the exam .  Cardiovascular: S1 S2 normal. No murmurs.   Abdomen: Soft, non-tender, non-distended. No organomegaly.  Extremities: No edema of calf tenderness   Skin: No rashes or skin lesions  Neurological: moving all the extremties with out weakness       HOSPITAL MEDICATIONS:  MEDICATIONS  (STANDING):  aspirin enteric coated 81 milliGRAM(s) Oral daily  azithromycin   Tablet 500 milliGRAM(s) Oral daily  budesonide 160 MICROgram(s)/formoterol 4.5 MICROgram(s) Inhaler 2 Puff(s) Inhalation two times a day  enoxaparin Injectable 40 milliGRAM(s) SubCutaneous every 24 hours  furosemide    Tablet 40 milliGRAM(s) Oral daily  lidocaine   4% Patch 1 Patch Transdermal daily  lisinopril 5 milliGRAM(s) Oral daily  pantoprazole    Tablet 40 milliGRAM(s) Oral before breakfast  predniSONE   Tablet 40 milliGRAM(s) Oral daily  senna 2 Tablet(s) Oral at bedtime  tiotropium 2.5 MICROgram(s) Inhaler 2 Puff(s) Inhalation daily    MEDICATIONS  (PRN):  albuterol    90 MICROgram(s) HFA Inhaler 2 Puff(s) Inhalation every 6 hours PRN for shortness of breath and/or wheezing  albuterol/ipratropium for Nebulization.. 3 milliLiter(s) Nebulizer every 20 minutes PRN Shortness of Breath and/or Wheezing  ALPRAZolam 2 milliGRAM(s) Oral every 6 hours PRN anxiety  oxyCODONE    IR 30 milliGRAM(s) Oral every 8 hours PRN Severe Pain (7 - 10)  zolpidem 5 milliGRAM(s) Oral at bedtime PRN Insomnia      LABS:

## 2024-03-25 NOTE — CONSULT NOTE ADULT - ASSESSMENT
58-year-old male past medical history colitis, hiatal hernia, OA, GERD, HLD, HTN, COPD on 5 L O2 at home, HFpEF (65%) presents with complaint of chest pain x 3 weeks.  Chest pain MSK per cardio. Pulm consulted for repeat COPDE    #recurrent COPDE  #REBECCA on CPAP  #HFpEF    - Pt already on max dose of inhalers  - prednisone taper  - OP pulm fu   58-year-old male past medical history colitis, hiatal hernia, OA, GERD, HLD, HTN, COPD on 5 L O2 at home, HFpEF (65%) presents with complaint of chest pain x 3 weeks.  Chest pain MSK per cardio. Pulm consulted for repeat COPDE.    #recurrent COPDE  #REBECCA on BiPAP, home bipap not working  #HFpEF  #former smoker. has not smoked in the last 24d    - Pt already on max dose of inhalers  - prednisone taper  -   - OP pulm fu for repeat sleep study, BiPAP, repeat PFTs    ###incomplete note###     58-year-old male past medical history colitis, hiatal hernia, OA, GERD, HLD, HTN, COPD on 5 L O2 at home, HFpEF (65%) presents with complaint of chest pain x 3 weeks.  Chest pain MSK per cardio. Pulm consulted for repeat COPDE.    #recurrent COPDE  #REBECCA on BiPAP, home bipap not working  #HFpEF  #former smoker. has not smoked in the last 24d    - Pt already on max dose of inhalers  - duonebs  - prednisone taper  -   - OP pulm fu for repeat sleep study, BiPAP, repeat PFTs    ###incomplete note###

## 2024-03-25 NOTE — PROGRESS NOTE ADULT - ASSESSMENT
58-year-old male past medical history colitis, hiatal hernia, OA, GERD, HLD, HTN, COPD on 5 L O2 at home, HFpEF (65%) presents with complaint of chest pain x 3 weeks.    CP likely musculoskeletal  COPD on home O2 5L NC  HTN / DL  Chronic HFpEF             PLAN:    ·	Tele reviewed.   ·	Troponin: 30-->28  ·	EKG on admission: NSR 65/min. LVH (Interpreted by me)  ·	Old record reviewed. Pt had nuclear stress done on July 24th 2023 which was negative for ischemia.  ·	Cardiology eval noted. No further cardiac w/u 58-year-old male past medical history colitis, hiatal hernia, OA, GERD, HLD, HTN, COPD on 5 L O2 at home, HFpEF (65%) presents with complaint of chest pain x 3 weeks.    CP likely musculoskeletal  COPD on home O2 5L NC  HTN / DL  Chronic HFpEF             PLAN:    ·	Tele reviewed.   ·	Troponin: 30-->28  ·	EKG on admission: NSR 65/min. LVH (Interpreted by me)  ·	Old record reviewed. Pt had nuclear stress done on July 24th 2023 which was negative for ischemia.  ·	Cardiology eval noted. No further cardiac w/u  ·	Pt had CTA chest done on 3/19 which was negative for PE  ·	CXR on admission was unremarkable  ·	Cont Nebs, and Zithromax   ·	Restarted Symbicort and Spiriva.   ·	Cont Prednisone 40 mg po daily  ·	Oxycodone PRN for CP      Progress Note Handoff    Pending (specify):  Consults__Pulmonary_______, Tests________, Test Results_______, Other_________  Family discussion:  Disposition: Home___/SNF___/Other________/Unknown at this time________    Rudi Aguilar MD  Spectra: [id number]

## 2024-03-26 ENCOUNTER — EMERGENCY (EMERGENCY)
Facility: HOSPITAL | Age: 59
LOS: 0 days | Discharge: LEFT BEFORE DISCHARGE | End: 2024-03-26
Attending: EMERGENCY MEDICINE
Payer: MEDICAID

## 2024-03-26 VITALS
HEART RATE: 130 BPM | HEIGHT: 69 IN | SYSTOLIC BLOOD PRESSURE: 122 MMHG | TEMPERATURE: 97 F | RESPIRATION RATE: 14 BRPM | OXYGEN SATURATION: 98 % | DIASTOLIC BLOOD PRESSURE: 73 MMHG | WEIGHT: 274.92 LBS

## 2024-03-26 VITALS
RESPIRATION RATE: 18 BRPM | SYSTOLIC BLOOD PRESSURE: 99 MMHG | TEMPERATURE: 97 F | OXYGEN SATURATION: 96 % | HEART RATE: 72 BPM | DIASTOLIC BLOOD PRESSURE: 56 MMHG

## 2024-03-26 DIAGNOSIS — I10 ESSENTIAL (PRIMARY) HYPERTENSION: ICD-10-CM

## 2024-03-26 DIAGNOSIS — Z91.198 PATIENT'S NONCOMPLIANCE WITH OTHER MEDICAL TREATMENT AND REGIMEN FOR OTHER REASON: ICD-10-CM

## 2024-03-26 DIAGNOSIS — Z79.82 LONG TERM (CURRENT) USE OF ASPIRIN: ICD-10-CM

## 2024-03-26 DIAGNOSIS — J44.9 CHRONIC OBSTRUCTIVE PULMONARY DISEASE, UNSPECIFIED: ICD-10-CM

## 2024-03-26 DIAGNOSIS — I11.0 HYPERTENSIVE HEART DISEASE WITH HEART FAILURE: ICD-10-CM

## 2024-03-26 DIAGNOSIS — D72.829 ELEVATED WHITE BLOOD CELL COUNT, UNSPECIFIED: ICD-10-CM

## 2024-03-26 DIAGNOSIS — F17.200 NICOTINE DEPENDENCE, UNSPECIFIED, UNCOMPLICATED: ICD-10-CM

## 2024-03-26 DIAGNOSIS — Z79.51 LONG TERM (CURRENT) USE OF INHALED STEROIDS: ICD-10-CM

## 2024-03-26 DIAGNOSIS — K21.9 GASTRO-ESOPHAGEAL REFLUX DISEASE WITHOUT ESOPHAGITIS: ICD-10-CM

## 2024-03-26 DIAGNOSIS — Z11.52 ENCOUNTER FOR SCREENING FOR COVID-19: ICD-10-CM

## 2024-03-26 DIAGNOSIS — E66.01 MORBID (SEVERE) OBESITY DUE TO EXCESS CALORIES: ICD-10-CM

## 2024-03-26 DIAGNOSIS — G47.00 INSOMNIA, UNSPECIFIED: ICD-10-CM

## 2024-03-26 DIAGNOSIS — E78.00 PURE HYPERCHOLESTEROLEMIA, UNSPECIFIED: ICD-10-CM

## 2024-03-26 DIAGNOSIS — Z90.49 ACQUIRED ABSENCE OF OTHER SPECIFIED PARTS OF DIGESTIVE TRACT: ICD-10-CM

## 2024-03-26 DIAGNOSIS — Z79.52 LONG TERM (CURRENT) USE OF SYSTEMIC STEROIDS: ICD-10-CM

## 2024-03-26 DIAGNOSIS — F11.90 OPIOID USE, UNSPECIFIED, UNCOMPLICATED: ICD-10-CM

## 2024-03-26 DIAGNOSIS — M25.569 PAIN IN UNSPECIFIED KNEE: ICD-10-CM

## 2024-03-26 DIAGNOSIS — K40.20 BILATERAL INGUINAL HERNIA, WITHOUT OBSTRUCTION OR GANGRENE, NOT SPECIFIED AS RECURRENT: Chronic | ICD-10-CM

## 2024-03-26 DIAGNOSIS — J96.02 ACUTE RESPIRATORY FAILURE WITH HYPERCAPNIA: ICD-10-CM

## 2024-03-26 DIAGNOSIS — J44.1 CHRONIC OBSTRUCTIVE PULMONARY DISEASE WITH (ACUTE) EXACERBATION: ICD-10-CM

## 2024-03-26 DIAGNOSIS — Z98.890 OTHER SPECIFIED POSTPROCEDURAL STATES: Chronic | ICD-10-CM

## 2024-03-26 DIAGNOSIS — G47.33 OBSTRUCTIVE SLEEP APNEA (ADULT) (PEDIATRIC): ICD-10-CM

## 2024-03-26 DIAGNOSIS — Z53.29 PROCEDURE AND TREATMENT NOT CARRIED OUT BECAUSE OF PATIENT'S DECISION FOR OTHER REASONS: ICD-10-CM

## 2024-03-26 DIAGNOSIS — F17.210 NICOTINE DEPENDENCE, CIGARETTES, UNCOMPLICATED: ICD-10-CM

## 2024-03-26 DIAGNOSIS — E78.5 HYPERLIPIDEMIA, UNSPECIFIED: ICD-10-CM

## 2024-03-26 DIAGNOSIS — Z96.653 PRESENCE OF ARTIFICIAL KNEE JOINT, BILATERAL: ICD-10-CM

## 2024-03-26 DIAGNOSIS — J96.01 ACUTE RESPIRATORY FAILURE WITH HYPOXIA: ICD-10-CM

## 2024-03-26 DIAGNOSIS — R07.89 OTHER CHEST PAIN: ICD-10-CM

## 2024-03-26 DIAGNOSIS — F41.1 GENERALIZED ANXIETY DISORDER: ICD-10-CM

## 2024-03-26 DIAGNOSIS — Z79.899 OTHER LONG TERM (CURRENT) DRUG THERAPY: ICD-10-CM

## 2024-03-26 DIAGNOSIS — Z79.2 LONG TERM (CURRENT) USE OF ANTIBIOTICS: ICD-10-CM

## 2024-03-26 DIAGNOSIS — Z71.6 TOBACCO ABUSE COUNSELING: ICD-10-CM

## 2024-03-26 DIAGNOSIS — M19.90 UNSPECIFIED OSTEOARTHRITIS, UNSPECIFIED SITE: ICD-10-CM

## 2024-03-26 DIAGNOSIS — I50.32 CHRONIC DIASTOLIC (CONGESTIVE) HEART FAILURE: ICD-10-CM

## 2024-03-26 LAB
ALBUMIN SERPL ELPH-MCNC: 3.7 G/DL — SIGNIFICANT CHANGE UP (ref 3.5–5.2)
ALBUMIN SERPL ELPH-MCNC: 3.8 G/DL — SIGNIFICANT CHANGE UP (ref 3.5–5.2)
ALP SERPL-CCNC: 120 U/L — HIGH (ref 30–115)
ALP SERPL-CCNC: 134 U/L — HIGH (ref 30–115)
ALT FLD-CCNC: 162 U/L — HIGH (ref 0–41)
ALT FLD-CCNC: 219 U/L — HIGH (ref 0–41)
ANION GAP SERPL CALC-SCNC: 12 MMOL/L — SIGNIFICANT CHANGE UP (ref 7–14)
ANION GAP SERPL CALC-SCNC: 9 MMOL/L — SIGNIFICANT CHANGE UP (ref 7–14)
APAP SERPL-MCNC: <5 UG/ML — LOW (ref 10–30)
AST SERPL-CCNC: 122 U/L — HIGH (ref 0–41)
AST SERPL-CCNC: 168 U/L — HIGH (ref 0–41)
BASE EXCESS BLDV CALC-SCNC: 9 MMOL/L — HIGH (ref -2–3)
BASOPHILS # BLD AUTO: 0.01 K/UL — SIGNIFICANT CHANGE UP (ref 0–0.2)
BASOPHILS NFR BLD AUTO: 0.1 % — SIGNIFICANT CHANGE UP (ref 0–1)
BILIRUB SERPL-MCNC: 0.5 MG/DL — SIGNIFICANT CHANGE UP (ref 0.2–1.2)
BILIRUB SERPL-MCNC: 0.6 MG/DL — SIGNIFICANT CHANGE UP (ref 0.2–1.2)
BUN SERPL-MCNC: 26 MG/DL — HIGH (ref 10–20)
BUN SERPL-MCNC: 27 MG/DL — HIGH (ref 10–20)
CA-I SERPL-SCNC: 1.16 MMOL/L — SIGNIFICANT CHANGE UP (ref 1.15–1.33)
CALCIUM SERPL-MCNC: 8.8 MG/DL — SIGNIFICANT CHANGE UP (ref 8.4–10.4)
CALCIUM SERPL-MCNC: 9 MG/DL — SIGNIFICANT CHANGE UP (ref 8.4–10.5)
CHLORIDE SERPL-SCNC: 94 MMOL/L — LOW (ref 98–110)
CHLORIDE SERPL-SCNC: 94 MMOL/L — LOW (ref 98–110)
CO2 SERPL-SCNC: 29 MMOL/L — SIGNIFICANT CHANGE UP (ref 17–32)
CO2 SERPL-SCNC: 35 MMOL/L — HIGH (ref 17–32)
CREAT SERPL-MCNC: 0.9 MG/DL — SIGNIFICANT CHANGE UP (ref 0.7–1.5)
CREAT SERPL-MCNC: 0.9 MG/DL — SIGNIFICANT CHANGE UP (ref 0.7–1.5)
EGFR: 100 ML/MIN/1.73M2 — SIGNIFICANT CHANGE UP
EGFR: 100 ML/MIN/1.73M2 — SIGNIFICANT CHANGE UP
EOSINOPHIL # BLD AUTO: 0.02 K/UL — SIGNIFICANT CHANGE UP (ref 0–0.7)
EOSINOPHIL NFR BLD AUTO: 0.3 % — SIGNIFICANT CHANGE UP (ref 0–8)
ETHANOL SERPL-MCNC: <10 MG/DL — SIGNIFICANT CHANGE UP
GAS PNL BLDV: 128 MMOL/L — LOW (ref 136–145)
GAS PNL BLDV: SIGNIFICANT CHANGE UP
GAS PNL BLDV: SIGNIFICANT CHANGE UP
GLUCOSE SERPL-MCNC: 109 MG/DL — HIGH (ref 70–99)
GLUCOSE SERPL-MCNC: 150 MG/DL — HIGH (ref 70–99)
HCO3 BLDV-SCNC: 36 MMOL/L — HIGH (ref 22–29)
HCT VFR BLD CALC: 41.2 % — LOW (ref 42–52)
HCT VFR BLD CALC: 41.6 % — LOW (ref 42–52)
HCT VFR BLDA CALC: 42 % — SIGNIFICANT CHANGE UP (ref 39–51)
HGB BLD CALC-MCNC: 14.1 G/DL — SIGNIFICANT CHANGE UP (ref 12.6–17.4)
HGB BLD-MCNC: 13.7 G/DL — LOW (ref 14–18)
HGB BLD-MCNC: 13.9 G/DL — LOW (ref 14–18)
IMM GRANULOCYTES NFR BLD AUTO: 0.6 % — HIGH (ref 0.1–0.3)
LACTATE BLDV-MCNC: 2.1 MMOL/L — HIGH (ref 0.5–2)
LYMPHOCYTES # BLD AUTO: 0.75 K/UL — LOW (ref 1.2–3.4)
LYMPHOCYTES # BLD AUTO: 9.6 % — LOW (ref 20.5–51.1)
MAGNESIUM SERPL-MCNC: 2 MG/DL — SIGNIFICANT CHANGE UP (ref 1.8–2.4)
MCHC RBC-ENTMCNC: 29.1 PG — SIGNIFICANT CHANGE UP (ref 27–31)
MCHC RBC-ENTMCNC: 29.5 PG — SIGNIFICANT CHANGE UP (ref 27–31)
MCHC RBC-ENTMCNC: 33.3 G/DL — SIGNIFICANT CHANGE UP (ref 32–37)
MCHC RBC-ENTMCNC: 33.4 G/DL — SIGNIFICANT CHANGE UP (ref 32–37)
MCV RBC AUTO: 87.5 FL — SIGNIFICANT CHANGE UP (ref 80–94)
MCV RBC AUTO: 88.3 FL — SIGNIFICANT CHANGE UP (ref 80–94)
MONOCYTES # BLD AUTO: 0.73 K/UL — HIGH (ref 0.1–0.6)
MONOCYTES NFR BLD AUTO: 9.4 % — HIGH (ref 1.7–9.3)
NEUTROPHILS # BLD AUTO: 6.23 K/UL — SIGNIFICANT CHANGE UP (ref 1.4–6.5)
NEUTROPHILS NFR BLD AUTO: 80 % — HIGH (ref 42.2–75.2)
NRBC # BLD: 0 /100 WBCS — SIGNIFICANT CHANGE UP (ref 0–0)
NRBC # BLD: 0 /100 WBCS — SIGNIFICANT CHANGE UP (ref 0–0)
NT-PROBNP SERPL-SCNC: <5 PG/ML — SIGNIFICANT CHANGE UP (ref 0–300)
PCO2 BLDV: 60 MMHG — HIGH (ref 42–55)
PH BLDV: 7.39 — SIGNIFICANT CHANGE UP (ref 7.32–7.43)
PLATELET # BLD AUTO: 195 K/UL — SIGNIFICANT CHANGE UP (ref 130–400)
PLATELET # BLD AUTO: 200 K/UL — SIGNIFICANT CHANGE UP (ref 130–400)
PMV BLD: 10.3 FL — SIGNIFICANT CHANGE UP (ref 7.4–10.4)
PMV BLD: 10.3 FL — SIGNIFICANT CHANGE UP (ref 7.4–10.4)
PO2 BLDV: 60 MMHG — HIGH (ref 25–45)
POTASSIUM BLDV-SCNC: 3.8 MMOL/L — SIGNIFICANT CHANGE UP (ref 3.5–5.1)
POTASSIUM SERPL-MCNC: 4.2 MMOL/L — SIGNIFICANT CHANGE UP (ref 3.5–5)
POTASSIUM SERPL-MCNC: 4.2 MMOL/L — SIGNIFICANT CHANGE UP (ref 3.5–5)
POTASSIUM SERPL-SCNC: 4.2 MMOL/L — SIGNIFICANT CHANGE UP (ref 3.5–5)
POTASSIUM SERPL-SCNC: 4.2 MMOL/L — SIGNIFICANT CHANGE UP (ref 3.5–5)
PROT SERPL-MCNC: 6.1 G/DL — SIGNIFICANT CHANGE UP (ref 6–8)
PROT SERPL-MCNC: 6.1 G/DL — SIGNIFICANT CHANGE UP (ref 6–8)
RBC # BLD: 4.71 M/UL — SIGNIFICANT CHANGE UP (ref 4.7–6.1)
RBC # BLD: 4.71 M/UL — SIGNIFICANT CHANGE UP (ref 4.7–6.1)
RBC # FLD: 16.7 % — HIGH (ref 11.5–14.5)
RBC # FLD: 16.8 % — HIGH (ref 11.5–14.5)
SALICYLATES SERPL-MCNC: <0.3 MG/DL — LOW (ref 4–30)
SAO2 % BLDV: 90.9 % — HIGH (ref 67–88)
SODIUM SERPL-SCNC: 135 MMOL/L — SIGNIFICANT CHANGE UP (ref 135–146)
SODIUM SERPL-SCNC: 138 MMOL/L — SIGNIFICANT CHANGE UP (ref 135–146)
TROPONIN T, HIGH SENSITIVITY RESULT: 25 NG/L — HIGH (ref 6–21)
WBC # BLD: 7.79 K/UL — SIGNIFICANT CHANGE UP (ref 4.8–10.8)
WBC # BLD: 7.92 K/UL — SIGNIFICANT CHANGE UP (ref 4.8–10.8)
WBC # FLD AUTO: 7.79 K/UL — SIGNIFICANT CHANGE UP (ref 4.8–10.8)
WBC # FLD AUTO: 7.92 K/UL — SIGNIFICANT CHANGE UP (ref 4.8–10.8)

## 2024-03-26 PROCEDURE — 93010 ELECTROCARDIOGRAM REPORT: CPT

## 2024-03-26 PROCEDURE — 36415 COLL VENOUS BLD VENIPUNCTURE: CPT

## 2024-03-26 PROCEDURE — 93005 ELECTROCARDIOGRAM TRACING: CPT

## 2024-03-26 PROCEDURE — 96374 THER/PROPH/DIAG INJ IV PUSH: CPT

## 2024-03-26 PROCEDURE — 71045 X-RAY EXAM CHEST 1 VIEW: CPT | Mod: 26

## 2024-03-26 PROCEDURE — 82330 ASSAY OF CALCIUM: CPT

## 2024-03-26 PROCEDURE — 99285 EMERGENCY DEPT VISIT HI MDM: CPT | Mod: 25

## 2024-03-26 PROCEDURE — 85014 HEMATOCRIT: CPT

## 2024-03-26 PROCEDURE — 83880 ASSAY OF NATRIURETIC PEPTIDE: CPT

## 2024-03-26 PROCEDURE — 85018 HEMOGLOBIN: CPT

## 2024-03-26 PROCEDURE — 71045 X-RAY EXAM CHEST 1 VIEW: CPT

## 2024-03-26 PROCEDURE — 84132 ASSAY OF SERUM POTASSIUM: CPT

## 2024-03-26 PROCEDURE — 84295 ASSAY OF SERUM SODIUM: CPT

## 2024-03-26 PROCEDURE — 84484 ASSAY OF TROPONIN QUANT: CPT

## 2024-03-26 PROCEDURE — 99285 EMERGENCY DEPT VISIT HI MDM: CPT

## 2024-03-26 PROCEDURE — 80053 COMPREHEN METABOLIC PANEL: CPT

## 2024-03-26 PROCEDURE — 83605 ASSAY OF LACTIC ACID: CPT

## 2024-03-26 PROCEDURE — 82803 BLOOD GASES ANY COMBINATION: CPT

## 2024-03-26 PROCEDURE — 80307 DRUG TEST PRSMV CHEM ANLYZR: CPT

## 2024-03-26 PROCEDURE — 85025 COMPLETE CBC W/AUTO DIFF WBC: CPT

## 2024-03-26 PROCEDURE — 99232 SBSQ HOSP IP/OBS MODERATE 35: CPT

## 2024-03-26 PROCEDURE — 94640 AIRWAY INHALATION TREATMENT: CPT

## 2024-03-26 RX ORDER — CHLORHEXIDINE GLUCONATE 213 G/1000ML
1 SOLUTION TOPICAL
Refills: 0 | Status: DISCONTINUED | OUTPATIENT
Start: 2024-03-26 | End: 2024-03-26

## 2024-03-26 RX ORDER — IPRATROPIUM/ALBUTEROL SULFATE 18-103MCG
3 AEROSOL WITH ADAPTER (GRAM) INHALATION
Refills: 0 | Status: COMPLETED | OUTPATIENT
Start: 2024-03-26 | End: 2024-03-26

## 2024-03-26 RX ORDER — NALOXONE HYDROCHLORIDE 4 MG/.1ML
1 SPRAY NASAL ONCE
Refills: 0 | Status: COMPLETED | OUTPATIENT
Start: 2024-03-26 | End: 2024-03-26

## 2024-03-26 RX ORDER — LISINOPRIL 2.5 MG/1
1 TABLET ORAL
Qty: 0 | Refills: 0 | DISCHARGE

## 2024-03-26 RX ADMIN — Medication 2 MILLIGRAM(S): at 05:58

## 2024-03-26 RX ADMIN — AZITHROMYCIN 500 MILLIGRAM(S): 500 TABLET, FILM COATED ORAL at 11:29

## 2024-03-26 RX ADMIN — Medication 3 MILLILITER(S): at 17:33

## 2024-03-26 RX ADMIN — PANTOPRAZOLE SODIUM 40 MILLIGRAM(S): 20 TABLET, DELAYED RELEASE ORAL at 05:17

## 2024-03-26 RX ADMIN — Medication 3 MILLILITER(S): at 17:57

## 2024-03-26 RX ADMIN — OXYCODONE HYDROCHLORIDE 30 MILLIGRAM(S): 5 TABLET ORAL at 06:45

## 2024-03-26 RX ADMIN — Medication 125 MILLIGRAM(S): at 17:57

## 2024-03-26 RX ADMIN — LISINOPRIL 5 MILLIGRAM(S): 2.5 TABLET ORAL at 05:18

## 2024-03-26 RX ADMIN — TIOTROPIUM BROMIDE 2 PUFF(S): 18 CAPSULE ORAL; RESPIRATORY (INHALATION) at 08:10

## 2024-03-26 RX ADMIN — Medication 3 MILLILITER(S): at 17:40

## 2024-03-26 RX ADMIN — OXYCODONE HYDROCHLORIDE 30 MILLIGRAM(S): 5 TABLET ORAL at 06:08

## 2024-03-26 RX ADMIN — Medication 81 MILLIGRAM(S): at 11:29

## 2024-03-26 RX ADMIN — LIDOCAINE 1 PATCH: 4 CREAM TOPICAL at 11:29

## 2024-03-26 RX ADMIN — Medication 40 MILLIGRAM(S): at 05:18

## 2024-03-26 RX ADMIN — LIDOCAINE 1 PATCH: 4 CREAM TOPICAL at 01:00

## 2024-03-26 RX ADMIN — Medication 2 MILLIGRAM(S): at 11:53

## 2024-03-26 NOTE — DISCHARGE NOTE PROVIDER - HOSPITAL COURSE
59 y/o M w/ PMHx of colitis, hiatal hernia, OA, GERD, HLD, HTN, COPD (on 5 L O2 at home), HFpEF (65%) presented to the ED for chest pain.  >CP, likely musculoskeletal; Trop: 30 >38; Old record reviewed. Pt had nuclear stress done on July 24th 2023 which was negative for ischemia; Cardiology eval noted. No further cardiac w/u; CTA chest done on 3/19 which was negative for PE  >COPD on home O2 5L NC; C/w prednisone 40 mg qd  >Pt is medically stable for d/c to home

## 2024-03-26 NOTE — DISCHARGE NOTE PROVIDER - NSDCCPCAREPLAN_GEN_ALL_CORE_FT
PRINCIPAL DISCHARGE DIAGNOSIS  Diagnosis: Chest pain  Assessment and Plan of Treatment: Chest pain can be caused by many different conditions which may or may not be dangerous. Causes include heartburn, lung infections, heart attack, blood clot in lungs, skin infections, strain or damage to muscle, cartilage, or bones, etc. In addition to a history and physical examination, an electrocardiogram (ECG) or other lab tests may have been performed to determine the cause of your chest pain. Follow up with your primary care provider or with a cardiologist as instructed.   SEEK IMMEDIATE MEDICAL CARE IF YOU HAVE ANY OF THE FOLLOWING SYMPTOMS: worsening chest pain, coughing up blood, unexplained back/neck/jaw pain, severe abdominal pain, dizziness or lightheadedness, fainting, shortness of breath, sweaty or clammy skin, vomiting, or racing heart beat. These symptoms may represent a serious problem that is an emergency. Do not wait to see if the symptoms will go away. Get medical help right away. Call 911 and do not drive yourself to the hospital.        SECONDARY DISCHARGE DIAGNOSES  Diagnosis: COPD exacerbation  Assessment and Plan of Treatment: Chronic obstructive pulmonary disease (COPD) is a lung condition in which airflow from the lungs is limited. Causes include smoking, secondhand smoke exposure, genetics, or recurrent infections. Take all medicines (inhaled or pills) as directed by your health care provider. Avoid exposure to irritants such as smoke, chemicals, and fumes that aggravate your breathing.  If you are a smoker, the most important thing that you can do is stop smoking. Continuing to smoke will cause further lung damage and breathing trouble. Ask your health care provider for help with quitting smoking.  SEEK IMMEDIATE MEDICAL CARE IF YOU HAVE ANY OF THE FOLLOWING SYMPTOMS: shortness of breath at rest or when talking, bluish discoloration of lips, skin, fever, worsening cough, unexplained chest pain, or lightheadedness/dizziness.

## 2024-03-26 NOTE — DISCHARGE NOTE NURSING/CASE MANAGEMENT/SOCIAL WORK - PATIENT PORTAL LINK FT
You can access the FollowMyHealth Patient Portal offered by [facility] by registering at the following website: [email or web address]. By joining [facility]’s FollowMyHealth portal, you will also be able to view your health information using other applications (apps) compatible with our system.

## 2024-03-26 NOTE — ED PROVIDER NOTE - WR INTERPRETATION 1
Independent interpretation of the ED Chest X-Ray film(s) performed by Fede Guillen are negative for ptx, infiltrates, or effusions.

## 2024-03-26 NOTE — PROGRESS NOTE ADULT - SUBJECTIVE AND OBJECTIVE BOX
Patient is a 58y old  Male who presents with a chief complaint of Chest pain (26 Mar 2024 10:23)      OVERNIGHT EVENTS: c/p SOB     SUBJECTIVE / INTERVAL HPI: Patient seen and examined at bedside.     VITAL SIGNS:  Vital Signs Last 24 Hrs  T(C): 36.2 (26 Mar 2024 05:00), Max: 36.6 (25 Mar 2024 19:49)  T(F): 97.1 (26 Mar 2024 05:00), Max: 97.9 (25 Mar 2024 19:49)  HR: 72 (26 Mar 2024 05:00) (72 - 99)  BP: 99/56 (26 Mar 2024 05:00) (99/56 - 148/71)  BP(mean): --  RR: 18 (26 Mar 2024 05:00) (18 - 18)  SpO2: 96% (26 Mar 2024 05:00) (96% - 96%)        PHYSICAL EXAM:    General: WDWN  HEENT: NC/AT; PERRL, clear conjunctiva  Neck: supple  Cardiovascular: +S1/S2; RRR  Respiratory: dec air entry b/l w exp wheezing,   Gastrointestinal: soft, NT/ND; +BSx4  Extremities: WWP; 2+ peripheral pulses; no edema   Neurological: AAOx3; no focal deficits    MEDICATIONS:  MEDICATIONS  (STANDING):  aspirin enteric coated 81 milliGRAM(s) Oral daily  azithromycin   Tablet 500 milliGRAM(s) Oral daily  budesonide 160 MICROgram(s)/formoterol 4.5 MICROgram(s) Inhaler 2 Puff(s) Inhalation two times a day  chlorhexidine 2% Cloths 1 Application(s) Topical <User Schedule>  enoxaparin Injectable 40 milliGRAM(s) SubCutaneous every 24 hours  furosemide    Tablet 40 milliGRAM(s) Oral daily  lidocaine   4% Patch 1 Patch Transdermal daily  pantoprazole    Tablet 40 milliGRAM(s) Oral before breakfast  predniSONE   Tablet 40 milliGRAM(s) Oral daily  senna 2 Tablet(s) Oral at bedtime  tiotropium 2.5 MICROgram(s) Inhaler 2 Puff(s) Inhalation daily    MEDICATIONS  (PRN):  albuterol    90 MICROgram(s) HFA Inhaler 2 Puff(s) Inhalation every 6 hours PRN for shortness of breath and/or wheezing  albuterol/ipratropium for Nebulization.. 3 milliLiter(s) Nebulizer every 20 minutes PRN Shortness of Breath and/or Wheezing  ALPRAZolam 2 milliGRAM(s) Oral every 6 hours PRN anxiety  oxyCODONE    IR 30 milliGRAM(s) Oral every 8 hours PRN Severe Pain (7 - 10)  zolpidem 5 milliGRAM(s) Oral at bedtime PRN Insomnia      ALLERGIES:  Allergies    No Known Allergies    Intolerances        LABS:                        13.7   7.92  )-----------( 195      ( 26 Mar 2024 08:15 )             41.2     03-26    138  |  94<L>  |  27<H>  ----------------------------<  109<H>  4.2   |  35<H>  |  0.9    Ca    9.0      26 Mar 2024 08:15  Mg     2.0     03-26    TPro  6.1  /  Alb  3.8  /  TBili  0.6  /  DBili  x   /  AST  122<H>  /  ALT  162<H>  /  AlkPhos  134<H>  03-26      Urinalysis Basic - ( 26 Mar 2024 08:15 )    Color: x / Appearance: x / SG: x / pH: x  Gluc: 109 mg/dL / Ketone: x  / Bili: x / Urobili: x   Blood: x / Protein: x / Nitrite: x   Leuk Esterase: x / RBC: x / WBC x   Sq Epi: x / Non Sq Epi: x / Bacteria: x      CAPILLARY BLOOD GLUCOSE          RADIOLOGY & ADDITIONAL TESTS: Reviewed.    ASSESSMENT:    PLAN:

## 2024-03-26 NOTE — DISCHARGE NOTE NURSING/CASE MANAGEMENT/SOCIAL WORK - NSDCPEFALRISK_GEN_ALL_CORE
For information on Fall & Injury Prevention, visit: [email or web address] OR  [email or web address] OR  https://www.cdc.gov/steadi/patient.html

## 2024-03-26 NOTE — DISCHARGE NOTE PROVIDER - CARE PROVIDERS DIRECT ADDRESSES
NPE HPV #3 only     [email or web address] Patient c/o of headache, chest pain, anxiety, chills and tingling of fingers, unable to sleep, states not feeling well, no SOB.  States was seen in ED 6 days ago and tested Covid+ and was told to go home but had some EKG abnormality and referred to see a cardiologist, appointment next week still.  Isolation protocol observed.  EKG in progress.

## 2024-03-26 NOTE — DISCHARGE NOTE PROVIDER - CARE PROVIDER_API CALL
Edison Davis  Internal Medicine  [address] [place] [id number]  Phone: [phone]  Fax: [phone]  Follow Up Time: 1 week

## 2024-03-26 NOTE — PROGRESS NOTE ADULT - ASSESSMENT
58-year-old male past medical history of hiatal hernia, OA, GERD, HLD, HTN, COPD on BiPAP and 5 L O2 at home, HFpEF (65%) presents with complaint of chest pain x 3 weeks.    CP likely musculoskeletal  COPD w acute exacerbation, on home O2 5L NC and night BiPAP  HTN / DL  Chronic HFpEF    PLANs:    was monitored on Tele w no events.   Troponin: 30-->28  EKG on admission: NSR 65/min. LVH   Old record reviewed. Pt had nuclear stress done on July 24th 2023 which was negative for ischemia.  Cardiology eval noted. No further cardiac w/u  at baseline O2 requirement 4 LNC   Pt had CTA chest done on 3/19 which was negative for PE but w Bilateral multi lobar opacities, possibly atelectasis although superimposed infectious process is considered  CXR on admission was unremarkable  Cont Nebs, and Zithromax D4/5, DC tomorrow  Restarted Symbicort and Spiriva.   give one dose of IV Methylpred 60mg then Prednisone 40 mg po daily  transaminitis: AVTAR wnl, probably from hypotension, check hepatitis panel, cont to monitor   dvt ppx  from home

## 2024-03-26 NOTE — ED PROVIDER NOTE - CLINICAL SUMMARY MEDICAL DECISION MAKING FREE TEXT BOX
88-year-old male presents to the ED for possible overdose on opiates.  Patient reports taking his morphine tablets.  Was found unresponsive at home.  Brought in by EMS.  No Narcan given.  On my evaluation patient is drowsy with pinpoint pupils.  He is on 6 L at home nasal cannula for COPD.  98% on room air.  Was placed on end-tidal and monitor.  Tox consulted.  Labs obtained and noted to be within normal limits.  Noted to have mildly elevated troponin which is at baseline.  On reevaluation patient is awake tolerating p.o.  Eating sandwich.  Ambulatory.  Reported he would like to AMA.  Advised patient stay to 11 PM for continued observation.  Patient reports he does not want to stay.  Has capacity.  Discharged home.

## 2024-03-26 NOTE — ED PROVIDER NOTE - PHYSICAL EXAMINATION
PHYSICAL EXAM: I have reviewed current vital signs.  GENERAL: NAD, well-nourished; well-developed.  HEAD:  Normocephalic, atraumatic.  EYES: Pupils constricted.  ENT: MMM, no erythema/exudates.  NECK: Supple, no JVD.  CHEST/LUNG: Bilateral wheezing, mild crackles.  HEART: Regular rate and rhythm, normal S1 and S2; no murmurs, rubs, or gallops.  ABDOMEN: Soft, nontender, nondistended.  EXTREMITIES:  2+ peripheral pulses; no clubbing, cyanosis, or edema.  PSYCH: Patient is sleepy but easily arousable, answering questions appropriately.  NEUROLOGY: A&O x 3. No focal neurological deficits.   SKIN: Warm and dry.

## 2024-03-26 NOTE — ED PROVIDER NOTE - PATIENT PORTAL LINK FT
You can access the FollowMyHealth Patient Portal offered by [facility] by registering at the following website: [email or web address]. By joining [facility]’s FollowMyHealth portal, you will also be able to view your health information using other applications (apps) compatible with our system.

## 2024-03-26 NOTE — ED PROVIDER NOTE - EKG/XRAY ADDITIONAL INFORMATION
Independent interpretation of the ED Chest X-Ray film(s) performed by Fede Guillen are negative for ptx, infiltrates, or effusions.  Independent interpretation of the EKG performed by Fede Guillen: Sinus, HR: 98, IL: 150, QTc: 418, ST-T: no T wave inversion lead III

## 2024-03-26 NOTE — ED PROVIDER NOTE - OBJECTIVE STATEMENT
58-year-old male with past medical history of colitis, hiatal hernia, OA, GERD, HLD, HTN, COPD on 5 L at home, heart failure, knee replacement, hip replacement, shoulder replacement, current smoker, BIBA for suspected overdose.  Patient is prescribed MS Contin for his chronic joint pain.  Per EMS, patient refilled his prescription 3 days ago, however only 2 pills are left in the bottle.  Patient has been sleepy, but Narcan was never given.  Patient denies taking more than his prescribed 2 pills a day.  Patient also notes that he has been experiencing shortness of breath for the last 3 weeks.  Patient denies any recent fever, chest pain, vomiting.  Patient denies any SI/HI.

## 2024-03-26 NOTE — ED ADULT TRIAGE NOTE - CHIEF COMPLAINT QUOTE
BIBA from home for overdose on morphine  pt had prescription of morphine filled 6 days ago with only 2 pills left in the bottle

## 2024-03-26 NOTE — ED PROVIDER NOTE - PROGRESS NOTE DETAILS
AE: Discussed case in detail with toxicology who would like to watch the patient until 11 PM.  Recommending naloxone as needed if respiratory rate <12 consistently or rising end-tidal CO2 >50. The patient wishes to leave against medical advice.  I have discussed the risks, benefits and alternatives (including the possibility of worsening of disease, pain, permanent disability, and/or death) with the patient.  The patient voices understanding of these risks, benefits, and alternatives and still wishes to sign out against medical advice.  The patient is awake, alert, oriented  x 3 and has demonstrated capacity to refuse/direct care.  I have advised the patient that they can and should return immediately should they develop any worse/different/additional symptoms, or if they change their mind and want to continue their care.

## 2024-03-26 NOTE — DISCHARGE NOTE PROVIDER - NSDCMRMEDTOKEN_GEN_ALL_CORE_FT
Advair Diskus 500 mcg-50 mcg inhalation powder: 1 puff(s) inhaled 2 times a day  albuterol 90 mcg/inh inhalation aerosol: 2 puff(s) inhaled every 6 hours as needed for  shortness of breath and/or wheezing  ALPRAZolam 2 mg oral tablet: 1 tab(s) orally every 6 hours, As needed, anxiety  Ambien 10 mg oral tablet: 0.5 tab(s) orally once a day (at bedtime), As Needed  Aspir 81 oral delayed release tablet: 1 tab(s) orally once a day  fenofibrate 134 mg oral capsule: 1 cap(s) orally once a day  ipratropium-albuterol 0.5 mg-2.5 mg/3 mL inhalation solution: 3 milliliter(s) inhaled every 6 hours  Lasix 40 mg oral tablet: 1 tab(s) orally once a day  morphine 100 mg/12 to 24 hr oral capsule, extended release: 1 cap(s) orally 2 times a day  omeprazole 40 mg oral delayed release capsule: 1 cap(s) orally once a day  predniSONE 20 mg oral tablet: 2 tab(s) orally once a day  Spiriva HandiHaler 18 mcg inhalation capsule: 1 cap(s) inhaled once a day  Zocor 20 mg oral tablet: 1 tab(s) orally once a day (at bedtime)

## 2024-03-27 ENCOUNTER — INPATIENT (INPATIENT)
Facility: HOSPITAL | Age: 59
LOS: 3 days | Discharge: LEFT BEFORE DISCHARGE | DRG: 351 | End: 2024-03-31
Attending: INTERNAL MEDICINE | Admitting: INTERNAL MEDICINE
Payer: MEDICAID

## 2024-03-27 VITALS
HEIGHT: 69 IN | SYSTOLIC BLOOD PRESSURE: 144 MMHG | OXYGEN SATURATION: 98 % | RESPIRATION RATE: 20 BRPM | TEMPERATURE: 98 F | DIASTOLIC BLOOD PRESSURE: 92 MMHG | HEART RATE: 64 BPM | WEIGHT: 315 LBS

## 2024-03-27 DIAGNOSIS — Z98.890 OTHER SPECIFIED POSTPROCEDURAL STATES: Chronic | ICD-10-CM

## 2024-03-27 DIAGNOSIS — K40.20 BILATERAL INGUINAL HERNIA, WITHOUT OBSTRUCTION OR GANGRENE, NOT SPECIFIED AS RECURRENT: Chronic | ICD-10-CM

## 2024-03-27 DIAGNOSIS — Z96.651 PRESENCE OF RIGHT ARTIFICIAL KNEE JOINT: Chronic | ICD-10-CM

## 2024-03-27 DIAGNOSIS — M62.82 RHABDOMYOLYSIS: ICD-10-CM

## 2024-03-27 LAB
ALBUMIN SERPL ELPH-MCNC: 4 G/DL — SIGNIFICANT CHANGE UP (ref 3.5–5.2)
ALP SERPL-CCNC: 135 U/L — HIGH (ref 30–115)
ALT FLD-CCNC: 404 U/L — HIGH (ref 0–41)
ANION GAP SERPL CALC-SCNC: 15 MMOL/L — HIGH (ref 7–14)
AST SERPL-CCNC: 421 U/L — HIGH (ref 0–41)
BASE EXCESS BLDV CALC-SCNC: 7.8 MMOL/L — HIGH (ref -2–3)
BASOPHILS # BLD AUTO: 0.02 K/UL — SIGNIFICANT CHANGE UP (ref 0–0.2)
BASOPHILS NFR BLD AUTO: 0.1 % — SIGNIFICANT CHANGE UP (ref 0–1)
BILIRUB SERPL-MCNC: 0.7 MG/DL — SIGNIFICANT CHANGE UP (ref 0.2–1.2)
BUN SERPL-MCNC: 32 MG/DL — HIGH (ref 10–20)
CA-I SERPL-SCNC: 1.18 MMOL/L — SIGNIFICANT CHANGE UP (ref 1.15–1.33)
CALCIUM SERPL-MCNC: 9.2 MG/DL — SIGNIFICANT CHANGE UP (ref 8.4–10.5)
CHLORIDE SERPL-SCNC: 89 MMOL/L — LOW (ref 98–110)
CK SERPL-CCNC: 7521 U/L — HIGH (ref 0–225)
CO2 SERPL-SCNC: 28 MMOL/L — SIGNIFICANT CHANGE UP (ref 17–32)
CREAT SERPL-MCNC: 1.2 MG/DL — SIGNIFICANT CHANGE UP (ref 0.7–1.5)
EGFR: 71 ML/MIN/1.73M2 — SIGNIFICANT CHANGE UP
EOSINOPHIL # BLD AUTO: 0 K/UL — SIGNIFICANT CHANGE UP (ref 0–0.7)
EOSINOPHIL NFR BLD AUTO: 0 % — SIGNIFICANT CHANGE UP (ref 0–8)
ETHANOL SERPL-MCNC: <10 MG/DL — SIGNIFICANT CHANGE UP
GAS PNL BLDV: 128 MMOL/L — LOW (ref 136–145)
GAS PNL BLDV: SIGNIFICANT CHANGE UP
GAS PNL BLDV: SIGNIFICANT CHANGE UP
GLUCOSE SERPL-MCNC: 126 MG/DL — HIGH (ref 70–99)
HCO3 BLDV-SCNC: 36 MMOL/L — HIGH (ref 22–29)
HCT VFR BLD CALC: 39.9 % — LOW (ref 42–52)
HCT VFR BLDA CALC: 43 % — SIGNIFICANT CHANGE UP (ref 39–51)
HGB BLD CALC-MCNC: 14.3 G/DL — SIGNIFICANT CHANGE UP (ref 12.6–17.4)
HGB BLD-MCNC: 13.8 G/DL — LOW (ref 14–18)
IMM GRANULOCYTES NFR BLD AUTO: 0.5 % — HIGH (ref 0.1–0.3)
LACTATE BLDV-MCNC: 3.1 MMOL/L — HIGH (ref 0.5–2)
LYMPHOCYTES # BLD AUTO: 1.22 K/UL — SIGNIFICANT CHANGE UP (ref 1.2–3.4)
LYMPHOCYTES # BLD AUTO: 8.4 % — LOW (ref 20.5–51.1)
MCHC RBC-ENTMCNC: 29.7 PG — SIGNIFICANT CHANGE UP (ref 27–31)
MCHC RBC-ENTMCNC: 34.6 G/DL — SIGNIFICANT CHANGE UP (ref 32–37)
MCV RBC AUTO: 85.8 FL — SIGNIFICANT CHANGE UP (ref 80–94)
MONOCYTES # BLD AUTO: 1.06 K/UL — HIGH (ref 0.1–0.6)
MONOCYTES NFR BLD AUTO: 7.3 % — SIGNIFICANT CHANGE UP (ref 1.7–9.3)
NEUTROPHILS # BLD AUTO: 12.23 K/UL — HIGH (ref 1.4–6.5)
NEUTROPHILS NFR BLD AUTO: 83.7 % — HIGH (ref 42.2–75.2)
NRBC # BLD: 0 /100 WBCS — SIGNIFICANT CHANGE UP (ref 0–0)
PCO2 BLDV: 65 MMHG — HIGH (ref 42–55)
PH BLDV: 7.35 — SIGNIFICANT CHANGE UP (ref 7.32–7.43)
PLATELET # BLD AUTO: 214 K/UL — SIGNIFICANT CHANGE UP (ref 130–400)
PMV BLD: 10.2 FL — SIGNIFICANT CHANGE UP (ref 7.4–10.4)
PO2 BLDV: 21 MMHG — LOW (ref 25–45)
POTASSIUM BLDV-SCNC: 4 MMOL/L — SIGNIFICANT CHANGE UP (ref 3.5–5.1)
POTASSIUM SERPL-MCNC: 4.5 MMOL/L — SIGNIFICANT CHANGE UP (ref 3.5–5)
POTASSIUM SERPL-SCNC: 4.5 MMOL/L — SIGNIFICANT CHANGE UP (ref 3.5–5)
PROT SERPL-MCNC: 6.4 G/DL — SIGNIFICANT CHANGE UP (ref 6–8)
RBC # BLD: 4.65 M/UL — LOW (ref 4.7–6.1)
RBC # FLD: 16.4 % — HIGH (ref 11.5–14.5)
SAO2 % BLDV: 27.3 % — LOW (ref 67–88)
SODIUM SERPL-SCNC: 132 MMOL/L — LOW (ref 135–146)
TROPONIN T, HIGH SENSITIVITY RESULT: 54 NG/L — CRITICAL HIGH (ref 6–21)
TROPONIN T, HIGH SENSITIVITY RESULT: 57 NG/L — CRITICAL HIGH (ref 6–21)
WBC # BLD: 14.61 K/UL — HIGH (ref 4.8–10.8)
WBC # FLD AUTO: 14.61 K/UL — HIGH (ref 4.8–10.8)

## 2024-03-27 PROCEDURE — 83605 ASSAY OF LACTIC ACID: CPT

## 2024-03-27 PROCEDURE — 85025 COMPLETE CBC W/AUTO DIFF WBC: CPT

## 2024-03-27 PROCEDURE — 99285 EMERGENCY DEPT VISIT HI MDM: CPT

## 2024-03-27 PROCEDURE — 94660 CPAP INITIATION&MGMT: CPT

## 2024-03-27 PROCEDURE — 82728 ASSAY OF FERRITIN: CPT

## 2024-03-27 PROCEDURE — 87522 HEPATITIS C REVRS TRNSCRPJ: CPT

## 2024-03-27 PROCEDURE — 83540 ASSAY OF IRON: CPT

## 2024-03-27 PROCEDURE — 76770 US EXAM ABDO BACK WALL COMP: CPT

## 2024-03-27 PROCEDURE — 80074 ACUTE HEPATITIS PANEL: CPT

## 2024-03-27 PROCEDURE — 86780 TREPONEMA PALLIDUM: CPT

## 2024-03-27 PROCEDURE — 71045 X-RAY EXAM CHEST 1 VIEW: CPT

## 2024-03-27 PROCEDURE — 82140 ASSAY OF AMMONIA: CPT

## 2024-03-27 PROCEDURE — 82247 BILIRUBIN TOTAL: CPT

## 2024-03-27 PROCEDURE — 86480 TB TEST CELL IMMUN MEASURE: CPT

## 2024-03-27 PROCEDURE — 84460 ALANINE AMINO (ALT) (SGPT): CPT

## 2024-03-27 PROCEDURE — 94640 AIRWAY INHALATION TREATMENT: CPT

## 2024-03-27 PROCEDURE — 83010 ASSAY OF HAPTOGLOBIN QUANT: CPT

## 2024-03-27 PROCEDURE — 86403 PARTICLE AGGLUT ANTBDY SCRN: CPT

## 2024-03-27 PROCEDURE — 83735 ASSAY OF MAGNESIUM: CPT

## 2024-03-27 PROCEDURE — 80307 DRUG TEST PRSMV CHEM ANLYZR: CPT

## 2024-03-27 PROCEDURE — 83550 IRON BINDING TEST: CPT

## 2024-03-27 PROCEDURE — 80053 COMPREHEN METABOLIC PANEL: CPT

## 2024-03-27 PROCEDURE — 85730 THROMBOPLASTIN TIME PARTIAL: CPT

## 2024-03-27 PROCEDURE — 87389 HIV-1 AG W/HIV-1&-2 AB AG IA: CPT

## 2024-03-27 PROCEDURE — 71045 X-RAY EXAM CHEST 1 VIEW: CPT | Mod: 26

## 2024-03-27 PROCEDURE — 86706 HEP B SURFACE ANTIBODY: CPT

## 2024-03-27 PROCEDURE — 85610 PROTHROMBIN TIME: CPT

## 2024-03-27 PROCEDURE — 87340 HEPATITIS B SURFACE AG IA: CPT

## 2024-03-27 PROCEDURE — 83883 ASSAY NEPHELOMETRY NOT SPEC: CPT

## 2024-03-27 PROCEDURE — 82105 ALPHA-FETOPROTEIN SERUM: CPT

## 2024-03-27 PROCEDURE — 80346 BENZODIAZEPINES1-12: CPT

## 2024-03-27 PROCEDURE — 84484 ASSAY OF TROPONIN QUANT: CPT

## 2024-03-27 PROCEDURE — 99222 1ST HOSP IP/OBS MODERATE 55: CPT | Mod: 1L

## 2024-03-27 PROCEDURE — 81596 NFCT DS CHRNC HCV 6 ASSAYS: CPT

## 2024-03-27 PROCEDURE — 36415 COLL VENOUS BLD VENIPUNCTURE: CPT

## 2024-03-27 PROCEDURE — 97163 PT EVAL HIGH COMPLEX 45 MIN: CPT | Mod: GP

## 2024-03-27 PROCEDURE — 82550 ASSAY OF CK (CPK): CPT

## 2024-03-27 PROCEDURE — 82977 ASSAY OF GGT: CPT

## 2024-03-27 PROCEDURE — 86790 VIRUS ANTIBODY NOS: CPT

## 2024-03-27 PROCEDURE — 86704 HEP B CORE ANTIBODY TOTAL: CPT

## 2024-03-27 RX ORDER — SODIUM CHLORIDE 9 MG/ML
1000 INJECTION, SOLUTION INTRAVENOUS ONCE
Refills: 0 | Status: COMPLETED | OUTPATIENT
Start: 2024-03-27 | End: 2024-03-27

## 2024-03-27 RX ORDER — ASPIRIN/CALCIUM CARB/MAGNESIUM 324 MG
81 TABLET ORAL DAILY
Refills: 0 | Status: DISCONTINUED | OUTPATIENT
Start: 2024-03-27 | End: 2024-03-31

## 2024-03-27 RX ORDER — ZOLPIDEM TARTRATE 10 MG/1
0.5 TABLET ORAL
Qty: 0 | Refills: 0 | DISCHARGE

## 2024-03-27 RX ORDER — SODIUM CHLORIDE 9 MG/ML
1000 INJECTION INTRAMUSCULAR; INTRAVENOUS; SUBCUTANEOUS
Refills: 0 | Status: DISCONTINUED | OUTPATIENT
Start: 2024-03-27 | End: 2024-03-28

## 2024-03-27 RX ORDER — ALBUTEROL 90 UG/1
2 AEROSOL, METERED ORAL EVERY 6 HOURS
Refills: 0 | Status: DISCONTINUED | OUTPATIENT
Start: 2024-03-27 | End: 2024-03-31

## 2024-03-27 RX ORDER — HEPARIN SODIUM 5000 [USP'U]/ML
5000 INJECTION INTRAVENOUS; SUBCUTANEOUS EVERY 8 HOURS
Refills: 0 | Status: DISCONTINUED | OUTPATIENT
Start: 2024-03-27 | End: 2024-03-31

## 2024-03-27 RX ORDER — SIMVASTATIN 20 MG/1
1 TABLET, FILM COATED ORAL
Qty: 0 | Refills: 0 | DISCHARGE

## 2024-03-27 RX ORDER — FENOFIBRATE,MICRONIZED 130 MG
145 CAPSULE ORAL DAILY
Refills: 0 | Status: DISCONTINUED | OUTPATIENT
Start: 2024-03-27 | End: 2024-03-28

## 2024-03-27 RX ORDER — FUROSEMIDE 40 MG
40 TABLET ORAL DAILY
Refills: 0 | Status: DISCONTINUED | OUTPATIENT
Start: 2024-03-27 | End: 2024-03-27

## 2024-03-27 RX ORDER — SIMVASTATIN 20 MG/1
20 TABLET, FILM COATED ORAL AT BEDTIME
Refills: 0 | Status: DISCONTINUED | OUTPATIENT
Start: 2024-03-27 | End: 2024-03-28

## 2024-03-27 RX ORDER — ASPIRIN/CALCIUM CARB/MAGNESIUM 324 MG
1 TABLET ORAL
Refills: 0 | DISCHARGE

## 2024-03-27 RX ORDER — FENOFIBRATE,MICRONIZED 130 MG
1 CAPSULE ORAL
Refills: 0 | DISCHARGE

## 2024-03-27 RX ORDER — IPRATROPIUM/ALBUTEROL SULFATE 18-103MCG
3 AEROSOL WITH ADAPTER (GRAM) INHALATION EVERY 6 HOURS
Refills: 0 | Status: DISCONTINUED | OUTPATIENT
Start: 2024-03-27 | End: 2024-03-31

## 2024-03-27 RX ORDER — OMEPRAZOLE 10 MG/1
1 CAPSULE, DELAYED RELEASE ORAL
Refills: 0 | DISCHARGE

## 2024-03-27 RX ORDER — PANTOPRAZOLE SODIUM 20 MG/1
40 TABLET, DELAYED RELEASE ORAL
Refills: 0 | Status: DISCONTINUED | OUTPATIENT
Start: 2024-03-27 | End: 2024-03-31

## 2024-03-27 RX ORDER — ALPRAZOLAM 0.25 MG
2 TABLET ORAL EVERY 6 HOURS
Refills: 0 | Status: DISCONTINUED | OUTPATIENT
Start: 2024-03-27 | End: 2024-03-28

## 2024-03-27 RX ORDER — MORPHINE SULFATE 50 MG/1
1 CAPSULE, EXTENDED RELEASE ORAL
Refills: 0 | DISCHARGE

## 2024-03-27 RX ORDER — BUDESONIDE AND FORMOTEROL FUMARATE DIHYDRATE 160; 4.5 UG/1; UG/1
2 AEROSOL RESPIRATORY (INHALATION)
Refills: 0 | Status: DISCONTINUED | OUTPATIENT
Start: 2024-03-27 | End: 2024-03-31

## 2024-03-27 RX ORDER — FUROSEMIDE 40 MG
1 TABLET ORAL
Qty: 0 | Refills: 0 | DISCHARGE

## 2024-03-27 RX ADMIN — Medication 2 MILLIGRAM(S): at 18:53

## 2024-03-27 RX ADMIN — HEPARIN SODIUM 5000 UNIT(S): 5000 INJECTION INTRAVENOUS; SUBCUTANEOUS at 21:14

## 2024-03-27 RX ADMIN — SIMVASTATIN 20 MILLIGRAM(S): 20 TABLET, FILM COATED ORAL at 21:14

## 2024-03-27 RX ADMIN — SODIUM CHLORIDE 1000 MILLILITER(S): 9 INJECTION, SOLUTION INTRAVENOUS at 13:04

## 2024-03-27 RX ADMIN — SODIUM CHLORIDE 75 MILLILITER(S): 9 INJECTION INTRAMUSCULAR; INTRAVENOUS; SUBCUTANEOUS at 21:15

## 2024-03-27 RX ADMIN — BUDESONIDE AND FORMOTEROL FUMARATE DIHYDRATE 2 PUFF(S): 160; 4.5 AEROSOL RESPIRATORY (INHALATION) at 21:17

## 2024-03-27 NOTE — ED PROVIDER NOTE - OBJECTIVE STATEMENT
57yo male with PMHx colitis, hiatal hernia, OA, GERD, HLD, HTN, COPD on 5 L at home, CHF, knee replacement, hip replacement, shoulder replacement, current smoker, on MS Contin for chronic joint pain presents c/o weakness in legs and unable ot ambulate since last night. PT notes he slipped on a glass of water last night in his home, fell onto R knee and then was unable to get up from the floor. He reports he thinks he was on the floor for 45min-1hr. He states family was unable to pick him up so EMS was called. Upon EMS arriving, pt reported he could not walk and thus was brought to ED. Of note pt was seen in ED yesterday reported recently filled a prescription for MS Contin but took all but two pills of script within 2 days of filling it. Tox was consulted and was to be observed in ED until 2300 but pt eloped prior to completion of observation. Pt reports went home and took 1 Ambien, not any more MS Contin.

## 2024-03-27 NOTE — H&P ADULT - ASSESSMENT
59yo male with PMHx colitis, hiatal hernia, OA, GERD, HLD, HTN, COPD on 5 L at home, HFpEF (EF 65% Nov 2023), knee replacement, hip replacement, shoulder replacement, current smoker, on MS Contin for chronic joint pain presents c/o weakness in legs and unable ot ambulate since last night. PT notes he slipped on a glass of water last night in his home, fell onto R knee and then was unable to get up from the floor. He reports he thinks he was on the floor for 45min-1hr. He states family was unable to pick him up so EMS was called. Upon EMS arriving, pt reported he could not walk and thus was brought to ED. Of note pt was seen in ED yesterday reported recently filled a prescription for MS Contin but took all but two pills of script within 2 days of filling it. Tox was consulted and was to be observed in ED until 2300 but pt eloped prior to completion of observation. Pt reports went home and took 1 Ambien, not any more MS Contin.    #Acute kidney injury likely 2/2 Rhabdomyolysis vs Pre renal from decreased PO intake   - Na: 132, Cl: 89, BUN: 32, Creatinine 1.2  - CPK 7521  - Lactate: 3.1   - Leukocytosis likely from fall vs steroids     Plan:  - IV fluid 75 cc/hour   - F/u repeat Lactate  - Trend CPK level   - Renal US  - Avoid nephrotoxic agents, hold lisinopril, hold lasix   - Monitor BUN/creatinine  - Check urine tox     # Recent COPD exacerbation on 5L at home  # Chronic Hypercapnia   - Patient still wheezing on exam   - Chest X-ray: Low lung volumes. Left lung base atelectasis.  - cxr neg for pneumonia  - C/w prednisone 40 mg   - albuterol 90mcg 2 puffs q6h PRN   - symbicort   - duoneb 3mL every 6 hours PRN     #Transaminitis, uptrending   -  ALK: 135, AST: 421, ALT: 404  - RUQ US Suboptimal exam. No definite hepatic mass or biliary ductal dilatation  - check hepatitis panel   - hold statin  - monitor   - Hepatology consult     # Recent admission for chest pain likely secondary to msk  # chronic musculoskeletal pain   # Elevated troponin   - recent NST less than a year ago neg for ischemia  - trops 30 -> 28 -> 25 -> 57   - EKG: no acute ischemic changes   - echo 11/2023, unremarkable   -  recently filled a prescription for MS Contin but took all but two pills of script within 2 days of filling it.   - C/w lidocaine patch   - aspirin 81mg daily   - Tylenol PRN   - Hold on opioids for now,   - F/u repeat troponin     # DLP  - Hold statin     #HFpEF  #HTN  - echo 11/2023, unremarkable  - Hold furosemide 40mg daily  - Hold lisinopril in setting of TEODORO     #GERD  - pantoprazole 40qd    #insomnia  #anxiety  - xanax 2mg q6h prn   - zolpidem 5mg nightly prn     #Misc  - DVT ppx: Heparin SQ  - GI PPX: protonix   - Diet: DASH  - Activity/PT eval

## 2024-03-27 NOTE — ED PROVIDER NOTE - NSTIMEPROVIDERCAREINITIATE_GEN_ER
Pt returned call. We discussed below message per Dr Puga. Pt did verbalize understanding and will go back to taking gabapentin only as prescribed at 300 mg daily until next OV. No further questions at this time.    27-Mar-2024 07:15

## 2024-03-27 NOTE — ED PROVIDER NOTE - CLINICAL SUMMARY MEDICAL DECISION MAKING FREE TEXT BOX
57yo male with PMHx colitis, hiatal hernia, OA, GERD, HLD, HTN, COPD on 5 L at home, HFpEF (EF 65% Nov 2023), knee replacement, hip replacement, shoulder replacement, current smoker, on MS Contin for chronic joint pain presents c/o weakness in legs and unable ot ambulate since last night. PT notes he slipped on a glass of water last night in his home, fell onto R knee and then was unable to get up from the floor. He reports he thinks he was on the floor for 45min-1hr. He states family was unable to pick him up so EMS was called. Upon EMS arriving, pt reported he could not walk and thus was brought to ED. Of note pt was seen in ED yesterday reported recently filled a prescription for MS Contin but took all but two pills of script within 2 days of filling it. Tox was consulted and was to be observed in ED until 2300 but pt eloped prior to completion of observation. Pt reports went home and took 1 Ambien, not any more MS Contin.  Labs were significant for: WBC: 14,61, Na: 132, Cl: 89, BUN: 32, Creatinine 1.4 ( baseline 1.1), ALK: 135, AST: 421, ALT: 404, Troponin: 57, CPK: 7521.  ADMIT.  IVF and UOP trending.

## 2024-03-27 NOTE — ED ADULT NURSE NOTE - OBJECTIVE STATEMENT
pt with c/o mild chest pain and tripped and fell at home, with right knee abrasion. pt appears sleepy at times. pt placed on 3l 02, 02 sat at 87 % on rm air .95% when placed on nc.

## 2024-03-27 NOTE — ED ADULT NURSE NOTE - NS PRO AD BILL OF RIGHTS
Patient transferred to room 3015 via cart, baby in mom's arms. Bands read and verified, report given to Kimberly SALOMON. Code alert # 24.    Yes

## 2024-03-27 NOTE — ED ADULT TRIAGE NOTE - CHIEF COMPLAINT QUOTE
BIBEMS from home for chest pain and SOB , per ems pt AMA 2x earlier in the day reports having elevated trop and overdose on MS  Contin

## 2024-03-27 NOTE — PATIENT PROFILE ADULT - NSPROHMSYMPCOND_GEN_A_NUR
Anticoagulation Clinic Progress Note    Anticoagulation Summary  As of 2020    INR goal:   2.5-3.5   TTR:   75.3 % (1.3 y)   INR used for dosin.80 (2020)   Warfarin maintenance plan:   8 mg every Mon, Wed, Fri; 10 mg all other days   Weekly warfarin total:   64 mg   No change documented:   Sun Curtis   Plan last modified:   Maria Del Rosario Cruz RPH (3/5/2020)   Next INR check:   2020   Priority:   Maintenance   Target end date:   Indefinite    Indications    History of mechanical aortic valve replacement [Z95.2]  Long term (current) use of anticoagulants [Z79.01]             Anticoagulation Episode Summary     INR check location:   Home Draw    Preferred lab:       Send INR reminders to:    JOSE LECHUGA  POOL    Comments:   **[facility]**      Anticoagulation Care Providers     Provider Role Specialty Phone number    Lorne Kam MD Referring Cardiology [phone]          Clinic Interview:  No pertinent clinical findings have been reported.    INR History:  Anticoagulation Monitoring 3/19/2020 2020 2020   INR 3.30 2.60 2.80   INR Date 3/19/2020 2020 2020   INR Goal 2.5-3.5 2.5-3.5 2.5-3.5   Trend Same Same Same   Last Week Total 64 mg 64 mg 64 mg   Next Week Total 64 mg 64 mg 64 mg   Sun 10 mg 10 mg 10 mg   Mon 8 mg 8 mg 8 mg   Tue 10 mg 10 mg 10 mg   Wed 8 mg 8 mg 8 mg   Thu 10 mg 10 mg 10 mg   Fri 8 mg 8 mg 8 mg   Sat 10 mg 10 mg 10 mg   Visit Report - - -   Some recent data might be hidden       Plan:  1. INR is therapeutic today- see above in Anticoagulation Summary.    Man STEWART Roxy to continue their warfarin regimen- see above in Anticoagulation Summary.  2. Follow up in 2 weeks  3. Pt has agreed to only be called if INR out of range. They have been instructed to call if any changes in medications, doses, concerns, etc. Patient expresses understanding and has no further questions at this time.    Sun Curtis   none

## 2024-03-27 NOTE — PATIENT PROFILE ADULT - NSTOBACCONEVERSMOKERY/N_GEN_A
Patient was seen in clinic last week on 2/18/17 for bronchitis. Is having a lot of discomfort in lower right lung when she coughs, and rattling feeling in that part of her lungs.   Is on antibiotics. Had fever yesterday, she is not sure if she is febrile today. Deep painful cough. Wakes her up. Inhaler helps a lot but temporary.     She is wondering if she should have a chest x-ray.     Encouraged patient to try OTC cough syrup at night (says she doesn't usually like to take cough suppressants) She said she will try this.   I will let Dr. Weiler know to see if she feels CXR would be indicated. I told patient she xray may not be needed if treatment would be the same but that I will check with Dr. Weiler. Sayda is fine with that.     Dr. Weiler do you want to order imaging or any other medications or just have patient continue current meds and call us back if not better when antibiotics finished?  Thank you,   Rama Heaton R.N.      Patient heaven be called at. [phone], okay to leave message. Uses [facility] in [place].    No

## 2024-03-27 NOTE — ED PROVIDER NOTE - PHYSICAL EXAMINATION
CONST: Fatigued appearing in NAD but easily arousable. No outward signs of trauma to head or back.  EYES: PERRL, EOMI, Sclera and conjunctiva clear. Pin-point pupils  ENT: No nasal discharge. TM's clear B/L without drainage. Oropharynx normal appearing, no erythema or exudates. Uvula midline.  NECK: Non-tender, no meningeal signs  CARD: Normal S1 S2; Normal rate and rhythm  RESP: Equal BS B/L, No wheezes, rhonchi or rales. No distress  GI: Soft, non-tender, non-distended.  MS: Normal ROM in all extremities. No midline spinal tenderness. Abrasion to R knee.  SKIN: Warm, dry, no acute rashes. Good turgor  NEURO: A&Ox3, No focal deficits. Strength 5/5 with no sensory deficits.

## 2024-03-27 NOTE — H&P ADULT - ATTENDING COMMENTS
*In the event of discrepancy, my note supersedes the resident note.  Date seen: 3/27/24   Agree with HPI above. ROS negative except per HPI.   I reviewed and edited the physical exam above.   Pertinent labs and radiology reviewed and as above.    Assessment/Plan:  57yo male with PMHx of colitis, hiatal hernia, OA, GERD, HLD, HTN, COPD on 5 L at home, HFpEF (EF 65% Nov 2023), chronic pain on MS contin presents to ED for fall. Admitted for rhabdomyolysis and worsening transaminitis.     #rhabdomyolysis   - CK 7521  - trend CK  - IV fluid 75cc/hr; avoid fluid overload, reassess fluid status in am     #mechanical fall  #ambulatory dysfunction  - PT eval    #transaminitis - worsening  - DDx: unclear etiology; could be due to rhabdomyolysis but LFTs were elevated last admission as well   - RUQ u/s 3/23/24: normal   - ALP/AST//421/404  - trend LFTs, get acute hepatitis panel  - hepatology consult     #suspected opioid abuse   #chronic joint pain   - hold home narcotics  - get utox  - consider pain management eval     #COPD on 5L NC  - recent admission and elopement on 3/26/24; was here for noncardiac chest and COPD exacerbation and was started on po prednisone  - can consider stopping or tapering po prednisone; c/w home inhalers    #HLD  - Hold statin     #HFpEF  #HTN  - echo 11/2023, unremarkable  - Hold home lasix and lisinopril     #GERD  - c/w pantoprazole 40mg qd    #insomnia  #anxiety  - c/w xanax and zolpidem    DVT ppx: lovenox  Dispo: from home; PT eval, trend CK, trend LFTs, hepatology eval, utox

## 2024-03-27 NOTE — H&P ADULT - NSCORESITESY/N_GEN_A_CORE_RD
No
71yo F with PMHx of dilated cardiomyopathy with severe MR, HTN, DM presents with dizziness since this morning. Pt reports chest pain and SOB x 2-3 days followed by multiple episodes of dizziness and urinary urgency this am. CP is intermittent, pressure like and radiates to left shoulder and arm. Pt reports similar pain when she was here about 3 weeks ago. Urinary urgency started on last admission. Pt reports she now has to wear diapers because she has difficulty making it to the bathroom on time. Pt reports generalized malaise and decreased exercise intolerance for the last few weeks. No syncope or fall. Pt denies fever, chills, peripheral edema, numbness or tingling in extremities.     Dr. Sommer saw patient upon arrival at bedside. Recommends 40mg IV Lasix and admission to hospitalist.

## 2024-03-27 NOTE — PATIENT PROFILE ADULT - FALL HARM RISK - HARM RISK INTERVENTIONS

## 2024-03-27 NOTE — ED ADULT NURSE REASSESSMENT NOTE - NS ED NURSE REASSESS COMMENT FT1
pt refusing to be on the monitor , pulling off 02, pt unsteady on his feet. md aware. iv access to be put in via ultrasound. pt alert and oriented times 4 lethargic at times. bed alarm remains on pt , close monitoring in place.

## 2024-03-27 NOTE — ED PROVIDER NOTE - PROGRESS NOTE DETAILS
Pt attempted to stand but is not steady on his feet and requires PCA assistance to use restroom. Upon initial exam, pt somnolent, though easily arousable. Asked serval times if patient took any other substances other than 1 Ambien last night and he denies. Pt completely undressed, no outward signs of trauma to head, chest or abdomen. Small abrasion to R knee, otherwise can freely move all extremities. Difficult IV access, US were not available as two traumas are ongoing, thus labs were delayed.

## 2024-03-27 NOTE — ED ADULT NURSE NOTE - NSFALLHARMRISKINTERV_ED_ALL_ED

## 2024-03-27 NOTE — H&P ADULT - NSHPLABSRESULTS_GEN_ALL_CORE
LABS:                          13.8   14.61 )-----------( 214      ( 27 Mar 2024 15:00 )             39.9     03-27    132<L>  |  89<L>  |  32<H>  ----------------------------<  126<H>  4.5   |  28  |  1.2    Ca    9.2      27 Mar 2024 12:48  Mg     2.0     03-26    TPro  6.4  /  Alb  4.0  /  TBili  0.7  /  DBili  x   /  AST  421<H>  /  ALT  404<H>  /  AlkPhos  135<H>  03-27    LIVER FUNCTIONS - ( 27 Mar 2024 12:48 )  Alb: 4.0 g/dL / Pro: 6.4 g/dL / ALK PHOS: 135 U/L / ALT: 404 U/L / AST: 421 U/L / GGT: x             Urinalysis Basic - ( 27 Mar 2024 12:48 )    Color: x / Appearance: x / SG: x / pH: x  Gluc: 126 mg/dL / Ketone: x  / Bili: x / Urobili: x   Blood: x / Protein: x / Nitrite: x   Leuk Esterase: x / RBC: x / WBC x   Sq Epi: x / Non Sq Epi: x / Bacteria: x

## 2024-03-27 NOTE — ED ADULT NURSE REASSESSMENT NOTE - NS ED NURSE REASSESS COMMENT FT1
Patient AAOx4. Fall precautious implemented. Bed alarm and safety socks in placed. Explained to patient the importance of staying in bed and keeping oxygen on. Pt verbalized understanding. Pt stable. No acute distress noted. ER MD made aware, Charge RN made aware and Primary RN made aware Patient AAOx4. Fall precautious implemented. Bed alarm and safety socks in placed. Cardiac monitor placed. Explained to patient the importance of staying in bed, and keeping oxygen on. Pt verbalized understanding. Pt stable. No acute distress noted. ER MD made aware, Charge RN made aware and Primary RN made  aware

## 2024-03-28 LAB
ALBUMIN SERPL ELPH-MCNC: 3.2 G/DL — LOW (ref 3.5–5.2)
ALP SERPL-CCNC: 138 U/L — HIGH (ref 30–115)
ALT FLD-CCNC: 470 U/L — HIGH (ref 0–41)
AMPHET UR-MCNC: NEGATIVE — SIGNIFICANT CHANGE UP
ANION GAP SERPL CALC-SCNC: 10 MMOL/L — SIGNIFICANT CHANGE UP (ref 7–14)
AST SERPL-CCNC: 460 U/L — HIGH (ref 0–41)
BARBITURATES UR SCN-MCNC: NEGATIVE — SIGNIFICANT CHANGE UP
BASOPHILS # BLD AUTO: 0.01 K/UL — SIGNIFICANT CHANGE UP (ref 0–0.2)
BASOPHILS NFR BLD AUTO: 0.1 % — SIGNIFICANT CHANGE UP (ref 0–1)
BENZODIAZ UR-MCNC: POSITIVE
BILIRUB SERPL-MCNC: 0.7 MG/DL — SIGNIFICANT CHANGE UP (ref 0.2–1.2)
BUN SERPL-MCNC: 26 MG/DL — HIGH (ref 10–20)
CALCIUM SERPL-MCNC: 8.6 MG/DL — SIGNIFICANT CHANGE UP (ref 8.4–10.5)
CHLORIDE SERPL-SCNC: 95 MMOL/L — LOW (ref 98–110)
CK SERPL-CCNC: 4469 U/L — HIGH (ref 0–225)
CO2 SERPL-SCNC: 31 MMOL/L — SIGNIFICANT CHANGE UP (ref 17–32)
COCAINE METAB.OTHER UR-MCNC: NEGATIVE — SIGNIFICANT CHANGE UP
CREAT SERPL-MCNC: 0.8 MG/DL — SIGNIFICANT CHANGE UP (ref 0.7–1.5)
EGFR: 104 ML/MIN/1.73M2 — SIGNIFICANT CHANGE UP
EOSINOPHIL # BLD AUTO: 0.03 K/UL — SIGNIFICANT CHANGE UP (ref 0–0.7)
EOSINOPHIL NFR BLD AUTO: 0.4 % — SIGNIFICANT CHANGE UP (ref 0–8)
GGT SERPL-CCNC: 235 U/L — HIGH (ref 1–40)
GLUCOSE SERPL-MCNC: 124 MG/DL — HIGH (ref 70–99)
HCT VFR BLD CALC: 35.1 % — LOW (ref 42–52)
HGB BLD-MCNC: 12.1 G/DL — LOW (ref 14–18)
IMM GRANULOCYTES NFR BLD AUTO: 0.4 % — HIGH (ref 0.1–0.3)
LACTATE SERPL-SCNC: 1.4 MMOL/L — SIGNIFICANT CHANGE UP (ref 0.7–2)
LACTATE SERPL-SCNC: 2 MMOL/L — SIGNIFICANT CHANGE UP (ref 0.7–2)
LYMPHOCYTES # BLD AUTO: 1.15 K/UL — LOW (ref 1.2–3.4)
LYMPHOCYTES # BLD AUTO: 15.2 % — LOW (ref 20.5–51.1)
MAGNESIUM SERPL-MCNC: 2.1 MG/DL — SIGNIFICANT CHANGE UP (ref 1.8–2.4)
MCHC RBC-ENTMCNC: 29.8 PG — SIGNIFICANT CHANGE UP (ref 27–31)
MCHC RBC-ENTMCNC: 34.5 G/DL — SIGNIFICANT CHANGE UP (ref 32–37)
MCV RBC AUTO: 86.5 FL — SIGNIFICANT CHANGE UP (ref 80–94)
METHADONE UR-MCNC: NEGATIVE — SIGNIFICANT CHANGE UP
MONOCYTES # BLD AUTO: 0.42 K/UL — SIGNIFICANT CHANGE UP (ref 0.1–0.6)
MONOCYTES NFR BLD AUTO: 5.5 % — SIGNIFICANT CHANGE UP (ref 1.7–9.3)
NEUTROPHILS # BLD AUTO: 5.95 K/UL — SIGNIFICANT CHANGE UP (ref 1.4–6.5)
NEUTROPHILS NFR BLD AUTO: 78.4 % — HIGH (ref 42.2–75.2)
NRBC # BLD: 0 /100 WBCS — SIGNIFICANT CHANGE UP (ref 0–0)
OPIATES UR-MCNC: NEGATIVE — SIGNIFICANT CHANGE UP
PCP SPEC-MCNC: SIGNIFICANT CHANGE UP
PLATELET # BLD AUTO: 165 K/UL — SIGNIFICANT CHANGE UP (ref 130–400)
PMV BLD: 10.5 FL — HIGH (ref 7.4–10.4)
POTASSIUM SERPL-MCNC: 4.1 MMOL/L — SIGNIFICANT CHANGE UP (ref 3.5–5)
POTASSIUM SERPL-SCNC: 4.1 MMOL/L — SIGNIFICANT CHANGE UP (ref 3.5–5)
PROPOXYPHENE QUALITATIVE URINE RESULT: NEGATIVE — SIGNIFICANT CHANGE UP
PROT SERPL-MCNC: 5.1 G/DL — LOW (ref 6–8)
RBC # BLD: 4.06 M/UL — LOW (ref 4.7–6.1)
RBC # FLD: 16.5 % — HIGH (ref 11.5–14.5)
SODIUM SERPL-SCNC: 136 MMOL/L — SIGNIFICANT CHANGE UP (ref 135–146)
TROPONIN T, HIGH SENSITIVITY RESULT: 41 NG/L — HIGH (ref 6–21)
WBC # BLD: 7.59 K/UL — SIGNIFICANT CHANGE UP (ref 4.8–10.8)
WBC # FLD AUTO: 7.59 K/UL — SIGNIFICANT CHANGE UP (ref 4.8–10.8)

## 2024-03-28 PROCEDURE — 99223 1ST HOSP IP/OBS HIGH 75: CPT

## 2024-03-28 PROCEDURE — 76770 US EXAM ABDO BACK WALL COMP: CPT | Mod: 26

## 2024-03-28 PROCEDURE — 99233 SBSQ HOSP IP/OBS HIGH 50: CPT

## 2024-03-28 RX ORDER — ALPRAZOLAM 0.25 MG
2 TABLET ORAL EVERY 8 HOURS
Refills: 0 | Status: DISCONTINUED | OUTPATIENT
Start: 2024-03-28 | End: 2024-03-28

## 2024-03-28 RX ORDER — SODIUM CHLORIDE 9 MG/ML
1000 INJECTION INTRAMUSCULAR; INTRAVENOUS; SUBCUTANEOUS
Refills: 0 | Status: DISCONTINUED | OUTPATIENT
Start: 2024-03-28 | End: 2024-03-31

## 2024-03-28 RX ORDER — SODIUM CHLORIDE 9 MG/ML
1000 INJECTION INTRAMUSCULAR; INTRAVENOUS; SUBCUTANEOUS
Refills: 0 | Status: DISCONTINUED | OUTPATIENT
Start: 2024-03-28 | End: 2024-03-28

## 2024-03-28 RX ORDER — HYDROXYZINE HCL 10 MG
50 TABLET ORAL ONCE
Refills: 0 | Status: COMPLETED | OUTPATIENT
Start: 2024-03-28 | End: 2024-03-28

## 2024-03-28 RX ORDER — ALPRAZOLAM 0.25 MG
2 TABLET ORAL EVERY 8 HOURS
Refills: 0 | Status: DISCONTINUED | OUTPATIENT
Start: 2024-03-28 | End: 2024-03-31

## 2024-03-28 RX ORDER — HYDROXYZINE HCL 10 MG
50 TABLET ORAL ONCE
Refills: 0 | Status: DISCONTINUED | OUTPATIENT
Start: 2024-03-28 | End: 2024-03-28

## 2024-03-28 RX ORDER — OXYCODONE HYDROCHLORIDE 5 MG/1
10 TABLET ORAL EVERY 6 HOURS
Refills: 0 | Status: DISCONTINUED | OUTPATIENT
Start: 2024-03-28 | End: 2024-03-30

## 2024-03-28 RX ADMIN — SODIUM CHLORIDE 100 MILLILITER(S): 9 INJECTION INTRAMUSCULAR; INTRAVENOUS; SUBCUTANEOUS at 21:07

## 2024-03-28 RX ADMIN — SODIUM CHLORIDE 150 MILLILITER(S): 9 INJECTION INTRAMUSCULAR; INTRAVENOUS; SUBCUTANEOUS at 09:50

## 2024-03-28 RX ADMIN — HEPARIN SODIUM 5000 UNIT(S): 5000 INJECTION INTRAVENOUS; SUBCUTANEOUS at 05:49

## 2024-03-28 RX ADMIN — Medication 3 MILLILITER(S): at 20:26

## 2024-03-28 RX ADMIN — Medication 40 MILLIGRAM(S): at 05:48

## 2024-03-28 RX ADMIN — Medication 2 MILLIGRAM(S): at 02:12

## 2024-03-28 RX ADMIN — OXYCODONE HYDROCHLORIDE 10 MILLIGRAM(S): 5 TABLET ORAL at 13:30

## 2024-03-28 RX ADMIN — OXYCODONE HYDROCHLORIDE 10 MILLIGRAM(S): 5 TABLET ORAL at 18:24

## 2024-03-28 RX ADMIN — OXYCODONE HYDROCHLORIDE 10 MILLIGRAM(S): 5 TABLET ORAL at 19:27

## 2024-03-28 RX ADMIN — OXYCODONE HYDROCHLORIDE 10 MILLIGRAM(S): 5 TABLET ORAL at 12:49

## 2024-03-28 RX ADMIN — HEPARIN SODIUM 5000 UNIT(S): 5000 INJECTION INTRAVENOUS; SUBCUTANEOUS at 12:50

## 2024-03-28 RX ADMIN — Medication 3 MILLILITER(S): at 14:42

## 2024-03-28 RX ADMIN — BUDESONIDE AND FORMOTEROL FUMARATE DIHYDRATE 2 PUFF(S): 160; 4.5 AEROSOL RESPIRATORY (INHALATION) at 09:50

## 2024-03-28 RX ADMIN — Medication 2 MILLIGRAM(S): at 21:01

## 2024-03-28 RX ADMIN — BUDESONIDE AND FORMOTEROL FUMARATE DIHYDRATE 2 PUFF(S): 160; 4.5 AEROSOL RESPIRATORY (INHALATION) at 20:26

## 2024-03-28 RX ADMIN — Medication 50 MILLIGRAM(S): at 05:51

## 2024-03-28 RX ADMIN — HEPARIN SODIUM 5000 UNIT(S): 5000 INJECTION INTRAVENOUS; SUBCUTANEOUS at 21:07

## 2024-03-28 RX ADMIN — Medication 81 MILLIGRAM(S): at 12:49

## 2024-03-28 RX ADMIN — PANTOPRAZOLE SODIUM 40 MILLIGRAM(S): 20 TABLET, DELAYED RELEASE ORAL at 05:49

## 2024-03-28 RX ADMIN — Medication 3 MILLILITER(S): at 10:03

## 2024-03-28 RX ADMIN — Medication 2 MILLIGRAM(S): at 13:51

## 2024-03-28 NOTE — PROGRESS NOTE ADULT - SUBJECTIVE AND OBJECTIVE BOX
Patient is a 58y old  Male who presents with a chief complaint of weakness in legs and unable ot ambulate     Patient was seen and examined.  Pt drowsy  As per staff keeps asking for oxycodone and xanax    PAST MEDICAL & SURGICAL HISTORY:  HTN (hypertension)  High cholesterol  GERD (gastroesophageal reflux disease)  Morbid obesity  Osteoarthritis  Hiatal hernia  GERD  Colitis LARGE INTESTINE   NO RECENT FLARES  COPD (chronic obstructive pulmonary disease)  REBECCA treated with BiPAP  History of cholecystectomy  History of appendectomy  History of knee replacement procedure of right kneeBILATERAL  Hernia, inguinal, bilateral 3 months old  H/O knee surgery arthoscopic x4  H/O foot surgery right big toe surgery      Allergies  No Known Allergies    MEDICATIONS  (STANDING):  albuterol/ipratropium for Nebulization 3 milliLiter(s) Nebulizer every 6 hours  aspirin enteric coated 81 milliGRAM(s) Oral daily  budesonide 160 MICROgram(s)/formoterol 4.5 MICROgram(s) Inhaler 2 Puff(s) Inhalation two times a day  heparin   Injectable 5000 Unit(s) SubCutaneous every 8 hours  oxyCODONE    IR 10 milliGRAM(s) Oral every 6 hours  pantoprazole    Tablet 40 milliGRAM(s) Oral before breakfast  predniSONE   Tablet 40 milliGRAM(s) Oral daily  sodium chloride 0.9%. 1000 milliLiter(s) (150 mL/Hr) IV Continuous <Continuous>    MEDICATIONS  (PRN):  albuterol    90 MICROgram(s) HFA Inhaler 2 Puff(s) Inhalation every 6 hours PRN for shortness of breath and/or wheezing  ALPRAZolam 2 milliGRAM(s) Oral every 8 hours PRN insomnia    Vital Signs Last 24 Hrs  T(C): 36.1  T(F): 96.9  HR: 97  BP: 111/60  BP(mean): --  RR: 22  SpO2: 97%    O/E: wdrowsy, not in distress.  HEENT: atraumatic, EOMI.  Chest: decreased breath soiunds at bases  CVS: SIS2 +, no murmur.  P/A: Soft, BS+  CNS:  drowsy , responds to verbal commands  Ext: no edema feet.  All systems reviewed positive findings as above.                        12.1<L>  7.59  )-----------( 165      ( 28 Mar 2024 05:53 )             35.1<L>                        13.8<L>  14.61<H> )-----------( 214      ( 27 Mar 2024 15:00 )             39.9<L>    03-28    136  |  95<L>  |  26<H>  ----------------------------<  124<H>  4.1   |  31  |  0.8  03-27    132<L>  |  89<L>  |  32<H>  ----------------------------<  126<H>  4.5   |  28  |  1.2    Ca    8.6      28 Mar 2024 05:53  Ca    9.2      27 Mar 2024 12:48  Ca    8.8      26 Mar 2024 18:16  Mg     2.1     03-28    TPro  5.1<L>  /  Alb  3.2<L>  /  TBili  0.7  /  DBili  x   /  AST  460<H>  /  ALT  470<H>  /  AlkPhos  138<H>  03-28  TPro  6.4  /  Alb  4.0  /  TBili  0.7  /  DBili  x   /  AST  421<H>  /  ALT  404<H>  /  AlkPhos  135<H>  03-27  TPro  6.1  /  Alb  3.7  /  TBili  0.5  /  DBili  x   /  AST  168<H>  /  ALT  219<H>  /  AlkPhos  120<H>  03-26      CARDIAC MARKERS ( 28 Mar 2024 05:53 )  x     / x     / 4469 U/L<H> / x     / x      CARDIAC MARKERS ( 27 Mar 2024 12:48 )  x     / x     / 7521 U/L<H> / x     / x            Urinalysis Basic - ( 28 Mar 2024 05:53 )    Color: x / Appearance: x / SG: x / pH: x  Gluc: 124 mg/dL / Ketone: x  / Bili: x / Urobili: x   Blood: x / Protein: x / Nitrite: x   Leuk Esterase: x / RBC: x / WBC x   Sq Epi: x / Non Sq Epi: x / Bacteria: x           Patient is a 58y old  Male who presents with a chief complaint of weakness in legs and unable ot ambulate     Patient was seen and examined.  Pt drowsy  As per staff keeps asking for oxycodone and xanax    PAST MEDICAL & SURGICAL HISTORY:  HTN (hypertension)  High cholesterol  GERD (gastroesophageal reflux disease)  Morbid obesity  Osteoarthritis  Hiatal hernia  GERD  Colitis LARGE INTESTINE   NO RECENT FLARES  COPD (chronic obstructive pulmonary disease)  REBECCA treated with BiPAP  History of cholecystectomy  History of appendectomy  History of knee replacement procedure of right kneeBILATERAL  Hernia, inguinal, bilateral 3 months old  H/O knee surgery arthoscopic x4  H/O foot surgery right big toe surgery      Allergies  No Known Allergies    MEDICATIONS  (STANDING):  albuterol/ipratropium for Nebulization 3 milliLiter(s) Nebulizer every 6 hours  aspirin enteric coated 81 milliGRAM(s) Oral daily  budesonide 160 MICROgram(s)/formoterol 4.5 MICROgram(s) Inhaler 2 Puff(s) Inhalation two times a day  heparin   Injectable 5000 Unit(s) SubCutaneous every 8 hours  oxyCODONE    IR 10 milliGRAM(s) Oral every 6 hours  pantoprazole    Tablet 40 milliGRAM(s) Oral before breakfast  predniSONE   Tablet 40 milliGRAM(s) Oral daily  sodium chloride 0.9%. 1000 milliLiter(s) (150 mL/Hr) IV Continuous <Continuous>    MEDICATIONS  (PRN):  albuterol    90 MICROgram(s) HFA Inhaler 2 Puff(s) Inhalation every 6 hours PRN for shortness of breath and/or wheezing  ALPRAZolam 2 milliGRAM(s) Oral every 8 hours PRN insomnia    Vital Signs Last 24 Hrs  T(C): 36.1  T(F): 96.9  HR: 97  BP: 111/60  BP(mean): --  RR: 22  SpO2: 97%    O/E: drowsy, not in distress.  HEENT: atraumatic, EOMI.  Chest: decreased breath soiunds at bases  CVS: SIS2 +, no murmur.  P/A: Soft, BS+  CNS:  drowsy , responds to verbal commands  Ext: no edema feet.  All systems reviewed positive findings as above.                        12.1<L>  7.59  )-----------( 165      ( 28 Mar 2024 05:53 )             35.1<L>                        13.8<L>  14.61<H> )-----------( 214      ( 27 Mar 2024 15:00 )             39.9<L>    03-28    136  |  95<L>  |  26<H>  ----------------------------<  124<H>  4.1   |  31  |  0.8  03-27    132<L>  |  89<L>  |  32<H>  ----------------------------<  126<H>  4.5   |  28  |  1.2    Ca    8.6      28 Mar 2024 05:53  Ca    9.2      27 Mar 2024 12:48  Ca    8.8      26 Mar 2024 18:16  Mg     2.1     03-28    TPro  5.1<L>  /  Alb  3.2<L>  /  TBili  0.7  /  DBili  x   /  AST  460<H>  /  ALT  470<H>  /  AlkPhos  138<H>  03-28  TPro  6.4  /  Alb  4.0  /  TBili  0.7  /  DBili  x   /  AST  421<H>  /  ALT  404<H>  /  AlkPhos  135<H>  03-27  TPro  6.1  /  Alb  3.7  /  TBili  0.5  /  DBili  x   /  AST  168<H>  /  ALT  219<H>  /  AlkPhos  120<H>  03-26      CARDIAC MARKERS ( 28 Mar 2024 05:53 )  x     / x     / 4469 U/L<H> / x     / x      CARDIAC MARKERS ( 27 Mar 2024 12:48 )  x     / x     / 7521 U/L<H> / x     / x            Urinalysis Basic - ( 28 Mar 2024 05:53 )    Color: x / Appearance: x / SG: x / pH: x  Gluc: 124 mg/dL / Ketone: x  / Bili: x / Urobili: x   Blood: x / Protein: x / Nitrite: x   Leuk Esterase: x / RBC: x / WBC x   Sq Epi: x / Non Sq Epi: x / Bacteria: x

## 2024-03-28 NOTE — PROGRESS NOTE ADULT - SUBJECTIVE AND OBJECTIVE BOX
24H events:    Patient is a 58y old Male who presents with a chief complaint of   Primary diagnosis of Rhabdomyolysis      Today is hospital day 1d. This morning patient was seen and examined at bedside, resting comfortably in bed. Patient is very agitated requesting pain meds. Patient made aware of medical plans and that pain medications will only be given after confirmation with pharmacy/pain doctor and UDS. No acute or major events overnight. Hemodynamically stable, tolerating oral diet, voiding appropriately with appropriate bowel movements.     PAST MEDICAL & SURGICAL HISTORY  HTN (hypertension)    High cholesterol    GERD (gastroesophageal reflux disease)    Morbid obesity    Osteoarthritis    Hiatal hernia  GERD    Colitis  LARGE INTESTINE   NO RECENT FLARES    COPD (chronic obstructive pulmonary disease)    REBECCA treated with BiPAP    History of cholecystectomy    History of appendectomy    History of knee replacement procedure of right knee  BILATERAL    Hernia, inguinal, bilateral  3 months old    H/O knee surgery  arthoscopic x4    H/O foot surgery  right big toe surgery      SOCIAL HISTORY:  Social History:      ALLERGIES:  No Known Allergies    MEDICATIONS:  STANDING MEDICATIONS  albuterol/ipratropium for Nebulization 3 milliLiter(s) Nebulizer every 6 hours  ALPRAZolam 2 milliGRAM(s) Oral every 8 hours  aspirin enteric coated 81 milliGRAM(s) Oral daily  budesonide 160 MICROgram(s)/formoterol 4.5 MICROgram(s) Inhaler 2 Puff(s) Inhalation two times a day  fenofibrate Tablet 145 milliGRAM(s) Oral daily  heparin   Injectable 5000 Unit(s) SubCutaneous every 8 hours  oxyCODONE    IR 10 milliGRAM(s) Oral every 6 hours  pantoprazole    Tablet 40 milliGRAM(s) Oral before breakfast  predniSONE   Tablet 40 milliGRAM(s) Oral daily  simvastatin 20 milliGRAM(s) Oral at bedtime  sodium chloride 0.9%. 1000 milliLiter(s) IV Continuous <Continuous>    PRN MEDICATIONS  albuterol    90 MICROgram(s) HFA Inhaler 2 Puff(s) Inhalation every 6 hours PRN  ALPRAZolam 2 milliGRAM(s) Oral every 6 hours PRN    VITALS:   T(F): 96.9  HR: 97  BP: 111/60  RR: 22  SpO2: 97%    PHYSICAL EXAM:  GENERAL: NAD, lying in bed comfortably, cooperative, obese, on NC O2  HEAD: NCAT  NECK: Supple  HEART: Regular rate and rhythm, normal S1/S2,  LUNGS: No acute respiratory distress, clear b/l breath sounds   ABDOMEN:  soft, non-tender, non-distended,   EXTREMITIES: no edema  NERVOUS SYSTEM: follows commands, answers questions appropriately     LABS:                        12.1   7.59  )-----------( 165      ( 28 Mar 2024 05:53 )             35.1     03-28    136  |  95<L>  |  26<H>  ----------------------------<  124<H>  4.1   |  31  |  0.8    Ca    8.6      28 Mar 2024 05:53  Mg     2.1     03-28    TPro  5.1<L>  /  Alb  3.2<L>  /  TBili  0.7  /  DBili  x   /  AST  460<H>  /  ALT  470<H>  /  AlkPhos  138<H>  03-28      Urinalysis Basic - ( 28 Mar 2024 05:53 )    Color: x / Appearance: x / SG: x / pH: x  Gluc: 124 mg/dL / Ketone: x  / Bili: x / Urobili: x   Blood: x / Protein: x / Nitrite: x   Leuk Esterase: x / RBC: x / WBC x   Sq Epi: x / Non Sq Epi: x / Bacteria: x        Creatine Kinase, Serum: 4469 U/L *H* (03-28-24 @ 05:53)  Lactate, Blood: 2.0 mmol/L (03-28-24 @ 05:53)  Lactate, Blood: 1.4 mmol/L (03-27-24 @ 23:55)      CARDIAC MARKERS ( 28 Mar 2024 05:53 )  x     / x     / 4469 U/L / x     / x      CARDIAC MARKERS ( 27 Mar 2024 12:48 )  x     / x     / 7521 U/L / x     / x          RADIOLOGY:    RADIOLOGY

## 2024-03-28 NOTE — CONSULT NOTE ADULT - SUBJECTIVE AND OBJECTIVE BOX
Gastroenterology Consultation:    Patient is a 58y old  Male who presents with a chief complaint of       Admitted on: 03-27-24      HPI:  59yo male with PMHx colitis, hiatal hernia, OA, GERD, HLD, HTN, COPD on 5 L at home, HFpEF (EF 65% Nov 2023), knee replacement, hip replacement, shoulder replacement, current smoker, on MS Contin for chronic joint pain presents c/o weakness in legs and unable ot ambulate since last night. PT notes he slipped on a glass of water last night in his home, fell onto R knee and then was unable to get up from the floor. He reports he thinks he was on the floor for 45min-1hr. He states family was unable to pick him up so EMS was called. Upon EMS arriving, pt reported he could not walk and thus was brought to ED. Of note pt was seen in ED yesterday reported recently filled a prescription for MS Contin but took all but two pills of script within 2 days of filling it. Tox was consulted and was to be observed in ED until 2300 but pt eloped prior to completion of observation. Pt reports went home and took 1 Ambien, not any more MS Contin.        PAST MEDICAL & SURGICAL HISTORY:  HTN (hypertension)      High cholesterol      GERD (gastroesophageal reflux disease)      Morbid obesity      Osteoarthritis      Hiatal hernia  GERD      Colitis  LARGE INTESTINE   NO RECENT FLARES      COPD (chronic obstructive pulmonary disease)      REBECCA treated with BiPAP      History of cholecystectomy      History of appendectomy      History of knee replacement procedure of right knee  BILATERAL      Hernia, inguinal, bilateral  3 months old      H/O knee surgery  arthoscopic x4      H/O foot surgery  right big toe surgery            FAMILY HISTORY:  FH: lung cancer (Mother)  mother    FH: diabetes mellitus        Social History:  Tobacco:  Alcohol:  Drugs:    Home Medications:  ALPRAZolam 2 mg oral tablet: 1 tab(s) orally every 6 hours, As needed, anxiety (27 Mar 2024 17:58)  Ambien 10 mg oral tablet: 0.5 tab(s) orally once a day (at bedtime), As Needed (27 Mar 2024 17:58)  Aspir 81 oral delayed release tablet: 1 tab(s) orally once a day (27 Mar 2024 17:58)  fenofibrate 134 mg oral capsule: 1 cap(s) orally once a day (27 Mar 2024 17:58)  Lasix 40 mg oral tablet: 1 tab(s) orally once a day (27 Mar 2024 17:58)  morphine 100 mg/12 to 24 hr oral capsule, extended release: 1 cap(s) orally 2 times a day (27 Mar 2024 17:58)  omeprazole 40 mg oral delayed release capsule: 1 cap(s) orally once a day (27 Mar 2024 17:58)  predniSONE 20 mg oral tablet: 2 tab(s) orally once a day (27 Mar 2024 17:58)  Zocor 20 mg oral tablet: 1 tab(s) orally once a day (at bedtime) (27 Mar 2024 17:58)        MEDICATIONS  (STANDING):  albuterol/ipratropium for Nebulization 3 milliLiter(s) Nebulizer every 6 hours  ALPRAZolam 2 milliGRAM(s) Oral every 8 hours  aspirin enteric coated 81 milliGRAM(s) Oral daily  budesonide 160 MICROgram(s)/formoterol 4.5 MICROgram(s) Inhaler 2 Puff(s) Inhalation two times a day  fenofibrate Tablet 145 milliGRAM(s) Oral daily  heparin   Injectable 5000 Unit(s) SubCutaneous every 8 hours  oxyCODONE    IR 10 milliGRAM(s) Oral every 6 hours  pantoprazole    Tablet 40 milliGRAM(s) Oral before breakfast  predniSONE   Tablet 40 milliGRAM(s) Oral daily  simvastatin 20 milliGRAM(s) Oral at bedtime  sodium chloride 0.9%. 1000 milliLiter(s) (150 mL/Hr) IV Continuous <Continuous>    MEDICATIONS  (PRN):  albuterol    90 MICROgram(s) HFA Inhaler 2 Puff(s) Inhalation every 6 hours PRN for shortness of breath and/or wheezing  ALPRAZolam 2 milliGRAM(s) Oral every 6 hours PRN anxiety      Allergies  No Known Allergies      Review of Systems:   Constitutional:  No Fever, No Chills  ENT/Mouth:  No Hearing Changes,  No Difficulty Swallowing  Eyes:  No Eye Pain, No Vision Changes  Cardiovascular:  No Chest Pain, No Palpitations  Respiratory:  No Cough, No Dyspnea  Gastrointestinal:  As described in HPI          Physical Examination:  T(C): 36.1 (03-28-24 @ 05:00), Max: 36.1 (03-28-24 @ 05:00)  HR: 97 (03-28-24 @ 05:00) (97 - 102)  BP: 111/60 (03-28-24 @ 05:00) (111/60 - 132/74)  RR: 22 (03-28-24 @ 05:00) (20 - 22)  SpO2: 97% (03-28-24 @ 05:00) (97% - 97%)      03-27-24 @ 07:01  -  03-28-24 @ 07:00  --------------------------------------------------------  IN: 525 mL / OUT: 0 mL / NET: 525 mL      GENERAL: AAOx3, no acute distress.  HEAD:  Atraumatic, Normocephalic  EYES: conjunctiva and sclera clear  CHEST/LUNG: Clear to auscultation bilaterally; No wheeze, rhonchi, or rales  HEART: Regular rate and rhythm; normal S1, S2, No murmurs.  ABDOMEN: Soft, nontender, nondistended; Bowel sounds present  SKIN: Intact, no jaundice        Data:                        12.1   7.59  )-----------( 165      ( 28 Mar 2024 05:53 )             35.1     Hgb Trend:  12.1  03-28-24 @ 05:53  13.8  03-27-24 @ 15:00  13.9  03-26-24 @ 18:16  13.7  03-26-24 @ 08:15        03-28    136  |  95<L>  |  26<H>  ----------------------------<  124<H>  4.1   |  31  |  0.8    Ca    8.6      28 Mar 2024 05:53  Mg     2.1     03-28    TPro  5.1<L>  /  Alb  3.2<L>  /  TBili  0.7  /  DBili  x   /  AST  460<H>  /  ALT  470<H>  /  AlkPhos  138<H>  03-28    Liver panel trend:  TBili 0.7   /      /      /   AlkP 138   /   Tptn 5.1   /   Alb 3.2    /   DBili --      03-28  TBili 0.7   /      /      /   AlkP 135   /   Tptn 6.4   /   Alb 4.0    /   DBili -- 03-27  TBili 0.5   /      /      /   AlkP 120   /   Tptn 6.1   /   Alb 3.7    /   DBili -- 03-26  TBili 0.6   /      /      /   AlkP 134   /   Tptn 6.1   /   Alb 3.8    /   DBili -- 03-26  TBili 0.6   /   AST 59   /      /   AlkP 129   /   Tptn 5.7   /   Alb 3.5    /   DBili -- 03-23  TBili 0.5   /   AST 72   /      /   AlkP 122   /   Tptn 5.8   /   Alb 3.5    /   DBili -- 03-22              Radiology:

## 2024-03-28 NOTE — PHYSICAL THERAPY INITIAL EVALUATION ADULT - PERTINENT HX OF CURRENT PROBLEM, REHAB EVAL
57yo male with PMHx colitis, hiatal hernia, OA, GERD, HLD, HTN, COPD on 5 L at home, HFpEF (EF 65% Nov 2023), knee replacement, hip replacement, shoulder replacement, current smoker, on MS Contin for chronic joint pain presents c/o weakness in legs and unable ot ambulate since last night. PT notes he slipped on a glass of water last night in his home, fell onto R knee and then was unable to get up from the floor. He reports he thinks he was on the floor for 45min-1hr. He states family was unable to pick him up so EMS was called. Upon EMS arriving, pt reported he could not walk and thus was brought to ED. Of note pt was seen in ED yesterday reported recently filled a prescription for MS Contin but took all but two pills of script within 2 days of filling it. Tox was consulted and was to be observed in ED until 2300 but pt eloped prior to completion of observation. Pt reports went home and took 1 Ambien, not any more MS Contin.

## 2024-03-28 NOTE — PROGRESS NOTE ADULT - ASSESSMENT
57yo male with PMHx colitis, hiatal hernia, OA, GERD, HLD, HTN, COPD on 5 L at home, HFpEF (EF 65% Nov 2023), knee replacement, hip replacement, shoulder replacement, current smoker, on MS Contin for chronic joint pain presents c/o weakness in legs and unable ot ambulate since last night. PT notes he slipped on a glass of water last night in his home, fell onto R knee and then was unable to get up from the floor. He reports he thinks he was on the floor for 45min-1hr. He states family was unable to pick him up so EMS was called. Upon EMS arriving, pt reported he could not walk and thus was brought to ED. Of note pt was seen in ED yesterday reported recently filled a prescription for MS Contin but took all but two pills of script within 2 days of filling it. Tox was consulted and was to be observed in ED until 2300 but pt eloped prior to completion of observation. Pt reports went home and took 1 Ambien, not any more MS Contin.    # Lactic acidosis- resolved  # Acute kidney injury , prerenal resolving  # Rhabdomylosis  - US Kidney and Bladder (03.28.24 @ 13:21) >Normal renal ultrasound.  - Lactate, Blood: 2.0 mmol/L (03.28.24 @ 05:53)  - Creatine Kinase, Serum: 4469 U/L (03.28.24 @ 05:53)  - continue holding lasix , lisinopril  - monitor BMP  - trend CPk    # Metabolic encephalopathy sec to opiates  - pt on morphine and oxycodone at home  - addiction medicine eval  - catch team team eval    # Transaminitis  - Hold statin , fibrate  -  US Abdomen Upper Quadrant Right (03.23.24 @ 18:12) >No definite hepatic mass or biliary ductal dilatation.  - F/u ammonia level  - UDS  - monitor LFT  - F/u acute hepatitis profile    # H/o recent COPD exacerbation   #  H/o chronic resp failure  - : Xray Chest 1 View- PORTABLE-Urgent (Xray Chest 1 View- PORTABLE-Urgent .) (03.27.24 @ 08:19) >Low lung volumes. Left lung base atelectasis.  - c/w prednisone   -  c/w duoneb, budesonide  - maintain oxygen sat > 92    # Elevated troponin, type 2 MI sec to fall  - Troponin T, High Sensitivity Result: 41(03.28.24 @ 05:53)  - 12 Lead ECG (03.27.24 @ 07:02) >Sinus tachycardiawith Premature atrial complexes    # Opiod dependance  - As per patients pharmacy -  oxycodone 30mg Q5H, morphine sulfate 100mg Q6H, Xanax 2mg Q6, ambien 10mg QHS PRN.prescribed b  Dr. Erick Womack for morphine and oxy [phone] and  Dr. Lukas Delgado for zolpidem and xanax [phone]    # GERD  # Hiatal hernia  - c/w protonix    # Anxiety  - decrease xanax to 2mg q8    # REBECCA  # Morbid obesity  - BMI: 51    # DVT prophylaxis    # full code    # Pending: addiction med eval  # Disposition: Home when stable     57yo male with PMHx colitis, hiatal hernia, OA, GERD, HLD, HTN, COPD on 5 L at home, HFpEF (EF 65% Nov 2023), knee replacement, hip replacement, shoulder replacement, current smoker, on MS Contin for chronic joint pain presents c/o weakness in legs and unable ot ambulate since last night. PT notes he slipped on a glass of water last night in his home, fell onto R knee and then was unable to get up from the floor. He reports he thinks he was on the floor for 45min-1hr. He states family was unable to pick him up so EMS was called. Upon EMS arriving, pt reported he could not walk and thus was brought to ED. Of note pt was seen in ED yesterday reported recently filled a prescription for MS Contin but took all but two pills of script within 2 days of filling it. Tox was consulted and was to be observed in ED until 2300 but pt eloped prior to completion of observation. Pt reports went home and took 1 Ambien, not any more MS Contin.    # Fall  # Lactic acidosis- resolved  # Acute kidney injury , prerenal resolving  # Rhabdomylosis  - US Kidney and Bladder (03.28.24 @ 13:21) >Normal renal ultrasound.  - Lactate, Blood: 2.0 mmol/L (03.28.24 @ 05:53)  - Creatine Kinase, Serum: 4469 U/L (03.28.24 @ 05:53)  - continue holding lasix , lisinopril  - IV flids  - monitor BMP  - trend CPk  - PT eval    # Metabolic encephalopathy sec to opiates  - pt on morphine and oxycodone at home  - addiction medicine eval  - catch team team eval    # Transaminitis  - Hold statin , fibrate  -  US Abdomen Upper Quadrant Right (03.23.24 @ 18:12) >No definite hepatic mass or biliary ductal dilatation.  - F/u ammonia level  - UDS  - monitor LFT  - F/u acute hepatitis profile    # H/o recent COPD exacerbation   #  H/o chronic resp failure  - : Xray Chest 1 View- PORTABLE-Urgent (Xray Chest 1 View- PORTABLE-Urgent .) (03.27.24 @ 08:19) >Low lung volumes. Left lung base atelectasis.  - c/w prednisone   -  c/w duoneb, budesonide  - maintain oxygen sat > 92    # Elevated troponin, type 2 MI sec to fall  - Troponin T, High Sensitivity Result: 41(03.28.24 @ 05:53)  - 12 Lead ECG (03.27.24 @ 07:02) >Sinus tachycardiawith Premature atrial complexes    # Opiod dependance  - As per patients pharmacy -  oxycodone 30mg Q5H, morphine sulfate 100mg Q6H, Xanax 2mg Q6, ambien 10mg QHS PRN.prescribed b  Dr. Erick Womack for morphine and oxy [phone] and  Dr. Lukas Delgado for zolpidem and xanax [phone]    # GERD  # Hiatal hernia  - c/w protonix    # Anxiety  - decrease xanax to 2mg q8    # REBECCA  # Morbid obesity  - BMI: 51    # DVT prophylaxis    # full code    # Pending: addiction med eval, monitor BMP, CPK, UDS, monitor LFT, PT eval  # Disposition: Home when stable

## 2024-03-28 NOTE — CONSULT NOTE ADULT - ATTENDING COMMENTS
58 y o  M with obesity HTN HLD HFpEF admitted with myalgia and hx of fall.  Comfortable  Abdomen soft non tender  Acute elevation of transaminases with elevation of CK  Pattern of liver injury is suggestive of DILI due to combination of statin and fibrate.   Has evidence of rhabdomyolysis clinically and has elevated liver enzymes.

## 2024-03-28 NOTE — CONSULT NOTE ADULT - ASSESSMENT
#Acute on chronic Liver Injury -mixed likely d/t rhabdomyolysis and underlying statin/fibrate combination DILI  T gayathri 0.7         03-28-24 @ 05:53  T gayathri 0.7         03-27-24 @ 12:48  T gayathri 0.5         03-26-24 @ 18:16  T gayathri 0.6         03-26-24 @ 08:15    - RUQ sono - CBD 6mm, post CCY  - LFts elevated ALP, and chronic elevation  - CK 7k    Rec  - Please obtain LFTs, coagulation profile qdaily  - Please send Hepatitis A IgM, Hepatitis B core IgM, core Ab total, surface Ag, surface Ab, HCV antibody, HCV RNA, Anti HEV  - Please send Iron studies  - Please send ALMAZ, AMA, anti-Smooth Muscle Ab type 1, A1AT phenotype, Anti liver-kidney microsomal Ab, Anti-soluble liver Ag, immunoglobulin panel  - Please send Serum Drug screen and Utox  - Please avoid hepatotoxic medications    Communicated with primary team     #Acute on chronic Liver Injury -mixed likely d/t rhabdomyolysis and underlying statin/fibrate combination DILI  T gayathri 0.7         03-28-24 @ 05:53  T gayathri 0.7         03-27-24 @ 12:48  T gayathri 0.5         03-26-24 @ 18:16  T gayathri 0.6         03-26-24 @ 08:15    - RUQ sono - CBD 6mm, post CCY  - LFts elevated ALP, and chronic elevation  - CK 7k    Rec  - Please obtain LFTs, coagulation profile qdaily  - Please send Hepatitis A IgM, Hepatitis B core IgM, core Ab total, surface Ag, surface Ab, HCV antibody, HCV RNA, Anti HEV  - Please send Iron studies  - Please send ALMAZ, AMA, anti-Smooth Muscle Ab type 1, A1AT phenotype, Anti liver-kidney microsomal Ab, Anti-soluble liver Ag, immunoglobulin panel  - Please avoid hepatotoxic medications    Communicated with primary team

## 2024-03-28 NOTE — PHYSICAL THERAPY INITIAL EVALUATION ADULT - GENERAL OBSERVATIONS, REHAB EVAL
Pt seen from 1000-125. Pt encountered in the bed, + O2@ 4l/min via NC, SPO2 92-97% through out the session, c/o pain in lower back & gayathri LE, agreeable for b/s PT IE/tx. Pt seen from 1000-125. Pt encountered in the bed, + 1:1 sit, O2@ 4l/min via NC, SPO2 92-97% through out the session, c/o pain in lower back & gayathri LE, agreeable for b/s PT IE/tx.

## 2024-03-28 NOTE — PROGRESS NOTE ADULT - ASSESSMENT
57yo male with PMHx colitis, hiatal hernia, OA, GERD, HLD, HTN, COPD on 5 L at home, HFpEF (EF 65% Nov 2023), knee replacement, hip replacement, shoulder replacement, current smoker, on MS Contin for chronic joint pain presents c/o weakness in legs and unable ot ambulate since last night. PT notes he slipped on a glass of water last night in his home, fell onto R knee and then was unable to get up from the floor. He reports he thinks he was on the floor for 45min-1hr. He states family was unable to pick him up so EMS was called. Upon EMS arriving, pt reported he could not walk and thus was brought to ED. Of note pt was seen in ED yesterday reported recently filled a prescription for MS Contin but took all but two pills of script within 2 days of filling it. Tox was consulted and was to be observed in ED until 2300 but pt eloped prior to completion of observation. Pt reports went home and took 1 Ambien, not any more MS Contin.    #Acute kidney injury likely 2/2 Rhabdomyolysis vs Pre renal from decreased PO intake - resolved  - CPK 7521  - Lactate: 2.0  03/28  - Cr 0.8 today  - increase IV fluid to 150 cc/hour   - Trend CPK level   - Avoid nephrotoxic agents, hold lisinopril, hold lasix   - Monitor BUN/creatinine  - Check urine tox negative    # Recent COPD exacerbation on 5L at home  # Chronic Hypercapnia   - Patient still wheezing on exam   - Chest X-ray: Low lung volumes. Left lung base atelectasis.  - cxr neg for pneumonia  - C/w prednisone 40 mg   - albuterol 90mcg 2 puffs q6h PRN   - symbicort   - duoneb 3mL every 6 hours PRN     #Transaminitis, uptrending   -  ALK: 135, AST: 421, ALT: 404  - RUQ US Suboptimal exam. No definite hepatic mass or biliary ductal dilatation  - hold statin  - monitor   03/28  -   - Hepatology recs appreciated: coags daily. hepatitis labs, hepatic autoimmune lbas.   - pending ammonia        # Recent admission for chest pain likely secondary to msk  # chronic musculoskeletal pain   # Elevated troponin   - recent NST less than a year ago neg for ischemia  - trops 30 -> 28 -> 25 -> 57   - EKG: no acute ischemic changes   - echo 11/2023, unremarkable   -  recently filled a prescription for MS Contin but took all but two pills of script within 2 days of filling it.   - C/w lidocaine patch   - aspirin 81mg daily   - Tylenol PRN   - Hold on opioids for now,   - F/u repeat troponin   03/28  - verified oxycodone 30mg Q5H, morphine sulfate 100mg Q6H, Xanax 2mg Q6, ambien 10mg QHS PRN. (with pharmacy and pain doctor.  - follows Dr. Erick Womack for morphine and oxy [phone]  - follows Dr. Lukas Delgado for zolpidem and xanax [phone]      # DLP  - Hold statin     #HFpEF  #HTN  - echo 11/2023, unremarkable  - Hold furosemide 40mg daily  - Hold lisinopril in setting of TEODORO     #GERD  - pantoprazole 40qd    #insomnia  #anxiety  - xanax 2mg q8h prn for now  - zolpidem 5mg nightly prn     #Misc  - DVT ppx: Heparin SQ  - GI PPX: protonix   - Diet: DASH  - Activity/PT eval       59yo male with PMHx colitis, hiatal hernia, OA, GERD, HLD, HTN, COPD on 5 L at home, HFpEF (EF 65% Nov 2023), knee replacement, hip replacement, shoulder replacement, current smoker, on MS Contin for chronic joint pain presents c/o weakness in legs and unable ot ambulate since last night. PT notes he slipped on a glass of water last night in his home, fell onto R knee and then was unable to get up from the floor. He reports he thinks he was on the floor for 45min-1hr. He states family was unable to pick him up so EMS was called. Upon EMS arriving, pt reported he could not walk and thus was brought to ED. Of note pt was seen in ED yesterday reported recently filled a prescription for MS Contin but took all but two pills of script within 2 days of filling it. Tox was consulted and was to be observed in ED until 2300 but pt eloped prior to completion of observation. Pt reports went home and took 1 Ambien, not any more MS Contin.    #Acute kidney injury likely 2/2 Rhabdomyolysis vs Pre renal from decreased PO intake - resolved  - CPK 7521-> 4469  - Lactate: 2.0  03/28  - Cr 0.8 today  - increase IV fluid to 150 cc/hour   - Trend CPK level   - Avoid nephrotoxic agents, hold lisinopril, hold lasix   - Monitor BUN/creatinine  - Check urine tox negative  - normal renal ultrasound    # Recent COPD exacerbation on 5L at home  # Chronic Hypercapnia   - Patient still wheezing on exam   - Chest X-ray: Low lung volumes. Left lung base atelectasis.  - cxr neg for pneumonia  - C/w prednisone 40 mg   - albuterol 90mcg 2 puffs q6h PRN   - symbicort   - duoneb 3mL every 6 hours PRN     #Transaminitis, uptrending   -  ALK: 135, AST: 421, ALT: 404  - RUQ US Suboptimal exam. No definite hepatic mass or biliary ductal dilatation  - hold statin  - monitor   03/28  -   - Hepatology recs appreciated: coags daily. hepatitis labs, hepatic autoimmune lbas.   - pending ammonia        # Recent admission for chest pain likely secondary to msk  # chronic musculoskeletal pain   # Elevated troponin   - recent NST less than a year ago neg for ischemia  - trops 30 -> 28 -> 25 -> 57   - EKG: no acute ischemic changes   - echo 11/2023, unremarkable   -  recently filled a prescription for MS Contin but took all but two pills of script within 2 days of filling it.   - C/w lidocaine patch   - aspirin 81mg daily   - Tylenol PRN   - Hold on opioids for now,   - F/u repeat troponin   03/28  - verified oxycodone 30mg Q5H, morphine sulfate 100mg Q6H, Xanax 2mg Q6, ambien 10mg QHS PRN. (with pharmacy and pain doctor.  - follows Dr. Erick Womack for morphine and oxy [phone]  - follows Dr. Lukas Delgado for zolpidem and xanax [phone]      # DLP  - Hold statin     #HFpEF  #HTN  - echo 11/2023, unremarkable  - Hold furosemide 40mg daily  - Hold lisinopril in setting of TEODORO     #GERD  - pantoprazole 40qd    #insomnia  #anxiety  - xanax 2mg q8h prn for now  - zolpidem 5mg nightly prn     #Misc  - DVT ppx: Heparin SQ  - GI PPX: protonix   - Diet: DASH  - Activity/PT eval       57yo male with PMHx colitis, hiatal hernia, OA, GERD, HLD, HTN, COPD on 5 L at home, HFpEF (EF 65% Nov 2023), knee replacement, hip replacement, shoulder replacement, current smoker, on MS Contin for chronic joint pain presents c/o weakness in legs and unable ot ambulate since last night. PT notes he slipped on a glass of water last night in his home, fell onto R knee and then was unable to get up from the floor. He reports he thinks he was on the floor for 45min-1hr. He states family was unable to pick him up so EMS was called. Upon EMS arriving, pt reported he could not walk and thus was brought to ED. Of note pt was seen in ED yesterday reported recently filled a prescription for MS Contin but took all but two pills of script within 2 days of filling it. Tox was consulted and was to be observed in ED until 2300 but pt eloped prior to completion of observation. Pt reports went home and took 1 Ambien, not any more MS Contin.    #Acute kidney injury likely 2/2 Rhabdomyolysis vs Pre renal from decreased PO intake - resolved  - CPK 7521-> 4469  - Lactate: 2.0  03/28  - Cr 0.8 today  - increase IV fluid to 150 cc/hour   - Trend CPK level   - Avoid nephrotoxic agents, hold lisinopril, hold lasix   - Monitor BUN/creatinine  - Check urine tox negative  - normal renal ultrasound    # Recent COPD exacerbation on 5L at home  # Chronic Hypercapnia   - Patient still wheezing on exam   - Chest X-ray: Low lung volumes. Left lung base atelectasis.  - cxr neg for pneumonia  - C/w prednisone 40 mg   - albuterol 90mcg 2 puffs q6h PRN   - symbicort   - duoneb 3mL every 6 hours PRN     #Transaminitis, uptrending   -  ALK: 135, AST: 421, ALT: 404  - RUQ US Suboptimal exam. No definite hepatic mass or biliary ductal dilatation  - monitor   03/28  -   - Hepatology recs appreciated: DC fibrate and statin. coags daily. hepatitis labs, hepatic autoimmune labs  - pending ammonia        # Recent admission for chest pain likely secondary to msk  # chronic musculoskeletal pain   # Elevated troponin   - recent NST less than a year ago neg for ischemia  - trops 30 -> 28 -> 25 -> 57   - EKG: no acute ischemic changes   - echo 11/2023, unremarkable   -  recently filled a prescription for MS Contin but took all but two pills of script within 2 days of filling it.   - C/w lidocaine patch   - aspirin 81mg daily   - Tylenol PRN   - Hold on opioids for now,   - F/u repeat troponin   03/28  - verified oxycodone 30mg Q5H, morphine sulfate 100mg Q6H, Xanax 2mg Q6, ambien 10mg QHS PRN. with pharmacy and prescribers  - follows Dr. Erick Womack for morphine and oxy [phone]  - follows Dr. Lukas Delgado for zolpidem and xanax [phone]      # DLP  - Hold statin per hepatology    #HFpEF  #HTN  - echo 11/2023, unremarkable  - Hold furosemide 40mg daily  - Hold lisinopril in setting of TEODORO     #GERD  - pantoprazole 40qd    #insomnia  #anxiety  - xanax 2mg q8h prn for now  - zolpidem 5mg nightly prn     #Misc  - DVT ppx: Heparin SQ  - GI PPX: protonix   - Diet: DASH  - Activity/PT eval

## 2024-03-28 NOTE — PHYSICAL THERAPY INITIAL EVALUATION ADULT - ADDITIONAL COMMENTS
Pt reported lives with spouse & daughters in a pvt house with 1 step to enter, 13 steps inside to go bedroom, & steps to go to the basement.

## 2024-03-29 LAB
ALBUMIN SERPL ELPH-MCNC: 3.3 G/DL — LOW (ref 3.5–5.2)
ALP SERPL-CCNC: 153 U/L — HIGH (ref 30–115)
ALT FLD-CCNC: 1370 U/L — HIGH (ref 0–41)
AMMONIA BLD-MCNC: 44 UMOL/L — SIGNIFICANT CHANGE UP (ref 11–55)
ANION GAP SERPL CALC-SCNC: 9 MMOL/L — SIGNIFICANT CHANGE UP (ref 7–14)
APTT BLD: 27.6 SEC — SIGNIFICANT CHANGE UP (ref 27–39.2)
AST SERPL-CCNC: 1251 U/L — HIGH (ref 0–41)
BASOPHILS # BLD AUTO: 0.02 K/UL — SIGNIFICANT CHANGE UP (ref 0–0.2)
BASOPHILS NFR BLD AUTO: 0.3 % — SIGNIFICANT CHANGE UP (ref 0–1)
BILIRUB SERPL-MCNC: 1.2 MG/DL — SIGNIFICANT CHANGE UP (ref 0.2–1.2)
BUN SERPL-MCNC: 16 MG/DL — SIGNIFICANT CHANGE UP (ref 10–20)
CALCIUM SERPL-MCNC: 8.2 MG/DL — LOW (ref 8.4–10.5)
CHLORIDE SERPL-SCNC: 100 MMOL/L — SIGNIFICANT CHANGE UP (ref 98–110)
CK SERPL-CCNC: >2000 U/L — HIGH (ref 0–225)
CO2 SERPL-SCNC: 29 MMOL/L — SIGNIFICANT CHANGE UP (ref 17–32)
CREAT SERPL-MCNC: 0.7 MG/DL — SIGNIFICANT CHANGE UP (ref 0.7–1.5)
EGFR: 108 ML/MIN/1.73M2 — SIGNIFICANT CHANGE UP
EOSINOPHIL # BLD AUTO: 0.03 K/UL — SIGNIFICANT CHANGE UP (ref 0–0.7)
EOSINOPHIL NFR BLD AUTO: 0.5 % — SIGNIFICANT CHANGE UP (ref 0–8)
FERRITIN SERPL-MCNC: 969 NG/ML — HIGH (ref 30–400)
GLUCOSE SERPL-MCNC: 96 MG/DL — SIGNIFICANT CHANGE UP (ref 70–99)
HCT VFR BLD CALC: 35.4 % — LOW (ref 42–52)
HCV RNA SERPL NAA DL=5-ACNC: SIGNIFICANT CHANGE UP IU/ML
HCV RNA SPEC NAA+PROBE-LOG IU: 7.38 LOGIU/ML — SIGNIFICANT CHANGE UP
HCV RNA SPEC NAA+PROBE-LOG IU: DETECTED IU/ML
HGB BLD-MCNC: 11.9 G/DL — LOW (ref 14–18)
IMM GRANULOCYTES NFR BLD AUTO: 0.8 % — HIGH (ref 0.1–0.3)
INR BLD: 1.09 RATIO — SIGNIFICANT CHANGE UP (ref 0.65–1.3)
IRON SATN MFR SERPL: 100 UG/DL — SIGNIFICANT CHANGE UP (ref 35–150)
IRON SATN MFR SERPL: 34 % — SIGNIFICANT CHANGE UP (ref 15–50)
LYMPHOCYTES # BLD AUTO: 1.38 K/UL — SIGNIFICANT CHANGE UP (ref 1.2–3.4)
LYMPHOCYTES # BLD AUTO: 21.5 % — SIGNIFICANT CHANGE UP (ref 20.5–51.1)
MCHC RBC-ENTMCNC: 29.6 PG — SIGNIFICANT CHANGE UP (ref 27–31)
MCHC RBC-ENTMCNC: 33.6 G/DL — SIGNIFICANT CHANGE UP (ref 32–37)
MCV RBC AUTO: 88.1 FL — SIGNIFICANT CHANGE UP (ref 80–94)
MONOCYTES # BLD AUTO: 0.37 K/UL — SIGNIFICANT CHANGE UP (ref 0.1–0.6)
MONOCYTES NFR BLD AUTO: 5.8 % — SIGNIFICANT CHANGE UP (ref 1.7–9.3)
NEUTROPHILS # BLD AUTO: 4.56 K/UL — SIGNIFICANT CHANGE UP (ref 1.4–6.5)
NEUTROPHILS NFR BLD AUTO: 71.1 % — SIGNIFICANT CHANGE UP (ref 42.2–75.2)
NRBC # BLD: 0 /100 WBCS — SIGNIFICANT CHANGE UP (ref 0–0)
PLATELET # BLD AUTO: 144 K/UL — SIGNIFICANT CHANGE UP (ref 130–400)
PMV BLD: 10.5 FL — HIGH (ref 7.4–10.4)
POTASSIUM SERPL-MCNC: 4.7 MMOL/L — SIGNIFICANT CHANGE UP (ref 3.5–5)
POTASSIUM SERPL-SCNC: 4.7 MMOL/L — SIGNIFICANT CHANGE UP (ref 3.5–5)
PROT SERPL-MCNC: 5 G/DL — LOW (ref 6–8)
PROTHROM AB SERPL-ACNC: 12.4 SEC — SIGNIFICANT CHANGE UP (ref 9.95–12.87)
RBC # BLD: 4.02 M/UL — LOW (ref 4.7–6.1)
RBC # FLD: 16.7 % — HIGH (ref 11.5–14.5)
SODIUM SERPL-SCNC: 138 MMOL/L — SIGNIFICANT CHANGE UP (ref 135–146)
TIBC SERPL-MCNC: 291 UG/DL — SIGNIFICANT CHANGE UP (ref 220–430)
UIBC SERPL-MCNC: 191 UG/DL — SIGNIFICANT CHANGE UP (ref 110–370)
WBC # BLD: 6.41 K/UL — SIGNIFICANT CHANGE UP (ref 4.8–10.8)
WBC # FLD AUTO: 6.41 K/UL — SIGNIFICANT CHANGE UP (ref 4.8–10.8)

## 2024-03-29 PROCEDURE — 99233 SBSQ HOSP IP/OBS HIGH 50: CPT

## 2024-03-29 RX ORDER — SENNA PLUS 8.6 MG/1
2 TABLET ORAL AT BEDTIME
Refills: 0 | Status: DISCONTINUED | OUTPATIENT
Start: 2024-03-29 | End: 2024-03-31

## 2024-03-29 RX ORDER — LANOLIN ALCOHOL/MO/W.PET/CERES
5 CREAM (GRAM) TOPICAL ONCE
Refills: 0 | Status: COMPLETED | OUTPATIENT
Start: 2024-03-29 | End: 2024-03-29

## 2024-03-29 RX ORDER — POLYETHYLENE GLYCOL 3350 17 G/17G
17 POWDER, FOR SOLUTION ORAL DAILY
Refills: 0 | Status: DISCONTINUED | OUTPATIENT
Start: 2024-03-29 | End: 2024-03-31

## 2024-03-29 RX ADMIN — OXYCODONE HYDROCHLORIDE 10 MILLIGRAM(S): 5 TABLET ORAL at 12:25

## 2024-03-29 RX ADMIN — SODIUM CHLORIDE 100 MILLILITER(S): 9 INJECTION INTRAMUSCULAR; INTRAVENOUS; SUBCUTANEOUS at 21:08

## 2024-03-29 RX ADMIN — OXYCODONE HYDROCHLORIDE 10 MILLIGRAM(S): 5 TABLET ORAL at 06:12

## 2024-03-29 RX ADMIN — HEPARIN SODIUM 5000 UNIT(S): 5000 INJECTION INTRAVENOUS; SUBCUTANEOUS at 21:08

## 2024-03-29 RX ADMIN — BUDESONIDE AND FORMOTEROL FUMARATE DIHYDRATE 2 PUFF(S): 160; 4.5 AEROSOL RESPIRATORY (INHALATION) at 20:16

## 2024-03-29 RX ADMIN — OXYCODONE HYDROCHLORIDE 10 MILLIGRAM(S): 5 TABLET ORAL at 01:21

## 2024-03-29 RX ADMIN — PANTOPRAZOLE SODIUM 40 MILLIGRAM(S): 20 TABLET, DELAYED RELEASE ORAL at 06:12

## 2024-03-29 RX ADMIN — Medication 40 MILLIGRAM(S): at 06:12

## 2024-03-29 RX ADMIN — Medication 81 MILLIGRAM(S): at 12:26

## 2024-03-29 RX ADMIN — HEPARIN SODIUM 5000 UNIT(S): 5000 INJECTION INTRAVENOUS; SUBCUTANEOUS at 13:53

## 2024-03-29 RX ADMIN — Medication 5 MILLIGRAM(S): at 21:08

## 2024-03-29 RX ADMIN — OXYCODONE HYDROCHLORIDE 10 MILLIGRAM(S): 5 TABLET ORAL at 12:55

## 2024-03-29 RX ADMIN — Medication 2 MILLIGRAM(S): at 05:17

## 2024-03-29 RX ADMIN — Medication 2 MILLIGRAM(S): at 18:02

## 2024-03-29 RX ADMIN — POLYETHYLENE GLYCOL 3350 17 GRAM(S): 17 POWDER, FOR SOLUTION ORAL at 21:08

## 2024-03-29 RX ADMIN — OXYCODONE HYDROCHLORIDE 10 MILLIGRAM(S): 5 TABLET ORAL at 17:19

## 2024-03-29 RX ADMIN — Medication 3 MILLILITER(S): at 19:59

## 2024-03-29 RX ADMIN — HEPARIN SODIUM 5000 UNIT(S): 5000 INJECTION INTRAVENOUS; SUBCUTANEOUS at 06:12

## 2024-03-29 RX ADMIN — SENNA PLUS 2 TABLET(S): 8.6 TABLET ORAL at 21:08

## 2024-03-29 RX ADMIN — OXYCODONE HYDROCHLORIDE 10 MILLIGRAM(S): 5 TABLET ORAL at 07:10

## 2024-03-29 RX ADMIN — Medication 3 MILLILITER(S): at 13:23

## 2024-03-29 RX ADMIN — Medication 3 MILLILITER(S): at 04:04

## 2024-03-29 RX ADMIN — Medication 3 MILLILITER(S): at 08:06

## 2024-03-29 RX ADMIN — OXYCODONE HYDROCHLORIDE 10 MILLIGRAM(S): 5 TABLET ORAL at 00:23

## 2024-03-29 NOTE — PROGRESS NOTE ADULT - SUBJECTIVE AND OBJECTIVE BOX
24H events:    Patient is a 58y old Male who presents with a chief complaint of   Primary diagnosis of Rhabdomyolysis      Today is hospital day 2d. This morning patient was seen and examined at bedside, resting comfortably in bed. Patient continues to endorse dull lower back and thigh pain. Patient continues to request his home dose of oxycodone. Otherwise, no acute or major events overnight. Hemodynamically stable, tolerating oral diet, voiding appropriately with appropriate bowel movements.     PAST MEDICAL & SURGICAL HISTORY  HTN (hypertension)    High cholesterol    GERD (gastroesophageal reflux disease)    Morbid obesity    Osteoarthritis    Hiatal hernia  GERD    Colitis  LARGE INTESTINE   NO RECENT FLARES    COPD (chronic obstructive pulmonary disease)    REBECCA treated with BiPAP    History of cholecystectomy    History of appendectomy    History of knee replacement procedure of right knee  BILATERAL    Hernia, inguinal, bilateral  3 months old    H/O knee surgery  arthoscopic x4    H/O foot surgery  right big toe surgery      SOCIAL HISTORY:  Social History:      ALLERGIES:  No Known Allergies    MEDICATIONS:  STANDING MEDICATIONS  albuterol/ipratropium for Nebulization 3 milliLiter(s) Nebulizer every 6 hours  aspirin enteric coated 81 milliGRAM(s) Oral daily  budesonide 160 MICROgram(s)/formoterol 4.5 MICROgram(s) Inhaler 2 Puff(s) Inhalation two times a day  heparin   Injectable 5000 Unit(s) SubCutaneous every 8 hours  oxyCODONE    IR 10 milliGRAM(s) Oral every 6 hours  pantoprazole    Tablet 40 milliGRAM(s) Oral before breakfast  predniSONE   Tablet 40 milliGRAM(s) Oral daily  sodium chloride 0.9%. 1000 milliLiter(s) IV Continuous <Continuous>    PRN MEDICATIONS  albuterol    90 MICROgram(s) HFA Inhaler 2 Puff(s) Inhalation every 6 hours PRN  ALPRAZolam 2 milliGRAM(s) Oral every 8 hours PRN    VITALS:   T(F): 98  HR: 101  BP: 143/81  RR: 18  SpO2: 99%    PHYSICAL EXAM:  GENERAL: NAD, lying in bed comfortably, cooperative, obese on 5L NC  HEAD: NCAT  NECK: Supple  HEART: Regular rate and rhythm, normal S1/S2,  LUNGS: Notable b/l wheezing.    ABDOMEN:  soft, non-tender, non-distended,   EXTREMITIES: no edema  NERVOUS SYSTEM: follows commands, answers questions appropriately     LABS:                        11.9   6.41  )-----------( 144      ( 29 Mar 2024 06:23 )             35.4     03-29    138  |  100  |  16  ----------------------------<  96  4.7   |  29  |  0.7    Ca    8.2<L>      29 Mar 2024 06:23  Mg     2.1     03-28    TPro  5.0<L>  /  Alb  3.3<L>  /  TBili  1.2  /  DBili  x   /  AST  1251<H>  /  ALT  1370<H>  /  AlkPhos  153<H>  03-29    PT/INR - ( 29 Mar 2024 06:23 )   PT: 12.40 sec;   INR: 1.09 ratio         PTT - ( 29 Mar 2024 06:23 )  PTT:27.6 sec  Urinalysis Basic - ( 29 Mar 2024 06:23 )    Color: x / Appearance: x / SG: x / pH: x  Gluc: 96 mg/dL / Ketone: x  / Bili: x / Urobili: x   Blood: x / Protein: x / Nitrite: x   Leuk Esterase: x / RBC: x / WBC x   Sq Epi: x / Non Sq Epi: x / Bacteria: x        Creatine Kinase, Serum: >2000 U/L *H* (03-29-24 @ 08:57)      CARDIAC MARKERS ( 29 Mar 2024 08:57 )  x     / x     / >2000 U/L / x     / x      CARDIAC MARKERS ( 28 Mar 2024 05:53 )  x     / x     / 4469 U/L / x     / x          RADIOLOGY:    RADIOLOGY

## 2024-03-29 NOTE — PROGRESS NOTE ADULT - ASSESSMENT
58 y o  M with obesity HTN HLD HFpEF admitted with myalgia and hx of fall. HEpatology consulted for elevated LFTs and rhabdomyolysis and now with Hep C    #Acute on chronic Liver Injury -mixed likely d/t rhabdomyolysis and underlying statin/fibrate combination DILI- Hep C positive - worsening  T gayathri 1.2     AST 1251  ALT 1370  03-29-24 @ 06:23  T gayathri 0.7         03-28-24 @ 05:53  T gayathri 0.7         03-27-24 @ 12:48  T gayathri 0.5         03-26-24 @ 18:16  T gayathri 0.6         03-26-24 @ 08:1    - RUQ sono - CBD 6mm, post CCY  - LFts elevated ALP, and chronic elevation  - CK 7k  - Hep C Ab + RNA :20million  - auto immune workup pending    Rec  - Please send HEp C genotype; fu remaining hepatitis panel; will be treated as outpatient  - Please send syphilis screen, HIV  - Please avoid hepatotoxic medications    Communicated with primary team   58 y o  M with obesity HTN HLD HFpEF admitted with myalgia and hx of fall. HEpatology consulted for elevated LFTs and rhabdomyolysis and now with Hep C    #Acute on chronic Liver Injury -mixed likely d/t rhabdomyolysis and underlying statin/fibrate combination DILI- Hep C positive - worsening  T gayathri 1.2     AST 1251  ALT 1370  03-29-24 @ 06:23  T gayathri 0.7         03-28-24 @ 05:53  T gayathri 0.7         03-27-24 @ 12:48  T gayathri 0.5         03-26-24 @ 18:16  T gayathri 0.6         03-26-24 @ 08:1    - RUQ sono - CBD 6mm, post CCY  - LFts elevated ALP, and chronic elevation  - CK 7k  - Hep C Ab + RNA :20million; denies any risk factors; unlikely acute hepatitis C  - auto immune workup pending    Rec  - Please send HEp C genotype; fu remaining hepatitis panel; will be treated as outpatient  - Please send syphilis screen, HIV  - hold statin/fibrate  - Please avoid hepatotoxic medications    Communicated with primary team

## 2024-03-29 NOTE — PROGRESS NOTE ADULT - ASSESSMENT
59yo male with PMHx colitis, hiatal hernia, OA, GERD, HLD, HTN, COPD on 5 L at home, HFpEF (EF 65% Nov 2023), knee replacement, hip replacement, shoulder replacement, current smoker, on MS Contin for chronic joint pain presents c/o weakness in legs and unable ot ambulate since last night. PT notes he slipped on a glass of water last night in his home, fell onto R knee and then was unable to get up from the floor. He reports he thinks he was on the floor for 45min-1hr. He states family was unable to pick him up so EMS was called. Upon EMS arriving, pt reported he could not walk and thus was brought to ED. Of note pt was seen in ED yesterday reported recently filled a prescription for MS Contin but took all but two pills of script within 2 days of filling it. Tox was consulted and was to be observed in ED until 2300 but pt eloped prior to completion of observation. Pt reports went home and took 1 Ambien, not any more MS Contin.    # Fall  # Lactic acidosis- resolved  # Acute kidney injury , prerenal resolving  # Rhabdomylosis- resolving  - US Kidney and Bladder (03.28.24 @ 13:21) >Normal renal ultrasound.  - Lactate, Blood: 2.0 mmol/L (03.28.24 @ 05:53)  - Creatine Kinase, Serum: >2000 U/L (03.29.24 @ 08:57)  - continue holding lasix , lisinopril  - IV fluids  - monitor BMP  - trend CPk  - evaluated by PT    # Metabolic encephalopathy sec to opiates-resolved  - pt on morphine and oxycodone at home  - addiction medicine eval  - evaluated by catch team     # Transaminitis  - Hold statin , fibrate  -  US Abdomen Upper Quadrant Right (03.23.24 @ 18:12) >No definite hepatic mass or biliary ductal dilatation.  - Ammonia, Serum: 44 umol/L (03.29.24 @ 06:23)  - UDS- benzo positive  - monitor LFT  - F/u acute hepatitis profile      # H/o recent COPD exacerbation   #  H/o chronic resp failure  - : Xray Chest 1 View- PORTABLE-Urgent (Xray Chest 1 View- PORTABLE-Urgent .) (03.27.24 @ 08:19) >Low lung volumes. Left lung base atelectasis.  - c/w prednisone   -  c/w duoneb, budesonide  - maintain oxygen sat > 92    # Elevated troponin, type 2 MI sec to fall  - Troponin T, High Sensitivity Result: 41(03.28.24 @ 05:53)  - 12 Lead ECG (03.27.24 @ 07:02) >Sinus tachycardiawith Premature atrial complexes    # Opiod dependance  - As per patients pharmacy -  oxycodone 30mg Q5H, morphine sulfate 100mg Q6H, Xanax 2mg Q6, ambien 10mg QHS PRN.prescribed b  Dr. Erick Womack for morphine and oxy [phone] and  Dr. Lukas Delgado for zolpidem and xanax [phone]    # GERD  # Hiatal hernia  - c/w protonix    # Anxiety  - decrease xanax to 2mg q8    # REBECCA  # Morbid obesity  - BMI: 51    # DVT prophylaxis    # full code    # Pending: addiction med eval, monitor BMP, CPK, , monitor LFT  # Disposition: Home when stable

## 2024-03-29 NOTE — PROGRESS NOTE ADULT - SUBJECTIVE AND OBJECTIVE BOX
Patient is a 58y old  Male who presents with a chief complaint of weakness in legs and unable ot ambulate     Patient was seen and examined.  no new events    PAST MEDICAL & SURGICAL HISTORY:  HTN (hypertension)  High cholesterol  GERD (gastroesophageal reflux disease)  Morbid obesity  Osteoarthritis  Hiatal hernia  GERD  Colitis LARGE INTESTINE   NO RECENT FLARES  COPD (chronic obstructive pulmonary disease)  REBECCA treated with BiPAP  History of cholecystectomy  History of appendectomy  History of knee replacement procedure of right kneeBILATERAL  Hernia, inguinal, bilateral 3 months old  H/O knee surgery arthoscopic x4  H/O foot surgery right big toe surgery    Allergies  No Known Allergies    MEDICATIONS  (STANDING):  albuterol/ipratropium for Nebulization 3 milliLiter(s) Nebulizer every 6 hours  aspirin enteric coated 81 milliGRAM(s) Oral daily  budesonide 160 MICROgram(s)/formoterol 4.5 MICROgram(s) Inhaler 2 Puff(s) Inhalation two times a day  heparin   Injectable 5000 Unit(s) SubCutaneous every 8 hours  oxyCODONE    IR 10 milliGRAM(s) Oral every 6 hours  pantoprazole    Tablet 40 milliGRAM(s) Oral before breakfast  predniSONE   Tablet 40 milliGRAM(s) Oral daily  sodium chloride 0.9%. 1000 milliLiter(s) (100 mL/Hr) IV Continuous <Continuous>    MEDICATIONS  (PRN):  albuterol    90 MICROgram(s) HFA Inhaler 2 Puff(s) Inhalation every 6 hours PRN for shortness of breath and/or wheezing  ALPRAZolam 2 milliGRAM(s) Oral every 8 hours PRN insomnia    O/E: awake, alert, not in distress.  HEENT: atraumatic, EOMI.  Chest: decreased breath soiunds at bases  CVS: SIS2 +, no murmur.  P/A: Soft, BS+  CNS:   awake, alert  Ext: no edema feet.  All systems reviewed positive findings as above.                                         11.9<L>  6.41  )-----------( 144      ( 29 Mar 2024 06:23 )             35.4<L>                        12.1<L>  7.59  )-----------( 165      ( 28 Mar 2024 05:53 )             35.1<L>  03-29    138  |  100  |  16  ----------------------------<  96  4.7   |  29  |  0.7  03-28    136  |  95<L>  |  26<H>  ----------------------------<  124<H>  4.1   |  31  |  0.8    Ca    8.2<L>      29 Mar 2024 06:23  Ca    8.6      28 Mar 2024 05:53  Mg     2.1     03-28    TPro  5.0<L>  /  Alb  3.3<L>  /  TBili  1.2  /  DBili  x   /  AST  1251<H>  /  ALT  1370<H>  /  AlkPhos  153<H>  03-29  TPro  5.1<L>  /  Alb  3.2<L>  /  TBili  0.7  /  DBili  x   /  AST  460<H>  /  ALT  470<H>  /  AlkPhos  138<H>  03-28

## 2024-03-29 NOTE — PROGRESS NOTE ADULT - ASSESSMENT
59yo male with PMHx colitis, hiatal hernia, OA, GERD, HLD, HTN, COPD on 5 L at home, HFpEF (EF 65% Nov 2023), knee replacement, hip replacement, shoulder replacement, current smoker, on MS Contin for chronic joint pain presents c/o weakness in legs and unable ot ambulate since last night. PT notes he slipped on a glass of water last night in his home, fell onto R knee and then was unable to get up from the floor. He reports he thinks he was on the floor for 45min-1hr. He states family was unable to pick him up so EMS was called. Upon EMS arriving, pt reported he could not walk and thus was brought to ED. Of note pt was seen in ED yesterday reported recently filled a prescription for MS Contin but took all but two pills of script within 2 days of filling it. Tox was consulted and was to be observed in ED until 2300 but pt eloped prior to completion of observation. Pt reports went home and took 1 Ambien, not any more MS Contin.    #Acute kidney injury likely 2/2 Rhabdomyolysis vs Pre renal from decreased PO intake - resolved  - CPK 7521-> 4469  - Lactate: 2.0  03/28  - Cr 0.8 today  - Avoid nephrotoxic agents, hold lisinopril, hold lasix   - Monitor BUN/creatinine  - Check urine tox negative  - normal renal ultrasound  03/29  - CPK >2000 today  - c/w IV fluids    # Recent COPD exacerbation on 5L at home  # Chronic Hypercapnia   - Patient still wheezing on exam   - Chest X-ray: Low lung volumes. Left lung base atelectasis.  - cxr neg for pneumonia  - C/w prednisone 40 mg   - albuterol 90mcg 2 puffs q6h PRN   - symbicort   - duoneb 3mL every 6 hours PRN   03/29  - interval improvement on physical exam    #Transaminitis, uptrending   -  ALK: 135, AST: 421, ALT: 404  - RUQ US Suboptimal exam. No definite hepatic mass or biliary ductal dilatation  - monitor   03/28  -   - Hepatology recs appreciated: DC fibrate and statin. coags daily. hepatitis labs, hepatic autoimmune labs  - pending ammonia  03/29  - AST/ALT: 1251/1370, alkphos 153  - iron labs wnl  - Hep C positive detected: 24,101,658      # Recent admission for chest pain likely secondary to msk  # chronic musculoskeletal pain   # Elevated troponin   - recent NST less than a year ago neg for ischemia  - trops 30 -> 28 -> 25 -> 57   - EKG: no acute ischemic changes   - echo 11/2023, unremarkable   -  recently filled a prescription for MS Contin but took all but two pills of script within 2 days of filling it.   - C/w lidocaine patch   - aspirin 81mg daily   - Tylenol PRN   - Hold on opioids for now,   - F/u repeat troponin   03/28  - verified oxycodone 30mg Q5H, morphine sulfate 100mg Q6H, Xanax 2mg Q6, ambien 10mg QHS PRN. with pharmacy and prescribers  - follows Dr. Erick Womack for morphine and oxy [phone]  - follows Dr. Lukas Delgado for zolpidem and xanax [phone]  03/29  - c/w oxycodone 10mg Q6 + xanax 2mg Q8    # DLP  - continue to hold statin per hepatology    #HFpEF  #HTN  - echo 11/2023, unremarkable  - Hold furosemide 40mg daily  - Hold lisinopril in setting of TEODORO     #GERD  - pantoprazole 40qd    #insomnia  #anxiety  - xanax 2mg q8h prn for now      #Misc  - DVT ppx: Heparin SQ  - GI PPX: protonix   - Diet: DASH  - Activity/PT eval    pending: Hepatology requested labs. hepatology recommendations.

## 2024-03-29 NOTE — PROGRESS NOTE ADULT - SUBJECTIVE AND OBJECTIVE BOX
Gastroenterology progress note:     Patient is a 58y old  Male who presents with a chief complaint of      Admitted on: 03-27-24    We are following the patient for:       Interval History:    No acute events overnight.   - Diet -   - last BM -   - Abdominal pain -       PAST MEDICAL & SURGICAL HISTORY:  HTN (hypertension)      High cholesterol      GERD (gastroesophageal reflux disease)      Morbid obesity      Osteoarthritis      Hiatal hernia  GERD      Colitis  LARGE INTESTINE   NO RECENT FLARES      COPD (chronic obstructive pulmonary disease)      REBECCA treated with BiPAP      History of cholecystectomy      History of appendectomy      History of knee replacement procedure of right knee  BILATERAL      Hernia, inguinal, bilateral  3 months old      H/O knee surgery  arthoscopic x4      H/O foot surgery  right big toe surgery          MEDICATIONS  (STANDING):  albuterol/ipratropium for Nebulization 3 milliLiter(s) Nebulizer every 6 hours  aspirin enteric coated 81 milliGRAM(s) Oral daily  budesonide 160 MICROgram(s)/formoterol 4.5 MICROgram(s) Inhaler 2 Puff(s) Inhalation two times a day  heparin   Injectable 5000 Unit(s) SubCutaneous every 8 hours  oxyCODONE    IR 10 milliGRAM(s) Oral every 6 hours  pantoprazole    Tablet 40 milliGRAM(s) Oral before breakfast  predniSONE   Tablet 40 milliGRAM(s) Oral daily  sodium chloride 0.9%. 1000 milliLiter(s) (100 mL/Hr) IV Continuous <Continuous>    MEDICATIONS  (PRN):  albuterol    90 MICROgram(s) HFA Inhaler 2 Puff(s) Inhalation every 6 hours PRN for shortness of breath and/or wheezing  ALPRAZolam 2 milliGRAM(s) Oral every 8 hours PRN insomnia      Allergies  No Known Allergies      Review of Systems:   Cardiovascular:  No Chest Pain, No Palpitations  Respiratory:  No Cough, No Dyspnea  Gastrointestinal:  As described in HPI  Skin:  No Skin Lesions, No Jaundice  Neuro:  No Syncope, No Dizziness    Physical Examination:  T(C): 36.7 (03-29-24 @ 07:40), Max: 36.7 (03-28-24 @ 23:12)  HR: 101 (03-29-24 @ 07:40) (97 - 103)  BP: 143/81 (03-29-24 @ 07:40) (112/61 - 143/81)  RR: 18 (03-28-24 @ 23:12) (18 - 18)  SpO2: 99% (03-29-24 @ 07:40) (91% - 99%)      03-28-24 @ 07:01  -  03-29-24 @ 07:00  --------------------------------------------------------  IN: 1750 mL / OUT: 0 mL / NET: 1750 mL    03-29-24 @ 07:01  -  03-29-24 @ 12:00  --------------------------------------------------------  IN: 780 mL / OUT: 1350 mL / NET: -570 mL        GENERAL: AAOx3, no acute distress.  HEAD:  Atraumatic, Normocephalic  EYES: conjunctiva and sclera clear  NECK: Supple, no JVD or thyromegaly  CHEST/LUNG: Clear to auscultation bilaterally; No wheeze, rhonchi, or rales  HEART: Regular rate and rhythm; normal S1, S2, No murmurs.  ABDOMEN: Soft, nontender, nondistended; Bowel sounds present  NEUROLOGY: No asterixis or tremor.   SKIN: Intact, no jaundice     Data:                        11.9   6.41  )-----------( 144      ( 29 Mar 2024 06:23 )             35.4     Hgb trend:  11.9  03-29-24 @ 06:23  12.1  03-28-24 @ 05:53  13.8  03-27-24 @ 15:00  13.9  03-26-24 @ 18:16        03-29    138  |  100  |  16  ----------------------------<  96  4.7   |  29  |  0.7    Ca    8.2<L>      29 Mar 2024 06:23  Mg     2.1     03-28    TPro  5.0<L>  /  Alb  3.3<L>  /  TBili  1.2  /  DBili  x   /  AST  1251<H>  /  ALT  1370<H>  /  AlkPhos  153<H>  03-29    Liver panel trend:  TBili 1.2   /   AST 1251   /   ALT 1370   /   AlkP 153   /   Tptn 5.0   /   Alb 3.3    /   DBili --      03-29  TBili 0.7   /      /      /   AlkP 138   /   Tptn 5.1   /   Alb 3.2    /   DBili --      03-28  TBili 0.7   /      /      /   AlkP 135   /   Tptn 6.4   /   Alb 4.0    /   DBili -- 03-27  TBili 0.5   /      /      /   AlkP 120   /   Tptn 6.1   /   Alb 3.7    /   DBili --      03-26  TBili 0.6   /      /      /   AlkP 134   /   Tptn 6.1   /   Alb 3.8    /   DBili -- 03-26  TBili 0.6   /   AST 59   /      /   AlkP 129   /   Tptn 5.7   /   Alb 3.5    /   DBili -- 03-23  TBili 0.5   /   AST 72   /      /   AlkP 122   /   Tptn 5.8   /   Alb 3.5    /   DBili --      03-22      PT/INR - ( 29 Mar 2024 06:23 )   PT: 12.40 sec;   INR: 1.09 ratio         PTT - ( 29 Mar 2024 06:23 )  PTT:27.6 sec       Radiology:       Gastroenterology progress note:     Patient is a 58y old  Male who presents with a chief complaint of      Admitted on: 03-27-24    We are following the patient for: elevated lFTS       Interval History: worsened LFTs, HEpC positive      PAST MEDICAL & SURGICAL HISTORY:  HTN (hypertension)      High cholesterol      GERD (gastroesophageal reflux disease)      Morbid obesity      Osteoarthritis      Hiatal hernia  GERD      Colitis  LARGE INTESTINE   NO RECENT FLARES      COPD (chronic obstructive pulmonary disease)      REBECCA treated with BiPAP      History of cholecystectomy      History of appendectomy      History of knee replacement procedure of right knee  BILATERAL      Hernia, inguinal, bilateral  3 months old      H/O knee surgery  arthoscopic x4      H/O foot surgery  right big toe surgery          MEDICATIONS  (STANDING):  albuterol/ipratropium for Nebulization 3 milliLiter(s) Nebulizer every 6 hours  aspirin enteric coated 81 milliGRAM(s) Oral daily  budesonide 160 MICROgram(s)/formoterol 4.5 MICROgram(s) Inhaler 2 Puff(s) Inhalation two times a day  heparin   Injectable 5000 Unit(s) SubCutaneous every 8 hours  oxyCODONE    IR 10 milliGRAM(s) Oral every 6 hours  pantoprazole    Tablet 40 milliGRAM(s) Oral before breakfast  predniSONE   Tablet 40 milliGRAM(s) Oral daily  sodium chloride 0.9%. 1000 milliLiter(s) (100 mL/Hr) IV Continuous <Continuous>    MEDICATIONS  (PRN):  albuterol    90 MICROgram(s) HFA Inhaler 2 Puff(s) Inhalation every 6 hours PRN for shortness of breath and/or wheezing  ALPRAZolam 2 milliGRAM(s) Oral every 8 hours PRN insomnia      Allergies  No Known Allergies      Review of Systems:   Cardiovascular:  No Chest Pain, No Palpitations  Respiratory:  No Cough, No Dyspnea  Gastrointestinal:  As described in HPI  Skin:  No Skin Lesions, No Jaundice  Neuro:  No Syncope, No Dizziness    Physical Examination:  T(C): 36.7 (03-29-24 @ 07:40), Max: 36.7 (03-28-24 @ 23:12)  HR: 101 (03-29-24 @ 07:40) (97 - 103)  BP: 143/81 (03-29-24 @ 07:40) (112/61 - 143/81)  RR: 18 (03-28-24 @ 23:12) (18 - 18)  SpO2: 99% (03-29-24 @ 07:40) (91% - 99%)      03-28-24 @ 07:01  -  03-29-24 @ 07:00  --------------------------------------------------------  IN: 1750 mL / OUT: 0 mL / NET: 1750 mL    03-29-24 @ 07:01  -  03-29-24 @ 12:00  --------------------------------------------------------  IN: 780 mL / OUT: 1350 mL / NET: -570 mL        GENERAL: AAOx3, no acute distress.  HEAD:  Atraumatic, Normocephalic  EYES: conjunctiva and sclera clear  NECK: Supple, no JVD or thyromegaly  CHEST/LUNG: Clear to auscultation bilaterally; No wheeze, rhonchi, or rales  HEART: Regular rate and rhythm; normal S1, S2, No murmurs.  ABDOMEN: Soft, nontender, nondistended; Bowel sounds present  NEUROLOGY: No asterixis or tremor.   SKIN: Intact, no jaundice     Data:                        11.9   6.41  )-----------( 144      ( 29 Mar 2024 06:23 )             35.4     Hgb trend:  11.9  03-29-24 @ 06:23  12.1  03-28-24 @ 05:53  13.8  03-27-24 @ 15:00  13.9  03-26-24 @ 18:16        03-29    138  |  100  |  16  ----------------------------<  96  4.7   |  29  |  0.7    Ca    8.2<L>      29 Mar 2024 06:23  Mg     2.1     03-28    TPro  5.0<L>  /  Alb  3.3<L>  /  TBili  1.2  /  DBili  x   /  AST  1251<H>  /  ALT  1370<H>  /  AlkPhos  153<H>  03-29    Liver panel trend:  TBili 1.2   /   AST 1251   /   ALT 1370   /   AlkP 153   /   Tptn 5.0   /   Alb 3.3    /   DBili -- 03-29  TBili 0.7   /      /      /   AlkP 138   /   Tptn 5.1   /   Alb 3.2    /   DBili --      03-28  TBili 0.7   /      /      /   AlkP 135   /   Tptn 6.4   /   Alb 4.0    /   DBili -- 03-27  TBili 0.5   /      /      /   AlkP 120   /   Tptn 6.1   /   Alb 3.7    /   DBili -- 03-26  TBili 0.6   /      /      /   AlkP 134   /   Tptn 6.1   /   Alb 3.8    /   DBili -- 03-26  TBili 0.6   /   AST 59   /      /   AlkP 129   /   Tptn 5.7   /   Alb 3.5    /   DBili -- 03-23  TBili 0.5   /   AST 72   /      /   AlkP 122   /   Tptn 5.8   /   Alb 3.5    /   DBili --      03-22      PT/INR - ( 29 Mar 2024 06:23 )   PT: 12.40 sec;   INR: 1.09 ratio         PTT - ( 29 Mar 2024 06:23 )  PTT:27.6 sec       Radiology:

## 2024-03-30 LAB
ALBUMIN SERPL ELPH-MCNC: 3.3 G/DL — LOW (ref 3.5–5.2)
ALP SERPL-CCNC: 190 U/L — HIGH (ref 30–115)
ALT FLD-CCNC: 1584 U/L — HIGH (ref 0–41)
ANION GAP SERPL CALC-SCNC: 11 MMOL/L — SIGNIFICANT CHANGE UP (ref 7–14)
APTT BLD: 27.4 SEC — SIGNIFICANT CHANGE UP (ref 27–39.2)
AST SERPL-CCNC: 924 U/L — HIGH (ref 0–41)
BASOPHILS # BLD AUTO: 0 K/UL — SIGNIFICANT CHANGE UP (ref 0–0.2)
BASOPHILS NFR BLD AUTO: 0 % — SIGNIFICANT CHANGE UP (ref 0–1)
BILIRUB SERPL-MCNC: 1.5 MG/DL — HIGH (ref 0.2–1.2)
BUN SERPL-MCNC: 16 MG/DL — SIGNIFICANT CHANGE UP (ref 10–20)
CALCIUM SERPL-MCNC: 8.5 MG/DL — SIGNIFICANT CHANGE UP (ref 8.4–10.5)
CHLORIDE SERPL-SCNC: 100 MMOL/L — SIGNIFICANT CHANGE UP (ref 98–110)
CK SERPL-CCNC: 1427 U/L — HIGH (ref 0–225)
CO2 SERPL-SCNC: 30 MMOL/L — SIGNIFICANT CHANGE UP (ref 17–32)
CREAT SERPL-MCNC: 0.7 MG/DL — SIGNIFICANT CHANGE UP (ref 0.7–1.5)
EGFR: 108 ML/MIN/1.73M2 — SIGNIFICANT CHANGE UP
EOSINOPHIL # BLD AUTO: 0 K/UL — SIGNIFICANT CHANGE UP (ref 0–0.7)
EOSINOPHIL NFR BLD AUTO: 0 % — SIGNIFICANT CHANGE UP (ref 0–8)
GLUCOSE SERPL-MCNC: 154 MG/DL — HIGH (ref 70–99)
HAV IGM SER-ACNC: SIGNIFICANT CHANGE UP
HBV CORE AB SER-ACNC: SIGNIFICANT CHANGE UP
HBV CORE IGM SER-ACNC: SIGNIFICANT CHANGE UP
HBV SURFACE AB SER-ACNC: SIGNIFICANT CHANGE UP
HBV SURFACE AG SER-ACNC: SIGNIFICANT CHANGE UP
HCT VFR BLD CALC: 37.9 % — LOW (ref 42–52)
HCV AB S/CO SERPL IA: 0.08 S/CO — SIGNIFICANT CHANGE UP (ref 0–0.99)
HCV AB SERPL-IMP: SIGNIFICANT CHANGE UP
HGB BLD-MCNC: 12.3 G/DL — LOW (ref 14–18)
INR BLD: 1.11 RATIO — SIGNIFICANT CHANGE UP (ref 0.65–1.3)
LYMPHOCYTES # BLD AUTO: 1.1 K/UL — LOW (ref 1.2–3.4)
LYMPHOCYTES # BLD AUTO: 14 % — LOW (ref 20.5–51.1)
MCHC RBC-ENTMCNC: 29.2 PG — SIGNIFICANT CHANGE UP (ref 27–31)
MCHC RBC-ENTMCNC: 32.5 G/DL — SIGNIFICANT CHANGE UP (ref 32–37)
MCV RBC AUTO: 90 FL — SIGNIFICANT CHANGE UP (ref 80–94)
MONOCYTES # BLD AUTO: 0.27 K/UL — SIGNIFICANT CHANGE UP (ref 0.1–0.6)
MONOCYTES NFR BLD AUTO: 3.5 % — SIGNIFICANT CHANGE UP (ref 1.7–9.3)
NEUTROPHILS # BLD AUTO: 6.19 K/UL — SIGNIFICANT CHANGE UP (ref 1.4–6.5)
NEUTROPHILS NFR BLD AUTO: 79 % — HIGH (ref 42.2–75.2)
PLATELET # BLD AUTO: 144 K/UL — SIGNIFICANT CHANGE UP (ref 130–400)
PMV BLD: 11.1 FL — HIGH (ref 7.4–10.4)
POTASSIUM SERPL-MCNC: 4.9 MMOL/L — SIGNIFICANT CHANGE UP (ref 3.5–5)
POTASSIUM SERPL-SCNC: 4.9 MMOL/L — SIGNIFICANT CHANGE UP (ref 3.5–5)
PROT SERPL-MCNC: 5.3 G/DL — LOW (ref 6–8)
PROTHROM AB SERPL-ACNC: 12.7 SEC — SIGNIFICANT CHANGE UP (ref 9.95–12.87)
RBC # BLD: 4.21 M/UL — LOW (ref 4.7–6.1)
RBC # FLD: 17.1 % — HIGH (ref 11.5–14.5)
SODIUM SERPL-SCNC: 141 MMOL/L — SIGNIFICANT CHANGE UP (ref 135–146)
WBC # BLD: 7.83 K/UL — SIGNIFICANT CHANGE UP (ref 4.8–10.8)
WBC # FLD AUTO: 7.83 K/UL — SIGNIFICANT CHANGE UP (ref 4.8–10.8)

## 2024-03-30 PROCEDURE — 99232 SBSQ HOSP IP/OBS MODERATE 35: CPT

## 2024-03-30 RX ORDER — OXYCODONE HYDROCHLORIDE 5 MG/1
10 TABLET ORAL EVERY 6 HOURS
Refills: 0 | Status: DISCONTINUED | OUTPATIENT
Start: 2024-03-30 | End: 2024-03-31

## 2024-03-30 RX ORDER — ALPRAZOLAM 0.25 MG
2 TABLET ORAL ONCE
Refills: 0 | Status: DISCONTINUED | OUTPATIENT
Start: 2024-03-30 | End: 2024-03-30

## 2024-03-30 RX ADMIN — PANTOPRAZOLE SODIUM 40 MILLIGRAM(S): 20 TABLET, DELAYED RELEASE ORAL at 05:56

## 2024-03-30 RX ADMIN — Medication 81 MILLIGRAM(S): at 11:08

## 2024-03-30 RX ADMIN — POLYETHYLENE GLYCOL 3350 17 GRAM(S): 17 POWDER, FOR SOLUTION ORAL at 11:03

## 2024-03-30 RX ADMIN — Medication 2 MILLIGRAM(S): at 21:53

## 2024-03-30 RX ADMIN — OXYCODONE HYDROCHLORIDE 10 MILLIGRAM(S): 5 TABLET ORAL at 09:15

## 2024-03-30 RX ADMIN — OXYCODONE HYDROCHLORIDE 10 MILLIGRAM(S): 5 TABLET ORAL at 03:55

## 2024-03-30 RX ADMIN — Medication 2 MILLIGRAM(S): at 11:03

## 2024-03-30 RX ADMIN — HEPARIN SODIUM 5000 UNIT(S): 5000 INJECTION INTRAVENOUS; SUBCUTANEOUS at 05:56

## 2024-03-30 RX ADMIN — Medication 2 MILLIGRAM(S): at 02:47

## 2024-03-30 RX ADMIN — Medication 3 MILLILITER(S): at 07:38

## 2024-03-30 RX ADMIN — OXYCODONE HYDROCHLORIDE 10 MILLIGRAM(S): 5 TABLET ORAL at 23:38

## 2024-03-30 RX ADMIN — OXYCODONE HYDROCHLORIDE 10 MILLIGRAM(S): 5 TABLET ORAL at 04:22

## 2024-03-30 RX ADMIN — Medication 40 MILLIGRAM(S): at 05:56

## 2024-03-30 RX ADMIN — Medication 3 MILLILITER(S): at 13:26

## 2024-03-30 RX ADMIN — Medication 2 MILLIGRAM(S): at 17:50

## 2024-03-30 RX ADMIN — OXYCODONE HYDROCHLORIDE 10 MILLIGRAM(S): 5 TABLET ORAL at 14:59

## 2024-03-30 RX ADMIN — OXYCODONE HYDROCHLORIDE 10 MILLIGRAM(S): 5 TABLET ORAL at 07:31

## 2024-03-30 RX ADMIN — HEPARIN SODIUM 5000 UNIT(S): 5000 INJECTION INTRAVENOUS; SUBCUTANEOUS at 14:59

## 2024-03-30 NOTE — CONSULT NOTE ADULT - SUBJECTIVE AND OBJECTIVE BOX
GENOVEVAKAYLEIGH HOWARD  58y, Male  Allergies    No Known Allergies    Intolerances      LOS  3d    HPI  HPI:  57yo male with PMHx colitis, hiatal hernia, OA, GERD, HLD, HTN, COPD on 5 L at home, HFpEF (EF 65% Nov 2023), knee replacement, hip replacement, shoulder replacement, current smoker, on MS Contin for chronic joint pain presents c/o weakness in legs and unable ot ambulate since last night. PT notes he slipped on a glass of water last night in his home, fell onto R knee and then was unable to get up from the floor. He reports he thinks he was on the floor for 45min-1hr. He states family was unable to pick him up so EMS was called. Upon EMS arriving, pt reported he could not walk and thus was brought to ED. Of note pt was seen in ED yesterday reported recently filled a prescription for MS Contin but took all but two pills of script within 2 days of filling it. Tox was consulted and was to be observed in ED until 2300 but pt eloped prior to completion of observation. Pt reports went home and took 1 Ambien, not any more MS Contin.    In ED:   - Vital signs were unremarkable on 5L NC at baseline  - Labs were significant for: WBC: 14,61, Na: 132, Cl: 89, BUN: 32, Creatinine 1.4 ( baseline 1.1), ALK: 135, AST: 421, ALT: 404, Troponin: 57, CPK: 7521, Lactate: 3.1 on VBG   - Chest X-ray: Low lung volumes. Left lung base atelectasis.   (27 Mar 2024 17:50)      INFECTIOUS DISEASE HISTORY:  Hospital course-  ID consulted for antimicrobial recommendations.     Prior hospital charts reviewed [Yes]  Primary team notes reviewed [Yes]  Other consultant notes reviewed [Yes]    REVIEW OF SYSTEMS:  CONSTITUTIONAL: No fever or chills  HEENT: No sore throat  RESPIRATORY: No cough, no shortness of breath  CARDIOVASCULAR: No chest pain or palpitations  GASTROINTESTINAL: No abdominal or epigastric pain  GENITOURINARY: No dysuria  NEUROLOGICAL: No headache/dizziness  MSK: No joint pain, erythema, or swelling; no back pain  SKIN: No itching, rashes  All other ROS negative except noted above    PAST MEDICAL & SURGICAL HISTORY:  HTN (hypertension)      High cholesterol      GERD (gastroesophageal reflux disease)      Morbid obesity      Osteoarthritis      Hiatal hernia  GERD      Colitis  LARGE INTESTINE   NO RECENT FLARES      COPD (chronic obstructive pulmonary disease)      REBECCA treated with BiPAP      History of cholecystectomy      History of appendectomy      History of knee replacement procedure of right knee  BILATERAL      Hernia, inguinal, bilateral  3 months old      H/O knee surgery  arthoscopic x4      H/O foot surgery  right big toe surgery    SOCIAL HISTORY:  - No recent travel  - 120 pack year history (3 packs daily, 40 years)   - Minimal EtOH use   - no drug use. Denies blood transfusion.  No tattoes.     FAMILY HISTORY:  FH: lung cancer (Mother)  mother    FH: diabetes mellitus      ANTIMICROBIALS:      ANTIMICROBIALS (past 90 days):  MEDICATIONS  (STANDING):        OTHER MEDS:   MEDICATIONS  (STANDING):  albuterol    90 MICROgram(s) HFA Inhaler 2 every 6 hours PRN  albuterol/ipratropium for Nebulization 3 every 6 hours  ALPRAZolam 2 every 8 hours PRN  aspirin enteric coated 81 daily  budesonide 160 MICROgram(s)/formoterol 4.5 MICROgram(s) Inhaler 2 two times a day  heparin   Injectable 5000 every 8 hours  oxyCODONE    IR 10 every 6 hours  pantoprazole    Tablet 40 before breakfast  polyethylene glycol 3350 17 daily  predniSONE   Tablet 40 daily  senna 2 at bedtime      VITALS:  Vital Signs Last 24 Hrs  T(F): 96.9 (03-30-24 @ 08:05), Max: 98.1 (03-27-24 @ 06:59)    Vital Signs Last 24 Hrs  HR: 82 (03-30-24 @ 08:05) (82 - 89)  BP: 134/61 (03-30-24 @ 08:05) (108/63 - 134/61)  RR: 18 (03-30-24 @ 08:05)  SpO2: --  Wt(kg): --    EXAM:  GENERAL: NAD, lying in bed, obese. On NC  HEAD: No head lesions  NECK: Supple  CHEST/LUNG: Shallow breath sounds bilaterally   HEART: S1 S2  ABDOMEN: Soft, nontender, distended.   EXTREMITIES: No clubbing, cyanosis. +1 edema   NERVOUS SYSTEM: Alert and oriented to person, time, place and situation  MSK: No joint erythema, swelling or pain  SKIN: No rashes or lesions, no superficial thrombophlebitis    Labs:                        12.3   7.83  )-----------( 144      ( 30 Mar 2024 07:48 )             37.9     03-30    141  |  100  |  16  ----------------------------<  154<H>  4.9   |  30  |  0.7    Ca    8.5      30 Mar 2024 07:48    TPro  5.3<L>  /  Alb  3.3<L>  /  TBili  1.5<H>  /  DBili  x   /  AST  924<H>  /  ALT  1584<H>  /  AlkPhos  190<H>  03-30      WBC Trend:  WBC Count: 7.83 (03-30-24 @ 07:48)  WBC Count: 6.41 (03-29-24 @ 06:23)  WBC Count: 7.59 (03-28-24 @ 05:53)  WBC Count: 14.61 (03-27-24 @ 15:00)      Auto Neutrophil #: 6.19 K/uL (03-30-24 @ 07:48)  Auto Neutrophil #: 4.56 K/uL (03-29-24 @ 06:23)  Auto Neutrophil #: 5.95 K/uL (03-28-24 @ 05:53)  Auto Neutrophil #: 12.23 K/uL (03-27-24 @ 15:00)  Auto Neutrophil #: 6.23 K/uL (03-26-24 @ 18:16)      Creatine Trend:  Creatinine: 0.7 (03-30)  Creatinine: 0.7 (03-29)  Creatinine: 0.8 (03-28)  Creatinine: 1.2 (03-27)  Liver Biochemical Testing Trend:  Alanine Aminotransferase (ALT/SGPT): 1584 *H* (03-30)  Alanine Aminotransferase (ALT/SGPT): 1370 *H* (03-29)  Alanine Aminotransferase (ALT/SGPT): 470 *H* (03-28)  Alanine Aminotransferase (ALT/SGPT): 404 *H* (03-27)  Alanine Aminotransferase (ALT/SGPT): 219 *H* (03-26)  Aspartate Aminotransferase (AST/SGOT): 924 (03-30-24 @ 07:48)  Aspartate Aminotransferase (AST/SGOT): 1251 (03-29-24 @ 06:23)  Aspartate Aminotransferase (AST/SGOT): 460 (03-28-24 @ 05:53)  Aspartate Aminotransferase (AST/SGOT): 421 (03-27-24 @ 12:48)  Aspartate Aminotransferase (AST/SGOT): 168 (03-26-24 @ 18:16)  Bilirubin Total: 1.5 (03-30)  Bilirubin Total: 1.2 (03-29)  Bilirubin Total: 0.7 (03-28)  Bilirubin Total: 0.7 (03-27)  Bilirubin Total: 0.5 (03-26)      Trend LDH      Auto Eosinophil %: 0.0 % (03-30-24 @ 07:48)  Auto Eosinophil %: 0.5 % (03-29-24 @ 06:23)  Auto Eosinophil %: 0.4 % (03-28-24 @ 05:53)  Auto Eosinophil %: 0.0 % (03-27-24 @ 15:00)      Urinalysis Basic - ( 30 Mar 2024 07:48 )    Color: x / Appearance: x / SG: x / pH: x  Gluc: 154 mg/dL / Ketone: x  / Bili: x / Urobili: x   Blood: x / Protein: x / Nitrite: x   Leuk Esterase: x / RBC: x / WBC x   Sq Epi: x / Non Sq Epi: x / Bacteria: x        MICROBIOLOGY:    Male    Ferritin: 969 (03-29)    Troponin T, High Sensitivity Result: 41 (03-28)  Troponin T, High Sensitivity Result: 54 (03-27)  Troponin T, High Sensitivity Result: 57 (03-27)  Troponin T, High Sensitivity Result: 25 (03-26)    Lactate, Blood: 2.0 (03-28 @ 05:53)  Lactate, Blood: 1.4 (03-27 @ 23:55)  Blood Gas Venous - Lactate: 3.1 (03-27 @ 12:57)    INFLAMMATORY MARKERS      RADIOLOGY & ADDITIONAL TESTS:  I have personally reviewed the imagings.  CXR      CT    < from: US Kidney and Bladder (03.28.24 @ 13:21) >    Right kidney: 11.4 cm. No hydronephrosis or calculi.    Left kidney:  13.5 cm. No hydronephrosis or calculi.    Urinary bladder: Patient voided prior to the study.    IMPRESSION:    Normal renal ultrasound.    < end of copied text >  < from: US Abdomen Upper Quadrant Right (03.23.24 @ 18:12) >    FINDINGS:    The examination is suboptimal secondary to patient's positioning and body   habitus.  Liver: No definite hepatic mass.  Bile ducts: Normal caliber. Common bile duct measures 6 mm.  Gallbladder: Cholecystectomy.  Pancreas: Poorly visualized.  Right kidney: 11.4 cm. No hydronephrosis.  Ascites: None.  IVC: Visualized portions are within normal limits.      IMPRESSION:      Suboptimal exam    No definite hepatic mass or biliary ductal dilatation.        --- End ofReport ---        < end of copied text >  < from: CT Angio Chest PE Protocol w/ IV Cont (03.19.24 @ 03:04) >    PULMONARY ARTERIES: No central or proximal segmental pulmonary emboli.   Nondiagnostic examination of more distal branches.    AIRWAYS/LUNGS/PLEURA: Patent central airways No pneumothorax or pleural   effusion. Right apical groundglass opacity. Bilateral multi lobar   opacity/atelectasis.    MEDIASTINUM: No lymphadenopathy.    HEART/GREAT VESSELS: Heart is normal in size. No pericardial effusion.    UPPER ABDOMEN: Limited evaluation based on technique. Surgically absent   gallbladder.    BONES/SOFT TISSUES: Degenerative changes of the spine. Chronic sternal   fracture. Bilateral glenohumeral joint effusions.    IMPRESSION:    1.  No central or proximal segmental pulmonary emboli.  2.  Bilateral multi lobar opacities, possibly atelectasis although   superimposed infectious process is considered in the appropriate clinical   setting.    --- End of Report ---        < end of copied text >  < from: CT Angio Chest PE Protocol w/ IV Cont (11.12.23 @ 01:49) >  IMPRESSION:  No pulmonary emboli.    Stable ectatic ascending thoracic aorta measuring 4.1 cm.    < end of copied text >    CARDIOLOGY TESTING  12 Lead ECG:   Ventricular Rate 118 BPM    Atrial Rate 118 BPM    P-R Interval 142 ms    QRS Duration 78 ms    Q-T Interval 306 ms    QTC Calculation(Bazett) 428 ms    P Axis 46 degrees    R Axis 22 degrees    T Axis 44 degrees    Diagnosis Line Sinus tachycardiawith Premature atrial complexes  Possible Left atrial enlargement  Borderline ECG    Confirmed by parish sparks ([id number]) on 3/27/2024 9:35:15 AM (03-27-24 @ 07:02)  12 Lead ECG:   Ventricular Rate 98 BPM    Atrial Rate 98 BPM    P-R Interval 150 ms    QRS Duration 74 ms    Q-T Interval 328 ms    QTC Calculation(Bazett) 418 ms    P Axis 31 degrees    R Axis 11 degrees    T Axis 9 degrees    Diagnosis Line Normal sinus rhythm  Moderate voltage criteria for LVH, may be normal variant  Borderline ECG    Confirmed by parish sparks ([id number]) on 3/27/2024 6:27:12 AM (03-26-24 @ 17:46)

## 2024-03-30 NOTE — CONSULT NOTE ADULT - ASSESSMENT
ASSESSMENT  This is a 57yo male with PMHx colitis, hiatal hernia, OA, GERD, HLD, HTN, COPD on 5 L at home, HFpEF (EF 65% Nov 2023), knee replacement, hip replacement, shoulder replacement, current smoker, on MS Contin for chronic joint pain presents after a fall.     IMPRESSION  # Transaminitis  -Acute on chronic Liver Injury -mixed likely d/t rhabdomyolysis and underlying statin/fibrate combination DILI  #Hep C positive- Denies any risk factors.   # Fall  # Acute kidney injury-Improved.   # Rhabdomylosis- resolved.   # Opioid dependance disorder.   # Transaminitis  # H/o COPD on home O2.  # REBECCA  #Obesity BMI (kg/m2): 51.7, 40.6, 43.4, 39.4, 38.8, 39.4, 39.4, 39.4, 39.4, 40.2, 40.2    RECOMMENDATIONS  -Hep B Non-Immune.  -Check HIV screen. Check Urine G/C. Syphilis screen and Quantiferon.  -Check Hep C Genotype. Check Fibrosure.  -No acute indication for hep C treatment.   -Can follow up outpatient with Hepatology or ID for Hep C treatment.   -Patient can be provided with [facility] Contact At   -Recall ID as needed.     If any questions, please send a message or call on [employer] Teams  Please continue to update ID with any pertinent new laboratory or radiographic findings.    Helena Fontenot M.D  Infectious Diseases Attending/   [employer] at [facility]

## 2024-03-30 NOTE — PROGRESS NOTE ADULT - ASSESSMENT
57yo male with PMHx colitis, hiatal hernia, OA, GERD, HLD, HTN, COPD on 5 L at home, HFpEF (EF 65% Nov 2023), knee replacement, hip replacement, shoulder replacement, current smoker, on MS Contin for chronic joint pain presents c/o weakness in legs and unable ot ambulate since last night. PT notes he slipped on a glass of water last night in his home, fell onto R knee and then was unable to get up from the floor. He reports he thinks he was on the floor for 45min-1hr. He states family was unable to pick him up so EMS was called. Upon EMS arriving, pt reported he could not walk and thus was brought to ED. Of note pt was seen in ED yesterday reported recently filled a prescription for MS Contin but took all but two pills of script within 2 days of filling it. Tox was consulted and was to be observed in ED until 2300 but pt eloped prior to completion of observation. Pt reports went home and took 1 Ambien, not any more MS Contin.    # Fall  # Lactic acidosis- resolved  # Acute kidney injury , prerenal resolving  # Rhabdomylosis- resolving  - US Kidney and Bladder (03.28.24 @ 13:21) >Normal renal ultrasound.  - Lactate, Blood: 2.0 mmol/L (03.28.24 @ 05:53)  - Creatine Kinase, Serum: 1427 U/L (03.30.24 @ 07:48  - continue holding lasix , lisinopril  - IV fluids  - monitor BMP  - trend CPk  - evaluated by PT    # Metabolic encephalopathy sec to opiates-resolved  - pt on morphine and oxycodone at home  - addiction medicine eval  - evaluated by catch team     # Hep c infection  # Transaminitis  - Hold statin , fibrate  -  US Abdomen Upper Quadrant Right (03.23.24 @ 18:12) >No definite hepatic mass or biliary ductal dilatation.  - Ammonia, Serum: 44 umol/L (03.29.24 @ 06:23)  - Hepatitis C Virus RNA Detection by PCR: [id number] IU/mL (03.29.24 @ 06:23)  - UDS- benzo positive  - Hepatology eval: Acute hepatitis  - pattern of liver injury is suggestive of DILI due to combination of statin and fibrate. Acute hepatitis C not likely. OP DAA therapy for chronic hepatitis C. Testing for STD.  - ID eval: Hep B Non-Immune.Check HIV screen. Check Urine G/C. Syphilis screen and Quantiferon. Check Hep C Genotype. Check Fibrosure.  -No acute indication for hep C treatment.   -Can follow up outpatient with Hepatology or ID for Hep C treatment.   -Patient can be provided with [facility] Contact At   - monitor LFT    # H/o recent COPD exacerbation   #  H/o chronic resp failure  - : Xray Chest 1 View- PORTABLE-Urgent (Xray Chest 1 View- PORTABLE-Urgent .) (03.27.24 @ 08:19) >Low lung volumes. Left lung base atelectasis.  - taper  prednisone   - c/w duoneb, budesonide  - maintain oxygen sat > 92    # Elevated troponin, type 2 MI sec to fall  - Troponin T, High Sensitivity Result: 41(03.28.24 @ 05:53)  - 12 Lead ECG (03.27.24 @ 07:02) >Sinus tachycardiawith Premature atrial complexes    # Opiod dependance  - As per patients pharmacy -  oxycodone 30mg Q5H, morphine sulfate 100mg Q6H, Xanax 2mg Q6, ambien 10mg QHS PRN.prescribed b  Dr. Erick Womack for morphine and oxy [phone] and  Dr. Lukas Delgado for zolpidem and xanax [phone]    # GERD  # Hiatal hernia  - c/w protonix    # Anxiety  - decrease xanax to 2mg q8    # REBECCA  # Morbid obesity  - BMI: 51    # DVT prophylaxis    # full code    # Pending: addiction med eval, monitor BMP, CPK, , monitor LFT, HIV syphilis screen, Urine G/C  # Discussed plan of care with patient  # Disposition: Home when stable

## 2024-03-30 NOTE — PROGRESS NOTE ADULT - SUBJECTIVE AND OBJECTIVE BOX
Patient is a 58y old  Male who presents with a chief complaint of weakness in legs and unable ot ambulate     Patient was seen and examined.  no new events    PAST MEDICAL & SURGICAL HISTORY:  HTN (hypertension)  High cholesterol  GERD (gastroesophageal reflux disease)  Morbid obesity  Osteoarthritis  Hiatal hernia  GERD  Colitis LARGE INTESTINE   NO RECENT FLARES  COPD (chronic obstructive pulmonary disease)  REBECCA treated with BiPAP  History of cholecystectomy  History of appendectomy  History of knee replacement procedure of right kneeBILATERAL  Hernia, inguinal, bilateral 3 months old  H/O knee surgery arthoscopic x4  H/O foot surgery right big toe surgery    Allergies  No Known Allergies    MEDICATIONS  (STANDING):  albuterol/ipratropium for Nebulization 3 milliLiter(s) Nebulizer every 6 hours  aspirin enteric coated 81 milliGRAM(s) Oral daily  budesonide 160 MICROgram(s)/formoterol 4.5 MICROgram(s) Inhaler 2 Puff(s) Inhalation two times a day  heparin   Injectable 5000 Unit(s) SubCutaneous every 8 hours  oxyCODONE    IR 10 milliGRAM(s) Oral every 6 hours  pantoprazole    Tablet 40 milliGRAM(s) Oral before breakfast  polyethylene glycol 3350 17 Gram(s) Oral daily  predniSONE   Tablet 40 milliGRAM(s) Oral daily  senna 2 Tablet(s) Oral at bedtime  sodium chloride 0.9%. 1000 milliLiter(s) (100 mL/Hr) IV Continuous <Continuous>    MEDICATIONS  (PRN):  albuterol    90 MICROgram(s) HFA Inhaler 2 Puff(s) Inhalation every 6 hours PRN for shortness of breath and/or wheezing  ALPRAZolam 2 milliGRAM(s) Oral every 8 hours PRN insomnia    T(C): 36.1 (03-30-24 @ 08:05), Max: 36.4 (03-29-24 @ 17:13)  HR: 82 (03-30-24 @ 08:05) (82 - 89)  BP: 134/61 (03-30-24 @ 08:05) (108/63 - 134/61)  RR: 18 (03-30-24 @ 08:05) (18 - 18)  SpO2: --    O/E: awake, alert, not in distress.  HEENT: atraumatic, EOMI.  Chest: decreased breath sounds at bases  CVS: SIS2 +, no murmur.  P/A: Soft, BS+  CNS:   awake, alert  Ext: no edema feet.  All systems reviewed positive findings as above.                                              12.3<L>  7.83  )-----------( 144      ( 30 Mar 2024 07:48 )             37.9<L>                        11.9<L>  6.41  )-----------( 144      ( 29 Mar 2024 06:23 )             35.4<L>    03-30    141  |  100  |  16  ----------------------------<  154<H>  4.9   |  30  |  0.7  03-29    138  |  100  |  16  ----------------------------<  96  4.7   |  29  |  0.7    Ca    8.5      30 Mar 2024 07:48  Ca    8.2<L>      29 Mar 2024 06:23    TPro  5.3<L>  /  Alb  3.3<L>  /  TBili  1.5<H>  /  DBili  x   /  AST  924<H>  /  ALT  1584<H>  /  AlkPhos  190<H>  03-30  TPro  5.0<L>  /  Alb  3.3<L>  /  TBili  1.2  /  DBili  x   /  AST  1251<H>  /  ALT  1370<H>  /  AlkPhos  153<H>  03-29

## 2024-03-31 ENCOUNTER — TRANSCRIPTION ENCOUNTER (OUTPATIENT)
Age: 59
End: 2024-03-31

## 2024-03-31 VITALS
HEART RATE: 81 BPM | SYSTOLIC BLOOD PRESSURE: 139 MMHG | DIASTOLIC BLOOD PRESSURE: 75 MMHG | RESPIRATION RATE: 18 BRPM | TEMPERATURE: 97 F

## 2024-03-31 LAB
ALBUMIN SERPL ELPH-MCNC: 3 G/DL — LOW (ref 3.5–5.2)
ALP SERPL-CCNC: 176 U/L — HIGH (ref 30–115)
ALT FLD-CCNC: 1201 U/L — HIGH (ref 0–41)
ANION GAP SERPL CALC-SCNC: 8 MMOL/L — SIGNIFICANT CHANGE UP (ref 7–14)
APTT BLD: 29 SEC — SIGNIFICANT CHANGE UP (ref 27–39.2)
AST SERPL-CCNC: 473 U/L — HIGH (ref 0–41)
BASOPHILS # BLD AUTO: 0.03 K/UL — SIGNIFICANT CHANGE UP (ref 0–0.2)
BASOPHILS NFR BLD AUTO: 0.4 % — SIGNIFICANT CHANGE UP (ref 0–1)
BILIRUB SERPL-MCNC: 1.2 MG/DL — SIGNIFICANT CHANGE UP (ref 0.2–1.2)
BUN SERPL-MCNC: 17 MG/DL — SIGNIFICANT CHANGE UP (ref 10–20)
CALCIUM SERPL-MCNC: 8.7 MG/DL — SIGNIFICANT CHANGE UP (ref 8.4–10.5)
CHLORIDE SERPL-SCNC: 102 MMOL/L — SIGNIFICANT CHANGE UP (ref 98–110)
CK SERPL-CCNC: 820 U/L — HIGH (ref 0–225)
CO2 SERPL-SCNC: 28 MMOL/L — SIGNIFICANT CHANGE UP (ref 17–32)
CREAT SERPL-MCNC: 0.6 MG/DL — LOW (ref 0.7–1.5)
EGFR: 113 ML/MIN/1.73M2 — SIGNIFICANT CHANGE UP
EOSINOPHIL # BLD AUTO: 0.05 K/UL — SIGNIFICANT CHANGE UP (ref 0–0.7)
EOSINOPHIL NFR BLD AUTO: 0.7 % — SIGNIFICANT CHANGE UP (ref 0–8)
GLUCOSE SERPL-MCNC: 123 MG/DL — HIGH (ref 70–99)
HBV CORE AB SER-ACNC: SIGNIFICANT CHANGE UP
HBV SURFACE AB SER-ACNC: <3 MIU/ML — LOW
HBV SURFACE AG SERPL QL IA: SIGNIFICANT CHANGE UP
HCT VFR BLD CALC: 34.9 % — LOW (ref 42–52)
HEV AB FLD QL: NEGATIVE — SIGNIFICANT CHANGE UP
HGB BLD-MCNC: 11.4 G/DL — LOW (ref 14–18)
HIV 1+2 AB+HIV1 P24 AG SERPL QL IA: SIGNIFICANT CHANGE UP
IMM GRANULOCYTES NFR BLD AUTO: 1 % — HIGH (ref 0.1–0.3)
INR BLD: 1.09 RATIO — SIGNIFICANT CHANGE UP (ref 0.65–1.3)
LYMPHOCYTES # BLD AUTO: 2.82 K/UL — SIGNIFICANT CHANGE UP (ref 1.2–3.4)
LYMPHOCYTES # BLD AUTO: 39.2 % — SIGNIFICANT CHANGE UP (ref 20.5–51.1)
MAGNESIUM SERPL-MCNC: 1.9 MG/DL — SIGNIFICANT CHANGE UP (ref 1.8–2.4)
MCHC RBC-ENTMCNC: 29.2 PG — SIGNIFICANT CHANGE UP (ref 27–31)
MCHC RBC-ENTMCNC: 32.7 G/DL — SIGNIFICANT CHANGE UP (ref 32–37)
MCV RBC AUTO: 89.3 FL — SIGNIFICANT CHANGE UP (ref 80–94)
MONOCYTES # BLD AUTO: 0.51 K/UL — SIGNIFICANT CHANGE UP (ref 0.1–0.6)
MONOCYTES NFR BLD AUTO: 7.1 % — SIGNIFICANT CHANGE UP (ref 1.7–9.3)
NEUTROPHILS # BLD AUTO: 3.71 K/UL — SIGNIFICANT CHANGE UP (ref 1.4–6.5)
NEUTROPHILS NFR BLD AUTO: 51.6 % — SIGNIFICANT CHANGE UP (ref 42.2–75.2)
NRBC # BLD: 0 /100 WBCS — SIGNIFICANT CHANGE UP (ref 0–0)
PLATELET # BLD AUTO: 152 K/UL — SIGNIFICANT CHANGE UP (ref 130–400)
PMV BLD: 10.8 FL — HIGH (ref 7.4–10.4)
POTASSIUM SERPL-MCNC: 4.5 MMOL/L — SIGNIFICANT CHANGE UP (ref 3.5–5)
POTASSIUM SERPL-SCNC: 4.5 MMOL/L — SIGNIFICANT CHANGE UP (ref 3.5–5)
PROT SERPL-MCNC: 4.8 G/DL — LOW (ref 6–8)
PROTHROM AB SERPL-ACNC: 12.4 SEC — SIGNIFICANT CHANGE UP (ref 9.95–12.87)
RBC # BLD: 3.91 M/UL — LOW (ref 4.7–6.1)
RBC # FLD: 17.1 % — HIGH (ref 11.5–14.5)
SODIUM SERPL-SCNC: 138 MMOL/L — SIGNIFICANT CHANGE UP (ref 135–146)
T PALLIDUM AB TITR SER: NEGATIVE — SIGNIFICANT CHANGE UP
WBC # BLD: 7.19 K/UL — SIGNIFICANT CHANGE UP (ref 4.8–10.8)
WBC # FLD AUTO: 7.19 K/UL — SIGNIFICANT CHANGE UP (ref 4.8–10.8)

## 2024-03-31 PROCEDURE — 71045 X-RAY EXAM CHEST 1 VIEW: CPT | Mod: 26

## 2024-03-31 PROCEDURE — 99233 SBSQ HOSP IP/OBS HIGH 50: CPT

## 2024-03-31 RX ORDER — LANOLIN ALCOHOL/MO/W.PET/CERES
3 CREAM (GRAM) TOPICAL ONCE
Refills: 0 | Status: COMPLETED | OUTPATIENT
Start: 2024-03-31 | End: 2024-03-31

## 2024-03-31 RX ORDER — FUROSEMIDE 40 MG
40 TABLET ORAL ONCE
Refills: 0 | Status: COMPLETED | OUTPATIENT
Start: 2024-03-31 | End: 2024-03-31

## 2024-03-31 RX ORDER — FUROSEMIDE 40 MG
40 TABLET ORAL DAILY
Refills: 0 | Status: DISCONTINUED | OUTPATIENT
Start: 2024-04-01 | End: 2024-03-31

## 2024-03-31 RX ORDER — ALPRAZOLAM 0.25 MG
2 TABLET ORAL EVERY 6 HOURS
Refills: 0 | Status: DISCONTINUED | OUTPATIENT
Start: 2024-03-31 | End: 2024-03-31

## 2024-03-31 RX ORDER — LISINOPRIL 2.5 MG/1
5 TABLET ORAL DAILY
Refills: 0 | Status: DISCONTINUED | OUTPATIENT
Start: 2024-03-31 | End: 2024-03-31

## 2024-03-31 RX ADMIN — Medication 40 MILLIGRAM(S): at 05:01

## 2024-03-31 RX ADMIN — Medication 2 MILLIGRAM(S): at 12:46

## 2024-03-31 RX ADMIN — PANTOPRAZOLE SODIUM 40 MILLIGRAM(S): 20 TABLET, DELAYED RELEASE ORAL at 05:02

## 2024-03-31 RX ADMIN — Medication 81 MILLIGRAM(S): at 11:35

## 2024-03-31 RX ADMIN — OXYCODONE HYDROCHLORIDE 10 MILLIGRAM(S): 5 TABLET ORAL at 02:16

## 2024-03-31 RX ADMIN — OXYCODONE HYDROCHLORIDE 10 MILLIGRAM(S): 5 TABLET ORAL at 14:22

## 2024-03-31 RX ADMIN — OXYCODONE HYDROCHLORIDE 10 MILLIGRAM(S): 5 TABLET ORAL at 14:12

## 2024-03-31 RX ADMIN — Medication 3 MILLILITER(S): at 08:09

## 2024-03-31 RX ADMIN — OXYCODONE HYDROCHLORIDE 10 MILLIGRAM(S): 5 TABLET ORAL at 07:44

## 2024-03-31 RX ADMIN — Medication 3 MILLILITER(S): at 13:21

## 2024-03-31 RX ADMIN — Medication 40 MILLIGRAM(S): at 12:47

## 2024-03-31 RX ADMIN — HEPARIN SODIUM 5000 UNIT(S): 5000 INJECTION INTRAVENOUS; SUBCUTANEOUS at 14:12

## 2024-03-31 RX ADMIN — POLYETHYLENE GLYCOL 3350 17 GRAM(S): 17 POWDER, FOR SOLUTION ORAL at 11:34

## 2024-03-31 RX ADMIN — Medication 2 MILLIGRAM(S): at 07:44

## 2024-03-31 RX ADMIN — OXYCODONE HYDROCHLORIDE 10 MILLIGRAM(S): 5 TABLET ORAL at 07:50

## 2024-03-31 RX ADMIN — BUDESONIDE AND FORMOTEROL FUMARATE DIHYDRATE 2 PUFF(S): 160; 4.5 AEROSOL RESPIRATORY (INHALATION) at 07:45

## 2024-03-31 RX ADMIN — ALBUTEROL 2 PUFF(S): 90 AEROSOL, METERED ORAL at 07:45

## 2024-03-31 NOTE — PROGRESS NOTE ADULT - TIME BILLING
[facility] patient care. Discussed with [facility] patient [facility]. Discussed with [facility], discused with addiction medicine, they are requesting psych eval [facility] patient [facility]. Discussed with [facility], discussed with addiction medicine

## 2024-03-31 NOTE — PROGRESS NOTE ADULT - ASSESSMENT
57yo male with PMHx colitis, hiatal hernia, OA, GERD, HLD, HTN, COPD on 5 L at home, HFpEF (EF 65% Nov 2023), knee replacement, hip replacement, shoulder replacement, current smoker, on MS Contin for chronic joint pain presents c/o weakness in legs and unable ot ambulate since last night. PT notes he slipped on a glass of water last night in his home, fell onto R knee and then was unable to get up from the floor. He reports he thinks he was on the floor for 45min-1hr. He states family was unable to pick him up so EMS was called. Upon EMS arriving, pt reported he could not walk and thus was brought to ED. Of note pt was seen in ED yesterday reported recently filled a prescription for MS Contin but took all but two pills of script within 2 days of filling it. Tox was consulted and was to be observed in ED until 2300 but pt eloped prior to completion of observation. Pt reports went home and took 1 Ambien, not any more MS Contin.    # Fall  # Lactic acidosis- resolved  # Acute kidney injury , prerenal resolved  # Rhabdomylosis- resolving  - US Kidney and Bladder (03.28.24 @ 13:21) >Normal renal ultrasound.  - Lactate, Blood: 2.0 mmol/L (03.28.24 @ 05:53)  - Creatine Kinase, Serum: 820 U/L (03.31.24 @ 04:30)  - dc  IV fluids  - monitor BMP  - trend CPk  - evaluated by PT    # Chronic diastolic CHF  - restart lasix and lisinopril    # Metabolic encephalopathy sec to opiates-resolved  - pt on morphine and oxycodone at home  - addiction medicine eval  - psychiatry eval  - evaluated by catch team     # Hep c infection  # Transaminitis  - Hold statin , fibrate  -  US Abdomen Upper Quadrant Right (03.23.24 @ 18:12) >No definite hepatic mass or biliary ductal dilatation.  - Ammonia, Serum: 44 umol/L (03.29.24 @ 06:23)  - Hepatitis C Virus RNA Detection by PCR: [id number] IU/mL (03.29.24 @ 06:23)  - UDS- benzo positive  - Hepatology eval: Acute hepatitis  - pattern of liver injury is suggestive of DILI due to combination of statin and fibrate. Acute hepatitis C not likely. OP DAA therapy for chronic hepatitis C. Testing for STD.  - ID eval: Hep B Non-Immune.Check HIV screen. Check Urine G/C. Syphilis screen and Quantiferon. Check Hep C Genotype. Check Fibrosure.  -No acute indication for hep C treatment.   -Can follow up outpatient with Hepatology or ID for Hep C treatment.   -Patient can be provided with [facility] Contact At   - HIV-1/2 Combo Result: Nonreact:(03.30.24 @ 12:16)  - Treponema Pallidum Antibody Interpretation: Negative (03.30.24 @ 12:16)  - monitor LFT    # H/o recent COPD exacerbation   #  H/o chronic resp failure  - : Xray Chest 1 View- PORTABLE-Urgent (Xray Chest 1 View- PORTABLE-Urgent .) (03.27.24 @ 08:19) >Low lung volumes. Left lung base atelectasis.  - taper  prednisone   - c/w duoneb, budesonide  - maintain oxygen sat > 92    # Elevated troponin, type 2 MI sec to fall  - Troponin T, High Sensitivity Result: 41(03.28.24 @ 05:53)  - 12 Lead ECG (03.27.24 @ 07:02) >Sinus tachycardiawith Premature atrial complexes    # Opiod dependance  - As per patients pharmacy -  oxycodone 30mg Q5H, morphine sulfate 100mg Q6H, Xanax 2mg Q6, ambien 10mg QHS PRN.prescribed b  Dr. Erick Womack for morphine and oxy [phone] and  Dr. Lukas Delgado for zolpidem and xanax [phone]    # GERD  # Hiatal hernia  - c/w protonix    # Anxiety  - decrease xanax to 2mg q8    # REBECCA  # Morbid obesity  - BMI: 51    # DVT prophylaxis    # full code    # Pending: addiction med eval, psych eval, monitor BMP, CPK, , monitor LFT, urine for clamydia/gonorrhea  # Discussed plan of care with patient  # Disposition: Home when stable     59yo male with PMHx colitis, hiatal hernia, OA, GERD, HLD, HTN, COPD on 5 L at home, HFpEF (EF 65% Nov 2023), knee replacement, hip replacement, shoulder replacement, current smoker, on MS Contin for chronic joint pain presents c/o weakness in legs and unable ot ambulate since last night. PT notes he slipped on a glass of water last night in his home, fell onto R knee and then was unable to get up from the floor. He reports he thinks he was on the floor for 45min-1hr. He states family was unable to pick him up so EMS was called. Upon EMS arriving, pt reported he could not walk and thus was brought to ED. Of note pt was seen in ED yesterday reported recently filled a prescription for MS Contin but took all but two pills of script within 2 days of filling it. Tox was consulted and was to be observed in ED until 2300 but pt eloped prior to completion of observation. Pt reports went home and took 1 Ambien, not any more MS Contin.    # Fall  # Lactic acidosis- resolved  # Acute kidney injury , prerenal resolved  # Rhabdomylosis- resolving  - US Kidney and Bladder (03.28.24 @ 13:21) >Normal renal ultrasound.  - Lactate, Blood: 2.0 mmol/L (03.28.24 @ 05:53)  - Creatine Kinase, Serum: 820 U/L (03.31.24 @ 04:30)  - dc  IV fluids  - monitor BMP  - trend CPk  - evaluated by PT    # Chronic diastolic CHF  - restart lasix and lisinopril    # Metabolic encephalopathy sec to opiates-resolved  - pt on morphine and oxycodone at home  - addiction medicine eval  - evaluated by catch team     # Hep c infection  # Transaminitis  - Hold statin , fibrate  -  US Abdomen Upper Quadrant Right (03.23.24 @ 18:12) >No definite hepatic mass or biliary ductal dilatation.  - Ammonia, Serum: 44 umol/L (03.29.24 @ 06:23)  - Hepatitis C Virus RNA Detection by PCR: [id number] IU/mL (03.29.24 @ 06:23)  - UDS- benzo positive  - Hepatology eval: Acute hepatitis  - pattern of liver injury is suggestive of DILI due to combination of statin and fibrate. Acute hepatitis C not likely. OP DAA therapy for chronic hepatitis C. Testing for STD.  - ID eval: Hep B Non-Immune.Check HIV screen. Check Urine G/C. Syphilis screen and Quantiferon. Check Hep C Genotype. Check Fibrosure.  -No acute indication for hep C treatment.   -Can follow up outpatient with Hepatology or ID for Hep C treatment.   -Patient can be provided with [facility] Contact At   - HIV-1/2 Combo Result: Nonreact:(03.30.24 @ 12:16)  - Treponema Pallidum Antibody Interpretation: Negative (03.30.24 @ 12:16)  - monitor LFT    # H/o recent COPD exacerbation   #  H/o chronic resp failure  - : Xray Chest 1 View- PORTABLE-Urgent (Xray Chest 1 View- PORTABLE-Urgent .) (03.27.24 @ 08:19) >Low lung volumes. Left lung base atelectasis.  - taper  prednisone   - c/w duoneb, budesonide  - maintain oxygen sat > 92    # Elevated troponin, type 2 MI sec to fall  - Troponin T, High Sensitivity Result: 41(03.28.24 @ 05:53)  - 12 Lead ECG (03.27.24 @ 07:02) >Sinus tachycardiawith Premature atrial complexes    # Opiod dependance  - As per patients pharmacy -  oxycodone 30mg Q5H, morphine sulfate 100mg Q6H, Xanax 2mg Q6, ambien 10mg QHS PRN.prescribed b  Dr. Erick Womack for morphine and oxy [phone] and  Dr. Lukas Delgado for zolpidem and xanax [phone]    # GERD  # Hiatal hernia  - c/w protonix    # Anxiety  - decrease xanax to 2mg q8    # REBECCA  # Morbid obesity  - BMI: 51    # DVT prophylaxis    # full code    # Pending: addiction med eval, psych eval, monitor BMP, CPK, , monitor LFT, urine for clamydia/gonorrhea  # Discussed plan of care with patient  # Disposition: Home when stable

## 2024-03-31 NOTE — CONSULT NOTE ADULT - CONSULT REASON
elevated LFTs
58 year old h/o polysubstance abuse here with rhabdomyolysis, fall. Addiction med consult to evaluate for need for further workup/management while here
Infection

## 2024-03-31 NOTE — PROGRESS NOTE ADULT - SUBJECTIVE AND OBJECTIVE BOX
Patient is a 58y old  Male who presents with a chief complaint of weakness in legs and unable ot ambulate     Patient was seen and examined.  Pt denies any complaints    PAST MEDICAL & SURGICAL HISTORY:  HTN (hypertension)  High cholesterol  GERD (gastroesophageal reflux disease)  Morbid obesity  Osteoarthritis  Hiatal hernia  GERD  Colitis LARGE INTESTINE   NO RECENT FLARES  COPD (chronic obstructive pulmonary disease)  REBECCA treated with BiPAP  History of cholecystectomy  History of appendectomy  History of knee replacement procedure of right kneeBILATERAL  Hernia, inguinal, bilateral 3 months old  H/O knee surgery arthoscopic x4  H/O foot surgery right big toe surgery    Allergies  No Known Allergies    MEDICATIONS  (STANDING):  albuterol/ipratropium for Nebulization 3 milliLiter(s) Nebulizer every 6 hours  aspirin enteric coated 81 milliGRAM(s) Oral daily  budesonide 160 MICROgram(s)/formoterol 4.5 MICROgram(s) Inhaler 2 Puff(s) Inhalation two times a day  heparin   Injectable 5000 Unit(s) SubCutaneous every 8 hours  pantoprazole    Tablet 40 milliGRAM(s) Oral before breakfast  polyethylene glycol 3350 17 Gram(s) Oral daily  predniSONE   Tablet 40 milliGRAM(s) Oral daily  senna 2 Tablet(s) Oral at bedtime    MEDICATIONS  (PRN):  albuterol    90 MICROgram(s) HFA Inhaler 2 Puff(s) Inhalation every 6 hours PRN for shortness of breath and/or wheezing  ALPRAZolam 2 milliGRAM(s) Oral every 6 hours PRN anxiety  oxyCODONE    IR 10 milliGRAM(s) Oral every 6 hours PRN Moderate Pain (4 - 6)    T(C): 36.5 (03-31-24 @ 08:07), Max: 36.5 (03-31-24 @ 08:07)  HR: 87 (03-31-24 @ 08:07) (79 - 87)  BP: 143/77 (03-31-24 @ 08:07) (111/77 - 143/77)  RR: 18 (03-31-24 @ 08:07) (18 - 19)  SpO2: 95% (03-31-24 @ 08:07) (95% - 95%)    O/E: awake, alert, not in distress.  HEENT: atraumatic, EOMI.  Chest: decreased breath sounds at bases  CVS: SIS2 +, no murmur.  P/A: Soft, BS+  CNS:   awake, alert  Ext: no edema feet.  All systems reviewed positive findings as above.                                              12.3<L>  7.83  )-----------( 144      ( 30 Mar 2024 07:48 )             37.9<L>                        11.9<L>  6.41  )-----------( 144      ( 29 Mar 2024 06:23 )             35.4<L>    03-30    141  |  100  |  16  ----------------------------<  154<H>  4.9   |  30  |  0.7  03-29    138  |  100  |  16  ----------------------------<  96  4.7   |  29  |  0.7    Ca    8.5      30 Mar 2024 07:48  Ca    8.2<L>      29 Mar 2024 06:23    TPro  5.3<L>  /  Alb  3.3<L>  /  TBili  1.5<H>  /  DBili  x   /  AST  924<H>  /  ALT  1584<H>  /  AlkPhos  190<H>  03-30  TPro  5.0<L>  /  Alb  3.3<L>  /  TBili  1.2  /  DBili  x   /  AST  1251<H>  /  ALT  1370<H>  /  AlkPhos  153<H>  03-29

## 2024-03-31 NOTE — CONSULT NOTE ADULT - ASSESSMENT
59yo male with PMHx colitis, hiatal hernia, OA, GERD, HLD, HTN, COPD on 5 L at home, HFpEF (EF 65% Nov 2023), knee replacement, hip replacement, shoulder replacement, current smoker, on MS Contin for chronic joint pain presents c/o weakness in legs and unable ot ambulate since last night. Addiction medicine was consulted for workup/management of polysubstance use disorder and concern for multiple admissions.    Patient denies hx of substance use and denies misuse of his prescribed pain and anxiety medications. He reports that EMS's report of him ingesting a full bottle was inaccurate. Patient's urine toxicology screen was negative for all substances tested except for benzodiazepines, which he is prescribed. He declines to change any of his medications at this time or engage with Addiction services.     Patient likely has developed iatrogenic dependence on pain medications and high-dose Xanax. Benzodiazepines are not recommended for long-term treatment of anxiety and patient would benefit from alternative treatment, including a slow detox off of benzodiazepine medication. However, this will likely need to occur on an outpatient basis. Given that patient has been on a high-dose for an extended period of time, a rapid detox would be risky.     RECOMMENDATIONS:  1. Pain medicine consult for optimizing patient's pain medication regimen  2. Decreasing Xanax to 2 mg q8h to initiate slow taper while in the hospital  3. Contacting patient's outpatient prescribers to recommend continuing slow detox outpatient    Addiction medicine team signing off, please reconsult PRN. 59yo male with PMHx colitis, hiatal hernia, OA, GERD, HLD, HTN, COPD on 5 L at home, HFpEF (EF 65% Nov 2023), knee replacement, hip replacement, shoulder replacement, current smoker, on MS Contin for chronic joint pain presents c/o weakness in legs and unable ot ambulate since last night. Addiction medicine was consulted for workup/management of polysubstance use disorder and concern for multiple admissions.    Patient denies hx of substance use and denies misuse of his prescribed pain and anxiety medications. He reports that EMS's report of him ingesting a full bottle was inaccurate. Patient's urine toxicology screen was negative for all substances tested except for benzodiazepines, which he is prescribed (confirmed via [facility]). He declines to change any of his medications at this time or engage with Addiction services.     Patient likely has developed iatrogenic dependence on pain medications and high-dose Xanax. Benzodiazepines are not recommended for long-term treatment of anxiety and patient would benefit from alternative treatment, including a slow detox off of benzodiazepine medication. However, this will likely need to occur on an outpatient basis. Given that patient has been on a high-dose for an extended period of time, a rapid detox would be risky.     RECOMMENDATIONS:  1. Pain medicine consult for optimizing patient's pain medication regimen  2. Decreasing Xanax to 2 mg q8h to initiate slow taper while in the hospital  3. Contacting patient's outpatient prescribers to recommend continuing slow detox outpatient    Addiction medicine team signing off, please reconsult PRN.

## 2024-03-31 NOTE — CONSULT NOTE ADULT - SUBJECTIVE AND OBJECTIVE BOX
HPI:  Addiction medicine HPI:  Patient reports that he took a single extra pill of oxycodone 30 mg to address his pain from multiple joint replacements and slipped on water and fell. He reports being on his pain medications for many years and Xanax 2 mg 4 times a day for the past 15 years. Patient denies taking more than his prescribed dose habitually. He denies using any other substances. Patient is adamant that he does not wish to switch to a longer-acting benzodiazepine at this time and that he will think about tapering after his shoulder replacements. Risk of early dementia with long-term use of benzodiazepines discussed. He declines offer to start alternative medication for his anxiety.    From CATCH team note:  "The writer met with pt. to assess for alcohol/substance use  disorder. The pt. reported his frustration that things have been exaggerated by  the EMS workers who brought him to [facility]. The pt. stated he took one extra pill  of his ms contin to assist with excessive pain, following double knee  replacement. The pt. stated that EMS workers informed [facility] staff in the ED that  he took an entire bottle of medication, which the pt. denied. The pt. denied  suicidality. The pt. declined substance use resources. The pt. declined  referral."    From medicine note: "57yo male with PMHx colitis, hiatal hernia, OA, GERD, HLD, HTN, COPD on 5 L at home, HFpEF (EF 65% Nov 2023), knee replacement, hip replacement, shoulder replacement, current smoker, on MS Contin for chronic joint pain presents c/o weakness in legs and unable ot ambulate since last night. PT notes he slipped on a glass of water last night in his home, fell onto R knee and then was unable to get up from the floor. He reports he thinks he was on the floor for 45min-1hr. He states family was unable to pick him up so EMS was called. Upon EMS arriving, pt reported he could not walk and thus was brought to ED. Of note pt was seen in ED yesterday reported recently filled a prescription for MS Contin but took all but two pills of script within 2 days of filling it. Tox was consulted and was to be observed in ED until 2300 but pt eloped prior to completion of observation. Pt reports went home and took 1 Ambien, not any more MS Contin."    Substance Use History: patient denies      Past Psychiatric History: anxiety on long-term Xanax treatment      Vitals:  T(C): 36.5 (03-31-24 @ 08:07), Max: 36.5 (03-31-24 @ 08:07)  HR: 87 (03-31-24 @ 08:07) (79 - 106)  BP: 143/77 (03-31-24 @ 08:07) (111/77 - 143/77)  RR: 18 (03-31-24 @ 08:07) (18 - 19)  SpO2: 95% (03-31-24 @ 08:07) (95% - 95%)    Physical Exam:       Mental Status Exam:   Appearance: appears chronically ill, poor dentition  Behavior: cooperative  Speech: WNL  Reported Mood: "I have anxiety"  Affect: constricted  Thought process: concrete  Thought Content: WNL  Perceptions: no AVH elicited  Memory: not formally assessed  Orientation: oriented x 4  Insight: limited  Judgement: fair HPI:  Addiction medicine HPI:  Patient reports that he took a single extra pill of oxycodone 30 mg to address his pain from multiple joint replacements and slipped on water and fell. He reports being on his pain medications for many years and Xanax 2 mg 4 times a day for the past 15 years. Patient denies taking more than his prescribed dose habitually. He denies using any other substances. Patient is adamant that he does not wish to switch to a longer-acting benzodiazepine at this time and that he will think about tapering after his shoulder replacements. Risk of early dementia with long-term use of benzodiazepines discussed. He declines offer to start alternative medication for his anxiety.    ISTOP checked.    From CATCH team note:  "The writer met with pt. to assess for alcohol/substance use  disorder. The pt. reported his frustration that things have been exaggerated by  the EMS workers who brought him to [facility]. The pt. stated he took one extra pill  of his ms contin to assist with excessive pain, following double knee  replacement. The pt. stated that EMS workers informed [facility] staff in the ED that  he took an entire bottle of medication, which the pt. denied. The pt. denied  suicidality. The pt. declined substance use resources. The pt. declined  referral."    From medicine note: "59yo male with PMHx colitis, hiatal hernia, OA, GERD, HLD, HTN, COPD on 5 L at home, HFpEF (EF 65% Nov 2023), knee replacement, hip replacement, shoulder replacement, current smoker, on MS Contin for chronic joint pain presents c/o weakness in legs and unable ot ambulate since last night. PT notes he slipped on a glass of water last night in his home, fell onto R knee and then was unable to get up from the floor. He reports he thinks he was on the floor for 45min-1hr. He states family was unable to pick him up so EMS was called. Upon EMS arriving, pt reported he could not walk and thus was brought to ED. Of note pt was seen in ED yesterday reported recently filled a prescription for MS Contin but took all but two pills of script within 2 days of filling it. Tox was consulted and was to be observed in ED until 2300 but pt eloped prior to completion of observation. Pt reports went home and took 1 Ambien, not any more MS Contin."    Substance Use History: patient denies      Past Psychiatric History: anxiety on long-term Xanax treatment      Vitals:  T(C): 36.5 (03-31-24 @ 08:07), Max: 36.5 (03-31-24 @ 08:07)  HR: 87 (03-31-24 @ 08:07) (79 - 106)  BP: 143/77 (03-31-24 @ 08:07) (111/77 - 143/77)  RR: 18 (03-31-24 @ 08:07) (18 - 19)  SpO2: 95% (03-31-24 @ 08:07) (95% - 95%)    Physical Exam:       Mental Status Exam:   Appearance: appears chronically ill, poor dentition  Behavior: cooperative  Speech: WNL  Reported Mood: "I have anxiety"  Affect: constricted  Thought process: concrete  Thought Content: WNL  Perceptions: no AVH elicited  Memory: not formally assessed  Orientation: oriented x 4  Insight: limited  Judgement: fair

## 2024-03-31 NOTE — CHART NOTE - NSCHARTNOTEFT_GEN_A_CORE
1535 RN to provider: patient requesting to AMA again    Patient is requesting to leave now stating that he will come back tomorrow. We discussed the risks of leaving against medical advice and that The patient is clinically sober, AAOx3, free from distracting injury.  Patient appears to have intact insight/judgment/reason and is A+Ox4. Therefore in our opinion, he has capacity to make decisions. The patient verbalized an understanding of our concerns and expressed strong desire to leave against medical advice. Patient was made aware that the AMA paperwork could take at least an hour. Patient stated that he will not wait and leave now.     We’ve made numerous efforts to prevent the patient from leaving Against Medical Advice. Earlier this morning, the attending physician, the patient and I had extensive discussions regarding the medical plan and discharge. We explained that we will review his lab results and imaging prior to discharge tomorrow. Mr. Hogan was agreeable to plan and stated that he will stay. Despite these efforts, the patient changed his mind and requested to leave again. We were unable to convince the patient to stay. The patient is refusing any further care and has left his room stating that he will be back tomorrow.     Patient has eloped. 1535 RN to provider: patient requesting to AMA again    Patient is requesting to leave now stating that he will come back tomorrow. The patient is clinically sober, AAOx3, free from distracting injury.  Patient appears to have intact insight/judgment/reason and is A+Ox4. Therefore in our opinion, he has capacity to make decisions. The patient verbalized an understanding of our concerns and expressed strong desire to leave against medical advice. Patient was made aware that the AMA paperwork could take at least an hour. Patient stated that he will not wait and leave now.     We’ve made numerous efforts to prevent the patient from leaving Against Medical Advice. Earlier this morning, the attending physician, the patient and I had extensive discussions regarding the medical plan and discharge. We explained that we will review his lab results and imaging prior to discharge tomorrow. Mr. Hogan was agreeable to plan and stated that he will stay. Despite these efforts, the patient changed his mind and requested to leave again. We were unable to convince the patient to stay. The patient is refusing any further care and has left his room stating that he will be back tomorrow.     Patient has eloped. 1535 RN to provider: patient requesting to AMA again    Patient is requesting to leave now stating that he will come back tomorrow. The patient is clinically sober, AAOx3, free from distracting injury.  Patient appears to have intact insight/judgment/reason and is A+Ox4. Therefore, in our opinion, he has capacity to make decisions. The patient verbalized an understanding of our concerns and expressed strong desire to leave against medical advice. Patient was made aware that the AMA paperwork could take at least an hour. Patient stated that he will not wait and leave now.     We’ve made numerous efforts to prevent the patient from leaving Against Medical Advice. Earlier this morning, the attending physician, the patient and I had extensive discussions regarding the medical plan and discharge. We explained that we will review his lab results and imaging prior to discharge tomorrow. We also explained that he cannot get the pain medications he is requesting due to his current kidney and liver function. Mr. Hogan demonstrated understanding, was agreeable to plan and stated that he will stay. Despite these efforts, the patient changed his mind and requested to leave again. We were unable to convince the patient to stay. The patient is refusing any further care and has left his room stating that he will be back tomorrow.     Patient has eloped.

## 2024-04-01 LAB
1OH-MIDAZOLAM UR CFM-MCNC: NEGATIVE NG/ML — SIGNIFICANT CHANGE UP
7AMINOCLONAZEPAM UR CFM-MCNC: NEGATIVE NG/ML — SIGNIFICANT CHANGE UP
AFP-TM SERPL-MCNC: <1.8 NG/ML — SIGNIFICANT CHANGE UP
CLONAZEPAM UR CFM-MCNC: NEGATIVE NG/ML — SIGNIFICANT CHANGE UP
DIAZEPAM UR CFM-MCNC: NEGATIVE NG/ML — SIGNIFICANT CHANGE UP
FLUNITRAZEPAM UR CFM-MCNC: NEGATIVE NG/ML — SIGNIFICANT CHANGE UP
FLURAZEPAM UR CFM-MCNC: NEGATIVE NG/ML — SIGNIFICANT CHANGE UP
HIV 1+2 AB+HIV1 P24 AG SERPL QL IA: SIGNIFICANT CHANGE UP
LORAZEPAM UR CFM-MCNC: NEGATIVE NG/ML — SIGNIFICANT CHANGE UP
MIDAZOLAM UR CFM-MCNC: NEGATIVE NG/ML — SIGNIFICANT CHANGE UP
NORDIAZEPAM, UR RESULT: NEGATIVE NG/ML — SIGNIFICANT CHANGE UP
OXAZEPAM UR QL SCN: NEGATIVE NG/ML — SIGNIFICANT CHANGE UP
OXAZEPAM, UR RESULT: NEGATIVE NG/ML — SIGNIFICANT CHANGE UP
TEMAZEPAM UR CFM-MCNC: NEGATIVE NG/ML — SIGNIFICANT CHANGE UP

## 2024-04-01 NOTE — ED ADULT TRIAGE NOTE - BP NONINVASIVE SYSTOLIC (MM HG)
Advance Care Planning   Healthcare Decision Maker:    Primary Decision Maker: Radha Cavazos - Child - [phone]    Secondary Decision Maker: Ozzy Watkins - Child - [phone]    Click here to complete Healthcare Decision Makers including selection of the Healthcare Decision Maker Relationship (ie \"Primary\").              123

## 2024-04-02 LAB
A2 MACROGLOB SERPL-MCNC: 181 MG/DL — SIGNIFICANT CHANGE UP (ref 110–276)
ALPHA-HYDROXYALPRAZOLAM, UR RESULT: 249 NG/ML — HIGH
ALPRAZ UR CFM-MCNC: 76 NG/ML — HIGH
ALT SERPL W P-5'-P-CCNC: 1732 IU/L — HIGH (ref 0–55)
APO A-I SERPL-MCNC: 172 MG/DL — SIGNIFICANT CHANGE UP (ref 101–178)
BENZODIAZ UR QL SCN: POSITIVE
BENZODIAZEPINES IN-HOUSE INTERPRETATION: POSITIVE
BILIRUB SERPL-MCNC: 1.3 MG/DL — HIGH (ref 0–1.2)
COMMENT 2:: SIGNIFICANT CHANGE UP
CRYPTOC AG FLD QL: NEGATIVE — SIGNIFICANT CHANGE UP
FIBROSIS SCORE: SIGNIFICANT CHANGE UP
FIBROSIS SCORING:: SIGNIFICANT CHANGE UP
FIBROSIS STAGE: SIGNIFICANT CHANGE UP
GGT SERPL-CCNC: 461 IU/L — HIGH (ref 0–65)
HAPTOGLOB SERPL-MCNC: 95 MG/DL — SIGNIFICANT CHANGE UP (ref 29–370)
HEV IGM SER QL: NEGATIVE — SIGNIFICANT CHANGE UP
INTERPRETATIONS:: SIGNIFICANT CHANGE UP
LIMITATIONS:: SIGNIFICANT CHANGE UP
NECROINFLAMM ACTIVITY SCORING:: SIGNIFICANT CHANGE UP
NECROINFLAMMAT ACTIVITY GRADE: SIGNIFICANT CHANGE UP
NECROINFLAMMAT ACTIVITY SCORE: SIGNIFICANT CHANGE UP

## 2024-04-05 DIAGNOSIS — K71.9 TOXIC LIVER DISEASE, UNSPECIFIED: ICD-10-CM

## 2024-04-05 DIAGNOSIS — G47.00 INSOMNIA, UNSPECIFIED: ICD-10-CM

## 2024-04-05 DIAGNOSIS — E66.01 MORBID (SEVERE) OBESITY DUE TO EXCESS CALORIES: ICD-10-CM

## 2024-04-05 DIAGNOSIS — J44.9 CHRONIC OBSTRUCTIVE PULMONARY DISEASE, UNSPECIFIED: ICD-10-CM

## 2024-04-05 DIAGNOSIS — F41.9 ANXIETY DISORDER, UNSPECIFIED: ICD-10-CM

## 2024-04-05 DIAGNOSIS — N17.9 ACUTE KIDNEY FAILURE, UNSPECIFIED: ICD-10-CM

## 2024-04-05 DIAGNOSIS — G92.8 OTHER TOXIC ENCEPHALOPATHY: ICD-10-CM

## 2024-04-05 DIAGNOSIS — Z99.81 DEPENDENCE ON SUPPLEMENTAL OXYGEN: ICD-10-CM

## 2024-04-05 DIAGNOSIS — R74.01 ELEVATION OF LEVELS OF LIVER TRANSAMINASE LEVELS: ICD-10-CM

## 2024-04-05 DIAGNOSIS — I21.A1 MYOCARDIAL INFARCTION TYPE 2: ICD-10-CM

## 2024-04-05 DIAGNOSIS — F11.20 OPIOID DEPENDENCE, UNCOMPLICATED: ICD-10-CM

## 2024-04-05 DIAGNOSIS — I11.0 HYPERTENSIVE HEART DISEASE WITH HEART FAILURE: ICD-10-CM

## 2024-04-05 DIAGNOSIS — M54.50 LOW BACK PAIN, UNSPECIFIED: ICD-10-CM

## 2024-04-05 DIAGNOSIS — M79.651 PAIN IN RIGHT THIGH: ICD-10-CM

## 2024-04-05 DIAGNOSIS — J96.12 CHRONIC RESPIRATORY FAILURE WITH HYPERCAPNIA: ICD-10-CM

## 2024-04-05 DIAGNOSIS — T46.6X5A ADVERSE EFFECT OF ANTIHYPERLIPIDEMIC AND ANTIARTERIOSCLEROTIC DRUGS, INITIAL ENCOUNTER: ICD-10-CM

## 2024-04-05 DIAGNOSIS — M62.82 RHABDOMYOLYSIS: ICD-10-CM

## 2024-04-05 DIAGNOSIS — I50.32 CHRONIC DIASTOLIC (CONGESTIVE) HEART FAILURE: ICD-10-CM

## 2024-04-05 DIAGNOSIS — Z96.653 PRESENCE OF ARTIFICIAL KNEE JOINT, BILATERAL: ICD-10-CM

## 2024-04-05 DIAGNOSIS — K44.9 DIAPHRAGMATIC HERNIA WITHOUT OBSTRUCTION OR GANGRENE: ICD-10-CM

## 2024-04-05 DIAGNOSIS — T40.605A ADVERSE EFFECT OF UNSPECIFIED NARCOTICS, INITIAL ENCOUNTER: ICD-10-CM

## 2024-04-05 DIAGNOSIS — B17.10 ACUTE HEPATITIS C WITHOUT HEPATIC COMA: ICD-10-CM

## 2024-04-05 DIAGNOSIS — Z72.0 TOBACCO USE: ICD-10-CM

## 2024-04-05 DIAGNOSIS — E78.5 HYPERLIPIDEMIA, UNSPECIFIED: ICD-10-CM

## 2024-04-05 DIAGNOSIS — G47.33 OBSTRUCTIVE SLEEP APNEA (ADULT) (PEDIATRIC): ICD-10-CM

## 2024-04-10 DIAGNOSIS — Z99.81 DEPENDENCE ON SUPPLEMENTAL OXYGEN: ICD-10-CM

## 2024-04-10 DIAGNOSIS — I50.32 CHRONIC DIASTOLIC (CONGESTIVE) HEART FAILURE: ICD-10-CM

## 2024-04-10 DIAGNOSIS — R07.89 OTHER CHEST PAIN: ICD-10-CM

## 2024-04-10 DIAGNOSIS — G47.33 OBSTRUCTIVE SLEEP APNEA (ADULT) (PEDIATRIC): ICD-10-CM

## 2024-04-10 DIAGNOSIS — Z79.51 LONG TERM (CURRENT) USE OF INHALED STEROIDS: ICD-10-CM

## 2024-04-10 DIAGNOSIS — J44.1 CHRONIC OBSTRUCTIVE PULMONARY DISEASE WITH (ACUTE) EXACERBATION: ICD-10-CM

## 2024-04-10 DIAGNOSIS — Z79.82 LONG TERM (CURRENT) USE OF ASPIRIN: ICD-10-CM

## 2024-04-10 DIAGNOSIS — I95.9 HYPOTENSION, UNSPECIFIED: ICD-10-CM

## 2024-04-10 DIAGNOSIS — R06.89 OTHER ABNORMALITIES OF BREATHING: ICD-10-CM

## 2024-04-10 DIAGNOSIS — F41.9 ANXIETY DISORDER, UNSPECIFIED: ICD-10-CM

## 2024-04-10 DIAGNOSIS — Z87.891 PERSONAL HISTORY OF NICOTINE DEPENDENCE: ICD-10-CM

## 2024-04-10 DIAGNOSIS — I11.0 HYPERTENSIVE HEART DISEASE WITH HEART FAILURE: ICD-10-CM

## 2024-04-10 DIAGNOSIS — R74.01 ELEVATION OF LEVELS OF LIVER TRANSAMINASE LEVELS: ICD-10-CM

## 2024-04-10 DIAGNOSIS — K21.9 GASTRO-ESOPHAGEAL REFLUX DISEASE WITHOUT ESOPHAGITIS: ICD-10-CM

## 2024-04-10 DIAGNOSIS — J98.11 ATELECTASIS: ICD-10-CM

## 2024-04-10 DIAGNOSIS — Z79.01 LONG TERM (CURRENT) USE OF ANTICOAGULANTS: ICD-10-CM

## 2024-04-10 DIAGNOSIS — E78.00 PURE HYPERCHOLESTEROLEMIA, UNSPECIFIED: ICD-10-CM

## 2024-04-10 DIAGNOSIS — M19.91 PRIMARY OSTEOARTHRITIS, UNSPECIFIED SITE: ICD-10-CM

## 2024-04-10 DIAGNOSIS — E66.01 MORBID (SEVERE) OBESITY DUE TO EXCESS CALORIES: ICD-10-CM

## 2024-05-06 NOTE — ED PROVIDER NOTE - NS ED ATTENDING STATEMENT MOD
Physical Therapy    Physical Therapy Evaluation    Patient Name: Jam Cox  MRN: [id number]  Today's Date: 5/6/2024   Time Calculation  Start Time: 1020  Stop Time: 1044  Time Calculation (min): 24 min    Assessment/Plan   PT Assessment  PT Assessment Results: Decreased strength, Impaired balance, Decreased mobility, Decreased cognition, Impaired judgement, Decreased safety awareness, Impaired vision, Impaired hearing  Rehab Prognosis: Good  End of Session Communication: Bedside nurse  End of Session Patient Position: Bed, 3 rail up, Alarm off, not on at start of session  IP OR SWING BED PT PLAN  Inpatient or Swing Bed: Inpatient  PT Plan  Treatment/Interventions: Bed mobility, Transfer training, Gait training, Balance training, Strengthening, Therapeutic exercise, Therapeutic activity, Positioning, Postural re-education  PT Plan: Skilled PT  PT Frequency: 4 times per week  PT Discharge Recommendations: Moderate intensity level of continued care  PT Recommended Transfer Status: Assist x2  PT - OK to Discharge: Yes      Subjective   General Visit Information:  General  Reason for Referral: presents with SOB; found to have multifocal PNA, c/b Afib with RVR.  Past Medical History Relevant to Rehab: heart failure with moderately reduced ejection fraction, Afib, dementia; per family, patient has not had any falls within the past 6 months per family. patient was discharged yesterday [facility] where he was admitted initially for CHF exacerbation.  Within the last month this is patient's third hospital admission.  Family/Caregiver Present: Yes  Caregiver Feedback: spouse and dtr present in the room; provided collateral information as patient with reduced alertness/impaired cognition  Co-Treatment: OT  Co-Treatment Reason: co-evaluation with OT d/t patient with impaired cognition, alertness, recent frequent hospitalizations  Prior to Session Communication: Bedside nurse  Patient Position Received: Bed, 3 rail  up, Alarm off, not on at start of session  General Comment: patient required frequent verbal and tactile stim to become alert, however, limited ability to remain alert to interact with therapists for entirety of session. heparin gtt, tele, external rosales.  Home Living:  Home Living  Home Living Comments: home set-up obtained from family in the room: patient lives in a house with wife, wife reports someone is always there; 3+4-5 VIOLET with HR, 2nd floor set-up with walk-in shower (10-12 stairs with HR); grab bars and shower chair; ownes a SPC, ww, rollator  Prior Level of Function:  Prior Function Per Pt/Caregiver Report  Prior Function Comments: PLOF obtained from family in the room d/t patient with current impaired cognition: indep with ADLs, indep with bed mobility, transfers and ambulation; family reports they complete the iADls; reports patient uses rollator in stores, however, patient often forgets to use DME to ambulate. (-) drive.  Precautions:  Precautions  Hearing/Visual Limitations: limited eye contact, only able to sustain eye opening briefly, inconsistent; per family, patient is suppose to wear glasses but he doesn't wear them; difficulty to assess if [place] d/t limited alertness; per family, patient has hearing aides but doesn't wear them.  Medical Precautions: Fall precautions  Vital Signs:  Vital Signs  Heart Rate:  (pre: 82 post: 78)  Resp:  (pre: 14 post: 32)  SpO2:  (pre: 95% post: 96%)  BP:  (pre: 112/61 post: 115/67)  BP Method: Automatic    Objective   Pain:  Pain Assessment  Pain Assessment: 0-10  Pain Score:  (patient did not report pain at this time)  Cognition:  Cognition  Overall Cognitive Status: Impaired  Arousal/Alertness: Delayed responses to stimuli (+inconsistent; reduced alertness, limited ability to sustain)  Orientation Level:  (appeared to localize when his first name was said; otherwise, patient not oriented; re-orientation provided)  Following Commands:  (25% single step commands,  inconsistent, delayed, increase time/cues)    General Assessments:      Activity Tolerance  Early Mobility/Exercise Safety Screen: Proceed with mobilization - No exclusion criteria met    Sensation  Sensation Comment: patient did not report    Strength  Strength Comments: limited command following; appeared to reach with RUE to touch face 2x during session; spontaneous movement of LUE however, <3/5; BLE: knee ext 3/5  Postural Control  Postural Control: Impaired  Head Control: favors increase cervical flexion with downward head positioning  Trunk Control: increase forward flexed posture    Static Sitting Balance  Static Sitting-Balance Support: Feet supported (0-2 UE support; PT placed patient's B hands on EOB)  Static Sitting-Level of Assistance:  (brief MINx1, mostly MAXx1 with retrolateral lean R)  Static Sitting-Comment/Number of Minutes: EOB x5-7 minutes       Functional Assessments:  Bed Mobility  Bed Mobility: Yes  Bed Mobility 1  Bed Mobility 1: Supine to sitting, Sitting to supine  Level of Assistance 1: Dependent, Maximum verbal cues, Maximum tactile cues  Bed Mobility Comments 1: x2 with HOB elevated, use of draw sheet; decrease task initiation    Transfers  Transfer: No (d/t impaired alertness, cognition, command following, impaired sitting balance)       Extremity/Trunk Assessments:  RUE   RUE :  (PROM WFL)  LUE   LUE:  (PROM WFL)  RLE   RLE :  (PROM WFL)  LLE   LLE :  (PROM WFL)  Outcome Measures:  [facility] Basic Mobility  Turning from your back to your side while in a flat bed without using bedrails: Total  Moving from lying on your back to sitting on the side of a flat bed without using bedrails: Total  Moving to and from bed to chair (including a wheelchair): Total  Standing up from a chair using your arms (e.g. wheelchair or bedside chair): Total  To walk in hospital room: Total  Climbing 3-5 steps with railing: Total  Basic Mobility - Total Score: 6    FSS-ICU  Ambulation: Unable to attempt due to  weakness  Rolling: Total assistance (performs 25% or requires another person)  Sitting: Maximal assistance (performs 25% - 49% of task)  Transfer Sit-to-Stand: Unable to perform  Transfer Supine-to-Sit: Total assistance (performs 25% or requires another person)  Total Score: 4      E = Exercise and Early Mobility  Early Mobility/Exercise Safety Screen: Proceed with mobilization - No exclusion criteria met  Current Activity: Sitting at edge of bed    Encounter Problems       Encounter Problems (Active)       PT Problem       Patient will complete bed mobility with MINx1        Start:  05/06/24    Expected End:  05/27/24            Patient will complete STS with MINx1 using LRAD without acute LOB         Start:  05/06/24    Expected End:  05/27/24            Patient will ambulate >/=50' with LRAD with MINx1 without acute LOB        Start:  05/06/24    Expected End:  05/27/24            Patient will complete static (contact guard assist) and dynamic (minimal assist) standing balance activities using LRAD without acute LOB.        Start:  05/06/24    Expected End:  05/27/24            Patient will participate in BLE there-ex program in order to assist in improving strength and to assist with the completion of functional mobility tasks.        Start:  05/06/24    Expected End:  05/27/24                            Education Documentation  Mobility Training, taught by China Flood PT at 5/6/2024 12:42 PM.  Learner: Patient  Readiness: Nonacceptance  Method: Explanation  Response: Needs Reinforcement  Comment: role of PT [facility] hospital stay    Education Comments  No comments found.        China Flood PT, DPT       Attending with

## 2024-05-16 NOTE — ED ADULT TRIAGE NOTE - CHIEF COMPLAINT QUOTE
Patient BIBA from home with complaints of lower abdominal pain and vomiting for 4 hours. Patient denies any fever or diarrhea.
no

## 2024-05-21 NOTE — DISCHARGE NOTE PROVIDER - NSDCCPCAREPLAN_GEN_ALL_CORE_FT
----- Message from Bonifacio Shirley MD sent at 5/21/2024  9:05 AM CDT -----  5 weeks with me   PRINCIPAL DISCHARGE DIAGNOSIS  Diagnosis: COPD exacerbation  Assessment and Plan of Treatment: You came to the hospital with a COPD exacerbation and were treated with symptom improvement. Please follow with your outpatient pulmonary doctor.   Call your local emergency number (911 in the [place]) if:   •You feel lightheaded, short of breath, and have chest pain.  Seek care immediately if:   •You cough up blood.  •You are confused, dizzy, or feel faint.  •Your arm or leg feels warm, tender, and painful. It may look swollen and red.  Call your doctor if:   •You have increased shortness of breath.  •You need more medicine than usual to control your symptoms.  •You are coughing or wheezing more than usual.  •You are coughing up more mucus, or it has a new color or odor.  •You gain more than 3 pounds in a week.  •You have a fever, a runny or stuffy nose, and a sore throat, or other cold or flu symptoms.  •Your skin, lips, or nails start to turn blue.  •You have swelling in your legs or ankles.  •You are very tired or weak for more than a day.  •You notice changes in your mood, or changes in your ability to think or concentrate.  •You have questions or concerns about your condition or care.        SECONDARY DISCHARGE DIAGNOSES  Diagnosis: Upper GI bleed  Assessment and Plan of Treatment: You vomited with some blood during your hospital stay. CT scan of your abdomen was negative for any acute process. Please follow up with gastroenterology for and endocopy and colonoscopy outpatient to finish your workup.     PRINCIPAL DISCHARGE DIAGNOSIS  Diagnosis: COPD exacerbation  Assessment and Plan of Treatment: You came to the hospital with a COPD exacerbation and were treated with symptom improvement. Please follow with your outpatient pulmonary doctor.   We offered you home care but you refused to stay for the duration required for it to be arranged.  Call your local emergency number (911 in the [place]) if:   •You feel lightheaded, short of breath, and have chest pain.  Seek care immediately if:   •You cough up blood.  •You are confused, dizzy, or feel faint.  •Your arm or leg feels warm, tender, and painful. It may look swollen and red.  Call your doctor if:   •You have increased shortness of breath.  •You need more medicine than usual to control your symptoms.  •You are coughing or wheezing more than usual.  •You are coughing up more mucus, or it has a new color or odor.  •You gain more than 3 pounds in a week.  •You have a fever, a runny or stuffy nose, and a sore throat, or other cold or flu symptoms.  •Your skin, lips, or nails start to turn blue.  •You have swelling in your legs or ankles.  •You are very tired or weak for more than a day.  •You notice changes in your mood, or changes in your ability to think or concentrate.  •You have questions or concerns about your condition or care.        SECONDARY DISCHARGE DIAGNOSES  Diagnosis: Upper GI bleed  Assessment and Plan of Treatment: You vomited with some blood during your hospital stay. CT scan of your abdomen was negative for any acute process. Please follow up with gastroenterology for and endocopy and colonoscopy outpatient to finish your workup.

## 2024-08-02 NOTE — ED ADULT NURSE NOTE - NS ED NOTE ABUSE SUSPICION NEGLECT YN
Have Your Skin Lesions Been Treated?: not been treated Is This A New Presentation, Or A Follow-Up?: Skin Lesions How Severe Is Your Skin Lesion?: moderate No

## 2024-09-03 NOTE — ED ADULT NURSE NOTE - NSIMPLEMENTINTERV_GEN_ALL_ED
Additional Notes: Patient mother was instructed and demonstrated how to inject medication. Patient was printed out a [employer] informative packet with pictures on how to injection. Patient mother is aware we will be doing next injection here and she will take over in November Render Risk Assessment In Note?: no Detail Level: Simple Implemented All Fall Risk Interventions:  [place] to call system. Call bell, personal items and telephone within reach. Instruct patient to call for assistance. Room bathroom lighting operational. Non-slip footwear when patient is off stretcher. Physically safe environment: no spills, clutter or unnecessary equipment. Stretcher in lowest position, wheels locked, appropriate side rails in place. Provide visual cue, wrist band, yellow gown, etc. Monitor gait and stability. Monitor for mental status changes and reorient to person, place, and time. Review medications for side effects contributing to fall risk. Reinforce activity limits and safety measures with patient and family.

## 2024-09-10 NOTE — H&P PST ADULT - VENOUS THROMBOEMBOLISM FOR WOMEN ONLY
Pt called in to get scheduled for marriage counseling. Pts wife will not let him back in the house until this is done.     Pt would like to go through his EAP     Cert # [id number]: Anamaria Sellers     Please return pt call to cell # [phone]   (0) indicator not present

## 2024-09-18 NOTE — ED PROVIDER NOTE - DATE/TIME 3
[Home] : at home, [unfilled] , at the time of the visit. [Medical Office: ([facility])___] : at the medical office located in  [Spouse] : spouse [Verbal consent obtained from patient] : the patient, [unfilled] [FreeTextEntry1] : reviewed ultrasound: - known chronic collection on the left - 4 cm collection on  the right with inflammation, unclear if postoperative collection and hematoma or an infection  discussed with patient he says he is feeling better and improving discussed the DD  will continue wih abx will follow up in 2 weeks if stable and improving will update if symptoms worsen 27-Mar-2024 11:14

## 2024-11-15 NOTE — H&P PST ADULT - LIVES WITH, PROFILE
11/15/24 0525   Treatment Team Notification   Reason for Communication Evaluate  (17 beats of VTach)   Name of Team Member Notified Dr. Alejandro   Treatment Team Role Resident   Method of Communication Secure Message   Response No new orders   Notification Time 0525        spouse

## 2024-12-03 NOTE — ED ADULT TRIAGE NOTE - HEIGHT IN FEET
cyclical maladaptive patterns, and the ability to use insight to inform behavior change.   Attention span and concentration appear within functional limits  Language use and knowledge base appear within functional limits      FAMILY MEDICAL/MH HISTORY   Her family history includes Alcohol Abuse in her father and mother; Colon Cancer in her paternal grandfather; Substance Abuse in her father.    PATIENT MENTAL HEALTH HISTORY  Likely depression    PSYCHOSOCIAL HISTORY  Current living situation: by herself with daughter (9 yo) shared custody -        Work/Education: / (finds she gets bored)       Support system: Needs assessment - some friends, sister    Tenriism/Spirituality: not assessed    DRUG AND ALCOHOL CURRENT USE/HISTORY  TOBACCO:  She reports that she has quit smoking. Her smoking use included e-cigarettes and cigarettes. She started smoking about 16 years ago. She has a 7 pack-year smoking history. She has never used smokeless tobacco.  ALCOHOL:  She reports current alcohol use.  OTHER SUBSTANCES: She reports that she does not currently use drugs after having used the following drugs: Marijuana (Weed).     ASSESSMENT  Fatmata Mai presented to the appointment today for evaluation and treatment of symptoms of depression/relationship issues.   She will also benefit from brief and solution-focused consultation to address cognitive and behavioral interventions for depression/relationship symptoms.        DIAGNOSIS    ICD-10-CM    1. Mild episode of recurrent major depressive disorder (HCC)  F33.0       2. ANEUDY (generalized anxiety disorder)  F41.1              INTERVENTION  Practiced assertive communication and Trained in improving communication skills, exploring MICHAEL issues that may have impacted how she responds to the world      PLAN  Continue assertiveness training - CBT    Electronically signed by Bryn Ivey, PhD on 12/3/2024 at 5:01 PM    
5

## 2024-12-17 NOTE — ED ADULT NURSE NOTE - NSIMPLEMENTINTERV_GEN_ALL_ED
Dear TEJAS   It was nice seeing you in the nephrology clinic today     Today we discussed the following:     #Chronic kidney disease stage III-your kidney function is stable recently at 30-35%-will continue to monitor.  Baseline serum creatinine 1.5-1.7     #Hypertension-blood pressure is  In good control.  Will drop losartan down to 25 mg from current 50 mg to allow for the kidney function to improve some.  No protein leak in the urine     #Limit salt intake. Ensure 40-50 ounces of fluid intake daily     #Vitamin D deficiency-continue vitamin D daily     #Osteoporosis-exercise regularly.  holding alendronate    # Recurrent fainting episodes-getting evaluated by cardiology and neurology    # Trace leg swelling-improved from prior.  Will defer starting water pill at this time will monitor    # Possible skin urticaria on the right arm-please discuss with PCP     Follow-up in 6 months with repeat blood work and urinalysis prior to next visit     Please call our office if you have any question  Thank you for coming to see me today     Shweta Garcia MD, MS, FRED LOMAS  Clinical  - [employer]  Nephrologist - [facility]      Implemented All Fall Risk Interventions:  Egan to call system. Call bell, personal items and telephone within reach. Instruct patient to call for assistance. Room bathroom lighting operational. Non-slip footwear when patient is off stretcher. Physically safe environment: no spills, clutter or unnecessary equipment. Stretcher in lowest position, wheels locked, appropriate side rails in place. Provide visual cue, wrist band, yellow gown, etc. Monitor gait and stability. Monitor for mental status changes and reorient to person, place, and time. Review medications for side effects contributing to fall risk. Reinforce activity limits and safety measures with patient and family.

## 2025-01-14 NOTE — ED PROVIDER NOTE - NSFOLLOWUPCLINICS_GEN_ALL_ED_FT
[Diabetes Foot Care] : diabetes foot care [Carbohydrate Consistent Diet] : carbohydrate consistent diet [Exercise/Effect on Glucose] : exercise/effect on glucose [Self Monitoring of Blood Glucose] : self monitoring of blood glucose [Retinopathy Screening] : Patient was referred to ophthalmology for retinopathy screening [Little interest or pleasure doing things] : 1) Little interest or pleasure doing things [Feeling down, depressed, or hopeless] : 2) Feeling down, depressed, or hopeless [0] : 2) Feeling down, depressed, or hopeless: Not at all (0) [PHQ-2 Negative - No further assessment needed] : PHQ-2 Negative - No further assessment needed [FreeTextEntry1] : 5min spent on depression screening [[id number]] : 0 [facility]  Medicine  [address], [place]   Phone: [phone]  Fax:   Follow Up Time:

## 2025-01-23 NOTE — ED ADULT NURSE NOTE - NS ED NURSE LEVEL OF CONSCIOUSNESS ORIENTATION
BIBEMS from home. Pt c/o abdominal pain and vomiting. Pmhx Gastroparesis, PMH: DM HTN. Per triage note. Pt placed in ED bed and minuted later had wrapped a chord around neck and proceeded to attempt to choke herself. Code grey immediately called. Pt placed on constant observation. Pt airway patent, respirations unlabored. Unable to conduct assessment questions d/t behavior
Oriented - self; Oriented - place; Oriented - time

## 2025-01-29 NOTE — ED ADULT NURSE NOTE - EXTENSIONS OF SELF_ADULT
Nursing report ED to floor  Cecilio Puckett  55 y.o.  male    HPI :  HPI  Stated Reason for Visit: left foot pain  History Obtained From: patient    Chief Complaint  Chief Complaint   Patient presents with    Foot Pain       Admitting doctor:   Lisa Guo MD    Admitting diagnosis:   The primary encounter diagnosis was Atherosclerosis of artery of extremity with intermittent claudication. Diagnoses of Great toe pain, left, Hyperglycemia, Chronic anticoagulation, and Subtherapeutic anticoagulation were also pertinent to this visit.    Code status:   Current Code Status       Date Active Code Status Order ID Comments User Context       Prior            Allergies:   Patient has no known allergies.    Isolation:   No active isolations    Intake and Output  No intake or output data in the 24 hours ending 01/28/25 2134    Weight:   There were no vitals filed for this visit.    Most recent vitals:   Vitals:    01/28/25 1741 01/28/25 1745 01/28/25 1758 01/28/25 1931   BP:  164/93 149/93 149/97   BP Location:  Right arm Right arm Right arm   Patient Position:  Sitting Lying Sitting   Pulse: 101  78 81   Resp: 16  16 16   Temp: 97.8 °F (36.6 °C)      TempSrc: Tympanic      SpO2: 97%          Active LDAs/IV Access:   Lines, Drains & Airways       Active LDAs       Name Placement date Placement time Site Days    Peripheral IV 01/28/25 1817 Anterior;Right Forearm 01/28/25 1817  Forearm  less than 1    Peripheral IV 01/28/25 2125 Left;Posterior Hand 01/28/25 2125  Hand  less than 1                    Labs (abnormal labs have a star):   Labs Reviewed   COMPREHENSIVE METABOLIC PANEL - Abnormal; Notable for the following components:       Result Value    Glucose 323 (*)     Calcium 8.5 (*)     Albumin 3.3 (*)     All other components within normal limits    Narrative:     GFR Categories in Chronic Kidney Disease (CKD)      GFR Category          GFR (mL/min/1.73)    Interpretation  G1                     90 or greater          Normal or high (1)  G2                      60-89                Mild decrease (1)  G3a                   45-59                Mild to moderate decrease  G3b                   30-44                Moderate to severe decrease  G4                    15-29                Severe decrease  G5                    14 or less           Kidney failure          (1)In the absence of evidence of kidney disease, neither GFR category G1 or G2 fulfill the criteria for CKD.    eGFR calculation 2021 CKD-EPI creatinine equation, which does not include race as a factor   PROTIME-INR - Abnormal; Notable for the following components:    Protime 17.0 (*)     INR 1.36 (*)     All other components within normal limits   CBC WITH AUTO DIFFERENTIAL - Abnormal; Notable for the following components:    MCHC 31.2 (*)     Neutrophil % 41.1 (*)     Lymphocyte % 47.6 (*)     Lymphocytes, Absolute 3.24 (*)     All other components within normal limits   C-REACTIVE PROTEIN - Abnormal; Notable for the following components:    C-Reactive Protein 0.61 (*)     All other components within normal limits   APTT - Normal   SEDIMENTATION RATE - Normal   APTT   CBC WITH AUTO DIFFERENTIAL   CBC AND DIFFERENTIAL    Narrative:     The following orders were created for panel order CBC & Differential.  Procedure                               Abnormality         Status                     ---------                               -----------         ------                     CBC Auto Differential[[id number]]        Abnormal            Final result                 Please view results for these tests on the individual orders.   CBC AND DIFFERENTIAL    Narrative:     The following orders were created for panel order CBC & Differential.  Procedure                               Abnormality         Status                     ---------                               -----------         ------                     CBC Auto Differential[[id number]]                                                          Please view results for these tests on the individual orders.       EKG:   No orders to display       Meds given in ED:   Medications   sodium chloride 0.9 % flush 10 mL (has no administration in time range)   heparin 98023 units/250 mL (100 units/mL) in 0.45 % NaCl infusion (14.56 Units/kg/hr × 103 kg Intravenous New Bag 1/28/25 2100)   heparin (porcine) 5000 UNIT/ML injection 4,100-8,200 Units (has no administration in time range)   iopamidol (ISOVUE-370) 76 % injection 100 mL (95 mL Intravenous Given 1/28/25 1909)   heparin (porcine) 5000 UNIT/ML injection 8,200 Units (8,200 Units Intravenous Given 1/28/25 2100)       Imaging results:  CT Angio Abdominal Aorta Bilateral Iliofem Runoff    Result Date: 1/28/2025   No evidence of acute visceral, soft tissue, or bony abnormality in the abdomen, or pelvis.  Abdominal aorta is normally patent and there has been prior aortobifemoral bypass, and both limbs of the graft are normally patent.  On the right, the SFA and popliteal artery are patent though there is focal popliteal stenosis a few centimeters above the knee, and below the knee there is heavy atheromatous change and heavily diseased two-vessel runoff.  On the left, there is occlusion of the SFA in the mid to upper thigh, and then collateral reconstitution of the popliteal artery 10 to 11 cm above the knee. There is extensive 7 trifurcation disease with only segmental visualization of the posterior tibial and peroneal arteries.    This report was finalized on 1/28/2025 7:46 PM by Dr. Severo Vann M.D on Workstation: [id number]       Ambulatory status:   - assist x1    Social issues:   Social History     Socioeconomic History    Marital status: Single   Tobacco Use    Smoking status: Every Day     Current packs/day: 1.00     Average packs/day: 1 pack/day for 31.1 years (31.1 ttl pk-yrs)     Types: Cigars, Cigarettes     Start date: 01/1994     Passive exposure: Current    Smokeless  tobacco: Never   Vaping Use    Vaping status: Never Used   Substance and Sexual Activity    Alcohol use: Yes     Alcohol/week: 2.0 standard drinks of alcohol     Types: 2 Cans of beer per week     Comment: occassionally    Drug use: Not Currently    Sexual activity: Defer       Peripheral Neurovascular  Peripheral Neurovascular (Adult)  Peripheral Neurovascular WDL: .WDL except, pulse assessment  Pulse Assessment: dorsalis pedis, posterior tibial    Neuro Cognitive  Neuro Cognitive (Adult)  Cognitive/Neuro/Behavioral WDL: WDL    Learning  Learning Assessment  Learning Readiness and Ability: no barriers identified    Respiratory  Respiratory WDL  Respiratory WDL: WDL    Abdominal Pain       Pain Assessments  Pain (Adult)  (0-10) Pain Rating: Rest: 10  (0-10) Pain Rating: Activity: 10    NIH Stroke Scale       Lara Marino RN  01/28/25 21:34 EST    None

## 2025-02-26 NOTE — DISCHARGE NOTE NURSING/CASE MANAGEMENT/SOCIAL WORK - NSDCPEFALRISK_GEN_ALL_CORE
Patient information on fall and injury prevention Please assist in scheduling:     Procedure/Imaging/Clinic: Endoscopic Retrograde Cholangiopancreatography (ERCP) with stent exchange  Physician: Kyle  Timin weeks  Scope time: Provider average   Anesthesia: General  Dx: Encounter for exchange of biliary stent  Tier:3  Location: [facility]  OK to schedule while attending: Not specified by provider   Patient communication letter header:Endoscopic Retrograde Cholangiopancreatography (ERCP)

## 2025-05-01 NOTE — ED PROVIDER NOTE - NSDCPRINTRESULTS_ED_ALL_ED
Goal Outcome Evaluation:    Cardiac MRI done today. Maintaining AVpaced 70s-no ectopy noted this shift.  Denies complaints; independent with cares; vss on RA.  Mg 1.9-replaced per protocol. K 4.3.  Plan for VT ablation procedure Monday 5/5.    Continue Regimen: betamethasone dipropionate 0.05 % topical cream \\nDays Supply: 30\\nSig: Apply twice daily for 2 weeks, take 2 weeks off then repeat prn.\\n\\ncalcipotriene 0.005 % topical ointment \\nDays Supply: 30\\nSig: Apply bid for 2 weeks, alternating with steroid Detail Level: Zone Action 3: Continue Patient requests all Lab, Cardiology, and Radiology Results on their Discharge Instructions

## 2025-05-06 NOTE — H&P ADULT - HISTORY OF PRESENT ILLNESS
57yo male with PMHx colitis, hiatal hernia, OA, GERD, HLD, HTN, COPD on 5 L at home, HFpEF (EF 65% Nov 2023), knee replacement, hip replacement, shoulder replacement, current smoker, on MS Contin for chronic joint pain presents c/o weakness in legs and unable ot ambulate since last night. PT notes he slipped on a glass of water last night in his home, fell onto R knee and then was unable to get up from the floor. He reports he thinks he was on the floor for 45min-1hr. He states family was unable to pick him up so EMS was called. Upon EMS arriving, pt reported he could not walk and thus was brought to ED. Of note pt was seen in ED yesterday reported recently filled a prescription for MS Contin but took all but two pills of script within 2 days of filling it. Tox was consulted and was to be observed in ED until 2300 but pt eloped prior to completion of observation. Pt reports went home and took 1 Ambien, not any more MS Contin.    In ED:   - Vital signs were unremarkable on 5L NC at baseline  - Labs were significant for: WBC: 14,61, Na: 132, Cl: 89, BUN: 32, Creatinine 1.4 ( baseline 1.1), ALK: 135, AST: 421, ALT: 404, Troponin: 57, CPK: 7521, Lactate: 3.1 on VBG   - Chest X-ray: Low lung volumes. Left lung base atelectasis.   PAST MEDICAL HISTORY:  Anxiety and depression     Atrial flutter     CA skin, basal cell     CAD (coronary artery disease)     CHF NYHA class II     Dialysis patient 12/2022    Emphysema/COPD 2L at night    Endotracheally intubated     H/O bacteremia     H/O mitral valve disease     Heart attack 2022    CORINA (obstructive sleep apnea)

## 2025-05-08 NOTE — ED PROVIDER NOTE - NS ED ROS FT
No
Review of Systems    Constitutional: (-) fever  Cardiovascular: (+) chest pain, (-) syncope  Respiratory: (-) cough, (-) shortness of breath  Gastrointestinal: (+) vomiting, (-) diarrhea  Integumentary: (-) rash, (-) edema  Neurological: (-) headache, (-) altered mental status  Psychiatric: (-) hallucinations  Allergic/Immunologic: (-) pruritus

## 2025-05-20 NOTE — ED PROVIDER NOTE - NS ED MD EM SELECTION
Portal message:     Sir, testosterone was quite low. We need to repeat and add some additional hormone labs to be done in morning in about 6 weeks. Thanks! [id number] Detailed

## 2025-06-05 NOTE — ED PROVIDER NOTE - OBJECTIVE STATEMENT
Phone call received from Home Health Nurse, she was calling in to ask about wound care/dressing change orders for patient.  She stated, she also wanted to report that she believes the patient took more of the prescription pain medication than required, stated, he was \"kind of out of it, slow speech, low pulse rate\", also reported patient has a history of abusing narcotics, and gabapentin in the past.    52M PMH COPD (Danyelle) no home o2, smoker states quit 8 days ago when got sick, nonobs cad (sicat), htn, hl, ortega cpap, p/w 2 days prod cough, sob, wheezing, chest pain to bilat chest only with coughing, achy sensation. wheezing/sob worse in the last few hours. feels like prior copd exac. never inb. no le edema/pain, immobilization, hormones, hemoptysis. no fever, chills.

## 2025-06-06 NOTE — CHART NOTE - NSCHARTNOTESELECT_GEN_ALL_CORE
Caller: Leslie Kwon    Relationship: Emergency Contact    Best call back number: [phone]     Requested Prescriptions:   Requested Prescriptions     Pending Prescriptions Disp Refills    HYDROcodone-acetaminophen (Norco) 5-325 MG per tablet 90 tablet 0     Si.5 tablets twice daily as needed for pain        [id number] [address] [place] [address]  AT [facility] [phone] [facility] [phone] FX     Last office visit with prescribing clinician: 2025   Last telemedicine visit with prescribing clinician: Visit date not found   Next office visit with prescribing clinician: 2025     Additional details provided by patient: PLEASE REFILL     Does the patient have less than a 3 day supply:  [] Yes  [x] No    Would you like a call back once the refill request has been completed: [] Yes [x] No    If the office needs to give you a call back, can they leave a voicemail: [] Yes [x] No    Sanaz Nuñez   25 11:47 EDT     
Event Note
Code flight/Event Note
Event Note
